# Patient Record
Sex: FEMALE | Race: WHITE | NOT HISPANIC OR LATINO | Employment: OTHER | URBAN - METROPOLITAN AREA
[De-identification: names, ages, dates, MRNs, and addresses within clinical notes are randomized per-mention and may not be internally consistent; named-entity substitution may affect disease eponyms.]

---

## 2017-01-06 ENCOUNTER — OFFICE VISIT (OUTPATIENT)
Dept: FAMILY MEDICINE | Facility: CLINIC | Age: 62
End: 2017-01-06
Payer: COMMERCIAL

## 2017-01-06 VITALS
RESPIRATION RATE: 18 BRPM | BODY MASS INDEX: 28.33 KG/M2 | WEIGHT: 176.25 LBS | DIASTOLIC BLOOD PRESSURE: 58 MMHG | SYSTOLIC BLOOD PRESSURE: 118 MMHG | OXYGEN SATURATION: 99 % | HEART RATE: 68 BPM | TEMPERATURE: 97.8 F | HEIGHT: 66 IN

## 2017-01-06 DIAGNOSIS — J01.00 ACUTE MAXILLARY SINUSITIS, RECURRENCE NOT SPECIFIED: Primary | ICD-10-CM

## 2017-01-06 DIAGNOSIS — C44.310 BCC (BASAL CELL CARCINOMA), FACE: ICD-10-CM

## 2017-01-06 LAB
DEPRECATED S PYO AG THROAT QL EIA: NORMAL
MICRO REPORT STATUS: NORMAL
SPECIMEN SOURCE: NORMAL

## 2017-01-06 PROCEDURE — 99213 OFFICE O/P EST LOW 20 MIN: CPT | Performed by: PHYSICIAN ASSISTANT

## 2017-01-06 PROCEDURE — 87880 STREP A ASSAY W/OPTIC: CPT | Performed by: PHYSICIAN ASSISTANT

## 2017-01-06 PROCEDURE — 87081 CULTURE SCREEN ONLY: CPT | Performed by: PHYSICIAN ASSISTANT

## 2017-01-06 RX ORDER — AZITHROMYCIN 250 MG/1
TABLET, FILM COATED ORAL
Qty: 6 TABLET | Refills: 0 | Status: SHIPPED | OUTPATIENT
Start: 2017-01-06 | End: 2017-01-16

## 2017-01-06 NOTE — NURSING NOTE
"Chief Complaint   Patient presents with     Throat Problem     since yesterday       Initial /58 mmHg  Pulse 68  Temp(Src) 97.8  F (36.6  C) (Tympanic)  Resp 18  Ht 5' 6\" (1.676 m)  Wt 176 lb 4 oz (79.946 kg)  BMI 28.46 kg/m2  SpO2 99%  LMP 01/01/2004 (Approximate)  Breastfeeding? No Estimated body mass index is 28.46 kg/(m^2) as calculated from the following:    Height as of this encounter: 5' 6\" (1.676 m).    Weight as of this encounter: 176 lb 4 oz (79.946 kg).  BP completed using cuff size: large    Patient would like to be notified at the following phone number for results from this visit   [phone] OK to leave message   Rebecca Garcia CMA (AAMA) 1/6/2017 9:17 AM      "

## 2017-01-06 NOTE — MR AVS SNAPSHOT
"              After Visit Summary   1/6/2017    Flor Griffin    MRN: [id number]           Patient Information     Date Of Birth          1955        Visit Information        Provider Department      1/6/2017 9:30 AM Rocío Donahue PA-C [facility]        Today's Diagnoses     Acute maxillary sinusitis, recurrence not specified    -  1     BCC (basal cell carcinoma), face            Follow-ups after your visit        Your next 10 appointments already scheduled     Jan 06, 2017  9:30 AM   SHORT with Rocío Donahue PA-C   [facility]    [address] [address] [phone]            Jan 09, 2017  9:00 AM   PHYSICAL with Suzanne Goode MD   [facility]    [address] [address] [phone]              Who to contact     If you have questions or need follow up information about today's clinic visit or your schedule please contact [facility] directly at [phone].  Normal or non-critical lab and imaging results will be communicated to you by MyChart, letter or phone within 4 business days after the clinic has received the results. If you do not hear from us within 7 days, please contact the clinic through MyChart or phone. If you have a critical or abnormal lab result, we will notify you by phone as soon as possible.  Submit refill requests through [employer] or call your pharmacy and they will forward the refill request to us. Please allow 3 business days for your refill to be completed.          Additional Information About Your Visit        MyChart Information     [email or web address] lets you send messages to your doctor, view your test results, renew your prescriptions, schedule appointments and more. To sign up, go to [email or web address] . Click on \"Log in\" on the left side of the screen, which will take you to the " "Welcome page. Then click on \"Sign up Now\" on the right side of the page.     You will be asked to enter the access code listed below, as well as some personal information. Please follow the directions to create your username and password.     Your access code is: [email or web address]  Expires: 2017  9:28 AM     Your access code will  in 90 days. If you need help or a new code, please call your [facility] clinic or [phone].        Care EveryWhere ID     This is your Care EveryWhere ID. This could be used by other organizations to access your [facility] medical records  [id number]        Your Vitals Were     Pulse Temperature Respirations    68 97.8  F (36.6  C) (Tympanic) 18    Height BMI (Body Mass Index) Pulse Oximetry    5' 6\" (1.676 m) 28.46 kg/m2 99%    Last Period Breastfeeding?       2004 (Approximate) No        Blood Pressure from Last 3 Encounters:   17 118/58   16 120/58   04/29/15 122/68    Weight from Last 3 Encounters:   17 176 lb 4 oz (79.946 kg)   16 179 lb 5 oz (81.336 kg)   04/29/15 179 lb 14.4 oz (81.602 kg)              We Performed the Following     Beta strep group A culture     Rapid strep screen          Today's Medication Changes          These changes are accurate as of: 17  9:28 AM.  If you have any questions, ask your nurse or doctor.               Start taking these medicines.        Dose/Directions    azithromycin 250 MG tablet   Commonly known as:  ZITHROMAX   Used for:  Acute maxillary sinusitis, recurrence not specified   Started by:  Rocío Donahue PA-C        Two tablets first day, then one tablet daily for four days.   Quantity:  6 tablet   Refills:  0            Where to get your medicines      These medications were sent to [facility] - [place], [place] - [address] AT [address]  [address], [facility] [address]     Phone:  [phone]    - azithromycin 250 MG tablet          "    Primary Care Provider Office Phone # Fax #    Suzanne Goode [phone]       [facility] [address]  [facility] [address]        Thank you!     Thank you for choosing [facility]  for your care. Our goal is always to provide you with excellent care. Hearing back from our patients is one way we can continue to improve our services. Please take a few minutes to complete the written survey that you may receive in the mail after your visit with us. Thank you!             Your Updated Medication List - Protect others around you: Learn how to safely use, store and throw away your medicines at www.disposemymeds.org.          This list is accurate as of: 1/6/17  9:28 AM.  Always use your most recent med list.                   Brand Name Dispense Instructions for use    aspirin 81 MG tablet      Take 81 mg by mouth daily       azithromycin 250 MG tablet    ZITHROMAX    6 tablet    Two tablets first day, then one tablet daily for four days.       CHOLEST OFF COMPLETE PO      Take 1 tablet by mouth daily       OMEGA-3 FISH OIL PO      Take 1 g by mouth daily       VITAMIN D (CHOLECALCIFEROL) PO      Take 1,000 Units by mouth daily

## 2017-01-06 NOTE — PROGRESS NOTES
"  SUBJECTIVE:                                                    Folr Griffin is a 61 year old female who presents to clinic today for the following health issues:      RESPIRATORY SYMPTOMS      Duration: x 1 month intermittently    Description  Productive Cough, Fatigue, Sore Throat Since Last Night    Severity: moderate    Accompanying signs and symptoms: None    History (predisposing factors):  none    Precipitating or alleviating factors: None    Therapies tried and outcome:  Ibuprofen--Per Patient Temporary Relief        Patient is here today complaining of persistent cough ongoing for 1 month  She states it is productive with \"slime\"  No fever, + chills, and achy and tired  + runny nose and sore throat  Some of the other symptoms come and go  Taking OTC Ibuprofen and Vit C without relief      Problem list and histories reviewed & adjusted, as indicated.  Additional history: as documented    Patient Active Problem List   Diagnosis     Hyperlipidemia LDL goal <160     Heartburn     Vitamin D deficiency     BCC (basal cell carcinoma), face     Past Surgical History   Procedure Laterality Date     Mohs micrographic procedure  ~2010     right cheek; BCC     Colonoscopy         Social History   Substance Use Topics     Smoking status: Never Smoker      Smokeless tobacco: Never Used     Alcohol Use: 0.0 oz/week     0 Standard drinks or equivalent per week     Family History   Problem Relation Age of Onset     Lupus Mother      HEART DISEASE Mother      CHF     DIABETES Father      Breast Cancer Other          Current Outpatient Prescriptions   Medication Sig Dispense Refill     azithromycin (ZITHROMAX) 250 MG tablet Two tablets first day, then one tablet daily for four days. 6 tablet 0     VITAMIN D, CHOLECALCIFEROL, PO Take 1,000 Units by mouth daily       Omega-3 Fatty Acids (OMEGA-3 FISH OIL PO) Take 1 g by mouth daily       Plant Sterol Stanol-Pantethine (CHOLEST OFF COMPLETE PO) Take 1 tablet by mouth daily  " "     aspirin 81 MG tablet Take 81 mg by mouth daily       Allergies   Allergen Reactions     Penicillins Hives       ROS:  Constitutional, HEENT, cardiovascular, pulmonary, gi and gu systems are negative, except as otherwise noted.    OBJECTIVE:                                                    /58 mmHg  Pulse 68  Temp(Src) 97.8  F (36.6  C) (Tympanic)  Resp 18  Ht 5' 6\" (1.676 m)  Wt 176 lb 4 oz (79.946 kg)  BMI 28.46 kg/m2  SpO2 99%  LMP 01/01/2004 (Approximate)  Breastfeeding? No  Body mass index is 28.46 kg/(m^2).  GENERAL: healthy, alert and no distress  EYES: Eyes grossly normal to inspection, PERRL and conjunctivae and sclerae normal  HENT: normal cephalic/atraumatic, ear canals and TM's normal, nose and mouth without ulcers or lesions, oropharynx clear, oral mucous membranes moist and sinuses: maxillary tenderness on bilateral, + post nasal drip  NECK: no adenopathy, no asymmetry, masses, or scars and thyroid normal to palpation  RESP: lungs clear to auscultation - no rales, rhonchi or wheezes  CV: regular rate and rhythm, normal S1 S2, no S3 or S4, no murmur, click or rub, no peripheral edema and peripheral pulses strong  MS: no gross musculoskeletal defects noted, no edema    Diagnostic Test Results:  none      ASSESSMENT/PLAN:                                                            1. Acute maxillary sinusitis, recurrence not specified  New problem, RS -, TC pending, will treat with Z sumit and recommend continual use of OTCs. Push fluids. F/U if symptoms worsen or do not improve.  - Rapid strep screen  - Beta strep group A culture  - azithromycin (ZITHROMAX) 250 MG tablet; Two tablets first day, then one tablet daily for four days.  Dispense: 6 tablet; Refill: 0    2. BCC (basal cell carcinoma), face  New problem, being followed by my Dermatology.      Risks, benefits and alternatives were discussed with patient. Agreeable to the plan of care.      OSCAR Schroeder " [facility]

## 2017-01-08 LAB
BACTERIA SPEC CULT: NORMAL
MICRO REPORT STATUS: NORMAL
SPECIMEN SOURCE: NORMAL

## 2017-01-16 ENCOUNTER — OFFICE VISIT (OUTPATIENT)
Dept: FAMILY MEDICINE | Facility: CLINIC | Age: 62
End: 2017-01-16
Payer: COMMERCIAL

## 2017-01-16 VITALS
RESPIRATION RATE: 15 BRPM | DIASTOLIC BLOOD PRESSURE: 54 MMHG | TEMPERATURE: 98 F | HEIGHT: 66 IN | SYSTOLIC BLOOD PRESSURE: 102 MMHG | WEIGHT: 176 LBS | OXYGEN SATURATION: 99 % | BODY MASS INDEX: 28.28 KG/M2 | HEART RATE: 70 BPM

## 2017-01-16 DIAGNOSIS — Z00.00 ROUTINE GENERAL MEDICAL EXAMINATION AT A HEALTH CARE FACILITY: Primary | ICD-10-CM

## 2017-01-16 DIAGNOSIS — E78.5 HYPERLIPIDEMIA LDL GOAL <160: ICD-10-CM

## 2017-01-16 DIAGNOSIS — Z12.31 VISIT FOR SCREENING MAMMOGRAM: ICD-10-CM

## 2017-01-16 DIAGNOSIS — E55.9 VITAMIN D DEFICIENCY: ICD-10-CM

## 2017-01-16 PROCEDURE — 99396 PREV VISIT EST AGE 40-64: CPT | Performed by: INTERNAL MEDICINE

## 2017-01-16 NOTE — LETTER
[facility]  [address] [address]  [phone]        March 28, 2017    Flor Griffin  [address] [address]              Dear Flor Griffin    This is to remind you that your fasting labs are  due.    You may call our office at [phone] schedule an appointment.    Please disregard this notice if you have already had your labs drawn or made an appointment.        Sincerely,        Suzanne Goode MD and [facility] lab staff

## 2017-01-16 NOTE — PROGRESS NOTES
SUBJECTIVE:     CC: Flor Griffin is an 61 year old woman who presents for preventive health visit.     Healthy Habits:    Do you get at least three servings of calcium containing foods daily (dairy, green leafy vegetables, etc.)? yes    Amount of exercise or daily activities, outside of work: 3-4 day(s) per week    Problems taking medications regularly not applicable    Medication side effects: No    Have you had an eye exam in the past two years? yes    Do you see a dentist twice per year? yes    Do you have sleep apnea, excessive snoring or daytime drowsiness?no        HISTORY OF PRESENT ILLNESS:    Hyperlipidemia:  The patient presents to the clinic for a hyperlipidemia follow-up.       Use of Krill oil and Cholestoff - has not been taking Choelstoff routinely, but thinks she needs to start backup on it; has been trying to intervene her elevated LDL cholesterol naturally through exercise. Over the weekend, she snoeshoed through [facility] in [place], [place].    Basal Cell Carcinoma:  The patient reports a history of basal cell carcinoma, which were located on her right-sided nose and cheek. Back in 2010, the patient's right cheek cell was removed via the Mohs procedure and on 10/31/2016, Dr. Nicholas removed the cell on her right-sided nose via the Mohs procedure. After both surgeries, she had plastic surgery performed to fill-in the removed skin. The excess skin was taken from her ear and placed on the two areas mentioned. After the procedure in 2016, the patient noticed a lump just above her right clavicle, which was diagnosed as a swollen lymph node. She states the node has not significantly changed since her surgery, but would still like to have it evaluated again.    Heartburn:  When asked, the patient reported requiring Tums three times per week for heartburn relief. In the past, she has taken Omeprazole with relief, but stopped taking it due to the negative effects it has on Vitamin D  absorption.     Physical:  Outside of these three complaints, the patient is here for a routine wellness exam and has no additional medical complaints at this time.         Today's PHQ-2 Score:   PHQ-2 ( 1999 Pfizer) 1/16/2017 8/16/2016   Q1: Little interest or pleasure in doing things 0 0   Q2: Feeling down, depressed or hopeless 0 0   PHQ-2 Score 0 0     Abuse: Current or Past(Physical, Sexual or Emotional)- No  Do you feel safe in your environment - Yes    Social History   Substance Use Topics     Smoking status: Never Smoker      Smokeless tobacco: Never Used     Alcohol Use: 0.0 oz/week     0 Standard drinks or equivalent per week     The patient does not drink >3 drinks per day nor >7 drinks per week.    Recent Labs   Lab Test  04/08/15   0802   CHOL  290*   HDL  59   LDL  204*   TRIG  137   CHOLHDLRATIO  4.9     Reviewed orders with patient.  Reviewed health maintenance and updated orders accordingly - Yes    Mammo Decision Support:  Patient over age 50, mutual decision to screen reflected in health maintenance.    Pertinent mammograms are reviewed under the imaging tab.  History of abnormal Pap smear: NO - age 30- 65 PAP every 3 years recommended  All Histories reviewed and updated in Epic.  Past Medical History   Diagnosis Date     Hyperlipidaemia      Basal cell cancer      right cheek      Past Surgical History   Procedure Laterality Date     Mohs micrographic procedure  ~2010     right cheek; BCC     Colonoscopy         REVIEW OF SYSTEMS:  C: NEGATIVE for fever, chills, or change in weight  I: Hx of Basal Cell Carcinoma; NEGATIVE for worrisome skin rashes or moles  E: NEGATIVE for vision changes or irritation  E/M: NEGATIVE for ear, mouth and throat problems  R: NEGATIVE for significant cough or SOB  CV: NEGATIVE for chest pain, palpitations or peripheral edema  GI: Hx of Vitamin D Deficiency - use of Cholecalciferol 1,000 IUs; POSITIVE for intermittent Heartburn - use of Tums; NEGATIVE for nausea,  "abdominal pain, or change in bowel habits  B: NEGATIVE for masses, tenderness, or nipple discharge  M: NEGATIVE for significant arthralgias or myalgia  N: NEGATIVE for weakness, dizziness or paresthesias  E: Hx of Hyperlipidemia- use of Cholest Off and Krill Oil; NEGATIVE for temperature intolerance, or skin/hair changes  H: POSITIVE for a swollen lymph node; NEGATIVE for bleeding problems  P: NEGATIVE for changes in mood or affect    This document serves as a record of the services and decisions personally performed and made by Suzanne Goode MD. It was created on her behalf by Jes Amor, a trained medical scribe. The creation of this document is based the provider's statements to the medical scribe.  Jes Amor, January 16, 2017 9:44 AM     Problem list, Medication list, Allergies, and Medical/Social/Surgical histories reviewed in [facility] and updated as appropriate.  Labs reviewed in EPIC  OBJECTIVE:     /54 mmHg  Pulse 70  Temp(Src) 98  F (36.7  C) (Oral)  Resp 15  Ht 5' 6\" (1.676 m)  Wt 176 lb (79.833 kg)  BMI 28.42 kg/m2  SpO2 99%  LMP 01/01/2004 (Approximate)    EXAM:  GENERAL: Patient appears healthy, alert and not distressed.  EYES: Eyes appear grossly normal to inspection, with normal conjunctivae and sclerae  HENT: Ear canals and TM's appear normal, mouth without ulcers or lesions, oropharynx is clear and oral mucous membranes are moist  NECK: Small, enlarged, but unchanged lymph node present 3 or 4 mm above the right clavicle; no asymmetry, masses, or scars noted, thyroid is normal to palpation  RESP: Lungs are clear to auscultation - no rales, rhonchi or wheezes present  CV: Regular rate and rhythm, normal S1 S2 heart sounds, no ectopy, no peripheral edema present, peripheral pulses normal, no carotid bruit.  ABDOMEN: Soft, nontender, no hepatosplenomegaly, no masses, normal bowel sounds  BREAST: normal without masses, tenderness or nipple discharge and no palpable axillary masses or " "adenopathy  MS: No gross musculoskeletal defects noted, no edema, gait is age appropriate without ataxia  SKIN: No suspicious rashes, lesions, or moles.  NEURO: Patient exhibits normal strength and tone, normal speech and mentation  PSYCH: Mentation appears normal, affect is normal/bright    Diagnostic Test Results:  No results found for this or any previous visit (from the past 24 hour(s)).     ASSESSMENT/PLAN:     (Z00.00) Routine general medical examination at a health care facility  (primary encounter diagnosis)  Comment: Annual preventative visit. Medications reviewed with patient. TDAP 2014  Continue to eat healthy and get regular exercise - goal of 150 minutes per week  Plan: Schedule an appointment one year from now for another annual wellness check.    (E78.5) Hyperlipidemia LDL goal <160  Comment: LDL from 4/2015 was reviewed - 204; Using Krill Oil and Cholest-off for management; Statins not optimally tolerated; Fasting labs today to recheck Cholesterol levels.  Pooled cohort risk assessment- low  Plan: Comprehensive metabolic panel, Lipid panel reflex to direct LDL          (Z12.31) Visit for screening mammogram  Comment: Due for mammogram screening in April or early May; Referred to [facility] for screening.  Plan: MA Screening Digital Bilateral          (E55.9) Vitamin D deficiency  Comment: Currently taking Vitamin D 1,000 units once daily; Future labs ordered to recheck Vitamin D levels.  Plan: Vitamin D deficiency screening            COUNSELING:   Reviewed preventive health counseling, as reflected in patient instructions       Regular exercise       Healthy diet/nutrition       Osteoporosis Prevention/Bone Health     reports that she has never smoked. She has never used smokeless tobacco.  Estimated body mass index is 28.42 kg/(m^2) as calculated from the following:    Height as of this encounter: 5' 6\" (1.676 m).    Weight as of this encounter: 176 lb (79.833 kg).   Weight " management plan: Encouraged to continue exercising regularly and to maintain a healthy, well-balanced diet.    Counseling Resources:  ATP IV Guidelines  Pooled Cohorts Equation Calculator  Breast Cancer Risk Calculator  FRAX Risk Assessment  ICSI Preventive Guidelines  Dietary Guidelines for Americans, 2010  USDA's MyPlate  ASA Prophylaxis  Lung CA Screening    The information in this document, created by a medical scribe for me, accurately reflects the services I personally performed and the decisions made by me. I have reviewed and approved this document for accuracy.  Dr. Suzanne Goode, 10:04 AM, January 16, 2017    Suzanne Goode MD  Internal Medicine   [facility]

## 2017-01-16 NOTE — MR AVS SNAPSHOT
After Visit Summary   1/16/2017    Flor Griffin    MRN: [id number]           Patient Information     Date Of Birth          1955        Visit Information        Provider Department      1/16/2017 9:00 AM Suzanne Goode MD [facility] [place]        Today's Diagnoses     Routine general medical examination at a health care facility    -  1     Hyperlipidemia LDL goal <160         Visit for screening mammogram         Vitamin D deficiency           Care Instructions      Preventive Health Recommendations  Female Ages 50 - 64    Yearly exam: See your health care provider every year in order to  o Review health changes.   o Discuss preventive care.    o Review your medicines if your doctor has prescribed any.      Get a Pap test every three years (unless you have an abnormal result and your provider advises testing more often).    If you get Pap tests with HPV test, you only need to test every 5 years, unless you have an abnormal result.     You do not need a Pap test if your uterus was removed (hysterectomy) and you have not had cancer.    You should be tested each year for STDs (sexually transmitted diseases) if you're at risk.     Have a mammogram every 1 to 2 years.    Have a colonoscopy at age 50, or have a yearly FIT test (stool test). These exams screen for colon cancer.      Have a cholesterol test every 5 years, or more often if advised.    Have a diabetes test (fasting glucose) every three years. If you are at risk for diabetes, you should have this test more often.     If you are at risk for osteoporosis (brittle bone disease), think about having a bone density scan (DEXA).    Shots: Get a flu shot each year. Get a tetanus shot every 10 years.    Nutrition:     Eat at least 5 servings of fruits and vegetables each day.    Eat whole-grain bread, whole-wheat pasta and brown rice instead of white grains and rice.    Talk to your provider about Calcium and Vitamin D.  "    Lifestyle    Exercise at least 150 minutes a week (30 minutes a day, 5 days a week). This will help you control your weight and prevent disease.    Limit alcohol to one drink per day.    No smoking.     Wear sunscreen to prevent skin cancer.     See your dentist every six months for an exam and cleaning.    See your eye doctor every 1 to 2 years.            Follow-ups after your visit        Future tests that were ordered for you today     Open Future Orders        Priority Expected Expires Ordered    MA Screening Digital Bilateral Routine  1/16/2018 1/16/2017    Comprehensive metabolic panel Routine 3/16/2017 7/16/2017 1/16/2017    Lipid panel reflex to direct LDL Routine 3/16/2017 7/16/2017 1/16/2017    Vitamin D deficiency screening Routine 3/16/2017 7/16/2017 1/16/2017            Who to contact     If you have questions or need follow up information about today's clinic visit or your schedule please contact [facility] directly at [phone].  Normal or non-critical lab and imaging results will be communicated to you by Intervention Insightshart, letter or phone within 4 business days after the clinic has received the results. If you do not hear from us within 7 days, please contact the clinic through Proficientt or phone. If you have a critical or abnormal lab result, we will notify you by phone as soon as possible.  Submit refill requests through T-ZONE or call your pharmacy and they will forward the refill request to us. Please allow 3 business days for your refill to be completed.          Additional Information About Your Visit        Intervention InsightsharStrongSteam Information     T-ZONE lets you send messages to your doctor, view your test results, renew your prescriptions, schedule appointments and more. To sign up, go to [email or web address] . Click on \"Log in\" on the left side of the screen, which will take you to the Welcome page. Then click on \"Sign up Now\" on the right side of the page.     You will be asked to enter " "the access code listed below, as well as some personal information. Please follow the directions to create your username and password.     Your access code is: [email or web address]  Expires: 2017  9:28 AM     Your access code will  in 90 days. If you need help or a new code, please call your [facility] clinic or [phone].        Care EveryWhere ID     This is your Care EveryWhere ID. This could be used by other organizations to access your [facility] medical records  [id number]        Your Vitals Were     Pulse Temperature Respirations    70 98  F (36.7  C) (Oral) 15    Height BMI (Body Mass Index) Pulse Oximetry    5' 6\" (1.676 m) 28.42 kg/m2 99%    Last Period          2004 (Approximate)         Blood Pressure from Last 3 Encounters:   17 102/54   17 118/58   16 120/58    Weight from Last 3 Encounters:   17 176 lb (79.833 kg)   17 176 lb 4 oz (79.946 kg)   16 179 lb 5 oz (81.336 kg)               Primary Care Provider Office Phone # Fax #    Suzanne Goode [phone]       [facility] [address] [facility] [address]        Thank you!     Thank you for choosing [facility]  for your care. Our goal is always to provide you with excellent care. Hearing back from our patients is one way we can continue to improve our services. Please take a few minutes to complete the written survey that you may receive in the mail after your visit with us. Thank you!             Your Updated Medication List - Protect others around you: Learn how to safely use, store and throw away your medicines at www.disposemymeds.org.          This list is accurate as of: 17  9:57 AM.  Always use your most recent med list.                   Brand Name Dispense Instructions for use    aspirin 81 MG tablet      Take 81 mg by mouth daily       CHOLEST OFF COMPLETE PO      Take 1 tablet by mouth daily       OMEGA-3 FISH OIL PO      Take 1 g by " mouth daily       VITAMIN D (CHOLECALCIFEROL) PO      Take 1,000 Units by mouth daily

## 2017-04-11 ENCOUNTER — RADIANT APPOINTMENT (OUTPATIENT)
Dept: MAMMOGRAPHY | Facility: CLINIC | Age: 62
End: 2017-04-11
Attending: INTERNAL MEDICINE
Payer: COMMERCIAL

## 2017-04-11 DIAGNOSIS — Z12.31 VISIT FOR SCREENING MAMMOGRAM: ICD-10-CM

## 2017-04-11 PROCEDURE — G0202 SCR MAMMO BI INCL CAD: HCPCS | Mod: TC

## 2017-06-02 ENCOUNTER — OFFICE VISIT (OUTPATIENT)
Dept: FAMILY MEDICINE | Facility: CLINIC | Age: 62
End: 2017-06-02
Payer: COMMERCIAL

## 2017-06-02 VITALS
DIASTOLIC BLOOD PRESSURE: 78 MMHG | SYSTOLIC BLOOD PRESSURE: 116 MMHG | HEIGHT: 66 IN | WEIGHT: 184 LBS | RESPIRATION RATE: 12 BRPM | BODY MASS INDEX: 29.57 KG/M2 | HEART RATE: 81 BPM | OXYGEN SATURATION: 99 % | TEMPERATURE: 98.3 F

## 2017-06-02 DIAGNOSIS — J06.9 VIRAL URI WITH COUGH: Primary | ICD-10-CM

## 2017-06-02 PROCEDURE — 99213 OFFICE O/P EST LOW 20 MIN: CPT | Performed by: FAMILY MEDICINE

## 2017-06-02 RX ORDER — PREDNISONE 20 MG/1
20 TABLET ORAL DAILY
Qty: 5 TABLET | Refills: 0 | Status: SHIPPED | OUTPATIENT
Start: 2017-06-02 | End: 2017-06-07

## 2017-06-02 NOTE — NURSING NOTE
"Chief Complaint   Patient presents with     URI       Initial /78 (BP Location: Left arm, Patient Position: Chair, Cuff Size: Adult Large)  Pulse 81  Temp 98.3  F (36.8  C) (Oral)  Resp 12  Ht 5' 6\" (1.676 m)  Wt 184 lb (83.5 kg)  LMP 01/01/2004 (Approximate)  SpO2 99%  BMI 29.7 kg/m2 Estimated body mass index is 29.7 kg/(m^2) as calculated from the following:    Height as of this encounter: 5' 6\" (1.676 m).    Weight as of this encounter: 184 lb (83.5 kg).  Medication Reconciliation: complete   Harish Wilhelm CMA       "

## 2017-06-02 NOTE — PATIENT INSTRUCTIONS
Follow up as needed  Viral Upper Respiratory Illness (Adult)  You have a viral upper respiratory illness (URI), which is another term for the common cold. This illness is contagious during the first few days. It is spread through the air by coughing and sneezing. It may also be spread by direct contact (touching the sick person and then touching your own eyes, nose, or mouth). Frequent handwashing will decrease risk of spread. Most viral illnesses go away within 7 to 10 days with rest and simple home remedies. Sometimes the illness may last for several weeks. Antibiotics will not kill a virus, and they are generally not prescribed for this condition.    Home care    If symptoms are severe, rest at home for the first 2 to 3 days. When you resume activity, don't let yourself get too tired.    Avoid being exposed to cigarette smoke (yours or others ).    You may use acetaminophen or ibuprofen to control pain and fever, unless another medicine was prescribed. (Note: If you have chronic liver or kidney disease, have ever had a stomach ulcer or gastrointestinal bleeding, or are taking blood-thinning medicines, talk with your healthcare provider before using these medicines.) Aspirin should never be given to anyone under 18 years of age who is ill with a viral infection or fever. It may cause severe liver or brain damage.    Your appetite may be poor, so a light diet is fine. Avoid dehydration by drinking 6 to 8 glasses of fluids per day (water, soft drinks, juices, tea, or soup). Extra fluids will help loosen secretions in the nose and lungs.    Over-the-counter cold medicines will not shorten the length of time you re sick, but they may be helpful for the following symptoms: cough, sore throat, and nasal and sinus congestion. (Note: Do not use decongestants if you have high blood pressure.)  Follow-up care  Follow up with your healthcare provider, or as advised.  When to seek medical advice  Call your healthcare  provider right away if any of these occur:    Cough with lots of colored sputum (mucus)    Severe headache; face, neck, or ear pain    Difficulty swallowing due to throat pain    Fever of 100.4 F (38 C)  Call 911, or get immediate medical care  Call emergency services right away if any of these occur:    Chest pain, shortness of breath, wheezing, or difficulty breathing    Coughing up blood    Inability to swallow due to throat pain    5950-0681 [employer]. [address] [place] [address]. All rights reserved. This information is not intended as a substitute for professional medical care. Always follow your healthcare professional's instructions.

## 2017-06-02 NOTE — PROGRESS NOTES
SUBJECTIVE:                                                    Flor Griffin is a 62 year old female who presents to clinic today for the following health issues:      Acute Illness   Acute illness concerns: upper respiratory  Onset: started 3 weeks ago got better for 4 days and then got sick again last night     Fever: 3 weeks ago     Chills/Sweats: slight    Headache (location?): YES- a little    Sinus Pressure:YES    Conjunctivitis:  no    Ear Pain: YES: both    Rhinorrhea: YES    Congestion: YES    Sore Throat: no     PND: YES     Cough: YES- feels congested     Wheeze: no    Decreased Appetite: YES    Nausea: no     Vomiting: no     Diarrhea:  YES- one night    Dysuria/Freq.: no     Fatigue/Achiness: YES- fatigue only    Sick/Strep Exposure: yes- grandkids have colds     Therapies Tried and outcome: Mucinex DM and Tylenol helped a little    Also power washed her patio over the weekend and thinks there was some mold that irritated her throat.       Problem list and histories reviewed & adjusted, as indicated.  Additional history: as documented    Patient Active Problem List   Diagnosis     Hyperlipidemia LDL goal <160     Heartburn     Vitamin D deficiency     BCC (basal cell carcinoma), face     Past Surgical History:   Procedure Laterality Date     COLONOSCOPY       MOHS MICROGRAPHIC PROCEDURE  ~2010    right cheek; BCC     MOHS MICROGRAPHIC PROCEDURE  10/31/2016    Dr. Nicholas [facility]       Social History   Substance Use Topics     Smoking status: Never Smoker     Smokeless tobacco: Never Used     Alcohol use 0.0 oz/week     0 Standard drinks or equivalent per week     Family History   Problem Relation Age of Onset     Lupus Mother      HEART DISEASE Mother      CHF     DIABETES Father      Breast Cancer Other            Reviewed and updated as needed this visit by clinical staff       Reviewed and updated as needed this visit by Provider         ROS:  Constitutional, HEENT,  pulmonary, gi  systems are  "negative, except as otherwise noted.    OBJECTIVE:                                                    /78 (BP Location: Left arm, Patient Position: Chair, Cuff Size: Adult Large)  Pulse 81  Temp 98.3  F (36.8  C) (Oral)  Resp 12  Ht 5' 6\" (1.676 m)  Wt 184 lb (83.5 kg)  LMP 01/01/2004 (Approximate)  SpO2 99%  BMI 29.7 kg/m2  Body mass index is 29.7 kg/(m^2).  GENERAL: healthy, alert and no distress  HENT: normal cephalic/atraumatic, ear canals and TM's normal, nose and mouth without ulcers or lesions, oropharynx clear, oral mucous membranes moist, sinuses: not tender and congested  NECK: no adenopathy, no asymmetry, masses, or scars and thyroid normal to palpation  RESP: lungs clear to auscultation - no rales, rhonchi or wheezes  CV: regular rate and rhythm, normal S1 S2,   Diagnostic Test Results:  none      ASSESSMENT/PLAN:                                                        1. Viral URI with cough  - supportive care discussed. No indication for Abx at this time. Recommend mucinex OTC, honey and plenty of fluids. If symptoms persist or worsen in the next 10-14 days patient to follow up with provider.   - predniSONE (DELTASONE) 20 MG tablet; Take 1 tablet (20 mg) by mouth daily for 5 days  Dispense: 5 tablet; Refill: 0    See Patient Instructions    Kate Johnson MD  [facility]  "

## 2017-06-02 NOTE — MR AVS SNAPSHOT
After Visit Summary   6/2/2017    Flor Griffin    MRN: [id number]           Patient Information     Date Of Birth          1955        Visit Information        Provider Department      6/2/2017 2:15 PM Kate Johnson MD [facility]        Today's Diagnoses     Viral URI with cough    -  1      Care Instructions      Follow up as needed  Viral Upper Respiratory Illness (Adult)  You have a viral upper respiratory illness (URI), which is another term for the common cold. This illness is contagious during the first few days. It is spread through the air by coughing and sneezing. It may also be spread by direct contact (touching the sick person and then touching your own eyes, nose, or mouth). Frequent handwashing will decrease risk of spread. Most viral illnesses go away within 7 to 10 days with rest and simple home remedies. Sometimes the illness may last for several weeks. Antibiotics will not kill a virus, and they are generally not prescribed for this condition.    Home care    If symptoms are severe, rest at home for the first 2 to 3 days. When you resume activity, don't let yourself get too tired.    Avoid being exposed to cigarette smoke (yours or others ).    You may use acetaminophen or ibuprofen to control pain and fever, unless another medicine was prescribed. (Note: If you have chronic liver or kidney disease, have ever had a stomach ulcer or gastrointestinal bleeding, or are taking blood-thinning medicines, talk with your healthcare provider before using these medicines.) Aspirin should never be given to anyone under 18 years of age who is ill with a viral infection or fever. It may cause severe liver or brain damage.    Your appetite may be poor, so a light diet is fine. Avoid dehydration by drinking 6 to 8 glasses of fluids per day (water, soft drinks, juices, tea, or soup). Extra fluids will help loosen secretions in the nose and  lungs.    Over-the-counter cold medicines will not shorten the length of time you re sick, but they may be helpful for the following symptoms: cough, sore throat, and nasal and sinus congestion. (Note: Do not use decongestants if you have high blood pressure.)  Follow-up care  Follow up with your healthcare provider, or as advised.  When to seek medical advice  Call your healthcare provider right away if any of these occur:    Cough with lots of colored sputum (mucus)    Severe headache; face, neck, or ear pain    Difficulty swallowing due to throat pain    Fever of 100.4 F (38 C)  Call 911, or get immediate medical care  Call emergency services right away if any of these occur:    Chest pain, shortness of breath, wheezing, or difficulty breathing    Coughing up blood    Inability to swallow due to throat pain    5587-3449 [employer]. [address] [place] [address]. All rights reserved. This information is not intended as a substitute for professional medical care. Always follow your healthcare professional's instructions.                Follow-ups after your visit        Who to contact     If you have questions or need follow up information about today's clinic visit or your schedule please contact [facility] directly at [phone].  Normal or non-critical lab and imaging results will be communicated to you by Cogohart, letter or phone within 4 business days after the clinic has received the results. If you do not hear from us within 7 days, please contact the clinic through Cogohart or phone. If you have a critical or abnormal lab result, we will notify you by phone as soon as possible.  Submit refill requests through MyDemocracy or call your pharmacy and they will forward the refill request to us. Please allow 3 business days for your refill to be completed.          Additional Information About Your Visit        MyDemocracy Information     MyDemocracy lets you send messages to  "your doctor, view your test results, renew your prescriptions, schedule appointments and more. To sign up, go to [email or web address] . Click on \"Log in\" on the left side of the screen, which will take you to the Welcome page. Then click on \"Sign up Now\" on the right side of the page.     You will be asked to enter the access code listed below, as well as some personal information. Please follow the directions to create your username and password.     Your access code is: [email or web address]  Expires: 2017  2:34 PM     Your access code will  in 90 days. If you need help or a new code, please call your [facility] clinic or [phone].        Care EveryWhere ID     This is your Care EveryWhere ID. This could be used by other organizations to access your [facility] medical records  [id number]        Your Vitals Were     Pulse Temperature Respirations Height Last Period Pulse Oximetry    81 98.3  F (36.8  C) (Oral) 12 5' 6\" (1.676 m) 2004 (Approximate) 99%    BMI (Body Mass Index)                   29.7 kg/m2            Blood Pressure from Last 3 Encounters:   17 116/78   17 102/54   17 118/58    Weight from Last 3 Encounters:   17 184 lb (83.5 kg)   17 176 lb (79.8 kg)   17 176 lb 4 oz (79.9 kg)              Today, you had the following     No orders found for display         Today's Medication Changes          These changes are accurate as of: 17  2:34 PM.  If you have any questions, ask your nurse or doctor.               Start taking these medicines.        Dose/Directions    predniSONE 20 MG tablet   Commonly known as:  DELTASONE   Used for:  Viral URI with cough   Started by:  Kate Johnson MD        Dose:  20 mg   Take 1 tablet (20 mg) by mouth daily for 5 days   Quantity:  5 tablet   Refills:  0            Where to get your medicines      These medications were sent to [facility] [id number] [place] [address]  [address]  [address], [facility] [address]     Phone:  [phone]     predniSONE 20 MG tablet                Primary Care Provider Office Phone # Fax #    Suzanne Goode [phone]       [facility] [address]  [facility] [address]        Thank you!     Thank you for choosing [facility]  for your care. Our goal is always to provide you with excellent care. Hearing back from our patients is one way we can continue to improve our services. Please take a few minutes to complete the written survey that you may receive in the mail after your visit with us. Thank you!             Your Updated Medication List - Protect others around you: Learn how to safely use, store and throw away your medicines at www.disposemymeds.org.          This list is accurate as of: 6/2/17  2:34 PM.  Always use your most recent med list.                   Brand Name Dispense Instructions for use    aspirin 81 MG tablet      Take 81 mg by mouth daily       CHOLEST OFF COMPLETE PO      Take 1 tablet by mouth daily       OMEGA-3 FISH OIL PO      Take 1 g by mouth daily       predniSONE 20 MG tablet    DELTASONE    5 tablet    Take 1 tablet (20 mg) by mouth daily for 5 days       VITAMIN D (CHOLECALCIFEROL) PO      Take 1,000 Units by mouth daily

## 2017-07-03 ENCOUNTER — TELEPHONE (OUTPATIENT)
Dept: FAMILY MEDICINE | Facility: CLINIC | Age: 62
End: 2017-07-03

## 2017-07-03 NOTE — TELEPHONE ENCOUNTER
Pt calls.  She is looking for a med for a cold sore.  She says she has gotten acyclovir in the past,- it was pills.  Recently she had a really bad cold sore.  She does not have one right now, but wants to have one on hand.      Advised we probably should see her since we have not given her that med and it is not on her problem list.  She said she will call me back because she is doing  and she has to go.

## 2017-08-25 ENCOUNTER — OFFICE VISIT (OUTPATIENT)
Dept: FAMILY MEDICINE | Facility: CLINIC | Age: 62
End: 2017-08-25
Payer: COMMERCIAL

## 2017-08-25 ENCOUNTER — RADIANT APPOINTMENT (OUTPATIENT)
Dept: GENERAL RADIOLOGY | Facility: CLINIC | Age: 62
End: 2017-08-25
Attending: NURSE PRACTITIONER
Payer: COMMERCIAL

## 2017-08-25 VITALS
TEMPERATURE: 98.4 F | SYSTOLIC BLOOD PRESSURE: 118 MMHG | WEIGHT: 182.4 LBS | OXYGEN SATURATION: 99 % | BODY MASS INDEX: 29.44 KG/M2 | HEART RATE: 62 BPM | DIASTOLIC BLOOD PRESSURE: 72 MMHG

## 2017-08-25 DIAGNOSIS — M25.572 ACUTE LEFT ANKLE PAIN: ICD-10-CM

## 2017-08-25 DIAGNOSIS — M25.572 ACUTE LEFT ANKLE PAIN: Primary | ICD-10-CM

## 2017-08-25 PROCEDURE — 99213 OFFICE O/P EST LOW 20 MIN: CPT | Performed by: NURSE PRACTITIONER

## 2017-08-25 PROCEDURE — 73610 X-RAY EXAM OF ANKLE: CPT | Mod: LT

## 2017-08-25 NOTE — PATIENT INSTRUCTIONS
Rest, ice, elevate.  Wear the boot for the next 7 days when up and around.    If you continue to have symptoms after this you should see Dr. Bhardwaj, the podiatrist here on Wednesdays.

## 2017-08-25 NOTE — PROGRESS NOTES
SUBJECTIVE:   Flor Griffin is a 62 year old female who presents to clinic today for the following health issues:      Musculoskeletal problem/pain      Duration: 2 weeks    Description  Location: Left ankle    Intensity:  moderate    Accompanying signs and symptoms: swelling    History  Previous similar problem: no   Previous evaluation:  none    Precipitating or alleviating factors:  Trauma or overuse: no   Aggravating factors include: standing, walking and bumping the area    Therapies tried and outcome: ice, support wrap and NSAID - with some relief.    Pt presents with L ankle pain, present for about 2-3 weeks.  Believes it is because she has worn flip flops for most of the time, and has occasionally felt like she tweaked her ankle on uneven ground several times.  No traumatic injury.  Mild swelling over lateral aspect.  She feels better when she takes ibuprofen and wraps the ankle with tennis shoes.    Problem list and histories reviewed & adjusted, as indicated.  Additional history: as documented    Patient Active Problem List   Diagnosis     Hyperlipidemia LDL goal <160     Heartburn     Vitamin D deficiency     BCC (basal cell carcinoma), face     Past Surgical History:   Procedure Laterality Date     COLONOSCOPY       MOHS MICROGRAPHIC PROCEDURE  ~2010    right cheek; BCC     MOHS MICROGRAPHIC PROCEDURE  10/31/2016    Dr. Nicholas [facility]       Social History   Substance Use Topics     Smoking status: Never Smoker     Smokeless tobacco: Never Used     Alcohol use 0.0 oz/week     0 Standard drinks or equivalent per week     Family History   Problem Relation Age of Onset     Lupus Mother      HEART DISEASE Mother      CHF     DIABETES Father      Breast Cancer Other              Reviewed and updated as needed this visit by clinical staff     Reviewed and updated as needed this visit by Provider         ROS:  SEE HPI.    OBJECTIVE:     /72  Pulse 62  Temp 98.4  F (36.9  C) (Oral)  Wt 182 lb 6.4 oz  (82.7 kg)  LMP 01/01/2004 (Approximate)  SpO2 99%  BMI 29.44 kg/m2  Body mass index is 29.44 kg/(m^2).  GENERAL: healthy, alert and no distress  MS: L ankle without erythema.  Trace swelling over lateral malleolus.  No ttp throughout ankle or foot.  Mild pain with dorsiflexion and inversion and eversion of ankle.  PSYCH: mentation appears normal, affect normal/bright    Diagnostic Test Results:  none     ASSESSMENT/PLAN:   1. Acute left ankle pain  Likely tendonitis r/t poor footwear and overuse.  She will wear the cam boot for the next week to rest the ankle and then wear supportive footwear and wrap the ankle for support following that time.  She will monitor her progress and if symptoms persist she will see Dr. Bhardwaj.  Pt agrees with plan and verbalized understanding.  - XR Ankle Left G/E 3 Views; Future  - order for DME; Cam boot  Dispense: 1 Units; Refill: 0    HEDY Gale Ra, CNP  [facility]

## 2017-08-25 NOTE — NURSING NOTE
"Chief Complaint   Patient presents with     Musculoskeletal Problem       Initial /72  Pulse 62  Temp 98.4  F (36.9  C) (Oral)  Wt 182 lb 6.4 oz (82.7 kg)  LMP 01/01/2004 (Approximate)  SpO2 99%  BMI 29.44 kg/m2 Estimated body mass index is 29.44 kg/(m^2) as calculated from the following:    Height as of 6/2/17: 5' 6\" (1.676 m).    Weight as of this encounter: 182 lb 6.4 oz (82.7 kg).  Medication Reconciliation: complete   Yola HECTOR M.A.      "

## 2017-08-25 NOTE — MR AVS SNAPSHOT
"              After Visit Summary   8/25/2017    Flor Griffin    MRN: [id number]           Patient Information     Date Of Birth          1955        Visit Information        Provider Department      8/25/2017 2:00 PM Maggi Serrano Ra, APRN CNP [facility]        Today's Diagnoses     Acute left ankle pain    -  1      Care Instructions    Rest, ice, elevate.  Wear the boot for the next 7 days when up and around.    If you continue to have symptoms after this you should see Dr. Bhardwaj, the podiatrist here on Wednesdays.          Follow-ups after your visit        Future tests that were ordered for you today     Open Future Orders        Priority Expected Expires Ordered    XR Ankle Left G/E 3 Views Routine 8/25/2017 8/25/2018 8/25/2017            Who to contact     If you have questions or need follow up information about today's clinic visit or your schedule please contact [facility] directly at [phone].  Normal or non-critical lab and imaging results will be communicated to you by MyChart, letter or phone within 4 business days after the clinic has received the results. If you do not hear from us within 7 days, please contact the clinic through Hurray!hart or phone. If you have a critical or abnormal lab result, we will notify you by phone as soon as possible.  Submit refill requests through IdenIve or call your pharmacy and they will forward the refill request to us. Please allow 3 business days for your refill to be completed.          Additional Information About Your Visit        Hurray!hart Information     IdenIve lets you send messages to your doctor, view your test results, renew your prescriptions, schedule appointments and more. To sign up, go to [email or web address] . Click on \"Log in\" on the left side of the screen, which will take you to the Welcome page. Then click on \"Sign up Now\" on the right side of the page.     You will be asked to enter the access code " listed below, as well as some personal information. Please follow the directions to create your username and password.     Your access code is: [email or web address]  Expires: 2017  2:34 PM     Your access code will  in 90 days. If you need help or a new code, please call your [place] clinic or [phone].        Care EveryWhere ID     This is your Care EveryWhere ID. This could be used by other organizations to access your [place] medical records  [id number]        Your Vitals Were     Pulse Temperature Last Period Pulse Oximetry BMI (Body Mass Index)       62 98.4  F (36.9  C) (Oral) 2004 (Approximate) 99% 29.44 kg/m2        Blood Pressure from Last 3 Encounters:   17 118/72   17 116/78   17 102/54    Weight from Last 3 Encounters:   17 182 lb 6.4 oz (82.7 kg)   17 184 lb (83.5 kg)   17 176 lb (79.8 kg)                 Today's Medication Changes          These changes are accurate as of: 17  2:29 PM.  If you have any questions, ask your nurse or doctor.               Start taking these medicines.        Dose/Directions    order for DME   Used for:  Acute left ankle pain   Started by:  Maggi Serrano Ra, HEDY Rodriges boot   Quantity:  1 Units   Refills:  0            Where to get your medicines      Some of these will need a paper prescription and others can be bought over the counter.  Ask your nurse if you have questions.     Bring a paper prescription for each of these medications     order for DME                Primary Care Provider Office Phone # Fax #    Suzanne Goode [phone] [address] [facility] [address]        Equal Access to Services     TRICIA JARA AH: Mika Nolan, carole heard, qaphil kaalmada osmani, ketan connor. So [facility] [phone].    ATENCIÓN: Si habla español, tiene a thomas disposición servicios gratuitos de asistencia lingüística. Llame al  [phone].    We comply with applicable federal civil rights laws and [place] laws. We do not discriminate on the basis of race, color, national origin, age, disability sex, sexual orientation or gender identity.            Thank you!     Thank you for choosing [facility]  for your care. Our goal is always to provide you with excellent care. Hearing back from our patients is one way we can continue to improve our services. Please take a few minutes to complete the written survey that you may receive in the mail after your visit with us. Thank you!             Your Updated Medication List - Protect others around you: Learn how to safely use, store and throw away your medicines at www.disposemymeds.org.          This list is accurate as of: 8/25/17  2:29 PM.  Always use your most recent med list.                   Brand Name Dispense Instructions for use Diagnosis    aspirin 81 MG tablet      Take 81 mg by mouth daily        CHOLEST OFF COMPLETE PO      Take 1 tablet by mouth daily        OMEGA-3 FISH OIL PO      Take 1 g by mouth daily        order for DME     1 Units    Cam boot    Acute left ankle pain       VITAMIN D (CHOLECALCIFEROL) PO      Take 1,000 Units by mouth daily

## 2017-10-23 ENCOUNTER — OFFICE VISIT (OUTPATIENT)
Dept: FAMILY MEDICINE | Facility: CLINIC | Age: 62
End: 2017-10-23
Payer: COMMERCIAL

## 2017-10-23 VITALS
SYSTOLIC BLOOD PRESSURE: 122 MMHG | BODY MASS INDEX: 29.23 KG/M2 | WEIGHT: 181.9 LBS | TEMPERATURE: 98.1 F | HEIGHT: 66 IN | HEART RATE: 65 BPM | OXYGEN SATURATION: 98 % | DIASTOLIC BLOOD PRESSURE: 78 MMHG | RESPIRATION RATE: 16 BRPM

## 2017-10-23 DIAGNOSIS — J01.00 ACUTE NON-RECURRENT MAXILLARY SINUSITIS: Primary | ICD-10-CM

## 2017-10-23 DIAGNOSIS — Z23 NEED FOR PROPHYLACTIC VACCINATION AND INOCULATION AGAINST INFLUENZA: ICD-10-CM

## 2017-10-23 DIAGNOSIS — B00.1 COLD SORE: ICD-10-CM

## 2017-10-23 DIAGNOSIS — R59.0 SUPRACLAVICULAR LYMPHADENOPATHY: ICD-10-CM

## 2017-10-23 LAB
DEPRECATED S PYO AG THROAT QL EIA: NORMAL
SPECIMEN SOURCE: NORMAL

## 2017-10-23 PROCEDURE — 99214 OFFICE O/P EST MOD 30 MIN: CPT | Mod: 25 | Performed by: PHYSICIAN ASSISTANT

## 2017-10-23 PROCEDURE — 90686 IIV4 VACC NO PRSV 0.5 ML IM: CPT | Performed by: PHYSICIAN ASSISTANT

## 2017-10-23 PROCEDURE — 87081 CULTURE SCREEN ONLY: CPT | Performed by: PHYSICIAN ASSISTANT

## 2017-10-23 PROCEDURE — 87880 STREP A ASSAY W/OPTIC: CPT | Performed by: PHYSICIAN ASSISTANT

## 2017-10-23 PROCEDURE — 90471 IMMUNIZATION ADMIN: CPT | Performed by: PHYSICIAN ASSISTANT

## 2017-10-23 RX ORDER — VALACYCLOVIR HYDROCHLORIDE 1 G/1
1000 TABLET, FILM COATED ORAL 2 TIMES DAILY
Qty: 20 TABLET | Refills: 0 | Status: SHIPPED | OUTPATIENT
Start: 2017-10-23 | End: 2018-05-18

## 2017-10-23 RX ORDER — AZITHROMYCIN 250 MG/1
TABLET, FILM COATED ORAL
Qty: 6 TABLET | Refills: 0 | Status: SHIPPED | OUTPATIENT
Start: 2017-10-23 | End: 2017-11-14

## 2017-10-23 NOTE — NURSING NOTE
"Chief Complaint   Patient presents with     Sinus Problem       Initial /78 (BP Location: Right arm, Patient Position: Chair, Cuff Size: Adult Regular)  Pulse 65  Temp 98.1  F (36.7  C) (Oral)  Resp 16  Ht 5' 6\" (1.676 m)  Wt 181 lb 14.4 oz (82.5 kg)  LMP 01/01/2004 (Approximate)  SpO2 98%  Breastfeeding? No  BMI 29.36 kg/m2 Estimated body mass index is 29.36 kg/(m^2) as calculated from the following:    Height as of this encounter: 5' 6\" (1.676 m).    Weight as of this encounter: 181 lb 14.4 oz (82.5 kg).  Medication Reconciliation: complete   Alfonso Cervantes MA      "

## 2017-10-23 NOTE — MR AVS SNAPSHOT
"              After Visit Summary   10/23/2017    Flor Griffin    MRN: [id number]           Patient Information     Date Of Birth          1955        Visit Information        Provider Department      10/23/2017 9:10 AM Rocío Donahue PA-C [facility]        Today's Diagnoses     Acute non-recurrent maxillary sinusitis    -  1    Supraclavicular lymphadenopathy        Need for prophylactic vaccination and inoculation against influenza           Follow-ups after your visit        Future tests that were ordered for you today     Open Future Orders        Priority Expected Expires Ordered    US Head Neck Soft Tissue Routine  10/23/2018 10/23/2017            Who to contact     If you have questions or need follow up information about today's clinic visit or your schedule please contact [facility] directly at [phone].  Normal or non-critical lab and imaging results will be communicated to you by MyChart, letter or phone within 4 business days after the clinic has received the results. If you do not hear from us within 7 days, please contact the clinic through MyChart or phone. If you have a critical or abnormal lab result, we will notify you by phone as soon as possible.  Submit refill requests through Drippler or call your pharmacy and they will forward the refill request to us. Please allow 3 business days for your refill to be completed.          Additional Information About Your Visit        MyChart Information     Drippler lets you send messages to your doctor, view your test results, renew your prescriptions, schedule appointments and more. To sign up, go to [email or web address] . Click on \"Log in\" on the left side of the screen, which will take you to the Welcome page. Then click on \"Sign up Now\" on the right side of the page.     You will be asked to enter the access code listed below, as well as some personal information. Please follow the directions to " "create your username and password.     Your access code is: [email or web address]  Expires: 2018  9:24 AM     Your access code will  in 90 days. If you need help or a new code, please call your [place] clinic or [phone].        Care EveryWhere ID     This is your Care EveryWhere ID. This could be used by other organizations to access your [place] medical records  [id number]        Your Vitals Were     Pulse Temperature Respirations Height Last Period Pulse Oximetry    65 98.1  F (36.7  C) (Oral) 16 5' 6\" (1.676 m) 2004 (Approximate) 98%    Breastfeeding? BMI (Body Mass Index)                No 29.36 kg/m2           Blood Pressure from Last 3 Encounters:   10/23/17 122/78   17 118/72   17 116/78    Weight from Last 3 Encounters:   10/23/17 181 lb 14.4 oz (82.5 kg)   17 182 lb 6.4 oz (82.7 kg)   17 184 lb (83.5 kg)              We Performed the Following     FLU VAC, SPLIT VIRUS IM > 3 YO (QUADRIVALENT) [02774]     Rapid strep screen     Vaccine Administration, Initial [65619]          Today's Medication Changes          These changes are accurate as of: 10/23/17  9:24 AM.  If you have any questions, ask your nurse or doctor.               Start taking these medicines.        Dose/Directions    azithromycin 250 MG tablet   Commonly known as:  ZITHROMAX   Used for:  Acute non-recurrent maxillary sinusitis   Started by:  Rocío Donahue PA-C        Two tablets first day, then one tablet daily for four days.   Quantity:  6 tablet   Refills:  0            Where to get your medicines      These medications were sent to [facility] - [address]  [address] [address] [address]     Phone:  [phone]     azithromycin 250 MG tablet                Primary Care Provider Office Phone # Fax #    Suzanne Goode [phone] [address] [address]        Equal Access to Services     [facility] " GAAR : Hadii aad dinorah ej Nolan, waelissada luqadaha, qaybta kafrieda danielamonica, waxgamaliel anmol haycara montejohyunbraxton connor. So [facility] [phone].    ATENCIÓN: Si habla español, tiene a thomas disposición servicios gratuitos de asistencia lingüística. Llame al [phone].    We comply with applicable federal civil rights laws and [place] laws. We do not discriminate on the basis of race, color, national origin, age, disability, sex, sexual orientation, or gender identity.            Thank you!     Thank you for choosing [facility]  for your care. Our goal is always to provide you with excellent care. Hearing back from our patients is one way we can continue to improve our services. Please take a few minutes to complete the written survey that you may receive in the mail after your visit with us. Thank you!             Your Updated Medication List - Protect others around you: Learn how to safely use, store and throw away your medicines at www.disposemymeds.org.          This list is accurate as of: 10/23/17  9:24 AM.  Always use your most recent med list.                   Brand Name Dispense Instructions for use Diagnosis    aspirin 81 MG tablet      Take 81 mg by mouth daily        azithromycin 250 MG tablet    ZITHROMAX    6 tablet    Two tablets first day, then one tablet daily for four days.    Acute non-recurrent maxillary sinusitis       OMEGA-3 FISH OIL PO      Take 1 g by mouth daily        VITAMIN D (CHOLECALCIFEROL) PO      Take 1,000 Units by mouth daily

## 2017-10-23 NOTE — PROGRESS NOTES
"  SUBJECTIVE:   Flor Griffin is a 62 year old female who presents to clinic today for the following health issues:      RESPIRATORY SYMPTOMS      Duration: x 1 month    Description  nasal congestion, facial pain/pressure, cough, headache    Severity: moderate    Accompanying signs and symptoms: None    History (predisposing factors):  grandkids are sick    Precipitating or alleviating factors: None    Therapies tried and outcome:  Mucinex, tylenol, and sudafed, nothing helped      Problem list and histories reviewed & adjusted, as indicated.  Additional history: as documented    Patient is here today for evaluation of sinus pain and pressure  Ongoing for 1 month, worsening  She states it initially started in her chest with a productive cough  Now having sinus pain and pressure, blowing green/yellow discharge out  No fever or chills, + headache, no ear pain, initially had a sore throat from coughing though that is improving  Taking OTC without relief    Reviewed and updated as needed this visit by clinical staffTobacco  Allergies  Med Hx  Surg Hx  Fam Hx  Soc Hx      Reviewed and updated as needed this visit by Provider         ROS:  Constitutional, HEENT, cardiovascular, pulmonary, gi and gu systems are negative, except as otherwise noted.      OBJECTIVE:   /78 (BP Location: Right arm, Patient Position: Chair, Cuff Size: Adult Regular)  Pulse 65  Temp 98.1  F (36.7  C) (Oral)  Resp 16  Ht 5' 6\" (1.676 m)  Wt 181 lb 14.4 oz (82.5 kg)  LMP 01/01/2004 (Approximate)  SpO2 98%  Breastfeeding? No  BMI 29.36 kg/m2  Body mass index is 29.36 kg/(m^2).  GENERAL: healthy, alert and no distress  EYES: Eyes grossly normal to inspection, PERRL and conjunctivae and sclerae normal  HENT: normal cephalic/atraumatic, ear canals and TM's normal, nose and mouth without ulcers or lesions, oropharynx clear, oral mucous membranes moist and sinuses: maxillary tenderness on bilateral  NECK: + right supraclavicular " lymph node present 4-5cm in diameter, mobile, rubbery, no asymmetry, masses, or scars and thyroid normal to palpation  RESP: lungs clear to auscultation - no rales, rhonchi or wheezes  CV: regular rate and rhythm, normal S1 S2, no S3 or S4, no murmur, click or rub, no peripheral edema and peripheral pulses strong  MS: no gross musculoskeletal defects noted, no edema    Diagnostic Test Results:  none     ASSESSMENT/PLAN:             1. Acute non-recurrent maxillary sinusitis  New problem, RS -, TC pending.  Will treat with a Z sumit, recommend rest, fluids and OTCs.  F/U if symptoms worsen or do not improve.  - Rapid strep screen  - azithromycin (ZITHROMAX) 250 MG tablet; Two tablets first day, then one tablet daily for four days.  Dispense: 6 tablet; Refill: 0  - Beta strep group A culture    2. Supraclavicular lymphadenopathy  Chronic issue, new to me, patient has right supraclavicular mass that feels like a swollen lymph node, ongoing for 9+ months, mobile. Patient feels that this is enlarging, will set up U/S to further evaluate.  - US Head Neck Soft Tissue; Future    3. Cold sore  Chronic issue, new to me, will order Valtrex for cold sore, initiate pills at onset of sore.  F/U if symptoms worsen or do not improve.  - valACYclovir (VALTREX) 1000 mg tablet; Take 1 tablet (1,000 mg) by mouth 2 times daily  Dispense: 20 tablet; Refill: 0    4. Need for prophylactic vaccination and inoculation against influenza  - FLU VAC, SPLIT VIRUS IM > 3 YO (QUADRIVALENT) [06571]  - Vaccine Administration, Initial [04567]    Risks, benefits and alternatives were discussed with patient. Agreeable to the plan of care.      Rocío Donahue PA-C  [facility]      Injectable Influenza Immunization Documentation    1.  Is the person to be vaccinated sick today?  Yes    2. Does the person to be vaccinated have an allergy to a component   of the vaccine?   No  Egg Allergy Algorithm Link    3. Has the person to be  vaccinated ever had a serious reaction   to influenza vaccine in the past?   No    4. Has the person to be vaccinated ever had Guillain-Barré syndrome?   No    Form completed by Alfonso Cervantes MA

## 2017-10-24 LAB
BACTERIA SPEC CULT: NORMAL
SPECIMEN SOURCE: NORMAL

## 2017-11-09 ENCOUNTER — HOSPITAL ENCOUNTER (OUTPATIENT)
Dept: ULTRASOUND IMAGING | Facility: CLINIC | Age: 62
Discharge: HOME | End: 2017-11-09
Attending: PHYSICIAN ASSISTANT | Admitting: PHYSICIAN ASSISTANT
Payer: COMMERCIAL

## 2017-11-09 DIAGNOSIS — R59.0 SUPRACLAVICULAR LYMPHADENOPATHY: ICD-10-CM

## 2017-11-09 PROCEDURE — 76536 US EXAM OF HEAD AND NECK: CPT

## 2017-11-10 ENCOUNTER — TELEPHONE (OUTPATIENT)
Dept: FAMILY MEDICINE | Facility: CLINIC | Age: 62
End: 2017-11-10

## 2017-11-10 DIAGNOSIS — R22.1 NECK SWELLING: Primary | ICD-10-CM

## 2017-11-10 NOTE — TELEPHONE ENCOUNTER
Clinic Action Needed: YES. Please follow up with Flor regarding her head/neck ultrasound results and treatment plan (referral needs). She can be reached at [phone], ok to leave detailed message. Flor returned call from clinic today and reached FNA nurse instead. This nurse relayed provider result note to patient (see below):    Result Notes   Notes Recorded by Mey Adler RN on 11/10/2017 at 10:15 AM  LM to call clinic.    Mey Adler RN    ------    Notes Recorded by Rocío Donahue PA-C on 11/10/2017 at 8:53 AM  Please call patient: + 3 lymph nodes present, largest is 2.3cm, would recommend f/u with general surgery to see about further work up (may need biopsy). Will place referral if okay. Rocío Donahue PA-C         FNA Triage Call  Presenting Problem: returning call from clinic     Guideline Used: n/a    Patient Recommendations/Teaching: Follow up with [facility] clinic for treatment plan/referral. Next step is a general surgery consult referral per provider recommendation.     Routed to: Dr. Michelle Goode's care team & ERIK Montero care team    Esmer August, RN  [facility]

## 2017-11-13 ENCOUNTER — OFFICE VISIT (OUTPATIENT)
Dept: SURGERY | Facility: CLINIC | Age: 62
End: 2017-11-13
Payer: COMMERCIAL

## 2017-11-13 VITALS
HEART RATE: 68 BPM | BODY MASS INDEX: 29.09 KG/M2 | OXYGEN SATURATION: 98 % | WEIGHT: 181 LBS | HEIGHT: 66 IN | DIASTOLIC BLOOD PRESSURE: 64 MMHG | SYSTOLIC BLOOD PRESSURE: 122 MMHG

## 2017-11-13 DIAGNOSIS — R59.0 SUPRACLAVICULAR ADENOPATHY: Primary | ICD-10-CM

## 2017-11-13 PROCEDURE — 99203 OFFICE O/P NEW LOW 30 MIN: CPT | Performed by: SURGERY

## 2017-11-13 RX ORDER — CEFAZOLIN SODIUM 2 G/100ML
2 INJECTION, SOLUTION INTRAVENOUS
Status: CANCELLED | OUTPATIENT
Start: 2017-11-13

## 2017-11-13 RX ORDER — CEFAZOLIN SODIUM 1 G/3ML
1 INJECTION, POWDER, FOR SOLUTION INTRAMUSCULAR; INTRAVENOUS SEE ADMIN INSTRUCTIONS
Status: CANCELLED | OUTPATIENT
Start: 2017-11-13

## 2017-11-13 NOTE — LETTER
2017    Re: Flor BARTHOLOMEW Gaby - 1955    I was asked to see this 62-year-old patient regarding an enlarging right supraclavicular mass.  This is a mobile lump which the patient has noted for over nine months, but which has recently become larger. No significant tenderness.  The patient does have a history of a basal cell on her face.      Physical exam:  The patient is in no apparent distress.  Previous nonlabored.  The right supraclavicular area reveals some prominence with a 2.5 cm node which actually moves down anterior to the clavicle.  This is quite freely mobile but fairly firm.  There are some mildly palpable nodes in the supraclavicular region on the left, but they are not notably enlarged.     Assessment and plan: This patient with a prominent mobile lymph node in the right supraclavicular area.  This is quite easily approachable surgically, and I have recommended excisional biopsy.  We discussed the procedure, along with its risks and complications, in detail.  The patient has agreed to proceed.  We'll plan on a local anesthesia with sedation.     Waylon Corral MD  [facility]

## 2017-11-13 NOTE — MR AVS SNAPSHOT
"              After Visit Summary   11/13/2017    Flor Griffin    MRN: [id number]           Patient Information     Date Of Birth          1955        Visit Information        Provider Department      11/13/2017 10:30 AM Waylon Corral MD [facility] General Surgery      Today's Diagnoses     Supraclavicular adenopathy    -  1       Follow-ups after your visit        Your next 10 appointments already scheduled     Nov 16, 2017   Procedure with Waylon Corral MD   [facility] (--)    [address] [facility] [address]   [phone]            Nov 16, 2017 10:30 AM [facility] Same Day Surgery with Waylon Corral MD, Brook Blackmon PA-C   [facility] Surgery Scheduling ([facility])    [facility] Surgery Scheduling ([facility])   [phone]              Who to contact     If you have questions or need follow up information about today's clinic visit or your schedule please contact [facility] directly at [phone].  Normal or non-critical lab and imaging results will be communicated to you by MyGeekDayhart, letter or phone within 4 business days after the clinic has received the results. If you do not hear from us within 7 days, please contact the clinic through MyColorScreent or phone. If you have a critical or abnormal lab result, we will notify you by phone as soon as possible.  Submit refill requests through [facility] or call your pharmacy and they will forward the refill request to us. Please allow 3 business days for your refill to be completed.          Additional Information About Your Visit        MyGeekDayharAnesiva Information     [facility] lets you send messages to your doctor, view your test results, renew your prescriptions, schedule appointments and more. To sign up, go to [email or web address] . Click on \"Log in\" on the left side of the " "screen, which will take you to the Welcome page. Then click on \"Sign up Now\" on the right side of the page.     You will be asked to enter the access code listed below, as well as some personal information. Please follow the directions to create your username and password.     Your access code is: [email or web address]  Expires: 2018  8:24 AM     Your access code will  in 90 days. If you need help or a new code, please call your [facility] or [phone].        Care EveryWhere ID     This is your Care EveryWhere ID. This could be used by other organizations to access your [place] medical records  [id number]        Your Vitals Were     Pulse Height Last Period Pulse Oximetry BMI (Body Mass Index)       68 5' 6\" (1.676 m) 2004 (Approximate) 98% 29.21 kg/m2        Blood Pressure from Last 3 Encounters:   17 120/60   17 122/64   10/23/17 122/78    Weight from Last 3 Encounters:   17 179 lb (81.2 kg)   17 181 lb (82.1 kg)   10/23/17 181 lb 14.4 oz (82.5 kg)              Today, you had the following     No orders found for display         Today's Medication Changes          These changes are accurate as of: 17 11:59 PM.  If you have any questions, ask your nurse or doctor.               These medicines have changed or have updated prescriptions.        Dose/Directions    valACYclovir 1000 mg tablet   Commonly known as:  VALTREX   This may have changed:    - when to take this  - reasons to take this   Used for:  Cold sore        Dose:  1000 mg   Take 1 tablet (1,000 mg) by mouth 2 times daily   Quantity:  20 tablet   Refills:  0                Primary Care Provider Office Phone # Fax #    [facility] [phone] [address]  [facility] [address]        Equal Access to Services     MARYJANE JARA AH: Mika Nolan, waaxda luqadaha, qaybta kaalketan cohen . So wa " [phone].    ATENCIÓN: Si lane le, tiene a thomas disposición servicios gratuitos de asistencia lingüística. Pino schafer [phone].    We comply with applicable federal civil rights laws and [place] laws. We do not discriminate on the basis of race, color, national origin, age, disability, sex, sexual orientation, or gender identity.            Thank you!     Thank you for choosing [facility]  for your care. Our goal is always to provide you with excellent care. Hearing back from our patients is one way we can continue to improve our services. Please take a few minutes to complete the written survey that you may receive in the mail after your visit with us. Thank you!             Your Updated Medication List - Protect others around you: Learn how to safely use, store and throw away your medicines at www.disposemymeds.org.          This list is accurate as of: 11/13/17 11:59 PM.  Always use your most recent med list.                   Brand Name Dispense Instructions for use Diagnosis    ADVIL PO      Take 400 mg by mouth every 6 hours as needed for moderate pain        aspirin 81 MG tablet      Take 81 mg by mouth daily        OMEGA-3 FISH OIL PO      Take 1 g by mouth daily        UNABLE TO FIND      Take 900 mg by mouth three times a week MEDICATION NAME: cholestoff plus        valACYclovir 1000 mg tablet    VALTREX    20 tablet    Take 1 tablet (1,000 mg) by mouth 2 times daily    Cold sore       VITAMIN D (CHOLECALCIFEROL) PO      Take 1,000 Units by mouth daily

## 2017-11-13 NOTE — TELEPHONE ENCOUNTER
Patient daughter is calling back, and referral is placed to general surgery today.  She is given the number to call and schedule an appointment this week.  Martita Barrios, RN  Triage Nurse

## 2017-11-13 NOTE — PROGRESS NOTES
I was asked to see this 62-year-old patient regarding an enlarging right supraclavicular mass.  This is a mobile lump which the patient has noted for over nine months, but which has recently become larger.  No significant tenderness.  The patient does have a history of a basal cell on her face.    Past Medical History:   Diagnosis Date     Basal cell cancer     right cheek     Gastroesophageal reflux disease      History of blood transfusion     blue baby     Hyperlipidaemia      Past Surgical History:   Procedure Laterality Date     COLONOSCOPY       MOHS MICROGRAPHIC PROCEDURE  ~2010    right cheek; BCC     MOHS MICROGRAPHIC PROCEDURE  10/31/2016    Dr. [facility]     Social History     Social History     Marital status:      Spouse name: N/A     Number of children: N/A     Years of education: N/A     Occupational History     Not on file.     Social History Main Topics     Smoking status: Never Smoker     Smokeless tobacco: Never Used     Alcohol use 0.0 oz/week     0 Standard drinks or equivalent per week      Comment: 4 times a week, 2 drinks     Drug use: No     Sexual activity: Yes     Partners: Male     Other Topics Concern     Parent/Sibling W/ Cabg, Mi Or Angioplasty Before 65f 55m? No     Social History Narrative     Physical exam:  The patient is in no apparent distress.  Previous nonlabored.  The right supraclavicular area reveals some prominence with a 2.5 cm node which actually moves down anterior to the clavicle.  This is quite freely mobile but fairly firm.  There are some mildly palpable nodes in the supraclavicular region on the left, but they are not notably enlarged.    Assessment and plan: This patient with a prominent mobile lymph node in the right supraclavicular area.  This is quite easily approachable surgically, and I have recommended excisional biopsy.  We discussed the procedure, along with its risks and complications, in detail.  The patient has agreed to proceed.  We'll plan on a  local anesthesia with sedation.    Waylon Corral MD  [facility]    Please route or send letter to:  Primary Care Provider (PCP)

## 2017-11-14 ENCOUNTER — OFFICE VISIT (OUTPATIENT)
Dept: FAMILY MEDICINE | Facility: CLINIC | Age: 62
End: 2017-11-14
Payer: COMMERCIAL

## 2017-11-14 VITALS
TEMPERATURE: 98.1 F | HEIGHT: 66 IN | RESPIRATION RATE: 20 BRPM | OXYGEN SATURATION: 99 % | SYSTOLIC BLOOD PRESSURE: 120 MMHG | DIASTOLIC BLOOD PRESSURE: 60 MMHG | BODY MASS INDEX: 28.77 KG/M2 | WEIGHT: 179 LBS | HEART RATE: 87 BPM

## 2017-11-14 DIAGNOSIS — Z01.818 PREOP GENERAL PHYSICAL EXAM: Primary | ICD-10-CM

## 2017-11-14 DIAGNOSIS — E66.3 OVERWEIGHT: ICD-10-CM

## 2017-11-14 DIAGNOSIS — R59.0 SUPRACLAVICULAR LYMPHADENOPATHY: ICD-10-CM

## 2017-11-14 LAB — HGB BLD-MCNC: 12.5 G/DL (ref 11.7–15.7)

## 2017-11-14 PROCEDURE — 85018 HEMOGLOBIN: CPT | Performed by: PHYSICIAN ASSISTANT

## 2017-11-14 PROCEDURE — 36415 COLL VENOUS BLD VENIPUNCTURE: CPT | Performed by: PHYSICIAN ASSISTANT

## 2017-11-14 PROCEDURE — 99214 OFFICE O/P EST MOD 30 MIN: CPT | Performed by: PHYSICIAN ASSISTANT

## 2017-11-14 NOTE — MR AVS SNAPSHOT
After Visit Summary   11/14/2017    Flor Griffin    MRN: [id number]           Patient Information     Date Of Birth          1955        Visit Information        Provider Department      11/14/2017 1:50 PM Rocío Donahue PA-C [facility]        Today's Diagnoses     Preop general physical exam    -  1    Supraclavicular lymphadenopathy        Overweight          Care Instructions      Before Your Surgery      Call your surgeon if there is any change in your health. This includes signs of a cold or flu (such as a sore throat, runny nose, cough, rash or fever).    Do not smoke, drink alcohol or take over the counter medicine (unless your surgeon or primary care doctor tells you to) for the 24 hours before and after surgery.    If you take prescribed drugs: Follow your doctor s orders about which medicines to take and which to stop until after surgery.    Eating and drinking prior to surgery: follow the instructions from your surgeon    Take a shower or bath the night before surgery. Use the soap your surgeon gave you to gently clean your skin. If you do not have soap from your surgeon, use your regular soap. Do not shave or scrub the surgery site.  Wear clean pajamas and have clean sheets on your bed.           Follow-ups after your visit        Your next 10 appointments already scheduled     Nov 16, 2017   Procedure with Waylon Corral MD   [facility] (--)    [address] [facility] [address]   [phone]            Nov 16, 2017 10:30 AM [facility] Same Day Surgery with Waylon Corral MD, Brook Blackmon PA-C   [facility] Surgery Scheduling ([facility])    [facility] Surgery Scheduling ([facility])   [phone]              Who to contact     If you have questions or need follow up information about today's clinic visit or your schedule please contact [facility] directly at [phone].  Normal or non-critical lab and imaging results will be communicated to you by MyChart, letter or phone within 4 business days after the clinic has received the results. If you do not hear from us within 7 days, please contact the clinic through [facility] or phone. If you have a critical or abnormal lab result, we will notify you by phone as soon as possible.  Submit refill requests through [facility] or call your pharmacy and they will forward the refill request to us. Please allow 3 business days for your refill to be completed.          Additional Information About Your Visit        [facility] Information     [facility] lets you send messages to your doctor, view your test results, renew your prescriptions, schedule appointments and more. To sign up, go to [email or web address] . Click on \"Log in\" on the left side of the screen, which will take you to the Welcome page. Then click on \"Sign up Now\" on the right side of the page.     You will be asked to enter the access code listed below, as well as some personal information. Please follow the directions to create your username and password.     Your access code is: [email or web address]  Expires: 2018  8:24 AM     Your access code will  in 90 days. If you need help or a new code, please call your [facility] clinic or [phone].        Care EveryWhere ID     This is your Care EveryWhere ID. This could be used by other organizations to access your [facility] medical records  [id number]        Your Vitals Were     Pulse Temperature Respirations Height Last Period Pulse Oximetry    87 98.1  F (36.7  C) (Oral) 20 5' 6\" (1.676 m) 2004 (Approximate) 99%    Breastfeeding? BMI (Body Mass Index)                No 28.89 kg/m2           Blood Pressure from Last 3 Encounters:   17 120/60   17 122/64   10/23/17 122/78    Weight from Last 3 Encounters:   17 179 lb (81.2 kg)   17 181 lb (82.1 kg)   10/23/17 181 " lb 14.4 oz (82.5 kg)              We Performed the Following     Hemoglobin          Today's Medication Changes          These changes are accurate as of: 11/14/17  2:30 PM.  If you have any questions, ask your nurse or doctor.               These medicines have changed or have updated prescriptions.        Dose/Directions    valACYclovir 1000 mg tablet   Commonly known as:  VALTREX   This may have changed:    - when to take this  - reasons to take this   Used for:  Cold sore        Dose:  1000 mg   Take 1 tablet (1,000 mg) by mouth 2 times daily   Quantity:  20 tablet   Refills:  0                Primary Care Provider Office Phone # Fax #    Rocío Donahue PA-C [phone] [address] [facility] [address] [facility] [address] [facility] [address]        Equal Access to Services     MARYJANE JARA : Mika sono Oivdio, waaxda luqadaha, qaybta kaalmada adeegyavanita, ketan connor. So [facility] [phone].    ATENCIÓN: Si habla español, tiene a thomas disposición servicios gratuitos de asistencia lingüística. [facility] [phone].    We comply with applicable federal civil rights laws and [place] laws. We do not discriminate on the basis of race, color, national origin, age, disability, sex, sexual orientation, or gender identity.            Thank you!     Thank you for choosing [facility]  for your care. Our goal is always to provide you with excellent care. Hearing back from our patients is one way we can continue to improve our services. Please take a few minutes to complete the written survey that you may receive in the mail after your visit with us. Thank you!             Your Updated Medication List - Protect others around you: Learn how to safely use, store and throw away your medicines at www.disposemymeds.org.          This list is accurate as of: 11/14/17  2:30 PM.  Always use your most recent med list.                   Brand Name Dispense Instructions for use  Diagnosis    ADVIL PO      Take 400 mg by mouth every 6 hours as needed for moderate pain        aspirin 81 MG tablet      Take 81 mg by mouth daily        OMEGA-3 FISH OIL PO      Take 1 g by mouth daily        UNABLE TO FIND      Take 900 mg by mouth three times a week MEDICATION NAME: cholestoff plus        valACYclovir 1000 mg tablet    VALTREX    20 tablet    Take 1 tablet (1,000 mg) by mouth 2 times daily    Cold sore       VITAMIN D (CHOLECALCIFEROL) PO      Take 1,000 Units by mouth daily

## 2017-11-14 NOTE — PROGRESS NOTES
"[facility]  [address] [facility] [address] [phone]  Dept: [phone]    PRE-OP EVALUATION:  Today's date: 2017    Flor Griffin (: 1955) presents for pre-operative evaluation assessment as requested by Dr. Corral  She requires evaluation and anesthesia risk assessment prior to undergoing surgery/procedure for treatment of her lymph node .  Proposed procedure: Excisional Biopsy Right Neck Lymph node    Date of Surgery/ Procedure: 17  Time of Surgery/ Procedure: 10:15 AM   Hospital/Surgical Facility: [facility]  Primary Physician: Rocío Donahue PA-C  Type of Anesthesia Anticipated: General    Patient has a Health Care Directive or Living Will:  NO    1. NO - Do you have a history of heart attack, stroke, stent, bypass or surgery on an artery in the head, neck, heart or legs?  2. NO - Do you ever have any pain or discomfort in your chest?  3. NO - Do you have a history of  Heart Failure?  4. NO - Are you troubled by shortness of breath when: walking on the level, up a slight hill or at night?  5. NO - Do you currently have a cold, bronchitis or other respiratory infection?  6. NO - Do you have a cough, shortness of breath or wheezing? Cough for \"awhile\"tickle in throat that causes cough  7. NO - Do you sometimes get pains in the calves of your legs when you walk?  8. NO - Do you or anyone in your family have previous history of blood clots?  9. NO - Do you or does anyone in your family have a serious bleeding problem such as prolonged bleeding following surgeries or cuts?  10. NO - Have you ever had problems with anemia or been told to take iron pills?  11. NO - Have you had any abnormal blood loss such as black, tarry or bloody stools, or abnormal vaginal bleeding?  12. YES - Have you ever had a blood transfusion? As baby  13. NO - Have you or any of your relatives ever had problems with anesthesia?  14. NO - Do you have sleep apnea, excessive snoring " or daytime drowsiness?  15. NO - Do you have any prosthetic heart valves?  16. NO - Do you have prosthetic joints?  17. NO - Is there any chance that you may be pregnant?        HPI:                                                      Brief HPI related to upcoming procedure: patient had lymph node in neck on right side that was enlarged and she thought may be getting bigger, was concerned. U/S ordered, found to be enlarged, it was recommended she see surgery. Planning for excisional biopsy.      See problem list for active medical problems.  Problems all longstanding and stable, except as noted/documented.  See ROS for pertinent symptoms related to these conditions.                                                                                                  .  HYPERLIPIDEMIA - Patient has a long history of significant Hyperlipidemia requiring medication for treatment with recent good control. Patient reports no problems or side effects with the medication.                                                                                                                                                       .    MEDICAL HISTORY:                                                    Patient Active Problem List    Diagnosis Date Noted     Overweight 11/14/2017     Priority: Medium     Cold sore 10/23/2017     Priority: Medium     BCC (basal cell carcinoma), face 01/06/2017     Priority: Medium     Follows with [facility] q6-1yr check       Vitamin D deficiency 04/29/2015     Priority: Medium     vit D3 26; discussed use of Vit D supplement 2000 IU  Problem list name updated by automated process. Provider to review       Hyperlipidemia LDL goal <160 03/30/2015     Priority: Medium     past use of statins, not well tolerated; now on Cholest-off; due for labs 2015       Heartburn 03/30/2015     Priority: Medium     has been on PPI 3 years; reviewed triggers; no EGD; recommmend change to H2; risk for Vit D         Past  "Medical History:   Diagnosis Date     Basal cell cancer     right cheek     Gastroesophageal reflux disease      History of blood transfusion     blue baby     Hyperlipidaemia      Past Surgical History:   Procedure Laterality Date     COLONOSCOPY       MOHS MICROGRAPHIC PROCEDURE  ~2010    right cheek; BCC     MOHS MICROGRAPHIC PROCEDURE  10/31/2016    Dr. Nicholas [facility]     Current Outpatient Prescriptions   Medication Sig Dispense Refill     UNABLE TO FIND Take 900 mg by mouth three times a week MEDICATION NAME: cholestoff plus       Ibuprofen (ADVIL PO) Take 400 mg by mouth every 6 hours as needed for moderate pain       valACYclovir (VALTREX) 1000 mg tablet Take 1 tablet (1,000 mg) by mouth 2 times daily (Patient taking differently: Take 1,000 mg by mouth 2 times daily as needed ) 20 tablet 0     VITAMIN D, CHOLECALCIFEROL, PO Take 1,000 Units by mouth daily       Omega-3 Fatty Acids (OMEGA-3 FISH OIL PO) Take 1 g by mouth daily       aspirin 81 MG tablet Take 81 mg by mouth daily       OTC products: Aspirin 81mg, last taken 11/14/3/2017    Allergies   Allergen Reactions     Penicillins Hives      Latex Allergy: NO    Social History   Substance Use Topics     Smoking status: Never Smoker     Smokeless tobacco: Never Used     Alcohol use 0.0 oz/week     0 Standard drinks or equivalent per week      Comment: 4 times a week, 2 drinks     History   Drug Use No       REVIEW OF SYSTEMS:                                                    C: NEGATIVE for fever, chills, change in weight  I: NEGATIVE for worrisome rashes, moles or lesions  E: NEGATIVE for vision changes or irritation  ENT/MOUTH: + tickle in throat ongoing  RESP:occasional has a dry cough, ongoing for \"awhile\"  B: NEGATIVE for masses, tenderness or discharge  CV: NEGATIVE for chest pain, palpitations or peripheral edema  GI: POSITIVE for heartburn or reflux  : NEGATIVE for frequency, dysuria, or hematuria  M: NEGATIVE for significant arthralgias or " "myalgia  N: NEGATIVE for weakness, dizziness or paresthesias  E: NEGATIVE for temperature intolerance, skin/hair changes  H: NEGATIVE for bleeding problems  P: NEGATIVE for changes in mood or affect    EXAM:                                                    /60 (BP Location: Right arm, Patient Position: Chair, Cuff Size: Adult Large)  Pulse 87  Temp 98.1  F (36.7  C) (Oral)  Resp 20  Ht 5' 6\" (1.676 m)  Wt 179 lb (81.2 kg)  LMP 01/01/2004 (Approximate)  SpO2 99%  Breastfeeding? No  BMI 28.89 kg/m2    GENERAL APPEARANCE: healthy, alert and no distress     EYES: EOMI, PERRL     HENT: ear canals and TM's normal and nose and mouth without ulcers or lesions     NECK: cervical adenopathy supraclavicular on right     RESP: lungs clear to auscultation - no rales, rhonchi or wheezes     CV: regular rates and rhythm, normal S1 S2, no S3 or S4 and no murmur, click or rub     ABDOMEN:  soft, nontender, no HSM or masses and bowel sounds normal     MS: extremities normal- no gross deformities noted, no evidence of inflammation in joints, FROM in all extremities.     SKIN: no suspicious lesions or rashes     NEURO: Normal strength and tone, sensory exam grossly normal, mentation intact and speech normal     PSYCH: mentation appears normal. and affect normal/bright     LYMPHATICS: No axillary, cervical, or supraclavicular nodes    DIAGNOSTICS:                                                    Hemoglobin (indicated for history of anemia or procedure with significant blood loss such as tonsillectomy, major intraperitoneal surgery, vascular surgery, major spine surgery, total joint replacement)    Recent Labs   Lab Test  04/08/15   0802   NA  141   POTASSIUM  4.1   CR  0.78        IMPRESSION:                                                    Reason for surgery/procedure: excisional biopsy enlarged node  Diagnosis/reason for consult: supraclavicular lymphadenopathy    The proposed surgical procedure is considered LOW " risk.    REVISED CARDIAC RISK INDEX  The patient has the following serious cardiovascular risks for perioperative complications such as (MI, PE, VFib and 3  AV Block):  No serious cardiac risks  INTERPRETATION: 0 risks: Class I (very low risk - 0.4% complication rate)    The patient has the following additional risks for perioperative complications:  No identified additional risks      ICD-10-CM    1. Preop general physical exam Z01.818 Hemoglobin   2. Supraclavicular lymphadenopathy R59.0    3. Overweight E66.3        RECOMMENDATIONS:                                                      --Consult hospital rounder / IM to assist post-op medical management  --Patient is to skip all medications day of surgery.  --Patient understands NPO recommendations, understands where and when to be.  --Patient instructed to take no further ASA, NSAIDs or Steroids until procedure complete.     *Patient did take ASA up until 11/13/2017*     APPROVAL GIVEN to proceed with proposed procedure, without further diagnostic evaluation       Signed Electronically by: Rocío Donahue PA-C    Copy of this evaluation report is provided to requesting physician.    [facility] Preop Guidelines

## 2017-11-14 NOTE — NURSING NOTE
"Chief Complaint   Patient presents with     Pre-Op Exam     11/16/17, bx       Initial /60 (BP Location: Right arm, Patient Position: Chair, Cuff Size: Adult Large)  Pulse 87  Temp 98.1  F (36.7  C) (Oral)  Resp 20  Ht 5' 6\" (1.676 m)  Wt 179 lb (81.2 kg)  LMP 01/01/2004 (Approximate)  SpO2 99%  Breastfeeding? No  BMI 28.89 kg/m2 Estimated body mass index is 28.89 kg/(m^2) as calculated from the following:    Height as of this encounter: 5' 6\" (1.676 m).    Weight as of this encounter: 179 lb (81.2 kg).  Medication Reconciliation: complete   Alfonso Cervantes MA    "

## 2017-11-15 NOTE — H&P (VIEW-ONLY)
"[facility]  [address] [facility] [id number] [phone]  Dept: [phone]    PRE-OP EVALUATION:  Today's date: 2017    Flor Griffin (: 1955) presents for pre-operative evaluation assessment as requested by Dr. Corral  She requires evaluation and anesthesia risk assessment prior to undergoing surgery/procedure for treatment of her lymph node .  Proposed procedure: Excisional Biopsy Right Neck Lymph node    Date of Surgery/ Procedure: 17  Time of Surgery/ Procedure: 10:15 AM   Hospital/Surgical Facility: [facility]  Primary Physician: Rocío Donahue PA-C  Type of Anesthesia Anticipated: General    Patient has a Health Care Directive or Living Will:  NO    1. NO - Do you have a history of heart attack, stroke, stent, bypass or surgery on an artery in the head, neck, heart or legs?  2. NO - Do you ever have any pain or discomfort in your chest?  3. NO - Do you have a history of  Heart Failure?  4. NO - Are you troubled by shortness of breath when: walking on the level, up a slight hill or at night?  5. NO - Do you currently have a cold, bronchitis or other respiratory infection?  6. NO - Do you have a cough, shortness of breath or wheezing? Cough for \"awhile\"tickle in throat that causes cough  7. NO - Do you sometimes get pains in the calves of your legs when you walk?  8. NO - Do you or anyone in your family have previous history of blood clots?  9. NO - Do you or does anyone in your family have a serious bleeding problem such as prolonged bleeding following surgeries or cuts?  10. NO - Have you ever had problems with anemia or been told to take iron pills?  11. NO - Have you had any abnormal blood loss such as black, tarry or bloody stools, or abnormal vaginal bleeding?  12. YES - Have you ever had a blood transfusion? As baby  13. NO - Have you or any of your relatives ever had problems with anesthesia?  14. NO - Do you have sleep apnea, excessive snoring " or daytime drowsiness?  15. NO - Do you have any prosthetic heart valves?  16. NO - Do you have prosthetic joints?  17. NO - Is there any chance that you may be pregnant?        HPI:                                                      Brief HPI related to upcoming procedure: patient had lymph node in neck on right side that was enlarged and she thought may be getting bigger, was concerned. U/S ordered, found to be enlarged, it was recommended she see surgery. Planning for excisional biopsy.      See problem list for active medical problems.  Problems all longstanding and stable, except as noted/documented.  See ROS for pertinent symptoms related to these conditions.                                                                                                  .  HYPERLIPIDEMIA - Patient has a long history of significant Hyperlipidemia requiring medication for treatment with recent good control. Patient reports no problems or side effects with the medication.                                                                                                                                                       .    MEDICAL HISTORY:                                                    Patient Active Problem List    Diagnosis Date Noted     Overweight 11/14/2017     Priority: Medium     Cold sore 10/23/2017     Priority: Medium     BCC (basal cell carcinoma), face 01/06/2017     Priority: Medium     Follows with [facility] q6-1yr check       Vitamin D deficiency 04/29/2015     Priority: Medium     vit D3 26; discussed use of Vit D supplement 2000 IU  Problem list name updated by automated process. Provider to review       Hyperlipidemia LDL goal <160 03/30/2015     Priority: Medium     past use of statins, not well tolerated; now on Cholest-off; due for labs 2015       Heartburn 03/30/2015     Priority: Medium     has been on PPI 3 years; reviewed triggers; no EGD; recommmend change to H2; risk for Vit D         Past  "Medical History:   Diagnosis Date     Basal cell cancer     right cheek     Gastroesophageal reflux disease      History of blood transfusion     blue baby     Hyperlipidaemia      Past Surgical History:   Procedure Laterality Date     COLONOSCOPY       MOHS MICROGRAPHIC PROCEDURE  ~2010    right cheek; BCC     MOHS MICROGRAPHIC PROCEDURE  10/31/2016    Dr. [facility]     Current Outpatient Prescriptions   Medication Sig Dispense Refill     UNABLE TO FIND Take 900 mg by mouth three times a week MEDICATION NAME: cholestoff plus       Ibuprofen (ADVIL PO) Take 400 mg by mouth every 6 hours as needed for moderate pain       valACYclovir (VALTREX) 1000 mg tablet Take 1 tablet (1,000 mg) by mouth 2 times daily (Patient taking differently: Take 1,000 mg by mouth 2 times daily as needed ) 20 tablet 0     VITAMIN D, CHOLECALCIFEROL, PO Take 1,000 Units by mouth daily       Omega-3 Fatty Acids (OMEGA-3 FISH OIL PO) Take 1 g by mouth daily       aspirin 81 MG tablet Take 81 mg by mouth daily       OTC products: Aspirin 81mg, last taken 11/14/3/2017    Allergies   Allergen Reactions     Penicillins Hives      Latex Allergy: NO    Social History   Substance Use Topics     Smoking status: Never Smoker     Smokeless tobacco: Never Used     Alcohol use 0.0 oz/week     0 Standard drinks or equivalent per week      Comment: 4 times a week, 2 drinks     History   Drug Use No       REVIEW OF SYSTEMS:                                                    C: NEGATIVE for fever, chills, change in weight  I: NEGATIVE for worrisome rashes, moles or lesions  E: NEGATIVE for vision changes or irritation  ENT/MOUTH: + tickle in throat ongoing  RESP:occasional has a dry cough, ongoing for \"awhile\"  B: NEGATIVE for masses, tenderness or discharge  CV: NEGATIVE for chest pain, palpitations or peripheral edema  GI: POSITIVE for heartburn or reflux  : NEGATIVE for frequency, dysuria, or hematuria  M: NEGATIVE for significant arthralgias or " "myalgia  N: NEGATIVE for weakness, dizziness or paresthesias  E: NEGATIVE for temperature intolerance, skin/hair changes  H: NEGATIVE for bleeding problems  P: NEGATIVE for changes in mood or affect    EXAM:                                                    /60 (BP Location: Right arm, Patient Position: Chair, Cuff Size: Adult Large)  Pulse 87  Temp 98.1  F (36.7  C) (Oral)  Resp 20  Ht 5' 6\" (1.676 m)  Wt 179 lb (81.2 kg)  LMP 01/01/2004 (Approximate)  SpO2 99%  Breastfeeding? No  BMI 28.89 kg/m2    GENERAL APPEARANCE: healthy, alert and no distress     EYES: EOMI, PERRL     HENT: ear canals and TM's normal and nose and mouth without ulcers or lesions     NECK: cervical adenopathy supraclavicular on right     RESP: lungs clear to auscultation - no rales, rhonchi or wheezes     CV: regular rates and rhythm, normal S1 S2, no S3 or S4 and no murmur, click or rub     ABDOMEN:  soft, nontender, no HSM or masses and bowel sounds normal     MS: extremities normal- no gross deformities noted, no evidence of inflammation in joints, FROM in all extremities.     SKIN: no suspicious lesions or rashes     NEURO: Normal strength and tone, sensory exam grossly normal, mentation intact and speech normal     PSYCH: mentation appears normal. and affect normal/bright     LYMPHATICS: No axillary, cervical, or supraclavicular nodes    DIAGNOSTICS:                                                    Hemoglobin (indicated for history of anemia or procedure with significant blood loss such as tonsillectomy, major intraperitoneal surgery, vascular surgery, major spine surgery, total joint replacement)    Recent Labs   Lab Test  04/08/15   0802   NA  141   POTASSIUM  4.1   CR  0.78        IMPRESSION:                                                    Reason for surgery/procedure: excisional biopsy enlarged node  Diagnosis/reason for consult: supraclavicular lymphadenopathy    The proposed surgical procedure is considered LOW " risk.    REVISED CARDIAC RISK INDEX  The patient has the following serious cardiovascular risks for perioperative complications such as (MI, PE, VFib and 3  AV Block):  No serious cardiac risks  INTERPRETATION: 0 risks: Class I (very low risk - 0.4% complication rate)    The patient has the following additional risks for perioperative complications:  No identified additional risks      ICD-10-CM    1. Preop general physical exam Z01.818 Hemoglobin   2. Supraclavicular lymphadenopathy R59.0    3. Overweight E66.3        RECOMMENDATIONS:                                                      --Consult hospital rounder / IM to assist post-op medical management  --Patient is to skip all medications day of surgery.  --Patient understands NPO recommendations, understands where and when to be.  --Patient instructed to take no further ASA, NSAIDs or Steroids until procedure complete.     *Patient did take ASA up until 11/13/2017*     APPROVAL GIVEN to proceed with proposed procedure, without further diagnostic evaluation       Signed Electronically by: Rocío Donahue PA-C    Copy of this evaluation report is provided to requesting physician.    [facility] Preop Guidelines

## 2017-11-16 ENCOUNTER — ANESTHESIA (OUTPATIENT)
Dept: SURGERY | Facility: CLINIC | Age: 62
End: 2017-11-16
Payer: COMMERCIAL

## 2017-11-16 ENCOUNTER — HOSPITAL ENCOUNTER (OUTPATIENT)
Facility: CLINIC | Age: 62
Discharge: HOME | End: 2017-11-16
Attending: SURGERY | Admitting: SURGERY
Payer: COMMERCIAL

## 2017-11-16 ENCOUNTER — SURGERY (OUTPATIENT)
Age: 62
End: 2017-11-16

## 2017-11-16 ENCOUNTER — ANESTHESIA EVENT (OUTPATIENT)
Dept: SURGERY | Facility: CLINIC | Age: 62
End: 2017-11-16
Payer: COMMERCIAL

## 2017-11-16 ENCOUNTER — APPOINTMENT (OUTPATIENT)
Dept: SURGERY | Facility: PHYSICIAN GROUP | Age: 62
End: 2017-11-16
Payer: COMMERCIAL

## 2017-11-16 VITALS
WEIGHT: 176 LBS | HEIGHT: 66 IN | SYSTOLIC BLOOD PRESSURE: 139 MMHG | RESPIRATION RATE: 16 BRPM | DIASTOLIC BLOOD PRESSURE: 75 MMHG | TEMPERATURE: 97.5 F | OXYGEN SATURATION: 96 % | HEART RATE: 68 BPM | BODY MASS INDEX: 28.28 KG/M2

## 2017-11-16 DIAGNOSIS — R59.9 ADENOPATHY: Primary | ICD-10-CM

## 2017-11-16 PROCEDURE — 88239 TISSUE CULTURE TUMOR: CPT | Performed by: SURGERY

## 2017-11-16 PROCEDURE — 00000159 ZZHCL STATISTIC H-SEND OUTS PREP: Performed by: SURGERY

## 2017-11-16 PROCEDURE — 88185 FLOWCYTOMETRY/TC ADD-ON: CPT | Performed by: SURGERY

## 2017-11-16 PROCEDURE — 25000125 ZZHC RX 250: Performed by: NURSE ANESTHETIST, CERTIFIED REGISTERED

## 2017-11-16 PROCEDURE — 36000063 ZZH SURGERY LEVEL 4 EA 15 ADDTL MIN: Performed by: SURGERY

## 2017-11-16 PROCEDURE — 25000128 H RX IP 250 OP 636: Performed by: NURSE ANESTHETIST, CERTIFIED REGISTERED

## 2017-11-16 PROCEDURE — 88341 IMHCHEM/IMCYTCHM EA ADD ANTB: CPT | Performed by: SURGERY

## 2017-11-16 PROCEDURE — 88280 CHROMOSOME KARYOTYPE STUDY: CPT | Performed by: SURGERY

## 2017-11-16 PROCEDURE — 27210794 ZZH OR GENERAL SUPPLY STERILE: Performed by: SURGERY

## 2017-11-16 PROCEDURE — 71000027 ZZH RECOVERY PHASE 2 EACH 15 MINS: Performed by: SURGERY

## 2017-11-16 PROCEDURE — 25000128 H RX IP 250 OP 636: Performed by: ANESTHESIOLOGY

## 2017-11-16 PROCEDURE — 88342 IMHCHEM/IMCYTCHM 1ST ANTB: CPT | Performed by: SURGERY

## 2017-11-16 PROCEDURE — 37000008 ZZH ANESTHESIA TECHNICAL FEE, 1ST 30 MIN: Performed by: SURGERY

## 2017-11-16 PROCEDURE — 88264 CHROMOSOME ANALYSIS 20-25: CPT | Performed by: SURGERY

## 2017-11-16 PROCEDURE — 38510 BIOPSY/REMOVAL LYMPH NODES: CPT | Performed by: SURGERY

## 2017-11-16 PROCEDURE — 36000093 ZZH SURGERY LEVEL 4 1ST 30 MIN: Performed by: SURGERY

## 2017-11-16 PROCEDURE — 88360 TUMOR IMMUNOHISTOCHEM/MANUAL: CPT | Performed by: SURGERY

## 2017-11-16 PROCEDURE — 88360 TUMOR IMMUNOHISTOCHEM/MANUAL: CPT | Mod: 26 | Performed by: SURGERY

## 2017-11-16 PROCEDURE — 88342 IMHCHEM/IMCYTCHM 1ST ANTB: CPT | Mod: 26,59 | Performed by: SURGERY

## 2017-11-16 PROCEDURE — 25000132 ZZH RX MED GY IP 250 OP 250 PS 637: Performed by: SURGERY

## 2017-11-16 PROCEDURE — 37000009 ZZH ANESTHESIA TECHNICAL FEE, EACH ADDTL 15 MIN: Performed by: SURGERY

## 2017-11-16 PROCEDURE — 00000158 ZZHCL STATISTIC H-FISH PROCESS B/S: Performed by: SURGERY

## 2017-11-16 PROCEDURE — 25000125 ZZHC RX 250: Performed by: SURGERY

## 2017-11-16 PROCEDURE — 88305 TISSUE EXAM BY PATHOLOGIST: CPT | Mod: 26 | Performed by: SURGERY

## 2017-11-16 PROCEDURE — 88360 TUMOR IMMUNOHISTOCHEM/MANUAL: CPT | Mod: 26,59 | Performed by: SURGERY

## 2017-11-16 PROCEDURE — 88377 M/PHMTRC ALYS ISHQUANT/SEMIQ: CPT | Performed by: PATHOLOGY

## 2017-11-16 PROCEDURE — 40000306 ZZH STATISTIC PRE PROC ASSESS II: Performed by: SURGERY

## 2017-11-16 PROCEDURE — 88305 TISSUE EXAM BY PATHOLOGIST: CPT | Performed by: SURGERY

## 2017-11-16 PROCEDURE — 40001004 ZZHCL STATISTIC FLOW INT 9-15 ABY TC 88188: Performed by: SURGERY

## 2017-11-16 PROCEDURE — 88184 FLOWCYTOMETRY/ TC 1 MARKER: CPT | Performed by: SURGERY

## 2017-11-16 PROCEDURE — 88341 IMHCHEM/IMCYTCHM EA ADD ANTB: CPT | Mod: 26,59 | Performed by: SURGERY

## 2017-11-16 PROCEDURE — S0020 INJECTION, BUPIVICAINE HYDRO: HCPCS | Performed by: SURGERY

## 2017-11-16 RX ORDER — LABETALOL HYDROCHLORIDE 5 MG/ML
10 INJECTION, SOLUTION INTRAVENOUS
Status: DISCONTINUED | OUTPATIENT
Start: 2017-11-16 | End: 2017-11-16 | Stop reason: HOSPADM

## 2017-11-16 RX ORDER — ONDANSETRON 2 MG/ML
INJECTION INTRAMUSCULAR; INTRAVENOUS PRN
Status: DISCONTINUED | OUTPATIENT
Start: 2017-11-16 | End: 2017-11-16

## 2017-11-16 RX ORDER — METOCLOPRAMIDE 10 MG/1
10 TABLET ORAL EVERY 6 HOURS PRN
Status: DISCONTINUED | OUTPATIENT
Start: 2017-11-16 | End: 2017-11-16 | Stop reason: HOSPADM

## 2017-11-16 RX ORDER — ONDANSETRON 4 MG/1
4 TABLET, ORALLY DISINTEGRATING ORAL EVERY 30 MIN PRN
Status: DISCONTINUED | OUTPATIENT
Start: 2017-11-16 | End: 2017-11-16 | Stop reason: HOSPADM

## 2017-11-16 RX ORDER — FENTANYL CITRATE 50 UG/ML
25-50 INJECTION, SOLUTION INTRAMUSCULAR; INTRAVENOUS
Status: DISCONTINUED | OUTPATIENT
Start: 2017-11-16 | End: 2017-11-16 | Stop reason: HOSPADM

## 2017-11-16 RX ORDER — DEXAMETHASONE SODIUM PHOSPHATE 4 MG/ML
4 INJECTION, SOLUTION INTRA-ARTICULAR; INTRALESIONAL; INTRAMUSCULAR; INTRAVENOUS; SOFT TISSUE EVERY 10 MIN PRN
Status: DISCONTINUED | OUTPATIENT
Start: 2017-11-16 | End: 2017-11-16 | Stop reason: HOSPADM

## 2017-11-16 RX ORDER — DEXAMETHASONE SODIUM PHOSPHATE 4 MG/ML
INJECTION, SOLUTION INTRA-ARTICULAR; INTRALESIONAL; INTRAMUSCULAR; INTRAVENOUS; SOFT TISSUE PRN
Status: DISCONTINUED | OUTPATIENT
Start: 2017-11-16 | End: 2017-11-16

## 2017-11-16 RX ORDER — LIDOCAINE 40 MG/G
CREAM TOPICAL
Status: DISCONTINUED | OUTPATIENT
Start: 2017-11-16 | End: 2017-11-16 | Stop reason: HOSPADM

## 2017-11-16 RX ORDER — DROPERIDOL 2.5 MG/ML
0.62 INJECTION, SOLUTION INTRAMUSCULAR; INTRAVENOUS
Status: DISCONTINUED | OUTPATIENT
Start: 2017-11-16 | End: 2017-11-16 | Stop reason: CLARIF

## 2017-11-16 RX ORDER — FENTANYL CITRATE 50 UG/ML
INJECTION, SOLUTION INTRAMUSCULAR; INTRAVENOUS PRN
Status: DISCONTINUED | OUTPATIENT
Start: 2017-11-16 | End: 2017-11-16

## 2017-11-16 RX ORDER — SODIUM CHLORIDE, SODIUM LACTATE, POTASSIUM CHLORIDE, CALCIUM CHLORIDE 600; 310; 30; 20 MG/100ML; MG/100ML; MG/100ML; MG/100ML
INJECTION, SOLUTION INTRAVENOUS CONTINUOUS
Status: DISCONTINUED | OUTPATIENT
Start: 2017-11-16 | End: 2017-11-16 | Stop reason: HOSPADM

## 2017-11-16 RX ORDER — PROMETHAZINE HYDROCHLORIDE 25 MG/ML
12.5 INJECTION, SOLUTION INTRAMUSCULAR; INTRAVENOUS
Status: DISCONTINUED | OUTPATIENT
Start: 2017-11-16 | End: 2017-11-16 | Stop reason: HOSPADM

## 2017-11-16 RX ORDER — MEPERIDINE HYDROCHLORIDE 25 MG/ML
12.5 INJECTION INTRAMUSCULAR; INTRAVENOUS; SUBCUTANEOUS
Status: DISCONTINUED | OUTPATIENT
Start: 2017-11-16 | End: 2017-11-16 | Stop reason: HOSPADM

## 2017-11-16 RX ORDER — PROPOFOL 10 MG/ML
INJECTION, EMULSION INTRAVENOUS PRN
Status: DISCONTINUED | OUTPATIENT
Start: 2017-11-16 | End: 2017-11-16

## 2017-11-16 RX ORDER — HYDROMORPHONE HYDROCHLORIDE 1 MG/ML
.3-.5 INJECTION, SOLUTION INTRAMUSCULAR; INTRAVENOUS; SUBCUTANEOUS EVERY 10 MIN PRN
Status: DISCONTINUED | OUTPATIENT
Start: 2017-11-16 | End: 2017-11-16 | Stop reason: HOSPADM

## 2017-11-16 RX ORDER — ONDANSETRON 2 MG/ML
4 INJECTION INTRAMUSCULAR; INTRAVENOUS EVERY 30 MIN PRN
Status: DISCONTINUED | OUTPATIENT
Start: 2017-11-16 | End: 2017-11-16 | Stop reason: HOSPADM

## 2017-11-16 RX ORDER — HYDRALAZINE HYDROCHLORIDE 20 MG/ML
2.5-5 INJECTION INTRAMUSCULAR; INTRAVENOUS EVERY 10 MIN PRN
Status: DISCONTINUED | OUTPATIENT
Start: 2017-11-16 | End: 2017-11-16 | Stop reason: HOSPADM

## 2017-11-16 RX ORDER — PROPOFOL 10 MG/ML
INJECTION, EMULSION INTRAVENOUS CONTINUOUS PRN
Status: DISCONTINUED | OUTPATIENT
Start: 2017-11-16 | End: 2017-11-16

## 2017-11-16 RX ORDER — KETOROLAC TROMETHAMINE 30 MG/ML
30 INJECTION, SOLUTION INTRAMUSCULAR; INTRAVENOUS EVERY 6 HOURS PRN
Status: DISCONTINUED | OUTPATIENT
Start: 2017-11-16 | End: 2017-11-16 | Stop reason: HOSPADM

## 2017-11-16 RX ORDER — NALOXONE HYDROCHLORIDE 0.4 MG/ML
.1-.4 INJECTION, SOLUTION INTRAMUSCULAR; INTRAVENOUS; SUBCUTANEOUS
Status: DISCONTINUED | OUTPATIENT
Start: 2017-11-16 | End: 2017-11-16 | Stop reason: HOSPADM

## 2017-11-16 RX ORDER — DIMENHYDRINATE 50 MG/ML
25 INJECTION, SOLUTION INTRAMUSCULAR; INTRAVENOUS
Status: DISCONTINUED | OUTPATIENT
Start: 2017-11-16 | End: 2017-11-16 | Stop reason: HOSPADM

## 2017-11-16 RX ORDER — METOCLOPRAMIDE HYDROCHLORIDE 5 MG/ML
10 INJECTION INTRAMUSCULAR; INTRAVENOUS EVERY 6 HOURS PRN
Status: DISCONTINUED | OUTPATIENT
Start: 2017-11-16 | End: 2017-11-16 | Stop reason: HOSPADM

## 2017-11-16 RX ORDER — HYDROCODONE BITARTRATE AND ACETAMINOPHEN 5; 325 MG/1; MG/1
1-2 TABLET ORAL EVERY 4 HOURS PRN
Qty: 30 TABLET | Refills: 0 | Status: SHIPPED | OUTPATIENT
Start: 2017-11-16 | End: 2017-11-22

## 2017-11-16 RX ORDER — HYDROCODONE BITARTRATE AND ACETAMINOPHEN 5; 325 MG/1; MG/1
1 TABLET ORAL
Status: COMPLETED | OUTPATIENT
Start: 2017-11-16 | End: 2017-11-16

## 2017-11-16 RX ADMIN — PROPOFOL 30 MG: 10 INJECTION, EMULSION INTRAVENOUS at 10:45

## 2017-11-16 RX ADMIN — SODIUM CHLORIDE, POTASSIUM CHLORIDE, SODIUM LACTATE AND CALCIUM CHLORIDE: 600; 310; 30; 20 INJECTION, SOLUTION INTRAVENOUS at 09:28

## 2017-11-16 RX ADMIN — FENTANYL CITRATE 50 MCG: 50 INJECTION, SOLUTION INTRAMUSCULAR; INTRAVENOUS at 10:41

## 2017-11-16 RX ADMIN — ONDANSETRON 4 MG: 2 INJECTION INTRAMUSCULAR; INTRAVENOUS at 10:45

## 2017-11-16 RX ADMIN — PROPOFOL 50 MCG/KG/MIN: 10 INJECTION, EMULSION INTRAVENOUS at 10:41

## 2017-11-16 RX ADMIN — PROPOFOL 30 MG: 10 INJECTION, EMULSION INTRAVENOUS at 10:41

## 2017-11-16 RX ADMIN — HYDROCODONE BITARTRATE AND ACETAMINOPHEN 1 TABLET: 5; 325 TABLET ORAL at 12:19

## 2017-11-16 RX ADMIN — BUPIVACAINE HYDROCHLORIDE 2 ML: 2.5 INJECTION, SOLUTION INFILTRATION; PERINEURAL at 11:08

## 2017-11-16 RX ADMIN — FENTANYL CITRATE 50 MCG: 50 INJECTION, SOLUTION INTRAMUSCULAR; INTRAVENOUS at 10:38

## 2017-11-16 RX ADMIN — MIDAZOLAM HYDROCHLORIDE 2 MG: 1 INJECTION, SOLUTION INTRAMUSCULAR; INTRAVENOUS at 10:35

## 2017-11-16 RX ADMIN — DEXAMETHASONE SODIUM PHOSPHATE 8 MG: 4 INJECTION, SOLUTION INTRA-ARTICULAR; INTRALESIONAL; INTRAMUSCULAR; INTRAVENOUS; SOFT TISSUE at 10:45

## 2017-11-16 NOTE — IP AVS SNAPSHOT
MRN:[id number]                      After Visit Summary   11/16/2017    Flor Griffin    MRN: [id number]           Thank you!     Thank you for choosing [facility] for your care. Our goal is always to provide you with excellent care. Hearing back from our patients is one way we can continue to improve our services. Please take a few minutes to complete the written survey that you may receive in the mail after you visit. If you would like to speak to someone directly about your visit please contact Patient Relations at [phone]. Thank you!          Patient Information     Date Of Birth          1955        About your hospital stay     You were admitted on:  November 16, 2017 You last received care in the:  [facility] PreOP/PostOP    You were discharged on:  November 16, 2017       Who to Call     For medical emergencies, please call 911.  For non-urgent questions about your medical care, please call your primary care provider or clinic, [phone]  For questions related to your surgery, please call your surgery clinic        Attending Provider     Provider Specialty    Waylon Corral MD General Surgery       Primary Care Provider Office Phone # Fax #    Rocío Donahue PA-C [phone]      Further instructions from your care team       GENERAL ANESTHESIA OR SEDATION ADULT DISCHARGE INSTRUCTIONS   SPECIAL PRECAUTIONS FOR 24 HOURS AFTER SURGERY    IT IS NOT UNUSUAL TO FEEL LIGHT-HEADED OR FAINT, UP TO 24 HOURS AFTER SURGERY OR WHILE TAKING PAIN MEDICATION.  IF YOU HAVE THESE SYMPTOMS; SIT FOR A FEW MINUTES BEFORE STANDING AND HAVE SOMEONE ASSIST YOU WHEN YOU GET UP TO WALK OR USE THE BATHROOM.    YOU SHOULD REST AND RELAX FOR THE NEXT 24 HOURS AND YOU MUST MAKE ARRANGEMENTS TO HAVE SOMEONE STAY WITH YOU FOR AT LEAST 24 HOURS AFTER YOUR DISCHARGE.  AVOID HAZARDOUS AND STRENUOUS ACTIVITIES.  DO NOT MAKE IMPORTANT DECISIONS FOR 24 HOURS.    DO  NOT DRIVE ANY VEHICLE OR OPERATE MECHANICAL EQUIPMENT FOR 24 HOURS FOLLOWING THE END OF YOUR SURGERY.  EVEN THOUGH YOU MAY FEEL NORMAL, YOUR REACTIONS MAY BE AFFECTED BY THE MEDICATION YOU HAVE RECEIVED.    DO NOT DRINK ALCOHOLIC BEVERAGES FOR 24 HOURS FOLLOWING YOUR SURGERY.    DRINK CLEAR LIQUIDS (APPLE JUICE, GINGER ALE, 7-UP, BROTH, ETC.).  PROGRESS TO YOUR REGULAR DIET AS YOU FEEL ABLE.    YOU MAY HAVE A DRY MOUTH, A SORE THROAT, MUSCLES ACHES OR TROUBLE SLEEPING.  THESE SHOULD GO AWAY AFTER 24 HOURS.    CALL YOUR DOCTOR FOR ANY OF THE FOLLOWING:  SIGNS OF INFECTION (FEVER, GROWING TENDERNESS AT THE SURGERY SITE, A LARGE AMOUNT OF DRAINAGE OR BLEEDING, SEVERE PAIN, FOUL-SMELLING DRAINAGE, REDNESS OR SWELLING.    IT HAS BEEN OVER 8 TO 10 HOURS SINCE SURGERY AND YOU ARE STILL NOT ABLE TO URINATE (PASS WATER).     HOME CARE FOLLOWING MINOR SURGERY  Sandra Elena, SOCO Corral, JUDY Whipple, MIROSLAVA Flowers, SOCO Sawyer, ALE Bosch, CORNELIUS Morales    RESULTS:  If a biopsy of tissue was done, you may call for your final pathology report after 1p.m. two working days after surgery.  Your results will also be reviewed with you at your postoperative appointment.    INCISIONAL CARE:    If you have a dressing in place, keep clean and dry for 48 hours; you may replace the gauze if it becomes soiled.    After 48 hours you may remove the dressing and shower.  Do not submerse incision in water for 1 week.    If you have a Dermabond dressing (a type of skin glue), you may shower immediately.    Sutures which are beneath the skin will absorb and do not need to be removed.    Sutures you can see should be removed at your surgeon's office in 10-14 days at the latest.    If present, leave the steri-strips (white paper tapes) in place for 14 days after surgery.    If present, leave Dermabond glue in place until it wears/flakes off.    You may expect a small amount of drainage from your incision.    A lump/ridge under the incision is normal  and will gradually resolve.  If it becomes red or very uncomfortable, contact the nurse at your surgeon's office to discuss whether this needs to be evaluated.    ACTIVITY:  Cautiously resume exercise and strenuous activities such as jogging, tennis, aerobics, etc. Also, be careful of stretching activities which affect the area of surgery for two weeks.    DIET:  No restrictions.  Increased fluid intake is recommended. While taking pain medications, increase dietary fiber or add a fiber supplementation like Metamucil or Citrucel to help prevent constipation - a possible side effect of pain medications.    DISCOMFORT:  Local anesthetic placed at surgery should provide relief for 4-8 hours.  Begin taking pain pills before discomfort is severe.  Take the pain medication with some food, when possible, to minimize side effects.  Intermittent use of ice packs may help during the first 48 hours.  Expect gradual improvement.  You may slowly transition to use of over-the-counter medications for pain relief when you are no longer requiring narcotics for pain control.    RETURN APPOINTMENT:  Schedule a follow-up visit 2-3 weeks post-op.  Office Phone:  [phone]     CONTACT US IF THE FOLLOWING DEVELOPS:   1. A fever that is above 101     2. If there is a large amount of drainage, bleeding, or swelling.   3. Severe pain that is not relieved by your prescription.   4. Drainage that is thick, cloudy, yellow, green or white.   5. Any other questions not answered by  Frequently Asked Questions  sheet.      FREQUENTLY ASKED QUESTIONS:    Q:  How should my incision look?    A:  Normally your incision will appear slightly swollen with light redness directly along the incision itself as it heals.  It may feel like a bump or ridge as the healing/scarring happens, and over time (3-4 months) this bump or ridge feeling should slowly go away.  In general, clear or pink watery drainage can be normal at first as your incision heals, but  should decrease over time.    Q:  How do I know if my incision is infected?  A:  Look at your incision for signs of infection, like redness around the incision spreading to surrounding skin, or drainage of cloudy or foul-smelling drainage.  If you feel warm, check your temperature to see if you are running a fever.    **If any of these things occur, please notify the nurse at our office.  We may need you to come into the office for an incision check.      Q:  How do I take care of my incision?  A:  If you have a dressing in place - Starting the day after surgery, replace the dressing 1-2 times a day until there is no further drainage from the incision.  At that time, a dressing is no longer needed.  Try to minimize tape on the skin if irritation is occurring at the tape sites.  If you have significant irritation from tape on the skin, please call the office to discuss other method of dressing your incision.    Small pieces of tape called  steri-strips  may be present directly overlying your incision; these may be removed 10 days after surgery unless otherwise specified by your surgeon.  If these tapes start to loosen at the ends, you may trim them back until they fall off or are removed.    A:  If you had  Dermabond  tissue glue used as a dressing (this causes your incision to look shiny with a clear covering over it) - This type of dressing wears off with time and does not require more dressings over the top unless it is draining around the glue as it wears off.  Do not apply ointments or lotions over the incisions until the glue has completely worn off.    Q:  There is a piece of tape or a sticky  lead  still on my skin.  Can I remove this?  A:  Sometimes the sticky  leads  used for monitoring during surgery or for evaluation in the emergency department are not all removed while you are in the hospital.  These sometimes have a tab or metal dot on them.  You can easily remove these on your own, like taking off a  band-aid.  If there is a gel substance under the  lead , simply wipe/clean it off with a washcloth or paper towel.      Q:  What can I do to minimize constipation (very hard stools, or lack of stools)?  A:  Stay well hydrated.  Increase your dietary fiber intake or take a fiber supplement -with plenty of water.  Walk around frequently.  You may consider an over-the-counter stool-softener.  Your Pharmacist can assist you with choosing one that is stocked at your pharmacy.  Constipation is also one of the most common side effects of pain medication.  If you are using pain medication, be pro-active and try to PREVENT problems with constipation by taking the steps above BEFORE constipation becomes a problem.    Q:  What do I do if I need more pain medications?  A:  Call the office to receive refills.  Be aware that certain pain meds cannot be called into a pharmacy and actually require a paper prescription.  A change may be made in your pain med as you progress thru your recovery period or if you have side effects to certain meds.    --Pain meds are NOT refilled after 5pm on weekdays, and NOT AT ALL on the weekends, so please look ahead to prevent problems.      Q:  Why am I having a hard time sleeping now that I am at home?  A:  Many medications you receive while you are in the hospital can impact your sleep for a number of days after your surgery/hospitalization.  Decreased level of activity and naps during the day may also make sleeping at night difficult.  Try to minimize day-time naps, and get up frequently during the day to walk around your home during your recovery time.  Sleep aides may be of some help, but are not recommended for long-term use.      Q:  I am having some back discomfort.  What should I do?  A:  This may be related to certain positioning that was required for your surgery, extended periods of time in bed, or other changes in your overall activity level.  You may try ice, heat, acetaminophen, or  ibuprofen to treat this temporarily.  Note that many pain medications have acetaminophen in them and would state this on the prescription bottle.  Be sure not to exceed the maximum of 4000mg per day of acetaminophen.     **If the pain you are having does not resolve, is severe, or is a flare of back pain you have had on other occasions prior to surgery, please contact your primary physician for further recommendations or for an appointment to be examined at their office.    Q:  Why am I having headaches?  A:  Headaches can be caused by many things:  caffeine withdrawal, use of pain meds, dehydration, high blood pressure, lack of sleep, over-activity/exhaustion, flare-up of usual migraine headaches.  If you feel this is related to muscle tension (a band-like feeling around the head, or a pressure at the low-back of the head) you may try ice or heat to this area.  You may need to drink more fluids (try electrolyte drink like Gatorade), rest, or take your usual migraine medications.   **If your headaches do not resolve, worsen, are accompanied by other symptoms, or if your blood pressure is high, please call your primary physician for recommendation and/or examination.    Q:  I am unable to urinate.  What do I do?  A:  A small percentage of people can have difficulty urinating initially after surgery.  This includes being able to urinate only a very small amount at a time and feeling discomfort or pressure in the very low abdomen.  This is called  urinary retention , and is actually an urgent situation.  Proceed to your nearest Emergency department for evaluation (not an Urgent Care Center).  Sometimes the bladder does not work correctly after certain medications you receive during surgery, or related to certain procedures.  You may need to have a catheter placed until your bladder recovers.  When planning to go to an Emergency department, it may help to call the ER to let them know you are coming in for this problem  "after a surgery.  This may help you get in quicker to be evaluated.  **If you have symptoms of a urinary tract infection, please contact your primary physician for the proper evaluation and treatment.          If you have other questions, please call the office Monday thru Friday between 8am and 5pm to discuss with the nurse or physician assistant.  [phone]    There is a surgeon ON CALL on weekday evenings and over the weekend in case of urgent need only, and may be contacted at the same number.    If you are having an emergency, call 911 or proceed to your nearest emergency department.              Pending Results     Date and Time Order Name Status Description    2017 1106 Surgical pathology exam In process             Admission Information     Date & Time Provider Department Dept. Phone    2017 Waylon Corral MD [facility] PreOP/PostOP [phone]      Your Vitals Were     Blood Pressure Pulse Temperature Respirations Height Weight    122/64 68 97.5  F (36.4  C) (Temporal) 16 1.676 m (5' 6\") 79.8 kg (176 lb)    Last Period Pulse Oximetry BMI (Body Mass Index)             2004 (Approximate) 98% 28.41 kg/m2         MyChart Information     "[employer] lets you send messages to your doctor, view your test results, renew your prescriptions, schedule appointments and more. To sign up, go to [email or web address] . Click on \"Log in\" on the left side of the screen, which will take you to the Welcome page. Then click on \"Sign up Now\" on the right side of the page.     You will be asked to enter the access code listed below, as well as some personal information. Please follow the directions to create your username and password.     Your access code is: [email or web address]  Expires: 2018  8:24 AM     Your access code will  in 90 days. If you need help or a new code, please call your [place] clinic or [phone].        Care EveryWhere ID     This is your Care EveryWhere ID. This " could be used by other organizations to access your [facility] medical records  [id number]        Equal Access to Services     MARYJANE JARA : Hadii nelida Nolan, carole heard, dayanandrew de santiagoradahvanita keen, ketan montejohyunbraxton connor. So [facility] [phone].    ATENCIÓN: Si habla español, tiene a thomas disposición servicios gratuitos de asistencia lingüística. Llame al [phone].    We comply with applicable federal civil rights laws and [place] laws. We do not discriminate on the basis of race, color, national origin, age, disability, sex, sexual orientation, or gender identity.               Review of your medicines      START taking        Dose / Directions    HYDROcodone-acetaminophen 5-325 MG per tablet   Commonly known as:  NORCO   Used for:  Adenopathy        Dose:  1-2 tablet   Take 1-2 tablets by mouth every 4 hours as needed for other (Moderate to Severe Pain)   Quantity:  30 tablet   Refills:  0         CONTINUE these medicines which may have CHANGED, or have new prescriptions. If we are uncertain of the size of tablets/capsules you have at home, strength may be listed as something that might have changed.        Dose / Directions    valACYclovir 1000 mg tablet   Commonly known as:  VALTREX   This may have changed:    - when to take this  - reasons to take this   Used for:  Cold sore        Dose:  1000 mg   Take 1 tablet (1,000 mg) by mouth 2 times daily   Quantity:  20 tablet   Refills:  0         CONTINUE these medicines which have NOT CHANGED        Dose / Directions    ADVIL PO        Dose:  400 mg   Take 400 mg by mouth every 6 hours as needed for moderate pain   Refills:  0       aspirin 81 MG tablet        Dose:  81 mg   Take 81 mg by mouth daily   Refills:  0       OMEGA-3 FISH OIL PO        Dose:  1 g   Take 1 g by mouth daily   Refills:  0       UNABLE TO FIND        Dose:  900 mg   Take 900 mg by mouth three times a week MEDICATION NAME: cholestoff plus   Refills:  0        VITAMIN D (CHOLECALCIFEROL) PO        Dose:  1000 Units   Take 1,000 Units by mouth daily   Refills:  0            Where to get your medicines      Some of these will need a paper prescription and others can be bought over the counter. Ask your nurse if you have questions.     Bring a paper prescription for each of these medications     HYDROcodone-acetaminophen 5-325 MG per tablet                Protect others around you: Learn how to safely use, store and throw away your medicines at www.disposemymeds.org.             Medication List: This is a list of all your medications and when to take them. Check marks below indicate your daily home schedule. Keep this list as a reference.      Medications           Morning Afternoon Evening Bedtime As Needed    ADVIL PO   Take 400 mg by mouth every 6 hours as needed for moderate pain                                aspirin 81 MG tablet   Take 81 mg by mouth daily                                HYDROcodone-acetaminophen 5-325 MG per tablet   Commonly known as:  NORCO   Take 1-2 tablets by mouth every 4 hours as needed for other (Moderate to Severe Pain)                                OMEGA-3 FISH OIL PO   Take 1 g by mouth daily                                UNABLE TO FIND   Take 900 mg by mouth three times a week MEDICATION NAME: cholestoff plus                                valACYclovir 1000 mg tablet   Commonly known as:  VALTREX   Take 1 tablet (1,000 mg) by mouth 2 times daily                                VITAMIN D (CHOLECALCIFEROL) PO   Take 1,000 Units by mouth daily

## 2017-11-16 NOTE — ANESTHESIA PREPROCEDURE EVALUATION
Anesthesia Evaluation     . Pt has had prior anesthetic. Type: General           ROS/MED HX    ENT/Pulmonary:  - neg pulmonary ROS     Neurologic:  - neg neurologic ROS     Cardiovascular:  - neg cardiovascular ROS       METS/Exercise Tolerance:     Hematologic:  - neg hematologic  ROS       Musculoskeletal:  - neg musculoskeletal ROS       GI/Hepatic:     (+) GERD Asymptomatic on medication,       Renal/Genitourinary:  - ROS Renal section negative       Endo:  - neg endo ROS       Psychiatric:  - neg psychiatric ROS       Infectious Disease:  - neg infectious disease ROS       Malignancy:      - no malignancy   Other:    (+) No chance of pregnancy C-spine cleared: N/A, no H/O Chronic Pain,no other significant disability   - neg other ROS                 Physical Exam  Normal systems: cardiovascular, pulmonary and dental    Airway   Mallampati: II  TM distance: >3 FB  Neck ROM: full    Dental     Cardiovascular       Pulmonary                     Anesthesia Plan      History & Physical Review  History and physical reviewed and following examination; no interval change.    ASA Status:  2 .    NPO Status:  > 8 hours    Plan for MAC with Other induction. Maintenance will be Other.  Reason for MAC:  Deep or markedly invasive procedure (G8)  PONV prophylaxis:  Ondansetron (or other 5HT-3) and Dexamethasone or Solumedrol       Postoperative Care  Postoperative pain management:  IV analgesics.      Consents  Anesthetic plan, risks, benefits and alternatives discussed with:  Patient.  Use of blood products discussed: Yes.   Use of blood products discussed with Patient.  Consented to blood products.  .                          .

## 2017-11-16 NOTE — ANESTHESIA POSTPROCEDURE EVALUATION
Patient: Flor Griffin    Procedure(s):  Excisional Biopsy Right Neck Lymph node - Wound Class: I-Clean    Diagnosis:Right Neck Adenopathy  Diagnosis Additional Information: Right Neck Adenopathy    Anesthesia Type:  MAC    Note:  Anesthesia Post Evaluation    Patient location during evaluation: PACU  Patient participation: Able to fully participate in evaluation  Level of consciousness: awake and alert  Pain management: adequate  Airway patency: patent  Cardiovascular status: acceptable  Respiratory status: acceptable  Hydration status: acceptable  PONV: controlled     Anesthetic complications: None          Last vitals:  Vitals:    11/16/17 1153 11/16/17 1217 11/16/17 1245   BP: 139/72 122/73 139/75   Pulse:      Resp: 16 12 16   Temp:   97.5  F (36.4  C)   SpO2: 99% 98% 96%         Electronically Signed By: Suleiman Leonard MD  November 16, 2017  2:42 PM

## 2017-11-16 NOTE — OP NOTE
General Surgery Operative Note    Pre-operative diagnosis:  Right Neck Adenopathy   Post-operative diagnosis: same   Procedure:  excisional biopsy of right supraclavicular lymph node.     Surgeon: Waylon Corral MD   Assistant(s): Brook Blackmon PA-C  - the PA's assistance was medically necessary in providing adequate exposure in the operating field, maintaining hemostasis, cuttting suture, clamping and ligating blood vessels, and visualization of the anatomic structures throughout the surgical procedure.   Anesthesia: Local with MAC    Estimated blood loss: 2 cc's   Drains placed: None   Complications:  None   Findings:   prominent right supraclavicular lymph node was resected.       Indications for operation: This is a 62-year-old woman with enlarging right supraclavicular lymph node.  Excisional biopsy was recommended, and the procedure, along with its risks and complications, was discussed with the patient.  She agreed to proceed.    Details of the operation: After informed consent, the patient was taken to the operating room where she was sterilely prepped and draped.  The right supraclavicular area was infiltrated with mixture of lidocaine with epinephrine plain Marcaine.  Skin incision was made and dissection carried down through the platysma using electrocautery.  The lymph node was pulled down over the clavicle and was then exposed.  The attachments were taken down between clamps and ties and the lymph node was removed and sent fresh to pathology.  There was excellent hemostasis.  The platysma was reapproximated using interrupted 3-0 Vicryl sutures and the subdermal tissues were approximated using interrupted 3-0 Vicryl suture as well.  Skin was closed using Dermabond skin adhesive.    The patient tolerated the procedure well and was transferred to the recovery room in satisfactory condition.  Sponge and needle counts were correct at the close of the case.    Specimens:   ID Type Source Tests  Collected by Time Destination   A : Right supraclavicular lymph node Tissue Lymph Node SURGICAL PATHOLOGY EXAM Waylon Corral MD 11/16/2017 11:05 AM            Waylon Corral MD

## 2017-11-16 NOTE — DISCHARGE INSTRUCTIONS
GENERAL ANESTHESIA OR SEDATION ADULT DISCHARGE INSTRUCTIONS   SPECIAL PRECAUTIONS FOR 24 HOURS AFTER SURGERY    IT IS NOT UNUSUAL TO FEEL LIGHT-HEADED OR FAINT, UP TO 24 HOURS AFTER SURGERY OR WHILE TAKING PAIN MEDICATION.  IF YOU HAVE THESE SYMPTOMS; SIT FOR A FEW MINUTES BEFORE STANDING AND HAVE SOMEONE ASSIST YOU WHEN YOU GET UP TO WALK OR USE THE BATHROOM.    YOU SHOULD REST AND RELAX FOR THE NEXT 24 HOURS AND YOU MUST MAKE ARRANGEMENTS TO HAVE SOMEONE STAY WITH YOU FOR AT LEAST 24 HOURS AFTER YOUR DISCHARGE.  AVOID HAZARDOUS AND STRENUOUS ACTIVITIES.  DO NOT MAKE IMPORTANT DECISIONS FOR 24 HOURS.    DO NOT DRIVE ANY VEHICLE OR OPERATE MECHANICAL EQUIPMENT FOR 24 HOURS FOLLOWING THE END OF YOUR SURGERY.  EVEN THOUGH YOU MAY FEEL NORMAL, YOUR REACTIONS MAY BE AFFECTED BY THE MEDICATION YOU HAVE RECEIVED.    DO NOT DRINK ALCOHOLIC BEVERAGES FOR 24 HOURS FOLLOWING YOUR SURGERY.    DRINK CLEAR LIQUIDS (APPLE JUICE, GINGER ALE, 7-UP, BROTH, ETC.).  PROGRESS TO YOUR REGULAR DIET AS YOU FEEL ABLE.    YOU MAY HAVE A DRY MOUTH, A SORE THROAT, MUSCLES ACHES OR TROUBLE SLEEPING.  THESE SHOULD GO AWAY AFTER 24 HOURS.    CALL YOUR DOCTOR FOR ANY OF THE FOLLOWING:  SIGNS OF INFECTION (FEVER, GROWING TENDERNESS AT THE SURGERY SITE, A LARGE AMOUNT OF DRAINAGE OR BLEEDING, SEVERE PAIN, FOUL-SMELLING DRAINAGE, REDNESS OR SWELLING.    IT HAS BEEN OVER 8 TO 10 HOURS SINCE SURGERY AND YOU ARE STILL NOT ABLE TO URINATE (PASS WATER).     HOME CARE FOLLOWING MINOR SURGERY  SOCO Redmond, JUDY Whipple, SOCO Bernard, ALE Bosch, CORNELIUS Morales    RESULTS:  If a biopsy of tissue was done, you may call for your final pathology report after 1p.m. two working days after surgery.  Your results will also be reviewed with you at your postoperative appointment.    INCISIONAL CARE:    If you have a dressing in place, keep clean and dry for 48 hours; you may replace the gauze if it becomes soiled.    After 48 hours you may  remove the dressing and shower.  Do not submerse incision in water for 1 week.    If you have a Dermabond dressing (a type of skin glue), you may shower immediately.    Sutures which are beneath the skin will absorb and do not need to be removed.    Sutures you can see should be removed at your surgeon's office in 10-14 days at the latest.    If present, leave the steri-strips (white paper tapes) in place for 14 days after surgery.    If present, leave Dermabond glue in place until it wears/flakes off.    You may expect a small amount of drainage from your incision.    A lump/ridge under the incision is normal and will gradually resolve.  If it becomes red or very uncomfortable, contact the nurse at your surgeon's office to discuss whether this needs to be evaluated.    ACTIVITY:  Cautiously resume exercise and strenuous activities such as jogging, tennis, aerobics, etc. Also, be careful of stretching activities which affect the area of surgery for two weeks.    DIET:  No restrictions.  Increased fluid intake is recommended. While taking pain medications, increase dietary fiber or add a fiber supplementation like Metamucil or Citrucel to help prevent constipation - a possible side effect of pain medications.    DISCOMFORT:  Local anesthetic placed at surgery should provide relief for 4-8 hours.  Begin taking pain pills before discomfort is severe.  Take the pain medication with some food, when possible, to minimize side effects.  Intermittent use of ice packs may help during the first 48 hours.  Expect gradual improvement.  You may slowly transition to use of over-the-counter medications for pain relief when you are no longer requiring narcotics for pain control.    RETURN APPOINTMENT:  Schedule a follow-up visit 2-3 weeks post-op.  Office Phone:  [phone]     CONTACT US IF THE FOLLOWING DEVELOPS:   1. A fever that is above 101     2. If there is a large amount of drainage, bleeding, or swelling.   3. Severe  pain that is not relieved by your prescription.   4. Drainage that is thick, cloudy, yellow, green or white.   5. Any other questions not answered by  Frequently Asked Questions  sheet.      FREQUENTLY ASKED QUESTIONS:    Q:  How should my incision look?    A:  Normally your incision will appear slightly swollen with light redness directly along the incision itself as it heals.  It may feel like a bump or ridge as the healing/scarring happens, and over time (3-4 months) this bump or ridge feeling should slowly go away.  In general, clear or pink watery drainage can be normal at first as your incision heals, but should decrease over time.    Q:  How do I know if my incision is infected?  A:  Look at your incision for signs of infection, like redness around the incision spreading to surrounding skin, or drainage of cloudy or foul-smelling drainage.  If you feel warm, check your temperature to see if you are running a fever.    **If any of these things occur, please notify the nurse at our office.  We may need you to come into the office for an incision check.      Q:  How do I take care of my incision?  A:  If you have a dressing in place - Starting the day after surgery, replace the dressing 1-2 times a day until there is no further drainage from the incision.  At that time, a dressing is no longer needed.  Try to minimize tape on the skin if irritation is occurring at the tape sites.  If you have significant irritation from tape on the skin, please call the office to discuss other method of dressing your incision.    Small pieces of tape called  steri-strips  may be present directly overlying your incision; these may be removed 10 days after surgery unless otherwise specified by your surgeon.  If these tapes start to loosen at the ends, you may trim them back until they fall off or are removed.    A:  If you had  Dermabond  tissue glue used as a dressing (this causes your incision to look shiny with a clear covering  over it) - This type of dressing wears off with time and does not require more dressings over the top unless it is draining around the glue as it wears off.  Do not apply ointments or lotions over the incisions until the glue has completely worn off.    Q:  There is a piece of tape or a sticky  lead  still on my skin.  Can I remove this?  A:  Sometimes the sticky  leads  used for monitoring during surgery or for evaluation in the emergency department are not all removed while you are in the hospital.  These sometimes have a tab or metal dot on them.  You can easily remove these on your own, like taking off a band-aid.  If there is a gel substance under the  lead , simply wipe/clean it off with a washcloth or paper towel.      Q:  What can I do to minimize constipation (very hard stools, or lack of stools)?  A:  Stay well hydrated.  Increase your dietary fiber intake or take a fiber supplement -with plenty of water.  Walk around frequently.  You may consider an over-the-counter stool-softener.  Your Pharmacist can assist you with choosing one that is stocked at your pharmacy.  Constipation is also one of the most common side effects of pain medication.  If you are using pain medication, be pro-active and try to PREVENT problems with constipation by taking the steps above BEFORE constipation becomes a problem.    Q:  What do I do if I need more pain medications?  A:  Call the office to receive refills.  Be aware that certain pain meds cannot be called into a pharmacy and actually require a paper prescription.  A change may be made in your pain med as you progress thru your recovery period or if you have side effects to certain meds.    --Pain meds are NOT refilled after 5pm on weekdays, and NOT AT ALL on the weekends, so please look ahead to prevent problems.      Q:  Why am I having a hard time sleeping now that I am at home?  A:  Many medications you receive while you are in the hospital can impact your sleep for a  number of days after your surgery/hospitalization.  Decreased level of activity and naps during the day may also make sleeping at night difficult.  Try to minimize day-time naps, and get up frequently during the day to walk around your home during your recovery time.  Sleep aides may be of some help, but are not recommended for long-term use.      Q:  I am having some back discomfort.  What should I do?  A:  This may be related to certain positioning that was required for your surgery, extended periods of time in bed, or other changes in your overall activity level.  You may try ice, heat, acetaminophen, or ibuprofen to treat this temporarily.  Note that many pain medications have acetaminophen in them and would state this on the prescription bottle.  Be sure not to exceed the maximum of 4000mg per day of acetaminophen.     **If the pain you are having does not resolve, is severe, or is a flare of back pain you have had on other occasions prior to surgery, please contact your primary physician for further recommendations or for an appointment to be examined at their office.    Q:  Why am I having headaches?  A:  Headaches can be caused by many things:  caffeine withdrawal, use of pain meds, dehydration, high blood pressure, lack of sleep, over-activity/exhaustion, flare-up of usual migraine headaches.  If you feel this is related to muscle tension (a band-like feeling around the head, or a pressure at the low-back of the head) you may try ice or heat to this area.  You may need to drink more fluids (try electrolyte drink like Gatorade), rest, or take your usual migraine medications.   **If your headaches do not resolve, worsen, are accompanied by other symptoms, or if your blood pressure is high, please call your primary physician for recommendation and/or examination.    Q:  I am unable to urinate.  What do I do?  A:  A small percentage of people can have difficulty urinating initially after surgery.  This includes  being able to urinate only a very small amount at a time and feeling discomfort or pressure in the very low abdomen.  This is called  urinary retention , and is actually an urgent situation.  Proceed to your nearest Emergency department for evaluation (not an Urgent Care Center).  Sometimes the bladder does not work correctly after certain medications you receive during surgery, or related to certain procedures.  You may need to have a catheter placed until your bladder recovers.  When planning to go to an Emergency department, it may help to call the ER to let them know you are coming in for this problem after a surgery.  This may help you get in quicker to be evaluated.  **If you have symptoms of a urinary tract infection, please contact your primary physician for the proper evaluation and treatment.          If you have other questions, please call the office Monday thru Friday between 8am and 5pm to discuss with the nurse or physician assistant.  #[phone]    There is a surgeon ON CALL on weekday evenings and over the weekend in case of urgent need only, and may be contacted at the same number.    If you are having an emergency, call 911 or proceed to your nearest emergency department.

## 2017-11-16 NOTE — ANESTHESIA CARE TRANSFER NOTE
Patient: Flor Griffin    Procedure(s):  Excisional Biopsy Right Neck Lymph node - Wound Class: I-Clean    Diagnosis: Right Neck Adenopathy  Diagnosis Additional Information: No value filed.    Anesthesia Type:   MAC     Note:  Airway :Room Air  Patient transferred to:Phase II  Comments: Report to RN, VVS.      Vitals: (Last set prior to Anesthesia Care Transfer)    CRNA VITALS  11/16/2017 1045 - 11/16/2017 1120      11/16/2017             Pulse: 61    SpO2: 100 %                Electronically Signed By: HEDY Salas CRNA  November 16, 2017  11:20 AM

## 2017-11-16 NOTE — IP AVS SNAPSHOT
[facility] PreOP/PostOP    [address]    [facility] [address]    Phone:  [phone]    Fax:  [phone]                                       After Visit Summary   11/16/2017    Flor Griffin    MRN: [id number]           After Visit Summary Signature Page     I have received my discharge instructions, and my questions have been answered. I have discussed any challenges I see with this plan with the nurse or doctor.    ..........................................................................................................................................  Patient/Patient Representative Signature      ..........................................................................................................................................  Patient Representative Print Name and Relationship to Patient    ..................................................               ................................................  Date                                            Time    ..........................................................................................................................................  Reviewed by Signature/Title    ...................................................              ..............................................  Date                                                            Time

## 2017-11-17 LAB — COPATH REPORT: NORMAL

## 2017-11-20 ENCOUNTER — TELEPHONE (OUTPATIENT)
Dept: SURGERY | Facility: CLINIC | Age: 62
End: 2017-11-20

## 2017-11-20 NOTE — TELEPHONE ENCOUNTER
Patient called again.  Discussed with Dr. Corral, patient notified, pathology results are still in process.  These tests frequently take a week to complete, with the holiday weekend it would not  Be unusual if results were not finalized until next Monday, 11/27/17.  Patient states family is very concerned and calling her frequently for results.  She will  Let family know results may not be available for a week.  Charity Magallon RN

## 2017-11-20 NOTE — TELEPHONE ENCOUNTER
Name of caller: Patient    Reason for Call:  RESULTS     Surgeon:  Dr. Corral    Recent Surgery:  Yes.    If yes, when & what type:  excisional biopsy of right supraclavicular lymph node 11/16/17       Best phone number to reach pt at is: [phone]  Ok to leave a message with medical info? Yes.    Pharmacy preferred (if calling for a refill): n/a

## 2017-11-21 ENCOUNTER — TELEPHONE (OUTPATIENT)
Dept: PEDIATRICS | Facility: CLINIC | Age: 62
End: 2017-11-21

## 2017-11-21 DIAGNOSIS — C80.1 ADENOCARCINOMA OF UNKNOWN PRIMARY (H): Primary | ICD-10-CM

## 2017-11-21 LAB — COPATH REPORT: NORMAL

## 2017-11-21 NOTE — PROGRESS NOTES
I called the patient with these results.  Patient instructed that she should follow up with primary care and should also make an appointment with her oncologist.  She would like to talk to her family to see if they have thoughts on who she should see.  I told her she could call back at any time task for recommendations.

## 2017-11-21 NOTE — TELEPHONE ENCOUNTER
Patient daughter is calling to ask questions about the pathology results for her mom.  Given key highlights in the results of both tests that were done. Please review these  And call her when you can. She is aware that you are out of the office today. She is going  To call the Oncology number that you gave her to get her scheduled. Wondering if she  Will need a PET scan or what the next step would possibly be.     Martita Barrios, RN  Triage Nurse

## 2017-11-22 ENCOUNTER — HOSPITAL ENCOUNTER (OUTPATIENT)
Facility: CLINIC | Age: 62
Setting detail: SPECIMEN
Discharge: HOME | End: 2017-11-22
Attending: INTERNAL MEDICINE | Admitting: INTERNAL MEDICINE
Payer: COMMERCIAL

## 2017-11-22 ENCOUNTER — OFFICE VISIT (OUTPATIENT)
Dept: ONCOLOGY | Facility: CLINIC | Age: 62
End: 2017-11-22
Attending: INTERNAL MEDICINE
Payer: COMMERCIAL

## 2017-11-22 VITALS
RESPIRATION RATE: 16 BRPM | BODY MASS INDEX: 29.05 KG/M2 | OXYGEN SATURATION: 98 % | WEIGHT: 180 LBS | HEART RATE: 76 BPM | TEMPERATURE: 97.6 F | SYSTOLIC BLOOD PRESSURE: 133 MMHG | DIASTOLIC BLOOD PRESSURE: 72 MMHG

## 2017-11-22 DIAGNOSIS — C77.9 LYMPH NODE CANCER (H): Primary | ICD-10-CM

## 2017-11-22 LAB
ALBUMIN SERPL-MCNC: 3.9 G/DL (ref 3.4–5)
ALP SERPL-CCNC: 83 U/L (ref 40–150)
ALT SERPL W P-5'-P-CCNC: 32 U/L (ref 0–50)
ANION GAP SERPL CALCULATED.3IONS-SCNC: 6 MMOL/L (ref 3–14)
AST SERPL W P-5'-P-CCNC: 19 U/L (ref 0–45)
BASOPHILS # BLD AUTO: 0 10E9/L (ref 0–0.2)
BASOPHILS NFR BLD AUTO: 0.5 %
BILIRUB SERPL-MCNC: 0.5 MG/DL (ref 0.2–1.3)
BUN SERPL-MCNC: 17 MG/DL (ref 7–30)
CALCIUM SERPL-MCNC: 9.2 MG/DL (ref 8.5–10.1)
CANCER AG125 SERPL-ACNC: 16 U/ML (ref 0–30)
CANCER AG27-29 SERPL-ACNC: 12 U/ML (ref 0–39)
CEA SERPL-MCNC: <0.5 UG/L (ref 0–2.5)
CHLORIDE SERPL-SCNC: 104 MMOL/L (ref 94–109)
CO2 SERPL-SCNC: 29 MMOL/L (ref 20–32)
CREAT SERPL-MCNC: 0.81 MG/DL (ref 0.52–1.04)
DIFFERENTIAL METHOD BLD: NORMAL
EOSINOPHIL # BLD AUTO: 0.1 10E9/L (ref 0–0.7)
EOSINOPHIL NFR BLD AUTO: 2.1 %
ERYTHROCYTE [DISTWIDTH] IN BLOOD BY AUTOMATED COUNT: 13.1 % (ref 10–15)
GFR SERPL CREATININE-BSD FRML MDRD: 72 ML/MIN/1.7M2
GLUCOSE SERPL-MCNC: 113 MG/DL (ref 70–99)
HCT VFR BLD AUTO: 39.6 % (ref 35–47)
HGB BLD-MCNC: 13.2 G/DL (ref 11.7–15.7)
IMM GRANULOCYTES # BLD: 0 10E9/L (ref 0–0.4)
IMM GRANULOCYTES NFR BLD: 0.2 %
LYMPHOCYTES # BLD AUTO: 1.6 10E9/L (ref 0.8–5.3)
LYMPHOCYTES NFR BLD AUTO: 25.5 %
MCH RBC QN AUTO: 31.4 PG (ref 26.5–33)
MCHC RBC AUTO-ENTMCNC: 33.3 G/DL (ref 31.5–36.5)
MCV RBC AUTO: 94 FL (ref 78–100)
MONOCYTES # BLD AUTO: 0.5 10E9/L (ref 0–1.3)
MONOCYTES NFR BLD AUTO: 7.8 %
NEUTROPHILS # BLD AUTO: 3.9 10E9/L (ref 1.6–8.3)
NEUTROPHILS NFR BLD AUTO: 63.9 %
NRBC # BLD AUTO: 0 10*3/UL
NRBC BLD AUTO-RTO: 0 /100
PLATELET # BLD AUTO: 312 10E9/L (ref 150–450)
POTASSIUM SERPL-SCNC: 4 MMOL/L (ref 3.4–5.3)
PROT SERPL-MCNC: 7.5 G/DL (ref 6.8–8.8)
RBC # BLD AUTO: 4.21 10E12/L (ref 3.8–5.2)
SODIUM SERPL-SCNC: 139 MMOL/L (ref 133–144)
WBC # BLD AUTO: 6.2 10E9/L (ref 4–11)

## 2017-11-22 PROCEDURE — 80053 COMPREHEN METABOLIC PANEL: CPT | Performed by: INTERNAL MEDICINE

## 2017-11-22 PROCEDURE — 85025 COMPLETE CBC W/AUTO DIFF WBC: CPT | Performed by: INTERNAL MEDICINE

## 2017-11-22 PROCEDURE — 36415 COLL VENOUS BLD VENIPUNCTURE: CPT

## 2017-11-22 PROCEDURE — 99211 OFF/OP EST MAY X REQ PHY/QHP: CPT

## 2017-11-22 PROCEDURE — 86301 IMMUNOASSAY TUMOR CA 19-9: CPT | Performed by: INTERNAL MEDICINE

## 2017-11-22 PROCEDURE — 82378 CARCINOEMBRYONIC ANTIGEN: CPT | Performed by: INTERNAL MEDICINE

## 2017-11-22 PROCEDURE — 86300 IMMUNOASSAY TUMOR CA 15-3: CPT | Performed by: INTERNAL MEDICINE

## 2017-11-22 PROCEDURE — 99205 OFFICE O/P NEW HI 60 MIN: CPT | Performed by: INTERNAL MEDICINE

## 2017-11-22 PROCEDURE — 86304 IMMUNOASSAY TUMOR CA 125: CPT | Performed by: INTERNAL MEDICINE

## 2017-11-22 ASSESSMENT — PAIN SCALES - GENERAL: PAINLEVEL: MILD PAIN (2)

## 2017-11-22 NOTE — PROGRESS NOTES
[facility]  HEMATOLOGY ONCOLOGY    ONCOLOGY CONSULTATION      DATE OF CONSULTATION:  11/22/2017      REASON FOR CONSULTATION:  New diagnosis of poorly differentiated carcinoma on a lymph node excisional biopsy.      REFERRING PROVIDER:  Dr. Jaida Liu.      HISTORY OF PRESENT ILLNESS:  Flor Griffin is a 62-year-old lady.  She states that she had a nasal basal cell excision surgery almost a year ago and at that time there was a right-sided supraclavicular node detected.  At that time, she was also having a sinus infection and was treated with antibiotics for 1 month and observation was decided at that time.  She states that almost a month ago, she felt that the lump is changing its shape and was seen by her primary care provider and further workup was done which included an ultrasound of her head and neck 11/09/2017.  It showed palpable abnormality right supraclavicular fossa correlated to at least 3 lymph node, the largest measured up to 2.3 cm in diameter.   One appeared hypoechoic without an obvious fatty hilum; it was 0.9 x 0.9 x 0.8 cm and another appeared to have a normal fatty hilum measuring 0.9 x 0.9 x 0.6 cm.  A third one was enlarged and appeared quite abnormal and did not appear to have a fatty hilum.  Pathology results indicated that it was a metastatic poorly differentiated adenocarcinoma.  Immunohistochemical staining was positive for CK7, CK5/6 and MAVIS-3 and were nonspecific for primary site of origin.      Patient states that she does not have any significant medical issues except for gastroesophageal reflux.  She has not had any weight loss, any excessive sweating overnight or any unexplained fever.  She had a mammogram in 04/2017 which was negative.  Colonoscopy 5 years ago was negative as well.      PAST MEDICAL HISTORY:  Gastroesophageal reflux disease.      MEDICATIONS:  Reviewed.      ALLERGIES:  Reviewed.      SOCIAL HISTORY:  Never smoker, no significant alcohol  intake.  She is , has 4 kids and 8 grandkids.  She is a beautician and currently retired.      FAMILY HISTORY:  Mother had lupus.  Father had diabetes mellitus.  Sister had breast cancer at the age of 80. Patient could not recall any additional details about cancers in other first/second degree relatives including ages of onset.     REVIEW OF SYSTEMS:  A complete review of systems was performed and found to be negative other than pertinent positives mentioned in history of present illness.      PHYSICAL EXAMINATION:   VITAL SIGNS:  Blood pressure was 133/72, pulse 76, respirations 16, temperature 97.6.    CONSTITUTIONAL: Sitting comfortably.   HEENT: Pupils are equal. Oropharynx is clear.   NECK: No cervical or supraclavicular lymphadenopathy.   RESPIRATORY: Clear bilaterally.   CARD/VASC: S1, S2, regular.   GI: Soft, nontender, nondistended, no hepatosplenomegaly.   MUSKULOSKELETAL: Warm, well perfused.   NEUROLOGIC: Alert, awake.   INTEGUMENT: No rash.   LYMPHATICS: No edema. She has a right-sided area of incision just below the right clavicle.  No palpable adenopathy was detected.     LABORATORY DATA AND IMAGING REVIEWED DURING THIS VISIT:  Recent Labs   Lab Test  11/22/17   1420  04/08/15   0802   NA  139  141   POTASSIUM  4.0  4.1   CHLORIDE  104  105   CO2  29  28   ANIONGAP  6  8   BUN  17  17   CR  0.81  0.78   GLC  113*  105*   EDILSON  9.2  9.4     Recent Labs   Lab Test  11/22/17   1420  11/14/17   1418   WBC  6.2   --    HGB  13.2  12.5   PLT  312   --    MCV  94   --    NEUTROPHIL  63.9   --      Recent Labs   Lab Test  11/22/17   1420  04/08/15   0802   BILITOTAL  0.5  0.4   ALKPHOS  83  83   ALT  32  33   AST  19  15   ALBUMIN  3.9  4.1         Results for orders placed or performed during the hospital encounter of 11/09/17   US Head Neck Soft Tissue    Narrative    ULTRASOUND HEAD NECK SOFT TISSUE 11/9/2017 12:30 PM     HISTORY: Supraclavicular lymphadenopathy.     FINDINGS: Ultrasound evaluation  in the area of patient's palpable  abnormality right supraclavicular fossa is performed. At least three  lymph nodes are visualized. One appears hypoechoic without an obvious  fatty hilum measuring 0.9 x 0.9 x 0.8 cm. Another appears to have a  normal fatty hilum measuring 0.9 x 0.9 x 0.6 cm. A third is enlarged  and predominantly hypoechoic but does appear to have a fatty hilum  measuring 1.0 x 2.3 x 1.1 cm. No other cyst or mass is appreciated.      Impression    IMPRESSION: Palpable abnormality right supraclavicular fossa  correlates to at least three lymph nodes. The largest measuring up to  2.3 cm in diameter. Reactive or metastatic nodes are possible. Follow  up as clinically warranted.    DAVON NUNEZ MD          ECOG performance status 0.      ASSESSMENT AND RECOMMENDATIONS:  This is a 62-year-old lady who has a diagnosis of poorly differentiated adenocarcinoma which is CK7, CK5/6 and MAVIS-3 positive.  Morphologically and based on immunohistochemistry staining, the site of origin could not be determined.  She has normal physical exam.  I did a breast exam and could not find any abnormality as well.  I did long detailed discussion with this patient and her daughter today, discussed that further staging is required, which will include a PET CT scan and based on the results of PET CT scan, we may decide to do additional testing including endoscopies and possibly a mammogram as well.  I will also draw some tumor markers today, which will be a CA 19-9, CA-125, CEA and CA 27-29.  I also discussed that this is a metastatic lesion and I am concerned about diagnosis of an incurable cancer; however, we will know more once we have the PET scan results available.  The patient and her daughter had multiple questions, all of them were answered to their satisfaction.      I plan to see her next week.         ERON BARNES MD             D: 11/22/2017 15:08   T: 11/22/2017 16:34   MT: MARINA      Name:     ISABELLA ALMA   MRN:       [id number]        Account:      [id number]   :      1955           Visit Date:   2017      Document: [id number]       cc: Jaida Liu MD

## 2017-11-22 NOTE — PROGRESS NOTES
"Oncology Rooming Note    November 22, 2017 1:37 PM   Flor Griffin is a 62 year old female who presents for:    Chief Complaint   Patient presents with     Oncology Clinic Visit     Initial Vitals: /72 (BP Location: Left arm, Patient Position: Chair, Cuff Size: Adult Regular)  Pulse 76  Temp 97.6  F (36.4  C) (Oral)  Resp 16  Wt 81.6 kg (180 lb)  LMP 01/01/2004 (Approximate)  SpO2 98%  BMI 29.05 kg/m2 Estimated body mass index is 29.05 kg/(m^2) as calculated from the following:    Height as of 11/16/17: 1.676 m (5' 6\").    Weight as of this encounter: 81.6 kg (180 lb). Body surface area is 1.95 meters squared.  Mild Pain (2) Comment: abdominal pain   Patient's last menstrual period was 01/01/2004 (approximate).  Allergies reviewed: Yes  Medications reviewed: Yes    Medications: Medication refills not needed today.  Ph[facility] into [facility] [address]    Clinical concerns: None     5 minutes for nursing intake (face to face time)     Mary Jo James CMA              "

## 2017-11-22 NOTE — LETTER
"    11/22/2017         RE: Flor Griffin  [address]  [facility] [address]        Dear Colleague,    Thank you for referring your patient, Flor Griffin, to the [facility]. Please see a copy of my visit note below.    Oncology Rooming Note    November 22, 2017 1:37 PM   Flor Griffin is a 62 year old female who presents for:    Chief Complaint   Patient presents with     Oncology Clinic Visit     Initial Vitals: /72 (BP Location: Left arm, Patient Position: Chair, Cuff Size: Adult Regular)  Pulse 76  Temp 97.6  F (36.4  C) (Oral)  Resp 16  Wt 81.6 kg (180 lb)  LMP 01/01/2004 (Approximate)  SpO2 98%  BMI 29.05 kg/m2 Estimated body mass index is 29.05 kg/(m^2) as calculated from the following:    Height as of 11/16/17: 1.676 m (5' 6\").    Weight as of this encounter: 81.6 kg (180 lb). Body surface area is 1.95 meters squared.  Mild Pain (2) Comment: abdominal pain   Patient's last menstrual period was 01/01/2004 (approximate).  Allergies reviewed: Yes  Medications reviewed: Yes    Medications: Medication refills not needed today.  Pharm[facility] entered into Reclip[facility]It[id number] - [place], [place] - [address]    Clinical concerns: None     5 minutes for nursing intake (face to face time)     Mary Jo James CMA                This clinic visit note has been dictated.  [id number]              Medical Assistant Note:  Flor Griffin presents today for labs.    Patient seen by provider today: Yes: tod.   present during visit today: Not Applicable.    Concerns: No Concerns.    Procedure:  Lab draw site: RAC, Needle type: BF, Gauge: 23.    Post Assessment:  Labs drawn without difficulty: Yes.    Discharge Plan:  Pt tolerated procedure well. Gauze applied and pt held pressure until site clear.     Face to Face Time: 10min.    Sanjana Corona MA        DISCHARGE PLAN:  Next appointments: See patient " instruction section  Departure Mode: Ambulatory; schedulers made future appts.  Accompanied by: daughter  5 minutes for nursing discharge (face to face time)   Sanjana Corona MA        [facility] PHYSICIANS  HEMATOLOGY ONCOLOGY    ONCOLOGY CONSULTATION      DATE OF CONSULTATION:  11/22/2017      REASON FOR CONSULTATION:  New diagnosis of poorly differentiated carcinoma on a lymph node excisional biopsy.      REFERRING PROVIDER:  Dr. Jaida Liu.      HISTORY OF PRESENT ILLNESS:  Flor Griffin is a 62-year-old lady.  She states that she had a nasal basal cell excision surgery almost a year ago and at that time there was a right-sided supraclavicular node detected.  At that time, she was also having a sinus infection and was treated with antibiotics for 1 month and observation was decided at that time.  She states that almost a month ago, she felt that the lump is changing its shape and was seen by her primary care provider and further workup was done which included an ultrasound of her head and neck 11/09/2017.  It showed palpable abnormality right supraclavicular fossa correlated to at least 3 lymph node, the largest measured up to 2.3 cm in diameter.   One appeared hypoechoic without an obvious fatty hilum; it was 0.9 x 0.9 x 0.8 cm and another appeared to have a normal fatty hilum measuring 0.9 x 0.9 x 0.6 cm.  A third one was enlarged and appeared quite abnormal and did not appear to have a fatty hilum.  Pathology results indicated that it was a metastatic poorly differentiated adenocarcinoma.  Immunohistochemical staining was positive for CK7, CK5/6 and MAVIS-3 and were nonspecific for primary site of origin.      Patient states that she does not have any significant medical issues except for gastroesophageal reflux.  She has not had any weight loss, any excessive sweating overnight or any unexplained fever.  She had a mammogram in 04/2017 which was negative.  Colonoscopy 5 years ago was  negative as well.      PAST MEDICAL HISTORY:  Gastroesophageal reflux disease.      MEDICATIONS:  Reviewed.      ALLERGIES:  Reviewed.      SOCIAL HISTORY:  Never smoker, no significant alcohol intake.  She is , has 4 kids and 8 grandkids.  She is a beautician and currently retired.      FAMILY HISTORY:  Mother had lupus.  Father had diabetes mellitus.  Sister had breast cancer at the age of 80; otherwise, no additional cancers in first or second-degree relatives.      REVIEW OF SYSTEMS:  A complete review of systems was performed and found to be negative other than pertinent positives mentioned in history of present illness.      PHYSICAL EXAMINATION:   VITAL SIGNS:  Blood pressure was 133/72, pulse 76, respirations 16, temperature 97.6.    CONSTITUTIONAL: Sitting comfortably.   HEENT: Pupils are equal. Oropharynx is clear.   NECK: No cervical or supraclavicular lymphadenopathy.   RESPIRATORY: Clear bilaterally.   CARD/VASC: S1, S2, regular.   GI: Soft, nontender, nondistended, no hepatosplenomegaly.   MUSKULOSKELETAL: Warm, well perfused.   NEUROLOGIC: Alert, awake.   INTEGUMENT: No rash.   LYMPHATICS: No edema. She has a right-sided area of incision just below the right clavicle.  No palpable adenopathy was detected.     LABORATORY DATA AND IMAGING REVIEWED DURING THIS VISIT:  Recent Labs   Lab Test  11/22/17   1420  04/08/15   0802   NA  139  141   POTASSIUM  4.0  4.1   CHLORIDE  104  105   CO2  29  28   ANIONGAP  6  8   BUN  17  17   CR  0.81  0.78   GLC  113*  105*   EDILSON  9.2  9.4     Recent Labs   Lab Test  11/22/17   1420  11/14/17   1418   WBC  6.2   --    HGB  13.2  12.5   PLT  312   --    MCV  94   --    NEUTROPHIL  63.9   --      Recent Labs   Lab Test  11/22/17   1420  04/08/15   0802   BILITOTAL  0.5  0.4   ALKPHOS  83  83   ALT  32  33   AST  19  15   ALBUMIN  3.9  4.1         Results for orders placed or performed during the hospital encounter of 11/09/17    Head Neck Soft Tissue     Narrative    ULTRASOUND HEAD NECK SOFT TISSUE 11/9/2017 12:30 PM     HISTORY: Supraclavicular lymphadenopathy.     FINDINGS: Ultrasound evaluation in the area of patient's palpable  abnormality right supraclavicular fossa is performed. At least three  lymph nodes are visualized. One appears hypoechoic without an obvious  fatty hilum measuring 0.9 x 0.9 x 0.8 cm. Another appears to have a  normal fatty hilum measuring 0.9 x 0.9 x 0.6 cm. A third is enlarged  and predominantly hypoechoic but does appear to have a fatty hilum  measuring 1.0 x 2.3 x 1.1 cm. No other cyst or mass is appreciated.      Impression    IMPRESSION: Palpable abnormality right supraclavicular fossa  correlates to at least three lymph nodes. The largest measuring up to  2.3 cm in diameter. Reactive or metastatic nodes are possible. Follow  up as clinically warranted.    DAVON NUNEZ MD          ECOG performance status 0.      ASSESSMENT AND RECOMMENDATIONS:  This is a 62-year-old lady who has a diagnosis of poorly differentiated adenocarcinoma which is CK7, CK5/6 and MAVIS-3 positive.  Morphologically and based on immunohistochemistry staining, the site of origin could not be determined.  She has normal physical exam.  I did a breast exam and could not find any abnormality as well.  I did long detailed discussion with this patient and her daughter today, discussed that further staging is required, which will include a PET CT scan and based on the results of PET CT scan, we may decide to do additional testing including endoscopies and possibly a mammogram as well.  I will also draw some tumor markers today, which will be a CA 19-9, CA-125, CEA and CA 27-29.  I also discussed that this is a metastatic lesion and I am concerned about diagnosis of an incurable cancer; however, we will know more once we have the PET scan results available.  The patient and her daughter had multiple questions, all of them were answered to their satisfaction.      I plan  to see her next week.         YANI MAGANA MD             D: 2017 15:08   T: 2017 16:34   MT: MARINA      Name:     ALMA MASON   MRN:      [id number]        Account:      [id number]   :      1955           Visit Date:   2017      Document: [id number]       cc: Jaida Liu MD       Again, thank you for allowing me to participate in the care of your patient.        Sincerely,        Yani Magana MD

## 2017-11-22 NOTE — PROGRESS NOTES
Medical Assistant Note:  Flor Griffin presents today for labs.    Patient seen by provider today: Yes: tod.   present during visit today: Not Applicable.    Concerns: No Concerns.    Procedure:  Lab draw site: RAC, Needle type: BF, Gauge: 23.    Post Assessment:  Labs drawn without difficulty: Yes.    Discharge Plan:  Pt tolerated procedure well. Gauze applied and pt held pressure until site clear.     Face to Face Time: 10min.    Sanjana Corona MA        DISCHARGE PLAN:  Next appointments: See patient instruction section  Departure Mode: Ambulatory; schedulers made future appts.  Accompanied by: daughter  5 minutes for nursing discharge (face to face time)   Sanjana Corona MA

## 2017-11-22 NOTE — MR AVS SNAPSHOT
After Visit Summary   11/22/2017    Flor Griffin    MRN: [id number]           Patient Information     Date Of Birth          1955        Visit Information        Provider Department      11/22/2017 1:45 PM Yani Magana MD [facility]        Today's Diagnoses     Lymph node cancer (H)    -  1      Care Instructions    1- Labs today  2- PET CT scan ASAP  3- RTC MD next week          Follow-ups after your visit        Your next 10 appointments already scheduled     Nov 27, 2017  2:00 PM CST   PE NPET ONCOLOGY (EYES TO THIGHS) with [id number]   [facility] PET CT ([facility])    [address] [id number] [phone]           Tell your doctor:   If there is any chance you may be pregnant or if you are breastfeeding.   If you have problems lying in small spaces (claustrophobia). If you do, your doctor may give you medicine to help you relax. If you have diabetes:   Have your exam early in the morning. Your blood glucose will go up as the day goes by.   Your glucose level must be 180 or less at the start of the exam. Please take any medicines you need to ensure this blood glucose level. 24 hours before your scan: Don t do any heavy exercise. (No jogging, aerobics or other workouts.) Exercise will make your pictures less accurate. 6 hours before your scan:   Stop all food and liquids (except water).   Do not chew gum or suck on mints.   If you need to take medicine with food, you may take it with a few crackers.  Please call your Imaging Department at your exam site with any questions.            Nov 30, 2017  8:45 AM CST   Return Visit with Yani Magana MD   [facility] ([facility])    [facility]  [address]  [facility] [address] [phone]              Future tests that were ordered for you today     Open Future Orders        Priority Expected Expires Ordered    PET Oncology  "(Eyes to Thighs) Routine  2018            Who to contact     If you have questions or need follow up information about today's clinic visit or your schedule please contact [facility] directly at [phone].  Normal or non-critical lab and imaging results will be communicated to you by MyChart, letter or phone within 4 business days after the clinic has received the results. If you do not hear from us within 7 days, please contact the clinic through [employer] or phone. If you have a critical or abnormal lab result, we will notify you by phone as soon as possible.  Submit refill requests through Giferent or call your pharmacy and they will forward the refill request to us. Please allow 3 business days for your refill to be completed.          Additional Information About Your Visit        [employer] Information     Giferent lets you send messages to your doctor, view your test results, renew your prescriptions, schedule appointments and more. To sign up, go to [email or web address] . Click on \"Log in\" on the left side of the screen, which will take you to the Welcome page. Then click on \"Sign up Now\" on the right side of the page.     You will be asked to enter the access code listed below, as well as some personal information. Please follow the directions to create your username and password.     Your access code is: [email or web address]  Expires: 2018  8:24 AM     Your access code will  in 90 days. If you need help or a new code, please call your [facility] clinic or [phone].        Care EveryWhere ID     This is your Care EveryWhere ID. This could be used by other organizations to access your [facility] medical records  [id number]        Your Vitals Were     Pulse Temperature Respirations Last Period Pulse Oximetry BMI (Body Mass Index)    76 97.6  F (36.4  C) (Oral) 16 2004 (Approximate) 98% 29.05 kg/m2       Blood Pressure from Last 3 Encounters:   17 133/72   17 " 139/75   11/14/17 120/60    Weight from Last 3 Encounters:   11/22/17 81.6 kg (180 lb)   11/16/17 79.8 kg (176 lb)   11/14/17 81.2 kg (179 lb)              We Performed the Following          Ca27.29  breast tumor marker     Cancer antigen 19-9     CBC with platelets differential     CEA     Comprehensive metabolic panel          Today's Medication Changes          These changes are accurate as of: 11/22/17  2:34 PM.  If you have any questions, ask your nurse or doctor.               Stop taking these medicines if you haven't already. Please contact your care team if you have questions.     HYDROcodone-acetaminophen 5-325 MG per tablet   Commonly known as:  NORCO           UNABLE TO FIND                    Primary Care Provider Office Phone # Fax #    Jaida Liu [phone] [address] [place] [address]        Equal Access to Services     [facility]: Mika Nolan, waaxvanita heard, qaybandrew de santiagoalmavanita keen, ketan lam . [facility] [phone].    ATENCIÓN: Si habla español, tiene a thomas disposición servicios gratuitos de asistencia lingüística. Llame al [phone].    We comply with applicable federal civil rights laws and [place] laws. We do not discriminate on the basis of race, color, national origin, age, disability, sex, sexual orientation, or gender identity.            Thank you!     Thank you for choosing [facility]  for your care. Our goal is always to provide you with excellent care. Hearing back from our patients is one way we can continue to improve our services. Please take a few minutes to complete the written survey that you may receive in the mail after your visit with us. Thank you!             Your Updated Medication List - Protect others around you: Learn how to safely use, store and throw away your medicines at www.disposemymeds.org.          This list is accurate as of: 11/22/17   2:34 PM.  Always use your most recent med list.                   Brand Name Dispense Instructions for use Diagnosis    ADVIL PO      Take 400 mg by mouth every 6 hours as needed for moderate pain        aspirin 81 MG tablet      Take 81 mg by mouth daily        OMEGA-3 FISH OIL PO      Take 1 g by mouth daily        valACYclovir 1000 mg tablet    VALTREX    20 tablet    Take 1 tablet (1,000 mg) by mouth 2 times daily    Cold sore       VITAMIN D (CHOLECALCIFEROL) PO      Take 1,000 Units by mouth daily

## 2017-11-22 NOTE — PATIENT INSTRUCTIONS
1- Labs today  2- PET CT scan ASAP  Scheduled/janice  3- RTC MD next week  Scheduled/Janice      AVS printed & given to patient/Liseth

## 2017-11-24 ENCOUNTER — TELEPHONE (OUTPATIENT)
Dept: ONCOLOGY | Facility: CLINIC | Age: 62
End: 2017-11-24

## 2017-11-24 LAB — CANCER AG19-9 SERPL-ACNC: 19 U/ML (ref 0–37)

## 2017-11-24 NOTE — TELEPHONE ENCOUNTER
Patient's daughter, Lexii, called inquiring about cancellations or any sooner openings with Dr Magana to review PET Scan results next week.  [facility] contacted to check for cancellations, I was informed that there were no changes to Dr Magana's upcoming schedule for week of Nov 27 - Dec 1.  Daughter informed that we would recheck scheduling on Monday and contact her if there were any cancellations.

## 2017-11-27 ENCOUNTER — HOSPITAL ENCOUNTER (OUTPATIENT)
Dept: PET IMAGING | Facility: CLINIC | Age: 62
Discharge: HOME | End: 2017-11-27
Attending: INTERNAL MEDICINE | Admitting: INTERNAL MEDICINE
Payer: COMMERCIAL

## 2017-11-27 DIAGNOSIS — C77.9 LYMPH NODE CANCER (H): ICD-10-CM

## 2017-11-27 LAB — GLUCOSE BLDC GLUCOMTR-MCNC: 80 MG/DL (ref 70–99)

## 2017-11-27 PROCEDURE — 82962 GLUCOSE BLOOD TEST: CPT

## 2017-11-27 PROCEDURE — A9552 F18 FDG: HCPCS | Performed by: INTERNAL MEDICINE

## 2017-11-27 PROCEDURE — 71260 CT THORAX DX C+: CPT

## 2017-11-27 PROCEDURE — 34300033 ZZH RX 343: Performed by: INTERNAL MEDICINE

## 2017-11-27 PROCEDURE — 78815 PET IMAGE W/CT SKULL-THIGH: CPT | Mod: PI

## 2017-11-27 PROCEDURE — 25000128 H RX IP 250 OP 636: Performed by: INTERNAL MEDICINE

## 2017-11-27 RX ORDER — IOPAMIDOL 755 MG/ML
100 INJECTION, SOLUTION INTRAVASCULAR ONCE
Status: COMPLETED | OUTPATIENT
Start: 2017-11-27 | End: 2017-11-27

## 2017-11-27 RX ADMIN — IOPAMIDOL 100 ML: 755 INJECTION, SOLUTION INTRAVENOUS at 15:27

## 2017-11-27 RX ADMIN — FLUDEOXYGLUCOSE F-18 13.77 MCI.: 500 INJECTION, SOLUTION INTRAVENOUS at 14:26

## 2017-11-29 ENCOUNTER — ONCOLOGY VISIT (OUTPATIENT)
Dept: ONCOLOGY | Facility: CLINIC | Age: 62
End: 2017-11-29
Attending: INTERNAL MEDICINE
Payer: COMMERCIAL

## 2017-11-29 VITALS
BODY MASS INDEX: 29.21 KG/M2 | SYSTOLIC BLOOD PRESSURE: 145 MMHG | WEIGHT: 181 LBS | HEART RATE: 76 BPM | TEMPERATURE: 98.4 F | RESPIRATION RATE: 16 BRPM | OXYGEN SATURATION: 100 % | DIASTOLIC BLOOD PRESSURE: 90 MMHG

## 2017-11-29 DIAGNOSIS — C50.911: Primary | ICD-10-CM

## 2017-11-29 DIAGNOSIS — C79.9 METASTATIC CARCINOMA (H): Primary | ICD-10-CM

## 2017-11-29 PROCEDURE — 99215 OFFICE O/P EST HI 40 MIN: CPT | Performed by: INTERNAL MEDICINE

## 2017-11-29 PROCEDURE — 36415 COLL VENOUS BLD VENIPUNCTURE: CPT

## 2017-11-29 PROCEDURE — 99211 OFF/OP EST MAY X REQ PHY/QHP: CPT

## 2017-11-29 ASSESSMENT — PAIN SCALES - GENERAL: PAINLEVEL: NO PAIN (0)

## 2017-11-29 NOTE — PROGRESS NOTES
"Oncology Rooming Note    November 29, 2017 12:42 PM   Flor Griffin is a 62 year old female who presents for:    Chief Complaint   Patient presents with     Oncology Clinic Visit     return pt     Initial Vitals: BP (!) 151/94 (BP Location: Right arm, Patient Position: Sitting, Cuff Size: Adult Regular)  Pulse 78  Temp 98.4  F (36.9  C) (Oral)  Resp 16  Wt 82.1 kg (181 lb)  LMP 01/01/2004 (Approximate)  SpO2 99%  BMI 29.21 kg/m2 Estimated body mass index is 29.21 kg/(m^2) as calculated from the following:    Height as of 11/16/17: 1.676 m (5' 6\").    Weight as of this encounter: 82.1 kg (181 lb). Body surface area is 1.96 meters squared.  No Pain (0) Comment: Data Unavailable   Patient's last menstrual period was 01/01/2004 (approximate).  Allergies reviewed: Yes  Medications reviewed: Yes    Medications: Medication refills not needed today.  Pharmacy name entered into Splash: [facility] [address] [place] [address]  [address]    Clinical concerns: none [place] was notified.    10 minutes for nursing intake (face to face time)     Sanjana Corona MA    DISCHARGE PLAN:  Next appointments: See patient instruction section.  Brought the patient to the lobby for scheduling.  Departure Mode: Ambulatory  Accompanied by: son and daughter  3 minutes for nursing discharge (face to face time)   Angie Parry MA    "

## 2017-11-29 NOTE — LETTER
"    11/29/2017         RE: Flor Griffin  [address]  [facility] [address]        Dear Colleague,    Thank you for referring your patient, Flor Griffin, to the [facility]. Please see a copy of my visit note below.    Oncology Rooming Note    November 29, 2017 12:42 PM   Flor Griffin is a 62 year old female who presents for:    Chief Complaint   Patient presents with     Oncology Clinic Visit     return pt     Initial Vitals: BP (!) 151/94 (BP Location: Right arm, Patient Position: Sitting, Cuff Size: Adult Regular)  Pulse 78  Temp 98.4  F (36.9  C) (Oral)  Resp 16  Wt 82.1 kg (181 lb)  LMP 01/01/2004 (Approximate)  SpO2 99%  BMI 29.21 kg/m2 Estimated body mass index is 29.21 kg/(m^2) as calculated from the following:    Height as of 11/16/17: 1.676 m (5' 6\").    Weight as of this encounter: 82.1 kg (181 lb). Body surface area is 1.96 meters squared.  No Pain (0) Comment: Data Unavailable   Patient's last menstrual period was 01/01/2004 (approximate).  Allergies reviewed: Yes  Medications reviewed: Yes    Medications: Medication refills not needed today.  Pharmacy name entered into [facility]: [id number] - [place], [place] - [address]    Clinical concerns: none [place] was notified.    10 minutes for nursing intake (face to face time)     Sanjana Corona MA    DISCHARGE PLAN:  Next appointments: See patient instruction section.  Brought the patient to the lobby for scheduling.  Departure Mode: Ambulatory  Accompanied by: son and daughter  3 minutes for nursing discharge (face to face time)   Angie Parry MA      This clinic visit note has been dictated.  [id number]        [facility]  HEMATOLOGY ONCOLOGY    ONCOLOGY FOLLOWUP NOTE      DATE OF SERVICE:  11/29/2017.      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  Initially she was seen " 11/22/2017 after she had the right supraclavicular lymph node biopsy which was positive for poorly differentiated adenocarcinoma.  She had this lymph node almost a month and had an excisional biopsy performed which was consistent with the diagnosis of cancer.      A pet CT scan 11/27/2017 showed hypermetabolic right supraclavicular, right axillary and subpectoral adenopathy.  Otherwise, no distant metastatic disease.      Treatment workup in progress.      SUBJECTIVE:  The patient was seen as a followup today.  Her son, daughter and  came along.  We reviewed the results of PET scans and discussed further plan of care.     REVIEW OF SYSTEMS:  A complete review of systems was performed and found to be negative other than pertinent positives mentioned in history of present illness.      Past medical, social histories reviewed.     Meds- Reviewed.     PHYSICAL EXAMINATION:   VITAL SIGNS:  Her blood pressure is 151/94, pulse 76, respirations 16, temperature 98.4.  Constitutional sitting on chair, appears stressed.   NEUROLOGIC:  Alert, awake.     PSYCHIATRIC:  Very anxious.      LABORATORY DATA AND IMAGING REVIEWED DURING THIS VISIT:  Recent Labs   Lab Test  11/22/17   1420  04/08/15   0802   NA  139  141   POTASSIUM  4.0  4.1   CHLORIDE  104  105   CO2  29  28   ANIONGAP  6  8   BUN  17  17   CR  0.81  0.78   GLC  113*  105*   EDILSON  9.2  9.4     Recent Labs   Lab Test  11/22/17   1420  11/14/17   1418   WBC  6.2   --    HGB  13.2  12.5   PLT  312   --    MCV  94   --    NEUTROPHIL  63.9   --      Recent Labs   Lab Test  11/22/17   1420  04/08/15   0802   BILITOTAL  0.5  0.4   ALKPHOS  83  83   ALT  32  33   AST  19  15   ALBUMIN  3.9  4.1         Results for orders placed or performed during the hospital encounter of 11/27/17   PET Oncology (Eyes to Thighs)    Narrative    PET/CT SKULL BASE TO MID THIGH DIAGNOSTIC WITH IV CONTRAST November 27, 2017 4:13 PM     HISTORY: Staging for poorly differentiated cancer;  lymph node cancer  (H).      COMPARISON EXAMS:  SUBURBAN IMAGING: None.  FAIRVIEW: Right supraclavicular ultrasound 11/9/2017.  OTHER: None.    TECHNIQUE: 13.8 mCi of F-18 FDG were administered  intravenously.  Following the uneventful administration of 100 mL Isovue 370  intravenous contrast and oral contrast, a PET/CT scan was performed  from the skull base through the mid thigh. A diagnostic CT scan was  performed of the chest, abdomen and pelvis. Coronal, sagittal and  axial images were created and fused with the contrast enhanced CT  examination. Blood glucose: 80 mg/dL. The CT, PET and fusion images  are then evaluated on a Vio[employer]er workstation. Radiation dose for this  scan was reduced using automated exposure control, adjustment of the  mA and/or kV according to patient size, or iterative reconstruction  technique.    FINDINGS: Normal physiologic uptake is identified within the salivary  glands, myocardium, kidneys, ureters and bladder. Scattered areas of  physiologic bowel uptake are also present.    NECK:  Lymph nodes: Right supraclavicular node on series 3, image 90 measures  1 cm in diameter with an SUV max 10.0 consistent with known  malignancy. This is in the area of prior ultrasound. Adjacent scarring  is noted possibly from interval lymph node dissection or biopsy. No  additional hypermetabolic cervical lymph nodes.    Additional findings: None.    CHEST:  Lungs: No pathologic activity. Lungs are clear. No infiltrate,  consolidation or mass.    Lymph nodes: Hypermetabolic subpectoral and right axillary lymph nodes  are present. The largest in the right subpectoral region just medial  to the pectoralis minor muscle on series 3, image 110 measures 1.6 x  1.5 cm with an SUV max 25.3. An additional lymph node is noted just  inferior to this node and is of similar size and metabolic activity.  Just lateral to the pectoralis minor muscle is an additional lymph  node measuring an SUV max 13.3 on series 3,  image 104 measuring less  than 1 cm. An anterior right axillary node on series 3, image 127  measures 1.4 x 1.2 cm with an SUV max 13.3. No other hypermetabolic  right axillary nodes are present. No other hypermetabolic left  axillary or intrathoracic lymph nodes.     Additional findings: No pleural or pericardial fluid. Heart is normal  in size. Esophagus is unremarkable.    ABDOMEN/PELVIS:  Hepatobiliary: No pathologic activity. No evidence of fatty liver  infiltration or mass. No calcified gallstones.    Spleen: No pathologic activity. No enlargement or focal mass.    Pancreas: No pathologic activity. No evidence of a mass or surrounding  inflammation.    Kidneys: No pathologic activity. Parapelvic cyst left kidney with no  associated abnormal hypermetabolism. No other renal cyst or mass is  appreciated. No urinary tract calculi. No hydronephrosis. Bladder is  unremarkable.    Adrenals: No pathologic activity. No adrenal mass.    Reproductive: No pathologic activity. Uterus and adnexal regions are  within normal limits.    Gastrointestinal: No pathologic activity. No bowel obstruction.    Lymph nodes: No pathologic activity. No enlarged or hypermetabolic  lymph nodes in the abdomen or pelvis.    Additional findings: None.    SKELETON:   No pathologic activity.      Impression    IMPRESSION:  1. Hypermetabolic right supraclavicular, right axillary and  subpectoral lymph nodes as described. A total of five hypermetabolic  lymph nodes are noted in this region. No other hypermetabolic  adenopathy in the neck, chest, abdomen or pelvis. Spleen is normal in  size.  2. Lungs are clear. No evidence of metastatic disease in the abdomen  or pelvis.     DAVON NUNEZ MD      the PET scan results were reviewed.  I reviewed the images myself.      ECOG performance status 0.      ASSESSMENT AND PLAN:  This is a 62-year-old lady who has diagnosis of poorly differentiated adenocarcinoma on right supraclavicular lymph node  excisional biopsy.  Tumor cells were positive for CK7, CK5/6 and GATA3.  We did tumor marker testing and she had CA 27-29 at the level of 12,  of 16, CA 19-9 at 19.  Her CEA was less than 0.5.      PET CT scan results was done 2017.  We reviewed the results today.  I reviewed the images myself and reviewed them with the family as well.  She has hypermetabolic right supraclavicular, right axillary and subpectoral adenopathy.  There is no evidence of distant metastases.      I think that based on the distribution of adenopathy it is likely is a breast cancer which has metastasized to the regional lymph nodes and we are not able to find a primary on physical exam and a PET scan.      I have called and discussed this case with Dr. Justine Whipple of [facility].  At this point, we are planning to proceed with ER, WA, and HER-2 testing on the biopsy specimen.  Dr. Waylon Corral, who is the referring provider, his office will schedule a mammogram and we will also have them arrange for a port placement.      She will also be referred to Genetics due to family history of breast cancer.      PLAN:   1.  We will send ER, WA and HER-2 on the biopsy specimen.   2.  Mammogram to be scheduled by Dr. Waylon Corral's office.   3.  Schedule an appointment with Dr. Waylon Corral at [facility] for port placement early next week.   4.  Return to clinic next Thursday.   5.  Refer to Genetics.         YANI BARNES MD             D: 2017 13:45   T: 2017 14:18   MT: RU      Name:     ALMA MASON   MRN:      [id number]        Account:      [id number]   :      1955           Visit Date:   2017      Document: [id number]       cc: Waylon Liu MD       Again, thank you for allowing me to participate in the care of your patient.        Sincerely,        Yani Barnes MD

## 2017-11-29 NOTE — MR AVS SNAPSHOT
"              After Visit Summary   11/29/2017    Flor rGiffin    MRN: [id number]           Patient Information     Date Of Birth          1955        Visit Information        Provider Department      11/29/2017 12:45 PM Yani Magana MD [facility]        Today's Diagnoses     Carcinoma of right breast, stage 3 (H)    -  1      Care Instructions    1- Please send biopsy from 11/16 for ER/MO and Her-2  2- Schedule for a mammogram  3- Schedule an appointment with Dr. Waylon Corral at [facility] for consultation and port placement. Please see if we can have her see Dr. Corral early next week.   4-  RTC MD next Thursday  5- Refer to genetics          Follow-ups after your visit        Your next 10 appointments already scheduled     Nov 30, 2017 12:45 PM CST   (Arrive by 12:30 PM)   MA SCREENING DIGITAL BILATERAL with SHBCMA2   [facility] ([facility])    [address] [place] [address], [address] [address] [phone]           Do not use any powder, lotion or deodorant under your arms or on your breast. If you do, we will ask you to remove it before your exam.  Wear comfortable, two-piece clothing.  If you have any allergies, tell your care team.  Bring any previous mammograms from other facilities or have them mailed to the breast center. Three-dimensional (3D) mammograms are available at [facility] locations in [facility], [place], [facility], [place], [place], and [place]. -Health locations include [facility] in [place]. Benefits of 3D mammograms include: - Improved rate of cancer detection - Decreases your chance of having to go back for more tests, which means fewer: - \"False-positive\" results (This means that there is an abnormal area but it isn't cancer.) - Invasive testing procedures, such as a biopsy or surgery - Can provide clearer images of the breast if you have dense breast tissue. 3D " mammography is an optional exam that anyone can have with a 2D mammogram. It doesn't replace or take the place of a 2D mammogram. 2D mammograms remain an effective screening test for all women.  Not all insurance companies cover the cost of a 3D mammogram. Check with your insurance.            Dec 04, 2017  3:00 PM CST   Return Visit with Waylon Corral MD   [facility] ([facility])    [address] [address]   [phone]            Dec 07, 2017  9:15 AM CST   Return Visit with Yani Magana MD   [facility] ([facility])    [facility]  [address]  [facility] [address] [phone]            Jan 11, 2018 11:15 AM CST   New Visit with María Galeas GC   [facility] ([facility])    [facility]  [address]  [facility] [address]   [phone]              Future tests that were ordered for you today     Open Future Orders        Priority Expected Expires Ordered    Mammo Screening digital (bilat) Routine  11/29/2018 11/29/2017            Who to contact     If you have questions or need follow up information about today's clinic visit or your schedule please contact [facility] directly at [phone].  Normal or non-critical lab and imaging results will be communicated to you by [employer]OSAFEhart, letter or phone within 4 business days after the clinic has received the results. If you do not hear from us within 7 days, please contact the clinic through [employer] or phone. If you have a critical or abnormal lab result, we will notify you by phone as soon as possible.  Submit refill requests through [employer] or call your pharmacy and they will forward the refill request to us. Please allow 3 business days for your refill to be completed.          Additional Information About Your Visit        BIOSAFEhart Information     [employer] lets you  "send messages to your doctor, view your test results, renew your prescriptions, schedule appointments and more. To sign up, go to [email or web address] . Click on \"Log in\" on the left side of the screen, which will take you to the Welcome page. Then click on \"Sign up Now\" on the right side of the page.     You will be asked to enter the access code listed below, as well as some personal information. Please follow the directions to create your username and password.     Your access code is: [email or web address]  Expires: 2018  8:24 AM     Your access code will  in 90 days. If you need help or a new code, please call your [place] clinic or [phone].        Care EveryWhere ID     This is your Care EveryWhere ID. This could be used by other organizations to access your [facility] medical records  [id number]        Your Vitals Were     Pulse Temperature Respirations Last Period Pulse Oximetry BMI (Body Mass Index)    76 98.4  F (36.9  C) (Oral) 16 2004 (Approximate) 100% 29.21 kg/m2       Blood Pressure from Last 3 Encounters:   17 145/90   17 133/72   17 139/75    Weight from Last 3 Encounters:   17 82.1 kg (181 lb)   17 81.6 kg (180 lb)   17 79.8 kg (176 lb)               Primary Care Provider Office Phone # Fax #    Jaida Liu [phone] [address] [place] [address]        Equal Access to Services     [facility]: Hadii nelida copeland hadashtessa Sodot, waaxda luqadaha, qaybta kaalmada osmani, ketan connor. So [facility] [phone].    ATENCIÓN: Si habla español, tiene a thomas disposición servicios gratuitos de asistencia lingüística. Llame al [phone].    We comply with applicable federal civil rights laws and [place] laws. We do not discriminate on the basis of race, color, national origin, age, disability, sex, sexual orientation, or gender identity.            Thank you!     Thank you for " choosing [facility]  for your care. Our goal is always to provide you with excellent care. Hearing back from our patients is one way we can continue to improve our services. Please take a few minutes to complete the written survey that you may receive in the mail after your visit with us. Thank you!             Your Updated Medication List - Protect others around you: Learn how to safely use, store and throw away your medicines at www.disposemymeds.org.          This list is accurate as of: 11/29/17  1:57 PM.  Always use your most recent med list.                   Brand Name Dispense Instructions for use Diagnosis    ADVIL PO      Take 400 mg by mouth every 6 hours as needed for moderate pain        aspirin 81 MG tablet      Take 81 mg by mouth daily        OMEGA-3 FISH OIL PO      Take 1 g by mouth daily        valACYclovir 1000 mg tablet    VALTREX    20 tablet    Take 1 tablet (1,000 mg) by mouth 2 times daily    Cold sore       VITAMIN D (CHOLECALCIFEROL) PO      Take 1,000 Units by mouth daily

## 2017-11-29 NOTE — PROGRESS NOTES
[facility]  HEMATOLOGY ONCOLOGY    ONCOLOGY FOLLOWUP NOTE      DATE OF SERVICE:  11/29/2017.      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  Initially she was seen 11/22/2017 after she had the right supraclavicular lymph node biopsy which was positive for poorly differentiated adenocarcinoma.  She had this lymph node almost a month and had an excisional biopsy performed which was consistent with the diagnosis of cancer.      A pet CT scan 11/27/2017 showed hypermetabolic right supraclavicular, right axillary and subpectoral adenopathy.  Otherwise, no distant metastatic disease.      Treatment workup in progress.      SUBJECTIVE:  The patient was seen as a followup today.  Her son, daughter and  came along.  We reviewed the results of PET scans and discussed further plan of care.     REVIEW OF SYSTEMS:  A complete review of systems was performed and found to be negative other than pertinent positives mentioned in history of present illness.      Past medical, social histories reviewed.     Meds- Reviewed.     PHYSICAL EXAMINATION:   VITAL SIGNS:  Her blood pressure is 151/94, pulse 76, respirations 16, temperature 98.4.  Constitutional sitting on chair, appears stressed.   NEUROLOGIC:  Alert, awake.     PSYCHIATRIC:  Very anxious.      LABORATORY DATA AND IMAGING REVIEWED DURING THIS VISIT:  Recent Labs   Lab Test  11/22/17   1420  04/08/15   0802   NA  139  141   POTASSIUM  4.0  4.1   CHLORIDE  104  105   CO2  29  28   ANIONGAP  6  8   BUN  17  17   CR  0.81  0.78   GLC  113*  105*   EDILSON  9.2  9.4     Recent Labs   Lab Test  11/22/17   1420  11/14/17   1418   WBC  6.2   --    HGB  13.2  12.5   PLT  312   --    MCV  94   --    NEUTROPHIL  63.9   --      Recent Labs   Lab Test  11/22/17   1420  04/08/15   0802   BILITOTAL  0.5  0.4   ALKPHOS  83  83   ALT  32  33   AST  19  15   ALBUMIN  3.9  4.1         Results for orders  placed or performed during the hospital encounter of 11/27/17   PET Oncology (Eyes to Thighs)    Narrative    PET/CT SKULL BASE TO MID THIGH DIAGNOSTIC WITH IV CONTRAST November 27, 2017 4:13 PM     HISTORY: Staging for poorly differentiated cancer; lymph node cancer  (H).      COMPARISON EXAMS:  SUBURBAN IMAGING: None.  FAIRVIEW: Right supraclavicular ultrasound 11/9/2017.  OTHER: None.    TECHNIQUE: 13.8 mCi of F-18 FDG were administered  intravenously.  Following the uneventful administration of 100 mL Isovue 370  intravenous contrast and oral contrast, a PET/CT scan was performed  from the skull base through the mid thigh. A diagnostic CT scan was  performed of the chest, abdomen and pelvis. Coronal, sagittal and  axial images were created and fused with the contrast enhanced CT  examination. Blood glucose: 80 mg/dL. The CT, PET and fusion images  are then evaluated on a [employer] workstation. Radiation dose for this  scan was reduced using automated exposure control, adjustment of the  mA and/or kV according to patient size, or iterative reconstruction  technique.    FINDINGS: Normal physiologic uptake is identified within the salivary  glands, myocardium, kidneys, ureters and bladder. Scattered areas of  physiologic bowel uptake are also present.    NECK:  Lymph nodes: Right supraclavicular node on series 3, image 90 measures  1 cm in diameter with an SUV max 10.0 consistent with known  malignancy. This is in the area of prior ultrasound. Adjacent scarring  is noted possibly from interval lymph node dissection or biopsy. No  additional hypermetabolic cervical lymph nodes.    Additional findings: None.    CHEST:  Lungs: No pathologic activity. Lungs are clear. No infiltrate,  consolidation or mass.    Lymph nodes: Hypermetabolic subpectoral and right axillary lymph nodes  are present. The largest in the right subpectoral region just medial  to the pectoralis minor muscle on series 3, image 110 measures 1.6  x  1.5 cm with an SUV max 25.3. An additional lymph node is noted just  inferior to this node and is of similar size and metabolic activity.  Just lateral to the pectoralis minor muscle is an additional lymph  node measuring an SUV max 13.3 on series 3, image 104 measuring less  than 1 cm. An anterior right axillary node on series 3, image 127  measures 1.4 x 1.2 cm with an SUV max 13.3. No other hypermetabolic  right axillary nodes are present. No other hypermetabolic left  axillary or intrathoracic lymph nodes.     Additional findings: No pleural or pericardial fluid. Heart is normal  in size. Esophagus is unremarkable.    ABDOMEN/PELVIS:  Hepatobiliary: No pathologic activity. No evidence of fatty liver  infiltration or mass. No calcified gallstones.    Spleen: No pathologic activity. No enlargement or focal mass.    Pancreas: No pathologic activity. No evidence of a mass or surrounding  inflammation.    Kidneys: No pathologic activity. Parapelvic cyst left kidney with no  associated abnormal hypermetabolism. No other renal cyst or mass is  appreciated. No urinary tract calculi. No hydronephrosis. Bladder is  unremarkable.    Adrenals: No pathologic activity. No adrenal mass.    Reproductive: No pathologic activity. Uterus and adnexal regions are  within normal limits.    Gastrointestinal: No pathologic activity. No bowel obstruction.    Lymph nodes: No pathologic activity. No enlarged or hypermetabolic  lymph nodes in the abdomen or pelvis.    Additional findings: None.    SKELETON:   No pathologic activity.      Impression    IMPRESSION:  1. Hypermetabolic right supraclavicular, right axillary and  subpectoral lymph nodes as described. A total of five hypermetabolic  lymph nodes are noted in this region. No other hypermetabolic  adenopathy in the neck, chest, abdomen or pelvis. Spleen is normal in  size.  2. Lungs are clear. No evidence of metastatic disease in the abdomen  or pelvis.     DAVON NUNEZ MD       the PET scan results were reviewed.  I reviewed the images myself.      ECOG performance status 0.      ASSESSMENT AND PLAN:  This is a 62-year-old lady who has diagnosis of 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  Tumor cells were positive for CK7, CK5/6 and GATA3.  We did tumor marker testing and she had CA 27-29 at the level of 12,  of 16, CA 19-9 at 19.  Her CEA was less than 0.5.      PET CT scan results was done 2017.  We reviewed the results today.  I reviewed the images myself and reviewed them with the family as well.  She has hypermetabolic right supraclavicular, right axillary and subpectoral adenopathy.  There is no evidence of distant metastases.      I think that based on the distribution of adenopathy it is likely is a breast cancer which has metastasized to the regional lymph nodes and we are not able to find a primary on physical exam and a PET scan.      I have called and discussed this case with Dr. Justine Whipple of [facility].  At this point, we are planning to proceed with ER, MT, and HER-2 testing on the biopsy specimen.  Dr. Waylon Corral, who is the referring provider, his office will schedule a mammogram and we will also have them arrange for a port placement.      She will also be referred to Genetics due to family history of breast cancer.      PLAN:   1.  We will send ER, MT and HER-2 on the biopsy specimen.   2.  Mammogram to be scheduled by Dr. Waylon Corral's office.   3.  Schedule an appointment with Dr. Wayoln Corral at [facility] for port placement early next week.   4.  Return to clinic next Thursday.   5.  Refer to Genetics.         ERON BARNES MD             D: 2017 13:45   T: 2017 14:18   MT: RU      Name:     ALMA MASON   MRN:      [id number]        Account:      [id number]   :      1955           Visit Date:   2017      Document: [id number]       cc: Waylon Corral MD        Jaida Gallego Serum MD

## 2017-11-29 NOTE — PATIENT INSTRUCTIONS
1- Please send biopsy from 11/16 for ER/CO and Her-2 ( Done by ZOE, RN)  2- Schedule for a mammogram  Scheduled for 11/30/17 - Charity  3- Schedule an appointment with Dr. Waylon Corral at [facility] for consultation and port  placement. Please see if we can have her see Dr. Corral early next week.   Scheduled for 12/4/17 - Charity  4-  RTC MD next Thursday  Scheduled - Charity  5- Refer to genetics - Scheduled for 1/11/18 - Charity    AVS given to patient - Charity      11/30: I spoke with Sharita at [facility]/ pathology [phone] and she informed me that pathologist is looking to see if they have enough tissue to do the ER/CO and Her-2.

## 2017-11-30 ENCOUNTER — HOSPITAL ENCOUNTER (OUTPATIENT)
Dept: MAMMOGRAPHY | Facility: CLINIC | Age: 62
Discharge: HOME | End: 2017-11-30
Attending: SURGERY | Admitting: SURGERY
Payer: COMMERCIAL

## 2017-11-30 ENCOUNTER — TELEPHONE (OUTPATIENT)
Dept: ONCOLOGY | Facility: CLINIC | Age: 62
End: 2017-11-30

## 2017-11-30 DIAGNOSIS — C79.9 METASTATIC CARCINOMA (H): ICD-10-CM

## 2017-11-30 PROCEDURE — G0204 DX MAMMO INCL CAD BI: HCPCS

## 2017-11-30 NOTE — TELEPHONE ENCOUNTER
"Addendum:     Vanesa returned this clinician's call later in day. Vanesa reported to this clinician that she was pleased to have better understanding about diagnosis after visit with Dr. Magana, which helps her to feel that \"it is something that I can tackle.\" Pt reports that her 4 children, all of whom live locally, as well as her strong kieran community ([facility]) have been helpful in her coping. Pt describes herself as a \"very optimistic person\" by nature, and is looking forward to meeting with Dr. Magana next week for further discussion as to next steps. This clinician introduced role of oncology social worker, and pt is aware of how to reach out to pt prior to upcoming visit if in the case of psychosocial distress or need. Pt in agreement with plan to visit after pt's appointment with Dr. Magana on 12/7/17.    Please call or page this clinician in the case of psychosocial distress or concern.     ALBERT Carranza, [facility]  Phone: [phone]  Pager: [phone]    Kevin Jorgensen: M, T  *every other Thursday, 8am-4:30pm  Kevin Rivera: W, F, *every other Thursday, 8am-4:30pm       "

## 2017-11-30 NOTE — TELEPHONE ENCOUNTER
Oncology Distress Screening Follow-up  Clinical Social Work  [facility]    Identified Concern and Score From Distress Screening:     How concerned are you about feeling anxious or very scared?   8       Date of Distress Screenin17    Intervention:   This clinician called pt's cell phone and LVM requesting return call when able, leaving contact information. This clinician called pt's home phone and spoke briefly to pt's , introducing psychosocial services and support and leaving this clinician's contact information.     Follow-up Required:   This clinician will plan to meet pt in person after 17 visit with Dr. Magana. Family knows to reach out prior to this in the event of psychosocial distress or concern.     ALBERT Carranza, LICSW  Phone: [phone]  Pager: [phone]    [facility]: M, T  *every other Thursday, 8am-4:30pm  [facility]: W, F, *every other Thursday, 8am-4:30pm

## 2017-12-04 ENCOUNTER — OFFICE VISIT (OUTPATIENT)
Dept: SURGERY | Facility: CLINIC | Age: 62
End: 2017-12-04
Payer: COMMERCIAL

## 2017-12-04 ENCOUNTER — TELEPHONE (OUTPATIENT)
Dept: ONCOLOGY | Facility: CLINIC | Age: 62
End: 2017-12-04

## 2017-12-04 VITALS
BODY MASS INDEX: 29.38 KG/M2 | WEIGHT: 182 LBS | DIASTOLIC BLOOD PRESSURE: 89 MMHG | SYSTOLIC BLOOD PRESSURE: 152 MMHG | HEART RATE: 71 BPM | OXYGEN SATURATION: 98 %

## 2017-12-04 DIAGNOSIS — Z09 SURGERY FOLLOW-UP EXAMINATION: Primary | ICD-10-CM

## 2017-12-04 LAB — COPATH REPORT: NORMAL

## 2017-12-04 PROCEDURE — 99024 POSTOP FOLLOW-UP VISIT: CPT | Performed by: SURGERY

## 2017-12-04 NOTE — MR AVS SNAPSHOT
"              After Visit Summary   12/4/2017    Flor Griffin    MRN: [id number]           Patient Information     Date Of Birth          1955        Visit Information        Provider Department      12/4/2017 3:15 PM Waylon Corral MD [facility]      Today's Diagnoses     Surgery follow-up examination    -  1       Follow-ups after your visit        Your next 10 appointments already scheduled     Dec 07, 2017  9:15 AM CST   Return Visit with Yani Magana MD   [facility] ([facility])    [facility]  [address]  [place] [address]   [phone]            Jan 11, 2018 11:15 AM CST   New Visit with María Galeas GC   Cancer Risk Management Program ([facility])    [facility]  [address]  [place] [address]   [phone]              Who to contact     If you have questions or need follow up information about today's clinic visit or your schedule please contact [facility] directly at [phone].  Normal or non-critical lab and imaging results will be communicated to you by MyChart, letter or phone within 4 business days after the clinic has received the results. If you do not hear from us within 7 days, please contact the clinic through MyChart or phone. If you have a critical or abnormal lab result, we will notify you by phone as soon as possible.  Submit refill requests through [employer] or call your pharmacy and they will forward the refill request to us. Please allow 3 business days for your refill to be completed.          Additional Information About Your Visit        MyChart Information     [employer] lets you send messages to your doctor, view your test results, renew your prescriptions, schedule appointments and more. To sign up, go to [email or web address] . Click on \"Log in\" on the left side of the screen, " "which will take you to the Welcome page. Then click on \"Sign up Now\" on the right side of the page.     You will be asked to enter the access code listed below, as well as some personal information. Please follow the directions to create your username and password.     Your access code is: [email or web address]  Expires: 2018  8:24 AM     Your access code will  in 90 days. If you need help or a new code, please call your [facility] clinic or [phone].        Care EveryWhere ID     This is your Care EveryWhere ID. This could be used by other organizations to access your [facility] medical records  [id number]        Your Vitals Were     Pulse Last Period Pulse Oximetry BMI (Body Mass Index)          71 2004 (Approximate) 98% 29.38 kg/m2         Blood Pressure from Last 3 Encounters:   17 152/89   17 145/90   17 133/72    Weight from Last 3 Encounters:   17 182 lb (82.6 kg)   17 181 lb (82.1 kg)   17 180 lb (81.6 kg)              Today, you had the following     No orders found for display       Primary Care Provider Office Phone # Fax #    Jaida Liu [phone] [address] [place] [address]        Equal Access to Services     [facility]: Hadii nelida ku hadasho Sobradleyali, waaxda luqadaha, qaybta kaalmada osmani, ketan lam . So [facility] [phone].    ATENCIÓN: Si habla español, tiene a thomas disposición servicios gratuitos de asistencia lingüística. Pino al [phone].    We comply with applicable federal civil rights laws and [place] laws. We do not discriminate on the basis of race, color, national origin, age, disability, sex, sexual orientation, or gender identity.            Thank you!     Thank you for choosing [facility]  for your care. Our goal is always to provide you with excellent care. Hearing back from our patients is one way we can continue to improve our " services. Please take a few minutes to complete the written survey that you may receive in the mail after your visit with us. Thank you!             Your Updated Medication List - Protect others around you: Learn how to safely use, store and throw away your medicines at www.disposemymeds.org.          This list is accurate as of: 12/4/17  4:59 PM.  Always use your most recent med list.                   Brand Name Dispense Instructions for use Diagnosis    ADVIL PO      Take 400 mg by mouth every 6 hours as needed for moderate pain        aspirin 81 MG tablet      Take 81 mg by mouth daily        OMEGA-3 FISH OIL PO      Take 1 g by mouth daily        valACYclovir 1000 mg tablet    VALTREX    20 tablet    Take 1 tablet (1,000 mg) by mouth 2 times daily    Cold sore       VITAMIN D (CHOLECALCIFEROL) PO      Take 1,000 Units by mouth daily

## 2017-12-04 NOTE — TELEPHONE ENCOUNTER
Patient's daughter called stating that her parents would like to purchase a new puppy currently but were wondering if it is ok with her mother starting chemotherapy treatments. Stated to patient's daughter that it should not be a problem as long as her mother does not clean up feces or urine with a new puppy and that she needs to be careful with a potential dog bite which could cause an infection. Message will be forwarded to Dr. Magana to make sure he is ok with patient being exposed to a new puppy. Danya Lara RN

## 2017-12-04 NOTE — PROGRESS NOTES
The patient presents today to follow-up after her recent right clavicular lymph node biopsy.  This revealed poorly differentiated adenocarcinoma.  Recent PET scan revealed axillary, subpectoral and supraclavicular malignant findings.  There were no other malignant findings.  This raises the possibility of the breast as primary.  Mammogram was negative.    The patient's incision is healing well.    Assessment and plan: This is a patient with a metastatic malignancy of unknown primary.  Breast is suspected.  We could certainly consider an MRI of the breasts, although I think is relatively unlikely that will show any obvious findings with a negative PET/CT scan and mammogram.  I would be happy to order that for her or it could be ordered through Dr. Magana's office.  At this point, the patient does not feel strongly she wants to have that.  We did discuss port placement today, along with its risks and complications.  We should be able to get that place for her prior to her chemotherapy.  She will let us know once she knows what her chemotherapy start date is.    Waylon Corral MD  [facility]    Please route or send letter to:  Primary Care Provider (PCP) and Dr. Magana of oncology

## 2017-12-04 NOTE — NURSING NOTE
"Chief Complaint   Patient presents with     RECHECK     Follow up excisional bx R neck lymphnode 11/16/17       Initial /89  Pulse 71  Wt 182 lb (82.6 kg)  LMP 01/01/2004 (Approximate)  SpO2 98%  BMI 29.38 kg/m2 Estimated body mass index is 29.38 kg/(m^2) as calculated from the following:    Height as of 11/16/17: 5' 6\" (1.676 m).    Weight as of this encounter: 182 lb (82.6 kg).  Medication Reconciliation: complete   Keara Burton CMA      "

## 2017-12-04 NOTE — LETTER
2017    Re: Flor Griffin, : 1955    The patient presents today to follow-up after her recent right clavicular lymph node biopsy.  This revealed poorly differentiated adenocarcinoma.  Recent PET scan revealed axillary, subpectoral and supraclavicular malignant findings.  There were no other malignant findings.  This raises the possibility of the breast as primary.  Mammogram was negative.     The patient's incision is healing well.     Assessment and plan: This is a patient with a metastatic malignancy of unknown primary.  Breast is suspected.  We could certainly consider an MRI of the breasts, although I think is relatively unlikely that will show any obvious findings with a negative PET/CT scan and mammogram.  I would be happy to order that for her or it could be ordered through Dr. Magana's office.  At this point, the patient does not feel strongly she wants to have that.  We did discuss port placement today, along with its risks and complications.  We should be able to get that place for her prior to her chemotherapy.  She will let us know once she knows what her chemotherapy start date is.     Waylon Corral MD  [facility]

## 2017-12-05 LAB — COPATH REPORT: NORMAL

## 2017-12-07 ENCOUNTER — ONCOLOGY VISIT (OUTPATIENT)
Dept: ONCOLOGY | Facility: CLINIC | Age: 62
End: 2017-12-07
Attending: INTERNAL MEDICINE
Payer: COMMERCIAL

## 2017-12-07 VITALS
DIASTOLIC BLOOD PRESSURE: 79 MMHG | BODY MASS INDEX: 29.31 KG/M2 | HEART RATE: 72 BPM | OXYGEN SATURATION: 99 % | SYSTOLIC BLOOD PRESSURE: 125 MMHG | WEIGHT: 181.6 LBS | TEMPERATURE: 97.8 F | RESPIRATION RATE: 16 BRPM

## 2017-12-07 DIAGNOSIS — C50.919 TRIPLE NEGATIVE MALIGNANT NEOPLASM OF BREAST (H): ICD-10-CM

## 2017-12-07 DIAGNOSIS — Z17.421 TRIPLE NEGATIVE MALIGNANT NEOPLASM OF BREAST (H): ICD-10-CM

## 2017-12-07 DIAGNOSIS — C80.1 CARCINOMA OF UNKNOWN ORIGIN (H): Primary | ICD-10-CM

## 2017-12-07 PROCEDURE — 99215 OFFICE O/P EST HI 40 MIN: CPT

## 2017-12-07 PROCEDURE — 99211 OFF/OP EST MAY X REQ PHY/QHP: CPT

## 2017-12-07 PROCEDURE — 99214 OFFICE O/P EST MOD 30 MIN: CPT | Performed by: INTERNAL MEDICINE

## 2017-12-07 ASSESSMENT — PAIN SCALES - GENERAL: PAINLEVEL: NO PAIN (0)

## 2017-12-07 NOTE — LETTER
"    12/7/2017         RE: Flor Griffin  [address]  [facility] [address]        Dear Colleague,    Thank you for referring your patient, Flor Griffin, to the [facility]. Please see a copy of my visit note below.    Oncology Rooming Note    December 7, 2017 8:53 AM   Flor Griffin is a 62 year old female who presents for:    Chief Complaint   Patient presents with     Oncology Clinic Visit     metastatic adenocarcinoma      Initial Vitals: /79 (BP Location: Right arm, Patient Position: Sitting, Cuff Size: Adult Large)  Pulse 72  Temp 97.8  F (36.6  C) (Oral)  Resp 16  Wt 82.4 kg (181 lb 9.6 oz)  LMP 01/01/2004 (Approximate)  SpO2 99%  BMI 29.31 kg/m2 Estimated body mass index is 29.31 kg/(m^2) as calculated from the following:    Height as of 11/16/17: 1.676 m (5' 6\").    Weight as of this encounter: 82.4 kg (181 lb 9.6 oz). Body surface area is 1.96 meters squared.  No Pain (0) Comment: Data Unavailable   Patient's last menstrual period was 01/01/2004 (approximate).  Allergies reviewed: Yes  Medications reviewed: Yes    Medications: Medication refills not needed today.  Pharmacy name entered into [employer]: [id number] - [place], [place] - [address]    Clinical concerns: None                      4 minutes for nursing intake (face to face time)     Angie Parry MA    DISCHARGE PLAN:  1.) Patient educated on port placement, reviewed port booklet with patient. Port will be placed in Interventional Radiology and order was sent for it be accessed in IR prior to chemotherapy.   2.) Patient given Chemotherapy guidebook and patient was given information through chemocare[employer] on Taxol/Carboplatin.   3.) Patient and  met with [facility] regarding chemotherapy symptoms, side effects and timing of chemotherapy.   4.) Patient to start Taxol/Carboplatin 12/11/17 after port placement.   5.) Patient to follow up with labs and " exam in 2 weeks with Dr. Magana.   Next appointments: See patient instruction section  Departure Mode: Ambulatory  Accompanied by:   35 minutes for nursing discharge (face to face time)   Danya Lara RN     Teaching Flowsheet   Relevant Diagnosis: Triple negative Breast cancer  Teaching Topic: Chemotherapy  Person(s) involved in teaching: Patient and   Motivation Level: High  Asks Questions: yes  Eager to Learn: yes  Cooperative: yes  Receptive (willing/able to accept information): yes  (Patient and wife) demonstrates understanding of the following:   Reason for the appointment, diagnosis and treatment plan: yes  Knowledge of proper use of medications and conditions for which they are ordered (with special attention to potential side effects or drug interactions): Patient met with [facility]  Which situations necessitate calling provider and whom to contact: Patient given clinic phone number, nurse Vanessa  Teaching Concerns Addressed: yes  Instructional Materials Used/Given: Chemotherapy Guidebook  Patient given ChemoCare print-out on Taxol,Carboplatin  Printed material reviewed. yes  Patient given contact phone number of Clinic [facility]   Time spent with patient: 35 monutes  Signature of Nurse: Danya Lara RN     Patient given information on [employer], [employer], Look Good Feel Better, [employer]. Patient given information on wig information, catalogue for wigs and hair coverings. Danya Lara RN      [facility]  HEMATOLOGY ONCOLOGY    ONCOLOGY FOLLOWUP NOTE      DIAGNOSIS:  Clinically, TX N3c M0 adenocarcinoma which is poorly differentiated, likely of breast origin.  Initially she was seen 11/22/2017 after she had the right supraclavicular lymph node biopsy which was positive for poorly differentiated adenocarcinoma.  She had this lymph node almost a month and had an excisional biopsy performed which was consistent with the diagnosis of cancer.      A pet CT scan  11/27/2017 showed hypermetabolic right supraclavicular, right axillary and subpectoral adenopathy.  Otherwise, no distant metastatic disease.     Mammogram 11/30/17- negative    TREATMENT: Carboplatin and Taxol      SUBJECTIVE:  The patient was seen as a followup today. We discussed the results of mammogram, pathology and further plan of care.     REVIEW OF SYSTEMS:  A complete review of systems was performed and found to be negative other than pertinent positives mentioned in history of present illness.      Past medical, social histories reviewed.     Meds- Reviewed.     PHYSICAL EXAMINATION:   VITAL SIGNS: /79 (BP Location: Right arm, Patient Position: Sitting, Cuff Size: Adult Large)  Pulse 72  Temp 97.8  F (36.6  C) (Oral)  Resp 16  Wt 82.4 kg (181 lb 9.6 oz)  LMP 01/01/2004 (Approximate)  SpO2 99%  BMI 29.31 kg/m2  CONSTITUTIONAL: Sitting comfortably.   NEUROLOGIC: Alert, awake.   PSYCH: Anxious.     LABORATORY DATA AND IMAGING REVIEWED DURING THIS VISIT:  Recent Labs   Lab Test  11/22/17   1420  04/08/15   0802   NA  139  141   POTASSIUM  4.0  4.1   CHLORIDE  104  105   CO2  29  28   ANIONGAP  6  8   BUN  17  17   CR  0.81  0.78   GLC  113*  105*   EDILSON  9.2  9.4     Recent Labs   Lab Test  11/22/17   1420  11/14/17   1418   WBC  6.2   --    HGB  13.2  12.5   PLT  312   --    MCV  94   --    NEUTROPHIL  63.9   --      Recent Labs   Lab Test  11/22/17   1420  04/08/15   0802   BILITOTAL  0.5  0.4   ALKPHOS  83  83   ALT  32  33   AST  19  15   ALBUMIN  3.9  4.1         Results for orders placed or performed during the hospital encounter of 11/30/17   MA Diagnostic Bilateral w/Dmitriy    Narrative    MA DIAGNOSTIC BILATERAL W/ DMITRIY -  11/30/2017    HISTORY:  Recently diagnosed right supraclavicular adenocarcinoma.  Bilateral diagnostic mammogram is performed to evaluate for primary  malignancy site.    COMPARISON:  Screening mammograms 4/11/2017, 4/30/2015.    BREAST DENSITY: Heterogeneously  dense.    FINDINGS:  Standard bilateral diagnostic views, including  tomosynthesis, were obtained. The pattern of glandular tissue in both  breasts is stable. There is no suspicious mammographic abnormality in  either breast to suggest primary breast malignancy.      Impression    IMPRESSION: BI-RADS CATEGORY: 1 -  Negative  No evidence of malignancy. Results were communicated to the patient  during her appointment. Clinical follow-up is recommended.    RECOMMENDED FOLLOW-UP: Clinical follow-up.    REJI LAKE MD     ECOG performance status 0.      ASSESSMENT AND PLAN:  This is a 62-year-old lady who has diagnosis of 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  Tumor cells were positive for CK7, CK5/6 and GATA3.  We did tumor marker testing and she had CA 27-29 at the level of 12,  of 16, CA 19-9 at 19.  Her CEA was less than 0.5.      PET CT scan results was done 11/27/2017.  showed hypermetabolic right supraclavicular, right axillary and subpectoral adenopathy.  There was no evidence of distant metastases.      The distribution of adenopathy indicates possibility breast cancer which has metastasized to the regional lymph nodes and we are not able to find a primary on physical exam and a PET scan.     - Mammogram 11/30/17 results were reviewed- no evidence of primary.   - Her-2 non amplified. Androgen receptor 10-20%. ER 1-5%. HI < 1%.   - Detailed discussion with the patient. I will favor getting an ENT examination done and an EGD to screen for primary in these areas. I think she should start chemotherapy and if she has a good response then we will consider doing consolidation radiation.   We discussed about starting combination of carboplatin and Taxol. We had a detailed discussion about each chemotherapy agent, intent of treatment and also potential risks and benefits. We discussed the adverse effects related to each of chemotherapy agent. Patient asked  questions and expressed willingness to proceed with the treatment.      PLAN:   1- Start carbo/Txol every three weeks- chemo education today  2- Port placement ASAP  3- Schedule an appointment for ENT examination-   4- Schedule an EGD -DR. Caro  5- RTC MD 2 weeks with CBC diff, CMP    YANI MAGANA MD    12/7/2017     cc: Waylon Liu MD       Again, thank you for allowing me to participate in the care of your patient.        Sincerely,        Yani Magana MD

## 2017-12-07 NOTE — MR AVS SNAPSHOT
After Visit Summary   12/7/2017    Flor Griffin    MRN: [id number]           Patient Information     Date Of Birth          1955        Visit Information        Provider Department      12/7/2017 9:15 AM Yani Magana MD [facility]        Today's Diagnoses     Carcinoma of unknown origin (H)    -  1    Triple negative malignant neoplasm of breast (H)          Care Instructions    1- Start carbo/Txol every three weeks- chemo education today  Scheduled/janice  2- Port placement ASAP  Scheduled/janice  3- Schedule an appointment for ENT examination-   Scheduled/janice  4- Schedule an EGD -DR. Caro  Scheduled/janice  5- RTC MD 2 weeks with CBC diff, CMP   Scheduled/janice    You are scheduled for a Port-a-cath Placement at [facility] on Dec 11, 2017 with Interventional Radiology.  Please check in at the [facility] [employer] Desk @ 6:30am  Your procedure is scheduled for 8:00am  Nothing to eat or drink after midnight  You will need a .      You have an appointment with Dr Miller on Dec 14, 2017 @ 1:30pm [place] location. The office will mail an information packet to you. Please check in 20 minutes early for registration.    You are scheduled with Dr Caro on Dec 15, 2017 @ 1:30pm for EGD at the [place] location. A nurse will call you with instructions and the office will mail a packet to you.      AVS printed & given to patient/janice          Follow-ups after your visit        Additional Services     GASTROENTEROLOGY ADULT REF PROCEDURE ONLY       Last Lab Result: Creatinine (mg/dL)       Date                     Value                 11/22/2017               0.81             ----------  Body mass index is 29.31 kg/(m^2).     Needed:  No  Language:  English    Patient will be contacted to schedule procedure.     Please be aware that coverage of these services is subject to the terms and limitations of your health insurance plan.  Call member  services at your health plan with any benefit or coverage questions.  Any procedures must be performed at a [place] facility OR coordinated by your clinic's referral office.    Please bring the following with you to your appointment:    (1) Any X-Rays, CTs or MRIs which have been performed.  Contact the facility where they were done to arrange for  prior to your scheduled appointment.    (2) List of current medications   (3) This referral request   (4) Any documents/labs given to you for this referral            OTOLARYNGOLOGY REFERRAL       Your provider has referred you to: [facility]: [place] [facility] [phone]   [email or web address]    Please be aware that coverage of these services is subject to the terms and limitations of your health insurance plan.  Call member services at your health plan with any benefit or coverage questions.      Please bring the following with you to your appointment:    (1) Any X-Rays, CTs or MRIs which have been performed.  Contact the facility where they were done to arrange for  prior to your scheduled appointment.   (2) List of current medications  (3) This referral request   (4) Any documents/labs given to you for this referral                  Your next 10 appointments already scheduled     Dec 11, 2017  8:00 AM CST   (Arrive by 7:45 AM)   IR CHEST PORT PLACEMENT > 5 YRS OF AGE with SHIR1   [facility] Interventional Radiology ([facility])    [address] [place] [id number] [phone]           1. Your doctor will need to do a history and physical within 7 days before this procedure. 2. Your doctor will which medications should not be taken the morning of the exam. 3. Laboratory tests are to be obtained by your doctor prior to the exam (Basic Metabolic Panel, CBCP, PTT and INR) (No labs needed if you are having a tunneled catheter exchange or removal) 4. If you have allergies to x-ray  contrast or iodine, contact your doctor or a Radiology nurse prior to the exam day for instructions. 5. Someone will need to drive you to and from the hospital. 6. If you are or may be pregnant, contact your doctor or a Radiology nurse prior to the day of the exam. 7. If you have diabetes, check with your doctor or a Radiology nurse to see if your insulin needs to be adjusted for the exam. 8. If you are taking a medication called Glucophage or Glucovance; these medications need to be held the day of the exam and for approximately 48 hours following. A blood sample must be drawn so your creatinine level can be checked before resuming this medication. 9. If you are taking Coumadin (to thin you blood) please contact your doctor or a Radiology nurse at least 3 days before the exam for special instructions. 10. You should not have received contrast within 48 hours of this exam. 11. The day before your exam you may eat your regular diet and are encouraged to drink at least 2 quarts of clear liquids. Drink no alcoholic beverages for 24 hours prior to the exam. 12. If you have a colostomy you will need to irrigate it with tap water at 8PM the evening before and again at 6AM the morning of the exam. 13. Do not smoke for 24 hours prior to the procedure. 14. Birth to 4 years: - Breast feeding must be stopped 4 hours prior to exam - Solid food or formula must be stopped 6 hours prior to exam - Tube feedings must be stopped 6 hours prior to exam 15. 4-10 years old: - Nothing to eat or drink 6 hours prior to exam 16. 10+ years old: - Nothing to eat or drink 8 hours prior to exam 17. The morning of the exam you may brush your teeth and take medications as directed with a sip of water. 18. When discharged, you cannot drive until morning, and an adult must be with you until then. You should stay in the [facility] overnight. 19. Bring a list of all drugs you are taking; include supplements and over-the-counter medications. Wear  comfortable clothes and leave your valuables at home.            Dec 11, 2017 10:00 AM CST   Level 5 with SH INFUSION CHAIR 16   [facility] ([facility])    [facility] [place]  [address]  [place] [address]   [phone]            Dec 21, 2017 11:30 AM CST   Level 1 with SH INFUSION CHAIR 4   [facility] ([facility])    [facility] [place]  [address]  [place] [address]   [phone]            Dec 21, 2017 12:30 PM CST   Return Visit with Yani Magana MD   [facility] ([facility])    [facility] [place]  [address]  [place] [address]   [phone]            Jan 11, 2018 11:15 AM CST   New Visit with María Galeas GC   Cancer Risk Management Program ([facility])    [facility]  [address]  [place] [address]   [phone]              Future tests that were ordered for you today     Open Future Orders        Priority Expected Expires Ordered    IR Chest Port Placement > 5 Yrs of Age Routine 12/7/2017 12/29/2017 12/7/2017    CBC with platelets differential Routine 12/21/2017 4/7/2018 12/7/2017    Comprehensive metabolic panel Routine 12/21/2017 4/7/2018 12/7/2017            Who to contact     If you have questions or need follow up information about today's clinic visit or your schedule please contact [facility] directly at [phone].  Normal or non-critical lab and imaging results will be communicated to you by MyChart, letter or phone within 4 business days after the clinic has received the results. If you do not hear from us within 7 days, please contact the clinic through MyChart or phone. If you have a critical or abnormal lab result, we will notify you by phone as soon as possible.  Submit refill requests through [employer] or call your  "pharmacy and they will forward the refill request to us. Please allow 3 business days for your refill to be completed.          Additional Information About Your Visit        MyChart Information     [employer] lets you send messages to your doctor, view your test results, renew your prescriptions, schedule appointments and more. To sign up, go to [email or web address] . Click on \"Log in\" on the left side of the screen, which will take you to the Welcome page. Then click on \"Sign up Now\" on the right side of the page.     You will be asked to enter the access code listed below, as well as some personal information. Please follow the directions to create your username and password.     Your access code is: [email or web address]  Expires: 2018  8:24 AM     Your access code will  in 90 days. If you need help or a new code, please call your [facility] clinic or [phone].        Care EveryWhere ID     This is your Care EveryWhere ID. This could be used by other organizations to access your [facility] medical records  [id number]        Your Vitals Were     Pulse Temperature Respirations Last Period Pulse Oximetry BMI (Body Mass Index)    72 97.8  F (36.6  C) (Oral) 16 2004 (Approximate) 99% 29.31 kg/m2       Blood Pressure from Last 3 Encounters:   17 125/79   17 152/89   17 145/90    Weight from Last 3 Encounters:   17 82.4 kg (181 lb 9.6 oz)   17 82.6 kg (182 lb)   17 82.1 kg (181 lb)              We Performed the Following     GASTROENTEROLOGY ADULT REF PROCEDURE ONLY     OTOLARYNGOLOGY REFERRAL        Primary Care Provider Office Phone # Fax #    Jaida Liu [phone] [address] [place] [address]        Equal Access to Services     MARYJANE JARA : Mika Nolan, carole heard, ketan norton. [facility] [phone].    ATENCIÓN: Si juan j zaman " thomas disposición servicios gratuitos de asistencia lingüística. Pino schafer [phone].    We comply with applicable federal civil rights laws and [place] laws. We do not discriminate on the basis of race, color, national origin, age, disability, sex, sexual orientation, or gender identity.            Thank you!     Thank you for choosing [facility]  for your care. Our goal is always to provide you with excellent care. Hearing back from our patients is one way we can continue to improve our services. Please take a few minutes to complete the written survey that you may receive in the mail after your visit with us. Thank you!             Your Updated Medication List - Protect others around you: Learn how to safely use, store and throw away your medicines at www.disposemymeds.org.          This list is accurate as of: 12/7/17  3:28 PM.  Always use your most recent med list.                   Brand Name Dispense Instructions for use Diagnosis    ADVIL PO      Take 400 mg by mouth every 6 hours as needed for moderate pain        aspirin 81 MG tablet      Take 81 mg by mouth daily        OMEGA-3 FISH OIL PO      Take 1 g by mouth daily        valACYclovir 1000 mg tablet    VALTREX    20 tablet    Take 1 tablet (1,000 mg) by mouth 2 times daily    Cold sore       VITAMIN D (CHOLECALCIFEROL) PO      Take 1,000 Units by mouth daily

## 2017-12-07 NOTE — PROGRESS NOTES
"Oncology Rooming Note    December 7, 2017 8:53 AM   Flor Griffin is a 62 year old female who presents for:    Chief Complaint   Patient presents with     Oncology Clinic Visit     metastatic adenocarcinoma      Initial Vitals: /79 (BP Location: Right arm, Patient Position: Sitting, Cuff Size: Adult Large)  Pulse 72  Temp 97.8  F (36.6  C) (Oral)  Resp 16  Wt 82.4 kg (181 lb 9.6 oz)  LMP 01/01/2004 (Approximate)  SpO2 99%  BMI 29.31 kg/m2 Estimated body mass index is 29.31 kg/(m^2) as calculated from the following:    Height as of 11/16/17: 1.676 m (5' 6\").    Weight as of this encounter: 82.4 kg (181 lb 9.6 oz). Body surface area is 1.96 meters squared.  No Pain (0) Comment: Data Unavailable   Patient's last menstrual period was 01/01/2004 (approximate).  Allergies reviewed: Yes  Medications reviewed: Yes    Medications: Medication refills not needed today.  Pharmacy name entered into [employer]: [id number] - [place] [address]  [address]    Clinical concerns: None                      4 minutes for nursing intake (face to face time)     Angie Parry MA    DISCHARGE PLAN:  1.) Patient educated on port placement, reviewed port booklet with patient. Port will be placed in Interventional Radiology and order was sent for it be accessed in IR prior to chemotherapy.   2.) Patient given Chemotherapy guidebook and patient was given information through chemocareWochit on Taxol/Carboplatin.   3.) Patient and  met with [facility] regarding chemotherapy symptoms, side effects and timing of chemotherapy.   4.) Patient to start Taxol/Carboplatin 12/11/17 after port placement.   5.) Patient to follow up with labs and exam in 2 weeks with Dr. Magana.   Next appointments: See patient instruction section  Departure Mode: Ambulatory  Accompanied by:   35 minutes for nursing discharge (face to face time)   Danya Lara RN     Teaching Flowsheet   Relevant " Diagnosis: Triple negative Breast cancer  Teaching Topic: Chemotherapy  Person(s) involved in teaching: Patient and   Motivation Level: High  Asks Questions: yes  Eager to Learn: yes  Cooperative: yes  Receptive (willing/able to accept information): yes  (Patient and wife) demonstrates understanding of the following:   Reason for the appointment, diagnosis and treatment plan: yes  Knowledge of proper use of medications and conditions for which they are ordered (with special attention to potential side effects or drug interactions): Patient met with [facility]  Which situations necessitate calling provider and whom to contact: Patient given clinic phone number, nurse Vanessa  Teaching Concerns Addressed: yes  Instructional Materials Used/Given: Chemotherapy Guidebook  Patient given ChemoCare print-out on Taxol,Carboplatin  Printed material reviewed. yes  Patient given contact phone number of Clinic [facility]   Time spent with patient: 35 monutes  Signature of Nurse: Danya Lara RN     Patient given information on [employer], [employer], Look Good Feel Better, [employer]. Patient given information on wig information, catalogue for wigs and hair coverings. Danya Lara RN

## 2017-12-07 NOTE — PATIENT INSTRUCTIONS
1- Start carbo/Txol every three weeks- chemo education today  Scheduled/janice  2- Port placement ASAP  Scheduled/janice  3- Schedule an appointment for ENT examination-   Scheduled/janice  4- Schedule an EGD -DR. Caro  Scheduled/janice  5- RTC MD 2 weeks with CBC diff, CMP   Scheduled/janice    You are scheduled for a Port-a-cath Placement at [facility] on Dec 11, 2017 with Interventional Radiology.  Please check in at the [facility] [place] Interview Desk @ 6:30am  Your procedure is scheduled for 8:00am  Nothing to eat or drink after midnight  You will need a .      You have an appointment with Dr Miller on Dec 14, 2017 @ 1:30pm [place] location. The office will mail an information packet to you. Please check in 20 minutes early for registration.    You are scheduled with Dr Caro on Dec 15, 2017 @ 1:30pm for EGD at the [place] location. A nurse will call you with instructions and the office will mail a packet to you.      AVS printed & given to patient/cristina

## 2017-12-07 NOTE — PROGRESS NOTES
[facility] PHYSICIANS  HEMATOLOGY ONCOLOGY    ONCOLOGY FOLLOWUP NOTE      DIAGNOSIS:  Clinically, TX N3c M0 adenocarcinoma which is poorly differentiated, likely of breast origin.  Initially she was seen 11/22/2017 after she had the right supraclavicular lymph node biopsy which was positive for poorly differentiated adenocarcinoma.  She had this lymph node almost a month and had an excisional biopsy performed which was consistent with the diagnosis of cancer.      A pet CT scan 11/27/2017 showed hypermetabolic right supraclavicular, right axillary and subpectoral adenopathy.  Otherwise, no distant metastatic disease.     Mammogram 11/30/17- negative    TREATMENT: Carboplatin and Taxol      SUBJECTIVE:  The patient was seen as a followup today. We discussed the results of mammogram, pathology and further plan of care.     REVIEW OF SYSTEMS:  A complete review of systems was performed and found to be negative other than pertinent positives mentioned in history of present illness.      Past medical, social histories reviewed.     Meds- Reviewed.     PHYSICAL EXAMINATION:   VITAL SIGNS: /79 (BP Location: Right arm, Patient Position: Sitting, Cuff Size: Adult Large)  Pulse 72  Temp 97.8  F (36.6  C) (Oral)  Resp 16  Wt 82.4 kg (181 lb 9.6 oz)  LMP 01/01/2004 (Approximate)  SpO2 99%  BMI 29.31 kg/m2  CONSTITUTIONAL: Sitting comfortably.   NEUROLOGIC: Alert, awake.   PSYCH: Anxious.     LABORATORY DATA AND IMAGING REVIEWED DURING THIS VISIT:  Recent Labs   Lab Test  11/22/17   1420  04/08/15   0802   NA  139  141   POTASSIUM  4.0  4.1   CHLORIDE  104  105   CO2  29  28   ANIONGAP  6  8   BUN  17  17   CR  0.81  0.78   GLC  113*  105*   EDILSON  9.2  9.4     Recent Labs   Lab Test  11/22/17   1420  11/14/17   1418   WBC  6.2   --    HGB  13.2  12.5   PLT  312   --    MCV  94   --    NEUTROPHIL  63.9   --      Recent Labs   Lab Test  11/22/17   1420  04/08/15   0802   BILITOTAL  0.5  0.4   ALKPHOS  83   83   ALT  32  33   AST  19  15   ALBUMIN  3.9  4.1         Results for orders placed or performed during the hospital encounter of 11/30/17   MA Diagnostic Bilateral w/Dmitriy    Narrative    MA DIAGNOSTIC BILATERAL W/ DMITRIY -  11/30/2017    HISTORY:  Recently diagnosed right supraclavicular adenocarcinoma.  Bilateral diagnostic mammogram is performed to evaluate for primary  malignancy site.    COMPARISON:  Screening mammograms 4/11/2017, 4/30/2015.    BREAST DENSITY: Heterogeneously dense.    FINDINGS:  Standard bilateral diagnostic views, including  tomosynthesis, were obtained. The pattern of glandular tissue in both  breasts is stable. There is no suspicious mammographic abnormality in  either breast to suggest primary breast malignancy.      Impression    IMPRESSION: BI-RADS CATEGORY: 1 -  Negative  No evidence of malignancy. Results were communicated to the patient  during her appointment. Clinical follow-up is recommended.    RECOMMENDED FOLLOW-UP: Clinical follow-up.    REJI LAKE MD     ECOG performance status 0.      ASSESSMENT AND PLAN:  This is a 62-year-old lady who has diagnosis of 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  Tumor cells were positive for CK7, CK5/6 and GATA3.  We did tumor marker testing and she had CA 27-29 at the level of 12,  of 16, CA 19-9 at 19.  Her CEA was less than 0.5.      PET CT scan results was done 11/27/2017.  showed hypermetabolic right supraclavicular, right axillary and subpectoral adenopathy.  There was no evidence of distant metastases.      The distribution of adenopathy indicates possibility breast cancer which has metastasized to the regional lymph nodes and we are not able to find a primary on physical exam and a PET scan.     - Mammogram 11/30/17 results were reviewed- no evidence of primary.   - Her-2 non amplified. Androgen receptor 10-20%. ER 1-5%. AR < 1%.   - Detailed discussion with the patient. I will  favor getting an ENT examination done and an EGD to screen for primary in these areas. I think she should start chemotherapy and if she has a good response then we will consider doing consolidation radiation.   We discussed about starting combination of carboplatin and Taxol. We had a detailed discussion about each chemotherapy agent, intent of treatment and also potential risks and benefits. We discussed the adverse effects related to each of chemotherapy agent. Patient asked questions and expressed willingness to proceed with the treatment.      PLAN:   1- Start carbo/Txol every three weeks- chemo education today  2- Port placement ASAP  3- Schedule an appointment for ENT examination-   4- Schedule an EGD -DR. Caro  5- RTC MD 2 weeks with CBC diff, CMP    ERON BARNES MD    12/7/2017     cc: Waylon Liu MD

## 2017-12-11 ENCOUNTER — APPOINTMENT (OUTPATIENT)
Dept: INTERVENTIONAL RADIOLOGY/VASCULAR | Facility: CLINIC | Age: 62
End: 2017-12-11
Attending: INTERNAL MEDICINE
Payer: COMMERCIAL

## 2017-12-11 ENCOUNTER — INFUSION THERAPY VISIT (OUTPATIENT)
Dept: INFUSION THERAPY | Facility: CLINIC | Age: 62
End: 2017-12-11
Attending: INTERNAL MEDICINE
Payer: COMMERCIAL

## 2017-12-11 ENCOUNTER — HOSPITAL ENCOUNTER (OUTPATIENT)
Facility: CLINIC | Age: 62
Setting detail: SPECIMEN
Discharge: HOME | End: 2017-12-11
Attending: INTERNAL MEDICINE | Admitting: INTERNAL MEDICINE
Payer: COMMERCIAL

## 2017-12-11 ENCOUNTER — HOSPITAL ENCOUNTER (OUTPATIENT)
Facility: CLINIC | Age: 62
Discharge: HOME | End: 2017-12-11
Attending: RADIOLOGY | Admitting: RADIOLOGY
Payer: COMMERCIAL

## 2017-12-11 VITALS
HEART RATE: 60 BPM | DIASTOLIC BLOOD PRESSURE: 53 MMHG | BODY MASS INDEX: 28.77 KG/M2 | WEIGHT: 179 LBS | TEMPERATURE: 97.3 F | HEIGHT: 66 IN | SYSTOLIC BLOOD PRESSURE: 100 MMHG | OXYGEN SATURATION: 98 % | RESPIRATION RATE: 16 BRPM

## 2017-12-11 VITALS
TEMPERATURE: 98.3 F | BODY MASS INDEX: 29.25 KG/M2 | DIASTOLIC BLOOD PRESSURE: 60 MMHG | HEIGHT: 66 IN | WEIGHT: 182 LBS | OXYGEN SATURATION: 100 % | RESPIRATION RATE: 16 BRPM | HEART RATE: 73 BPM | SYSTOLIC BLOOD PRESSURE: 96 MMHG

## 2017-12-11 DIAGNOSIS — C80.1 CARCINOMA OF UNKNOWN ORIGIN (H): Primary | ICD-10-CM

## 2017-12-11 DIAGNOSIS — Z17.421 TRIPLE NEGATIVE MALIGNANT NEOPLASM OF BREAST (H): ICD-10-CM

## 2017-12-11 DIAGNOSIS — C50.919 TRIPLE NEGATIVE MALIGNANT NEOPLASM OF BREAST (H): ICD-10-CM

## 2017-12-11 LAB
ALBUMIN SERPL-MCNC: 3.6 G/DL (ref 3.4–5)
ALP SERPL-CCNC: 75 U/L (ref 40–150)
ALT SERPL W P-5'-P-CCNC: 39 U/L (ref 0–50)
ANION GAP SERPL CALCULATED.3IONS-SCNC: 7 MMOL/L (ref 3–14)
APTT PPP: 27 SEC (ref 22–37)
AST SERPL W P-5'-P-CCNC: 21 U/L (ref 0–45)
BASOPHILS # BLD AUTO: 0 10E9/L (ref 0–0.2)
BASOPHILS NFR BLD AUTO: 0.8 %
BILIRUB SERPL-MCNC: 0.5 MG/DL (ref 0.2–1.3)
BUN SERPL-MCNC: 13 MG/DL (ref 7–30)
CALCIUM SERPL-MCNC: 8.6 MG/DL (ref 8.5–10.1)
CHLORIDE SERPL-SCNC: 105 MMOL/L (ref 94–109)
CO2 SERPL-SCNC: 28 MMOL/L (ref 20–32)
CREAT SERPL-MCNC: 0.63 MG/DL (ref 0.52–1.04)
DIFFERENTIAL METHOD BLD: NORMAL
EOSINOPHIL # BLD AUTO: 0.1 10E9/L (ref 0–0.7)
EOSINOPHIL NFR BLD AUTO: 2.8 %
ERYTHROCYTE [DISTWIDTH] IN BLOOD BY AUTOMATED COUNT: 13.1 % (ref 10–15)
GFR SERPL CREATININE-BSD FRML MDRD: >90 ML/MIN/1.7M2
GLUCOSE SERPL-MCNC: 154 MG/DL (ref 70–99)
HCT VFR BLD AUTO: 37.5 % (ref 35–47)
HGB BLD-MCNC: 12.5 G/DL (ref 11.7–15.7)
IMM GRANULOCYTES # BLD: 0 10E9/L (ref 0–0.4)
IMM GRANULOCYTES NFR BLD: 0.2 %
INR PPP: 0.84 (ref 0.86–1.14)
LYMPHOCYTES # BLD AUTO: 1.4 10E9/L (ref 0.8–5.3)
LYMPHOCYTES NFR BLD AUTO: 27.2 %
MCH RBC QN AUTO: 31.3 PG (ref 26.5–33)
MCHC RBC AUTO-ENTMCNC: 33.3 G/DL (ref 31.5–36.5)
MCV RBC AUTO: 94 FL (ref 78–100)
MONOCYTES # BLD AUTO: 0.3 10E9/L (ref 0–1.3)
MONOCYTES NFR BLD AUTO: 5.6 %
NEUTROPHILS # BLD AUTO: 3.1 10E9/L (ref 1.6–8.3)
NEUTROPHILS NFR BLD AUTO: 63.4 %
NRBC # BLD AUTO: 0 10*3/UL
NRBC BLD AUTO-RTO: 0 /100
PLATELET # BLD AUTO: 275 10E9/L (ref 150–450)
POTASSIUM SERPL-SCNC: 3.8 MMOL/L (ref 3.4–5.3)
PROT SERPL-MCNC: 6.9 G/DL (ref 6.8–8.8)
RBC # BLD AUTO: 4 10E12/L (ref 3.8–5.2)
SODIUM SERPL-SCNC: 140 MMOL/L (ref 133–144)
WBC # BLD AUTO: 5 10E9/L (ref 4–11)

## 2017-12-11 PROCEDURE — 27210886 ZZH ACCESSORY CR5

## 2017-12-11 PROCEDURE — 96417 CHEMO IV INFUS EACH ADDL SEQ: CPT

## 2017-12-11 PROCEDURE — C1769 GUIDE WIRE: HCPCS

## 2017-12-11 PROCEDURE — 25000128 H RX IP 250 OP 636: Performed by: INTERNAL MEDICINE

## 2017-12-11 PROCEDURE — 96375 TX/PRO/DX INJ NEW DRUG ADDON: CPT

## 2017-12-11 PROCEDURE — 96377 APPLICATON ON-BODY INJECTOR: CPT | Mod: XS

## 2017-12-11 PROCEDURE — 36415 COLL VENOUS BLD VENIPUNCTURE: CPT

## 2017-12-11 PROCEDURE — 36561 INSERT TUNNELED CV CATH: CPT | Mod: LT

## 2017-12-11 PROCEDURE — 85025 COMPLETE CBC W/AUTO DIFF WBC: CPT | Performed by: INTERNAL MEDICINE

## 2017-12-11 PROCEDURE — 96415 CHEMO IV INFUSION ADDL HR: CPT

## 2017-12-11 PROCEDURE — C1788 PORT, INDWELLING, IMP: HCPCS

## 2017-12-11 PROCEDURE — 27211193 ZZ H WOUND GLUE CR1

## 2017-12-11 PROCEDURE — 27210905 ZZH KIT CR7

## 2017-12-11 PROCEDURE — S0077 INJECTION, CLINDAMYCIN PHOSP: HCPCS | Performed by: RADIOLOGY

## 2017-12-11 PROCEDURE — 25000128 H RX IP 250 OP 636: Performed by: RADIOLOGY

## 2017-12-11 PROCEDURE — 80053 COMPREHEN METABOLIC PANEL: CPT | Performed by: INTERNAL MEDICINE

## 2017-12-11 PROCEDURE — 40000854 ZZH STATISTIC SIMPLE TUBE INSERTION/CHARGE, PORT, CATH, FISTULOGRAM

## 2017-12-11 PROCEDURE — 85610 PROTHROMBIN TIME: CPT | Performed by: RADIOLOGY

## 2017-12-11 PROCEDURE — 25000125 ZZHC RX 250: Performed by: RADIOLOGY

## 2017-12-11 PROCEDURE — 85730 THROMBOPLASTIN TIME PARTIAL: CPT | Performed by: RADIOLOGY

## 2017-12-11 PROCEDURE — 27210742 ZZH CATH CR1

## 2017-12-11 PROCEDURE — 96413 CHEMO IV INFUSION 1 HR: CPT

## 2017-12-11 PROCEDURE — 25000125 ZZHC RX 250: Performed by: INTERNAL MEDICINE

## 2017-12-11 PROCEDURE — 96367 TX/PROPH/DG ADDL SEQ IV INF: CPT

## 2017-12-11 RX ORDER — ONDANSETRON 2 MG/ML
8 INJECTION INTRAMUSCULAR; INTRAVENOUS ONCE
Status: COMPLETED | OUTPATIENT
Start: 2017-12-11 | End: 2017-12-11

## 2017-12-11 RX ORDER — CLINDAMYCIN PHOSPHATE 900 MG/50ML
900 INJECTION, SOLUTION INTRAVENOUS
Status: COMPLETED | OUTPATIENT
Start: 2017-12-11 | End: 2017-12-11

## 2017-12-11 RX ORDER — FENTANYL CITRATE 50 UG/ML
INJECTION, SOLUTION INTRAMUSCULAR; INTRAVENOUS
Status: DISCONTINUED
Start: 2017-12-11 | End: 2017-12-11 | Stop reason: HOSPADM

## 2017-12-11 RX ORDER — HEPARIN SODIUM (PORCINE) LOCK FLUSH IV SOLN 100 UNIT/ML 100 UNIT/ML
SOLUTION INTRAVENOUS
Status: DISCONTINUED
Start: 2017-12-11 | End: 2017-12-11 | Stop reason: HOSPADM

## 2017-12-11 RX ORDER — MEPERIDINE HYDROCHLORIDE 50 MG/ML
25 INJECTION INTRAMUSCULAR; INTRAVENOUS; SUBCUTANEOUS EVERY 30 MIN PRN
Status: CANCELLED | OUTPATIENT
Start: 2017-12-13

## 2017-12-11 RX ORDER — SODIUM CHLORIDE 9 MG/ML
1000 INJECTION, SOLUTION INTRAVENOUS CONTINUOUS PRN
Status: CANCELLED
Start: 2017-12-13

## 2017-12-11 RX ORDER — FENTANYL CITRATE 50 UG/ML
25-50 INJECTION, SOLUTION INTRAMUSCULAR; INTRAVENOUS EVERY 5 MIN PRN
Status: DISCONTINUED | OUTPATIENT
Start: 2017-12-11 | End: 2017-12-11 | Stop reason: HOSPADM

## 2017-12-11 RX ORDER — LORAZEPAM 2 MG/ML
0.5 INJECTION INTRAMUSCULAR EVERY 4 HOURS PRN
Status: CANCELLED
Start: 2017-12-13

## 2017-12-11 RX ORDER — HEPARIN SODIUM (PORCINE) LOCK FLUSH IV SOLN 100 UNIT/ML 100 UNIT/ML
5 SOLUTION INTRAVENOUS EVERY 8 HOURS PRN
Status: DISCONTINUED | OUTPATIENT
Start: 2017-12-11 | End: 2017-12-11 | Stop reason: HOSPADM

## 2017-12-11 RX ORDER — ACETAMINOPHEN 325 MG/1
650-975 TABLET ORAL
Status: DISCONTINUED | OUTPATIENT
Start: 2017-12-11 | End: 2017-12-11 | Stop reason: HOSPADM

## 2017-12-11 RX ORDER — NALOXONE HYDROCHLORIDE 0.4 MG/ML
.1-.4 INJECTION, SOLUTION INTRAMUSCULAR; INTRAVENOUS; SUBCUTANEOUS
Status: DISCONTINUED | OUTPATIENT
Start: 2017-12-11 | End: 2017-12-11 | Stop reason: HOSPADM

## 2017-12-11 RX ORDER — DIPHENHYDRAMINE HCL 25 MG
50 CAPSULE ORAL ONCE
Status: CANCELLED
Start: 2017-12-13

## 2017-12-11 RX ORDER — LIDOCAINE HYDROCHLORIDE 10 MG/ML
1-30 INJECTION, SOLUTION EPIDURAL; INFILTRATION; INTRACAUDAL; PERINEURAL
Status: COMPLETED | OUTPATIENT
Start: 2017-12-11 | End: 2017-12-11

## 2017-12-11 RX ORDER — PROCHLORPERAZINE MALEATE 10 MG
10 TABLET ORAL EVERY 6 HOURS PRN
Qty: 30 TABLET | Refills: 2 | Status: SHIPPED | OUTPATIENT
Start: 2017-12-11 | End: 2018-02-21

## 2017-12-11 RX ORDER — LIDOCAINE 40 MG/G
CREAM TOPICAL
Status: DISCONTINUED | OUTPATIENT
Start: 2017-12-11 | End: 2017-12-11 | Stop reason: HOSPADM

## 2017-12-11 RX ORDER — SODIUM CHLORIDE 9 MG/ML
INJECTION, SOLUTION INTRAVENOUS CONTINUOUS
Status: DISCONTINUED | OUTPATIENT
Start: 2017-12-11 | End: 2017-12-11 | Stop reason: HOSPADM

## 2017-12-11 RX ORDER — ALBUTEROL SULFATE 0.83 MG/ML
2.5 SOLUTION RESPIRATORY (INHALATION)
Status: CANCELLED | OUTPATIENT
Start: 2017-12-13

## 2017-12-11 RX ORDER — ALBUTEROL SULFATE 90 UG/1
1-2 AEROSOL, METERED RESPIRATORY (INHALATION)
Status: CANCELLED
Start: 2017-12-13

## 2017-12-11 RX ORDER — DIPHENHYDRAMINE HYDROCHLORIDE 50 MG/ML
50 INJECTION INTRAMUSCULAR; INTRAVENOUS
Status: CANCELLED
Start: 2017-12-13

## 2017-12-11 RX ORDER — ONDANSETRON 8 MG/1
8 TABLET, FILM COATED ORAL EVERY 8 HOURS PRN
Qty: 10 TABLET | Refills: 2 | Status: SHIPPED | OUTPATIENT
Start: 2017-12-11 | End: 2018-02-21

## 2017-12-11 RX ORDER — HEPARIN SODIUM 1000 [USP'U]/ML
INJECTION, SOLUTION INTRAVENOUS; SUBCUTANEOUS
Status: DISCONTINUED
Start: 2017-12-11 | End: 2017-12-11 | Stop reason: HOSPADM

## 2017-12-11 RX ORDER — LORAZEPAM 0.5 MG/1
0.5 TABLET ORAL EVERY 4 HOURS PRN
Qty: 30 TABLET | Refills: 2 | Status: SHIPPED | OUTPATIENT
Start: 2017-12-11 | End: 2018-02-21

## 2017-12-11 RX ORDER — FLUMAZENIL 0.1 MG/ML
0.2 INJECTION, SOLUTION INTRAVENOUS
Status: DISCONTINUED | OUTPATIENT
Start: 2017-12-11 | End: 2017-12-11 | Stop reason: HOSPADM

## 2017-12-11 RX ORDER — EPINEPHRINE 1 MG/ML
0.3 INJECTION, SOLUTION INTRAMUSCULAR; SUBCUTANEOUS EVERY 5 MIN PRN
Status: CANCELLED | OUTPATIENT
Start: 2017-12-13

## 2017-12-11 RX ORDER — METHYLPREDNISOLONE SODIUM SUCCINATE 125 MG/2ML
125 INJECTION, POWDER, LYOPHILIZED, FOR SOLUTION INTRAMUSCULAR; INTRAVENOUS
Status: CANCELLED
Start: 2017-12-13

## 2017-12-11 RX ORDER — OXYCODONE AND ACETAMINOPHEN 5; 325 MG/1; MG/1
1 TABLET ORAL
Status: DISCONTINUED | OUTPATIENT
Start: 2017-12-11 | End: 2017-12-11 | Stop reason: HOSPADM

## 2017-12-11 RX ORDER — EPINEPHRINE 0.3 MG/.3ML
0.3 INJECTION SUBCUTANEOUS EVERY 5 MIN PRN
Status: CANCELLED | OUTPATIENT
Start: 2017-12-13

## 2017-12-11 RX ORDER — HEPARIN SODIUM (PORCINE) LOCK FLUSH IV SOLN 100 UNIT/ML 100 UNIT/ML
500 SOLUTION INTRAVENOUS ONCE
Status: COMPLETED | OUTPATIENT
Start: 2017-12-11 | End: 2017-12-11

## 2017-12-11 RX ORDER — LIDOCAINE HYDROCHLORIDE 10 MG/ML
INJECTION, SOLUTION INFILTRATION; PERINEURAL
Status: DISCONTINUED
Start: 2017-12-11 | End: 2017-12-11 | Stop reason: HOSPADM

## 2017-12-11 RX ADMIN — SODIUM CHLORIDE, PRESERVATIVE FREE 400 UNITS: 5 INJECTION INTRAVENOUS at 08:58

## 2017-12-11 RX ADMIN — SODIUM CHLORIDE, PRESERVATIVE FREE 5 ML: 5 INJECTION INTRAVENOUS at 15:46

## 2017-12-11 RX ADMIN — LIDOCAINE HYDROCHLORIDE 140 MG: 10 INJECTION, SOLUTION INFILTRATION; PERINEURAL at 08:59

## 2017-12-11 RX ADMIN — DIPHENHYDRAMINE HYDROCHLORIDE 50 MG: 50 INJECTION, SOLUTION INTRAMUSCULAR; INTRAVENOUS at 11:29

## 2017-12-11 RX ADMIN — SODIUM CHLORIDE 250 ML: 9 INJECTION, SOLUTION INTRAVENOUS at 11:29

## 2017-12-11 RX ADMIN — FAMOTIDINE 20 MG: 20 INJECTION, SOLUTION INTRAVENOUS at 11:48

## 2017-12-11 RX ADMIN — PEGFILGRASTIM 6 MG: KIT SUBCUTANEOUS at 15:20

## 2017-12-11 RX ADMIN — CLINDAMYCIN PHOSPHATE 900 MG: 18 INJECTION, SOLUTION INTRAVENOUS at 07:35

## 2017-12-11 RX ADMIN — ONDANSETRON HYDROCHLORIDE 8 MG: 2 INJECTION, SOLUTION INTRAVENOUS at 11:33

## 2017-12-11 RX ADMIN — FENTANYL CITRATE 25 MCG: 50 INJECTION, SOLUTION INTRAMUSCULAR; INTRAVENOUS at 08:45

## 2017-12-11 RX ADMIN — MIDAZOLAM HYDROCHLORIDE 0.5 MG: 1 INJECTION, SOLUTION INTRAMUSCULAR; INTRAVENOUS at 08:45

## 2017-12-11 RX ADMIN — DEXAMETHASONE SODIUM PHOSPHATE: 10 INJECTION, SOLUTION INTRAMUSCULAR; INTRAVENOUS at 11:40

## 2017-12-11 RX ADMIN — PACLITAXEL 392 MG: 6 INJECTION, SOLUTION INTRAVENOUS at 12:10

## 2017-12-11 RX ADMIN — FENTANYL CITRATE 50 MCG: 50 INJECTION, SOLUTION INTRAMUSCULAR; INTRAVENOUS at 08:25

## 2017-12-11 RX ADMIN — FENTANYL CITRATE 25 MCG: 50 INJECTION, SOLUTION INTRAMUSCULAR; INTRAVENOUS at 08:35

## 2017-12-11 RX ADMIN — MIDAZOLAM HYDROCHLORIDE 0.5 MG: 1 INJECTION, SOLUTION INTRAMUSCULAR; INTRAVENOUS at 08:35

## 2017-12-11 RX ADMIN — MIDAZOLAM HYDROCHLORIDE 1 MG: 1 INJECTION, SOLUTION INTRAMUSCULAR; INTRAVENOUS at 08:25

## 2017-12-11 RX ADMIN — CARBOPLATIN 625 MG: 10 INJECTION, SOLUTION INTRAVENOUS at 15:15

## 2017-12-11 RX ADMIN — SODIUM CHLORIDE: 9 INJECTION, SOLUTION INTRAVENOUS at 07:00

## 2017-12-11 ASSESSMENT — PAIN SCALES - GENERAL: PAINLEVEL: NO PAIN (0)

## 2017-12-11 NOTE — PATIENT INSTRUCTIONS
Your On-body Neulasta Injector was applied to your left upper arm at 3:20pm.  At approximately 6:20pm on 12/12/17, your On-body Injector will beep to let you know your dose delivery will begin in 2 minutes.  Your medication will be delivered over the next 45 minutes.  You can remove your Injector when indicator on device is on empty.  Please make sure your Injector has a solid green light or has turned off prior to removing the device.  Please contact your provider at [phone] with questions or concerns.

## 2017-12-11 NOTE — PROCEDURES
RADIOLOGY PROCEDURE NOTE  Patient name: Flor Griffin  MRN: [id number]  : 1955    Pre-procedure diagnosis: breast cancer  Post-procedure diagnosis: Same    Procedure Date/Time: 2017  8:59 AM  Procedure: port placement  Estimated blood loss: None  Specimen(s) collected with description: none  The patient tolerated the procedure well with no immediate complications.  Significant findings:none    See imaging dictation for procedural details.    Provider name: Reece Wong  Assistant(s):None

## 2017-12-11 NOTE — PROGRESS NOTES
Infusion Nursing Note:  Flor Griffin presents today for C1 D1 Taxol, Carboplatin.    Patient seen by provider today: No   present during visit today: Not Applicable.    Note: Patient denies any new concerns since exam with Dr. Magana on 12/7. First time getting chemotherapy today. Oriented to infusion center. Chemotherapy teaching, side effects, and schedule reviewed with patient. General and individual chemotherapy side effects reviewed with patient including fatigue, immunosuppression, alopecia, nausea/vomiting, constipation/diarrhea and peripheral neuropathy. States she previously received printed info and teaching on the chemo drugs in clinic. Pt instructed to call care coordinator, triage (or MD on call if after hours/weekends) with chills/temp >=100.5, questions/concerns. Pt stated understanding of plan.    ONPRO  Was placed on patient's: back of left arm.    Was placed at 3:20 PM    ONPRO injector device Lot number: [id number]    Patient education included: what patient can expect after application, what colored lights mean on the device, when to remove device, when and where to call with questions or issues, all patients questions answered and that Neulasta administration will occur at 6:20pm. Patient declined to watch onpro instructional video.    Patient tolerated administration well.    Intravenous Access:  Labs drawn without difficulty.  Implanted Port.    Treatment Conditions:  Lab Results   Component Value Date    HGB 12.5 12/11/2017     Lab Results   Component Value Date    WBC 5.0 12/11/2017      Lab Results   Component Value Date    ANEU 3.1 12/11/2017     Lab Results   Component Value Date     12/11/2017      Lab Results   Component Value Date     12/11/2017                   Lab Results   Component Value Date    POTASSIUM 3.8 12/11/2017               Lab Results   Component Value Date    CR 0.63 12/11/2017                   Lab Results   Component Value Date    EDILSON 8.6  12/11/2017                Lab Results   Component Value Date    BILITOTAL 0.5 12/11/2017           Lab Results   Component Value Date    ALBUMIN 3.6 12/11/2017                    Lab Results   Component Value Date    ALT 39 12/11/2017           Lab Results   Component Value Date    AST 21 12/11/2017     Results reviewed, labs MET treatment parameters, ok to proceed with treatment.      Post Infusion Assessment:  Patient tolerated infusion without incident.  Patient tolerated injection without incident.  Blood return noted pre and post infusion.  Site patent and intact, free from redness, edema or discomfort.  No evidence of extravasations.  Access discontinued per protocol.    Discharge Plan:   Prescription refills given for ativan, compazine and zofran. Indications and possible side effects of meds reviewed by pharmacy staff with patient.  Discharge instructions reviewed with: Patient.  Patient and/or family verbalized understanding of discharge instructions and all questions answered.  Copy of AVS reviewed with patient and/or family.  Patient will return 12/21 for next appointment.  Patient discharged in stable condition accompanied by: .  Departure Mode: Ambulatory.    Mary Charles RN

## 2017-12-11 NOTE — IP AVS SNAPSHOT
[place] [address]    [place] [address]    Phone:  [phone]                                       After Visit Summary   12/11/2017    Flor Griffin    MRN: [id number]           After Visit Summary Signature Page     I have received my discharge instructions, and my questions have been answered. I have discussed any challenges I see with this plan with the nurse or doctor.    ..........................................................................................................................................  Patient/Patient Representative Signature      ..........................................................................................................................................  Patient Representative Print Name and Relationship to Patient    ..................................................               ................................................  Date                                            Time    ..........................................................................................................................................  Reviewed by Signature/Title    ...................................................              ..............................................  Date                                                            Time

## 2017-12-11 NOTE — MR AVS SNAPSHOT
After Visit Summary   12/11/2017    Flor Griffin    MRN: [id number]           Patient Information     Date Of Birth          1955        Visit Information        Provider Department      12/11/2017 10:00 AM  INFUSION CHAIR 16 [facility]        Today's Diagnoses     Carcinoma of unknown origin (H)    -  1      Care Instructions    Your On-body Neulasta Injector was applied to your left upper arm at 3:20pm.  At approximately 6:20pm on 12/12/17, your On-body Injector will beep to let you know your dose delivery will begin in 2 minutes.  Your medication will be delivered over the next 45 minutes.  You can remove your Injector when indicator on device is on empty.  Please make sure your Injector has a solid green light or has turned off prior to removing the device.  Please contact your provider at [phone] with questions or concerns.            Follow-ups after your visit        Your next 10 appointments already scheduled     Dec 21, 2017 11:30 AM CST   Level 1 with  INFUSION CHAIR 4   [facility] ([facility])    [facility]  [address]  [place] [address]   [phone]            Dec 21, 2017 12:30 PM CST   Return Visit with Yani Magana MD   [facility] ([facility])    [facility]  [address]  [place] [address]   [phone]            Jan 11, 2018 11:15 AM CST   New Visit with María Galeas GC   [facility] ([facility])    [facility] [place] [id number]  [place] [address]   [phone]              Who to contact     If you have questions or need follow up information about today's clinic visit or your schedule please contact [facility] directly at [phone].  Normal or non-critical lab and imaging results  "will be communicated to you by MyChart, letter or phone within 4 business days after the clinic has received the results. If you do not hear from us within 7 days, please contact the clinic through [email or web address] or phone. If you have a critical or abnormal lab result, we will notify you by phone as soon as possible.  Submit refill requests through [email or web address] or call your pharmacy and they will forward the refill request to us. Please allow 3 business days for your refill to be completed.          Additional Information About Your Visit        [email or web address] Information     [email or web address] lets you send messages to your doctor, view your test results, renew your prescriptions, schedule appointments and more. To sign up, go to [email or web address] . Click on \"Log in\" on the left side of the screen, which will take you to the Welcome page. Then click on \"Sign up Now\" on the right side of the page.     You will be asked to enter the access code listed below, as well as some personal information. Please follow the directions to create your username and password.     Your access code is: [email or web address]  Expires: 2018  8:24 AM     Your access code will  in 90 days. If you need help or a new code, please call your [place] clinic or [phone].        Care EveryWhere ID     This is your Care EveryWhere ID. This could be used by other organizations to access your [place] medical records  [id number]        Your Vitals Were     Pulse Temperature Respirations Height Last Period Pulse Oximetry    73 98.3  F (36.8  C) (Oral) 16 1.676 m (5' 5.98\") 2004 (Approximate) 100%    BMI (Body Mass Index)                   29.39 kg/m2            Blood Pressure from Last 3 Encounters:   17 96/60   17 100/53   17 125/79    Weight from Last 3 Encounters:   17 82.6 kg (182 lb)   17 81.2 kg (179 lb)   17 82.4 kg (181 lb 9.6 oz)              We Performed the Following     CBC with platelets differential     " Comprehensive metabolic panel          Today's Medication Changes          These changes are accurate as of: 12/11/17  3:24 PM.  If you have any questions, ask your nurse or doctor.               Start taking these medicines.        Dose/Directions    LORazepam 0.5 MG tablet   Commonly known as:  ATIVAN   Used for:  Carcinoma of unknown origin (H)        Dose:  0.5 mg   Take 1 tablet (0.5 mg) by mouth every 4 hours as needed (Anxiety, Nausea/Vomiting or Sleep)   Quantity:  30 tablet   Refills:  2       ondansetron 8 MG tablet   Commonly known as:  ZOFRAN   Used for:  Carcinoma of unknown origin (H)        Dose:  8 mg   Take 1 tablet (8 mg) by mouth every 8 hours as needed (Nausea/Vomiting)   Quantity:  10 tablet   Refills:  2       prochlorperazine 10 MG tablet   Commonly known as:  COMPAZINE   Used for:  Carcinoma of unknown origin (H)        Dose:  10 mg   Take 1 tablet (10 mg) by mouth every 6 hours as needed (Nausea/Vomiting)   Quantity:  30 tablet   Refills:  2            Where to get your medicines      These medications were sent to [facility] - [place] - [address]  [address] [place] [address], [facility] [address]     Phone:  [phone]     ondansetron 8 MG tablet    prochlorperazine 10 MG tablet         Some of these will need a paper prescription and others can be bought over the counter.  Ask your nurse if you have questions.     Bring a paper prescription for each of these medications     LORazepam 0.5 MG tablet                Primary Care Provider Office Phone # Fax #    Jaida Liu [phone] [address] [place] [address]        Equal Access to Services     [facility] AH: Hadii nelida pagan Sodot, waaxda luqadaha, qaybta kaalmada ketan keen. So [facility] [phone].    ATENCIÓN: Si habla español, tiene a thomas disposición servicios gratuitos de asistencia lingüística. Llame al  [phone].    We comply with applicable federal civil rights laws and [place] laws. We do not discriminate on the basis of race, color, national origin, age, disability, sex, sexual orientation, or gender identity.            Thank you!     Thank you for choosing [facility]  for your care. Our goal is always to provide you with excellent care. Hearing back from our patients is one way we can continue to improve our services. Please take a few minutes to complete the written survey that you may receive in the mail after your visit with us. Thank you!             Your Updated Medication List - Protect others around you: Learn how to safely use, store and throw away your medicines at www.disposemymeds.org.          This list is accurate as of: 12/11/17  3:24 PM.  Always use your most recent med list.                   Brand Name Dispense Instructions for use Diagnosis    ADVIL PO      Take 400 mg by mouth every 6 hours as needed for moderate pain        aspirin 81 MG tablet      Take 81 mg by mouth daily        GLUCOSAMINE CHONDROITIN JOINT PO      Take by mouth daily        LORazepam 0.5 MG tablet    ATIVAN    30 tablet    Take 1 tablet (0.5 mg) by mouth every 4 hours as needed (Anxiety, Nausea/Vomiting or Sleep)    Carcinoma of unknown origin (H)       OMEGA-3 FISH OIL PO      Take 1 g by mouth daily        ondansetron 8 MG tablet    ZOFRAN    10 tablet    Take 1 tablet (8 mg) by mouth every 8 hours as needed (Nausea/Vomiting)    Carcinoma of unknown origin (H)       prochlorperazine 10 MG tablet    COMPAZINE    30 tablet    Take 1 tablet (10 mg) by mouth every 6 hours as needed (Nausea/Vomiting)    Carcinoma of unknown origin (H)       valACYclovir 1000 mg tablet    VALTREX    20 tablet    Take 1 tablet (1,000 mg) by mouth 2 times daily    Cold sore       VITAMIN B6 PO      Take by mouth daily        VITAMIN D (CHOLECALCIFEROL) PO      Take 1,000 Units by mouth daily

## 2017-12-11 NOTE — IP AVS SNAPSHOT
MRN:[id number]                      After Visit Summary   12/11/2017    Flor Griffin    MRN: [id number]           Visit Information        Department      12/11/2017  5:59 AM [facility]          Review of your medicines      UNREVIEWED medicines. Ask your doctor about these medicines        Dose / Directions    ADVIL PO        Dose:  400 mg   Take 400 mg by mouth every 6 hours as needed for moderate pain   Refills:  0       aspirin 81 MG tablet        Dose:  81 mg   Take 81 mg by mouth daily   Refills:  0       OMEGA-3 FISH OIL PO        Dose:  1 g   Take 1 g by mouth daily   Refills:  0       valACYclovir 1000 mg tablet   Commonly known as:  VALTREX   Used for:  Cold sore        Dose:  1000 mg   Take 1 tablet (1,000 mg) by mouth 2 times daily   Quantity:  20 tablet   Refills:  0       VITAMIN D (CHOLECALCIFEROL) PO        Dose:  1000 Units   Take 1,000 Units by mouth daily   Refills:  0                Protect others around you: Learn how to safely use, store and throw away your medicines at www.disposemymeds.org.         Follow-ups after your visit        Your next 10 appointments already scheduled     Dec 11, 2017  8:00 AM CST   (Arrive by 7:45 AM)   IR CHEST PORT PLACEMENT > 5 YRS OF AGE with SHIR1   [facility] Interventional Radiology ([facility])    [address] [place] [address] [phone]           1. Your doctor will need to do a history and physical within 7 days before this procedure. 2. Your doctor will which medications should not be taken the morning of the exam. 3. Laboratory tests are to be obtained by your doctor prior to the exam (Basic Metabolic Panel, CBCP, PTT and INR) (No labs needed if you are having a tunneled catheter exchange or removal) 4. If you have allergies to x-ray contrast or iodine, contact your doctor or a Radiology nurse prior to the exam day for instructions. 5. Someone will need to drive you to  and from the hospital. 6. If you are or may be pregnant, contact your doctor or a Radiology nurse prior to the day of the exam. 7. If you have diabetes, check with your doctor or a Radiology nurse to see if your insulin needs to be adjusted for the exam. 8. If you are taking a medication called Glucophage or Glucovance; these medications need to be held the day of the exam and for approximately 48 hours following. A blood sample must be drawn so your creatinine level can be checked before resuming this medication. 9. If you are taking Coumadin (to thin you blood) please contact your doctor or a Radiology nurse at least 3 days before the exam for special instructions. 10. You should not have received contrast within 48 hours of this exam. 11. The day before your exam you may eat your regular diet and are encouraged to drink at least 2 quarts of clear liquids. Drink no alcoholic beverages for 24 hours prior to the exam. 12. If you have a colostomy you will need to irrigate it with tap water at 8PM the evening before and again at 6AM the morning of the exam. 13. Do not smoke for 24 hours prior to the procedure. 14. Birth to 4 years: - Breast feeding must be stopped 4 hours prior to exam - Solid food or formula must be stopped 6 hours prior to exam - Tube feedings must be stopped 6 hours prior to exam 15. 4-10 years old: - Nothing to eat or drink 6 hours prior to exam 16. 10+ years old: - Nothing to eat or drink 8 hours prior to exam 17. The morning of the exam you may brush your teeth and take medications as directed with a sip of water. 18. When discharged, you cannot drive until morning, and an adult must be with you until then. You should stay in the [facility] overnight. 19. Bring a list of all drugs you are taking; include supplements and over-the-counter medications. Wear comfortable clothes and leave your valuables at home.            Dec 11, 2017 10:00 AM CST   Level 5 with  INFUSION CHAIR 16    [facility] ([facility])    [facility]  [address]  [place] [address]   [phone]            Dec 21, 2017 11:30 AM CST   Level 1 with  INFUSION CHAIR 4   [facility] ([facility])    [facility]  [address]  [place] [address]   [phone]            Dec 21, 2017 12:30 PM CST   Return Visit with Yani Magana MD   [facility] ([facility])    [place] [address]  [place] [address]   [phone]            Jan 11, 2018 11:15 AM CST   New Visit with María Galeas GC   Cancer Risk Management Program ([facility])    [facility]  [address]  [place] [address]   [phone]               Care Instructions        Further instructions from your care team       Port Insertion Discharge Instructions     After you go home:      Have an adult stay with you for the first 6 hours    You may resume your normal diet       For 24 hours - due to the sedation you received:    Relax and take it easy    Do NOT make any important or legal decisions    Do NOT drive or operate machines at home or at work    Do NOT drink alcohol    Care of Incision sites:      You have a liquid adhesive covering on your incisions. Do not remove the adhesive residue. It will come off on it's own.    You may shower tomorrow.    You may cover the wound with a bandaid if needed for comfort.      Activity       Avoid heavy lifting (greater than 10 pounds) or the overuse of your shoulder for 3 days    Bleeding:      If you start bleeding from the incision sites in your chest or neck - or have swelling in your neck, sit down and press on the site for 5-10 minutes.     If bleeding has not stopped after 10 minutes, call your provider.        Call 911 right away if you have  "heavy bleeding or bleeding that does not stop.      Medicines:      You may resume all medications    Resume your Warfarin/Coumadin at your regular dose today. Follow up with your provider to have your INR rechecked    Resume your Platelet Inhibitors and Aspirin tomorrow at your regular dose    For minor pain, you may take Acetaminophen (Tylenol) or Ibuprofen (Advil)    Call the provider who ordered this procedure if:      You have swelling in your neck or over your port site    The incision area is red, swollen, hot or tender    You have chills or a fever greater than 101 F (38 C)    Any questions or concerns    Call  911 or go to the Emergency Room if you have:      Severe chest pain or trouble breathing    Bleeding that you cannot control    Additional Information:      Your port may be accessed right away.     You will need to have your port flushed every 30 days or after each use.      If you have questions call:          [facility] Radiology Dept @ [phone]      The provider who performed your procedure was _Dr. Wong________________.         Additional Information About Your Visit        Yi Chang Ou Sai [employer]Thar[employer]ensys Information     [employer] lets you send messages to your doctor, view your test results, renew your prescriptions, schedule appointments and more. To sign up, go to [email or web address] . Click on \"Log in\" on the left side of the screen, which will take you to the Welcome page. Then click on \"Sign up Now\" on the right side of the page.     You will be asked to enter the access code listed below, as well as some personal information. Please follow the directions to create your username and password.     Your access code is: [email or web address]  Expires: 2018  8:24 AM     Your access code will  in 90 days. If you need help or a new code, please call your [facility] clinic or [phone].        Care EveryWhere ID     This is your Care EveryWhere ID. This could be used by other organizations to access " "your [facility] medical records  [id number]        Your Vitals Were     Blood Pressure Pulse Temperature Respirations Height Weight    127/77 (BP Location: Right arm) 60 97.3  F (36.3  C) (Oral) 16 1.676 m (5' 6\") 81.2 kg (179 lb)    Last Period Pulse Oximetry BMI (Body Mass Index)             01/01/2004 (Approximate) 98% 28.89 kg/m2          Primary Care Provider Office Phone # Fax #    Jaida Julián Liu [phone]      Equal Access to Services     [facility]: Hadii nelida copeland hadasho Soomaali, waaxda luqadaha, qaybta kaalmada adeegyavanita, ketan lam . So [facility] [phone].    ATENCIÓN: Si habla español, tiene a thomas disposición servicios gratuitos de asistencia lingüística. [facility] [phone].    We comply with applicable federal civil rights laws and [place] laws. We do not discriminate on the basis of race, color, national origin, age, disability, sex, sexual orientation, or gender identity.            Thank you!     Thank you for choosing [facility] for your care. Our goal is always to provide you with excellent care. Hearing back from our patients is one way we can continue to improve our services. Please take a few minutes to complete the written survey that you may receive in the mail after you visit with us. Thank you!             Medication List: This is a list of all your medications and when to take them. Check marks below indicate your daily home schedule. Keep this list as a reference.      Medications           Morning Afternoon Evening Bedtime As Needed    ADVIL PO   Take 400 mg by mouth every 6 hours as needed for moderate pain                                aspirin 81 MG tablet   Take 81 mg by mouth daily                                OMEGA-3 FISH OIL PO   Take 1 g by mouth daily                                valACYclovir 1000 mg tablet   Commonly known as:  VALTREX   Take 1 tablet (1,000 mg) by mouth 2 times daily                                " VITAMIN D (CHOLECALCIFEROL) PO   Take 1,000 Units by mouth daily

## 2017-12-11 NOTE — PROGRESS NOTES
States tolerated port placement well. Denies any c/o. Port remains accessed for chemo today. Tolerated juice and crackers well. AVS was reviewed and given to pt and  pre procedure. Ambulated in room and to BR well. Discharged per w/c to 's care and to chemo in stable condition.

## 2017-12-11 NOTE — PROGRESS NOTES
Interventional Radiology Intra-procedural Nursing Note    Patient Name: Flor Griffin  Medical Record Number: [id number]  Today's Date: December 11, 2017    Start Time: 0825  End of procedure time: 0855  Procedure: Port placement  Report given to: Cathie Mckee RN, Care Suites  Time pt departs:  0905      Other Notes: Successful port placement. No complications. Pt tolerated the procedure well. Fentanyl 100 mcg and Versed 2 mg. Site sutured, dermabond and steri-strips applied. Covered with gauze and tegaderm. Left port accessed for chemotherapy this afternoon. CDI. JEMMA KEITH RN

## 2017-12-11 NOTE — DISCHARGE INSTRUCTIONS
Port Insertion Discharge Instructions     After you go home:      Have an adult stay with you for the first 6 hours    You may resume your normal diet       For 24 hours - due to the sedation you received:    Relax and take it easy    Do NOT make any important or legal decisions    Do NOT drive or operate machines at home or at work    Do NOT drink alcohol    Care of Incision sites:      You have a liquid adhesive covering on your incisions. Do not remove the adhesive residue. It will come off on it's own.    You may shower tomorrow.    You may cover the wound with a bandaid if needed for comfort.      Activity       Avoid heavy lifting (greater than 10 pounds) or the overuse of your shoulder for 3 days    Bleeding:      If you start bleeding from the incision sites in your chest or neck - or have swelling in your neck, sit down and press on the site for 5-10 minutes.     If bleeding has not stopped after 10 minutes, call your provider.        Call 911 right away if you have heavy bleeding or bleeding that does not stop.      Medicines:      You may resume all medications    Resume your Warfarin/Coumadin at your regular dose today. Follow up with your provider to have your INR rechecked    Resume your Platelet Inhibitors and Aspirin tomorrow at your regular dose    For minor pain, you may take Acetaminophen (Tylenol) or Ibuprofen (Advil)    Call the provider who ordered this procedure if:      You have swelling in your neck or over your port site    The incision area is red, swollen, hot or tender    You have chills or a fever greater than 101 F (38 C)    Any questions or concerns    Call  911 or go to the Emergency Room if you have:      Severe chest pain or trouble breathing    Bleeding that you cannot control    Additional Information:      Your port may be accessed right away.     You will need to have your port flushed every 30 days or after each use.      If you have questions call:          Kevin Rivera  Radiology Dept @ [phone]      The provider who performed your procedure was _Dr. Wong________________.

## 2017-12-11 NOTE — PROGRESS NOTES
"Infusion Nursing Note:  Flor Griffin presents today for ***.    Patient seen by provider today: {YES (EXPLAIN)/NO:083806}   present during visit today: {UMHLANGUAGE:175330}    Note: {Not Applicable or free text:075651:s:\"N/A\"}.    Intravenous Access:  {UMHIVACCESS:971394}    Treatment Conditions:  {UMHTXCONDITIONS:305467}      Post Infusion Assessment:  {UMHPOSTINFUSION:470411}    Discharge Plan:   {UMHDISCHARGE:246813}    Mary Charles RN      "

## 2017-12-12 ENCOUNTER — TELEPHONE (OUTPATIENT)
Dept: ONCOLOGY | Facility: CLINIC | Age: 62
End: 2017-12-12

## 2017-12-12 NOTE — TELEPHONE ENCOUNTER
Called and spoke with Perry.   Dose for glutamine powder 30 grams , usually divided in 2 doses- 15 grams each.

## 2017-12-12 NOTE — TELEPHONE ENCOUNTER
10:34 am Perry Gavin patients daughter has medication questions.  She picked up the Glutamine powder for her mother but she needs to know the dosage her mom is supposed to be taking.  Please call back at [phone]  Angie Parry MA

## 2017-12-13 ENCOUNTER — TELEPHONE (OUTPATIENT)
Dept: ONCOLOGY | Facility: CLINIC | Age: 62
End: 2017-12-13

## 2017-12-13 NOTE — TELEPHONE ENCOUNTER
Post treatment follow up call made.  Treatment received on this date: 12/11/17- Carboplatin and Taxol.  Onpro neulasta administered w/o any difficulty.  Denies any side effects/concerns at this time.  Has a 2 week follow up appointment on 12/21/17 w/ Dr. Magana./ Labs.  Will follow up with her then and arrange next treatment.

## 2017-12-14 ENCOUNTER — TRANSFERRED RECORDS (OUTPATIENT)
Dept: HEALTH INFORMATION MANAGEMENT | Facility: CLINIC | Age: 62
End: 2017-12-14

## 2017-12-14 ENCOUNTER — TELEPHONE (OUTPATIENT)
Dept: ONCOLOGY | Facility: CLINIC | Age: 62
End: 2017-12-14

## 2017-12-14 DIAGNOSIS — C80.1 CARCINOMA OF UNKNOWN ORIGIN (H): Primary | ICD-10-CM

## 2017-12-15 ENCOUNTER — HOSPITAL ENCOUNTER (OUTPATIENT)
Facility: CLINIC | Age: 62
End: 2017-12-15
Admitting: INTERNAL MEDICINE

## 2017-12-15 ENCOUNTER — TELEPHONE (OUTPATIENT)
Dept: ONCOLOGY | Facility: CLINIC | Age: 62
End: 2017-12-15

## 2017-12-15 NOTE — TELEPHONE ENCOUNTER
I was informed an order for a breast MRI has been placed. Patient's family is aware. I have sent a request to our schedulers to reach out to patient and assist in scheduling scan prior to 12/21 office visit. Michela Parmar

## 2017-12-18 ENCOUNTER — TELEPHONE (OUTPATIENT)
Dept: ONCOLOGY | Facility: CLINIC | Age: 62
End: 2017-12-18

## 2017-12-18 NOTE — TELEPHONE ENCOUNTER
"Patient called clinic stating that over the weekend she felt \" a pulsating sensation inside her body\". She felt her blood pressure was elevated at the time and stated that it feels better today. Patient also stated that her port felt itchy. Explained to patient that this is normal as her port heals. Instructed patient to apply hydrocortisone at her port site for the itching. Message will be routed to Dr. Magana and Dr. Magana's XAVIER Mendez. Danya Lara RN    "

## 2017-12-20 ENCOUNTER — HOSPITAL ENCOUNTER (OUTPATIENT)
Facility: CLINIC | Age: 62
Discharge: HOME | End: 2017-12-20
Attending: INTERNAL MEDICINE | Admitting: INTERNAL MEDICINE
Payer: COMMERCIAL

## 2017-12-20 ENCOUNTER — HOSPITAL ENCOUNTER (OUTPATIENT)
Dept: MRI IMAGING | Facility: CLINIC | Age: 62
End: 2017-12-20
Attending: INTERNAL MEDICINE | Admitting: INTERNAL MEDICINE
Payer: COMMERCIAL

## 2017-12-20 DIAGNOSIS — C80.1 CARCINOMA OF UNKNOWN ORIGIN (H): ICD-10-CM

## 2017-12-20 PROCEDURE — A9585 GADOBUTROL INJECTION: HCPCS | Performed by: INTERNAL MEDICINE

## 2017-12-20 PROCEDURE — 25000128 H RX IP 250 OP 636: Performed by: INTERNAL MEDICINE

## 2017-12-20 PROCEDURE — 0159T MR BREAST BILATERAL W CONTRAST: CPT

## 2017-12-20 PROCEDURE — 27210995 ZZH RX 272: Performed by: INTERNAL MEDICINE

## 2017-12-20 RX ORDER — GADOBUTROL 604.72 MG/ML
8 INJECTION INTRAVENOUS ONCE
Status: COMPLETED | OUTPATIENT
Start: 2017-12-20 | End: 2017-12-20

## 2017-12-20 RX ORDER — ACYCLOVIR 200 MG/1
60 CAPSULE ORAL ONCE
Status: COMPLETED | OUTPATIENT
Start: 2017-12-20 | End: 2017-12-20

## 2017-12-20 RX ADMIN — GADOBUTROL 8 ML: 604.72 INJECTION INTRAVENOUS at 08:45

## 2017-12-20 RX ADMIN — SODIUM CHLORIDE 60 ML: 9 INJECTION, SOLUTION INTRAMUSCULAR; INTRAVENOUS; SUBCUTANEOUS at 08:44

## 2017-12-21 ENCOUNTER — ONCOLOGY VISIT (OUTPATIENT)
Dept: ONCOLOGY | Facility: CLINIC | Age: 62
End: 2017-12-21
Attending: INTERNAL MEDICINE
Payer: COMMERCIAL

## 2017-12-21 ENCOUNTER — INFUSION THERAPY VISIT (OUTPATIENT)
Dept: INFUSION THERAPY | Facility: CLINIC | Age: 62
End: 2017-12-21
Attending: INTERNAL MEDICINE
Payer: COMMERCIAL

## 2017-12-21 ENCOUNTER — HOSPITAL ENCOUNTER (OUTPATIENT)
Facility: CLINIC | Age: 62
Setting detail: SPECIMEN
Discharge: HOME | End: 2017-12-21
Attending: INTERNAL MEDICINE | Admitting: INTERNAL MEDICINE
Payer: COMMERCIAL

## 2017-12-21 VITALS
OXYGEN SATURATION: 98 % | RESPIRATION RATE: 20 BRPM | HEART RATE: 87 BPM | WEIGHT: 174 LBS | SYSTOLIC BLOOD PRESSURE: 142 MMHG | TEMPERATURE: 98.6 F | DIASTOLIC BLOOD PRESSURE: 88 MMHG | BODY MASS INDEX: 28.1 KG/M2

## 2017-12-21 DIAGNOSIS — C80.1 CARCINOMA OF UNKNOWN ORIGIN (H): Primary | ICD-10-CM

## 2017-12-21 DIAGNOSIS — C80.1 CARCINOMA OF UNKNOWN ORIGIN (H): ICD-10-CM

## 2017-12-21 LAB
ALBUMIN SERPL-MCNC: 3.6 G/DL (ref 3.4–5)
ALP SERPL-CCNC: 137 U/L (ref 40–150)
ALT SERPL W P-5'-P-CCNC: 84 U/L (ref 0–50)
ANION GAP SERPL CALCULATED.3IONS-SCNC: 5 MMOL/L (ref 3–14)
AST SERPL W P-5'-P-CCNC: 32 U/L (ref 0–45)
BASOPHILS # BLD AUTO: 0 10E9/L (ref 0–0.2)
BASOPHILS NFR BLD AUTO: 0.2 %
BILIRUB SERPL-MCNC: 0.2 MG/DL (ref 0.2–1.3)
BUN SERPL-MCNC: 16 MG/DL (ref 7–30)
CALCIUM SERPL-MCNC: 8.5 MG/DL (ref 8.5–10.1)
CHLORIDE SERPL-SCNC: 105 MMOL/L (ref 94–109)
CO2 SERPL-SCNC: 28 MMOL/L (ref 20–32)
CREAT SERPL-MCNC: 0.66 MG/DL (ref 0.52–1.04)
DIFFERENTIAL METHOD BLD: ABNORMAL
EOSINOPHIL # BLD AUTO: 0.2 10E9/L (ref 0–0.7)
EOSINOPHIL NFR BLD AUTO: 1.5 %
ERYTHROCYTE [DISTWIDTH] IN BLOOD BY AUTOMATED COUNT: 13.6 % (ref 10–15)
GFR SERPL CREATININE-BSD FRML MDRD: >90 ML/MIN/1.7M2
GLUCOSE SERPL-MCNC: 103 MG/DL (ref 70–99)
HCT VFR BLD AUTO: 34.8 % (ref 35–47)
HGB BLD-MCNC: 11.6 G/DL (ref 11.7–15.7)
IMM GRANULOCYTES # BLD: 0.6 10E9/L (ref 0–0.4)
IMM GRANULOCYTES NFR BLD: 4.1 %
LYMPHOCYTES # BLD AUTO: 1.4 10E9/L (ref 0.8–5.3)
LYMPHOCYTES NFR BLD AUTO: 10.6 %
MCH RBC QN AUTO: 31.2 PG (ref 26.5–33)
MCHC RBC AUTO-ENTMCNC: 33.3 G/DL (ref 31.5–36.5)
MCV RBC AUTO: 94 FL (ref 78–100)
MONOCYTES # BLD AUTO: 0.7 10E9/L (ref 0–1.3)
MONOCYTES NFR BLD AUTO: 5.3 %
NEUTROPHILS # BLD AUTO: 10.5 10E9/L (ref 1.6–8.3)
NEUTROPHILS NFR BLD AUTO: 78.3 %
NRBC # BLD AUTO: 0 10*3/UL
NRBC BLD AUTO-RTO: 0 /100
PLATELET # BLD AUTO: 210 10E9/L (ref 150–450)
POTASSIUM SERPL-SCNC: 3.9 MMOL/L (ref 3.4–5.3)
PROT SERPL-MCNC: 6.7 G/DL (ref 6.8–8.8)
RBC # BLD AUTO: 3.72 10E12/L (ref 3.8–5.2)
SODIUM SERPL-SCNC: 138 MMOL/L (ref 133–144)
WBC # BLD AUTO: 13.4 10E9/L (ref 4–11)

## 2017-12-21 PROCEDURE — 99211 OFF/OP EST MAY X REQ PHY/QHP: CPT

## 2017-12-21 PROCEDURE — 99214 OFFICE O/P EST MOD 30 MIN: CPT | Performed by: INTERNAL MEDICINE

## 2017-12-21 PROCEDURE — 80053 COMPREHEN METABOLIC PANEL: CPT | Performed by: INTERNAL MEDICINE

## 2017-12-21 PROCEDURE — 25000128 H RX IP 250 OP 636: Performed by: INTERNAL MEDICINE

## 2017-12-21 PROCEDURE — 85025 COMPLETE CBC W/AUTO DIFF WBC: CPT | Performed by: INTERNAL MEDICINE

## 2017-12-21 PROCEDURE — 36591 DRAW BLOOD OFF VENOUS DEVICE: CPT

## 2017-12-21 RX ORDER — HEPARIN SODIUM (PORCINE) LOCK FLUSH IV SOLN 100 UNIT/ML 100 UNIT/ML
500 SOLUTION INTRAVENOUS ONCE
Status: COMPLETED | OUTPATIENT
Start: 2017-12-21 | End: 2017-12-21

## 2017-12-21 RX ADMIN — SODIUM CHLORIDE, PRESERVATIVE FREE 500 UNITS: 5 INJECTION INTRAVENOUS at 11:22

## 2017-12-21 ASSESSMENT — PAIN SCALES - GENERAL: PAINLEVEL: NO PAIN (0)

## 2017-12-21 NOTE — PROGRESS NOTES
"Oncology Rooming Note    December 21, 2017 12:22 PM   Flor Griffin is a 62 year old female who presents for:    Chief Complaint   Patient presents with     Oncology Clinic Visit     RETURN-2 week follow up-Carcinoma of unknown origin      Initial Vitals: /88 (BP Location: Right arm, Patient Position: Chair, Cuff Size: Adult Regular)  Pulse 87  Temp 98.6  F (37  C)  Resp 20  Wt 78.9 kg (174 lb)  LMP 01/01/2004 (Approximate)  SpO2 98%  BMI 28.1 kg/m2 Estimated body mass index is 28.1 kg/(m^2) as calculated from the following:    Height as of 12/11/17: 1.676 m (5' 5.98\").    Weight as of this encounter: 78.9 kg (174 lb). Body surface area is 1.92 meters squared.  No Pain (0) Comment: Data Unavailable   Patient's last menstrual period was 01/01/2004 (approximate).  Allergies reviewed: Yes  Medications reviewed: Yes    Medications: Medication refills not needed today.  Pharmacy name entered into [facility]:    [id number] - [place], [place] - [address]  [facility] [place], [place] - [address]    Clinical concerns: none     5 minutes for nursing intake (face to face time)     Hailey Ryan [facility]            "

## 2017-12-21 NOTE — PROGRESS NOTES
Infusion Nursing Note:  Flor Griffin presents today for port labs.    Patient seen by provider today: Yes: Dr. Magana   present during visit today: Not Applicable.    Note: N/A.    Intravenous Access:  Labs drawn without difficulty.  Implanted Port.    Treatment Conditions:  Not Applicable.      Post Infusion Assessment:  Site patent and intact, free from redness, edema or discomfort.  No evidence of extravasations.  Access discontinued per protocol.    Discharge Plan:   AVS to patient via MYCHART.  Patient will return as prev keily for next appointment.   Patient discharged in stable condition accompanied by: son.  Departure Mode: Ambulatory to clinic.    Woody Mathur RN

## 2017-12-21 NOTE — MR AVS SNAPSHOT
"              After Visit Summary   12/21/2017    Flor Griffin    MRN: [id number]           Patient Information     Date Of Birth          1955        Visit Information        Provider Department      12/21/2017 11:30 AM  INFUSION CHAIR 4 [facility]        Today's Diagnoses     Carcinoma of unknown origin (H)           Follow-ups after your visit        Your next 10 appointments already scheduled     Dec 21, 2017 12:30 PM CST   Return Visit with Yani Magana MD   [facility] ([facility])    [facility]  [address]  [facility] [address]   [phone]            Jan 11, 2018 11:15 AM CST   New Visit with María Galeas GC   Cancer Risk Management Program ([facility])    [facility]  [address]  [facility] [address] [phone]              Who to contact     If you have questions or need follow up information about today's clinic visit or your schedule please contact [facility] AND [facility] directly at [phone].  Normal or non-critical lab and imaging results will be communicated to you by LoiLohart, letter or phone within 4 business days after the clinic has received the results. If you do not hear from us within 7 days, please contact the clinic through LoiLohart or phone. If you have a critical or abnormal lab result, we will notify you by phone as soon as possible.  Submit refill requests through MySupportAssistant or call your pharmacy and they will forward the refill request to us. Please allow 3 business days for your refill to be completed.          Additional Information About Your Visit        MyChart Information     MySupportAssistant lets you send messages to your doctor, view your test results, renew your prescriptions, schedule appointments and more. To sign up, go to [email or web address] . Click on \"Log in\" on the left side of the screen, which will " "take you to the Welcome page. Then click on \"Sign up Now\" on the right side of the page.     You will be asked to enter the access code listed below, as well as some personal information. Please follow the directions to create your username and password.     Your access code is: [email or web address]  Expires: 2018  8:24 AM     Your access code will  in 90 days. If you need help or a new code, please call your [facility] clinic or [phone].        Care EveryWhere ID     This is your Care EveryWhere ID. This could be used by other organizations to access your [facility] medical records  [id number]        Your Vitals Were     Last Period                   2004 (Approximate)            Blood Pressure from Last 3 Encounters:   17 96/60   17 100/53   17 125/79    Weight from Last 3 Encounters:   17 82.6 kg (182 lb)   17 81.2 kg (179 lb)   17 82.4 kg (181 lb 9.6 oz)              We Performed the Following     CBC with platelets differential     Comprehensive metabolic panel        Primary Care Provider Office Phone # Fax #    Jaida Liu [phone] [address] [place] [address]        Equal Access to Services     [facility] : Hadii nelida ku hadasho Soomaali, waaxda luqadaha, qaybta kaalmada adeegyada, ketan lam . So [facility] [phone].    ATENCIÓN: Si habla español, tiene a thomas disposición servicios gratuitos de asistencia lingüística. Llame al [phone].    We comply with applicable federal civil rights laws and [place] laws. We do not discriminate on the basis of race, color, national origin, age, disability, sex, sexual orientation, or gender identity.            Thank you!     Thank you for choosing [facility]  for your care. Our goal is always to provide you with excellent care. Hearing back from our patients is one way we can continue to improve our " services. Please take a few minutes to complete the written survey that you may receive in the mail after your visit with us. Thank you!             Your Updated Medication List - Protect others around you: Learn how to safely use, store and throw away your medicines at www.disposemymeds.org.          This list is accurate as of: 12/21/17 11:33 AM.  Always use your most recent med list.                   Brand Name Dispense Instructions for use Diagnosis    ADVIL PO      Take 400 mg by mouth every 6 hours as needed for moderate pain        aspirin 81 MG tablet      Take 81 mg by mouth daily        GLUCOSAMINE CHONDROITIN JOINT PO      Take by mouth daily        LORazepam 0.5 MG tablet    ATIVAN    30 tablet    Take 1 tablet (0.5 mg) by mouth every 4 hours as needed (Anxiety, Nausea/Vomiting or Sleep)    Carcinoma of unknown origin (H)       OMEGA-3 FISH OIL PO      Take 1 g by mouth daily        ondansetron 8 MG tablet    ZOFRAN    10 tablet    Take 1 tablet (8 mg) by mouth every 8 hours as needed (Nausea/Vomiting)    Carcinoma of unknown origin (H)       prochlorperazine 10 MG tablet    COMPAZINE    30 tablet    Take 1 tablet (10 mg) by mouth every 6 hours as needed (Nausea/Vomiting)    Carcinoma of unknown origin (H)       valACYclovir 1000 mg tablet    VALTREX    20 tablet    Take 1 tablet (1,000 mg) by mouth 2 times daily    Cold sore       VITAMIN B6 PO      Take by mouth daily        VITAMIN D (CHOLECALCIFEROL) PO      Take 1,000 Units by mouth daily

## 2017-12-21 NOTE — LETTER
"    12/21/2017         RE: Flor Griffin  [address]  [facility] [address]        Dear Colleague,    Thank you for referring your patient, Flor Griffin, to the [facility]. Please see a copy of my visit note below.    Oncology Rooming Note    December 21, 2017 12:22 PM   Flor Griffin is a 62 year old female who presents for:    Chief Complaint   Patient presents with     Oncology Clinic Visit     RETURN-2 week follow up-Carcinoma of unknown origin      Initial Vitals: /88 (BP Location: Right arm, Patient Position: Chair, Cuff Size: Adult Regular)  Pulse 87  Temp 98.6  F (37  C)  Resp 20  Wt 78.9 kg (174 lb)  LMP 01/01/2004 (Approximate)  SpO2 98%  BMI 28.1 kg/m2 Estimated body mass index is 28.1 kg/(m^2) as calculated from the following:    Height as of 12/11/17: 1.676 m (5' 5.98\").    Weight as of this encounter: 78.9 kg (174 lb). Body surface area is 1.92 meters squared.  No Pain (0) Comment: Data Unavailable   Patient's last menstrual period was 01/01/2004 (approximate).  Allergies reviewed: Yes  Medications reviewed: Yes    Medications: Medication refills not needed today.  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  Initially she was seen 11/22/2017 after she had the right supraclavicular lymph node biopsy which was positive for poorly differentiated adenocarcinoma.  She had this lymph node almost a month and had an excisional biopsy performed which was consistent with the diagnosis of cancer.      A pet CT " scan 11/27/2017 showed hypermetabolic right supraclavicular, right axillary and subpectoral adenopathy.  Otherwise, no distant metastatic disease.     Mammogram 11/30/17- negative    TREATMENT: Carboplatin and Taxol      SUBJECTIVE:  The patient was seen as a followup today. She is tolerating treatment well except for some fatigue and hair loss.     REVIEW OF SYSTEMS:  A complete review of systems was performed and found to be negative other than pertinent positives mentioned in history of present illness.      Past medical, social histories reviewed.     Meds- Reviewed.     PHYSICAL EXAMINATION:   VITAL SIGNS: /88 (BP Location: Right arm, Patient Position: Chair, Cuff Size: Adult Regular)  Pulse 87  Temp 98.6  F (37  C)  Resp 20  Wt 78.9 kg (174 lb)  LMP 01/01/2004 (Approximate)  SpO2 98%  BMI 28.1 kg/m2  CONSTITUTIONAL: Sitting comfortably.   HEENT: Pupils are equal. Oropharynx is clear.   NECK: No cervical or supraclavicular lymphadenopathy.   RESPIRATORY: Clear bilaterally.   CARD/VASC: S1, S2, regular.   GI: Soft, nontender, nondistended, no hepatosplenomegaly.   MUSKULOSKELETAL: Warm, well perfused.   NEUROLOGIC: Alert, awake.   INTEGUMENT: No rash.   LYMPHATICS: No edema.   PSYCH: Mood and affect was normal.     LABORATORY DATA AND IMAGING REVIEWED DURING THIS VISIT:  Recent Labs   Lab Test  12/21/17   1120  12/11/17   1030   NA  138  140   POTASSIUM  3.9  3.8   CHLORIDE  105  105   CO2  28  28   ANIONGAP  5  7   BUN  16  13   CR  0.66  0.63   GLC  103*  154*   EDILSON  8.5  8.6     Recent Labs   Lab Test  12/21/17   1120  12/11/17   1030  11/22/17   1420   WBC  13.4*  5.0  6.2   HGB  11.6*  12.5  13.2   PLT  210  275  312   MCV  94  94  94   NEUTROPHIL  78.3  63.4  63.9     Recent Labs   Lab Test  12/21/17   1120  12/11/17   1030  11/22/17   1420   BILITOTAL  0.2  0.5  0.5   ALKPHOS  137  75  83   ALT  84*  39  32   AST  32  21  19   ALBUMIN  3.6  3.6  3.9         Results for orders placed or  performed during the hospital encounter of 12/20/17   MR Breast Bilateral w Contrast    Narrative    Exam: Breast MRI, with and without Contrast, and with color encoding     History/Indication: Recently diagnosed poorly differentiated cancer  from lymph node biopsy with PET/CT demonstrating hypermetabolic right  supraclavicular, right axillary and right subpectoral lymph nodes.    Comparison: Mammogram dated 11/30/2017 and PET/CT dated 11/27/2017.    Technique: Precontrast gradient recall axial nonfat sat T1.  Precontrast gradient recall axial fat-sat T1. Precontrast STIR axial  fat sat. Postcontrast gradient recall axial fat-sat T1 with dynamic  postcontrast acquisitions at 2, 4 and 6 minutes with subtractions.  Delayed high-resolution gradient recall sagittal fat-sat T1. MIP of  subtractions, axial coronal and sagittal. Color encoding of post  contrast dynamic acquisitions. IV contrast: 8 mL Gadavist    Breast composition: Heterogeneous fibroglandular tissue    Background parenchymal enhancement: Mild    Findings: No concerning areas of enhancement in either breast.     Extensive lymphadenopathy is again seen on the right and axillary  levels 2, 3 and supraclavicular regions. There is sparing of the  axillary level 1 lymph nodes, which appear normal in size. No internal  mammary lymphadenopathy on either side. No left axillary  lymphadenopathy.     Left chest implanted central venous port is seen.      Impression    Impression: BI-RADS CATEGORY: 1 -  NEGATIVE FOR PRIMARY BREAST  MALIGNANCY.  1. Extensive lymphadenopathy is again seen on the right and axillary  levels 2, 3 and the supraclavicular region.  2. No concerning areas of enhancement in either breast. Also, given  the sparing of right axillary level 1 lymph nodes, primary breast  malignancy is thought to be unlikely.    Recommendation: Continued oncologic management.    SHAWANDA MADRID MD         ECOG performance status 0.      ASSESSMENT AND PLAN:  This is  a 62-year-old lady who has diagnosis of 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  Tumor cells were positive for CK7, CK5/6 and GATA3.  We did tumor marker testing and she had CA 27-29 at the level of 12,  of 16, CA 19-9 at 19.  Her CEA was less than 0.5.      PET CT scan results was done 11/27/2017.  showed hypermetabolic right supraclavicular, right axillary and subpectoral adenopathy.  There was no evidence of distant metastases.      The distribution of adenopathy indicates possibility breast cancer which has metastasized to the regional lymph nodes and we are not able to find a primary on physical exam and a PET scan.     Mammogram 11/30/17 results were reviewed- no evidence of primary. Her-2 non amplified. Androgen receptor 10-20%. ER 1-5%. [place] < 1%.   - ENT examination by Dr. Miller was negative. EGD was negative as well.   - MRI breast 12/20/17 results were reviewed, no evidence of malignancy in the breasts.  - Labs were reviewed with the patient, leukocytosis and mild anemia.    I will complete 2-3 cycles of chemotherapy and then will repeat a PET scan. If she has a good response then we will plan to complete six cycles of chemotherapy. We are treating her like Stage IIIC breast cancer. I will have her see radiation oncology for consolidation radiation.      PLAN:   1- Continue carbo/Txol every three weeks-Cycle 2 1/3  2- Schedule an appointment with Dr. Miles  3. RTC MD 1/3    YANI MAGANA MD    12/21/2017     cc: Waylon Liu MD       Again, thank you for allowing me to participate in the care of your patient.        Sincerely,        Yani Magana MD

## 2017-12-21 NOTE — MR AVS SNAPSHOT
After Visit Summary   12/21/2017    Flor Griffin    MRN: [id number]           Patient Information     Date Of Birth          1955        Visit Information        Provider Department      12/21/2017 12:30 PM Yani Magana MD [facility]        Care Instructions    1- Continue carbo/Txol every three weeks-Cycle 2 1/3  2- Schedule an appointment with Dr. Miles  3. RTC MD 1/3          Follow-ups after your visit        Your next 10 appointments already scheduled     Jan 03, 2018  9:30 AM CST   Level 3 with  INFUSION CHAIR 12   [facility] and Infusion Center ([facility])    [facility]  [address]  [place] [address]   [phone]            Jan 03, 2018 10:00 AM CST   Return Visit with Yani Magana MD   [facility] ([facility])    [facility]  [address]  [place] [address]   [phone]            Jan 11, 2018 11:15 AM CST   New Visit with María Galeas GC   Cancer Risk Management Program ([facility])    [facility]  [address]  [place] [address]   [phone]              Who to contact     If you have questions or need follow up information about today's clinic visit or your schedule please contact [facility] directly at [phone].  Normal or non-critical lab and imaging results will be communicated to you by MyChart, letter or phone within 4 business days after the clinic has received the results. If you do not hear from us within 7 days, please contact the clinic through MyChart or phone. If you have a critical or abnormal lab result, we will notify you by phone as soon as possible.  Submit refill requests through [employer] or call your pharmacy and they will forward the refill request to us. Please allow 3 business days for your refill to be completed.          Additional Information  "About Your Visit        [employer] Information     [employer] lets you send messages to your doctor, view your test results, renew your prescriptions, schedule appointments and more. To sign up, go to [email or web address] . Click on \"Log in\" on the left side of the screen, which will take you to the Welcome page. Then click on \"Sign up Now\" on the right side of the page.     You will be asked to enter the access code listed below, as well as some personal information. Please follow the directions to create your username and password.     Your access code is: [email or web address]  Expires: 2018  8:24 AM     Your access code will  in 90 days. If you need help or a new code, please call your [facility] clinic or [phone].        Care EveryWhere ID     This is your Care EveryWhere ID. This could be used by other organizations to access your [facility] medical records  [id number]        Your Vitals Were     Pulse Temperature Respirations Last Period Pulse Oximetry BMI (Body Mass Index)    87 98.6  F (37  C) 20 2004 (Approximate) 98% 28.1 kg/m2       Blood Pressure from Last 3 Encounters:   17 142/88   17 96/60   17 100/53    Weight from Last 3 Encounters:   17 78.9 kg (174 lb)   17 82.6 kg (182 lb)   17 81.2 kg (179 lb)              Today, you had the following     No orders found for display       Primary Care Provider Office Phone # Fax #    Jaida Liu [phone] [address] [place] [address]        Equal Access to Services     [facility]: Hadii nelida Nolan, waelissada orquidea, qaybta anyialketan cohen . So [facility] [phone].    ATENCIÓN: Si habla español, tiene a thomas disposición servicios gratuitos de asistencia lingüística. Llame al [phone].    We comply with applicable federal civil rights laws and [place] laws. We do not discriminate on the basis of race, color, " national origin, age, disability, sex, sexual orientation, or gender identity.            Thank you!     Thank you for choosing [facility] CANCER [facility]  for your care. Our goal is always to provide you with excellent care. Hearing back from our patients is one way we can continue to improve our services. Please take a few minutes to complete the written survey that you may receive in the mail after your visit with us. Thank you!             Your Updated Medication List - Protect others around you: Learn how to safely use, store and throw away your medicines at www.disposemymeds.org.          This list is accurate as of: 12/21/17  1:15 PM.  Always use your most recent med list.                   Brand Name Dispense Instructions for use Diagnosis    ADVIL PO      Take 400 mg by mouth every 6 hours as needed for moderate pain        aspirin 81 MG tablet      Take 81 mg by mouth daily        GLUCOSAMINE CHONDROITIN JOINT PO      Take by mouth daily        LORazepam 0.5 MG tablet    ATIVAN    30 tablet    Take 1 tablet (0.5 mg) by mouth every 4 hours as needed (Anxiety, Nausea/Vomiting or Sleep)    Carcinoma of unknown origin (H)       OMEGA-3 FISH OIL PO      Take 1 g by mouth daily        ondansetron 8 MG tablet    ZOFRAN    10 tablet    Take 1 tablet (8 mg) by mouth every 8 hours as needed (Nausea/Vomiting)    Carcinoma of unknown origin (H)       prochlorperazine 10 MG tablet    COMPAZINE    30 tablet    Take 1 tablet (10 mg) by mouth every 6 hours as needed (Nausea/Vomiting)    Carcinoma of unknown origin (H)       valACYclovir 1000 mg tablet    VALTREX    20 tablet    Take 1 tablet (1,000 mg) by mouth 2 times daily    Cold sore       VITAMIN B6 PO      Take by mouth daily        VITAMIN D (CHOLECALCIFEROL) PO      Take 1,000 Units by mouth daily

## 2017-12-21 NOTE — PATIENT INSTRUCTIONS
1- Continue carbo/Txol every three weeks-Cycle 2 1/3  - Scheduled - Charity  2- Schedule an appointment with Dr. Miles  Scheduled for 1/4/18 - Charity  3. RTC MD 1/3 - Scheduled - Charity    AVS given to patient - Charity

## 2017-12-21 NOTE — PROGRESS NOTES
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  Initially she was seen 11/22/2017 after she had the right supraclavicular lymph node biopsy which was positive for poorly differentiated adenocarcinoma.  She had this lymph node almost a month and had an excisional biopsy performed which was consistent with the diagnosis of cancer.      A pet CT scan 11/27/2017 showed hypermetabolic right supraclavicular, right axillary and subpectoral adenopathy.  Otherwise, no distant metastatic disease.     Mammogram 11/30/17- negative    TREATMENT: Carboplatin and Taxol      SUBJECTIVE:  The patient was seen as a followup today. She is tolerating treatment well except for some fatigue and hair loss.     REVIEW OF SYSTEMS:  A complete review of systems was performed and found to be negative other than pertinent positives mentioned in history of present illness.      Past medical, social histories reviewed.     Meds- Reviewed.     PHYSICAL EXAMINATION:   VITAL SIGNS: /88 (BP Location: Right arm, Patient Position: Chair, Cuff Size: Adult Regular)  Pulse 87  Temp 98.6  F (37  C)  Resp 20  Wt 78.9 kg (174 lb)  LMP 01/01/2004 (Approximate)  SpO2 98%  BMI 28.1 kg/m2  CONSTITUTIONAL: Sitting comfortably.   HEENT: Pupils are equal. Oropharynx is clear.   NECK: No cervical or supraclavicular lymphadenopathy.   RESPIRATORY: Clear bilaterally.   CARD/VASC: S1, S2, regular.   GI: Soft, nontender, nondistended, no hepatosplenomegaly.   MUSKULOSKELETAL: Warm, well perfused.   NEUROLOGIC: Alert, awake.   INTEGUMENT: No rash.   LYMPHATICS: No edema.   PSYCH: Mood and affect was normal.     LABORATORY DATA AND IMAGING REVIEWED DURING THIS VISIT:  Recent Labs   Lab Test  12/21/17   1120  12/11/17   1030   NA  138  140   POTASSIUM  3.9  3.8   CHLORIDE  105  105   CO2  28  28   ANIONGAP  5  7    BUN  16  13   CR  0.66  0.63   GLC  103*  154*   EDILSON  8.5  8.6     Recent Labs   Lab Test  12/21/17   1120  12/11/17   1030  11/22/17   1420   WBC  13.4*  5.0  6.2   HGB  11.6*  12.5  13.2   PLT  210  275  312   MCV  94  94  94   NEUTROPHIL  78.3  63.4  63.9     Recent Labs   Lab Test  12/21/17   1120  12/11/17   1030  11/22/17   1420   BILITOTAL  0.2  0.5  0.5   ALKPHOS  137  75  83   ALT  84*  39  32   AST  32  21  19   ALBUMIN  3.6  3.6  3.9         Results for orders placed or performed during the hospital encounter of 12/20/17   MR Breast Bilateral w Contrast    Narrative    Exam: Breast MRI, with and without Contrast, and with color encoding     History/Indication: Recently diagnosed poorly differentiated cancer  from lymph node biopsy with PET/CT demonstrating hypermetabolic right  supraclavicular, right axillary and right subpectoral lymph nodes.    Comparison: Mammogram dated 11/30/2017 and PET/CT dated 11/27/2017.    Technique: Precontrast gradient recall axial nonfat sat T1.  Precontrast gradient recall axial fat-sat T1. Precontrast STIR axial  fat sat. Postcontrast gradient recall axial fat-sat T1 with dynamic  postcontrast acquisitions at 2, 4 and 6 minutes with subtractions.  Delayed high-resolution gradient recall sagittal fat-sat T1. MIP of  subtractions, axial coronal and sagittal. Color encoding of post  contrast dynamic acquisitions. IV contrast: 8 mL Gadavist    Breast composition: Heterogeneous fibroglandular tissue    Background parenchymal enhancement: Mild    Findings: No concerning areas of enhancement in either breast.     Extensive lymphadenopathy is again seen on the right and axillary  levels 2, 3 and supraclavicular regions. There is sparing of the  axillary level 1 lymph nodes, which appear normal in size. No internal  mammary lymphadenopathy on either side. No left axillary  lymphadenopathy.     Left chest implanted central venous port is seen.      Impression    Impression: BI-RADS  CATEGORY: 1 -  NEGATIVE FOR PRIMARY BREAST  MALIGNANCY.  1. Extensive lymphadenopathy is again seen on the right and axillary  levels 2, 3 and the supraclavicular region.  2. No concerning areas of enhancement in either breast. Also, given  the sparing of right axillary level 1 lymph nodes, primary breast  malignancy is thought to be unlikely.    Recommendation: Continued oncologic management.    SHAWANDA MADRID MD         ECOG performance status 0.      ASSESSMENT AND PLAN:  This is a 62-year-old lady who has diagnosis of 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  Tumor cells were positive for CK7, CK5/6 and GATA3.  We did tumor marker testing and she had CA 27-29 at the level of 12,  of 16, CA 19-9 at 19.  Her CEA was less than 0.5.      PET CT scan results was done 11/27/2017.  showed hypermetabolic right supraclavicular, right axillary and subpectoral adenopathy.  There was no evidence of distant metastases.      The distribution of adenopathy indicates possibility breast cancer which has metastasized to the regional lymph nodes and we are not able to find a primary on physical exam and a PET scan.     Mammogram 11/30/17 results were reviewed- no evidence of primary. Her-2 non amplified. Androgen receptor 10-20%. ER 1-5%. [place] < 1%.   - ENT examination by Dr. Miller was negative. EGD was negative as well.   - MRI breast 12/20/17 results were reviewed, no evidence of malignancy in the breasts.  - Labs were reviewed with the patient, leukocytosis and mild anemia.    I will complete 2-3 cycles of chemotherapy and then will repeat a PET scan. If she has a good response then we will plan to complete six cycles of chemotherapy. We are treating her like Stage IIIC breast cancer. I will have her see radiation oncology for consolidation radiation.      PLAN:   1- Continue carbo/Txol every three weeks-Cycle 2 1/3  2- Schedule an appointment with Dr. Miles  3. RTC MD  1/3    ERON BARNES MD    12/21/2017     cc: Waylon Liu MD

## 2018-01-02 RX ORDER — ALBUTEROL SULFATE 0.83 MG/ML
2.5 SOLUTION RESPIRATORY (INHALATION)
Status: CANCELLED | OUTPATIENT
Start: 2018-01-03

## 2018-01-02 RX ORDER — DIPHENHYDRAMINE HCL 25 MG
50 CAPSULE ORAL ONCE
Status: CANCELLED
Start: 2018-01-03

## 2018-01-02 RX ORDER — ALBUTEROL SULFATE 90 UG/1
1-2 AEROSOL, METERED RESPIRATORY (INHALATION)
Status: CANCELLED
Start: 2018-01-03

## 2018-01-02 RX ORDER — EPINEPHRINE 1 MG/ML
0.3 INJECTION, SOLUTION, CONCENTRATE INTRAVENOUS EVERY 5 MIN PRN
Status: CANCELLED | OUTPATIENT
Start: 2018-01-03

## 2018-01-02 RX ORDER — HEPARIN SODIUM (PORCINE) LOCK FLUSH IV SOLN 100 UNIT/ML 100 UNIT/ML
5 SOLUTION INTRAVENOUS EVERY 8 HOURS PRN
Status: CANCELLED
Start: 2018-01-03

## 2018-01-02 RX ORDER — EPINEPHRINE 0.3 MG/.3ML
0.3 INJECTION SUBCUTANEOUS EVERY 5 MIN PRN
Status: CANCELLED | OUTPATIENT
Start: 2018-01-03

## 2018-01-02 RX ORDER — LORAZEPAM 2 MG/ML
0.5 INJECTION INTRAMUSCULAR EVERY 4 HOURS PRN
Status: CANCELLED
Start: 2018-01-03

## 2018-01-02 RX ORDER — METHYLPREDNISOLONE SODIUM SUCCINATE 125 MG/2ML
125 INJECTION, POWDER, LYOPHILIZED, FOR SOLUTION INTRAMUSCULAR; INTRAVENOUS
Status: CANCELLED
Start: 2018-01-03

## 2018-01-02 RX ORDER — MEPERIDINE HYDROCHLORIDE 25 MG/ML
25 INJECTION INTRAMUSCULAR; INTRAVENOUS; SUBCUTANEOUS EVERY 30 MIN PRN
Status: CANCELLED | OUTPATIENT
Start: 2018-01-03

## 2018-01-02 RX ORDER — SODIUM CHLORIDE 9 MG/ML
1000 INJECTION, SOLUTION INTRAVENOUS CONTINUOUS PRN
Status: CANCELLED
Start: 2018-01-03

## 2018-01-02 RX ORDER — DIPHENHYDRAMINE HYDROCHLORIDE 50 MG/ML
50 INJECTION INTRAMUSCULAR; INTRAVENOUS
Status: CANCELLED
Start: 2018-01-03

## 2018-01-03 ENCOUNTER — HOSPITAL ENCOUNTER (OUTPATIENT)
Facility: CLINIC | Age: 63
Setting detail: SPECIMEN
Discharge: HOME | End: 2018-01-03
Attending: INTERNAL MEDICINE | Admitting: INTERNAL MEDICINE
Payer: COMMERCIAL

## 2018-01-03 ENCOUNTER — INFUSION THERAPY VISIT (OUTPATIENT)
Dept: INFUSION THERAPY | Facility: CLINIC | Age: 63
End: 2018-01-03
Attending: INTERNAL MEDICINE
Payer: COMMERCIAL

## 2018-01-03 ENCOUNTER — ONCOLOGY VISIT (OUTPATIENT)
Dept: ONCOLOGY | Facility: CLINIC | Age: 63
End: 2018-01-03
Attending: INTERNAL MEDICINE
Payer: COMMERCIAL

## 2018-01-03 VITALS
SYSTOLIC BLOOD PRESSURE: 114 MMHG | TEMPERATURE: 98.3 F | HEART RATE: 78 BPM | DIASTOLIC BLOOD PRESSURE: 67 MMHG | HEIGHT: 66 IN | RESPIRATION RATE: 16 BRPM | BODY MASS INDEX: 28.93 KG/M2 | WEIGHT: 180 LBS

## 2018-01-03 VITALS
SYSTOLIC BLOOD PRESSURE: 119 MMHG | RESPIRATION RATE: 14 BRPM | TEMPERATURE: 98.2 F | HEART RATE: 78 BPM | BODY MASS INDEX: 28.93 KG/M2 | DIASTOLIC BLOOD PRESSURE: 75 MMHG | HEIGHT: 66 IN | WEIGHT: 180 LBS

## 2018-01-03 DIAGNOSIS — C80.1 CARCINOMA OF UNKNOWN ORIGIN (H): Primary | ICD-10-CM

## 2018-01-03 LAB
ALBUMIN SERPL-MCNC: 3.5 G/DL (ref 3.4–5)
ALP SERPL-CCNC: 86 U/L (ref 40–150)
ALT SERPL W P-5'-P-CCNC: 75 U/L (ref 0–50)
ANION GAP SERPL CALCULATED.3IONS-SCNC: 6 MMOL/L (ref 3–14)
AST SERPL W P-5'-P-CCNC: 40 U/L (ref 0–45)
BASOPHILS # BLD AUTO: 0 10E9/L (ref 0–0.2)
BASOPHILS NFR BLD AUTO: 1 %
BILIRUB SERPL-MCNC: 0.7 MG/DL (ref 0.2–1.3)
BUN SERPL-MCNC: 18 MG/DL (ref 7–30)
CALCIUM SERPL-MCNC: 9 MG/DL (ref 8.5–10.1)
CHLORIDE SERPL-SCNC: 106 MMOL/L (ref 94–109)
CO2 SERPL-SCNC: 28 MMOL/L (ref 20–32)
CREAT SERPL-MCNC: 0.71 MG/DL (ref 0.52–1.04)
DIFFERENTIAL METHOD BLD: ABNORMAL
EOSINOPHIL # BLD AUTO: 0.2 10E9/L (ref 0–0.7)
EOSINOPHIL NFR BLD AUTO: 3.7 %
ERYTHROCYTE [DISTWIDTH] IN BLOOD BY AUTOMATED COUNT: 14.2 % (ref 10–15)
GFR SERPL CREATININE-BSD FRML MDRD: 83 ML/MIN/1.7M2
GLUCOSE SERPL-MCNC: 88 MG/DL (ref 70–99)
HCT VFR BLD AUTO: 35.2 % (ref 35–47)
HGB BLD-MCNC: 11.8 G/DL (ref 11.7–15.7)
IMM GRANULOCYTES # BLD: 0 10E9/L (ref 0–0.4)
IMM GRANULOCYTES NFR BLD: 0 %
LYMPHOCYTES # BLD AUTO: 1.1 10E9/L (ref 0.8–5.3)
LYMPHOCYTES NFR BLD AUTO: 27.5 %
MCH RBC QN AUTO: 31.6 PG (ref 26.5–33)
MCHC RBC AUTO-ENTMCNC: 33.5 G/DL (ref 31.5–36.5)
MCV RBC AUTO: 94 FL (ref 78–100)
MONOCYTES # BLD AUTO: 0.4 10E9/L (ref 0–1.3)
MONOCYTES NFR BLD AUTO: 9.9 %
NEUTROPHILS # BLD AUTO: 2.3 10E9/L (ref 1.6–8.3)
NEUTROPHILS NFR BLD AUTO: 57.9 %
NRBC # BLD AUTO: 0 10*3/UL
NRBC BLD AUTO-RTO: 0 /100
PLATELET # BLD AUTO: 265 10E9/L (ref 150–450)
POTASSIUM SERPL-SCNC: 4 MMOL/L (ref 3.4–5.3)
PROT SERPL-MCNC: 6.9 G/DL (ref 6.8–8.8)
RBC # BLD AUTO: 3.74 10E12/L (ref 3.8–5.2)
SODIUM SERPL-SCNC: 140 MMOL/L (ref 133–144)
WBC # BLD AUTO: 4 10E9/L (ref 4–11)

## 2018-01-03 PROCEDURE — G0463 HOSPITAL OUTPT CLINIC VISIT: HCPCS

## 2018-01-03 PROCEDURE — 25000132 ZZH RX MED GY IP 250 OP 250 PS 637: Performed by: INTERNAL MEDICINE

## 2018-01-03 PROCEDURE — 25000128 H RX IP 250 OP 636: Performed by: INTERNAL MEDICINE

## 2018-01-03 PROCEDURE — 96413 CHEMO IV INFUSION 1 HR: CPT

## 2018-01-03 PROCEDURE — 96417 CHEMO IV INFUS EACH ADDL SEQ: CPT

## 2018-01-03 PROCEDURE — 96377 APPLICATON ON-BODY INJECTOR: CPT | Mod: XS

## 2018-01-03 PROCEDURE — 85025 COMPLETE CBC W/AUTO DIFF WBC: CPT | Performed by: INTERNAL MEDICINE

## 2018-01-03 PROCEDURE — 99214 OFFICE O/P EST MOD 30 MIN: CPT | Performed by: INTERNAL MEDICINE

## 2018-01-03 PROCEDURE — 96415 CHEMO IV INFUSION ADDL HR: CPT

## 2018-01-03 PROCEDURE — 80053 COMPREHEN METABOLIC PANEL: CPT | Performed by: INTERNAL MEDICINE

## 2018-01-03 PROCEDURE — 25000125 ZZHC RX 250: Performed by: INTERNAL MEDICINE

## 2018-01-03 PROCEDURE — 96375 TX/PRO/DX INJ NEW DRUG ADDON: CPT

## 2018-01-03 PROCEDURE — 96367 TX/PROPH/DG ADDL SEQ IV INF: CPT

## 2018-01-03 RX ORDER — HEPARIN SODIUM (PORCINE) LOCK FLUSH IV SOLN 100 UNIT/ML 100 UNIT/ML
5 SOLUTION INTRAVENOUS EVERY 8 HOURS PRN
Status: DISCONTINUED | OUTPATIENT
Start: 2018-01-03 | End: 2018-01-03 | Stop reason: HOSPADM

## 2018-01-03 RX ORDER — DIPHENHYDRAMINE HCL 25 MG
50 CAPSULE ORAL ONCE
Status: COMPLETED | OUTPATIENT
Start: 2018-01-03 | End: 2018-01-03

## 2018-01-03 RX ORDER — ONDANSETRON 2 MG/ML
8 INJECTION INTRAMUSCULAR; INTRAVENOUS ONCE
Status: COMPLETED | OUTPATIENT
Start: 2018-01-03 | End: 2018-01-03

## 2018-01-03 RX ADMIN — PACLITAXEL 392 MG: 6 INJECTION, SOLUTION INTRAVENOUS at 11:23

## 2018-01-03 RX ADMIN — SODIUM CHLORIDE, PRESERVATIVE FREE 5 ML: 5 INJECTION INTRAVENOUS at 15:10

## 2018-01-03 RX ADMIN — PEGFILGRASTIM 6 MG: KIT SUBCUTANEOUS at 14:49

## 2018-01-03 RX ADMIN — CARBOPLATIN 600 MG: 10 INJECTION, SOLUTION INTRAVENOUS at 14:26

## 2018-01-03 RX ADMIN — FAMOTIDINE 20 MG: 20 INJECTION, SOLUTION INTRAVENOUS at 10:54

## 2018-01-03 RX ADMIN — DEXAMETHASONE SODIUM PHOSPHATE 20 MG: 10 INJECTION, SOLUTION INTRAMUSCULAR; INTRAVENOUS at 11:13

## 2018-01-03 RX ADMIN — DIPHENHYDRAMINE HYDROCHLORIDE 50 MG: 25 CAPSULE ORAL at 10:29

## 2018-01-03 RX ADMIN — SODIUM CHLORIDE 250 ML: 9 INJECTION, SOLUTION INTRAVENOUS at 10:28

## 2018-01-03 RX ADMIN — ONDANSETRON HYDROCHLORIDE 8 MG: 2 INJECTION, SOLUTION INTRAVENOUS at 11:17

## 2018-01-03 ASSESSMENT — PAIN SCALES - GENERAL
PAINLEVEL: NO PAIN (0)
PAINLEVEL: NO PAIN (0)

## 2018-01-03 NOTE — PROGRESS NOTES
Infusion Nursing Note:  Flor Griffin presents today for chemotherapy.    Patient seen by provider today: Yes: Dr Magana   present during visit today: Not Applicable.    Note: N/A.    Intravenous Access:  Labs drawn without difficulty.  Implanted Port.    Treatment Conditions:  Lab Results   Component Value Date    HGB 11.8 01/03/2018     Lab Results   Component Value Date    WBC 4.0 01/03/2018      Lab Results   Component Value Date    ANEU 2.3 01/03/2018     Lab Results   Component Value Date     01/03/2018      Lab Results   Component Value Date     01/03/2018                   Lab Results   Component Value Date    POTASSIUM 4.0 01/03/2018           No results found for: MAG         Lab Results   Component Value Date    CR 0.71 01/03/2018                   Lab Results   Component Value Date    EDILSON 9.0 01/03/2018                Lab Results   Component Value Date    BILITOTAL 0.7 01/03/2018           Lab Results   Component Value Date    ALBUMIN 3.5 01/03/2018                    Lab Results   Component Value Date    ALT 75 01/03/2018           Lab Results   Component Value Date    AST 40 01/03/2018     Results reviewed, labs MET treatment parameters, ok to proceed with treatment.          Post Infusion Assessment:  Patient tolerated infusion without incident.  Site patent and intact, free from redness, edema or discomfort.  No evidence of extravasations.  Access discontinued per protocol.    Discharge Plan:   Patient and/or family verbalized understanding of discharge instructions and all questions answered.  Copy of AVS reviewed with patient and/or family.  Patient will return 1/24/18 for next appointment.  Patient discharged in stable condition accompanied by: self.    Kimberly Ramirez RN        ONPRO  Was placed on patient's: back of left arm.    Was placed at 2:50 PM    ONPRO injector device Lot number: [id number]    Patient education included: what patient can expect after application,  what colored lights mean on the device, when to remove device, when and where to call with questions or issues, all patients questions answered and that Neulasta administration will occur at 5:50pm.    Patient tolerated administration well.    Your On-body Neulasta Injector was applied to your left arm at 2:50.  At approximately 5:50pm on 1/4/18, your On-body Injector will beep to let you know your dose delivery will begin in 2 minutes.  Your medication will be delivered over the next 45 minutes.  You can remove your Injector at 7pm.  Please make sure your Injector has a solid green light or has turned off prior to removing the device.  Please contact your provider at [phone] with questions or concerns.

## 2018-01-03 NOTE — PATIENT INSTRUCTIONS
Your On-body Neulasta Injector was applied to your left arm at 2:50.  At approximately 5:50pm on 1/4/18, your On-body Injector will beep to let you know your dose delivery will begin in 2 minutes.  Your medication will be delivered over the next 45 minutes.  You can remove your Injector at 7pm.  Please make sure your Injector has a solid green light or has turned off prior to removing the device.  Please contact your provider at [phone] with questions or concerns.

## 2018-01-03 NOTE — PROGRESS NOTES
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  Initially she was seen 11/22/2017 after she had the right supraclavicular lymph node biopsy which was positive for poorly differentiated adenocarcinoma.  She had this lymph node almost a month and had an excisional biopsy performed which was consistent with the diagnosis of cancer.      A pet CT scan 11/27/2017 showed hypermetabolic right supraclavicular, right axillary and subpectoral adenopathy.  Otherwise, no distant metastatic disease.     Mammogram 11/30/17- negative    TREATMENT: Carboplatin and Taxol      SUBJECTIVE:  The patient was seen as a followup today. She has been feeling well. No new complaints.     REVIEW OF SYSTEMS:  A complete review of systems was performed and found to be negative other than pertinent positives mentioned in history of present illness.      Past medical, social histories reviewed.     Meds- Reviewed.     PHYSICAL EXAMINATION:   VITAL SIGNS: /75  Pulse 78  Temp 98.2  F (36.8  C) (Oral)  Resp 14  Ht 1.676 m (5' 5.98\")  Wt 81.6 kg (180 lb)  LMP 01/01/2004 (Approximate)  BMI 29.07 kg/m2  CONSTITUTIONAL: Sitting comfortably.   HEENT: Pupils are equal. Oropharynx is clear.   NECK: No cervical or supraclavicular lymphadenopathy.   RESPIRATORY: Clear bilaterally.   CARD/VASC: S1, S2, regular.   GI: Soft, nontender, nondistended, no hepatosplenomegaly.   MUSKULOSKELETAL: Warm, well perfused.   NEUROLOGIC: Alert, awake.   INTEGUMENT: No rash.   LYMPHATICS: No edema.   PSYCH: Mood and affect was normal.     LABORATORY DATA AND IMAGING REVIEWED DURING THIS VISIT:  Recent Labs   Lab Test  12/21/17   1120  12/11/17   1030   NA  138  140   POTASSIUM  3.9  3.8   CHLORIDE  105  105   CO2  28  28   ANIONGAP  5  7   BUN  16  13   CR  0.66  0.63   GLC  103*  154*   EDILSON  8.5  8.6     Recent Labs "   Lab Test  12/21/17   1120  12/11/17   1030  11/22/17   1420   WBC  13.4*  5.0  6.2   HGB  11.6*  12.5  13.2   PLT  210  275  312   MCV  94  94  94   NEUTROPHIL  78.3  63.4  63.9     Recent Labs   Lab Test  12/21/17   1120  12/11/17   1030  11/22/17   1420   BILITOTAL  0.2  0.5  0.5   ALKPHOS  137  75  83   ALT  84*  39  32   AST  32  21  19   ALBUMIN  3.6  3.6  3.9        ECOG performance status 0.      ASSESSMENT AND PLAN:  This is a 62-year-old lady who has diagnosis of 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  Tumor cells were positive for CK7, CK5/6 and GATA3.  We did tumor marker testing and she had CA 27-29 at the level of 12,  of 16, CA 19-9 at 19.  Her CEA was less than 0.5.      PET CT scan results was done 11/27/2017.  showed hypermetabolic right supraclavicular, right axillary and subpectoral adenopathy.  There was no evidence of distant metastases.      The distribution of adenopathy indicates possibility breast cancer which has metastasized to the regional lymph nodes and we are not able to find a primary on physical exam and a PET scan.     Mammogram 11/30/17 no evidence of primary. Her-2 non amplified. Androgen receptor 10-20%. ER 1-5%. MS < 1%.   ENT examination by Dr. Miller was negative. EGD was negative as well. MRI breast 12/20/17 no evidence of malignancy in the breasts.    She is getting chemotherapy. We plan to complete 3 cycles of chemotherapy and then will repeat a PET scan. If she has a good response then we will plan to complete six cycles of chemotherapy. We are treating her like Stage IIIC breast cancer.     - Labs were reviewed with the patient, normal blood counts.      PLAN:   1- Continue carbo/Txol every three weeks-Cycle 2 today  2- RTC MD 3 weeks.     ERON BARNES MD    1/3/2018     cc: Waylon Liu MD

## 2018-01-03 NOTE — PATIENT INSTRUCTIONS
1- Continue carbo/Txol every three weeks-Cycle 2 today  Scheduled - [facility]  2- RTC MD 3 weeks with infusion clinic appointment  Scheduled - [facility]

## 2018-01-03 NOTE — MR AVS SNAPSHOT
After Visit Summary   1/3/2018    Flor Griffin    MRN: [id number]           Patient Information     Date Of Birth          1955        Visit Information        Provider Department      1/3/2018 10:00 AM Yani Magana MD [facility]        Care Instructions    1- Continue carbo/Txol every three weeks-Cycle 2 today  2- RTC MD 3 weeks with infusion clinic appointment          Follow-ups after your visit        Your next 10 appointments already scheduled     Jan 11, 2018 11:15 AM CST   New Visit with María Galeas GC   Cancer Risk Management Program ([facility] [place])    [facility]  [address]  [place] [address]   [phone]            Jan 24, 2018  9:00 AM CST   Level 3 with  INFUSION CHAIR 6   [facility] ([facility] [place])    [facility]  [address]  [place] [address]   [phone]            Jan 24, 2018  9:45 AM CST   Return Visit with Yani Magana MD   [facility] ([facility] [place])    [facility]  [address]  [place] [address]   [phone]              Who to contact     If you have questions or need follow up information about today's clinic visit or your schedule please contact [facility] directly at [phone].  Normal or non-critical lab and imaging results will be communicated to you by MyChart, letter or phone within 4 business days after the clinic has received the results. If you do not hear from us within 7 days, please contact the clinic through MyChart or phone. If you have a critical or abnormal lab result, we will notify you by phone as soon as possible.  Submit refill requests through [employer] or call your pharmacy and they will forward the refill request to us. Please allow 3 business days for your refill to be completed.          Additional Information About Your  "Visit        [facility] [employer] Information     [employer] lets you send messages to your doctor, view your test results, renew your prescriptions, schedule appointments and more. To sign up, go to [email or web address] . Click on \"Log in\" on the left side of the screen, which will take you to the Welcome page. Then click on \"Sign up Now\" on the right side of the page.     You will be asked to enter the access code listed below, as well as some personal information. Please follow the directions to create your username and password.     Your access code is: [email or web address]  Expires: 2018  8:24 AM     Your access code will  in 90 days. If you need help or a new code, please call your [place] clinic or [phone].        Care EveryWhere ID     This is your Care EveryWhere ID. This could be used by other organizations to access your [place] medical records  [id number]        Your Vitals Were     Pulse Temperature Respirations Height Last Period BMI (Body Mass Index)    78 98.2  F (36.8  C) (Oral) 14 1.676 m (5' 5.98\") 2004 (Approximate) 29.07 kg/m2       Blood Pressure from Last 3 Encounters:   18 119/75   18 119/75   17 142/88    Weight from Last 3 Encounters:   18 81.6 kg (180 lb)   18 81.6 kg (180 lb)   17 78.9 kg (174 lb)              Today, you had the following     No orders found for display       Primary Care Provider Office Phone # Fax #    Jaida Liu [phone] [address] [place] [address]        Equal Access to Services     [facility]: Mika Nolan, carole heard, pernell keen, ketan connor. So [facility] [phone].    ATENCIÓN: Si habla español, tiene a thomas disposición servicios gratuitos de asistencia lingüística. Llame al [phone].    We comply with applicable federal civil rights laws and [place] laws. We do not discriminate on the basis of race, " color, national origin, age, disability, sex, sexual orientation, or gender identity.            Thank you!     Thank you for choosing [facility]  for your care. Our goal is always to provide you with excellent care. Hearing back from our patients is one way we can continue to improve our services. Please take a few minutes to complete the written survey that you may receive in the mail after your visit with us. Thank you!             Your Updated Medication List - Protect others around you: Learn how to safely use, store and throw away your medicines at www.disposemymeds.org.          This list is accurate as of: 1/3/18  1:05 PM.  Always use your most recent med list.                   Brand Name Dispense Instructions for use Diagnosis    ADVIL PO      Take 400 mg by mouth every 6 hours as needed for moderate pain        aspirin 81 MG tablet      Take 81 mg by mouth daily        GLUCOSAMINE CHONDROITIN JOINT PO      Take by mouth daily        LORazepam 0.5 MG tablet    ATIVAN    30 tablet    Take 1 tablet (0.5 mg) by mouth every 4 hours as needed (Anxiety, Nausea/Vomiting or Sleep)    Carcinoma of unknown origin (H)       OMEGA-3 FISH OIL PO      Take 1 g by mouth daily        ondansetron 8 MG tablet    ZOFRAN    10 tablet    Take 1 tablet (8 mg) by mouth every 8 hours as needed (Nausea/Vomiting)    Carcinoma of unknown origin (H)       prochlorperazine 10 MG tablet    COMPAZINE    30 tablet    Take 1 tablet (10 mg) by mouth every 6 hours as needed (Nausea/Vomiting)    Carcinoma of unknown origin (H)       valACYclovir 1000 mg tablet    VALTREX    20 tablet    Take 1 tablet (1,000 mg) by mouth 2 times daily    Cold sore       VITAMIN B6 PO      Take by mouth daily        VITAMIN D (CHOLECALCIFEROL) PO      Take 1,000 Units by mouth daily

## 2018-01-03 NOTE — MR AVS SNAPSHOT
After Visit Summary   1/3/2018    Flor Griffin    MRN: [id number]           Patient Information     Date Of Birth          1955        Visit Information        Provider Department      1/3/2018 9:30 AM  INFUSION CHAIR 12 [facility]        Today's Diagnoses     Carcinoma of unknown origin (H)    -  1      Care Instructions    Your On-body Neulasta Injector was applied to your left arm at 2:50.  At approximately 5:50pm on 1/4/18, your On-body Injector will beep to let you know your dose delivery will begin in 2 minutes.  Your medication will be delivered over the next 45 minutes.  You can remove your Injector at 7pm.  Please make sure your Injector has a solid green light or has turned off prior to removing the device.  Please contact your provider at [phone] with questions or concerns.            Follow-ups after your visit        Your next 10 appointments already scheduled     Jan 11, 2018 11:15 AM CST   New Visit with María Galeas GC   Cancer Risk Management Program ([facility])    [facility]  [address]  [place] [address]   [phone]            Jan 24, 2018  9:00 AM CST   Level 3 with  INFUSION CHAIR 6   [facility] ([facility])    [facility]  [address]  [place] [address]   [phone]            Jan 24, 2018  9:45 AM CST   Return Visit with Yani Magana MD   [facility] ([facility])    [facility]  [address]  [place] [address]   [phone]              Who to contact     If you have questions or need follow up information about today's clinic visit or your schedule please contact [facility] directly at [phone].  Normal or non-critical lab and imaging results will be communicated to you by MyChart, letter  "or phone within 4 business days after the clinic has received the results. If you do not hear from us within 7 days, please contact the clinic through [employer] or phone. If you have a critical or abnormal lab result, we will notify you by phone as soon as possible.  Submit refill requests through [employer] or call your pharmacy and they will forward the refill request to us. Please allow 3 business days for your refill to be completed.          Additional Information About Your Visit        [employer] Information     [employer] lets you send messages to your doctor, view your test results, renew your prescriptions, schedule appointments and more. To sign up, go to [email or web address] . Click on \"Log in\" on the left side of the screen, which will take you to the Welcome page. Then click on \"Sign up Now\" on the right side of the page.     You will be asked to enter the access code listed below, as well as some personal information. Please follow the directions to create your username and password.     Your access code is: [email or web address]  Expires: 2018  8:24 AM     Your access code will  in 90 days. If you need help or a new code, please call your [facility] clinic or [phone].        Care EveryWhere ID     This is your Care EveryWhere ID. This could be used by other organizations to access your [facility] medical records  [id number]        Your Vitals Were     Pulse Temperature Respirations Height Last Period BMI (Body Mass Index)    78 98.2  F (36.8  C) (Oral) 14 1.676 m (5' 5.98\") 2004 (Approximate) 29.07 kg/m2       Blood Pressure from Last 3 Encounters:   18 119/75   18 119/75   17 142/88    Weight from Last 3 Encounters:   18 81.6 kg (180 lb)   18 81.6 kg (180 lb)   17 78.9 kg (174 lb)              We Performed the Following     CBC with platelets differential     Comprehensive metabolic panel        Primary Care Provider Office Phone # Fax #    Jaida Gallego " MD Noe [phone] [address] [place] [address]        Equal Access to Services     TRICIA JARA : Hadii aad ku hadavanitessa Kattali, waelissada kuldiptessie, dayanta kafrieda danielamonica, ketan osvaldoin hayaachirag suarezdarcy vallejo laliyachirag nikolas. So [facility] [phone].    ATENCIÓN: Si habla español, tiene a thomas disposición servicios gratuitos de asistencia lingüística. Llame al [phone].    We comply with applicable federal civil rights laws and [place] laws. We do not discriminate on the basis of race, color, national origin, age, disability, sex, sexual orientation, or gender identity.            Thank you!     Thank you for choosing [facility] CLIN[facility] AND [facility]  for your care. Our goal is always to provide you with excellent care. Hearing back from our patients is one way we can continue to improve our services. Please take a few minutes to complete the written survey that you may receive in the mail after your visit with us. Thank you!             Your Updated Medication List - Protect others around you: Learn how to safely use, store and throw away your medicines at www.disposemymeds.org.          This list is accurate as of: 1/3/18  3:01 PM.  Always use your most recent med list.                   Brand Name Dispense Instructions for use Diagnosis    ADVIL PO      Take 400 mg by mouth every 6 hours as needed for moderate pain        aspirin 81 MG tablet      Take 81 mg by mouth daily        GLUCOSAMINE CHONDROITIN JOINT PO      Take by mouth daily        LORazepam 0.5 MG tablet    ATIVAN    30 tablet    Take 1 tablet (0.5 mg) by mouth every 4 hours as needed (Anxiety, Nausea/Vomiting or Sleep)    Carcinoma of unknown origin (H)       OMEGA-3 FISH OIL PO      Take 1 g by mouth daily        ondansetron 8 MG tablet    ZOFRAN    10 tablet    Take 1 tablet (8 mg) by mouth every 8 hours as needed (Nausea/Vomiting)    Carcinoma of unknown origin (H)       prochlorperazine 10 MG tablet     COMPAZINE    30 tablet    Take 1 tablet (10 mg) by mouth every 6 hours as needed (Nausea/Vomiting)    Carcinoma of unknown origin (H)       valACYclovir 1000 mg tablet    VALTREX    20 tablet    Take 1 tablet (1,000 mg) by mouth 2 times daily    Cold sore       VITAMIN B6 PO      Take by mouth daily        VITAMIN D (CHOLECALCIFEROL) PO      Take 1,000 Units by mouth daily

## 2018-01-03 NOTE — LETTER
"    1/3/2018         RE: Flor Griffin  [address] [place] [facility] [address]        Dear Colleague,    Thank you for referring your patient, Flor Griffin, to the [facility]. Please see a copy of my visit note below.    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  Initially she was seen 11/22/2017 after she had the right supraclavicular lymph node biopsy which was positive for poorly differentiated adenocarcinoma.  She had this lymph node almost a month and had an excisional biopsy performed which was consistent with the diagnosis of cancer.      A pet CT scan 11/27/2017 showed hypermetabolic right supraclavicular, right axillary and subpectoral adenopathy.  Otherwise, no distant metastatic disease.     Mammogram 11/30/17- negative    TREATMENT: Carboplatin and Taxol      SUBJECTIVE:  The patient was seen as a followup today. She has been feeling well. No new complaints.     REVIEW OF SYSTEMS:  A complete review of systems was performed and found to be negative other than pertinent positives mentioned in history of present illness.      Past medical, social histories reviewed.     Meds- Reviewed.     PHYSICAL EXAMINATION:   VITAL SIGNS: /75  Pulse 78  Temp 98.2  F (36.8  C) (Oral)  Resp 14  Ht 1.676 m (5' 5.98\")  Wt 81.6 kg (180 lb)  LMP 01/01/2004 (Approximate)  BMI 29.07 kg/m2  CONSTITUTIONAL: Sitting comfortably.   HEENT: Pupils are equal. Oropharynx is clear.   NECK: No cervical or supraclavicular lymphadenopathy.   RESPIRATORY: Clear bilaterally.   CARD/VASC: S1, S2, regular.   GI: Soft, nontender, nondistended, no hepatosplenomegaly.   MUSKULOSKELETAL: Warm, well perfused.   NEUROLOGIC: Alert, awake.   INTEGUMENT: No rash.   LYMPHATICS: No edema.   PSYCH: Mood and affect was normal.     LABORATORY DATA AND IMAGING " REVIEWED DURING THIS VISIT:  Recent Labs   Lab Test  12/21/17   1120  12/11/17   1030   NA  138  140   POTASSIUM  3.9  3.8   CHLORIDE  105  105   CO2  28  28   ANIONGAP  5  7   BUN  16  13   CR  0.66  0.63   GLC  103*  154*   EDILSON  8.5  8.6     Recent Labs   Lab Test  12/21/17   1120  12/11/17   1030  11/22/17   1420   WBC  13.4*  5.0  6.2   HGB  11.6*  12.5  13.2   PLT  210  275  312   MCV  94  94  94   NEUTROPHIL  78.3  63.4  63.9     Recent Labs   Lab Test  12/21/17   1120  12/11/17   1030  11/22/17   1420   BILITOTAL  0.2  0.5  0.5   ALKPHOS  137  75  83   ALT  84*  39  32   AST  32  21  19   ALBUMIN  3.6  3.6  3.9        ECOG performance status 0.      ASSESSMENT AND PLAN:  This is a 62-year-old lady who has diagnosis of 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  Tumor cells were positive for CK7, CK5/6 and GATA3.  We did tumor marker testing and she had CA 27-29 at the level of 12,  of 16, CA 19-9 at 19.  Her CEA was less than 0.5.      PET CT scan results was done 11/27/2017.  showed hypermetabolic right supraclavicular, right axillary and subpectoral adenopathy.  There was no evidence of distant metastases.      The distribution of adenopathy indicates possibility breast cancer which has metastasized to the regional lymph nodes and we are not able to find a primary on physical exam and a PET scan.     Mammogram 11/30/17 no evidence of primary. Her-2 non amplified. Androgen receptor 10-20%. ER 1-5%. MT < 1%.   ENT examination by Dr. Miller was negative. EGD was negative as well. MRI breast 12/20/17 no evidence of malignancy in the breasts.    She is getting chemotherapy. We plan to complete 3 cycles of chemotherapy and then will repeat a PET scan. If she has a good response then we will plan to complete six cycles of chemotherapy. We are treating her like Stage IIIC breast cancer.     - Labs were reviewed with the patient, normal blood counts.      PLAN:  "  1- Continue carbo/Txol every three weeks-Cycle 2 today  2- RTC MD 3 weeks.     YANI MAGANA MD    1/3/2018     cc: Waylon Liu MD       Oncology Rooming Note    January 3, 2018 10:01 AM   Flor Griffin is a 62 year old female who presents for:    Chief Complaint   Patient presents with     Oncology Clinic Visit     Initial Vitals: /75  Pulse 78  Temp 98.2  F (36.8  C) (Oral)  Resp 14  Ht 1.676 m (5' 5.98\")  Wt 81.6 kg (180 lb)  LMP 01/01/2004 (Approximate)  BMI 29.07 kg/m2 Estimated body mass index is 29.07 kg/(m^2) as calculated from the following:    Height as of this encounter: 1.676 m (5' 5.98\").    Weight as of this encounter: 81.6 kg (180 lb). Body surface area is 1.95 meters squared.  No Pain (0) Comment: Data Unavailable   Patient's last menstrual period was 01/01/2004 (approximate).  Allergies reviewed: Yes  Medications reviewed: Yes    Medications: Medication refills not needed today.  Pharmacy name entered into [employer]:    [facility] [address] [place] [address] - [address]  [address]  [facility] [place], [place] - [address]    Clinical concerns: None     2 minutes for nursing intake (face to face time)     Mary Jo James [facility]                Again, thank you for allowing me to participate in the care of your patient.        Sincerely,        Yani Magana MD    "

## 2018-01-03 NOTE — PROGRESS NOTES
"Oncology Rooming Note    January 3, 2018 10:01 AM   Flor Griffin is a 62 year old female who presents for:    Chief Complaint   Patient presents with     Oncology Clinic Visit     Initial Vitals: /75  Pulse 78  Temp 98.2  F (36.8  C) (Oral)  Resp 14  Ht 1.676 m (5' 5.98\")  Wt 81.6 kg (180 lb)  LMP 01/01/2004 (Approximate)  BMI 29.07 kg/m2 Estimated body mass index is 29.07 kg/(m^2) as calculated from the following:    Height as of this encounter: 1.676 m (5' 5.98\").    Weight as of this encounter: 81.6 kg (180 lb). Body surface area is 1.95 meters squared.  No Pain (0) Comment: Data Unavailable   Patient's last menstrual period was 01/01/2004 (approximate).  Allergies reviewed: Yes  Medications reviewed: Yes    Medications: Medication refills not needed today.  Pharmacy name entered into [facility]:    [id number] [address]  [facility] [place], [place] - [address]    Clinical concerns: None     2 minutes for nursing intake (face to face time)     Mary Jo James CMA              "

## 2018-01-04 ENCOUNTER — TRANSFERRED RECORDS (OUTPATIENT)
Dept: HEALTH INFORMATION MANAGEMENT | Facility: CLINIC | Age: 63
End: 2018-01-04

## 2018-01-05 ENCOUNTER — TELEPHONE (OUTPATIENT)
Dept: ONCOLOGY | Facility: CLINIC | Age: 63
End: 2018-01-05

## 2018-01-05 NOTE — TELEPHONE ENCOUNTER
Pt's daughter Emmie called & stated that Pt had some vomiting yesterday & some nausea today.  Emmie is wondering if this could be from her chemo--Carbo/Taxol--that she had on Wed 1-3-18 or perhaps gastroenteritis.  Spoke with Randy Hennessy & nausea/vomiting is a common side effect of this chemo regimen.     Pt felt better after taking Zofran--encouraged Pt to take her Zofran every 8 hours for now to decrease N/V.  Offered for Pt to come in for IV fluids but Pt's daughter felt that Pt is taking in sufficient fluids po today & that the vomiting was only yesterday.    Strongly encouraged Emmie to call back or have Pt call back if she is having any further issues or wants to have IV fluids.    Emmie verbalized understanding.

## 2018-01-11 ENCOUNTER — TELEPHONE (OUTPATIENT)
Dept: ONCOLOGY | Facility: CLINIC | Age: 63
End: 2018-01-11

## 2018-01-22 RX ORDER — EPINEPHRINE 1 MG/ML
0.3 INJECTION, SOLUTION, CONCENTRATE INTRAVENOUS EVERY 5 MIN PRN
Status: CANCELLED | OUTPATIENT
Start: 2018-01-24

## 2018-01-22 RX ORDER — ALBUTEROL SULFATE 90 UG/1
1-2 AEROSOL, METERED RESPIRATORY (INHALATION)
Status: CANCELLED
Start: 2018-01-24

## 2018-01-22 RX ORDER — MEPERIDINE HYDROCHLORIDE 25 MG/ML
25 INJECTION INTRAMUSCULAR; INTRAVENOUS; SUBCUTANEOUS EVERY 30 MIN PRN
Status: CANCELLED | OUTPATIENT
Start: 2018-01-24

## 2018-01-22 RX ORDER — DIPHENHYDRAMINE HYDROCHLORIDE 50 MG/ML
50 INJECTION INTRAMUSCULAR; INTRAVENOUS
Status: CANCELLED
Start: 2018-01-24

## 2018-01-22 RX ORDER — EPINEPHRINE 0.3 MG/.3ML
0.3 INJECTION SUBCUTANEOUS EVERY 5 MIN PRN
Status: CANCELLED | OUTPATIENT
Start: 2018-01-24

## 2018-01-22 RX ORDER — SODIUM CHLORIDE 9 MG/ML
1000 INJECTION, SOLUTION INTRAVENOUS CONTINUOUS PRN
Status: CANCELLED
Start: 2018-01-24

## 2018-01-22 RX ORDER — LORAZEPAM 2 MG/ML
0.5 INJECTION INTRAMUSCULAR EVERY 4 HOURS PRN
Status: CANCELLED
Start: 2018-01-24

## 2018-01-22 RX ORDER — ALBUTEROL SULFATE 0.83 MG/ML
2.5 SOLUTION RESPIRATORY (INHALATION)
Status: CANCELLED | OUTPATIENT
Start: 2018-01-24

## 2018-01-22 RX ORDER — HEPARIN SODIUM (PORCINE) LOCK FLUSH IV SOLN 100 UNIT/ML 100 UNIT/ML
5 SOLUTION INTRAVENOUS EVERY 8 HOURS PRN
Status: CANCELLED
Start: 2018-01-24

## 2018-01-22 RX ORDER — METHYLPREDNISOLONE SODIUM SUCCINATE 125 MG/2ML
125 INJECTION, POWDER, LYOPHILIZED, FOR SOLUTION INTRAMUSCULAR; INTRAVENOUS
Status: CANCELLED
Start: 2018-01-24

## 2018-01-22 RX ORDER — DIPHENHYDRAMINE HCL 25 MG
50 CAPSULE ORAL ONCE
Status: CANCELLED
Start: 2018-01-24

## 2018-01-24 ENCOUNTER — INFUSION THERAPY VISIT (OUTPATIENT)
Dept: INFUSION THERAPY | Facility: CLINIC | Age: 63
End: 2018-01-24
Attending: INTERNAL MEDICINE
Payer: COMMERCIAL

## 2018-01-24 ENCOUNTER — ONCOLOGY VISIT (OUTPATIENT)
Dept: ONCOLOGY | Facility: CLINIC | Age: 63
End: 2018-01-24
Attending: INTERNAL MEDICINE
Payer: COMMERCIAL

## 2018-01-24 ENCOUNTER — HOSPITAL ENCOUNTER (OUTPATIENT)
Facility: CLINIC | Age: 63
Setting detail: SPECIMEN
Discharge: HOME | End: 2018-01-24
Attending: INTERNAL MEDICINE | Admitting: INTERNAL MEDICINE
Payer: COMMERCIAL

## 2018-01-24 VITALS
RESPIRATION RATE: 16 BRPM | WEIGHT: 177 LBS | DIASTOLIC BLOOD PRESSURE: 65 MMHG | TEMPERATURE: 98.3 F | BODY MASS INDEX: 28.57 KG/M2 | SYSTOLIC BLOOD PRESSURE: 137 MMHG | OXYGEN SATURATION: 99 % | HEART RATE: 69 BPM

## 2018-01-24 VITALS
HEART RATE: 86 BPM | DIASTOLIC BLOOD PRESSURE: 70 MMHG | SYSTOLIC BLOOD PRESSURE: 114 MMHG | WEIGHT: 177.8 LBS | BODY MASS INDEX: 28.57 KG/M2 | HEIGHT: 66 IN | RESPIRATION RATE: 16 BRPM

## 2018-01-24 DIAGNOSIS — C80.1 CARCINOMA OF UNKNOWN ORIGIN (H): Primary | ICD-10-CM

## 2018-01-24 LAB
ALBUMIN SERPL-MCNC: 3.6 G/DL (ref 3.4–5)
ALP SERPL-CCNC: 110 U/L (ref 40–150)
ALT SERPL W P-5'-P-CCNC: 51 U/L (ref 0–50)
ANION GAP SERPL CALCULATED.3IONS-SCNC: 9 MMOL/L (ref 3–14)
AST SERPL W P-5'-P-CCNC: 24 U/L (ref 0–45)
BASOPHILS # BLD AUTO: 0.1 10E9/L (ref 0–0.2)
BASOPHILS NFR BLD AUTO: 1.5 %
BILIRUB SERPL-MCNC: 0.5 MG/DL (ref 0.2–1.3)
BUN SERPL-MCNC: 16 MG/DL (ref 7–30)
CALCIUM SERPL-MCNC: 9.1 MG/DL (ref 8.5–10.1)
CHLORIDE SERPL-SCNC: 104 MMOL/L (ref 94–109)
CO2 SERPL-SCNC: 25 MMOL/L (ref 20–32)
CREAT SERPL-MCNC: 0.68 MG/DL (ref 0.52–1.04)
DIFFERENTIAL METHOD BLD: ABNORMAL
EOSINOPHIL # BLD AUTO: 0.1 10E9/L (ref 0–0.7)
EOSINOPHIL NFR BLD AUTO: 1.2 %
ERYTHROCYTE [DISTWIDTH] IN BLOOD BY AUTOMATED COUNT: 14.8 % (ref 10–15)
GFR SERPL CREATININE-BSD FRML MDRD: 88 ML/MIN/1.7M2
GLUCOSE SERPL-MCNC: 103 MG/DL (ref 70–99)
HCT VFR BLD AUTO: 33.3 % (ref 35–47)
HGB BLD-MCNC: 11.1 G/DL (ref 11.7–15.7)
IMM GRANULOCYTES # BLD: 0 10E9/L (ref 0–0.4)
IMM GRANULOCYTES NFR BLD: 0.2 %
LYMPHOCYTES # BLD AUTO: 1.3 10E9/L (ref 0.8–5.3)
LYMPHOCYTES NFR BLD AUTO: 31.4 %
MCH RBC QN AUTO: 31.4 PG (ref 26.5–33)
MCHC RBC AUTO-ENTMCNC: 33.3 G/DL (ref 31.5–36.5)
MCV RBC AUTO: 94 FL (ref 78–100)
MONOCYTES # BLD AUTO: 0.5 10E9/L (ref 0–1.3)
MONOCYTES NFR BLD AUTO: 12.6 %
NEUTROPHILS # BLD AUTO: 2.1 10E9/L (ref 1.6–8.3)
NEUTROPHILS NFR BLD AUTO: 53.1 %
NRBC # BLD AUTO: 0 10*3/UL
NRBC BLD AUTO-RTO: 0 /100
PLATELET # BLD AUTO: 284 10E9/L (ref 150–450)
POTASSIUM SERPL-SCNC: 4 MMOL/L (ref 3.4–5.3)
PROT SERPL-MCNC: 7.1 G/DL (ref 6.8–8.8)
RBC # BLD AUTO: 3.53 10E12/L (ref 3.8–5.2)
SODIUM SERPL-SCNC: 138 MMOL/L (ref 133–144)
WBC # BLD AUTO: 4 10E9/L (ref 4–11)

## 2018-01-24 PROCEDURE — 80053 COMPREHEN METABOLIC PANEL: CPT | Performed by: INTERNAL MEDICINE

## 2018-01-24 PROCEDURE — 25000128 H RX IP 250 OP 636: Performed by: INTERNAL MEDICINE

## 2018-01-24 PROCEDURE — 96375 TX/PRO/DX INJ NEW DRUG ADDON: CPT

## 2018-01-24 PROCEDURE — 96417 CHEMO IV INFUS EACH ADDL SEQ: CPT

## 2018-01-24 PROCEDURE — 96413 CHEMO IV INFUSION 1 HR: CPT

## 2018-01-24 PROCEDURE — 96377 APPLICATON ON-BODY INJECTOR: CPT | Mod: XS

## 2018-01-24 PROCEDURE — 96415 CHEMO IV INFUSION ADDL HR: CPT

## 2018-01-24 PROCEDURE — 25000125 ZZHC RX 250: Performed by: INTERNAL MEDICINE

## 2018-01-24 PROCEDURE — 85025 COMPLETE CBC W/AUTO DIFF WBC: CPT | Performed by: INTERNAL MEDICINE

## 2018-01-24 PROCEDURE — 99214 OFFICE O/P EST MOD 30 MIN: CPT | Performed by: INTERNAL MEDICINE

## 2018-01-24 PROCEDURE — G0463 HOSPITAL OUTPT CLINIC VISIT: HCPCS | Mod: 25

## 2018-01-24 PROCEDURE — 25000132 ZZH RX MED GY IP 250 OP 250 PS 637: Performed by: INTERNAL MEDICINE

## 2018-01-24 RX ORDER — ONDANSETRON 2 MG/ML
8 INJECTION INTRAMUSCULAR; INTRAVENOUS ONCE
Status: COMPLETED | OUTPATIENT
Start: 2018-01-24 | End: 2018-01-24

## 2018-01-24 RX ORDER — DIPHENHYDRAMINE HCL 25 MG
50 CAPSULE ORAL ONCE
Status: COMPLETED | OUTPATIENT
Start: 2018-01-24 | End: 2018-01-24

## 2018-01-24 RX ORDER — HEPARIN SODIUM (PORCINE) LOCK FLUSH IV SOLN 100 UNIT/ML 100 UNIT/ML
5 SOLUTION INTRAVENOUS EVERY 8 HOURS PRN
Status: DISCONTINUED | OUTPATIENT
Start: 2018-01-24 | End: 2018-01-24 | Stop reason: HOSPADM

## 2018-01-24 RX ADMIN — FAMOTIDINE 20 MG: 20 INJECTION, SOLUTION INTRAVENOUS at 11:15

## 2018-01-24 RX ADMIN — CARBOPLATIN 585 MG: 10 INJECTION, SOLUTION INTRAVENOUS at 14:27

## 2018-01-24 RX ADMIN — ONDANSETRON HYDROCHLORIDE 8 MG: 2 INJECTION, SOLUTION INTRAVENOUS at 11:07

## 2018-01-24 RX ADMIN — PACLITAXEL 392 MG: 6 INJECTION, SOLUTION INTRAVENOUS at 11:32

## 2018-01-24 RX ADMIN — SODIUM CHLORIDE 250 ML: 9 INJECTION, SOLUTION INTRAVENOUS at 10:31

## 2018-01-24 RX ADMIN — PEGFILGRASTIM 6 MG: KIT SUBCUTANEOUS at 13:57

## 2018-01-24 RX ADMIN — HEPARIN 5 ML: 100 SYRINGE at 15:02

## 2018-01-24 RX ADMIN — DIPHENHYDRAMINE HYDROCHLORIDE 50 MG: 25 CAPSULE ORAL at 10:31

## 2018-01-24 RX ADMIN — DEXAMETHASONE SODIUM PHOSPHATE: 10 INJECTION, SOLUTION INTRAMUSCULAR; INTRAVENOUS at 10:58

## 2018-01-24 ASSESSMENT — PAIN SCALES - GENERAL: PAINLEVEL: NO PAIN (0)

## 2018-01-24 NOTE — LETTER
"    1/24/2018         RE: Flor Griffin  [address]  [facility] [address]        Dear Colleague,    Thank you for referring your patient, Flor Griffin, to the [facility]. Please see a copy of my visit note below.    Oncology Rooming Note    January 24, 2018 9:42 AM   Flor Griffin is a 62 year old female who presents for:    Chief Complaint   Patient presents with     Oncology Clinic Visit     Initial Vitals: /65 (BP Location: Left arm, Patient Position: Sitting, Cuff Size: Adult Regular)  Pulse 69  Temp 98.3  F (36.8  C)  Resp 16  Wt 80.3 kg (177 lb)  LMP 01/01/2004 (Approximate)  SpO2 99%  BMI 28.57 kg/m2 Estimated body mass index is 28.57 kg/(m^2) as calculated from the following:    Height as of an earlier encounter on 1/24/18: 1.676 m (5' 6\").    Weight as of this encounter: 80.3 kg (177 lb). Body surface area is 1.93 meters squared.  No Pain (0) Comment: Data Unavailable   Patient's last menstrual period was 01/01/2004 (approximate).  Allergies reviewed: Yes  Medications reviewed: Yes    Medications: Medication refills not needed today.  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  Initially she was seen 11/22/2017 after she had the right supraclavicular lymph node biopsy which was positive for poorly differentiated adenocarcinoma.  She had this lymph node almost a month and had an excisional biopsy performed which was consistent with the diagnosis of cancer.      A pet CT scan 11/27/2017 showed " hypermetabolic right supraclavicular, right axillary and subpectoral adenopathy.  Otherwise, no distant metastatic disease.     Mammogram 11/30/17- negative    TREATMENT: Carboplatin and Taxol      SUBJECTIVE:  The patient was seen as a followup today. She has mild neuropathy. Otherwise tolerating treatment well.     REVIEW OF SYSTEMS:  A complete review of systems was performed and found to be negative other than pertinent positives mentioned in history of present illness.      Past medical, social histories reviewed.     Meds- Reviewed.     PHYSICAL EXAMINATION:   VITAL SIGNS: /65 (BP Location: Left arm, Patient Position: Sitting, Cuff Size: Adult Regular)  Pulse 69  Temp 98.3  F (36.8  C)  Resp 16  Wt 80.3 kg (177 lb)  LMP 01/01/2004 (Approximate)  SpO2 99%  BMI 28.57 kg/m2  CONSTITUTIONAL: Appears tired.   HEENT: Pupils are equal. Oropharynx is clear.   NECK: No cervical or supraclavicular lymphadenopathy.   RESPIRATORY: Clear bilaterally.   CARD/VASC: S1, S2, regular.   GI: Soft, nontender, nondistended, no hepatosplenomegaly.   MUSKULOSKELETAL: Warm, well perfused.   NEUROLOGIC: Alert, awake.   INTEGUMENT: No rash.   LYMPHATICS: No edema.   PSYCH: Mood and affect was normal.     LABORATORY DATA AND IMAGING REVIEWED DURING THIS VISIT:  Recent Labs   Lab Test  01/03/18   0933  12/21/17   1120   NA  140  138   POTASSIUM  4.0  3.9   CHLORIDE  106  105   CO2  28  28   ANIONGAP  6  5   BUN  18  16   CR  0.71  0.66   GLC  88  103*   EDILSON  9.0  8.5     Recent Labs   Lab Test  01/24/18   0926  01/03/18   0933  12/21/17   1120   WBC  4.0  4.0  13.4*   HGB  11.1*  11.8  11.6*   PLT  284  265  210   MCV  94  94  94   NEUTROPHIL  53.1  57.9  78.3     Recent Labs   Lab Test  01/03/18   0933  12/21/17   1120  12/11/17   1030   BILITOTAL  0.7  0.2  0.5   ALKPHOS  86  137  75   ALT  75*  84*  39   AST  40  32  21   ALBUMIN  3.5  3.6  3.6      ECOG performance status 0.      ASSESSMENT AND PLAN:  This is a  62-year-old lady who has diagnosis of 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  Tumor cells were positive for CK7, CK5/6 and GATA3.  We did tumor marker testing and she had CA 27-29 at the level of 12,  of 16, CA 19-9 at 19.  Her CEA was less than 0.5.      PET CT scan results was done 11/27/2017.  showed hypermetabolic right supraclavicular, right axillary and subpectoral adenopathy.  There was no evidence of distant metastases.      The distribution of adenopathy indicates possibility breast cancer which has metastasized to the regional lymph nodes and we are not able to find a primary on physical exam and a PET scan.     Mammogram 11/30/17 no evidence of primary. Her-2 non amplified. Androgen receptor 10-20%. ER 1-5%. DE < 1%.   ENT examination by Dr. Miller was negative. EGD was negative as well. MRI breast 12/20/17 no evidence of malignancy in the breasts.    She is getting chemotherapy carboplatin/Taxol.   She will proceed with 3rd cycle of chemotherapy today and then will repeat a PET scan. If she has a good response then we will plan to complete six cycles of chemotherapy. We are treating her like Stage IIIC breast cancer.     - Labs were reviewed with the patient, normocytic anemia related to chemotherapy.   - Neuropathy, mild in nature- monitor.      PLAN:   1- Continue carbo/Txol every three weeks-Cycle 3 today  2- PET CT scan in 2 weeks  3- RTC MD 3 weeks with infusion clinic appointment     YANI MAGANA MD    1/24/2018     cc: Waylon Liu MD       Again, thank you for allowing me to participate in the care of your patient.        Sincerely,        Yani Magana MD

## 2018-01-24 NOTE — PROGRESS NOTES
"Oncology Rooming Note    January 24, 2018 9:42 AM   Flor Griffin is a 62 year old female who presents for:    Chief Complaint   Patient presents with     Oncology Clinic Visit     Initial Vitals: /65 (BP Location: Left arm, Patient Position: Sitting, Cuff Size: Adult Regular)  Pulse 69  Temp 98.3  F (36.8  C)  Resp 16  Wt 80.3 kg (177 lb)  LMP 01/01/2004 (Approximate)  SpO2 99%  BMI 28.57 kg/m2 Estimated body mass index is 28.57 kg/(m^2) as calculated from the following:    Height as of an earlier encounter on 1/24/18: 1.676 m (5' 6\").    Weight as of this encounter: 80.3 kg (177 lb). Body surface area is 1.93 meters squared.  No Pain (0) Comment: Data Unavailable   Patient's last menstrual period was 01/01/2004 (approximate).  Allergies reviewed: Yes  Medications reviewed: Yes    Medications: Medication refills not needed today.  Pharmacy name entered into [facility]:    [id number] - [facility] [address] OF  [address] [place], [place] - [address]    Clinical concerns: no     5 minutes for nursing intake (face to face time)          Terri Torres MA                "

## 2018-01-24 NOTE — MR AVS SNAPSHOT
After Visit Summary   1/24/2018    Flor Griffin    MRN: [id number]           Patient Information     Date Of Birth          1955        Visit Information        Provider Department      1/24/2018 9:00 AM  INFUSION CHAIR 6 [facility]        Today's Diagnoses     Carcinoma of unknown origin (H)    -  1      Care Instructions    Your On-body Neulasta Injector was applied to your left arm at 2pm.  At approximately 5pm on Thursday 1/25, your On-body Injector will beep to let you know your dose delivery will begin in 2 minutes.  Your medication will be delivered over the next 45 minutes.  You can remove your Injector at 6pm.  Please make sure your Injector has a solid green light or has turned off prior to removing the device.  Please contact your provider at [phone] with questions or concerns.  Check level is at empty.          Follow-ups after your visit        Your next 10 appointments already scheduled     Feb 12, 2018 10:00 AM CST   (Arrive by 9:45 AM)   PE NPET ONCOLOGY (EYES TO THIGHS) with SHPETM1   [facility] PET CT ([facility])    [address] [place] [address] [phone]           Tell your doctor:   If there is any chance you may be pregnant or if you are breastfeeding.   If you have problems lying in small spaces (claustrophobia). If you do, your doctor may give you medicine to help you relax. If you have diabetes:   Have your exam early in the morning. Your blood glucose will go up as the day goes by.   Your glucose level must be 180 or less at the start of the exam. Please take any medicines you need to ensure this blood glucose level. 24 hours before your scan: Don t do any heavy exercise. (No jogging, aerobics or other workouts.) Exercise will make your pictures less accurate. 6 hours before your scan:   Stop all food and liquids (except water).   Do not chew gum or suck on mints.   If you need to  take medicine with food, you may take it with a few crackers.  Please call your Imaging Department at your exam site with any questions.            Feb 14, 2018 10:30 AM CST   Level 3 with  INFUSION CHAIR 1   [facility] ([facility] [place])    [place] [address]  [place] [address]   [phone]            Feb 14, 2018 11:00 AM CST   Return Visit with Yani Magana MD   [facility] ([facility] [place])    [place] [address]  [place] [address]   [phone]            Mar 15, 2018 11:15 AM CDT   New Visit with María Galeas GC   Cancer Risk Management Program ([facility] [place])    [place] [address]  [place] [address]   [phone]              Future tests that were ordered for you today     Open Future Orders        Priority Expected Expires Ordered    PET Oncology (Eyes to Thighs) Routine  1/24/2019 1/24/2018            Who to contact     If you have questions or need follow up information about today's clinic visit or your schedule please contact [facility] directly at [phone].  Normal or non-critical lab and imaging results will be communicated to you by [employer], letter or phone within 4 business days after the clinic has received the results. If you do not hear from us within 7 days, please contact the clinic through [employer] or phone. If you have a critical or abnormal lab result, we will notify you by phone as soon as possible.  Submit refill requests through [employer] or call your pharmacy and they will forward the refill request to us. Please allow 3 business days for your refill to be completed.          Additional Information About Your Visit        [employer]ix Information     [employer] lets you send messages to your doctor, view your test results, renew your prescriptions, schedule  "appointments and more. To sign up, go to [email or web address] . Click on \"Log in\" on the left side of the screen, which will take you to the Welcome page. Then click on \"Sign up Now\" on the right side of the page.     You will be asked to enter the access code listed below, as well as some personal information. Please follow the directions to create your username and password.     Your access code is: [email or web address]  Expires: 2018  2:02 PM     Your access code will  in 90 days. If you need help or a new code, please call your [place] clinic or [phone].        Care EveryWhere ID     This is your Care EveryWhere ID. This could be used by other organizations to access your [place] medical records  [id number]        Your Vitals Were     Height Last Period BMI (Body Mass Index)             1.676 m (5' 6\") 2004 (Approximate) 28.7 kg/m2          Blood Pressure from Last 3 Encounters:   18 137/65   18 119/75   18 114/67    Weight from Last 3 Encounters:   18 80.3 kg (177 lb)   18 80.6 kg (177 lb 12.8 oz)   18 81.6 kg (180 lb)              We Performed the Following     CBC with platelets differential     Comprehensive metabolic panel        Primary Care Provider Office Phone # Fax #    [facility] [phone] [address] [facility] [address]        Equal Access to Services     TRICIA JARA : Hadii nelida sono Sodot, waaxda luqadaha, qaybta kaalmada adeegyavanita, ketan connor. So [facility] [phone].    ATENCIÓN: Si habla español, tiene a thomas disposición servicios gratuitos de asistencia lingüística. Llame al [phone].    We comply with applicable federal civil rights laws and [place] laws. We do not discriminate on the basis of race, color, national origin, age, disability, sex, sexual orientation, or gender identity.            Thank you!     Thank you for choosing [facility]  for your care. Our goal is always to provide you with excellent care. Hearing back from our patients is one way we can continue to improve our services. Please take a few minutes to complete the written survey that you may receive in the mail after your visit with us. Thank you!             Your Updated Medication List - Protect others around you: Learn how to safely use, store and throw away your medicines at www.disposemymeds.org.          This list is accurate as of 1/24/18  2:49 PM.  Always use your most recent med list.                   Brand Name Dispense Instructions for use Diagnosis    ADVIL PO      Take 400 mg by mouth every 6 hours as needed for moderate pain        aspirin 81 MG tablet      Take 81 mg by mouth daily        GLUCOSAMINE CHONDROITIN JOINT PO      Take by mouth daily        LORazepam 0.5 MG tablet    ATIVAN    30 tablet    Take 1 tablet (0.5 mg) by mouth every 4 hours as needed (Anxiety, Nausea/Vomiting or Sleep)    Carcinoma of unknown origin (H)       OMEGA-3 FISH OIL PO      Take 1 g by mouth daily        ondansetron 8 MG tablet    ZOFRAN    10 tablet    Take 1 tablet (8 mg) by mouth every 8 hours as needed (Nausea/Vomiting)    Carcinoma of unknown origin (H)       prochlorperazine 10 MG tablet    COMPAZINE    30 tablet    Take 1 tablet (10 mg) by mouth every 6 hours as needed (Nausea/Vomiting)    Carcinoma of unknown origin (H)       valACYclovir 1000 mg tablet    VALTREX    20 tablet    Take 1 tablet (1,000 mg) by mouth 2 times daily    Cold sore       VITAMIN B6 PO      Take by mouth daily        VITAMIN D (CHOLECALCIFEROL) PO      Take 1,000 Units by mouth daily

## 2018-01-24 NOTE — PATIENT INSTRUCTIONS
Your On-body Neulasta Injector was applied to your left arm at 2pm.  At approximately 5pm on Thursday 1/25, your On-body Injector will beep to let you know your dose delivery will begin in 2 minutes.  Your medication will be delivered over the next 45 minutes.  You can remove your Injector at 6pm.  Please make sure your Injector has a solid green light or has turned off prior to removing the device.  Please contact your provider at [phone] with questions or concerns.  Check level is at empty.

## 2018-01-24 NOTE — MR AVS SNAPSHOT
After Visit Summary   1/24/2018    Flor Griffin    MRN: [id number]           Patient Information     Date Of Birth          1955        Visit Information        Provider Department      1/24/2018 9:45 AM Yani Magana MD [facility]        Today's Diagnoses     Carcinoma of unknown origin (H)    -  1      Care Instructions    1- Continue carbo/Txol every three weeks-Cycle 3 today - Scheduled - [facility]  2- PET CT scan in 2 weeks  3- RTC MD 3 weeks with infusion clinic appointment - Scheduled - [facility]    AVS given to patient - [facility]          Follow-ups after your visit        Your next 10 appointments already scheduled     Feb 12, 2018 10:00 AM CST   (Arrive by 9:45 AM)   PE NPET ONCOLOGY (EYES TO THIGHS) with SHPETM1   [facility] PET CT ([facility])    [address] [place] [address] [phone]           Tell your doctor:   If there is any chance you may be pregnant or if you are breastfeeding.   If you have problems lying in small spaces (claustrophobia). If you do, your doctor may give you medicine to help you relax. If you have diabetes:   Have your exam early in the morning. Your blood glucose will go up as the day goes by.   Your glucose level must be 180 or less at the start of the exam. Please take any medicines you need to ensure this blood glucose level. 24 hours before your scan: Don t do any heavy exercise. (No jogging, aerobics or other workouts.) Exercise will make your pictures less accurate. 6 hours before your scan:   Stop all food and liquids (except water).   Do not chew gum or suck on mints.   If you need to take medicine with food, you may take it with a few crackers.  Please call your Imaging Department at your exam site with any questions.            Feb 14, 2018 10:30 AM CST   Level 3 with  INFUSION CHAIR 1   [facility] ([facility])    [facility]  [address] [address]   [phone]            2018 11:00 AM CST   Return Visit with Yani Magana MD   [facility] ([facility])    [facility]  [address] [address]   [phone]            Mar 15, 2018 11:15 AM CDT   New Visit with María Galeas GC   Cancer Risk Management Program ([facility])    [facility]  [address] [address]   [phone]              Who to contact     If you have questions or need follow up information about today's clinic visit or your schedule please contact [facility] directly at [phone].  Normal or non-critical lab and imaging results will be communicated to you by MyChart, letter or phone within 4 business days after the clinic has received the results. If you do not hear from us within 7 days, please contact the clinic through [employer] or phone. If you have a critical or abnormal lab result, we will notify you by phone as soon as possible.  Submit refill requests through [employer] or call your pharmacy and they will forward the refill request to us. Please allow 3 business days for your refill to be completed.          Additional Information About Your Visit        MyChart Information     [employer] lets you send messages to your doctor, view your test results, renew your prescriptions, schedule appointments and more. To sign up, go to [email or web address] . Click on \"Log in\" on the left side of the screen, which will take you to the Welcome page. Then click on \"Sign up Now\" on the right side of the page.     You will be asked to enter the access code listed below, as well as some personal information. Please follow the directions to create your username and password.     Your access code is: [email or web address]  Expires: 2018  2:02 PM     Your access code will  in 90 days. If you need help or a new code, " please call your [place] clinic or [phone].        Care EveryWhere ID     This is your Care EveryWhere ID. This could be used by other organizations to access your [place] medical records  [id number]        Your Vitals Were     Pulse Temperature Respirations Last Period Pulse Oximetry BMI (Body Mass Index)    69 98.3  F (36.8  C) 16 01/01/2004 (Approximate) 99% 28.57 kg/m2       Blood Pressure from Last 3 Encounters:   01/24/18 137/65   01/24/18 114/70   01/03/18 119/75    Weight from Last 3 Encounters:   01/24/18 80.3 kg (177 lb)   01/24/18 80.6 kg (177 lb 12.8 oz)   01/03/18 81.6 kg (180 lb)               Primary Care Provider Office Phone # Fax #    [facility] [phone] [address] [facility] [address]        Equal Access to Services     MARYJANE JARA : Hadii aad ku hadasho Soomaali, waaxda luqadaha, qaybta kaalmada adeegyada, ketan lam . So [facility] [phone].    ATENCIÓN: Si habla español, tiene a thomas disposición servicios gratuitos de asistencia lingüística. Llame al [phone].    We comply with applicable federal civil rights laws and [place] laws. We do not discriminate on the basis of race, color, national origin, age, disability, sex, sexual orientation, or gender identity.            Thank you!     Thank you for choosing [facility]  for your care. Our goal is always to provide you with excellent care. Hearing back from our patients is one way we can continue to improve our services. Please take a few minutes to complete the written survey that you may receive in the mail after your visit with us. Thank you!             Your Updated Medication List - Protect others around you: Learn how to safely use, store and throw away your medicines at www.disposemymeds.org.          This list is accurate as of 1/24/18 11:59 PM.  Always use your most recent med list.                   Brand Name Dispense Instructions  for use Diagnosis    ADVIL PO      Take 400 mg by mouth every 6 hours as needed for moderate pain        aspirin 81 MG tablet      Take 81 mg by mouth daily        GLUCOSAMINE CHONDROITIN JOINT PO      Take by mouth daily        LORazepam 0.5 MG tablet    ATIVAN    30 tablet    Take 1 tablet (0.5 mg) by mouth every 4 hours as needed (Anxiety, Nausea/Vomiting or Sleep)    Carcinoma of unknown origin (H)       OMEGA-3 FISH OIL PO      Take 1 g by mouth daily        ondansetron 8 MG tablet    ZOFRAN    10 tablet    Take 1 tablet (8 mg) by mouth every 8 hours as needed (Nausea/Vomiting)    Carcinoma of unknown origin (H)       prochlorperazine 10 MG tablet    COMPAZINE    30 tablet    Take 1 tablet (10 mg) by mouth every 6 hours as needed (Nausea/Vomiting)    Carcinoma of unknown origin (H)       valACYclovir 1000 mg tablet    VALTREX    20 tablet    Take 1 tablet (1,000 mg) by mouth 2 times daily    Cold sore       VITAMIN B6 PO      Take by mouth daily        VITAMIN D (CHOLECALCIFEROL) PO      Take 1,000 Units by mouth daily

## 2018-01-24 NOTE — PATIENT INSTRUCTIONS
1- Continue carbo/Txol every three weeks-Cycle 3 today - Scheduled - [facility]  2- PET CT scan in 2 weeks  3- RTC MD 3 weeks with infusion clinic appointment - Scheduled - [facility]    AVS given to patient - [facility]

## 2018-01-24 NOTE — PROGRESS NOTES
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  Initially she was seen 11/22/2017 after she had the right supraclavicular lymph node biopsy which was positive for poorly differentiated adenocarcinoma.  She had this lymph node almost a month and had an excisional biopsy performed which was consistent with the diagnosis of cancer.      A pet CT scan 11/27/2017 showed hypermetabolic right supraclavicular, right axillary and subpectoral adenopathy.  Otherwise, no distant metastatic disease.     Mammogram 11/30/17- negative    TREATMENT: Carboplatin and Taxol      SUBJECTIVE:  The patient was seen as a followup today. She has mild neuropathy. Otherwise tolerating treatment well.     REVIEW OF SYSTEMS:  A complete review of systems was performed and found to be negative other than pertinent positives mentioned in history of present illness.      Past medical, social histories reviewed.     Meds- Reviewed.     PHYSICAL EXAMINATION:   VITAL SIGNS: /65 (BP Location: Left arm, Patient Position: Sitting, Cuff Size: Adult Regular)  Pulse 69  Temp 98.3  F (36.8  C)  Resp 16  Wt 80.3 kg (177 lb)  LMP 01/01/2004 (Approximate)  SpO2 99%  BMI 28.57 kg/m2  CONSTITUTIONAL: Appears tired.   HEENT: Pupils are equal. Oropharynx is clear.   NECK: No cervical or supraclavicular lymphadenopathy.   RESPIRATORY: Clear bilaterally.   CARD/VASC: S1, S2, regular.   GI: Soft, nontender, nondistended, no hepatosplenomegaly.   MUSKULOSKELETAL: Warm, well perfused.   NEUROLOGIC: Alert, awake.   INTEGUMENT: No rash.   LYMPHATICS: No edema.   PSYCH: Mood and affect was normal.     LABORATORY DATA AND IMAGING REVIEWED DURING THIS VISIT:  Recent Labs   Lab Test  01/03/18   0933  12/21/17   1120   NA  140  138   POTASSIUM  4.0  3.9   CHLORIDE  106  105   CO2  28  28   ANIONGAP  6  5   BUN  18  16    CR  0.71  0.66   GLC  88  103*   EDILSON  9.0  8.5     Recent Labs   Lab Test  01/24/18   0926  01/03/18   0933  12/21/17   1120   WBC  4.0  4.0  13.4*   HGB  11.1*  11.8  11.6*   PLT  284  265  210   MCV  94  94  94   NEUTROPHIL  53.1  57.9  78.3     Recent Labs   Lab Test  01/03/18   0933  12/21/17   1120  12/11/17   1030   BILITOTAL  0.7  0.2  0.5   ALKPHOS  86  137  75   ALT  75*  84*  39   AST  40  32  21   ALBUMIN  3.5  3.6  3.6      ECOG performance status 0.      ASSESSMENT AND PLAN:  This is a 62-year-old lady who has diagnosis of 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  Tumor cells were positive for CK7, CK5/6 and GATA3.  We did tumor marker testing and she had CA 27-29 at the level of 12,  of 16, CA 19-9 at 19.  Her CEA was less than 0.5.      PET CT scan results was done 11/27/2017.  showed hypermetabolic right supraclavicular, right axillary and subpectoral adenopathy.  There was no evidence of distant metastases.      The distribution of adenopathy indicates possibility breast cancer which has metastasized to the regional lymph nodes and we are not able to find a primary on physical exam and a PET scan.     Mammogram 11/30/17 no evidence of primary. Her-2 non amplified. Androgen receptor 10-20%. ER 1-5%. [place] < 1%.   ENT examination by Dr. Miller was negative. EGD was negative as well. MRI breast 12/20/17 no evidence of malignancy in the breasts.    She is getting chemotherapy carboplatin/Taxol.   She will proceed with 3rd cycle of chemotherapy today and then will repeat a PET scan. If she has a good response then we will plan to complete six cycles of chemotherapy. We are treating her like Stage IIIC breast cancer.     - Labs were reviewed with the patient, normocytic anemia related to chemotherapy.   - Neuropathy, mild in nature- monitor.      PLAN:   1- Continue carbo/Txol every three weeks-Cycle 3 today  2- PET CT scan in 2 weeks  3- RTC MD 3  weeks with infusion clinic appointment     ERON BARNES MD    1/24/2018     cc: Waylon Liu MD

## 2018-01-24 NOTE — PROGRESS NOTES
Infusion Nursing Note:  Flor PUMA Griffin presents today for taxol/carbo.    Patient seen by provider today: Yes: Dr. Magana   present during visit today: Not Applicable.    Note: N/A.    Intravenous Access:  Implanted Port.    Treatment Conditions:  Lab Results   Component Value Date    HGB 11.1 01/24/2018     Lab Results   Component Value Date    WBC 4.0 01/24/2018      Lab Results   Component Value Date    ANEU 2.1 01/24/2018     Lab Results   Component Value Date     01/24/2018      Lab Results   Component Value Date     01/24/2018                   Lab Results   Component Value Date    POTASSIUM 4.0 01/24/2018           No results found for: MAG         Lab Results   Component Value Date    CR 0.68 01/24/2018                   Lab Results   Component Value Date    EDILSON 9.1 01/24/2018                Lab Results   Component Value Date    BILITOTAL 0.5 01/24/2018           Lab Results   Component Value Date    ALBUMIN 3.6 01/24/2018                    Lab Results   Component Value Date    ALT 51 01/24/2018           Lab Results   Component Value Date    AST 24 01/24/2018       Results reviewed, labs MET treatment parameters, ok to proceed with treatment.  ONPRO  Was placed on patient's: back of left arm.    Was placed at 2 PM    ONPRO injector device Lot number: [id number]    Patient education included: what patient can expect after application, what colored lights mean on the device, when to remove device, when and where to call with questions or issues, all patients questions answered and that Neulasta administration will occur at 5pm  tomnorrow.    Patient tolerated administration well.    Post Infusion Assessment:  Patient tolerated infusion without incident.  Blood return noted pre and post infusion.  Site patent and intact, free from redness, edema or discomfort.  No evidence of extravasations.  Access discontinued per protocol.    Discharge Plan:   Patient declined prescription  refills.  Discharge instructions reviewed with: Patient.  Patient and/or family verbalized understanding of discharge instructions and all questions answered.  Copy of AVS reviewed with patient and/or family.  Patient will return 2/14/18 for next appointment.  Patient discharged in stable condition accompanied by: self and .  Departure Mode: Ambulatory.    XVAIER Posey RN

## 2018-02-05 ENCOUNTER — TELEPHONE (OUTPATIENT)
Dept: ONCOLOGY | Facility: CLINIC | Age: 63
End: 2018-02-05

## 2018-02-07 DIAGNOSIS — C80.1 CARCINOMA OF UNKNOWN ORIGIN (H): Primary | ICD-10-CM

## 2018-02-07 DIAGNOSIS — R05.8 PRODUCTIVE COUGH: ICD-10-CM

## 2018-02-07 RX ORDER — LEVOFLOXACIN 500 MG/1
TABLET, FILM COATED ORAL
Qty: 7 TABLET | Refills: 0 | Status: SHIPPED | OUTPATIENT
Start: 2018-02-07 | End: 2018-02-21

## 2018-02-09 RX ORDER — HEPARIN SODIUM (PORCINE) LOCK FLUSH IV SOLN 100 UNIT/ML 100 UNIT/ML
5 SOLUTION INTRAVENOUS EVERY 8 HOURS PRN
Status: CANCELLED
Start: 2018-02-14

## 2018-02-09 RX ORDER — MEPERIDINE HYDROCHLORIDE 25 MG/ML
25 INJECTION INTRAMUSCULAR; INTRAVENOUS; SUBCUTANEOUS EVERY 30 MIN PRN
Status: CANCELLED | OUTPATIENT
Start: 2018-02-14

## 2018-02-09 RX ORDER — METHYLPREDNISOLONE SODIUM SUCCINATE 125 MG/2ML
125 INJECTION, POWDER, LYOPHILIZED, FOR SOLUTION INTRAMUSCULAR; INTRAVENOUS
Status: CANCELLED
Start: 2018-02-14

## 2018-02-09 RX ORDER — DIPHENHYDRAMINE HYDROCHLORIDE 50 MG/ML
50 INJECTION INTRAMUSCULAR; INTRAVENOUS
Status: CANCELLED
Start: 2018-02-14

## 2018-02-09 RX ORDER — EPINEPHRINE 1 MG/ML
0.3 INJECTION, SOLUTION, CONCENTRATE INTRAVENOUS EVERY 5 MIN PRN
Status: CANCELLED | OUTPATIENT
Start: 2018-02-14

## 2018-02-09 RX ORDER — ALBUTEROL SULFATE 90 UG/1
1-2 AEROSOL, METERED RESPIRATORY (INHALATION)
Status: CANCELLED
Start: 2018-02-14

## 2018-02-09 RX ORDER — LORAZEPAM 2 MG/ML
0.5 INJECTION INTRAMUSCULAR EVERY 4 HOURS PRN
Status: CANCELLED
Start: 2018-02-14

## 2018-02-09 RX ORDER — SODIUM CHLORIDE 9 MG/ML
1000 INJECTION, SOLUTION INTRAVENOUS CONTINUOUS PRN
Status: CANCELLED
Start: 2018-02-14

## 2018-02-09 RX ORDER — ALBUTEROL SULFATE 0.83 MG/ML
2.5 SOLUTION RESPIRATORY (INHALATION)
Status: CANCELLED | OUTPATIENT
Start: 2018-02-14

## 2018-02-09 RX ORDER — EPINEPHRINE 0.3 MG/.3ML
0.3 INJECTION SUBCUTANEOUS EVERY 5 MIN PRN
Status: CANCELLED | OUTPATIENT
Start: 2018-02-14

## 2018-02-09 RX ORDER — DIPHENHYDRAMINE HCL 25 MG
50 CAPSULE ORAL ONCE
Status: CANCELLED
Start: 2018-02-14

## 2018-02-12 ENCOUNTER — HOSPITAL ENCOUNTER (OUTPATIENT)
Facility: CLINIC | Age: 63
End: 2018-02-12
Admitting: INTERNAL MEDICINE

## 2018-02-12 ENCOUNTER — TELEPHONE (OUTPATIENT)
Dept: ONCOLOGY | Facility: CLINIC | Age: 63
End: 2018-02-12

## 2018-02-12 ENCOUNTER — HOSPITAL ENCOUNTER (OUTPATIENT)
Facility: CLINIC | Age: 63
Discharge: HOME | End: 2018-02-12
Attending: INTERNAL MEDICINE | Admitting: INTERNAL MEDICINE
Payer: COMMERCIAL

## 2018-02-12 VITALS
OXYGEN SATURATION: 100 % | HEART RATE: 70 BPM | RESPIRATION RATE: 18 BRPM | DIASTOLIC BLOOD PRESSURE: 67 MMHG | SYSTOLIC BLOOD PRESSURE: 126 MMHG | TEMPERATURE: 96.8 F

## 2018-02-12 PROCEDURE — 40000863 ZZH STATISTIC RADIOLOGY XRAY, US, CT, MAR, NM

## 2018-02-12 NOTE — TELEPHONE ENCOUNTER
Patient called wanting to update Dr. Magana she did not have her PETSCAN done because she had a bite to eat this morning with her antibiotic. She has been rescheduled for Monday 2/19.     Patient is scheduled for exam and treatment 2/14. Michela Parmar

## 2018-02-14 ENCOUNTER — ONCOLOGY VISIT (OUTPATIENT)
Dept: ONCOLOGY | Facility: CLINIC | Age: 63
End: 2018-02-14
Attending: INTERNAL MEDICINE
Payer: COMMERCIAL

## 2018-02-14 ENCOUNTER — INFUSION THERAPY VISIT (OUTPATIENT)
Dept: INFUSION THERAPY | Facility: CLINIC | Age: 63
End: 2018-02-14
Attending: INTERNAL MEDICINE
Payer: COMMERCIAL

## 2018-02-14 ENCOUNTER — HOSPITAL ENCOUNTER (OUTPATIENT)
Facility: CLINIC | Age: 63
Setting detail: SPECIMEN
Discharge: HOME | End: 2018-02-14
Attending: INTERNAL MEDICINE | Admitting: INTERNAL MEDICINE
Payer: COMMERCIAL

## 2018-02-14 VITALS
SYSTOLIC BLOOD PRESSURE: 119 MMHG | DIASTOLIC BLOOD PRESSURE: 71 MMHG | BODY MASS INDEX: 28.87 KG/M2 | HEIGHT: 66 IN | TEMPERATURE: 98.3 F | OXYGEN SATURATION: 100 % | HEART RATE: 81 BPM | RESPIRATION RATE: 16 BRPM | WEIGHT: 179.6 LBS

## 2018-02-14 VITALS
WEIGHT: 179.6 LBS | TEMPERATURE: 98.3 F | BODY MASS INDEX: 28.87 KG/M2 | DIASTOLIC BLOOD PRESSURE: 63 MMHG | RESPIRATION RATE: 18 BRPM | SYSTOLIC BLOOD PRESSURE: 124 MMHG | OXYGEN SATURATION: 100 % | HEIGHT: 66 IN | HEART RATE: 72 BPM

## 2018-02-14 DIAGNOSIS — C80.1 CARCINOMA OF UNKNOWN ORIGIN (H): Primary | ICD-10-CM

## 2018-02-14 LAB
ALBUMIN SERPL-MCNC: 3.5 G/DL (ref 3.4–5)
ALP SERPL-CCNC: 101 U/L (ref 40–150)
ALT SERPL W P-5'-P-CCNC: 41 U/L (ref 0–50)
ANION GAP SERPL CALCULATED.3IONS-SCNC: 6 MMOL/L (ref 3–14)
AST SERPL W P-5'-P-CCNC: 21 U/L (ref 0–45)
BASOPHILS # BLD AUTO: 0 10E9/L (ref 0–0.2)
BASOPHILS NFR BLD AUTO: 0.7 %
BILIRUB SERPL-MCNC: 0.4 MG/DL (ref 0.2–1.3)
BUN SERPL-MCNC: 14 MG/DL (ref 7–30)
CALCIUM SERPL-MCNC: 8.7 MG/DL (ref 8.5–10.1)
CHLORIDE SERPL-SCNC: 105 MMOL/L (ref 94–109)
CO2 SERPL-SCNC: 27 MMOL/L (ref 20–32)
CREAT SERPL-MCNC: 0.72 MG/DL (ref 0.52–1.04)
DIFFERENTIAL METHOD BLD: ABNORMAL
EOSINOPHIL # BLD AUTO: 0.1 10E9/L (ref 0–0.7)
EOSINOPHIL NFR BLD AUTO: 2.1 %
ERYTHROCYTE [DISTWIDTH] IN BLOOD BY AUTOMATED COUNT: 15.3 % (ref 10–15)
GFR SERPL CREATININE-BSD FRML MDRD: 82 ML/MIN/1.7M2
GLUCOSE SERPL-MCNC: 105 MG/DL (ref 70–99)
HCT VFR BLD AUTO: 33 % (ref 35–47)
HGB BLD-MCNC: 11 G/DL (ref 11.7–15.7)
IMM GRANULOCYTES # BLD: 0 10E9/L (ref 0–0.4)
IMM GRANULOCYTES NFR BLD: 0 %
LYMPHOCYTES # BLD AUTO: 1.3 10E9/L (ref 0.8–5.3)
LYMPHOCYTES NFR BLD AUTO: 30.3 %
MCH RBC QN AUTO: 31.8 PG (ref 26.5–33)
MCHC RBC AUTO-ENTMCNC: 33.3 G/DL (ref 31.5–36.5)
MCV RBC AUTO: 95 FL (ref 78–100)
MONOCYTES # BLD AUTO: 0.6 10E9/L (ref 0–1.3)
MONOCYTES NFR BLD AUTO: 13.7 %
NEUTROPHILS # BLD AUTO: 2.2 10E9/L (ref 1.6–8.3)
NEUTROPHILS NFR BLD AUTO: 53.2 %
NRBC # BLD AUTO: 0 10*3/UL
NRBC BLD AUTO-RTO: 0 /100
PLATELET # BLD AUTO: 271 10E9/L (ref 150–450)
POTASSIUM SERPL-SCNC: 3.8 MMOL/L (ref 3.4–5.3)
PROT SERPL-MCNC: 6.8 G/DL (ref 6.8–8.8)
RBC # BLD AUTO: 3.46 10E12/L (ref 3.8–5.2)
SODIUM SERPL-SCNC: 138 MMOL/L (ref 133–144)
WBC # BLD AUTO: 4.2 10E9/L (ref 4–11)

## 2018-02-14 PROCEDURE — 25000132 ZZH RX MED GY IP 250 OP 250 PS 637: Performed by: INTERNAL MEDICINE

## 2018-02-14 PROCEDURE — 96413 CHEMO IV INFUSION 1 HR: CPT

## 2018-02-14 PROCEDURE — 96377 APPLICATON ON-BODY INJECTOR: CPT

## 2018-02-14 PROCEDURE — 99214 OFFICE O/P EST MOD 30 MIN: CPT | Performed by: INTERNAL MEDICINE

## 2018-02-14 PROCEDURE — 25000128 H RX IP 250 OP 636: Performed by: INTERNAL MEDICINE

## 2018-02-14 PROCEDURE — G0463 HOSPITAL OUTPT CLINIC VISIT: HCPCS | Mod: 25

## 2018-02-14 PROCEDURE — 96415 CHEMO IV INFUSION ADDL HR: CPT

## 2018-02-14 PROCEDURE — 96367 TX/PROPH/DG ADDL SEQ IV INF: CPT

## 2018-02-14 PROCEDURE — 96417 CHEMO IV INFUS EACH ADDL SEQ: CPT

## 2018-02-14 PROCEDURE — 85025 COMPLETE CBC W/AUTO DIFF WBC: CPT | Performed by: INTERNAL MEDICINE

## 2018-02-14 PROCEDURE — 25000125 ZZHC RX 250: Performed by: INTERNAL MEDICINE

## 2018-02-14 PROCEDURE — 96375 TX/PRO/DX INJ NEW DRUG ADDON: CPT

## 2018-02-14 PROCEDURE — 80053 COMPREHEN METABOLIC PANEL: CPT | Performed by: INTERNAL MEDICINE

## 2018-02-14 RX ORDER — ONDANSETRON 2 MG/ML
8 INJECTION INTRAMUSCULAR; INTRAVENOUS ONCE
Status: COMPLETED | OUTPATIENT
Start: 2018-02-14 | End: 2018-02-14

## 2018-02-14 RX ORDER — HEPARIN SODIUM (PORCINE) LOCK FLUSH IV SOLN 100 UNIT/ML 100 UNIT/ML
5 SOLUTION INTRAVENOUS EVERY 8 HOURS PRN
Status: DISCONTINUED | OUTPATIENT
Start: 2018-02-14 | End: 2018-02-14 | Stop reason: HOSPADM

## 2018-02-14 RX ORDER — HYDROCODONE BITARTRATE AND ACETAMINOPHEN 5; 325 MG/1; MG/1
1 TABLET ORAL EVERY 6 HOURS PRN
Qty: 6 TABLET | Refills: 0 | Status: SHIPPED | OUTPATIENT
Start: 2018-02-14 | End: 2018-05-18

## 2018-02-14 RX ORDER — DIPHENHYDRAMINE HCL 25 MG
50 CAPSULE ORAL ONCE
Status: COMPLETED | OUTPATIENT
Start: 2018-02-14 | End: 2018-02-14

## 2018-02-14 RX ADMIN — HEPARIN 5 ML: 100 SYRINGE at 16:55

## 2018-02-14 RX ADMIN — ONDANSETRON HYDROCHLORIDE 8 MG: 2 INJECTION, SOLUTION INTRAVENOUS at 12:18

## 2018-02-14 RX ADMIN — PACLITAXEL 392 MG: 6 INJECTION, SOLUTION INTRAVENOUS at 13:25

## 2018-02-14 RX ADMIN — CARBOPLATIN 560 MG: 10 INJECTION, SOLUTION INTRAVENOUS at 16:21

## 2018-02-14 RX ADMIN — PEGFILGRASTIM 6 MG: KIT SUBCUTANEOUS at 15:38

## 2018-02-14 RX ADMIN — DEXAMETHASONE SODIUM PHOSPHATE: 10 INJECTION, SOLUTION INTRAMUSCULAR; INTRAVENOUS at 12:24

## 2018-02-14 RX ADMIN — SODIUM CHLORIDE 250 ML: 9 INJECTION, SOLUTION INTRAVENOUS at 12:16

## 2018-02-14 RX ADMIN — FAMOTIDINE 20 MG: 20 INJECTION, SOLUTION INTRAVENOUS at 12:34

## 2018-02-14 RX ADMIN — DIPHENHYDRAMINE HYDROCHLORIDE 50 MG: 25 CAPSULE ORAL at 12:10

## 2018-02-14 ASSESSMENT — PAIN SCALES - GENERAL: PAINLEVEL: NO PAIN (0)

## 2018-02-14 NOTE — LETTER
"    2/14/2018         RE: Flor Griffin  [id number] [facility] [address]        Dear Colleague,    Thank you for referring your patient, Flor Griffin, to the [facility]. Please see a copy of my visit note below.    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  Initially she was seen 11/22/2017 after she had the right supraclavicular lymph node biopsy which was positive for poorly differentiated adenocarcinoma.  She had this lymph node almost a month and had an excisional biopsy performed which was consistent with the diagnosis of cancer.      A pet CT scan 11/27/2017 showed hypermetabolic right supraclavicular, right axillary and subpectoral adenopathy.  Otherwise, no distant metastatic disease.     Mammogram 11/30/17- negative    TREATMENT: Carboplatin and Taxol      SUBJECTIVE:  The patient was seen as a followup today. She is tolerating treatment well. Mild neuropathy is unchanged.     REVIEW OF SYSTEMS:  A complete review of systems was performed and found to be negative other than pertinent positives mentioned in history of present illness.      Past medical, social histories reviewed.     Meds- Reviewed.     PHYSICAL EXAMINATION:   VITAL SIGNS: /63  Pulse 72  Temp 98.3  F (36.8  C) (Oral)  Resp 18  Ht 1.676 m (5' 5.98\")  Wt 81.5 kg (179 lb 9.6 oz)  LMP 01/01/2004 (Approximate)  SpO2 100%  BMI 29 kg/m2  CONSTITUTIONAL: Appears tired.   HEENT: Pupils are equal. Oropharynx is clear.   NECK: No cervical or supraclavicular lymphadenopathy.   RESPIRATORY: Clear bilaterally.   CARD/VASC: S1, S2, regular.   GI: Soft, nontender, nondistended, no hepatosplenomegaly.   MUSKULOSKELETAL: Warm, well perfused.   NEUROLOGIC: Alert, awake.   INTEGUMENT: No rash.   LYMPHATICS: No edema.   PSYCH: Mood and affect was normal.   "   LABORATORY DATA AND IMAGING REVIEWED DURING THIS VISIT:  Recent Labs   Lab Test  01/24/18   0926  01/03/18   0933   NA  138  140   POTASSIUM  4.0  4.0   CHLORIDE  104  106   CO2  25  28   ANIONGAP  9  6   BUN  16  18   CR  0.68  0.71   GLC  103*  88   EDILSON  9.1  9.0     Recent Labs   Lab Test  01/24/18   0926  01/03/18   0933  12/21/17   1120   WBC  4.0  4.0  13.4*   HGB  11.1*  11.8  11.6*   PLT  284  265  210   MCV  94  94  94   NEUTROPHIL  53.1  57.9  78.3     Recent Labs   Lab Test  01/24/18   0926  01/03/18   0933  12/21/17   1120   BILITOTAL  0.5  0.7  0.2   ALKPHOS  110  86  137   ALT  51*  75*  84*   AST  24  40  32   ALBUMIN  3.6  3.5  3.6        ECOG performance status 0.      ASSESSMENT AND PLAN:  This is a 62-year-old lady who has diagnosis of 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  Tumor cells were positive for CK7, CK5/6 and GATA3.  We did tumor marker testing and she had CA 27-29 at the level of 12,  of 16, CA 19-9 at 19.  Her CEA was less than 0.5.      PET CT scan results was done 11/27/2017.  showed hypermetabolic right supraclavicular, right axillary and subpectoral adenopathy.  There was no evidence of distant metastases.      The distribution of adenopathy indicates possibility breast cancer which has metastasized to the regional lymph nodes and we are not able to find a primary on physical exam and a PET scan.     Mammogram 11/30/17 no evidence of primary. Her-2 non amplified. Androgen receptor 10-20%. ER 1-5%. CA < 1%.   ENT examination by Dr. Miller was negative. EGD was negative as well. MRI breast 12/20/17 no evidence of malignancy in the breasts.    She is getting chemotherapy carboplatin/Taxol.   She will proceed with 4th cycle of chemotherapy today,   PET scan is scheduled for 2/19.  - Her case was reviewed at tumor board on 2/7/18- it was recommended to consider AC for 4 cycles if she has good response on PET scan.  If she has  "a good response then we will plan to complete 4 cycles of AC.     - Labs were reviewed with the patient, normocytic anemia related to chemotherapy.   - Neuropathy, mild in nature- monitor.      PLAN:   1- Carbo/Txol Cycle 4 today  2- PET CT scan 2/19  3- RTC MD next week  4- Use norco for bone pain    YANI MAGANA MD    2/14/2018     cc: Waylon Liu MD       Oncology Rooming Note    February 14, 2018 11:35 AM   Flor Griffin is a 62 year old female who presents for:    Chief Complaint   Patient presents with     Oncology Clinic Visit     Initial Vitals: /63  Pulse 72  Temp 98.3  F (36.8  C) (Oral)  Resp 18  Ht 1.676 m (5' 5.98\")  Wt 81.5 kg (179 lb 9.6 oz)  LMP 01/01/2004 (Approximate)  SpO2 100%  BMI 29 kg/m2 Estimated body mass index is 29 kg/(m^2) as calculated from the following:    Height as of this encounter: 1.676 m (5' 5.98\").    Weight as of this encounter: 81.5 kg (179 lb 9.6 oz). Body surface area is 1.95 meters squared.  Data Unavailable Comment: Data Unavailable   Patient's last menstrual period was 01/01/2004 (approximate).  Allergies reviewed: Yes  Medications reviewed: Yes    Medications: Medication refills not needed today.  Pharmacy name entered into [employer]: [id number] - [place] [address]  [address]    Clinical concerns: no    5 minutes for nursing intake (face to face time)        Terri Torres MA                Again, thank you for allowing me to participate in the care of your patient.        Sincerely,        Yani Magana MD    "

## 2018-02-14 NOTE — PROGRESS NOTES
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  Initially she was seen 11/22/2017 after she had the right supraclavicular lymph node biopsy which was positive for poorly differentiated adenocarcinoma.  She had this lymph node almost a month and had an excisional biopsy performed which was consistent with the diagnosis of cancer.      A pet CT scan 11/27/2017 showed hypermetabolic right supraclavicular, right axillary and subpectoral adenopathy.  Otherwise, no distant metastatic disease.     Mammogram 11/30/17- negative    TREATMENT: Carboplatin and Taxol      SUBJECTIVE:  The patient was seen as a followup today. She is tolerating treatment well. Mild neuropathy is unchanged.     REVIEW OF SYSTEMS:  A complete review of systems was performed and found to be negative other than pertinent positives mentioned in history of present illness.      Past medical, social histories reviewed.     Meds- Reviewed.     PHYSICAL EXAMINATION:   VITAL SIGNS: /63  Pulse 72  Temp 98.3  F (36.8  C) (Oral)  Resp 18  Ht 1.676 m (5' 5.98\")  Wt 81.5 kg (179 lb 9.6 oz)  LMP 01/01/2004 (Approximate)  SpO2 100%  BMI 29 kg/m2  CONSTITUTIONAL: Appears tired.   HEENT: Pupils are equal. Oropharynx is clear.   NECK: No cervical or supraclavicular lymphadenopathy.   RESPIRATORY: Clear bilaterally.   CARD/VASC: S1, S2, regular.   GI: Soft, nontender, nondistended, no hepatosplenomegaly.   MUSKULOSKELETAL: Warm, well perfused.   NEUROLOGIC: Alert, awake.   INTEGUMENT: No rash.   LYMPHATICS: No edema.   PSYCH: Mood and affect was normal.     LABORATORY DATA AND IMAGING REVIEWED DURING THIS VISIT:  Recent Labs   Lab Test  01/24/18   0926  01/03/18   0933   NA  138  140   POTASSIUM  4.0  4.0   CHLORIDE  104  106   CO2  25  28   ANIONGAP  9  6   BUN  16  18   CR  0.68  0.71   GLC  103*  88   EDILSON  " 9.1  9.0     Recent Labs   Lab Test  01/24/18   0926  01/03/18   0933  12/21/17   1120   WBC  4.0  4.0  13.4*   HGB  11.1*  11.8  11.6*   PLT  284  265  210   MCV  94  94  94   NEUTROPHIL  53.1  57.9  78.3     Recent Labs   Lab Test  01/24/18   0926  01/03/18   0933  12/21/17   1120   BILITOTAL  0.5  0.7  0.2   ALKPHOS  110  86  137   ALT  51*  75*  84*   AST  24  40  32   ALBUMIN  3.6  3.5  3.6        ECOG performance status 0.      ASSESSMENT AND PLAN:  This is a 62-year-old lady who has diagnosis of 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  Tumor cells were positive for CK7, CK5/6 and GATA3.  We did tumor marker testing and she had CA 27-29 at the level of 12,  of 16, CA 19-9 at 19.  Her CEA was less than 0.5.      PET CT scan results was done 11/27/2017.  showed hypermetabolic right supraclavicular, right axillary and subpectoral adenopathy.  There was no evidence of distant metastases.      The distribution of adenopathy indicates possibility breast cancer which has metastasized to the regional lymph nodes and we are not able to find a primary on physical exam and a PET scan.     Mammogram 11/30/17 no evidence of primary. Her-2 non amplified. Androgen receptor 10-20%. ER 1-5%. [place] < 1%.   ENT examination by Dr. Miller was negative. EGD was negative as well. MRI breast 12/20/17 no evidence of malignancy in the breasts.    She is getting chemotherapy carboplatin/Taxol.   She will proceed with 4th cycle of chemotherapy today,   PET scan is scheduled for 2/19.  - Her case was reviewed at tumor board on 2/7/18- it was recommended to consider AC for 4 cycles if she has good response on PET scan.  If she has a good response then we will plan to complete 4 cycles of AC.     - Labs were reviewed with the patient, normocytic anemia related to chemotherapy.   - Neuropathy, mild in nature- monitor.      PLAN:   1- Carbo/Txol Cycle 4 today  2- PET CT scan 2/19  3- RTC  MD next week  4- Use norco for bone pain    ERON BARNES MD    2/14/2018     cc: Wayoln Liu MD

## 2018-02-14 NOTE — PROGRESS NOTES
Infusion Nursing Note:  Flor Griffin presents today for D1C4 Carbo/taxol.    Patient seen by provider today: Yes: Dr. Magana   present during visit today: Not Applicable.    Note: No concern per visit for this appointment visit.    Intravenous Access:  Labs drawn without difficulty.  Implanted Port.    Treatment Conditions:  Lab Results   Component Value Date    HGB 11.0 02/14/2018     Lab Results   Component Value Date    WBC 4.2 02/14/2018      Lab Results   Component Value Date    ANEU 2.2 02/14/2018     Lab Results   Component Value Date     02/14/2018      Results reviewed, labs MET treatment parameters, ok to proceed with treatment.    ONPRO  Was placed on patient's: back of right arm.    Was placed at 3:38 PM    ONPRO injector device Lot number: [id number]    Patient education included: what patient can expect after application, what colored lights mean on the device, when to remove device, when and where to call with questions or issues, all patients questions answered and that Neulasta administration will occur at 7:35 pm 2/15/2018.    Patient tolerated administration well.    Post Infusion Assessment:  Patient tolerated infusion without incident.  Site patent and intact, free from redness, edema or discomfort.  No evidence of extravasations.  Access discontinued per protocol.     Discharge Plan:   Copy of AVS reviewed with patient and/or family.  Patient will return as scheduled for next appointment.  Patient discharged in stable condition accompanied by: .  Departure Mode: Ambulatory.    XAVIER Louis RN (discharge)

## 2018-02-14 NOTE — PATIENT INSTRUCTIONS
1- Carbo/Txol Cycle 4 today  2- PET CT scan 2/19   Scheduled - Charity  3- RTC MD next week  - Scheduled - Charity  4- Use norco for bone pain  5- Echocardiogram  - Scheduled - Charity    AVS given to patient - Charity

## 2018-02-14 NOTE — PROGRESS NOTES
"Oncology Rooming Note    February 14, 2018 11:35 AM   Flor Griffin is a 62 year old female who presents for:    Chief Complaint   Patient presents with     Oncology Clinic Visit     Initial Vitals: /63  Pulse 72  Temp 98.3  F (36.8  C) (Oral)  Resp 18  Ht 1.676 m (5' 5.98\")  Wt 81.5 kg (179 lb 9.6 oz)  LMP 01/01/2004 (Approximate)  SpO2 100%  BMI 29 kg/m2 Estimated body mass index is 29 kg/(m^2) as calculated from the following:    Height as of this encounter: 1.676 m (5' 5.98\").    Weight as of this encounter: 81.5 kg (179 lb 9.6 oz). Body surface area is 1.95 meters squared.  Data Unavailable Comment: Data Unavailable   Patient's last menstrual period was 01/01/2004 (approximate).  Allergies reviewed: Yes  Medications reviewed: Yes    Medications: Medication refills not needed today.  Pharmacy name entered into [employer]: [id number] - [facility] [id number]    Clinical concerns: no    5 minutes for nursing intake (face to face time)        eTrri Torres MA              "

## 2018-02-14 NOTE — MR AVS SNAPSHOT
After Visit Summary   2/14/2018    Flor Griffin    MRN: [id number]           Patient Information     Date Of Birth          1955        Visit Information        Provider Department      2/14/2018 11:00 AM Yani Magana MD [facility]        Today's Diagnoses     Carcinoma of unknown origin (H)    -  1      Care Instructions    1- Carbo/Txol Cycle 4 today  2- PET CT scan 2/19  3- RTC MD next week  4- Use norco for bone pain  5- Echocardiogram          Follow-ups after your visit        Your next 10 appointments already scheduled     Feb 19, 2018  1:15 PM CST   (Arrive by 1:00 PM)   PE NPET ONCOLOGY (EYES TO THIGHS) with SHPETM1   [facility] PET CT ([facility])    [address] [facility] [address] [phone]           Tell your doctor:   If there is any chance you may be pregnant or if you are breastfeeding.   If you have problems lying in small spaces (claustrophobia). If you do, your doctor may give you medicine to help you relax. If you have diabetes:   Have your exam early in the morning. Your blood glucose will go up as the day goes by.   Your glucose level must be 180 or less at the start of the exam. Please take any medicines you need to ensure this blood glucose level. 24 hours before your scan: Don t do any heavy exercise. (No jogging, aerobics or other workouts.) Exercise will make your pictures less accurate. 6 hours before your scan:   Stop all food and liquids (except water).   Do not chew gum or suck on mints.   If you need to take medicine with food, you may take it with a few crackers.  Please call your Imaging Department at your exam site with any questions.            Feb 21, 2018  8:45 AM CST   Ech Complete with SHCVECHR4   [facility] CV Echocardiography (Cardiovascular Imaging at [facility])    [address]  [facility] [address] [phone]           1. Please bring or wear a  "comfortable two-piece outfit. 2. You may eat, drink and take your normal medicines. 3. For any questions that cannot be answered, please contact the ordering physician            Feb 21, 2018 12:30 PM CST   Return Visit with Yani Magana MD   [facility] ([facility])    [facility]  [address]  [place] [address]   [phone]            Mar 15, 2018 11:15 AM CDT   New Visit with María Galeas GC   Cancer Risk Management Program ([facility])    [facility]  [address]  [facility] [id number] [phone]              Future tests that were ordered for you today     Open Future Orders        Priority Expected Expires Ordered    Echocardiogram Complete Routine  2/14/2019 2/14/2018            Who to contact     If you have questions or need follow up information about today's clinic visit or your schedule please contact [facility] directly at [phone].  Normal or non-critical lab and imaging results will be communicated to you by Entredahart, letter or phone within 4 business days after the clinic has received the results. If you do not hear from us within 7 days, please contact the clinic through EverPowert or phone. If you have a critical or abnormal lab result, we will notify you by phone as soon as possible.  Submit refill requests through [employer] or call your pharmacy and they will forward the refill request to us. Please allow 3 business days for your refill to be completed.          Additional Information About Your Visit        EntredaharSweet Cred Information     [employer] lets you send messages to your doctor, view your test results, renew your prescriptions, schedule appointments and more. To sign up, go to [email or web address] . Click on \"Log in\" on the left side of the screen, which will take you to the Welcome page. Then click on \"Sign up Now\" on the right side of the page.     You will be asked " "to enter the access code listed below, as well as some personal information. Please follow the directions to create your username and password.     Your access code is: [email or web address]  Expires: 2018  2:02 PM     Your access code will  in 90 days. If you need help or a new code, please call your [facility] or [phone].        Care EveryWhere ID     This is your Care EveryWhere ID. This could be used by other organizations to access your [place] medical records  [id number]        Your Vitals Were     Pulse Temperature Respirations Height Last Period Pulse Oximetry    72 98.3  F (36.8  C) (Oral) 18 1.676 m (5' 5.98\") 2004 (Approximate) 100%    BMI (Body Mass Index)                   29 kg/m2            Blood Pressure from Last 3 Encounters:   18 124/63   18 124/63   18 126/67    Weight from Last 3 Encounters:   18 81.5 kg (179 lb 9.6 oz)   18 81.5 kg (179 lb 9.6 oz)   18 80.3 kg (177 lb)                 Today's Medication Changes          These changes are accurate as of 18 12:31 PM.  If you have any questions, ask your nurse or doctor.               Start taking these medicines.        Dose/Directions    HYDROcodone-acetaminophen 5-325 MG per tablet   Commonly known as:  NORCO   Used for:  Carcinoma of unknown origin (H)   Started by:  Yani Magana MD        Dose:  1 tablet   Take 1 tablet by mouth every 6 hours as needed for moderate to severe pain   Quantity:  6 tablet   Refills:  0            Where to get your medicines      Some of these will need a paper prescription and others can be bought over the counter.  Ask your nurse if you have questions.     Bring a paper prescription for each of these medications     HYDROcodone-acetaminophen 5-325 MG per tablet                Primary Care Provider Office Phone # Fax #    [facility] [phone] [address] [facility] [address] [address]        Equal Access to Services "     MARYJANE JARA : Hadii aad ku ej Nolan, waaxda luqadaha, qaybta kaalmada osmani, ketan anmol haycara montejohyunbraxton lam . So [facility] [phone].    ATENCIÓN: Si habla rey, tiene a thomas disposición servicios gratuitos de asistencia lingüística. Ngocame al [phone].    We comply with applicable federal civil rights laws and [place] laws. We do not discriminate on the basis of race, color, national origin, age, disability, sex, sexual orientation, or gender identity.            Thank you!     Thank you for choosing [facility]  for your care. Our goal is always to provide you with excellent care. Hearing back from our patients is one way we can continue to improve our services. Please take a few minutes to complete the written survey that you may receive in the mail after your visit with us. Thank you!             Your Updated Medication List - Protect others around you: Learn how to safely use, store and throw away your medicines at www.disposemymeds.org.          This list is accurate as of 2/14/18 12:31 PM.  Always use your most recent med list.                   Brand Name Dispense Instructions for use Diagnosis    ADVIL PO      Take 400 mg by mouth every 6 hours as needed for moderate pain        aspirin 81 MG tablet      Take 81 mg by mouth daily        GLUCOSAMINE CHONDROITIN JOINT PO      Take by mouth daily        HYDROcodone-acetaminophen 5-325 MG per tablet    NORCO    6 tablet    Take 1 tablet by mouth every 6 hours as needed for moderate to severe pain    Carcinoma of unknown origin (H)       levofloxacin 500 MG tablet    LEVAQUIN    7 tablet    Take 1 tab daily for 7 days    Carcinoma of unknown origin (H), Productive cough       LORazepam 0.5 MG tablet    ATIVAN    30 tablet    Take 1 tablet (0.5 mg) by mouth every 4 hours as needed (Anxiety, Nausea/Vomiting or Sleep)    Carcinoma of unknown origin (H)       OMEGA-3 FISH OIL PO      Take 1 g by mouth daily         ondansetron 8 MG tablet    ZOFRAN    10 tablet    Take 1 tablet (8 mg) by mouth every 8 hours as needed (Nausea/Vomiting)    Carcinoma of unknown origin (H)       prochlorperazine 10 MG tablet    COMPAZINE    30 tablet    Take 1 tablet (10 mg) by mouth every 6 hours as needed (Nausea/Vomiting)    Carcinoma of unknown origin (H)       valACYclovir 1000 mg tablet    VALTREX    20 tablet    Take 1 tablet (1,000 mg) by mouth 2 times daily    Cold sore       VITAMIN B6 PO      Take by mouth daily        VITAMIN D (CHOLECALCIFEROL) PO      Take 1,000 Units by mouth daily

## 2018-02-14 NOTE — PATIENT INSTRUCTIONS
Your On-body Neulasta Injector was applied to your back of right arm at 3:38 pm 2/14/2017.  At approximately 7:38 pm on 2/15/2018, your On-body Injector will beep to let you know your dose delivery will begin in 2 minutes.  Your medication will be delivered over the next 45 minutes.  You can remove your Injector at 8:38 pm.  Please make sure your Injector has a solid green light or has turned off prior to removing the device.  Please contact your provider at [phone] with questions or concerns.

## 2018-02-14 NOTE — MR AVS SNAPSHOT
After Visit Summary   2/14/2018    Flor Griffin    MRN: [id number]           Patient Information     Date Of Birth          1955        Visit Information        Provider Department      2/14/2018 10:30 AM  INFUSION CHAIR 1 [facility]        Today's Diagnoses     Carcinoma of unknown origin (H)    -  1      Care Instructions    Your On-body Neulasta Injector was applied to your back of right arm at 3:38 pm 2/14/2017.  At approximately 7:38 pm on 2/15/2018, your On-body Injector will beep to let you know your dose delivery will begin in 2 minutes.  Your medication will be delivered over the next 45 minutes.  You can remove your Injector at 8:38 pm.  Please make sure your Injector has a solid green light or has turned off prior to removing the device.  Please contact your provider at [phone] with questions or concerns.              Follow-ups after your visit        Your next 10 appointments already scheduled     Feb 19, 2018  1:15 PM CST   (Arrive by 1:00 PM)   PE NPET ONCOLOGY (EYES TO THIGHS) with SHPETM1   [facility] PET CT ([facility])    [address] [place] [address] [phone]           Tell your doctor:   If there is any chance you may be pregnant or if you are breastfeeding.   If you have problems lying in small spaces (claustrophobia). If you do, your doctor may give you medicine to help you relax. If you have diabetes:   Have your exam early in the morning. Your blood glucose will go up as the day goes by.   Your glucose level must be 180 or less at the start of the exam. Please take any medicines you need to ensure this blood glucose level. 24 hours before your scan: Don t do any heavy exercise. (No jogging, aerobics or other workouts.) Exercise will make your pictures less accurate. 6 hours before your scan:   Stop all food and liquids (except water).   Do not chew gum or suck on mints.   If you need  to take medicine with food, you may take it with a few crackers.  Please call your Imaging Department at your exam site with any questions.            Feb 21, 2018  8:45 AM CST   Ech Complete with SHCVECHR4   [facility] CV Echocardiography (Cardiovascular Imaging at [facility])    [address] [address]  [place] [address]   [phone]           1. Please bring or wear a comfortable two-piece outfit. 2. You may eat, drink and take your normal medicines. 3. For any questions that cannot be answered, please contact the ordering physician            Feb 21, 2018 12:30 PM CST   Return Visit with Yani Magana MD   [facility] ([facility])    [facility]  [address]  [facility] [address]   [phone]            Mar 15, 2018 11:15 AM CDT   New Visit with María Galeas GC   Cancer Risk Management Program ([facility])    [facility]  [address]  [facility] [address] [phone]              Future tests that were ordered for you today     Open Future Orders        Priority Expected Expires Ordered    Echocardiogram Complete Routine  2/14/2019 2/14/2018            Who to contact     If you have questions or need follow up information about today's clinic visit or your schedule please contact [facility] directly at [phone].  Normal or non-critical lab and imaging results will be communicated to you by MyChart, letter or phone within 4 business days after the clinic has received the results. If you do not hear from us within 7 days, please contact the clinic through MyChart or phone. If you have a critical or abnormal lab result, we will notify you by phone as soon as possible.  Submit refill requests through [employer] or call your pharmacy and they will forward the refill request to us. Please allow 3 business days for your refill to be  "completed.          Additional Information About Your Visit        [employer]E Software[employer]ul Information     [employer] lets you send messages to your doctor, view your test results, renew your prescriptions, schedule appointments and more. To sign up, go to [email or web address] . Click on \"Log in\" on the left side of the screen, which will take you to the Welcome page. Then click on \"Sign up Now\" on the right side of the page.     You will be asked to enter the access code listed below, as well as some personal information. Please follow the directions to create your username and password.     Your access code is: [email or web address]  Expires: 2018  2:02 PM     Your access code will  in 90 days. If you need help or a new code, please call your [place] clinic or [phone].        Care EveryWhere ID     This is your Care EveryWhere ID. This could be used by other organizations to access your [place] medical records  [id number]        Your Vitals Were     Pulse Temperature Respirations Height Last Period Pulse Oximetry    72 98.3  F (36.8  C) (Oral) 18 1.676 m (5' 6\") 2004 (Approximate) 100%    BMI (Body Mass Index)                   28.99 kg/m2            Blood Pressure from Last 3 Encounters:   18 124/63   18 124/63   18 126/67    Weight from Last 3 Encounters:   18 81.5 kg (179 lb 9.6 oz)   18 81.5 kg (179 lb 9.6 oz)   18 80.3 kg (177 lb)              We Performed the Following     CBC with platelets differential     Comprehensive metabolic panel          Today's Medication Changes          These changes are accurate as of 18  3:48 PM.  If you have any questions, ask your nurse or doctor.               Start taking these medicines.        Dose/Directions    HYDROcodone-acetaminophen 5-325 MG per tablet   Commonly known as:  NORCO   Used for:  Carcinoma of unknown origin (H)   Started by:  Yani Magana MD        Dose:  1 tablet   Take 1 tablet by mouth every 6 hours as needed " for moderate to severe pain   Quantity:  6 tablet   Refills:  0            Where to get your medicines      Some of these will need a paper prescription and others can be bought over the counter.  Ask your nurse if you have questions.     Bring a paper prescription for each of these medications     HYDROcodone-acetaminophen 5-325 MG per tablet                Primary Care Provider Office Phone # Fax #    [facility] [phone] [address] [facility] [address]        Equal Access to Services     MARYJANE JARA : Hadii aad ku hadasho Soomaali, waaxda luqadaha, qaybta kaalmada adeegyada, waxay idiin hayaan adedarcy montejohyunbraxton lam . So [facility] [phone].    ATENCIÓN: Si habla español, tiene a thomas disposición servicios gratuitos de asistencia lingüística. [facility] [phone].    We comply with applicable federal civil rights laws and [place] laws. We do not discriminate on the basis of race, color, national origin, age, disability, sex, sexual orientation, or gender identity.            Thank you!     Thank you for choosing [facility] AND [facility]  for your care. Our goal is always to provide you with excellent care. Hearing back from our patients is one way we can continue to improve our services. Please take a few minutes to complete the written survey that you may receive in the mail after your visit with us. Thank you!             Your Updated Medication List - Protect others around you: Learn how to safely use, store and throw away your medicines at www.disposemymeds.org.          This list is accurate as of 2/14/18  3:48 PM.  Always use your most recent med list.                   Brand Name Dispense Instructions for use Diagnosis    ADVIL PO      Take 400 mg by mouth every 6 hours as needed for moderate pain        aspirin 81 MG tablet      Take 81 mg by mouth daily        GLUCOSAMINE CHONDROITIN JOINT PO      Take by mouth daily         HYDROcodone-acetaminophen 5-325 MG per tablet    NORCO    6 tablet    Take 1 tablet by mouth every 6 hours as needed for moderate to severe pain    Carcinoma of unknown origin (H)       levofloxacin 500 MG tablet    LEVAQUIN    7 tablet    Take 1 tab daily for 7 days    Carcinoma of unknown origin (H), Productive cough       LORazepam 0.5 MG tablet    ATIVAN    30 tablet    Take 1 tablet (0.5 mg) by mouth every 4 hours as needed (Anxiety, Nausea/Vomiting or Sleep)    Carcinoma of unknown origin (H)       OMEGA-3 FISH OIL PO      Take 1 g by mouth daily        ondansetron 8 MG tablet    ZOFRAN    10 tablet    Take 1 tablet (8 mg) by mouth every 8 hours as needed (Nausea/Vomiting)    Carcinoma of unknown origin (H)       prochlorperazine 10 MG tablet    COMPAZINE    30 tablet    Take 1 tablet (10 mg) by mouth every 6 hours as needed (Nausea/Vomiting)    Carcinoma of unknown origin (H)       valACYclovir 1000 mg tablet    VALTREX    20 tablet    Take 1 tablet (1,000 mg) by mouth 2 times daily    Cold sore       VITAMIN B6 PO      Take by mouth daily        VITAMIN D (CHOLECALCIFEROL) PO      Take 1,000 Units by mouth daily

## 2018-02-20 ENCOUNTER — RADIANT APPOINTMENT (OUTPATIENT)
Dept: PET IMAGING | Facility: CLINIC | Age: 63
End: 2018-02-20
Attending: INTERNAL MEDICINE
Payer: COMMERCIAL

## 2018-02-20 DIAGNOSIS — C50.919 BREAST CANCER (H): Primary | ICD-10-CM

## 2018-02-20 DIAGNOSIS — C80.1 CARCINOMA OF UNKNOWN ORIGIN (H): ICD-10-CM

## 2018-02-20 LAB — GLUCOSE SERPL-MCNC: 95 MG/DL (ref 70–99)

## 2018-02-20 RX ORDER — HEPARIN SODIUM (PORCINE) LOCK FLUSH IV SOLN 100 UNIT/ML 100 UNIT/ML
500 SOLUTION INTRAVENOUS ONCE
Status: COMPLETED | OUTPATIENT
Start: 2018-02-20 | End: 2018-02-20

## 2018-02-20 RX ADMIN — HEPARIN SODIUM (PORCINE) LOCK FLUSH IV SOLN 100 UNIT/ML 500 UNITS: 100 SOLUTION at 09:30

## 2018-02-20 NOTE — DISCHARGE INSTRUCTIONS

## 2018-02-21 ENCOUNTER — ONCOLOGY VISIT (OUTPATIENT)
Dept: ONCOLOGY | Facility: CLINIC | Age: 63
End: 2018-02-21
Attending: INTERNAL MEDICINE
Payer: COMMERCIAL

## 2018-02-21 ENCOUNTER — HOSPITAL ENCOUNTER (OUTPATIENT)
Dept: CARDIOLOGY | Facility: CLINIC | Age: 63
Discharge: HOME | End: 2018-02-21
Attending: INTERNAL MEDICINE | Admitting: INTERNAL MEDICINE
Payer: COMMERCIAL

## 2018-02-21 VITALS
DIASTOLIC BLOOD PRESSURE: 86 MMHG | TEMPERATURE: 97.5 F | WEIGHT: 177 LBS | RESPIRATION RATE: 16 BRPM | HEART RATE: 91 BPM | SYSTOLIC BLOOD PRESSURE: 123 MMHG | OXYGEN SATURATION: 96 % | BODY MASS INDEX: 28.58 KG/M2

## 2018-02-21 DIAGNOSIS — Z17.0 MALIGNANT NEOPLASM OF OVERLAPPING SITES OF RIGHT BREAST IN FEMALE, ESTROGEN RECEPTOR POSITIVE (H): Primary | ICD-10-CM

## 2018-02-21 DIAGNOSIS — C80.1 CARCINOMA OF UNKNOWN ORIGIN (H): ICD-10-CM

## 2018-02-21 DIAGNOSIS — C50.811 MALIGNANT NEOPLASM OF OVERLAPPING SITES OF RIGHT BREAST IN FEMALE, ESTROGEN RECEPTOR POSITIVE (H): Primary | ICD-10-CM

## 2018-02-21 PROCEDURE — 93306 TTE W/DOPPLER COMPLETE: CPT | Mod: 26 | Performed by: INTERNAL MEDICINE

## 2018-02-21 PROCEDURE — 0399T ECHO COMPLETE: CPT

## 2018-02-21 PROCEDURE — 99214 OFFICE O/P EST MOD 30 MIN: CPT | Performed by: INTERNAL MEDICINE

## 2018-02-21 PROCEDURE — 0399T ZZHC MYOCARDIAL STRAIN IMAGING: CPT | Performed by: INTERNAL MEDICINE

## 2018-02-21 PROCEDURE — G0463 HOSPITAL OUTPT CLINIC VISIT: HCPCS

## 2018-02-21 RX ORDER — ALBUTEROL SULFATE 90 UG/1
1-2 AEROSOL, METERED RESPIRATORY (INHALATION)
Status: CANCELLED
Start: 2018-03-07

## 2018-02-21 RX ORDER — LORAZEPAM 2 MG/ML
0.5 INJECTION INTRAMUSCULAR EVERY 4 HOURS PRN
Status: CANCELLED
Start: 2018-03-07

## 2018-02-21 RX ORDER — EPINEPHRINE 1 MG/ML
0.3 INJECTION, SOLUTION INTRAMUSCULAR; SUBCUTANEOUS EVERY 5 MIN PRN
Status: CANCELLED | OUTPATIENT
Start: 2018-03-07

## 2018-02-21 RX ORDER — PALONOSETRON 0.05 MG/ML
0.25 INJECTION, SOLUTION INTRAVENOUS ONCE
Status: CANCELLED
Start: 2018-03-07

## 2018-02-21 RX ORDER — ALBUTEROL SULFATE 0.83 MG/ML
2.5 SOLUTION RESPIRATORY (INHALATION)
Status: CANCELLED | OUTPATIENT
Start: 2018-03-07

## 2018-02-21 RX ORDER — METHYLPREDNISOLONE SODIUM SUCCINATE 125 MG/2ML
125 INJECTION, POWDER, LYOPHILIZED, FOR SOLUTION INTRAMUSCULAR; INTRAVENOUS
Status: CANCELLED
Start: 2018-03-07

## 2018-02-21 RX ORDER — SODIUM CHLORIDE 9 MG/ML
1000 INJECTION, SOLUTION INTRAVENOUS CONTINUOUS PRN
Status: CANCELLED
Start: 2018-03-07

## 2018-02-21 RX ORDER — MEPERIDINE HYDROCHLORIDE 25 MG/ML
25 INJECTION INTRAMUSCULAR; INTRAVENOUS; SUBCUTANEOUS EVERY 30 MIN PRN
Status: CANCELLED | OUTPATIENT
Start: 2018-03-07

## 2018-02-21 RX ORDER — EPINEPHRINE 0.3 MG/.3ML
0.3 INJECTION SUBCUTANEOUS EVERY 5 MIN PRN
Status: CANCELLED | OUTPATIENT
Start: 2018-03-07

## 2018-02-21 RX ORDER — DOXORUBICIN HYDROCHLORIDE 2 MG/ML
60 INJECTION, SOLUTION INTRAVENOUS ONCE
Status: CANCELLED | OUTPATIENT
Start: 2018-03-07

## 2018-02-21 RX ORDER — DIPHENHYDRAMINE HYDROCHLORIDE 50 MG/ML
50 INJECTION INTRAMUSCULAR; INTRAVENOUS
Status: CANCELLED
Start: 2018-03-07

## 2018-02-21 ASSESSMENT — PAIN SCALES - GENERAL: PAINLEVEL: NO PAIN (0)

## 2018-02-21 NOTE — MR AVS SNAPSHOT
After Visit Summary   2/21/2018    Flor Griffin    MRN: [id number]           Patient Information     Date Of Birth          1955        Visit Information        Provider Department      2/21/2018 12:30 PM Yani Magana MD [facility]        Care Instructions    1- Start dose dense AC on 3/7  2- Chemo education today  3- RTC MD 2 weeks with infusion clinic appointment          Follow-ups after your visit        Your next 10 appointments already scheduled     Mar 07, 2018 12:30 PM CST   Level 3 with  INFUSION CHAIR 13   [facility] ([facility])    [facility]  [address]  [place] [address]   [phone]            Mar 07, 2018  1:15 PM CST   Return Visit with Yani Magana MD   [facility] ([facility])    [facility]  [address]  [place] [address]   [phone]            Mar 15, 2018 11:15 AM CDT   New Visit with María Galeas GC   Cancer Risk Management Program ([facility])    [facility]  [address]  [place] [address]   [phone]            Mar 21, 2018 11:00 AM CDT   Level 3 with  INFUSION CHAIR 2   [facility] ([facility])    [facility]  [address]  [place] [address]   [phone]              Who to contact     If you have questions or need follow up information about today's clinic visit or your schedule please contact [facility] directly at [phone].  Normal or non-critical lab and imaging results will be communicated to you by MyChart, letter or phone within 4 business days after the clinic has received the results. If you do not hear from us within 7 days, please contact the clinic through MyChart or phone. If you have a critical or abnormal lab result, we will notify  "you by phone as soon as possible.  Submit refill requests through [employer] or call your pharmacy and they will forward the refill request to us. Please allow 3 business days for your refill to be completed.          Additional Information About Your Visit        [email or web address] Information     [employer] lets you send messages to your doctor, view your test results, renew your prescriptions, schedule appointments and more. To sign up, go to [email or web address] . Click on \"Log in\" on the left side of the screen, which will take you to the Welcome page. Then click on \"Sign up Now\" on the right side of the page.     You will be asked to enter the access code listed below, as well as some personal information. Please follow the directions to create your username and password.     Your access code is: [email or web address]  Expires: 2018  2:02 PM     Your access code will  in 90 days. If you need help or a new code, please call your [facility] clinic or [phone].        Care EveryWhere ID     This is your Care EveryWhere ID. This could be used by other organizations to access your [facility] medical records  [id number]        Your Vitals Were     Pulse Temperature Respirations Last Period Pulse Oximetry BMI (Body Mass Index)    91 97.5  F (36.4  C) (Oral) 16 2004 (Approximate) 96% 28.58 kg/m2       Blood Pressure from Last 3 Encounters:   18 123/86   18 124/63   18 119/71    Weight from Last 3 Encounters:   18 80.3 kg (177 lb)   18 81.5 kg (179 lb 9.6 oz)   18 81.5 kg (179 lb 9.6 oz)              Today, you had the following     No orders found for display         Today's Medication Changes          These changes are accurate as of 18  1:28 PM.  If you have any questions, ask your nurse or doctor.               Stop taking these medicines if you haven't already. Please contact your care team if you have questions.     levofloxacin 500 MG tablet   Commonly known as:  LEVAQUIN     "       LORazepam 0.5 MG tablet   Commonly known as:  ATIVAN           ondansetron 8 MG tablet   Commonly known as:  ZOFRAN           prochlorperazine 10 MG tablet   Commonly known as:  COMPAZINE                    Primary Care Provider Office Phone # Fax #    [facility] [phone] [address] [facility] [address]        Equal Access to Services     [facility] Center[facility]ICK[facility] : Hadii aad ku hadasho Soomaali, waaxda luqadaha, qaybta kaalmada adeegyada, waxay idiin hayaan adeeg khhyunsh laclementina . So [facility] [phone].    ATENCIÓN: Si habla español, tiene a thomas disposición servicios gratuitos de asistencia lingüística. Pino al [phone].    We comply with applicable federal civil rights laws and [place] laws. We do not discriminate on the basis of race, color, national origin, age, disability, sex, sexual orientation, or gender identity.            Thank you!     Thank you for choosing [facility]  for your care. Our goal is always to provide you with excellent care. Hearing back from our patients is one way we can continue to improve our services. Please take a few minutes to complete the written survey that you may receive in the mail after your visit with us. Thank you!             Your Updated Medication List - Protect others around you: Learn how to safely use, store and throw away your medicines at www.disposemymeds.org.          This list is accurate as of 2/21/18  1:28 PM.  Always use your most recent med list.                   Brand Name Dispense Instructions for use Diagnosis    ADVIL PO      Take 400 mg by mouth every 6 hours as needed for moderate pain        aspirin 81 MG tablet      Take 81 mg by mouth daily        GLUCOSAMINE CHONDROITIN JOINT PO      Take by mouth daily        HYDROcodone-acetaminophen 5-325 MG per tablet    NORCO    6 tablet    Take 1 tablet by mouth every 6 hours as needed for moderate to severe pain    Carcinoma of unknown origin (H)        OMEGA-3 FISH OIL PO      Take 1 g by mouth daily        valACYclovir 1000 mg tablet    VALTREX    20 tablet    Take 1 tablet (1,000 mg) by mouth 2 times daily    Cold sore       VITAMIN B6 PO      Take by mouth daily        VITAMIN D (CHOLECALCIFEROL) PO      Take 1,000 Units by mouth daily

## 2018-02-21 NOTE — PATIENT INSTRUCTIONS
1- Start dose dense AC on 3/7  Scheduled/janice  2- Chemo education today/ Done- CW  3- RTC MD 2 weeks with infusion clinic appointment  Scheduled/janice      AVS printed & given to patient/cristina

## 2018-02-21 NOTE — LETTER
"    2/21/2018         RE: Flor Griffin  [address]  [place] [address]        Dear Colleague,    Thank you for referring your patient, Flor Griffin, to the [facility]. Please see a copy of my visit note below.    Oncology Rooming Note    February 21, 2018 12:08 PM   Flor Griffin is a 62 year old female who presents for:    Chief Complaint   Patient presents with     Oncology Clinic Visit     Breast cancer     Initial Vitals: /86 (BP Location: Left arm, Patient Position: Sitting, Cuff Size: Adult Large)  Pulse 91  Temp 97.5  F (36.4  C) (Oral)  Resp 16  Wt 80.3 kg (177 lb)  LMP 01/01/2004 (Approximate)  SpO2 96%  BMI 28.58 kg/m2 Estimated body mass index is 28.58 kg/(m^2) as calculated from the following:    Height as of 2/14/18: 1.676 m (5' 5.98\").    Weight as of this encounter: 80.3 kg (177 lb). Body surface area is 1.93 meters squared.  No Pain (0) Comment: Data Unavailable   Patient's last menstrual period was 01/01/2004 (approximate).  Allergies reviewed: Yes  Medications reviewed: Yes    Medications: Medication refills not needed today.  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  Initially she was seen 11/22/2017 after she had the right supraclavicular lymph node biopsy which was positive for poorly differentiated adenocarcinoma.  She had this lymph node almost a month and had an excisional biopsy performed which was consistent with the diagnosis of cancer.      A pet CT scan 11/27/2017 showed hypermetabolic right supraclavicular, right axillary and " subpectoral adenopathy.  Otherwise, no distant metastatic disease.     Mammogram 11/30/17- negative    TREATMENT: Carboplatin and Taxol completed 4 cycles.      SUBJECTIVE:  The patient was seen as a followup today. She is tolerating treatment well. Mild neuropathy is unchanged. We reviewed the results of PET CT scan and discussed further plan of care.     REVIEW OF SYSTEMS:  A complete review of systems was performed and found to be negative other than pertinent positives mentioned in history of present illness.      Past medical, social histories reviewed.     Meds- Reviewed.     PHYSICAL EXAMINATION:   VITAL SIGNS: /86 (BP Location: Left arm, Patient Position: Sitting, Cuff Size: Adult Large)  Pulse 91  Temp 97.5  F (36.4  C) (Oral)  Resp 16  Wt 80.3 kg (177 lb)  LMP 01/01/2004 (Approximate)  SpO2 96%  BMI 28.58 kg/m2  CONSTITUTIONAL: Appears tired.   NEUROLOGIC: Alert, awake.   PSYCH: Anxious.      LABORATORY DATA AND IMAGING REVIEWED DURING THIS VISIT:  Recent Labs   Lab Test  02/20/18   0805  02/14/18   1103  01/24/18   0926   NA   --   138  138   POTASSIUM   --   3.8  4.0   CHLORIDE   --   105  104   CO2   --   27  25   ANIONGAP   --   6  9   BUN   --   14  16   CR   --   0.72  0.68   GLC  95  105*  103*   EDILSON   --   8.7  9.1     Recent Labs   Lab Test  02/14/18   1103  01/24/18   0926  01/03/18   0933   WBC  4.2  4.0  4.0   HGB  11.0*  11.1*  11.8   PLT  271  284  265   MCV  95  94  94   NEUTROPHIL  53.2  53.1  57.9     Recent Labs   Lab Test  02/14/18   1103  01/24/18   0926  01/03/18   0933   BILITOTAL  0.4  0.5  0.7   ALKPHOS  101  110  86   ALT  41  51*  75*   AST  21  24  40   ALBUMIN  3.5  3.6  3.5         Results for orders placed or performed in visit on 02/20/18   PET Oncology Whole Body    Narrative    Combined Report of:    PET and CT on  2/20/2018 9:40 AM :    1. PET of the neck, chest, abdomen, and pelvis.  2. PET CT Fusion for Attenuation Correction and Anatomical  Localization:     3. Diagnostic CT scan of the chest, abdomen, and pelvis with  intravenous contrast for interpretation.  3. CT of the chest, abdomen and pelvis obtained for diagnostic  interpretation.  4. 3D MIP and PET-CT fused images were processed on an independent  workstation and archived to PACS and reviewed by a radiologist.    Technique:    1. PET: The patient received 16.69 mCi of F-18-FDG; the serum glucose  was 95 prior to administration, body weight was 81.5 kg. Images were  evaluated in the axial, sagittal, and coronal planes as well as the  rotational whole body MIP. Images were acquired from the Vertex to the  Feet.    UPTAKE WAS MEASURED AT 60 MINUTES.     BACKGROUND:  Liver SUV max= 4.2,  Aorta Blood SUV Max: 3.3.     2. CT: Volumetric acquisition for clinical interpretation of the  chest, abdomen, and pelvis acquired at 3 mm sections . The chest,  abdomen, and pelvis were evaluated at 5 mm sections in bone, soft  tissue, and lung windows.      The patient received 100 cc Optiray 320  intravenously for the  examination.      3. 3D MIP and PET-CT fused images were processed on an independent  workstation and archived to PACS and reviewed by a radiologist.    INDICATION: Carcinoma of unknown origin (H)    ADDITIONAL INFORMATION OBTAINED FROM EMR: Patient is a 62-year-old  female with poorly differentiated adenocarcinoma, likely of breast  origin. Patient treatment includes carboplatin and Taxol.    COMPARISON: PET/CT dated 11/27/2017. Breast MRI dated 12/20/2017.  Ultrasound dated 11/9/2017.    FINDINGS:     HEAD/NECK:  Right supraclavicular lymph node measures up to 0.9 cm (series 3 image  249), maximum SUV 6.82, previously 1.1 cm and maximum SUV 10.    The paranasal sinuses are clear. The mastoid air cells are clear.     The mucosal pharyngeal space, the , prevertebral and carotid  spaces are within normal limits.     No masses, mass effect or pathologically enlarged lymph nodes are  evident. The thyroid  gland is unremarkable.    CHEST:  Right axillary lymph nodes are decreased in size and FDG avidity when  compared with prior exam:  - Right axillary lymph node measures 1.2 cm with maximum SUV 6.64  (series 3 image 339), previously 1.3 cm with maximum SUV 13.3.  - Right subpectoral lymph node measures 1.3 cm with maximum SUV 10.32  (series 3 image 317), previously 1.8 cm with maximum SUV 25.3.    The central tracheobronchial tree is patent. No focal consolidation,  pleural effusion or pneumothorax.    Left chest wall port tip is in the right atrium. The heart is not  enlarged. There is no pericardial effusion.     ABDOMEN AND PELVIS:  There is no suspicious FDG uptake in the abdomen or pelvis.    There are no suspicious hepatic lesions. There is no splenomegaly or  evidence for splenic or pancreatic mass lesion.  There are no suspicious adrenal mass lesions or opaque gallbladder  calculi.  There is symmetric nephrographic renal phase without  hydronephrosis. Left parapelvic cyst is unchanged.    The bowel is nondilated. Large colonic stool burden. Colonic  diverticulosis without diverticulitis. The appendix is normal.    LOWER EXTREMITIES:   No abnormal masses or hypermetabolic lesions.    BONES:   There are no suspicious lytic or blastic osseous lesions.  Diffuse  uptake of the bone marrow is consistent with marrow reactivation.  There is no abnormal FDG uptake in the skeleton.      Impression    IMPRESSION:   1. In this patient with history of sarcoma of unknown origin, there  has been a positive response to therapy:  a. Decreased size and metabolic activity of right supraclavicular and  right axillary lymphadenopathy when compared with exam dated  11/27/2017. However there are residual hypermetabolic abnormal  lymphadenopathy in the same stations. no new lesions/  lymphoadenopathy.   b. No evidence for additional malignancy in the chest, abdomen or  pelvis.    I have personally reviewed the examination and  initial interpretation  and I agree with the findings.    MICAH PHILLIPS MD        ECOG performance status 0.      ASSESSMENT AND PLAN:  This is a 62-year-old lady who has diagnosis of 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  Tumor cells were positive for CK7, CK5/6 and GATA3.  We did tumor marker testing and she had CA 27-29 at the level of 12,  of 16, CA 19-9 at 19.  Her CEA was less than 0.5.      PET CT scan results was done 11/27/2017.  showed hypermetabolic right supraclavicular, right axillary and subpectoral adenopathy.  There was no evidence of distant metastases.      The distribution of adenopathy indicates possibility breast cancer which has metastasized to the regional lymph nodes and we are not able to find a primary on physical exam and a PET scan.     Mammogram 11/30/17 no evidence of primary. Her-2 non amplified. Androgen receptor 10-20%. ER 1-5%. SC < 1%.   ENT examination by Dr. Miller was negative. EGD was negative as well. MRI breast 12/20/17 no evidence of malignancy in the breasts.    She is getting chemotherapy carboplatin/Taxol. Completed 4 cycles.   - PET Scan 2/20/18 results were reviewed with the patient. I reviewed the images myself. She has good response to treatment.   - We discussed about switching to dose dense AC for 4 cycles. We had a detailed discussion about each chemotherapy agent, intent of treatment and also potential risks and benefits. We discussed the adverse effects related to each of chemotherapy agent. Patient asked questions and expressed willingness to proceed with the treatment.   - Neuropathy, mild in nature- monitor.      PLAN:   1- Start dose dense AC on 3/7    2- Chemo education today  3- RTC MD 2 weeks with infusion clinic appointment    ERON BARNES MD    2/21/2018     cc: Waylon Liu MD       Again, thank you for allowing me to participate in the care of your patient.         Sincerely,        Yani Magana MD

## 2018-02-21 NOTE — PROGRESS NOTES
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  Initially she was seen 11/22/2017 after she had the right supraclavicular lymph node biopsy which was positive for poorly differentiated adenocarcinoma.  She had this lymph node almost a month and had an excisional biopsy performed which was consistent with the diagnosis of cancer.      A pet CT scan 11/27/2017 showed hypermetabolic right supraclavicular, right axillary and subpectoral adenopathy.  Otherwise, no distant metastatic disease.     Mammogram 11/30/17- negative    TREATMENT: Carboplatin and Taxol completed 4 cycles.      SUBJECTIVE:  The patient was seen as a followup today. She is tolerating treatment well. Mild neuropathy is unchanged. We reviewed the results of PET CT scan and discussed further plan of care.     REVIEW OF SYSTEMS:  A complete review of systems was performed and found to be negative other than pertinent positives mentioned in history of present illness.      Past medical, social histories reviewed.     Meds- Reviewed.     PHYSICAL EXAMINATION:   VITAL SIGNS: /86 (BP Location: Left arm, Patient Position: Sitting, Cuff Size: Adult Large)  Pulse 91  Temp 97.5  F (36.4  C) (Oral)  Resp 16  Wt 80.3 kg (177 lb)  LMP 01/01/2004 (Approximate)  SpO2 96%  BMI 28.58 kg/m2  CONSTITUTIONAL: Appears tired.   NEUROLOGIC: Alert, awake.   PSYCH: Anxious.      LABORATORY DATA AND IMAGING REVIEWED DURING THIS VISIT:  Recent Labs   Lab Test  02/20/18   0805  02/14/18   1103  01/24/18   0926   NA   --   138  138   POTASSIUM   --   3.8  4.0   CHLORIDE   --   105  104   CO2   --   27  25   ANIONGAP   --   6  9   BUN   --   14  16   CR   --   0.72  0.68   GLC  95  105*  103*   EDILSON   --   8.7  9.1     Recent Labs   Lab Test  02/14/18   1103  01/24/18   0926  01/03/18   0933   WBC  4.2  4.0  4.0   HGB  11.0*   11.1*  11.8   PLT  271  284  265   MCV  95  94  94   NEUTROPHIL  53.2  53.1  57.9     Recent Labs   Lab Test  02/14/18   1103  01/24/18   0926  01/03/18   0933   BILITOTAL  0.4  0.5  0.7   ALKPHOS  101  110  86   ALT  41  51*  75*   AST  21  24  40   ALBUMIN  3.5  3.6  3.5         Results for orders placed or performed in visit on 02/20/18   PET Oncology Whole Body    Narrative    Combined Report of:    PET and CT on  2/20/2018 9:40 AM :    1. PET of the neck, chest, abdomen, and pelvis.  2. PET CT Fusion for Attenuation Correction and Anatomical  Localization:    3. Diagnostic CT scan of the chest, abdomen, and pelvis with  intravenous contrast for interpretation.  3. CT of the chest, abdomen and pelvis obtained for diagnostic  interpretation.  4. 3D MIP and PET-CT fused images were processed on an independent  workstation and archived to PACS and reviewed by a radiologist.    Technique:    1. PET: The patient received 16.69 mCi of F-18-FDG; the serum glucose  was 95 prior to administration, body weight was 81.5 kg. Images were  evaluated in the axial, sagittal, and coronal planes as well as the  rotational whole body MIP. Images were acquired from the Vertex to the  Feet.    UPTAKE WAS MEASURED AT 60 MINUTES.     BACKGROUND:  Liver SUV max= 4.2,  Aorta Blood SUV Max: 3.3.     2. CT: Volumetric acquisition for clinical interpretation of the  chest, abdomen, and pelvis acquired at 3 mm sections . The chest,  abdomen, and pelvis were evaluated at 5 mm sections in bone, soft  tissue, and lung windows.      The patient received 100 cc Optiray 320  intravenously for the  examination.      3. 3D MIP and PET-CT fused images were processed on an independent  workstation and archived to PACS and reviewed by a radiologist.    INDICATION: Carcinoma of unknown origin (H)    ADDITIONAL INFORMATION OBTAINED FROM EMR: Patient is a 62-year-old  female with poorly differentiated adenocarcinoma, likely of breast  origin. Patient  treatment includes carboplatin and Taxol.    COMPARISON: PET/CT dated 11/27/2017. Breast MRI dated 12/20/2017.  Ultrasound dated 11/9/2017.    FINDINGS:     HEAD/NECK:  Right supraclavicular lymph node measures up to 0.9 cm (series 3 image  249), maximum SUV 6.82, previously 1.1 cm and maximum SUV 10.    The paranasal sinuses are clear. The mastoid air cells are clear.     The mucosal pharyngeal space, the , prevertebral and carotid  spaces are within normal limits.     No masses, mass effect or pathologically enlarged lymph nodes are  evident. The thyroid gland is unremarkable.    CHEST:  Right axillary lymph nodes are decreased in size and FDG avidity when  compared with prior exam:  - Right axillary lymph node measures 1.2 cm with maximum SUV 6.64  (series 3 image 339), previously 1.3 cm with maximum SUV 13.3.  - Right subpectoral lymph node measures 1.3 cm with maximum SUV 10.32  (series 3 image 317), previously 1.8 cm with maximum SUV 25.3.    The central tracheobronchial tree is patent. No focal consolidation,  pleural effusion or pneumothorax.    Left chest wall port tip is in the right atrium. The heart is not  enlarged. There is no pericardial effusion.     ABDOMEN AND PELVIS:  There is no suspicious FDG uptake in the abdomen or pelvis.    There are no suspicious hepatic lesions. There is no splenomegaly or  evidence for splenic or pancreatic mass lesion.  There are no suspicious adrenal mass lesions or opaque gallbladder  calculi.  There is symmetric nephrographic renal phase without  hydronephrosis. Left parapelvic cyst is unchanged.    The bowel is nondilated. Large colonic stool burden. Colonic  diverticulosis without diverticulitis. The appendix is normal.    LOWER EXTREMITIES:   No abnormal masses or hypermetabolic lesions.    BONES:   There are no suspicious lytic or blastic osseous lesions.  Diffuse  uptake of the bone marrow is consistent with marrow reactivation.  There is no abnormal  FDG uptake in the skeleton.      Impression    IMPRESSION:   1. In this patient with history of sarcoma of unknown origin, there  has been a positive response to therapy:  a. Decreased size and metabolic activity of right supraclavicular and  right axillary lymphadenopathy when compared with exam dated  11/27/2017. However there are residual hypermetabolic abnormal  lymphadenopathy in the same stations. no new lesions/  lymphoadenopathy.   b. No evidence for additional malignancy in the chest, abdomen or  pelvis.    I have personally reviewed the examination and initial interpretation  and I agree with the findings.    MICAH PHILLIPS MD        ECOG performance status 0.      ASSESSMENT AND PLAN:  This is a 62-year-old lady who has diagnosis of 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  Tumor cells were positive for CK7, CK5/6 and GATA3.  We did tumor marker testing and she had CA 27-29 at the level of 12,  of 16, CA 19-9 at 19.  Her CEA was less than 0.5.      PET CT scan results was done 11/27/2017.  showed hypermetabolic right supraclavicular, right axillary and subpectoral adenopathy.  There was no evidence of distant metastases.      The distribution of adenopathy indicates possibility breast cancer which has metastasized to the regional lymph nodes and we are not able to find a primary on physical exam and a PET scan.     Mammogram 11/30/17 no evidence of primary. Her-2 non amplified. Androgen receptor 10-20%. ER 1-5%. IA < 1%.   ENT examination by Dr. Miller was negative. EGD was negative as well. MRI breast 12/20/17 no evidence of malignancy in the breasts.    She is getting chemotherapy carboplatin/Taxol. Completed 4 cycles.   - PET Scan 2/20/18 results were reviewed with the patient. I reviewed the images myself. She has good response to treatment.   - We discussed about switching to dose dense AC for 4 cycles. We had a detailed discussion about each  chemotherapy agent, intent of treatment and also potential risks and benefits. We discussed the adverse effects related to each of chemotherapy agent. Patient asked questions and expressed willingness to proceed with the treatment.   - Neuropathy, mild in nature- monitor.      PLAN:   1- Start dose dense AC on 3/7    2- Chemo education today  3- RTC MD 2 weeks with infusion clinic appointment    ERON BARNSE MD    2/21/2018     cc: Waylon Liu MD

## 2018-02-21 NOTE — PROGRESS NOTES
"Oncology Rooming Note    February 21, 2018 12:08 PM   Flor Griffin is a 62 year old female who presents for:    Chief Complaint   Patient presents with     Oncology Clinic Visit     Breast cancer     Initial Vitals: /86 (BP Location: Left arm, Patient Position: Sitting, Cuff Size: Adult Large)  Pulse 91  Temp 97.5  F (36.4  C) (Oral)  Resp 16  Wt 80.3 kg (177 lb)  LMP 01/01/2004 (Approximate)  SpO2 96%  BMI 28.58 kg/m2 Estimated body mass index is 28.58 kg/(m^2) as calculated from the following:    Height as of 2/14/18: 1.676 m (5' 5.98\").    Weight as of this encounter: 80.3 kg (177 lb). Body surface area is 1.93 meters squared.  No Pain (0) Comment: Data Unavailable   Patient's last menstrual period was 01/01/2004 (approximate).  Allergies reviewed: Yes  Medications reviewed: Yes    Medications: Medication refills not needed today.  Pharm[facility] entered into [employer]: [id number] [id number]    Clinical concerns: None                   4 minutes for nursing intake (face to face time)     Angie Parry MA            "

## 2018-03-07 ENCOUNTER — DOCUMENTATION ONLY (OUTPATIENT)
Dept: PHARMACY | Facility: CLINIC | Age: 63
End: 2018-03-07

## 2018-03-07 ENCOUNTER — ONCOLOGY VISIT (OUTPATIENT)
Dept: ONCOLOGY | Facility: CLINIC | Age: 63
End: 2018-03-07
Attending: INTERNAL MEDICINE
Payer: COMMERCIAL

## 2018-03-07 ENCOUNTER — INFUSION THERAPY VISIT (OUTPATIENT)
Dept: INFUSION THERAPY | Facility: CLINIC | Age: 63
End: 2018-03-07
Attending: INTERNAL MEDICINE
Payer: COMMERCIAL

## 2018-03-07 ENCOUNTER — HOSPITAL ENCOUNTER (OUTPATIENT)
Facility: CLINIC | Age: 63
Setting detail: SPECIMEN
Discharge: HOME | End: 2018-03-07
Attending: INTERNAL MEDICINE | Admitting: INTERNAL MEDICINE
Payer: COMMERCIAL

## 2018-03-07 VITALS
HEART RATE: 81 BPM | TEMPERATURE: 98.1 F | RESPIRATION RATE: 20 BRPM | HEIGHT: 66 IN | SYSTOLIC BLOOD PRESSURE: 130 MMHG | DIASTOLIC BLOOD PRESSURE: 86 MMHG | BODY MASS INDEX: 28.7 KG/M2 | WEIGHT: 178.6 LBS

## 2018-03-07 VITALS
HEART RATE: 70 BPM | DIASTOLIC BLOOD PRESSURE: 77 MMHG | TEMPERATURE: 98.1 F | BODY MASS INDEX: 28.7 KG/M2 | HEIGHT: 66 IN | RESPIRATION RATE: 16 BRPM | WEIGHT: 178.6 LBS | SYSTOLIC BLOOD PRESSURE: 120 MMHG

## 2018-03-07 DIAGNOSIS — C50.811 MALIGNANT NEOPLASM OF OVERLAPPING SITES OF RIGHT BREAST IN FEMALE, ESTROGEN RECEPTOR POSITIVE (H): Primary | ICD-10-CM

## 2018-03-07 DIAGNOSIS — Z17.0 MALIGNANT NEOPLASM OF OVERLAPPING SITES OF RIGHT BREAST IN FEMALE, ESTROGEN RECEPTOR POSITIVE (H): Primary | ICD-10-CM

## 2018-03-07 DIAGNOSIS — C80.1 CARCINOMA OF UNKNOWN ORIGIN (H): Primary | ICD-10-CM

## 2018-03-07 DIAGNOSIS — C80.1 CARCINOMA OF UNKNOWN ORIGIN (H): ICD-10-CM

## 2018-03-07 LAB
ALBUMIN SERPL-MCNC: 3.6 G/DL (ref 3.4–5)
ALP SERPL-CCNC: 109 U/L (ref 40–150)
ALT SERPL W P-5'-P-CCNC: 60 U/L (ref 0–50)
ANION GAP SERPL CALCULATED.3IONS-SCNC: 8 MMOL/L (ref 3–14)
AST SERPL W P-5'-P-CCNC: 35 U/L (ref 0–45)
BASOPHILS # BLD AUTO: 0.1 10E9/L (ref 0–0.2)
BASOPHILS NFR BLD AUTO: 1.2 %
BILIRUB SERPL-MCNC: 0.4 MG/DL (ref 0.2–1.3)
BUN SERPL-MCNC: 16 MG/DL (ref 7–30)
CALCIUM SERPL-MCNC: 8.5 MG/DL (ref 8.5–10.1)
CHLORIDE SERPL-SCNC: 103 MMOL/L (ref 94–109)
CO2 SERPL-SCNC: 28 MMOL/L (ref 20–32)
CREAT SERPL-MCNC: 0.63 MG/DL (ref 0.52–1.04)
DIFFERENTIAL METHOD BLD: ABNORMAL
EOSINOPHIL # BLD AUTO: 0.1 10E9/L (ref 0–0.7)
EOSINOPHIL NFR BLD AUTO: 2 %
ERYTHROCYTE [DISTWIDTH] IN BLOOD BY AUTOMATED COUNT: 15.5 % (ref 10–15)
GFR SERPL CREATININE-BSD FRML MDRD: >90 ML/MIN/1.7M2
GLUCOSE SERPL-MCNC: 99 MG/DL (ref 70–99)
HCT VFR BLD AUTO: 33.5 % (ref 35–47)
HGB BLD-MCNC: 11.3 G/DL (ref 11.7–15.7)
IMM GRANULOCYTES # BLD: 0 10E9/L (ref 0–0.4)
IMM GRANULOCYTES NFR BLD: 0 %
LYMPHOCYTES # BLD AUTO: 1.3 10E9/L (ref 0.8–5.3)
LYMPHOCYTES NFR BLD AUTO: 32.5 %
MCH RBC QN AUTO: 32.5 PG (ref 26.5–33)
MCHC RBC AUTO-ENTMCNC: 33.7 G/DL (ref 31.5–36.5)
MCV RBC AUTO: 96 FL (ref 78–100)
MONOCYTES # BLD AUTO: 0.5 10E9/L (ref 0–1.3)
MONOCYTES NFR BLD AUTO: 13.2 %
NEUTROPHILS # BLD AUTO: 2.1 10E9/L (ref 1.6–8.3)
NEUTROPHILS NFR BLD AUTO: 51.1 %
NRBC # BLD AUTO: 0 10*3/UL
NRBC BLD AUTO-RTO: 0 /100
PLATELET # BLD AUTO: 258 10E9/L (ref 150–450)
POTASSIUM SERPL-SCNC: 3.7 MMOL/L (ref 3.4–5.3)
PROT SERPL-MCNC: 7.2 G/DL (ref 6.8–8.8)
RBC # BLD AUTO: 3.48 10E12/L (ref 3.8–5.2)
SODIUM SERPL-SCNC: 139 MMOL/L (ref 133–144)
WBC # BLD AUTO: 4 10E9/L (ref 4–11)

## 2018-03-07 PROCEDURE — 99213 OFFICE O/P EST LOW 20 MIN: CPT | Performed by: INTERNAL MEDICINE

## 2018-03-07 PROCEDURE — 80053 COMPREHEN METABOLIC PANEL: CPT | Performed by: INTERNAL MEDICINE

## 2018-03-07 PROCEDURE — 25000128 H RX IP 250 OP 636: Performed by: INTERNAL MEDICINE

## 2018-03-07 PROCEDURE — 96377 APPLICATON ON-BODY INJECTOR: CPT | Mod: XS

## 2018-03-07 PROCEDURE — 96375 TX/PRO/DX INJ NEW DRUG ADDON: CPT

## 2018-03-07 PROCEDURE — 96367 TX/PROPH/DG ADDL SEQ IV INF: CPT

## 2018-03-07 PROCEDURE — 85025 COMPLETE CBC W/AUTO DIFF WBC: CPT | Performed by: INTERNAL MEDICINE

## 2018-03-07 PROCEDURE — 96413 CHEMO IV INFUSION 1 HR: CPT

## 2018-03-07 PROCEDURE — 96411 CHEMO IV PUSH ADDL DRUG: CPT

## 2018-03-07 RX ORDER — PALONOSETRON 0.05 MG/ML
0.25 INJECTION, SOLUTION INTRAVENOUS ONCE
Status: COMPLETED | OUTPATIENT
Start: 2018-03-07 | End: 2018-03-07

## 2018-03-07 RX ORDER — LORAZEPAM 0.5 MG/1
0.5 TABLET ORAL EVERY 4 HOURS PRN
Qty: 30 TABLET | Refills: 3 | Status: SHIPPED | OUTPATIENT
Start: 2018-03-07 | End: 2018-12-06

## 2018-03-07 RX ORDER — DEXAMETHASONE 4 MG/1
8 TABLET ORAL DAILY
Qty: 6 TABLET | Refills: 3 | Status: SHIPPED | OUTPATIENT
Start: 2018-03-07 | End: 2018-04-04

## 2018-03-07 RX ORDER — PROCHLORPERAZINE MALEATE 10 MG
10 TABLET ORAL EVERY 6 HOURS PRN
Qty: 30 TABLET | Refills: 3 | Status: SHIPPED | OUTPATIENT
Start: 2018-03-07 | End: 2018-05-18

## 2018-03-07 RX ORDER — DOXORUBICIN HYDROCHLORIDE 2 MG/ML
120 INJECTION, SOLUTION INTRAVENOUS ONCE
Status: COMPLETED | OUTPATIENT
Start: 2018-03-07 | End: 2018-03-07

## 2018-03-07 RX ORDER — HEPARIN SODIUM (PORCINE) LOCK FLUSH IV SOLN 100 UNIT/ML 100 UNIT/ML
500 SOLUTION INTRAVENOUS ONCE
Status: COMPLETED | OUTPATIENT
Start: 2018-03-07 | End: 2018-03-07

## 2018-03-07 RX ADMIN — PEGFILGRASTIM 6 MG: KIT SUBCUTANEOUS at 15:17

## 2018-03-07 RX ADMIN — DEXAMETHASONE SODIUM PHOSPHATE: 10 INJECTION, SOLUTION INTRAMUSCULAR; INTRAVENOUS at 14:17

## 2018-03-07 RX ADMIN — SODIUM CHLORIDE 250 ML: 9 INJECTION, SOLUTION INTRAVENOUS at 14:12

## 2018-03-07 RX ADMIN — DOXORUBICIN HYDROCHLORIDE 120 MG: 2 INJECTION, SOLUTION INTRAVENOUS at 14:44

## 2018-03-07 RX ADMIN — PALONOSETRON HYDROCHLORIDE 0.25 MG: 0.25 INJECTION INTRAVENOUS at 14:12

## 2018-03-07 RX ADMIN — HEPARIN 500 UNITS: 100 SYRINGE at 15:43

## 2018-03-07 RX ADMIN — CYCLOPHOSPHAMIDE 1200 MG: 1 INJECTION, POWDER, FOR SOLUTION INTRAVENOUS; ORAL at 15:06

## 2018-03-07 NOTE — PROGRESS NOTES
Counseled patient on dexamethasone, lorazepam, and compazine about side effects and how to take the medications. Reviewed when to call the pharmacy if needed.     -Giorgio Martin  PharmD Student    Minoo Stephenson PharmD  March 7, 2018

## 2018-03-07 NOTE — PROGRESS NOTES
"Oncology Rooming Note    March 7, 2018 1:23 PM   Flor Griffin is a 62 year old female who presents for:    Chief Complaint   Patient presents with     Oncology Clinic Visit     Initial Vitals: /86  Pulse 81  Temp 98.1  F (36.7  C) (Oral)  Resp 20  Ht 1.676 m (5' 5.98\")  Wt 81 kg (178 lb 9.6 oz)  LMP 01/01/2004 (Approximate)  BMI 28.84 kg/m2 Estimated body mass index is 28.84 kg/(m^2) as calculated from the following:    Height as of this encounter: 1.676 m (5' 5.98\").    Weight as of this encounter: 81 kg (178 lb 9.6 oz). Body surface area is 1.94 meters squared.  Data Unavailable Comment: Data Unavailable   Patient's last menstrual period was 01/01/2004 (approximate).  Allergies reviewed: Yes  Medications reviewed: Yes    Medications: Medication refills not needed today.  Pharmacy name entered into [employer]: [facility] [place] [id number]    Clinical concerns: None     2 minutes for nursing intake (face to face time)     Mary Jo James CMA              "

## 2018-03-07 NOTE — MR AVS SNAPSHOT
After Visit Summary   3/7/2018    Flor Griffin    MRN: [id number]           Patient Information     Date Of Birth          1955        Visit Information        Provider Department      3/7/2018 1:15 PM Yani Magana MD [facility]         Follow-ups after your visit        Your next 10 appointments already scheduled     Mar 07, 2018  1:15 PM CST   Return Visit with Yani Magana MD   [facility] ([facility])    [facility]  [address]  [place] [address]   [phone]            Mar 15, 2018 11:15 AM CDT   New Visit with María Galeas GC   Cancer Risk Management Program ([facility])    [facility]  [address]  [place] [address]   [phone]            Mar 21, 2018 11:00 AM CDT   Level 3 with  INFUSION CHAIR 2   [facility] ([facility])    [facility]  [address]  [place] [address]   [phone]              Who to contact     If you have questions or need follow up information about today's clinic visit or your schedule please contact [facility] directly at [phone].  Normal or non-critical lab and imaging results will be communicated to you by InternetVistahart, letter or phone within 4 business days after the clinic has received the results. If you do not hear from us within 7 days, please contact the clinic through InternetVistahart or phone. If you have a critical or abnormal lab result, we will notify you by phone as soon as possible.  Submit refill requests through Pix4D or call your pharmacy and they will forward the refill request to us. Please allow 3 business days for your refill to be completed.          Additional Information About Your Visit        InternetVistaharLuminous Medical Information     Pix4D lets you send messages to your doctor, view your test results, renew your prescriptions,  "schedule appointments and more. To sign up, go to [email or web address] . Click on \"Log in\" on the left side of the screen, which will take you to the Welcome page. Then click on \"Sign up Now\" on the right side of the page.     You will be asked to enter the access code listed below, as well as some personal information. Please follow the directions to create your username and password.     Your access code is: [email or web address]  Expires: 2018  2:02 PM     Your access code will  in 90 days. If you need help or a new code, please call your [facility] clinic or [phone].        Care EveryWhere ID     This is your Care EveryWhere ID. This could be used by other organizations to access your [facility] medical records  [id number]        Your Vitals Were     Last Period                   2004 (Approximate)            Blood Pressure from Last 3 Encounters:   18 123/86   18 124/63   18 119/71    Weight from Last 3 Encounters:   18 80.3 kg (177 lb)   18 81.5 kg (179 lb 9.6 oz)   18 81.5 kg (179 lb 9.6 oz)              Today, you had the following     No orders found for display       Primary Care Provider Office Phone # Fax #    [facility] [phone]       [address] [facility] [address]        Equal Access to Services     MARYJANE JARA AH: Hadii nelida sono Sodot, waaxda luqadaha, qaybta kaalmada ketan keen [facility] con[facility]. So [facility] [phone].    ATENCIÓN: Si habla luis mañol, tiene a thomas disposición servicios gratuitos de asistencia lingüística. Llame al [phone].    We comply with applicable federal civil rights laws and [place] laws. We do not discriminate on the basis of race, color, national origin, age, disability, sex, sexual orientation, or gender identity.            Thank you!     Thank you for choosing [facility]  for your care. Our goal is always to provide you with " excellent care. Hearing back from our patients is one way we can continue to improve our services. Please take a few minutes to complete the written survey that you may receive in the mail after your visit with us. Thank you!             Your Updated Medication List - Protect others around you: Learn how to safely use, store and throw away your medicines at www.disposemymeds.org.          This list is accurate as of 3/7/18 12:35 PM.  Always use your most recent med list.                   Brand Name Dispense Instructions for use Diagnosis    ADVIL PO      Take 400 mg by mouth every 6 hours as needed for moderate pain        aspirin 81 MG tablet      Take 81 mg by mouth daily        GLUCOSAMINE CHONDROITIN JOINT PO      Take by mouth daily        HYDROcodone-acetaminophen 5-325 MG per tablet    NORCO    6 tablet    Take 1 tablet by mouth every 6 hours as needed for moderate to severe pain    Carcinoma of unknown origin (H)       OMEGA-3 FISH OIL PO      Take 1 g by mouth daily        valACYclovir 1000 mg tablet    VALTREX    20 tablet    Take 1 tablet (1,000 mg) by mouth 2 times daily    Cold sore       VITAMIN B6 PO      Take by mouth daily        VITAMIN D (CHOLECALCIFEROL) PO      Take 1,000 Units by mouth daily

## 2018-03-07 NOTE — PROGRESS NOTES
Infusion Nursing Note:  Flor Griffin presents today for C1D1.    Patient seen by provider today: Yes: Dr. Magana   present during visit today: Not Applicable.    Note: N/A.    Intravenous Access:  Labs drawn without difficulty.  Implanted Port.    Treatment Conditions:  Lab Results   Component Value Date    HGB 11.3 03/07/2018     Lab Results   Component Value Date    WBC 4.0 03/07/2018      Lab Results   Component Value Date    ANEU 2.1 03/07/2018     Lab Results   Component Value Date     03/07/2018      Lab Results   Component Value Date     03/07/2018                   Lab Results   Component Value Date    POTASSIUM 3.7 03/07/2018           No results found for: MAG         Lab Results   Component Value Date    CR 0.63 03/07/2018                   Lab Results   Component Value Date    EDILSON 8.5 03/07/2018                Lab Results   Component Value Date    BILITOTAL 0.4 03/07/2018           Lab Results   Component Value Date    ALBUMIN 3.6 03/07/2018                    Lab Results   Component Value Date    ALT 60 03/07/2018           Lab Results   Component Value Date    AST 35 03/07/2018       Results reviewed, labs MET treatment parameters, ok to proceed with treatment.  ECHO/MUGA completed 2/21/18  EF 69%.      Post Infusion Assessment:  Patient tolerated infusion without incident.  Blood return noted pre and post infusion.  Site patent and intact, free from redness, edema or discomfort.  No evidence of extravasations.  Access discontinued per protocol.    ONPRO  Was placed on patient's: back of right arm.    Was placed at 3:15 PM    ONPRO injector device Lot number: [id number]    Patient education included: that Neulasta administration will occur at 3/8 at 6:15pm.    Patient tolerated administration well.      Discharge Plan:   Prescription refills given for Dexamethasone, Ativan and Compazine.  Discharge instructions reviewed with: Patient.  Patient and/or family verbalized  understanding of discharge instructions and all questions answered.  Copy of AVS reviewed with patient and/or family.  Patient will return 3/15/2018 for next appointment.  Patient discharged in stable condition accompanied by: self and .  Departure Mode: Ambulatory.    XAVIER Fuller RN

## 2018-03-07 NOTE — MR AVS SNAPSHOT
After Visit Summary   3/7/2018    Flor Griffin    MRN: [id number]           Patient Information     Date Of Birth          1955        Visit Information        Provider Department      3/7/2018 12:30 PM  INFUSION CHAIR 13 [facility]        Today's Diagnoses     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 in female, estrogen receptor positive (H)    -  1    Carcinoma of unknown origin (H)           Follow-ups after your visit        Your next 10 appointments already scheduled     Mar 15, 2018 11:15 AM CDT   New Visit with María Galeas GC   Cancer Risk Management Program ([facility])    [facility]  [address]  [place] [address]   [phone]            Mar 15, 2018 12:30 PM CDT   Return Visit with HEDY Beth CNP   [facility] ([facility])    [facility]  [address]  [place] [address]   [phone]            Mar 15, 2018  2:00 PM CDT   Level 2 with  INFUSION CHAIR 8   [facility] ([facility])    [facility]  [address]  [place] [address]   [phone]            Mar 21, 2018  1:00 PM CDT   Level 3 with  INFUSION CHAIR 2   [facility] ([facility])    [facility]  [address]  [place] [address]   [phone]            Mar 21, 2018  1:45 PM CDT   Return Visit with Yani Magana MD   [facility] ([facility])    [facility]  [address]  [place] [address]   [phone]              Future tests that were ordered for you today     Open Future Orders        Priority Expected Expires Ordered    CBC with platelets differential Routine  4/7/2018 3/7/2018    Comprehensive metabolic panel Routine   "2018 3/7/2018            Who to contact     If you have questions or need follow up information about today's clinic visit or your schedule please contact [facility] directly at [phone].  Normal or non-critical lab and imaging results will be communicated to you by "[employer]hart, letter or phone within 4 business days after the clinic has received the results. If you do not hear from us within 7 days, please contact the clinic through "[employer]hart or phone. If you have a critical or abnormal lab result, we will notify you by phone as soon as possible.  Submit refill requests through [employer] or call your pharmacy and they will forward the refill request to us. Please allow 3 business days for your refill to be completed.          Additional Information About Your Visit        "[employer] Information     [employer] lets you send messages to your doctor, view your test results, renew your prescriptions, schedule appointments and more. To sign up, go to [email or web address] . Click on \"Log in\" on the left side of the screen, which will take you to the Welcome page. Then click on \"Sign up Now\" on the right side of the page.     You will be asked to enter the access code listed below, as well as some personal information. Please follow the directions to create your username and password.     Your access code is: [email or web address]  Expires: 2018  2:02 PM     Your access code will  in 90 days. If you need help or a new code, please call your [place] clinic or [phone].        Care EveryWhere ID     This is your Care EveryWhere ID. This could be used by other organizations to access your [place] medical records  [id number]        Your Vitals Were     Pulse Temperature Respirations Height Last Period BMI (Body Mass Index)    81 98.1  F (36.7  C) (Oral) 20 1.676 m (5' 5.98\") 2004 (Approximate) 28.84 kg/m2       Blood Pressure from Last 3 Encounters:   18 130/86   18 " 130/86   02/21/18 123/86    Weight from Last 3 Encounters:   03/07/18 81 kg (178 lb 9.6 oz)   03/07/18 81 kg (178 lb 9.6 oz)   02/21/18 80.3 kg (177 lb)              We Performed the Following     CBC with platelets differential     Comprehensive metabolic panel          Today's Medication Changes          These changes are accurate as of 3/7/18  3:46 PM.  If you have any questions, ask your nurse or doctor.               Start taking these medicines.        Dose/Directions    dexamethasone 4 MG tablet   Commonly known as:  DECADRON   Used for:  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 in female, estrogen receptor positive (H), Carcinoma of unknown origin (H)        Dose:  8 mg   Take 2 tablets (8 mg) by mouth daily for 3 days Start on Day 2.   Quantity:  6 tablet   Refills:  3       LORazepam 0.5 MG tablet   Commonly known as:  ATIVAN   Used for:  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 in female, estrogen receptor positive (H), Carcinoma of unknown origin (H)        Dose:  0.5 mg   Take 1 tablet (0.5 mg) by mouth every 4 hours as needed (Anxiety, Nausea/Vomiting or Sleep)   Quantity:  30 tablet   Refills:  3       prochlorperazine 10 MG tablet   Commonly known as:  COMPAZINE   Used for:  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 in female, estrogen receptor positive (H), Carcinoma of unknown origin (H)        Dose:  10 mg   Take 1 tablet (10 mg) by mouth every 6 hours as needed (Nausea/Vomiting)   Quantity:  30 tablet   Refills:  3            Where to get your medicines      These medications were sent to [facility] [place], [place] - [address]  [address] [facility] [address] [address]     Phone:  [phone]     dexamethasone 4 MG tablet    prochlorperazine 10 MG tablet         Some of these will need a paper prescription and others can be bought over the counter.  Ask your nurse if you have questions.     Bring a paper prescription for each of  these medications     LORazepam 0.5 MG tablet                Primary Care Provider Office Phone # Fax #    [facility] [phone] [address] [facility] [address]        Equal Access to Services     MARYJANE CONNOR: Hadii aad ku hadavanio Sobradleyali, waaxda luqadaha, qaybta kaalmada adedarcyyada, ketan rosen danieladarcy vallejo genaro connor. [facility] [phone].    ATENCIÓN: Si habla español, tiene a thomas disposición servicios gratuitos de asistencia lingüística. Llame al [phone].    We comply with applicable federal civil rights laws and [place] laws. We do not discriminate on the basis of race, color, national origin, age, disability, sex, sexual orientation, or gender identity.            Thank you!     Thank you for choosing [facility]  for your care. Our goal is always to provide you with excellent care. Hearing back from our patients is one way we can continue to improve our services. Please take a few minutes to complete the written survey that you may receive in the mail after your visit with us. Thank you!             Your Updated Medication List - Protect others around you: Learn how to safely use, store and throw away your medicines at www.disposemymeds.org.          This list is accurate as of 3/7/18  3:46 PM.  Always use your most recent med list.                   Brand Name Dispense Instructions for use Diagnosis    ADVIL PO      Take 400 mg by mouth every 6 hours as needed for moderate pain        aspirin 81 MG tablet      Take 81 mg by mouth daily        dexamethasone 4 MG tablet    DECADRON    6 tablet    Take 2 tablets (8 mg) by mouth daily for 3 days Start on Day 2.    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 in female, estrogen receptor positive (H), Carcinoma of unknown origin (H)       GLUCOSAMINE CHONDROITIN JOINT PO      Take by mouth daily        HYDROcodone-acetaminophen 5-325 MG per tablet    NORCO     6 tablet    Take 1 tablet by mouth every 6 hours as needed for moderate to severe pain    Carcinoma of unknown origin (H)       LORazepam 0.5 MG tablet    ATIVAN    30 tablet    Take 1 tablet (0.5 mg) by mouth every 4 hours as needed (Anxiety, Nausea/Vomiting or Sleep)    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 in female, estrogen receptor positive (H), Carcinoma of unknown origin (H)       OMEGA-3 FISH OIL PO      Take 1 g by mouth daily        prochlorperazine 10 MG tablet    COMPAZINE    30 tablet    Take 1 tablet (10 mg) by mouth every 6 hours as needed (Nausea/Vomiting)    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 in female, estrogen receptor positive (H), Carcinoma of unknown origin (H)       valACYclovir 1000 mg tablet    VALTREX    20 tablet    Take 1 tablet (1,000 mg) by mouth 2 times daily    Cold sore       VITAMIN B6 PO      Take by mouth daily        VITAMIN D (CHOLECALCIFEROL) PO      Take 1,000 Units by mouth daily

## 2018-03-07 NOTE — PATIENT INSTRUCTIONS
1- Cycle 1 dose dense AC today  2- See NP next week with Labs and IV fluids.   3- RTC MD 2 weeks with infusion clinic appointment

## 2018-03-07 NOTE — PROGRESS NOTES
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  Initially she was seen 11/22/2017 after she had the right supraclavicular lymph node biopsy which was positive for poorly differentiated adenocarcinoma.  She had this lymph node almost a month and had an excisional biopsy performed which was consistent with the diagnosis of cancer.      A pet CT scan 11/27/2017 showed hypermetabolic right supraclavicular, right axillary and subpectoral adenopathy.  Otherwise, no distant metastatic disease.     Mammogram 11/30/17- negative    TREATMENT: Carboplatin and Taxol completed 4 cycles. 3/7/2018 started dose dense AC.      SUBJECTIVE:  The patient was seen as a followup today. She has been feeling well. No new complaints.     REVIEW OF SYSTEMS:  A complete review of systems was performed and found to be negative other than pertinent positives mentioned in history of present illness.      Past medical, social histories reviewed.     Meds- Reviewed.     PHYSICAL EXAMINATION:   VITAL SIGNS: /86  Pulse 81  Temp 98.1  F (36.7  C) (Oral)  Resp 20  Ht 1.676 m (5' 5.98\")  Wt 81 kg (178 lb 9.6 oz)  LMP 01/01/2004 (Approximate)  BMI 28.84 kg/m2  CONSTITUTIONAL: Sitting comfortably.   HEENT: Pupils are equal. Oropharynx is clear.   NECK: No cervical or supraclavicular lymphadenopathy.   RESPIRATORY: Clear bilaterally.   CARD/VASC: S1, S2, regular.   GI: Soft, nontender, nondistended, no hepatosplenomegaly.   MUSKULOSKELETAL: Warm, well perfused.   NEUROLOGIC: Alert, awake.   INTEGUMENT: No rash.   LYMPHATICS: No edema.   PSYCH: Mood and affect was normal.     LABORATORY DATA AND IMAGING REVIEWED DURING THIS VISIT:  Recent Labs   Lab Test  02/20/18   0805  02/14/18   1103  01/24/18   0926   NA   --   138  138   POTASSIUM   --   3.8  4.0   CHLORIDE   --   105  104   CO2   --   27  25 "   ANIONGAP   --   6  9   BUN   --   14  16   CR   --   0.72  0.68   GLC  95  105*  103*   EDILSON   --   8.7  9.1     Recent Labs   Lab Test  03/07/18   1250  02/14/18   1103  01/24/18   0926   WBC  4.0  4.2  4.0   HGB  11.3*  11.0*  11.1*   PLT  258  271  284   MCV  96  95  94   NEUTROPHIL  51.1  53.2  53.1     Recent Labs   Lab Test  02/14/18   1103  01/24/18   0926  01/03/18   0933   BILITOTAL  0.4  0.5  0.7   ALKPHOS  101  110  86   ALT  41  51*  75*   AST  21  24  40   ALBUMIN  3.5  3.6  3.5        ECOG performance status 0.      ASSESSMENT AND PLAN:  This is a 62-year-old lady who has diagnosis of 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  Tumor cells were positive for CK7, CK5/6 and GATA3.  We did tumor marker testing and she had CA 27-29 at the level of 12,  of 16, CA 19-9 at 19.  Her CEA was less than 0.5.      PET CT scan results was done 11/27/2017.  showed hypermetabolic right supraclavicular, right axillary and subpectoral adenopathy.  There was no evidence of distant metastases.      The distribution of adenopathy indicates possibility breast cancer which has metastasized to the regional lymph nodes and we are not able to find a primary on physical exam and a PET scan.     Mammogram 11/30/17 no evidence of primary. Her-2 non amplified. Androgen receptor 10-20%. ER 1-5%. ND < 1%.   ENT examination by Dr. Miller was negative. EGD was negative as well. MRI breast 12/20/17 no evidence of malignancy in the breasts.    She started chemotherapy carboplatin/Taxol. Completed 4 cycles. PET Scan 2/20/18 showed good response to treatment.   She will complete dose dense AC for 4 cycles.     - Labs were reviewed with the patient, mild elevation f AST, normocytic anemia.  She will start dose dense AC today. Risks benefits discussed. Questions answered.      PLAN:   1- Cycle 1 dose dense AC today  2- See NP next week with Labs and IV fluids.   3- RTC MD 2 weeks with  infusion clinic appointment     ERON BARNES MD    3/7/2018       cc: Waylon Liu MD

## 2018-03-07 NOTE — LETTER
"    3/7/2018         RE: Flor Griffin  [address] [facility] [address]        Dear Colleague,    Thank you for referring your patient, Flor Griffin, to the [facility]. Please see a copy of my visit note below.    Oncology Rooming Note    March 7, 2018 1:23 PM   Flor Griffin is a 62 year old female who presents for:    Chief Complaint   Patient presents with     Oncology Clinic Visit     Initial Vitals: /86  Pulse 81  Temp 98.1  F (36.7  C) (Oral)  Resp 20  Ht 1.676 m (5' 5.98\")  Wt 81 kg (178 lb 9.6 oz)  LMP 01/01/2004 (Approximate)  BMI 28.84 kg/m2 Estimated body mass index is 28.84 kg/(m^2) as calculated from the following:    Height as of this encounter: 1.676 m (5' 5.98\").    Weight as of this encounter: 81 kg (178 lb 9.6 oz). Body surface area is 1.94 meters squared.  Data Unavailable Comment: Data Unavailable   Patient's last menstrual period was 01/01/2004 (approximate).  Allergies reviewed: Yes  Medications reviewed: Yes    Medications: Medication refills not needed today.  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  Initially she was seen 11/22/2017 after she had the right supraclavicular lymph node biopsy which was positive for poorly differentiated adenocarcinoma.  She had this lymph node almost a month and had an excisional biopsy performed which was consistent with the diagnosis of cancer.      A pet CT scan 11/27/2017 showed hypermetabolic right supraclavicular, right axillary and subpectoral adenopathy.  Otherwise, no distant metastatic disease. " "    Mammogram 11/30/17- negative    TREATMENT: Carboplatin and Taxol completed 4 cycles. 3/7/2018 started dose dense AC.      SUBJECTIVE:  The patient was seen as a followup today. She has been feeling well. No new complaints.     REVIEW OF SYSTEMS:  A complete review of systems was performed and found to be negative other than pertinent positives mentioned in history of present illness.      Past medical, social histories reviewed.     Meds- Reviewed.     PHYSICAL EXAMINATION:   VITAL SIGNS: /86  Pulse 81  Temp 98.1  F (36.7  C) (Oral)  Resp 20  Ht 1.676 m (5' 5.98\")  Wt 81 kg (178 lb 9.6 oz)  LMP 01/01/2004 (Approximate)  BMI 28.84 kg/m2  CONSTITUTIONAL: Sitting comfortably.   HEENT: Pupils are equal. Oropharynx is clear.   NECK: No cervical or supraclavicular lymphadenopathy.   RESPIRATORY: Clear bilaterally.   CARD/VASC: S1, S2, regular.   GI: Soft, nontender, nondistended, no hepatosplenomegaly.   MUSKULOSKELETAL: Warm, well perfused.   NEUROLOGIC: Alert, awake.   INTEGUMENT: No rash.   LYMPHATICS: No edema.   PSYCH: Mood and affect was normal.     LABORATORY DATA AND IMAGING REVIEWED DURING THIS VISIT:  Recent Labs   Lab Test  02/20/18   0805  02/14/18   1103  01/24/18   0926   NA   --   138  138   POTASSIUM   --   3.8  4.0   CHLORIDE   --   105  104   CO2   --   27  25   ANIONGAP   --   6  9   BUN   --   14  16   CR   --   0.72  0.68   GLC  95  105*  103*   EDILSON   --   8.7  9.1     Recent Labs   Lab Test  03/07/18   1250  02/14/18   1103  01/24/18   0926   WBC  4.0  4.2  4.0   HGB  11.3*  11.0*  11.1*   PLT  258  271  284   MCV  96  95  94   NEUTROPHIL  51.1  53.2  53.1     Recent Labs   Lab Test  02/14/18   1103  01/24/18   0926  01/03/18   0933   BILITOTAL  0.4  0.5  0.7   ALKPHOS  101  110  86   ALT  41  51*  75*   AST  21  24  40   ALBUMIN  3.5  3.6  3.5        ECOG performance status 0.      ASSESSMENT AND PLAN:  This is a 62-year-old lady who has diagnosis of poorly differentiated " adenocarcinoma on right supraclavicular lymph node excisional biopsy.  Tumor cells were positive for CK7, CK5/6 and GATA3.  We did tumor marker testing and she had CA 27-29 at the level of 12,  of 16, CA 19-9 at 19.  Her CEA was less than 0.5.      PET CT scan results was done 11/27/2017.  showed hypermetabolic right supraclavicular, right axillary and subpectoral adenopathy.  There was no evidence of distant metastases.      The distribution of adenopathy indicates possibility breast cancer which has metastasized to the regional lymph nodes and we are not able to find a primary on physical exam and a PET scan.     Mammogram 11/30/17 no evidence of primary. Her-2 non amplified. Androgen receptor 10-20%. ER 1-5%. NM < 1%.   ENT examination by Dr. Miller was negative. EGD was negative as well. MRI breast 12/20/17 no evidence of malignancy in the breasts.    She started chemotherapy carboplatin/Taxol. Completed 4 cycles. PET Scan 2/20/18 showed good response to treatment.   She will complete dose dense AC for 4 cycles.     - Labs were reviewed with the patient, mild elevation f AST, normocytic anemia.  She will start dose dense AC today. Risks benefits discussed. Questions answered.      PLAN:   1- Cycle 1 dose dense AC today  2- See NP next week with Labs and IV fluids.   3- RTC MD 2 weeks with infusion clinic appointment     YANI MAGANA MD    3/7/2018       cc: Waylon Liu MD       Again, thank you for allowing me to participate in the care of your patient.        Sincerely,        Yani Magana MD

## 2018-03-12 ENCOUNTER — TELEPHONE (OUTPATIENT)
Dept: ONCOLOGY | Facility: CLINIC | Age: 63
End: 2018-03-12

## 2018-03-12 NOTE — TELEPHONE ENCOUNTER
"I called the patient to follow up on potential side effects of C1D1 AC received on 3/7/18.    Patient states she has been doing \"surprisingly well\" she was prepared to feel awful. She denies nausea, vomiting diarrhea, and is drinking and eating well.    She is scheduled for IVf's this Thursday but does not think she will need them.  Patient encouraged to call with any concerns or questions.    Message routed to Vanessa BERGERON RN . Michela Parmar    "

## 2018-03-15 ENCOUNTER — INFUSION THERAPY VISIT (OUTPATIENT)
Dept: INFUSION THERAPY | Facility: CLINIC | Age: 63
End: 2018-03-15
Attending: NURSE PRACTITIONER
Payer: COMMERCIAL

## 2018-03-15 ENCOUNTER — HOSPITAL ENCOUNTER (OUTPATIENT)
Facility: CLINIC | Age: 63
Setting detail: SPECIMEN
Discharge: HOME | End: 2018-03-15
Attending: NURSE PRACTITIONER | Admitting: INTERNAL MEDICINE
Payer: COMMERCIAL

## 2018-03-15 ENCOUNTER — ONCOLOGY VISIT (OUTPATIENT)
Dept: ONCOLOGY | Facility: CLINIC | Age: 63
End: 2018-03-15
Payer: COMMERCIAL

## 2018-03-15 VITALS
HEART RATE: 77 BPM | BODY MASS INDEX: 28.68 KG/M2 | OXYGEN SATURATION: 100 % | TEMPERATURE: 98.5 F | RESPIRATION RATE: 16 BRPM | DIASTOLIC BLOOD PRESSURE: 76 MMHG | SYSTOLIC BLOOD PRESSURE: 117 MMHG | WEIGHT: 177.6 LBS

## 2018-03-15 DIAGNOSIS — C80.1 CARCINOMA OF UNKNOWN ORIGIN (H): ICD-10-CM

## 2018-03-15 DIAGNOSIS — T45.1X5A CHEMOTHERAPY-INDUCED NEUTROPENIA (H): Primary | ICD-10-CM

## 2018-03-15 DIAGNOSIS — C80.1 CARCINOMA OF UNKNOWN ORIGIN (H): Primary | ICD-10-CM

## 2018-03-15 DIAGNOSIS — D70.1 CHEMOTHERAPY-INDUCED NEUTROPENIA (H): Primary | ICD-10-CM

## 2018-03-15 DIAGNOSIS — Z80.3 FAMILY HISTORY OF MALIGNANT NEOPLASM OF BREAST: ICD-10-CM

## 2018-03-15 LAB
ALBUMIN SERPL-MCNC: 3.5 G/DL (ref 3.4–5)
ALP SERPL-CCNC: 116 U/L (ref 40–150)
ALT SERPL W P-5'-P-CCNC: 37 U/L (ref 0–50)
ANION GAP SERPL CALCULATED.3IONS-SCNC: 6 MMOL/L (ref 3–14)
AST SERPL W P-5'-P-CCNC: 13 U/L (ref 0–45)
BASOPHILS # BLD AUTO: 0 10E9/L (ref 0–0.2)
BASOPHILS NFR BLD AUTO: 2 %
BILIRUB SERPL-MCNC: 0.6 MG/DL (ref 0.2–1.3)
BUN SERPL-MCNC: 17 MG/DL (ref 7–30)
CALCIUM SERPL-MCNC: 8.7 MG/DL (ref 8.5–10.1)
CHLORIDE SERPL-SCNC: 101 MMOL/L (ref 94–109)
CO2 SERPL-SCNC: 27 MMOL/L (ref 20–32)
CREAT SERPL-MCNC: 0.55 MG/DL (ref 0.52–1.04)
DIFFERENTIAL METHOD BLD: ABNORMAL
EOSINOPHIL # BLD AUTO: 0 10E9/L (ref 0–0.7)
EOSINOPHIL NFR BLD AUTO: 2 %
ERYTHROCYTE [DISTWIDTH] IN BLOOD BY AUTOMATED COUNT: 14 % (ref 10–15)
GFR SERPL CREATININE-BSD FRML MDRD: >90 ML/MIN/1.7M2
GLUCOSE SERPL-MCNC: 92 MG/DL (ref 70–99)
HCT VFR BLD AUTO: 30.9 % (ref 35–47)
HGB BLD-MCNC: 10.5 G/DL (ref 11.7–15.7)
LYMPHOCYTES # BLD AUTO: 0.9 10E9/L (ref 0.8–5.3)
LYMPHOCYTES NFR BLD AUTO: 84 %
MCH RBC QN AUTO: 32.2 PG (ref 26.5–33)
MCHC RBC AUTO-ENTMCNC: 34 G/DL (ref 31.5–36.5)
MCV RBC AUTO: 95 FL (ref 78–100)
MISCELLANEOUS TEST: NORMAL
MONOCYTES # BLD AUTO: 0 10E9/L (ref 0–1.3)
MONOCYTES NFR BLD AUTO: 3 %
NEUTROPHILS # BLD AUTO: 0.1 10E9/L (ref 1.6–8.3)
NEUTROPHILS NFR BLD AUTO: 9 %
PLATELET # BLD AUTO: 69 10E9/L (ref 150–450)
PLATELET # BLD EST: ABNORMAL 10*3/UL
POTASSIUM SERPL-SCNC: 3.8 MMOL/L (ref 3.4–5.3)
PROT SERPL-MCNC: 6.6 G/DL (ref 6.8–8.8)
RBC # BLD AUTO: 3.26 10E12/L (ref 3.8–5.2)
RBC MORPH BLD: ABNORMAL
SODIUM SERPL-SCNC: 134 MMOL/L (ref 133–144)
WBC # BLD AUTO: 1.1 10E9/L (ref 4–11)

## 2018-03-15 PROCEDURE — 96040 ZZH GENETIC COUNSELING, EACH 30 MINUTES: CPT | Performed by: GENETIC COUNSELOR, MS

## 2018-03-15 PROCEDURE — 80053 COMPREHEN METABOLIC PANEL: CPT | Performed by: INTERNAL MEDICINE

## 2018-03-15 PROCEDURE — 99214 OFFICE O/P EST MOD 30 MIN: CPT | Performed by: NURSE PRACTITIONER

## 2018-03-15 PROCEDURE — G0463 HOSPITAL OUTPT CLINIC VISIT: HCPCS

## 2018-03-15 PROCEDURE — 36591 DRAW BLOOD OFF VENOUS DEVICE: CPT

## 2018-03-15 PROCEDURE — 25000128 H RX IP 250 OP 636: Performed by: NURSE PRACTITIONER

## 2018-03-15 PROCEDURE — 85025 COMPLETE CBC W/AUTO DIFF WBC: CPT | Performed by: INTERNAL MEDICINE

## 2018-03-15 RX ORDER — LEVOFLOXACIN 500 MG/1
500 TABLET, FILM COATED ORAL DAILY
Qty: 7 TABLET | Refills: 0 | Status: SHIPPED | OUTPATIENT
Start: 2018-03-15 | End: 2018-04-09

## 2018-03-15 RX ORDER — FLUCONAZOLE 100 MG/1
TABLET ORAL
Qty: 7 TABLET | Refills: 0 | Status: SHIPPED | OUTPATIENT
Start: 2018-03-15 | End: 2018-04-09

## 2018-03-15 RX ORDER — HEPARIN SODIUM (PORCINE) LOCK FLUSH IV SOLN 100 UNIT/ML 100 UNIT/ML
500 SOLUTION INTRAVENOUS ONCE
Status: COMPLETED | OUTPATIENT
Start: 2018-03-15 | End: 2018-03-15

## 2018-03-15 RX ADMIN — HEPARIN 500 UNITS: 100 SYRINGE at 12:59

## 2018-03-15 ASSESSMENT — PAIN SCALES - GENERAL: PAINLEVEL: NO PAIN (0)

## 2018-03-15 NOTE — MR AVS SNAPSHOT
After Visit Summary   3/15/2018    Flor Griffin    MRN: [id number]           Patient Information     Date Of Birth          1955        Visit Information        Provider Department      3/15/2018 2:00 PM  INFUSION CHAIR 8 [facility]        Today's Diagnoses     Carcinoma of unknown origin (H)           Follow-ups after your visit        Follow-up notes from your care team     Return in 4 days (on 3/19/2018).      Your next 10 appointments already scheduled     Mar 21, 2018  1:00 PM CDT   Level 3 with  INFUSION CHAIR 15   [facility] ([facility])    [facility]  [address]  [place] [id number] [phone]            Mar 21, 2018  1:45 PM CDT   Return Visit with Yani Magana MD   [facility] ([facility])    [facility]  [address]  [facility] [id number] [phone]              Who to contact     If you have questions or need follow up information about today's clinic visit or your schedule please contact [facility] directly at [phone].  Normal or non-critical lab and imaging results will be communicated to you by [employer], letter or phone within 4 business days after the clinic has received the results. If you do not hear from us within 7 days, please contact the clinic through [employer] or phone. If you have a critical or abnormal lab result, we will notify you by phone as soon as possible.  Submit refill requests through [facility] or call your pharmacy and they will forward the refill request to us. Please allow 3 business days for your refill to be completed.          Additional Information About Your Visit        MyChart Information     [facility] lets you send messages to your doctor, view your test results, renew your prescriptions, schedule appointments and more. To sign up,  "go to [email or web address] . Click on \"Log in\" on the left side of the screen, which will take you to the Welcome page. Then click on \"Sign up Now\" on the right side of the page.     You will be asked to enter the access code listed below, as well as some personal information. Please follow the directions to create your username and password.     Your access code is: [email or web address]  Expires: 2018  3:02 PM     Your access code will  in 90 days. If you need help or a new code, please call your [facility] clinic or [phone].        Care EveryWhere ID     This is your Care EveryWhere ID. This could be used by other organizations to access your [facility] medical records  [id number]        Your Vitals Were     Last Period                   2004 (Approximate)            Blood Pressure from Last 3 Encounters:   03/15/18 117/76   18 130/86   18 120/77    Weight from Last 3 Encounters:   03/15/18 80.6 kg (177 lb 9.6 oz)   18 81 kg (178 lb 9.6 oz)   18 81 kg (178 lb 9.6 oz)              We Performed the Following     CBC with platelets differential     Comprehensive metabolic panel          Today's Medication Changes          These changes are accurate as of 3/15/18  2:03 PM.  If you have any questions, ask your nurse or doctor.               Start taking these medicines.        Dose/Directions    fluconazole 100 MG tablet   Commonly known as:  DIFLUCAN   Used for:  Chemotherapy-induced neutropenia (H)   Started by:  Buster Menon APRN CNP        Take 1 tab po q day x 7 days   Quantity:  7 tablet   Refills:  0       levofloxacin 500 MG tablet   Commonly known as:  LEVAQUIN   Used for:  Chemotherapy-induced neutropenia (H)   Started by:  Buster Menon APRN CNP        Dose:  500 mg   Take 1 tablet (500 mg) by mouth daily   Quantity:  7 tablet   Refills:  0            Where to get your medicines      These medications were sent to [facility] [address] [place] [address] "  [address]  [id number], [facility] [address]     Phone:  [phone]     fluconazole 100 MG tablet    levofloxacin 500 MG tablet                Primary Care Provider Office Phone # Fax #    [facility] [phone] [address] [facility]  [place] [address]        Equal Access to Services     [facility] ESTEFANI : Hadii aad ku hadasho Soomaali, waaxda luqadaha, qaybta kaalmada adeegyada, waxay idiin hayaan adeeg kharash la'antoinen ah. So [facility] [phone].    ATENCIÓN: Si habla español, tiene a thomas disposición servicios gratuitos de asistencia lingüística. [facility] [phone].    We comply with applicable federal civil rights laws and [place] laws. We do not discriminate on the basis of race, color, national origin, age, disability, sex, sexual orientation, or gender identity.            Thank you!     Thank you for choosing [facility] AND [facility]  for your care. Our goal is always to provide you with excellent care. Hearing back from our patients is one way we can continue to improve our services. Please take a few minutes to complete the written survey that you may receive in the mail after your visit with us. Thank you!             Your Updated Medication List - Protect others around you: Learn how to safely use, store and throw away your medicines at www.disposemymeds.org.          This list is accurate as of 3/15/18  2:03 PM.  Always use your most recent med list.                   Brand Name Dispense Instructions for use Diagnosis    ADVIL PO      Take 400 mg by mouth every 6 hours as needed for moderate pain        aspirin 81 MG tablet      Take 81 mg by mouth daily        dexamethasone 4 MG tablet    DECADRON    6 tablet    Take 2 tablets (8 mg) by mouth daily for 3 days Start on Day 2.    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 in female, estrogen receptor positive (H), Carcinoma of unknown origin  (H)       fluconazole 100 MG tablet    DIFLUCAN    7 tablet    Take 1 tab po q day x 7 days    Chemotherapy-induced neutropenia (H)       GLUCOSAMINE CHONDROITIN JOINT PO      Take by mouth daily        HYDROcodone-acetaminophen 5-325 MG per tablet    NORCO    6 tablet    Take 1 tablet by mouth every 6 hours as needed for moderate to severe pain    Carcinoma of unknown origin (H)       levofloxacin 500 MG tablet    LEVAQUIN    7 tablet    Take 1 tablet (500 mg) by mouth daily    Chemotherapy-induced neutropenia (H)       LORazepam 0.5 MG tablet    ATIVAN    30 tablet    Take 1 tablet (0.5 mg) by mouth every 4 hours as needed (Anxiety, Nausea/Vomiting or Sleep)    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 in female, estrogen receptor positive (H), Carcinoma of unknown origin (H)       OMEGA-3 FISH OIL PO      Take 1 g by mouth daily        prochlorperazine 10 MG tablet    COMPAZINE    30 tablet    Take 1 tablet (10 mg) by mouth every 6 hours as needed (Nausea/Vomiting)    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 in female, estrogen receptor positive (H), Carcinoma of unknown origin (H)       valACYclovir 1000 mg tablet    VALTREX    20 tablet    Take 1 tablet (1,000 mg) by mouth 2 times daily    Cold sore       VITAMIN B6 PO      Take by mouth daily        VITAMIN D (CHOLECALCIFEROL) PO      Take 1,000 Units by mouth daily

## 2018-03-15 NOTE — PATIENT INSTRUCTIONS
Schedule appointment with Buster on Monday Scheduled @ [facility]    schedule for labs on Monday  CBC and CMP scheduled @ [facility]    Start Levaquin and fluconazole    In the event of fever or infection go to ER        AVS printed & given to patient/cristina

## 2018-03-15 NOTE — LETTER
"    3/15/2018         RE: Flor Griffin  [address]  [place] [address]        Dear Colleague,    Thank you for referring your patient, Flor Griffin, to the [facility]. Please see a copy of my visit note below.    Oncology Rooming Note    March 15, 2018 12:19 PM   Flor Griffin is a 62 year old female who presents for:    Chief Complaint   Patient presents with     Oncology Clinic Visit     breast cancer     Initial Vitals: /76 (BP Location: Left arm, Patient Position: Sitting, Cuff Size: Adult Regular)  Pulse 77  Temp 98.5  F (36.9  C) (Oral)  Resp 16  Wt 80.6 kg (177 lb 9.6 oz)  LMP 01/01/2004 (Approximate)  SpO2 100%  BMI 28.68 kg/m2 Estimated body mass index is 28.68 kg/(m^2) as calculated from the following:    Height as of 3/7/18: 1.676 m (5' 5.98\").    Weight as of this encounter: 80.6 kg (177 lb 9.6 oz). Body surface area is 1.94 meters squared.  No Pain (0) Comment: Data Unavailable   Patient's last menstrual period was 01/01/2004 (approximate).  Allergies reviewed: Yes  Medications reviewed: Yes    Medications: Medication refills not needed today.  Pharmacy name entered into [facility]: [id number] - [place], [place] - [address]    Clinical concerns: None                  4 minutes for nursing intake (face to face time)     Angie Parry MA              Oncology/Hematology Visit Note  Mar 15, 2018    Reason for Visit: follow up of adenocarcinoma post cycle 1 on chemo.     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  showed hypermetabolic right supraclavicular, right axillary and subpectoral adenopathy. There was no evidence of distant metastases.Mammogram 11/30/17 no evidence of primary  MRI breast 12/20/17 no evidence of malignancy in the breasts. S/p chemotherapy carboplatin/Taxol. " Completed 4 cycles. PET Scan 2/20/18 showed good response to treatment. She is currently undergoing treatment with  dose dense AC for 4 cycles.  Cycle 1 day 1 was on March 7  She comes in today for routine follow up after cycle 1       Interval History:  Tolerated cycle 1 of  dose dense AC well.  Has neuropathy in her fingers and toes.  Her symptoms are mild.  She refuses any intervention for now.  Denies edema.  She is active she walks frequently.  She denies fever chills or sweats, denies cough, denies shortness of breath.  Her appetite is good, her energy is good. She denies nausea vomiting or diarrhea, denies abdominal pain. Denies urinary symptoms.  She denies mouth sores or taste changes.    she does not have any concerns today      Review of Systems:  14 point ROS of systems including Constitutional, Eyes, Respiratory, Cardiovascular, Gastroenterology, Genitourinary, Integumentary, Muscularskeletal, Psychiatric were all negative except for pertinent positives noted in my HPI.      Current Outpatient Prescriptions   Medication Sig Dispense Refill     LORazepam (ATIVAN) 0.5 MG tablet Take 1 tablet (0.5 mg) by mouth every 4 hours as needed (Anxiety, Nausea/Vomiting or Sleep) 30 tablet 3     prochlorperazine (COMPAZINE) 10 MG tablet Take 1 tablet (10 mg) by mouth every 6 hours as needed (Nausea/Vomiting) 30 tablet 3     HYDROcodone-acetaminophen (NORCO) 5-325 MG per tablet Take 1 tablet by mouth every 6 hours as needed for moderate to severe pain 6 tablet 0     Pyridoxine HCl (VITAMIN B6 PO) Take by mouth daily       Glucos-Chondroit-Hyaluron-MSM (GLUCOSAMINE CHONDROITIN JOINT PO) Take by mouth daily       Ibuprofen (ADVIL PO) Take 400 mg by mouth every 6 hours as needed for moderate pain       valACYclovir (VALTREX) 1000 mg tablet Take 1 tablet (1,000 mg) by mouth 2 times daily 20 tablet 0     VITAMIN D, CHOLECALCIFEROL, PO Take 1,000 Units by mouth daily       Omega-3 Fatty Acids (OMEGA-3 FISH OIL PO) Take 1 g  by mouth daily       aspirin 81 MG tablet Take 81 mg by mouth daily       dexamethasone (DECADRON) 4 MG tablet Take 2 tablets (8 mg) by mouth daily for 3 days Start on Day 2. 6 tablet 3       Physical Examination:  General: The patient is a pleasant female in no acute distress.  /76 (BP Location: Left arm, Patient Position: Sitting, Cuff Size: Adult Regular)  Pulse 77  Temp 98.5  F (36.9  C) (Oral)  Resp 16  Wt 80.6 kg (177 lb 9.6 oz)  LMP 01/01/2004 (Approximate)  SpO2 100%  BMI 28.68 kg/m2  Wt Readings from Last 10 Encounters:   03/15/18 80.6 kg (177 lb 9.6 oz)   03/07/18 81 kg (178 lb 9.6 oz)   03/07/18 81 kg (178 lb 9.6 oz)   02/21/18 80.3 kg (177 lb)   02/14/18 81.5 kg (179 lb 9.6 oz)   02/14/18 81.5 kg (179 lb 9.6 oz)   01/24/18 80.3 kg (177 lb)   01/24/18 80.6 kg (177 lb 12.8 oz)   01/03/18 81.6 kg (180 lb)   01/03/18 81.6 kg (180 lb)     HEENT: EOMI, PERRL. Sclerae are anicteric. Oral mucosa is pink and moist with no lesions or thrush.   Lymph: Neck is supple with no lymphadenopathy in the cervical or supraclavicular areas.   Heart: Regular rate and rhythm.   Lungs: Clear to auscultation bilaterally.   GI: Bowel sounds present, soft, nontender with no palpable hepatosplenomegaly or masses.   Extremities: No lower extremity edema noted bilaterally.   Skin: No rashes, petechiae, or bruising noted on exposed skin.    Laboratory Data:    Results for ALMA MASON (MRN [id number]) as of 3/15/2018 14:08   Ref. Range 3/15/2018 13:00   Sodium Latest Ref Range: 133 - 144 mmol/L 134   Potassium Latest Ref Range: 3.4 - 5.3 mmol/L 3.8   Chloride Latest Ref Range: 94 - 109 mmol/L 101   Carbon Dioxide Latest Ref Range: 20 - 32 mmol/L 27   Urea Nitrogen Latest Ref Range: 7 - 30 mg/dL 17   Creatinine Latest Ref Range: 0.52 - 1.04 mg/dL 0.55   GFR Estimate Latest Ref Range: >60 mL/min/1.7m2 >90   GFR Estimate If Black Latest Ref Range: >60 mL/min/1.7m2 >90   Calcium Latest Ref Range: 8.5 - 10.1 mg/dL 8.7    Anion Gap Latest Ref Range: 3 - 14 mmol/L 6   Albumin Latest Ref Range: 3.4 - 5.0 g/dL 3.5   Protein Total Latest Ref Range: 6.8 - 8.8 g/dL 6.6 (L)   Bilirubin Total Latest Ref Range: 0.2 - 1.3 mg/dL 0.6   Alkaline Phosphatase Latest Ref Range: 40 - 150 U/L 116   ALT Latest Ref Range: 0 - 50 U/L 37   AST Latest Ref Range: 0 - 45 U/L 13   Glucose Latest Ref Range: 70 - 99 mg/dL 92   WBC Latest Ref Range: 4.0 - 11.0 10e9/L 1.1 (L)   Hemoglobin Latest Ref Range: 11.7 - 15.7 g/dL 10.5 (L)   Hematocrit Latest Ref Range: 35.0 - 47.0 % 30.9 (L)   Platelet Count Latest Ref Range: 150 - 450 10e9/L 69 (L)   RBC Count Latest Ref Range: 3.8 - 5.2 10e12/L 3.26 (L)   MCV Latest Ref Range: 78 - 100 fl 95   MCH Latest Ref Range: 26.5 - 33.0 pg 32.2   MCHC Latest Ref Range: 31.5 - 36.5 g/dL 34.0   RDW Latest Ref Range: 10.0 - 15.0 % 14.0         Assessment and Plan:    This is a 62-year-old female with    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   There is no evidence of distant metastases.  She had mammogram and MRI breast - did not show malignancy.  She completed 4 cycles of carbo carboplatin and Taxol.  PET CT scan showed good response to the treatment.  She started dose dense Adriamycin Cytoxan cycle 1 day 1 on March 7.  Besides mild neuropathy toes and fingers she has been tolerating chemotherapy well.  Patient is neutropenic today.  She will return to clinic on Monday 03/12 for labs and follow-up with Jennifer.  She is scheduled for cycle 2 of chemo on March 21.  She will see Dr. Magana on that day as well.        NeutropeniaSecondary to chemotherapy.    Patient is status post Neulasta shot after chemo on March 8.  Start prophylactic therapy with Levaquin 500 mg p.o. and fluconazole  neutropenic precautions discussed  In the event of fever, chills or sweats, cough shortness of breath or any changes in health condition  go to the emergency room.      Thrombocytopenia secondary to therapy.  No evidence of bleeding.  Stable  No need for platelet transfusion  Check labs on Monday and infuse for platelets less than 20 or prn bleeding    Peripheral neuropathy  Patient reports that the  symptoms are mild.  The patient refuses intervention for now.  I told the patient to call if worsening of symptoms.      Patient is asked to go to the emergency room in the event of fever chills sweats cough shortness of breath chest pain or any changes in health condition      HEDY Beth CNP  Hem/Onc   [facility]               Again, thank you for allowing me to participate in the care of your patient.        Sincerely,        HEDY Beth CNP

## 2018-03-15 NOTE — LETTER
3/15/2018         RE: Flor Griffin  [address] [facility] [address]        Dear Colleague,    Thank you for referring your patient, Flor Griffin, to the CANCER RISK MANAGEMENT PROGRAM. Please see a copy of my visit note below.    3/15/2018    Referring Provider: Yani Magana MD    Presenting Information:   I met with Flor Griffin today for genetic counseling at the Cancer Risk Management Program at the [facility] to discuss her personal and family history of cancer.  She is here today to review this history and available genetic testing options.    Personal History:  Flor is a 62 year old female.  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 Magana, at age 62.  Treatment has included chemotherapy.      Flor has her ovaries, fallopian tubes and uterus in place, and she has had no ovarian cancer screening to date.  Her most recent mammogram in November of 2017 and breast MRI in December of 2017 were normal.  Her most recent colonoscopy in 2012 was normal and follow up was recommended in 10 years.  She reports a history of two basal cell carcinomas removed in her late 50's and early 60's.  She does not regularly do any other cancer screening at this time.  Flor reported no tobacco use, and no concerns regarding alcohol use or environmental exposures.    Family History: (Please see scanned pedigree for detailed family history information)    Flor's sister was diagnosed with breast cancer at age 80 and is now 81    One maternal aunt was diagnosed with breast cancer in her 30's, and possible ovarian cancer in her 70's.  Flor was uncertain whether this ovarian cancer was a second primary diagnosis.      One maternal aunt who passed away in her 70's reportedly had a history of a brain tumor.      Her maternal uncle reportedly had a history of cancer, however details were not  available today.    One paternal aunt reportedly had a history of a brain tumor, and passed away in her 80's     Her maternal ethnicity is Japanese. Her paternal ethnicity is Japanese/Spanish.  There is no known Ashkenazi Voodoo ancestry on either side of her family.     Discussion:    Flor's family history of early onset breast cancer is suggestive of a possible hereditary cancer syndrome, such as Hereditary Breast and Ovarian Cancer syndrome.    We reviewed the features of sporadic, familial, and hereditary cancers.  In looking at Flor's family history, it is possible that a cancer susceptibility gene is present due to her maternal aunt's history of early breast cancer diagnosed in her 30's, and her sister's history of breast cancer diagnosed at an older age.  In addition, Flor has a personal history of cancer likely of breast origin, per her oncology care team.  Based on her personal and family history, Flor meets current National Comprehensive Cancer Network (NCCN) criteria for genetic testing of BRCA1 and BRCA2.      We discussed the natural history and genetics of Hereditary Breast and Ovarian Cancer syndrome due to mutations in the BRCA1/2 genes. We discussed that there are additional genes that could cause increased risk for breast cancer.  As many of these genes present with overlapping features in a family and accurate cancer risk cannot always be established based upon the pedigree analysis alone, it would be reasonable for Flor to consider panel genetic testing to analyze multiple genes at once.     A detailed handout regarding hereditary breast cancer and the information we discussed was provided to Flor at the end of our appointment today and can be found in the after visit summary.  Topics included: inheritance pattern, cancer risks, cancer screening recommendations, and also risks, benefits and limitations of testing.    We reviewed genetic testing options for hereditary breast  and gynecologic cancers, including actionable high/moderate risk breast and gynecologic cancer custom panel (CustomNext-Cancer, 16 genes) and expanded high and moderate risk panel testing (OvaNext, 25 genes).  Flor expressed an interest in only the actionable genes.  She opted for the CustomNext-Cancer panel.  The CustomNext-Cancer high/moderate risk breast and gynecologic panel includes the following genes: RAMONE, BRCA1, BRCA2, BRIP1, CDH1, CHEK2, EPCAM, MLH1, MSH2, MSH6, PALB2,  PMS2, PTEN, RAD51C, RAD51D, and TP53.    Some of the genes on this custom panel are associated with specific hereditary cancer syndromes and have published management guidelines:  Hereditary Breast and Ovarian Cancer syndrome (BRCA1, BRCA2), Ravi syndrome (EPCAM, MLH1, MSH2, MSH6, PMS2), Hereditary Diffuse Gastric Cancer (CDH1), Cowden Syndrome (PTEN), and Li Fraumeni syndrome (TP53).     Risk-reducing salpingo-oophorectomy can be considered in women with mutations in BRIP1, RAD51C, or RAD51D.  RAMONE, CHEK2, and PALB2 are associated with moderate breast cancer risk and have published screening guidelines.    Consent was obtained and genetic testing for the CustomNext-Cancer panel was sent to [facility].  Turn around time: approximately 3-4 weeks after insurance authorization is obtained.      Medical Management: For Flor, we reviewed that the information from genetic testing may determine:    additional cancer screening for which Flor may qualify (i.e. mammogram and breast MRI, more frequent colonoscopies, more frequent dermatologic exams, etc.),    options for risk reducing surgeries Flor could consider (i.e. bilateral mastectomy, surgery to remove the ovaries and/or uterus, etc.),      and possible targeted chemotherapies for Flor's active cancer, or if she were to develop certain cancers in the future (i.e. immunotherapy for individuals with Ravi syndrome, PARP inhibitors, etc.).     These recommendations  will be discussed in detail once genetic testing is completed.     Plan:  1) Today Flor elected to proceed with the CustomNext-Cancer panel offered through [facility].  2) This information should be available 3-4 weeks after insurance authorization is obtained.  3) Flor will return to clinic to discuss the results    Face to face time: 35 minutes    María Galeas MS, [facility]  Licensed Genetic Counselor  [phone]              Again, thank you for allowing me to participate in the care of your patient.        Sincerely,        María Galeas, GC

## 2018-03-15 NOTE — MR AVS SNAPSHOT
After Visit Summary   3/15/2018    Flor Griffin    MRN: [id number]           Patient Information     Date Of Birth          1955        Visit Information        Provider Department      3/15/2018 11:15 AM [facility] Risk Management [facility]        Today's Diagnoses     Carcinoma of unknown origin (H)    -  1    Family history of malignant neoplasm of breast          Care Instructions        Assessing Cancer Risk  Only about 5-10% of cancers are thought to be due to an inherited cancer susceptibility gene.    These families often have:    Several people with the same or related types of cancer    Cancers diagnosed at a young age (before age 50)    Individuals with more than one primary cancer    Multiple generations of the family affected with cancer    Some people may be candidates for genetic testing of more than one gene.  For these families, genetic testing using a cancer panel may be offered.  These panels will test different genes known to increase the risk for breast, ovarian, uterine, and/or other cancers. All of the genes discussed below have published clinical management guidelines for individuals who are found to carry a mutation. The purpose of this handout is to serve as a brief summary of the genes analyzed by the panels used to inquire about hereditary breast and gynecologic cancer:  RAMONE, BRCA1, BRCA2, BRIP1, CDH1, CHEK2, MLH1, MSH2, MSH6, PMS2, EPCAM, PTEN, PALB2, RAD51C, RAD51D, and TP53.  ______________________________________________________________________________  Hereditary Breast and Ovarian Cancer Syndrome   (BRCA1 and BRCA2)  A single mutation in one of the copies of BRCA1 or BRCA2 increases the risk for breast and ovarian cancer, among others.  The risk for pancreatic cancer and melanoma may also be slightly increased in some families.  The chart below shows the chance that someone with a BRCA mutation would develop cancer in his or her lifetime1,2,3,4.         A person s ethnic background is also important to consider, as individuals of Ashkenazi Yazidi ancestry have a higher chance of having a BRCA gene mutation.  There are three BRCA mutations that occur more frequently in this population.    Ravi Syndrome   (MLH1, MSH2, MSH6, PMS2, and EPCAM)  Currently five genes are known to cause Ravi Syndrome: MLH1, MSH2, MSH6, PMS2, and EPCAM.  A single mutation in one of the Ravi Syndrome genes increases the risk for colon, endometrial, ovarian, and stomach cancers.  Other cancers that occur less commonly in Ravi Syndrome include urinary tract, skin, and brain cancers.  The chart below shows the chance that a person with Ravi syndrome would develop cancer in his or her lifetime5.      *Cancer risk varies depending on Ravi syndrome gene found    Cowden Syndrome   (PTEN)  Cowden syndrome is a hereditary condition that increases the risk for breast, thyroid, endometrial, colon, and kidney cancer.  Cowden syndrome is caused by a mutation in the PTEN gene.  A single mutation in one of the copies of PTEN causes Cowden syndrome and increases cancer risk.  The chart below shows the chance that someone with a PTEN mutation would develop cancer in their lifetime6,7.  Other benign features seen in some individuals with Cowden syndrome include benign skin lesions (facial papules, keratoses, lipomas), learning disability, autism, thyroid nodules, colon polyps, and larger head size.      *One recent study found breast cancer risk to be increased to 85%    Li-Fraumeni Syndrome   (TP53)  Li-Fraumeni Syndrome (LFS) is a cancer predisposition syndrome caused by a mutation in the TP53 gene. A single mutation in one of the copies of TP53 increases the risk for multiple cancers. Individuals with LFS are at an increased risk for developing cancer at a young age. The lifetime risk for development of a LFS-associated cancer is 50% by age 30 and 90% by age 60.     Core Cancers: Sarcomas,  Breast, Brain, Lung, Leukemias/Lymphomas, Adrenocortical carcinomas  Other Cancers: Gastrointestinal, Thyroid, Skin, Genitourinary    Hereditary Diffuse Gastric Cancer   (CDH1)  Currently, one gene is known to cause hereditary diffuse gastric cancer (HDGC): CDH1.  Individuals with HDGC are at increased risk for diffuse gastric cancer and lobular breast cancer. Of people diagnosed with HDGC, 30-50% have a mutation in the CDH1 gene.  This suggests there are likely other genes that may cause HDGC that have not been identified yet.      Lifetime Cancer Risks    General Population HDGC    Diffuse Gastric  <1% ~80%   Breast 12% 39-52%         Additional Genes  RAMONE  RAMONE is a moderate-risk breast cancer gene. Women who have a mutation in RAMONE can have between a 2-4 fold increased risk for breast cancer compared to the general population8. RAMONE mutations have also been associated with increased risk for pancreatic cancer, however an estimate of this cancer risk is not well understood9. Individuals who inherit two RAMONE mutations have a condition called ataxia-telangiectasia (AT).  This rare autosomal recessive condition affects the nervous system and immune system, and is associated with progressive cerebellar ataxia beginning in childhood.  Individuals with ataxia-telangiectasia often have a weakened immune system and have an increased risk for childhood cancers.    PALB2  Mutations in PALB2 have been shown to increase the risk of breast cancer up to 33-58% in some families; where individuals fall within this risk range is dependent upon family [id number]. PALB2 mutations have also been associated with increased risk for pancreatic cancer, although this risk has not been quantified yet.  Individuals who inherit two PALB2 mutations--one from their mother and one from their father--have a condition called Fanconi Anemia.  This rare autosomal recessive condition is associated with short stature, developmental delay, bone marrow  failure, and increased risk for childhood cancers.    CHEK2   CHEK2 is a moderate-risk breast cancer gene.  Women who have a mutation in CHEK2 have around a 2-fold increased risk for breast cancer compared to the general population, and this risk may be higher depending upon family history.11,12,13 Mutations in CHEK2 have also been shown to increase the risk of a number of other cancers, including colon and prostate, however these cancer risks are currently not well understood.    BRIP1, RAD51C and RAD51D  Mutations in BRIP1, RAD51C, and RAD51D have been shown to increase the risk of ovarian cancer and possibly female breast cancer as well14,15 .       Lifetime Cancer Risk    General Population BRIP1 RAD51C RAD51D   Ovarian 1-2% ~5-8% ~5-9% ~7-15%               Inheritance  All of the cancer syndromes reviewed above are inherited in an autosomal dominant pattern.  This means that if a parent has a mutation, each of his or her children will have a 50% chance of inheriting that same mutation.  Therefore, each child--male or female--would have a 50% chance of being at increased risk for developing cancer.      Image obtained from Genetics Home Reference, 2013     Mutations in some genes can occur de ric, which means that a person s mutation occurred for the first time in them and was not inherited from a parent.  Now that they have the mutation, however, it can be passed on to future generations.    Genetic Testing  Genetic testing involves a blood test and will look at the genetic information in the RAMONE, BRCA1, BRCA2, BRIP1, CDH1, CHEK2, MLH1, MSH2, MSH6, PMS2, EPCAM, PTEN, PALB2, RAD51C, RAD51D, and TP53 genes for any harmful mutations that are associated with increased cancer risk.  If possible, it is recommended that the person(s) who has had cancer be tested before other family members.  That person will give us the most useful information about whether or not a specific gene is associated with the cancer in the  family.    Results  There are three possible results of genetic testing:    Positive--a harmful mutation was identified in one or more of the genes    Negative--no mutation was identified in any of the genes on this panel    Variant of unknown significance--a variation in one of the genes was identified, but it is unclear how this impacts cancer risk in the family    Advantages and Disadvantages   There are advantages and disadvantages to genetic testing.    Advantages    May clarify your cancer risk    Can help you make medical decisions    May explain the cancers in your family    May give useful information to your family members (if you share your results)    Disadvantages    Possible negative emotional impact of learning about inherited cancer risk    Uncertainty in interpreting a negative test result in some situations    Possible genetic discrimination concerns (see below)    Genetic Information Nondiscrimination Act (TAURUS)  TAURUS is a federal law that protects individuals from health insurance or employment discrimination based on a genetic test result alone.  Although rare, there are currently no legal discrimination protections in terms of life insurance, long term care, or disability insurances.  Visit the National [employer] [place] website to learn more.    Reducing Cancer Risk  All of the genes described above have nationally recognized cancer screening guidelines that would be recommended for individuals who test positive.  In addition to increased cancer screening, surgeries may be offered or recommended to reduce cancer risk.  Recommendations are based upon an individual s genetic test result as well as their personal and family history of cancer.    Questions to Think About Regarding Genetic Testing:    What effect will the test result have on me and my relationship with my family members if I have an inherited gene mutation?  If I don t have a gene mutation?    Should I share my test  results, and how will my family react to this news, which may also affect them?    Are my children ready to learn new information that may one day affect their own health?    Hereditary Cancer Resources    FORCE: Facing Our Risk of Cancer Empowered facingourrisk.org   Bright Pink bebrightpink.org   Li-Fraumeni Syndrome Association lfsassociation.org   PTEN World PTENworld.The Campaign Solution   No stomach for cancer, Inc. nostomacforcancer.org   Stomach cancer relief network Scrnet.org   Collaborative Group of the Americas on Inherited Colorectal Cancer (CGA) cgaicc.com    Cancer Care cancercare.org   American Cancer Society (ACS) cancer.org   National Cancer [place] (NCI) cancer.gov     Cancer Risk Management Program [phone] ([phone])  ? Arlette Diana, MS, [facility]  [phone]  ? Danica Billings, MS, [facility] [place]  [phone]  ? Awa Gomes, MS, [facility]  [phone]  ? María Galeas, MS, [facility] [place]  [phone]  ? Mag Mazariegos, MS, [facility]  [phone]    References  1. Carlita A, Arlin PDP, Demetria S, Rosey CHÁVEZ, Yan JE, Jacob JL, Warren N, Johnna H, Regan O, Sarah Beth A, Reena B, Lindsay P, Deepika S, Bertha DM, Juanito N, Tamiko E, Ravi H, Guan E, Lubinski J, Gronwald J, Denilson B, Tuldianeus H, Thorlacius S, Eerola H, Juan Pablo H, Mayur K, Kendy OP. Average risks of breast and ovarian cancer associated with BRCA1 or BRCA2 mutations detected in case series unselected for family history: a combined analysis of 222 studies. Am J Hum Belkis. 2003;72:1117-30.  2. Malika WARNER, Ruth JOHNS, Neo BELL.  BRCA1 and BRCA2 Hereditary Breast and Ovarian Cancer. Gene Reviews online. 2013.  3. Sage YC, Yao S, Grover G, Ramsay S. Breast cancer risk among male BRCA1 and BRCA2 mutation carriers. J Natl Cancer Inst. 2007;99:1811-4.  4. Eduardo ADKINS, Casandra I, Omar J, Samir E, Tirso ER, Bob F. Risk of breast cancer in male BRCA2 carriers. J Med Belkis. 2010;47:710-1.  5. National Comprehensive Cancer Network. Clinical practice  guidelines in oncology, colorectal cancer screening. Available online (registration required). 2015.  6. Darren MH, Dexter J, Vadim J, Medardo LA, Anshu MS, Chu C. Lifetime cancer risks in individuals with germline PTEN mutations. Clin Cancer Res. 2012;18:400-7.  7. Bryanna FORMAN. Cowden Syndrome: A Critical Review of the Clinical Literature. J Belkis . 2009:18:13-27.  8. Sravan A, Zachary D, Elma S, Aida P, Netta T, Huang M, Robbin B, Sara H, Jazmin R, Justina K, Joshua L, Eduardo DG, Bertha D, Hector DF, Linda MR, The Breast Cancer Susceptibility Collaboration (UK) & John WARNER. RAMONE mutations that cause ataxia-telangiectasia are breast cancer susceptibility alleles. Nature Genetics. 2006;38:873-875  9. Олег N , Cassie Y, Radha J, Bella L, Erlin GM , Ilan ML, Gallinger S, Agee AG, Syngal S, Ozzy ML, Forrest J , Zuleima R, Becky SZ, Arnold JR, Arturo VE, Keily M, Vogelstein B, Charles N, Kellien RH, Delvis KW, and Mehta AP. RAMONE mutations in patients with hereditary pancreatic cancer. Cancer Discover. 2012;2:41-46  10. Carlita REED, et al. Breast-Cancer Risk in Families with Mutations in PALB2. NEJM. 2014; 371(6):497-506.  11. CHEK2 Breast Cancer Case-Control Consortium. CHEK2*1100delC and susceptibility to breast cancer: A collaborative analysis involving 10,860 breast cancer cases and 9,065 controls from 10 studies. Am J Hum Belkis, 74 (2004), pp. 0596-3119  12. Nahid T, Gallo S, Timothy K, et al. Spectrum of Mutations in BRCA1, BRCA2, CHEK2, and TP53 in Families at High Risk of Breast Cancer. FARIDA. 2006;295(12):2360-7025.   13. Barbara BAUTISTA, Calvin HECTOR, Trish SHORT, et al. Risk of breast cancer in women with a CHEK2 mutation with and without a family history of breast cancer. J Clin Oncol. 2011;29:9286-2425.  14. Selvin H, Nehal E, Ramona DUVAL, et al. Contribution of germline mutations in the RAD51B, RAD51C, and RAD51D genes to ovarian cancer in the population. J Clin Oncol.  2015;33(26):9428-1564. Doi:10.1200/JCO.2015.61.2408.  15. Enedina T, Saroj DF, Perry P, et al. Mutations in BRIP1 confer high risk of ovarian cancer. Chana Belksi. 2011;43(11):5495-6503. doi:10.1038/ng.955.            Follow-ups after your visit        Your next 10 appointments already scheduled     Mar 15, 2018 12:30 PM CDT   Return Visit with HEDY Beth CNP   [facility] ([facility])    [place] [address]  [place] [address]   [phone]            Mar 15, 2018  2:00 PM CDT   Level 2 with SH INFUSION CHAIR 8   [facility] [place] ([facility])    [place] [address]  [place] [address]   [phone]            Mar 21, 2018  1:00 PM CDT   Level 3 with SH INFUSION CHAIR 15   [facility] [place])    [place] [id number]  [place] [address]   [phone]            Mar 21, 2018  1:45 PM CDT   Return Visit with Yani Magana MD   [facility] [place] ([facility])    [place] [id number]  [place] [address] [phone]              Who to contact     If you have questions or need follow up information about today's clinic visit or your schedule please contact CANCER RISK MANAGEMENT PROGRAM directly at [phone].  Normal or non-critical lab and imaging results will be communicated to you by MyChart, letter or phone within 4 business days after the clinic has received the results. If you do not hear from us within 7 days, please contact the clinic through MyChart or phone. If you have a critical or abnormal lab result, we will notify you by phone as soon as possible.  Submit refill requests through [employer] or call your pharmacy and they will forward the refill request to us. Please allow 3 business days for your  "refill to be completed.          Additional Information About Your Visit        nVoqharA la Mobile Information     [employer] lets you send messages to your doctor, view your test results, renew your prescriptions, schedule appointments and more. To sign up, go to [email or web address] . Click on \"Log in\" on the left side of the screen, which will take you to the Welcome page. Then click on \"Sign up Now\" on the right side of the page.     You will be asked to enter the access code listed below, as well as some personal information. Please follow the directions to create your username and password.     Your access code is: [email or web address]  Expires: 2018  3:02 PM     Your access code will  in 90 days. If you need help or a new code, please call your [facility] clinic or [phone].        Care EveryWhere ID     This is your Care EveryWhere ID. This could be used by other organizations to access your [facility] medical records  [id number]        Your Vitals Were     Last Period                   2004 (Approximate)            Blood Pressure from Last 3 Encounters:   18 130/86   18 120/77   18 123/86    Weight from Last 3 Encounters:   18 81 kg (178 lb 9.6 oz)   18 81 kg (178 lb 9.6 oz)   18 80.3 kg (177 lb)              We Performed the Following     [facility] test 9510: Laboratory Miscellaneous Order        Primary Care Provider Office Phone # Fax #    [facility] [place] [phone] [address] [facility] [address]        Equal Access to Services     TRICIA JARA AH: Hadii nelida Nolan, carole heard, qaybta ketan sun. So [facility] [phone].    ATENCIÓN: Si habla español, tiene a thomas disposición servicios gratuitos de asistencia lingüística. Pino schafer [phone].    We comply with applicable federal civil rights laws and [place] laws. We do not discriminate " on the basis of race, color, national origin, age, disability, sex, sexual orientation, or gender identity.            Thank you!     Thank you for choosing CANCER RISK MANAGEMENT PROGRAM  for your care. Our goal is always to provide you with excellent care. Hearing back from our patients is one way we can continue to improve our services. Please take a few minutes to complete the written survey that you may receive in the mail after your visit with us. Thank you!             Your Updated Medication List - Protect others around you: Learn how to safely use, store and throw away your medicines at www.disposemymeds.org.          This list is accurate as of 3/15/18 11:51 AM.  Always use your most recent med list.                   Brand Name Dispense Instructions for use Diagnosis    ADVIL PO      Take 400 mg by mouth every 6 hours as needed for moderate pain        aspirin 81 MG tablet      Take 81 mg by mouth daily        dexamethasone 4 MG tablet    DECADRON    6 tablet    Take 2 tablets (8 mg) by mouth daily for 3 days Start on Day 2.    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 in female, estrogen receptor positive (H), Carcinoma of unknown origin (H)       GLUCOSAMINE CHONDROITIN JOINT PO      Take by mouth daily        HYDROcodone-acetaminophen 5-325 MG per tablet    NORCO    6 tablet    Take 1 tablet by mouth every 6 hours as needed for moderate to severe pain    Carcinoma of unknown origin (H)       LORazepam 0.5 MG tablet    ATIVAN    30 tablet    Take 1 tablet (0.5 mg) by mouth every 4 hours as needed (Anxiety, Nausea/Vomiting or Sleep)    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 in female, estrogen receptor positive (H), Carcinoma of unknown origin (H)       OMEGA-3 FISH OIL PO      Take 1 g by mouth daily        prochlorperazine 10 MG tablet    COMPAZINE    30 tablet    Take 1 tablet (10 mg) by mouth every 6 hours as needed (Nausea/Vomiting)    Malignant neoplasm of overlapping  sites of right breast in female, estrogen receptor positive (H), Carcinoma of unknown origin (H)       valACYclovir 1000 mg tablet    VALTREX    20 tablet    Take 1 tablet (1,000 mg) by mouth 2 times daily    Cold sore       VITAMIN B6 PO      Take by mouth daily        VITAMIN D (CHOLECALCIFEROL) PO      Take 1,000 Units by mouth daily

## 2018-03-15 NOTE — PROGRESS NOTES
"Oncology Rooming Note    March 15, 2018 12:19 PM   Flor Griffin is a 62 year old female who presents for:    Chief Complaint   Patient presents with     Oncology Clinic Visit     breast cancer     Initial Vitals: /76 (BP Location: Left arm, Patient Position: Sitting, Cuff Size: Adult Regular)  Pulse 77  Temp 98.5  F (36.9  C) (Oral)  Resp 16  Wt 80.6 kg (177 lb 9.6 oz)  LMP 01/01/2004 (Approximate)  SpO2 100%  BMI 28.68 kg/m2 Estimated body mass index is 28.68 kg/(m^2) as calculated from the following:    Height as of 3/7/18: 1.676 m (5' 5.98\").    Weight as of this encounter: 80.6 kg (177 lb 9.6 oz). Body surface area is 1.94 meters squared.  No Pain (0) Comment: Data Unavailable   Patient's last menstrual period was 01/01/2004 (approximate).  Allergies reviewed: Yes  Medications reviewed: Yes    Medications: Medication refills not needed today.  Pharmacy name entered into [employer]: [id number] [address]    Clinical concerns: None                  4 minutes for nursing intake (face to face time)     Angie Parry MA            "

## 2018-03-15 NOTE — PROGRESS NOTES
Infusion Nursing Note:  Flor Griffin presents today for labs (didn't need IVF today).    Patient seen by provider today: Yes: Dr. Magana   present during visit today: Not Applicable.    Note:   Per Buster Menon NP pt aware of Neutropenic precautions has a protective mask and verbalizes understanding of precautions. Will return for labs 3/19/2018 pt aware    Intravenous Access:  Labs drawn without difficulty.  Implanted Port.    Treatment Conditions:  Not Applicable.      Post Infusion Assessment:  Site patent and intact, free from redness, edema or discomfort.  No evidence of extravasations.  Access discontinued per protocol.    Discharge Plan:   AVS to patient via MYCHART.  Patient will return as prev keily for next appointment.   Patient discharged in stable condition accompanied by: self.  Departure Mode: Ambulatory.    Woody Mathur RN

## 2018-03-15 NOTE — PROGRESS NOTES
Oncology/Hematology Visit Note  Mar 15, 2018    Reason for Visit: follow up of adenocarcinoma post cycle 1 on chemo.     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  showed hypermetabolic right supraclavicular, right axillary and subpectoral adenopathy. There was no evidence of distant metastases.Mammogram 11/30/17 no evidence of primary  MRI breast 12/20/17 no evidence of malignancy in the breasts. S/p chemotherapy carboplatin/Taxol. Completed 4 cycles. PET Scan 2/20/18 showed good response to treatment. She is currently undergoing treatment with  dose dense AC for 4 cycles.  Cycle 1 day 1 was on March 7  She comes in today for routine follow up after cycle 1       Interval History:  Tolerated cycle 1 of  dose dense AC well.  Has neuropathy in her fingers and toes.  Her symptoms are mild.  She refuses any intervention for now.  Denies edema.  She is active she walks frequently.  She denies fever chills or sweats, denies cough, denies shortness of breath.  Her appetite is good, her energy is good. She denies nausea vomiting or diarrhea, denies abdominal pain. Denies urinary symptoms.  She denies mouth sores or taste changes.    she does not have any concerns today      Review of Systems:  14 point ROS of systems including Constitutional, Eyes, Respiratory, Cardiovascular, Gastroenterology, Genitourinary, Integumentary, Muscularskeletal, Psychiatric were all negative except for pertinent positives noted in my HPI.      Current Outpatient Prescriptions   Medication Sig Dispense Refill     LORazepam (ATIVAN) 0.5 MG tablet Take 1 tablet (0.5 mg) by mouth every 4 hours as needed (Anxiety, Nausea/Vomiting or Sleep) 30 tablet 3     prochlorperazine (COMPAZINE) 10 MG tablet Take 1 tablet (10 mg) by mouth every 6 hours as needed (Nausea/Vomiting) 30 tablet 3     HYDROcodone-acetaminophen (NORCO) 5-325 MG  per tablet Take 1 tablet by mouth every 6 hours as needed for moderate to severe pain 6 tablet 0     Pyridoxine HCl (VITAMIN B6 PO) Take by mouth daily       Glucos-Chondroit-Hyaluron-MSM (GLUCOSAMINE CHONDROITIN JOINT PO) Take by mouth daily       Ibuprofen (ADVIL PO) Take 400 mg by mouth every 6 hours as needed for moderate pain       valACYclovir (VALTREX) 1000 mg tablet Take 1 tablet (1,000 mg) by mouth 2 times daily 20 tablet 0     VITAMIN D, CHOLECALCIFEROL, PO Take 1,000 Units by mouth daily       Omega-3 Fatty Acids (OMEGA-3 FISH OIL PO) Take 1 g by mouth daily       aspirin 81 MG tablet Take 81 mg by mouth daily       dexamethasone (DECADRON) 4 MG tablet Take 2 tablets (8 mg) by mouth daily for 3 days Start on Day 2. 6 tablet 3       Physical Examination:  General: The patient is a pleasant female in no acute distress.  /76 (BP Location: Left arm, Patient Position: Sitting, Cuff Size: Adult Regular)  Pulse 77  Temp 98.5  F (36.9  C) (Oral)  Resp 16  Wt 80.6 kg (177 lb 9.6 oz)  LMP 01/01/2004 (Approximate)  SpO2 100%  BMI 28.68 kg/m2  Wt Readings from Last 10 Encounters:   03/15/18 80.6 kg (177 lb 9.6 oz)   03/07/18 81 kg (178 lb 9.6 oz)   03/07/18 81 kg (178 lb 9.6 oz)   02/21/18 80.3 kg (177 lb)   02/14/18 81.5 kg (179 lb 9.6 oz)   02/14/18 81.5 kg (179 lb 9.6 oz)   01/24/18 80.3 kg (177 lb)   01/24/18 80.6 kg (177 lb 12.8 oz)   01/03/18 81.6 kg (180 lb)   01/03/18 81.6 kg (180 lb)     HEENT: EOMI, PERRL. Sclerae are anicteric. Oral mucosa is pink and moist with no lesions or thrush.   Lymph: Neck is supple with no lymphadenopathy in the cervical or supraclavicular areas.   Heart: Regular rate and rhythm.   Lungs: Clear to auscultation bilaterally.   GI: Bowel sounds present, soft, nontender with no palpable hepatosplenomegaly or masses.   Extremities: No lower extremity edema noted bilaterally.   Skin: No rashes, petechiae, or bruising noted on exposed skin.    Laboratory Data:    Results for  ALMA MASON (MRN [id number]) as of 3/15/2018 14:08   Ref. Range 3/15/2018 13:00   Sodium Latest Ref Range: 133 - 144 mmol/L 134   Potassium Latest Ref Range: 3.4 - 5.3 mmol/L 3.8   Chloride Latest Ref Range: 94 - 109 mmol/L 101   Carbon Dioxide Latest Ref Range: 20 - 32 mmol/L 27   Urea Nitrogen Latest Ref Range: 7 - 30 mg/dL 17   Creatinine Latest Ref Range: 0.52 - 1.04 mg/dL 0.55   GFR Estimate Latest Ref Range: >60 mL/min/1.7m2 >90   GFR Estimate If Black Latest Ref Range: >60 mL/min/1.7m2 >90   Calcium Latest Ref Range: 8.5 - 10.1 mg/dL 8.7   Anion Gap Latest Ref Range: 3 - 14 mmol/L 6   Albumin Latest Ref Range: 3.4 - 5.0 g/dL 3.5   Protein Total Latest Ref Range: 6.8 - 8.8 g/dL 6.6 (L)   Bilirubin Total Latest Ref Range: 0.2 - 1.3 mg/dL 0.6   Alkaline Phosphatase Latest Ref Range: 40 - 150 U/L 116   ALT Latest Ref Range: 0 - 50 U/L 37   AST Latest Ref Range: 0 - 45 U/L 13   Glucose Latest Ref Range: 70 - 99 mg/dL 92   WBC Latest Ref Range: 4.0 - 11.0 10e9/L 1.1 (L)   Hemoglobin Latest Ref Range: 11.7 - 15.7 g/dL 10.5 (L)   Hematocrit Latest Ref Range: 35.0 - 47.0 % 30.9 (L)   Platelet Count Latest Ref Range: 150 - 450 10e9/L 69 (L)   RBC Count Latest Ref Range: 3.8 - 5.2 10e12/L 3.26 (L)   MCV Latest Ref Range: 78 - 100 fl 95   MCH Latest Ref Range: 26.5 - 33.0 pg 32.2   MCHC Latest Ref Range: 31.5 - 36.5 g/dL 34.0   RDW Latest Ref Range: 10.0 - 15.0 % 14.0         Assessment and Plan:    This is a 62-year-old female with    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   There is no evidence of distant metastases.  She had mammogram and MRI breast - did not show malignancy.  She completed 4 cycles of carbo carboplatin and Taxol.  PET CT scan showed good response to the treatment.  She started dose dense Adriamycin Cytoxan cycle 1 day 1 on March 7.  Besides mild neuropathy toes and fingers she has been tolerating chemotherapy well.  Patient is neutropenic today.  She will return to clinic on Monday  03/12 for labs and follow-up with Jennifer.  She is scheduled for cycle 2 of chemo on March 21.  She will see Dr. Magana on that day as well.        NeutropeniaSecondary to chemotherapy.    Patient is status post Neulasta shot after chemo on March 8.  Start prophylactic therapy with Levaquin 500 mg p.o. and fluconazole  neutropenic precautions discussed  In the event of fever, chills or sweats, cough shortness of breath or any changes in health condition  go to the emergency room.      Thrombocytopenia secondary to therapy.  No evidence of bleeding. Stable  No need for platelet transfusion  Check labs on Monday and infuse for platelets less than 20 or prn bleeding    Peripheral neuropathy  Patient reports that the  symptoms are mild.  The patient refuses intervention for now.  I told the patient to call if worsening of symptoms.      Patient is asked to go to the emergency room in the event of fever chills sweats cough shortness of breath chest pain or any changes in health condition      HEDY Beth CNP  Hem/Onc   [facility]

## 2018-03-15 NOTE — LETTER
Cancer Risk Management  Program Locations    [facility]  [facility]  Mailing Address  Cancer Risk Management Program  [facility]  [address]  [place] [address]    New patient appointments  [phone]  March 16, 2018    Flor BARTHOLOMEW Gaby  [address] [address] [id number]      Dear Flor,    It was a pleasure meeting with you at the [facility] on 3/15/2018.  Here is a copy of the progress note from your recent genetic counseling visit to the Cancer Risk Management Program.  If you have any additional questions, please feel free to call.    Referring Provider: Yani Magana MD    Presenting Information:   I met with Flor Gaby today for genetic counseling at the Cancer Risk Management Program at the [facility] to discuss her personal and family history of cancer.  She is here today to review this history and available genetic testing options.    Personal History:  Flor is a 62 year old female.  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  Treatment has included chemotherapy.      Flor has her ovaries, fallopian tubes and uterus in place, and she has had no ovarian cancer screening to date.  Her most recent mammogram in November of 2017 and breast MRI in December of 2017 were normal.  Her most recent colonoscopy in 2012 was normal and follow up was recommended in 10 years.  She reports a history of two basal cell carcinomas removed in her late 50's and early 60's.  She does not regularly do any other cancer screening at this time.  Flor reported no tobacco use, and no concerns regarding alcohol use or environmental exposures.    Family History: (Please see scanned pedigree  for detailed family history information)    Flor's sister was diagnosed with breast cancer at age 80 and is now 81    One maternal aunt was diagnosed with breast cancer in her 30's, and possible ovarian cancer in her 70's.  Flor was uncertain whether this ovarian cancer was a second primary diagnosis.      One maternal aunt who passed away in her 70's reportedly had a history of a brain tumor.      Her maternal uncle reportedly had a history of cancer, however details were not available today.    One paternal aunt reportedly had a history of a brain tumor, and passed away in her 80's     Her maternal ethnicity is [place]. Her paternal ethnicity is [place]arian/Thai.  There is no known Ashkenazi Bahai ancestry on either side of her family.     Discussion:    Flor's family history of early onset breast cancer is suggestive of a possible hereditary cancer syndrome, such as Hereditary Breast and Ovarian Cancer syndrome.    We reviewed the features of sporadic, familial, and hereditary cancers.  In looking at Flor's family history, it is possible that a cancer susceptibility gene is present due to her maternal aunt's history of early breast cancer diagnosed in her 30's, and her sister's history of breast cancer diagnosed at an older age.  In addition, Flor has a personal history of cancer likely of breast origin, per her oncology care team.  Based on her personal and family history, Flor meets current National Comprehensive Cancer Network (NCCN) criteria for genetic testing of BRCA1 and BRCA2.      We discussed the natural history and genetics of Hereditary Breast and Ovarian Cancer syndrome due to mutations in the BRCA1/2 genes. We discussed that there are additional genes that could cause increased risk for breast cancer.  As many of these genes present with overlapping features in a family and accurate cancer risk cannot always be established based upon the pedigree analysis alone, it would be  reasonable for Flor to consider panel genetic testing to analyze multiple genes at once.     A detailed handout regarding hereditary breast cancer and the information we discussed was provided to Flor at the end of our appointment today and can be found in the after visit summary.  Topics included: inheritance pattern, cancer risks, cancer screening recommendations, and also risks, benefits and limitations of testing.    We reviewed genetic testing options for hereditary breast and gynecologic cancers, including actionable high/moderate risk breast and gynecologic cancer custom panel (CustomNext-Cancer, 16 genes) and expanded high and moderate risk panel testing (OvaNext, 25 genes).  Flor expressed an interest in only the actionable genes.  She opted for the CustomNext-Cancer panel.  The CustomNext-Cancer high/moderate risk breast and gynecologic panel includes the following genes: RAMONE, BRCA1, BRCA2, BRIP1, CDH1, CHEK2, EPCAM, MLH1, MSH2, MSH6, PALB2,  PMS2, PTEN, RAD51C, RAD51D, and TP53.    Some of the genes on this custom panel are associated with specific hereditary cancer syndromes and have published management guidelines:  Hereditary Breast and Ovarian Cancer syndrome (BRCA1, BRCA2), Ravi syndrome (EPCAM, MLH1, MSH2, MSH6, PMS2), Hereditary Diffuse Gastric Cancer (CDH1), Cowden Syndrome (PTEN), and Li Fraumeni syndrome (TP53).     Risk-reducing salpingo-oophorectomy can be considered in women with mutations in BRIP1, RAD51C, or RAD51D.  RAMONE, CHEK2, and PALB2 are associated with moderate breast cancer risk and have published screening guidelines.    Consent was obtained and genetic testing for the CustomNext-Cancer panel was sent to [facility].  Turn around time: approximately 3-4 weeks after insurance authorization is obtained.      Medical Management: For Flor, we reviewed that the information from genetic testing may determine:    additional cancer screening for which Flor may  qualify (i.e. mammogram and breast MRI, more frequent colonoscopies, more frequent dermatologic exams, etc.),    options for risk reducing surgeries Flor could consider (i.e. bilateral mastectomy, surgery to remove the ovaries and/or uterus, etc.),      and possible targeted chemotherapies for Flor's active cancer, or if she were to develop certain cancers in the future (i.e. immunotherapy for individuals with Ravi syndrome, PARP inhibitors, etc.).     These recommendations will be discussed in detail once genetic testing is completed.     Plan:  1) Today Flor elected to proceed with the CustomNext-Cancer panel offered through [facility].  2) This information should be available 3-4 weeks after insurance authorization is obtained.  3) Flor will return to clinic to discuss the results    María Galeas MS, [facility]  Licensed Genetic Counselor  [phone]

## 2018-03-15 NOTE — PROGRESS NOTES
3/15/2018    Referring Provider: Yani Magana MD    Presenting Information:   I met with Flor Griffin today for genetic counseling at the Cancer Risk Management Program at the [facility] to discuss her personal and family history of cancer.  She is here today to review this history and available genetic testing options.    Personal History:  Flor is a 62 year old female.  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 Magana, at age 62.  Treatment has included chemotherapy.      Flor has her ovaries, fallopian tubes and uterus in place, and she has had no ovarian cancer screening to date.  Her most recent mammogram in November of 2017 and breast MRI in December of 2017 were normal.  Her most recent colonoscopy in 2012 was normal and follow up was recommended in 10 years.  She reports a history of two basal cell carcinomas removed in her late 50's and early 60's.  She does not regularly do any other cancer screening at this time.  Flor reported no tobacco use, and no concerns regarding alcohol use or environmental exposures.    Family History: (Please see scanned pedigree for detailed family history information)    Flor's sister was diagnosed with breast cancer at age 80 and is now 81    One maternal aunt was diagnosed with breast cancer in her 30's, and possible ovarian cancer in her 70's.  Flor was uncertain whether this ovarian cancer was a second primary diagnosis.      One maternal aunt who passed away in her 70's reportedly had a history of a brain tumor.      Her maternal uncle reportedly had a history of cancer, however details were not available today.    One paternal aunt reportedly had a history of a brain tumor, and passed away in her 80's     Her maternal ethnicity is [place]mese. Her paternal ethnicity is Burmese/German.  There is no known Ashkenazi Taoist ancestry on either side of  her family.     Discussion:    Flor's family history of early onset breast cancer is suggestive of a possible hereditary cancer syndrome, such as Hereditary Breast and Ovarian Cancer syndrome.    We reviewed the features of sporadic, familial, and hereditary cancers.  In looking at Flor's family history, it is possible that a cancer susceptibility gene is present due to her maternal aunt's history of early breast cancer diagnosed in her 30's, and her sister's history of breast cancer diagnosed at an older age.  In addition, Flor has a personal history of cancer likely of breast origin, per her oncology care team.  Based on her personal and family history, Flor meets current National Comprehensive Cancer Network (NCCN) criteria for genetic testing of BRCA1 and BRCA2.      We discussed the natural history and genetics of Hereditary Breast and Ovarian Cancer syndrome due to mutations in the BRCA1/2 genes. We discussed that there are additional genes that could cause increased risk for breast cancer.  As many of these genes present with overlapping features in a family and accurate cancer risk cannot always be established based upon the pedigree analysis alone, it would be reasonable for Flor to consider panel genetic testing to analyze multiple genes at once.     A detailed handout regarding hereditary breast cancer and the information we discussed was provided to Flor at the end of our appointment today and can be found in the after visit summary.  Topics included: inheritance pattern, cancer risks, cancer screening recommendations, and also risks, benefits and limitations of testing.    We reviewed genetic testing options for hereditary breast and gynecologic cancers, including actionable high/moderate risk breast and gynecologic cancer custom panel (CustomNext-Cancer, 16 genes) and expanded high and moderate risk panel testing (OvaNext, 25 genes).  Flor expressed an interest in only the  actionable genes.  She opted for the CustomNext-Cancer panel.  The CustomNext-Cancer high/moderate risk breast and gynecologic panel includes the following genes: RAMONE, BRCA1, BRCA2, BRIP1, CDH1, CHEK2, EPCAM, MLH1, MSH2, MSH6, PALB2,  PMS2, PTEN, RAD51C, RAD51D, and TP53.    Some of the genes on this custom panel are associated with specific hereditary cancer syndromes and have published management guidelines:  Hereditary Breast and Ovarian Cancer syndrome (BRCA1, BRCA2), Ravi syndrome (EPCAM, MLH1, MSH2, MSH6, PMS2), Hereditary Diffuse Gastric Cancer (CDH1), Cowden Syndrome (PTEN), and Li Fraumeni syndrome (TP53).     Risk-reducing salpingo-oophorectomy can be considered in women with mutations in BRIP1, RAD51C, or RAD51D.  RAMONE, CHEK2, and PALB2 are associated with moderate breast cancer risk and have published screening guidelines.    Consent was obtained and genetic testing for the CustomNext-Cancer panel was sent to [facility].  Turn around time: approximately 3-4 weeks after insurance authorization is obtained.      Medical Management: For Flor, we reviewed that the information from genetic testing may determine:    additional cancer screening for which Flor may qualify (i.e. mammogram and breast MRI, more frequent colonoscopies, more frequent dermatologic exams, etc.),    options for risk reducing surgeries Flor could consider (i.e. bilateral mastectomy, surgery to remove the ovaries and/or uterus, etc.),      and possible targeted chemotherapies for Flor's active cancer, or if she were to develop certain cancers in the future (i.e. immunotherapy for individuals with Ravi syndrome, PARP inhibitors, etc.).     These recommendations will be discussed in detail once genetic testing is completed.     Plan:  1) Today Flor elected to proceed with the CustomNext-Cancer panel offered through [facility].  2) This information should be available 3-4 weeks after insurance authorization  is obtained.  3) Flor will return to clinic to discuss the results    Face to face time: 35 minutes    María Galeas MS, [facility]  Licensed Genetic Counselor  [phone]

## 2018-03-15 NOTE — MR AVS SNAPSHOT
After Visit Summary   3/15/2018    Flor Griffin    MRN: [id number]           Patient Information     Date Of Birth          1955        Visit Information        Provider Department      3/15/2018 12:30 PM Buster Menon APRN CNP [facility]        Today's Diagnoses     Chemotherapy-induced neutropenia (H)    -  1      Care Instructions      Schedule appointment with Buster on Monday     schedule for labs on Monday  CBC and CMP     Start Levaquin and fluconazole    In the event of fever or infection go to ER              Follow-ups after your visit        Your next 10 appointments already scheduled     Mar 19, 2018  8:30 AM CDT   LAB with  INFUSION CHAIR 11   [facility] ([facility])    [address]  [place] [address]   [phone]           Please do not eat 10-12 hours before your appointment if you are coming in fasting for labs on lipids, cholesterol, or glucose (sugar). This does not apply to pregnant women. Water, hot tea and black coffee (with nothing added) are okay. Do not drink other fluids, diet soda or chew gum.            Mar 19, 2018  9:00 AM CDT   Return Visit with Jennifer Garcia PA-C   [facility] ([facility])    [facility]  [address]  [place] [address]   [phone]            Mar 21, 2018  1:00 PM CDT   Level 3 with  INFUSION CHAIR 15   [facility] ([facility])    Critical access hospital [place]  [address]  [place] [id number] [phone]            Mar 21, 2018  1:45 PM CDT   Return Visit with Yani Magana MD   [facility] ([facility])    Critical access hospital [place]  [address]  [place] [address] [phone]              Who to contact     If you have  "questions or need follow up information about today's clinic visit or your schedule please contact [facility] directly at [phone].  Normal or non-critical lab and imaging results will be communicated to you by MyChart, letter or phone within 4 business days after the clinic has received the results. If you do not hear from us within 7 days, please contact the clinic through Skyerahart or phone. If you have a critical or abnormal lab result, we will notify you by phone as soon as possible.  Submit refill requests through Zkatter or call your pharmacy and they will forward the refill request to us. Please allow 3 business days for your refill to be completed.          Additional Information About Your Visit        SkyeraharAndersonBrecon Information     Zkatter lets you send messages to your doctor, view your test results, renew your prescriptions, schedule appointments and more. To sign up, go to [email or web address] . Click on \"Log in\" on the left side of the screen, which will take you to the Welcome page. Then click on \"Sign up Now\" on the right side of the page.     You will be asked to enter the access code listed below, as well as some personal information. Please follow the directions to create your username and password.     Your access code is: [email or web address]  Expires: 2018  3:02 PM     Your access code will  in 90 days. If you need help or a new code, please call your [place] clinic or [phone].        Care EveryWhere ID     This is your Care EveryWhere ID. This could be used by other organizations to access your [place] medical records  [id number]        Your Vitals Were     Pulse Temperature Respirations Last Period Pulse Oximetry BMI (Body Mass Index)    77 98.5  F (36.9  C) (Oral) 16 2004 (Approximate) 100% 28.68 kg/m2       Blood Pressure from Last 3 Encounters:   03/15/18 117/76   18 130/86   18 120/77    Weight from Last 3 Encounters:   03/15/18 80.6 kg (177 lb " 9.6 oz)   03/07/18 81 kg (178 lb 9.6 oz)   03/07/18 81 kg (178 lb 9.6 oz)              Today, you had the following     No orders found for display         Today's Medication Changes          These changes are accurate as of 3/15/18  2:05 PM.  If you have any questions, ask your nurse or doctor.               Start taking these medicines.        Dose/Directions    fluconazole 100 MG tablet   Commonly known as:  DIFLUCAN   Used for:  Chemotherapy-induced neutropenia (H)   Started by:  Buster Menon APRN CNP        Take 1 tab po q day x 7 days   Quantity:  7 tablet   Refills:  0       levofloxacin 500 MG tablet   Commonly known as:  LEVAQUIN   Used for:  Chemotherapy-induced neutropenia (H)   Started by:  Buster Menon APRN CNP        Dose:  500 mg   Take 1 tablet (500 mg) by mouth daily   Quantity:  7 tablet   Refills:  0            Where to get your medicines      These medications were sent to [facility] [address]  [address], [facility] [address]     Phone:  [phone]     fluconazole 100 MG tablet    levofloxacin 500 MG tablet                Primary Care Provider Office Phone # Fax #    [facility] [phone] [address] [facility] [address]        Equal Access to Services     MARYJANE JARA AH: Hadii nelida sono Sodot, waaxda luqadaha, qaybta kaalmada adeegyada, ketan connor. So [facility] [phone].    ATENCIÓN: Si habla español, tiene a thomas disposición servicios gratuitos de asistencia lingüística. Llame al [phone].    We comply with applicable federal civil rights laws and [place] laws. We do not discriminate on the basis of race, color, national origin, age, disability, sex, sexual orientation, or gender identity.            Thank you!     Thank you for choosing [facility]  for your care. Our goal is always to provide you with  excellent care. Hearing back from our patients is one way we can continue to improve our services. Please take a few minutes to complete the written survey that you may receive in the mail after your visit with us. Thank you!             Your Updated Medication List - Protect others around you: Learn how to safely use, store and throw away your medicines at www.disposemymeds.org.          This list is accurate as of 3/15/18  2:05 PM.  Always use your most recent med list.                   Brand Name Dispense Instructions for use Diagnosis    ADVIL PO      Take 400 mg by mouth every 6 hours as needed for moderate pain        aspirin 81 MG tablet      Take 81 mg by mouth daily        dexamethasone 4 MG tablet    DECADRON    6 tablet    Take 2 tablets (8 mg) by mouth daily for 3 days Start on Day 2.    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 in female, estrogen receptor positive (H), Carcinoma of unknown origin (H)       fluconazole 100 MG tablet    DIFLUCAN    7 tablet    Take 1 tab po q day x 7 days    Chemotherapy-induced neutropenia (H)       GLUCOSAMINE CHONDROITIN JOINT PO      Take by mouth daily        HYDROcodone-acetaminophen 5-325 MG per tablet    NORCO    6 tablet    Take 1 tablet by mouth every 6 hours as needed for moderate to severe pain    Carcinoma of unknown origin (H)       levofloxacin 500 MG tablet    LEVAQUIN    7 tablet    Take 1 tablet (500 mg) by mouth daily    Chemotherapy-induced neutropenia (H)       LORazepam 0.5 MG tablet    ATIVAN    30 tablet    Take 1 tablet (0.5 mg) by mouth every 4 hours as needed (Anxiety, Nausea/Vomiting or Sleep)    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 in female, estrogen receptor positive (H), Carcinoma of unknown origin (H)       OMEGA-3 FISH OIL PO      Take 1 g by mouth daily        prochlorperazine 10 MG tablet    COMPAZINE    30 tablet    Take 1 tablet (10 mg) by mouth every 6 hours as needed (Nausea/Vomiting)    Malignant  neoplasm of overlapping sites of right breast in female, estrogen receptor positive (H), Carcinoma of unknown origin (H)       valACYclovir 1000 mg tablet    VALTREX    20 tablet    Take 1 tablet (1,000 mg) by mouth 2 times daily    Cold sore       VITAMIN B6 PO      Take by mouth daily        VITAMIN D (CHOLECALCIFEROL) PO      Take 1,000 Units by mouth daily

## 2018-03-15 NOTE — PATIENT INSTRUCTIONS
Assessing Cancer Risk  Only about 5-10% of cancers are thought to be due to an inherited cancer susceptibility gene.    These families often have:    Several people with the same or related types of cancer    Cancers diagnosed at a young age (before age 50)    Individuals with more than one primary cancer    Multiple generations of the family affected with cancer    Some people may be candidates for genetic testing of more than one gene.  For these families, genetic testing using a cancer panel may be offered.  These panels will test different genes known to increase the risk for breast, ovarian, uterine, and/or other cancers. All of the genes discussed below have published clinical management guidelines for individuals who are found to carry a mutation. The purpose of this handout is to serve as a brief summary of the genes analyzed by the panels used to inquire about hereditary breast and gynecologic cancer:  RAMONE, BRCA1, BRCA2, BRIP1, CDH1, CHEK2, MLH1, MSH2, MSH6, PMS2, EPCAM, PTEN, PALB2, RAD51C, RAD51D, and TP53.  ______________________________________________________________________________  Hereditary Breast and Ovarian Cancer Syndrome   (BRCA1 and BRCA2)  A single mutation in one of the copies of BRCA1 or BRCA2 increases the risk for breast and ovarian cancer, among others.  The risk for pancreatic cancer and melanoma may also be slightly increased in some families.  The chart below shows the chance that someone with a BRCA mutation would develop cancer in his or her lifetime1,2,3,4.        A person s ethnic background is also important to consider, as individuals of Ashkenazi Shinto ancestry have a higher chance of having a BRCA gene mutation.  There are three BRCA mutations that occur more frequently in this population.    Ravi Syndrome   (MLH1, MSH2, MSH6, PMS2, and EPCAM)  Currently five genes are known to cause Ravi Syndrome: MLH1, MSH2, MSH6, PMS2, and EPCAM.  A single mutation in one of the  Ravi Syndrome genes increases the risk for colon, endometrial, ovarian, and stomach cancers.  Other cancers that occur less commonly in Ravi Syndrome include urinary tract, skin, and brain cancers.  The chart below shows the chance that a person with Ravi syndrome would develop cancer in his or her lifetime5.      *Cancer risk varies depending on Ravi syndrome gene found    Cowden Syndrome   (PTEN)  Cowden syndrome is a hereditary condition that increases the risk for breast, thyroid, endometrial, colon, and kidney cancer.  Cowden syndrome is caused by a mutation in the PTEN gene.  A single mutation in one of the copies of PTEN causes Cowden syndrome and increases cancer risk.  The chart below shows the chance that someone with a PTEN mutation would develop cancer in their lifetime6,7.  Other benign features seen in some individuals with Cowden syndrome include benign skin lesions (facial papules, keratoses, lipomas), learning disability, autism, thyroid nodules, colon polyps, and larger head size.      *One recent study found breast cancer risk to be increased to 85%    Li-Fraumeni Syndrome   (TP53)  Li-Fraumeni Syndrome (LFS) is a cancer predisposition syndrome caused by a mutation in the TP53 gene. A single mutation in one of the copies of TP53 increases the risk for multiple cancers. Individuals with LFS are at an increased risk for developing cancer at a young age. The lifetime risk for development of a LFS-associated cancer is 50% by age 30 and 90% by age 60.     Core Cancers: Sarcomas, Breast, Brain, Lung, Leukemias/Lymphomas, Adrenocortical carcinomas  Other Cancers: Gastrointestinal, Thyroid, Skin, Genitourinary    Hereditary Diffuse Gastric Cancer   (CDH1)  Currently, one gene is known to cause hereditary diffuse gastric cancer (HDGC): CDH1.  Individuals with HDGC are at increased risk for diffuse gastric cancer and lobular breast cancer. Of people diagnosed with HDGC, 30-50% have a mutation in the  CDH1 gene.  This suggests there are likely other genes that may cause HDGC that have not been identified yet.      Lifetime Cancer Risks    General Population HDGC    Diffuse Gastric  <1% ~80%   Breast 12% 39-52%         Additional Genes  RAMONE  RAMONE is a moderate-risk breast cancer gene. Women who have a mutation in RAMONE can have between a 2-4 fold increased risk for breast cancer compared to the general population8. RAMONE mutations have also been associated with increased risk for pancreatic cancer, however an estimate of this cancer risk is not well understood9. Individuals who inherit two RAMONE mutations have a condition called ataxia-telangiectasia (AT).  This rare autosomal recessive condition affects the nervous system and immune system, and is associated with progressive cerebellar ataxia beginning in childhood.  Individuals with ataxia-telangiectasia often have a weakened immune system and have an increased risk for childhood cancers.    PALB2  Mutations in PALB2 have been shown to increase the risk of breast cancer up to 33-58% in some families; where individuals fall within this risk range is dependent upon family [id number]. PALB2 mutations have also been associated with increased risk for pancreatic cancer, although this risk has not been quantified yet.  Individuals who inherit two PALB2 mutations--one from their mother and one from their father--have a condition called Fanconi Anemia.  This rare autosomal recessive condition is associated with short stature, developmental delay, bone marrow failure, and increased risk for childhood cancers.    CHEK2   CHEK2 is a moderate-risk breast cancer gene.  Women who have a mutation in CHEK2 have around a 2-fold increased risk for breast cancer compared to the general population, and this risk may be higher depending upon family history.11,12,13 Mutations in CHEK2 have also been shown to increase the risk of a number of other cancers, including colon and prostate,  however these cancer risks are currently not well understood.    BRIP1, RAD51C and RAD51D  Mutations in BRIP1, RAD51C, and RAD51D have been shown to increase the risk of ovarian cancer and possibly female breast cancer as well14,15 .       Lifetime Cancer Risk    General Population BRIP1 RAD51C RAD51D   Ovarian 1-2% ~5-8% ~5-9% ~7-15%               Inheritance  All of the cancer syndromes reviewed above are inherited in an autosomal dominant pattern.  This means that if a parent has a mutation, each of his or her children will have a 50% chance of inheriting that same mutation.  Therefore, each child--male or female--would have a 50% chance of being at increased risk for developing cancer.      Image obtained from [employer], 2013     Mutations in some genes can occur de ric, which means that a person s mutation occurred for the first time in them and was not inherited from a parent.  Now that they have the mutation, however, it can be passed on to future generations.    Genetic Testing  Genetic testing involves a blood test and will look at the genetic information in the RAMONE, BRCA1, BRCA2, BRIP1, CDH1, CHEK2, MLH1, MSH2, MSH6, PMS2, EPCAM, PTEN, PALB2, RAD51C, RAD51D, and TP53 genes for any harmful mutations that are associated with increased cancer risk.  If possible, it is recommended that the person(s) who has had cancer be tested before other family members.  That person will give us the most useful information about whether or not a specific gene is associated with the cancer in the family.    Results  There are three possible results of genetic testing:    Positive--a harmful mutation was identified in one or more of the genes    Negative--no mutation was identified in any of the genes on this panel    Variant of unknown significance--a variation in one of the genes was identified, but it is unclear how this impacts cancer risk in the family    Advantages and Disadvantages   There are advantages  and disadvantages to genetic testing.    Advantages    May clarify your cancer risk    Can help you make medical decisions    May explain the cancers in your family    May give useful information to your family members (if you share your results)    Disadvantages    Possible negative emotional impact of learning about inherited cancer risk    Uncertainty in interpreting a negative test result in some situations    Possible genetic discrimination concerns (see below)    Genetic Information Nondiscrimination Act (TAURUS)  TAURUS is a federal law that protects individuals from health insurance or employment discrimination based on a genetic test result alone.  Although rare, there are currently no legal discrimination protections in terms of life insurance, long term care, or disability insurances.  Visit the [employer] [place] website to learn more.    Reducing Cancer Risk  All of the genes described above have nationally recognized cancer screening guidelines that would be recommended for individuals who test positive.  In addition to increased cancer screening, surgeries may be offered or recommended to reduce cancer risk.  Recommendations are based upon an individual s genetic test result as well as their personal and family history of cancer.    Questions to Think About Regarding Genetic Testing:    What effect will the test result have on me and my relationship with my family members if I have an inherited gene mutation?  If I don t have a gene mutation?    Should I share my test results, and how will my family react to this news, which may also affect them?    Are my children ready to learn new information that may one day affect their own health?    Hereditary Cancer Resources    FORCE: Facing Our Risk of Cancer Empowered facingourrisk.org   Bright Pink bebrightpink.org   Li-Fraumeni Syndrome Association lfsassociation.org   PTEN World PTENworld.com   No stomach for cancer, Inc.  nostomachforcancer.org   Stomach cancer relief network Scrnet.org   Collaborative Group of the Americas on Inherited Colorectal Cancer (CGA) cgaicc.com    Cancer Care cancercare.org   American Cancer Society (ACS) cancer.org   National Cancer [place] (NCI) cancer.gov     Cancer Risk Management Program [phone] ([phone])  ? Arlette Ortiz, MS, [facility]  [phone]  ? Danica Billings, MS, [facility]  [phone]  ? Awa Gomes, MS, [facility]  [phone]  ? María Galeas, MS, [facility]  [phone]  ? Mag Mazariegos, MS, [facility]  [phone]    References  1. Carlita SHORT, Arlin PDP, Demetria S, Rosey CHÁVEZ, Yan JE, Jacob JL, Warren N, Johnna H, Regan O, Sarah Beth A, Reena B, Lindsay P, Mandain S, Bertha DM, Juanito N, Tamiko E, Ravi H, Roge E, Valencia J, Gronurvashi J, Denilson B, Tulinius H, Thorlacius S, Eerola H, Nevshainalinna H, Mayur K, Kendy OP. Average risks of breast and ovarian cancer associated with BRCA1 or BRCA2 mutations detected in case series unselected for family history: a combined analysis of 222 studies. Am J Hum Belkis. 2003;72:1117-30.  2. Malika N, Ruth M, Larson G.  BRCA1 and BRCA2 Hereditary Breast and Ovarian Cancer. Gene Reviews online. 2013.  3. Sage YC, Yao S, Grover G, Ramsay S. Breast cancer risk among male BRCA1 and BRCA2 mutation carriers. J Natl Cancer Inst. 2007;99:1811-4.  4. Eduardo ADKINS, Casandra I, Omar J, Samir E, Tirso ER, Bob F. Risk of breast cancer in male BRCA2 carriers. J Med Belkis. 2010;47:710-1.  5. National Comprehensive Cancer Network. Clinical practice guidelines in oncology, colorectal cancer screening. Available online (registration required). 2015.  6. Darren BRADEN, Dexter GALEAS, Vadim GALEAS, Medardo LA, Anshu MS, Eng C. Lifetime cancer risks in individuals with germline PTEN mutations. Clin Cancer Res. 2012;18:400-7.  7. Bryanna FORMAN. Cowden Syndrome: A Critical Review of the Clinical Literature. J Belkis . 2009:18:13-27.  8. Sravan SHORT, Zachary HECTOR, Elma S, Aida P,  Netta T, Huang M, Robbin B, Sara H, Jazmin R, Justina K, Joshua L, Eduardo DG, Bertha D, Hector DF, Linda MR, The Breast Cancer Susceptibility Collaboration () & John WARNER. RAMONE mutations that cause ataxia-telangiectasia are breast cancer susceptibility alleles. Nature Genetics. 2006;38:873-875  9. Олег N , Cassie Y, Radha J, Bella L, Elrin GM , Ilan ML, Gallinger S, Agee AG, Syngal S, Ozzy ML, Forrest J , Zuleima R, Becky SZ, Esgregorio JR, Arturo VE, Keily M, Vogelstein B, Charles N, Felix RH, Delvis KW, and Janine AP. RAMONE mutations in patients with hereditary pancreatic cancer. Cancer Discover. 2012;2:41-46  10. Carlita REED, et al. Breast-Cancer Risk in Families with Mutations in PALB2. NEJM. 2014; 371(6):497-506.  11. CHEK2 Breast Cancer Case-Control Consortium. CHEK2*1100delC and susceptibility to breast cancer: A collaborative analysis involving 10,860 breast cancer cases and 9,065 controls from 10 studies. Am J Hum Belkis, 74 (2004), pp. 7539-2237  12. Nahid T, Gallo S, Timothy K, et al. Spectrum of Mutations in BRCA1, BRCA2, CHEK2, and TP53 in Families at High Risk of Breast Cancer. FARIDA. 2006;295(12):3279-8455.   13. Barbaar C, Calvin D, Trish A, et al. Risk of breast cancer in women with a CHEK2 mutation with and without a family history of breast cancer. J Clin Oncol. 2011;29:3587-3139.  14. Selvin H, Nehal E, Ramona SJ, et al. Contribution of germline mutations in the RAD51B, RAD51C, and RAD51D genes to ovarian cancer in the population. J Clin Oncol. 2015;33(26):8391-3671. Doi:10.1200/JCO.2015.61.2408.  15. Enedina BURGESS, Saroj FULLER, Perry P, et al. Mutations in BRIP1 confer high risk of ovarian cancer. Chana Belkis. 2011;43(11):1838-2576. doi:10.1038/ng.955.

## 2018-03-17 ENCOUNTER — HOSPITAL ENCOUNTER (OUTPATIENT)
Facility: CLINIC | Age: 63
Setting detail: SPECIMEN
End: 2018-03-17
Attending: INTERNAL MEDICINE
Payer: COMMERCIAL

## 2018-03-19 ENCOUNTER — HOSPITAL ENCOUNTER (OUTPATIENT)
Facility: CLINIC | Age: 63
Setting detail: SPECIMEN
Discharge: HOME | End: 2018-03-19
Attending: INTERNAL MEDICINE | Admitting: PHYSICIAN ASSISTANT
Payer: COMMERCIAL

## 2018-03-19 ENCOUNTER — ONCOLOGY VISIT (OUTPATIENT)
Dept: ONCOLOGY | Facility: CLINIC | Age: 63
End: 2018-03-19
Attending: INTERNAL MEDICINE
Payer: COMMERCIAL

## 2018-03-19 ENCOUNTER — HOSPITAL ENCOUNTER (OUTPATIENT)
Facility: CLINIC | Age: 63
Setting detail: SPECIMEN
End: 2018-03-19
Attending: INTERNAL MEDICINE
Payer: COMMERCIAL

## 2018-03-19 VITALS
WEIGHT: 177 LBS | HEART RATE: 72 BPM | OXYGEN SATURATION: 100 % | RESPIRATION RATE: 16 BRPM | TEMPERATURE: 97 F | DIASTOLIC BLOOD PRESSURE: 72 MMHG | SYSTOLIC BLOOD PRESSURE: 107 MMHG | BODY MASS INDEX: 28.58 KG/M2

## 2018-03-19 DIAGNOSIS — K59.00 CONSTIPATION, UNSPECIFIED CONSTIPATION TYPE: ICD-10-CM

## 2018-03-19 DIAGNOSIS — C80.1 CARCINOMA OF UNKNOWN ORIGIN (H): Primary | ICD-10-CM

## 2018-03-19 DIAGNOSIS — R12 HEARTBURN: ICD-10-CM

## 2018-03-19 DIAGNOSIS — C80.1 CARCINOMA OF UNKNOWN ORIGIN (H): ICD-10-CM

## 2018-03-19 LAB
ALBUMIN SERPL-MCNC: 3.3 G/DL (ref 3.4–5)
ALP SERPL-CCNC: 116 U/L (ref 40–150)
ALT SERPL W P-5'-P-CCNC: 34 U/L (ref 0–50)
ANION GAP SERPL CALCULATED.3IONS-SCNC: 5 MMOL/L (ref 3–14)
AST SERPL W P-5'-P-CCNC: 15 U/L (ref 0–45)
BASOPHILS # BLD AUTO: 0 10E9/L (ref 0–0.2)
BASOPHILS NFR BLD AUTO: 0.8 %
BILIRUB SERPL-MCNC: 0.2 MG/DL (ref 0.2–1.3)
BUN SERPL-MCNC: 12 MG/DL (ref 7–30)
CALCIUM SERPL-MCNC: 9 MG/DL (ref 8.5–10.1)
CHLORIDE SERPL-SCNC: 107 MMOL/L (ref 94–109)
CO2 SERPL-SCNC: 28 MMOL/L (ref 20–32)
CREAT SERPL-MCNC: 0.59 MG/DL (ref 0.52–1.04)
DIFFERENTIAL METHOD BLD: ABNORMAL
EOSINOPHIL # BLD AUTO: 0 10E9/L (ref 0–0.7)
EOSINOPHIL NFR BLD AUTO: 0.3 %
ERYTHROCYTE [DISTWIDTH] IN BLOOD BY AUTOMATED COUNT: 14.3 % (ref 10–15)
GFR SERPL CREATININE-BSD FRML MDRD: >90 ML/MIN/1.7M2
GLUCOSE SERPL-MCNC: 94 MG/DL (ref 70–99)
HCT VFR BLD AUTO: 30.4 % (ref 35–47)
HGB BLD-MCNC: 10.1 G/DL (ref 11.7–15.7)
IMM GRANULOCYTES # BLD: 0.1 10E9/L (ref 0–0.4)
IMM GRANULOCYTES NFR BLD: 1.9 %
LYMPHOCYTES # BLD AUTO: 1 10E9/L (ref 0.8–5.3)
LYMPHOCYTES NFR BLD AUTO: 25.8 %
MCH RBC QN AUTO: 33 PG (ref 26.5–33)
MCHC RBC AUTO-ENTMCNC: 33.2 G/DL (ref 31.5–36.5)
MCV RBC AUTO: 99 FL (ref 78–100)
MONOCYTES # BLD AUTO: 0.4 10E9/L (ref 0–1.3)
MONOCYTES NFR BLD AUTO: 10.2 %
NEUTROPHILS # BLD AUTO: 2.3 10E9/L (ref 1.6–8.3)
NEUTROPHILS NFR BLD AUTO: 61 %
NRBC # BLD AUTO: 0 10*3/UL
NRBC BLD AUTO-RTO: 0 /100
PLATELET # BLD AUTO: 98 10E9/L (ref 150–450)
POTASSIUM SERPL-SCNC: 4 MMOL/L (ref 3.4–5.3)
PROT SERPL-MCNC: 6.7 G/DL (ref 6.8–8.8)
RBC # BLD AUTO: 3.06 10E12/L (ref 3.8–5.2)
SODIUM SERPL-SCNC: 140 MMOL/L (ref 133–144)
WBC # BLD AUTO: 3.7 10E9/L (ref 4–11)

## 2018-03-19 PROCEDURE — 80053 COMPREHEN METABOLIC PANEL: CPT | Performed by: PHYSICIAN ASSISTANT

## 2018-03-19 PROCEDURE — 36591 DRAW BLOOD OFF VENOUS DEVICE: CPT

## 2018-03-19 PROCEDURE — 99214 OFFICE O/P EST MOD 30 MIN: CPT | Performed by: PHYSICIAN ASSISTANT

## 2018-03-19 PROCEDURE — 85025 COMPLETE CBC W/AUTO DIFF WBC: CPT | Performed by: PHYSICIAN ASSISTANT

## 2018-03-19 PROCEDURE — G0463 HOSPITAL OUTPT CLINIC VISIT: HCPCS

## 2018-03-19 RX ORDER — DOXORUBICIN HYDROCHLORIDE 2 MG/ML
60 INJECTION, SOLUTION INTRAVENOUS ONCE
Status: CANCELLED | OUTPATIENT
Start: 2018-03-21

## 2018-03-19 RX ORDER — PALONOSETRON 0.05 MG/ML
0.25 INJECTION, SOLUTION INTRAVENOUS ONCE
Status: CANCELLED
Start: 2018-03-21

## 2018-03-19 RX ORDER — ALBUTEROL SULFATE 0.83 MG/ML
2.5 SOLUTION RESPIRATORY (INHALATION)
Status: CANCELLED | OUTPATIENT
Start: 2018-03-21

## 2018-03-19 RX ORDER — FAMOTIDINE 20 MG/1
20 TABLET, FILM COATED ORAL DAILY
Qty: 30 TABLET | Refills: 1 | Status: ON HOLD | OUTPATIENT
Start: 2018-03-19 | End: 2019-05-13

## 2018-03-19 RX ORDER — DIPHENHYDRAMINE HYDROCHLORIDE 50 MG/ML
50 INJECTION INTRAMUSCULAR; INTRAVENOUS
Status: CANCELLED
Start: 2018-03-21

## 2018-03-19 RX ORDER — SODIUM CHLORIDE 9 MG/ML
1000 INJECTION, SOLUTION INTRAVENOUS CONTINUOUS PRN
Status: CANCELLED
Start: 2018-03-21

## 2018-03-19 RX ORDER — EPINEPHRINE 0.3 MG/.3ML
0.3 INJECTION SUBCUTANEOUS EVERY 5 MIN PRN
Status: CANCELLED | OUTPATIENT
Start: 2018-03-21

## 2018-03-19 RX ORDER — LORAZEPAM 2 MG/ML
0.5 INJECTION INTRAMUSCULAR EVERY 4 HOURS PRN
Status: CANCELLED
Start: 2018-03-21

## 2018-03-19 RX ORDER — EPINEPHRINE 1 MG/ML
0.3 INJECTION, SOLUTION, CONCENTRATE INTRAVENOUS EVERY 5 MIN PRN
Status: CANCELLED | OUTPATIENT
Start: 2018-03-21

## 2018-03-19 RX ORDER — SENNOSIDES A AND B 8.6 MG/1
1 TABLET, FILM COATED ORAL DAILY
Qty: 120 TABLET | Refills: 1 | Status: SHIPPED | OUTPATIENT
Start: 2018-03-19 | End: 2018-05-18

## 2018-03-19 RX ORDER — ALBUTEROL SULFATE 90 UG/1
1-2 AEROSOL, METERED RESPIRATORY (INHALATION)
Status: CANCELLED
Start: 2018-03-21

## 2018-03-19 RX ORDER — MEPERIDINE HYDROCHLORIDE 25 MG/ML
25 INJECTION INTRAMUSCULAR; INTRAVENOUS; SUBCUTANEOUS EVERY 30 MIN PRN
Status: CANCELLED | OUTPATIENT
Start: 2018-03-21

## 2018-03-19 RX ORDER — METHYLPREDNISOLONE SODIUM SUCCINATE 125 MG/2ML
125 INJECTION, POWDER, LYOPHILIZED, FOR SOLUTION INTRAMUSCULAR; INTRAVENOUS
Status: CANCELLED
Start: 2018-03-21

## 2018-03-19 ASSESSMENT — PAIN SCALES - GENERAL: PAINLEVEL: NO PAIN (0)

## 2018-03-19 NOTE — MR AVS SNAPSHOT
After Visit Summary   3/19/2018    Flor Griffin    MRN: [id number]           Patient Information     Date Of Birth          1955        Visit Information        Provider Department      3/19/2018 9:00 AM Jennifer Ruiz PA-C [facility]        Today's Diagnoses     Carcinoma of unknown origin (H)    -  1    Constipation, unspecified constipation type        Heartburn           Follow-ups after your visit        Your next 10 appointments already scheduled     Mar 21, 2018  1:00 PM CDT   Level 3 with  INFUSION CHAIR 15   [facility] ([facility] [place])    [facility]  [address]  [place] [address] [phone]            Mar 21, 2018  1:45 PM CDT   Return Visit with Yani Magana MD   [facility] ([facility] [place])    [facility]  [address]  [place] [address] [phone]              Who to contact     If you have questions or need follow up information about today's clinic visit or your schedule please contact [facility] directly at [phone].  Normal or non-critical lab and imaging results will be communicated to you by MyChart, letter or phone within 4 business days after the clinic has received the results. If you do not hear from us within 7 days, please contact the clinic through [employer] or phone. If you have a critical or abnormal lab result, we will notify you by phone as soon as possible.  Submit refill requests through [employer] or call your pharmacy and they will forward the refill request to us. Please allow 3 business days for your refill to be completed.          Additional Information About Your Visit        [employer] Information     [employer] lets you send messages to your doctor, view your test results, renew your prescriptions, schedule appointments and more. To sign up, go to  [email or web address] . Click on \"Log in\" on the left side of the screen, which will take you to the Welcome page. Then click on \"Sign up Now\" on the right side of the page.     You will be asked to enter the access code listed below, as well as some personal information. Please follow the directions to create your username and password.     Your access code is: [email or web address]  Expires: 2018  3:02 PM     Your access code will  in 90 days. If you need help or a new code, please call your [place] clinic or [phone].        Care EveryWhere ID     This is your Care EveryWhere ID. This could be used by other organizations to access your [place] medical records  [id number]        Your Vitals Were     Pulse Temperature Respirations Last Period Pulse Oximetry BMI (Body Mass Index)    72 97  F (36.1  C) (Tympanic) 16 2004 (Approximate) 100% 28.58 kg/m2       Blood Pressure from Last 3 Encounters:   18 107/72   03/15/18 117/76   18 130/86    Weight from Last 3 Encounters:   18 80.3 kg (177 lb)   03/15/18 80.6 kg (177 lb 9.6 oz)   18 81 kg (178 lb 9.6 oz)                 Today's Medication Changes          These changes are accurate as of 3/19/18 12:48 PM.  If you have any questions, ask your nurse or doctor.               Start taking these medicines.        Dose/Directions    famotidine 20 MG tablet   Commonly known as:  PEPCID   Used for:  Heartburn   Started by:  Jennifer Ruiz PA-C        Dose:  20 mg   Take 1 tablet (20 mg) by mouth daily   Quantity:  30 tablet   Refills:  1       senna 8.6 MG tablet   Commonly known as:  SENOKOT   Used for:  Carcinoma of unknown origin (H), Constipation, unspecified constipation type   Started by:  Jennifer Ruiz PA-C        Dose:  1 tablet   Take 1 tablet by mouth daily   Quantity:  120 tablet   Refills:  1            Where to get your medicines      These medications were sent to [id number] " - [place], [place] - [address]  [address], [facility] [address]     Phone:  [phone]     famotidine 20 MG tablet    senna 8.6 MG tablet                Primary Care Provider Office Phone # Fax #    [facility] [phone] [address]  [place] [address]        Equal Access to Services     MARYJANE JARA : Hadii aad ku hadasho Soomaali, waaxda luqadaha, qaybta kaalmada adeegyada, waxay idiin hayaan adeeg kharash laclementina . So [facility] [phone].    ATENCIÓN: Si romainla rey, tiene a thomas disposición servicios gratuitos de asistencia lingüística. Pino al [phone].    We comply with applicable federal civil rights laws and [place] laws. We do not discriminate on the basis of race, color, national origin, age, disability, sex, sexual orientation, or gender identity.            Thank you!     Thank you for choosing [facility]  for your care. Our goal is always to provide you with excellent care. Hearing back from our patients is one way we can continue to improve our services. Please take a few minutes to complete the written survey that you may receive in the mail after your visit with us. Thank you!             Your Updated Medication List - Protect others around you: Learn how to safely use, store and throw away your medicines at www.disposemymeds.org.          This list is accurate as of 3/19/18 12:48 PM.  Always use your most recent med list.                   Brand Name Dispense Instructions for use Diagnosis    ADVIL PO      Take 400 mg by mouth every 6 hours as needed for moderate pain        aspirin 81 MG tablet      Take 81 mg by mouth daily        dexamethasone 4 MG tablet    DECADRON    6 tablet    Take 2 tablets (8 mg) by mouth daily for 3 days Start on Day 2.    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 in female, estrogen receptor positive (H), Carcinoma of  unknown origin (H)       famotidine 20 MG tablet    PEPCID    30 tablet    Take 1 tablet (20 mg) by mouth daily    Heartburn       fluconazole 100 MG tablet    DIFLUCAN    7 tablet    Take 1 tab po q day x 7 days    Chemotherapy-induced neutropenia (H)       GLUCOSAMINE CHONDROITIN JOINT PO      Take by mouth daily        HYDROcodone-acetaminophen 5-325 MG per tablet    NORCO    6 tablet    Take 1 tablet by mouth every 6 hours as needed for moderate to severe pain    Carcinoma of unknown origin (H)       levofloxacin 500 MG tablet    LEVAQUIN    7 tablet    Take 1 tablet (500 mg) by mouth daily    Chemotherapy-induced neutropenia (H)       LORazepam 0.5 MG tablet    ATIVAN    30 tablet    Take 1 tablet (0.5 mg) by mouth every 4 hours as needed (Anxiety, Nausea/Vomiting or Sleep)    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 in female, estrogen receptor positive (H), Carcinoma of unknown origin (H)       OMEGA-3 FISH OIL PO      Take 1 g by mouth daily        prochlorperazine 10 MG tablet    COMPAZINE    30 tablet    Take 1 tablet (10 mg) by mouth every 6 hours as needed (Nausea/Vomiting)    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 in female, estrogen receptor positive (H), Carcinoma of unknown origin (H)       senna 8.6 MG tablet    SENOKOT    120 tablet    Take 1 tablet by mouth daily    Carcinoma of unknown origin (H), Constipation, unspecified constipation type       valACYclovir 1000 mg tablet    VALTREX    20 tablet    Take 1 tablet (1,000 mg) by mouth 2 times daily    Cold sore       VITAMIN B6 PO      Take by mouth daily        VITAMIN D (CHOLECALCIFEROL) PO      Take 1,000 Units by mouth daily

## 2018-03-19 NOTE — PROGRESS NOTES
Oncology/Hematology Visit Note  Mar 19, 2018    Reason for Visit: follow up of adenocarcinoma post cycle 1 on chemo.     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 PET CT scan results was done 11/27/2017 and showed hypermetabolic right supraclavicular, right axillary and subpectoral adenopathy. There was no evidence of distant metastases. Mammogram 11/30/17 no evidence of primary breast cancer. MRI breast 12/20/17 no evidence of malignancy in the breasts. S/p chemotherapy carboplatin/Taxol. Completed 4 cycles. PET Scan 2/20/18 showed good response to treatment. She is currently undergoing treatment with dose dense AC for 4 cycles.  Cycle 1 day 1 was on 3/7/18. She was seen on 3/15 for follow up and was noted to be neutropenic. She is here today for repeat labs and follow up.      Interval History:  Flor returns today for follow-up.  She is doing really well and tolerated cycle 1 Adriamycin and Cytoxan well.  She was seen last week on 3/15 for follow-up and was noted to be neutropenic, however she had no significant symptoms.  She continues to have mild peripheral neuropathy, that is unchanged.  This does not affect her ability to function and is tolerable.  She did notice that she had more fatigue after AC that after Taxol, which was worse over this past weekend, however she has recovered nicely.  She states that she has noted constipation days 1 through 7 after AC and resolved on its own.  She has no stool softeners at home.  She denies nausea, vomiting or diarrhea.  Denies fever chills, cough or sore throat, bleeding.  She had GERD symptoms in the past and she was previously on omeprazole and Protonix, however this did not help and she is inquiring about a different medication.  She continues the fluconazole, Levaquin and Valtrex.  She denies headache, vision changes, rashes.  She has no other concerns or questions at this  time.      Review of Systems: See interval hx. Denies fevers, chills, HA, changes in vision, cough, sore throat, CP, SOB, N/V, diarrhea, changes in urination, bleeding, bruising, rash.     Current Outpatient Prescriptions   Medication Sig Dispense Refill     senna (SENOKOT) 8.6 MG tablet Take 1 tablet by mouth daily 120 tablet 1     famotidine (PEPCID) 20 MG tablet Take 1 tablet (20 mg) by mouth daily 30 tablet 1     fluconazole (DIFLUCAN) 100 MG tablet Take 1 tab po q day x 7 days 7 tablet 0     LORazepam (ATIVAN) 0.5 MG tablet Take 1 tablet (0.5 mg) by mouth every 4 hours as needed (Anxiety, Nausea/Vomiting or Sleep) 30 tablet 3     prochlorperazine (COMPAZINE) 10 MG tablet Take 1 tablet (10 mg) by mouth every 6 hours as needed (Nausea/Vomiting) 30 tablet 3     HYDROcodone-acetaminophen (NORCO) 5-325 MG per tablet Take 1 tablet by mouth every 6 hours as needed for moderate to severe pain 6 tablet 0     Pyridoxine HCl (VITAMIN B6 PO) Take by mouth daily       Glucos-Chondroit-Hyaluron-MSM (GLUCOSAMINE CHONDROITIN JOINT PO) Take by mouth daily       Ibuprofen (ADVIL PO) Take 400 mg by mouth every 6 hours as needed for moderate pain       valACYclovir (VALTREX) 1000 mg tablet Take 1 tablet (1,000 mg) by mouth 2 times daily 20 tablet 0     VITAMIN D, CHOLECALCIFEROL, PO Take 1,000 Units by mouth daily       Omega-3 Fatty Acids (OMEGA-3 FISH OIL PO) Take 1 g by mouth daily       aspirin 81 MG tablet Take 81 mg by mouth daily       levofloxacin (LEVAQUIN) 500 MG tablet Take 1 tablet (500 mg) by mouth daily (Patient not taking: Reported on 3/19/2018) 7 tablet 0     dexamethasone (DECADRON) 4 MG tablet Take 2 tablets (8 mg) by mouth daily for 3 days Start on Day 2. 6 tablet 3       Physical Examination:  General: The patient is a pleasant female in no acute distress.  /72 (BP Location: Right arm, Patient Position: Sitting, Cuff Size: Adult Regular)  Pulse 72  Temp 97  F (36.1  C) (Tympanic)  Resp 16  Wt 80.3 kg  (177 lb)  LMP 01/01/2004 (Approximate)  SpO2 100%  BMI 28.58 kg/m2  Wt Readings from Last 10 Encounters:   03/19/18 80.3 kg (177 lb)   03/15/18 80.6 kg (177 lb 9.6 oz)   03/07/18 81 kg (178 lb 9.6 oz)   03/07/18 81 kg (178 lb 9.6 oz)   02/21/18 80.3 kg (177 lb)   02/14/18 81.5 kg (179 lb 9.6 oz)   02/14/18 81.5 kg (179 lb 9.6 oz)   01/24/18 80.3 kg (177 lb)   01/24/18 80.6 kg (177 lb 12.8 oz)   01/03/18 81.6 kg (180 lb)     HEENT: EOMI, PERRL. Sclerae are anicteric. Oral mucosa is pink and moist with no lesions or thrush.   Lymph: Neck is supple with no lymphadenopathy in the cervical or supraclavicular areas.   Heart: Regular rate and rhythm.   Lungs: Clear to auscultation bilaterally.   GI: Bowel sounds present, soft, nontender with no palpable hepatosplenomegaly or masses.   Extremities: No lower extremity edema noted bilaterally.   Skin: No rashes, petechiae, or bruising noted on exposed skin.    Laboratory Data:  Results for ALMA MASON (MRN [id number]) as of 3/19/2018 11:51   3/19/2018 08:38   Sodium 140   Potassium 4.0   Chloride 107   Carbon Dioxide 28   Urea Nitrogen 12   Creatinine 0.59   GFR Estimate >90   GFR Estimate If Black >90   Calcium 9.0   Anion Gap 5   Albumin 3.3 (L)   Protein Total 6.7 (L)   Bilirubin Total 0.2   Alkaline Phosphatase 116   ALT 34   AST 15   Glucose 94   WBC 3.7 (L)   Hemoglobin 10.1 (L)   Hematocrit 30.4 (L)   Platelet Count 98 (L)   RBC Count 3.06 (L)   MCV 99   MCH 33.0   MCHC 33.2   RDW 14.3   Diff Method Automated Method   % Neutrophils 61.0   % Lymphocytes 25.8   % Monocytes 10.2   % Eosinophils 0.3   % Basophils 0.8   % Immature Granulocytes 1.9   Nucleated RBCs 0   Absolute Neutrophil 2.3   Absolute Lymphocytes 1.0   Absolute Monocytes 0.4   Absolute Eosinophils 0.0   Absolute Basophils 0.0   Abs Immature Granulocytes 0.1   Absolute Nucleated RBC 0.0       Assessment and Plan:  This is a 62-year-old lady who has diagnosis of 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     Adenocarcinoma on right supraclavicular lymph node: No evidence of distant metastasis from CT scan on 11/27/17.  Mammogram and breast MRI did not show malignancy. She completed 4 cycles of carboplatin/Taxol with minimal peripheral neuropathy.  Plan for dose dense AC ×4 cycles and she completed cycle 1 on 3/7/18.  She has tolerated AC very well so far with minimal fatigue and her counts have recovered nicely from her visit last week.  She will continue with cycle 2 on 3/21 and see Dr. Magana prior to infusion.     Peripheral neuropathy: Grade 1, secondary to Taxol and tolerable.  Does not affect her ability to function and will likely slowly improve over the next several months since completing Taxol.  We will closely monitor.    Heme:   --Neutropenia: Secondary to chemotherapy and counts have recovered today with WBC of 3.7 and ANC of 2.3.   --Thrombocytopenia: Secondary to chemo and improved today to 98 from 69 last week. No evidence of bleeding. Platelet transfusion indicated for platelets less than 20.    Constipation: Most constipated a week after chemo and we discussed senna.  Prescription sent to pharmacy.  She has been increasing her fruit and fiber intake.  We will monitor.    GERD: History of GERD and tried omeprazole and pantoprazole without improvement. Sent Rx Pepcid to pharmacy and we will monitor.        Jennifer Ash PA-C  Hematology/Oncology  [facility]

## 2018-03-19 NOTE — PATIENT INSTRUCTIONS
Patient is scheduled for her chemo treatments at [facility].  No scheduling needs at this time.  Ranjit Rosas RN, BSN, OCN  [facility]  Patient Care Coordinator

## 2018-03-19 NOTE — NURSING NOTE
"Oncology Rooming Note    March 19, 2018 8:58 AM   Flor Griffin is a 62 year old female who presents for:    Chief Complaint   Patient presents with     Oncology Clinic Visit     Follow up - 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 in female, estrogen receptor positive      Initial Vitals: /72 (BP Location: Right arm, Patient Position: Sitting, Cuff Size: Adult Regular)  Pulse 72  Temp 97  F (36.1  C) (Tympanic)  Resp 16  Wt 80.3 kg (177 lb)  LMP 01/01/2004 (Approximate)  SpO2 100%  BMI 28.58 kg/m2 Estimated body mass index is 28.58 kg/(m^2) as calculated from the following:    Height as of 3/7/18: 1.676 m (5' 5.98\").    Weight as of this encounter: 80.3 kg (177 lb). Body surface area is 1.93 meters squared.  No Pain (0) Comment: Data Unavailable   Patient's last menstrual period was 01/01/2004 (approximate).   Allergies reviewed: Yes  Medications reviewed: Yes    Medications: Medication refills not needed today.  Pharmacy name entered into [facility]: [id number] - [place] [address]    Clinical concerns: Patient is her for a recheck of labs and  Status.      10 minutes minutes for nursing intake (face to face time)     Ranjit Rosas RN     DISCHARGE PLAN:  Next appointments: Patient will have treatment and f/u at [facility].  Departure Mode: Ambulatory  Accompanied by: Self     Ranjit Rosas RN, BSN, OCN  [facility]  Patient Care Coordinator                  "

## 2018-03-19 NOTE — PROGRESS NOTES
Infusion Nursing Note:  Flor Griffin presents today for port labs.    Patient seen by provider today: No   present during visit today: Not Applicable.    Note: N/A.    Intravenous Access:  Labs drawn without difficulty.  Implanted Port.    Treatment Conditions:  Not Applicable.      Post Infusion Assessment:  Blood return noted pre and post infusion.  Site patent and intact, free from redness, edema or discomfort.  No evidence of extravasations.  Access discontinued per protocol.    Discharge Plan:   Discharge instructions reviewed with: Patient.  Patient and/or family verbalized understanding of discharge instructions and all questions answered..   Patient discharged in stable condition accompanied by: self.  Departure Mode: Ambulatory.    Opal Giles RN

## 2018-03-19 NOTE — LETTER
3/19/2018         RE: Flor Griffin  [address] [facility] [address]        Dear Colleague,    Thank you for referring your patient, Flor Griffin, to the [facility]. Please see a copy of my visit note below.    Oncology/Hematology Visit Note  Mar 19, 2018    Reason for Visit: follow up of adenocarcinoma post cycle 1 on chemo.     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 PET CT scan results was done 11/27/2017 and showed hypermetabolic right supraclavicular, right axillary and subpectoral adenopathy. There was no evidence of distant metastases. Mammogram 11/30/17 no evidence of primary  breast cancer. MRI breast 12/20/17 no evidence of malignancy in the breasts. S/p chemotherapy carboplatin/Taxol. Completed 4 cycles. PET Scan 2/20/18 showed good response to treatment. She is currently undergoing treatment with dose dense AC for 4 cycles.  Cycle 1 day 1 was on 3/7/18. She was seen on 3/15 for follow up and was noted to be neutropenic. She is here today for repeat labs and follow up.      Interval History:  Flor returns today for follow-up.  She is doing really well and tolerated cycle 1 Adriamycin and Cytoxan well.  She was seen last week on 3/15 for follow-up and was noted to be neutropenic, however she had no significant symptoms.  She continues to have mild peripheral neuropathy, that is unchanged.  This does not affect her ability to function and is tolerable.  She did notice that she had more fatigue after AC that after Taxol, which was worse over this past weekend, however she has recovered nicely.  She states that she has noted constipation days 1 through 7 after AC and resolved on its own.  She has no stool softeners at home.  She denies nausea, vomiting or diarrhea.  Denies fever chills, cough or sore throat, bleeding.  She had GERD symptoms in the past and she was  previously on omeprazole and Protonix, however this did not help and she is inquiring about a different medication.  She continues the fluconazole, Levaquin and Valtrex.  She denies headache, vision changes, rashes.  She has no other concerns or questions at this time.      Review of Systems: See interval hx. Denies fevers, chills, HA, changes in vision, cough, sore throat, CP, SOB, N/V, diarrhea, changes in urination, bleeding, bruising, rash.     Current Outpatient Prescriptions   Medication Sig Dispense Refill     senna (SENOKOT) 8.6 MG tablet Take 1 tablet by mouth daily 120 tablet 1     famotidine (PEPCID) 20 MG tablet Take 1 tablet (20 mg) by mouth daily 30 tablet 1     fluconazole (DIFLUCAN) 100 MG tablet Take 1 tab po q day x 7 days 7 tablet 0     LORazepam (ATIVAN) 0.5 MG tablet Take 1 tablet (0.5 mg) by mouth every 4 hours as needed (Anxiety, Nausea/Vomiting or Sleep) 30 tablet 3     prochlorperazine (COMPAZINE) 10 MG tablet Take 1 tablet (10 mg) by mouth every 6 hours as needed (Nausea/Vomiting) 30 tablet 3     HYDROcodone-acetaminophen (NORCO) 5-325 MG per tablet Take 1 tablet by mouth every 6 hours as needed for moderate to severe pain 6 tablet 0     Pyridoxine HCl (VITAMIN B6 PO) Take by mouth daily       Glucos-Chondroit-Hyaluron-MSM (GLUCOSAMINE CHONDROITIN JOINT PO) Take by mouth daily       Ibuprofen (ADVIL PO) Take 400 mg by mouth every 6 hours as needed for moderate pain       valACYclovir (VALTREX) 1000 mg tablet Take 1 tablet (1,000 mg) by mouth 2 times daily 20 tablet 0     VITAMIN D, CHOLECALCIFEROL, PO Take 1,000 Units by mouth daily       Omega-3 Fatty Acids (OMEGA-3 FISH OIL PO) Take 1 g by mouth daily       aspirin 81 MG tablet Take 81 mg by mouth daily       levofloxacin (LEVAQUIN) 500 MG tablet Take 1 tablet (500 mg) by mouth daily (Patient not taking: Reported on 3/19/2018) 7 tablet 0     dexamethasone (DECADRON) 4 MG tablet Take 2 tablets (8 mg) by mouth daily for 3 days Start on Day  2. 6 tablet 3       Physical Examination:  General: The patient is a pleasant female in no acute distress.  /72 (BP Location: Right arm, Patient Position: Sitting, Cuff Size: Adult Regular)  Pulse 72  Temp 97  F (36.1  C) (Tympanic)  Resp 16  Wt 80.3 kg (177 lb)  LMP 01/01/2004 (Approximate)  SpO2 100%  BMI 28.58 kg/m2  Wt Readings from Last 10 Encounters:   03/19/18 80.3 kg (177 lb)   03/15/18 80.6 kg (177 lb 9.6 oz)   03/07/18 81 kg (178 lb 9.6 oz)   03/07/18 81 kg (178 lb 9.6 oz)   02/21/18 80.3 kg (177 lb)   02/14/18 81.5 kg (179 lb 9.6 oz)   02/14/18 81.5 kg (179 lb 9.6 oz)   01/24/18 80.3 kg (177 lb)   01/24/18 80.6 kg (177 lb 12.8 oz)   01/03/18 81.6 kg (180 lb)     HEENT: EOMI, PERRL. Sclerae are anicteric. Oral mucosa is pink and moist with no lesions or thrush.   Lymph: Neck is supple with no lymphadenopathy in the cervical or supraclavicular areas.   Heart: Regular rate and rhythm.   Lungs: Clear to auscultation bilaterally.   GI: Bowel sounds present, soft, nontender with no palpable hepatosplenomegaly or masses.   Extremities: No lower extremity edema noted bilaterally.   Skin: No rashes, petechiae, or bruising noted on exposed skin.    Laboratory Data:  Results for ALMA MASON (MRN [id number]) as of 3/19/2018 11:51   3/19/2018 08:38   Sodium 140   Potassium 4.0   Chloride 107   Carbon Dioxide 28   Urea Nitrogen 12   Creatinine 0.59   GFR Estimate >90   GFR Estimate If Black >90   Calcium 9.0   Anion Gap 5   Albumin 3.3 (L)   Protein Total 6.7 (L)   Bilirubin Total 0.2   Alkaline Phosphatase 116   ALT 34   AST 15   Glucose 94   WBC 3.7 (L)   Hemoglobin 10.1 (L)   Hematocrit 30.4 (L)   Platelet Count 98 (L)   RBC Count 3.06 (L)   MCV 99   MCH 33.0   MCHC 33.2   RDW 14.3   Diff Method Automated Method   % Neutrophils 61.0   % Lymphocytes 25.8   % Monocytes 10.2   % Eosinophils 0.3   % Basophils 0.8   % Immature Granulocytes 1.9   Nucleated RBCs 0   Absolute Neutrophil 2.3   Absolute  Lymphocytes 1.0   Absolute Monocytes 0.4   Absolute Eosinophils 0.0   Absolute Basophils 0.0   Abs Immature Granulocytes 0.1   Absolute Nucleated RBC 0.0       Assessment and Plan:  This is a 62-year-old lady who has diagnosis of 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     Adenocarcinoma on right supraclavicular lymph node: No evidence of distant metastasis from CT scan on 11/27/17.  Mammogram and breast MRI did not show malignancy. She completed 4 cycles of carboplatin/Taxol with minimal peripheral neuropathy.  Plan for dose dense AC ×4 cycles and she completed cycle 1 on 3/7/18.  She has tolerated AC very well so far with minimal fatigue and her counts have recovered nicely from her visit last week.  She will continue with cycle 2 on 3/21 and see Dr. Magana prior to infusion.     Peripheral neuropathy: Grade 1, secondary to Taxol and tolerable.  Does not affect her ability to function and will likely slowly improve over the next several months since completing Taxol.  We will closely monitor.    Heme:   --Neutropenia: Secondary to chemotherapy and counts have recovered today with WBC of 3.7 and ANC of 2.3.   --Thrombocytopenia: Secondary to chemo and improved today to 98 from 69 last week. No evidence of bleeding. Platelet transfusion indicated for platelets less than 20.    Constipation: Most constipated a week after chemo and we discussed senna.  Prescription sent to pharmacy.  She has been increasing her fruit and fiber intake.  We will monitor.    GERD: History of GERD and tried omeprazole and pantoprazole without improvement. Sent Rx Pepcid to pharmacy and we will monitor.        Jennifer Ash PA-C  Hematology/Oncology  [facility]            Again, thank you for allowing me to participate in the care of your patient.        Sincerely,        Jennifer Ash PA-C

## 2018-03-21 ENCOUNTER — HOSPITAL ENCOUNTER (OUTPATIENT)
Facility: CLINIC | Age: 63
Setting detail: SPECIMEN
Discharge: HOME | End: 2018-03-21
Attending: INTERNAL MEDICINE | Admitting: INTERNAL MEDICINE
Payer: COMMERCIAL

## 2018-03-21 ENCOUNTER — ONCOLOGY VISIT (OUTPATIENT)
Dept: ONCOLOGY | Facility: CLINIC | Age: 63
End: 2018-03-21
Attending: INTERNAL MEDICINE
Payer: COMMERCIAL

## 2018-03-21 ENCOUNTER — INFUSION THERAPY VISIT (OUTPATIENT)
Dept: INFUSION THERAPY | Facility: CLINIC | Age: 63
End: 2018-03-21
Attending: INTERNAL MEDICINE
Payer: COMMERCIAL

## 2018-03-21 VITALS
WEIGHT: 180 LBS | DIASTOLIC BLOOD PRESSURE: 54 MMHG | BODY MASS INDEX: 28.93 KG/M2 | OXYGEN SATURATION: 100 % | TEMPERATURE: 97.9 F | HEIGHT: 66 IN | RESPIRATION RATE: 16 BRPM | HEART RATE: 65 BPM | SYSTOLIC BLOOD PRESSURE: 112 MMHG

## 2018-03-21 VITALS
DIASTOLIC BLOOD PRESSURE: 68 MMHG | HEIGHT: 66 IN | OXYGEN SATURATION: 100 % | WEIGHT: 180 LBS | SYSTOLIC BLOOD PRESSURE: 128 MMHG | HEART RATE: 80 BPM | RESPIRATION RATE: 18 BRPM | TEMPERATURE: 98.2 F | BODY MASS INDEX: 28.93 KG/M2

## 2018-03-21 DIAGNOSIS — Z17.0 MALIGNANT NEOPLASM OF OVERLAPPING SITES OF RIGHT BREAST IN FEMALE, ESTROGEN RECEPTOR POSITIVE (H): Primary | ICD-10-CM

## 2018-03-21 DIAGNOSIS — C80.1 CARCINOMA OF UNKNOWN ORIGIN (H): ICD-10-CM

## 2018-03-21 DIAGNOSIS — C50.811 MALIGNANT NEOPLASM OF OVERLAPPING SITES OF RIGHT BREAST IN FEMALE, ESTROGEN RECEPTOR POSITIVE (H): Primary | ICD-10-CM

## 2018-03-21 DIAGNOSIS — Z95.828 PORT CATHETER IN PLACE: ICD-10-CM

## 2018-03-21 LAB
BASOPHILS # BLD AUTO: 0 10E9/L (ref 0–0.2)
BASOPHILS NFR BLD AUTO: 0.4 %
DIFFERENTIAL METHOD BLD: ABNORMAL
EOSINOPHIL # BLD AUTO: 0 10E9/L (ref 0–0.7)
EOSINOPHIL NFR BLD AUTO: 0 %
ERYTHROCYTE [DISTWIDTH] IN BLOOD BY AUTOMATED COUNT: 15.1 % (ref 10–15)
HCT VFR BLD AUTO: 28.8 % (ref 35–47)
HGB BLD-MCNC: 9.6 G/DL (ref 11.7–15.7)
IMM GRANULOCYTES # BLD: 0.1 10E9/L (ref 0–0.4)
IMM GRANULOCYTES NFR BLD: 1 %
LYMPHOCYTES # BLD AUTO: 0.9 10E9/L (ref 0.8–5.3)
LYMPHOCYTES NFR BLD AUTO: 18.4 %
MCH RBC QN AUTO: 32.2 PG (ref 26.5–33)
MCHC RBC AUTO-ENTMCNC: 33.3 G/DL (ref 31.5–36.5)
MCV RBC AUTO: 97 FL (ref 78–100)
MONOCYTES # BLD AUTO: 0.6 10E9/L (ref 0–1.3)
MONOCYTES NFR BLD AUTO: 11.2 %
NEUTROPHILS # BLD AUTO: 3.5 10E9/L (ref 1.6–8.3)
NEUTROPHILS NFR BLD AUTO: 69 %
NRBC # BLD AUTO: 0 10*3/UL
NRBC BLD AUTO-RTO: 0 /100
PLATELET # BLD AUTO: 152 10E9/L (ref 150–450)
RBC # BLD AUTO: 2.98 10E12/L (ref 3.8–5.2)
WBC # BLD AUTO: 5.1 10E9/L (ref 4–11)

## 2018-03-21 PROCEDURE — 99213 OFFICE O/P EST LOW 20 MIN: CPT | Performed by: INTERNAL MEDICINE

## 2018-03-21 PROCEDURE — 85025 COMPLETE CBC W/AUTO DIFF WBC: CPT | Performed by: INTERNAL MEDICINE

## 2018-03-21 PROCEDURE — 96367 TX/PROPH/DG ADDL SEQ IV INF: CPT

## 2018-03-21 PROCEDURE — 96411 CHEMO IV PUSH ADDL DRUG: CPT

## 2018-03-21 PROCEDURE — 25000128 H RX IP 250 OP 636: Performed by: INTERNAL MEDICINE

## 2018-03-21 PROCEDURE — 96413 CHEMO IV INFUSION 1 HR: CPT

## 2018-03-21 PROCEDURE — 96377 APPLICATON ON-BODY INJECTOR: CPT | Mod: XS

## 2018-03-21 PROCEDURE — 96375 TX/PRO/DX INJ NEW DRUG ADDON: CPT

## 2018-03-21 RX ORDER — HEPARIN SODIUM (PORCINE) LOCK FLUSH IV SOLN 100 UNIT/ML 100 UNIT/ML
500 SOLUTION INTRAVENOUS ONCE
Status: CANCELLED
Start: 2018-03-21 | End: 2018-03-21

## 2018-03-21 RX ORDER — HEPARIN SODIUM (PORCINE) LOCK FLUSH IV SOLN 100 UNIT/ML 100 UNIT/ML
500 SOLUTION INTRAVENOUS ONCE
Status: COMPLETED | OUTPATIENT
Start: 2018-03-21 | End: 2018-03-21

## 2018-03-21 RX ORDER — DOXORUBICIN HYDROCHLORIDE 2 MG/ML
120 INJECTION, SOLUTION INTRAVENOUS ONCE
Status: COMPLETED | OUTPATIENT
Start: 2018-03-21 | End: 2018-03-21

## 2018-03-21 RX ORDER — PALONOSETRON 0.05 MG/ML
0.25 INJECTION, SOLUTION INTRAVENOUS ONCE
Status: COMPLETED | OUTPATIENT
Start: 2018-03-21 | End: 2018-03-21

## 2018-03-21 RX ADMIN — DEXAMETHASONE SODIUM PHOSPHATE: 10 INJECTION, SOLUTION INTRAMUSCULAR; INTRAVENOUS at 14:44

## 2018-03-21 RX ADMIN — SODIUM CHLORIDE, PRESERVATIVE FREE 500 UNITS: 5 INJECTION INTRAVENOUS at 16:15

## 2018-03-21 RX ADMIN — CYCLOPHOSPHAMIDE 1200 MG: 1 INJECTION, POWDER, FOR SOLUTION INTRAVENOUS; ORAL at 15:37

## 2018-03-21 RX ADMIN — DOXORUBICIN HYDROCHLORIDE 120 MG: 2 INJECTION, SOLUTION INTRAVENOUS at 15:14

## 2018-03-21 RX ADMIN — PEGFILGRASTIM 6 MG: KIT SUBCUTANEOUS at 16:15

## 2018-03-21 RX ADMIN — PALONOSETRON HYDROCHLORIDE 0.25 MG: 0.25 INJECTION INTRAVENOUS at 14:41

## 2018-03-21 RX ADMIN — SODIUM CHLORIDE 250 ML: 9 INJECTION, SOLUTION INTRAVENOUS at 15:27

## 2018-03-21 ASSESSMENT — PAIN SCALES - GENERAL
PAINLEVEL: NO PAIN (0)
PAINLEVEL: NO PAIN (0)

## 2018-03-21 NOTE — PATIENT INSTRUCTIONS
Your On-body Neulasta Injector was applied to your right upper arm at 4:20pm.  At approximately 7:20pm on 3/22/18, your On-body Injector will beep to let you know your dose delivery will begin in 2 minutes.  Your medication will be delivered over the next 45 minutes.  You can remove your Injector at 8:20pm.  Please make sure your Injector has a solid green light or has turned off prior to removing the device.  Please contact your provider at [phone] with questions or concerns.

## 2018-03-21 NOTE — PROGRESS NOTES
Infusion Nursing Note:  Flor Griffin presents today for AC.    Patient seen by provider today: Yes: Dr. Magana   present during visit today: Not Applicable.    Note: N/A.    Intravenous Access:  Implanted Port.    Treatment Conditions:  Lab Results   Component Value Date    HGB 9.6 03/21/2018     Lab Results   Component Value Date    WBC 5.1 03/21/2018      Lab Results   Component Value Date    ANEU 3.5 03/21/2018     Lab Results   Component Value Date     03/21/2018      Results reviewed, labs MET treatment parameters, ok to proceed with treatment.      Post Infusion Assessment:  Patient tolerated infusion without incident.  Blood return noted pre and post infusion.  Site patent and intact, free from redness, edema or discomfort.  No evidence of extravasations.  Access discontinued per protocol.   ONPRO  Was placed on patient's: back of right arm.    Was placed at 4:20 PM    ONPRO injector device Lot number: [id number]    Patient education included: what patient can expect after application, what colored lights mean on the device, when to remove device, when and where to call with questions or issues, all patients questions answered and that Neulasta administration will occur at 7:20pm  tomorrow.    Patient tolerated administration well.    Discharge Plan:   Discharge instructions reviewed with: Patient and Family.  Patient and/or family verbalized understanding of discharge instructions and all questions answered.  Copy of AVS reviewed with patient and/or family.  Patient will return 3/27/18 for next appointment.  Patient discharged in stable condition accompanied by: .  Departure Mode: Ambulatory.    Max Montiel RN

## 2018-03-21 NOTE — PATIENT INSTRUCTIONS
1- Cycle 2 dose dense AC today  2- NP visit Next week with Labs and IV fluids. Scheduled/cristina  3- RTC MD 2 weeks with infusion clinic appointment  Scheduled/cristina      AVS printed & given to patient/cristina

## 2018-03-21 NOTE — LETTER
"    3/21/2018         RE: Flor Griffin  [address] [facility] [address]        Dear Colleague,    Thank you for referring your patient, Flor Griffin, to the [facility]. Please see a copy of my visit note below.    [facility]  HEMATOLOGY ONCOLOGY    ONCOLOGY FOLLOWUP NOTE      DIAGNOSIS:  Clinically, TX N3c M0 adenocarcinoma which is poorly differentiated, likely of breast origin.  Initially she was seen 11/22/2017 after she had the right supraclavicular lymph node biopsy which was positive for poorly differentiated adenocarcinoma.  She had this lymph node almost a month and had an excisional biopsy performed which was consistent with the diagnosis of cancer.      A pet CT scan 11/27/2017 showed hypermetabolic right supraclavicular, right axillary and subpectoral adenopathy.  Otherwise, no distant metastatic disease.     Mammogram 11/30/17- negative    TREATMENT: Carboplatin and Taxol completed 4 cycles. 3/7/2018 started dose dense AC.      SUBJECTIVE:  The patient was seen as a followup today. She is tolerating treatment well. Some fatigue. Oral intake is good.     REVIEW OF SYSTEMS:  A complete review of systems was performed and found to be negative other than pertinent positives mentioned in history of present illness.      Past medical, social histories reviewed.     Meds- Reviewed.     PHYSICAL EXAMINATION:   VITAL SIGNS: /68 (BP Location: Right arm, Patient Position: Chair, Cuff Size: Adult Regular)  Pulse 80  Temp 98.2  F (36.8  C) (Oral)  Resp 18  Ht 1.676 m (5' 5.98\")  Wt 81.6 kg (180 lb)  LMP 01/01/2004 (Approximate)  SpO2 100%  BMI 29.07 kg/m2  CONSTITUTIONAL: Sitting comfortably.   HEENT: Pupils are equal. Oropharynx is clear.   NECK: No cervical or supraclavicular lymphadenopathy.   RESPIRATORY: Clear bilaterally.   CARD/VASC: S1, S2, regular.   GI: Soft, nontender, nondistended, no hepatosplenomegaly.   MUSKULOSKELETAL: Warm, " well perfused.   NEUROLOGIC: Alert, awake.   INTEGUMENT: No rash.   LYMPHATICS: No edema.   PSYCH: Mood and affect was normal.     LABORATORY DATA AND IMAGING REVIEWED DURING THIS VISIT:  Recent Labs   Lab Test  03/19/18   0838  03/15/18   1300   NA  140  134   POTASSIUM  4.0  3.8   CHLORIDE  107  101   CO2  28  27   ANIONGAP  5  6   BUN  12  17   CR  0.59  0.55   GLC  94  92   EDILSON  9.0  8.7     Recent Labs   Lab Test  03/19/18   0838  03/15/18   1300  03/07/18   1250   WBC  3.7*  1.1*  4.0   HGB  10.1*  10.5*  11.3*   PLT  98*  69*  258   MCV  99  95  96   NEUTROPHIL  61.0  9.0  51.1     Recent Labs   Lab Test  03/19/18   0838  03/15/18   1300  03/07/18   1250   BILITOTAL  0.2  0.6  0.4   ALKPHOS  116  116  109   ALT  34  37  60*   AST  15  13  35   ALBUMIN  3.3*  3.5  3.6        ECOG performance status 1     ASSESSMENT AND PLAN:  This is a 62-year-old lady who has diagnosis of 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  Tumor cells were positive for CK7, CK5/6 and GATA3.  We did tumor marker testing and she had CA 27-29 at the level of 12,  of 16, CA 19-9 at 19.  Her CEA was less than 0.5.      PET CT scan results was done 11/27/2017.  showed hypermetabolic right supraclavicular, right axillary and subpectoral adenopathy.  There was no evidence of distant metastases.      The distribution of adenopathy indicates possibility breast cancer which has metastasized to the regional lymph nodes and we are not able to find a primary on physical exam and a PET scan.     Mammogram 11/30/17 no evidence of primary. Her-2 non amplified. Androgen receptor 10-20%. ER 1-5%. HI < 1%.   ENT examination by Dr. Miller was negative. EGD was negative as well. MRI breast 12/20/17 no evidence of malignancy in the breasts.    She started chemotherapy carboplatin/Taxol. Completed 4 cycles. PET Scan 2/20/18 showed good response to treatment.   She will complete dose dense AC for 4  "cycles.     - Labs were reviewed with the patient, normocytic anemia related to chemotherapy. White cell count have recovered.      PLAN:   1- Cycle 2 dose dense AC today  2- NP visit Next week with Labs and IV fluids.   3- RTC MD 2 weeks with infusion clinic appointment     YANI MAGANA MD    3/21/2018       cc: Waylon Liu MD       Oncology Rooming Note    March 21, 2018 1:38 PM   Flor Griffin is a 62 year old female who presents for:    Chief Complaint   Patient presents with     Oncology Clinic Visit     RETURN-2 week return-breast cancer     Initial Vitals: /68 (BP Location: Right arm, Patient Position: Chair, Cuff Size: Adult Regular)  Pulse 80  Temp 98.2  F (36.8  C) (Oral)  Resp 18  Ht 1.676 m (5' 5.98\")  Wt 81.6 kg (180 lb)  LMP 01/01/2004 (Approximate)  SpO2 100%  BMI 29.07 kg/m2 Estimated body mass index is 29.07 kg/(m^2) as calculated from the following:    Height as of this encounter: 1.676 m (5' 5.98\").    Weight as of this encounter: 81.6 kg (180 lb). Body surface area is 1.95 meters squared.  No Pain (0) Comment: Data Unavailable   Patient's last menstrual period was 01/01/2004 (approximate).  Allergies reviewed: Yes  Medications reviewed: Yes    Medications: Medication refills not needed today.  Pharmacy name entered into [employer]: [facility] [place] [id number]    Clinical concerns: none     5 minutes for nursing intake (face to face time)     Hailey Ryan [facility]              Again, thank you for allowing me to participate in the care of your patient.        Sincerely,        Yani Magana MD    "

## 2018-03-21 NOTE — MR AVS SNAPSHOT
After Visit Summary   3/21/2018    Flor Griffin    MRN: [id number]           Patient Information     Date Of Birth          1955        Visit Information        Provider Department      3/21/2018 1:45 PM Yani Magana MD [facility]        Care Instructions    1- Cycle 2 dose dense AC today  2- NP visit Next week with Labs and IV fluids.   3- RTC MD 2 weeks with infusion clinic appointment           Follow-ups after your visit        Your next 10 appointments already scheduled     Mar 27, 2018 11:30 AM CDT   Level 2 with SH INFUSION CHAIR 6   [facility] ([facility])    [facility]  [address]  [place] [address]   [phone]            Mar 27, 2018 12:30 PM CDT   Return Visit with HEDY Beth CNP   [facility] ([facility])    [facility]  [address]  [place] [address]   [phone]            Apr 04, 2018 10:30 AM CDT   Level 3 with  INFUSION CHAIR 12   [facility] and Infusion [facility] ([facility])    [facility]  [address]  [place] [address]   [phone]            Apr 04, 2018 11:15 AM CDT   Return Visit with Yani Magana MD   [facility] ([facility])    [facility]  [address]  [place] [address]   [phone]              Who to contact     If you have questions or need follow up information about today's clinic visit or your schedule please contact [facility] directly at [phone].  Normal or non-critical lab and imaging results will be communicated to you by MyChart, letter or phone within 4 business days after the clinic has received the results. If you do not hear from us within 7 days, please contact the clinic through MyChart or phone. If you have a critical or abnormal  "lab result, we will notify you by phone as soon as possible.  Submit refill requests through [facility] or call your pharmacy and they will forward the refill request to us. Please allow 3 business days for your refill to be completed.          Additional Information About Your Visit        MyChart Information     [facility] lets you send messages to your doctor, view your test results, renew your prescriptions, schedule appointments and more. To sign up, go to [email or web address] . Click on \"Log in\" on the left side of the screen, which will take you to the Welcome page. Then click on \"Sign up Now\" on the right side of the page.     You will be asked to enter the access code listed below, as well as some personal information. Please follow the directions to create your username and password.     Your access code is: [email or web address]  Expires: 2018  3:02 PM     Your access code will  in 90 days. If you need help or a new code, please call your [place] clinic or [phone].        Care EveryWhere ID     This is your Care EveryWhere ID. This could be used by other organizations to access your [place] medical records  [id number]        Your Vitals Were     Pulse Temperature Respirations Height Last Period Pulse Oximetry    80 98.2  F (36.8  C) (Oral) 18 1.676 m (5' 5.98\") 2004 (Approximate) 100%    BMI (Body Mass Index)                   29.07 kg/m2            Blood Pressure from Last 3 Encounters:   18 128/68   18 128/68   18 107/72    Weight from Last 3 Encounters:   18 81.6 kg (180 lb)   18 81.6 kg (180 lb)   18 80.3 kg (177 lb)              Today, you had the following     No orders found for display       Primary Care Provider Office Phone # Fax #    Kevin [facility] [phone] [address] [facility] [address]        Equal Access to Services     MARYJANE JARA AH: Hadii carole Caicedo, lillianaybta " ketan sundarcy connor. So [facility] [phone].    ATENCIÓN: Si lane le, tiene a thomas disposición servicios gratuitos de asistencia lingüística. Pino al [phone].    We comply with applicable federal civil rights laws and [place] laws. We do not discriminate on the basis of race, color, national origin, age, disability, sex, sexual orientation, or gender identity.            Thank you!     Thank you for choosing [facility] [place] [facility]  for your care. Our goal is always to provide you with excellent care. Hearing back from our patients is one way we can continue to improve our services. Please take a few minutes to complete the written survey that you may receive in the mail after your visit with us. Thank you!             Your Updated Medication List - Protect others around you: Learn how to safely use, store and throw away your medicines at www.disposemymeds.org.          This list is accurate as of 3/21/18  2:47 PM.  Always use your most recent med list.                   Brand Name Dispense Instructions for use Diagnosis    ADVIL PO      Take 400 mg by mouth every 6 hours as needed for moderate pain        aspirin 81 MG tablet      Take 81 mg by mouth daily        dexamethasone 4 MG tablet    DECADRON    6 tablet    Take 2 tablets (8 mg) by mouth daily for 3 days Start on Day 2.    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 in female, estrogen receptor positive (H), Carcinoma of unknown origin (H)       famotidine 20 MG tablet    PEPCID    30 tablet    Take 1 tablet (20 mg) by mouth daily    Heartburn       fluconazole 100 MG tablet    DIFLUCAN    7 tablet    Take 1 tab po q day x 7 days    Chemotherapy-induced neutropenia (H)       GLUCOSAMINE CHONDROITIN JOINT PO      Take by mouth daily        HYDROcodone-acetaminophen 5-325 MG per tablet    NORCO    6 tablet    Take 1 tablet by mouth every 6 hours as needed for moderate to severe pain    Carcinoma  of unknown origin (H)       levofloxacin 500 MG tablet    LEVAQUIN    7 tablet    Take 1 tablet (500 mg) by mouth daily    Chemotherapy-induced neutropenia (H)       LORazepam 0.5 MG tablet    ATIVAN    30 tablet    Take 1 tablet (0.5 mg) by mouth every 4 hours as needed (Anxiety, Nausea/Vomiting or Sleep)    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 in female, estrogen receptor positive (H), Carcinoma of unknown origin (H)       OMEGA-3 FISH OIL PO      Take 1 g by mouth daily        prochlorperazine 10 MG tablet    COMPAZINE    30 tablet    Take 1 tablet (10 mg) by mouth every 6 hours as needed (Nausea/Vomiting)    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 in female, estrogen receptor positive (H), Carcinoma of unknown origin (H)       senna 8.6 MG tablet    SENOKOT    120 tablet    Take 1 tablet by mouth daily    Carcinoma of unknown origin (H), Constipation, unspecified constipation type       valACYclovir 1000 mg tablet    VALTREX    20 tablet    Take 1 tablet (1,000 mg) by mouth 2 times daily    Cold sore       VITAMIN B6 PO      Take by mouth daily        VITAMIN D (CHOLECALCIFEROL) PO      Take 1,000 Units by mouth daily

## 2018-03-21 NOTE — MR AVS SNAPSHOT
After Visit Summary   3/21/2018    Flor Griffin    MRN: [id number]           Patient Information     Date Of Birth          1955        Visit Information        Provider Department      3/21/2018 1:00 PM  INFUSION CHAIR 15 [facility]        Today's Diagnoses     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 in female, estrogen receptor positive (H)    -  1    Carcinoma of unknown origin (H)        Port catheter in place          Care Instructions    Your On-body Neulasta Injector was applied to your right upper arm at 4:20pm.  At approximately 7:20pm on 3/22/18, your On-body Injector will beep to let you know your dose delivery will begin in 2 minutes.  Your medication will be delivered over the next 45 minutes.  You can remove your Injector at 8:20pm.  Please make sure your Injector has a solid green light or has turned off prior to removing the device.  Please contact your provider at [phone] with questions or concerns.            Follow-ups after your visit        Your next 10 appointments already scheduled     Mar 27, 2018 11:30 AM CDT   Level 2 with  INFUSION CHAIR 6   [facility] ([facility])    [facility]  [address]  [place] [address]   [phone]            Mar 27, 2018 12:30 PM CDT   Return Visit with HEDY Beth CNP   [facility] ([facility])    [facility]  [address]  [place] [address]   [phone]            Apr 04, 2018 10:30 AM CDT   Level 3 with  INFUSION CHAIR 12   [facility] ([facility])    [facility]  [address]  [place] [address]   [phone]            Apr 04, 2018 11:15 AM CDT   Return Visit with Yani Magana MD   [facility] ([facility])    [facility]  [address]  [place] [address] [phone]              Who to contact     If you have questions or need follow up information about today's clinic visit or your schedule please contact [facility] AND [facility] directly at [phone].  Normal or non-critical lab and imaging results will be communicated to you by MyChart, letter or phone within 4 business days after the clinic has received the results. If you do not hear from us within 7 days, please contact the clinic through MyChart or phone. If you have a critical or abnormal lab result, we will notify you by phone as soon as possible.  Submit refill requests through [employer] or call your pharmacy and they will forward the refill request to us. Please allow 3 business days for your refill to be completed.          Additional Information About Your Visit        [employer] Information     [employer] lets you send messages to your doctor, view your test results, renew your prescriptions, schedule appointments and more. To sign up, go to [email or web address] . Click on \"Log in\" on the left side of the screen, which will take you to the Welcome page. Then click on \"Sign up Now\" on the right side of the page.     You will be asked to enter the access code listed below, as well as some personal information. Please follow the directions to create your username and password.     Your access code is: [email or web address]  Expires: 2018  3:02 PM     Your access code will  in 90 days. If you need help or a new code, please call your [place] clinic or [phone].        Care EveryWhere ID     This is your Care EveryWhere ID. This could be used by other organizations to access your [place] medical records  [id number]        Your Vitals Were     Pulse Temperature Respirations Height Last Period Pulse Oximetry    65 97.9  F (36.6  C) (Oral) 16 1.676 m (5' 6\") 2004 (Approximate) 100%    BMI (Body Mass " Index)                   29.05 kg/m2            Blood Pressure from Last 3 Encounters:   03/21/18 128/68   03/21/18 112/54   03/19/18 107/72    Weight from Last 3 Encounters:   03/21/18 81.6 kg (180 lb)   03/21/18 81.6 kg (180 lb)   03/19/18 80.3 kg (177 lb)              We Performed the Following     CBC with platelets differential        Primary Care Provider Office Phone # Fax #    [facility] [phone] [address] [facility] [address]        Equal Access to Services     [facility] HospitalDICKSON : Hadii nelida sono Sodot, waaxda luqadaha, qaybta kaalmada osmani, ketan lam . So [facility] [phone].    ATENCIÓN: Si habla español, tiene a thomas disposición servicios gratuitos de asistencia lingüística. [facility] [phone].    We comply with applicable federal civil rights laws and [place] laws. We do not discriminate on the basis of race, color, national origin, age, disability, sex, sexual orientation, or gender identity.            Thank you!     Thank you for choosing [facility] AND [facility]  for your care. Our goal is always to provide you with excellent care. Hearing back from our patients is one way we can continue to improve our services. Please take a few minutes to complete the written survey that you may receive in the mail after your visit with us. Thank you!             Your Updated Medication List - Protect others around you: Learn how to safely use, store and throw away your medicines at www.disposemymeds.org.          This list is accurate as of 3/21/18  4:17 PM.  Always use your most recent med list.                   Brand Name Dispense Instructions for use Diagnosis    ADVIL PO      Take 400 mg by mouth every 6 hours as needed for moderate pain        aspirin 81 MG tablet      Take 81 mg by mouth daily        dexamethasone 4 MG tablet    DECADRON    6 tablet    Take 2 tablets (8 mg) by mouth daily for 3  days Start on Day 2.    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 in female, estrogen receptor positive (H), Carcinoma of unknown origin (H)       famotidine 20 MG tablet    PEPCID    30 tablet    Take 1 tablet (20 mg) by mouth daily    Heartburn       fluconazole 100 MG tablet    DIFLUCAN    7 tablet    Take 1 tab po q day x 7 days    Chemotherapy-induced neutropenia (H)       GLUCOSAMINE CHONDROITIN JOINT PO      Take by mouth daily        HYDROcodone-acetaminophen 5-325 MG per tablet    NORCO    6 tablet    Take 1 tablet by mouth every 6 hours as needed for moderate to severe pain    Carcinoma of unknown origin (H)       levofloxacin 500 MG tablet    LEVAQUIN    7 tablet    Take 1 tablet (500 mg) by mouth daily    Chemotherapy-induced neutropenia (H)       LORazepam 0.5 MG tablet    ATIVAN    30 tablet    Take 1 tablet (0.5 mg) by mouth every 4 hours as needed (Anxiety, Nausea/Vomiting or Sleep)    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 in female, estrogen receptor positive (H), Carcinoma of unknown origin (H)       OMEGA-3 FISH OIL PO      Take 1 g by mouth daily        prochlorperazine 10 MG tablet    COMPAZINE    30 tablet    Take 1 tablet (10 mg) by mouth every 6 hours as needed (Nausea/Vomiting)    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 in female, estrogen receptor positive (H), Carcinoma of unknown origin (H)       senna 8.6 MG tablet    SENOKOT    120 tablet    Take 1 tablet by mouth daily    Carcinoma of unknown origin (H), Constipation, unspecified constipation type       valACYclovir 1000 mg tablet    VALTREX    20 tablet    Take 1 tablet (1,000 mg) by mouth 2 times daily    Cold sore       VITAMIN B6 PO      Take by mouth daily        VITAMIN D (CHOLECALCIFEROL) PO      Take 1,000 Units by mouth daily

## 2018-03-21 NOTE — PROGRESS NOTES
"[facility] PHYSICIANS  HEMATOLOGY ONCOLOGY    ONCOLOGY FOLLOWUP NOTE      DIAGNOSIS:  Clinically, TX N3c M0 adenocarcinoma which is poorly differentiated, likely of breast origin.  Initially she was seen 11/22/2017 after she had the right supraclavicular lymph node biopsy which was positive for poorly differentiated adenocarcinoma.  She had this lymph node almost a month and had an excisional biopsy performed which was consistent with the diagnosis of cancer.      A pet CT scan 11/27/2017 showed hypermetabolic right supraclavicular, right axillary and subpectoral adenopathy.  Otherwise, no distant metastatic disease.     Mammogram 11/30/17- negative    TREATMENT: Carboplatin and Taxol completed 4 cycles. 3/7/2018 started dose dense AC.      SUBJECTIVE:  The patient was seen as a followup today. She is tolerating treatment well. Some fatigue. Oral intake is good.     REVIEW OF SYSTEMS:  A complete review of systems was performed and found to be negative other than pertinent positives mentioned in history of present illness.      Past medical, social histories reviewed.     Meds- Reviewed.     PHYSICAL EXAMINATION:   VITAL SIGNS: /68 (BP Location: Right arm, Patient Position: Chair, Cuff Size: Adult Regular)  Pulse 80  Temp 98.2  F (36.8  C) (Oral)  Resp 18  Ht 1.676 m (5' 5.98\")  Wt 81.6 kg (180 lb)  LMP 01/01/2004 (Approximate)  SpO2 100%  BMI 29.07 kg/m2  CONSTITUTIONAL: Sitting comfortably.   HEENT: Pupils are equal. Oropharynx is clear.   NECK: No cervical or supraclavicular lymphadenopathy.   RESPIRATORY: Clear bilaterally.   CARD/VASC: S1, S2, regular.   GI: Soft, nontender, nondistended, no hepatosplenomegaly.   MUSKULOSKELETAL: Warm, well perfused.   NEUROLOGIC: Alert, awake.   INTEGUMENT: No rash.   LYMPHATICS: No edema.   PSYCH: Mood and affect was normal.     LABORATORY DATA AND IMAGING REVIEWED DURING THIS VISIT:  Recent Labs   Lab Test  03/19/18   0838  03/15/18   1300   NA  " 140  134   POTASSIUM  4.0  3.8   CHLORIDE  107  101   CO2  28  27   ANIONGAP  5  6   BUN  12  17   CR  0.59  0.55   GLC  94  92   EDILSON  9.0  8.7     Recent Labs   Lab Test  03/19/18   0838  03/15/18   1300  03/07/18   1250   WBC  3.7*  1.1*  4.0   HGB  10.1*  10.5*  11.3*   PLT  98*  69*  258   MCV  99  95  96   NEUTROPHIL  61.0  9.0  51.1     Recent Labs   Lab Test  03/19/18   0838  03/15/18   1300  03/07/18   1250   BILITOTAL  0.2  0.6  0.4   ALKPHOS  116  116  109   ALT  34  37  60*   AST  15  13  35   ALBUMIN  3.3*  3.5  3.6        ECOG performance status 1     ASSESSMENT AND PLAN:  This is a 62-year-old lady who has diagnosis of 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  Tumor cells were positive for CK7, CK5/6 and GATA3.  We did tumor marker testing and she had CA 27-29 at the level of 12,  of 16, CA 19-9 at 19.  Her CEA was less than 0.5.      PET CT scan results was done 11/27/2017.  showed hypermetabolic right supraclavicular, right axillary and subpectoral adenopathy.  There was no evidence of distant metastases.      The distribution of adenopathy indicates possibility breast cancer which has metastasized to the regional lymph nodes and we are not able to find a primary on physical exam and a PET scan.     Mammogram 11/30/17 no evidence of primary. Her-2 non amplified. Androgen receptor 10-20%. ER 1-5%. MS < 1%.   ENT examination by Dr. Miller was negative. EGD was negative as well. MRI breast 12/20/17 no evidence of malignancy in the breasts.    She started chemotherapy carboplatin/Taxol. Completed 4 cycles. PET Scan 2/20/18 showed good response to treatment.   She will complete dose dense AC for 4 cycles.     - Labs were reviewed with the patient, normocytic anemia related to chemotherapy. White cell count have recovered.      PLAN:   1- Cycle 2 dose dense AC today  2- NP visit Next week with Labs and IV fluids.   3- RTC MD 2 weeks with infusion  clinic appointment     ERON BARNES MD    3/21/2018       cc: Waylon Liu MD

## 2018-03-21 NOTE — PROGRESS NOTES
"Oncology Rooming Note    March 21, 2018 1:38 PM   Flor Griffin is a 62 year old female who presents for:    Chief Complaint   Patient presents with     Oncology Clinic Visit     RETURN-2 week return-breast cancer     Initial Vitals: /68 (BP Location: Right arm, Patient Position: Chair, Cuff Size: Adult Regular)  Pulse 80  Temp 98.2  F (36.8  C) (Oral)  Resp 18  Ht 1.676 m (5' 5.98\")  Wt 81.6 kg (180 lb)  LMP 01/01/2004 (Approximate)  SpO2 100%  BMI 29.07 kg/m2 Estimated body mass index is 29.07 kg/(m^2) as calculated from the following:    Height as of this encounter: 1.676 m (5' 5.98\").    Weight as of this encounter: 81.6 kg (180 lb). Body surface area is 1.95 meters squared.  No Pain (0) Comment: Data Unavailable   Patient's last menstrual period was 01/01/2004 (approximate).  Allergies reviewed: Yes  Medications reviewed: Yes    Medications: Medication refills not needed today.  [facility] into [id number] [address]    Clinical concerns: none     5 minutes for nursing intake (face to face time)     Hailey Ryan CMA            "

## 2018-03-26 ENCOUNTER — HOSPITAL ENCOUNTER (OUTPATIENT)
Facility: CLINIC | Age: 63
Setting detail: SPECIMEN
Discharge: HOME | End: 2018-03-26
Attending: INTERNAL MEDICINE | Admitting: INTERNAL MEDICINE
Payer: COMMERCIAL

## 2018-03-26 DIAGNOSIS — C50.919 BREAST CANCER (H): Primary | ICD-10-CM

## 2018-03-26 LAB
ALBUMIN SERPL-MCNC: 3.4 G/DL (ref 3.4–5)
ALP SERPL-CCNC: 137 U/L (ref 40–150)
ALT SERPL W P-5'-P-CCNC: 26 U/L (ref 0–50)
ANION GAP SERPL CALCULATED.3IONS-SCNC: 6 MMOL/L (ref 3–14)
AST SERPL W P-5'-P-CCNC: 12 U/L (ref 0–45)
BASOPHILS # BLD AUTO: 0 10E9/L (ref 0–0.2)
BASOPHILS NFR BLD AUTO: 0 %
BILIRUB SERPL-MCNC: 0.5 MG/DL (ref 0.2–1.3)
BUN SERPL-MCNC: 21 MG/DL (ref 7–30)
CALCIUM SERPL-MCNC: 8.2 MG/DL (ref 8.5–10.1)
CHLORIDE SERPL-SCNC: 103 MMOL/L (ref 94–109)
CO2 SERPL-SCNC: 30 MMOL/L (ref 20–32)
CREAT SERPL-MCNC: 0.64 MG/DL (ref 0.52–1.04)
DIFFERENTIAL METHOD BLD: ABNORMAL
EOSINOPHIL # BLD AUTO: 0 10E9/L (ref 0–0.7)
EOSINOPHIL NFR BLD AUTO: 0 %
ERYTHROCYTE [DISTWIDTH] IN BLOOD BY AUTOMATED COUNT: 15 % (ref 10–15)
GFR SERPL CREATININE-BSD FRML MDRD: >90 ML/MIN/1.7M2
GLUCOSE SERPL-MCNC: 113 MG/DL (ref 70–99)
HCT VFR BLD AUTO: 31.3 % (ref 35–47)
HGB BLD-MCNC: 10.3 G/DL (ref 11.7–15.7)
LYMPHOCYTES # BLD AUTO: 1.4 10E9/L (ref 0.8–5.3)
LYMPHOCYTES NFR BLD AUTO: 33 %
MCH RBC QN AUTO: 32.7 PG (ref 26.5–33)
MCHC RBC AUTO-ENTMCNC: 32.9 G/DL (ref 31.5–36.5)
MCV RBC AUTO: 99 FL (ref 78–100)
MONOCYTES # BLD AUTO: 0 10E9/L (ref 0–1.3)
MONOCYTES NFR BLD AUTO: 0 %
NEUTROPHILS # BLD AUTO: 2.7 10E9/L (ref 1.6–8.3)
NEUTROPHILS NFR BLD AUTO: 67 %
PLATELET # BLD AUTO: 151 10E9/L (ref 150–450)
PLATELET # BLD EST: ABNORMAL 10*3/UL
POTASSIUM SERPL-SCNC: 3.4 MMOL/L (ref 3.4–5.3)
PROT SERPL-MCNC: 6.3 G/DL (ref 6.8–8.8)
RBC # BLD AUTO: 3.15 10E12/L (ref 3.8–5.2)
RBC MORPH BLD: ABNORMAL
SODIUM SERPL-SCNC: 139 MMOL/L (ref 133–144)
WBC # BLD AUTO: 4.1 10E9/L (ref 4–11)

## 2018-03-26 PROCEDURE — 36591 DRAW BLOOD OFF VENOUS DEVICE: CPT

## 2018-03-26 PROCEDURE — 85025 COMPLETE CBC W/AUTO DIFF WBC: CPT | Performed by: INTERNAL MEDICINE

## 2018-03-26 PROCEDURE — 80053 COMPREHEN METABOLIC PANEL: CPT | Performed by: INTERNAL MEDICINE

## 2018-03-26 NOTE — PROGRESS NOTES
Infusion Nursing Note:  Flor BARTHOLOMEW Griffin presents today for port labs.    Patient seen by provider today: No   present during visit today: Not Applicable.    Note: Called patient with lab results per request.    Intravenous Access:  Labs drawn without difficulty.  Implanted Port.    Treatment Conditions:  Lab Results   Component Value Date    HGB 10.3 03/26/2018     Lab Results   Component Value Date    WBC 4.1 03/26/2018      Lab Results   Component Value Date    ANEU 3.5 03/21/2018     Lab Results   Component Value Date     03/26/2018      Lab Results   Component Value Date     03/26/2018                   Lab Results   Component Value Date    POTASSIUM 3.4 03/26/2018           No results found for: MAG         Lab Results   Component Value Date    CR 0.64 03/26/2018                   Lab Results   Component Value Date    EDILSON 8.2 03/26/2018                Lab Results   Component Value Date    BILITOTAL 0.5 03/26/2018           Lab Results   Component Value Date    ALBUMIN 3.4 03/26/2018                    Lab Results   Component Value Date    ALT 26 03/26/2018           Lab Results   Component Value Date    AST 12 03/26/2018           Post Infusion Assessment:  Site patent and intact, free from redness, edema or discomfort.  No evidence of extravasations.  Access discontinued per protocol.    Discharge Plan:   Patient declined prescription refills.  Discharge instructions reviewed with: Patient.  Patient and/or family verbalized understanding of discharge instructions and all questions answered.  AVS to patient via [employer].  Patient will return 4/4/18 for next appointment.   Patient discharged in stable condition accompanied by: self.  Departure Mode: Ambulatory.    Jami Sanders RN

## 2018-03-27 ENCOUNTER — HOSPITAL ENCOUNTER (OUTPATIENT)
Facility: CLINIC | Age: 63
Setting detail: SPECIMEN
End: 2018-03-27
Attending: INTERNAL MEDICINE
Payer: COMMERCIAL

## 2018-04-04 ENCOUNTER — HOSPITAL ENCOUNTER (OUTPATIENT)
Facility: CLINIC | Age: 63
End: 2018-04-04
Attending: NURSE PRACTITIONER

## 2018-04-04 ENCOUNTER — ONCOLOGY VISIT (OUTPATIENT)
Dept: ONCOLOGY | Facility: CLINIC | Age: 63
End: 2018-04-04
Attending: INTERNAL MEDICINE
Payer: COMMERCIAL

## 2018-04-04 ENCOUNTER — INFUSION THERAPY VISIT (OUTPATIENT)
Dept: INFUSION THERAPY | Facility: CLINIC | Age: 63
End: 2018-04-04
Attending: INTERNAL MEDICINE
Payer: COMMERCIAL

## 2018-04-04 ENCOUNTER — HOSPITAL ENCOUNTER (OUTPATIENT)
Facility: CLINIC | Age: 63
Setting detail: SPECIMEN
Discharge: HOME | End: 2018-04-04
Attending: INTERNAL MEDICINE | Admitting: INTERNAL MEDICINE
Payer: COMMERCIAL

## 2018-04-04 VITALS
DIASTOLIC BLOOD PRESSURE: 50 MMHG | RESPIRATION RATE: 20 BRPM | BODY MASS INDEX: 28.68 KG/M2 | SYSTOLIC BLOOD PRESSURE: 111 MMHG | HEART RATE: 65 BPM | TEMPERATURE: 98.1 F | WEIGHT: 177.6 LBS | OXYGEN SATURATION: 100 %

## 2018-04-04 VITALS
TEMPERATURE: 98.1 F | RESPIRATION RATE: 18 BRPM | OXYGEN SATURATION: 100 % | BODY MASS INDEX: 28.54 KG/M2 | HEIGHT: 66 IN | DIASTOLIC BLOOD PRESSURE: 51 MMHG | WEIGHT: 177.6 LBS | HEART RATE: 77 BPM | SYSTOLIC BLOOD PRESSURE: 103 MMHG

## 2018-04-04 DIAGNOSIS — Z17.0 MALIGNANT NEOPLASM OF OVERLAPPING SITES OF RIGHT BREAST IN FEMALE, ESTROGEN RECEPTOR POSITIVE (H): Primary | ICD-10-CM

## 2018-04-04 DIAGNOSIS — C80.1 CARCINOMA OF UNKNOWN ORIGIN (H): ICD-10-CM

## 2018-04-04 DIAGNOSIS — K13.79 MOUTH SORES: ICD-10-CM

## 2018-04-04 DIAGNOSIS — Z95.828 PORT CATHETER IN PLACE: ICD-10-CM

## 2018-04-04 DIAGNOSIS — C50.811 MALIGNANT NEOPLASM OF OVERLAPPING SITES OF RIGHT BREAST IN FEMALE, ESTROGEN RECEPTOR POSITIVE (H): Primary | ICD-10-CM

## 2018-04-04 LAB
ALBUMIN SERPL-MCNC: 3.2 G/DL (ref 3.4–5)
ALP SERPL-CCNC: 97 U/L (ref 40–150)
ALT SERPL W P-5'-P-CCNC: 32 U/L (ref 0–50)
ANION GAP SERPL CALCULATED.3IONS-SCNC: 6 MMOL/L (ref 3–14)
AST SERPL W P-5'-P-CCNC: 20 U/L (ref 0–45)
BASOPHILS # BLD AUTO: 0 10E9/L (ref 0–0.2)
BASOPHILS NFR BLD AUTO: 0.2 %
BILIRUB SERPL-MCNC: 0.2 MG/DL (ref 0.2–1.3)
BUN SERPL-MCNC: 15 MG/DL (ref 7–30)
CALCIUM SERPL-MCNC: 8.3 MG/DL (ref 8.5–10.1)
CHLORIDE SERPL-SCNC: 106 MMOL/L (ref 94–109)
CO2 SERPL-SCNC: 26 MMOL/L (ref 20–32)
CREAT SERPL-MCNC: 0.67 MG/DL (ref 0.52–1.04)
DIFFERENTIAL METHOD BLD: ABNORMAL
EOSINOPHIL # BLD AUTO: 0 10E9/L (ref 0–0.7)
EOSINOPHIL NFR BLD AUTO: 0 %
ERYTHROCYTE [DISTWIDTH] IN BLOOD BY AUTOMATED COUNT: 15.2 % (ref 10–15)
GFR SERPL CREATININE-BSD FRML MDRD: 89 ML/MIN/1.7M2
GLUCOSE SERPL-MCNC: 116 MG/DL (ref 70–99)
HCT VFR BLD AUTO: 24.2 % (ref 35–47)
HGB BLD-MCNC: 8.2 G/DL (ref 11.7–15.7)
IMM GRANULOCYTES # BLD: 0.1 10E9/L (ref 0–0.4)
IMM GRANULOCYTES NFR BLD: 2 %
LYMPHOCYTES # BLD AUTO: 0.8 10E9/L (ref 0.8–5.3)
LYMPHOCYTES NFR BLD AUTO: 18.6 %
MCH RBC QN AUTO: 32.5 PG (ref 26.5–33)
MCHC RBC AUTO-ENTMCNC: 33.9 G/DL (ref 31.5–36.5)
MCV RBC AUTO: 96 FL (ref 78–100)
MONOCYTES # BLD AUTO: 0.6 10E9/L (ref 0–1.3)
MONOCYTES NFR BLD AUTO: 12.5 %
NEUTROPHILS # BLD AUTO: 3 10E9/L (ref 1.6–8.3)
NEUTROPHILS NFR BLD AUTO: 66.7 %
NRBC # BLD AUTO: 0 10*3/UL
NRBC BLD AUTO-RTO: 0 /100
PLATELET # BLD AUTO: 131 10E9/L (ref 150–450)
POTASSIUM SERPL-SCNC: 4 MMOL/L (ref 3.4–5.3)
PROT SERPL-MCNC: 6.1 G/DL (ref 6.8–8.8)
RBC # BLD AUTO: 2.52 10E12/L (ref 3.8–5.2)
SODIUM SERPL-SCNC: 138 MMOL/L (ref 133–144)
WBC # BLD AUTO: 4.5 10E9/L (ref 4–11)

## 2018-04-04 PROCEDURE — 85025 COMPLETE CBC W/AUTO DIFF WBC: CPT | Performed by: INTERNAL MEDICINE

## 2018-04-04 PROCEDURE — 96411 CHEMO IV PUSH ADDL DRUG: CPT

## 2018-04-04 PROCEDURE — 96413 CHEMO IV INFUSION 1 HR: CPT

## 2018-04-04 PROCEDURE — 25000128 H RX IP 250 OP 636: Performed by: INTERNAL MEDICINE

## 2018-04-04 PROCEDURE — 96375 TX/PRO/DX INJ NEW DRUG ADDON: CPT

## 2018-04-04 PROCEDURE — 96367 TX/PROPH/DG ADDL SEQ IV INF: CPT

## 2018-04-04 PROCEDURE — 96377 APPLICATON ON-BODY INJECTOR: CPT | Mod: XS

## 2018-04-04 PROCEDURE — 80053 COMPREHEN METABOLIC PANEL: CPT | Performed by: INTERNAL MEDICINE

## 2018-04-04 PROCEDURE — 99214 OFFICE O/P EST MOD 30 MIN: CPT | Performed by: INTERNAL MEDICINE

## 2018-04-04 RX ORDER — DIPHENHYDRAMINE HYDROCHLORIDE AND LIDOCAINE HYDROCHLORIDE AND ALUMINUM HYDROXIDE AND MAGNESIUM HYDRO
5-10 KIT EVERY 6 HOURS PRN
Qty: 240 ML | Refills: 1 | Status: SHIPPED | OUTPATIENT
Start: 2018-04-04 | End: 2018-05-18

## 2018-04-04 RX ORDER — ALBUTEROL SULFATE 90 UG/1
1-2 AEROSOL, METERED RESPIRATORY (INHALATION)
Status: CANCELLED
Start: 2018-04-04

## 2018-04-04 RX ORDER — EPINEPHRINE 0.3 MG/.3ML
0.3 INJECTION SUBCUTANEOUS EVERY 5 MIN PRN
Status: CANCELLED | OUTPATIENT
Start: 2018-04-04

## 2018-04-04 RX ORDER — MEPERIDINE HYDROCHLORIDE 25 MG/ML
25 INJECTION INTRAMUSCULAR; INTRAVENOUS; SUBCUTANEOUS EVERY 30 MIN PRN
Status: CANCELLED | OUTPATIENT
Start: 2018-04-04

## 2018-04-04 RX ORDER — HEPARIN SODIUM (PORCINE) LOCK FLUSH IV SOLN 100 UNIT/ML 100 UNIT/ML
500 SOLUTION INTRAVENOUS ONCE
Status: CANCELLED
Start: 2018-04-04 | End: 2018-04-04

## 2018-04-04 RX ORDER — PALONOSETRON 0.05 MG/ML
0.25 INJECTION, SOLUTION INTRAVENOUS ONCE
Status: COMPLETED | OUTPATIENT
Start: 2018-04-04 | End: 2018-04-04

## 2018-04-04 RX ORDER — PALONOSETRON 0.05 MG/ML
0.25 INJECTION, SOLUTION INTRAVENOUS ONCE
Status: CANCELLED
Start: 2018-04-04

## 2018-04-04 RX ORDER — EPINEPHRINE 1 MG/ML
0.3 INJECTION, SOLUTION INTRAMUSCULAR; SUBCUTANEOUS EVERY 5 MIN PRN
Status: CANCELLED | OUTPATIENT
Start: 2018-04-04

## 2018-04-04 RX ORDER — SODIUM CHLORIDE 9 MG/ML
1000 INJECTION, SOLUTION INTRAVENOUS CONTINUOUS PRN
Status: CANCELLED
Start: 2018-04-04

## 2018-04-04 RX ORDER — DOXORUBICIN HYDROCHLORIDE 2 MG/ML
60 INJECTION, SOLUTION INTRAVENOUS ONCE
Status: CANCELLED | OUTPATIENT
Start: 2018-04-04

## 2018-04-04 RX ORDER — ALBUTEROL SULFATE 0.83 MG/ML
2.5 SOLUTION RESPIRATORY (INHALATION)
Status: CANCELLED | OUTPATIENT
Start: 2018-04-04

## 2018-04-04 RX ORDER — HEPARIN SODIUM (PORCINE) LOCK FLUSH IV SOLN 100 UNIT/ML 100 UNIT/ML
500 SOLUTION INTRAVENOUS ONCE
Status: COMPLETED | OUTPATIENT
Start: 2018-04-04 | End: 2018-04-04

## 2018-04-04 RX ORDER — DEXAMETHASONE 4 MG/1
8 TABLET ORAL DAILY
Qty: 6 TABLET | Refills: 3 | Status: SHIPPED | OUTPATIENT
Start: 2018-04-04 | End: 2018-05-18

## 2018-04-04 RX ORDER — LORAZEPAM 2 MG/ML
0.5 INJECTION INTRAMUSCULAR EVERY 4 HOURS PRN
Status: CANCELLED
Start: 2018-04-04

## 2018-04-04 RX ORDER — METHYLPREDNISOLONE SODIUM SUCCINATE 125 MG/2ML
125 INJECTION, POWDER, LYOPHILIZED, FOR SOLUTION INTRAMUSCULAR; INTRAVENOUS
Status: CANCELLED
Start: 2018-04-04

## 2018-04-04 RX ORDER — DOXORUBICIN HYDROCHLORIDE 2 MG/ML
120 INJECTION, SOLUTION INTRAVENOUS ONCE
Status: COMPLETED | OUTPATIENT
Start: 2018-04-04 | End: 2018-04-04

## 2018-04-04 RX ORDER — DIPHENHYDRAMINE HYDROCHLORIDE 50 MG/ML
50 INJECTION INTRAMUSCULAR; INTRAVENOUS
Status: CANCELLED
Start: 2018-04-04

## 2018-04-04 RX ADMIN — PEGFILGRASTIM 6 MG: KIT SUBCUTANEOUS at 12:52

## 2018-04-04 RX ADMIN — DEXAMETHASONE SODIUM PHOSPHATE: 10 INJECTION, SOLUTION INTRAMUSCULAR; INTRAVENOUS at 11:46

## 2018-04-04 RX ADMIN — SODIUM CHLORIDE, PRESERVATIVE FREE 500 UNITS: 5 INJECTION INTRAVENOUS at 13:10

## 2018-04-04 RX ADMIN — CYCLOPHOSPHAMIDE 1200 MG: 1 INJECTION, POWDER, FOR SOLUTION INTRAVENOUS; ORAL at 12:35

## 2018-04-04 RX ADMIN — SODIUM CHLORIDE 250 ML: 9 INJECTION, SOLUTION INTRAVENOUS at 11:45

## 2018-04-04 RX ADMIN — PALONOSETRON HYDROCHLORIDE 0.25 MG: 0.25 INJECTION INTRAVENOUS at 11:45

## 2018-04-04 RX ADMIN — DOXORUBICIN HYDROCHLORIDE 120 MG: 2 INJECTION, SOLUTION INTRAVENOUS at 12:15

## 2018-04-04 ASSESSMENT — PAIN SCALES - GENERAL
PAINLEVEL: MILD PAIN (2)
PAINLEVEL: NO PAIN (0)

## 2018-04-04 NOTE — PATIENT INSTRUCTIONS
Your On-body Neulasta Injector was applied to your Right Arm at 12:55pm.  At approximately 3:55pm on 4/5/18, your On-body Injector will beep to let you know your dose delivery will begin in 2 minutes.  Your medication will be delivered over the next 45 minutes.  You can remove your Injector at 5:00pm.  Please make sure your Injector has a solid green light or has turned off prior to removing the device.  Please contact your provider at [phone] with questions or concerns.

## 2018-04-04 NOTE — MR AVS SNAPSHOT
After Visit Summary   4/4/2018    Flor Griffin    MRN: [id number]           Patient Information     Date Of Birth          1955        Visit Information        Provider Department      4/4/2018 10:30 AM  INFUSION CHAIR 12 [facility] [place]        Today's Diagnoses     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 in female, estrogen receptor positive (H)    -  1    Carcinoma of unknown origin (H)          Care Instructions    Your On-body Neulasta Injector was applied to your Right Arm at 12:55pm.  At approximately 3:55pm on 4/5/18, your On-body Injector will beep to let you know your dose delivery will begin in 2 minutes.  Your medication will be delivered over the next 45 minutes.  You can remove your Injector at 5:00pm.  Please make sure your Injector has a solid green light or has turned off prior to removing the device.  Please contact your provider at [phone] with questions or concerns.              Follow-ups after your visit        Future tests that were ordered for you today     Open Future Orders        Priority Expected Expires Ordered    PET Oncology (Eyes to Thighs) Routine  4/4/2019 4/4/2018            Who to contact     If you have questions or need follow up information about today's clinic visit or your schedule please contact [facility] directly at [phone].  Normal or non-critical lab and imaging results will be communicated to you by MyChart, letter or phone within 4 business days after the clinic has received the results. If you do not hear from us within 7 days, please contact the clinic through MyChart or phone. If you have a critical or abnormal lab result, we will notify you by phone as soon as possible.  Submit refill requests through [employer] or call your pharmacy and they will forward the refill request to us. Please allow 3 business days for your refill to be completed.           "Additional Information About Your Visit        MyChart Information     [employer] lets you send messages to your doctor, view your test results, renew your prescriptions, schedule appointments and more. To sign up, go to [email or web address] . Click on \"Log in\" on the left side of the screen, which will take you to the Welcome page. Then click on \"Sign up Now\" on the right side of the page.     You will be asked to enter the access code listed below, as well as some personal information. Please follow the directions to create your username and password.     Your access code is: [email or web address]  Expires: 2018  3:02 PM     Your access code will  in 90 days. If you need help or a new code, please call your [place] clinic or [phone].        Care EveryWhere ID     This is your Care EveryWhere ID. This could be used by other organizations to access your [place] medical records  [id number]        Your Vitals Were     Pulse Temperature Respirations Height Last Period Pulse Oximetry    65 98.1  F (36.7  C) (Oral) 20 1.676 m (5' 5.98\") 2004 (Approximate) 100%    BMI (Body Mass Index)                   28.68 kg/m2            Blood Pressure from Last 3 Encounters:   18 111/50   18 111/50   18 128/68    Weight from Last 3 Encounters:   18 80.6 kg (177 lb 9.6 oz)   18 80.6 kg (177 lb 9.6 oz)   18 81.6 kg (180 lb)              We Performed the Following     CBC with platelets differential     Comprehensive metabolic panel        Primary Care Provider Office Phone # Fax #    [facility] [phone] [address] [facility] [address]        Equal Access to Services     TRICIA JARA : Mika Nolan, carole heard, ketan norton. So [facility] [phone].    ATENCIÓN: Si habla español, tiene a thomas disposición servicios gratuitos de asistencia lingüística. Llame al " [phone].    We comply with applicable federal civil rights laws and [place] laws. We do not discriminate on the basis of race, color, national origin, age, disability, sex, sexual orientation, or gender identity.            Thank you!     Thank you for choosing [facility] AND [facility]  for your care. Our goal is always to provide you with excellent care. Hearing back from our patients is one way we can continue to improve our services. Please take a few minutes to complete the written survey that you may receive in the mail after your visit with us. Thank you!             Your Updated Medication List - Protect others around you: Learn how to safely use, store and throw away your medicines at www.disposemymeds.org.          This list is accurate as of 4/4/18 12:57 PM.  Always use your most recent med list.                   Brand Name Dispense Instructions for use Diagnosis    ADVIL PO      Take 400 mg by mouth every 6 hours as needed for moderate pain        aspirin 81 MG tablet      Take 81 mg by mouth daily        dexamethasone 4 MG tablet    DECADRON    6 tablet    Take 2 tablets (8 mg) by mouth daily for 3 days Start on Day 2.    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 in female, estrogen receptor positive (H), Carcinoma of unknown origin (H)       famotidine 20 MG tablet    PEPCID    30 tablet    Take 1 tablet (20 mg) by mouth daily    Heartburn       fluconazole 100 MG tablet    DIFLUCAN    7 tablet    Take 1 tab po q day x 7 days    Chemotherapy-induced neutropenia (H)       GLUCOSAMINE CHONDROITIN JOINT PO      Take by mouth daily        HYDROcodone-acetaminophen 5-325 MG per tablet    NORCO    6 tablet    Take 1 tablet by mouth every 6 hours as needed for moderate to severe pain    Carcinoma of unknown origin (H)       levofloxacin 500 MG tablet    LEVAQUIN    7 tablet    Take 1 tablet (500 mg) by mouth daily    Chemotherapy-induced neutropenia (H)       LORazepam  0.5 MG tablet    ATIVAN    30 tablet    Take 1 tablet (0.5 mg) by mouth every 4 hours as needed (Anxiety, Nausea/Vomiting or Sleep)    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 in female, estrogen receptor positive (H), Carcinoma of unknown origin (H)       OMEGA-3 FISH OIL PO      Take 1 g by mouth daily        prochlorperazine 10 MG tablet    COMPAZINE    30 tablet    Take 1 tablet (10 mg) by mouth every 6 hours as needed (Nausea/Vomiting)    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 in female, estrogen receptor positive (H), Carcinoma of unknown origin (H)       senna 8.6 MG tablet    SENOKOT    120 tablet    Take 1 tablet by mouth daily    Carcinoma of unknown origin (H), Constipation, unspecified constipation type       valACYclovir 1000 mg tablet    VALTREX    20 tablet    Take 1 tablet (1,000 mg) by mouth 2 times daily    Cold sore       VITAMIN B6 PO      Take by mouth daily        VITAMIN D (CHOLECALCIFEROL) PO      Take 1,000 Units by mouth daily

## 2018-04-04 NOTE — PROGRESS NOTES
Infusion Nursing Note:  Flor BARTHOLOMEW Gaby presents today for Cycle 3 Day 1 Adriamycin, Cytoxan..    Patient seen by provider today: Yes: Dr. Magana   present during visit today: Not Applicable.    Note: N/A.    Intravenous Access:  Implanted Port.    Treatment Conditions:  Lab Results   Component Value Date    HGB 8.2 04/04/2018     Lab Results   Component Value Date    WBC 4.5 04/04/2018      Lab Results   Component Value Date    ANEU 3.0 04/04/2018     Lab Results   Component Value Date     04/04/2018      Lab Results   Component Value Date     04/04/2018                   Lab Results   Component Value Date    POTASSIUM 4.0 04/04/2018           No results found for: MAG         Lab Results   Component Value Date    CR 0.67 04/04/2018                   Lab Results   Component Value Date    EDILSON 8.3 04/04/2018                Lab Results   Component Value Date    BILITOTAL 0.2 04/04/2018           Lab Results   Component Value Date    ALBUMIN 3.2 04/04/2018                    Lab Results   Component Value Date    ALT 32 04/04/2018           Lab Results   Component Value Date    AST 20 04/04/2018       Results reviewed, labs MET treatment parameters, ok to proceed with treatment.      Post Infusion Assessment:  Patient tolerated infusion without incident.  Blood return noted pre and post infusion.  Blood return noted during administration every 2 cc.  Site patent and intact, free from redness, edema or discomfort.  No evidence of extravasations.  Access discontinued per protocol.    ONPRO  Was placed on patient's: back of right arm.    Was placed at 1255 PM    ONPRO injector device Lot number: [id number]    Patient education included: what patient can expect after application, what colored lights mean on the device, when to remove device, when and where to call with questions or issues, all patients questions answered and that Neulasta administration will occur at 1555 on 4/5/18.    Patient tolerated  administration well.      Discharge Plan:   Patient declined prescription refills.  Discharge instructions reviewed with: Patient.  Patient verbalized understanding of discharge instructions and all questions answered.  Copy of AVS reviewed with patient.  Patient will return 4/18/18 for next appointment.  Patient discharged in stable condition accompanied by: self.  Departure Mode: Ambulatory.    Domenica Farah RN

## 2018-04-04 NOTE — PATIENT INSTRUCTIONS
1- Cycle 3 dose dense AC today  2- Cycle 4 on 4/23 with NP visit   Scheduled - Charity  3- See Dr. Miles first week of May   Scheduled for 5/3/18- Charity  4- See me on 5/3 with PET CT scan before the visit  PT Scheduled for 4/30/18, follow up scheduled - Charity    AVS given to patient - Charity

## 2018-04-04 NOTE — MR AVS SNAPSHOT
After Visit Summary   4/4/2018    Flor Griffin    MRN: [id number]           Patient Information     Date Of Birth          1955        Visit Information        Provider Department      4/4/2018 11:15 AM Yani Magana MD [facility]        Today's Diagnoses     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 in female, estrogen receptor positive (H)    -  1      Care Instructions    1- Cycle 3 dose dense AC today  2- Cycle 4 on 4/23 with NP visit  3- See Dr. Miles first week of May  4- See me on 5/3 with PET CT scan before the visit  Scheduled           Follow-ups after your visit        Follow-up notes from your care team     Return in about 6 months (around 10/4/2018).      Your next 10 appointments already scheduled     Apr 23, 2018  9:30 AM CDT   Level 3 with  INFUSION CHAIR 17   [facility] ([facility])    [facility]  [address]  [facility] [address]   [phone]            Apr 23, 2018 10:00 AM CDT   Return Visit with HEDY Beth CNP   [facility] ([facility])    [facility]  [address]  [facility] [address]   [phone]            Apr 30, 2018  8:30 AM CDT   (Arrive by 8:15 AM)   PE NPET ONCOLOGY (EYES TO THIGHS) with [id number]   [facility] PET CT ([facility])    [address] [facility] [address]   [phone]           Tell your doctor:   If there is any chance you may be pregnant or if you are breastfeeding.   If you have problems lying in small spaces (claustrophobia). If you do, your doctor may give you medicine to help you relax. If you have diabetes:   Have your exam early in the morning. Your blood glucose will go up as the day goes by.   Your glucose level must be 180 or less at the start of the exam. Please take any medicines you need to ensure this blood glucose level. 24  "hours before your scan: Don t do any heavy exercise. (No jogging, aerobics or other workouts.) Exercise will make your pictures less accurate. 6 hours before your scan:   Stop all food and liquids (except water).   Do not chew gum or suck on mints.   If you need to take medicine with food, you may take it with a few crackers.  Please call your Imaging Department at your exam site with any questions.            May 03, 2018 10:30 AM CDT   Return Visit with Yani Magana MD   [facility] ([facility])    [facility] [place]  [address]  [place] [address]   [phone]              Future tests that were ordered for you today     Open Future Orders        Priority Expected Expires Ordered    PET Oncology (Eyes to Thighs) Routine  4/4/2019 4/4/2018            Who to contact     If you have questions or need follow up information about today's clinic visit or your schedule please contact [facility] directly at [phone].  Normal or non-critical lab and imaging results will be communicated to you by Spotsetterhart, letter or phone within 4 business days after the clinic has received the results. If you do not hear from us within 7 days, please contact the clinic through Biodesixt or phone. If you have a critical or abnormal lab result, we will notify you by phone as soon as possible.  Submit refill requests through [employer] or call your pharmacy and they will forward the refill request to us. Please allow 3 business days for your refill to be completed.          Additional Information About Your Visit        Emp[employer] Information     [employer]ica lets you send messages to your doctor, view your test results, renew your prescriptions, schedule appointments and more. To sign up, go to [email or web address] . Click on \"Log in\" on the left side of the screen, which will take you to the Welcome page. Then click on \"Sign up Now\" on the right side of the page.     You will " be asked to enter the access code listed below, as well as some personal information. Please follow the directions to create your username and password.     Your access code is: [email or web address]  Expires: 2018  3:02 PM     Your access code will  in 90 days. If you need help or a new code, please call your [facility] or [phone].        Care EveryWhere ID     This is your Care EveryWhere ID. This could be used by other organizations to access your [place] medical records  [id number]        Your Vitals Were     Pulse Temperature Respirations Last Period Pulse Oximetry BMI (Body Mass Index)    65 98.1  F (36.7  C) (Oral) 20 2004 (Approximate) 100% 28.68 kg/m2       Blood Pressure from Last 3 Encounters:   18 111/50   18 103/51   18 128/68    Weight from Last 3 Encounters:   18 80.6 kg (177 lb 9.6 oz)   18 80.6 kg (177 lb 9.6 oz)   18 81.6 kg (180 lb)               Primary Care Provider Office Phone # Fax #    [facility] [phone]       [address] [facility] [address]        Equal Access to Services     MARYJANE JARA AH: Hadii nelida sono Sodot, waaxda luqadaha, qaybta kaalmada adeegyada, ketan connor. So [facility] [phone].    ATENCIÓN: Si habla español, tiene a thomas disposición servicios gratuitos de asistencia lingüística. ame al [phone].    We comply with applicable federal civil rights laws and [place] laws. We do not discriminate on the basis of race, color, national origin, age, disability, sex, sexual orientation, or gender identity.            Thank you!     Thank you for choosing [facility]  for your care. Our goal is always to provide you with excellent care. Hearing back from our patients is one way we can continue to improve our services. Please take a few minutes to complete the written survey that you may receive in the mail after your visit with us.  Thank you!             Your Updated Medication List - Protect others around you: Learn how to safely use, store and throw away your medicines at www.disposemymeds.org.          This list is accurate as of 4/4/18  1:38 PM.  Always use your most recent med list.                   Brand Name Dispense Instructions for use Diagnosis    ADVIL PO      Take 400 mg by mouth every 6 hours as needed for moderate pain        aspirin 81 MG tablet      Take 81 mg by mouth daily        dexamethasone 4 MG tablet    DECADRON    6 tablet    Take 2 tablets (8 mg) by mouth daily for 3 days Start on Day 2.    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 in female, estrogen receptor positive (H), Carcinoma of unknown origin (H)       famotidine 20 MG tablet    PEPCID    30 tablet    Take 1 tablet (20 mg) by mouth daily    Heartburn       fluconazole 100 MG tablet    DIFLUCAN    7 tablet    Take 1 tab po q day x 7 days    Chemotherapy-induced neutropenia (H)       GLUCOSAMINE CHONDROITIN JOINT PO      Take by mouth daily        HYDROcodone-acetaminophen 5-325 MG per tablet    NORCO    6 tablet    Take 1 tablet by mouth every 6 hours as needed for moderate to severe pain    Carcinoma of unknown origin (H)       levofloxacin 500 MG tablet    LEVAQUIN    7 tablet    Take 1 tablet (500 mg) by mouth daily    Chemotherapy-induced neutropenia (H)       LORazepam 0.5 MG tablet    ATIVAN    30 tablet    Take 1 tablet (0.5 mg) by mouth every 4 hours as needed (Anxiety, Nausea/Vomiting or Sleep)    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 in female, estrogen receptor positive (H), Carcinoma of unknown origin (H)       OMEGA-3 FISH OIL PO      Take 1 g by mouth daily        prochlorperazine 10 MG tablet    COMPAZINE    30 tablet    Take 1 tablet (10 mg) by mouth every 6 hours as needed (Nausea/Vomiting)    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 in female, estrogen receptor positive (H), Carcinoma of unknown  origin (H)       senna 8.6 MG tablet    SENOKOT    120 tablet    Take 1 tablet by mouth daily    Carcinoma of unknown origin (H), Constipation, unspecified constipation type       valACYclovir 1000 mg tablet    VALTREX    20 tablet    Take 1 tablet (1,000 mg) by mouth 2 times daily    Cold sore       VITAMIN B6 PO      Take by mouth daily        VITAMIN D (CHOLECALCIFEROL) PO      Take 1,000 Units by mouth daily

## 2018-04-04 NOTE — PROGRESS NOTES
"Oncology Rooming Note    April 4, 2018 11:07 AM   Flor Griffin is a 62 year old female who presents for:    Chief Complaint   Patient presents with     Oncology Clinic Visit     Initial Vitals: /50 (BP Location: Left arm)  Pulse 65  Temp 98.1  F (36.7  C) (Oral)  Resp 20  Wt 80.6 kg (177 lb 9.6 oz)  LMP 01/01/2004 (Approximate)  SpO2 100%  BMI 28.68 kg/m2 Estimated body mass index is 28.68 kg/(m^2) as calculated from the following:    Height as of an earlier encounter on 4/4/18: 1.676 m (5' 5.98\").    Weight as of this encounter: 80.6 kg (177 lb 9.6 oz). Body surface area is 1.94 meters squared.  Mild Pain (2) Comment: mouth sores   Patient's last menstrual period was 01/01/2004 (approximate).  Allergies reviewed: Yes  Medications reviewed: Yes    Medications: Medication refills not needed today.  Pharmacy [facility] entered into [employer]: [id number] - [facility] [id number]    Clinical concerns: None     2 minutes for nursing intake (face to face time)     Mary Jo James CMA              "

## 2018-04-04 NOTE — PROGRESS NOTES
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  Initially she was seen 11/22/2017 after she had the right supraclavicular lymph node biopsy which was positive for poorly differentiated adenocarcinoma.  She had this lymph node almost a month and had an excisional biopsy performed which was consistent with the diagnosis of cancer.      A pet CT scan 11/27/2017 showed hypermetabolic right supraclavicular, right axillary and subpectoral adenopathy.  Otherwise, no distant metastatic disease.     Mammogram 11/30/17- negative    TREATMENT: Carboplatin and Taxol completed 4 cycles. 3/7/2018 started dose dense AC.      SUBJECTIVE:  The patient was seen as a followup today. She has been feeling tired and have mouth sores. She is planning for a vacation after this cycle of chemotherapy.     REVIEW OF SYSTEMS:  A complete review of systems was performed and found to be negative other than pertinent positives mentioned in history of present illness.      Past medical, social histories reviewed.     Meds- Reviewed.     PHYSICAL EXAMINATION:   VITAL SIGNS: /50 (BP Location: Left arm)  Pulse 65  Temp 98.1  F (36.7  C) (Oral)  Resp 20  Wt 80.6 kg (177 lb 9.6 oz)  LMP 01/01/2004 (Approximate)  SpO2 100%  BMI 28.68 kg/m2  CONSTITUTIONAL: Appears tired.   HEENT: Pupils are equal. Mucositis.   NECK: No cervical or supraclavicular lymphadenopathy.   RESPIRATORY: Clear bilaterally.   CARD/VASC: S1, S2, regular.   GI: Soft, nontender, nondistended, no hepatosplenomegaly.   MUSKULOSKELETAL: Warm, well perfused.   NEUROLOGIC: Alert, awake.   INTEGUMENT: No rash.   LYMPHATICS: No edema.   PSYCH: Mood and affect was normal.     LABORATORY DATA AND IMAGING REVIEWED DURING THIS VISIT:  Recent Labs   Lab Test  03/26/18   0710  03/19/18   0838   NA  139  140   POTASSIUM  3.4  4.0   CHLORIDE   103  107   CO2  30  28   ANIONGAP  6  5   BUN  21  12   CR  0.64  0.59   GLC  113*  94   EDILSON  8.2*  9.0     Recent Labs   Lab Test  04/04/18   1036  03/26/18   0710  03/21/18   1320   WBC  4.5  4.1  5.1   HGB  8.2*  10.3*  9.6*   PLT  131*  151  152   MCV  96  99  97   NEUTROPHIL  66.7  67.0  69.0     Recent Labs   Lab Test  03/26/18   0710  03/19/18   0838  03/15/18   1300   BILITOTAL  0.5  0.2  0.6   ALKPHOS  137  116  116   ALT  26  34  37   AST  12  15  13   ALBUMIN  3.4  3.3*  3.5        ECOG performance status 1     ASSESSMENT AND PLAN:  This is a 62-year-old lady who has diagnosis of 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  Tumor cells were positive for CK7, CK5/6 and GATA3.  We did tumor marker testing and she had CA 27-29 at the level of 12,  of 16, CA 19-9 at 19.  Her CEA was less than 0.5.      PET CT scan results was done 11/27/2017.  showed hypermetabolic right supraclavicular, right axillary and subpectoral adenopathy.  There was no evidence of distant metastases.      The distribution of adenopathy indicates possibility breast cancer which has metastasized to the regional lymph nodes and we are not able to find a primary on physical exam and a PET scan.     Mammogram 11/30/17 no evidence of primary. Her-2 non amplified. Androgen receptor 10-20%. ER 1-5%. PA < 1%.   ENT examination by Dr. Miller was negative. EGD was negative as well. MRI breast 12/20/17 no evidence of malignancy in the breasts.    She started chemotherapy carboplatin/Taxol. Completed 4 cycles. PET Scan 2/20/18 showed good response to treatment.   She will complete dose dense AC for 4 cycles.     - Labs were reviewed with the patient, normocytic anemia and thrombocytopenia related to chemotherapy. The labs are within the parameters.   - She will start magic mouth wash for mucositis related to chemotherapy.     PLAN:   1- Cycle 3 dose dense AC today  2- Cycle 4 on 4/23 with NP  visit  3- See Dr. Miles first week of May  4- See me on 5/3 with PET CT scan before the visit    ERON BARNES MD    4/4/2018       cc: Waylon Liu MD

## 2018-04-04 NOTE — LETTER
"    4/4/2018         RE: Flor Griffin  [address] [facility] [address]        Dear Colleague,    Thank you for referring your patient, Flor Griffin, to the [facility]. Please see a copy of my visit note below.    Oncology Rooming Note    April 4, 2018 11:07 AM   Flor Griffin is a 62 year old female who presents for:    Chief Complaint   Patient presents with     Oncology Clinic Visit     Initial Vitals: /50 (BP Location: Left arm)  Pulse 65  Temp 98.1  F (36.7  C) (Oral)  Resp 20  Wt 80.6 kg (177 lb 9.6 oz)  LMP 01/01/2004 (Approximate)  SpO2 100%  BMI 28.68 kg/m2 Estimated body mass index is 28.68 kg/(m^2) as calculated from the following:    Height as of an earlier encounter on 4/4/18: 1.676 m (5' 5.98\").    Weight as of this encounter: 80.6 kg (177 lb 9.6 oz). Body surface area is 1.94 meters squared.  Mild Pain (2) Comment: mouth sores   Patient's last menstrual period was 01/01/2004 (approximate).  Allergies reviewed: Yes  Medications reviewed: Yes    Medications: Medication refills not needed today.  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  Initially she was seen 11/22/2017 after she had the right supraclavicular lymph node biopsy which was positive for poorly differentiated adenocarcinoma.  She had this lymph node almost a month and had an excisional biopsy performed which was consistent with the diagnosis of cancer.      A pet CT scan 11/27/2017 showed hypermetabolic right supraclavicular, right axillary and subpectoral adenopathy.  Otherwise, no " distant metastatic disease.     Mammogram 11/30/17- negative    TREATMENT: Carboplatin and Taxol completed 4 cycles. 3/7/2018 started dose dense AC.      SUBJECTIVE:  The patient was seen as a followup today. She has been feeling tired and have mouth sores. She is planning for a vacation after this cycle of chemotherapy.     REVIEW OF SYSTEMS:  A complete review of systems was performed and found to be negative other than pertinent positives mentioned in history of present illness.      Past medical, social histories reviewed.     Meds- Reviewed.     PHYSICAL EXAMINATION:   VITAL SIGNS: /50 (BP Location: Left arm)  Pulse 65  Temp 98.1  F (36.7  C) (Oral)  Resp 20  Wt 80.6 kg (177 lb 9.6 oz)  LMP 01/01/2004 (Approximate)  SpO2 100%  BMI 28.68 kg/m2  CONSTITUTIONAL: Appears tired.   HEENT: Pupils are equal. Mucositis.   NECK: No cervical or supraclavicular lymphadenopathy.   RESPIRATORY: Clear bilaterally.   CARD/VASC: S1, S2, regular.   GI: Soft, nontender, nondistended, no hepatosplenomegaly.   MUSKULOSKELETAL: Warm, well perfused.   NEUROLOGIC: Alert, awake.   INTEGUMENT: No rash.   LYMPHATICS: No edema.   PSYCH: Mood and affect was normal.     LABORATORY DATA AND IMAGING REVIEWED DURING THIS VISIT:  Recent Labs   Lab Test  03/26/18   0710  03/19/18   0838   NA  139  140   POTASSIUM  3.4  4.0   CHLORIDE  103  107   CO2  30  28   ANIONGAP  6  5   BUN  21  12   CR  0.64  0.59   GLC  113*  94   EDILSON  8.2*  9.0     Recent Labs   Lab Test  04/04/18   1036  03/26/18   0710  03/21/18   1320   WBC  4.5  4.1  5.1   HGB  8.2*  10.3*  9.6*   PLT  131*  151  152   MCV  96  99  97   NEUTROPHIL  66.7  67.0  69.0     Recent Labs   Lab Test  03/26/18   0710  03/19/18   0838  03/15/18   1300   BILITOTAL  0.5  0.2  0.6   ALKPHOS  137  116  116   ALT  26  34  37   AST  12  15  13   ALBUMIN  3.4  3.3*  3.5        ECOG performance status 1     ASSESSMENT AND PLAN:  This is a 62-year-old lady who has diagnosis of poorly  differentiated adenocarcinoma on right supraclavicular lymph node excisional biopsy.  Tumor cells were positive for CK7, CK5/6 and GATA3.  We did tumor marker testing and she had CA 27-29 at the level of 12,  of 16, CA 19-9 at 19.  Her CEA was less than 0.5.      PET CT scan results was done 11/27/2017.  showed hypermetabolic right supraclavicular, right axillary and subpectoral adenopathy.  There was no evidence of distant metastases.      The distribution of adenopathy indicates possibility breast cancer which has metastasized to the regional lymph nodes and we are not able to find a primary on physical exam and a PET scan.     Mammogram 11/30/17 no evidence of primary. Her-2 non amplified. Androgen receptor 10-20%. ER 1-5%. NM < 1%.   ENT examination by Dr. Miller was negative. EGD was negative as well. MRI breast 12/20/17 no evidence of malignancy in the breasts.    She started chemotherapy carboplatin/Taxol. Completed 4 cycles. PET Scan 2/20/18 showed good response to treatment.   She will complete dose dense AC for 4 cycles.     - Labs were reviewed with the patient, normocytic anemia and thrombocytopenia related to chemotherapy. The labs are within the parameters.   - She will start magic mouth wash for mucositis related to chemotherapy.     PLAN:   1- Cycle 3 dose dense AC today  2- Cycle 4 on 4/23 with NP visit  3- See Dr. Miles first week of May  4- See me on 5/3 with PET CT scan before the visit    YANI MAGANA MD    4/4/2018       cc: Waylon Liu MD       Again, thank you for allowing me to participate in the care of your patient.        Sincerely,        Yani Magana MD

## 2018-04-06 RX ORDER — ALBUTEROL SULFATE 0.83 MG/ML
2.5 SOLUTION RESPIRATORY (INHALATION)
Status: CANCELLED | OUTPATIENT
Start: 2018-04-23

## 2018-04-06 RX ORDER — MEPERIDINE HYDROCHLORIDE 25 MG/ML
25 INJECTION INTRAMUSCULAR; INTRAVENOUS; SUBCUTANEOUS EVERY 30 MIN PRN
Status: CANCELLED | OUTPATIENT
Start: 2018-04-23

## 2018-04-06 RX ORDER — SODIUM CHLORIDE 9 MG/ML
1000 INJECTION, SOLUTION INTRAVENOUS CONTINUOUS PRN
Status: CANCELLED
Start: 2018-04-23

## 2018-04-06 RX ORDER — LORAZEPAM 2 MG/ML
0.5 INJECTION INTRAMUSCULAR EVERY 4 HOURS PRN
Status: CANCELLED
Start: 2018-04-23

## 2018-04-06 RX ORDER — EPINEPHRINE 1 MG/ML
0.3 INJECTION, SOLUTION, CONCENTRATE INTRAVENOUS EVERY 5 MIN PRN
Status: CANCELLED | OUTPATIENT
Start: 2018-04-23

## 2018-04-06 RX ORDER — DIPHENHYDRAMINE HYDROCHLORIDE 50 MG/ML
50 INJECTION INTRAMUSCULAR; INTRAVENOUS
Status: CANCELLED
Start: 2018-04-23

## 2018-04-06 RX ORDER — EPINEPHRINE 0.3 MG/.3ML
0.3 INJECTION SUBCUTANEOUS EVERY 5 MIN PRN
Status: CANCELLED | OUTPATIENT
Start: 2018-04-23

## 2018-04-06 RX ORDER — PALONOSETRON 0.05 MG/ML
0.25 INJECTION, SOLUTION INTRAVENOUS ONCE
Status: CANCELLED
Start: 2018-04-23

## 2018-04-06 RX ORDER — METHYLPREDNISOLONE SODIUM SUCCINATE 125 MG/2ML
125 INJECTION, POWDER, LYOPHILIZED, FOR SOLUTION INTRAMUSCULAR; INTRAVENOUS
Status: CANCELLED
Start: 2018-04-23

## 2018-04-06 RX ORDER — DOXORUBICIN HYDROCHLORIDE 2 MG/ML
60 INJECTION, SOLUTION INTRAVENOUS ONCE
Status: CANCELLED | OUTPATIENT
Start: 2018-04-23

## 2018-04-06 RX ORDER — ALBUTEROL SULFATE 90 UG/1
1-2 AEROSOL, METERED RESPIRATORY (INHALATION)
Status: CANCELLED
Start: 2018-04-23

## 2018-04-09 ENCOUNTER — INFUSION THERAPY VISIT (OUTPATIENT)
Dept: INFUSION THERAPY | Facility: CLINIC | Age: 63
End: 2018-04-09
Attending: INTERNAL MEDICINE
Payer: COMMERCIAL

## 2018-04-09 ENCOUNTER — ONCOLOGY VISIT (OUTPATIENT)
Dept: ONCOLOGY | Facility: CLINIC | Age: 63
End: 2018-04-09
Attending: NURSE PRACTITIONER
Payer: COMMERCIAL

## 2018-04-09 ENCOUNTER — TELEPHONE (OUTPATIENT)
Dept: ONCOLOGY | Facility: CLINIC | Age: 63
End: 2018-04-09

## 2018-04-09 ENCOUNTER — HOSPITAL ENCOUNTER (OUTPATIENT)
Facility: CLINIC | Age: 63
Setting detail: SPECIMEN
Discharge: HOME | End: 2018-04-09
Attending: NURSE PRACTITIONER | Admitting: NURSE PRACTITIONER
Payer: COMMERCIAL

## 2018-04-09 VITALS
SYSTOLIC BLOOD PRESSURE: 104 MMHG | TEMPERATURE: 98.2 F | BODY MASS INDEX: 27.45 KG/M2 | DIASTOLIC BLOOD PRESSURE: 69 MMHG | OXYGEN SATURATION: 98 % | HEART RATE: 84 BPM | RESPIRATION RATE: 12 BRPM | WEIGHT: 170 LBS

## 2018-04-09 VITALS — HEART RATE: 63 BPM | DIASTOLIC BLOOD PRESSURE: 71 MMHG | SYSTOLIC BLOOD PRESSURE: 120 MMHG

## 2018-04-09 DIAGNOSIS — C50.811 MALIGNANT NEOPLASM OF OVERLAPPING SITES OF RIGHT BREAST IN FEMALE, ESTROGEN RECEPTOR POSITIVE (H): ICD-10-CM

## 2018-04-09 DIAGNOSIS — R11.0 NAUSEA: ICD-10-CM

## 2018-04-09 DIAGNOSIS — C80.1 CARCINOMA OF UNKNOWN ORIGIN (H): Primary | ICD-10-CM

## 2018-04-09 DIAGNOSIS — C80.1 CARCINOMA OF UNKNOWN ORIGIN (H): ICD-10-CM

## 2018-04-09 DIAGNOSIS — Z95.828 PORT CATHETER IN PLACE: Primary | ICD-10-CM

## 2018-04-09 DIAGNOSIS — Z17.0 MALIGNANT NEOPLASM OF OVERLAPPING SITES OF RIGHT BREAST IN FEMALE, ESTROGEN RECEPTOR POSITIVE (H): ICD-10-CM

## 2018-04-09 DIAGNOSIS — Z95.828 PORT CATHETER IN PLACE: ICD-10-CM

## 2018-04-09 LAB
ALBUMIN SERPL-MCNC: 3.6 G/DL (ref 3.4–5)
ALP SERPL-CCNC: 134 U/L (ref 40–150)
ALT SERPL W P-5'-P-CCNC: 30 U/L (ref 0–50)
ANION GAP SERPL CALCULATED.3IONS-SCNC: 7 MMOL/L (ref 3–14)
AST SERPL W P-5'-P-CCNC: 21 U/L (ref 0–45)
BASOPHILS # BLD AUTO: 0 10E9/L (ref 0–0.2)
BASOPHILS NFR BLD AUTO: 0 %
BILIRUB SERPL-MCNC: 0.6 MG/DL (ref 0.2–1.3)
BUN SERPL-MCNC: 15 MG/DL (ref 7–30)
CALCIUM SERPL-MCNC: 8.9 MG/DL (ref 8.5–10.1)
CHLORIDE SERPL-SCNC: 102 MMOL/L (ref 94–109)
CO2 SERPL-SCNC: 28 MMOL/L (ref 20–32)
CREAT SERPL-MCNC: 0.59 MG/DL (ref 0.52–1.04)
DIFFERENTIAL METHOD BLD: ABNORMAL
DOHLE BOD BLD QL SMEAR: PRESENT
EOSINOPHIL # BLD AUTO: 0 10E9/L (ref 0–0.7)
EOSINOPHIL NFR BLD AUTO: 0 %
ERYTHROCYTE [DISTWIDTH] IN BLOOD BY AUTOMATED COUNT: 15.4 % (ref 10–15)
GFR SERPL CREATININE-BSD FRML MDRD: >90 ML/MIN/1.7M2
GLUCOSE SERPL-MCNC: 104 MG/DL (ref 70–99)
HCT VFR BLD AUTO: 25.6 % (ref 35–47)
HGB BLD-MCNC: 8.9 G/DL (ref 11.7–15.7)
LYMPHOCYTES # BLD AUTO: 0.3 10E9/L (ref 0.8–5.3)
LYMPHOCYTES NFR BLD AUTO: 17 %
MCH RBC QN AUTO: 33.5 PG (ref 26.5–33)
MCHC RBC AUTO-ENTMCNC: 34.8 G/DL (ref 31.5–36.5)
MCV RBC AUTO: 96 FL (ref 78–100)
MONOCYTES # BLD AUTO: 0 10E9/L (ref 0–1.3)
MONOCYTES NFR BLD AUTO: 0 %
NEUTROPHILS # BLD AUTO: 1.4 10E9/L (ref 1.6–8.3)
NEUTROPHILS NFR BLD AUTO: 83 %
NEUTS HYPERSEG BLD QL SMEAR: PRESENT
PLATELET # BLD AUTO: 146 10E9/L (ref 150–450)
PLATELET # BLD EST: ABNORMAL 10*3/UL
POTASSIUM SERPL-SCNC: 4 MMOL/L (ref 3.4–5.3)
PROT SERPL-MCNC: 6.7 G/DL (ref 6.8–8.8)
RBC # BLD AUTO: 2.66 10E12/L (ref 3.8–5.2)
RBC MORPH BLD: NORMAL
SODIUM SERPL-SCNC: 137 MMOL/L (ref 133–144)
TOXIC GRANULES BLD QL SMEAR: PRESENT
WBC # BLD AUTO: 1.7 10E9/L (ref 4–11)

## 2018-04-09 PROCEDURE — G0463 HOSPITAL OUTPT CLINIC VISIT: HCPCS | Mod: 25

## 2018-04-09 PROCEDURE — 80053 COMPREHEN METABOLIC PANEL: CPT | Performed by: NURSE PRACTITIONER

## 2018-04-09 PROCEDURE — 99214 OFFICE O/P EST MOD 30 MIN: CPT | Performed by: NURSE PRACTITIONER

## 2018-04-09 PROCEDURE — 25000128 H RX IP 250 OP 636: Performed by: NURSE PRACTITIONER

## 2018-04-09 PROCEDURE — 85025 COMPLETE CBC W/AUTO DIFF WBC: CPT | Performed by: NURSE PRACTITIONER

## 2018-04-09 PROCEDURE — 96361 HYDRATE IV INFUSION ADD-ON: CPT

## 2018-04-09 PROCEDURE — 96374 THER/PROPH/DIAG INJ IV PUSH: CPT

## 2018-04-09 PROCEDURE — 25000128 H RX IP 250 OP 636: Performed by: INTERNAL MEDICINE

## 2018-04-09 RX ORDER — ONDANSETRON 2 MG/ML
8 INJECTION INTRAMUSCULAR; INTRAVENOUS EVERY 6 HOURS PRN
Status: CANCELLED
Start: 2018-04-09

## 2018-04-09 RX ORDER — DEXAMETHASONE 4 MG/1
4 TABLET ORAL 2 TIMES DAILY WITH MEALS
Qty: 3 TABLET | Refills: 0 | Status: SHIPPED | OUTPATIENT
Start: 2018-04-09 | End: 2018-04-12

## 2018-04-09 RX ORDER — HEPARIN SODIUM (PORCINE) LOCK FLUSH IV SOLN 100 UNIT/ML 100 UNIT/ML
500 SOLUTION INTRAVENOUS ONCE
Status: COMPLETED | OUTPATIENT
Start: 2018-04-09 | End: 2018-04-09

## 2018-04-09 RX ORDER — HEPARIN SODIUM (PORCINE) LOCK FLUSH IV SOLN 100 UNIT/ML 100 UNIT/ML
500 SOLUTION INTRAVENOUS ONCE
Status: CANCELLED
Start: 2018-04-09 | End: 2018-04-09

## 2018-04-09 RX ORDER — OLANZAPINE 5 MG/1
TABLET ORAL
Qty: 30 TABLET | Refills: 0 | Status: SHIPPED | OUTPATIENT
Start: 2018-04-09 | End: 2018-05-18

## 2018-04-09 RX ORDER — ONDANSETRON 2 MG/ML
8 INJECTION INTRAMUSCULAR; INTRAVENOUS EVERY 6 HOURS PRN
Status: DISCONTINUED | OUTPATIENT
Start: 2018-04-09 | End: 2018-04-09 | Stop reason: HOSPADM

## 2018-04-09 RX ADMIN — SODIUM CHLORIDE 1000 ML: 9 INJECTION, SOLUTION INTRAVENOUS at 09:57

## 2018-04-09 RX ADMIN — ONDANSETRON HYDROCHLORIDE 8 MG: 2 INJECTION, SOLUTION INTRAVENOUS at 10:20

## 2018-04-09 RX ADMIN — SODIUM CHLORIDE, PRESERVATIVE FREE 500 UNITS: 5 INJECTION INTRAVENOUS at 11:07

## 2018-04-09 ASSESSMENT — PAIN SCALES - GENERAL: PAINLEVEL: NO PAIN (0)

## 2018-04-09 NOTE — MR AVS SNAPSHOT
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 ([facility])    [facility]  [address]  [place] [address]   [phone]            Apr 13, 2018  1:00 PM CDT   Level 2 with  INFUSION CHAIR 4   [facility] ([facility])    [facility]  [address]  [place] [address]   [phone]            Apr 23, 2018  9:30 AM CDT   Level 3 with  INFUSION CHAIR 17   [facility] ([facility])    [facility]  [address]  [facility] [address]   [phone]            Apr 23, 2018 10:00 AM CDT   Return Visit with HEDY Beth CNP   [facility] ([facility])    [facility]  [address]  [facility] [address]   [phone]            Apr 30, 2018  8:30 AM CDT   (Arrive by 8:15 AM)   PE NPET ONCOLOGY (EYES TO THIGHS) with SHPETM1   [facility] PET CT ([facility])    [address] [facility] [address] [phone]           Tell your doctor:   If there is any chance you may be pregnant or if you are breastfeeding.   If you have problems lying in small spaces (claustrophobia). If  you do, your doctor may give you medicine to help you relax. If you have diabetes:   Have your exam early in the morning. Your blood glucose will go up as the day goes by.   Your glucose level must be 180 or less at the start of the exam. Please take any medicines you need to ensure this blood glucose level. 24 hours before your scan: Don t do any heavy exercise. (No jogging, aerobics or other workouts.) Exercise will make your pictures less accurate. 6 hours before your scan:   Stop all food and liquids (except water).   Do not chew gum or suck on mints.   If you need to take medicine with food, you may take it with a few crackers.  Please call your Imaging Department at your exam site with any questions.            May 03, 2018 10:30 AM CDT   Return Visit with Yani Magana MD   [facility] ([facility])    [facility]  [address] [facility] [address]  [facility] [address] [phone]              Who to contact     If you have questions or need follow up information about today's clinic visit or your schedule please contact [facility] AND [facility] directly at [phone].  Normal or non-critical lab and imaging results will be communicated to you by MyChart, letter or phone within 4 business days after the clinic has received the results. If you do not hear from us within 7 days, please contact the clinic through MyChart or phone. If you have a critical or abnormal lab result, we will notify you by phone as soon as possible.  Submit refill requests through [employer] or call your pharmacy and they will forward the refill request to us. Please allow 3 business days for your refill to be completed.          Additional Information About Your Visit        Debitos Information     [employer] lets you send messages to your doctor, view your test results, renew your prescriptions, schedule appointments and more. To sign up, go to [email or web address] . Click on  "\"Log in\" on the left side of the screen, which will take you to the Welcome page. Then click on \"Sign up Now\" on the right side of the page.     You will be asked to enter the access code listed below, as well as some personal information. Please follow the directions to create your username and password.     Your access code is: [email or web address]  Expires: 2018  3:02 PM     Your access code will  in 90 days. If you need help or a new code, please call your [facility] clinic or [phone].        Care EveryWhere ID     This is your Care EveryWhere ID. This could be used by other organizations to access your [facility] medical records  [id number]        Your Vitals Were     Pulse Last Period                63 2004 (Approximate)           Blood Pressure from Last 3 Encounters:   18 104/69   18 120/71   18 111/50    Weight from Last 3 Encounters:   18 77.1 kg (170 lb)   18 80.6 kg (177 lb 9.6 oz)   18 80.6 kg (177 lb 9.6 oz)              We Performed the Following     Comprehensive metabolic panel          Today's Medication Changes          These changes are accurate as of 18 11:10 AM.  If you have any questions, ask your nurse or doctor.               Start taking these medicines.        Dose/Directions    OLANZapine 5 MG tablet   Commonly known as:  zyPREXA   Used for:  Nausea   Started by:  Buster Menon APRN CNP        Take 1 tablet (5 mg) orally at bedtime   Quantity:  30 tablet   Refills:  0         These medicines have changed or have updated prescriptions.        Dose/Directions    * dexamethasone 4 MG tablet   Commonly known as:  DECADRON   This may have changed:  Another medication with the same name was added. Make sure you understand how and when to take each.   Used for:  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 in female, estrogen receptor positive (H), Carcinoma of unknown origin (H)   Changed by:  Buster Menon APRN CNP        Dose:  8 mg "   Take 2 tablets (8 mg) by mouth daily Start on Day 2.   Quantity:  6 tablet   Refills:  3       * dexamethasone 4 MG tablet   Commonly known as:  DECADRON   This may have changed:  You were already taking a medication with the same name, and this prescription was added. Make sure you understand how and when to take each.   Used for:  Carcinoma of unknown origin (H)   Changed by:  Buster Menon APRN CNP        Dose:  4 mg   Take 1 tablet (4 mg) by mouth 2 times daily (with meals) for 3 days   Quantity:  3 tablet   Refills:  0       * Notice:  This list has 2 medication(s) that are the same as other medications prescribed for you. Read the directions carefully, and ask your doctor or other care provider to review them with you.      Stop taking these medicines if you haven't already. Please contact your care team if you have questions.     fluconazole 100 MG tablet   Commonly known as:  DIFLUCAN   Stopped by:  Buster Menon APRN CNP           levofloxacin 500 MG tablet   Commonly known as:  LEVAQUIN   Stopped by:  Buster Menon APRN CNP                Where to get your medicines      These medications were sent to [facility] [address] [place] [address] [id number]  [address], [facility] [address]     Phone:  [phone]     dexamethasone 4 MG tablet    OLANZapine 5 MG tablet                Primary Care Provider Office Phone # Fax #    [facility] [phone] [address] [facility] [address]        Equal Access to Services     TRICIA JARA AH: Mika sono Sodot, waaxda luqadaha, qaybta kaalmada adeegyada, ketan connor. So [facility] [phone].    ATENCIÓN: Si habla español, tiene a thomas disposición servicios gratuitos de asistencia lingüística. Llame al [phone].    We comply with applicable federal civil rights laws and [place] laws. We do not discriminate on the basis of  race, color, national origin, age, disability, sex, sexual orientation, or gender identity.            Thank you!     Thank you for choosing [facility]  for your care. Our goal is always to provide you with excellent care. Hearing back from our patients is one way we can continue to improve our services. Please take a few minutes to complete the written survey that you may receive in the mail after your visit with us. Thank you!             Your Updated Medication List - Protect others around you: Learn how to safely use, store and throw away your medicines at www.disposemymeds.org.          This list is accurate as of 4/9/18 11:10 AM.  Always use your most recent med list.                   Brand Name Dispense Instructions for use Diagnosis    ADVIL PO      Take 400 mg by mouth every 6 hours as needed for moderate pain        aspirin 81 MG tablet      Take 81 mg by mouth daily        * dexamethasone 4 MG tablet    DECADRON    6 tablet    Take 2 tablets (8 mg) by mouth daily Start on Day 2.    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 in female, estrogen receptor positive (H), Carcinoma of unknown origin (H)       * dexamethasone 4 MG tablet    DECADRON    3 tablet    Take 1 tablet (4 mg) by mouth 2 times daily (with meals) for 3 days    Carcinoma of unknown origin (H)       famotidine 20 MG tablet    PEPCID    30 tablet    Take 1 tablet (20 mg) by mouth daily    Heartburn       GLUCOSAMINE CHONDROITIN JOINT PO      Take by mouth daily        HYDROcodone-acetaminophen 5-325 MG per tablet    NORCO    6 tablet    Take 1 tablet by mouth every 6 hours as needed for moderate to severe pain    Carcinoma of unknown origin (H)       LORazepam 0.5 MG tablet    ATIVAN    30 tablet    Take 1 tablet (0.5 mg) by mouth every 4 hours as needed (Anxiety, Nausea/Vomiting or Sleep)    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 in female, estrogen receptor positive (H),  Carcinoma of unknown origin (H)       magic mouthwash suspension (diphenhydrAMINE, lidocaine, aluminum-magnesium & simethicone) Susp compounding kit     240 mL    Swish and swallow 5-10 mLs in mouth every 6 hours as needed for mouth sores    Mouth sores       OLANZapine 5 MG tablet    zyPREXA    30 tablet    Take 1 tablet (5 mg) orally at bedtime    Nausea       OMEGA-3 FISH OIL PO      Take 1 g by mouth daily        prochlorperazine 10 MG tablet    COMPAZINE    30 tablet    Take 1 tablet (10 mg) by mouth every 6 hours as needed (Nausea/Vomiting)    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 in female, estrogen receptor positive (H), Carcinoma of unknown origin (H)       senna 8.6 MG tablet    SENOKOT    120 tablet    Take 1 tablet by mouth daily    Carcinoma of unknown origin (H), Constipation, unspecified constipation type       valACYclovir 1000 mg tablet    VALTREX    20 tablet    Take 1 tablet (1,000 mg) by mouth 2 times daily    Cold sore       VITAMIN B6 PO      Take by mouth daily        VITAMIN D (CHOLECALCIFEROL) PO      Take 1,000 Units by mouth daily        * Notice:  This list has 2 medication(s) that are the same as other medications prescribed for you. Read the directions carefully, and ask your doctor or other care provider to review them with you.

## 2018-04-09 NOTE — PROGRESS NOTES
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  Otherwise, no distant metastatic disease.   Mammogram 11/30/17- negative   TREATMENT: Carboplatin and Taxol completed 4 cycles. 3/7/2018 started dose dense AC.     Patient comes here for follow-up of nausea    Interval History:  Nausea started on Saturday.  She had the one episode of vomiting last night.  She takes Zofran and she took Ativan last night.  Ativan helped with nausea, she denies abdominal pain.  He feels constipated.  She had a small bowel movements last night.  She takes Senokot she tolerates p.o. and she tries to drink 1-2 bottles of water a day.  She eats small frequent meals.She denies fever chills or sweats denies cough denies shortness of breath.  She feels a little weak this morning but overall she feels okay she takes small short walks throughout the day.    Review of Systems:  14 point ROS of systems including Constitutional, Eyes, Respiratory, Cardiovascular, Gastroenterology, Genitourinary, Integumentary, Muscularskeletal, Psychiatric were all negative except for pertinent positives noted in my HPI.      Current Outpatient Prescriptions   Medication Sig Dispense Refill     dexamethasone (DECADRON) 4 MG tablet Take 1 tablet (4 mg) by mouth 2 times daily (with meals) for 3 days 3 tablet 0     OLANZapine (ZYPREXA) 5 MG tablet Take 1 tablet (5 mg) orally at bedtime 30 tablet 0     magic mouthwash (FIRST-MOUTHWASH BLM) suspension Swish and swallow 5-10 mLs in mouth every 6 hours as needed for mouth sores 240 mL 1     dexamethasone (DECADRON) 4 MG tablet Take 2 tablets (8 mg) by mouth daily Start on Day 2. 6 tablet 3     senna (SENOKOT) 8.6 MG tablet  Take 1 tablet by mouth daily 120 tablet 1     famotidine (PEPCID) 20 MG tablet Take 1 tablet (20 mg) by mouth daily 30 tablet 1     LORazepam (ATIVAN) 0.5 MG tablet Take 1 tablet (0.5 mg) by mouth every 4 hours as needed (Anxiety, Nausea/Vomiting or Sleep) 30 tablet 3     prochlorperazine (COMPAZINE) 10 MG tablet Take 1 tablet (10 mg) by mouth every 6 hours as needed (Nausea/Vomiting) 30 tablet 3     HYDROcodone-acetaminophen (NORCO) 5-325 MG per tablet Take 1 tablet by mouth every 6 hours as needed for moderate to severe pain 6 tablet 0     Pyridoxine HCl (VITAMIN B6 PO) Take by mouth daily       Glucos-Chondroit-Hyaluron-MSM (GLUCOSAMINE CHONDROITIN JOINT PO) Take by mouth daily       Ibuprofen (ADVIL PO) Take 400 mg by mouth every 6 hours as needed for moderate pain       valACYclovir (VALTREX) 1000 mg tablet Take 1 tablet (1,000 mg) by mouth 2 times daily 20 tablet 0     VITAMIN D, CHOLECALCIFEROL, PO Take 1,000 Units by mouth daily       Omega-3 Fatty Acids (OMEGA-3 FISH OIL PO) Take 1 g by mouth daily       aspirin 81 MG tablet Take 81 mg by mouth daily         Physical Examination:  General: The patient is a pleasant female in no acute distress.  /69 (BP Location: Left arm, Patient Position: Chair, Cuff Size: Adult Regular)  Pulse 84  Temp 98.2  F (36.8  C) (Oral)  Resp 12  Wt 77.1 kg (170 lb)  LMP 01/01/2004 (Approximate)  SpO2 98%  BMI 27.45 kg/m2  Wt Readings from Last 10 Encounters:   04/09/18 77.1 kg (170 lb)   04/04/18 80.6 kg (177 lb 9.6 oz)   04/04/18 80.6 kg (177 lb 9.6 oz)   03/21/18 81.6 kg (180 lb)   03/21/18 81.6 kg (180 lb)   03/19/18 80.3 kg (177 lb)   03/15/18 80.6 kg (177 lb 9.6 oz)   03/07/18 81 kg (178 lb 9.6 oz)   03/07/18 81 kg (178 lb 9.6 oz)   02/21/18 80.3 kg (177 lb)     HEENT: EOMI, PERRL. Sclerae are anicteric. Oral mucosa is pink and moist with no lesions or thrush.   Lymph: Neck is supple with no lymphadenopathy in the cervical or supraclavicular areas.   Heart:  Regular rate and rhythm.   Lungs: Clear to auscultation bilaterally.   GI: Bowel sounds present, soft, nontender with no palpable hepatosplenomegaly or masses.   Extremities: No lower extremity edema noted bilaterally.   Skin: No rashes, petechiae, or bruising noted on exposed skin.    Laboratory Data:  Results for ALMA MASON (MRN [id number]) as of 4/9/2018 11:59   Ref. Range 4/9/2018 09:55   Sodium Latest Ref Range: 133 - 144 mmol/L 137   Potassium Latest Ref Range: 3.4 - 5.3 mmol/L 4.0   Chloride Latest Ref Range: 94 - 109 mmol/L 102   Carbon Dioxide Latest Ref Range: 20 - 32 mmol/L 28   Urea Nitrogen Latest Ref Range: 7 - 30 mg/dL 15   Creatinine Latest Ref Range: 0.52 - 1.04 mg/dL 0.59   GFR Estimate Latest Ref Range: >60 mL/min/1.7m2 >90   GFR Estimate If Black Latest Ref Range: >60 mL/min/1.7m2 >90   Calcium Latest Ref Range: 8.5 - 10.1 mg/dL 8.9   Anion Gap Latest Ref Range: 3 - 14 mmol/L 7   Albumin Latest Ref Range: 3.4 - 5.0 g/dL 3.6   Protein Total Latest Ref Range: 6.8 - 8.8 g/dL 6.7 (L)   Bilirubin Total Latest Ref Range: 0.2 - 1.3 mg/dL 0.6   Alkaline Phosphatase Latest Ref Range: 40 - 150 U/L 134   ALT Latest Ref Range: 0 - 50 U/L 30   AST Latest Ref Range: 0 - 45 U/L 21   Glucose Latest Ref Range: 70 - 99 mg/dL 104 (H)   WBC Latest Ref Range: 4.0 - 11.0 10e9/L 1.7 (L)   Hemoglobin Latest Ref Range: 11.7 - 15.7 g/dL 8.9 (L)   Hematocrit Latest Ref Range: 35.0 - 47.0 % 25.6 (L)   Platelet Count Latest Ref Range: 150 - 450 10e9/L 146 (L)   RBC Count Latest Ref Range: 3.8 - 5.2 10e12/L 2.66 (L)   MCV Latest Ref Range: 78 - 100 fl 96   MCH Latest Ref Range: 26.5 - 33.0 pg 33.5 (H)   MCHC Latest Ref Range: 31.5 - 36.5 g/dL 34.8   RDW Latest Ref Range: 10.0 - 15.0 % 15.4 (H)   Diff Method Unknown Manual Differential   % Neutrophils Latest Units: % 83.0   % Lymphocytes Latest Units: % 17.0   % Monocytes Latest Units: % 0.0   % Eosinophils Latest Units: % 0.0   % Basophils Latest Units: % 0.0    Absolute Neutrophil Latest Ref Range: 1.6 - 8.3 10e9/L 1.4 (L)   Absolute Lymphocytes Latest Ref Range: 0.8 - 5.3 10e9/L 0.3 (L)   Absolute Monocytes Latest Ref Range: 0.0 - 1.3 10e9/L 0.0   Absolute Eosinophils Latest Ref Range: 0.0 - 0.7 10e9/L 0.0   Absolute Basophils Latest Ref Range: 0.0 - 0.2 10e9/L 0.0   RBC Morphology Unknown Normal   Toxic Granulation Unknown Present   Dohle Bodies Unknown Present   Hyper Segmented PMNs Unknown Present   Platelet Estimate Unknown Automated count c...       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  PET scan from 11/2017hypermetabolic right supraclavicular, right axillary and subpectoral adenopathy.  There was no evidence of distant metastases.  One possibility is breast cancer which has metastasized to the regional lymph nodes.  However, we are unable to find primary cancer on physical exam and a PET scan mammogram was negative.  MR of the breast I is negative.She is status post 4 cycles of carboplatin Taxol with PET CT scan on February 2018 showing good response.  She is currently receiving dose dense AC for 4 cycles she completed 2 cycles of the chemo on March 21.  He is scheduled in 2 weeks for cycle 3.  And follow-up with me prior cycle 3.She is scheduled for PET CT scan on April 30 and she will see Dr. Magana May 3.    Neutropenia-secondary to chemotherapy  WBC 1.7 ANC 1.4   She denies fever chills or sweats no signs or symptoms of infection.  Neutropenic precautions discussed .  In the event of fever chills or sweats callwe will monitor CBC closely  Recheck CBC on Wednesday     Nausea vomiting secondary to chemotherapy.  Continue with Zofran and Ativan as needed nausea.  I will add dexamethasone 4 mg p.o. ×3 days  Start Zyprexa 5 mg p.o. q. at bedtime  I will schedule her for normal saline hydration today on Wednesday and on Friday.  She prefers that hydration be scheduled at [facility].  Call if  worsening of symptoms    Constipation secondary to Zofran and chemo  She will continue docusate.  I asked her to start taking MiraLAX p.o. Daily.  I discussed also prune-juice and high-fiber foods.  Call if worsening of symptoms.    Patient is asked to call in the event of fever chills sweats worsening of nausea vomiting or constipation.  Patient is asked to call if any changes in health condition      HEDY Beth CNP  Hem/Onc   [facility]

## 2018-04-09 NOTE — PROGRESS NOTES
"Oncology Rooming Note    April 9, 2018 9:30 AM   Flor Griffin is a 62 year old female who presents for:    Chief Complaint   Patient presents with     Oncology Clinic Visit     Initial Vitals: /69 (BP Location: Left arm, Patient Position: Chair, Cuff Size: Adult Regular)  Pulse 84  Temp 98.2  F (36.8  C) (Oral)  Resp 12  Wt 77.1 kg (170 lb)  LMP 01/01/2004 (Approximate)  SpO2 98%  BMI 27.45 kg/m2 Estimated body mass index is 27.45 kg/(m^2) as calculated from the following:    Height as of 4/4/18: 1.676 m (5' 5.98\").    Weight as of this encounter: 77.1 kg (170 lb). Body surface area is 1.89 meters squared.  No Pain (0) Comment: Data Unavailable   Patient's last menstrual period was 01/01/2004 (approximate).  Allergies reviewed: Yes  Medications reviewed: Yes    Medications: Medication refills not needed today.  Pharmacy name entered into [employer]: [id number] [id number]    Clinical concerns: None     5 minutes for nursing intake (face to face time)     Mary Jo James CMA              "

## 2018-04-09 NOTE — MR AVS SNAPSHOT
After Visit Summary   4/9/2018    Flor Griffin    MRN: [id number]           Patient Information     Date Of Birth          1955        Visit Information        Provider Department      4/9/2018 9:00 AM Buster Menon APRN CNP [facility]        Today's Diagnoses     Carcinoma of unknown origin (H)    -  1    Nausea        Port catheter in place        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 in female, estrogen receptor positive (H)          Care Instructions    CBC and CMP today   Star miralax    Start dexamethasone x 3 days   Start zyprexa 5 mgpo q hs ( for nausea)     Schedule for hydration on Wed and Friday at [facility]                       Follow-ups after your visit        Your next 10 appointments already scheduled     Apr 11, 2018  2:30 PM CDT   Level 2 with  INFUSION CHAIR 4   [facility] ([facility])    [facility]  [address] [place] [address]  [address]  [facility] [address]   [phone]            Apr 13, 2018  1:00 PM CDT   Level 2 with  INFUSION CHAIR 4   [facility] ([facility])    [facility]  [address] [place] [address]  [address]  [facility] [address]   [phone]            Apr 23, 2018  9:30 AM CDT   Level 3 with  INFUSION CHAIR 17   [facility] ([facility])    [facility]  [address]  [facility] [address]   [phone]            Apr 23, 2018 10:00 AM CDT   Return Visit with HEDY Beth CNP   [facility] ([facility])    [facility]  [address]  [facility] [address]   [phone]            Apr 30, 2018  8:30 AM CDT   (Arrive by 8:15 AM)   PE NPET ONCOLOGY (EYES TO THIGHS) with [id number]   [facility] PET CT ([facility])    [address] [facility]  [place] [address] [phone]           Tell your doctor:   If there is any chance you may be pregnant or if you are breastfeeding.   If you have problems lying in small spaces (claustrophobia). If you do, your doctor may give you medicine to help you relax. If you have diabetes:   Have your exam early in the morning. Your blood glucose will go up as the day goes by.   Your glucose level must be 180 or less at the start of the exam. Please take any medicines you need to ensure this blood glucose level. 24 hours before your scan: Don t do any heavy exercise. (No jogging, aerobics or other workouts.) Exercise will make your pictures less accurate. 6 hours before your scan:   Stop all food and liquids (except water).   Do not chew gum or suck on mints.   If you need to take medicine with food, you may take it with a few crackers.  Please call your Imaging Department at your exam site with any questions.            May 03, 2018 10:30 AM CDT   Return Visit with Yani Magana MD   [facility] ([facility])    [facility]  [address]  [facility] [address] [phone]              Who to contact     If you have questions or need follow up information about today's clinic visit or your schedule please contact [facility] directly at [phone].  Normal or non-critical lab and imaging results will be communicated to you by MyChart, letter or phone within 4 business days after the clinic has received the results. If you do not hear from us within 7 days, please contact the clinic through MyChart or phone. If you have a critical or abnormal lab result, we will notify you by phone as soon as possible.  Submit refill requests through [employer] or call your pharmacy and they will forward the refill request to us. Please allow 3 business days for your refill to be completed.          Additional Information About Your Visit        MyChart Information      "[employer] lets you send messages to your doctor, view your test results, renew your prescriptions, schedule appointments and more. To sign up, go to [email or web address] . Click on \"Log in\" on the left side of the screen, which will take you to the Welcome page. Then click on \"Sign up Now\" on the right side of the page.     You will be asked to enter the access code listed below, as well as some personal information. Please follow the directions to create your username and password.     Your access code is: [email or web address]  Expires: 2018  3:02 PM     Your access code will  in 90 days. If you need help or a new code, please call your [place] clinic or [phone].        Care EveryWhere ID     This is your Care EveryWhere ID. This could be used by other organizations to access your [facility] medical records  [id number]        Your Vitals Were     Pulse Temperature Respirations Last Period Pulse Oximetry BMI (Body Mass Index)    84 98.2  F (36.8  C) (Oral) 12 2004 (Approximate) 98% 27.45 kg/m2       Blood Pressure from Last 3 Encounters:   18 104/69   18 111/50   18 103/51    Weight from Last 3 Encounters:   18 77.1 kg (170 lb)   18 80.6 kg (177 lb 9.6 oz)   18 80.6 kg (177 lb 9.6 oz)              We Performed the Following     CBC with platelets and differential          Today's Medication Changes          These changes are accurate as of 18 10:13 AM.  If you have any questions, ask your nurse or doctor.               Start taking these medicines.        Dose/Directions    OLANZapine 5 MG tablet   Commonly known as:  zyPREXA   Used for:  Nausea   Started by:  Buster Menon APRN CNP        Take 1 tablet (5 mg) orally at bedtime   Quantity:  30 tablet   Refills:  0         These medicines have changed or have updated prescriptions.        Dose/Directions    * dexamethasone 4 MG tablet   Commonly known as:  DECADRON   This may have changed:  Another medication " with the same name was added. Make sure you understand how and when to take each.   Used for:  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 in female, estrogen receptor positive (H), Carcinoma of unknown origin (H)   Changed by:  Buster Menon APRN CNP        Dose:  8 mg   Take 2 tablets (8 mg) by mouth daily Start on Day 2.   Quantity:  6 tablet   Refills:  3       * dexamethasone 4 MG tablet   Commonly known as:  DECADRON   This may have changed:  You were already taking a medication with the same name, and this prescription was added. Make sure you understand how and when to take each.   Used for:  Carcinoma of unknown origin (H)   Changed by:  Buster Menon APRN CNP        Dose:  4 mg   Take 1 tablet (4 mg) by mouth 2 times daily (with meals) for 3 days   Quantity:  3 tablet   Refills:  0       * Notice:  This list has 2 medication(s) that are the same as other medications prescribed for you. Read the directions carefully, and ask your doctor or other care provider to review them with you.      Stop taking these medicines if you haven't already. Please contact your care team if you have questions.     fluconazole 100 MG tablet   Commonly known as:  DIFLUCAN   Stopped by:  Buster Menon APRN CNP           levofloxacin 500 MG tablet   Commonly known as:  LEVAQUIN   Stopped by:  Buster Menon APRN CNP                Where to get your medicines      These medications were sent to [facility] [id number] [place] [address] [id number]  [address], [facility] [address]     Phone:  [phone]     dexamethasone 4 MG tablet    OLANZapine 5 MG tablet                Primary Care Provider Office Phone # Fax #    [facility] [phone] [address] [facility] [address]        Equal Access to Services     [facility] ESTEFANI AH: Mika Nolan, pernell dolan waxay idiin hayaan  kelsea shipmanaachirag ah. So [facility] [phone].    ATENCIÓN: Si lane le, tiene a thomas disposición servicios gratuitos de asistencia lingüística. Pino al [phone].    We comply with applicable federal civil rights laws and [place] laws. We do not discriminate on the basis of race, color, national origin, age, disability, sex, sexual orientation, or gender identity.            Thank you!     Thank you for choosing [facility]  for your care. Our goal is always to provide you with excellent care. Hearing back from our patients is one way we can continue to improve our services. Please take a few minutes to complete the written survey that you may receive in the mail after your visit with us. Thank you!             Your Updated Medication List - Protect others around you: Learn how to safely use, store and throw away your medicines at www.disposemymeds.org.          This list is accurate as of 4/9/18 10:13 AM.  Always use your most recent med list.                   Brand Name Dispense Instructions for use Diagnosis    ADVIL PO      Take 400 mg by mouth every 6 hours as needed for moderate pain        aspirin 81 MG tablet      Take 81 mg by mouth daily        * dexamethasone 4 MG tablet    DECADRON    6 tablet    Take 2 tablets (8 mg) by mouth daily Start on Day 2.    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 in female, estrogen receptor positive (H), Carcinoma of unknown origin (H)       * dexamethasone 4 MG tablet    DECADRON    3 tablet    Take 1 tablet (4 mg) by mouth 2 times daily (with meals) for 3 days    Carcinoma of unknown origin (H)       famotidine 20 MG tablet    PEPCID    30 tablet    Take 1 tablet (20 mg) by mouth daily    Heartburn       GLUCOSAMINE CHONDROITIN JOINT PO      Take by mouth daily        HYDROcodone-acetaminophen 5-325 MG per tablet    NORCO    6 tablet    Take 1 tablet by mouth every 6 hours as needed for moderate to severe pain    Carcinoma of unknown  origin (H)       LORazepam 0.5 MG tablet    ATIVAN    30 tablet    Take 1 tablet (0.5 mg) by mouth every 4 hours as needed (Anxiety, Nausea/Vomiting or Sleep)    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 in female, estrogen receptor positive (H), Carcinoma of unknown origin (H)       magic mouthwash suspension (diphenhydrAMINE, lidocaine, aluminum-magnesium & simethicone) Susp compounding kit     240 mL    Swish and swallow 5-10 mLs in mouth every 6 hours as needed for mouth sores    Mouth sores       OLANZapine 5 MG tablet    zyPREXA    30 tablet    Take 1 tablet (5 mg) orally at bedtime    Nausea       OMEGA-3 FISH OIL PO      Take 1 g by mouth daily        prochlorperazine 10 MG tablet    COMPAZINE    30 tablet    Take 1 tablet (10 mg) by mouth every 6 hours as needed (Nausea/Vomiting)    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 in female, estrogen receptor positive (H), Carcinoma of unknown origin (H)       senna 8.6 MG tablet    SENOKOT    120 tablet    Take 1 tablet by mouth daily    Carcinoma of unknown origin (H), Constipation, unspecified constipation type       valACYclovir 1000 mg tablet    VALTREX    20 tablet    Take 1 tablet (1,000 mg) by mouth 2 times daily    Cold sore       VITAMIN B6 PO      Take by mouth daily        VITAMIN D (CHOLECALCIFEROL) PO      Take 1,000 Units by mouth daily        * Notice:  This list has 2 medication(s) that are the same as other medications prescribed for you. Read the directions carefully, and ask your doctor or other care provider to review them with you.

## 2018-04-09 NOTE — PATIENT INSTRUCTIONS
CBC and CMP today   Star miralax    Start dexamethasone x 3 days   Start zyprexa 5 mgpo q hs ( for nausea)     Schedule for hydration on Wed and Friday at [facility] Scheduled/janice    Please schedule for lab CBC on wed at [facility]       AVs printed & given to patient/janice

## 2018-04-09 NOTE — TELEPHONE ENCOUNTER
I received a call from patient's daughter Emmie. Patient has been nauseated with no relief from antiemetics, diarrhea, and poor oral intake for the last 4 days. Emmie stated that patient 's urine has been concentrated as well.    I added patient to Buster's and Infusion schedule. Buster to place lab and infusion orders.    Will route message to Dr. Magana and Vanessa BERGERON RN

## 2018-04-09 NOTE — LETTER
"    4/9/2018         RE: Flor Griffin  [address]  [facility] [address]        Dear Colleague,    Thank you for referring your patient, Flor Griffin, to the [facility]. Please see a copy of my visit note below.    Oncology Rooming Note    April 9, 2018 9:30 AM   Flor Griffin is a 62 year old female who presents for:    Chief Complaint   Patient presents with     Oncology Clinic Visit     Initial Vitals: /69 (BP Location: Left arm, Patient Position: Chair, Cuff Size: Adult Regular)  Pulse 84  Temp 98.2  F (36.8  C) (Oral)  Resp 12  Wt 77.1 kg (170 lb)  LMP 01/01/2004 (Approximate)  SpO2 98%  BMI 27.45 kg/m2 Estimated body mass index is 27.45 kg/(m^2) as calculated from the following:    Height as of 4/4/18: 1.676 m (5' 5.98\").    Weight as of this encounter: 77.1 kg (170 lb). Body surface area is 1.89 meters squared.  No Pain (0) Comment: Data Unavailable   Patient's last menstrual period was 01/01/2004 (approximate).  Allergies reviewed: Yes  Medications reviewed: Yes    Medications: Medication refills not needed today.  Pharmacy name entered into tibdit: 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  Otherwise, no distant metastatic disease.   Mammogram 11/30/17- negative   TREATMENT: Carboplatin and Taxol completed 4 cycles. 3/7/2018 started dose dense AC.     Patient comes here for follow-up of nausea    Interval History:  Nausea started on " Saturday.  She had the one episode of vomiting last night.  She takes Zofran and she took Ativan last night.  Ativan helped with nausea, she denies abdominal pain.  He feels constipated.  She had a small bowel movements last night.  She takes Senokot she tolerates p.o. and she tries to drink 1-2 bottles of water a day.  She eats small frequent meals.She denies fever chills or sweats denies cough denies shortness of breath.  She feels a little weak this morning but overall she feels okay she takes small short walks throughout the day.    Review of Systems:  14 point ROS of systems including Constitutional, Eyes, Respiratory, Cardiovascular, Gastroenterology, Genitourinary, Integumentary, Muscularskeletal, Psychiatric were all negative except for pertinent positives noted in my HPI.      Current Outpatient Prescriptions   Medication Sig Dispense Refill     dexamethasone (DECADRON) 4 MG tablet Take 1 tablet (4 mg) by mouth 2 times daily (with meals) for 3 days 3 tablet 0     OLANZapine (ZYPREXA) 5 MG tablet Take 1 tablet (5 mg) orally at bedtime 30 tablet 0     magic mouthwash (FIRST-MOUTHWASH BLM) suspension Swish and swallow 5-10 mLs in mouth every 6 hours as needed for mouth sores 240 mL 1     dexamethasone (DECADRON) 4 MG tablet Take 2 tablets (8 mg) by mouth daily Start on Day 2. 6 tablet 3     senna (SENOKOT) 8.6 MG tablet Take 1 tablet by mouth daily 120 tablet 1     famotidine (PEPCID) 20 MG tablet Take 1 tablet (20 mg) by mouth daily 30 tablet 1     LORazepam (ATIVAN) 0.5 MG tablet Take 1 tablet (0.5 mg) by mouth every 4 hours as needed (Anxiety, Nausea/Vomiting or Sleep) 30 tablet 3     prochlorperazine (COMPAZINE) 10 MG tablet Take 1 tablet (10 mg) by mouth every 6 hours as needed (Nausea/Vomiting) 30 tablet 3     HYDROcodone-acetaminophen (NORCO) 5-325 MG per tablet Take 1 tablet by mouth every 6 hours as needed for moderate to severe pain 6 tablet 0     Pyridoxine HCl (VITAMIN B6 PO) Take by mouth daily        Glucos-Chondroit-Hyaluron-MSM (GLUCOSAMINE CHONDROITIN JOINT PO) Take by mouth daily       Ibuprofen (ADVIL PO) Take 400 mg by mouth every 6 hours as needed for moderate pain       valACYclovir (VALTREX) 1000 mg tablet Take 1 tablet (1,000 mg) by mouth 2 times daily 20 tablet 0     VITAMIN D, CHOLECALCIFEROL, PO Take 1,000 Units by mouth daily       Omega-3 Fatty Acids (OMEGA-3 FISH OIL PO) Take 1 g by mouth daily       aspirin 81 MG tablet Take 81 mg by mouth daily         Physical Examination:  General: The patient is a pleasant female in no acute distress.  /69 (BP Location: Left arm, Patient Position: Chair, Cuff Size: Adult Regular)  Pulse 84  Temp 98.2  F (36.8  C) (Oral)  Resp 12  Wt 77.1 kg (170 lb)  LMP 01/01/2004 (Approximate)  SpO2 98%  BMI 27.45 kg/m2  Wt Readings from Last 10 Encounters:   04/09/18 77.1 kg (170 lb)   04/04/18 80.6 kg (177 lb 9.6 oz)   04/04/18 80.6 kg (177 lb 9.6 oz)   03/21/18 81.6 kg (180 lb)   03/21/18 81.6 kg (180 lb)   03/19/18 80.3 kg (177 lb)   03/15/18 80.6 kg (177 lb 9.6 oz)   03/07/18 81 kg (178 lb 9.6 oz)   03/07/18 81 kg (178 lb 9.6 oz)   02/21/18 80.3 kg (177 lb)     HEENT: EOMI, PERRL. Sclerae are anicteric. Oral mucosa is pink and moist with no lesions or thrush.   Lymph: Neck is supple with no lymphadenopathy in the cervical or supraclavicular areas.   Heart: Regular rate and rhythm.   Lungs: Clear to auscultation bilaterally.   GI: Bowel sounds present, soft, nontender with no palpable hepatosplenomegaly or masses.   Extremities: No lower extremity edema noted bilaterally.   Skin: No rashes, petechiae, or bruising noted on exposed skin.    Laboratory Data:  Results for ALMA MASON (MRN [id number]) as of 4/9/2018 11:59   Ref. Range 4/9/2018 09:55   Sodium Latest Ref Range: 133 - 144 mmol/L 137   Potassium Latest Ref Range: 3.4 - 5.3 mmol/L 4.0   Chloride Latest Ref Range: 94 - 109 mmol/L 102   Carbon Dioxide Latest Ref Range: 20 - 32 mmol/L 28    Urea Nitrogen Latest Ref Range: 7 - 30 mg/dL 15   Creatinine Latest Ref Range: 0.52 - 1.04 mg/dL 0.59   GFR Estimate Latest Ref Range: >60 mL/min/1.7m2 >90   GFR Estimate If Black Latest Ref Range: >60 mL/min/1.7m2 >90   Calcium Latest Ref Range: 8.5 - 10.1 mg/dL 8.9   Anion Gap Latest Ref Range: 3 - 14 mmol/L 7   Albumin Latest Ref Range: 3.4 - 5.0 g/dL 3.6   Protein Total Latest Ref Range: 6.8 - 8.8 g/dL 6.7 (L)   Bilirubin Total Latest Ref Range: 0.2 - 1.3 mg/dL 0.6   Alkaline Phosphatase Latest Ref Range: 40 - 150 U/L 134   ALT Latest Ref Range: 0 - 50 U/L 30   AST Latest Ref Range: 0 - 45 U/L 21   Glucose Latest Ref Range: 70 - 99 mg/dL 104 (H)   WBC Latest Ref Range: 4.0 - 11.0 10e9/L 1.7 (L)   Hemoglobin Latest Ref Range: 11.7 - 15.7 g/dL 8.9 (L)   Hematocrit Latest Ref Range: 35.0 - 47.0 % 25.6 (L)   Platelet Count Latest Ref Range: 150 - 450 10e9/L 146 (L)   RBC Count Latest Ref Range: 3.8 - 5.2 10e12/L 2.66 (L)   MCV Latest Ref Range: 78 - 100 fl 96   MCH Latest Ref Range: 26.5 - 33.0 pg 33.5 (H)   MCHC Latest Ref Range: 31.5 - 36.5 g/dL 34.8   RDW Latest Ref Range: 10.0 - 15.0 % 15.4 (H)   Diff Method Unknown Manual Differential   % Neutrophils Latest Units: % 83.0   % Lymphocytes Latest Units: % 17.0   % Monocytes Latest Units: % 0.0   % Eosinophils Latest Units: % 0.0   % Basophils Latest Units: % 0.0   Absolute Neutrophil Latest Ref Range: 1.6 - 8.3 10e9/L 1.4 (L)   Absolute Lymphocytes Latest Ref Range: 0.8 - 5.3 10e9/L 0.3 (L)   Absolute Monocytes Latest Ref Range: 0.0 - 1.3 10e9/L 0.0   Absolute Eosinophils Latest Ref Range: 0.0 - 0.7 10e9/L 0.0   Absolute Basophils Latest Ref Range: 0.0 - 0.2 10e9/L 0.0   RBC Morphology Unknown Normal   Toxic Granulation Unknown Present   Dohle Bodies Unknown Present   Hyper Segmented PMNs Unknown Present   Platelet Estimate Unknown Automated count c...       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  PET scan from 11/2017hypermetabolic right supraclavicular, right axillary and subpectoral adenopathy.  There was no evidence of distant metastases.  One possibility is breast cancer which has metastasized to the regional lymph nodes.  However, we are unable to find primary cancer on physical exam and a PET scan mammogram was negative.  MR of the breast I is negative.She is status post 4 cycles of carboplatin Taxol with PET CT scan on February 2018 showing good response.  She is currently receiving dose dense AC for 4 cycles she completed 2 cycles of the chemo on March 21.  He is scheduled in 2 weeks for cycle 3.  And follow-up with me prior cycle 3.She is scheduled for PET CT scan on April 30 and she will see Dr. Magana May 3.    Neutropenia-secondary to chemotherapy  WBC 1.7 ANC 1.4   She denies fever chills or sweats no signs or symptoms of infection.  Neutropenic precautions discussed .  In the event of fever chills or sweats callwe will monitor CBC closely  Recheck CBC on Wednesday     Nausea vomiting secondary to chemotherapy.  Continue with Zofran and Ativan as needed nausea.  I will add dexamethasone 4 mg p.o. ×3 days  Start Zyprexa 5 mg p.o. q. at bedtime  I will schedule her for normal saline hydration today on Wednesday and on Friday.  She prefers that hydration be scheduled at [facility].  Call if worsening of symptoms    Constipation secondary to Zofran and chemo  She will continue docusate.  I asked her to start taking MiraLAX p.o. Daily.  I discussed also prune-juice and high-fiber foods.  Call if worsening of symptoms.    Patient is asked to call in the event of fever chills sweats worsening of nausea vomiting or constipation.  Patient is asked to call if any changes in health condition      HEDY Beth CNP  Hem/Onc   [facility]               Again, thank you for allowing me to participate in the care of your patient.        Sincerely,        HEDY Beth  CNP

## 2018-04-09 NOTE — PROGRESS NOTES
Infusion Nursing Note:  Flor Griffin presents today for IV fluids and Zofran.    Patient seen by provider today: Yes: QUYEN Millard   present during visit today: Not Applicable.    Note: Patient states over the weekend she felt very nauseated and weak. Each cycle of chemo therapy has gotten progressively worse as far N/V.    Intravenous Access:  Labs drawn without difficulty.  Implanted Port.    Treatment Conditions:  Not Applicable.    Post Infusion Assessment:  Patient tolerated infusion without incident.  Blood return noted pre and post infusion.  Site patent and intact, free from redness, edema or discomfort.  No evidence of extravasations.  Access discontinued per protocol.    Discharge Plan:   QUYEN Goins sent prescription to [facility] in [place] for Dexamethasone and Zyprexa.  Discharge instructions reviewed with: Patient and Family.  Patient and/or family verbalized understanding of discharge instructions and all questions answered.  Copy of AVS reviewed with patient and/or family.  Patient will return 4/11/2018 to [facility] for next appointment.  Patient discharged in stable condition accompanied by: self and .  Departure Mode: Ambulatory.    Amber Mckeon RN

## 2018-04-10 ENCOUNTER — TELEPHONE (OUTPATIENT)
Dept: ONCOLOGY | Facility: CLINIC | Age: 63
End: 2018-04-10

## 2018-04-10 NOTE — TELEPHONE ENCOUNTER
Call received from Michelle's daughter Emmie about constipation and rectal irritation.  She is taking Miralax and senna-s .  Emmie stated she was going to the store to get anusol and witch hazel pads for her mothers rectal irritation.  Advised her to have her mom stay hydrated and to update us if the above has not worked.    Message left on [facility] cell phone to return my call, multiple attempts made to home phone line that has been busy.  Checking on her mouth sores mentioned at last weeks appointment, magic mouth wash sent for this, reviewed salt and baking soda mouth rinses with her.

## 2018-04-11 ENCOUNTER — INFUSION THERAPY VISIT (OUTPATIENT)
Dept: INFUSION THERAPY | Facility: CLINIC | Age: 63
End: 2018-04-11
Attending: INTERNAL MEDICINE
Payer: COMMERCIAL

## 2018-04-11 ENCOUNTER — TELEPHONE (OUTPATIENT)
Dept: ONCOLOGY | Facility: CLINIC | Age: 63
End: 2018-04-11

## 2018-04-11 ENCOUNTER — ONCOLOGY VISIT (OUTPATIENT)
Dept: ONCOLOGY | Facility: CLINIC | Age: 63
End: 2018-04-11
Attending: NURSE PRACTITIONER
Payer: COMMERCIAL

## 2018-04-11 VITALS
TEMPERATURE: 98.8 F | HEART RATE: 68 BPM | RESPIRATION RATE: 16 BRPM | DIASTOLIC BLOOD PRESSURE: 76 MMHG | SYSTOLIC BLOOD PRESSURE: 127 MMHG

## 2018-04-11 DIAGNOSIS — R11.0 NAUSEA: ICD-10-CM

## 2018-04-11 DIAGNOSIS — D70.1 CHEMOTHERAPY-INDUCED NEUTROPENIA (H): ICD-10-CM

## 2018-04-11 DIAGNOSIS — T45.1X5A CHEMOTHERAPY-INDUCED NEUTROPENIA (H): ICD-10-CM

## 2018-04-11 DIAGNOSIS — Z95.828 PORT CATHETER IN PLACE: Primary | ICD-10-CM

## 2018-04-11 DIAGNOSIS — C80.1 CARCINOMA OF UNKNOWN ORIGIN (H): ICD-10-CM

## 2018-04-11 DIAGNOSIS — Z17.0 MALIGNANT NEOPLASM OF OVERLAPPING SITES OF RIGHT BREAST IN FEMALE, ESTROGEN RECEPTOR POSITIVE (H): ICD-10-CM

## 2018-04-11 DIAGNOSIS — Z17.0 MALIGNANT NEOPLASM OF OVERLAPPING SITES OF RIGHT BREAST IN FEMALE, ESTROGEN RECEPTOR POSITIVE (H): Primary | ICD-10-CM

## 2018-04-11 DIAGNOSIS — C50.811 MALIGNANT NEOPLASM OF OVERLAPPING SITES OF RIGHT BREAST IN FEMALE, ESTROGEN RECEPTOR POSITIVE (H): ICD-10-CM

## 2018-04-11 DIAGNOSIS — C50.811 MALIGNANT NEOPLASM OF OVERLAPPING SITES OF RIGHT BREAST IN FEMALE, ESTROGEN RECEPTOR POSITIVE (H): Primary | ICD-10-CM

## 2018-04-11 LAB
DIFFERENTIAL METHOD BLD: ABNORMAL
ERYTHROCYTE [DISTWIDTH] IN BLOOD BY AUTOMATED COUNT: 14.6 % (ref 10–15)
HCT VFR BLD AUTO: 22.3 % (ref 35–47)
HGB BLD-MCNC: 7.4 G/DL (ref 11.7–15.7)
MCH RBC QN AUTO: 32.5 PG (ref 26.5–33)
MCHC RBC AUTO-ENTMCNC: 33.2 G/DL (ref 31.5–36.5)
MCV RBC AUTO: 98 FL (ref 78–100)
PLATELET # BLD AUTO: 96 10E9/L (ref 150–450)
RBC # BLD AUTO: 2.28 10E12/L (ref 3.8–5.2)
WBC # BLD AUTO: 0.2 10E9/L (ref 4–11)

## 2018-04-11 PROCEDURE — 85027 COMPLETE CBC AUTOMATED: CPT | Performed by: NURSE PRACTITIONER

## 2018-04-11 PROCEDURE — 96372 THER/PROPH/DIAG INJ SC/IM: CPT | Mod: XS

## 2018-04-11 PROCEDURE — 99214 OFFICE O/P EST MOD 30 MIN: CPT | Performed by: NURSE PRACTITIONER

## 2018-04-11 PROCEDURE — 96374 THER/PROPH/DIAG INJ IV PUSH: CPT

## 2018-04-11 PROCEDURE — 96361 HYDRATE IV INFUSION ADD-ON: CPT

## 2018-04-11 PROCEDURE — 25000128 H RX IP 250 OP 636: Performed by: INTERNAL MEDICINE

## 2018-04-11 PROCEDURE — 25000128 H RX IP 250 OP 636: Performed by: NURSE PRACTITIONER

## 2018-04-11 RX ORDER — HEPARIN SODIUM (PORCINE) LOCK FLUSH IV SOLN 100 UNIT/ML 100 UNIT/ML
500 SOLUTION INTRAVENOUS ONCE
Status: COMPLETED | OUTPATIENT
Start: 2018-04-11 | End: 2018-04-11

## 2018-04-11 RX ORDER — FLUCONAZOLE 100 MG/1
TABLET ORAL
Qty: 15 TABLET | Refills: 0 | Status: SHIPPED | OUTPATIENT
Start: 2018-04-11 | End: 2018-05-18

## 2018-04-11 RX ORDER — HEPARIN SODIUM (PORCINE) LOCK FLUSH IV SOLN 100 UNIT/ML 100 UNIT/ML
500 SOLUTION INTRAVENOUS ONCE
Status: CANCELLED
Start: 2018-04-11 | End: 2018-04-11

## 2018-04-11 RX ORDER — ONDANSETRON 2 MG/ML
8 INJECTION INTRAMUSCULAR; INTRAVENOUS EVERY 6 HOURS PRN
Status: CANCELLED
Start: 2018-04-11

## 2018-04-11 RX ORDER — LEVOFLOXACIN 500 MG/1
500 TABLET, FILM COATED ORAL DAILY
Qty: 7 TABLET | Refills: 0 | Status: SHIPPED | OUTPATIENT
Start: 2018-04-11 | End: 2018-04-28

## 2018-04-11 RX ORDER — ONDANSETRON 2 MG/ML
8 INJECTION INTRAMUSCULAR; INTRAVENOUS EVERY 6 HOURS PRN
Status: DISCONTINUED | OUTPATIENT
Start: 2018-04-11 | End: 2018-04-11 | Stop reason: HOSPADM

## 2018-04-11 RX ADMIN — SODIUM CHLORIDE 1000 ML: 9 INJECTION, SOLUTION INTRAVENOUS at 15:06

## 2018-04-11 RX ADMIN — FILGRASTIM 480 MCG: 480 INJECTION, SOLUTION INTRAVENOUS; SUBCUTANEOUS at 16:27

## 2018-04-11 RX ADMIN — SODIUM CHLORIDE, PRESERVATIVE FREE 500 UNITS: 5 INJECTION INTRAVENOUS at 16:06

## 2018-04-11 RX ADMIN — ONDANSETRON 8 MG: 2 INJECTION INTRAMUSCULAR; INTRAVENOUS at 15:34

## 2018-04-11 NOTE — PROGRESS NOTES
Infusion Nursing Note:  Flor Griffin presents today for IV fluids .    Patient seen by provider today: No   present during visit today: Not Applicable.    Note: Nausea and vomited last night. Given zofran today, will try zyprexa tonight for the first time.  Given Neupogen shot first of three today.  -pt received neutrapenic instruct, and has masks and thermometer.  Will need T&C tomorrow with Neupogen shot  Friday transfuse PRBC      Intravenous Access:  Implanted Port.    Treatment Conditions:  Lab Results   Component Value Date    HGB 7.4 04/11/2018     Lab Results   Component Value Date    WBC 0.2 04/11/2018      Lab Results   Component Value Date    ANEU 1.4 04/09/2018     Lab Results   Component Value Date    PLT 96 04/11/2018      Not Applicable.      Post Infusion Assessment:  Patient tolerated infusion without incident.  Patient tolerated injection without incident.  Blood return noted pre and post infusion.  Site patent and intact, free from redness, edema or discomfort.  Access discontinued per protocol.    Discharge Plan:   Prescription refills given for Levaquin and fluconazole.  Discharge instructions reviewed with: Patient.  Patient and/or family verbalized understanding of discharge instructions and all questions answered.  Copy of AVS reviewed with patient and/or family.  Patient will return tomorrow for next appointment.  Patient discharged in stable condition accompanied by: self and .  Departure Mode: Ambulatory.    Flor Hong RN

## 2018-04-11 NOTE — PATIENT INSTRUCTIONS
Please schedule for 1 unit of blood  on Friday Scheduled 4/13/18 Esha HECTOR     Schedule for labs and follow up with leisa on Friday Scheduled 4/13/18 Esha hector   schedule for chemo and follow up with me on 04/18  Flor is scheduled for her chemo and f/u with Buster on 4/23 instead of 4/18 because she will be in [place] on 4/18/18. Esha HECTOR    neutropenia   Start levaquin and fluconazole      In the event of fever, chill or sweats, cough . Or any changes in health condition  go to ER   Pt is s/p neulasta shot     AVS given to patient

## 2018-04-11 NOTE — PATIENT INSTRUCTIONS
Schedule for 1 unit PRBC tomorrow  or friday      Schedule for  labs and follow up with leisa on Friday     schedule for chemo and follow up with me on 04/18    Start levaquin and fluconazole     In the event of fever go to ER

## 2018-04-11 NOTE — LETTER
4/11/2018         RE: Flor Griffin  [address] [facility] [address]        Dear Colleague,    Thank you for referring your patient, Flor Griffin, to the [facility]. Please see a copy of my visit note below.    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  Otherwise, no distant metastatic disease.   Mammogram 11/30/17- negative   TREATMENT: Carboplatin and Taxol completed 4 cycles. 3/7/2018 started dose dense AC.     Patient comes here for follow-up of neutropenia     Interval History:    Her nausea resolved since the start of Zyprexa.  She feels better now.  She is tolerating p.o.  She denies vomiting diarrhea constipation, she denies abdominal pain.  Patient feels well she denies fever chills or sweats, denies cough shortness of breath or chest pain.  Her energy and appetite are improving.  She takes short frequent walks.  Overall she reports feeling better since last clinic visit.  She is going to [place] for vacation on April 14.    Review of Systems:  14 point ROS of systems including Constitutional, Eyes, Respiratory, Cardiovascular, Gastroenterology, Genitourinary, Integumentary, Muscularskeletal, Psychiatric were all negative except for pertinent positives noted in my HPI.      Current Outpatient Prescriptions   Medication Sig Dispense Refill     levofloxacin (LEVAQUIN) 500 MG tablet Take 1 tablet (500 mg) by mouth daily 7 tablet 0     fluconazole (DIFLUCAN) 100 MG tablet Take 1 tab daily 15 tablet 0     dexamethasone (DECADRON) 4 MG tablet Take 1 tablet (4 mg) by mouth 2 times daily (with meals) for 3 days 3 tablet 0     OLANZapine (ZYPREXA)  5 MG tablet Take 1 tablet (5 mg) orally at bedtime 30 tablet 0     magic mouthwash (FIRST-MOUTHWASH BLM) suspension Swish and swallow 5-10 mLs in mouth every 6 hours as needed for mouth sores 240 mL 1     dexamethasone (DECADRON) 4 MG tablet Take 2 tablets (8 mg) by mouth daily Start on Day 2. 6 tablet 3     senna (SENOKOT) 8.6 MG tablet Take 1 tablet by mouth daily 120 tablet 1     famotidine (PEPCID) 20 MG tablet Take 1 tablet (20 mg) by mouth daily 30 tablet 1     LORazepam (ATIVAN) 0.5 MG tablet Take 1 tablet (0.5 mg) by mouth every 4 hours as needed (Anxiety, Nausea/Vomiting or Sleep) 30 tablet 3     prochlorperazine (COMPAZINE) 10 MG tablet Take 1 tablet (10 mg) by mouth every 6 hours as needed (Nausea/Vomiting) 30 tablet 3     HYDROcodone-acetaminophen (NORCO) 5-325 MG per tablet Take 1 tablet by mouth every 6 hours as needed for moderate to severe pain 6 tablet 0     Pyridoxine HCl (VITAMIN B6 PO) Take by mouth daily       Glucos-Chondroit-Hyaluron-MSM (GLUCOSAMINE CHONDROITIN JOINT PO) Take by mouth daily       Ibuprofen (ADVIL PO) Take 400 mg by mouth every 6 hours as needed for moderate pain       valACYclovir (VALTREX) 1000 mg tablet Take 1 tablet (1,000 mg) by mouth 2 times daily 20 tablet 0     VITAMIN D, CHOLECALCIFEROL, PO Take 1,000 Units by mouth daily       Omega-3 Fatty Acids (OMEGA-3 FISH OIL PO) Take 1 g by mouth daily       aspirin 81 MG tablet Take 81 mg by mouth daily         Physical Examination:  General: The patient is a pleasant female in no acute distress.  LMP 01/01/2004 (Approximate)  Wt Readings from Last 10 Encounters:   04/09/18 77.1 kg (170 lb)   04/04/18 80.6 kg (177 lb 9.6 oz)   04/04/18 80.6 kg (177 lb 9.6 oz)   03/21/18 81.6 kg (180 lb)   03/21/18 81.6 kg (180 lb)   03/19/18 80.3 kg (177 lb)   03/15/18 80.6 kg (177 lb 9.6 oz)   03/07/18 81 kg (178 lb 9.6 oz)   03/07/18 81 kg (178 lb 9.6 oz)   02/21/18 80.3 kg (177 lb)     HEENT: EOMI, PERRL. Sclerae are anicteric. Oral mucosa  is pink and moist with no lesions or thrush.   Lymph: Neck is supple with no lymphadenopathy in the cervical or supraclavicular areas.   Heart: Regular rate and rhythm.   Lungs: Clear to auscultation bilaterally.   GI: Bowel sounds present, soft, nontender with no palpable hepatosplenomegaly or masses.   Extremities: No lower extremity edema noted bilaterally.   Skin: No rashes, petechiae, or bruising noted on exposed skin.    Laboratory Data:    Results for ALMA MASON (MRN [id number]) as of 4/12/2018 12:10   Ref. Range 4/11/2018 15:20   WBC Latest Ref Range: 4.0 - 11.0 10e9/L 0.2 (LL)   Hemoglobin Latest Ref Range: 11.7 - 15.7 g/dL 7.4 (L)   Hematocrit Latest Ref Range: 35.0 - 47.0 % 22.3 (L)   Platelet Count Latest Ref Range: 150 - 450 10e9/L 96 (L)   RBC Count Latest Ref Range: 3.8 - 5.2 10e12/L 2.28 (L)   MCV Latest Ref Range: 78 - 100 fl 98   MCH Latest Ref Range: 26.5 - 33.0 pg 32.5   MCHC Latest Ref Range: 31.5 - 36.5 g/dL 33.2   RDW Latest Ref Range: 10.0 - 15.0 % 14.6   Diff Method Unknown WBC <0.5, Diff no...             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  PET scan from 11/2017hypermetabolic right supraclavicular, right axillary and subpectoral adenopathy.  There was no evidence of distant metastases.  One possibility is breast cancer which has metastasized to the regional lymph nodes.  However, we are unable to find primary cancer on physical exam and a PET scan mammogram was negative.  MR of the breast I is negative.She is status post 4 cycles of carboplatin Taxol with PET CT scan on February 2018 showing good response.  She is currently receiving dose dense AC for 4 cycles she completed 2 cycles of the chemo on March 21.  He is scheduled in 2 weeks for cycle 3.  And follow-up with me prior cycle 3.She is scheduled for PET CT scan on April 30 and she will see Dr. Magana May 3.      Neutropenia-secondary to  chemotherapy  Neutropenic precautions discussed  She denies fever chills or sweats.  Cough  Or SOB. No signs or symptoms of infection. Patient does not appear to be septic and is in stable condition.  Patient is status post Neulasta shot on 4/04.   She received Neupogen shot today.(Dr. Magana is aware) no additional Neupogen shot needed since she received Neulasta. Patient is going to [place] on the 14th and will stay there for 1 week. I discussed neutropenic precautions in detail, again I told the patient that she must go to the emergency room in the event of fever, chills, sweats, cough, SOB, CP urinary symptoms, signs or symptoms of infection or any changes in health condition.  Patient verbalized understanding  -I will start her on prophylactic Levaquin and fluconazole      Anemia secondary to chemotherapy.  No evidence of bleeding.  I will schedule her for 1 unit PRBC on Friday.      Nausea vomiting secondary to chemotherapy.    Started Zyprexa 5 mg p.o. q. at bedtime.  Nausea resolved  She has Zofran and Ativan as needed as well  -She is scheduled for normal saline hydration today already so therefore she will get NS bolus hydration today        Patient is asked to go to the emergency room in the event of fever, chills sweats, cough, shortness of breath chest pain nausea vomiting diarrhea abdominal pain or any changes in health condition       Patient will come on Friday 04/13 for labs, 1 unit PRBC and physical assessment prior to her trip to [place].    HEDY Beth CNP  Hem/Onc   [facility]                       Again, thank you for allowing me to participate in the care of your patient.        Sincerely,        HEDY Beth CNP

## 2018-04-11 NOTE — MR AVS SNAPSHOT
After Visit Summary   4/11/2018    Flor Griffin    MRN: [id number]           Patient Information     Date Of Birth          1955        Visit Information        Provider Department      4/11/2018 2:30 PM Buster Menon APRN CNP [facility]        Care Instructions    Schedule for daily Neupogen shot  Scheduled 4/12, 4/13. Esha hector  Please schedule for 1 unit of blood today or on Friday Scheduled 4/13/18 Esha HECTOR     Schedule for  labs and follow up with jennifer on Friday Scheduled 4/13/18 Esha hector   schedule for chemo and follow up with me on 04/18  Flor is scheduled for her chemo and f/u with Buster on 4/23 instead of 4/18 because she will be in [place] on 4/18/18. Esha HECTOR     Start levaquin and fluconazole      In the event of fever go to ER   AVS given to patient            Electronically signed by Buster Menon APRN CNP at                Follow-ups after your visit        Your next 10 appointments already scheduled     Apr 12, 2018 12:45 PM CDT   injection with RH LAB DRAW 1   [facility] ([facility])    [facility]  [address]  [facility] [address]   [phone]            Apr 13, 2018 12:00 PM CDT   Level 4 with RH INFUSION CHAIR 4   [facility] ([facility])    [facility]  [address]  [facility] [address]   [phone]            Apr 13, 2018 12:30 PM CDT   Return Visit with Jennifer Garcia PA-C   [facility] ([facility])    [facility]  [address]  [facility] [address]   [phone]            Apr 23, 2018  9:30 AM CDT   Level 3 with  INFUSION CHAIR 17   [facility] ([facility])    [facility]  [address]  [place]  [address]   [phone]            Apr 23, 2018 10:00 AM CDT   Return Visit with HEDY Beth CNP   [facility] ([facility])    [facility]  [address]  [place] [address]   [phone]            Apr 30, 2018  8:30 AM CDT   (Arrive by 8:15 AM)   PE NPET ONCOLOGY (EYES TO THIGHS) with SHPETM1   [facility] PET CT ([facility])    [address] [facility] [address] [phone]           Tell your doctor:   If there is any chance you may be pregnant or if you are breastfeeding.   If you have problems lying in small spaces (claustrophobia). If you do, your doctor may give you medicine to help you relax. If you have diabetes:   Have your exam early in the morning. Your blood glucose will go up as the day goes by.   Your glucose level must be 180 or less at the start of the exam. Please take any medicines you need to ensure this blood glucose level. 24 hours before your scan: Don t do any heavy exercise. (No jogging, aerobics or other workouts.) Exercise will make your pictures less accurate. 6 hours before your scan:   Stop all food and liquids (except water).   Do not chew gum or suck on mints.   If you need to take medicine with food, you may take it with a few crackers.  Please call your Imaging Department at your exam site with any questions.            May 03, 2018  8:00 AM CDT   Return Visit with María Galeas GC   [facility] Risk Management Program ([facility])    [facility]  [address]  [place] [address]   [phone]            May 03, 2018 10:30 AM CDT   Return Visit with Yani Magana MD   [facility] ([facility])    [facility]  [address]  [facility] [address]   [phone]              Who to contact     If you have questions or need follow up information about today's clinic visit or your  "schedule please contact [facility]ARE directly at [phone].  Normal or non-critical lab and imaging results will be communicated to you by MyChart, letter or phone within 4 business days after the clinic has received the results. If you do not hear from us within 7 days, please contact the clinic through [employer] or phone. If you have a critical or abnormal lab result, we will notify you by phone as soon as possible.  Submit refill requests through [employer] or call your pharmacy and they will forward the refill request to us. Please allow 3 business days for your refill to be completed.          Additional Information About Your Visit        Uncov[employer]arDat[employer] Information     [employer] lets you send messages to your doctor, view your test results, renew your prescriptions, schedule appointments and more. To sign up, go to [email or web address] . Click on \"Log in\" on the left side of the screen, which will take you to the Welcome page. Then click on \"Sign up Now\" on the right side of the page.     You will be asked to enter the access code listed below, as well as some personal information. Please follow the directions to create your username and password.     Your access code is: [email or web address]  Expires: 2018  3:02 PM     Your access code will  in 90 days. If you need help or a new code, please call your [facility] clinic or [phone].        Care EveryWhere ID     This is your Care EveryWhere ID. This could be used by other organizations to access your [facility] medical records  [id number]        Your Vitals Were     Last Period                   2004 (Approximate)            Blood Pressure from Last 3 Encounters:   18 127/76   18 104/69   18 120/71    Weight from Last 3 Encounters:   18 77.1 kg (170 lb)   18 80.6 kg (177 lb 9.6 oz)   18 80.6 kg (177 lb 9.6 oz)              Today, you had the following     No orders found for display         Today's " Medication Changes          These changes are accurate as of 4/11/18  4:28 PM.  If you have any questions, ask your nurse or doctor.               Start taking these medicines.        Dose/Directions    fluconazole 100 MG tablet   Commonly known as:  DIFLUCAN   Used for:  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 in female, estrogen receptor positive (H), Carcinoma of unknown origin (H)        Take 1 tab daily   Quantity:  15 tablet   Refills:  0       levofloxacin 500 MG tablet   Commonly known as:  LEVAQUIN   Used for:  Port catheter in place, 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 in female, estrogen receptor positive (H), Carcinoma of unknown origin (H)        Dose:  500 mg   Take 1 tablet (500 mg) by mouth daily   Quantity:  7 tablet   Refills:  0            Where to get your medicines      These medications were sent to [facility] [address] [facility] [address]  [address], [facility] [address]     Phone:  [phone]     fluconazole 100 MG tablet    levofloxacin 500 MG tablet                Primary Care Provider Office Phone # Fax #    [facility] [phone] [address] [facility] [address]        Equal Access to Services     MARYJANE JARA AH: Hadii nelida copeland hadasho Soomaali, waaxda luqadaha, qaybta kaalmada adeegyada, ketan connor. So [facility] [phone].    ATENCIÓN: Si habla español, tiene a thomas disposición servicios gratuitos de asistencia lingüística. Pino al [phone].    We comply with applicable federal civil rights laws and [place] laws. We do not discriminate on the basis of race, color, national origin, age, disability, sex, sexual orientation, or gender identity.            Thank you!     Thank you for choosing [facility]  for your care. Our goal is always to provide you with excellent care. Hearing  back from our patients is one way we can continue to improve our services. Please take a few minutes to complete the written survey that you may receive in the mail after your visit with us. Thank you!             Your Updated Medication List - Protect others around you: Learn how to safely use, store and throw away your medicines at www.disposemymeds.org.          This list is accurate as of 4/11/18  4:28 PM.  Always use your most recent med list.                   Brand Name Dispense Instructions for use Diagnosis    ADVIL PO      Take 400 mg by mouth every 6 hours as needed for moderate pain        aspirin 81 MG tablet      Take 81 mg by mouth daily        * dexamethasone 4 MG tablet    DECADRON    6 tablet    Take 2 tablets (8 mg) by mouth daily Start on Day 2.    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 in female, estrogen receptor positive (H), Carcinoma of unknown origin (H)       * dexamethasone 4 MG tablet    DECADRON    3 tablet    Take 1 tablet (4 mg) by mouth 2 times daily (with meals) for 3 days    Carcinoma of unknown origin (H), Nausea, Port catheter in place, 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 in female, estrogen receptor positive (H)       famotidine 20 MG tablet    PEPCID    30 tablet    Take 1 tablet (20 mg) by mouth daily    Heartburn       fluconazole 100 MG tablet    DIFLUCAN    15 tablet    Take 1 tab daily    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 in female, estrogen receptor positive (H), Carcinoma of unknown origin (H)       GLUCOSAMINE CHONDROITIN JOINT PO      Take by mouth daily        HYDROcodone-acetaminophen 5-325 MG per tablet    NORCO    6 tablet    Take 1 tablet by mouth every 6 hours as needed for moderate to severe pain    Carcinoma of unknown origin (H)       levofloxacin 500 MG tablet    LEVAQUIN    7 tablet    Take 1 tablet (500 mg) by mouth daily    Port catheter in place, Malignant neoplasm of overlapping sites of right  breast in female, estrogen receptor positive (H), Carcinoma of unknown origin (H)       LORazepam 0.5 MG tablet    ATIVAN    30 tablet    Take 1 tablet (0.5 mg) by mouth every 4 hours as needed (Anxiety, Nausea/Vomiting or Sleep)    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 in female, estrogen receptor positive (H), Carcinoma of unknown origin (H)       magic mouthwash suspension (diphenhydrAMINE, lidocaine, aluminum-magnesium & simethicone) Susp compounding kit     240 mL    Swish and swallow 5-10 mLs in mouth every 6 hours as needed for mouth sores    Mouth sores       OLANZapine 5 MG tablet    zyPREXA    30 tablet    Take 1 tablet (5 mg) orally at bedtime    Nausea, Carcinoma of unknown origin (H), Port catheter in place, 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 in female, estrogen receptor positive (H)       OMEGA-3 FISH OIL PO      Take 1 g by mouth daily        prochlorperazine 10 MG tablet    COMPAZINE    30 tablet    Take 1 tablet (10 mg) by mouth every 6 hours as needed (Nausea/Vomiting)    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 in female, estrogen receptor positive (H), Carcinoma of unknown origin (H)       senna 8.6 MG tablet    SENOKOT    120 tablet    Take 1 tablet by mouth daily    Carcinoma of unknown origin (H), Constipation, unspecified constipation type       valACYclovir 1000 mg tablet    VALTREX    20 tablet    Take 1 tablet (1,000 mg) by mouth 2 times daily    Cold sore       VITAMIN B6 PO      Take by mouth daily        VITAMIN D (CHOLECALCIFEROL) PO      Take 1,000 Units by mouth daily        * Notice:  This list has 2 medication(s) that are the same as other medications prescribed for you. Read the directions carefully, and ask your doctor or other care provider to review them with you.

## 2018-04-12 ENCOUNTER — TELEPHONE (OUTPATIENT)
Dept: ONCOLOGY | Facility: CLINIC | Age: 63
End: 2018-04-12

## 2018-04-12 VITALS — SYSTOLIC BLOOD PRESSURE: 110 MMHG | TEMPERATURE: 98.7 F | HEART RATE: 85 BPM | DIASTOLIC BLOOD PRESSURE: 62 MMHG

## 2018-04-12 PROBLEM — D64.9 ANEMIA: Status: ACTIVE | Noted: 2018-04-12

## 2018-04-12 LAB — LAB SCANNED RESULT: NORMAL

## 2018-04-12 NOTE — PROGRESS NOTES
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  Otherwise, no distant metastatic disease.   Mammogram 11/30/17- negative   TREATMENT: Carboplatin and Taxol completed 4 cycles. 3/7/2018 started dose dense AC.     Patient comes here for follow-up of neutropenia     Interval History:    Her nausea resolved since the start of Zyprexa.  She feels better now.  She is tolerating p.o.  She denies vomiting diarrhea constipation, she denies abdominal pain.  Patient feels well she denies fever chills or sweats, denies cough shortness of breath or chest pain.  Her energy and appetite are improving.  She takes short frequent walks.  Overall she reports feeling better since last clinic visit.  She is going to [place] for vacation on April 14.    Review of Systems:  14 point ROS of systems including Constitutional, Eyes, Respiratory, Cardiovascular, Gastroenterology, Genitourinary, Integumentary, Muscularskeletal, Psychiatric were all negative except for pertinent positives noted in my HPI.      Current Outpatient Prescriptions   Medication Sig Dispense Refill     levofloxacin (LEVAQUIN) 500 MG tablet Take 1 tablet (500 mg) by mouth daily 7 tablet 0     fluconazole (DIFLUCAN) 100 MG tablet Take 1 tab daily 15 tablet 0     dexamethasone (DECADRON) 4 MG tablet Take 1 tablet (4 mg) by mouth 2 times daily (with meals) for 3 days 3 tablet 0     OLANZapine (ZYPREXA) 5 MG tablet Take 1 tablet (5 mg) orally at bedtime 30 tablet 0     magic mouthwash (FIRST-MOUTHWASH BLM) suspension Swish and swallow 5-10 mLs in mouth every 6 hours as needed for mouth sores 240 mL 1     dexamethasone (DECADRON) 4 MG tablet Take 2 tablets (8 mg)  by mouth daily Start on Day 2. 6 tablet 3     senna (SENOKOT) 8.6 MG tablet Take 1 tablet by mouth daily 120 tablet 1     famotidine (PEPCID) 20 MG tablet Take 1 tablet (20 mg) by mouth daily 30 tablet 1     LORazepam (ATIVAN) 0.5 MG tablet Take 1 tablet (0.5 mg) by mouth every 4 hours as needed (Anxiety, Nausea/Vomiting or Sleep) 30 tablet 3     prochlorperazine (COMPAZINE) 10 MG tablet Take 1 tablet (10 mg) by mouth every 6 hours as needed (Nausea/Vomiting) 30 tablet 3     HYDROcodone-acetaminophen (NORCO) 5-325 MG per tablet Take 1 tablet by mouth every 6 hours as needed for moderate to severe pain 6 tablet 0     Pyridoxine HCl (VITAMIN B6 PO) Take by mouth daily       Glucos-Chondroit-Hyaluron-MSM (GLUCOSAMINE CHONDROITIN JOINT PO) Take by mouth daily       Ibuprofen (ADVIL PO) Take 400 mg by mouth every 6 hours as needed for moderate pain       valACYclovir (VALTREX) 1000 mg tablet Take 1 tablet (1,000 mg) by mouth 2 times daily 20 tablet 0     VITAMIN D, CHOLECALCIFEROL, PO Take 1,000 Units by mouth daily       Omega-3 Fatty Acids (OMEGA-3 FISH OIL PO) Take 1 g by mouth daily       aspirin 81 MG tablet Take 81 mg by mouth daily         Physical Examination:  General: The patient is a pleasant female in no acute distress.  LMP 01/01/2004 (Approximate)  Wt Readings from Last 10 Encounters:   04/09/18 77.1 kg (170 lb)   04/04/18 80.6 kg (177 lb 9.6 oz)   04/04/18 80.6 kg (177 lb 9.6 oz)   03/21/18 81.6 kg (180 lb)   03/21/18 81.6 kg (180 lb)   03/19/18 80.3 kg (177 lb)   03/15/18 80.6 kg (177 lb 9.6 oz)   03/07/18 81 kg (178 lb 9.6 oz)   03/07/18 81 kg (178 lb 9.6 oz)   02/21/18 80.3 kg (177 lb)     HEENT: EOMI, PERRL. Sclerae are anicteric. Oral mucosa is pink and moist with no lesions or thrush.   Lymph: Neck is supple with no lymphadenopathy in the cervical or supraclavicular areas.   Heart: Regular rate and rhythm.   Lungs: Clear to auscultation bilaterally.   GI: Bowel sounds present, soft, nontender with no  palpable hepatosplenomegaly or masses.   Extremities: No lower extremity edema noted bilaterally.   Skin: No rashes, petechiae, or bruising noted on exposed skin.    Laboratory Data:    Results for ALMA MASON (MRN [id number]) as of 4/12/2018 12:10   Ref. Range 4/11/2018 15:20   WBC Latest Ref Range: 4.0 - 11.0 10e9/L 0.2 (LL)   Hemoglobin Latest Ref Range: 11.7 - 15.7 g/dL 7.4 (L)   Hematocrit Latest Ref Range: 35.0 - 47.0 % 22.3 (L)   Platelet Count Latest Ref Range: 150 - 450 10e9/L 96 (L)   RBC Count Latest Ref Range: 3.8 - 5.2 10e12/L 2.28 (L)   MCV Latest Ref Range: 78 - 100 fl 98   MCH Latest Ref Range: 26.5 - 33.0 pg 32.5   MCHC Latest Ref Range: 31.5 - 36.5 g/dL 33.2   RDW Latest Ref Range: 10.0 - 15.0 % 14.6   Diff Method Unknown WBC <0.5, Diff no...             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  PET scan from 11/2017hypermetabolic right supraclavicular, right axillary and subpectoral adenopathy.  There was no evidence of distant metastases.  One possibility is breast cancer which has metastasized to the regional lymph nodes.  However, we are unable to find primary cancer on physical exam and a PET scan mammogram was negative.  MR of the breast I is negative.She is status post 4 cycles of carboplatin Taxol with PET CT scan on February 2018 showing good response.  She is currently receiving dose dense AC for 4 cycles she completed 2 cycles of the chemo on March 21.  He is scheduled in 2 weeks for cycle 3.  And follow-up with me prior cycle 3.She is scheduled for PET CT scan on April 30 and she will see Dr. Magana May 3.      Neutropenia-secondary to chemotherapy  Neutropenic precautions discussed  She denies fever chills or sweats.  Cough  Or SOB. No signs or symptoms of infection. Patient does not appear to be septic and is in stable condition.  Patient is status post Neulasta shot on 4/04.   She received Neupogen shot  today.(Dr. Magana is aware) no additional Neupogen shot needed since she received Neulasta. Patient is going to [place] on the 14th and will stay there for 1 week. I discussed neutropenic precautions in detail, again I told the patient that she must go to the emergency room in the event of fever, chills, sweats, cough, SOB, CP urinary symptoms, signs or symptoms of infection or any changes in health condition.  Patient verbalized understanding  -I will start her on prophylactic Levaquin and fluconazole      Anemia secondary to chemotherapy.  No evidence of bleeding.  I will schedule her for 1 unit PRBC on Friday.      Nausea vomiting secondary to chemotherapy.    Started Zyprexa 5 mg p.o. q. at bedtime.  Nausea resolved  She has Zofran and Ativan as needed as well  -She is scheduled for normal saline hydration today already so therefore she will get NS bolus hydration today        Patient is asked to go to the emergency room in the event of fever, chills sweats, cough, shortness of breath chest pain nausea vomiting diarrhea abdominal pain or any changes in health condition       Patient will come on Friday 04/13 for labs, 1 unit PRBC and physical assessment prior to her trip to [place].    HEDY Beth CNP  Hem/Onc   [facility]

## 2018-04-12 NOTE — TELEPHONE ENCOUNTER
TORB 4/12/18 at 0815 per Buster Menon NP/Ericka Ge RN Cancel Neupogen today and tomorrow.     Writer notified patient she doesn't need to come for Neupogen tomorrow per Buster Menon NP. Patient received on-pro on 4-5. Counts still low on 4/11, ANC < 0.5. Patient received 1 dose Neupogen SQ. No further injections needed. Patient aware.     Patient scheduled for 1 unit prbc tomorrow, 4/13. She'd prefer to get T&C drawn tomorrow prior to blood transfusion instead of coming for labs today. Fast Track and scheduling notified patient appt cancelled today. Patient and scheduling notified her appt for tomorrow has been changed to 10:00 instead of 12:00 due to needing T&C drawn.    Dr. Magana notified patient leaving on plane for [place] the morning of 4/14. Neutropenic precautions reinforced. Patient stated she has a mask she will wear and will practice diligent handwashing.    TORB 4/12/18 at 1045 Per Dr. Chino M.D./Clarita Ramirez RN Draw CBC with diff on Friday and call him with results prior to patient discharge. CBC with diff ordered.     Message routed to Buster Menon NP to discontinue Neupogen plan and to write blood plan orders for tomorrows infusion.

## 2018-04-13 ENCOUNTER — HOSPITAL ENCOUNTER (OUTPATIENT)
Facility: CLINIC | Age: 63
Setting detail: SPECIMEN
Discharge: HOME | End: 2018-04-13
Attending: INTERNAL MEDICINE | Admitting: NURSE PRACTITIONER
Payer: COMMERCIAL

## 2018-04-13 ENCOUNTER — INFUSION THERAPY VISIT (OUTPATIENT)
Dept: INFUSION THERAPY | Facility: CLINIC | Age: 63
End: 2018-04-13
Attending: INTERNAL MEDICINE
Payer: COMMERCIAL

## 2018-04-13 VITALS
SYSTOLIC BLOOD PRESSURE: 116 MMHG | TEMPERATURE: 98.6 F | HEART RATE: 82 BPM | RESPIRATION RATE: 16 BRPM | DIASTOLIC BLOOD PRESSURE: 66 MMHG | OXYGEN SATURATION: 98 %

## 2018-04-13 DIAGNOSIS — Z17.0 MALIGNANT NEOPLASM OF OVERLAPPING SITES OF RIGHT BREAST IN FEMALE, ESTROGEN RECEPTOR POSITIVE (H): ICD-10-CM

## 2018-04-13 DIAGNOSIS — C50.811 MALIGNANT NEOPLASM OF OVERLAPPING SITES OF RIGHT BREAST IN FEMALE, ESTROGEN RECEPTOR POSITIVE (H): ICD-10-CM

## 2018-04-13 DIAGNOSIS — D64.89 ANEMIA DUE TO OTHER CAUSE, NOT CLASSIFIED: Primary | ICD-10-CM

## 2018-04-13 LAB
ABO + RH BLD: NORMAL
ABO + RH BLD: NORMAL
BASOPHILS # BLD AUTO: 0 10E9/L (ref 0–0.2)
BASOPHILS NFR BLD AUTO: 1 %
BLD GP AB SCN SERPL QL: NORMAL
BLD PROD TYP BPU: NORMAL
BLD PROD TYP BPU: NORMAL
BLD UNIT ID BPU: 0
BLOOD BANK CMNT PATIENT-IMP: NORMAL
BLOOD PRODUCT CODE: NORMAL
BPU ID: NORMAL
DIFFERENTIAL METHOD BLD: ABNORMAL
EOSINOPHIL # BLD AUTO: 0 10E9/L (ref 0–0.7)
EOSINOPHIL NFR BLD AUTO: 0 %
ERYTHROCYTE [DISTWIDTH] IN BLOOD BY AUTOMATED COUNT: 14.8 % (ref 10–15)
HCT VFR BLD AUTO: 21.5 % (ref 35–47)
HGB BLD-MCNC: 7.4 G/DL (ref 11.7–15.7)
LYMPHOCYTES # BLD AUTO: 1 10E9/L (ref 0.8–5.3)
LYMPHOCYTES NFR BLD AUTO: 47 %
MCH RBC QN AUTO: 33.5 PG (ref 26.5–33)
MCHC RBC AUTO-ENTMCNC: 34.4 G/DL (ref 31.5–36.5)
MCV RBC AUTO: 97 FL (ref 78–100)
METAMYELOCYTES # BLD: 0 10E9/L
METAMYELOCYTES NFR BLD MANUAL: 2 %
MONOCYTES # BLD AUTO: 0.4 10E9/L (ref 0–1.3)
MONOCYTES NFR BLD AUTO: 17 %
NEUTROPHILS # BLD AUTO: 0.7 10E9/L (ref 1.6–8.3)
NEUTROPHILS NFR BLD AUTO: 33 %
NUM BPU REQUESTED: 1
PLATELET # BLD AUTO: 80 10E9/L (ref 150–450)
PLATELET # BLD EST: ABNORMAL 10*3/UL
RBC # BLD AUTO: 2.21 10E12/L (ref 3.8–5.2)
RBC MORPH BLD: ABNORMAL
SPECIMEN EXP DATE BLD: NORMAL
TRANSFUSION STATUS PATIENT QL: NORMAL
TRANSFUSION STATUS PATIENT QL: NORMAL
WBC # BLD AUTO: 2.1 10E9/L (ref 4–11)

## 2018-04-13 PROCEDURE — 86901 BLOOD TYPING SEROLOGIC RH(D): CPT | Performed by: NURSE PRACTITIONER

## 2018-04-13 PROCEDURE — P9016 RBC LEUKOCYTES REDUCED: HCPCS | Performed by: NURSE PRACTITIONER

## 2018-04-13 PROCEDURE — 86923 COMPATIBILITY TEST ELECTRIC: CPT | Performed by: NURSE PRACTITIONER

## 2018-04-13 PROCEDURE — 96372 THER/PROPH/DIAG INJ SC/IM: CPT

## 2018-04-13 PROCEDURE — 25000128 H RX IP 250 OP 636: Performed by: INTERNAL MEDICINE

## 2018-04-13 PROCEDURE — 86900 BLOOD TYPING SEROLOGIC ABO: CPT | Performed by: NURSE PRACTITIONER

## 2018-04-13 PROCEDURE — 86850 RBC ANTIBODY SCREEN: CPT | Performed by: NURSE PRACTITIONER

## 2018-04-13 PROCEDURE — 36430 TRANSFUSION BLD/BLD COMPNT: CPT

## 2018-04-13 PROCEDURE — 85025 COMPLETE CBC W/AUTO DIFF WBC: CPT | Performed by: INTERNAL MEDICINE

## 2018-04-13 RX ADMIN — FILGRASTIM 300 MCG: 300 INJECTION, SOLUTION INTRAVENOUS; SUBCUTANEOUS at 13:16

## 2018-04-13 NOTE — MR AVS SNAPSHOT
After Visit Summary   4/13/2018    Flor Griffin    MRN: [id number]           Patient Information     Date Of Birth          1955        Visit Information        Provider Department      4/13/2018 10:00 AM  INFUSION CHAIR 4 [facility]        Today's Diagnoses     Anemia due to other cause, not classified    -  1    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 in female, estrogen receptor positive (H)           Follow-ups after your visit        Your next 10 appointments already scheduled     Apr 23, 2018  9:30 AM CDT   Level 3 with  INFUSION CHAIR 17   [facility] ([facility])    [facility]  [address]  [facility] [place] [address]   [phone]            Apr 23, 2018 10:00 AM CDT   Return Visit with HEDY Beth CNP   [facility] ([facility])    [facility]  [address]  [facility] [facility] [place] [address]   [phone]            Apr 30, 2018  8:30 AM CDT   (Arrive by 8:15 AM)   PE NPET ONCOLOGY (EYES TO THIGHS) with [id number]   [facility] PET CT ([facility])    [address] [facility] [address] [phone]           Tell your doctor:   If there is any chance you may be pregnant or if you are breastfeeding.   If you have problems lying in small spaces (claustrophobia). If you do, your doctor may give you medicine to help you relax. If you have diabetes:   Have your exam early in the morning. Your blood glucose will go up as the day goes by.   Your glucose level must be 180 or less at the start of the exam. Please take any medicines you need to ensure this blood glucose level. 24 hours before your scan: Don t do any heavy exercise. (No jogging, aerobics or other workouts.) Exercise will make your pictures less accurate. 6 hours before your scan:   Stop all food and liquids (except  "water).   Do not chew gum or suck on mints.   If you need to take medicine with food, you may take it with a few crackers.  Please call your Imaging Department at your exam site with any questions.            May 03, 2018  8:00 AM CDT   Return Visit with María Galeas GC   [facility] Program ([facility])    [facility]  [address]  [place] [id number] [phone]            May 03, 2018 10:30 AM CDT   Return Visit with Yani Magana MD   [facility] ([facility])    [facility]  [address]  [place] [id number] [phone]              Who to contact     If you have questions or need follow up information about today's clinic visit or your schedule please contact [facility] directly at [phone].  Normal or non-critical lab and imaging results will be communicated to you by [employer], letter or phone within 4 business days after the clinic has received the results. If you do not hear from us within 7 days, please contact the clinic through [employer] or phone. If you have a critical or abnormal lab result, we will notify you by phone as soon as possible.  Submit refill requests through [employer] or call your pharmacy and they will forward the refill request to us. Please allow 3 business days for your refill to be completed.          Additional Information About Your Visit        [employer] Information     [employer] lets you send messages to your doctor, view your test results, renew your prescriptions, schedule appointments and more. To sign up, go to [email or web address] . Click on \"Log in\" on the left side of the screen, which will take you to the Welcome page. Then click on \"Sign up Now\" on the right side of the page.     You will be asked to enter the access code listed below, as well as some personal information. Please follow the directions to create your " username and password.     Your access code is: [email or web address]  Expires: 2018  3:02 PM     Your access code will  in 90 days. If you need help or a new code, please call your [place] clinic or [phone].        Care EveryWhere ID     This is your Care EveryWhere ID. This could be used by other organizations to access your [place] medical records  [id number]        Your Vitals Were     Pulse Temperature Respirations Last Period Pulse Oximetry       82 98.6  F (37  C) (Tympanic) 16 2004 (Approximate) 98%        Blood Pressure from Last 3 Encounters:   18 116/66   18 110/62   18 127/76    Weight from Last 3 Encounters:   18 77.1 kg (170 lb)   18 80.6 kg (177 lb 9.6 oz)   18 80.6 kg (177 lb 9.6 oz)              We Performed the Following     ABO/Rh type and screen     Blood component     CBC with platelets and differential     Transfuse red blood cell unit        Primary Care Provider Office Phone # Fax #    [facility] [phone]       [address] [facility] [address]        Equal Access to Services     MARYJANE JARA : Hadii aad ku hadasho Soomaali, waaxda luqadaha, qaybta kaalmada adeegyada, waxay osvaldoin hayantoinen kelsea connor. So [facility] [phone].    ATENCIÓN: Si habla español, tiene a thomas disposición servicios gratuitos de asistencia lingüística. Llame al [phone].    We comply with applicable federal civil rights laws and [place] laws. We do not discriminate on the basis of race, color, national origin, age, disability, sex, sexual orientation, or gender identity.            Thank you!     Thank you for choosing [facility]  for your care. Our goal is always to provide you with excellent care. Hearing back from our patients is one way we can continue to improve our services. Please take a few minutes to complete the written survey that you may receive in the mail after your  visit with us. Thank you!             Your Updated Medication List - Protect others around you: Learn how to safely use, store and throw away your medicines at www.disposemymeds.org.          This list is accurate as of 4/13/18  3:59 PM.  Always use your most recent med list.                   Brand Name Dispense Instructions for use Diagnosis    ADVIL PO      Take 400 mg by mouth every 6 hours as needed for moderate pain        aspirin 81 MG tablet      Take 81 mg by mouth daily        * dexamethasone 4 MG tablet    DECADRON    6 tablet    Take 2 tablets (8 mg) by mouth daily Start on Day 2.    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 in female, estrogen receptor positive (H), Carcinoma of unknown origin (H)       * dexamethasone 4 MG tablet    DECADRON    3 tablet    Take 1 tablet (4 mg) by mouth 2 times daily (with meals) for 3 days    Carcinoma of unknown origin (H), Nausea, Port catheter in place, 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 in female, estrogen receptor positive (H)       famotidine 20 MG tablet    PEPCID    30 tablet    Take 1 tablet (20 mg) by mouth daily    Heartburn       fluconazole 100 MG tablet    DIFLUCAN    15 tablet    Take 1 tab daily    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 in female, estrogen receptor positive (H), Carcinoma of unknown origin (H)       GLUCOSAMINE CHONDROITIN JOINT PO      Take by mouth daily        HYDROcodone-acetaminophen 5-325 MG per tablet    NORCO    6 tablet    Take 1 tablet by mouth every 6 hours as needed for moderate to severe pain    Carcinoma of unknown origin (H)       levofloxacin 500 MG tablet    LEVAQUIN    7 tablet    Take 1 tablet (500 mg) by mouth daily    Port catheter in place, 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 in female, estrogen receptor positive (H), Carcinoma of unknown origin (H)       LORazepam 0.5 MG tablet    ATIVAN    30 tablet    Take 1 tablet (0.5 mg) by mouth every 4  hours as needed (Anxiety, Nausea/Vomiting or Sleep)    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 in female, estrogen receptor positive (H), Carcinoma of unknown origin (H)       magic mouthwash suspension (diphenhydrAMINE, lidocaine, aluminum-magnesium & simethicone) Susp compounding kit     240 mL    Swish and swallow 5-10 mLs in mouth every 6 hours as needed for mouth sores    Mouth sores       OLANZapine 5 MG tablet    zyPREXA    30 tablet    Take 1 tablet (5 mg) orally at bedtime    Nausea, Carcinoma of unknown origin (H), Port catheter in place, 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 in female, estrogen receptor positive (H)       OMEGA-3 FISH OIL PO      Take 1 g by mouth daily        prochlorperazine 10 MG tablet    COMPAZINE    30 tablet    Take 1 tablet (10 mg) by mouth every 6 hours as needed (Nausea/Vomiting)    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 in female, estrogen receptor positive (H), Carcinoma of unknown origin (H)       senna 8.6 MG tablet    SENOKOT    120 tablet    Take 1 tablet by mouth daily    Carcinoma of unknown origin (H), Constipation, unspecified constipation type       valACYclovir 1000 mg tablet    VALTREX    20 tablet    Take 1 tablet (1,000 mg) by mouth 2 times daily    Cold sore       VITAMIN B6 PO      Take by mouth daily        VITAMIN D (CHOLECALCIFEROL) PO      Take 1,000 Units by mouth daily        * Notice:  This list has 2 medication(s) that are the same as other medications prescribed for you. Read the directions carefully, and ask your doctor or other care provider to review them with you.

## 2018-04-13 NOTE — PROGRESS NOTES
Infusion Nursing Note:  Flor Griffin presents today for 1 PRBC, labs, and neupogen.    Patient seen by provider today: Yes: Jennifer Ash   present during visit today: Not Applicable.    Note: ANC 0.7 today. Order from  for neupogen due to patient traveling tomorrow.Neutropenic precautions discussed with Flor, and masks supplied.    Intravenous Access:  Labs drawn without difficulty.  Implanted Port.    Treatment Conditions:  Lab Results   Component Value Date    HGB 7.4 04/13/2018     Lab Results   Component Value Date    WBC 2.1 04/13/2018      Lab Results   Component Value Date    ANEU 0.7 04/13/2018     Lab Results   Component Value Date    PLT 80 04/13/2018      Blood transfusion consent signed 4/13/18.      Post Infusion Assessment:  Patient tolerated infusion without incident.  Site patent and intact, free from redness, edema or discomfort.  No evidence of extravasations.  Access discontinued per protocol.    Discharge Plan:   Patient declined prescription refills.  Discharge instructions reviewed with: Patient.  Patient and/or family verbalized understanding of discharge instructions and all questions answered.  Patient discharged in stable condition accompanied by: self.  Departure Mode: Ambulatory.    Jami Sanders RN

## 2018-04-19 NOTE — TELEPHONE ENCOUNTER
Michelle called back stating she is in [place] feeling well. She noticed a rash on her back, chest and thigh that itches.  She states it looks red, kind of blotchy with little bumps in it.  She has been taking benadryl at night for the itching.    Reviewed with Dr. Magana, he stated she can take a claritin in the day for itching and try some topical hyrdocortisone cream.  If this gets worse she is to got to urgent care in [place].    Message left for her to call back

## 2018-04-23 ENCOUNTER — INFUSION THERAPY VISIT (OUTPATIENT)
Dept: INFUSION THERAPY | Facility: CLINIC | Age: 63
End: 2018-04-23
Attending: INTERNAL MEDICINE
Payer: COMMERCIAL

## 2018-04-23 ENCOUNTER — ONCOLOGY VISIT (OUTPATIENT)
Dept: ONCOLOGY | Facility: CLINIC | Age: 63
End: 2018-04-23
Attending: INTERNAL MEDICINE
Payer: COMMERCIAL

## 2018-04-23 ENCOUNTER — HOSPITAL ENCOUNTER (OUTPATIENT)
Facility: CLINIC | Age: 63
Setting detail: SPECIMEN
Discharge: HOME | End: 2018-04-23
Attending: INTERNAL MEDICINE | Admitting: NURSE PRACTITIONER
Payer: COMMERCIAL

## 2018-04-23 VITALS
BODY MASS INDEX: 27.97 KG/M2 | OXYGEN SATURATION: 100 % | RESPIRATION RATE: 16 BRPM | SYSTOLIC BLOOD PRESSURE: 107 MMHG | TEMPERATURE: 98.1 F | DIASTOLIC BLOOD PRESSURE: 53 MMHG | HEIGHT: 66 IN | HEART RATE: 74 BPM | WEIGHT: 174 LBS

## 2018-04-23 VITALS
HEIGHT: 66 IN | WEIGHT: 174 LBS | HEART RATE: 66 BPM | SYSTOLIC BLOOD PRESSURE: 95 MMHG | BODY MASS INDEX: 27.97 KG/M2 | TEMPERATURE: 98.1 F | RESPIRATION RATE: 16 BRPM | DIASTOLIC BLOOD PRESSURE: 47 MMHG | OXYGEN SATURATION: 100 %

## 2018-04-23 DIAGNOSIS — C50.811 MALIGNANT NEOPLASM OF OVERLAPPING SITES OF RIGHT BREAST IN FEMALE, ESTROGEN RECEPTOR POSITIVE (H): Primary | ICD-10-CM

## 2018-04-23 DIAGNOSIS — Z17.0 MALIGNANT NEOPLASM OF OVERLAPPING SITES OF RIGHT BREAST IN FEMALE, ESTROGEN RECEPTOR POSITIVE (H): Primary | ICD-10-CM

## 2018-04-23 DIAGNOSIS — C80.1 CARCINOMA OF UNKNOWN ORIGIN (H): ICD-10-CM

## 2018-04-23 LAB
ALBUMIN SERPL-MCNC: 2.9 G/DL (ref 3.4–5)
ALP SERPL-CCNC: 101 U/L (ref 40–150)
ALT SERPL W P-5'-P-CCNC: 27 U/L (ref 0–50)
ANION GAP SERPL CALCULATED.3IONS-SCNC: 7 MMOL/L (ref 3–14)
AST SERPL W P-5'-P-CCNC: 20 U/L (ref 0–45)
BASOPHILS # BLD AUTO: 0 10E9/L (ref 0–0.2)
BASOPHILS NFR BLD AUTO: 1.2 %
BILIRUB SERPL-MCNC: 0.2 MG/DL (ref 0.2–1.3)
BUN SERPL-MCNC: 14 MG/DL (ref 7–30)
CALCIUM SERPL-MCNC: 8.7 MG/DL (ref 8.5–10.1)
CHLORIDE SERPL-SCNC: 108 MMOL/L (ref 94–109)
CO2 SERPL-SCNC: 26 MMOL/L (ref 20–32)
CREAT SERPL-MCNC: 0.61 MG/DL (ref 0.52–1.04)
DIFFERENTIAL METHOD BLD: ABNORMAL
EOSINOPHIL # BLD AUTO: 0 10E9/L (ref 0–0.7)
EOSINOPHIL NFR BLD AUTO: 0.3 %
ERYTHROCYTE [DISTWIDTH] IN BLOOD BY AUTOMATED COUNT: 19 % (ref 10–15)
GFR SERPL CREATININE-BSD FRML MDRD: >90 ML/MIN/1.7M2
GLUCOSE SERPL-MCNC: 96 MG/DL (ref 70–99)
HCT VFR BLD AUTO: 24 % (ref 35–47)
HGB BLD-MCNC: 8 G/DL (ref 11.7–15.7)
IMM GRANULOCYTES # BLD: 0 10E9/L (ref 0–0.4)
IMM GRANULOCYTES NFR BLD: 0.3 %
LYMPHOCYTES # BLD AUTO: 0.6 10E9/L (ref 0.8–5.3)
LYMPHOCYTES NFR BLD AUTO: 17 %
MCH RBC QN AUTO: 31.7 PG (ref 26.5–33)
MCHC RBC AUTO-ENTMCNC: 33.3 G/DL (ref 31.5–36.5)
MCV RBC AUTO: 95 FL (ref 78–100)
MONOCYTES # BLD AUTO: 0.6 10E9/L (ref 0–1.3)
MONOCYTES NFR BLD AUTO: 18.5 %
NEUTROPHILS # BLD AUTO: 2 10E9/L (ref 1.6–8.3)
NEUTROPHILS NFR BLD AUTO: 62.7 %
NRBC # BLD AUTO: 0 10*3/UL
NRBC BLD AUTO-RTO: 0 /100
PLATELET # BLD AUTO: 308 10E9/L (ref 150–450)
POTASSIUM SERPL-SCNC: 4 MMOL/L (ref 3.4–5.3)
PROT SERPL-MCNC: 5.9 G/DL (ref 6.8–8.8)
RBC # BLD AUTO: 2.52 10E12/L (ref 3.8–5.2)
SODIUM SERPL-SCNC: 141 MMOL/L (ref 133–144)
WBC # BLD AUTO: 3.2 10E9/L (ref 4–11)

## 2018-04-23 PROCEDURE — 96375 TX/PRO/DX INJ NEW DRUG ADDON: CPT

## 2018-04-23 PROCEDURE — 99214 OFFICE O/P EST MOD 30 MIN: CPT | Performed by: NURSE PRACTITIONER

## 2018-04-23 PROCEDURE — 96413 CHEMO IV INFUSION 1 HR: CPT

## 2018-04-23 PROCEDURE — 96411 CHEMO IV PUSH ADDL DRUG: CPT

## 2018-04-23 PROCEDURE — 85025 COMPLETE CBC W/AUTO DIFF WBC: CPT | Performed by: INTERNAL MEDICINE

## 2018-04-23 PROCEDURE — 96377 APPLICATON ON-BODY INJECTOR: CPT | Mod: XS

## 2018-04-23 PROCEDURE — 25000128 H RX IP 250 OP 636: Performed by: INTERNAL MEDICINE

## 2018-04-23 PROCEDURE — 96367 TX/PROPH/DG ADDL SEQ IV INF: CPT

## 2018-04-23 PROCEDURE — 80053 COMPREHEN METABOLIC PANEL: CPT | Performed by: NURSE PRACTITIONER

## 2018-04-23 RX ORDER — PALONOSETRON 0.05 MG/ML
0.25 INJECTION, SOLUTION INTRAVENOUS ONCE
Status: COMPLETED | OUTPATIENT
Start: 2018-04-23 | End: 2018-04-23

## 2018-04-23 RX ORDER — DOXORUBICIN HYDROCHLORIDE 2 MG/ML
120 INJECTION, SOLUTION INTRAVENOUS ONCE
Status: COMPLETED | OUTPATIENT
Start: 2018-04-23 | End: 2018-04-23

## 2018-04-23 RX ADMIN — DOXORUBICIN HYDROCHLORIDE 120 MG: 2 INJECTION, SOLUTION INTRAVENOUS at 11:51

## 2018-04-23 RX ADMIN — SODIUM CHLORIDE 250 ML: 9 INJECTION, SOLUTION INTRAVENOUS at 11:06

## 2018-04-23 RX ADMIN — PALONOSETRON HYDROCHLORIDE 0.25 MG: 0.25 INJECTION INTRAVENOUS at 11:07

## 2018-04-23 RX ADMIN — CYCLOPHOSPHAMIDE 1200 MG: 1 INJECTION, POWDER, FOR SOLUTION INTRAVENOUS; ORAL at 12:07

## 2018-04-23 RX ADMIN — DEXAMETHASONE SODIUM PHOSPHATE: 10 INJECTION, SOLUTION INTRAMUSCULAR; INTRAVENOUS at 11:10

## 2018-04-23 RX ADMIN — PEGFILGRASTIM 6 MG: KIT SUBCUTANEOUS at 12:11

## 2018-04-23 ASSESSMENT — PAIN SCALES - GENERAL
PAINLEVEL: NO PAIN (0)
PAINLEVEL: NO PAIN (0)

## 2018-04-23 NOTE — PATIENT INSTRUCTIONS
Your On-body Neulasta Injector was applied to your right arm at 1215PM.  At approximately 3:15PM on 4/24/18, your On-body Injector will beep to let you know your dose delivery will begin in 2 minutes.  Your medication will be delivered over the next 45 minutes.  You can remove your Injector at 4:15PM.  Please make sure your Injector has a solid green light or has turned off prior to removing the device.  Please contact your provider at [phone] with questions or concerns.

## 2018-04-23 NOTE — MR AVS SNAPSHOT
After Visit Summary   4/23/2018    Flor Griffin    MRN: [id number]           Patient Information     Date Of Birth          1955        Visit Information        Provider Department      4/23/2018 9:30 AM  INFUSION CHAIR 17 [facility]        Today's Diagnoses     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 in female, estrogen receptor positive (H)    -  1    Carcinoma of unknown origin (H)          Care Instructions    Your On-body Neulasta Injector was applied to your right arm at 1215PM.  At approximately 3:15PM on 4/24/18, your On-body Injector will beep to let you know your dose delivery will begin in 2 minutes.  Your medication will be delivered over the next 45 minutes.  You can remove your Injector at 4:15PM.  Please make sure your Injector has a solid green light or has turned off prior to removing the device.  Please contact your provider at [phone] with questions or concerns.              Follow-ups after your visit        Your next 10 appointments already scheduled     Apr 30, 2018  8:30 AM CDT   (Arrive by 8:15 AM)   PE NPET ONCOLOGY (EYES TO THIGHS) with SHPETM1   [facility] PET CT ([facility])    [address] [place] [id number] [phone]           Tell your doctor:   If there is any chance you may be pregnant or if you are breastfeeding.   If you have problems lying in small spaces (claustrophobia). If you do, your doctor may give you medicine to help you relax. If you have diabetes:   Have your exam early in the morning. Your blood glucose will go up as the day goes by.   Your glucose level must be 180 or less at the start of the exam. Please take any medicines you need to ensure this blood glucose level. 24 hours before your scan: Don t do any heavy exercise. (No jogging, aerobics or other workouts.) Exercise will make your pictures less accurate. 6 hours before your scan:   Stop all  "food and liquids (except water).   Do not chew gum or suck on mints.   If you need to take medicine with food, you may take it with a few crackers.  Please call your Imaging Department at your exam site with any questions.            May 03, 2018  8:00 AM CDT   Return Visit with María Galeas GC   Cancer Risk Management Program ([facility])    [place] [id number]  [facility] [id number] [phone]            May 03, 2018 10:30 AM CDT   Return Visit with Yani Magana MD   [facility] ([facility])    [facility]  [address]  [facility] [id number] [phone]              Who to contact     If you have questions or need follow up information about today's clinic visit or your schedule please contact [facility] directly at [phone].  Normal or non-critical lab and imaging results will be communicated to you by [employer]hart, letter or phone within 4 business days after the clinic has received the results. If you do not hear from us within 7 days, please contact the clinic through [employer]hart or phone. If you have a critical or abnormal lab result, we will notify you by phone as soon as possible.  Submit refill requests through [employer] or call your pharmacy and they will forward the refill request to us. Please allow 3 business days for your refill to be completed.          Additional Information About Your Visit        [employer] Information     [employer] lets you send messages to your doctor, view your test results, renew your prescriptions, schedule appointments and more. To sign up, go to [email or web address] . Click on \"Log in\" on the left side of the screen, which will take you to the Welcome page. Then click on \"Sign up Now\" on the right side of the page.     You will be asked to enter the access code listed below, as well as some personal information. Please follow the " "directions to create your username and password.     Your access code is: [email or web address]  Expires: 2018  3:02 PM     Your access code will  in 90 days. If you need help or a new code, please call your [place] clinic or [phone].        Care EveryWhere ID     This is your Care EveryWhere ID. This could be used by other organizations to access your [place] medical records  [id number]        Your Vitals Were     Pulse Temperature Respirations Height Last Period Pulse Oximetry    74 98.1  F (36.7  C) (Oral) 16 1.676 m (5' 6\") 2004 (Approximate) 100%    BMI (Body Mass Index)                   28.08 kg/m2            Blood Pressure from Last 3 Encounters:   18 107/53   18 107/53   18 116/66    Weight from Last 3 Encounters:   18 78.9 kg (174 lb)   18 78.9 kg (174 lb)   18 77.1 kg (170 lb)              We Performed the Following     CBC with platelets differential     Comprehensive metabolic panel (BMP + Alb, Alk Phos, ALT, AST, Total. Bili, TP)        Primary Care Provider Office Phone # Fax #    [facility] [phone]       [address] [facility] [address]        Equal Access to Services     MARYJANE JARA : Hadii nelida ku hadasho Sodot, waaxda luqadaha, qaybta kaalmada osmani, ketan connor. So [facility] [phone].    ATENCIÓN: Si habla español, tiene a thomas disposición servicios gratuitos de asistencia lingüística. Llame al [phone].    We comply with applicable federal civil rights laws and [place] laws. We do not discriminate on the basis of race, color, national origin, age, disability, sex, sexual orientation, or gender identity.            Thank you!     Thank you for choosing [facility] AND [facility]  for your care. Our goal is always to provide you with excellent care. Hearing back from our patients is one way we can continue to improve our services. Please take a " few minutes to complete the written survey that you may receive in the mail after your visit with us. Thank you!             Your Updated Medication List - Protect others around you: Learn how to safely use, store and throw away your medicines at www.disposemymeds.org.          This list is accurate as of 4/23/18 12:16 PM.  Always use your most recent med list.                   Brand Name Dispense Instructions for use Diagnosis    ADVIL PO      Take 400 mg by mouth every 6 hours as needed for moderate pain        aspirin 81 MG tablet      Take 81 mg by mouth daily        dexamethasone 4 MG tablet    DECADRON    6 tablet    Take 2 tablets (8 mg) by mouth daily Start on Day 2.    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 in female, estrogen receptor positive (H), Carcinoma of unknown origin (H)       famotidine 20 MG tablet    PEPCID    30 tablet    Take 1 tablet (20 mg) by mouth daily    Heartburn       fluconazole 100 MG tablet    DIFLUCAN    15 tablet    Take 1 tab daily    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 in female, estrogen receptor positive (H), Carcinoma of unknown origin (H)       GLUCOSAMINE CHONDROITIN JOINT PO      Take by mouth daily        HYDROcodone-acetaminophen 5-325 MG per tablet    NORCO    6 tablet    Take 1 tablet by mouth every 6 hours as needed for moderate to severe pain    Carcinoma of unknown origin (H)       levofloxacin 500 MG tablet    LEVAQUIN    7 tablet    Take 1 tablet (500 mg) by mouth daily    Port catheter in place, 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 in female, estrogen receptor positive (H), Carcinoma of unknown origin (H)       LORazepam 0.5 MG tablet    ATIVAN    30 tablet    Take 1 tablet (0.5 mg) by mouth every 4 hours as needed (Anxiety, Nausea/Vomiting or Sleep)    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 in female, estrogen receptor positive (H), Carcinoma of unknown origin (H)       magic mouthwash  suspension (diphenhydrAMINE, lidocaine, aluminum-magnesium & simethicone) Susp compounding kit     240 mL    Swish and swallow 5-10 mLs in mouth every 6 hours as needed for mouth sores    Mouth sores       OLANZapine 5 MG tablet    zyPREXA    30 tablet    Take 1 tablet (5 mg) orally at bedtime    Nausea, Carcinoma of unknown origin (H), Port catheter in place, 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 in female, estrogen receptor positive (H)       OMEGA-3 FISH OIL PO      Take 1 g by mouth daily        prochlorperazine 10 MG tablet    COMPAZINE    30 tablet    Take 1 tablet (10 mg) by mouth every 6 hours as needed (Nausea/Vomiting)    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 in female, estrogen receptor positive (H), Carcinoma of unknown origin (H)       senna 8.6 MG tablet    SENOKOT    120 tablet    Take 1 tablet by mouth daily    Carcinoma of unknown origin (H), Constipation, unspecified constipation type       valACYclovir 1000 mg tablet    VALTREX    20 tablet    Take 1 tablet (1,000 mg) by mouth 2 times daily    Cold sore       VITAMIN B6 PO      Take by mouth daily        VITAMIN D (CHOLECALCIFEROL) PO      Take 1,000 Units by mouth daily

## 2018-04-23 NOTE — PROGRESS NOTES
Infusion Nursing Note:  Flor Griffin presents today for C4D1 AC.    Patient seen by provider today: Yes: Buster Menon   present during visit today: Not Applicable.    Note: N/A.    Intravenous Access:  Labs drawn without difficulty.  Implanted Port.    Treatment Conditions:  Lab Results   Component Value Date    HGB 8.0 04/23/2018     Lab Results   Component Value Date    WBC 3.2 04/23/2018      Lab Results   Component Value Date    ANEU 2.0 04/23/2018     Lab Results   Component Value Date     04/23/2018      Results reviewed, labs MET treatment parameters, ok to proceed with treatment.      Post Infusion Assessment:  Patient tolerated infusion without incident.  Blood return noted pre and post infusion.  Blood return noted during administration every 2 cc.  Site patent and intact, free from redness, edema or discomfort.  No evidence of extravasations.  Access discontinued per protocol.  ONPRO  Was placed on patient's: back of right arm.    Was placed at 1215 PM    ONPRO injector device Lot number: [id number]    Patient education included: what patient can expect after application, what colored lights mean on the device, when to remove device, when and where to call with questions or issues, all patients questions answered and that Neulasta administration will occur at 3:15PM.    Patient tolerated administration well.    Discharge Plan:   Patient declined prescription refills.  Discharge instructions reviewed with: Patient.  Patient and/or family verbalized understanding of discharge instructions and all questions answered.  Copy of AVS reviewed with patient and/or family.  Patient will return 5/3/18 for next appointment.  Patient discharged in stable condition accompanied by: self.  Departure Mode: Ambulatory.    Audrey Mendez RN

## 2018-04-23 NOTE — PROGRESS NOTES
"Oncology Rooming Note    April 23, 2018 10:01 AM   Flor Griffin is a 62 year old female who presents for:    Chief Complaint   Patient presents with     Oncology Clinic Visit     Initial Vitals: /53  Pulse 74  Temp 98.1  F (36.7  C) (Oral)  Resp 16  Ht 1.676 m (5' 5.98\")  Wt 78.9 kg (174 lb)  LMP 01/01/2004 (Approximate)  SpO2 100%  BMI 28.1 kg/m2 Estimated body mass index is 28.1 kg/(m^2) as calculated from the following:    Height as of this encounter: 1.676 m (5' 5.98\").    Weight as of this encounter: 78.9 kg (174 lb). Body surface area is 1.92 meters squared.  No Pain (0) Comment: Data Unavailable   Patient's last menstrual period was 01/01/2004 (approximate).  Allergies reviewed: Yes  Medications reviewed: Yes    Medications: Medication refills not needed today.  Pharm[facility] into [facility]: [facility] [id number]    Clinical concerns: None     2 minutes for nursing intake (face to face time)     Mary Jo James CMA              "

## 2018-04-23 NOTE — MR AVS SNAPSHOT
After Visit Summary   4/23/2018    Flor Griffin    MRN: [id number]           Patient Information     Date Of Birth          1955        Visit Information        Provider Department      4/23/2018 10:00 AM Buster Menon APRN [facility]        Today's Diagnoses     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 in female, estrogen receptor positive (H)    -  1      Care Instructions    Ok to give chemo today       Keep all future appointments           Follow-ups after your visit        Your next 10 appointments already scheduled     Apr 30, 2018  8:30 AM CDT   (Arrive by 8:15 AM)   PE NPET ONCOLOGY (EYES TO THIGHS) with SHPETM1   [facility] PET CT ([facility])    [address] [facility] [id number] [phone]           Tell your doctor:   If there is any chance you may be pregnant or if you are breastfeeding.   If you have problems lying in small spaces (claustrophobia). If you do, your doctor may give you medicine to help you relax. If you have diabetes:   Have your exam early in the morning. Your blood glucose will go up as the day goes by.   Your glucose level must be 180 or less at the start of the exam. Please take any medicines you need to ensure this blood glucose level. 24 hours before your scan: Don t do any heavy exercise. (No jogging, aerobics or other workouts.) Exercise will make your pictures less accurate. 6 hours before your scan:   Stop all food and liquids (except water).   Do not chew gum or suck on mints.   If you need to take medicine with food, you may take it with a few crackers.  Please call your Imaging Department at your exam site with any questions.            May 03, 2018  8:00 AM CDT   Return Visit with María Galeas GC   Cancer Risk Management Program ([facility])    [facility]  [address] [address] [phone]            May 03, 2018 10:30  "AM CDT   Return Visit with Yani Magana MD   [facility] ([facility])    [facility]  [address]  [place] [address] [phone]              Who to contact     If you have questions or need follow up information about today's clinic visit or your schedule please contact [facility] directly at [phone].  Normal or non-critical lab and imaging results will be communicated to you by [employer], letter or phone within 4 business days after the clinic has received the results. If you do not hear from us within 7 days, please contact the clinic through [employer] or phone. If you have a critical or abnormal lab result, we will notify you by phone as soon as possible.  Submit refill requests through [employer] or call your pharmacy and they will forward the refill request to us. Please allow 3 business days for your refill to be completed.          Additional Information About Your Visit        [employer] Information     [employer] lets you send messages to your doctor, view your test results, renew your prescriptions, schedule appointments and more. To sign up, go to [email or web address] . Click on \"Log in\" on the left side of the screen, which will take you to the Welcome page. Then click on \"Sign up Now\" on the right side of the page.     You will be asked to enter the access code listed below, as well as some personal information. Please follow the directions to create your username and password.     Your access code is: [email or web address]  Expires: 2018  3:02 PM     Your access code will  in 90 days. If you need help or a new code, please call your [place] clinic or [phone].        Care EveryWhere ID     This is your Care EveryWhere ID. This could be used by other organizations to access your [place] medical records  [id number]        Your Vitals Were     Pulse Temperature Respirations Height Last Period Pulse Oximetry    74 98.1  F " "(36.7  C) (Oral) 16 1.676 m (5' 5.98\") 01/01/2004 (Approximate) 100%    BMI (Body Mass Index)                   28.1 kg/m2            Blood Pressure from Last 3 Encounters:   04/23/18 107/53   04/23/18 95/47   04/13/18 116/66    Weight from Last 3 Encounters:   04/23/18 78.9 kg (174 lb)   04/23/18 78.9 kg (174 lb)   04/09/18 77.1 kg (170 lb)              Today, you had the following     No orders found for display       Primary Care Provider Office Phone # Fax #    [facility] [phone] [address] [facility] [address]        Equal Access to Services     MARYJANE JARA : Mika Nolan, waaxda luqadaha, qaybta kaalmada adedarcyyavanita, ketan connor. So [facility] [phone].    ATENCIÓN: Si habla español, tiene a thomas disposición servicios gratuitos de asistencia lingüística. [facility] [phone].    We comply with applicable federal civil rights laws and [place] laws. We do not discriminate on the basis of race, color, national origin, age, disability, sex, sexual orientation, or gender identity.            Thank you!     Thank you for choosing [facility] [place]  for your care. Our goal is always to provide you with excellent care. Hearing back from our patients is one way we can continue to improve our services. Please take a few minutes to complete the written survey that you may receive in the mail after your visit with us. Thank you!             Your Updated Medication List - Protect others around you: Learn how to safely use, store and throw away your medicines at www.disposemymeds.org.          This list is accurate as of 4/23/18 11:59 PM.  Always use your most recent med list.                   Brand Name Dispense Instructions for use Diagnosis    ADVIL PO      Take 400 mg by mouth every 6 hours as needed for moderate pain        aspirin 81 MG tablet      Take 81 mg by mouth daily        dexamethasone 4 MG tablet    " DECADRON    6 tablet    Take 2 tablets (8 mg) by mouth daily Start on Day 2.    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 in female, estrogen receptor positive (H), Carcinoma of unknown origin (H)       famotidine 20 MG tablet    PEPCID    30 tablet    Take 1 tablet (20 mg) by mouth daily    Heartburn       fluconazole 100 MG tablet    DIFLUCAN    15 tablet    Take 1 tab daily    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 in female, estrogen receptor positive (H), Carcinoma of unknown origin (H)       GLUCOSAMINE CHONDROITIN JOINT PO      Take by mouth daily        HYDROcodone-acetaminophen 5-325 MG per tablet    NORCO    6 tablet    Take 1 tablet by mouth every 6 hours as needed for moderate to severe pain    Carcinoma of unknown origin (H)       levofloxacin 500 MG tablet    LEVAQUIN    7 tablet    Take 1 tablet (500 mg) by mouth daily    Port catheter in place, 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 in female, estrogen receptor positive (H), Carcinoma of unknown origin (H)       LORazepam 0.5 MG tablet    ATIVAN    30 tablet    Take 1 tablet (0.5 mg) by mouth every 4 hours as needed (Anxiety, Nausea/Vomiting or Sleep)    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 in female, estrogen receptor positive (H), Carcinoma of unknown origin (H)       magic mouthwash suspension (diphenhydrAMINE, lidocaine, aluminum-magnesium & simethicone) Susp compounding kit     240 mL    Swish and swallow 5-10 mLs in mouth every 6 hours as needed for mouth sores    Mouth sores       OLANZapine 5 MG tablet    zyPREXA    30 tablet    Take 1 tablet (5 mg) orally at bedtime    Nausea, Carcinoma of unknown origin (H), Port catheter in place, 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 in female, estrogen receptor positive (H)       OMEGA-3 FISH OIL PO      Take 1 g by mouth daily        prochlorperazine 10 MG tablet    COMPAZINE    30 tablet    Take 1 tablet (10 mg) by  mouth every 6 hours as needed (Nausea/Vomiting)    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 in female, estrogen receptor positive (H), Carcinoma of unknown origin (H)       senna 8.6 MG tablet    SENOKOT    120 tablet    Take 1 tablet by mouth daily    Carcinoma of unknown origin (H), Constipation, unspecified constipation type       valACYclovir 1000 mg tablet    VALTREX    20 tablet    Take 1 tablet (1,000 mg) by mouth 2 times daily    Cold sore       VITAMIN B6 PO      Take by mouth daily        VITAMIN D (CHOLECALCIFEROL) PO      Take 1,000 Units by mouth daily

## 2018-04-23 NOTE — LETTER
"    4/23/2018         RE: Flor Griffin  [address]  [facility] [address]        Dear Colleague,    Thank you for referring your patient, Flor Griffin, to the [facility]. Please see a copy of my visit note below.    Oncology Rooming Note    April 23, 2018 10:01 AM   Flor Griffin is a 62 year old female who presents for:    Chief Complaint   Patient presents with     Oncology Clinic Visit     Initial Vitals: /53  Pulse 74  Temp 98.1  F (36.7  C) (Oral)  Resp 16  Ht 1.676 m (5' 5.98\")  Wt 78.9 kg (174 lb)  LMP 01/01/2004 (Approximate)  SpO2 100%  BMI 28.1 kg/m2 Estimated body mass index is 28.1 kg/(m^2) as calculated from the following:    Height as of this encounter: 1.676 m (5' 5.98\").    Weight as of this encounter: 78.9 kg (174 lb). Body surface area is 1.92 meters squared.  No Pain (0) Comment: Data Unavailable   Patient's last menstrual period was 01/01/2004 (approximate).  Allergies reviewed: Yes  Medications reviewed: Yes    Medications: Medication refills not needed today.  Pharm[facility] entered into [id number] - [place], [place] - [address]    Clinical concerns: None     2 minutes for nursing intake (face to face time)     Mary Jo James CMA                Oncology/Hematology Visit Note  Apr 23, 2018    Reason for Visit: follow up of  Adenocarcinoma.   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  Otherwise, no distant metastatic disease.   Mammogram 11/30/17- negative   TREATMENT: Carboplatin and Taxol completed 4 cycles. 3/7/2018 started dose dense AC.     Patient comes here for follow-up prior cycle 4 of  dose dense AC.    Interval History:    She " returned from a trip in [place].  Patient reports that she feels well. she denies fever chills or sweats, denies cough shortness of breath, her nausea is controlled with Zyprexa.  She denies nausea now denies vomiting denies pain.  Appetite is improving.  She does not have any new complaints today    Review of Systems:  14 point ROS of systems including Constitutional, Eyes, Respiratory, Cardiovascular, Gastroenterology, Genitourinary, Integumentary, Muscularskeletal, Psychiatric were all negative except for pertinent positives noted in my HPI.      Current Outpatient Prescriptions   Medication Sig Dispense Refill     aspirin 81 MG tablet Take 81 mg by mouth daily       dexamethasone (DECADRON) 4 MG tablet Take 2 tablets (8 mg) by mouth daily Start on Day 2. 6 tablet 3     famotidine (PEPCID) 20 MG tablet Take 1 tablet (20 mg) by mouth daily 30 tablet 1     fluconazole (DIFLUCAN) 100 MG tablet Take 1 tab daily (Patient not taking: Reported on 4/23/2018) 15 tablet 0     Glucos-Chondroit-Hyaluron-MSM (GLUCOSAMINE CHONDROITIN JOINT PO) Take by mouth daily       HYDROcodone-acetaminophen (NORCO) 5-325 MG per tablet Take 1 tablet by mouth every 6 hours as needed for moderate to severe pain 6 tablet 0     Ibuprofen (ADVIL PO) Take 400 mg by mouth every 6 hours as needed for moderate pain       levofloxacin (LEVAQUIN) 500 MG tablet Take 1 tablet (500 mg) by mouth daily (Patient not taking: Reported on 4/23/2018) 7 tablet 0     LORazepam (ATIVAN) 0.5 MG tablet Take 1 tablet (0.5 mg) by mouth every 4 hours as needed (Anxiety, Nausea/Vomiting or Sleep) 30 tablet 3     magic mouthwash (FIRST-MOUTHWASH BLM) suspension Swish and swallow 5-10 mLs in mouth every 6 hours as needed for mouth sores 240 mL 1     OLANZapine (ZYPREXA) 5 MG tablet Take 1 tablet (5 mg) orally at bedtime 30 tablet 0     Omega-3 Fatty Acids (OMEGA-3 FISH OIL PO) Take 1 g by mouth daily       prochlorperazine (COMPAZINE) 10 MG tablet Take 1 tablet (10 mg) by  "mouth every 6 hours as needed (Nausea/Vomiting) 30 tablet 3     Pyridoxine HCl (VITAMIN B6 PO) Take by mouth daily       senna (SENOKOT) 8.6 MG tablet Take 1 tablet by mouth daily 120 tablet 1     valACYclovir (VALTREX) 1000 mg tablet Take 1 tablet (1,000 mg) by mouth 2 times daily 20 tablet 0     VITAMIN D, CHOLECALCIFEROL, PO Take 1,000 Units by mouth daily         Physical Examination:  General: The patient is a pleasant female in no acute distress.  /53  Pulse 74  Temp 98.1  F (36.7  C) (Oral)  Resp 16  Ht 1.676 m (5' 5.98\")  Wt 78.9 kg (174 lb)  LMP 01/01/2004 (Approximate)  SpO2 100%  BMI 28.1 kg/m2  HEENT: EOMI, PERRL. Sclerae are anicteric. Oral mucosa is pink and moist with no lesions or thrush.   Lymph: Neck is supple with no lymphadenopathy in the cervical or supraclavicular areas.   Heart: Regular rate and rhythm.   Lungs: Clear to auscultation bilaterally.   GI: Bowel sounds present, soft, nontender with no palpable hepatosplenomegaly or masses.   Extremities: No lower extremity edema noted bilaterally.   Skin: No rashes, petechiae, or bruising noted on exposed skin.    Laboratory Data:      Results for ALMA MASON (MRN [id number]) as of 4/24/2018 09:11   Ref. Range 4/23/2018 09:21 4/23/2018 09:51   Sodium Latest Ref Range: 133 - 144 mmol/L  141   Potassium Latest Ref Range: 3.4 - 5.3 mmol/L  4.0   Chloride Latest Ref Range: 94 - 109 mmol/L  108   Carbon Dioxide Latest Ref Range: 20 - 32 mmol/L  26   Urea Nitrogen Latest Ref Range: 7 - 30 mg/dL  14   Creatinine Latest Ref Range: 0.52 - 1.04 mg/dL  0.61   GFR Estimate Latest Ref Range: >60 mL/min/1.7m2  >90   GFR Estimate If Black Latest Ref Range: >60 mL/min/1.7m2  >90   Calcium Latest Ref Range: 8.5 - 10.1 mg/dL  8.7   Anion Gap Latest Ref Range: 3 - 14 mmol/L  7   Albumin Latest Ref Range: 3.4 - 5.0 g/dL  2.9 (L)   Protein Total Latest Ref Range: 6.8 - 8.8 g/dL  5.9 (L)   Bilirubin Total Latest Ref Range: 0.2 - 1.3 mg/dL  0.2 "   Alkaline Phosphatase Latest Ref Range: 40 - 150 U/L  101   ALT Latest Ref Range: 0 - 50 U/L  27   AST Latest Ref Range: 0 - 45 U/L  20   Glucose Latest Ref Range: 70 - 99 mg/dL  96   WBC Latest Ref Range: 4.0 - 11.0 10e9/L 3.2 (L)    Hemoglobin Latest Ref Range: 11.7 - 15.7 g/dL 8.0 (L)    Hematocrit Latest Ref Range: 35.0 - 47.0 % 24.0 (L)    Platelet Count Latest Ref Range: 150 - 450 10e9/L 308    RBC Count Latest Ref Range: 3.8 - 5.2 10e12/L 2.52 (L)    MCV Latest Ref Range: 78 - 100 fl 95    MCH Latest Ref Range: 26.5 - 33.0 pg 31.7    MCHC Latest Ref Range: 31.5 - 36.5 g/dL 33.3    RDW Latest Ref Range: 10.0 - 15.0 % 19.0 (H)    Diff Method Unknown Automated Method    % Neutrophils Latest Units: % 62.7    % Lymphocytes Latest Units: % 17.0    % Monocytes Latest Units: % 18.5    % Eosinophils Latest Units: % 0.3    % Basophils Latest Units: % 1.2    % Immature Granulocytes Latest Units: % 0.3    Nucleated RBCs Latest Ref Range: 0 /100 0    Absolute Neutrophil Latest Ref Range: 1.6 - 8.3 10e9/L 2.0    Absolute Lymphocytes Latest Ref Range: 0.8 - 5.3 10e9/L 0.6 (L)    Absolute Monocytes Latest Ref Range: 0.0 - 1.3 10e9/L 0.6    Absolute Eosinophils Latest Ref Range: 0.0 - 0.7 10e9/L 0.0    Absolute Basophils Latest Ref Range: 0.0 - 0.2 10e9/L 0.0    Abs Immature Granulocytes Latest Ref Range: 0 - 0.4 10e9/L 0.0    Absolute Nucleated RBC Unknown 0.0              Assessment and Plan:    This is a 62-year-old female with    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  PET scan from 11/2017hypermetabolic right supraclavicular, right axillary and subpectoral adenopathy.  There was no evidence of distant metastases.  One possibility is breast cancer which has metastasized to the regional lymph nodes.  However, we are unable to find primary cancer on physical exam and a PET scan mammogram was negative.  MR of the breast I is negative.She is status post 4 cycles of carboplatin Taxol with PET CT scan on  February 2018 showing good response.  She is currently receiving dose dense AC for 4 cycles s  Today is cycle 4 - she had neutropenia after cycle 3 . She was given Neupogen in addition to neulasta  due to her trip to [place]      Proceed with cycle 4 today followed by neulasta   She is scheduled for PET CT scan on April 30 and she will see Dr. Magana May 3.            Anemia secondary to chemotherapy.  No evidence of bleeding.  Continue to monitor     Nausea vomiting secondary to chemotherapy.    Started Zyprexa 5 mg p.o. q. at bedtime.  Nausea resolved  She has Zofran and Ativan as needed as well          Patient is asked to go to the emergency room in the event of fever, chills sweats, cough, shortness of breath chest pain nausea vomiting diarrhea abdominal pain or any changes in health condition           HEDY Beth CNP  Hem/Onc   [facility]                       Again, thank you for allowing me to participate in the care of your patient.        Sincerely,        HEDY Beth CNP

## 2018-04-24 NOTE — PROGRESS NOTES
Oncology/Hematology Visit Note  Apr 23, 2018    Reason for Visit: follow up of  Adenocarcinoma.   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  Otherwise, no distant metastatic disease.   Mammogram 11/30/17- negative   TREATMENT: Carboplatin and Taxol completed 4 cycles. 3/7/2018 started dose dense AC.     Patient comes here for follow-up prior cycle 4 of  dose dense AC.    Interval History:    She returned from a trip in [place].  Patient reports that she feels well. she denies fever chills or sweats, denies cough shortness of breath, her nausea is controlled with Zyprexa.  She denies nausea now denies vomiting denies pain.  Appetite is improving.  She does not have any new complaints today    Review of Systems:  14 point ROS of systems including Constitutional, Eyes, Respiratory, Cardiovascular, Gastroenterology, Genitourinary, Integumentary, Muscularskeletal, Psychiatric were all negative except for pertinent positives noted in my HPI.      Current Outpatient Prescriptions   Medication Sig Dispense Refill     aspirin 81 MG tablet Take 81 mg by mouth daily       dexamethasone (DECADRON) 4 MG tablet Take 2 tablets (8 mg) by mouth daily Start on Day 2. 6 tablet 3     famotidine (PEPCID) 20 MG tablet Take 1 tablet (20 mg) by mouth daily 30 tablet 1     fluconazole (DIFLUCAN) 100 MG tablet Take 1 tab daily (Patient not taking: Reported on 4/23/2018) 15 tablet 0     Glucos-Chondroit-Hyaluron-MSM (GLUCOSAMINE CHONDROITIN JOINT PO) Take by mouth daily       HYDROcodone-acetaminophen (NORCO) 5-325 MG per tablet Take 1 tablet by mouth every 6 hours as needed for moderate to severe pain 6 tablet 0     Ibuprofen (ADVIL PO) Take 400 mg by mouth every 6 hours as needed for moderate pain    "    levofloxacin (LEVAQUIN) 500 MG tablet Take 1 tablet (500 mg) by mouth daily (Patient not taking: Reported on 4/23/2018) 7 tablet 0     LORazepam (ATIVAN) 0.5 MG tablet Take 1 tablet (0.5 mg) by mouth every 4 hours as needed (Anxiety, Nausea/Vomiting or Sleep) 30 tablet 3     magic mouthwash (FIRST-MOUTHWASH BLM) suspension Swish and swallow 5-10 mLs in mouth every 6 hours as needed for mouth sores 240 mL 1     OLANZapine (ZYPREXA) 5 MG tablet Take 1 tablet (5 mg) orally at bedtime 30 tablet 0     Omega-3 Fatty Acids (OMEGA-3 FISH OIL PO) Take 1 g by mouth daily       prochlorperazine (COMPAZINE) 10 MG tablet Take 1 tablet (10 mg) by mouth every 6 hours as needed (Nausea/Vomiting) 30 tablet 3     Pyridoxine HCl (VITAMIN B6 PO) Take by mouth daily       senna (SENOKOT) 8.6 MG tablet Take 1 tablet by mouth daily 120 tablet 1     valACYclovir (VALTREX) 1000 mg tablet Take 1 tablet (1,000 mg) by mouth 2 times daily 20 tablet 0     VITAMIN D, CHOLECALCIFEROL, PO Take 1,000 Units by mouth daily         Physical Examination:  General: The patient is a pleasant female in no acute distress.  /53  Pulse 74  Temp 98.1  F (36.7  C) (Oral)  Resp 16  Ht 1.676 m (5' 5.98\")  Wt 78.9 kg (174 lb)  LMP 01/01/2004 (Approximate)  SpO2 100%  BMI 28.1 kg/m2  HEENT: EOMI, PERRL. Sclerae are anicteric. Oral mucosa is pink and moist with no lesions or thrush.   Lymph: Neck is supple with no lymphadenopathy in the cervical or supraclavicular areas.   Heart: Regular rate and rhythm.   Lungs: Clear to auscultation bilaterally.   GI: Bowel sounds present, soft, nontender with no palpable hepatosplenomegaly or masses.   Extremities: No lower extremity edema noted bilaterally.   Skin: No rashes, petechiae, or bruising noted on exposed skin.    Laboratory Data:      Results for ALMA MASON (MRN [id number]) as of 4/24/2018 09:11   Ref. Range 4/23/2018 09:21 4/23/2018 09:51   Sodium Latest Ref Range: 133 - 144 mmol/L  141 "   Potassium Latest Ref Range: 3.4 - 5.3 mmol/L  4.0   Chloride Latest Ref Range: 94 - 109 mmol/L  108   Carbon Dioxide Latest Ref Range: 20 - 32 mmol/L  26   Urea Nitrogen Latest Ref Range: 7 - 30 mg/dL  14   Creatinine Latest Ref Range: 0.52 - 1.04 mg/dL  0.61   GFR Estimate Latest Ref Range: >60 mL/min/1.7m2  >90   GFR Estimate If Black Latest Ref Range: >60 mL/min/1.7m2  >90   Calcium Latest Ref Range: 8.5 - 10.1 mg/dL  8.7   Anion Gap Latest Ref Range: 3 - 14 mmol/L  7   Albumin Latest Ref Range: 3.4 - 5.0 g/dL  2.9 (L)   Protein Total Latest Ref Range: 6.8 - 8.8 g/dL  5.9 (L)   Bilirubin Total Latest Ref Range: 0.2 - 1.3 mg/dL  0.2   Alkaline Phosphatase Latest Ref Range: 40 - 150 U/L  101   ALT Latest Ref Range: 0 - 50 U/L  27   AST Latest Ref Range: 0 - 45 U/L  20   Glucose Latest Ref Range: 70 - 99 mg/dL  96   WBC Latest Ref Range: 4.0 - 11.0 10e9/L 3.2 (L)    Hemoglobin Latest Ref Range: 11.7 - 15.7 g/dL 8.0 (L)    Hematocrit Latest Ref Range: 35.0 - 47.0 % 24.0 (L)    Platelet Count Latest Ref Range: 150 - 450 10e9/L 308    RBC Count Latest Ref Range: 3.8 - 5.2 10e12/L 2.52 (L)    MCV Latest Ref Range: 78 - 100 fl 95    MCH Latest Ref Range: 26.5 - 33.0 pg 31.7    MCHC Latest Ref Range: 31.5 - 36.5 g/dL 33.3    RDW Latest Ref Range: 10.0 - 15.0 % 19.0 (H)    Diff Method Unknown Automated Method    % Neutrophils Latest Units: % 62.7    % Lymphocytes Latest Units: % 17.0    % Monocytes Latest Units: % 18.5    % Eosinophils Latest Units: % 0.3    % Basophils Latest Units: % 1.2    % Immature Granulocytes Latest Units: % 0.3    Nucleated RBCs Latest Ref Range: 0 /100 0    Absolute Neutrophil Latest Ref Range: 1.6 - 8.3 10e9/L 2.0    Absolute Lymphocytes Latest Ref Range: 0.8 - 5.3 10e9/L 0.6 (L)    Absolute Monocytes Latest Ref Range: 0.0 - 1.3 10e9/L 0.6    Absolute Eosinophils Latest Ref Range: 0.0 - 0.7 10e9/L 0.0    Absolute Basophils Latest Ref Range: 0.0 - 0.2 10e9/L 0.0    Abs Immature Granulocytes  Latest Ref Range: 0 - 0.4 10e9/L 0.0    Absolute Nucleated RBC Unknown 0.0              Assessment and Plan:    This is a 62-year-old female with    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  PET scan from 11/2017hypermetabolic right supraclavicular, right axillary and subpectoral adenopathy.  There was no evidence of distant metastases.  One possibility is breast cancer which has metastasized to the regional lymph nodes.  However, we are unable to find primary cancer on physical exam and a PET scan mammogram was negative.  MR of the breast I is negative.She is status post 4 cycles of carboplatin Taxol with PET CT scan on February 2018 showing good response.  She is currently receiving dose dense AC for 4 cycles s  Today is cycle 4 - she had neutropenia after cycle 3 . She was given Neupogen in addition to neulasta  due to her trip to [place]      Proceed with cycle 4 today followed by neulasta   She is scheduled for PET CT scan on April 30 and she will see Dr. Magana May 3.            Anemia secondary to chemotherapy.  No evidence of bleeding.  Continue to monitor     Nausea vomiting secondary to chemotherapy.    Started Zyprexa 5 mg p.o. q. at bedtime.  Nausea resolved  She has Zofran and Ativan as needed as well          Patient is asked to go to the emergency room in the event of fever, chills sweats, cough, shortness of breath chest pain nausea vomiting diarrhea abdominal pain or any changes in health condition           HEDY Beth CNP  Hem/Onc   [facility]

## 2018-04-27 ENCOUNTER — HOSPITAL ENCOUNTER (EMERGENCY)
Facility: CLINIC | Age: 63
Discharge: HOME | End: 2018-04-27
Attending: NURSE PRACTITIONER | Admitting: NURSE PRACTITIONER
Payer: COMMERCIAL

## 2018-04-27 ENCOUNTER — APPOINTMENT (OUTPATIENT)
Dept: GENERAL RADIOLOGY | Facility: CLINIC | Age: 63
End: 2018-04-27
Attending: NURSE PRACTITIONER
Payer: COMMERCIAL

## 2018-04-27 ENCOUNTER — TELEPHONE (OUTPATIENT)
Dept: ONCOLOGY | Facility: CLINIC | Age: 63
End: 2018-04-27

## 2018-04-27 VITALS
TEMPERATURE: 98.4 F | WEIGHT: 170 LBS | RESPIRATION RATE: 18 BRPM | BODY MASS INDEX: 27.32 KG/M2 | DIASTOLIC BLOOD PRESSURE: 64 MMHG | HEART RATE: 84 BPM | SYSTOLIC BLOOD PRESSURE: 107 MMHG | OXYGEN SATURATION: 100 % | HEIGHT: 66 IN

## 2018-04-27 DIAGNOSIS — J06.9 UPPER RESPIRATORY TRACT INFECTION, UNSPECIFIED TYPE: ICD-10-CM

## 2018-04-27 PROCEDURE — 71046 X-RAY EXAM CHEST 2 VIEWS: CPT

## 2018-04-27 PROCEDURE — 99283 EMERGENCY DEPT VISIT LOW MDM: CPT | Mod: 25

## 2018-04-27 ASSESSMENT — ENCOUNTER SYMPTOMS
RHINORRHEA: 1
CHEST TIGHTNESS: 1
SHORTNESS OF BREATH: 0
DIARRHEA: 0
NAUSEA: 0
CHILLS: 0
VOMITING: 0
FEVER: 0

## 2018-04-27 NOTE — TELEPHONE ENCOUNTER
Michelle called today stating she is developing a chest cold, Denies fever.  States she has clear phlegm.  She declined an appointment today.  Per Buster our NP He advised she be seen in Urgent care.  She just finished chemotherapy and will be starting radiation therapy soon

## 2018-04-27 NOTE — ED AVS SNAPSHOT
[facility] Emergency Department    [address] [place] [address]    Phone:  [phone]    Fax:  [phone]                                       Flor Griffin   MRN: [id number]    Department:  [facility] Emergency Department   Date of Visit:  4/27/2018           Patient Information     Date Of Birth          1955        Your diagnoses for this visit were:     Upper respiratory tract infection, unspecified type        You were seen by Mina Kitchen APRN CNP and Andrew Alonso MD.      Follow-up Information     Follow up with [facility] [place] [facility].    Why:  As scheduled or sooner if worsening    Contact information:    [address] [facility] [address]  [phone]          Follow up with oncology.    Why:  As scheduled or sooner if worsening        Discharge Instructions         Viral Upper Respiratory Illness (Adult)  You have a viral upper respiratory illness (URI), which is another term for the common cold. This illness is contagious during the first few days. It is spread through the air by coughing and sneezing. It may also be spread by direct contact (touching the sick person and then touching your own eyes, nose, or mouth). Frequent handwashing will decrease risk of spread. Most viral illnesses go away within 7 to 10 days with rest and simple home remedies. Sometimes the illness may last for several weeks. Antibiotics will not kill a virus, and they are generally not prescribed for this condition.    Home care    If symptoms are severe, rest at home for the first 2 to 3 days. When you resume activity, don't let yourself get too tired.    Avoid being exposed to cigarette smoke (yours or others ).    You may use acetaminophen or ibuprofen to control pain and fever, unless another medicine was prescribed. If you have chronic liver or kidney disease, have ever had a stomach ulcer or gastrointestinal bleeding, or are taking  blood-thinning medicines, talk with your healthcare provider before using these medicines. Aspirin should never be given to anyone under 18 years of age who is ill with a viral infection or fever. It may cause severe liver or brain damage.    Your appetite may be poor, so a light diet is fine. Avoid dehydration by drinking 6 to 8 glasses of fluids per day (water, soft drinks, juices, tea, or soup). Extra fluids will help loosen secretions in the nose and lungs.    Over-the-counter cold medicines will not shorten the length of time you re sick, but they may be helpful for the following symptoms: cough, sore throat, and nasal and sinus congestion. (Note: Do not use decongestants if you have high blood pressure.)  Follow-up care  Follow up with your healthcare provider, or as advised.  When to seek medical advice  Call your healthcare provider right away if any of these occur:    Cough with lots of colored sputum (mucus)    Severe headache; face, neck, or ear pain    Difficulty swallowing due to throat pain    Fever of 100.4 F (38 C) or higher, or as directed by your healthcare provider  Call 911  Call 911 if any of these occur:    Chest pain, shortness of breath, wheezing, or difficulty breathing    Coughing up blood    Inability to swallow due to throat pain  Date Last Reviewed: 9/13/2015 2000-2017 [employer]. [address] [place] [address]. All rights reserved. This information is not intended as a substitute for professional medical care. Always follow your healthcare professional's instructions.          Your next 10 appointments already scheduled     Apr 30, 2018  8:30 AM CDT   (Arrive by 8:15 AM)   PE NPET ONCOLOGY (EYES TO THIGHS) with [id number]   [facility] PET CT ([facility])    [address] [place] [id number] [phone]           Tell your doctor:   If there is any chance you may be pregnant or if you are breastfeeding.   If you have  problems lying in small spaces (claustrophobia). If you do, your doctor may give you medicine to help you relax. If you have diabetes:   Have your exam early in the morning. Your blood glucose will go up as the day goes by.   Your glucose level must be 180 or less at the start of the exam. Please take any medicines you need to ensure this blood glucose level. 24 hours before your scan: Don t do any heavy exercise. (No jogging, aerobics or other workouts.) Exercise will make your pictures less accurate. 6 hours before your scan:   Stop all food and liquids (except water).   Do not chew gum or suck on mints.   If you need to take medicine with food, you may take it with a few crackers.  Please call your Imaging Department at your exam site with any questions.            May 03, 2018  8:00 AM CDT   Return Visit with María Galeas GC   Cancer Risk Management Program ([facility])    [facility]  [address] [facility] [address]  [facility] [address]   [phone]            May 03, 2018 10:30 AM CDT   Return Visit with Yani Magana MD   [facility] ([facility])    [facility]  [address] [facility] [address]  [facility] [address]   [phone]              24 Hour Appointment Hotline       To make an appointment at any [facility], call [phone] ([phone]). If you don't have a family doctor or clinic, we will help you find one. [facility] are conveniently located to serve the needs of you and your family.             Review of your medicines      Our records show that you are taking the medicines listed below. If these are incorrect, please call your family doctor or clinic.        Dose / Directions Last dose taken    ADVIL PO   Dose:  400 mg        Take 400 mg by mouth every 6 hours as needed for moderate pain   Refills:  0        aspirin 81 MG tablet   Dose:  81 mg        Take 81 mg by mouth daily   Refills:  0         dexamethasone 4 MG tablet   Commonly known as:  DECADRON   Dose:  8 mg   Quantity:  6 tablet        Take 2 tablets (8 mg) by mouth daily Start on Day 2.   Refills:  3        famotidine 20 MG tablet   Commonly known as:  PEPCID   Dose:  20 mg   Quantity:  30 tablet        Take 1 tablet (20 mg) by mouth daily   Refills:  1        fluconazole 100 MG tablet   Commonly known as:  DIFLUCAN   Quantity:  15 tablet        Take 1 tab daily   Refills:  0        GLUCOSAMINE CHONDROITIN JOINT PO        Take by mouth daily   Refills:  0        HYDROcodone-acetaminophen 5-325 MG per tablet   Commonly known as:  NORCO   Dose:  1 tablet   Quantity:  6 tablet        Take 1 tablet by mouth every 6 hours as needed for moderate to severe pain   Refills:  0        levofloxacin 500 MG tablet   Commonly known as:  LEVAQUIN   Dose:  500 mg   Quantity:  7 tablet        Take 1 tablet (500 mg) by mouth daily   Refills:  0        LORazepam 0.5 MG tablet   Commonly known as:  ATIVAN   Dose:  0.5 mg   Quantity:  30 tablet        Take 1 tablet (0.5 mg) by mouth every 4 hours as needed (Anxiety, Nausea/Vomiting or Sleep)   Refills:  3        magic mouthwash suspension (diphenhydrAMINE, lidocaine, aluminum-magnesium & simethicone) Susp compounding kit   Dose:  5-10 mL   Quantity:  240 mL        Swish and swallow 5-10 mLs in mouth every 6 hours as needed for mouth sores   Refills:  1        OLANZapine 5 MG tablet   Commonly known as:  zyPREXA   Quantity:  30 tablet        Take 1 tablet (5 mg) orally at bedtime   Refills:  0        OMEGA-3 FISH OIL PO   Dose:  1 g        Take 1 g by mouth daily   Refills:  0        prochlorperazine 10 MG tablet   Commonly known as:  COMPAZINE   Dose:  10 mg   Quantity:  30 tablet        Take 1 tablet (10 mg) by mouth every 6 hours as needed (Nausea/Vomiting)   Refills:  3        senna 8.6 MG tablet   Commonly known as:  SENOKOT   Dose:  1 tablet   Quantity:  120 tablet        Take 1 tablet by mouth daily   Refills:  1         valACYclovir 1000 mg tablet   Commonly known as:  VALTREX   Dose:  1000 mg   Quantity:  20 tablet        Take 1 tablet (1,000 mg) by mouth 2 times daily   Refills:  0        VITAMIN B6 PO        Take by mouth daily   Refills:  0        VITAMIN D (CHOLECALCIFEROL) PO   Dose:  1000 Units        Take 1,000 Units by mouth daily   Refills:  0                Procedures and tests performed during your visit     XR Chest 2 Views      Orders Needing Specimen Collection     None      Pending Results     No orders found from 4/25/2018 to 4/28/2018.            Pending Culture Results     No orders found from 4/25/2018 to 4/28/2018.            Pending Results Instructions     If you had any lab results that were not finalized at the time of your Discharge, you can call the ED Lab Result RN at [phone]. You will be contacted by this team for any positive Lab results or changes in treatment. The nurses are available 7 days a week from 10A to 6:30P.  You can leave a message 24 hours per day and they will return your call.        Test Results From Your Hospital Stay        4/27/2018  3:33 PM      Narrative     XR CHEST 2 VW 4/27/2018 3:26 PM    HISTORY: Cough.    COMPARISON: None.        Impression     IMPRESSION: 2 views of the chest show no acute or active  cardiopulmonary disease. A PowerPort catheter is in place.     SANDOR THORNE MD                Clinical Quality Measure: Blood Pressure Screening     Your blood pressure was checked while you were in the emergency department today. The last reading we obtained was  BP: 107/64 . Please read the guidelines below about what these numbers mean and what you should do about them.  If your systolic blood pressure (the top number) is less than 120 and your diastolic blood pressure (the bottom number) is less than 80, then your blood pressure is normal. There is nothing more that you need to do about it.  If your systolic blood pressure (the top number) is 120-139 or your  "diastolic blood pressure (the bottom number) is 80-89, your blood pressure may be higher than it should be. You should have your blood pressure rechecked within a year by a primary care provider.  If your systolic blood pressure (the top number) is 140 or greater or your diastolic blood pressure (the bottom number) is 90 or greater, you may have high blood pressure. High blood pressure is treatable, but if left untreated over time it can put you at risk for heart attack, stroke, or kidney failure. You should have your blood pressure rechecked by a primary care provider within the next 4 weeks.  If your provider in the emergency department today gave you specific instructions to follow-up with your doctor or provider even sooner than that, you should follow that instruction and not wait for up to 4 weeks for your follow-up visit.        Thank you for choosing [facility]       Thank you for choosing [facility] for your care. Our goal is always to provide you with excellent care. Hearing back from our patients is one way we can continue to improve our services. Please take a few minutes to complete the written survey that you may receive in the mail after you visit with us. Thank you!        [employer] Information     [employer] lets you send messages to your doctor, view your test results, renew your prescriptions, schedule appointments and more. To sign up, go to [email or web address] . Click on \"Log in\" on the left side of the screen, which will take you to the Welcome page. Then click on \"Sign up Now\" on the right side of the page.     You will be asked to enter the access code listed below, as well as some personal information. Please follow the directions to create your username and password.     Your access code is: [email or web address]  Expires: 2018  4:07 PM     Your access code will  in 90 days. If you need help or a new code, please call your [facility] clinic or [phone].        Care EveryWhere ID     This is " your Care EveryWhere ID. This could be used by other organizations to access your [facility] medical records  [id number]        Equal Access to Services     MARYJANE JARA : Mika Nolan, carole heard, pernell keen, ketan connor. So [facility] [phone].    ATENCIÓN: Si habla español, tiene a thomas disposición servicios gratuitos de asistencia lingüística. Llame al [phone].    We comply with applicable federal civil rights laws and [place] laws. We do not discriminate on the basis of race, color, national origin, age, disability, sex, sexual orientation, or gender identity.            After Visit Summary       This is your record. Keep this with you and show to your community pharmacist(s) and doctor(s) at your next visit.

## 2018-04-27 NOTE — ED PROVIDER NOTES
"  History     Chief Complaint:  chest congestion    The history is provided by the patient.      Flor Griffin is a 62 year old female with history of breast cancer who presents with chest congestion. The patient last received chemotherapy on 4/23 and she is scheduled to start radiation therapy next week. She was sent here by her oncologist for a chest xray given her new cold symptoms she developed 2 days ago. She currently endorses rhinorrhea, congestion, and chest tightness. She denies any fever, nausea, vomiting, diarrhea, or other medical complaints.    Allergies:  Penicillins      Medications:    Aspirin  Decadron  Pepcid  Diflucan  Norco  Levaquin  Ativan  Zyprexa  Compazine  Senokot  Valtrex    Past Medical History:    Breast cancer   Basal cell cancer   Gastroesophageal reflux disease   History of blood transfusion   Hyperlipidaemia     Past Surgical History:    Biopsy mass neck  Colonoscopy  Mohs Micrographic procedure x2    Family History:    Lupus  CHF  CAD  HLD  Diabetes  Breast cancer    Social History:  Presents alone   Tobacco use: Never  Alcohol use: Occasional  PCP: Kevin Quinones    Marital Status:        Review of Systems   Constitutional: Negative for chills and fever.   HENT: Positive for congestion and rhinorrhea.    Respiratory: Positive for chest tightness. Negative for shortness of breath.    Cardiovascular: Negative for chest pain and leg swelling.   Gastrointestinal: Negative for diarrhea, nausea and vomiting.   Genitourinary: Negative.      Physical Exam     Patient Vitals for the past 24 hrs:   BP Temp Temp src Pulse Heart Rate Resp SpO2 Height Weight   04/27/18 1605 107/64 - - - 64 - 100 % - -   04/27/18 1419 120/60 98.4  F (36.9  C) Oral 84 84 18 99 % 1.676 m (5' 6\") 77.1 kg (170 lb)      Physical Exam  General: Alert, No obvious discomfort, well kept  Eyes: PERRL, conjunctivae pink no scleral icterus or conjunctival injection  ENT:   Moist mucus membranes, posterior " oropharynx clear without erythema or exudates, No lymphadenopathy, Normal voice  Resp:  Lungs clear to auscultation bilaterally, no crackles/rubs/wheezes. Good air movement  CV:  Normal rate and rhythm, no murmurs/rubs/gallops  GI:  Abdomen soft and non-distended.  Normoactive BS.  No tenderness, guarding or rebound, No masses  Skin:  Warm, dry.  No rashes or petechiae  Musculoskeletal: No peripheral edema or calf tenderness, Normal gross ROM   Neuro: Alert and oriented to person/place/time, normal sensation  Psychiatric: Normal affect, cooperative, good eye contact     Emergency Department Course   Imaging:  Radiographic findings were communicated with the patient who voiced understanding of the findings.  XR Chest 2 Views  IMPRESSION: 2 views of the chest show no acute or active cardiopulmonary disease. A PowerPort catheter is in place.     SANDOR THORNE MD    Emergency Department Course:  Past medical records, nursing notes, and vitals reviewed.  1503: I performed an exam of the patient and obtained history, as documented above.      1605: I rechecked the patient. Findings and plan explained to the Patient. Patient discharged home with instructions regarding supportive care, medications, and reasons to return. The importance of close follow-up was reviewed.    Impression & Plan    Medical Decision Making:  Flor Griffin is a 62 year old female presents for evaluation of upper respiratory symptoms. Patient is concerned for possible pneumonia, was advised to come here due to her current treatment for cancer. Evaluation consisted of CXR, physical exam; negative CXR. Exam consistent with viral illness. No meningismus, pneumothorax, or pleural effusion. No evidence of sepsis.  Given patient's minor symptoms and negative exam.  No laboratory studies or advanced imagery was indicated. Discharged with advice for symptomatic treatment including over the counter medication such as Tylenol and Ibuprofen. Advised to follow  up with primary care provider in 3-5 days if continued symptoms, immediately if worse. Patient will return to the ER/UR if they develop high fevers not controlled with medication, difficulty breathing, shortness of breath, or has other concerns.     Diagnosis:    ICD-10-CM    1. Upper respiratory tract infection, unspecified type J06.9        Disposition:  Discharged to home with plan as outlined.    Discharge Medications:  New Prescriptions    No medications on file         Richie Tyler  4/27/2018   [facility] EMERGENCY DEPARTMENT  I, Richie Tyler, am serving as a scribe at 3:03 PM on 4/27/2018 to document services personally performed by Mina Kitchen APRN CNP based on my observations and the provider's statements to me.        Mina Kitchen APRN CNP  04/27/18 1032

## 2018-04-27 NOTE — ED PROVIDER NOTES
History     Chief Complaint:  Chest congestion    HPI   Flor Griffin is a 62 year old female who presents to the ED for evaluation of chest congestion. The patient is currently being followed by Dr. Magana at [facility] for breast cancer. She underwent her last chemotherapy     Dr. Magana at [place] [facility]  Here for chest x-ray  PET scan for Monday  Last chemo last Monday; then developed chest cold and wanted her to be seen in ED  Not terrible sob  No fever, cp  No hx asthma, COPD, emphysema    Allergies:  Penicillins     Medications:    aspirin 81 MG tablet  dexamethasone (DECADRON) 4 MG tablet  famotidine (PEPCID) 20 MG tablet  fluconazole (DIFLUCAN) 100 MG tablet  Glucos-Chondroit-Hyaluron-MSM (GLUCOSAMINE CHONDROITIN JOINT PO)  HYDROcodone-acetaminophen (NORCO) 5-325 MG per tablet  Ibuprofen (ADVIL PO)  levofloxacin (LEVAQUIN) 500 MG tablet  LORazepam (ATIVAN) 0.5 MG tablet  magic mouthwash (FIRST-MOUTHWASH BLM) suspension  OLANZapine (ZYPREXA) 5 MG tablet  Omega-3 Fatty Acids (OMEGA-3 FISH OIL PO)  prochlorperazine (COMPAZINE) 10 MG tablet  Pyridoxine HCl (VITAMIN B6 PO)  senna (SENOKOT) 8.6 MG tablet  valACYclovir (VALTREX) 1000 mg tablet  VITAMIN D, CHOLECALCIFEROL, PO     Past Medical History:    Basal cell cancer  GERD  Hyperlipidemia  Heartburn  Vitamin D deficiency  Cold sore  Overweight  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 in female, estrogen receptor positive  Carcinoma of unknown origin  Port catheter in place  Chemotherapy-induced neutropenia  Anemia    Past Surgical History:    Biopsy mass neck, right  MOHS micrographic procedure - right cheek; BCC  MOHS micrographic procedure    Family History:    Lupus  Heart disease - CHF  CAD  Hyperlipidemia  Diabetes    Social History:  Smoking status: Never  Alcohol use: Yes  Marital Status:       Review of Systems  ***    Physical Exam     Patient Vitals for the past 24 hrs:   BP Temp Temp src Pulse Heart Rate Resp SpO2 Height Weight  "  04/27/18 1419 120/60 98.4  F (36.9  C) Oral 84 84 18 99 % 1.676 m (5' 6\") 77.1 kg (170 lb)     Physical Exam  General: ***  HEENT:   The scalp and head appear normal    The pupils are equal, round, and reactive to light    Extraocular muscles are intact.    The nose is normal.    The oropharynx is normal.      Uvula is in the midline.    Neck:  Normal range of motion.    Lungs:  Clear.      No rales, no wheezing.      There is no tachypnea.  Non-labored.  Cardiac: Regular rate.      Normal S1 and S2.      No pathological murmur/rub    Abdomen: Soft. No distension, no localized tenderness or rebound.  MS:  Normal tone. Normal movement of all extremities.   Neurologic:     Normal mentation.  No cranial nerve deficits.  No focal motor or sensory                            changes.      Speech normal.  Psych:  Awake.     Alert.      Normal affect.      Appropriate interactions.  Skin:  No rash.      No lesions.    Emergency Department Course   ECG:  ***    Imaging:  {Radiographic findings?:[id number]::\" \"}  ***    Laboratory:  ***    Procedures:  ***        Interventions:  ***  {Response to meds:926360::\" \"}    Emergency Department Course:  ***    Impression & Plan       {trauma activation?:322192::\" \"}  CMS Diagnoses: {Sepsis/Septic Shock/Stemi/Stroke:224819::\" \"}       Medical Decision Making:  ***  Critical Care time:  {none or minutes:229238::\"none\"}    Diagnosis:  No diagnosis found.    Disposition:  {discharged to home/discharged to home with.../Admitted to...:738585}    Discharge Medications:  New Prescriptions    No medications on file         Aleena Jones  4/27/2018   [facility] EMERGENCY DEPARTMENT  I, Aleena Jones, am serving as a scribe at 3:07 PM on 4/27/2018 to document services personally performed by Andrew Alonso MD*** based on my observations and the provider's statements to me.     "

## 2018-04-27 NOTE — DISCHARGE INSTRUCTIONS
Viral Upper Respiratory Illness (Adult)  You have a viral upper respiratory illness (URI), which is another term for the common cold. This illness is contagious during the first few days. It is spread through the air by coughing and sneezing. It may also be spread by direct contact (touching the sick person and then touching your own eyes, nose, or mouth). Frequent handwashing will decrease risk of spread. Most viral illnesses go away within 7 to 10 days with rest and simple home remedies. Sometimes the illness may last for several weeks. Antibiotics will not kill a virus, and they are generally not prescribed for this condition.    Home care    If symptoms are severe, rest at home for the first 2 to 3 days. When you resume activity, don't let yourself get too tired.    Avoid being exposed to cigarette smoke (yours or others ).    You may use acetaminophen or ibuprofen to control pain and fever, unless another medicine was prescribed. If you have chronic liver or kidney disease, have ever had a stomach ulcer or gastrointestinal bleeding, or are taking blood-thinning medicines, talk with your healthcare provider before using these medicines. Aspirin should never be given to anyone under 18 years of age who is ill with a viral infection or fever. It may cause severe liver or brain damage.    Your appetite may be poor, so a light diet is fine. Avoid dehydration by drinking 6 to 8 glasses of fluids per day (water, soft drinks, juices, tea, or soup). Extra fluids will help loosen secretions in the nose and lungs.    Over-the-counter cold medicines will not shorten the length of time you re sick, but they may be helpful for the following symptoms: cough, sore throat, and nasal and sinus congestion. (Note: Do not use decongestants if you have high blood pressure.)  Follow-up care  Follow up with your healthcare provider, or as advised.  When to seek medical advice  Call your healthcare provider right away if any of these  occur:    Cough with lots of colored sputum (mucus)    Severe headache; face, neck, or ear pain    Difficulty swallowing due to throat pain    Fever of 100.4 F (38 C) or higher, or as directed by your healthcare provider  Call 911  Call 911 if any of these occur:    Chest pain, shortness of breath, wheezing, or difficulty breathing    Coughing up blood    Inability to swallow due to throat pain  Date Last Reviewed: 9/13/2015 2000-2017 [employer]. [address] [place] [address]. All rights reserved. This information is not intended as a substitute for professional medical care. Always follow your healthcare professional's instructions.

## 2018-04-27 NOTE — ED AVS SNAPSHOT
[facility] Emergency Department    [address]    [facility] [address]    Phone:  [phone]    Fax:  [phone]                                       Flor Griffin   MRN: [id number]    Department:  [facility] Emergency Department   Date of Visit:  4/27/2018           After Visit Summary Signature Page     I have received my discharge instructions, and my questions have been answered. I have discussed any challenges I see with this plan with the nurse or doctor.    ..........................................................................................................................................  Patient/Patient Representative Signature      ..........................................................................................................................................  Patient Representative Print Name and Relationship to Patient    ..................................................               ................................................  Date                                            Time    ..........................................................................................................................................  Reviewed by Signature/Title    ...................................................              ..............................................  Date                                                            Time

## 2018-04-27 NOTE — ED TRIAGE NOTES
Reports cough, chest congestion; sent over from oncologist for xray to rule out infection so pt can start radiation. Denies fever. PET scan scheduled Monday. Hx of breast CA. ABCs intact.

## 2018-04-28 ENCOUNTER — NURSE TRIAGE (OUTPATIENT)
Dept: NURSING | Facility: CLINIC | Age: 63
End: 2018-04-28

## 2018-04-28 DIAGNOSIS — J01.00 ACUTE MAXILLARY SINUSITIS, RECURRENCE NOT SPECIFIED: ICD-10-CM

## 2018-04-28 DIAGNOSIS — Z17.0 MALIGNANT NEOPLASM OF OVERLAPPING SITES OF RIGHT BREAST IN FEMALE, ESTROGEN RECEPTOR POSITIVE (H): Primary | ICD-10-CM

## 2018-04-28 DIAGNOSIS — C50.811 MALIGNANT NEOPLASM OF OVERLAPPING SITES OF RIGHT BREAST IN FEMALE, ESTROGEN RECEPTOR POSITIVE (H): Primary | ICD-10-CM

## 2018-04-28 RX ORDER — AZITHROMYCIN 250 MG/1
TABLET, FILM COATED ORAL
Qty: 6 TABLET | Refills: 0 | Status: SHIPPED | OUTPATIENT
Start: 2018-04-28 | End: 2018-04-30

## 2018-04-28 NOTE — PROGRESS NOTES
Flor called in with symptoms of cold that have been persistent since this Wed. She did have CXR yesterday and did not have pneumonia. She continues to feel poorly now with headaches and sinus symptoms.     I have prescribed zpack for her.     She has PET-CT and follow up in clinic next week.     Earnest Kilpatrick  ,  Division of Hematology, Oncology & Transplantation  [facility].

## 2018-04-28 NOTE — TELEPHONE ENCOUNTER
"Patient calling stating \"I am supposed to have a PET scan Monday 4/30/18. Last chemo was 4/23/18.   Patient reporting new onset of \"sinus\" symptoms starting in past 2 days. Facial pain, pressure, chest tightness, frequent cough.  Patient can not find her thermometer during triage. Current temp unknown. Patient is requesting to update MD on call.    Paged Dr Kilpatrick (PCP [facility] Hematology/Oncology) through [facility] Answering [facility] at 8 a.m. To call patient at Ph. [phone]. Patient was advised to return call if no call back with in 20 minutes. Caller verbalized understanding. Denies further questions.    Danica Carlson RN  [facility]      "

## 2018-04-28 NOTE — TELEPHONE ENCOUNTER
Reason for Disposition    Lots of coughing    Additional Information    Negative: Severe difficulty breathing (e.g., struggling for each breath, speaks in single words)    Negative: Sounds like a life-threatening emergency to the triager    Negative: [1] Sinus infection AND [2] taking an antibiotic AND [3] symptoms continue    Negative: [1] Difficulty breathing AND [2] not from stuffy nose (e.g., not relieved by cleaning out the nose)    Negative: [1] SEVERE headache AND [2] fever    Negative: [1] Redness or swelling on the cheek, forehead or around the eye AND [2] fever    Negative: Fever > 104 F (40 C)    Negative: Patient sounds very sick or weak to the triager    Negative: [1] SEVERE pain AND [2] not improved 2 hours after pain medicine    Negative: [1] Redness or swelling on the cheek, forehead or around the eye AND [2] no fever    Negative: [1] Fever > 101 F (38.3 C) AND [2] age > 60    Negative: [1] Fever > 101 F (38.3 C) AND [2] bedridden (e.g., nursing home patient, CVA, chronic illness, recovering from surgery)    Negative: [1] Fever > 100.5 F (38.1 C) AND [2] diabetes mellitus or weak immune system (e.g., HIV positive, cancer chemo, splenectomy, organ transplant, chronic steroids)    Negative: Fever present > 3 days (72 hours)    Negative: [1] Fever returns after gone for over 24 hours AND [2] symptoms worse or not improved    Negative: [1] Sinus pain (not just congestion) AND [2] fever    Negative: Earache    Negative: [1] Sinus congestion (pressure, fullness) AND [2] present > 10 days    Negative: [1] Nasal discharge AND [2] present > 10 days    Negative: [1] Using nasal washes and pain medicine > 24 hours AND [2] sinus pain (around cheekbone or eye) persists    Negative: [1] Sinus congestion as part of a cold AND [2] present < 10 days (all triage questions negative)    Protocols used: SINUS PAIN OR CONGESTION-ADULT-

## 2018-04-30 ENCOUNTER — HOSPITAL ENCOUNTER (OUTPATIENT)
Facility: CLINIC | Age: 63
Setting detail: SPECIMEN
End: 2018-04-30
Attending: NURSE PRACTITIONER
Payer: COMMERCIAL

## 2018-04-30 ENCOUNTER — TELEPHONE (OUTPATIENT)
Dept: ONCOLOGY | Facility: CLINIC | Age: 63
End: 2018-04-30

## 2018-04-30 ENCOUNTER — NURSE TRIAGE (OUTPATIENT)
Dept: NURSING | Facility: CLINIC | Age: 63
End: 2018-04-30

## 2018-04-30 ENCOUNTER — INFUSION THERAPY VISIT (OUTPATIENT)
Dept: INFUSION THERAPY | Facility: CLINIC | Age: 63
End: 2018-04-30
Attending: INTERNAL MEDICINE
Payer: COMMERCIAL

## 2018-04-30 ENCOUNTER — HOSPITAL ENCOUNTER (OUTPATIENT)
Facility: CLINIC | Age: 63
Setting detail: SPECIMEN
Discharge: HOME | End: 2018-04-30
Attending: NURSE PRACTITIONER | Admitting: NURSE PRACTITIONER
Payer: COMMERCIAL

## 2018-04-30 ENCOUNTER — ONCOLOGY VISIT (OUTPATIENT)
Dept: ONCOLOGY | Facility: CLINIC | Age: 63
End: 2018-04-30
Attending: INTERNAL MEDICINE
Payer: COMMERCIAL

## 2018-04-30 VITALS
RESPIRATION RATE: 18 BRPM | HEART RATE: 90 BPM | TEMPERATURE: 98.4 F | DIASTOLIC BLOOD PRESSURE: 56 MMHG | SYSTOLIC BLOOD PRESSURE: 122 MMHG | OXYGEN SATURATION: 100 %

## 2018-04-30 VITALS
SYSTOLIC BLOOD PRESSURE: 122 MMHG | DIASTOLIC BLOOD PRESSURE: 56 MMHG | RESPIRATION RATE: 18 BRPM | HEART RATE: 90 BPM | OXYGEN SATURATION: 100 % | TEMPERATURE: 98.4 F

## 2018-04-30 DIAGNOSIS — Z95.828 PORT CATHETER IN PLACE: Primary | ICD-10-CM

## 2018-04-30 DIAGNOSIS — Z17.0 MALIGNANT NEOPLASM OF OVERLAPPING SITES OF RIGHT BREAST IN FEMALE, ESTROGEN RECEPTOR POSITIVE (H): Primary | ICD-10-CM

## 2018-04-30 DIAGNOSIS — C50.811 MALIGNANT NEOPLASM OF OVERLAPPING SITES OF RIGHT BREAST IN FEMALE, ESTROGEN RECEPTOR POSITIVE (H): ICD-10-CM

## 2018-04-30 DIAGNOSIS — C50.811 MALIGNANT NEOPLASM OF OVERLAPPING SITES OF RIGHT BREAST IN FEMALE, ESTROGEN RECEPTOR POSITIVE (H): Primary | ICD-10-CM

## 2018-04-30 DIAGNOSIS — Z17.0 MALIGNANT NEOPLASM OF OVERLAPPING SITES OF RIGHT BREAST IN FEMALE, ESTROGEN RECEPTOR POSITIVE (H): ICD-10-CM

## 2018-04-30 LAB
ALBUMIN SERPL-MCNC: 3.4 G/DL (ref 3.4–5)
ALBUMIN UR-MCNC: 10 MG/DL
ALP SERPL-CCNC: 106 U/L (ref 40–150)
ALT SERPL W P-5'-P-CCNC: 30 U/L (ref 0–50)
ANION GAP SERPL CALCULATED.3IONS-SCNC: 8 MMOL/L (ref 3–14)
APPEARANCE UR: CLEAR
AST SERPL W P-5'-P-CCNC: 13 U/L (ref 0–45)
BASOPHILS # BLD AUTO: 0 10E9/L (ref 0–0.2)
BASOPHILS NFR BLD AUTO: 1 %
BILIRUB SERPL-MCNC: 0.7 MG/DL (ref 0.2–1.3)
BILIRUB UR QL STRIP: NEGATIVE
BUN SERPL-MCNC: 20 MG/DL (ref 7–30)
CALCIUM SERPL-MCNC: 8.8 MG/DL (ref 8.5–10.1)
CHLORIDE SERPL-SCNC: 101 MMOL/L (ref 94–109)
CO2 SERPL-SCNC: 26 MMOL/L (ref 20–32)
COLOR UR AUTO: YELLOW
CREAT SERPL-MCNC: 0.62 MG/DL (ref 0.52–1.04)
DACRYOCYTES BLD QL SMEAR: SLIGHT
DIFFERENTIAL METHOD BLD: ABNORMAL
EOSINOPHIL # BLD AUTO: 0 10E9/L (ref 0–0.7)
EOSINOPHIL NFR BLD AUTO: 1 %
ERYTHROCYTE [DISTWIDTH] IN BLOOD BY AUTOMATED COUNT: 17.5 % (ref 10–15)
GFR SERPL CREATININE-BSD FRML MDRD: >90 ML/MIN/1.7M2
GLUCOSE SERPL-MCNC: 106 MG/DL (ref 70–99)
GLUCOSE UR STRIP-MCNC: NEGATIVE MG/DL
HCT VFR BLD AUTO: 22.4 % (ref 35–47)
HGB BLD-MCNC: 7.8 G/DL (ref 11.7–15.7)
HGB UR QL STRIP: NEGATIVE
HYALINE CASTS #/AREA URNS LPF: 12 /LPF (ref 0–2)
KETONES UR STRIP-MCNC: NEGATIVE MG/DL
LEUKOCYTE ESTERASE UR QL STRIP: NEGATIVE
LYMPHOCYTES # BLD AUTO: 0.3 10E9/L (ref 0.8–5.3)
LYMPHOCYTES NFR BLD AUTO: 86 %
MCH RBC QN AUTO: 32.2 PG (ref 26.5–33)
MCHC RBC AUTO-ENTMCNC: 34.8 G/DL (ref 31.5–36.5)
MCV RBC AUTO: 93 FL (ref 78–100)
MONOCYTES # BLD AUTO: 0 10E9/L (ref 0–1.3)
MONOCYTES NFR BLD AUTO: 9 %
MUCOUS THREADS #/AREA URNS LPF: PRESENT /LPF
NEUTROPHILS # BLD AUTO: 0 10E9/L (ref 1.6–8.3)
NEUTROPHILS NFR BLD AUTO: 3 %
NITRATE UR QL: NEGATIVE
OVALOCYTES BLD QL SMEAR: SLIGHT
PH UR STRIP: 6 PH (ref 5–7)
PLATELET # BLD AUTO: 79 10E9/L (ref 150–450)
PLATELET # BLD EST: ABNORMAL 10*3/UL
POTASSIUM SERPL-SCNC: 4 MMOL/L (ref 3.4–5.3)
PROT SERPL-MCNC: 6.3 G/DL (ref 6.8–8.8)
RBC # BLD AUTO: 2.42 10E12/L (ref 3.8–5.2)
RBC #/AREA URNS AUTO: 3 /HPF (ref 0–2)
SODIUM SERPL-SCNC: 135 MMOL/L (ref 133–144)
SOURCE: ABNORMAL
SP GR UR STRIP: 1.02 (ref 1–1.03)
SQUAMOUS #/AREA URNS AUTO: 1 /HPF (ref 0–1)
UROBILINOGEN UR STRIP-MCNC: 2 MG/DL (ref 0–2)
WBC # BLD AUTO: 0.3 10E9/L (ref 4–11)
WBC #/AREA URNS AUTO: 1 /HPF (ref 0–5)

## 2018-04-30 PROCEDURE — 85025 COMPLETE CBC W/AUTO DIFF WBC: CPT | Performed by: NURSE PRACTITIONER

## 2018-04-30 PROCEDURE — 96361 HYDRATE IV INFUSION ADD-ON: CPT

## 2018-04-30 PROCEDURE — 87040 BLOOD CULTURE FOR BACTERIA: CPT | Performed by: INTERNAL MEDICINE

## 2018-04-30 PROCEDURE — 80053 COMPREHEN METABOLIC PANEL: CPT | Performed by: NURSE PRACTITIONER

## 2018-04-30 PROCEDURE — 99214 OFFICE O/P EST MOD 30 MIN: CPT | Performed by: NURSE PRACTITIONER

## 2018-04-30 PROCEDURE — 25000128 H RX IP 250 OP 636: Performed by: INTERNAL MEDICINE

## 2018-04-30 PROCEDURE — 81001 URINALYSIS AUTO W/SCOPE: CPT | Performed by: NURSE PRACTITIONER

## 2018-04-30 PROCEDURE — G0463 HOSPITAL OUTPT CLINIC VISIT: HCPCS | Mod: 25

## 2018-04-30 PROCEDURE — 96374 THER/PROPH/DIAG INJ IV PUSH: CPT

## 2018-04-30 PROCEDURE — 25000128 H RX IP 250 OP 636: Performed by: NURSE PRACTITIONER

## 2018-04-30 RX ORDER — HEPARIN SODIUM (PORCINE) LOCK FLUSH IV SOLN 100 UNIT/ML 100 UNIT/ML
500 SOLUTION INTRAVENOUS ONCE
Status: CANCELLED
Start: 2018-04-30 | End: 2018-04-30

## 2018-04-30 RX ORDER — LEVOFLOXACIN 500 MG/1
500 TABLET, FILM COATED ORAL DAILY
Qty: 7 TABLET | Refills: 0 | Status: SHIPPED | OUTPATIENT
Start: 2018-04-30 | End: 2018-05-18

## 2018-04-30 RX ORDER — ONDANSETRON 2 MG/ML
8 INJECTION INTRAMUSCULAR; INTRAVENOUS EVERY 6 HOURS PRN
Status: CANCELLED
Start: 2018-04-30

## 2018-04-30 RX ORDER — HEPARIN SODIUM (PORCINE) LOCK FLUSH IV SOLN 100 UNIT/ML 100 UNIT/ML
500 SOLUTION INTRAVENOUS ONCE
Status: COMPLETED | OUTPATIENT
Start: 2018-04-30 | End: 2018-04-30

## 2018-04-30 RX ORDER — ONDANSETRON 2 MG/ML
8 INJECTION INTRAMUSCULAR; INTRAVENOUS EVERY 6 HOURS PRN
Status: DISCONTINUED | OUTPATIENT
Start: 2018-04-30 | End: 2018-04-30 | Stop reason: HOSPADM

## 2018-04-30 RX ORDER — FLUCONAZOLE 100 MG/1
TABLET ORAL
Qty: 7 TABLET | Refills: 0 | Status: SHIPPED | OUTPATIENT
Start: 2018-04-30 | End: 2018-05-18

## 2018-04-30 RX ADMIN — SODIUM CHLORIDE 1000 ML: 9 INJECTION, SOLUTION INTRAVENOUS at 12:10

## 2018-04-30 RX ADMIN — ONDANSETRON HYDROCHLORIDE 8 MG: 2 INJECTION, SOLUTION INTRAVENOUS at 12:22

## 2018-04-30 RX ADMIN — HEPARIN 500 UNITS: 100 SYRINGE at 13:10

## 2018-04-30 ASSESSMENT — PAIN SCALES - GENERAL: PAINLEVEL: NO PAIN (0)

## 2018-04-30 NOTE — PROGRESS NOTES
"Oncology Rooming Note    April 30, 2018 12:02 PM   Flor Griffin is a 62 year old female who presents for:    Chief Complaint   Patient presents with     Oncology Clinic Visit     Initial Vitals: /56  Pulse 90  Temp 98.4  F (36.9  C) (Oral)  Resp 18  LMP 01/01/2004 (Approximate)  SpO2 100% Estimated body mass index is 27.44 kg/(m^2) as calculated from the following:    Height as of 4/27/18: 1.676 m (5' 6\").    Weight as of 4/27/18: 77.1 kg (170 lb). There is no height or weight on file to calculate BSA.  No Pain (0) Comment: Data Unavailable   Patient's last menstrual period was 01/01/2004 (approximate).  Allergies reviewed: Yes  Medications reviewed: Yes    Medications: Medication refills not needed today.  Pharmacy name entered into [employer]: [facility] [address] [place] [address]  [address]    Clinical concerns: None     2 minutes for nursing intake (face to face time)     Mary Jo James CMA                    "

## 2018-04-30 NOTE — PROGRESS NOTES
Infusion Nursing Note:  Flor Griffin presents today for IVFs.    Patient seen by provider today: Yes: Buster Menon   present during visit today: Not Applicable.    Note: N/A.    Intravenous Access:  Implanted Port.    Treatment Conditions:  Lab Results   Component Value Date    HGB 7.8 04/30/2018     Lab Results   Component Value Date    WBC 0.3 04/30/2018      Lab Results   Component Value Date    ANEU 0.0 04/30/2018     Lab Results   Component Value Date    PLT 79 04/30/2018      Lab Results   Component Value Date     04/30/2018                   Lab Results   Component Value Date    POTASSIUM 4.0 04/30/2018           No results found for: MAG         Lab Results   Component Value Date    CR 0.62 04/30/2018                   Lab Results   Component Value Date    EDILSON 8.8 04/30/2018                Lab Results   Component Value Date    BILITOTAL 0.7 04/30/2018           Lab Results   Component Value Date    ALBUMIN 3.4 04/30/2018                    Lab Results   Component Value Date    ALT 30 04/30/2018           Lab Results   Component Value Date    AST 13 04/30/2018           Post Infusion Assessment:  Patient tolerated infusion without incident.  Blood return noted pre and post infusion.  Site patent and intact, free from redness, edema or discomfort.  No evidence of extravasations.  Access discontinued per protocol.    Discharge Plan:   Discharge instructions reviewed with: Patient and Family.  Patient and/or family verbalized understanding of discharge instructions and all questions answered.  Copy of AVS reviewed with patient and/or family.  Patient will return 5/3/18 for next appointment.  Patient discharged in stable condition accompanied by: .  Departure Mode: Ambulatory.    Max Montiel RN        Notified QUYEN Goins of WBC 0.3. UA already ordered. Will obtained blood cultures. Patient received chemo 7 days ago and Neulasta 6 days ago. Low counts is to be expected. Patient to  start on antibiotic. Patient educated on neutropenic precautions and to go to ER with temperature >/=100.4F. Patient will get a PET Scan on Wednesday and will see Dr. Magana on Thursday. She has been added on to the schedule for labs and possible transfusion on Thursday as well.  XAVIER Rolle

## 2018-04-30 NOTE — MR AVS SNAPSHOT
After Visit Summary   4/30/2018    Flor Griffin    MRN: [id number]           Patient Information     Date Of Birth          1955        Visit Information        Provider Department      4/30/2018 11:00 AM  INFUSION CHAIR 14 [facility]        Today's Diagnoses     Port catheter in place    -  1    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 in female, estrogen receptor positive (H)           Follow-ups after your visit        Your next 10 appointments already scheduled     May 02, 2018 12:00 PM CDT   PE NPET ONCOLOGY (EYES TO THIGHS) with UUPET1   [facility] PET CT ([facility], [facility])    [address] [facility] [id number] [phone]           Tell your doctor:   If there is any chance you may be pregnant or if you are breastfeeding.   If you have problems lying in small spaces (claustrophobia). If you do, your doctor may give you medicine to help you relax. If you have diabetes:   Have your exam early in the morning. Your blood glucose will go up as the day goes by.   Your glucose level must be 180 or less at the start of the exam. Please take any medicines you need to ensure this blood glucose level. 24 hours before your scan: Don t do any heavy exercise. (No jogging, aerobics or other workouts.) Exercise will make your pictures less accurate. 6 hours before your scan:   Stop all food and liquids (except water).   Do not chew gum or suck on mints.   If you need to take medicine with food, you may take it with a few crackers.  Please call your Imaging Department at your exam site with any questions.            May 03, 2018  8:00 AM CDT   Return Visit with María Galeas GC   Cancer Risk Management Program ([facility])    [facility]  [address]  [facility] [address]   [phone]            May 03, 2018 10:00 AM CDT   Level 4 with SH INFUSION  "CHAIR 3   [facility] ([facility])    [facility] [place]  [address]  [place] [address] [phone]            May 03, 2018 10:30 AM CDT   Return Visit with Yani Magana MD   [facility] ([facility])    [facility] [place]  [address]  [place] [id number] [phone]              Who to contact     If you have questions or need follow up information about today's clinic visit or your schedule please contact [facility] directly at [phone].  Normal or non-critical lab and imaging results will be communicated to you by [employer], letter or phone within 4 business days after the clinic has received the results. If you do not hear from us within 7 days, please contact the clinic through MyChart or phone. If you have a critical or abnormal lab result, we will notify you by phone as soon as possible.  Submit refill requests through crowd[employer]PRING or call your pharmacy and they will forward the refill request to us. Please allow 3 business days for your refill to be completed.          Additional Information About Your Visit        [employer] Information     [employer]ING lets you send messages to your doctor, view your test results, renew your prescriptions, schedule appointments and more. To sign up, go to [email or web address] . Click on \"Log in\" on the left side of the screen, which will take you to the Welcome page. Then click on \"Sign up Now\" on the right side of the page.     You will be asked to enter the access code listed below, as well as some personal information. Please follow the directions to create your username and password.     Your access code is: [email or web address]  Expires: 2018  4:07 PM     Your access code will  in 90 days. If you need help or a new code, please call your [facility] clinic or [phone].        Care EveryWhere ID     This " is your Care EveryWhere ID. This could be used by other organizations to access your [place] medical records  [id number]        Your Vitals Were     Pulse Temperature Respirations Last Period Pulse Oximetry       90 98.4  F (36.9  C) (Oral) 18 01/01/2004 (Approximate) 100%        Blood Pressure from Last 3 Encounters:   04/30/18 122/56   04/30/18 122/56   04/27/18 107/64    Weight from Last 3 Encounters:   04/27/18 77.1 kg (170 lb)   04/23/18 78.9 kg (174 lb)   04/23/18 78.9 kg (174 lb)              We Performed the Following     Blood culture     CBC with platelets and differential     Comprehensive metabolic panel (BMP + Alb, Alk Phos, ALT, AST, Total. Bili, TP)     UA with Microscopic reflex to Culture          Today's Medication Changes          These changes are accurate as of 4/30/18  1:57 PM.  If you have any questions, ask your nurse or doctor.               Start taking these medicines.        Dose/Directions    levofloxacin 500 MG tablet   Commonly known as:  LEVAQUIN   Used for:  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 in female, estrogen receptor positive (H)   Started by:  Buster Menon APRN CNP        Dose:  500 mg   Take 1 tablet (500 mg) by mouth daily   Quantity:  7 tablet   Refills:  0         These medicines have changed or have updated prescriptions.        Dose/Directions    * fluconazole 100 MG tablet   Commonly known as:  DIFLUCAN   This may have changed:  Another medication with the same name was added. Make sure you understand how and when to take each.   Used for:  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 in female, estrogen receptor positive (H), Carcinoma of unknown origin (H)   Changed by:  Buster Menon APRN CNP        Take 1 tab daily   Quantity:  15 tablet   Refills:  0       * fluconazole 100 MG tablet   Commonly known as:  DIFLUCAN   This may have changed:  You were already taking a medication with the same name, and this prescription was added. Make  sure you understand how and when to take each.   Used for:  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 in female, estrogen receptor positive (H)   Changed by:  Buster Menon APRN CNP        Take 1 tab p daily   Quantity:  7 tablet   Refills:  0       * Notice:  This list has 2 medication(s) that are the same as other medications prescribed for you. Read the directions carefully, and ask your doctor or other care provider to review them with you.      Stop taking these medicines if you haven't already. Please contact your care team if you have questions.     azithromycin 250 MG tablet   Commonly known as:  ZITHROMAX Z-JOLENE   Stopped by:  Buster Menon APRN CNP                Where to get your medicines      These medications were sent to [facility] [id number] [place] [address] [address]  [address], [facility] [address]     Phone:  [phone]     fluconazole 100 MG tablet    levofloxacin 500 MG tablet                Primary Care Provider Office Phone # Fax #    [facility] [phone] [address] [facility] [address]        Equal Access to Services     TRICIA JARA AH: Hadii nelida copeland hadasho Sodot, waaxda luqadaha, qaybta kaalmada adedarcyyavanita, ketan lam . So [facility] [phone].    ATENCIÓN: Si habla español, tiene a thomas disposición servicios gratuitos de asistencia lingüística. [facility] [phone].    We comply with applicable federal civil rights laws and [place] laws. We do not discriminate on the basis of race, color, national origin, age, disability, sex, sexual orientation, or gender identity.            Thank you!     Thank you for choosing [facility]  for your care. Our goal is always to provide you with excellent care. Hearing back from our patients is one way we can continue to improve our services. Please take a few minutes to  complete the written survey that you may receive in the mail after your visit with us. Thank you!             Your Updated Medication List - Protect others around you: Learn how to safely use, store and throw away your medicines at www.disposemymeds.org.          This list is accurate as of 4/30/18  1:57 PM.  Always use your most recent med list.                   Brand Name Dispense Instructions for use Diagnosis    ADVIL PO      Take 400 mg by mouth every 6 hours as needed for moderate pain        aspirin 81 MG tablet      Take 81 mg by mouth daily        dexamethasone 4 MG tablet    DECADRON    6 tablet    Take 2 tablets (8 mg) by mouth daily Start on Day 2.    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 in female, estrogen receptor positive (H), Carcinoma of unknown origin (H)       * fluconazole 100 MG tablet    DIFLUCAN    7 tablet    Take 1 tab p daily    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 in female, estrogen receptor positive (H)       GLUCOSAMINE CHONDROITIN JOINT PO      Take by mouth daily        HYDROcodone-acetaminophen 5-325 MG per tablet    NORCO    6 tablet    Take 1 tablet by mouth every 6 hours as needed for moderate to severe pain    Carcinoma of unknown origin (H)       levofloxacin 500 MG tablet    LEVAQUIN    7 tablet    Take 1 tablet (500 mg) by mouth daily    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 in female, estrogen receptor positive (H)       LORazepam 0.5 MG tablet    ATIVAN    30 tablet    Take 1 tablet (0.5 mg) by mouth every 4 hours as needed (Anxiety, Nausea/Vomiting or Sleep)    Malignant neoplasm of overlapping sites of  right breast in female, estrogen receptor positive (H), Carcinoma of unknown origin (H)       magic mouthwash suspension (diphenhydrAMINE, lidocaine, aluminum-magnesium & simethicone) Susp compounding kit     240 mL    Swish and swallow 5-10 mLs in mouth every 6 hours as needed for mouth sores    Mouth sores       OLANZapine 5 MG tablet    zyPREXA    30 tablet    Take 1 tablet (5 mg) orally at bedtime    Nausea, Carcinoma of unknown origin (H), Port catheter in place, 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 in female, estrogen receptor positive (H)       OMEGA-3 FISH OIL PO      Take 1 g by mouth daily        prochlorperazine 10 MG tablet    COMPAZINE    30 tablet    Take 1 tablet (10 mg) by mouth every 6 hours as needed (Nausea/Vomiting)    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 in female, estrogen receptor positive (H), Carcinoma of unknown origin (H)       senna 8.6 MG tablet    SENOKOT    120 tablet    Take 1 tablet by mouth daily    Carcinoma of unknown origin (H), Constipation, unspecified constipation type       valACYclovir 1000 mg tablet    VALTREX    20 tablet    Take 1 tablet (1,000 mg) by mouth 2 times daily    Cold sore       VITAMIN B6 PO      Take by mouth daily        VITAMIN D (CHOLECALCIFEROL) PO      Take 1,000 Units by mouth daily        * Notice:  This list has 2 medication(s) that are the same as other medications prescribed for you. Read the directions carefully, and ask your doctor or other care provider to review them with you.

## 2018-04-30 NOTE — PATIENT INSTRUCTIONS
'Reschedule PET SCAN for tomorrow or wed  Done/Janice   Schedule for  labs and possible blood transfusion on Thursday when pt sees Dr Magana   Scheduled/janice  Start miralax    Dc zpack  Start levaquin

## 2018-04-30 NOTE — MR AVS SNAPSHOT
After Visit Summary   4/30/2018    Flor Griffin    MRN: [id number]           Patient Information     Date Of Birth          1955        Visit Information        Provider Department      4/30/2018 11:45 AM Buster Menon APRN [facility]        Today's Diagnoses     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 in female, estrogen receptor positive (H)    -  1      Care Instructions      'Reschedule PET SCAN for tomorrow or wed   Schedule for  labs and possible blood transfusion on Thursday when pt sees Dr Chino Ayers miralax    Dc zpack  Start levaquin           Follow-ups after your visit        Your next 10 appointments already scheduled     May 02, 2018 12:00 PM CDT   PE NPET ONCOLOGY (EYES TO THIGHS) with UUPET1   [facility] PET CT ([facility], [facility])    [address] [facility] [address] [phone]           Tell your doctor:   If there is any chance you may be pregnant or if you are breastfeeding.   If you have problems lying in small spaces (claustrophobia). If you do, your doctor may give you medicine to help you relax. If you have diabetes:   Have your exam early in the morning. Your blood glucose will go up as the day goes by.   Your glucose level must be 180 or less at the start of the exam. Please take any medicines you need to ensure this blood glucose level. 24 hours before your scan: Don t do any heavy exercise. (No jogging, aerobics or other workouts.) Exercise will make your pictures less accurate. 6 hours before your scan:   Stop all food and liquids (except water).   Do not chew gum or suck on mints.   If you need to take medicine with food, you may take it with a few crackers.  Please call your Imaging Department at your exam site with any questions.            May 03, 2018  8:00 AM CDT   Return Visit with María Galeas GC   Cancer Risk Management Program ([facility])    [facility]  "[facility]  [address]  [place] [address]   [phone]            May 03, 2018 10:00 AM CDT   Level 4 with  INFUSION CHAIR 3   [facility] ([facility])    [facility] [place]  [address]  [place] [address]   [phone]            May 03, 2018 10:30 AM CDT   Return Visit with Yani Magana MD   [facility] ([facility])    [facility] [place]  [address]  [place] [address]   [phone]              Who to contact     If you have questions or need follow up information about today's clinic visit or your schedule please contact [facility] directly at [phone].  Normal or non-critical lab and imaging results will be communicated to you by Bigvesthart, letter or phone within 4 business days after the clinic has received the results. If you do not hear from us within 7 days, please contact the clinic through GuestMetricst or phone. If you have a critical or abnormal lab result, we will notify you by phone as soon as possible.  Submit refill requests through NewsFixed or call your pharmacy and they will forward the refill request to us. Please allow 3 business days for your refill to be completed.          Additional Information About Your Visit        NewsFixed Information     NewsFixed lets you send messages to your doctor, view your test results, renew your prescriptions, schedule appointments and more. To sign up, go to [email or web address] . Click on \"Log in\" on the left side of the screen, which will take you to the Welcome page. Then click on \"Sign up Now\" on the right side of the page.     You will be asked to enter the access code listed below, as well as some personal information. Please follow the directions to create your username and password.     Your access code is: [email or web address]  Expires: 7/26/2018  4:07 PM     Your access code will "  in 90 days. If you need help or a new code, please call your [place] clinic or [phone].        Care EveryWhere ID     This is your Care EveryWhere ID. This could be used by other organizations to access your [place] medical records  [id number]        Your Vitals Were     Pulse Temperature Respirations Last Period Pulse Oximetry       90 98.4  F (36.9  C) (Oral) 18 2004 (Approximate) 100%        Blood Pressure from Last 3 Encounters:   18 122/56   18 122/56   18 107/64    Weight from Last 3 Encounters:   18 77.1 kg (170 lb)   18 78.9 kg (174 lb)   18 78.9 kg (174 lb)                 Today's Medication Changes          These changes are accurate as of 18  2:08 PM.  If you have any questions, ask your nurse or doctor.               Start taking these medicines.        Dose/Directions    levofloxacin 500 MG tablet   Commonly known as:  LEVAQUIN   Used for:  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 in female, estrogen receptor positive (H)   Started by:  Buster Menon APRN CNP        Dose:  500 mg   Take 1 tablet (500 mg) by mouth daily   Quantity:  7 tablet   Refills:  0         These medicines have changed or have updated prescriptions.        Dose/Directions    * fluconazole 100 MG tablet   Commonly known as:  DIFLUCAN   This may have changed:  Another medication with the same name was added. Make sure you understand how and when to take each.   Used for:  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 in female, estrogen receptor positive (H), Carcinoma of unknown origin (H)   Changed by:  Buster Menon APRN CNP        Take 1 tab daily   Quantity:  15 tablet   Refills:  0       * fluconazole 100 MG tablet   Commonly known as:  DIFLUCAN   This may have changed:  You were already taking a medication with the same name, and this prescription was added. Make sure you understand how and when to take each.   Used for:  Malignant neoplasm  of overlapping sites of right breast in female, estrogen receptor positive (H)   Changed by:  Buster Menon APRN CNP        Take 1 tab p daily   Quantity:  7 tablet   Refills:  0       * Notice:  This list has 2 medication(s) that are the same as other medications prescribed for you. Read the directions carefully, and ask your doctor or other care provider to review them with you.      Stop taking these medicines if you haven't already. Please contact your care team if you have questions.     azithromycin 250 MG tablet   Commonly known as:  ZITHROMAX Z-JOLENE   Stopped by:  Buster Menon APRN CNP                Where to get your medicines      These medications were sent to [facility] [address] [facility] [address], [facility] [address]     Phone:  [phone]     fluconazole 100 MG tablet    levofloxacin 500 MG tablet                Primary Care Provider Office Phone # Fax #    [facility] [phone] [address] [facility] [address]        Equal Access to Services     [facility]: Hadii aad ku hadasho Soomaali, waaxda luqadaha, qaybta kaalmada adeegyada, waxay osvaldoin haycara lam . So [facility] [phone].    ATENCIÓN: Si habla español, tiene a thomas disposición servicios gratuitos de asistencia lingüística. Llame al [phone].    We comply with applicable federal civil rights laws and [place] laws. We do not discriminate on the basis of race, color, national origin, age, disability, sex, sexual orientation, or gender identity.            Thank you!     Thank you for choosing [facility]  for your care. Our goal is always to provide you with excellent care. Hearing back from our patients is one way we can continue to improve our services. Please take a few minutes to complete the written survey that you may receive in the mail after your visit with us. Thank you!              Your Updated Medication List - Protect others around you: Learn how to safely use, store and throw away your medicines at www.disposemymeds.org.          This list is accurate as of 4/30/18  2:08 PM.  Always use your most recent med list.                   Brand Name Dispense Instructions for use Diagnosis    ADVIL PO      Take 400 mg by mouth every 6 hours as needed for moderate pain        aspirin 81 MG tablet      Take 81 mg by mouth daily        dexamethasone 4 MG tablet    DECADRON    6 tablet    Take 2 tablets (8 mg) by mouth daily Start on Day 2.    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 in female, estrogen receptor positive (H), Carcinoma of unknown origin (H)       * fluconazole 100 MG tablet    DIFLUCAN    7 tablet    Take 1 tab p daily    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 in female, estrogen receptor positive (H)       GLUCOSAMINE CHONDROITIN JOINT PO      Take by mouth daily        HYDROcodone-acetaminophen 5-325 MG per tablet    NORCO    6 tablet    Take 1 tablet by mouth every 6 hours as needed for moderate to severe pain    Carcinoma of unknown origin (H)       levofloxacin 500 MG tablet    LEVAQUIN    7 tablet    Take 1 tablet (500 mg) by mouth daily    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 in female, estrogen receptor positive (H)       LORazepam 0.5 MG tablet    ATIVAN    30 tablet    Take 1 tablet (0.5 mg) by mouth every 4 hours as needed (Anxiety, Nausea/Vomiting or Sleep)    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 in female, estrogen receptor positive (H), Carcinoma of unknown origin (H)       magic  mouthwash suspension (diphenhydrAMINE, lidocaine, aluminum-magnesium & simethicone) Susp compounding kit     240 mL    Swish and swallow 5-10 mLs in mouth every 6 hours as needed for mouth sores    Mouth sores       OLANZapine 5 MG tablet    zyPREXA    30 tablet    Take 1 tablet (5 mg) orally at bedtime    Nausea, Carcinoma of unknown origin (H), Port catheter in place, 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 in female, estrogen receptor positive (H)       OMEGA-3 FISH OIL PO      Take 1 g by mouth daily        prochlorperazine 10 MG tablet    COMPAZINE    30 tablet    Take 1 tablet (10 mg) by mouth every 6 hours as needed (Nausea/Vomiting)    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 in female, estrogen receptor positive (H), Carcinoma of unknown origin (H)       senna 8.6 MG tablet    SENOKOT    120 tablet    Take 1 tablet by mouth daily    Carcinoma of unknown origin (H), Constipation, unspecified constipation type       valACYclovir 1000 mg tablet    VALTREX    20 tablet    Take 1 tablet (1,000 mg) by mouth 2 times daily    Cold sore       VITAMIN B6 PO      Take by mouth daily        VITAMIN D (CHOLECALCIFEROL) PO      Take 1,000 Units by mouth daily        * Notice:  This list has 2 medication(s) that are the same as other medications prescribed for you. Read the directions carefully, and ask your doctor or other care provider to review them with you.

## 2018-04-30 NOTE — TELEPHONE ENCOUNTER
Patient's daughter Emmie called stating that her mother is not feeling with dehydration and an anal fissure. Patient will be added on for IV fluids today and to see QUYEN Goins. Danya Lara RN,BSN,OCN

## 2018-04-30 NOTE — LETTER
"    4/30/2018         RE: Flor Griffin  [id number]  [place] [address]        Dear Colleague,    Thank you for referring your patient, Flor Griffin, to the [facility]. Please see a copy of my visit note below.    Oncology Rooming Note    April 30, 2018 12:02 PM   Flor Griffin is a 62 year old female who presents for:    Chief Complaint   Patient presents with     Oncology Clinic Visit     Initial Vitals: /56  Pulse 90  Temp 98.4  F (36.9  C) (Oral)  Resp 18  LMP 01/01/2004 (Approximate)  SpO2 100% Estimated body mass index is 27.44 kg/(m^2) as calculated from the following:    Height as of 4/27/18: 1.676 m (5' 6\").    Weight as of 4/27/18: 77.1 kg (170 lb). There is no height or weight on file to calculate BSA.  No Pain (0) Comment: Data Unavailable   Patient's last menstrual period was 01/01/2004 (approximate).  Allergies reviewed: Yes  Medications reviewed: Yes    Medications: Medication refills not needed today.  Pharmacy name entered into Tapjoy: [id number] - [place], [place] - [address]    Clinical concerns: None     2 minutes for nursing intake (face to face time)     Mary Jo James CMA                      Oncology/Hematology Visit Note  Apr 30, 2018    Reason for Visit: follow up of chills and cough    History of Present Illness: Flor Griffin is a 62 year old female with poorly differentiated adenocarcinoma PET scan 11/27/2017 showed hypermetabolic right supraclavicular oral, right axillary and subpectoral adenopathy no distant metastatic disease.  One possibility is breast cancer metastasized to the regional lymph nodes.  However, mammogram and MRI of the breast were negative  Patient was treated with carboplatin and Taxol she completed 4 cycles on 02/14/2018   she started dose dense AC on 03/07/2018-She completed 4th cycle on April 23 and followed by Neulasta shot        Interval " History:  Patient states that on Friday, April 27 she noticed chills and nonproductive cough she went to ER where chest x-ray was negative.  She was sent home without antibiotic.   On Saturday she called the clinic again and Dr. Kilpatrick  Prescribed Z-Levi for the patient  Since the start of Z-Levi her symptoms have improved.  Her cough has subsided.  Patient denies fever she told me that she had chills on Saturday and Sunday but the temperature was 98.9 .she denies sweats ,she took ibuprofen.   patient tells me that her temperature was 98.9.  Prior to taking ibuprofen.  Patient denies shortness of breath denies any rhinorrhea denies sore throat denies ear pain or chest tightness.  Denies urinary symptoms.  She she had nausea yesterday no vomiting she took Zofran which helps with nausea.  She tells me she was constipated yesterday she is taking senna.  She she had a normal bowel movement today  Patient denies abdominal pain.  Denies bleeding or bruising    She was scheduled for PET scan today but she canceled because she was scheduled for hydration here           Review of Systems:  14 point ROS of systems including Constitutional, Eyes, Respiratory, Cardiovascular, Gastroenterology, Genitourinary, Integumentary, Muscularskeletal, Psychiatric were all negative except for pertinent positives noted in my HPI.      Current Outpatient Prescriptions   Medication Sig Dispense Refill     levofloxacin (LEVAQUIN) 500 MG tablet Take 1 tablet (500 mg) by mouth daily 7 tablet 0     aspirin 81 MG tablet Take 81 mg by mouth daily       dexamethasone (DECADRON) 4 MG tablet Take 2 tablets (8 mg) by mouth daily Start on Day 2. 6 tablet 3     famotidine (PEPCID) 20 MG tablet Take 1 tablet (20 mg) by mouth daily 30 tablet 1     fluconazole (DIFLUCAN) 100 MG tablet Take 1 tab daily 15 tablet 0     Glucos-Chondroit-Hyaluron-MSM (GLUCOSAMINE CHONDROITIN JOINT PO) Take by mouth daily       HYDROcodone-acetaminophen (NORCO) 5-325 MG per tablet  Take 1 tablet by mouth every 6 hours as needed for moderate to severe pain 6 tablet 0     Ibuprofen (ADVIL PO) Take 400 mg by mouth every 6 hours as needed for moderate pain       LORazepam (ATIVAN) 0.5 MG tablet Take 1 tablet (0.5 mg) by mouth every 4 hours as needed (Anxiety, Nausea/Vomiting or Sleep) 30 tablet 3     magic mouthwash (FIRST-MOUTHWASH BLM) suspension Swish and swallow 5-10 mLs in mouth every 6 hours as needed for mouth sores 240 mL 1     OLANZapine (ZYPREXA) 5 MG tablet Take 1 tablet (5 mg) orally at bedtime 30 tablet 0     Omega-3 Fatty Acids (OMEGA-3 FISH OIL PO) Take 1 g by mouth daily       prochlorperazine (COMPAZINE) 10 MG tablet Take 1 tablet (10 mg) by mouth every 6 hours as needed (Nausea/Vomiting) 30 tablet 3     Pyridoxine HCl (VITAMIN B6 PO) Take by mouth daily       senna (SENOKOT) 8.6 MG tablet Take 1 tablet by mouth daily 120 tablet 1     valACYclovir (VALTREX) 1000 mg tablet Take 1 tablet (1,000 mg) by mouth 2 times daily 20 tablet 0     VITAMIN D, CHOLECALCIFEROL, PO Take 1,000 Units by mouth daily         Physical Examination:  General: The patient is a pleasant female in no acute distress.  /56  Pulse 90  Temp 98.4  F (36.9  C) (Oral)  Resp 18  LMP 01/01/2004 (Approximate)  SpO2 100%  HEENT: EOMI, PERRL. Sclerae are anicteric. Oral mucosa is pink and moist with no lesions or thrush.   Lymph: Neck is supple with no lymphadenopathy in the cervical or supraclavicular areas.   Heart: Regular rate and rhythm.   Lungs: Clear to auscultation bilaterally.   GI: Bowel sounds present, soft, nontender with no palpable hepatosplenomegaly or masses.   Extremities: No lower extremity edema noted bilaterally.   Skin: No rashes, petechiae, or bruising noted on exposed skin.    Laboratory Data:  Results for orders placed or performed in visit on 04/30/18 (from the past 24 hour(s))   CBC with platelets and differential   Result Value Ref Range    WBC 0.3 (LL) 4.0 - 11.0 10e9/L    RBC  Count 2.42 (L) 3.8 - 5.2 10e12/L    Hemoglobin 7.8 (L) 11.7 - 15.7 g/dL    Hematocrit 22.4 (L) 35.0 - 47.0 %    MCV 93 78 - 100 fl    MCH 32.2 26.5 - 33.0 pg    MCHC 34.8 31.5 - 36.5 g/dL    RDW 17.5 (H) 10.0 - 15.0 %    Platelet Count 79 (L) 150 - 450 10e9/L    Diff Method PENDING    Comprehensive metabolic panel (BMP + Alb, Alk Phos, ALT, AST, Total. Bili, TP)   Result Value Ref Range    Sodium 135 133 - 144 mmol/L    Potassium 4.0 3.4 - 5.3 mmol/L    Chloride 101 94 - 109 mmol/L    Carbon Dioxide 26 20 - 32 mmol/L    Anion Gap 8 3 - 14 mmol/L    Glucose 106 (H) 70 - 99 mg/dL    Urea Nitrogen 20 7 - 30 mg/dL    Creatinine 0.62 0.52 - 1.04 mg/dL    GFR Estimate >90 >60 mL/min/1.7m2    GFR Estimate If Black >90 >60 mL/min/1.7m2    Calcium 8.8 8.5 - 10.1 mg/dL    Bilirubin Total 0.7 0.2 - 1.3 mg/dL    Albumin 3.4 3.4 - 5.0 g/dL    Protein Total 6.3 (L) 6.8 - 8.8 g/dL    Alkaline Phosphatase 106 40 - 150 U/L    ALT 30 0 - 50 U/L    AST 13 0 - 45 U/L         Assessment and Plan:    This is a 62-year-old female with    Adenocarcinoma   poorly differentiated adenocarcinoma PET scan 11/27/2017 showed hypermetabolic right supraclavicular oral, right axillary and subpectoral adenopathy no distant metastatic disease.  One possibility is breast cancer metastasized to the regional lymph nodes.  However, mammogram and MRI of the breast were negative  Patient was treated with carboplatin and Taxol she completed 4 cycles on 02/14/2018   she started dose dense AC on 03/07/2018 She completed 4th cycle on April 23  followed by Neulasta shot  -Patient missed her PET scan today we will reschedule PET scan and see if she can go today.  If not reschedule PET scan prior visit with Dr. Magana on May 3      Neutropenia contrary to chemotherapy  given to her on April 23 she received  Neulasta shot  Neutropenic precautions discussed, avoid sick contacts.  In the event of fever, chills sweats worsening of cough patient must call and go to the  ER  Patient verbalized understanding      Anemia /thrombocytopenia secondary to chemotherapy  Denies bleeding, patient is asymptomatic from anemia denies shortness of breath, extensive fatigue, headache or dizziness, heart palpitations  -She will come on Thursday for CBC and possible blood transfusion  -I discussed symptoms of anemia -which she denies - instructed patient to call the clinic clinic if symptomatic    History of chills/nonproductive cough  -Patient denies chills ,denies fever, shortness of breath.  -Patient tells me that her symptoms have improved since the start of Z-Levi.   Since patient is neutropenic I will start prophylactic Levaquin and fluconazole for better coverage (DC Z-Levi)  Patient refuses to go for chest x-ray today-she states her cough is almost resolved and refuses to go.  Chest x-ray 3  days ago was normal  -Order blood and urine cultures today      Nausea secondary to chemotherapy  She has also Zyprexa 5 mg at bedtime.  Zofran helps with nausea  I will give her NS bolus hydration 1 L today      Constipation-therapy and Zofran  Normal bowel movement today  Continue senna  Start MiraLAX daily as needed    Patient is asked to call and  go to ER in the event of fever chills, sweats productive cough or  any signs or symptoms of infection.  Or any changes in health condition nausea vomiting diarrhea bleeding    HEDY Beth CNP  Hem/Onc   [facility]               Again, thank you for allowing me to participate in the care of your patient.        Sincerely,        HEDY Beth CNP

## 2018-04-30 NOTE — TELEPHONE ENCOUNTER
Michelle calling to cancel PET scan scheduled for today at 08:30,  Transferred to imaging scheduling.      Reason for Disposition    [1] Caller requesting NON-URGENT health information AND [2] PCP's office is the best resource    Protocols used: INFORMATION ONLY CALL-ADULT-

## 2018-04-30 NOTE — PROGRESS NOTES
Oncology/Hematology Visit Note  Apr 30, 2018    Reason for Visit: follow up of chills and cough    History of Present Illness: Flor Griffin is a 62 year old female with poorly differentiated adenocarcinoma PET scan 11/27/2017 showed hypermetabolic right supraclavicular oral, right axillary and subpectoral adenopathy no distant metastatic disease.  One possibility is breast cancer metastasized to the regional lymph nodes.  However, mammogram and MRI of the breast were negative  Patient was treated with carboplatin and Taxol she completed 4 cycles on 02/14/2018   she started dose dense AC on 03/07/2018-She completed 4th cycle on April 23 and followed by Neulasta shot        Interval History:  Patient states that on Friday, April 27 she noticed chills and nonproductive cough she went to ER where chest x-ray was negative.  She was sent home without antibiotic.   On Saturday she called the clinic again and Dr. Kilpatrick  Prescribed Z-Levi for the patient  Since the start of Z-Levi her symptoms have improved.  Her cough has subsided.  Patient denies fever she told me that she had chills on Saturday and Sunday but the temperature was 98.9 .she denies sweats ,she took ibuprofen.   patient tells me that her temperature was 98.9.  Prior to taking ibuprofen.  Patient denies shortness of breath denies any rhinorrhea denies sore throat denies ear pain or chest tightness.  Denies urinary symptoms.  She she had nausea yesterday no vomiting she took Zofran which helps with nausea.  She tells me she was constipated yesterday she is taking senna.  She she had a normal bowel movement today  Patient denies abdominal pain.  Denies bleeding or bruising    She was scheduled for PET scan today but she canceled because she was scheduled for hydration here           Review of Systems:  14 point ROS of systems including Constitutional, Eyes, Respiratory, Cardiovascular, Gastroenterology, Genitourinary, Integumentary, Muscularskeletal, Psychiatric  were all negative except for pertinent positives noted in my HPI.      Current Outpatient Prescriptions   Medication Sig Dispense Refill     levofloxacin (LEVAQUIN) 500 MG tablet Take 1 tablet (500 mg) by mouth daily 7 tablet 0     aspirin 81 MG tablet Take 81 mg by mouth daily       dexamethasone (DECADRON) 4 MG tablet Take 2 tablets (8 mg) by mouth daily Start on Day 2. 6 tablet 3     famotidine (PEPCID) 20 MG tablet Take 1 tablet (20 mg) by mouth daily 30 tablet 1     fluconazole (DIFLUCAN) 100 MG tablet Take 1 tab daily 15 tablet 0     Glucos-Chondroit-Hyaluron-MSM (GLUCOSAMINE CHONDROITIN JOINT PO) Take by mouth daily       HYDROcodone-acetaminophen (NORCO) 5-325 MG per tablet Take 1 tablet by mouth every 6 hours as needed for moderate to severe pain 6 tablet 0     Ibuprofen (ADVIL PO) Take 400 mg by mouth every 6 hours as needed for moderate pain       LORazepam (ATIVAN) 0.5 MG tablet Take 1 tablet (0.5 mg) by mouth every 4 hours as needed (Anxiety, Nausea/Vomiting or Sleep) 30 tablet 3     magic mouthwash (FIRST-MOUTHWASH BLM) suspension Swish and swallow 5-10 mLs in mouth every 6 hours as needed for mouth sores 240 mL 1     OLANZapine (ZYPREXA) 5 MG tablet Take 1 tablet (5 mg) orally at bedtime 30 tablet 0     Omega-3 Fatty Acids (OMEGA-3 FISH OIL PO) Take 1 g by mouth daily       prochlorperazine (COMPAZINE) 10 MG tablet Take 1 tablet (10 mg) by mouth every 6 hours as needed (Nausea/Vomiting) 30 tablet 3     Pyridoxine HCl (VITAMIN B6 PO) Take by mouth daily       senna (SENOKOT) 8.6 MG tablet Take 1 tablet by mouth daily 120 tablet 1     valACYclovir (VALTREX) 1000 mg tablet Take 1 tablet (1,000 mg) by mouth 2 times daily 20 tablet 0     VITAMIN D, CHOLECALCIFEROL, PO Take 1,000 Units by mouth daily         Physical Examination:  General: The patient is a pleasant female in no acute distress.  /56  Pulse 90  Temp 98.4  F (36.9  C) (Oral)  Resp 18  LMP 01/01/2004 (Approximate)  SpO2 100%  HEENT:  EOMI, PERRL. Sclerae are anicteric. Oral mucosa is pink and moist with no lesions or thrush.   Lymph: Neck is supple with no lymphadenopathy in the cervical or supraclavicular areas.   Heart: Regular rate and rhythm.   Lungs: Clear to auscultation bilaterally.   GI: Bowel sounds present, soft, nontender with no palpable hepatosplenomegaly or masses.   Extremities: No lower extremity edema noted bilaterally.   Skin: No rashes, petechiae, or bruising noted on exposed skin.    Laboratory Data:  Results for orders placed or performed in visit on 04/30/18 (from the past 24 hour(s))   CBC with platelets and differential   Result Value Ref Range    WBC 0.3 (LL) 4.0 - 11.0 10e9/L    RBC Count 2.42 (L) 3.8 - 5.2 10e12/L    Hemoglobin 7.8 (L) 11.7 - 15.7 g/dL    Hematocrit 22.4 (L) 35.0 - 47.0 %    MCV 93 78 - 100 fl    MCH 32.2 26.5 - 33.0 pg    MCHC 34.8 31.5 - 36.5 g/dL    RDW 17.5 (H) 10.0 - 15.0 %    Platelet Count 79 (L) 150 - 450 10e9/L    Diff Method PENDING    Comprehensive metabolic panel (BMP + Alb, Alk Phos, ALT, AST, Total. Bili, TP)   Result Value Ref Range    Sodium 135 133 - 144 mmol/L    Potassium 4.0 3.4 - 5.3 mmol/L    Chloride 101 94 - 109 mmol/L    Carbon Dioxide 26 20 - 32 mmol/L    Anion Gap 8 3 - 14 mmol/L    Glucose 106 (H) 70 - 99 mg/dL    Urea Nitrogen 20 7 - 30 mg/dL    Creatinine 0.62 0.52 - 1.04 mg/dL    GFR Estimate >90 >60 mL/min/1.7m2    GFR Estimate If Black >90 >60 mL/min/1.7m2    Calcium 8.8 8.5 - 10.1 mg/dL    Bilirubin Total 0.7 0.2 - 1.3 mg/dL    Albumin 3.4 3.4 - 5.0 g/dL    Protein Total 6.3 (L) 6.8 - 8.8 g/dL    Alkaline Phosphatase 106 40 - 150 U/L    ALT 30 0 - 50 U/L    AST 13 0 - 45 U/L         Assessment and Plan:    This is a 62-year-old female with    Adenocarcinoma   poorly differentiated adenocarcinoma PET scan 11/27/2017 showed hypermetabolic right supraclavicular oral, right axillary and subpectoral adenopathy no distant metastatic disease.  One possibility is breast cancer  metastasized to the regional lymph nodes.  However, mammogram and MRI of the breast were negative  Patient was treated with carboplatin and Taxol she completed 4 cycles on 02/14/2018   she started dose dense AC on 03/07/2018 She completed 4th cycle on April 23  followed by Neulasta shot  -Patient missed her PET scan today we will reschedule PET scan and see if she can go today.  If not reschedule PET scan prior visit with Dr. Magana on May 3      Neutropenia contrary to chemotherapy  given to her on April 23 she received  Neulasta shot  Neutropenic precautions discussed, avoid sick contacts.  In the event of fever, chills sweats worsening of cough patient must call and go to the ER  Patient verbalized understanding      Anemia /thrombocytopenia secondary to chemotherapy  Denies bleeding, patient is asymptomatic from anemia denies shortness of breath, extensive fatigue, headache or dizziness, heart palpitations  -She will come on Thursday for CBC and possible blood transfusion  -I discussed symptoms of anemia -which she denies - instructed patient to call the clinic clinic if symptomatic    History of chills/nonproductive cough  -Patient denies chills ,denies fever, shortness of breath.  -Patient tells me that her symptoms have improved since the start of Z-Levi.   Since patient is neutropenic I will start prophylactic Levaquin and fluconazole for better coverage (DC Z-Levi)  Patient refuses to go for chest x-ray today-she states her cough is almost resolved and refuses to go.  Chest x-ray 3  days ago was normal  -Order blood and urine cultures today      Nausea secondary to chemotherapy  She has also Zyprexa 5 mg at bedtime.  Zofran helps with nausea  I will give her NS bolus hydration 1 L today      Constipation-therapy and Zofran  Normal bowel movement today  Continue senna  Start MiraLAX daily as needed    Patient is asked to call and  go to ER in the event of fever chills, sweats productive cough or  any signs or  symptoms of infection.  Or any changes in health condition nausea vomiting diarrhea bleeding    HEDY Beth CNP  Hem/Onc   [facility]

## 2018-04-30 NOTE — TELEPHONE ENCOUNTER
Patient's daughter Emmie called concerned regarding her mother's antibiotics. She just completed a Zpack, and she was just prescribed Levaquin and Diflucan. Patient and her daughter are concerned about rosy C diff with all these antibiotics. She is aware that Diflucan is an antifungal. Emmie would like a call back from Buster at [phone].

## 2018-05-02 ENCOUNTER — HOSPITAL ENCOUNTER (OUTPATIENT)
Dept: PET IMAGING | Facility: CLINIC | Age: 63
Discharge: HOME | End: 2018-05-02
Attending: INTERNAL MEDICINE | Admitting: INTERNAL MEDICINE
Payer: COMMERCIAL

## 2018-05-02 DIAGNOSIS — Z17.0 MALIGNANT NEOPLASM OF OVERLAPPING SITES OF RIGHT BREAST IN FEMALE, ESTROGEN RECEPTOR POSITIVE (H): ICD-10-CM

## 2018-05-02 DIAGNOSIS — C50.811 MALIGNANT NEOPLASM OF OVERLAPPING SITES OF RIGHT BREAST IN FEMALE, ESTROGEN RECEPTOR POSITIVE (H): ICD-10-CM

## 2018-05-02 LAB — GLUCOSE BLDC GLUCOMTR-MCNC: 111 MG/DL (ref 70–99)

## 2018-05-02 PROCEDURE — 34300033 ZZH RX 343: Performed by: INTERNAL MEDICINE

## 2018-05-02 PROCEDURE — A9552 F18 FDG: HCPCS | Performed by: INTERNAL MEDICINE

## 2018-05-02 PROCEDURE — 71260 CT THORAX DX C+: CPT

## 2018-05-02 PROCEDURE — 82962 GLUCOSE BLOOD TEST: CPT

## 2018-05-02 PROCEDURE — 25000128 H RX IP 250 OP 636: Performed by: INTERNAL MEDICINE

## 2018-05-02 RX ORDER — IOPAMIDOL 755 MG/ML
1-135 INJECTION, SOLUTION INTRAVASCULAR ONCE
Status: COMPLETED | OUTPATIENT
Start: 2018-05-02 | End: 2018-05-02

## 2018-05-02 RX ORDER — HEPARIN SODIUM (PORCINE) LOCK FLUSH IV SOLN 100 UNIT/ML 100 UNIT/ML
500 SOLUTION INTRAVENOUS ONCE
Status: COMPLETED | OUTPATIENT
Start: 2018-05-02 | End: 2018-05-02

## 2018-05-02 RX ADMIN — HEPARIN 500 UNITS: 100 SYRINGE at 13:24

## 2018-05-02 RX ADMIN — IOPAMIDOL 103 ML: 755 INJECTION, SOLUTION INTRAVENOUS at 13:22

## 2018-05-02 RX ADMIN — FLUDEOXYGLUCOSE F-18 10.4 MCI.: 500 INJECTION, SOLUTION INTRAVENOUS at 12:00

## 2018-05-03 ENCOUNTER — TELEPHONE (OUTPATIENT)
Dept: ONCOLOGY | Facility: CLINIC | Age: 63
End: 2018-05-03

## 2018-05-03 ENCOUNTER — INFUSION THERAPY VISIT (OUTPATIENT)
Dept: INFUSION THERAPY | Facility: CLINIC | Age: 63
End: 2018-05-03
Attending: INTERNAL MEDICINE
Payer: COMMERCIAL

## 2018-05-03 ENCOUNTER — ONCOLOGY VISIT (OUTPATIENT)
Dept: ONCOLOGY | Facility: CLINIC | Age: 63
End: 2018-05-03
Attending: INTERNAL MEDICINE
Payer: COMMERCIAL

## 2018-05-03 ENCOUNTER — HOSPITAL ENCOUNTER (OUTPATIENT)
Facility: CLINIC | Age: 63
Setting detail: SPECIMEN
Discharge: HOME | End: 2018-05-03
Attending: INTERNAL MEDICINE | Admitting: INTERNAL MEDICINE
Payer: COMMERCIAL

## 2018-05-03 VITALS
RESPIRATION RATE: 16 BRPM | SYSTOLIC BLOOD PRESSURE: 105 MMHG | DIASTOLIC BLOOD PRESSURE: 57 MMHG | BODY MASS INDEX: 26.36 KG/M2 | HEART RATE: 87 BPM | TEMPERATURE: 97.8 F | WEIGHT: 164 LBS | HEIGHT: 66 IN | OXYGEN SATURATION: 100 %

## 2018-05-03 VITALS
TEMPERATURE: 97.8 F | SYSTOLIC BLOOD PRESSURE: 105 MMHG | RESPIRATION RATE: 20 BRPM | DIASTOLIC BLOOD PRESSURE: 57 MMHG | HEART RATE: 87 BPM

## 2018-05-03 DIAGNOSIS — C50.811 MALIGNANT NEOPLASM OF OVERLAPPING SITES OF RIGHT BREAST IN FEMALE, ESTROGEN RECEPTOR POSITIVE (H): ICD-10-CM

## 2018-05-03 DIAGNOSIS — Z17.0 MALIGNANT NEOPLASM OF OVERLAPPING SITES OF RIGHT BREAST IN FEMALE, ESTROGEN RECEPTOR POSITIVE (H): ICD-10-CM

## 2018-05-03 DIAGNOSIS — Z17.0 MALIGNANT NEOPLASM OF OVERLAPPING SITES OF RIGHT BREAST IN FEMALE, ESTROGEN RECEPTOR POSITIVE (H): Primary | ICD-10-CM

## 2018-05-03 DIAGNOSIS — Z95.828 PORT CATHETER IN PLACE: Primary | ICD-10-CM

## 2018-05-03 DIAGNOSIS — C50.811 MALIGNANT NEOPLASM OF OVERLAPPING SITES OF RIGHT BREAST IN FEMALE, ESTROGEN RECEPTOR POSITIVE (H): Primary | ICD-10-CM

## 2018-05-03 LAB
BASOPHILS # BLD AUTO: 0.1 10E9/L (ref 0–0.2)
BASOPHILS NFR BLD AUTO: 3 %
DACRYOCYTES BLD QL SMEAR: SLIGHT
DIFFERENTIAL METHOD BLD: ABNORMAL
EOSINOPHIL # BLD AUTO: 0 10E9/L (ref 0–0.7)
EOSINOPHIL NFR BLD AUTO: 0 %
ERYTHROCYTE [DISTWIDTH] IN BLOOD BY AUTOMATED COUNT: 18.2 % (ref 10–15)
HCT VFR BLD AUTO: 20.8 % (ref 35–47)
HGB BLD-MCNC: 7.3 G/DL (ref 11.7–15.7)
LYMPHOCYTES # BLD AUTO: 0.3 10E9/L (ref 0.8–5.3)
LYMPHOCYTES NFR BLD AUTO: 9 %
MCH RBC QN AUTO: 32.2 PG (ref 26.5–33)
MCHC RBC AUTO-ENTMCNC: 35.1 G/DL (ref 31.5–36.5)
MCV RBC AUTO: 92 FL (ref 78–100)
MONOCYTES # BLD AUTO: 0.2 10E9/L (ref 0–1.3)
MONOCYTES NFR BLD AUTO: 7 %
NEUTROPHILS # BLD AUTO: 2.4 10E9/L (ref 1.6–8.3)
NEUTROPHILS NFR BLD AUTO: 81 %
NRBC # BLD AUTO: 0 10*3/UL
NRBC BLD AUTO-RTO: 1 /100
PLATELET # BLD AUTO: 61 10E9/L (ref 150–450)
PLATELET # BLD EST: ABNORMAL 10*3/UL
RBC # BLD AUTO: 2.27 10E12/L (ref 3.8–5.2)
RBC INCLUSIONS BLD: SLIGHT
WBC # BLD AUTO: 3 10E9/L (ref 4–11)

## 2018-05-03 PROCEDURE — G0463 HOSPITAL OUTPT CLINIC VISIT: HCPCS

## 2018-05-03 PROCEDURE — 25000128 H RX IP 250 OP 636: Performed by: INTERNAL MEDICINE

## 2018-05-03 PROCEDURE — 85025 COMPLETE CBC W/AUTO DIFF WBC: CPT | Performed by: INTERNAL MEDICINE

## 2018-05-03 PROCEDURE — 36591 DRAW BLOOD OFF VENOUS DEVICE: CPT

## 2018-05-03 PROCEDURE — 99215 OFFICE O/P EST HI 40 MIN: CPT | Performed by: INTERNAL MEDICINE

## 2018-05-03 RX ORDER — HEPARIN SODIUM (PORCINE) LOCK FLUSH IV SOLN 100 UNIT/ML 100 UNIT/ML
500 SOLUTION INTRAVENOUS ONCE
Status: COMPLETED | OUTPATIENT
Start: 2018-05-03 | End: 2018-05-03

## 2018-05-03 RX ORDER — HEPARIN SODIUM (PORCINE) LOCK FLUSH IV SOLN 100 UNIT/ML 100 UNIT/ML
500 SOLUTION INTRAVENOUS ONCE
Status: CANCELLED
Start: 2018-05-03 | End: 2018-05-03

## 2018-05-03 RX ORDER — ONDANSETRON 2 MG/ML
8 INJECTION INTRAMUSCULAR; INTRAVENOUS EVERY 6 HOURS PRN
Status: CANCELLED
Start: 2018-05-03

## 2018-05-03 RX ADMIN — HEPARIN 500 UNITS: 100 SYRINGE at 11:26

## 2018-05-03 ASSESSMENT — PAIN SCALES - GENERAL: PAINLEVEL: NO PAIN (0)

## 2018-05-03 NOTE — PROGRESS NOTES
Infusion Nursing Note:  Flor Griffin presents today for port labs  Patient seen by provider today: Yes: exam with Dr. Magana today   present during visit today: Not Applicable.    Note: appt set up for cbc possible transfusion.    Intravenous Access:  Implanted Port.    Treatment Conditions:  Lab Results   Component Value Date    HGB 7.3 05/03/2018     Lab Results   Component Value Date    WBC 3.0 05/03/2018      Lab Results   Component Value Date    ANEU 2.4 05/03/2018     Lab Results   Component Value Date    PLT 61 05/03/2018      No orders for blood transfusion following exam with MD    Post Infusion Assessment:  Site patent and intact, free from redness, edema or discomfort.  No evidence of extravasations.  Access discontinued per protocol.    Discharge Plan:   Patient discharged in stable condition accompanied by: .  Departure Mode: Ambulatory.    Cecy Lopez RN

## 2018-05-03 NOTE — PATIENT INSTRUCTIONS
1- Schedule an appointment with [facility]  for left parapharyngeal hypermetabolic area  Scheduled/cristina  2- RTC MD after ENT appointment   Scheduled/cristina      AVS printed & given to patient/cristina

## 2018-05-03 NOTE — MR AVS SNAPSHOT
After Visit Summary   5/3/2018    Flor Griffin    MRN: [id number]           Patient Information     Date Of Birth          1955        Visit Information        Provider Department      5/3/2018 10:30 AM Yani Magana MD [facility]        Care Instructions    1- Schedule an appointment with ENT at [facility] for left parapharyngeal hypermetabolic area  2- RTC MD after ENT appointment          Follow-ups after your visit        Your next 10 appointments already scheduled     May 16, 2018  2:20 PM CDT   (Arrive by 2:05 PM)   New Patient Visit with Norma Lee MD   [facility] ([facility] [place])    [address]  4th Floor  [place] [address] [phone]            May 17, 2018  9:15 AM CDT   Return Visit with Yani Magana MD   [facility] ([facility])    [facility]  [address]  [facility] [id number] [phone]              Future tests that were ordered for you today     Open Future Orders        Priority Expected Expires Ordered    PET Oncology Whole Body Routine  4/4/2019 4/4/2018            Who to contact     If you have questions or need follow up information about today's clinic visit or your schedule please contact [facility] directly at [phone].  Normal or non-critical lab and imaging results will be communicated to you by MyChart, letter or phone within 4 business days after the clinic has received the results. If you do not hear from us within 7 days, please contact the clinic through MyChart or phone. If you have a critical or abnormal lab result, we will notify you by phone as soon as possible.  Submit refill requests through [employer] or call your pharmacy and they will forward the refill request to us. Please allow 3 business days for your refill to be completed.          Additional Information About Your Visit        MyChart Information   "   [employer] lets you send messages to your doctor, view your test results, renew your prescriptions, schedule appointments and more. To sign up, go to [email or web address] . Click on \"Log in\" on the left side of the screen, which will take you to the Welcome page. Then click on \"Sign up Now\" on the right side of the page.     You will be asked to enter the access code listed below, as well as some personal information. Please follow the directions to create your username and password.     Your access code is: [email or web address]  Expires: 2018  4:07 PM     Your access code will  in 90 days. If you need help or a new code, please call your [place] clinic or [phone].        Care EveryWhere ID     This is your [facility] EveryWhere ID. This could be used by other organizations to access your [place] medical records  [id number]        Your Vitals Were     Pulse Temperature Respirations Height Last Period Pulse Oximetry    87 97.8  F (36.6  C) 16 1.676 m (5' 6\") 2004 (Approximate) 100%    BMI (Body Mass Index)                   26.47 kg/m2            Blood Pressure from Last 3 Encounters:   18 105/57   18 105/57   18 122/56    Weight from Last 3 Encounters:   18 74.4 kg (164 lb)   18 77.1 kg (170 lb)   18 78.9 kg (174 lb)              Today, you had the following     No orders found for display       Primary Care Provider Office Phone # Fax #    [facility] [phone] [address] [address]        Equal Access to Services     [facility] ESTEFANI AH: Hadii nelida Nolan, carole heard, pernell kaalmada osmani, ketan connor. So [facility] [phone].    ATENCIÓN: Si habla español, tiene a thomas disposición servicios gratuitos de asistencia lingüística. Llame al [phone].    We comply with applicable federal civil rights laws and [place] laws. We do not discriminate on the basis of " race, color, national origin, age, disability, sex, sexual orientation, or gender identity.            Thank you!     Thank you for choosing [facility]  for your care. Our goal is always to provide you with excellent care. Hearing back from our patients is one way we can continue to improve our services. Please take a few minutes to complete the written survey that you may receive in the mail after your visit with us. Thank you!             Your Updated Medication List - Protect others around you: Learn how to safely use, store and throw away your medicines at www.disposemymeds.org.          This list is accurate as of 5/3/18 11:25 AM.  Always use your most recent med list.                   Brand Name Dispense Instructions for use Diagnosis    ADVIL PO      Take 400 mg by mouth every 6 hours as needed for moderate pain        aspirin 81 MG tablet      Take 81 mg by mouth daily        dexamethasone 4 MG tablet    DECADRON    6 tablet    Take 2 tablets (8 mg) by mouth daily Start on Day 2.    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 in female, estrogen receptor positive (H), Carcinoma of unknown origin (H)       * fluconazole 100 MG tablet    DIFLUCAN    7 tablet    Take 1 tab p daily    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 in female, estrogen receptor positive (H)       GLUCOSAMINE CHONDROITIN JOINT PO      Take by mouth daily        HYDROcodone-acetaminophen 5-325 MG per tablet    NORCO    6 tablet    Take 1 tablet by mouth every 6 hours as needed for moderate to severe pain    Carcinoma of unknown origin (H)       levofloxacin 500 MG tablet    LEVAQUIN     7 tablet    Take 1 tablet (500 mg) by mouth daily    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 in female, estrogen receptor positive (H)       LORazepam 0.5 MG tablet    ATIVAN    30 tablet    Take 1 tablet (0.5 mg) by mouth every 4 hours as needed (Anxiety, Nausea/Vomiting or Sleep)    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 in female, estrogen receptor positive (H), Carcinoma of unknown origin (H)       magic mouthwash suspension (diphenhydrAMINE, lidocaine, aluminum-magnesium & simethicone) Susp compounding kit     240 mL    Swish and swallow 5-10 mLs in mouth every 6 hours as needed for mouth sores    Mouth sores       OLANZapine 5 MG tablet    zyPREXA    30 tablet    Take 1 tablet (5 mg) orally at bedtime    Nausea, Carcinoma of unknown origin (H), Port catheter in place, 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 in female, estrogen receptor positive (H)       OMEGA-3 FISH OIL PO      Take 1 g by mouth daily        prochlorperazine 10 MG tablet    COMPAZINE    30 tablet    Take 1 tablet (10 mg) by mouth every 6 hours as needed (Nausea/Vomiting)    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 in female, estrogen receptor positive (H), Carcinoma of unknown origin (H)       senna 8.6 MG tablet    SENOKOT    120 tablet    Take 1 tablet by mouth daily    Carcinoma of unknown origin (H), Constipation, unspecified constipation type       valACYclovir 1000 mg tablet    VALTREX    20 tablet    Take 1 tablet (1,000 mg) by mouth 2 times daily    Cold sore       VITAMIN B6 PO      Take by mouth daily        VITAMIN D (CHOLECALCIFEROL) PO      Take 1,000 Units by mouth daily        * Notice:  This list has 2 medication(s) that are the same as other medications prescribed for you. Read the directions carefully, and ask your doctor or other care provider to review them with you.

## 2018-05-03 NOTE — TELEPHONE ENCOUNTER
"5/3/2018    Referring Provider: Yani Magana MD    Presenting Information:  I spoke to Flor by phone today to discuss her genetic testing results, as she was unable to attend her genetic counseling appointment this morning. Her blood was drawn on 3/15/2018. A CustomNext-Cancer panel was ordered  from [employer]. This testing was done because of Flor's personal and family history of breast cancer.    Genetic Testing Result: NEGATIVE  Flor is negative for mutations in the RAMONE, BRCA1, BRCA2, BRIP1, CDH1, CHEK2, EPCAM, MLH1, MSH2, MSH6, PALB2, PMS2, PTEN, RAD51C, RAD51D, and TP53 genes.  No mutations were found in any of the 16 genes analyzed.  This test involved sequencing and deletion/duplication analysis of all genes with the exception of EPCAM (deletions/duplications only).    Testing did not detect an identifiable mutation associated with Hereditary Breast and Ovarian Cancer syndrome (BRCA1, BRCA2), Hereditary Diffuse Gastric Cancer (CDH1), Ravi syndrome (EPCAM, MLH1, MSH2, MSH6, PMS2), Li Fraumeni syndrome (TP53), or Cowden syndrome (PTEN).    A copy of the test report can be found in the Laboratory tab, dated 3/15/2018, and named \"SEND OUTS MISC TEST\". The report is scanned in as a linked document.    Interpretation:  We discussed several different interpretations of this negative test result.    1. One explanation may be that there is a different gene or combination of genes and environment that are associated with the cancers in Flor and/or her relatives.    2. It is possible that her sister or maternal aunt, who had a history of breast cancer, did have a mutation in a cancer risk gene and she did not inherit it.  3. There is also a small possibility that there is a mutation in one of these genes, and we could not find it with our current testing methods.       Screening:  Based on this negative test result, it is important for Flor and her relatives to refer back to the family history for " appropriate cancer screening.      Flor s close female relatives remain at increased risk for breast cancer given their family history.  Breast cancer screening is generally recommended to begin approximately 10 years younger than the earliest age of breast cancer diagnosis in the family, or at age 40, whichever comes first.  Breast screening options should be discussed with an individual's primary care provider and a genetic counselor, to determine what screening is appropriate, or if additional screening (such as breast MRI) is necessary based on personal/family history factors.        Other population cancer screening, such as recommendations by the American Cancer Society and the National Comprehensive Cancer Network (NCCN), are also appropriate for Flor and her family.  These screening recommendations may change if there are changes to Flor's personal and/or family history.  Final screening recommendations should be made by each individual's managing physician.    Inheritance:  We reviewed the autosomal dominant inheritance of the 16 genes tested.  We discussed that Flor did not pass on an identifiable mutation in these genes to her children based on this test result.  Mutations in these genes do not skip generations.      Additional Testing Considerations:  It is possible Flor does carry a gene or combination of genes and environment that increase her risk for cancer. We again reviewed the option of larger gene panels covering more moderate risk genes associated with breast cancer. Flor felt comfortable declining further genetic testing at this time.     Summary:  We do not have an explanation for Flor's personal and family history of cancer.  Because of that, it is important that she continue with cancer screening based on her personal and family history as discussed above.    Genetic testing is rapidly advancing, and new cancer susceptibility genes will most likely be identified in the  future.  Therefore, I encouraged Flor to contact me annually or if there are changes in her personal or family history.  This may change how we assess her cancer risk, screening, and the testing we would offer.    Plan:  1.  I will send Flor a copy of her test results today.  2. She plans to follow-up with her care providers for treatment.  3. She should contact me annually, or sooner if her family history changes.    If Flor has any further questions, I encouraged her to contact me at [phone].    María Galeas MS, [facility]  Licensed Genetic Counselor  [phone]

## 2018-05-03 NOTE — Clinical Note
Please print and send a copy of this letter and the patient's genetic testing report to the patient.    Please enclose test report (ordered 3/15/2018): Send outs misc test [[id number]] (Order [id number])

## 2018-05-03 NOTE — LETTER
Cancer Risk Management  Program [facility]  [facility]  [facility]  Mailing Address  Cancer Risk Management Program  [facility]  [address]  [place] [address]    New patient appointments  [phone]  May 7, 2018    Flor Griffin  [address] [address] [address]      Dear Flor,    It was a pleasure speaking with you on the phone on 5/3/2018.  Here is a copy of the progress note from our discussion.  If you have any additional questions, please feel free to call.    Referring Provider: Yani Magana MD    Presenting Information:  I spoke to Flor by phone today to discuss her genetic testing results, as she was unable to attend her genetic counseling appointment this morning. Her blood was drawn on 3/15/2018. A CustomNext-Cancer panel was ordered  from [facility]. This testing was done because of Flor's personal and family history of breast cancer.    Genetic Testing Result: NEGATIVE  Flor is negative for mutations in the RAMONE, BRCA1, BRCA2, BRIP1, CDH1, CHEK2, EPCAM, MLH1, MSH2, MSH6, PALB2, PMS2, PTEN, RAD51C, RAD51D, and TP53 genes.  No mutations were found in any of the 16 genes analyzed.  This test involved sequencing and deletion/duplication analysis of all genes with the exception of EPCAM (deletions/duplications only).    Testing did not detect an identifiable mutation associated with Hereditary Breast and Ovarian Cancer syndrome (BRCA1, BRCA2), Hereditary Diffuse Gastric Cancer (CDH1), Ravi syndrome (EPCAM, MLH1, MSH2, MSH6, PMS2), Li Fraumeni syndrome (TP53), or Cowden syndrome (PTEN).    Interpretation:  We discussed several different interpretations of this negative test result.    1. One explanation may be that there is a different gene or combination of genes and  environment that are associated with the cancers in Flor and/or her relatives.    2. It is possible that her sister or maternal aunt, who had a history of breast cancer, did have a mutation in a cancer risk gene and she did not inherit it.  3. There is also a small possibility that there is a mutation in one of these genes, and we could not find it with our current testing methods.       Screening:  Based on this negative test result, it is important for Flor and her relatives to refer back to the family history for appropriate cancer screening.      Flor s close female relatives remain at increased risk for breast cancer given their family history.  Breast cancer screening is generally recommended to begin approximately 10 years younger than the earliest age of breast cancer diagnosis in the family, or at age 40, whichever comes first.  Breast screening options should be discussed with an individual's primary care provider and a genetic counselor, to determine what screening is appropriate, or if additional screening (such as breast MRI) is necessary based on personal/family history factors.        Other population cancer screening, such as recommendations by the American Cancer Society and the National Comprehensive Cancer Network (NCCN), are also appropriate for Flor and her family.  These screening recommendations may change if there are changes to Flor's personal and/or family history.  Final screening recommendations should be made by each individual's managing physician.    Inheritance:  We reviewed the autosomal dominant inheritance of the 16 genes tested.  We discussed that Flor did not pass on an identifiable mutation in these genes to her children based on this test result.  Mutations in these genes do not skip generations.      Additional Testing Considerations:  It is possible Flor does carry a gene or combination of genes and environment that increase her risk for cancer. We again  reviewed the option of larger gene panels covering more moderate risk genes associated with breast cancer. Flor felt comfortable declining further genetic testing at this time.     Summary:  We do not have an explanation for Flor's personal and family history of cancer.  Because of that, it is important that she continue with cancer screening based on her personal and family history as discussed above.    Genetic testing is rapidly advancing, and new cancer susceptibility genes will most likely be identified in the future.  Therefore, I encouraged Flor to contact me annually or if there are changes in her personal or family history.  This may change how we assess her cancer risk, screening, and the testing we would offer.    Plan:  1.  I will send Flor a copy of her test results today.  2. She plans to follow-up with her care providers for treatment.  3. She should contact me annually, or sooner if her family history changes.    If Flor has any further questions, I encouraged her to contact me at [phone].    María Galeas MS, [facility]  Licensed Genetic Counselor  [phone]

## 2018-05-03 NOTE — PROGRESS NOTES
"Oncology Rooming Note    May 3, 2018 10:26 AM   Flor Griffin is a 62 year old female who presents for:    Chief Complaint   Patient presents with     Oncology Clinic Visit     Return-1 week follow up-breast cancer     Initial Vitals: /57 (BP Location: Right arm, Patient Position: Chair, Cuff Size: Adult Regular)  Pulse 87  Temp 97.8  F (36.6  C)  Resp 16  Ht 1.676 m (5' 6\")  Wt 74.4 kg (164 lb)  LMP 01/01/2004 (Approximate)  SpO2 100%  BMI 26.47 kg/m2 Estimated body mass index is 26.47 kg/(m^2) as calculated from the following:    Height as of this encounter: 1.676 m (5' 6\").    Weight as of this encounter: 74.4 kg (164 lb). Body surface area is 1.86 meters squared.  No Pain (0) Comment: Data Unavailable   Patient's last menstrual period was 01/01/2004 (approximate).  Allergies reviewed: Yes  Medications reviewed: Yes    Medications: Medication refills not needed today.  Pharmacy name entered into [employer]: [id number] - [place], [place] - [address]    Clinical concerns: none     5 minutes for nursing intake (face to face time)     Hailey Ryan CMA            "

## 2018-05-03 NOTE — MR AVS SNAPSHOT
After Visit Summary   5/3/2018    Flor Griffin    MRN: [id number]           Patient Information     Date Of Birth          1955        Visit Information        Provider Department      5/3/2018 10:00 AM  INFUSION CHAIR 3 [facility]        Today's Diagnoses     Port catheter in place    -  1    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 in female, estrogen receptor positive (H)           Follow-ups after your visit        Your next 10 appointments already scheduled     May 16, 2018  2:20 PM CDT   (Arrive by 2:05 PM)   New Patient Visit with Norma Lee MD   [facility] ([facility])    [address]  4th [address]  [facility] [address] [phone]            May 17, 2018  9:15 AM CDT   Return Visit with Yani Magana MD   [facility] ([facility])    [facility]  [address]  [facility] [id number] [phone]              Future tests that were ordered for you today     Open Future Orders        Priority Expected Expires Ordered    PET Oncology Whole Body Routine  4/4/2019 4/4/2018            Who to contact     If you have questions or need follow up information about today's clinic visit or your schedule please contact [facility] directly at [phone].  Normal or non-critical lab and imaging results will be communicated to you by MyChart, letter or phone within 4 business days after the clinic has received the results. If you do not hear from us within 7 days, please contact the clinic through MyChart or phone. If you have a critical or abnormal lab result, we will notify you by phone as soon as possible.  Submit refill requests through [employer] or call your pharmacy and they will forward the refill request to us. Please allow 3 business days for your refill to be completed.          Additional  "Information About Your Visit        MyChart Information     [employer] lets you send messages to your doctor, view your test results, renew your prescriptions, schedule appointments and more. To sign up, go to [email or web address] . Click on \"Log in\" on the left side of the screen, which will take you to the Welcome page. Then click on \"Sign up Now\" on the right side of the page.     You will be asked to enter the access code listed below, as well as some personal information. Please follow the directions to create your username and password.     Your access code is: [email or web address]  Expires: 2018  4:07 PM     Your access code will  in 90 days. If you need help or a new code, please call your [facility] clinic or [phone].        Care EveryWhere ID     This is your Care EveryWhere ID. This could be used by other organizations to access your [facility] medical records  [id number]        Your Vitals Were     Pulse Temperature Respirations Last Period          87 97.8  F (36.6  C) (Oral) 20 2004 (Approximate)         Blood Pressure from Last 3 Encounters:   18 105/57   18 105/57   18 122/56    Weight from Last 3 Encounters:   18 74.4 kg (164 lb)   18 77.1 kg (170 lb)   18 78.9 kg (174 lb)              We Performed the Following     CBC with platelets differential        Primary Care Provider Office Phone # Fax #    [facility] [phone] [address] [address]        Equal Access to Services     [facility] Center[facility] : Hadii nelida pagan Sodot, waaxda luqadaha, qaybta kaalmaketan carlson. So [facility] [phone].    ATENCIÓN: Si habla español, tiene a thomas disposición servicios gratuitos de asistencia lingüística. Llame al [phone].    We comply with applicable federal civil rights laws and [place] laws. We do not discriminate on the basis of race, color, national origin, " age, disability, sex, sexual orientation, or gender identity.            Thank you!     Thank you for choosing [facility]  for your care. Our goal is always to provide you with excellent care. Hearing back from our patients is one way we can continue to improve our services. Please take a few minutes to complete the written survey that you may receive in the mail after your visit with us. Thank you!             Your Updated Medication List - Protect others around you: Learn how to safely use, store and throw away your medicines at www.disposemymeds.org.          This list is accurate as of 5/3/18 11:46 AM.  Always use your most recent med list.                   Brand Name Dispense Instructions for use Diagnosis    ADVIL PO      Take 400 mg by mouth every 6 hours as needed for moderate pain        aspirin 81 MG tablet      Take 81 mg by mouth daily        dexamethasone 4 MG tablet    DECADRON    6 tablet    Take 2 tablets (8 mg) by mouth daily Start on Day 2.    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 in female, estrogen receptor positive (H), Carcinoma of unknown origin (H)       * fluconazole 100 MG tablet    DIFLUCAN    7 tablet    Take 1 tab p daily    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 in female, estrogen receptor positive (H)       GLUCOSAMINE CHONDROITIN JOINT PO      Take by mouth daily        HYDROcodone-acetaminophen 5-325 MG per tablet    NORCO    6 tablet    Take 1 tablet by mouth every 6 hours as needed for moderate to severe pain    Carcinoma of unknown origin (H)       levofloxacin 500 MG tablet    LEVAQUIN    7 tablet     Take 1 tablet (500 mg) by mouth daily    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 in female, estrogen receptor positive (H)       LORazepam 0.5 MG tablet    ATIVAN    30 tablet    Take 1 tablet (0.5 mg) by mouth every 4 hours as needed (Anxiety, Nausea/Vomiting or Sleep)    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 in female, estrogen receptor positive (H), Carcinoma of unknown origin (H)       magic mouthwash suspension (diphenhydrAMINE, lidocaine, aluminum-magnesium & simethicone) Susp compounding kit     240 mL    Swish and swallow 5-10 mLs in mouth every 6 hours as needed for mouth sores    Mouth sores       OLANZapine 5 MG tablet    zyPREXA    30 tablet    Take 1 tablet (5 mg) orally at bedtime    Nausea, Carcinoma of unknown origin (H), Port catheter in place, 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 in female, estrogen receptor positive (H)       OMEGA-3 FISH OIL PO      Take 1 g by mouth daily        prochlorperazine 10 MG tablet    COMPAZINE    30 tablet    Take 1 tablet (10 mg) by mouth every 6 hours as needed (Nausea/Vomiting)    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 in female, estrogen receptor positive (H), Carcinoma of unknown origin (H)       senna 8.6 MG tablet    SENOKOT    120 tablet    Take 1 tablet by mouth daily    Carcinoma of unknown origin (H), Constipation, unspecified constipation type       valACYclovir 1000 mg tablet    VALTREX    20 tablet    Take 1 tablet (1,000 mg) by mouth 2 times daily    Cold sore       VITAMIN B6 PO      Take by mouth daily        VITAMIN D (CHOLECALCIFEROL) PO      Take 1,000 Units by mouth daily        * Notice:  This list has 2 medication(s) that are the same as other medications prescribed for you. Read the directions carefully, and ask your doctor or other care provider to review them with you.

## 2018-05-03 NOTE — LETTER
"    5/3/2018         RE: Flor Griffin  [address]  [facility] [address]        Dear Colleague,    Thank you for referring your patient, Flor Griffin, to the [facility]. Please see a copy of my visit note below.    Oncology Rooming Note    May 3, 2018 10:26 AM   Flor Griffin is a 62 year old female who presents for:    Chief Complaint   Patient presents with     Oncology Clinic Visit     Return-1 week follow up-breast cancer     Initial Vitals: /57 (BP Location: Right arm, Patient Position: Chair, Cuff Size: Adult Regular)  Pulse 87  Temp 97.8  F (36.6  C)  Resp 16  Ht 1.676 m (5' 6\")  Wt 74.4 kg (164 lb)  LMP 01/01/2004 (Approximate)  SpO2 100%  BMI 26.47 kg/m2 Estimated body mass index is 26.47 kg/(m^2) as calculated from the following:    Height as of this encounter: 1.676 m (5' 6\").    Weight as of this encounter: 74.4 kg (164 lb). Body surface area is 1.86 meters squared.  No Pain (0) Comment: Data Unavailable   Patient's last menstrual period was 01/01/2004 (approximate).  Allergies reviewed: Yes  Medications reviewed: Yes    Medications: Medication refills not needed today.  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  Initially she was seen 11/22/2017 after she had the right supraclavicular lymph node biopsy which was positive for poorly differentiated adenocarcinoma.  She had this lymph node almost a month and had an excisional biopsy performed which was consistent with the diagnosis of cancer.      A pet CT scan 11/27/2017 showed hypermetabolic right " "supraclavicular, right axillary and subpectoral adenopathy.  Otherwise, no distant metastatic disease.     Mammogram 11/30/17- negative    TREATMENT: Carboplatin and Taxol completed 4 cycles. 3/7/2018 started dose dense AC. Completed 4 cycles.      SUBJECTIVE:  The patient was seen as a followup today. She is recovering from a respiratory infection. She was not feeling well overall and had to miss radiation appointment.     REVIEW OF SYSTEMS:  A complete review of systems was performed and found to be negative other than pertinent positives mentioned in history of present illness.      Past medical, social histories reviewed.     Meds- Reviewed.     PHYSICAL EXAMINATION:   VITAL SIGNS: /57 (BP Location: Right arm, Patient Position: Chair, Cuff Size: Adult Regular)  Pulse 87  Temp 97.8  F (36.6  C)  Resp 16  Ht 1.676 m (5' 6\")  Wt 74.4 kg (164 lb)  LMP 01/01/2004 (Approximate)  SpO2 100%  BMI 26.47 kg/m2  CONSTITUTIONAL: Appears tired.   HEENT: Pupils are equal. Normal mucosa.    NECK: No cervical or supraclavicular lymphadenopathy.   RESPIRATORY: Clear bilaterally.   CARD/VASC: S1, S2, regular.   GI: Soft, nontender, nondistended, no hepatosplenomegaly.   MUSKULOSKELETAL: Warm, well perfused.   NEUROLOGIC: Alert, awake.   INTEGUMENT: No rash.   LYMPHATICS: No edema.   PSYCH: Anxious.      LABORATORY DATA AND IMAGING REVIEWED DURING THIS VISIT:  Recent Labs   Lab Test  04/30/18   1150  04/23/18   0951   NA  135  141   POTASSIUM  4.0  4.0   CHLORIDE  101  108   CO2  26  26   ANIONGAP  8  7   BUN  20  14   CR  0.62  0.61   GLC  106*  96   EDILSON  8.8  8.7     Recent Labs   Lab Test  05/03/18   1005  04/30/18   1150  04/23/18   0921  04/13/18   1000   WBC  3.0*  0.3*  3.2*  2.1*   HGB  7.3*  7.8*  8.0*  7.4*   PLT  61*  79*  308  80*   MCV  92  93  95  97   NEUTROPHIL   --   3.0  62.7  33.0     Recent Labs   Lab Test  04/30/18   1150  04/23/18   0951  04/09/18   0955   BILITOTAL  0.7  0.2  0.6   ALKPHOS  106  " 101  134   ALT  30  27  30   AST  13  20  21   ALBUMIN  3.4  2.9*  3.6         Results for orders placed or performed during the hospital encounter of 05/02/18   PET Oncology Whole Body    Narrative    Combined Report of:    PET and CT on  5/2/2018 1:57 PM :    1. PET of the neck, chest, abdomen, and pelvis.  2. PET CT Fusion for Attenuation Correction and Anatomical  Localization:    3. Diagnostic CT scan of the chest, abdomen, and pelvis with  intravenous contrast for interpretation.  3. CT of the chest, abdomen and pelvis obtained for diagnostic  interpretation.  4. 3D MIP and PET-CT fused images were processed on an independent  workstation and archived to PACS and reviewed by a radiologist.    Technique:    1. PET: The patient received 10.4 mCi of F-18-FDG; the serum glucose  was 111 prior to administration, body weight was 77.1 kg. Images were  evaluated in the axial, sagittal, and coronal planes as well as the  rotational whole body MIP. Images were acquired from the Vertex to the  Feet.    UPTAKE WAS MEASURED AT 65 MINUTES.     BACKGROUND:  Liver SUV max= 3.92,   Aorta Blood SUV Max: 2.62.     2. CT: Volumetric acquisition for clinical interpretation of the  chest, abdomen, and pelvis acquired at 3 mm sections . The chest,  abdomen, and pelvis were evaluated at 5 mm sections in bone, soft  tissue, and lung windows.      The patient received 103 cc. Of Isovue 370 intravenously for the  examination.      3. 3D MIP and PET-CT fused images were processed on an independent  workstation and archived to PACS and reviewed by a radiologist.    INDICATION: follow up breast cancer; Malignant neoplasm of overlapping  sites of right breast in female, estrogen receptor positive (H);  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 in female,  estrogen receptor positive (H)    ADDITIONAL INFORMATION OBTAINED FROM EMR: Patient with history of  right supraclavicular lymph node adenocarcinoma of unknown  origin.  Patient is undergone chemotherapy with carboplatin Taxol     COMPARISON: None.    FINDINGS:     HEAD/NECK:  There is a new, FDG avid, 8 mm peripherally enhancing lesion in the  left parapharyngeal region with Max SUV of 9.2, likely representing an  necrotic lymphadenopathy.    Interval decrease in the size and FDG uptake of previously seen right  supraclavicular lymph nodes. For example, 6 mm nodule with max SUV of  8.1, previously was measuring 10 mm with max SUV of 14 and 7 mm node  with max SUV of 3.3 was measuring 14 mm with max SUV of 6.    The paranasal sinuses are clear. The mastoid air cells are clear.     The mucosal pharyngeal space, the , prevertebral and carotid  spaces are within normal limits.     No masses, mass effect or pathologically enlarged lymph nodes are  evident. The thyroid gland is normal.    CHEST:  Interval decrease in the size and FDG uptake of right axillary and  right pectoral lymph nodes. For example, right axillary lymph node  measures 6 mm with max SUV of 1.4, previously 11 mm with max SUV of  6.6 and pectoral node measures 9 mm with max SUV of 1.3, previously  measuring 1.7 cm with max SUV of 12.    There are no pathologically enlarged mediastinal, hilar or axillary  lymph nodes.    There are no suspicious lung nodules or evidence for infection.     There is no significant pericardial or plural effusions.    Left IJV port a catheter tip is in the right atrium.    ABDOMEN AND PELVIS:  There is no suspicious FDG uptake in the abdomen or pelvis.    There are no suspicious hepatic lesions. There is no splenomegaly or  evidence for splenic or pancreatic mass lesion.  There are no suspicious adrenal mass lesions or opaque gallbladder  calculi. There is symmetric nephrographic renal phase without  hydronephrosis.    There is no evidence for diverticulitis, bowel obstruction or free  fluid.    LOWER EXTREMITIES:   No abnormal masses or hypermetabolic lesions.    BONES:    There are no suspicious lytic or blastic osseous lesions.  There is no  abnormal FDG uptake in the skeleton. Diffuse FDG uptake in the axial  and proximal appendicular skeleton, likely secondary to bone marrow  activation.      Impression    IMPRESSION: In this patient with history of poorly differentiated  adenocarcinoma with unknown origin status post chemotherapy;  1. Interval decrease in the size and FDG uptake of previously seen  right supraclavicular, axillary and pectoral lymph nodes. There are  still several residual hypermetabolic lymph nodes. Findings are  compatible with partial treatment response.  2. New hypermetabolic peripherally enhancing nodular lesion in the  left parapharyngeal space, likely representing a necrotic lymph node  reactive vs metastatic. Recommend direct visualization and biopsy.     I have personally reviewed the examination and initial interpretation  and I agree with the findings.    DESEAN VILLA MD        ECOG performance status 1     ASSESSMENT AND PLAN:  This is a 62-year-old lady who has diagnosis of 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  Tumor cells were positive for CK7, CK5/6 and GATA3.  We did tumor marker testing and she had CA 27-29 at the level of 12,  of 16, CA 19-9 at 19.  Her CEA was less than 0.5.      PET CT scan results was done 11/27/2017.  showed hypermetabolic right supraclavicular, right axillary and subpectoral adenopathy.  There was no evidence of distant metastases.      The distribution of adenopathy indicates possibility breast cancer which has metastasized to the regional lymph nodes and we are not able to find a primary on physical exam and a PET scan.     Mammogram 11/30/17 no evidence of primary. Her-2 non amplified. Androgen receptor 10-20%. ER 1-5%. KS < 1%.   ENT examination by Dr. Miller was negative. EGD was negative as well. MRI breast 12/20/17 no evidence of malignancy in the  breasts.    She started chemotherapy carboplatin/Taxol. Completed 4 cycles. PET Scan 2/20/18 showed good response to treatment.   She completed dose dense AC for 4 cycles.     - Labs were reviewed with the patient, normocytic anemia related to chemotherapy.   - PET Scan 5/2/18 results were reviewed with the patient. I reviewed the images myself. She has overall response to previous areas of disease. There is a new area in left parapharyngeal wall which appears to be a necrotic or reactive lymph node. I am concerned about a new metastatic site.  I will have her see one of my ENT colleagues for assessment of this area.     I also have discussed with Dr. Miles (radiation oncology). We will delay starting radiation until we have ENT area assessment done.      PLAN:   1- Schedule an appointment with ENT at [facility] for left parapharyngeal hypermetabolic area  2- RTC MD after ENT appointment    YANI MAGANA MD    5/3/2018       cc: Waylon Liu MD       Again, thank you for allowing me to participate in the care of your patient.        Sincerely,        Yani Magana MD

## 2018-05-06 LAB
BACTERIA SPEC CULT: NO GROWTH
BACTERIA SPEC CULT: NO GROWTH
Lab: NORMAL
Lab: NORMAL
SPECIMEN SOURCE: NORMAL
SPECIMEN SOURCE: NORMAL

## 2018-05-09 ENCOUNTER — TELEPHONE (OUTPATIENT)
Dept: ONCOLOGY | Facility: CLINIC | Age: 63
End: 2018-05-09

## 2018-05-09 NOTE — TELEPHONE ENCOUNTER
Michelle called today stating that she finished her levaquin and fluconazole yesterday and still is congested with a productive cough, phlegm is clear, she denies any fever or shortness of breath.  Per Dr. Magana we will have her notify us if she develops a fever of 100.4 or higher, increased shortness of breath or frequency of cough with yellow or brown phlegm.  Michelle verbalized understanding of this.

## 2018-05-11 ENCOUNTER — OFFICE VISIT (OUTPATIENT)
Dept: OTOLARYNGOLOGY | Facility: CLINIC | Age: 63
End: 2018-05-11
Payer: COMMERCIAL

## 2018-05-11 VITALS — HEIGHT: 66 IN | BODY MASS INDEX: 26.84 KG/M2 | WEIGHT: 167 LBS

## 2018-05-11 DIAGNOSIS — R94.02 ABNORMAL POSITRON EMISSION TOMOGRAPHY (PET) SCAN OF HEAD: Primary | ICD-10-CM

## 2018-05-11 ASSESSMENT — PAIN SCALES - GENERAL: PAINLEVEL: NO PAIN (0)

## 2018-05-11 NOTE — NURSING NOTE
Chief Complaint   Patient presents with     Consult     Left parapharyngeal mass     Charity Bird Medical Assistant

## 2018-05-11 NOTE — PATIENT INSTRUCTIONS
1. Please follow-up in clinic as needed with Dr. Lee.   2. Please call the ENT clinic with any questions,concerns, new or worsening symptoms.    -Clinic number is [phone]   - Amber's direct line (Dr. Lee's nurse) [phone]

## 2018-05-11 NOTE — LETTER
5/11/2018     RE: Flor Griffin  [address] [facility] [address]     Dear Colleague,    Thank you for referring your patient, Flor Griffin, to the [facility] EAR NOSE AND THROAT at [facility]. Please see a copy of my visit note below.    Dear Dr. Magana:    I had the pleasure of meeting Flor Griffin in consultation today at the [facility] Ot[facility] at your request.     History of Present Illness:   Flor Griffin is a 62-year-old woman who is referred for evaluation of PET CT scan findings.  She has a history of a [id number] adenocarcinoma presumably of the breast.  She initially presented to her primary care physician with a supraclavicular mass which was biopsied and consistent with an poorly differentiated adenocarcinoma.  She had a CT scan in November that showed dulce maria disease in her supraclavicular region, axilla, subpectoral area without any distant disease.  She underwent chemotherapy under the care of Dr. Magana with 4 cycles carboplatin and Taxol and then 4 cycles of dose dense AC.  She had a PET CT scan following her chemotherapy which showed some hypermetabolic activity in the left base of tongue (read by radiology left parapharyngeal space).  She is supposed to start radiation next week.  She is not having any symptoms of sore throat, dysphagia, odynophagia, otalgia.  She does say that at the time of her scan she was suffering from an upper respiratory illness.    She does not have a history significant for smoking.  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  She is  and has multiple children.      MEDICATIONS:     Current Outpatient Prescriptions   Medication Sig Dispense Refill     aspirin 81 MG tablet Take 81 mg by mouth daily       dexamethasone (DECADRON) 4 MG tablet Take 2 tablets (8 mg) by mouth daily Start on Day 2. 6 tablet 3     famotidine (PEPCID) 20 MG tablet Take 1 tablet (20 mg) by mouth  daily 30 tablet 1     fluconazole (DIFLUCAN) 100 MG tablet Take 1 tab daily 15 tablet 0     fluconazole (DIFLUCAN) 100 MG tablet Take 1 tab p daily 7 tablet 0     Glucos-Chondroit-Hyaluron-MSM (GLUCOSAMINE CHONDROITIN JOINT PO) Take by mouth daily       HYDROcodone-acetaminophen (NORCO) 5-325 MG per tablet Take 1 tablet by mouth every 6 hours as needed for moderate to severe pain 6 tablet 0     Ibuprofen (ADVIL PO) Take 400 mg by mouth every 6 hours as needed for moderate pain       levofloxacin (LEVAQUIN) 500 MG tablet Take 1 tablet (500 mg) by mouth daily 7 tablet 0     LORazepam (ATIVAN) 0.5 MG tablet Take 1 tablet (0.5 mg) by mouth every 4 hours as needed (Anxiety, Nausea/Vomiting or Sleep) 30 tablet 3     magic mouthwash (FIRST-MOUTHWASH BLM) suspension Swish and swallow 5-10 mLs in mouth every 6 hours as needed for mouth sores 240 mL 1     OLANZapine (ZYPREXA) 5 MG tablet Take 1 tablet (5 mg) orally at bedtime 30 tablet 0     Omega-3 Fatty Acids (OMEGA-3 FISH OIL PO) Take 1 g by mouth daily       prochlorperazine (COMPAZINE) 10 MG tablet Take 1 tablet (10 mg) by mouth every 6 hours as needed (Nausea/Vomiting) 30 tablet 3     Pyridoxine HCl (VITAMIN B6 PO) Take by mouth daily       senna (SENOKOT) 8.6 MG tablet Take 1 tablet by mouth daily 120 tablet 1     valACYclovir (VALTREX) 1000 mg tablet Take 1 tablet (1,000 mg) by mouth 2 times daily 20 tablet 0     VITAMIN D, CHOLECALCIFEROL, PO Take 1,000 Units by mouth daily         ALLERGIES:    Allergies   Allergen Reactions     Penicillins Hives       HABITS/SOCIAL HISTORY:   , multiple children  Nonsmoker, no chewing tobacco use  Previously was a hairdresser    Social History     Social History     Marital status:      Spouse name: N/A     Number of children: N/A     Years of education: N/A     Occupational History     Not on file.     Social History Main Topics     Smoking status: Never Smoker     Smokeless tobacco: Never Used     Alcohol use 0.0  "oz/week     0 Standard drinks or equivalent per week      Comment: 4 times a week, 2 drinks     Drug use: No     Sexual activity: Yes     Partners: Male     Other Topics Concern     Parent/Sibling W/ Cabg, Mi Or Angioplasty Before 65f 55m? No     Social History Narrative       PAST MEDICAL HISTORY:   Past Medical History:   Diagnosis Date     Basal cell cancer     right cheek     Gastroesophageal reflux disease      History of blood transfusion     blue baby     Hyperlipidaemia         PAST SURGICAL HISTORY:   Past Surgical History:   Procedure Laterality Date     BIOPSY MASS NECK Right 11/16/2017    Procedure: BIOPSY MASS NECK;  Excisional Biopsy Right Neck Lymph node;  Surgeon: Waylon Corral MD;  Location: [facility] OR     COLONOSCOPY       MOHS MICROGRAPHIC PROCEDURE  ~2010    right cheek; BCC     MOHS MICROGRAPHIC PROCEDURE  10/31/2016    Dr. Nicholas [facility]       FAMILY HISTORY:    Family History   Problem Relation Age of Onset     Lupus Mother      HEART DISEASE Mother      CHF     Coronary Artery Disease Mother      Hyperlipidemia Mother      DIABETES Father      Breast Cancer Other        REVIEW OF SYSTEMS:  12 point ROS was negative other than the symptoms noted above in the HPI.  Patient Supplied Answers to Review of Systems  [facility] ENT ROS 5/11/2018   Cardiopulmonary Cough         PHYSICAL EXAMINATION:   Ht 1.676 m (5' 6\")  Wt 75.8 kg (167 lb)  LMP 01/01/2004 (Approximate)  BMI 26.95 kg/m2  Appearance:   normal; NAD, age-appropriate appearance, well-developed, normal habitus   Communication:   normal; communicates verbally, normal voice quality   Head/Face:   inspection -  Normal; no scars or visible lesions   Salivary glands -  Normal size, no tenderness, swelling, or palpable masses   Facial strength -  Normal and symmetric bilateral; H/B I/VI   Skin:  normal, no rash   Ocular Motility:  normal occular movements   Ears:  auricle (AD) -  normal  EAC (AD) -  normal  TM (AD) -  Normal, no effusion  auricle (AS) -  " normal  EAC (AS) -  normal  TM (AS) -  Normal, no effusion  Normal clinical speech reception   Nose:  Ext. inspection -  Normal  Internal Inspection -  Normal mucosa, septum, and turbinates   Nasopharynx:  normal mucosa, no masses   Oral Cavity:  lips -  Normal mucosa, oral competence, and stoma size   Age-appropriate dentition, healthy gingival mucosa   Hard palate, buccal, floor of mouth mucosa normal   Tongue - normal movement, no lesions   Oropharynx:  mucosa -  Normal, no lesions  soft palate -  Normal, no lesions, no asymmetry, normal elevation  tonsils -  Normal, no exudates, no abnormal lesions, symmetric   Hypopharynx:  Normal pyriform sinus and pharyngeal wall mucosa   No pooled secretions    Larynx:  Epiglottis, false vocal cord, true vocal cord normal in appearance, bilaterally mobile cords   Neck: No visible mass or asymmetry   Normal palpation, no tenderness, no tracheal deviation  Normal range of motion   Lymphatic:  no abnormal nodes   Cardiovascular:  warm, pink, well-perfused extremities without swelling, tenderness, or edema   Respiratory:  Normal respiratory effort, no stridor   Neuro/Psych.:  mood/affect -  normal  mental status -  normal  cranial nerves -  normal        PROCEDURES:   Flexible fiberoptic laryngoscopy: Scope exam was indicated due to PET CT findings. Verbal consent was obtained. The nasal cavity was prepped with an aerosolized solution of topical anesthetic and vasoconstrictive agent. The scope was passed through the anterior nasal cavity and advanced. Inspection of the nasopharynx revealed no gross abnormality. Inspection of the larynx revealed bilaterally mobile vocal cords. Pyriform sinuses are symmetric. No intra-arytenoid irregularities noted. The epiglottis, aryepiglottic folds, vallecula and base of tongue are unremarkable. The airway is patent. Procedure tolerated well with no immediate complications noted.    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  She had a PET CT scan images which showed some uptake in the left base of tongue.  On her scope exam today I do not see anything concerning.  She has no symptoms related to this.  I think at this point I would not proceed with any sort of biopsy and allow her to move forward with treatment of her primary disease.  Certainly if she knows that if she starts having any sore throat, dysphagia, odynophagia that she should certainly let us know we should reevaluate.      Thank you very much for the opportunity to participate in the care of your patient.      Norma Lee M.D.  Otolaryngology- Head & Neck Surgery    CC:  Yani Magana MD  Department of Medical Oncology  [facility]

## 2018-05-11 NOTE — MR AVS SNAPSHOT
After Visit Summary   2018    Flor Griffin    MRN: [id number]           Patient Information     Date Of Birth          1955        Visit Information        Provider Department      2018 4:00 PM Norma Lee MD [facility] Ear Nose and Throat        Today's Diagnoses     Abnormal positron emission tomography (PET) scan of head    -  1      Care Instructions    1. Please follow-up in clinic as needed with Dr. Lee.   2. Please call the ENT clinic with any questions,concerns, new or worsening symptoms.    -Clinic number is [phone]   - Amber's direct line (Dr. Lee's nurse) [phone]              Follow-ups after your visit        Your next 10 appointments already scheduled     May 17, 2018  9:15 AM CDT   Return Visit with Yani Magana MD   [facility] ([facility])    [facility]  [address] [facility] [address]  [facility] [address] [phone]              Who to contact     Please call your clinic at [phone] to:    Ask questions about your health    Make or cancel appointments    Discuss your medicines    Learn about your test results    Speak to your doctor            Additional Information About Your Visit        MyChart Information     Emefcyt is an electronic gateway that provides easy, online access to your medical records. With Sweetgreen, you can request a clinic appointment, read your test results, renew a prescription or communicate with your care team.     To sign up for Emefcyt visit the website at [email or web address]   You will be asked to enter the access code listed below, as well as some personal information. Please follow the directions to create your username and password.     Your access code is: [email or web address]  Expires: 2018  4:07 PM     Your access code will  in 90 days. If you need help or a new code, please contact your [facility] or call [phone] for  "assistance.        Care EveryWhere ID     This is your Care EveryWhere ID. This could be used by other organizations to access your [place] medical records  [id number]        Your Vitals Were     Height Last Period BMI (Body Mass Index)             1.676 m (5' 6\") 01/01/2004 (Approximate) 26.95 kg/m2          Blood Pressure from Last 3 Encounters:   05/03/18 105/57   05/03/18 105/57   04/30/18 122/56    Weight from Last 3 Encounters:   05/11/18 75.8 kg (167 lb)   05/03/18 74.4 kg (164 lb)   04/27/18 77.1 kg (170 lb)              We Performed the Following     IMAGESTREAM RECORDING ORDER        Primary Care Provider Office Phone # Fax #    [facility] [phone] [address] [facility] [address]        Equal Access to Services     MARYJANE JARA : Hadii nelida sono Sodot, waaxda luqadaha, qaybta kaalmada adedarcyyada, ketan lam . So [facility] [phone].    ATENCIÓN: Si habla español, tiene a thomas disposición servicios gratuitos de asistencia lingüística. Llame al [phone].    We comply with applicable federal civil rights laws and [place] laws. We do not discriminate on the basis of race, color, national origin, age, disability, sex, sexual orientation, or gender identity.            Thank you!     Thank you for choosing [facility] EAR NOSE AND THROAT  for your care. Our goal is always to provide you with excellent care. Hearing back from our patients is one way we can continue to improve our services. Please take a few minutes to complete the written survey that you may receive in the mail after your visit with us. Thank you!             Your Updated Medication List - Protect others around you: Learn how to safely use, store and throw away your medicines at www.disposemymeds.org.          This list is accurate as of 5/11/18 11:59 PM.  Always use your most recent med list.                   Brand Name Dispense Instructions for use " Diagnosis    ADVIL PO      Take 400 mg by mouth every 6 hours as needed for moderate pain        aspirin 81 MG tablet      Take 81 mg by mouth daily        dexamethasone 4 MG tablet    DECADRON    6 tablet    Take 2 tablets (8 mg) by mouth daily Start on Day 2.    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 in female, estrogen receptor positive (H), Carcinoma of unknown origin (H)       * fluconazole 100 MG tablet    DIFLUCAN    7 tablet    Take 1 tab p daily    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 in female, estrogen receptor positive (H)       GLUCOSAMINE CHONDROITIN JOINT PO      Take by mouth daily        HYDROcodone-acetaminophen 5-325 MG per tablet    NORCO    6 tablet    Take 1 tablet by mouth every 6 hours as needed for moderate to severe pain    Carcinoma of unknown origin (H)       levofloxacin 500 MG tablet    LEVAQUIN    7 tablet    Take 1 tablet (500 mg) by mouth daily    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 in female, estrogen receptor positive (H)       LORazepam 0.5 MG tablet    ATIVAN    30 tablet    Take 1 tablet (0.5 mg) by mouth every 4 hours as needed (Anxiety, Nausea/Vomiting or Sleep)    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 in female, estrogen receptor positive (H), Carcinoma of unknown origin (H)       magic mouthwash suspension (diphenhydrAMINE, lidocaine, aluminum-magnesium & simethicone) Susp compounding kit     240 mL    Swish and swallow 5-10 mLs in mouth every 6 hours as needed for mouth sores    Mouth sores       OLANZapine 5 MG tablet    zyPREXA    30 tablet    Take 1 tablet (5 mg) orally at bedtime     Nausea, Carcinoma of unknown origin (H), Port catheter in place, 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 in female, estrogen receptor positive (H)       OMEGA-3 FISH OIL PO      Take 1 g by mouth daily        prochlorperazine 10 MG tablet    COMPAZINE    30 tablet    Take 1 tablet (10 mg) by mouth every 6 hours as needed (Nausea/Vomiting)    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 in female, estrogen receptor positive (H), Carcinoma of unknown origin (H)       senna 8.6 MG tablet    SENOKOT    120 tablet    Take 1 tablet by mouth daily    Carcinoma of unknown origin (H), Constipation, unspecified constipation type       valACYclovir 1000 mg tablet    VALTREX    20 tablet    Take 1 tablet (1,000 mg) by mouth 2 times daily    Cold sore       VITAMIN B6 PO      Take by mouth daily        VITAMIN D (CHOLECALCIFEROL) PO      Take 1,000 Units by mouth daily        * Notice:  This list has 2 medication(s) that are the same as other medications prescribed for you. Read the directions carefully, and ask your doctor or other care provider to review them with you.

## 2018-05-12 NOTE — PROGRESS NOTES
Dear Dr. Magana:    I had the pleasure of meeting Flor Griffin in consultation today at the [facility] at your request.     History of Present Illness:   Flor Griffin is a 62-year-old woman who is referred for evaluation of PET CT scan findings.  She has a history of a JNO4fT1 adenocarcinoma presumably of the breast.  She initially presented to her primary care physician with a supraclavicular mass which was biopsied and consistent with an poorly differentiated adenocarcinoma.  She had a CT scan in November that showed dulce maria disease in her supraclavicular region, axilla, subpectoral area without any distant disease.  She underwent chemotherapy under the care of Dr. Magana with 4 cycles carboplatin and Taxol and then 4 cycles of dose dense AC.  She had a PET CT scan following her chemotherapy which showed some hypermetabolic activity in the left base of tongue (read by radiology left parapharyngeal space).  She is supposed to start radiation next week.  She is not having any symptoms of sore throat, dysphagia, odynophagia, otalgia.  She does say that at the time of her scan she was suffering from an upper respiratory illness.    She does not have a history significant for smoking.  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  She is  and has multiple children.      MEDICATIONS:     Current Outpatient Prescriptions   Medication Sig Dispense Refill     aspirin 81 MG tablet Take 81 mg by mouth daily       dexamethasone (DECADRON) 4 MG tablet Take 2 tablets (8 mg) by mouth daily Start on Day 2. 6 tablet 3     famotidine (PEPCID) 20 MG tablet Take 1 tablet (20 mg) by mouth daily 30 tablet 1     fluconazole (DIFLUCAN) 100 MG tablet Take 1 tab daily 15 tablet 0     fluconazole (DIFLUCAN) 100 MG tablet Take 1 tab p daily 7 tablet 0     Glucos-Chondroit-Hyaluron-MSM (GLUCOSAMINE CHONDROITIN JOINT PO) Take by mouth daily       HYDROcodone-acetaminophen (NORCO) 5-325 MG per  tablet Take 1 tablet by mouth every 6 hours as needed for moderate to severe pain 6 tablet 0     Ibuprofen (ADVIL PO) Take 400 mg by mouth every 6 hours as needed for moderate pain       levofloxacin (LEVAQUIN) 500 MG tablet Take 1 tablet (500 mg) by mouth daily 7 tablet 0     LORazepam (ATIVAN) 0.5 MG tablet Take 1 tablet (0.5 mg) by mouth every 4 hours as needed (Anxiety, Nausea/Vomiting or Sleep) 30 tablet 3     magic mouthwash (FIRST-MOUTHWASH BLM) suspension Swish and swallow 5-10 mLs in mouth every 6 hours as needed for mouth sores 240 mL 1     OLANZapine (ZYPREXA) 5 MG tablet Take 1 tablet (5 mg) orally at bedtime 30 tablet 0     Omega-3 Fatty Acids (OMEGA-3 FISH OIL PO) Take 1 g by mouth daily       prochlorperazine (COMPAZINE) 10 MG tablet Take 1 tablet (10 mg) by mouth every 6 hours as needed (Nausea/Vomiting) 30 tablet 3     Pyridoxine HCl (VITAMIN B6 PO) Take by mouth daily       senna (SENOKOT) 8.6 MG tablet Take 1 tablet by mouth daily 120 tablet 1     valACYclovir (VALTREX) 1000 mg tablet Take 1 tablet (1,000 mg) by mouth 2 times daily 20 tablet 0     VITAMIN D, CHOLECALCIFEROL, PO Take 1,000 Units by mouth daily         ALLERGIES:    Allergies   Allergen Reactions     Penicillins Hives       HABITS/SOCIAL HISTORY:   , multiple children  Nonsmoker, no chewing tobacco use  Previously was a hairdresser    Social History     Social History     Marital status:      Spouse name: N/A     Number of children: N/A     Years of education: N/A     Occupational History     Not on file.     Social History Main Topics     Smoking status: Never Smoker     Smokeless tobacco: Never Used     Alcohol use 0.0 oz/week     0 Standard drinks or equivalent per week      Comment: 4 times a week, 2 drinks     Drug use: No     Sexual activity: Yes     Partners: Male     Other Topics Concern     Parent/Sibling W/ Cabg, Mi Or Angioplasty Before 65f 55m? No     Social History Narrative       PAST MEDICAL HISTORY:  "  Past Medical History:   Diagnosis Date     Basal cell cancer     right cheek     Gastroesophageal reflux disease      History of blood transfusion     blue baby     Hyperlipidaemia         PAST SURGICAL HISTORY:   Past Surgical History:   Procedure Laterality Date     BIOPSY MASS NECK Right 11/16/2017    Procedure: BIOPSY MASS NECK;  Excisional Biopsy Right Neck Lymph node;  Surgeon: Waylon Corral MD;  Location: [facility] OR     COLONOSCOPY       MOHS MICROGRAPHIC PROCEDURE  ~2010    right cheek; BCC     MOHS MICROGRAPHIC PROCEDURE  10/31/2016    Dr. Yu BERUMEN       FAMILY HISTORY:    Family History   Problem Relation Age of Onset     Lupus Mother      HEART DISEASE Mother      CHF     Coronary Artery Disease Mother      Hyperlipidemia Mother      DIABETES Father      Breast Cancer Other        REVIEW OF SYSTEMS:  12 point ROS was negative other than the symptoms noted above in the HPI.  Patient Supplied Answers to Review of Systems  [facility] ENT ROS 5/11/2018   Cardiopulmonary Cough         PHYSICAL EXAMINATION:   Ht 1.676 m (5' 6\")  Wt 75.8 kg (167 lb)  LMP 01/01/2004 (Approximate)  BMI 26.95 kg/m2  Appearance:   normal; NAD, age-appropriate appearance, well-developed, normal habitus   Communication:   normal; communicates verbally, normal voice quality   Head/Face:   inspection -  Normal; no scars or visible lesions   Salivary glands -  Normal size, no tenderness, swelling, or palpable masses   Facial strength -  Normal and symmetric bilateral; H/B I/VI   Skin:  normal, no rash   Ocular Motility:  normal occular movements   Ears:  auricle (AD) -  normal  EAC (AD) -  normal  TM (AD) -  Normal, no effusion  auricle (AS) -  normal  EAC (AS) -  normal  TM (AS) -  Normal, no effusion  Normal clinical speech reception   Nose:  Ext. inspection -  Normal  Internal Inspection -  Normal mucosa, septum, and turbinates   Nasopharynx:  normal mucosa, no masses   Oral Cavity:  lips -  Normal mucosa, oral competence, and stoma " size   Age-appropriate dentition, healthy gingival mucosa   Hard palate, buccal, floor of mouth mucosa normal   Tongue - normal movement, no lesions   Oropharynx:  mucosa -  Normal, no lesions  soft palate -  Normal, no lesions, no asymmetry, normal elevation  tonsils -  Normal, no exudates, no abnormal lesions, symmetric   Hypopharynx:  Normal pyriform sinus and pharyngeal wall mucosa   No pooled secretions    Larynx:  Epiglottis, false vocal cord, true vocal cord normal in appearance, bilaterally mobile cords   Neck: No visible mass or asymmetry   Normal palpation, no tenderness, no tracheal deviation  Normal range of motion   Lymphatic:  no abnormal nodes   Cardiovascular:  warm, pink, well-perfused extremities without swelling, tenderness, or edema   Respiratory:  Normal respiratory effort, no stridor   Neuro/Psych.:  mood/affect -  normal  mental status -  normal  cranial nerves -  normal        PROCEDURES:   Flexible fiberoptic laryngoscopy: Scope exam was indicated due to PET CT findings. Verbal consent was obtained. The nasal cavity was prepped with an aerosolized solution of topical anesthetic and vasoconstrictive agent. The scope was passed through the anterior nasal cavity and advanced. Inspection of the nasopharynx revealed no gross abnormality. Inspection of the larynx revealed bilaterally mobile vocal cords. Pyriform sinuses are symmetric. No intra-arytenoid irregularities noted. The epiglottis, aryepiglottic folds, vallecula and base of tongue are unremarkable. The airway is patent. Procedure tolerated well with no immediate complications noted.    RESULTS REVIEWED:   I reviewed the referral records from Dr. Magana which are summarized above    I independently reviewed the PET CT scan images which show a hypermetabolic region in the left base of tongue (radiology read as parapharyngeal space but this does not appear consistent based on my examination and further discussion with the  neuroradiologist)    IMPRESSION AND PLAN:   Flor Griffin is a 62-year-old woman with a history of a poorly differentiated adenocarcinoma likely of the breast with metastases to the neck.  She had a PET CT scan images which showed some uptake in the left base of tongue.  On her scope exam today I do not see anything concerning.  She has no symptoms related to this.  I think at this point I would not proceed with any sort of biopsy and allow her to move forward with treatment of her primary disease.  Certainly if she knows that if she starts having any sore throat, dysphagia, odynophagia that she should certainly let us know we should reevaluate.      Thank you very much for the opportunity to participate in the care of your patient.      Norma Lee M.D.  Otolaryngology- Head & Neck Surgery      CC:  Yani Magana MD  Department of Medical Oncology  [facility]

## 2018-05-14 ENCOUNTER — TELEPHONE (OUTPATIENT)
Dept: ONCOLOGY | Facility: CLINIC | Age: 63
End: 2018-05-14

## 2018-05-14 NOTE — TELEPHONE ENCOUNTER
Patient called clinic stating that she had an EGD done on Friday 5/11/18 which she stated was ok. Patient is wondering when Dr. Magana would like her to start radiation treatment. Message will be routed to Dr. Magana and his care coordinators. Patient would like a call back at [phone].

## 2018-05-16 NOTE — TELEPHONE ENCOUNTER
Jose Maria Mendez,    Please let the patient know that it is ok to proceed with radiation as was being planned by Dr. Miles. Thanks

## 2018-05-16 NOTE — TELEPHONE ENCOUNTER
Michelle notified and called [facility] for FYI.  Also faxed message to [facility] at [phone] per anil at [facility] .

## 2018-05-17 ENCOUNTER — TRANSFERRED RECORDS (OUTPATIENT)
Dept: HEALTH INFORMATION MANAGEMENT | Facility: CLINIC | Age: 63
End: 2018-05-17

## 2018-05-18 ENCOUNTER — TELEPHONE (OUTPATIENT)
Dept: ONCOLOGY | Facility: CLINIC | Age: 63
End: 2018-05-18

## 2018-05-18 ENCOUNTER — OFFICE VISIT (OUTPATIENT)
Dept: FAMILY MEDICINE | Facility: CLINIC | Age: 63
End: 2018-05-18
Payer: COMMERCIAL

## 2018-05-18 VITALS
DIASTOLIC BLOOD PRESSURE: 62 MMHG | WEIGHT: 167.7 LBS | SYSTOLIC BLOOD PRESSURE: 102 MMHG | TEMPERATURE: 98 F | BODY MASS INDEX: 27.07 KG/M2 | RESPIRATION RATE: 20 BRPM | HEART RATE: 76 BPM | OXYGEN SATURATION: 99 %

## 2018-05-18 DIAGNOSIS — R05.9 COUGH: Primary | ICD-10-CM

## 2018-05-18 PROCEDURE — 99213 OFFICE O/P EST LOW 20 MIN: CPT | Performed by: NURSE PRACTITIONER

## 2018-05-18 RX ORDER — AZITHROMYCIN 250 MG/1
TABLET, FILM COATED ORAL
Qty: 6 TABLET | Refills: 0 | Status: SHIPPED | OUTPATIENT
Start: 2018-05-18 | End: 2018-05-23

## 2018-05-18 NOTE — MR AVS SNAPSHOT
"              After Visit Summary   5/18/2018    Flor Griffin    MRN: [id number]           Patient Information     Date Of Birth          1955        Visit Information        Provider Department      5/18/2018 2:40 PM Ananya Ruiz APRN CNP [facility]        Today's Diagnoses     Cough    -  1      Care Instructions    Begin antibiotics and monitor symptoms, if they persist through the weekend then report to [facility] in [place] for a chest x-ray. If they last until next week, contact your oncologist to discuss what should be done.          Follow-ups after your visit        Follow-up notes from your care team     Return in about 1 week (around 5/25/2018), or if symptoms worsen or fail to improve.      Who to contact     If you have questions or need follow up information about today's clinic visit or your schedule please contact [facility] directly at [phone].  Normal or non-critical lab and imaging results will be communicated to you by MyChart, letter or phone within 4 business days after the clinic has received the results. If you do not hear from us within 7 days, please contact the clinic through MyChart or phone. If you have a critical or abnormal lab result, we will notify you by phone as soon as possible.  Submit refill requests through thrdPlace or call your pharmacy and they will forward the refill request to us. Please allow 3 business days for your refill to be completed.          Additional Information About Your Visit        MyChart Information     thrdPlace lets you send messages to your doctor, view your test results, renew your prescriptions, schedule appointments and more. To sign up, go to [email or web address] . Click on \"Log in\" on the left side of the screen, which will take you to the Welcome page. Then click on \"Sign up Now\" on the right side of the page.     You will be asked to enter the access code listed below, as well as some " personal information. Please follow the directions to create your username and password.     Your access code is: [email or web address]  Expires: 2018  4:07 PM     Your access code will  in 90 days. If you need help or a new code, please call your [place] clinic or [phone].        Care EveryWhere ID     This is your Care EveryWhere ID. This could be used by other organizations to access your [place] medical records  [id number]        Your Vitals Were     Pulse Temperature Respirations Last Period Pulse Oximetry BMI (Body Mass Index)    76 98  F (36.7  C) (Oral) 20 2004 (Approximate) 99% 27.07 kg/m2       Blood Pressure from Last 3 Encounters:   18 102/62   18 105/57   18 105/57    Weight from Last 3 Encounters:   18 167 lb 11.2 oz (76.1 kg)   18 167 lb (75.8 kg)   18 164 lb (74.4 kg)              Today, you had the following     No orders found for display         Today's Medication Changes          These changes are accurate as of 18  3:07 PM.  If you have any questions, ask your nurse or doctor.               Start taking these medicines.        Dose/Directions    azithromycin 250 MG tablet   Commonly known as:  ZITHROMAX   Used for:  Cough   Started by:  Ananya Ruiz APRN CNP        Two tablets first day, then one tablet daily for four days.   Quantity:  6 tablet   Refills:  0            Where to get your medicines      These medications were sent to [id number] [address]  [address], [facility] [address]     Phone:  [phone]     azithromycin 250 MG tablet                Primary Care Provider Office Phone # Fax #    [facility] [phone] [address] [facility] [address]        Equal Access to Services     MARYJANE JARA AH: Mika Nolan, carole heard, ketan norton  kelsea montejohyunbraxton rosario'aachirag ah. So [facility] [phone].    ATENCIÓN: Si lane le, tiene a thomas disposición servicios gratuitos de asistencia lingüística. Pino schafer [phone].    We comply with applicable federal civil rights laws and [place] laws. We do not discriminate on the basis of race, color, national origin, age, disability, sex, sexual orientation, or gender identity.            Thank you!     Thank you for choosing [facility]  for your care. Our goal is always to provide you with excellent care. Hearing back from our patients is one way we can continue to improve our services. Please take a few minutes to complete the written survey that you may receive in the mail after your visit with us. Thank you!             Your Updated Medication List - Protect others around you: Learn how to safely use, store and throw away your medicines at www.disposemymeds.org.          This list is accurate as of 5/18/18  3:07 PM.  Always use your most recent med list.                   Brand Name Dispense Instructions for use Diagnosis    ADVIL PO      Take 400 mg by mouth every 6 hours as needed for moderate pain        aspirin 81 MG tablet      Take 81 mg by mouth daily        azithromycin 250 MG tablet    ZITHROMAX    6 tablet    Two tablets first day, then one tablet daily for four days.    Cough       famotidine 20 MG tablet    PEPCID    30 tablet    Take 1 tablet (20 mg) by mouth daily    Heartburn       LORazepam 0.5 MG tablet    ATIVAN    30 tablet    Take 1 tablet (0.5 mg) by mouth every 4 hours as needed (Anxiety, Nausea/Vomiting or Sleep)    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 in female, estrogen receptor positive (H), Carcinoma of unknown origin (H)       OMEGA-3 FISH OIL PO      Take 1 g by mouth daily        VITAMIN B6 PO      Take by mouth daily        VITAMIN D (CHOLECALCIFEROL) PO      Take 1,000 Units by mouth daily

## 2018-05-18 NOTE — PATIENT INSTRUCTIONS
Begin antibiotics and monitor symptoms, if they persist through the weekend then report to [facility] in [place] for a chest x-ray. If they last until next week, contact your oncologist to discuss what should be done.

## 2018-05-18 NOTE — TELEPHONE ENCOUNTER
Michelle called today stating she still has a productive cough hanging on.  She denies fever.  States phlegm is sometimes clear and sometimes a little green.  Mentions she is very fatigued.    Writer reviewed with our NP who wanted her to come in and be seen, have a chest xray and labs.  She declined.    Advised her to then follow up with her PCP.  She was agreeable to this.    Will follow up next week to see how she is feeling.

## 2018-05-18 NOTE — PROGRESS NOTES
SUBJECTIVE:   Flor Griffin is a 62 year old female who presents to clinic today for the following health issues:      RESPIRATORY SYMPTOMS    Duration: 1 month, improved initially and nearly resolved now worsening in the past few days.  Is scheduled to start radiation therapy    Description  nasal congestion, rhinorrhea, facial pain/pressure, cough, headache and chest congestion    Severity: moderate    Accompanying signs and symptoms: None    History (predisposing factors):  none    Precipitating or alleviating factors: None    Therapies tried and outcome:  Rest, fluids & oral decongestant      - Pt has been undergoing chemotherapy for treatment of breast cancer, then caught this illness from her granddaughter. She would like to be well, as she begins radiation treatment soon and wants to be healthy when she starts that. The symptoms have persisted for a month now, worse this morning when she woke up than it previously had been.    > Michelle was treated for similar symptoms 04/30 after reporting to , took a round of Levaquin but states she  does not feel this helped.   > Pt declines a CXR at today's visit.           Problem list and histories reviewed & adjusted, as indicated.  Additional history: as documented    Labs reviewed in EPIC    Reviewed and updated as needed this visit by clinical staff  Tobacco  Allergies  Meds  Problems  Med Hx  Surg Hx  Fam Hx  Soc Hx        Reviewed and updated as needed this visit by Provider  Allergies  Meds  Problems             Patient Active Problem List   Diagnosis     Hyperlipidemia LDL goal <160     Heartburn     Vitamin D deficiency     BCC (basal cell carcinoma), face     Cold sore     Overweight     Carcinoma of unknown origin (H)     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 in female, estrogen receptor positive (H)     Port catheter in place     Chemotherapy-induced neutropenia (H)     Anemia     Past Surgical History:   Procedure Laterality  Date     BIOPSY MASS NECK Right 11/16/2017    Procedure: BIOPSY MASS NECK;  Excisional Biopsy Right Neck Lymph node;  Surgeon: Waylon Corral MD;  Location: [facility] OR     COLONOSCOPY       MOHS MICROGRAPHIC PROCEDURE  ~2010    right cheek; BCC     MOHS MICROGRAPHIC PROCEDURE  10/31/2016    Dr. [facility]       Social History   Substance Use Topics     Smoking status: Never Smoker     Smokeless tobacco: Never Used     Alcohol use 0.0 oz/week     0 Standard drinks or equivalent per week      Comment: 4 times a week, 2 drinks     Family History   Problem Relation Age of Onset     Lupus Mother      HEART DISEASE Mother      CHF     Coronary Artery Disease Mother      Hyperlipidemia Mother      DIABETES Father      Breast Cancer Other            ROS:  Constitutional, HEENT, cardiovascular, pulmonary, GI, , musculoskeletal, neuro, skin, endocrine and psych systems are negative, except as otherwise noted.      This document serves as a record of the services and decisions personally performed and made by HEDY Davis CNP. It was created on his behalf by Jade Cortez, a trained medical scribe. The creation of this document is based the provider's statements to the medical scribe.  Scribe Jade Cortez 2:55 PM, May 18, 2018    OBJECTIVE:                                                    /62  Pulse 76  Temp 98  F (36.7  C) (Oral)  Resp 20  Wt 76.1 kg (167 lb 11.2 oz)  LMP 01/01/2004 (Approximate)  SpO2 99%  BMI 27.07 kg/m2  Body mass index is 27.07 kg/(m^2).  GENERAL: healthy, alert and no distress  HENT: ear canals and TM's normal, nose and mouth without ulcers or lesions  NECK: no adenopathy, no asymmetry, masses, or scars and thyroid normal to palpation  RESP: lungs clear to auscultation - no rales, rhonchi or wheezes  CV: regular rate and rhythm, normal S1 S2, no S3 or S4, no murmur, click or rub, no peripheral edema and peripheral pulses strong  SKIN: no suspicious lesions or rashes  NEURO: Normal  "strength and tone, mentation intact and speech normal  PSYCH: mentation appears normal, affect normal/bright    Diagnostic Test Results:  none      ASSESSMENT/PLAN:                                                    A/P:    ICD-10-CM    1. Cough R05 azithromycin (ZITHROMAX) 250 MG tablet     Suspect sinusitis.  Patient states z pack \"usually works for her\"  Offered CXR which was recommended per oncology and pt refuses.  Agrees to seek further evaluation if worsening.    Patient Instructions   Begin antibiotics and monitor symptoms, if they persist through the weekend then report to [facility] in [place] for a chest x-ray. If they last until next week, contact your oncologist to discuss what should be done.        The information in this document, created by the medical scribe for me, accurately reflects the services I personally performed and the decisions made by me. I have reviewed and approved this document for accuracy prior to leaving the patient care area.  2:54 PM, 05/18/18    HEDY Davis [facility]    "

## 2018-05-29 ENCOUNTER — TELEPHONE (OUTPATIENT)
Dept: ONCOLOGY | Facility: CLINIC | Age: 63
End: 2018-05-29

## 2018-05-29 NOTE — TELEPHONE ENCOUNTER
Patient called this morning to requesting to know when she should be scheduled for a port flush. I reviewed chart and noted last port flush done on 5/3. Patient is starting radiation today. Port flush requested to be coordinated with radiation appointment on 6/12 @ 11:15.    I have sent a staff message to Dr. Magana with an update to advise follow up plan of care. Patient to be called with an update on plan of care. Michela Parmar

## 2018-06-12 ENCOUNTER — INFUSION THERAPY VISIT (OUTPATIENT)
Dept: INFUSION THERAPY | Facility: CLINIC | Age: 63
End: 2018-06-12
Attending: INTERNAL MEDICINE
Payer: COMMERCIAL

## 2018-06-12 VITALS
SYSTOLIC BLOOD PRESSURE: 112 MMHG | RESPIRATION RATE: 20 BRPM | TEMPERATURE: 97.8 F | DIASTOLIC BLOOD PRESSURE: 70 MMHG | HEART RATE: 78 BPM

## 2018-06-12 DIAGNOSIS — C50.811 MALIGNANT NEOPLASM OF OVERLAPPING SITES OF RIGHT BREAST IN FEMALE, ESTROGEN RECEPTOR POSITIVE (H): ICD-10-CM

## 2018-06-12 DIAGNOSIS — Z17.0 MALIGNANT NEOPLASM OF OVERLAPPING SITES OF RIGHT BREAST IN FEMALE, ESTROGEN RECEPTOR POSITIVE (H): ICD-10-CM

## 2018-06-12 DIAGNOSIS — Z95.828 PORT CATHETER IN PLACE: Primary | ICD-10-CM

## 2018-06-12 PROCEDURE — 96523 IRRIG DRUG DELIVERY DEVICE: CPT

## 2018-06-12 PROCEDURE — 25000128 H RX IP 250 OP 636: Performed by: INTERNAL MEDICINE

## 2018-06-12 RX ORDER — ONDANSETRON 2 MG/ML
8 INJECTION INTRAMUSCULAR; INTRAVENOUS EVERY 6 HOURS PRN
Status: CANCELLED
Start: 2018-06-12

## 2018-06-12 RX ORDER — HEPARIN SODIUM (PORCINE) LOCK FLUSH IV SOLN 100 UNIT/ML 100 UNIT/ML
500 SOLUTION INTRAVENOUS ONCE
Status: COMPLETED | OUTPATIENT
Start: 2018-06-12 | End: 2018-06-12

## 2018-06-12 RX ORDER — HEPARIN SODIUM (PORCINE) LOCK FLUSH IV SOLN 100 UNIT/ML 100 UNIT/ML
500 SOLUTION INTRAVENOUS ONCE
Status: CANCELLED
Start: 2018-06-12 | End: 2018-06-12

## 2018-06-12 RX ADMIN — HEPARIN 500 UNITS: 100 SYRINGE at 11:18

## 2018-06-12 NOTE — PROGRESS NOTES
Infusion Nursing Note:  Flormarkos Griffin presents today for port flush.    Patient seen by provider today: No   present during visit today: Not Applicable.    Note: N/A.    Intravenous Access:  Implanted Port.    Treatment Conditions:  Not Applicable.      Post Infusion Assessment:  Patient tolerated infusion without incident.  Blood return noted pre and post infusion.  Site patent and intact, free from redness, edema or discomfort.  No evidence of extravasations.  Access discontinued per protocol.    Discharge Plan:   Patient declined prescription refills.  Discharge instructions reviewed with: Patient.  Patient verbalized understanding of discharge instructions and all questions answered.  AVS to patient via [employer].  Patient will return 6/20/18 for next appointment.   Patient discharged in stable condition accompanied by: self.  Departure Mode: Ambulatory.    Domenica Farah RN

## 2018-06-12 NOTE — MR AVS SNAPSHOT
After Visit Summary   6/12/2018    Flor Griffin    MRN: [id number]           Patient Information     Date Of Birth          1955        Visit Information        Provider Department      6/12/2018 11:30 AM  INFUSION CHAIR [facility]        Today's Diagnoses     Port catheter in place    -  1    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 in female, estrogen receptor positive (H)           Follow-ups after your visit        Your next 10 appointments already scheduled     Jun 20, 2018 11:30 AM CDT   Return Visit with Yani Magana MD   [facility] ([facility])    [facility] [place]  [address]  [place] [address] [phone]              Who to contact     If you have questions or need follow up information about today's clinic visit or your schedule please contact [facility] directly at [phone].  Normal or non-critical lab and imaging results will be communicated to you by MyChart, letter or phone within 4 business days after the clinic has received the results. If you do not hear from us within 7 days, please contact the clinic through MyChart or phone. If you have a critical or abnormal lab result, we will notify you by phone as soon as possible.  Submit refill requests through [employer] or call your pharmacy and they will forward the refill request to us. Please allow 3 business days for your refill to be completed.          Additional Information About Your Visit        Care EveryWhere ID     This is your Care EveryWhere ID. This could be used by other organizations to access your [place] medical records  [id number]        Your Vitals Were     Pulse Temperature Respirations Last Period          78 97.8  F (36.6  C) (Oral) 20 01/01/2004 (Approximate)         Blood Pressure from Last 3 Encounters:   06/12/18 112/70   05/18/18 102/62   05/03/18 105/57     Weight from Last 3 Encounters:   05/18/18 76.1 kg (167 lb 11.2 oz)   05/11/18 75.8 kg (167 lb)   05/03/18 74.4 kg (164 lb)              Today, you had the following     No orders found for display       Primary Care Provider Office Phone # Fax #    [facility] [phone] [address] [facility] [address]        Equal Access to Services     MARYJANE JARA : Hadii aad ku hadasho Soomaali, waaxda luqadaha, qaybta kaalmada adeegyada, waxay idiin hayaan adeeg yoni connor. So [facility] [phone].    ATENCIÓN: Si habla espkaryn, tiene a thomas disposición servicios gratuitos de asistencia lingüística. Llame al [phone].    We comply with applicable federal civil rights laws and [place] laws. We do not discriminate on the basis of race, color, national origin, age, disability, sex, sexual orientation, or gender identity.            Thank you!     Thank you for choosing [facility] AND [facility]  for your care. Our goal is always to provide you with excellent care. Hearing back from our patients is one way we can continue to improve our services. Please take a few minutes to complete the written survey that you may receive in the mail after your visit with us. Thank you!             Your Updated Medication List - Protect others around you: Learn how to safely use, store and throw away your medicines at www.disposemymeds.org.          This list is accurate as of 6/12/18 11:54 AM.  Always use your most recent med list.                   Brand Name Dispense Instructions for use Diagnosis    ADVIL PO      Take 400 mg by mouth every 6 hours as needed for moderate pain        aspirin 81 MG tablet      Take 81 mg by mouth daily        famotidine 20 MG tablet    PEPCID    30 tablet    Take 1 tablet (20 mg) by mouth daily    Heartburn       LORazepam 0.5 MG tablet    ATIVAN    30 tablet    Take 1 tablet (0.5 mg) by mouth every 4 hours as needed (Anxiety,  Nausea/Vomiting or Sleep)    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 in female, estrogen receptor positive (H), Carcinoma of unknown origin (H)       OMEGA-3 FISH OIL PO      Take 1 g by mouth daily        VITAMIN B6 PO      Take by mouth daily        VITAMIN D (CHOLECALCIFEROL) PO      Take 1,000 Units by mouth daily

## 2018-06-20 ENCOUNTER — ONCOLOGY VISIT (OUTPATIENT)
Dept: ONCOLOGY | Facility: CLINIC | Age: 63
End: 2018-06-20
Attending: INTERNAL MEDICINE
Payer: COMMERCIAL

## 2018-06-20 ENCOUNTER — HOSPITAL ENCOUNTER (OUTPATIENT)
Facility: CLINIC | Age: 63
End: 2018-06-20
Attending: INTERNAL MEDICINE | Admitting: INTERNAL MEDICINE
Payer: COMMERCIAL

## 2018-06-20 VITALS
BODY MASS INDEX: 27.28 KG/M2 | DIASTOLIC BLOOD PRESSURE: 80 MMHG | RESPIRATION RATE: 18 BRPM | OXYGEN SATURATION: 99 % | SYSTOLIC BLOOD PRESSURE: 125 MMHG | WEIGHT: 169 LBS | TEMPERATURE: 98.4 F | HEART RATE: 68 BPM

## 2018-06-20 DIAGNOSIS — C80.1 CARCINOMA OF UNKNOWN ORIGIN (H): Primary | ICD-10-CM

## 2018-06-20 PROCEDURE — 99213 OFFICE O/P EST LOW 20 MIN: CPT | Performed by: INTERNAL MEDICINE

## 2018-06-20 PROCEDURE — G0463 HOSPITAL OUTPT CLINIC VISIT: HCPCS

## 2018-06-20 ASSESSMENT — PAIN SCALES - GENERAL: PAINLEVEL: NO PAIN (0)

## 2018-06-20 NOTE — LETTER
6/20/2018         RE: Flor Griffin  [address] [place] [address]        Dear Colleague,    Thank you for referring your patient, Flor Griffin, to the [facility]. Please see a copy of my visit note below.    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  Initially she was seen 11/22/2017 after she had the right supraclavicular lymph node biopsy which was positive for poorly differentiated adenocarcinoma.  She had this lymph node almost a month and had an excisional biopsy performed which was consistent with the diagnosis of cancer.      A pet CT scan 11/27/2017 showed hypermetabolic right supraclavicular, right axillary and subpectoral adenopathy.  Otherwise, no distant metastatic disease.     Mammogram 11/30/17- negative    TREATMENT: Carboplatin and Taxol completed 4 cycles. 3/7/2018 started dose dense AC. Completed 4 cycles.      SUBJECTIVE:  The patient was seen as a followup today. She is undergoing radiation. Tolerating it well except for some fatigue.     REVIEW OF SYSTEMS:  A complete review of systems was performed and found to be negative other than pertinent positives mentioned in history of present illness.      Past medical, social histories reviewed.     Meds- Reviewed.     PHYSICAL EXAMINATION:   VITAL SIGNS: /80 (BP Location: Right arm, Patient Position: Sitting, Cuff Size: Adult Regular)  Pulse 68  Temp 98.4  F (36.9  C) (Oral)  Resp 18  Wt 76.7 kg (169 lb)  LMP 01/01/2004 (Approximate)  SpO2 99%  BMI 27.28 kg/m2  CONSTITUTIONAL: Sitting comfortably.   HEENT: Pupils are equal. Oropharynx is clear.   NECK: No cervical or supraclavicular lymphadenopathy.   RESPIRATORY: Clear bilaterally.   CARD/VASC: S1, S2, regular.   GI: Soft, nontender, nondistended, no hepatosplenomegaly.   MUSKULOSKELETAL: Warm,  well perfused.   NEUROLOGIC: Alert, awake.   INTEGUMENT: Anterior chest wall skin changes related to radiation.   LYMPHATICS: No edema.   PSYCH: Mood and affect was normal.     LABORATORY DATA AND IMAGING REVIEWED DURING THIS VISIT:  Recent Labs   Lab Test  04/30/18   1150  04/23/18   0951   NA  135  141   POTASSIUM  4.0  4.0   CHLORIDE  101  108   CO2  26  26   ANIONGAP  8  7   BUN  20  14   CR  0.62  0.61   GLC  106*  96   EDILSON  8.8  8.7     Recent Labs   Lab Test  05/03/18   1005  04/30/18   1150  04/23/18   0921   WBC  3.0*  0.3*  3.2*   HGB  7.3*  7.8*  8.0*   PLT  61*  79*  308   MCV  92  93  95   NEUTROPHIL  81.0  3.0  62.7     Recent Labs   Lab Test  04/30/18   1150  04/23/18   0951  04/09/18   0955   BILITOTAL  0.7  0.2  0.6   ALKPHOS  106  101  134   ALT  30  27  30   AST  13  20  21   ALBUMIN  3.4  2.9*  3.6        ECOG performance status 1     ASSESSMENT AND PLAN:  This is a 63-year-old lady who has diagnosis of 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  Tumor cells were positive for CK7, CK5/6 and GATA3.  We did tumor marker testing and she had CA 27-29 at the level of 12,  of 16, CA 19-9 at 19.  Her CEA was less than 0.5.      PET CT scan results was done 11/27/2017.  showed hypermetabolic right supraclavicular, right axillary and subpectoral adenopathy.  There was no evidence of distant metastases.      The distribution of adenopathy indicates possibility breast cancer which has metastasized to the regional lymph nodes and we are not able to find a primary on physical exam and a PET scan.     Mammogram 11/30/17 no evidence of primary. Her-2 non amplified. Androgen receptor 10-20%. ER 1-5%. [place] < 1%.   ENT examination by Dr. Miller was negative. EGD was negative as well. MRI breast 12/20/17 no evidence of malignancy in the breasts.    She started chemotherapy carboplatin/Taxol. Completed 4 cycles. PET Scan 2/20/18 showed good response to treatment.    She completed dose dense AC for 4 cycles.   PET Scan 5/2/18 showed overall response to previous areas of disease. There was a new area in left parapharyngeal wall which appeared to be a necrotic or reactive lymph node. She was seen by ENT colleagues for assessment of this area. Follow up recommended.     - She will complete radiation and will get a PET scan after that.     PLAN:   1- Continue radiation  2. RTC MD first week of September. PET CT scan before the visit.     YANI MAGANA MD    6/20/2018     cc: Waylon Liu MD       Again, thank you for allowing me to participate in the care of your patient.        Sincerely,        Yani Magana MD

## 2018-06-20 NOTE — MR AVS SNAPSHOT
After Visit Summary   6/20/2018    Flor Griffin    MRN: [id number]           Patient Information     Date Of Birth          1955        Visit Information        Provider Department      6/20/2018 11:30 AM Yani Magana MD [facility]        Today's Diagnoses     Carcinoma of unknown origin (H)    -  1      Care Instructions    1- Continue radiation  2. RTC MD first week of September. PET CT scan before the visit.           Follow-ups after your visit        Your next 10 appointments already scheduled     Aug 27, 2018 10:00 AM CDT   PE NPET ONCOLOGY (EYES TO THIGHS) with SHPETM1   [facility] PET CT ([facility])    [address] [facility] [id number] [phone]           Tell your doctor:   If there is any chance you may be pregnant or if you are breastfeeding.   If you have problems lying in small spaces (claustrophobia). If you do, your doctor may give you medicine to help you relax. If you have diabetes:   Have your exam early in the morning. Your blood glucose will go up as the day goes by.   Your glucose level must be 180 or less at the start of the exam. Please take any medicines you need to ensure this blood glucose level. 24 hours before your scan: Don t do any heavy exercise. (No jogging, aerobics or other workouts.) Exercise will make your pictures less accurate. 6 hours before your scan:   Stop all food and liquids (except water).   Do not chew gum or suck on mints.   If you need to take medicine with food, you may take it with a few crackers.  Please call your Imaging Department at your exam site with any questions.            Sep 06, 2018 10:00 AM CDT   Return Visit with Yani Magana MD   [facility] ([facility])    n [facility]  [address]  [place] [address] [phone]              Future tests that were ordered for you today     Open Future Orders        Priority  Expected Expires Ordered    PET Oncology (Eyes to Thighs) Routine  6/20/2019 6/20/2018            Who to contact     If you have questions or need follow up information about today's clinic visit or your schedule please contact [facility] directly at [phone].  Normal or non-critical lab and imaging results will be communicated to you by MyChart, letter or phone within 4 business days after the clinic has received the results. If you do not hear from us within 7 days, please contact the clinic through MyChart or phone. If you have a critical or abnormal lab result, we will notify you by phone as soon as possible.  Submit refill requests through [employer] or call your pharmacy and they will forward the refill request to us. Please allow 3 business days for your refill to be completed.          Additional Information About Your Visit        Care EveryWhere ID     This is your Care EveryWhere ID. This could be used by other organizations to access your [place] medical records  [id number]        Your Vitals Were     Pulse Temperature Respirations Last Period Pulse Oximetry BMI (Body Mass Index)    68 98.4  F (36.9  C) (Oral) 18 01/01/2004 (Approximate) 99% 27.28 kg/m2       Blood Pressure from Last 3 Encounters:   06/20/18 125/80   06/12/18 112/70   05/18/18 102/62    Weight from Last 3 Encounters:   06/20/18 76.7 kg (169 lb)   05/18/18 76.1 kg (167 lb 11.2 oz)   05/11/18 75.8 kg (167 lb)               Primary Care Provider Office Phone # Fax #    [facility] [phone] [address] [address]        Equal Access to Services     [facility] AH: Hadii nelida Nolan, carole heard, qaketan andrade. So [facility] [phone].    ATENCIÓN: Si habla español, tiene a thomas disposición servicios gratuitos de asistencia lingüística. Llame al [phone].    We comply with applicable federal civil  rights laws and [place] laws. We do not discriminate on the basis of race, color, national origin, age, disability, sex, sexual orientation, or gender identity.            Thank you!     Thank you for choosing [facility]  for your care. Our goal is always to provide you with excellent care. Hearing back from our patients is one way we can continue to improve our services. Please take a few minutes to complete the written survey that you may receive in the mail after your visit with us. Thank you!             Your Updated Medication List - Protect others around you: Learn how to safely use, store and throw away your medicines at www.disposemymeds.org.          This list is accurate as of 6/20/18 12:04 PM.  Always use your most recent med list.                   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

## 2018-06-20 NOTE — PATIENT INSTRUCTIONS
1- Continue radiation  2. RTC MD first week of September. PET CT scan before the visit. Scheduled/ Marley    AVS printed and given to the patient. Marley  Staff message sent to fd to arrange port flushes, CW

## 2018-06-20 NOTE — PROGRESS NOTES
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  Initially she was seen 11/22/2017 after she had the right supraclavicular lymph node biopsy which was positive for poorly differentiated adenocarcinoma.  She had this lymph node almost a month and had an excisional biopsy performed which was consistent with the diagnosis of cancer.      A pet CT scan 11/27/2017 showed hypermetabolic right supraclavicular, right axillary and subpectoral adenopathy.  Otherwise, no distant metastatic disease.     Mammogram 11/30/17- negative    TREATMENT: Carboplatin and Taxol completed 4 cycles. 3/7/2018 started dose dense AC. Completed 4 cycles.      SUBJECTIVE:  The patient was seen as a followup today. She is undergoing radiation. Tolerating it well except for some fatigue.     REVIEW OF SYSTEMS:  A complete review of systems was performed and found to be negative other than pertinent positives mentioned in history of present illness.      Past medical, social histories reviewed.     Meds- Reviewed.     PHYSICAL EXAMINATION:   VITAL SIGNS: /80 (BP Location: Right arm, Patient Position: Sitting, Cuff Size: Adult Regular)  Pulse 68  Temp 98.4  F (36.9  C) (Oral)  Resp 18  Wt 76.7 kg (169 lb)  LMP 01/01/2004 (Approximate)  SpO2 99%  BMI 27.28 kg/m2  CONSTITUTIONAL: Sitting comfortably.   HEENT: Pupils are equal. Oropharynx is clear.   NECK: No cervical or supraclavicular lymphadenopathy.   RESPIRATORY: Clear bilaterally.   CARD/VASC: S1, S2, regular.   GI: Soft, nontender, nondistended, no hepatosplenomegaly.   MUSKULOSKELETAL: Warm, well perfused.   NEUROLOGIC: Alert, awake.   INTEGUMENT: Anterior chest wall skin changes related to radiation.   LYMPHATICS: No edema.   PSYCH: Mood and affect was normal.     LABORATORY DATA AND IMAGING REVIEWED DURING THIS VISIT:  Recent Labs   Lab  Test  04/30/18   1150  04/23/18   0951   NA  135  141   POTASSIUM  4.0  4.0   CHLORIDE  101  108   CO2  26  26   ANIONGAP  8  7   BUN  20  14   CR  0.62  0.61   GLC  106*  96   EDILSON  8.8  8.7     Recent Labs   Lab Test  05/03/18   1005  04/30/18   1150  04/23/18   0921   WBC  3.0*  0.3*  3.2*   HGB  7.3*  7.8*  8.0*   PLT  61*  79*  308   MCV  92  93  95   NEUTROPHIL  81.0  3.0  62.7     Recent Labs   Lab Test  04/30/18   1150  04/23/18   0951  04/09/18   0955   BILITOTAL  0.7  0.2  0.6   ALKPHOS  106  101  134   ALT  30  27  30   AST  13  20  21   ALBUMIN  3.4  2.9*  3.6        ECOG performance status 1     ASSESSMENT AND PLAN:  This is a 63-year-old lady who has diagnosis of 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  Tumor cells were positive for CK7, CK5/6 and GATA3.  We did tumor marker testing and she had CA 27-29 at the level of 12,  of 16, CA 19-9 at 19.  Her CEA was less than 0.5.      PET CT scan results was done 11/27/2017.  showed hypermetabolic right supraclavicular, right axillary and subpectoral adenopathy.  There was no evidence of distant metastases.      The distribution of adenopathy indicates possibility breast cancer which has metastasized to the regional lymph nodes and we are not able to find a primary on physical exam and a PET scan.     Mammogram 11/30/17 no evidence of primary. Her-2 non amplified. Androgen receptor 10-20%. ER 1-5%. KS < 1%.   ENT examination by Dr. Miller was negative. EGD was negative as well. MRI breast 12/20/17 no evidence of malignancy in the breasts.    She started chemotherapy carboplatin/Taxol. Completed 4 cycles. PET Scan 2/20/18 showed good response to treatment.   She completed dose dense AC for 4 cycles.   PET Scan 5/2/18 showed overall response to previous areas of disease. There was a new area in left parapharyngeal wall which appeared to be a necrotic or reactive lymph node. She was seen by ENT colleagues  for assessment of this area. Follow up recommended.     - She will complete radiation and will get a PET scan after that.     PLAN:   1- Continue radiation  2. RTC MD first week of September. PET CT scan before the visit.     ERON BARNES MD    6/20/2018     cc: Waylon Liu MD

## 2018-06-27 ENCOUNTER — TELEPHONE (OUTPATIENT)
Dept: ONCOLOGY | Facility: CLINIC | Age: 63
End: 2018-06-27

## 2018-06-27 NOTE — TELEPHONE ENCOUNTER
Per staff message, called patient to schedule port flush appointment/s prior to Dr visit in September    Second attempt to schedule port flush appointments

## 2018-07-09 ENCOUNTER — TELEPHONE (OUTPATIENT)
Dept: INFUSION THERAPY | Facility: CLINIC | Age: 63
End: 2018-07-09

## 2018-07-09 NOTE — TELEPHONE ENCOUNTER
Port Flush 7-13-18 has been cancelled per Charge time slot needed for an urgent appointment- please schedule her for an afternoon time

## 2018-07-12 ENCOUNTER — INFUSION THERAPY VISIT (OUTPATIENT)
Dept: INFUSION THERAPY | Facility: CLINIC | Age: 63
End: 2018-07-12
Attending: INTERNAL MEDICINE
Payer: COMMERCIAL

## 2018-07-12 DIAGNOSIS — Z95.828 PORT CATHETER IN PLACE: Primary | ICD-10-CM

## 2018-07-12 DIAGNOSIS — Z17.0 MALIGNANT NEOPLASM OF OVERLAPPING SITES OF RIGHT BREAST IN FEMALE, ESTROGEN RECEPTOR POSITIVE (H): ICD-10-CM

## 2018-07-12 DIAGNOSIS — C50.811 MALIGNANT NEOPLASM OF OVERLAPPING SITES OF RIGHT BREAST IN FEMALE, ESTROGEN RECEPTOR POSITIVE (H): ICD-10-CM

## 2018-07-12 PROCEDURE — 25000128 H RX IP 250 OP 636: Performed by: INTERNAL MEDICINE

## 2018-07-12 PROCEDURE — 96523 IRRIG DRUG DELIVERY DEVICE: CPT

## 2018-07-12 RX ORDER — HEPARIN SODIUM (PORCINE) LOCK FLUSH IV SOLN 100 UNIT/ML 100 UNIT/ML
500 SOLUTION INTRAVENOUS ONCE
Status: CANCELLED
Start: 2018-07-12 | End: 2018-07-12

## 2018-07-12 RX ORDER — HEPARIN SODIUM (PORCINE) LOCK FLUSH IV SOLN 100 UNIT/ML 100 UNIT/ML
500 SOLUTION INTRAVENOUS ONCE
Status: COMPLETED | OUTPATIENT
Start: 2018-07-12 | End: 2018-07-12

## 2018-07-12 RX ORDER — ONDANSETRON 2 MG/ML
8 INJECTION INTRAMUSCULAR; INTRAVENOUS EVERY 6 HOURS PRN
Status: CANCELLED
Start: 2018-07-12

## 2018-07-12 RX ADMIN — HEPARIN 500 UNITS: 100 SYRINGE at 09:36

## 2018-07-12 NOTE — MR AVS SNAPSHOT
After Visit Summary   7/12/2018    Flor Griffin    MRN: [id number]           Patient Information     Date Of Birth          1955        Visit Information        Provider Department      7/12/2018 9:30 AM  INFUSION CHAIR 19 [facility]        Today's Diagnoses     Port catheter in place    -  1    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 in female, estrogen receptor positive (H)           Follow-ups after your visit        Your next 10 appointments already scheduled     Aug 27, 2018 10:00 AM CDT   PE NPET ONCOLOGY (EYES TO THIGHS) with [id number]   [facility] PET CT ([facility])    [address] [address]   [phone]           Tell your doctor:   If there is any chance you may be pregnant or if you are breastfeeding.   If you have problems lying in small spaces (claustrophobia). If you do, your doctor may give you medicine to help you relax. If you have diabetes:   Have your exam early in the morning. Your blood glucose will go up as the day goes by.   Your glucose level must be 180 or less at the start of the exam. Please take any medicines you need to ensure this blood glucose level. 24 hours before your scan: Don t do any heavy exercise. (No jogging, aerobics or other workouts.) Exercise will make your pictures less accurate. 6 hours before your scan:   Stop all food and liquids (except water).   Do not chew gum or suck on mints.   If you need to take medicine with food, you may take it with a few crackers.  Please call your Imaging Department at your exam site with any questions.            Sep 06, 2018 10:00 AM CDT   Return Visit with Yani Magana MD   [facility] ([facility])    [facility]  [address] [facility] [address]  [facility] [place] [facility] [address]   [phone]            Sep 24, 2018  1:00 PM CDT   Level 1 with  INFUSION CHAIR 2   [facility] ([facility])    n [facility] [place]  [address]  [place] [address] [phone]              Who to contact     If you have questions or need follow up information about today's clinic visit or your schedule please contact [facility] directly at [phone].  Normal or non-critical lab and imaging results will be communicated to you by MyChart, letter or phone within 4 business days after the clinic has received the results. If you do not hear from us within 7 days, please contact the clinic through MyChart or phone. If you have a critical or abnormal lab result, we will notify you by phone as soon as possible.  Submit refill requests through Not iT or call your pharmacy and they will forward the refill request to us. Please allow 3 business days for your refill to be completed.          Additional Information About Your Visit        Care EveryWhere ID     This is your Care EveryWhere ID. This could be used by other organizations to access your [facility] medical records  [id number]        Your Vitals Were     Last Period                   01/01/2004 (Approximate)            Blood Pressure from Last 3 Encounters:   06/20/18 125/80   06/12/18 112/70   05/18/18 102/62    Weight from Last 3 Encounters:   06/20/18 76.7 kg (169 lb)   05/18/18 76.1 kg (167 lb 11.2 oz)   05/11/18 75.8 kg (167 lb)              Today, you had the following     No orders found for display       Primary Care Provider Office Phone # Fax #    [facility] [phone] [address] [address]        Equal Access to Services     TRICIA JARA : Hadii nelida Nolan, waaxda luqadaha, qaybta kaalmada osmani, ketan connor. So [facility] [phone].    ATENCIÓN: Si habla español, tiene a thomas disposición servicios gratuitos de asistencia lingüística. Llame al  [phone].    We comply with applicable federal civil rights laws and [place] laws. We do not discriminate on the basis of race, color, national origin, age, disability, sex, sexual orientation, or gender identity.            Thank you!     Thank you for choosing [facility]  for your care. Our goal is always to provide you with excellent care. Hearing back from our patients is one way we can continue to improve our services. Please take a few minutes to complete the written survey that you may receive in the mail after your visit with us. Thank you!             Your Updated Medication List - Protect others around you: Learn how to safely use, store and throw away your medicines at www.disposemymeds.org.          This list is accurate as of 7/12/18  9:37 AM.  Always use your most recent med list.                   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

## 2018-07-12 NOTE — PROGRESS NOTES
Infusion Nursing Note:  Flor PUMA Griffin presents today for port flush.    Patient seen by provider today: No   present during visit today: Not Applicable.    Note: N/A.    Intravenous Access:  Implanted Port.    Treatment Conditions:  Not Applicable.      Post Infusion Assessment:  Site patent and intact, free from redness, edema or discomfort.  No evidence of extravasations.  Access discontinued per protocol.    Discharge Plan:   Discharge instructions reviewed with: Patient.  Patient and/or family verbalized understanding of discharge instructions and all questions answered.  AVS to patient via "[facility].  Patient will return 9/6/18 for next appointment.   Patient discharged in stable condition accompanied by: self.  Departure Mode: Ambulatory.    Karissa Briceño RN

## 2018-07-13 ENCOUNTER — TRANSFERRED RECORDS (OUTPATIENT)
Dept: SURGERY | Facility: CLINIC | Age: 63
End: 2018-07-13

## 2018-07-13 ENCOUNTER — TRANSFERRED RECORDS (OUTPATIENT)
Dept: HEALTH INFORMATION MANAGEMENT | Facility: CLINIC | Age: 63
End: 2018-07-13

## 2018-08-27 ENCOUNTER — HOSPITAL ENCOUNTER (OUTPATIENT)
Dept: PET IMAGING | Facility: CLINIC | Age: 63
Discharge: HOME | End: 2018-08-27
Attending: INTERNAL MEDICINE | Admitting: INTERNAL MEDICINE
Payer: COMMERCIAL

## 2018-08-27 ENCOUNTER — HOSPITAL ENCOUNTER (OUTPATIENT)
Dept: PET IMAGING | Facility: CLINIC | Age: 63
End: 2018-08-27
Attending: INTERNAL MEDICINE
Payer: COMMERCIAL

## 2018-08-27 DIAGNOSIS — C80.1 CARCINOMA OF UNKNOWN ORIGIN (H): ICD-10-CM

## 2018-08-27 PROCEDURE — 70491 CT SOFT TISSUE NECK W/DYE: CPT

## 2018-08-27 PROCEDURE — 25000128 H RX IP 250 OP 636: Performed by: INTERNAL MEDICINE

## 2018-08-27 PROCEDURE — 71260 CT THORAX DX C+: CPT

## 2018-08-27 PROCEDURE — A9552 F18 FDG: HCPCS | Performed by: INTERNAL MEDICINE

## 2018-08-27 PROCEDURE — 34300033 ZZH RX 343: Performed by: INTERNAL MEDICINE

## 2018-08-27 RX ORDER — IOPAMIDOL 755 MG/ML
1-135 INJECTION, SOLUTION INTRAVASCULAR ONCE
Status: COMPLETED | OUTPATIENT
Start: 2018-08-27 | End: 2018-08-27

## 2018-08-27 RX ADMIN — FLUDEOXYGLUCOSE F-18 11.54 MCI.: 500 INJECTION, SOLUTION INTRAVENOUS at 10:00

## 2018-08-27 RX ADMIN — IOPAMIDOL 100 ML: 755 INJECTION, SOLUTION INTRAVENOUS at 10:31

## 2018-08-27 RX ADMIN — SODIUM CHLORIDE, PRESERVATIVE FREE 500 UNITS: 5 INJECTION INTRAVENOUS at 11:37

## 2018-08-29 ENCOUNTER — ONCOLOGY VISIT (OUTPATIENT)
Dept: ONCOLOGY | Facility: CLINIC | Age: 63
End: 2018-08-29
Attending: INTERNAL MEDICINE
Payer: COMMERCIAL

## 2018-08-29 VITALS
BODY MASS INDEX: 27.97 KG/M2 | DIASTOLIC BLOOD PRESSURE: 78 MMHG | SYSTOLIC BLOOD PRESSURE: 122 MMHG | TEMPERATURE: 98.1 F | HEART RATE: 79 BPM | OXYGEN SATURATION: 97 % | WEIGHT: 174 LBS | HEIGHT: 66 IN

## 2018-08-29 DIAGNOSIS — Z17.0 MALIGNANT NEOPLASM OF OVERLAPPING SITES OF RIGHT BREAST IN FEMALE, ESTROGEN RECEPTOR POSITIVE (H): Primary | ICD-10-CM

## 2018-08-29 DIAGNOSIS — C50.811 MALIGNANT NEOPLASM OF OVERLAPPING SITES OF RIGHT BREAST IN FEMALE, ESTROGEN RECEPTOR POSITIVE (H): Primary | ICD-10-CM

## 2018-08-29 PROCEDURE — G0463 HOSPITAL OUTPT CLINIC VISIT: HCPCS

## 2018-08-29 PROCEDURE — 99215 OFFICE O/P EST HI 40 MIN: CPT | Performed by: INTERNAL MEDICINE

## 2018-08-29 RX ORDER — LETROZOLE 2.5 MG/1
2.5 TABLET, FILM COATED ORAL DAILY
Qty: 30 TABLET | Refills: 3 | Status: SHIPPED | OUTPATIENT
Start: 2018-08-29 | End: 2018-12-29

## 2018-08-29 ASSESSMENT — PAIN SCALES - GENERAL: PAINLEVEL: NO PAIN (0)

## 2018-08-29 NOTE — PATIENT INSTRUCTIONS
1- We will work on getting appointments with surg onc and neck surgery.- Vanessa RN will call the patient with Appointment- LW  2. Start Femara 2.5 mg daily.   3. Dexa scan in next few weeks scheduled/crsitina      AVS printed & given to patient/cristina     Messaged the  to arrange a Dr. Magana follow up appt 1 week after her visit with Dr. Booker

## 2018-08-29 NOTE — PROGRESS NOTES
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  Initially she was seen 11/22/2017 after she had the right supraclavicular lymph node biopsy which was positive for poorly differentiated adenocarcinoma.  She had this lymph node almost a month and had an excisional biopsy performed which was consistent with the diagnosis of cancer.      A pet CT scan 11/27/2017 showed hypermetabolic right supraclavicular, right axillary and subpectoral adenopathy.  Otherwise, no distant metastatic disease.     Mammogram 11/30/17- negative    TREATMENT: Carboplatin and Taxol completed 4 cycles. 3/7/2018 started dose dense AC. Completed 4 cycles.   Radiation to right breast, right axilla, right supraclavicular area between 5/29/18-7/31/18     SUBJECTIVE:  The patient was seen as a followup today. We reviewed the PET CT Scan results and discussed further plan of care.     REVIEW OF SYSTEMS:  A complete review of systems was performed and found to be negative other than pertinent positives mentioned in history of present illness.      Past medical, social histories reviewed.     Meds- Reviewed.     PHYSICAL EXAMINATION:   VITAL SIGNS: /78 (BP Location: Left arm, Patient Position: Chair, Cuff Size: Adult Large)  Pulse 79  Temp 98.1  F (36.7  C) (Oral)  Ht 1.676 m (5' 6\")  Wt 78.9 kg (174 lb)  LMP 01/01/2004 (Approximate)  SpO2 97%  BMI 28.08 kg/m2  CONSTITUTIONAL: Sitting comfortably.   NEUROLOGIC: Alert, awake.   PSYCH: Anxious     LABORATORY DATA AND IMAGING REVIEWED DURING THIS VISIT:  Recent Labs   Lab Test  04/30/18   1150  04/23/18   0951   NA  135  141   POTASSIUM  4.0  4.0   CHLORIDE  101  108   CO2  26  26   ANIONGAP  8  7   BUN  20  14   CR  0.62  0.61   GLC  106*  96   EDILSON  8.8  8.7     Recent Labs   Lab Test  05/03/18   1005  04/30/18   1150  04/23/18   0921   WBC  3.0*  " 0.3*  3.2*   HGB  7.3*  7.8*  8.0*   PLT  61*  79*  308   MCV  92  93  95   NEUTROPHIL  81.0  3.0  62.7     Recent Labs   Lab Test  04/30/18   1150  04/23/18   0951  04/09/18   0955   BILITOTAL  0.7  0.2  0.6   ALKPHOS  106  101  134   ALT  30  27  30   AST  13  20  21   ALBUMIN  3.4  2.9*  3.6         Results for orders placed or performed during the hospital encounter of 08/27/18   PET Oncology (Eyes to Thighs)    Narrative    PET / CT SKULL BASE TO MID THIGH DIAGNOSTIC WITH IV CONTRAST 8/27/2018  11:54 AM     HISTORY: Follow up breast cancer- please include high-resolution CT of  neck. Carcinoma of unknown origin (H).    COMPARISON EXAMS:  [facility]: None.  [facility]  OTHER: None.    TECHNIQUE:  11.5 mCi of F-18 FDG were administered  intravenously.   Following the uneventful administration of 100 mL Isovue 370  intravenous contrast and oral contrast, a PET/CT scan was performed  from the skull base through the mid thigh. Additional high-resolution  PET/CT imaging of the head and neck is performed.  A diagnostic CT  scan was performed of the chest, abdomen and pelvis. Coronal, sagittal  and axial images were created and fused with the contrast enhanced CT  examination.  Blood glucose: 84 mg/dL.  The CT, PET and fusion images  are then evaluated on a [employer] workstation.  Radiation dose for  this scan was reduced using automated exposure control, adjustment of  the mA and/or kV according to patient size, or iterative  reconstruction technique.    FINDINGS: Normal physiologic uptake is identified within the salivary  glands, myocardium, kidneys, ureters and bladder.  Scattered areas of  physiologic bowel uptake are also present.    HIGH-RESOLUTION NECK:  Lymph nodes: Previously noted left parapharyngeal rim-enhancing  lesions are not definitely identified on the current exam and have  likely resolved. Correlate with concurrently performed neck CT exam  report. Right supraclavicular lymph  node again measures 0.6-0.7 cm in  size now with an SUV max 2.5, previously 8.1 consistent with interval  improvement. Some residual viable malignancy in this node is possible.  No new or enlarged cervical lymph nodes.    Additional findings: None.    CHEST:  Lungs: No pathologic activity. Lungs are clear with only mild  dependent atelectasis.    Lymph nodes: A high right axillary lymph node described on prior exam  is barely evident on the current study now measuring 0.4 cm in maximal  diameter without any obvious residual FDG activity on series 3, image  118. Previously this measured 0.7 cm with an SUV max 3.3. Remaining  nodes in the right axillary and subpectoral region have resolved.    Additional findings: No pleural or pericardial fluid. Heart is normal  in size. Esophagus is unremarkable. Left chest port appears in good  position with catheter tip near the SVC right atrium junction.    ABDOMEN/PELVIS:  Hepatobiliary: No pathologic activity. No evidence of fatty  infiltration or mass. No calcified gallstones.    Spleen: No pathologic activity. No enlargement or focal mass.    Pancreas: No pathologic activity. No evidence of a mass or  peripancreatic inflammation.    Kidneys: No pathologic activity. Parapelvic cyst lower pole left  kidney is stable. No enhancing renal mass. No abnormal FDG activity in  the kidneys, ureters or bladder. Mild left bladder wall thickening  could be due to incomplete distention. No discrete wall nodularity is  evident.    Adrenals: No pathologic activity. No adrenal mass.    Reproductive: No pathologic activity. Uterus is unremarkable. Adnexal  regions are within normal limits.    Gastrointestinal: No pathologic activity. No evidence of bowel  obstruction or diverticulitis. Appendix is normal.    Lymph nodes: No pathologic activity. No enlarged abdominal or pelvic  lymph nodes.    Additional findings: Aorta demonstrates scattered calcified plaque. No  aneurysm or  dissection.    SKELETON:   No pathologic activity.      Impression    IMPRESSION:  1. Interval improvement since prior PET/CT likely related to interval  therapy. Left parapharyngeal necrotic-appearing lymph nodes have  resolved. Right axillary and supraclavicular nodes have diminished in  size or have completely resolved. Minimal residual FDG activity is  noted in a small subcentimeter right axillary node. Continued  surveillance recommended.   2. No evidence of metastatic disease in the lungs, abdomen or pelvis.  No enlarged lymph nodes in the neck, chest, abdomen or pelvis.  3. Parapelvic cyst lower pole left kidney. No associated abnormal FDG  activity. This is likely a simple cyst and no further follow-up  required.    DAVON NUNEZ MD   CT Soft Tissue Neck w Contrast    Narrative    CT SCAN OF THE NECK WITH CONTRAST  8/27/2018 11:55 AM     HISTORY: Carcinoma of unknown origin.    TECHNIQUE: Axial CT images of the neck following administration of  intravenous contrast with reformations. Radiation dose for this scan  was reduced using automated exposure control, adjustment of the mA  and/or kV according to patient size, or iterative reconstruction  technique. DX CT W CONTRAST, IV Contrast: 100ml Isovue-370, Oral  Contrast 500ml Breeza, 11.54 mCi FDG inj port, glucose: 84, HT: 176.6  cm, WT: 76.7 kg, Uptake: 60 minutes IV.     COMPARISON: PET/CT 5/2/2018, 2/20/2018, and 11/27/2017.     FINDINGS:  Previously seen centrally hypodense necrotic node or  possible mass in the left glossotonsillar sulcus appears to be  resolved. No new suspicious masses in the neck.    No new enlarged or necrotic cervical lymph nodes are seen. Please see  dedicated chest CT report for discussion of right axillary lymph node.    The submandibular and parotid glands are unremarkable by CT. No  abnormal thyroid gland nodules. Left IJ Port-A-Cath partially  visualized.    No suspicious osseous lesions.    Please see dedicated PET/CT report  for details below the neck.      Impression    IMPRESSION:    1. Previously seen hypermetabolic lesion with central hypodensity in  the region of the left glossotonsillar sulcus is no longer  appreciated. This may have represented a primary mass or necrotic  lymph node. Findings suggest represent response to treatment.  2. No new suspicious masses or lymph nodes within the neck.  3. Please see dedicated PET/CT report for discussion of the right  axillary lymph node as well as findings below the neck.    VENKATA ROCHA MD        ECOG performance status 0     ASSESSMENT:  This is a 63-year-old lady who has diagnosis of 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  Tumor cells were positive for CK7, CK5/6 and GATA3.  We did tumor marker testing and she had CA 27-29 at the level of 12,  of 16, CA 19-9 at 19.  Her CEA was less than 0.5. PET CT scan results was done 11/27/2017.  showed hypermetabolic right supraclavicular, right axillary and subpectoral adenopathy.  There was no evidence of distant metastases.      The distribution of adenopathy indicated possibility breast cancer which had metastasized to the regional lymph nodes and we are not able to find a primary on physical exam and a PET scan. Mammogram 11/30/17 no evidence of primary. Her-2 non amplified. Androgen receptor 10-20%. ER 1-5%. ME < 1%. ENT examination by Dr. Mliler was negative. EGD was negative as well. MRI breast 12/20/17 no evidence of malignancy in the breasts.    She started chemotherapy carboplatin/Taxol. Completed 4 cycles. PET Scan 2/20/18 showed good response to treatment. She completed dose dense AC for 4 cycles. PET Scan 5/2/18 showed overall response to previous areas of disease. There was a new area in left parapharyngeal wall which appeared to be a necrotic or reactive lymph node. She was seen by ENT colleagues for assessment of this area. Follow up recommended.     She has completed  radiation.    - PET CT Scan results 8/27/18 were reviewed with the patient. I reviewed the images myself.  Left paraesophageal necrotic lymph node has resolved. Right supraclavicular lymph node has FDG activity and right axillary lymph node has actvity as well. Overall, she appears to have a good partial response with residual disease in axilla and right supraclavicular area.   I have called and discussed the CT findings with surgeon Dr. Ulises Castillo- the axillary dissection is realistic option but supraclavicular area would likely need involvement by ENT/Neck surgeon.     I think that we must try to surgically remove the residual disease. I will have her see Dr. Booker for this. She has seen Dr. Lee from ENT before and if needed we can involve Dr. Lee again.     She has low ER positivity and I will start her on Femara in the mean time.      PLAN:   1- We will work on getting appointments with surg onc.  2. Start Femara 2.5 mg daily.   3. Dexa scan    ERON BARNES MD    8/29/2018    Total face to face time 50 minutes, more than 50% of which were spent in counselling and coordination of care.        cc: Waylon Liu MD

## 2018-08-29 NOTE — LETTER
"    8/29/2018         RE: Flor Griffin  [address] [facility] [address]        Dear Colleague,    Thank you for referring your patient, Flor Griffin, to the [facility]. Please see a copy of my visit note below.    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  Initially she was seen 11/22/2017 after she had the right supraclavicular lymph node biopsy which was positive for poorly differentiated adenocarcinoma.  She had this lymph node almost a month and had an excisional biopsy performed which was consistent with the diagnosis of cancer.      A pet CT scan 11/27/2017 showed hypermetabolic right supraclavicular, right axillary and subpectoral adenopathy.  Otherwise, no distant metastatic disease.     Mammogram 11/30/17- negative    TREATMENT: Carboplatin and Taxol completed 4 cycles. 3/7/2018 started dose dense AC. Completed 4 cycles.   Radiation to right breast, right axilla, right supraclavicular area between 5/29/18-7/31/18     SUBJECTIVE:  The patient was seen as a followup today. We reviewed the PET CT Scan results and discussed further plan of care.     REVIEW OF SYSTEMS:  A complete review of systems was performed and found to be negative other than pertinent positives mentioned in history of present illness.      Past medical, social histories reviewed.     Meds- Reviewed.     PHYSICAL EXAMINATION:   VITAL SIGNS: /78 (BP Location: Left arm, Patient Position: Chair, Cuff Size: Adult Large)  Pulse 79  Temp 98.1  F (36.7  C) (Oral)  Ht 1.676 m (5' 6\")  Wt 78.9 kg (174 lb)  LMP 01/01/2004 (Approximate)  SpO2 97%  BMI 28.08 kg/m2  CONSTITUTIONAL: Sitting comfortably.   NEUROLOGIC: Alert, awake.   PSYCH: Anxious     LABORATORY DATA AND IMAGING REVIEWED DURING THIS VISIT:  Recent Labs   Lab Test  04/30/18   1150  " 04/23/18   0951   NA  135  141   POTASSIUM  4.0  4.0   CHLORIDE  101  108   CO2  26  26   ANIONGAP  8  7   BUN  20  14   CR  0.62  0.61   GLC  106*  96   EDILSON  8.8  8.7     Recent Labs   Lab Test  05/03/18   1005  04/30/18   1150  04/23/18   0921   WBC  3.0*  0.3*  3.2*   HGB  7.3*  7.8*  8.0*   PLT  61*  79*  308   MCV  92  93  95   NEUTROPHIL  81.0  3.0  62.7     Recent Labs   Lab Test  04/30/18   1150  04/23/18   0951  04/09/18   0955   BILITOTAL  0.7  0.2  0.6   ALKPHOS  106  101  134   ALT  30  27  30   AST  13  20  21   ALBUMIN  3.4  2.9*  3.6         Results for orders placed or performed during the hospital encounter of 08/27/18   PET Oncology (Eyes to Thighs)    Narrative    PET / CT SKULL BASE TO MID THIGH DIAGNOSTIC WITH IV CONTRAST 8/27/2018  11:54 AM     HISTORY: Follow up breast cancer- please include high-resolution CT of  neck. Carcinoma of unknown origin (H).    COMPARISON EXAMS:  SUBURBAN IMAGING: None.  [place]: PET/CT 5/2/2018.  OTHER: None.    TECHNIQUE:  11.5 mCi of F-18 FDG were administered  intravenously.   Following the uneventful administration of 100 mL Isovue 370  intravenous contrast and oral contrast, a PET/CT scan was performed  from the skull base through the mid thigh. Additional high-resolution  PET/CT imaging of the head and neck is performed.  A diagnostic CT  scan was performed of the chest, abdomen and pelvis. Coronal, sagittal  and axial images were created and fused with the contrast enhanced CT  examination.  Blood glucose: 84 mg/dL.  The CT, PET and fusion images  are then evaluated on a Acc[employer] workstation.  Radiation dose for  this scan was reduced using automated exposure control, adjustment of  the mA and/or kV according to patient size, or iterative  reconstruction technique.    FINDINGS: Normal physiologic uptake is identified within the salivary  glands, myocardium, kidneys, ureters and bladder.  Scattered areas of  physiologic bowel uptake are also  present.    HIGH-RESOLUTION NECK:  Lymph nodes: Previously noted left parapharyngeal rim-enhancing  lesions are not definitely identified on the current exam and have  likely resolved. Correlate with concurrently performed neck CT exam  report. Right supraclavicular lymph node again measures 0.6-0.7 cm in  size now with an SUV max 2.5, previously 8.1 consistent with interval  improvement. Some residual viable malignancy in this node is possible.  No new or enlarged cervical lymph nodes.    Additional findings: None.    CHEST:  Lungs: No pathologic activity. Lungs are clear with only mild  dependent atelectasis.    Lymph nodes: A high right axillary lymph node described on prior exam  is barely evident on the current study now measuring 0.4 cm in maximal  diameter without any obvious residual FDG activity on series 3, image  118. Previously this measured 0.7 cm with an SUV max 3.3. Remaining  nodes in the right axillary and subpectoral region have resolved.    Additional findings: No pleural or pericardial fluid. Heart is normal  in size. Esophagus is unremarkable. Left chest port appears in good  position with catheter tip near the SVC right atrium junction.    ABDOMEN/PELVIS:  Hepatobiliary: No pathologic activity. No evidence of fatty  infiltration or mass. No calcified gallstones.    Spleen: No pathologic activity. No enlargement or focal mass.    Pancreas: No pathologic activity. No evidence of a mass or  peripancreatic inflammation.    Kidneys: No pathologic activity. Parapelvic cyst lower pole left  kidney is stable. No enhancing renal mass. No abnormal FDG activity in  the kidneys, ureters or bladder. Mild left bladder wall thickening  could be due to incomplete distention. No discrete wall nodularity is  evident.    Adrenals: No pathologic activity. No adrenal mass.    Reproductive: No pathologic activity. Uterus is unremarkable. Adnexal  regions are within normal limits.    Gastrointestinal: No pathologic  activity. No evidence of bowel  obstruction or diverticulitis. Appendix is normal.    Lymph nodes: No pathologic activity. No enlarged abdominal or pelvic  lymph nodes.    Additional findings: Aorta demonstrates scattered calcified plaque. No  aneurysm or dissection.    SKELETON:   No pathologic activity.      Impression    IMPRESSION:  1. Interval improvement since prior PET/CT likely related to interval  therapy. Left parapharyngeal necrotic-appearing lymph nodes have  resolved. Right axillary and supraclavicular nodes have diminished in  size or have completely resolved. Minimal residual FDG activity is  noted in a small subcentimeter right axillary node. Continued  surveillance recommended.   2. No evidence of metastatic disease in the lungs, abdomen or pelvis.  No enlarged lymph nodes in the neck, chest, abdomen or pelvis.  3. Parapelvic cyst lower pole left kidney. No associated abnormal FDG  activity. This is likely a simple cyst and no further follow-up  required.    DAVON NUNEZ MD   CT Soft Tissue Neck w Contrast    Narrative    CT SCAN OF THE NECK WITH CONTRAST  8/27/2018 11:55 AM     HISTORY: Carcinoma of unknown origin.    TECHNIQUE: Axial CT images of the neck following administration of  intravenous contrast with reformations. Radiation dose for this scan  was reduced using automated exposure control, adjustment of the mA  and/or kV according to patient size, or iterative reconstruction  technique. DX CT W CONTRAST, IV Contrast: 100ml Isovue-370, Oral  Contrast 500ml [place], 11.54 mCi FDG inj port, glucose: 84, HT: 176.6  cm, WT: 76.7 kg, Uptake: 60 minutes IV.     COMPARISON: PET/CT 5/2/2018, 2/20/2018, and 11/27/2017.     FINDINGS:  Previously seen centrally hypodense necrotic node or  possible mass in the left glossotonsillar sulcus appears to be  resolved. No new suspicious masses in the neck.    No new enlarged or necrotic cervical lymph nodes are seen. Please see  dedicated chest CT report for  discussion of right axillary lymph node.    The submandibular and parotid glands are unremarkable by CT. No  abnormal thyroid gland nodules. Left IJ Port-A-Cath partially  visualized.    No suspicious osseous lesions.    Please see dedicated PET/CT report for details below the neck.      Impression    IMPRESSION:    1. Previously seen hypermetabolic lesion with central hypodensity in  the region of the left glossotonsillar sulcus is no longer  appreciated. This may have represented a primary mass or necrotic  lymph node. Findings suggest represent response to treatment.  2. No new suspicious masses or lymph nodes within the neck.  3. Please see dedicated PET/CT report for discussion of the right  axillary lymph node as well as findings below the neck.    VENKATA ROCHA MD        ECOG performance status 0     ASSESSMENT:  This is a 63-year-old lady who has diagnosis of 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  Tumor cells were positive for CK7, CK5/6 and GATA3.  We did tumor marker testing and she had CA 27-29 at the level of 12,  of 16, CA 19-9 at 19.  Her CEA was less than 0.5. PET CT scan results was done 11/27/2017.  showed hypermetabolic right supraclavicular, right axillary and subpectoral adenopathy.  There was no evidence of distant metastases.      The distribution of adenopathy indicated possibility breast cancer which had metastasized to the regional lymph nodes and we are not able to find a primary on physical exam and a PET scan. Mammogram 11/30/17 no evidence of primary. Her-2 non amplified. Androgen receptor 10-20%. ER 1-5%. NH < 1%. ENT examination by Dr. Miller was negative. EGD was negative as well. MRI breast 12/20/17 no evidence of malignancy in the breasts.    She started chemotherapy carboplatin/Taxol. Completed 4 cycles. PET Scan 2/20/18 showed good response to treatment. She completed dose dense AC for 4 cycles. PET Scan 5/2/18 showed  overall response to previous areas of disease. There was a new area in left parapharyngeal wall which appeared to be a necrotic or reactive lymph node. She was seen by ENT colleagues for assessment of this area. Follow up recommended.     She has completed radiation.    - PET CT Scan results 8/27/18 were reviewed with the patient. I reviewed the images myself.  Left paraesophageal necrotic lymph node has resolved. Right supraclavicular lymph node has FDG activity and right axillary lymph node has actvity as well. Overall, she appears to have a good partial response with residual disease in axilla and right supraclavicular area.   I have called and discussed the CT findings with surgeon Dr. Ulises Castillo- the axillary dissection is realistic option but supraclavicular area would likely need involvement by ENT/Neck surgeon.     I think that we must try to surgically remove the residual disease. I will have her see Dr. Booker for this. She has seen Dr. Lee from ENT before and if needed we can involve Dr. Lee again.     She has low ER positivity and I will start her on Femara in the mean time.      PLAN:   1- We will work on getting appointments with surg onc.  2. Start Femara 2.5 mg daily.   3. Dexa scan    YANI MAGANA MD    8/29/2018    Total face to face time 50 minutes, more than 50% of which were spent in counselling and coordination of care.        cc: Waylon Liu MD       Again, thank you for allowing me to participate in the care of your patient.        Sincerely,        Yani Magana MD

## 2018-08-29 NOTE — TELEPHONE ENCOUNTER
Date of appointment: 9/7/18 @ 0915   Diagnosis/reason for appointment: Surgical Consult for Resection of Residual Disease  Referring provider/facility: Dr. Yani Magana - [facility]  Who called: Vanessa PRAK @ [facility]    Recent Studies - ALL in Epic    Additional Information: Dr. Magana specific referral to Dr. Booker for consult

## 2018-08-29 NOTE — MR AVS SNAPSHOT
After Visit Summary   8/29/2018    Flor Griffin    MRN: [id number]           Patient Information     Date Of Birth          1955        Visit Information        Provider Department      8/29/2018 9:00 AM Yani Magana MD [facility]        Today's Diagnoses     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 in female, estrogen receptor positive (H)    -  1      Care Instructions    1- We will work on getting appointments with surg onc and neck surgery.- XAVIER Mendez will call the patient with Appointment- LW  2. Start Femara 2.5 mg daily.   3. Dexa scan in next few weeks          Follow-ups after your visit        Your next 10 appointments already scheduled     Sep 06, 2018  9:00 AM CDT   DX HIP/PELVIS/SPINE with [id number]   [facility] ([facility])    [address]  [facility] [address] [phone]           Please do not take any of the following 24 hours prior to the day of your exam: vitamins, calcium tablets, antacids.  If possible, please wear clothes without metal (snaps, zippers). A sweatsuit works well.            Sep 07, 2018  9:15 AM CDT   (Arrive by 9:00 AM)   New Patient Visit with Moody Booker MD   [facility] ([facility])    [address] [place] [id number] [phone]            Sep 24, 2018  1:00 PM CDT   Level 1 with  INFUSION CHAIR 2   [facility] ([facility])    [facility]  [address]  [facility] [address] [phone]              Future tests that were ordered for you today     Open Future Orders        Priority Expected Expires Ordered    DX Hip/Pelvis/Spine Routine  8/29/2019 8/29/2018            Who to contact     If you have questions or need follow up information about today's clinic visit or your schedule please contact [facility]  "directly at [phone].  Normal or non-critical lab and imaging results will be communicated to you by MyChart, letter or phone within 4 business days after the clinic has received the results. If you do not hear from us within 7 days, please contact the clinic through MyChart or phone. If you have a critical or abnormal lab result, we will notify you by phone as soon as possible.  Submit refill requests through [employer] or call your pharmacy and they will forward the refill request to us. Please allow 3 business days for your refill to be completed.          Additional Information About Your Visit        Care EveryWhere ID     This is your Care EveryWhere ID. This could be used by other organizations to access your [facility] medical records  [id number]        Your Vitals Were     Pulse Temperature Height Last Period Pulse Oximetry BMI (Body Mass Index)    79 98.1  F (36.7  C) (Oral) 1.676 m (5' 6\") 01/01/2004 (Approximate) 97% 28.08 kg/m2       Blood Pressure from Last 3 Encounters:   08/29/18 122/78   06/20/18 125/80   06/12/18 112/70    Weight from Last 3 Encounters:   08/29/18 78.9 kg (174 lb)   06/20/18 76.7 kg (169 lb)   05/18/18 76.1 kg (167 lb 11.2 oz)                 Today's Medication Changes          These changes are accurate as of 8/29/18  9:52 AM.  If you have any questions, ask your nurse or doctor.               Start taking these medicines.        Dose/Directions    letrozole 2.5 MG tablet   Commonly known as:  FEMARA   Used for:  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 in female, estrogen receptor positive (H)   Started by:  Yani Magana MD        Dose:  2.5 mg   Take 1 tablet (2.5 mg) by mouth daily   Quantity:  30 tablet   Refills:  3            Where to get your medicines      These medications were sent to [facility] - [place], [place] - [address], [facility] [address]     Phone:  [phone]    "  letrozole 2.5 MG tablet                Primary Care Provider Office Phone # Fax #    [facility] [phone] [address] [address]        Equal Access to Services     MARYJANE JARA : Hadii aad ku hadavanio Sobradleyali, waaxda luqadaha, qaybta kaalmada ademonica, ketan rosen danieladarcy vallejo genaro connor. So [facility] [phone].    ATENCIÓN: Si habla español, tiene a thomas disposición servicios gratuitos de asistencia lingüística. [facility] [phone].    We comply with applicable federal civil rights laws and [place] laws. We do not discriminate on the basis of race, color, national origin, age, disability, sex, sexual orientation, or gender identity.            Thank you!     Thank you for choosing [facility]  for your care. Our goal is always to provide you with excellent care. Hearing back from our patients is one way we can continue to improve our services. Please take a few minutes to complete the written survey that you may receive in the mail after your visit with us. Thank you!             Your Updated Medication List - Protect others around you: Learn how to safely use, store and throw away your medicines at www.disposemymeds.org.          This list is accurate as of 8/29/18  9:52 AM.  Always use your most recent med list.                   Brand Name Dispense Instructions for use Diagnosis    ADVIL PO      Take 400 mg by mouth every 6 hours as needed for moderate pain        aspirin 81 MG tablet      Take 81 mg by mouth daily        famotidine 20 MG tablet    PEPCID    30 tablet    Take 1 tablet (20 mg) by mouth daily    Heartburn       letrozole 2.5 MG tablet    FEMARA    30 tablet    Take 1 tablet (2.5 mg) by mouth daily    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 in female, estrogen receptor positive (H)       LORazepam 0.5 MG tablet    ATIVAN    30 tablet    Take 1 tablet (0.5 mg) by mouth every 4 hours as needed (Anxiety,  Nausea/Vomiting or Sleep)    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 in female, estrogen receptor positive (H), Carcinoma of unknown origin (H)       OMEGA-3 FISH OIL PO      Take 1 g by mouth daily        VITAMIN B6 PO      Take by mouth daily        VITAMIN D (CHOLECALCIFEROL) PO      Take 1,000 Units by mouth daily

## 2018-08-30 ENCOUNTER — TELEPHONE (OUTPATIENT)
Dept: ONCOLOGY | Facility: CLINIC | Age: 63
End: 2018-08-30

## 2018-08-30 NOTE — TELEPHONE ENCOUNTER
----- Message from Vanessa Goldsmith RN sent at 8/29/2018  4:44 PM CDT -----  Regarding: follow up appt with Dr. Chino magallanes,   Can you please call walter and have her follow up with Dr. Magana 1 week after she sees Dr. Booker ( 9/7/18) Thanks  Vanessa

## 2018-09-06 ENCOUNTER — HOSPITAL ENCOUNTER (OUTPATIENT)
Dept: BONE DENSITY | Facility: CLINIC | Age: 63
Discharge: HOME | End: 2018-09-06
Attending: INTERNAL MEDICINE | Admitting: INTERNAL MEDICINE
Payer: COMMERCIAL

## 2018-09-06 DIAGNOSIS — C50.811 MALIGNANT NEOPLASM OF OVERLAPPING SITES OF RIGHT BREAST IN FEMALE, ESTROGEN RECEPTOR POSITIVE (H): ICD-10-CM

## 2018-09-06 DIAGNOSIS — Z17.0 MALIGNANT NEOPLASM OF OVERLAPPING SITES OF RIGHT BREAST IN FEMALE, ESTROGEN RECEPTOR POSITIVE (H): ICD-10-CM

## 2018-09-06 PROCEDURE — 77080 DXA BONE DENSITY AXIAL: CPT

## 2018-09-07 ENCOUNTER — ONCOLOGY VISIT (OUTPATIENT)
Dept: ONCOLOGY | Facility: CLINIC | Age: 63
End: 2018-09-07
Attending: SURGERY
Payer: COMMERCIAL

## 2018-09-07 ENCOUNTER — PRE VISIT (OUTPATIENT)
Dept: ONCOLOGY | Facility: CLINIC | Age: 63
End: 2018-09-07

## 2018-09-07 VITALS
WEIGHT: 174.7 LBS | OXYGEN SATURATION: 99 % | DIASTOLIC BLOOD PRESSURE: 77 MMHG | SYSTOLIC BLOOD PRESSURE: 112 MMHG | HEIGHT: 66 IN | TEMPERATURE: 96.9 F | BODY MASS INDEX: 28.08 KG/M2 | RESPIRATION RATE: 16 BRPM | HEART RATE: 77 BPM

## 2018-09-07 DIAGNOSIS — C80.1 CARCINOMA OF UNKNOWN ORIGIN (H): Primary | ICD-10-CM

## 2018-09-07 PROCEDURE — G0463 HOSPITAL OUTPT CLINIC VISIT: HCPCS | Mod: ZF

## 2018-09-07 ASSESSMENT — PAIN SCALES - GENERAL: PAINLEVEL: NO PAIN (0)

## 2018-09-07 NOTE — NURSING NOTE
"Oncology Rooming Note    September 7, 2018 9:20 AM   Flor Griffin is a 63 year old female who presents for:    Chief Complaint   Patient presents with     Oncology Clinic Visit     new pt surgical Consult for resection of residual disease      Initial Vitals: /77 (BP Location: Right arm, Patient Position: Sitting, Cuff Size: Adult Regular)  Pulse 77  Temp 96.9  F (36.1  C) (Oral)  Resp 16  Ht 1.676 m (5' 5.98\")  Wt 79.2 kg (174 lb 11.2 oz)  LMP 01/01/2004 (Approximate)  SpO2 99%  BMI 28.21 kg/m2 Estimated body mass index is 28.21 kg/(m^2) as calculated from the following:    Height as of this encounter: 1.676 m (5' 5.98\").    Weight as of this encounter: 79.2 kg (174 lb 11.2 oz). Body surface area is 1.92 meters squared.  No Pain (0) Comment: Data Unavailable   Patient's last menstrual period was 01/01/2004 (approximate).  Allergies reviewed: Yes  Medications reviewed: Yes    Medications: Medication refills not needed today.  Pharmacy [facility] entered into [employer]: [id number] - [place] [id number]    Clinical concerns: none      8 minutes for nursing intake (face to face time)     Kellie PALMA Jean-Baptiste              "

## 2018-09-07 NOTE — MR AVS SNAPSHOT
After Visit Summary   9/7/2018    Flor Griffin    MRN: [id number]           Patient Information     Date Of Birth          1955        Visit Information        Provider Department      9/7/2018 9:15 AM Moody Booker MD [facility]        Today's Diagnoses     Carcinoma of unknown origin (H)    -  1       Follow-ups after your visit        Your next 10 appointments already scheduled     Sep 13, 2018  9:30 AM CDT   Return Visit with Yani Magana MD   [facility] ([facility])    [facility]  [address]  [facility] [address]   [phone]            Sep 24, 2018  1:00 PM CDT   Level 1 with  INFUSION CHAIR 2   [facility] ([facility])    [facility]  [address]  [facility] [address]   [phone]              Who to contact     If you have questions or need follow up information about today's clinic visit or your schedule please contact [facility] directly at [phone].  Normal or non-critical lab and imaging results will be communicated to you by MyChart, letter or phone within 4 business days after the clinic has received the results. If you do not hear from us within 7 days, please contact the clinic through MyChart or phone. If you have a critical or abnormal lab result, we will notify you by phone as soon as possible.  Submit refill requests through [employer] or call your pharmacy and they will forward the refill request to us. Please allow 3 business days for your refill to be completed.          Additional Information About Your Visit        Care EveryWhere ID     This is your Care EveryWhere ID. This could be used by other organizations to access your [place] medical records  [id number]        Your Vitals Were     Pulse Temperature Respirations Height Last Period Pulse Oximetry    77 96.9  F (36.1  C) (Oral) 16 1.676 m  "(5' 5.98\") 01/01/2004 (Approximate) 99%    BMI (Body Mass Index)                   28.21 kg/m2            Blood Pressure from Last 3 Encounters:   09/07/18 112/77   08/29/18 122/78   06/20/18 125/80    Weight from Last 3 Encounters:   09/07/18 79.2 kg (174 lb 11.2 oz)   08/29/18 78.9 kg (174 lb)   06/20/18 76.7 kg (169 lb)              Today, you had the following     No orders found for display       Primary Care Provider Office Phone # Fax #    [facility] [phone] [address] [facility] [address]        Equal Access to Services     MARYJANE JARA : Mika Nolan, wasteven heard, qaybta kaalmada osmani, ketan connor. So [facility] [phone].    ATENCIÓN: Si habla español, tiene a thomas disposición servicios gratuitos de asistencia lingüística. [facility] [phone].    We comply with applicable federal civil rights laws and [place] laws. We do not discriminate on the basis of race, color, national origin, age, disability, sex, sexual orientation, or gender identity.            Thank you!     Thank you for choosing [facility]  for your care. Our goal is always to provide you with excellent care. Hearing back from our patients is one way we can continue to improve our services. Please take a few minutes to complete the written survey that you may receive in the mail after your visit with us. Thank you!             Your Updated Medication List - Protect others around you: Learn how to safely use, store and throw away your medicines at www.disposemymeds.org.          This list is accurate as of 9/7/18 11:59 PM.  Always use your most recent med list.                   Brand Name Dispense Instructions for use Diagnosis    ADVIL PO      Take 400 mg by mouth every 6 hours as needed for moderate pain        aspirin 81 MG tablet      Take 81 mg by mouth daily        famotidine 20 MG tablet    PEPCID    30 tablet    Take 1 " tablet (20 mg) by mouth daily    Heartburn       letrozole 2.5 MG tablet    FEMARA    30 tablet    Take 1 tablet (2.5 mg) by mouth daily    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 in female, estrogen receptor positive (H)       LORazepam 0.5 MG tablet    ATIVAN    30 tablet    Take 1 tablet (0.5 mg) by mouth every 4 hours as needed (Anxiety, Nausea/Vomiting or Sleep)    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 in female, estrogen receptor positive (H), Carcinoma of unknown origin (H)       OMEGA-3 FISH OIL PO      Take 1 g by mouth daily        VITAMIN B6 PO      Take by mouth daily        VITAMIN D (CHOLECALCIFEROL) PO      Take 1,000 Units by mouth daily

## 2018-09-07 NOTE — PROGRESS NOTES
HISTORY OF PRESENT ILLNESS:  Flor Griffin is a 63-year-old woman I was asked to see at the request of Dr. Yani Magana for evaluation of metastatic breast cancer.  The patient initially presented in late fall with a lump in her right supraclavicular area for which she underwent an excision, which demonstrated a poorly differentiated adenocarcinoma.  Estrogen receptors were positive in 1%-5%.  It was HER-2/mitzi negative.  She underwent an extensive workup.  She has a negative mammogram.  She had an MRI, which demonstrated level 2 and 3 lymph nodes, but there were no level 1 lymph nodes.  There were no abnormalities in either breast.  She had a PET CT scan in 11/2017 which demonstrated a right supraclavicular lymphadenopathy, right subpectoral lymphadenopathy and right axillary lymphadenopathy without any other primary source and without any evidence of distant metastatic disease.  She was treated with breast therapy, specifically carboplatinum, Taxol and Adriamycin, Cytoxan.  She then underwent 6 weeks of radiation to her right breast, axilla and right supraclavicular area.  She completed that in early summer.  She had a followup PET/CT scan in August, which demonstrated decreased adenopathy of the right axilla and supraclavicular lymph nodes with no evidence of distant metastatic disease.  On the PET/CT scan from August, the lymph node in her right axilla measured 4 mm in size.  The lymph node in her right neck measured 7 mm.  She is now here to talk about the role of surgical treatment.      PHYSICAL EXAMINATION:  She is a well-appearing woman in no apparent distress.  Examination of her neck demonstrates a scar in her right supraclavicular area.  I can appreciate no evidence of residual lymphadenopathy.  Examination of her right axilla demonstrates no lymphadenopathy.      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  This may very well be breast cancer, but  I am not certain.      PLAN:  I do not think there is a role for surgical treatment at this time.  I do not think I would be able to find the 4 mm lymph node in her right axilla or the 7 mm lymph node in her supraclavicular area without doing an extensive lymph node dissection.  I do not think she would benefit from an extensive lymph node dissection.  I have recommended observation.  If there is a clear enlargement and either of these lymph nodes could be clearly distinguished, then surgery would be more feasible.  I still do not know whether or not she would have any survival benefit.  She is happy with this recommendation and will follow up with me if there are any further changes.      TT:  30 minutes.  CT:  20 minutes.      cc:   Yani Magana MD   [facility]   [address], [address] [address]

## 2018-09-13 ENCOUNTER — ONCOLOGY VISIT (OUTPATIENT)
Dept: ONCOLOGY | Facility: CLINIC | Age: 63
End: 2018-09-13
Attending: INTERNAL MEDICINE
Payer: COMMERCIAL

## 2018-09-13 VITALS
SYSTOLIC BLOOD PRESSURE: 109 MMHG | BODY MASS INDEX: 28.52 KG/M2 | HEART RATE: 72 BPM | RESPIRATION RATE: 18 BRPM | OXYGEN SATURATION: 99 % | TEMPERATURE: 97.2 F | WEIGHT: 176.6 LBS | DIASTOLIC BLOOD PRESSURE: 71 MMHG

## 2018-09-13 DIAGNOSIS — Z17.0 MALIGNANT NEOPLASM OF OVERLAPPING SITES OF RIGHT BREAST IN FEMALE, ESTROGEN RECEPTOR POSITIVE (H): Primary | ICD-10-CM

## 2018-09-13 DIAGNOSIS — C50.811 MALIGNANT NEOPLASM OF OVERLAPPING SITES OF RIGHT BREAST IN FEMALE, ESTROGEN RECEPTOR POSITIVE (H): Primary | ICD-10-CM

## 2018-09-13 PROCEDURE — G0463 HOSPITAL OUTPT CLINIC VISIT: HCPCS

## 2018-09-13 PROCEDURE — 99214 OFFICE O/P EST MOD 30 MIN: CPT | Performed by: INTERNAL MEDICINE

## 2018-09-13 ASSESSMENT — PAIN SCALES - GENERAL: PAINLEVEL: NO PAIN (0)

## 2018-09-13 NOTE — PATIENT INSTRUCTIONS
1- Continue Femara  2- Calcium vitamin D and weight bearing exercise  3- RTC MD in december with PET CT scan before scheduled/janice     Port Flushes at [facility] please- last flushed 8/27/18  Patient scheduled      MD discharged CY      AVS printed & mailed to patient after appts after phone conversation/Janice

## 2018-09-13 NOTE — PROGRESS NOTES
[facility] PHYSICIANS  HEMATOLOGY ONCOLOGY    ONCOLOGY FOLLOWUP NOTE      DIAGNOSIS:  Clinically, TX N3c M0 adenocarcinoma which is poorly differentiated, likely of breast origin.  Initially she was seen 11/22/2017 after she had the right supraclavicular lymph node biopsy which was positive for poorly differentiated adenocarcinoma.  She had this lymph node almost a month and had an excisional biopsy performed which was consistent with the diagnosis of cancer.      A pet CT scan 11/27/2017 showed hypermetabolic right supraclavicular, right axillary and subpectoral adenopathy.  Otherwise, no distant metastatic disease.     Mammogram 11/30/17- negative    TREATMENT: Carboplatin and Taxol completed 4 cycles. 3/7/2018 started dose dense AC. Completed 4 cycles.   Radiation to right breast, right axilla, right supraclavicular area between 5/29/18-7/31/18 9/2018 started Femara     SUBJECTIVE:  The patient was seen as a followup today. She has been at her baseline. We discussed further plan of care.     REVIEW OF SYSTEMS:  A complete review of systems was performed and found to be negative other than pertinent positives mentioned in history of present illness.      Past medical, social histories reviewed.     Meds- Reviewed.     PHYSICAL EXAMINATION:   VITAL SIGNS: /71 (BP Location: Left arm, Patient Position: Sitting, Cuff Size: Adult Regular)  Pulse 72  Temp 97.2  F (36.2  C) (Oral)  Resp 18  Wt 80.1 kg (176 lb 9.6 oz)  LMP 01/01/2004 (Approximate)  SpO2 99%  BMI 28.52 kg/m2  CONSTITUTIONAL: Sitting comfortably.   NEUROLOGIC: Alert, awake.   PSYCH: Anxious     LABORATORY DATA AND IMAGING REVIEWED DURING THIS VISIT:  No new labs today.     ECOG performance status 0     ASSESSMENT:  This is a 63-year-old lady who has diagnosis of 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  Tumor cells were positive for CK7, CK5/6 and GATA3.  We did tumor marker  testing and she had CA 27-29 at the level of 12,  of 16, CA 19-9 at 19.  Her CEA was less than 0.5. PET CT scan results was done 11/27/2017.  showed hypermetabolic right supraclavicular, right axillary and subpectoral adenopathy.  There was no evidence of distant metastases.      The distribution of adenopathy indicated possibility breast cancer which had metastasized to the regional lymph nodes and we are not able to find a primary on physical exam and a PET scan. Mammogram 11/30/17 no evidence of primary. Her-2 non amplified. Androgen receptor 10-20%. ER 1-5%. KS < 1%. ENT examination by Dr. Miller was negative. EGD was negative as well. MRI breast 12/20/17 no evidence of malignancy in the breasts.    She started chemotherapy carboplatin/Taxol. Completed 4 cycles. PET Scan 2/20/18 showed good response to treatment. She completed dose dense AC for 4 cycles. PET Scan 5/2/18 showed overall response to previous areas of disease. There was a new area in left parapharyngeal wall which appeared to be a necrotic or reactive lymph node. She was seen by ENT colleagues for assessment of this area. Follow up recommended.     She has completed radiation.    - PET CT Scan results 8/27/18 were reviewed with the patient. I reviewed the images myself.  Left paraesophageal necrotic lymph node has resolved. Right supraclavicular lymph node has FDG activity and right axillary lymph node has actvity as well. Overall, she appears to have a good partial response with residual disease in axilla and right supraclavicular area.   I have called and discussed the CT findings with surgeon Dr. Ulises Castillo- the axillary dissection is realistic option but supraclavicular area would likely need involvement by ENT/Neck surgeon.     - She saw Dr. Booker for consideration on surgical removal of the residual disease. Observation was recommended.   - She is on Femara.    - We had a detailed discussion about her situation. She agrees to  continue Femara and repeat imaging in 3-4 months.      PLAN:   1- Continue Femara  2- Calcium vitamin D and weight bearing exercise  3- RTC MD in december with PET CT scan before    ERON BARNES MD    9/13/2018       cc: Waylon Liu MD

## 2018-09-13 NOTE — MR AVS SNAPSHOT
After Visit Summary   9/13/2018    Flro Griffin    MRN: [id number]           Patient Information     Date Of Birth          1955        Visit Information        Provider Department      9/13/2018 9:30 AM Yani Magana MD [facility]        Today's Diagnoses     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 in female, estrogen receptor positive (H)    -  1      Care Instructions    1- Continue Femara  2- Calcium vitamin D and weight bearing exercise  3- RTC MD in december with PET CT scan before scheduled/janice     Port Flushes at [facility] please- last flushed 8/27/18  Patient scheduled      MD discharged CY            Follow-ups after your visit        Your next 10 appointments already scheduled     Sep 24, 2018  1:00 PM CDT   Level 1 with  INFUSION CHAIR 2   [facility] ([facility])    [facility]  [address]  [place] [address]   [phone]            Oct 03, 2018 12:30 PM CDT   injection with RH LAB DRAW 1   [facility] ([facility] [place])    [facility]  [address]  [place] [address]   [phone]            Nov 14, 2018 12:30 PM CST   injection with RH LAB DRAW 1   [facility] ([facility] [place])    [facility]  [address]  [facility] [address]   [phone]            Dec 04, 2018  9:30 AM CST   (Arrive by 9:15 AM)   PE NPET ONCOLOGY (EYES TO THIGHS) with RHPET1   [facility] [place] PET/CT ([facility])    [address]  [address]  [place] [address]   [phone]           How do I prepare for my exam? (Food and drink instructions) 6 hours before your scan, stop all food and liquids (except water). Do not chew gum or suck on mints. If you need to take medicine with food, you may take it  with a few crackers.  How do I prepare for my exam? (Other instructions) 24 hours before your scan, don t do any heavy exercise. (No jogging, aerobics or other workouts.) Exercise will make your pictures less accurate.  What should I wear? Wear loose fitting clothing to your exam.  How long does the exam take?  Most scans will take between 2-3 hours.  What should I bring: Please bring a list of your medicines (including vitamins, minerals and over-the-counter drugs). Leave your valuables at home.  Do I need a :  No  is needed.  What should I do after the exam: No restrictions, You may resume normal activities.  What is this test: Your doctor has ordered a PET scan (positron emission tomography). This will take pictures of the cells and organs in your body. Your doctor may have also ordered a CT scan (computed tomography). This is another way to take pictures of your cells and organs. It is done at the same time as the PET scan. The pictures will help us understand your problem so we can make a treatment plan that fits your needs.  Who should I call with questions: Please call your Imaging Department at your exam site with any questions. Directions, parking instructions, and other information is available on our website, [email or web address].            Dec 06, 2018  9:15 AM CST   Return Visit with Yani Magana MD   [facility] ([facility])    [facility]  [address]  [facility] [address]   [phone]              Future tests that were ordered for you today     Open Future Orders        Priority Expected Expires Ordered    PET Oncology (Eyes to Thighs) Routine  9/13/2019 9/13/2018            Who to contact     If you have questions or need follow up information about today's clinic visit or your schedule please contact [facility] directly at [phone].  Normal or non-critical lab and imaging results will be communicated to you  by MyChart, letter or phone within 4 business days after the clinic has received the results. If you do not hear from us within 7 days, please contact the clinic through MyChart or phone. If you have a critical or abnormal lab result, we will notify you by phone as soon as possible.  Submit refill requests through [email or web address] or call your pharmacy and they will forward the refill request to us. Please allow 3 business days for your refill to be completed.          Additional Information About Your Visit        Care EveryWhere ID     This is your Care EveryWhere ID. This could be used by other organizations to access your [place] medical records  [id number]        Your Vitals Were     Pulse Temperature Respirations Last Period Pulse Oximetry BMI (Body Mass Index)    72 97.2  F (36.2  C) (Oral) 18 01/01/2004 (Approximate) 99% 28.52 kg/m2       Blood Pressure from Last 3 Encounters:   09/13/18 109/71   09/07/18 112/77   08/29/18 122/78    Weight from Last 3 Encounters:   09/13/18 80.1 kg (176 lb 9.6 oz)   09/07/18 79.2 kg (174 lb 11.2 oz)   08/29/18 78.9 kg (174 lb)               Primary Care Provider Office Phone # Fax #    [facility] [phone]       [address] [facility] [address]        Equal Access to Services     MARYJANE JARA : Hadii nelida ku hadasho Soomaali, waaxda luqadaha, qaybta kaalmada osmani, ketan connor. So [facility] [phone].    ATENCIÓN: Si habla español, tiene a thomas disposición servicios gratuitos de asistencia lingüística. Llame al [phone].    We comply with applicable federal civil rights laws and [place] laws. We do not discriminate on the basis of race, color, national origin, age, disability, sex, sexual orientation, or gender identity.            Thank you!     Thank you for choosing [facility]  for your care. Our goal is always to provide you with excellent care. Hearing back from our patients is one way  we can continue to improve our services. Please take a few minutes to complete the written survey that you may receive in the mail after your visit with us. Thank you!             Your Updated Medication List - Protect others around you: Learn how to safely use, store and throw away your medicines at www.disposemymeds.org.          This list is accurate as of 9/13/18  3:45 PM.  Always use your most recent med list.                   Brand Name Dispense Instructions for use Diagnosis    ADVIL PO      Take 400 mg by mouth every 6 hours as needed for moderate pain        aspirin 81 MG tablet      Take 81 mg by mouth daily        famotidine 20 MG tablet    PEPCID    30 tablet    Take 1 tablet (20 mg) by mouth daily    Heartburn       letrozole 2.5 MG tablet    FEMARA    30 tablet    Take 1 tablet (2.5 mg) by mouth daily    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 in female, estrogen receptor positive (H)       LORazepam 0.5 MG tablet    ATIVAN    30 tablet    Take 1 tablet (0.5 mg) by mouth every 4 hours as needed (Anxiety, Nausea/Vomiting or Sleep)    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 in female, estrogen receptor positive (H), Carcinoma of unknown origin (H)       OMEGA-3 FISH OIL PO      Take 1 g by mouth daily        VITAMIN B6 PO      Take by mouth daily        VITAMIN D (CHOLECALCIFEROL) PO      Take 1,000 Units by mouth daily

## 2018-09-13 NOTE — PROGRESS NOTES
"Oncology Rooming Note    September 13, 2018 9:27 AM   Flor Griffin is a 63 year old female who presents for:    Chief Complaint   Patient presents with     Oncology Clinic Visit     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 in female, estrogen receptor positive (H)     Initial Vitals: /71 (BP Location: Left arm, Patient Position: Sitting, Cuff Size: Adult Regular)  Pulse 72  Temp 97.2  F (36.2  C) (Oral)  Resp 18  Wt 80.1 kg (176 lb 9.6 oz)  LMP 01/01/2004 (Approximate)  SpO2 99%  BMI 28.52 kg/m2 Estimated body mass index is 28.52 kg/(m^2) as calculated from the following:    Height as of 9/7/18: 1.676 m (5' 5.98\").    Weight as of this encounter: 80.1 kg (176 lb 9.6 oz). Body surface area is 1.93 meters squared.  No Pain (0) Comment: Data Unavailable   Patient's last menstrual period was 01/01/2004 (approximate).  Allergies reviewed: Yes  Medications reviewed: Yes    Medications: Medication refills not needed today.  Pharmacy name entered into [employer]: [id number] - [place], [place] - [address]    Clinical concerns: no   5 minutes for nursing intake (face to face time)          Terri Torres MA            "

## 2018-09-13 NOTE — LETTER
"    9/13/2018         RE: Flor Griffin  [address]  [facility] [address]        Dear Colleague,    Thank you for referring your patient, Flor Griffin, to the [facility]. Please see a copy of my visit note below.    Oncology Rooming Note    September 13, 2018 9:27 AM   Flor Griffin is a 63 year old female who presents for:    Chief Complaint   Patient presents with     Oncology Clinic Visit     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 in female, estrogen receptor positive (H)     Initial Vitals: /71 (BP Location: Left arm, Patient Position: Sitting, Cuff Size: Adult Regular)  Pulse 72  Temp 97.2  F (36.2  C) (Oral)  Resp 18  Wt 80.1 kg (176 lb 9.6 oz)  LMP 01/01/2004 (Approximate)  SpO2 99%  BMI 28.52 kg/m2 Estimated body mass index is 28.52 kg/(m^2) as calculated from the following:    Height as of 9/7/18: 1.676 m (5' 5.98\").    Weight as of this encounter: 80.1 kg (176 lb 9.6 oz). Body surface area is 1.93 meters squared.  No Pain (0) Comment: Data Unavailable   Patient's last menstrual period was 01/01/2004 (approximate).  Allergies reviewed: Yes  Medications reviewed: Yes    Medications: Medication refills not needed today.  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  Initially she was seen 11/22/2017 after she had the right supraclavicular lymph node biopsy which was positive for poorly differentiated adenocarcinoma.  She had this lymph node almost a month and had an excisional biopsy performed which was consistent with the diagnosis of cancer.      A pet " CT scan 11/27/2017 showed hypermetabolic right supraclavicular, right axillary and subpectoral adenopathy.  Otherwise, no distant metastatic disease.     Mammogram 11/30/17- negative    TREATMENT: Carboplatin and Taxol completed 4 cycles. 3/7/2018 started dose dense AC. Completed 4 cycles.   Radiation to right breast, right axilla, right supraclavicular area between 5/29/18-7/31/18 9/2018 started Femara     SUBJECTIVE:  The patient was seen as a followup today. She has been at her baseline. We discussed further plan of care.     REVIEW OF SYSTEMS:  A complete review of systems was performed and found to be negative other than pertinent positives mentioned in history of present illness.      Past medical, social histories reviewed.     Meds- Reviewed.     PHYSICAL EXAMINATION:   VITAL SIGNS: /71 (BP Location: Left arm, Patient Position: Sitting, Cuff Size: Adult Regular)  Pulse 72  Temp 97.2  F (36.2  C) (Oral)  Resp 18  Wt 80.1 kg (176 lb 9.6 oz)  LMP 01/01/2004 (Approximate)  SpO2 99%  BMI 28.52 kg/m2  CONSTITUTIONAL: Sitting comfortably.   NEUROLOGIC: Alert, awake.   PSYCH: Anxious     LABORATORY DATA AND IMAGING REVIEWED DURING THIS VISIT:  No new labs today.     ECOG performance status 0     ASSESSMENT:  This is a 63-year-old lady who has diagnosis of 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  Tumor cells were positive for CK7, CK5/6 and GATA3.  We did tumor marker testing and she had CA 27-29 at the level of 12,  of 16, CA 19-9 at 19.  Her CEA was less than 0.5. PET CT scan results was done 11/27/2017.  showed hypermetabolic right supraclavicular, right axillary and subpectoral adenopathy.  There was no evidence of distant metastases.      The distribution of adenopathy indicated possibility breast cancer which had metastasized to the regional lymph nodes and we are not able to find a primary on physical exam and a PET scan. Mammogram  11/30/17 no evidence of primary. Her-2 non amplified. Androgen receptor 10-20%. ER 1-5%. LA < 1%. ENT examination by Dr. Miller was negative. EGD was negative as well. MRI breast 12/20/17 no evidence of malignancy in the breasts.    She started chemotherapy carboplatin/Taxol. Completed 4 cycles. PET Scan 2/20/18 showed good response to treatment. She completed dose dense AC for 4 cycles. PET Scan 5/2/18 showed overall response to previous areas of disease. There was a new area in left parapharyngeal wall which appeared to be a necrotic or reactive lymph node. She was seen by ENT colleagues for assessment of this area. Follow up recommended.     She has completed radiation.    - PET CT Scan results 8/27/18 were reviewed with the patient. I reviewed the images myself.  Left paraesophageal necrotic lymph node has resolved. Right supraclavicular lymph node has FDG activity and right axillary lymph node has actvity as well. Overall, she appears to have a good partial response with residual disease in axilla and right supraclavicular area.   I have called and discussed the CT findings with surgeon Dr. Ulises Castillo- the axillary dissection is realistic option but supraclavicular area would likely need involvement by ENT/Neck surgeon.     - She saw Dr. Booker for consideration on surgical removal of the residual disease. Observation was recommended.   - She is on Femara.    - We had a detailed discussion about her situation. She agrees to continue Femara and repeat imaging in 3-4 months.      PLAN:   1- Continue Femara  2- Calcium vitamin D and weight bearing exercise  3- RTC MD in december with PET CT scan before    YANI MAGANA MD    9/13/2018       cc: Waylon Liu MD       Again, thank you for allowing me to participate in the care of your patient.        Sincerely,        Yani Magana MD

## 2018-09-14 ENCOUNTER — TELEPHONE (OUTPATIENT)
Dept: ONCOLOGY | Facility: CLINIC | Age: 63
End: 2018-09-14

## 2018-09-14 NOTE — TELEPHONE ENCOUNTER
Pt's daughter Emmie called & requested a recap of yesterday's visit with .  Updated Emmie & mentioned that she can be on speaker phone if she'd like to be a part of Pt's exam appt & not able to attend in person.  Emmie expressed appreciation.

## 2018-10-03 ENCOUNTER — ALLIED HEALTH/NURSE VISIT (OUTPATIENT)
Dept: INFUSION THERAPY | Facility: CLINIC | Age: 63
End: 2018-10-03
Attending: INTERNAL MEDICINE
Payer: COMMERCIAL

## 2018-10-03 DIAGNOSIS — Z17.0 MALIGNANT NEOPLASM OF OVERLAPPING SITES OF RIGHT BREAST IN FEMALE, ESTROGEN RECEPTOR POSITIVE (H): ICD-10-CM

## 2018-10-03 DIAGNOSIS — C50.811 MALIGNANT NEOPLASM OF OVERLAPPING SITES OF RIGHT BREAST IN FEMALE, ESTROGEN RECEPTOR POSITIVE (H): ICD-10-CM

## 2018-10-03 DIAGNOSIS — Z95.828 PORT-A-CATH IN PLACE: Primary | ICD-10-CM

## 2018-10-03 PROCEDURE — 25000128 H RX IP 250 OP 636: Performed by: INTERNAL MEDICINE

## 2018-10-03 PROCEDURE — 96523 IRRIG DRUG DELIVERY DEVICE: CPT

## 2018-10-03 RX ORDER — ONDANSETRON 2 MG/ML
8 INJECTION INTRAMUSCULAR; INTRAVENOUS EVERY 6 HOURS PRN
Status: CANCELLED
Start: 2018-10-03

## 2018-10-03 RX ORDER — HEPARIN SODIUM (PORCINE) LOCK FLUSH IV SOLN 100 UNIT/ML 100 UNIT/ML
500 SOLUTION INTRAVENOUS ONCE
Status: COMPLETED | OUTPATIENT
Start: 2018-10-03 | End: 2018-10-03

## 2018-10-03 RX ORDER — HEPARIN SODIUM (PORCINE) LOCK FLUSH IV SOLN 100 UNIT/ML 100 UNIT/ML
500 SOLUTION INTRAVENOUS ONCE
Status: CANCELLED
Start: 2018-10-03 | End: 2018-10-03

## 2018-10-03 RX ADMIN — SODIUM CHLORIDE, PRESERVATIVE FREE 500 UNITS: 5 INJECTION INTRAVENOUS at 12:20

## 2018-10-03 NOTE — PROGRESS NOTES
Infusion Nursing Note:  Flor BARTHOLOMEW Gaby presents today for Port flush.    Patient seen by provider today: No   present during visit today: Not Applicable.    Note: N/A.    Intravenous Access:  Implanted Port.    Treatment Conditions:  Not Applicable.    Post Infusion Assessment:  Patient tolerated port flush without incident.    Discharge Plan:   Discharge instructions reviewed with: Patient.  Patient and/or family verbalized understanding of discharge instructions and all questions answered.  Copy of AVS declined; appointment list given to patient by scheduling.  Patient will return 1/14/18 for port flush for next appointment.  Patient discharged in stable condition accompanied by: self.  Departure Mode: Ambulatory.    Anni Puente RN

## 2018-10-03 NOTE — MR AVS SNAPSHOT
After Visit Summary   10/3/2018    Flor Griffin    MRN: [id number]           Patient Information     Date Of Birth          1955        Visit Information        Provider Department      10/3/2018 12:30 PM RH LAB DRAW 1 [facility] Infusion Services        Today's Diagnoses     Port catheter in place    -  1    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 in female, estrogen receptor positive (H)           Follow-ups after your visit        Your next 10 appointments already scheduled     Oct 03, 2018 12:30 PM CDT   injection with RH LAB DRAW 1   [facility] Infusion Services ([facility])    [facility]  [address]  [facility] [address]   [phone]            Nov 14, 2018 12:30 PM CST   injection with RH LAB DRAW 1   [facility] Infusion Services ([facility])    [facility]  [address]  [facility] [address]   [phone]            Dec 04, 2018  9:30 AM CST   (Arrive by 9:15 AM)   PE NPET ONCOLOGY (EYES TO THIGHS) with RHPET1   [facility] PET/CT ([facility])    [address]  [address]  [facility] [address]   [phone]           How do I prepare for my exam? (Food and drink instructions) 6 hours before your scan, stop all food and liquids (except water). Do not chew gum or suck on mints. If you need to take medicine with food, you may take it with a few crackers.  How do I prepare for my exam? (Other instructions) If you have diabetes: Have your exam early in the morning. Your blood glucose will go up as the day goes by. Your glucose level must be 180 or less at the start of the exam. Please take any oral diabetic medication you need to ensure this blood glucose level is below 180, but no insulin 4 hours prior to the exam.  * If you are taking insulin in the morning take with breakfast by  6 am and schedule procedure between 12 and 2:15 pm. * If you are taking insulin at night take nightly dose, fast overnight, schedule procedure before 10 am. * If you take insulin both morning and night take morning dose by 6am and schedule procedure between 12 and 2:15 pm.  24 hours before your scan, don t do any heavy exercise. (No jogging, aerobics or other workouts.) Exercise will make your pictures less accurate.  What should I wear? Wear loose fitting clothing to your exam.  How long does the exam take?  Most scans will take between 2-3 hours.  What should I bring: Please bring a list of your medicines (including vitamins, minerals and over-the-counter drugs). Leave your valuables at home.  Do I need a :  No  is needed.  What should I do after the exam: No restrictions, You may resume normal activities.  What is this test: Your doctor has ordered a PET scan (positron emission tomography). This will take pictures of the cells and organs in your body. Your doctor may have also ordered a CT scan (computed tomography). This is another way to take pictures of your cells and organs. It is done at the same time as the PET scan. The pictures will help us understand your problem so we can make a treatment plan that fits your needs.  Who should I call with questions: Please call your Imaging Department at your exam site with any questions. Directions, parking instructions, and other information is available on our website, [email or web address].            Dec 06, 2018 12:30 PM CST   Return Visit with Yani Magana MD   [facility] ([facility])    [facility]  [address]  [place] [address] [phone]              Who to contact     If you have questions or need follow up information about today's clinic visit or your schedule please contact [facility] INFUSION SERVICES directly at [phone].  Normal or non-critical lab and  imaging results will be communicated to you by MyChart, letter or phone within 4 business days after the clinic has received the results. If you do not hear from us within 7 days, please contact the clinic through MyChart or phone. If you have a critical or abnormal lab result, we will notify you by phone as soon as possible.  Submit refill requests through [employer] or call your pharmacy and they will forward the refill request to us. Please allow 3 business days for your refill to be completed.          Additional Information About Your Visit        Care EveryWhere ID     This is your Care EveryWhere ID. This could be used by other organizations to access your [place] medical records  [id number]        Your Vitals Were     Last Period                   01/01/2004 (Approximate)            Blood Pressure from Last 3 Encounters:   09/13/18 109/71   09/07/18 112/77   08/29/18 122/78    Weight from Last 3 Encounters:   09/13/18 80.1 kg (176 lb 9.6 oz)   09/07/18 79.2 kg (174 lb 11.2 oz)   08/29/18 78.9 kg (174 lb)              Today, you had the following     No orders found for display       Primary Care Provider Office Phone # Fax #    [facility] [phone]       [address] [facility] [address]        Equal Access to Services     MARYJANE JARA : Hadii nelida sono Sobradleyali, waaxda luqadaha, qaybta kaalmada adeegyada, ketan connor. So [facility] [phone].    ATENCIÓN: Si habla español, tiene a thomas disposición servicios gratuitos de asistencia lingüística. Llame al [phone].    We comply with applicable federal civil rights laws and [place] laws. We do not discriminate on the basis of race, color, national origin, age, disability, sex, sexual orientation, or gender identity.            Thank you!     Thank you for choosing [facility] INF[facility]ION SERVICES  for your care. Our goal is always to provide you with excellent care.  Hearing back from our patients is one way we can continue to improve our services. Please take a few minutes to complete the written survey that you may receive in the mail after your visit with us. Thank you!             Your Updated Medication List - Protect others around you: Learn how to safely use, store and throw away your medicines at www.disposemymeds.org.          This list is accurate as of 10/3/18 12:24 PM.  Always use your most recent med list.                   Brand Name Dispense Instructions for use Diagnosis    ADVIL PO      Take 400 mg by mouth every 6 hours as needed for moderate pain        aspirin 81 MG tablet      Take 81 mg by mouth daily        famotidine 20 MG tablet    PEPCID    30 tablet    Take 1 tablet (20 mg) by mouth daily    Heartburn       letrozole 2.5 MG tablet    FEMARA    30 tablet    Take 1 tablet (2.5 mg) by mouth daily    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 in female, estrogen receptor positive (H)       LORazepam 0.5 MG tablet    ATIVAN    30 tablet    Take 1 tablet (0.5 mg) by mouth every 4 hours as needed (Anxiety, Nausea/Vomiting or Sleep)    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 in female, estrogen receptor positive (H), Carcinoma of unknown origin (H)       OMEGA-3 FISH OIL PO      Take 1 g by mouth daily        VITAMIN B6 PO      Take by mouth daily        VITAMIN D (CHOLECALCIFEROL) PO      Take 1,000 Units by mouth daily

## 2018-11-14 ENCOUNTER — ALLIED HEALTH/NURSE VISIT (OUTPATIENT)
Dept: INFUSION THERAPY | Facility: CLINIC | Age: 63
End: 2018-11-14
Attending: INTERNAL MEDICINE
Payer: COMMERCIAL

## 2018-11-14 DIAGNOSIS — Z17.0 MALIGNANT NEOPLASM OF OVERLAPPING SITES OF RIGHT BREAST IN FEMALE, ESTROGEN RECEPTOR POSITIVE (H): ICD-10-CM

## 2018-11-14 DIAGNOSIS — Z95.828 PORT-A-CATH IN PLACE: Primary | ICD-10-CM

## 2018-11-14 DIAGNOSIS — C50.811 MALIGNANT NEOPLASM OF OVERLAPPING SITES OF RIGHT BREAST IN FEMALE, ESTROGEN RECEPTOR POSITIVE (H): ICD-10-CM

## 2018-11-14 PROCEDURE — 25000128 H RX IP 250 OP 636: Performed by: INTERNAL MEDICINE

## 2018-11-14 PROCEDURE — 96523 IRRIG DRUG DELIVERY DEVICE: CPT

## 2018-11-14 RX ORDER — HEPARIN SODIUM (PORCINE) LOCK FLUSH IV SOLN 100 UNIT/ML 100 UNIT/ML
500 SOLUTION INTRAVENOUS ONCE
Status: CANCELLED
Start: 2018-11-14 | End: 2018-11-14

## 2018-11-14 RX ORDER — HEPARIN SODIUM (PORCINE) LOCK FLUSH IV SOLN 100 UNIT/ML 100 UNIT/ML
500 SOLUTION INTRAVENOUS ONCE
Status: COMPLETED | OUTPATIENT
Start: 2018-11-14 | End: 2018-11-14

## 2018-11-14 RX ORDER — ONDANSETRON 2 MG/ML
8 INJECTION INTRAMUSCULAR; INTRAVENOUS EVERY 6 HOURS PRN
Status: CANCELLED
Start: 2018-11-14

## 2018-11-14 RX ADMIN — SODIUM CHLORIDE, PRESERVATIVE FREE 500 UNITS: 5 INJECTION INTRAVENOUS at 12:30

## 2018-11-14 NOTE — MR AVS SNAPSHOT
After Visit Summary   11/14/2018    Flor Griffin    MRN: [id number]           Patient Information     Date Of Birth          1955        Visit Information        Provider Department      11/14/2018 12:30 PM RH LAB DRAW 1 [facility] Infusion Services        Today's Diagnoses     Port catheter in place    -  1    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 in female, estrogen receptor positive (H)           Follow-ups after your visit        Your next 10 appointments already scheduled     Dec 04, 2018  9:30 AM CST   (Arrive by 9:15 AM)   PE NPET ONCOLOGY (EYES TO THIGHS) with RHPET1   [facility] PET/CT ([facility])    [address]  [address]  [facility] [address] [phone]           How do I prepare for my exam? (Food and drink instructions) 6 hours before your scan, stop all food and liquids (except water). Do not chew gum or suck on mints. If you need to take medicine with food, you may take it with a few crackers.  How do I prepare for my exam? (Other instructions) If you have diabetes: Have your exam early in the morning. Your blood glucose will go up as the day goes by. Your glucose level must be 180 or less at the start of the exam. Please take any oral diabetic medication you need to ensure this blood glucose level is below 180, but no insulin 4 hours prior to the exam.  * If you are taking insulin in the morning take with breakfast by 6 am and schedule procedure between 12 and 2:15 pm. * If you are taking insulin at night take nightly dose, fast overnight, schedule procedure before 10 am. * If you take insulin both morning and night take morning dose by 6am and schedule procedure between 12 and 2:15 pm.  24 hours before your scan, don t do any heavy exercise. (No jogging, aerobics or other workouts.) Exercise will make your pictures less accurate.  What should I wear? Wear loose fitting clothing to your exam.  How  long does the exam take?  Most scans will take between 2-3 hours.  What should I bring: Please bring a list of your medicines (including vitamins, minerals and over-the-counter drugs). Leave your valuables at home.  Do I need a :  No  is needed.  What should I do after the exam: No restrictions, You may resume normal activities.  What is this test: Your doctor has ordered a PET scan (positron emission tomography). This will take pictures of the cells and organs in your body. Your doctor may have also ordered a CT scan (computed tomography). This is another way to take pictures of your cells and organs. It is done at the same time as the PET scan. The pictures will help us understand your problem so we can make a treatment plan that fits your needs.  Who should I call with questions: Please call your Imaging Department at your exam site with any questions. Directions, parking instructions, and other information is available on our website, [email or web address].            Dec 06, 2018 12:30 PM CST   Return Visit with Yani Magana MD   [facility] ([facility])    [facility]  [address] [facility] [address]  [facility] [address] [phone]              Who to contact     If you have questions or need follow up information about today's clinic visit or your schedule please contact [facility] INFUSION SERVICES directly at [phone].  Normal or non-critical lab and imaging results will be communicated to you by MyChart, letter or phone within 4 business days after the clinic has received the results. If you do not hear from us within 7 days, please contact the clinic through MyChart or phone. If you have a critical or abnormal lab result, we will notify you by phone as soon as possible.  Submit refill requests through [employer] or call your pharmacy and they will forward the refill request to us. Please allow 3 business days for your refill to be  completed.          Additional Information About Your Visit        Care EveryWhere ID     This is your Care EveryWhere ID. This could be used by other organizations to access your [place] medical records  [id number]        Your Vitals Were     Last Period                   01/01/2004 (Approximate)            Blood Pressure from Last 3 Encounters:   09/13/18 109/71   09/07/18 112/77   08/29/18 122/78    Weight from Last 3 Encounters:   09/13/18 80.1 kg (176 lb 9.6 oz)   09/07/18 79.2 kg (174 lb 11.2 oz)   08/29/18 78.9 kg (174 lb)              Today, you had the following     No orders found for display       Primary Care Provider Office Phone # Fax #    [facility] [phone] [address] [facility] [address]        Equal Access to Services     MARYJANE JARA : Hadii nelida pagan Sodot, waaxda luqadaha, qaybta kaalmada osmani, ketan connor. So [facility] [phone].    ATENCIÓN: Si habla español, tiene a thomas disposición servicios gratuitos de asistencia lingüística. Pino al [phone].    We comply with applicable federal civil rights laws and [place] laws. We do not discriminate on the basis of race, color, national origin, age, disability, sex, sexual orientation, or gender identity.            Thank you!     Thank you for choosing [facility] INFUSION [facility]  for your care. Our goal is always to provide you with excellent care. Hearing back from our patients is one way we can continue to improve our services. Please take a few minutes to complete the written survey that you may receive in the mail after your visit with us. Thank you!             Your Updated Medication List - Protect others around you: Learn how to safely use, store and throw away your medicines at www.disposemymeds.org.          This list is accurate as of 11/14/18 12:45 PM.  Always use your most recent med list.                   Brand Name  Dispense Instructions for use Diagnosis    ADVIL PO      Take 400 mg by mouth every 6 hours as needed for moderate pain        aspirin 81 MG tablet      Take 81 mg by mouth daily        famotidine 20 MG tablet    PEPCID    30 tablet    Take 1 tablet (20 mg) by mouth daily    Heartburn       letrozole 2.5 MG tablet    FEMARA    30 tablet    Take 1 tablet (2.5 mg) by mouth daily    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 in female, estrogen receptor positive (H)       LORazepam 0.5 MG tablet    ATIVAN    30 tablet    Take 1 tablet (0.5 mg) by mouth every 4 hours as needed (Anxiety, Nausea/Vomiting or Sleep)    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 in female, estrogen receptor positive (H), Carcinoma of unknown origin (H)       OMEGA-3 FISH OIL PO      Take 1 g by mouth daily        VITAMIN B6 PO      Take by mouth daily        VITAMIN D (CHOLECALCIFEROL) PO      Take 1,000 Units by mouth daily

## 2018-11-14 NOTE — PROGRESS NOTES
Infusion Nursing Note:  Flor PUMA Griffin presents today for Port flush.    Patient seen by provider today: No   present during visit today: Not Applicable.    Note: N/A.    Intravenous Access:  Implanted Port.    Treatment Conditions:  Not Applicable.      Post Infusion Assessment:  Blood return noted.  Site patent and intact, free from redness, edema or discomfort.  No evidence of extravasations.  Access discontinued per protocol.    Discharge Plan:   Discharge instructions reviewed with: Patient.  Patient and/or family verbalized understanding of discharge instructions and all questions answered.  AVS to patient via [email or web address].  Patient will return 12/6/18 for next appointment.   Patient discharged in stable condition accompanied by: self.  Departure Mode: Ambulatory.    Elisabeth North RN

## 2018-12-04 ENCOUNTER — HOSPITAL ENCOUNTER (OUTPATIENT)
Dept: PET IMAGING | Facility: CLINIC | Age: 63
Discharge: HOME | End: 2018-12-04
Attending: INTERNAL MEDICINE | Admitting: INTERNAL MEDICINE
Payer: COMMERCIAL

## 2018-12-04 ENCOUNTER — HOSPITAL ENCOUNTER (OUTPATIENT)
Dept: PET IMAGING | Facility: CLINIC | Age: 63
End: 2018-12-04
Attending: INTERNAL MEDICINE
Payer: COMMERCIAL

## 2018-12-04 DIAGNOSIS — Z17.0 MALIGNANT NEOPLASM OF OVERLAPPING SITES OF RIGHT BREAST IN FEMALE, ESTROGEN RECEPTOR POSITIVE (H): ICD-10-CM

## 2018-12-04 DIAGNOSIS — C50.811 MALIGNANT NEOPLASM OF OVERLAPPING SITES OF RIGHT BREAST IN FEMALE, ESTROGEN RECEPTOR POSITIVE (H): ICD-10-CM

## 2018-12-04 PROCEDURE — A9552 F18 FDG: HCPCS | Performed by: INTERNAL MEDICINE

## 2018-12-04 PROCEDURE — 25000128 H RX IP 250 OP 636: Performed by: INTERNAL MEDICINE

## 2018-12-04 PROCEDURE — 34300033 ZZH RX 343: Performed by: INTERNAL MEDICINE

## 2018-12-04 PROCEDURE — 70491 CT SOFT TISSUE NECK W/DYE: CPT

## 2018-12-04 PROCEDURE — 78815 PET IMAGE W/CT SKULL-THIGH: CPT

## 2018-12-04 RX ORDER — IOPAMIDOL 755 MG/ML
20-135 INJECTION, SOLUTION INTRAVASCULAR ONCE
Status: COMPLETED | OUTPATIENT
Start: 2018-12-04 | End: 2018-12-04

## 2018-12-04 RX ORDER — HEPARIN SODIUM (PORCINE) LOCK FLUSH IV SOLN 100 UNIT/ML 100 UNIT/ML
500 SOLUTION INTRAVENOUS
Status: COMPLETED | OUTPATIENT
Start: 2018-12-04 | End: 2018-12-04

## 2018-12-04 RX ADMIN — SODIUM CHLORIDE, PRESERVATIVE FREE 500 UNITS: 5 INJECTION INTRAVENOUS at 10:01

## 2018-12-04 RX ADMIN — FLUDEOXYGLUCOSE F-18 13.4 MCI.: 500 INJECTION, SOLUTION INTRAVENOUS at 10:01

## 2018-12-04 RX ADMIN — IOPAMIDOL 100 ML: 755 INJECTION, SOLUTION INTRAVENOUS at 10:01

## 2018-12-06 ENCOUNTER — TELEPHONE (OUTPATIENT)
Dept: ONCOLOGY | Facility: CLINIC | Age: 63
End: 2018-12-06

## 2018-12-06 ENCOUNTER — ONCOLOGY VISIT (OUTPATIENT)
Dept: ONCOLOGY | Facility: CLINIC | Age: 63
End: 2018-12-06
Attending: INTERNAL MEDICINE
Payer: COMMERCIAL

## 2018-12-06 VITALS
DIASTOLIC BLOOD PRESSURE: 85 MMHG | HEART RATE: 67 BPM | TEMPERATURE: 97.8 F | RESPIRATION RATE: 18 BRPM | SYSTOLIC BLOOD PRESSURE: 137 MMHG | WEIGHT: 169 LBS | OXYGEN SATURATION: 100 % | BODY MASS INDEX: 27.29 KG/M2

## 2018-12-06 DIAGNOSIS — C50.811 MALIGNANT NEOPLASM OF OVERLAPPING SITES OF RIGHT BREAST IN FEMALE, ESTROGEN RECEPTOR POSITIVE (H): Primary | ICD-10-CM

## 2018-12-06 DIAGNOSIS — Z17.0 MALIGNANT NEOPLASM OF OVERLAPPING SITES OF RIGHT BREAST IN FEMALE, ESTROGEN RECEPTOR POSITIVE (H): Primary | ICD-10-CM

## 2018-12-06 PROCEDURE — 99214 OFFICE O/P EST MOD 30 MIN: CPT | Performed by: INTERNAL MEDICINE

## 2018-12-06 ASSESSMENT — PAIN SCALES - GENERAL: PAINLEVEL: NO PAIN (0)

## 2018-12-06 NOTE — PROGRESS NOTES
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  Initially she was seen 11/22/2017 after she had the right supraclavicular lymph node biopsy which was positive for poorly differentiated adenocarcinoma.  She had this lymph node almost a month and had an excisional biopsy performed which was consistent with the diagnosis of cancer.   A pet CT scan 11/27/2017 showed hypermetabolic right supraclavicular, right axillary and subpectoral adenopathy.  Otherwise, no distant metastatic disease. Mammogram 11/30/17- negative    TREATMENT: Carboplatin and Taxol completed 4 cycles. 3/7/2018 started dose dense AC. Completed 4 cycles.   Radiation to right breast, right axilla, right supraclavicular area between 5/29/18-7/31/18 9/2018 started Femara       SUBJECTIVE:  The patient was seen as a followup today. She had a URI and is recovering from it. She otherwise no other alarming symptoms. She has been worried about her scan results.     REVIEW OF SYSTEMS:  A complete review of systems was performed and found to be negative other than pertinent positives mentioned in history of present illness.      Past medical, social histories reviewed.     Meds- Reviewed.     PHYSICAL EXAMINATION:   VITAL SIGNS: /85 (BP Location: Right arm, Patient Position: Sitting, Cuff Size: Adult Regular)  Pulse 67  Temp 97.8  F (36.6  C) (Oral)  Resp 18  Wt 76.7 kg (169 lb)  LMP 01/01/2004 (Approximate)  SpO2 100%  BMI 27.29 kg/m2  CONSTITUTIONAL: Sitting comfortably.   HEENT: Pupils are equal. Oropharynx is clear.   NECK: No cervical or supraclavicular lymphadenopathy.   RESPIRATORY: Clear bilaterally.   CARD/VASC: S1, S2, regular.   GI: Soft, nontender, nondistended, no hepatosplenomegaly.   MUSKULOSKELETAL: Warm, well perfused.   NEUROLOGIC: Alert, awake.   INTEGUMENT: No rash.   LYMPHATICS: No  edema.   PSYCH: Anxious     LABORATORY DATA AND IMAGING REVIEWED DURING THIS VISIT:  Results for orders placed or performed during the hospital encounter of 12/04/18   PET Oncology (Eyes to Thighs)    Narrative    PET/CT SKULL BASE TO MID THIGH DIAGNOSTIC WITH IV CONTRAST  12/4/2018  1:55 PM     HISTORY: Follow up malignant neoplasm of overlapping sites of right  breast in female, estrogen receptor positive (H).    COMPARISON EXAMS:  SUBURBAN IMAGING: None.  FA[place]: PET/CT 8/27/2018.  OTHER: None.    TECHNIQUE:  13.4 mCi of F-18 FDG were administered  intravenously.   Following the uneventful administration of 100 mL Isovue 370  intravenous contrast and oral contrast, a PET/CT scan was performed  from the skull base through the mid thigh. Additional high-resolution  PET/CT imaging of the head and neck is performed.  A diagnostic CT  scan was performed of the chest, abdomen and pelvis. Coronal, sagittal  and axial images were created and fused with the contrast enhanced CT  examination.  Blood glucose: 89 mg/dL.  The CT, PET and fusion images  are then evaluated on a Hydrocapsule workstation.  Radiation dose for  this scan was reduced using automated exposure control, adjustment of  the mA and/or kV according to patient size, or iterative  reconstruction technique.    FINDINGS: Normal physiologic uptake is identified within the salivary  glands, myocardium, kidneys, ureters and bladder.  Scattered areas of  physiologic bowel uptake are also present.    HIGH-RESOLUTION NECK:  Lymph nodes: Previously noted right supraclavicular node is again  present measuring 0.6 cm in maximal diameter with an SUV max 7.1,  previously 0.4 cm with  an SUV max 2.5, suggesting interval  progression in this metastatic node. A small left cervical node on  series 611, image 92 measures 0.5 cm with an SUV max 4.6 which is new  since prior study. No other hypermetabolic cervical lymph nodes are  evident.    Additional findings:  None.    CHEST:  Lungs: No pathologic activity. Lungs are clear. No infiltrate,  consolidation or mass.    Lymph nodes: No pathologic activity. Previously noted tiny right  axillary lymph node measures 0.4 cm, which is stable. No associated  abnormal FDG activity, unchanged since prior study. No enlarged lymph  nodes are evident in the chest or axillas.    Additional findings: No pleural or pericardial fluid. Heart is normal  in size. Esophagus is unremarkable. Left chest port appears in good  position with catheter tip near the SVC right atrium junction.    ABDOMEN/PELVIS:  Hepatobiliary: No pathologic activity. No evidence of fatty  infiltration or mass. No calcified gallstones.    Spleen: No pathologic activity. No enlargement or mass.    Pancreas: No pathologic activity. No evidence of a mass or surrounding  inflammation.    Kidneys: No pathologic activity. Parapelvic lower pole left renal cyst  is noted. No renal mass is appreciated. No urinary tract calculi.  Bladder is unremarkable.    Adrenals: No pathologic activity. No adrenal mass.    Reproductive: No pathologic activity. The uterus and ovaries are  unremarkable.    Gastrointestinal: No pathologic activity. No bowel obstruction or  diverticulitis. Appendix is normal.    Lymph nodes: No pathologic activity. No enlarged abdominal or pelvic  lymph nodes.    Additional findings: None.    SKELETON:   No pathologic activity.      Impression    IMPRESSION:  1. Slight increased FDG activity involving the right supraclavicular  node. New left posterior cervical node is also noted. Follow up as  clinically warranted. These lymph nodes are not significantly enlarged  by CT criteria but demonstrate increased FDG uptake concerning for  metastases. No other evidence of metastatic adenopathy in the neck,  chest, abdomen or pelvis.  2. Stable parapelvic cyst lower pole left kidney. No hydronephrosis.     DAVON NUNEZ MD   CT Soft Tissue Neck w Contrast    Narrative    CT  SCAN OF THE NECK WITH CONTRAST  12/4/2018 1:55 PM     HISTORY: 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 in  female, estrogen receptor positive (H) [C50.811, Z17.0 (ICD-10-CM)];     TECHNIQUE:  Axial images and coronal reformations. Radiation dose for  this scan was reduced using automated exposure control, adjustment of  the mA and/or kV according to patient size, or iterative  reconstruction technique. 100 mL Isovue-370 IV.    COMPARISON: Scan dated 8/27/2018    FINDINGS: There is an 8 mm left level 3 lymph node posterior 2 the  left sternocleidomastoid muscle. This is seen on axial image 90 of  series 3. This is slightly larger than on the scan of 8/27/2018 when  it measured about 4 mm. The small lymph node does demonstrate  increased metabolic activity. This is suspicious for metastatic  disease.    Visualized sinuses, nasopharynx and orbits: Normal.      Tongue, oral cavity and oropharynx:  No abnormal activity is seen in  the glossopharyngeal sulcus region.      Hypopharynx: Normal.      Larynx and trachea: Normal.      Thyroid: Normal.    Submandibular glands: Normal.      Parotid glands: Normal.        Lymph nodes: Normal.      Vasculature: Normal.      Upper mediastinum and lungs: Normal.      Bones: Normal.      Impression    IMPRESSION:   Small 8 mm left level 3 lymph node has increased in size  when compared to 8/27/2018 and it does demonstrate in reduced  metabolic activity. This is suspicious for metastatic disease.    NAHOMI SOLORIO MD        ECOG performance status 0     ASSESSMENT:  This is a 63-year-old lady who has diagnosis of 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  Tumor cells were positive for CK7, CK5/6 and GATA3.  We did tumor marker testing and she had CA 27-29 at the level of 12,  of 16, CA 19-9 at 19.  Her CEA was less than 0.5. PET CT scan results was done 11/27/2017.  showed hypermetabolic right supraclavicular, right  axillary and subpectoral adenopathy.  There was no evidence of distant metastases.      The distribution of adenopathy indicated possibility breast cancer which had metastasized to the regional lymph nodes and we are not able to find a primary on physical exam and a PET scan. Mammogram 11/30/17 no evidence of primary. Her-2 non amplified. Androgen receptor 10-20%. ER 1-5%. WI < 1%. ENT examination by Dr. Miller was negative. EGD was negative as well. MRI breast 12/20/17 no evidence of malignancy in the breasts.    She started chemotherapy carboplatin/Taxol. Completed 4 cycles. PET Scan 2/20/18 showed good response to treatment. She completed dose dense AC for 4 cycles. PET Scan 5/2/18 showed overall response to previous areas of disease. There was a new area in left parapharyngeal wall which appeared to be a necrotic or reactive lymph node. She was seen by ENT colleagues for assessment of this area. Follow up recommended.   She has completed radiation.  PET CT Scan results 8/27/18 showed left paraesophageal necrotic lymph node had resolved. Right supraclavicular lymph node had FDG activity and right axillary lymph node had actvity as well. Overall, she appears to have a good partial response with residual disease in axilla and right supraclavicular area. She saw Dr. Booker for consideration on surgical removal of the residual disease. Observation was recommended.   - She was continued on Femara.    - PET CT 12/4/18 Scan results were reviewed with the patient. I reviewed the images myself. There is a new hypermetabolic left cervical lymph node, it is small-0.8 cm. There are stable right supraclavicular lymph nodes with some increase in FDG uptake. The lymph node in the neck is small and likely not amenable to biopsy. Her disease had indolent behavior and I don't see any urgency to start systemic treatment at this point other than continuing Femara.   I will have her see one of my colleagues at [facility]  for a second opinion.  I have called and updated her daughter about this plan.      PLAN:   1- Continue Femara  2- Calcium vitamin D and weight bearing exercise  3- RTC MD 8 weeks. CT scan neck and CT chest abdomen and pelvis before next visit  4- Schedule an appointment with Dr. Taina Abreu or Dr. Sanabria for a second opinion    ERON BARNES MD    12/6/2018     cc: Waylon Liu MD

## 2018-12-06 NOTE — PATIENT INSTRUCTIONS
1- Continue Femara patient aware  2- Calcium vitamin D and weight bearing exercise patient aware  3- RTC MD 8 weeks. CT scan neck and CT chest abdomen and pelvis before next visit scheduled/cristina   4- Schedule an appointment with Dr. Taina Abreu or Dr. Waylon Sanabria for a second opinion  Scheduled with Dr Abreu Jan 22, 2018/cristina       AVS printed & given to patient/cristina

## 2018-12-06 NOTE — MR AVS SNAPSHOT
After Visit Summary   12/6/2018    Flor Griffin    MRN: [id number]           Patient Information     Date Of Birth          1955        Visit Information        Provider Department      12/6/2018 12:30 PM Yani Magana MD [facility]        Today's Diagnoses     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 in female, estrogen receptor positive (H)    -  1      Care Instructions    1- Continue Femara patient aware  2- Calcium vitamin D and weight bearing exercise patient aware  3- RTC MD 8 weeks. CT scan neck and CT chest abdomen and pelvis before next visit  4- Schedule an appointment with Dr. Cathie Abreu or Dr. Waylon Sanabria for a second opinion          Follow-ups after your visit        Your next 10 appointments already scheduled     Jan 22, 2019  3:00 PM CST   (Arrive by 2:45 PM)   New Patient Visit with Taina Abreu MD   [facility] ([facility])    [address] [place] [address]  [address] [place] [id number]   [phone]            Jan 31, 2019 10:00 AM CST   CT CHEST/ABDOMEN/PELVIS W CONTRAST with SHCT1   [facility] CT ([facility])    [address] [place] [id number]   [phone]           How do I prepare for my exam? (Food and drink instructions) To prepare: Do not eat or drink for 2 hours before your exam. If you need to take medicine, you may take it with small sips of water. (We may ask you to take liquid medicine as well.)  How do I prepare for my exam? (Other instructions) Please arrive 30 minutes early for your CT.  Once in the department you might be asked to drink water 15-20 minutes prior to your exam.  If indicated you may be asked to drink an oral contrast in advance of your CT.  If this is the case, the imaging team will let you know or be in contact with you prior to your appointment  Patients over 70 or patients with diabetes or kidney problems: If you haven t had a  blood test (creatinine test) within the last 30 days, the Cardiologist/Radiologist may require you to get this test prior to your exam.  If you have diabetes:  Continue to take your metformin medication on the day of your exam  What should I wear: Please wear loose clothing, such as a sweat suit or jogging clothes. Avoid snaps, zippers and other metal. We may ask you to undress and put on a hospital gown.  How long does the exam take: Most scans take less than 20 minutes.  What should I bring: Please bring any scans or X-rays taken at other hospitals, if similar tests were done. Also bring a list of your medicines, including vitamins, minerals and over-the-counter drugs. It is safest to leave personal items at home.  Do I need a : No  is needed.  What do I need to tell my doctor? Be sure to tell your doctor: * If you have any allergies. * If there s any chance you are pregnant. * If you are breastfeeding.  What should I do after the exam: No restrictions, You may resume normal activities.  What is this test: A CT (computed tomography) scan is a series of pictures that allows us to look inside your body. The scanner creates images of the body in cross sections, much like slices of bread. This helps us see any problems more clearly. You may receive contrast (X-ray dye) before or during your scan. You will be asked to drink the contrast.  Who should I call with questions: If you have any questions, please call the Imaging Department where you will have your exam. Directions, parking instructions, and other information is available on our website, [email or web address].            Jan 31, 2019 10:30 AM CST   CT SOFT TISSUE NECK W CONTRAST with SHCT1   [facility] CT ([facility])    [address] [place] [id number] [phone]           How do I prepare for my exam? (Food and drink instructions) **You will have contrast for this exam.** Do not eat or drink for 2 hours  before your exam. If you need to take medicine, you may take it with small sips of water. (We may ask you to take liquid medicine as well.)  The day before your exam, drink extra fluids at least six 8-ounce glasses (unless your doctor tells you to restrict your fluids).  How do I prepare for my exam? (Other instructions) Patients over 70 or patients with diabetes or kidney problems: If you haven t had a blood test (creatinine test) within the last 30 days, the Cardiologist/Radiologist may require you to get this test prior to your exam.  What should I wear: Please wear loose clothing, such as a sweat suit or jogging clothes.  Avoid snaps, zippers and other metal. We may ask you to undress and put on a hospital gown.  How long does the exam take: Most scans take less than 20 minutes.  What should I bring: Please bring any scans or X-rays taken at other hospitals, if similar tests were done. Also bring a list of your medicines, including vitamins, minerals and over-the-counter drugs. It is safest to leave personal items at home.  Do I need a :  No  is needed.  What do I need to tell my doctor? Be sure to tell your doctor: * If you have any allergies. * If there s any chance you are pregnant. * If you are breastfeeding. * If you have diabetes as your medication may need to be adjusted for this exam.  What should I do after the exam: No restrictions, You may resume normal activities.  What is this test: A CT (computed tomography) scan is a series of pictures that allows us to look inside your body. The scanner creates images of the body in cross sections, much like slices of bread. This helps us see any problems more clearly. You may receive contrast (X-ray dye) before or during your scan. Contrast is given through an IV (small needle in your arm).  Who should I call with questions: If you have any questions, please call the Imaging Department where you will have your exam. Directions, parking instructions,  and other information is available on our website, [email or web address].            Feb 06, 2019 12:45 PM CST   Return Visit with Yani Magana MD   [facility] ([facility])    [facility] [place]  [address]  [place] [address]   [phone]              Future tests that were ordered for you today     Open Future Orders        Priority Expected Expires Ordered    CT Chest/Abdomen/Pelvis w Contrast Routine  12/6/2019 12/6/2018    CT Soft Tissue Neck w Contrast Routine  12/6/2019 12/6/2018            Who to contact     If you have questions or need follow up information about today's clinic visit or your schedule please contact [facility] directly at [phone].  Normal or non-critical lab and imaging results will be communicated to you by MyChart, letter or phone within 4 business days after the clinic has received the results. If you do not hear from us within 7 days, please contact the clinic through MyChart or phone. If you have a critical or abnormal lab result, we will notify you by phone as soon as possible.  Submit refill requests through [employer] or call your pharmacy and they will forward the refill request to us. Please allow 3 business days for your refill to be completed.          Additional Information About Your Visit        Care EveryWhere ID     This is your Care EveryWhere ID. This could be used by other organizations to access your [facility] medical records  [id number]        Your Vitals Were     Pulse Temperature Respirations Last Period Pulse Oximetry BMI (Body Mass Index)    67 97.8  F (36.6  C) (Oral) 18 01/01/2004 (Approximate) 100% 27.29 kg/m2       Blood Pressure from Last 3 Encounters:   12/06/18 137/85   09/13/18 109/71   09/07/18 112/77    Weight from Last 3 Encounters:   12/06/18 76.7 kg (169 lb)   09/13/18 80.1 kg (176 lb 9.6 oz)   09/07/18 79.2 kg (174 lb 11.2 oz)                 Today's Medication Changes           These changes are accurate as of 12/6/18  1:03 PM.  If you have any questions, ask your nurse or doctor.               Stop taking these medicines if you haven't already. Please contact your care team if you have questions.     LORazepam 0.5 MG tablet   Commonly known as:  ATIVAN                    Primary Care Provider Office Phone # Fax #    [facility] [phone] [address] [facility] [address]        Equal Access to Services     MARYJANE JARA : Hadii aad ku hadasho Soomaali, waaxda luqadaha, qaybta kaalmada adeegyada, waxay idiin hayantoinen kelsea lam . So [facility] [phone].    ATENCIÓN: Si habla español, tiene a thomas disposición servicios gratuitos de asistencia lingüística. [facility] [phone].    We comply with applicable federal civil rights laws and [place] laws. We do not discriminate on the basis of race, color, national origin, age, disability, sex, sexual orientation, or gender identity.            Thank you!     Thank you for choosing [facility]  for your care. Our goal is always to provide you with excellent care. Hearing back from our patients is one way we can continue to improve our services. Please take a few minutes to complete the written survey that you may receive in the mail after your visit with us. Thank you!             Your Updated Medication List - Protect others around you: Learn how to safely use, store and throw away your medicines at www.disposemymeds.org.          This list is accurate as of 12/6/18  1:03 PM.  Always use your most recent med list.                   Brand Name Dispense Instructions for use Diagnosis    ADVIL PO      Take 400 mg by mouth every 6 hours as needed for moderate pain        aspirin 81 MG tablet    ASA     Take 81 mg by mouth daily        famotidine 20 MG tablet    PEPCID    30 tablet    Take 1 tablet (20 mg) by mouth daily    Heartburn       letrozole 2.5 MG tablet    FEMARA    30 tablet     Take 1 tablet (2.5 mg) by mouth daily    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 in female, estrogen receptor positive (H)       OMEGA-3 FISH OIL PO      Take 1 g by mouth daily        VITAMIN B6 PO      Take by mouth daily        VITAMIN D (CHOLECALCIFEROL) PO      Take 1,000 Units by mouth daily

## 2018-12-06 NOTE — TELEPHONE ENCOUNTER
ONCOLOGY INTAKE: Records Information      APPT INFORMATION:  Referring provider:  Yani Magana  Referring provider s clinic:  [facility]  Reason for visit/diagnosis:  Breast Cancer    Were the records received with the referral (via [employer])? No, everything is in our system, no further action needed    Has patient been seen for any external appt for this diagnosis (enter clinic/location)? No, internal referral

## 2018-12-06 NOTE — LETTER
12/6/2018         RE: Flor Griffin  [address] [facility] [address]        Dear Colleague,    Thank you for referring your patient, Flor Griffin, to the [facility]. Please see a copy of my visit note below.    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  Initially she was seen 11/22/2017 after she had the right supraclavicular lymph node biopsy which was positive for poorly differentiated adenocarcinoma.  She had this lymph node almost a month and had an excisional biopsy performed which was consistent with the diagnosis of cancer.   A pet CT scan 11/27/2017 showed hypermetabolic right supraclavicular, right axillary and subpectoral adenopathy.  Otherwise, no distant metastatic disease. Mammogram 11/30/17- negative    TREATMENT: Carboplatin and Taxol completed 4 cycles. 3/7/2018 started dose dense AC. Completed 4 cycles.   Radiation to right breast, right axilla, right supraclavicular area between 5/29/18-7/31/18 9/2018 started Femara       SUBJECTIVE:  The patient was seen as a followup today. She had a URI and is recovering from it. She otherwise no other alarming symptoms. She has been worried about her scan results.     REVIEW OF SYSTEMS:  A complete review of systems was performed and found to be negative other than pertinent positives mentioned in history of present illness.      Past medical, social histories reviewed.     Meds- Reviewed.     PHYSICAL EXAMINATION:   VITAL SIGNS: /85 (BP Location: Right arm, Patient Position: Sitting, Cuff Size: Adult Regular)  Pulse 67  Temp 97.8  F (36.6  C) (Oral)  Resp 18  Wt 76.7 kg (169 lb)  LMP 01/01/2004 (Approximate)  SpO2 100%  BMI 27.29 kg/m2  CONSTITUTIONAL: Sitting comfortably.   HEENT: Pupils are equal. Oropharynx is clear.   NECK: No cervical or supraclavicular  lymphadenopathy.   RESPIRATORY: Clear bilaterally.   CARD/VASC: S1, S2, regular.   GI: Soft, nontender, nondistended, no hepatosplenomegaly.   MUSKULOSKELETAL: Warm, well perfused.   NEUROLOGIC: Alert, awake.   INTEGUMENT: No rash.   LYMPHATICS: No edema.   PSYCH: Anxious     LABORATORY DATA AND IMAGING REVIEWED DURING THIS VISIT:  Results for orders placed or performed during the hospital encounter of 12/04/18   PET Oncology (Eyes to Thighs)    Narrative    PET/CT SKULL BASE TO MID THIGH DIAGNOSTIC WITH IV CONTRAST  12/4/2018  1:55 PM     HISTORY: Follow up malignant neoplasm of overlapping sites of right  breast in female, estrogen receptor positive (H).    COMPARISON EXAMS:  SUBURBAN IMAGING: None.  FA[facility]VIEW: PET/CT 8/27/2018.  OTHER: None.    TECHNIQUE:  13.4 mCi of F-18 FDG were administered  intravenously.   Following the uneventful administration of 100 mL Isovue 370  intravenous contrast and oral contrast, a PET/CT scan was performed  from the skull base through the mid thigh. Additional high-resolution  PET/CT imaging of the head and neck is performed.  A diagnostic CT  scan was performed of the chest, abdomen and pelvis. Coronal, sagittal  and axial images were created and fused with the contrast enhanced CT  examination.  Blood glucose: 89 mg/dL.  The CT, PET and fusion images  are then evaluated on a viaCycle workstation.  Radiation dose for  this scan was reduced using automated exposure control, adjustment of  the mA and/or kV according to patient size, or iterative  reconstruction technique.    FINDINGS: Normal physiologic uptake is identified within the salivary  glands, myocardium, kidneys, ureters and bladder.  Scattered areas of  physiologic bowel uptake are also present.    HIGH-RESOLUTION NECK:  Lymph nodes: Previously noted right supraclavicular node is again  present measuring 0.6 cm in maximal diameter with an SUV max 7.1,  previously 0.4 cm with  an SUV max 2.5, suggesting  interval  progression in this metastatic node. A small left cervical node on  series 611, image 92 measures 0.5 cm with an SUV max 4.6 which is new  since prior study. No other hypermetabolic cervical lymph nodes are  evident.    Additional findings: None.    CHEST:  Lungs: No pathologic activity. Lungs are clear. No infiltrate,  consolidation or mass.    Lymph nodes: No pathologic activity. Previously noted tiny right  axillary lymph node measures 0.4 cm, which is stable. No associated  abnormal FDG activity, unchanged since prior study. No enlarged lymph  nodes are evident in the chest or axillas.    Additional findings: No pleural or pericardial fluid. Heart is normal  in size. Esophagus is unremarkable. Left chest port appears in good  position with catheter tip near the SVC right atrium junction.    ABDOMEN/PELVIS:  Hepatobiliary: No pathologic activity. No evidence of fatty  infiltration or mass. No calcified gallstones.    Spleen: No pathologic activity. No enlargement or mass.    Pancreas: No pathologic activity. No evidence of a mass or surrounding  inflammation.    Kidneys: No pathologic activity. Parapelvic lower pole left renal cyst  is noted. No renal mass is appreciated. No urinary tract calculi.  Bladder is unremarkable.    Adrenals: No pathologic activity. No adrenal mass.    Reproductive: No pathologic activity. The uterus and ovaries are  unremarkable.    Gastrointestinal: No pathologic activity. No bowel obstruction or  diverticulitis. Appendix is normal.    Lymph nodes: No pathologic activity. No enlarged abdominal or pelvic  lymph nodes.    Additional findings: None.    SKELETON:   No pathologic activity.      Impression    IMPRESSION:  1. Slight increased FDG activity involving the right supraclavicular  node. New left posterior cervical node is also noted. Follow up as  clinically warranted. These lymph nodes are not significantly enlarged  by CT criteria but demonstrate increased FDG uptake  concerning for  metastases. No other evidence of metastatic adenopathy in the neck,  chest, abdomen or pelvis.  2. Stable parapelvic cyst lower pole left kidney. No hydronephrosis.     DAVON NUNEZ MD   CT Soft Tissue Neck w Contrast    Narrative    CT SCAN OF THE NECK WITH CONTRAST  12/4/2018 1:55 PM     HISTORY: 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 in  female, estrogen receptor positive (H) [C50.811, Z17.0 (ICD-10-CM)];     TECHNIQUE:  Axial images and coronal reformations. Radiation dose for  this scan was reduced using automated exposure control, adjustment of  the mA and/or kV according to patient size, or iterative  reconstruction technique. 100 mL Isovue-370 IV.    COMPARISON: Scan dated 8/27/2018    FINDINGS: There is an 8 mm left level 3 lymph node posterior 2 the  left sternocleidomastoid muscle. This is seen on axial image 90 of  series 3. This is slightly larger than on the scan of 8/27/2018 when  it measured about 4 mm. The small lymph node does demonstrate  increased metabolic activity. This is suspicious for metastatic  disease.    Visualized sinuses, nasopharynx and orbits: Normal.      Tongue, oral cavity and oropharynx:  No abnormal activity is seen in  the glossopharyngeal sulcus region.      Hypopharynx: Normal.      Larynx and trachea: Normal.      Thyroid: Normal.    Submandibular glands: Normal.      Parotid glands: Normal.        Lymph nodes: Normal.      Vasculature: Normal.      Upper mediastinum and lungs: Normal.      Bones: Normal.      Impression    IMPRESSION:   Small 8 mm left level 3 lymph node has increased in size  when compared to 8/27/2018 and it does demonstrate in reduced  metabolic activity. This is suspicious for metastatic disease.    NAHOMI SOLORIO MD        ECOG performance status 0     ASSESSMENT:  This is a 63-year-old lady who has diagnosis of 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  Tumor cells were  positive for CK7, CK5/6 and GATA3.  We did tumor marker testing and she had CA 27-29 at the level of 12,  of 16, CA 19-9 at 19.  Her CEA was less than 0.5. PET CT scan results was done 11/27/2017.  showed hypermetabolic right supraclavicular, right axillary and subpectoral adenopathy.  There was no evidence of distant metastases.      The distribution of adenopathy indicated possibility breast cancer which had metastasized to the regional lymph nodes and we are not able to find a primary on physical exam and a PET scan. Mammogram 11/30/17 no evidence of primary. Her-2 non amplified. Androgen receptor 10-20%. ER 1-5%. NC < 1%. ENT examination by Dr. Miller was negative. EGD was negative as well. MRI breast 12/20/17 no evidence of malignancy in the breasts.    She started chemotherapy carboplatin/Taxol. Completed 4 cycles. PET Scan 2/20/18 showed good response to treatment. She completed dose dense AC for 4 cycles. PET Scan 5/2/18 showed overall response to previous areas of disease. There was a new area in left parapharyngeal wall which appeared to be a necrotic or reactive lymph node. She was seen by ENT colleagues for assessment of this area. Follow up recommended.   She has completed radiation.  PET CT Scan results 8/27/18 showed left paraesophageal necrotic lymph node had resolved. Right supraclavicular lymph node had FDG activity and right axillary lymph node had actvity as well. Overall, she appears to have a good partial response with residual disease in axilla and right supraclavicular area. She saw Dr. Booker for consideration on surgical removal of the residual disease. Observation was recommended.   - She was continued on Femara.    - PET CT 12/4/18 Scan results were reviewed with the patient. I reviewed the images myself. There is a new hypermetabolic left cervical lymph node, it is small-0.8 cm. There are stable right supraclavicular lymph nodes with some increase in FDG uptake. The lymph node in  the neck is small and likely not amenable to biopsy. Her disease had indolent behavior and I don't see any urgency to start systemic treatment at this point other than continuing Femara.   I will have her see one of my colleagues at [facility] for a second opinion.  I have called and updated her daughter about this plan.      PLAN:   1- Continue Femara  2- Calcium vitamin D and weight bearing exercise  3- RTC MD 8 weeks. CT scan neck and CT chest abdomen and pelvis before next visit  4- Schedule an appointment with Dr. Taina Abreu or Dr. Sanabria for a second opinion    YANI MAGANA MD    12/6/2018     cc: Waylon Liu MD       Again, thank you for allowing me to participate in the care of your patient.        Sincerely,        Yani Magana MD

## 2018-12-29 DIAGNOSIS — Z17.0 MALIGNANT NEOPLASM OF OVERLAPPING SITES OF RIGHT BREAST IN FEMALE, ESTROGEN RECEPTOR POSITIVE (H): ICD-10-CM

## 2018-12-29 DIAGNOSIS — C50.811 MALIGNANT NEOPLASM OF OVERLAPPING SITES OF RIGHT BREAST IN FEMALE, ESTROGEN RECEPTOR POSITIVE (H): ICD-10-CM

## 2019-01-02 PROBLEM — C50.811 MALIGNANT NEOPLASM OF OVERLAPPING SITES OF RIGHT BREAST IN FEMALE, ESTROGEN RECEPTOR POSITIVE (H): Status: ACTIVE | Noted: 2018-02-21

## 2019-01-02 PROBLEM — Z17.0 MALIGNANT NEOPLASM OF OVERLAPPING SITES OF RIGHT BREAST IN FEMALE, ESTROGEN RECEPTOR POSITIVE (H): Status: ACTIVE | Noted: 2018-02-21

## 2019-01-02 RX ORDER — LETROZOLE 2.5 MG/1
TABLET, FILM COATED ORAL
Qty: 30 TABLET | Refills: 0 | Status: SHIPPED | OUTPATIENT
Start: 2019-01-02 | End: 2019-01-28

## 2019-01-22 ENCOUNTER — ONCOLOGY VISIT (OUTPATIENT)
Dept: ONCOLOGY | Facility: CLINIC | Age: 64
End: 2019-01-22
Attending: INTERNAL MEDICINE
Payer: COMMERCIAL

## 2019-01-22 VITALS
BODY MASS INDEX: 27.16 KG/M2 | SYSTOLIC BLOOD PRESSURE: 133 MMHG | WEIGHT: 169 LBS | TEMPERATURE: 98.2 F | HEART RATE: 71 BPM | OXYGEN SATURATION: 98 % | RESPIRATION RATE: 16 BRPM | HEIGHT: 66 IN | DIASTOLIC BLOOD PRESSURE: 80 MMHG

## 2019-01-22 DIAGNOSIS — C80.1 CARCINOMA OF UNKNOWN ORIGIN (H): Primary | ICD-10-CM

## 2019-01-22 PROCEDURE — 99205 OFFICE O/P NEW HI 60 MIN: CPT | Mod: ZP | Performed by: INTERNAL MEDICINE

## 2019-01-22 PROCEDURE — G0463 HOSPITAL OUTPT CLINIC VISIT: HCPCS | Mod: ZF

## 2019-01-22 ASSESSMENT — PAIN SCALES - GENERAL: PAINLEVEL: NO PAIN (0)

## 2019-01-22 ASSESSMENT — MIFFLIN-ST. JEOR: SCORE: 1338.08

## 2019-01-22 NOTE — NURSING NOTE
"Oncology Rooming Note    January 22, 2019 3:01 PM   Flor Griffin is a 63 year old female who presents for:    Chief Complaint   Patient presents with     Oncology Clinic Visit     new pt complete 2nd opinion for breast cancer      Initial Vitals: /80 (BP Location: Right arm, Patient Position: Sitting, Cuff Size: Adult Regular)   Pulse 71   Temp 98.2  F (36.8  C) (Oral)   Resp 16   Ht 1.676 m (5' 5.98\")   Wt 76.7 kg (169 lb)   LMP 01/01/2004 (Approximate)   SpO2 98%   BMI 27.29 kg/m   Estimated body mass index is 27.29 kg/m  as calculated from the following:    Height as of this encounter: 1.676 m (5' 5.98\").    Weight as of this encounter: 76.7 kg (169 lb). Body surface area is 1.89 meters squared.  No Pain (0) Comment: Data Unavailable   Patient's last menstrual period was 01/01/2004 (approximate).  Allergies reviewed: Yes  Medications reviewed: Yes    Medications: Medication refills not needed today.  Pharmacy name entered into [facility]: [id number] - [facility] [id number]    Clinical concerns: none      8 minutes for nursing intake (face to face time)     Kellie PALMA Jean-Baptiste              "

## 2019-01-22 NOTE — LETTER
1/22/2019       RE: Flor Griffin  [address] [facility] [address]     Dear Colleague,    Thank you for referring your patient, Flor Griffin, to the [facility]. Please see a copy of my visit note below.    I was asked to see Ms. Flor Griffin at the request of Dr. Yani Magana for a second opinion and consultation regarding an adenocarcinoma of unknown primary.      HISTORY OF PRESENT ILLNESS:  Michelle is a 63-year-old postmenopausal female who comes in today for consultation.  She reports that she noticed a lump in her right supraclavicular area in the fall of 2017.  At that time she had originally been told it was a lipoma.  She subsequently had a cold and went in and saw a physician assistant who did not like the feel of this area and sent her for ultrasound and further workup and evaluation.  She was sent to Dr. Corral for an excisional biopsy; this excisional biopsy was performed on 11/16/2017.  This revealed a metastatic, poorly differentiated adenocarcinoma.  Immunohistochemical staining profile was positive for CK7, CK5-CK6 and GATA3.  Possible origins of primary were felt to be breast cancer, urothelial adnexal of the skin, the salivary gland, pancreatic, mesothelial and lung tumor.  This was HER2 negative by FISH.  ER was low positive at 5% of the tumor cells, VA negative at less than 1%, and androgen receptor was positive in 10%-20% of the cells.  She had a PET/CT scan done at that particular time as well as a mammogram and MRI.  The PET/CT scan 11/27/2017 revealed hypermetabolic right supraclavicular, right axillary and subpectoral lymph nodes.  There were a total of 5 lymph nodes in that area that were noted to be abnormal in size and PET activity.  The largest of these measured approximately 1.5 cm.  These were quite PET avid with an SUV of 25 and 15, respectively.  She had a bilateral diagnostic mammogram 11/30/2017, which was negative.  She had a breast MRI  performed 12/20/2017, which revealed extensive lymphadenopathy in the right axillary levels 2-3 in the supraclavicular region.  There were no concerning areas of enhancement in either breast.  There was sparing of the right axillary level 1 lymph nodes, making a primary breast tumor unlikely.      The patient saw Dr. Magana and began on chemotherapy with carboplatin and Taxol.  She received 4 cycles of this.  She then went on to have dose-dense Adriamycin and Cytoxan.  She had near-complete resolution of this area.  No surgical resection was obtained.  She went on to receive radiation to the right breast, right axilla and right supraclavicular area between 05/29/2018-07/31/2018.  In 09/2018, she started on Femara.      The patient comes in today for followup.  She is feeling well with no specific complaints.  She has no hot flashes, no arthralgias or myalgias.  She says she is feeling well and recovering from her chemotherapy.      She has no fevers or chills.  She currently is suffering from an upper respiratory infection with occasional cough.  She has no adenopathy that she can appreciate.  She said she has no heartburn.  She is eating and drinking well.  She feels like her energy is improving now that she is off of any therapy.  She takes Femara and has no other complaints today.      REVIEW OF SYSTEMS:  Her 10 point review of systems is otherwise negative.      PAST MEDICAL HISTORY:  Significant for:    1.  GERD.   2.  Adenocarcinoma of unknown primary as outlined above.      MEDICATIONS:  Reviewed in the EMR.      ALLERGIES:  Penicillin.      SOCIAL HISTORY:  She is a never smoker, no alcohol intake.  She is a retired beautician.  She is .  She has 4 children between the ages of 30-40 years old and 9 grandkids.      FAMILY HISTORY:  Mother had lupus.  Father had diabetes.  She had a sister who was diagnosed with breast cancer at the age of 80.  She had an aunt who had a mastectomy at the age of 30 and  subsequently  of uterine cancer.  Her children are healthy.  Her 10 point review of systems is otherwise negative.      PHYSICAL EXAMINATION:   GENERAL:  She is well appearing, in no apparent distress.   HEENT:  No icterus.  Her oropharynx is clear.  There are no ulcers or lesions.   NECK:  Supple.  There is no cervical, supraclavicular or axillary adenopathy that I can appreciate.   HEART:  Regular.   LUNGS:  Clear bilaterally.   ABDOMEN:  Soft, nontender and nondistended.  There is no palpable hepatosplenomegaly.   EXTREMITIES:  She has no peripheral edema.  No clubbing or cyanosis.   SKIN:  No rashes.   BREASTS:  She has symmetric breasts.  There are no nodules or masses in either breast, no axillary adenopathy, no nipple discharge.  She has a port in the upper chest.      Her laboratories and pathology were reviewed personally by me.  Her PET/CT scan was also reviewed revealing 2 small areas of subcentimeter possible lymph nodes.      ASSESSMENT AND PLAN:  This is a 63-year-old female with adenocarcinoma of unknown primary involving the supraclavicular, cervical and axillary adenopathy.  She was treated with chemotherapy with carboplatin and Taxol, followed by dose-dense Adriamycin and Cytoxan, followed by adjuvant radiation therapy who now has no evidence of disease.      I had a long conversation today with Michelle and her .  We discussed that this may be a breast primary; however, I believe this is likely adenocarcinoma of unknown primary.  This may have originated in the sweat glands or skin as outlined on the original pathology report.  We reviewed her MRI that revealed there were no level 1 lymph nodes, making it also less likely to be breast primary.      She has had complete therapy with chemotherapy and radiation treatment.  At this time, I would recommend close observation.  Without any measurable disease, I would not recommend any systemic treatment.      She was weakly ER positive at 5%.  She  is tolerating Femara without any difficulty, and I would recommend continuing this.      It would be reasonable to do PD-1 testing on her primary tumor.  If she developed a recurrence, it would be worthwhile considering this therapy as part of her treatment.      I am happy to see Michelle at any time.  She is coping today with no signs or symptoms of depression.  No neuropathy.      We reviewed that it is unclear where her primary tumor originally originated from.  I did discuss with her that there are some tumor testings that can be done to look at the site of origin.  However, given her overall stable disease currently, I think close observation would be reasonable.  If she had a recurrence, sending a tumor evaluation with [facility], for example, would be reasonable to pursue.      I am happy to see Michelle back at any time.  Thank you for this consultation.         Again, thank you for allowing me to participate in the care of your patient.      Sincerely,    Taina Abreu MD

## 2019-01-22 NOTE — PROGRESS NOTES
I was asked to see Ms. Flor Griffin at the request of Dr. Yani Magana for a second opinion and consultation regarding an adenocarcinoma of unknown primary.      HISTORY OF PRESENT ILLNESS:  Michelle is a 63-year-old postmenopausal female who comes in today for consultation.  She reports that she noticed a lump in her right supraclavicular area in the fall of 2017.  At that time she had originally been told it was a lipoma.  She subsequently had a cold and went in and saw a physician assistant who did not like the feel of this area and sent her for ultrasound and further workup and evaluation.  She was sent to Dr. Corral for an excisional biopsy; this excisional biopsy was performed on 11/16/2017.  This revealed a metastatic, poorly differentiated adenocarcinoma.  Immunohistochemical staining profile was positive for CK7, CK5-CK6 and GATA3.  Possible origins of primary were felt to be breast cancer, urothelial adnexal of the skin, the salivary gland, pancreatic, mesothelial and lung tumor.  This was HER2 negative by FISH.  ER was low positive at 5% of the tumor cells, TX negative at less than 1%, and androgen receptor was positive in 10%-20% of the cells.  She had a PET/CT scan done at that particular time as well as a mammogram and MRI.  The PET/CT scan 11/27/2017 revealed hypermetabolic right supraclavicular, right axillary and subpectoral lymph nodes.  There were a total of 5 lymph nodes in that area that were noted to be abnormal in size and PET activity.  The largest of these measured approximately 1.5 cm.  These were quite PET avid with an SUV of 25 and 15, respectively.  She had a bilateral diagnostic mammogram 11/30/2017, which was negative.  She had a breast MRI performed 12/20/2017, which revealed extensive lymphadenopathy in the right axillary levels 2-3 in the supraclavicular region.  There were no concerning areas of enhancement in either breast.  There was sparing of the right axillary level 1 lymph nodes,  making a primary breast tumor unlikely.      The patient saw Dr. Magana and began on chemotherapy with carboplatin and Taxol.  She received 4 cycles of this.  She then went on to have dose-dense Adriamycin and Cytoxan.  She had near-complete resolution of this area.  No surgical resection was obtained.  She went on to receive radiation to the right breast, right axilla and right supraclavicular area between 2018-2018.  In 2018, she started on Femara.      The patient comes in today for followup.  She is feeling well with no specific complaints.  She has no hot flashes, no arthralgias or myalgias.  She says she is feeling well and recovering from her chemotherapy.      She has no fevers or chills.  She currently is suffering from an upper respiratory infection with occasional cough.  She has no adenopathy that she can appreciate.  She said she has no heartburn.  She is eating and drinking well.  She feels like her energy is improving now that she is off of any therapy.  She takes Femara and has no other complaints today.      REVIEW OF SYSTEMS:  Her 10 point review of systems is otherwise negative.      PAST MEDICAL HISTORY:  Significant for:    1.  GERD.   2.  Adenocarcinoma of unknown primary as outlined above.      MEDICATIONS:  Reviewed in the EMR.      ALLERGIES:  Penicillin.      SOCIAL HISTORY:  She is a never smoker, no alcohol intake.  She is a retired beautician.  She is .  She has 4 children between the ages of 30-40 years old and 9 grandkids.      FAMILY HISTORY:  Mother had lupus.  Father had diabetes.  She had a sister who was diagnosed with breast cancer at the age of 80.  She had an aunt who had a mastectomy at the age of 30 and subsequently  of uterine cancer.  Her children are healthy.  Her 10 point review of systems is otherwise negative.      PHYSICAL EXAMINATION:   GENERAL:  She is well appearing, in no apparent distress.   HEENT:  No icterus.  Her oropharynx is clear.   There are no ulcers or lesions.   NECK:  Supple.  There is no cervical, supraclavicular or axillary adenopathy that I can appreciate.   HEART:  Regular.   LUNGS:  Clear bilaterally.   ABDOMEN:  Soft, nontender and nondistended.  There is no palpable hepatosplenomegaly.   EXTREMITIES:  She has no peripheral edema.  No clubbing or cyanosis.   SKIN:  No rashes.   BREASTS:  She has symmetric breasts.  There are no nodules or masses in either breast, no axillary adenopathy, no nipple discharge.  She has a port in the upper chest.      Her laboratories and pathology were reviewed personally by me.  Her PET/CT scan was also reviewed revealing 2 small areas of subcentimeter possible lymph nodes.      ASSESSMENT AND PLAN:  This is a 63-year-old female with adenocarcinoma of unknown primary involving the supraclavicular, cervical and axillary adenopathy.  She was treated with chemotherapy with carboplatin and Taxol, followed by dose-dense Adriamycin and Cytoxan, followed by adjuvant radiation therapy who now has no evidence of disease.      I had a long conversation today with Michelle and her .  We discussed that this may be a breast primary; however, I believe this is likely adenocarcinoma of unknown primary.  This may have originated in the sweat glands or skin as outlined on the original pathology report.  We reviewed her MRI that revealed there were no level 1 lymph nodes, making it also less likely to be breast primary.      She has had complete therapy with chemotherapy and radiation treatment.  At this time, I would recommend close observation.  Without any measurable disease, I would not recommend any systemic treatment.      She was weakly ER positive at 5%.  She is tolerating Femara without any difficulty, and I would recommend continuing this.      It would be reasonable to do PD-1 testing on her primary tumor.  If she developed a recurrence, it would be worthwhile considering this therapy as part of her  treatment.      I am happy to see Michelle at any time.  She is coping today with no signs or symptoms of depression.  No neuropathy.      We reviewed that it is unclear where her primary tumor originally originated from.  I did discuss with her that there are some tumor testings that can be done to look at the site of origin.  However, given her overall stable disease currently, I think close observation would be reasonable.  If she had a recurrence, sending a tumor evaluation with [facility], for example, would be reasonable to pursue.      I am happy to see Michelle back at any time.  Thank you for this consultation.

## 2019-01-28 DIAGNOSIS — Z17.0 MALIGNANT NEOPLASM OF OVERLAPPING SITES OF RIGHT BREAST IN FEMALE, ESTROGEN RECEPTOR POSITIVE (H): ICD-10-CM

## 2019-01-28 DIAGNOSIS — C50.811 MALIGNANT NEOPLASM OF OVERLAPPING SITES OF RIGHT BREAST IN FEMALE, ESTROGEN RECEPTOR POSITIVE (H): ICD-10-CM

## 2019-01-28 RX ORDER — LETROZOLE 2.5 MG/1
TABLET, FILM COATED ORAL
Qty: 90 TABLET | Refills: 0 | Status: SHIPPED | OUTPATIENT
Start: 2019-01-28 | End: 2019-04-30

## 2019-01-31 ENCOUNTER — HOSPITAL ENCOUNTER (OUTPATIENT)
Dept: CT IMAGING | Facility: CLINIC | Age: 64
End: 2019-01-31
Attending: INTERNAL MEDICINE | Admitting: INTERNAL MEDICINE
Payer: COMMERCIAL

## 2019-01-31 ENCOUNTER — HOSPITAL ENCOUNTER (OUTPATIENT)
Facility: CLINIC | Age: 64
Discharge: HOME | End: 2019-01-31
Attending: INTERNAL MEDICINE | Admitting: INTERNAL MEDICINE
Payer: COMMERCIAL

## 2019-01-31 DIAGNOSIS — C50.811 MALIGNANT NEOPLASM OF OVERLAPPING SITES OF RIGHT BREAST IN FEMALE, ESTROGEN RECEPTOR POSITIVE (H): ICD-10-CM

## 2019-01-31 DIAGNOSIS — Z17.0 MALIGNANT NEOPLASM OF OVERLAPPING SITES OF RIGHT BREAST IN FEMALE, ESTROGEN RECEPTOR POSITIVE (H): ICD-10-CM

## 2019-01-31 PROCEDURE — 74177 CT ABD & PELVIS W/CONTRAST: CPT

## 2019-01-31 PROCEDURE — 96374 THER/PROPH/DIAG INJ IV PUSH: CPT

## 2019-01-31 PROCEDURE — 25000128 H RX IP 250 OP 636: Performed by: INTERNAL MEDICINE

## 2019-01-31 PROCEDURE — 25000125 ZZHC RX 250: Performed by: INTERNAL MEDICINE

## 2019-01-31 PROCEDURE — 70491 CT SOFT TISSUE NECK W/DYE: CPT

## 2019-01-31 PROCEDURE — 71260 CT THORAX DX C+: CPT

## 2019-01-31 PROCEDURE — 40000863 ZZH STATISTIC RADIOLOGY XRAY, US, CT, MAR, NM

## 2019-01-31 RX ORDER — LIDOCAINE 40 MG/G
CREAM TOPICAL
Status: DISCONTINUED | OUTPATIENT
Start: 2019-01-31 | End: 2019-01-31 | Stop reason: HOSPADM

## 2019-01-31 RX ORDER — HEPARIN SODIUM (PORCINE) LOCK FLUSH IV SOLN 100 UNIT/ML 100 UNIT/ML
5 SOLUTION INTRAVENOUS
Status: DISCONTINUED | OUTPATIENT
Start: 2019-01-31 | End: 2019-01-31 | Stop reason: HOSPADM

## 2019-01-31 RX ORDER — IOPAMIDOL 755 MG/ML
125 INJECTION, SOLUTION INTRAVASCULAR ONCE
Status: COMPLETED | OUTPATIENT
Start: 2019-01-31 | End: 2019-01-31

## 2019-01-31 RX ADMIN — IOPAMIDOL 125 ML: 755 INJECTION, SOLUTION INTRAVENOUS at 09:37

## 2019-01-31 RX ADMIN — SODIUM CHLORIDE, PRESERVATIVE FREE 70 ML: 5 INJECTION INTRAVENOUS at 09:38

## 2019-01-31 RX ADMIN — SODIUM CHLORIDE, PRESERVATIVE FREE 5 ML: 5 INJECTION INTRAVENOUS at 09:42

## 2019-02-06 ENCOUNTER — ONCOLOGY VISIT (OUTPATIENT)
Dept: ONCOLOGY | Facility: CLINIC | Age: 64
End: 2019-02-06
Attending: INTERNAL MEDICINE
Payer: COMMERCIAL

## 2019-02-06 VITALS
HEART RATE: 72 BPM | OXYGEN SATURATION: 98 % | SYSTOLIC BLOOD PRESSURE: 120 MMHG | WEIGHT: 169 LBS | BODY MASS INDEX: 27.16 KG/M2 | HEIGHT: 66 IN | DIASTOLIC BLOOD PRESSURE: 78 MMHG | RESPIRATION RATE: 16 BRPM | TEMPERATURE: 97.7 F

## 2019-02-06 DIAGNOSIS — C80.1 CARCINOMA OF UNKNOWN ORIGIN (H): Primary | ICD-10-CM

## 2019-02-06 PROCEDURE — 99214 OFFICE O/P EST MOD 30 MIN: CPT | Performed by: INTERNAL MEDICINE

## 2019-02-06 PROCEDURE — G0463 HOSPITAL OUTPT CLINIC VISIT: HCPCS

## 2019-02-06 ASSESSMENT — PAIN SCALES - GENERAL: PAINLEVEL: NO PAIN (0)

## 2019-02-06 ASSESSMENT — MIFFLIN-ST. JEOR: SCORE: 1338.01

## 2019-02-06 NOTE — NURSING NOTE
"Oncology Rooming Note    February 6, 2019 1:00 PM   Flor Griffin is a 63 year old female who presents for:    Chief Complaint   Patient presents with     Oncology Clinic Visit     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 in female, estrogen receptor positive (H)     Initial Vitals: /78 (BP Location: Right arm, Cuff Size: Adult Regular)   Pulse 72   Temp 97.7  F (36.5  C) (Oral)   Resp 16   Ht 1.676 m (5' 5.98\")   Wt 76.7 kg (169 lb)   LMP 01/01/2004 (Approximate)   SpO2 98%   BMI 27.29 kg/m   Estimated body mass index is 27.29 kg/m  as calculated from the following:    Height as of this encounter: 1.676 m (5' 5.98\").    Weight as of this encounter: 76.7 kg (169 lb). Body surface area is 1.89 meters squared.  No Pain (0) Comment: Data Unavailable   Patient's last menstrual period was 01/01/2004 (approximate).  Allergies reviewed: Yes  Medications reviewed: Yes    Medications: Medication refills not needed today.  Pharmacy name entered into [employer] [id number] [id number]    Clinical concerns: none     8 minutes for nursing intake (face to face time)     Martita Serrano CMA              "

## 2019-02-06 NOTE — PROGRESS NOTES
"[facility]  HEMATOLOGY ONCOLOGY    ONCOLOGY FOLLOWUP NOTE      DIAGNOSIS:  Clinically, TX N3c M0 adenocarcinoma which is poorly differentiated, likely of breast origin.  Initially she was seen 11/22/2017 after she had the right supraclavicular lymph node biopsy which was positive for poorly differentiated adenocarcinoma.  She had this lymph node almost a month and had an excisional biopsy performed which was consistent with the diagnosis of cancer.   A pet CT scan 11/27/2017 showed hypermetabolic right supraclavicular, right axillary and subpectoral adenopathy.  Otherwise, no distant metastatic disease. Mammogram 11/30/17- negative    TREATMENT: Carboplatin and Taxol completed 4 cycles. 3/7/2018 started dose dense AC. Completed 4 cycles.   Radiation to right breast, right axilla, right supraclavicular area between 5/29/18-7/31/18 9/2018 started Femara       SUBJECTIVE:  The patient was seen as a followup today. She has been feeling well. No new symptoms.     REVIEW OF SYSTEMS:  A complete review of systems was performed and found to be negative other than pertinent positives mentioned in history of present illness.      Past medical, social histories reviewed.     Meds- Reviewed.     PHYSICAL EXAMINATION:   VITAL SIGNS:/78 (BP Location: Right arm, Cuff Size: Adult Regular)   Pulse 72   Temp 97.7  F (36.5  C) (Oral)   Resp 16   Ht 1.676 m (5' 5.98\")   Wt 76.7 kg (169 lb)   LMP 01/01/2004 (Approximate)   SpO2 98%   BMI 27.29 kg/m    CONSTITUTIONAL: Sitting comfortably.   HEENT: Pupils are equal. Oropharynx is clear.   NECK: No cervical or supraclavicular lymphadenopathy.   RESPIRATORY: Clear bilaterally.   CARD/VASC: S1, S2, regular.   GI: Soft, nontender, nondistended, no hepatosplenomegaly.   MUSKULOSKELETAL: Warm, well perfused.   NEUROLOGIC: Alert, awake.   INTEGUMENT: No rash.   LYMPHATICS: No edema.   PSYCH: Anxious     LABORATORY DATA AND IMAGING REVIEWED DURING THIS " VISIT:  Recent Labs   Lab Test 04/30/18  1150 04/23/18  0951    141   POTASSIUM 4.0 4.0   CHLORIDE 101 108   CO2 26 26   ANIONGAP 8 7   BUN 20 14   CR 0.62 0.61   * 96   EDILSON 8.8 8.7     Recent Labs   Lab Test 05/03/18  1005 04/30/18  1150 04/23/18  0921   WBC 3.0* 0.3* 3.2*   HGB 7.3* 7.8* 8.0*   PLT 61* 79* 308   MCV 92 93 95   NEUTROPHIL 81.0 3.0 62.7     Recent Labs   Lab Test 04/30/18  1150 04/23/18  0951 04/09/18  0955   BILITOTAL 0.7 0.2 0.6   ALKPHOS 106 101 134   ALT 30 27 30   AST 13 20 21   ALBUMIN 3.4 2.9* 3.6         Results for orders placed or performed during the hospital encounter of 01/31/19   CT Soft Tissue Neck w Contrast    Narrative    CT SCAN OF THE NECK WITH CONTRAST  1/31/2019 9:52 AM     HISTORY: Follow up breast cancer. Malignant neoplasm of overlapping  sites of right breast in female, estrogen receptor positive (H).    TECHNIQUE:  Axial images and coronal reformations. 55 mL Isovue-370  IV.   Radiation dose for this scan was reduced using automated  exposure control, adjustment of the mA and/or kV according to patient  size, or iterative reconstruction technique.    COMPARISON: 12/4/2018    FINDINGS:  Visualized sinuses, nasopharynx and orbits: Normal.      Tongue, oral cavity and oropharynx:  Normal.      Hypopharynx: Normal.      Larynx and trachea: Normal.      Thyroid: Normal.    Submandibular glands: Normal.      Parotid glands: Normal.        Lymph nodes: The left level III lymph node previously described on  prior report currently measures 0.4 cm in maximum dimensions as  compared to 0.8 cm on the prior exam. This is measured on image 64 of  series 12. No enlarged lymph nodes are present.     Vasculature: Some mild atherosclerotic disease noted.    Upper mediastinum and lungs: Normal.      Bones: Normal.      Impression    IMPRESSION:   Interval decrease in size of previously seen  metabolically active left level III lymph node. There is no evidence  for any new  lymphadenopathy.    MAYKEL WINTER MD        ECOG performance status 0     ASSESSMENT:  This is a 63-year-old lady who has diagnosis of 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  Tumor cells were positive for CK7, CK5/6 and GATA3.  We did tumor marker testing and she had CA 27-29 at the level of 12,  of 16, CA 19-9 at 19.  Her CEA was less than 0.5. PET CT scan results was done 11/27/2017.  showed hypermetabolic right supraclavicular, right axillary and subpectoral adenopathy.  There was no evidence of distant metastases.      The distribution of adenopathy indicated possibility breast cancer which had metastasized to the regional lymph nodes and we are not able to find a primary on physical exam and a PET scan. Mammogram 11/30/17 no evidence of primary. Her-2 non amplified. Androgen receptor 10-20%. ER 1-5%. MS < 1%. ENT examination by Dr. Miller was negative. EGD was negative as well. MRI breast 12/20/17 no evidence of malignancy in the breasts.    She started chemotherapy carboplatin/Taxol. Completed 4 cycles. PET Scan 2/20/18 showed good response to treatment. She completed dose dense AC for 4 cycles. PET Scan 5/2/18 showed overall response to previous areas of disease. There was a new area in left parapharyngeal wall which appeared to be a necrotic or reactive lymph node. She was seen by ENT colleagues for assessment of this area. Follow up recommended.   She has completed radiation.    She was continued on Femara.    PET CT 12/4/18 Scan showed a new hypermetabolic left cervical lymph node, it is small-0.8 cm. There are stable right supraclavicular lymph nodes with some increase in FDG uptake. The lymph node in the neck was small and likely not amenable to biopsy. Her disease had indolent behavior and I did not see any urgency to start systemic treatment at that point other than continuing Femara.   - She was seen by Dr. Abreu for second opinion. Dr. Ho  thought it might not be breast primary and mentioned possibility of sweat gland tumor.     - CT Scan 1/31/18 results were reviewed with the patient. I reviewed the images myself. Left level III lymph node has further decreased in size. No evidence of disease on body CT scan.      PLAN:   1- Continue Femara  2- Calcium vitamin D and weight bearing exercise  3- RTC MD 3 months. CT scan neck and CT chest abdomen and pelvis before next visit    ERON BARNES MD    2/6/2019     cc: Waylon Liu MD

## 2019-02-06 NOTE — PATIENT INSTRUCTIONS
1- Continue Femara  2- Calcium vitamin D and weight bearing exercise  3- RTC MD 3 months. CT scan neck and CT chest abdomen and pelvis before next visit  Scheduled/Marley MANDEL printed and given to patient/ Marley

## 2019-02-06 NOTE — LETTER
"    2/6/2019         RE: Flor Griffin  [id number] [facility] [id number]        Dear Colleague,    Thank you for referring your patient, Flor Griffin, to the [facility]. Please see a copy of my visit note below.    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  Initially she was seen 11/22/2017 after she had the right supraclavicular lymph node biopsy which was positive for poorly differentiated adenocarcinoma.  She had this lymph node almost a month and had an excisional biopsy performed which was consistent with the diagnosis of cancer.   A pet CT scan 11/27/2017 showed hypermetabolic right supraclavicular, right axillary and subpectoral adenopathy.  Otherwise, no distant metastatic disease. Mammogram 11/30/17- negative    TREATMENT: Carboplatin and Taxol completed 4 cycles. 3/7/2018 started dose dense AC. Completed 4 cycles.   Radiation to right breast, right axilla, right supraclavicular area between 5/29/18-7/31/18 9/2018 started Femara       SUBJECTIVE:  The patient was seen as a followup today. She has been feeling well. No new symptoms.     REVIEW OF SYSTEMS:  A complete review of systems was performed and found to be negative other than pertinent positives mentioned in history of present illness.      Past medical, social histories reviewed.     Meds- Reviewed.     PHYSICAL EXAMINATION:   VITAL SIGNS:/78 (BP Location: Right arm, Cuff Size: Adult Regular)   Pulse 72   Temp 97.7  F (36.5  C) (Oral)   Resp 16   Ht 1.676 m (5' 5.98\")   Wt 76.7 kg (169 lb)   LMP 01/01/2004 (Approximate)   SpO2 98%   BMI 27.29 kg/m     CONSTITUTIONAL: Sitting comfortably.   HEENT: Pupils are equal. Oropharynx is clear.   NECK: No cervical or supraclavicular lymphadenopathy.   RESPIRATORY: Clear bilaterally.   CARD/VASC: S1, S2, regular. "   GI: Soft, nontender, nondistended, no hepatosplenomegaly.   MUSKULOSKELETAL: Warm, well perfused.   NEUROLOGIC: Alert, awake.   INTEGUMENT: No rash.   LYMPHATICS: No edema.   PSYCH: Anxious     LABORATORY DATA AND IMAGING REVIEWED DURING THIS VISIT:  Recent Labs   Lab Test 04/30/18  1150 04/23/18  0951    141   POTASSIUM 4.0 4.0   CHLORIDE 101 108   CO2 26 26   ANIONGAP 8 7   BUN 20 14   CR 0.62 0.61   * 96   EDILSON 8.8 8.7     Recent Labs   Lab Test 05/03/18  1005 04/30/18  1150 04/23/18  0921   WBC 3.0* 0.3* 3.2*   HGB 7.3* 7.8* 8.0*   PLT 61* 79* 308   MCV 92 93 95   NEUTROPHIL 81.0 3.0 62.7     Recent Labs   Lab Test 04/30/18  1150 04/23/18  0951 04/09/18  0955   BILITOTAL 0.7 0.2 0.6   ALKPHOS 106 101 134   ALT 30 27 30   AST 13 20 21   ALBUMIN 3.4 2.9* 3.6         Results for orders placed or performed during the hospital encounter of 01/31/19   CT Soft Tissue Neck w Contrast    Narrative    CT SCAN OF THE NECK WITH CONTRAST  1/31/2019 9:52 AM     HISTORY: Follow up breast cancer. Malignant neoplasm of overlapping  sites of right breast in female, estrogen receptor positive (H).    TECHNIQUE:  Axial images and coronal reformations. 55 mL Isovue-370  IV.   Radiation dose for this scan was reduced using automated  exposure control, adjustment of the mA and/or kV according to patient  size, or iterative reconstruction technique.    COMPARISON: 12/4/2018    FINDINGS:  Visualized sinuses, nasopharynx and orbits: Normal.      Tongue, oral cavity and oropharynx:  Normal.      Hypopharynx: Normal.      Larynx and trachea: Normal.      Thyroid: Normal.    Submandibular glands: Normal.      Parotid glands: Normal.        Lymph nodes: The left level III lymph node previously described on  prior report currently measures 0.4 cm in maximum dimensions as  compared to 0.8 cm on the prior exam. This is measured on image 64 of  series 12. No enlarged lymph nodes are present.     Vasculature: Some mild  atherosclerotic disease noted.    Upper mediastinum and lungs: Normal.      Bones: Normal.      Impression    IMPRESSION:   Interval decrease in size of previously seen  metabolically active left level III lymph node. There is no evidence  for any new lymphadenopathy.    MAYKEL WINTER MD        ECOG performance status 0     ASSESSMENT:  This is a 63-year-old lady who has diagnosis of 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  Tumor cells were positive for CK7, CK5/6 and GATA3.  We did tumor marker testing and she had CA 27-29 at the level of 12,  of 16, CA 19-9 at 19.  Her CEA was less than 0.5. PET CT scan results was done 11/27/2017.  showed hypermetabolic right supraclavicular, right axillary and subpectoral adenopathy.  There was no evidence of distant metastases.      The distribution of adenopathy indicated possibility breast cancer which had metastasized to the regional lymph nodes and we are not able to find a primary on physical exam and a PET scan. Mammogram 11/30/17 no evidence of primary. Her-2 non amplified. Androgen receptor 10-20%. ER 1-5%. SC < 1%. ENT examination by Dr. Miller was negative. EGD was negative as well. MRI breast 12/20/17 no evidence of malignancy in the breasts.    She started chemotherapy carboplatin/Taxol. Completed 4 cycles. PET Scan 2/20/18 showed good response to treatment. She completed dose dense AC for 4 cycles. PET Scan 5/2/18 showed overall response to previous areas of disease. There was a new area in left parapharyngeal wall which appeared to be a necrotic or reactive lymph node. She was seen by ENT colleagues for assessment of this area. Follow up recommended.   She has completed radiation.    She was continued on Femara.    PET CT 12/4/18 Scan showed a new hypermetabolic left cervical lymph node, it is small-0.8 cm. There are stable right supraclavicular lymph nodes with some increase in FDG uptake. The lymph node in  the neck was small and likely not amenable to biopsy. Her disease had indolent behavior and I did not see any urgency to start systemic treatment at that point other than continuing Femara.   - She was seen by Dr. Abreu for second opinion. Dr. Ho thought it might not be breast primary and mentioned possibility of sweat gland tumor.     - CT Scan 1/31/18 results were reviewed with the patient. I reviewed the images myself. Left level III lymph node has further decreased in size. No evidence of disease on body CT scan.      PLAN:   1- Continue Femara  2- Calcium vitamin D and weight bearing exercise  3- RTC MD 3 months. CT scan neck and CT chest abdomen and pelvis before next visit    YANI MAGANA MD    2/6/2019     cc: Waylon Liu MD       Again, thank you for allowing me to participate in the care of your patient.        Sincerely,        Yani Magana MD

## 2019-02-07 ENCOUNTER — TELEPHONE (OUTPATIENT)
Dept: ONCOLOGY | Facility: CLINIC | Age: 64
End: 2019-02-07

## 2019-02-07 NOTE — TELEPHONE ENCOUNTER
Patient called requesting to have port removed and a message to Dr. Magana was sent with the request. Dr. Magana responded that he would like patient to keep the port in for now.     I called and LVM with patient with the above information. Michela Parmar

## 2019-03-13 ENCOUNTER — OFFICE VISIT (OUTPATIENT)
Dept: FAMILY MEDICINE | Facility: CLINIC | Age: 64
End: 2019-03-13
Payer: COMMERCIAL

## 2019-03-13 VITALS
TEMPERATURE: 98.7 F | DIASTOLIC BLOOD PRESSURE: 76 MMHG | HEART RATE: 88 BPM | BODY MASS INDEX: 30.35 KG/M2 | WEIGHT: 187.9 LBS | OXYGEN SATURATION: 99 % | SYSTOLIC BLOOD PRESSURE: 122 MMHG

## 2019-03-13 DIAGNOSIS — J01.00 ACUTE NON-RECURRENT MAXILLARY SINUSITIS: Primary | ICD-10-CM

## 2019-03-13 DIAGNOSIS — C50.811 MALIGNANT NEOPLASM OF OVERLAPPING SITES OF RIGHT BREAST IN FEMALE, ESTROGEN RECEPTOR POSITIVE (H): ICD-10-CM

## 2019-03-13 DIAGNOSIS — Z17.0 MALIGNANT NEOPLASM OF OVERLAPPING SITES OF RIGHT BREAST IN FEMALE, ESTROGEN RECEPTOR POSITIVE (H): ICD-10-CM

## 2019-03-13 PROCEDURE — 99213 OFFICE O/P EST LOW 20 MIN: CPT | Performed by: PHYSICIAN ASSISTANT

## 2019-03-13 RX ORDER — LETROZOLE 2.5 MG/1
1 TABLET, FILM COATED ORAL DAILY
Qty: 90 TABLET | Refills: 0 | Status: CANCELLED | OUTPATIENT
Start: 2019-03-13

## 2019-03-13 RX ORDER — AZITHROMYCIN 250 MG/1
TABLET, FILM COATED ORAL
Qty: 6 TABLET | Refills: 0 | Status: SHIPPED | OUTPATIENT
Start: 2019-03-13 | End: 2019-05-17

## 2019-03-13 NOTE — PROGRESS NOTES
SUBJECTIVE:   Flor Griffin is a 63 year old female who presents to clinic today for the following health issues:      RESPIRATORY SYMPTOMS      Duration: 1 week    Description  nasal congestion, rhinorrhea, facial pain/pressure, cough, headache, hoarse voice and chest congestion    Severity: moderate    Accompanying signs and symptoms: None    History (predisposing factors):  none    Precipitating or alleviating factors: None    Therapies tried and outcome:  oral decongestant antihistamine nasal spray/wash - hot showers without relief      Patient is here today for evaluation of not feeling well for 1 week  She notes sinus congestion, facial pain over cheeks, + green/yellow nasal discharge and productive cough  No known fevers, but has some chills  She has tried multiple OTC regimens without relief    Problem list and histories reviewed & adjusted, as indicated.  Additional history: as documented    Patient Active Problem List   Diagnosis     Hyperlipidemia LDL goal <160     Heartburn     Vitamin D deficiency     BCC (basal cell carcinoma), face     Cold sore     Overweight     Carcinoma of unknown origin (H)     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 in female, estrogen receptor positive (H)     Port catheter in place     Chemotherapy-induced neutropenia (H)     Anemia     Past Surgical History:   Procedure Laterality Date     BIOPSY MASS NECK Right 11/16/2017    Procedure: BIOPSY MASS NECK;  Excisional Biopsy Right Neck Lymph node;  Surgeon: Waylon Corral MD;  Location: [facility] OR     COLONOSCOPY       MOHS MICROGRAPHIC PROCEDURE  ~2010    right cheek; BCC     MOHS MICROGRAPHIC PROCEDURE  10/31/2016    [facility]       Social History     Tobacco Use     Smoking status: Never Smoker     Smokeless tobacco: Never Used   Substance Use Topics     Alcohol use: Yes     Alcohol/week: 0.0 oz     Comment: 4 times a week, 2 drinks     Family History   Problem Relation Age of Onset     Lupus Mother       Heart Disease Mother         CHF     Coronary Artery Disease Mother      Hyperlipidemia Mother      Diabetes Father      Breast Cancer Other          Current Outpatient Medications   Medication Sig Dispense Refill     aspirin 81 MG tablet Take 81 mg by mouth daily       azithromycin (ZITHROMAX) 250 MG tablet Two tablets first day, then one tablet daily for four days. 6 tablet 0     famotidine (PEPCID) 20 MG tablet Take 1 tablet (20 mg) by mouth daily 30 tablet 1     Ibuprofen (ADVIL PO) Take 400 mg by mouth every 6 hours as needed for moderate pain       letrozole (FEMARA) 2.5 MG tablet TAKE 1 TABLET BY MOUTH DAILY 90 tablet 0     Omega-3 Fatty Acids (OMEGA-3 FISH OIL PO) Take 1 g by mouth daily       Pyridoxine HCl (VITAMIN B6 PO) Take by mouth daily       VITAMIN D, CHOLECALCIFEROL, PO Take 1,000 Units by mouth daily       Allergies   Allergen Reactions     Penicillins Hives       Reviewed and updated as needed this visit by clinical staff  Tobacco  Allergies  Meds  Med Hx  Surg Hx  Fam Hx  Soc Hx      Reviewed and updated as needed this visit by Provider         ROS:  Constitutional, HEENT, cardiovascular, pulmonary, gi and gu systems are negative, except as otherwise noted.    OBJECTIVE:     /76   Pulse 88   Temp 98.7  F (37.1  C) (Oral)   Wt 85.2 kg (187 lb 14.4 oz)   LMP 01/01/2004 (Approximate)   SpO2 99%   BMI 30.35 kg/m    Body mass index is 30.35 kg/m .  GENERAL: healthy, alert and no distress  EYES: Eyes grossly normal to inspection, PERRL and conjunctivae and sclerae normal  HENT: normal cephalic/atraumatic, ear canals and TM's normal, nose and mouth without ulcers or lesions, oropharynx clear, oral mucous membranes moist and sinuses: maxillary tenderness on bilateral  NECK: no adenopathy, no asymmetry, masses, or scars and thyroid normal to palpation  RESP: lungs clear to auscultation - no rales, rhonchi or wheezes  CV: regular rate and rhythm, normal S1 S2, no S3 or S4, no murmur,  click or rub, no peripheral edema and peripheral pulses strong  MS: no gross musculoskeletal defects noted, no edema    Diagnostic Test Results:  none     ASSESSMENT/PLAN:             1. Acute non-recurrent maxillary sinusitis  New problem, given breast cancer and recent treatment, will go ahead and treat with Z sumit for infection.  Recommend rest, fluids and OTCS.  F/U if symptoms worsen or do not improve.  - azithromycin (ZITHROMAX) 250 MG tablet; Two tablets first day, then one tablet daily for four days.  Dispense: 6 tablet; Refill: 0    2. 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 in female, estrogen receptor positive (H)  Chronic issue, followed by oncology.      Risks, benefits and alternatives were discussed with patient. Agreeable to the plan of care.      Rocío Donahue PA-C  [facility]

## 2019-04-30 DIAGNOSIS — Z17.0 MALIGNANT NEOPLASM OF OVERLAPPING SITES OF RIGHT BREAST IN FEMALE, ESTROGEN RECEPTOR POSITIVE (H): ICD-10-CM

## 2019-04-30 DIAGNOSIS — C50.811 MALIGNANT NEOPLASM OF OVERLAPPING SITES OF RIGHT BREAST IN FEMALE, ESTROGEN RECEPTOR POSITIVE (H): ICD-10-CM

## 2019-04-30 RX ORDER — LETROZOLE 2.5 MG/1
1 TABLET, FILM COATED ORAL DAILY
Qty: 90 TABLET | Refills: 0 | Status: ON HOLD | OUTPATIENT
Start: 2019-04-30 | End: 2019-05-13

## 2019-05-02 RX ORDER — LETROZOLE 2.5 MG/1
TABLET, FILM COATED ORAL
Qty: 90 TABLET | Refills: 0 | Status: SHIPPED | OUTPATIENT
Start: 2019-05-02 | End: 2019-07-26

## 2019-05-07 ENCOUNTER — HOSPITAL ENCOUNTER (OUTPATIENT)
Dept: CT IMAGING | Facility: CLINIC | Age: 64
End: 2019-05-07
Attending: INTERNAL MEDICINE | Admitting: INTERNAL MEDICINE
Payer: COMMERCIAL

## 2019-05-07 ENCOUNTER — HOSPITAL ENCOUNTER (OUTPATIENT)
Facility: CLINIC | Age: 64
Discharge: HOME | End: 2019-05-07
Attending: INTERNAL MEDICINE | Admitting: INTERNAL MEDICINE
Payer: COMMERCIAL

## 2019-05-07 DIAGNOSIS — C80.1 CARCINOMA OF UNKNOWN ORIGIN (H): ICD-10-CM

## 2019-05-07 PROCEDURE — 71260 CT THORAX DX C+: CPT

## 2019-05-07 PROCEDURE — 74177 CT ABD & PELVIS W/CONTRAST: CPT

## 2019-05-07 PROCEDURE — 70491 CT SOFT TISSUE NECK W/DYE: CPT

## 2019-05-07 PROCEDURE — 25000125 ZZHC RX 250: Performed by: INTERNAL MEDICINE

## 2019-05-07 PROCEDURE — 40000863 ZZH STATISTIC RADIOLOGY XRAY, US, CT, MAR, NM

## 2019-05-07 PROCEDURE — 25000128 H RX IP 250 OP 636: Performed by: INTERNAL MEDICINE

## 2019-05-07 PROCEDURE — 25000128 H RX IP 250 OP 636: Performed by: RADIOLOGY

## 2019-05-07 RX ORDER — MULTIPLE VITAMINS W/ MINERALS TAB 9MG-400MCG
1 TAB ORAL EVERY MORNING
COMMUNITY

## 2019-05-07 RX ORDER — HEPARIN SODIUM (PORCINE) LOCK FLUSH IV SOLN 100 UNIT/ML 100 UNIT/ML
5 SOLUTION INTRAVENOUS
Status: DISCONTINUED | OUTPATIENT
Start: 2019-05-07 | End: 2019-05-07 | Stop reason: HOSPADM

## 2019-05-07 RX ORDER — IOPAMIDOL 755 MG/ML
125 INJECTION, SOLUTION INTRAVASCULAR ONCE
Status: COMPLETED | OUTPATIENT
Start: 2019-05-07 | End: 2019-05-07

## 2019-05-07 RX ADMIN — IOPAMIDOL 125 ML: 755 INJECTION, SOLUTION INTRAVENOUS at 10:26

## 2019-05-07 RX ADMIN — SODIUM CHLORIDE 70 ML: 9 INJECTION, SOLUTION INTRAVENOUS at 10:27

## 2019-05-07 RX ADMIN — HEPARIN SODIUM (PORCINE) LOCK FLUSH IV SOLN 100 UNIT/ML 5 ML: 100 SOLUTION at 10:33

## 2019-05-07 NOTE — PROGRESS NOTES
Required Care Suites assessment completed.  Power port accessed per protocol, using sterile procedure, for CT abd/pelvis.  Tolerated well.  Denies pain.       1035  Port de-accessed per protocol.  Bandaid applied to site.  Instructed patient to schedule next port flush on 6/7/19.  Patient verbalized understanding.

## 2019-05-08 ENCOUNTER — ONCOLOGY VISIT (OUTPATIENT)
Dept: ONCOLOGY | Facility: CLINIC | Age: 64
End: 2019-05-08
Attending: INTERNAL MEDICINE
Payer: COMMERCIAL

## 2019-05-08 VITALS
DIASTOLIC BLOOD PRESSURE: 83 MMHG | SYSTOLIC BLOOD PRESSURE: 141 MMHG | TEMPERATURE: 98.6 F | WEIGHT: 175 LBS | OXYGEN SATURATION: 100 % | HEIGHT: 66 IN | HEART RATE: 85 BPM | BODY MASS INDEX: 28.12 KG/M2 | RESPIRATION RATE: 16 BRPM

## 2019-05-08 DIAGNOSIS — Z17.0 MALIGNANT NEOPLASM OF OVERLAPPING SITES OF RIGHT BREAST IN FEMALE, ESTROGEN RECEPTOR POSITIVE (H): Primary | ICD-10-CM

## 2019-05-08 DIAGNOSIS — C50.811 MALIGNANT NEOPLASM OF OVERLAPPING SITES OF RIGHT BREAST IN FEMALE, ESTROGEN RECEPTOR POSITIVE (H): Primary | ICD-10-CM

## 2019-05-08 PROCEDURE — 99213 OFFICE O/P EST LOW 20 MIN: CPT | Performed by: INTERNAL MEDICINE

## 2019-05-08 PROCEDURE — G0463 HOSPITAL OUTPT CLINIC VISIT: HCPCS

## 2019-05-08 ASSESSMENT — PAIN SCALES - GENERAL: PAINLEVEL: NO PAIN (0)

## 2019-05-08 ASSESSMENT — MIFFLIN-ST. JEOR: SCORE: 1365.22

## 2019-05-08 NOTE — LETTER
"    5/8/2019         RE: Flor Griffin  [address] [facility] [address]        Dear Colleague,    Thank you for referring your patient, Flor Griffin, to the [facility]. Please see a copy of my visit note below.    Oncology Rooming Note    May 8, 2019 1:00 PM   Flor Griffin is a 63 year old female who presents for:    Chief Complaint   Patient presents with     Oncology Clinic Visit     Initial Vitals: /83   Pulse 85   Temp 98.6  F (37  C) (Oral)   Resp 16   Ht 1.676 m (5' 5.98\")   Wt 79.4 kg (175 lb)   LMP 01/01/2004 (Approximate)   SpO2 100%   BMI 28.26 kg/m    Estimated body mass index is 28.26 kg/m  as calculated from the following:    Height as of this encounter: 1.676 m (5' 5.98\").    Weight as of this encounter: 79.4 kg (175 lb). Body surface area is 1.92 meters squared.  No Pain (0) Comment: Data Unavailable   Patient's last menstrual period was 01/01/2004 (approximate).  Allergies reviewed: Yes  Medications reviewed: Yes    Medications: Medication refills not needed today.  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  Initially she was seen 11/22/2017 after she had the right supraclavicular lymph node biopsy which was positive for poorly differentiated adenocarcinoma.  She had this lymph node almost a month and had an excisional biopsy performed which was consistent with the diagnosis of cancer.   A pet CT scan 11/27/2017 showed hypermetabolic right supraclavicular, right axillary and subpectoral adenopathy.  Otherwise, no distant metastatic disease. Mammogram 11/30/17- negative    TREATMENT: Carboplatin and Taxol " "completed 4 cycles. 3/7/2018 started dose dense AC. Completed 4 cycles.   Radiation to right breast, right axilla, right supraclavicular area between 5/29/18-7/31/18 9/2018 started Femara       SUBJECTIVE:  The patient was seen as a followup today. She has been feeling well. No new symptoms.     REVIEW OF SYSTEMS:  A complete review of systems was performed and found to be negative other than pertinent positives mentioned in history of present illness.      Past medical, social histories reviewed.     Meds- Reviewed.     PHYSICAL EXAMINATION:   VITAL SIGNS:/83   Pulse 85   Temp 98.6  F (37  C) (Oral)   Resp 16   Ht 1.676 m (5' 5.98\")   Wt 79.4 kg (175 lb)   LMP 01/01/2004 (Approximate)   SpO2 100%   BMI 28.26 kg/m     CONSTITUTIONAL: Sitting comfortably.   HEENT: Pupils are equal. Oropharynx is clear.   NECK: No cervical or supraclavicular lymphadenopathy.   RESPIRATORY: Clear bilaterally.   CARD/VASC: S1, S2, regular.   GI: Soft, nontender, nondistended, no hepatosplenomegaly.   MUSKULOSKELETAL: Warm, well perfused.   NEUROLOGIC: Alert, awake.   INTEGUMENT: No rash.   LYMPHATICS: No edema.   PSYCH: Anxious     LABORATORY DATA AND IMAGING REVIEWED DURING THIS VISIT:  Recent Labs   Lab Test 04/30/18  1150 04/23/18  0951    141   POTASSIUM 4.0 4.0   CHLORIDE 101 108   CO2 26 26   ANIONGAP 8 7   BUN 20 14   CR 0.62 0.61   * 96   EDILSON 8.8 8.7     Recent Labs   Lab Test 05/03/18  1005 04/30/18  1150 04/23/18  0921   WBC 3.0* 0.3* 3.2*   HGB 7.3* 7.8* 8.0*   PLT 61* 79* 308   MCV 92 93 95   NEUTROPHIL 81.0 3.0 62.7     Recent Labs   Lab Test 04/30/18  1150 04/23/18  0951 04/09/18  0955   BILITOTAL 0.7 0.2 0.6   ALKPHOS 106 101 134   ALT 30 27 30   AST 13 20 21   ALBUMIN 3.4 2.9* 3.6         Results for orders placed or performed during the hospital encounter of 05/07/19   CT Soft tissue neck w contrast*    Narrative    CT OF THE NECK WITH CONTRAST  5/7/2019 10:48 AM     COMPARISON: Soft tissue " neck CT 1/31/2019.    HISTORY: Follow up breast cancer. Carcinoma of unknown origin (H).    TECHNIQUE:  Axial CT images of the neck were acquired after the  intravenous administration of  125 mL Isovue-370 nonionic iodinated  contrast material. Coronal reconstructions were created.    FINDINGS: The 3-4 mm lymph node in the level III left neck (series 12,  image 48) is again noted without change in size or shape. There is no  cervical lymphadenopathy. There are no fluid collections or abscesses  in the neck. The salivary glands remain unremarkable. There is no  evidence for mucosal space lesion. The thyroid gland is unremarkable.  The lung apices are clear. The paranasal sinuses are well aerated.  There is no mastoiditis.      Impression    IMPRESSION: Normal soft tissue neck CT.    Radiation dose for this scan was reduced using automated exposure  control, adjustment of the mA and/or kV according to patient size, or  iterative reconstruction technique.    JOSE CARLOS FLORES MD        ECOG performance status 0     ASSESSMENT:  This is a 63-year-old lady who has diagnosis of 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  Tumor cells were positive for CK7, CK5/6 and GATA3.  We did tumor marker testing and she had CA 27-29 at the level of 12,  of 16, CA 19-9 at 19.  Her CEA was less than 0.5. PET CT scan results was done 11/27/2017.  showed hypermetabolic right supraclavicular, right axillary and subpectoral adenopathy.  There was no evidence of distant metastases.      The distribution of adenopathy indicated possibility breast cancer which had metastasized to the regional lymph nodes and we are not able to find a primary on physical exam and a PET scan. Mammogram 11/30/17 no evidence of primary. Her-2 non amplified. Androgen receptor 10-20%. ER 1-5%. CO < 1%. ENT examination by Dr. Miller was negative. EGD was negative as well. MRI breast 12/20/17 no evidence of  malignancy in the breasts.    She started chemotherapy carboplatin/Taxol. Completed 4 cycles. PET Scan 2/20/18 showed good response to treatment. She completed dose dense AC for 4 cycles. PET Scan 5/2/18 showed overall response to previous areas of disease. There was a new area in left parapharyngeal wall which appeared to be a necrotic or reactive lymph node. She was seen by ENT colleagues for assessment of this area. Follow up recommended.   She has completed radiation.    She was continued on Femara.    PET CT 12/4/18 Scan showed a new hypermetabolic left cervical lymph node, it is small-0.8 cm. There are stable right supraclavicular lymph nodes with some increase in FDG uptake. The lymph node in the neck was small and likely not amenable to biopsy. Her disease had indolent behavior and I did not see any urgency to start systemic treatment at that point other than continuing Femara.   - She was seen by Dr. Abreu for second opinion. Dr. Ho thought it might not be breast primary and mentioned possibility of sweat gland tumor.     - CT Scan 5/7/19 results were reviewed with the patient. No evidence of recurrence.      PLAN:   1- Continue Femara  2- Calcium vitamin D and weight bearing exercise  3- RTC MD 3-4 months. CT scan neck and CT chest abdomen and pelvis before next visit  4- Schedule for port removal    YANI MAGANA MD    5/8/2019     cc: Waylon Liu MD       Again, thank you for allowing me to participate in the care of your patient.        Sincerely,        Yani Magana MD

## 2019-05-08 NOTE — PROGRESS NOTES
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  Initially she was seen 11/22/2017 after she had the right supraclavicular lymph node biopsy which was positive for poorly differentiated adenocarcinoma.  She had this lymph node almost a month and had an excisional biopsy performed which was consistent with the diagnosis of cancer.   A pet CT scan 11/27/2017 showed hypermetabolic right supraclavicular, right axillary and subpectoral adenopathy.  Otherwise, no distant metastatic disease. Mammogram 11/30/17- negative    TREATMENT: Carboplatin and Taxol completed 4 cycles. 3/7/2018 started dose dense AC. Completed 4 cycles.   Radiation to right breast, right axilla, right supraclavicular area between 5/29/18-7/31/18 9/2018 started Femara       SUBJECTIVE:  The patient was seen as a followup today. She has been feeling well. No new symptoms.     REVIEW OF SYSTEMS:  A complete review of systems was performed and found to be negative other than pertinent positives mentioned in history of present illness.      Past medical, social histories reviewed.     Meds- Reviewed.     PHYSICAL EXAMINATION:   VITAL SIGNS:/83   Pulse 85   Temp 98.6  F (37  C) (Oral)   Resp 16   Ht 1.676 m (5' 5.98\")   Wt 79.4 kg (175 lb)   LMP 01/01/2004 (Approximate)   SpO2 100%   BMI 28.26 kg/m    CONSTITUTIONAL: Sitting comfortably.   HEENT: Pupils are equal. Oropharynx is clear.   NECK: No cervical or supraclavicular lymphadenopathy.   RESPIRATORY: Clear bilaterally.   CARD/VASC: S1, S2, regular.   GI: Soft, nontender, nondistended, no hepatosplenomegaly.   MUSKULOSKELETAL: Warm, well perfused.   NEUROLOGIC: Alert, awake.   INTEGUMENT: No rash.   LYMPHATICS: No edema.   PSYCH: Anxious     LABORATORY DATA AND IMAGING REVIEWED DURING THIS VISIT:  Recent Labs   Lab Test 04/30/18  1150 " 04/23/18  0951    141   POTASSIUM 4.0 4.0   CHLORIDE 101 108   CO2 26 26   ANIONGAP 8 7   BUN 20 14   CR 0.62 0.61   * 96   EDILSON 8.8 8.7     Recent Labs   Lab Test 05/03/18  1005 04/30/18  1150 04/23/18  0921   WBC 3.0* 0.3* 3.2*   HGB 7.3* 7.8* 8.0*   PLT 61* 79* 308   MCV 92 93 95   NEUTROPHIL 81.0 3.0 62.7     Recent Labs   Lab Test 04/30/18  1150 04/23/18  0951 04/09/18  0955   BILITOTAL 0.7 0.2 0.6   ALKPHOS 106 101 134   ALT 30 27 30   AST 13 20 21   ALBUMIN 3.4 2.9* 3.6         Results for orders placed or performed during the hospital encounter of 05/07/19   CT Soft tissue neck w contrast*    Narrative    CT OF THE NECK WITH CONTRAST  5/7/2019 10:48 AM     COMPARISON: Soft tissue neck CT 1/31/2019.    HISTORY: Follow up breast cancer. Carcinoma of unknown origin (H).    TECHNIQUE:  Axial CT images of the neck were acquired after the  intravenous administration of  125 mL Isovue-370 nonionic iodinated  contrast material. Coronal reconstructions were created.    FINDINGS: The 3-4 mm lymph node in the level III left neck (series 12,  image 48) is again noted without change in size or shape. There is no  cervical lymphadenopathy. There are no fluid collections or abscesses  in the neck. The salivary glands remain unremarkable. There is no  evidence for mucosal space lesion. The thyroid gland is unremarkable.  The lung apices are clear. The paranasal sinuses are well aerated.  There is no mastoiditis.      Impression    IMPRESSION: Normal soft tissue neck CT.    Radiation dose for this scan was reduced using automated exposure  control, adjustment of the mA and/or kV according to patient size, or  iterative reconstruction technique.    JOSE CARLOS FLORES MD        ECOG performance status 0     ASSESSMENT:  This is a 63-year-old lady who has diagnosis of 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  Tumor cells were positive for CK7, CK5/6 and GATA3.  We did  tumor marker testing and she had CA 27-29 at the level of 12,  of 16, CA 19-9 at 19.  Her CEA was less than 0.5. PET CT scan results was done 11/27/2017.  showed hypermetabolic right supraclavicular, right axillary and subpectoral adenopathy.  There was no evidence of distant metastases.      The distribution of adenopathy indicated possibility breast cancer which had metastasized to the regional lymph nodes and we are not able to find a primary on physical exam and a PET scan. Mammogram 11/30/17 no evidence of primary. Her-2 non amplified. Androgen receptor 10-20%. ER 1-5%. NV < 1%. ENT examination by Dr. Miller was negative. EGD was negative as well. MRI breast 12/20/17 no evidence of malignancy in the breasts.    She started chemotherapy carboplatin/Taxol. Completed 4 cycles. PET Scan 2/20/18 showed good response to treatment. She completed dose dense AC for 4 cycles. PET Scan 5/2/18 showed overall response to previous areas of disease. There was a new area in left parapharyngeal wall which appeared to be a necrotic or reactive lymph node. She was seen by ENT colleagues for assessment of this area. Follow up recommended.   She has completed radiation.    She was continued on Femara.    PET CT 12/4/18 Scan showed a new hypermetabolic left cervical lymph node, it is small-0.8 cm. There are stable right supraclavicular lymph nodes with some increase in FDG uptake. The lymph node in the neck was small and likely not amenable to biopsy. Her disease had indolent behavior and I did not see any urgency to start systemic treatment at that point other than continuing Femara.   - She was seen by Dr. Abreu for second opinion. Dr. Ho thought it might not be breast primary and mentioned possibility of sweat gland tumor.     - CT Scan 5/7/19 results were reviewed with the patient. No evidence of recurrence.      PLAN:   1- Continue Femara  2- Calcium vitamin D and weight bearing exercise  3- RTC MD 3-4 months. CT scan  neck and CT chest abdomen and pelvis before next visit  4- Schedule for port removal    ERON BARNES MD    5/8/2019     cc: Waylon Liu MD

## 2019-05-08 NOTE — PATIENT INSTRUCTIONS
1- Continue Femara  2- Calcium vitamin D and weight bearing exercise  3- RTC MD 3-4 months. CT scan neck and CT chest abdomen and pelvis before next visit  4- Schedule for port removal

## 2019-05-08 NOTE — PROGRESS NOTES
"Oncology Rooming Note    May 8, 2019 1:00 PM   Flor Griffin is a 63 year old female who presents for:    Chief Complaint   Patient presents with     Oncology Clinic Visit     Initial Vitals: /83   Pulse 85   Temp 98.6  F (37  C) (Oral)   Resp 16   Ht 1.676 m (5' 5.98\")   Wt 79.4 kg (175 lb)   LMP 01/01/2004 (Approximate)   SpO2 100%   BMI 28.26 kg/m   Estimated body mass index is 28.26 kg/m  as calculated from the following:    Height as of this encounter: 1.676 m (5' 5.98\").    Weight as of this encounter: 79.4 kg (175 lb). Body surface area is 1.92 meters squared.  No Pain (0) Comment: Data Unavailable   Patient's last menstrual period was 01/01/2004 (approximate).  Allergies reviewed: Yes  Medications reviewed: Yes    Medications: Medication refills not needed today.  Pharmacy name entered into [employer]: [id number] - [place] [phone]    Clinical concerns: no      Miriam Kovacs, [place]            "

## 2019-05-13 ENCOUNTER — APPOINTMENT (OUTPATIENT)
Dept: INTERVENTIONAL RADIOLOGY/VASCULAR | Facility: CLINIC | Age: 64
End: 2019-05-13
Attending: INTERNAL MEDICINE
Payer: COMMERCIAL

## 2019-05-13 ENCOUNTER — HOSPITAL ENCOUNTER (OUTPATIENT)
Facility: CLINIC | Age: 64
Discharge: HOME | End: 2019-05-13
Attending: RADIOLOGY | Admitting: RADIOLOGY
Payer: COMMERCIAL

## 2019-05-13 VITALS
DIASTOLIC BLOOD PRESSURE: 57 MMHG | SYSTOLIC BLOOD PRESSURE: 95 MMHG | OXYGEN SATURATION: 95 % | TEMPERATURE: 98 F | HEART RATE: 71 BPM | RESPIRATION RATE: 10 BRPM

## 2019-05-13 DIAGNOSIS — C50.811 MALIGNANT NEOPLASM OF OVERLAPPING SITES OF RIGHT BREAST IN FEMALE, ESTROGEN RECEPTOR POSITIVE (H): ICD-10-CM

## 2019-05-13 DIAGNOSIS — Z17.0 MALIGNANT NEOPLASM OF OVERLAPPING SITES OF RIGHT BREAST IN FEMALE, ESTROGEN RECEPTOR POSITIVE (H): ICD-10-CM

## 2019-05-13 LAB
APTT PPP: 25 SEC (ref 22–37)
ERYTHROCYTE [DISTWIDTH] IN BLOOD BY AUTOMATED COUNT: 13.7 % (ref 10–15)
HCT VFR BLD AUTO: 38.6 % (ref 35–47)
HGB BLD-MCNC: 12.8 G/DL (ref 11.7–15.7)
INR PPP: 0.98 (ref 0.86–1.14)
MCH RBC QN AUTO: 32.5 PG (ref 26.5–33)
MCHC RBC AUTO-ENTMCNC: 33.2 G/DL (ref 31.5–36.5)
MCV RBC AUTO: 98 FL (ref 78–100)
PLATELET # BLD AUTO: 206 10E9/L (ref 150–450)
RBC # BLD AUTO: 3.94 10E12/L (ref 3.8–5.2)
WBC # BLD AUTO: 2.9 10E9/L (ref 4–11)

## 2019-05-13 PROCEDURE — 36415 COLL VENOUS BLD VENIPUNCTURE: CPT

## 2019-05-13 PROCEDURE — 76937 US GUIDE VASCULAR ACCESS: CPT

## 2019-05-13 PROCEDURE — 85027 COMPLETE CBC AUTOMATED: CPT | Performed by: RADIOLOGY

## 2019-05-13 PROCEDURE — 27211193 ZZ H WOUND GLUE CR1

## 2019-05-13 PROCEDURE — 40000854 ZZH STATISTIC SIMPLE TUBE INSERTION/CHARGE, PORT, CATH, FISTULOGRAM

## 2019-05-13 PROCEDURE — 25000128 H RX IP 250 OP 636: Performed by: NURSE PRACTITIONER

## 2019-05-13 PROCEDURE — 25000125 ZZHC RX 250

## 2019-05-13 PROCEDURE — 85730 THROMBOPLASTIN TIME PARTIAL: CPT | Performed by: RADIOLOGY

## 2019-05-13 PROCEDURE — 25000128 H RX IP 250 OP 636

## 2019-05-13 PROCEDURE — 25000125 ZZHC RX 250: Performed by: RADIOLOGY

## 2019-05-13 PROCEDURE — 36415 COLL VENOUS BLD VENIPUNCTURE: CPT | Performed by: RADIOLOGY

## 2019-05-13 PROCEDURE — 27210886 ZZH ACCESSORY CR5

## 2019-05-13 PROCEDURE — 27210905 ZZH KIT CR7

## 2019-05-13 PROCEDURE — 85610 PROTHROMBIN TIME: CPT | Performed by: RADIOLOGY

## 2019-05-13 RX ORDER — ACETAMINOPHEN 325 MG/1
650-975 TABLET ORAL
Status: CANCELLED | OUTPATIENT
Start: 2019-05-13

## 2019-05-13 RX ORDER — NALOXONE HYDROCHLORIDE 0.4 MG/ML
.1-.4 INJECTION, SOLUTION INTRAMUSCULAR; INTRAVENOUS; SUBCUTANEOUS
Status: DISCONTINUED | OUTPATIENT
Start: 2019-05-13 | End: 2019-05-13 | Stop reason: HOSPADM

## 2019-05-13 RX ORDER — FLUMAZENIL 0.1 MG/ML
0.2 INJECTION, SOLUTION INTRAVENOUS
Status: DISCONTINUED | OUTPATIENT
Start: 2019-05-13 | End: 2019-05-13 | Stop reason: HOSPADM

## 2019-05-13 RX ORDER — CLINDAMYCIN PHOSPHATE 900 MG/50ML
900 INJECTION, SOLUTION INTRAVENOUS
Status: COMPLETED | OUTPATIENT
Start: 2019-05-13 | End: 2019-05-13

## 2019-05-13 RX ORDER — LIDOCAINE 40 MG/G
CREAM TOPICAL
Status: DISCONTINUED | OUTPATIENT
Start: 2019-05-13 | End: 2019-05-13 | Stop reason: HOSPADM

## 2019-05-13 RX ORDER — DEXTROSE MONOHYDRATE 25 G/50ML
25-50 INJECTION, SOLUTION INTRAVENOUS
Status: DISCONTINUED | OUTPATIENT
Start: 2019-05-13 | End: 2019-05-13 | Stop reason: HOSPADM

## 2019-05-13 RX ORDER — LIDOCAINE HYDROCHLORIDE 10 MG/ML
INJECTION, SOLUTION INFILTRATION; PERINEURAL
Status: COMPLETED
Start: 2019-05-13 | End: 2019-05-13

## 2019-05-13 RX ORDER — FENTANYL CITRATE 50 UG/ML
INJECTION, SOLUTION INTRAMUSCULAR; INTRAVENOUS
Status: COMPLETED
Start: 2019-05-13 | End: 2019-05-13

## 2019-05-13 RX ORDER — NICOTINE POLACRILEX 4 MG
15-30 LOZENGE BUCCAL
Status: DISCONTINUED | OUTPATIENT
Start: 2019-05-13 | End: 2019-05-13 | Stop reason: HOSPADM

## 2019-05-13 RX ORDER — FENTANYL CITRATE 50 UG/ML
25-50 INJECTION, SOLUTION INTRAMUSCULAR; INTRAVENOUS EVERY 5 MIN PRN
Status: DISCONTINUED | OUTPATIENT
Start: 2019-05-13 | End: 2019-05-13 | Stop reason: HOSPADM

## 2019-05-13 RX ADMIN — MIDAZOLAM HYDROCHLORIDE 1 MG: 1 INJECTION, SOLUTION INTRAMUSCULAR; INTRAVENOUS at 09:57

## 2019-05-13 RX ADMIN — MIDAZOLAM HYDROCHLORIDE 1 MG: 1 INJECTION, SOLUTION INTRAMUSCULAR; INTRAVENOUS at 10:03

## 2019-05-13 RX ADMIN — FENTANYL CITRATE 50 MCG: 50 INJECTION, SOLUTION INTRAMUSCULAR; INTRAVENOUS at 09:57

## 2019-05-13 RX ADMIN — CLINDAMYCIN PHOSPHATE 900 MG: 900 INJECTION, SOLUTION INTRAVENOUS at 09:35

## 2019-05-13 RX ADMIN — FENTANYL CITRATE 50 MCG: 50 INJECTION, SOLUTION INTRAMUSCULAR; INTRAVENOUS at 10:03

## 2019-05-13 RX ADMIN — LIDOCAINE HYDROCHLORIDE 10 ML: 10 INJECTION, SOLUTION INFILTRATION; PERINEURAL at 10:16

## 2019-05-13 NOTE — PROGRESS NOTES
Interventional Radiology Pre-Procedure Sedation Assessment   Time of Assessment: 9:21 AM    Expected Level: Moderate Sedation    Indication: Sedation is required for the following type of Procedure: Port a catheter removal with IV moderate sedation    Sedation and procedural consent: Risks, benefits and alternatives were discussed with Patient    PO Intake: Appropriately NPO for procedure    ASA Class: Class 3 - SEVERE SYSTEMIC DISEASE, DEFINITE FUNCTIONAL LIMITATIONS.    Mallampati: Grade 1:  Soft palate, uvula, tonsillar pillars, and posterior pharyngeal wall visible    Lungs: Lungs Clear with good breath sounds bilaterally    Heart: Normal heart sounds and rate    History and physical reviewed and no updates needed. I have reviewed the lab findings, diagnostic data, medications, and the plan for sedation. I have determined this patient to be an appropriate candidate for the planned sedation and procedure and have reassessed the patient IMMEDIATELY PRIOR to sedation and procedure.    Katja Rendon, HEDY CNP

## 2019-05-13 NOTE — PROGRESS NOTES
Interventional Radiology Intra-procedural Nursing Note    Patient Name: Flor Griffin  Medical Record Number: [id number]  Today's Date: May 13, 2019    Start Time: 1000  End of procedure time: 1011  Procedure: Left Port Removal  Report given to: Cristian PARK  Time pt departs:  1020    Other Notes: Patient into IR suite 1 via cart with cleocin infusing to left PIV. Patient to table in supine position, prepped and draped with 2% chlorhexidine to left neck/chest. VSS, sinus rhythm. Dr. Scott in room, timeout and procedure started. Port removed without any issues. Versed 2 mg and fentanyl 100 mcg given. Debrief with Dr. Scott, no complications. Dressing clean, dry and intact. Patient back to care suites. Report given to XAVIER.    Juju Perez RN

## 2019-05-13 NOTE — PROCEDURES
RADIOLOGY POST PROCEDURE NOTE w/ SEDATION  Patient name: Flor Griffin  MRN: [id number]  : 1955    Pre-procedure diagnosis: adenocarcinoma  Post-procedure diagnosis: Same    Procedure Date/Time: May 13, 2019  10:13 AM  Procedure: Chest port removal  Estimated blood loss: None  Specimen(s) collected with description: none    I determined this patient to be an appropriate candidate for the planned sedation and procedure and reassessed the patient IMMEDIATELY PRIOR to sedation and procedure.     The patient tolerated the procedure well with no immediate complications.  Significant findings:none    See imaging dictation for procedural details.    Provider name: Baldo Scott  Assistant(s):None

## 2019-05-13 NOTE — DISCHARGE SUMMARY
Patient discharged to home by wheelchair at 11:15 AM 05/13/19.  Medication regimen discussed with patient and patient verbalizes understanding.  Diet and activity and wound care discussed with patient.  Upcoming appointments reviewed.  No questions at this time.

## 2019-05-13 NOTE — DISCHARGE INSTRUCTIONS
Port Removal Discharge Instructions     After you go home:      Have an adult stay with you for the first 6 hours    You may resume your normal diet       For 24 hours - due to the sedation you received:    Relax and take it easy    Do NOT make any important or legal decisions    Do NOT drive or operate machines at home or at work    Do NOT drink alcohol    Care of Puncture Site:      Keep the dressings on your site clean & dry for 3 days. Change it only if it gets wet or dirty.    You may shower after the dressing comes off in 3 days    Do not remove the small white strips of tape, if present. Allow them to fall off on their own.     You may cover the wound with a bandaid after the dressing is removed if needed for comfort      Activity       Avoid heavy lifting (greater than 10 pounds) or the overuse of your shoulder for 3 days    Bleeding:      If you start bleeding from the incision site in your chest or have swelling in your neck, sit down and press on the site for 5-10 minutes.     If bleeding has not stopped after 10 minutes, call your provider.        Call 911 right away if you have heavy bleeding or bleeding that does not stop.      Medicines:      You may resume all medications    Resume your Aspirin tomorrow at your regular dose    For minor pain, you may take Acetaminophen (Tylenol) or Ibuprofen (Advil)          Call the provider who ordered this procedure if:      You have swelling in your neck or over your port site    The incision area is red, swollen, hot or tender    You have chills or a fever greater than 101 F (38 C)    Any questions or concerns    Call  911 or go to the Emergency Room if you have:      Severe chest pain or trouble breathing    Bleeding that you cannot control    If you have questions call:          [facility] @ [phone]

## 2019-05-13 NOTE — PROGRESS NOTES
INR 0.98  PTT 25  Consent yet to be signed.    IV started    Patient took 81 mg aspirin today.  Patient is accompanied by Joe -  [phone]  Pre procedure plan of care reviewed with patient prior to procedure. All questions answered and patient verbalizes acceptance of plan.

## 2019-05-17 ENCOUNTER — OFFICE VISIT (OUTPATIENT)
Dept: FAMILY MEDICINE | Facility: CLINIC | Age: 64
End: 2019-05-17
Payer: COMMERCIAL

## 2019-05-17 VITALS
WEIGHT: 182.8 LBS | OXYGEN SATURATION: 98 % | HEART RATE: 78 BPM | RESPIRATION RATE: 16 BRPM | DIASTOLIC BLOOD PRESSURE: 74 MMHG | BODY MASS INDEX: 29.38 KG/M2 | SYSTOLIC BLOOD PRESSURE: 124 MMHG | HEIGHT: 66 IN | TEMPERATURE: 97.8 F

## 2019-05-17 DIAGNOSIS — Z17.0 MALIGNANT NEOPLASM OF OVERLAPPING SITES OF RIGHT BREAST IN FEMALE, ESTROGEN RECEPTOR POSITIVE (H): ICD-10-CM

## 2019-05-17 DIAGNOSIS — J01.00 ACUTE NON-RECURRENT MAXILLARY SINUSITIS: Primary | ICD-10-CM

## 2019-05-17 DIAGNOSIS — C50.811 MALIGNANT NEOPLASM OF OVERLAPPING SITES OF RIGHT BREAST IN FEMALE, ESTROGEN RECEPTOR POSITIVE (H): ICD-10-CM

## 2019-05-17 PROCEDURE — 99213 OFFICE O/P EST LOW 20 MIN: CPT | Performed by: PHYSICIAN ASSISTANT

## 2019-05-17 RX ORDER — AZITHROMYCIN 250 MG/1
TABLET, FILM COATED ORAL
Qty: 6 TABLET | Refills: 0 | Status: SHIPPED | OUTPATIENT
Start: 2019-05-17 | End: 2019-06-05

## 2019-05-17 ASSESSMENT — MIFFLIN-ST. JEOR: SCORE: 1400.93

## 2019-05-17 NOTE — PROGRESS NOTES
SUBJECTIVE:   Flor Griffin is a 63 year old female who presents to clinic today for the following   health issues:      RESPIRATORY SYMPTOMS      Duration: 8 days    Description  nasal congestion, rhinorrhea, facial pain/pressure, cough, chills, headache, fatigue/malaise, myalgias and feeling warm (unsure if fever, didn't take temp)    Severity: moderate    Accompanying signs and symptoms: None    History (predisposing factors):  Neighbor and friend both have URI symptoms    Precipitating or alleviating factors: meds helped a little bit    Therapies tried and outcome:  oral decongestant, ibuprofen and sudafed; they helped a little bit    Patient is here today for evaluation of sinus congestion  She notes this is ongoing for 8 days- 9 days  + headache, facial pressure, fatigue  No known temp  No ear pain, sore throat or cough  Using OTC without relief    Additional history: as documented    Reviewed  and updated as needed this visit by clinical staff  Tobacco  Allergies  Meds  Med Hx  Surg Hx  Fam Hx  Soc Hx        Reviewed and updated as needed this visit by Provider         Patient Active Problem List   Diagnosis     Hyperlipidemia LDL goal <160     Heartburn     Vitamin D deficiency     BCC (basal cell carcinoma), face     Cold sore     Overweight     Carcinoma of unknown origin (H)     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 in female, estrogen receptor positive (H)     Port catheter in place     Chemotherapy-induced neutropenia (H)     Anemia     Past Surgical History:   Procedure Laterality Date     BIOPSY MASS NECK Right 11/16/2017    Procedure: BIOPSY MASS NECK;  Excisional Biopsy Right Neck Lymph node;  Surgeon: Waylon Corral MD;  Location: [facility] OR     COLONOSCOPY       IR PORT REMOVAL LEFT  5/13/2019     MOHS MICROGRAPHIC PROCEDURE  ~2010    right cheek; BCC     MOHS MICROGRAPHIC PROCEDURE  10/31/2016    Dr. Nicholas [facility]       Social History     Tobacco Use     Smoking status:  "Never Smoker     Smokeless tobacco: Never Used   Substance Use Topics     Alcohol use: Yes     Alcohol/week: 0.0 oz     Comment: 4 times a week, 2 drinks     Family History   Problem Relation Age of Onset     Lupus Mother      Heart Disease Mother         CHF     Coronary Artery Disease Mother      Hyperlipidemia Mother      Diabetes Father      Breast Cancer Other          Current Outpatient Medications   Medication Sig Dispense Refill     aspirin 81 MG tablet Take 81 mg by mouth daily       azithromycin (ZITHROMAX) 250 MG tablet Two tablets first day, then one tablet daily for four days. 6 tablet 0     Ibuprofen (ADVIL PO) Take 400 mg by mouth every 6 hours as needed for moderate pain       letrozole (FEMARA) 2.5 MG tablet TAKE 1 TABLET BY MOUTH DAILY 90 tablet 0     multivitamin w/minerals (THERA-VIT-M) tablet Take 1 tablet by mouth daily       Omega-3 Fatty Acids (OMEGA-3 FISH OIL PO) Take 1 g by mouth daily       Allergies   Allergen Reactions     Penicillins Hives       ROS:  Constitutional, HEENT, cardiovascular, pulmonary, gi and gu systems are negative, except as otherwise noted.    OBJECTIVE:     /74 (BP Location: Right arm, Patient Position: Chair, Cuff Size: Adult Regular)   Pulse 78   Temp 97.8  F (36.6  C) (Oral)   Resp 16   Ht 1.676 m (5' 6\")   Wt 82.9 kg (182 lb 12.8 oz)   LMP 01/01/2004 (Approximate)   SpO2 98%   Breastfeeding? No   BMI 29.50 kg/m    Body mass index is 29.5 kg/m .  GENERAL: healthy, alert and no distress  EYES: Eyes grossly normal to inspection, PERRL and conjunctivae and sclerae normal  HENT: normal cephalic/atraumatic, ear canals and TM's normal, nose and mouth without ulcers or lesions, oropharynx clear, oral mucous membranes moist and sinuses: maxillary tenderness on bilateral  NECK: no adenopathy, no asymmetry, masses, or scars and thyroid normal to palpation  RESP: lungs clear to auscultation - no rales, rhonchi or wheezes  CV: regular rate and rhythm, normal S1 " S2, no S3 or S4, no murmur, click or rub, no peripheral edema and peripheral pulses strong  MS: no gross musculoskeletal defects noted, no edema    Diagnostic Test Results:  none     ASSESSMENT/PLAN:             1. Acute non-recurrent maxillary sinusitis  New problem, given recent breast cancer and treatment, will treat with Z sumit.  Recommend rest, fluids and OTCs.  F/U if symptoms worsen or do not improve.  - azithromycin (ZITHROMAX) 250 MG tablet; Two tablets first day, then one tablet daily for four days.  Dispense: 6 tablet; Refill: 0    2. 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 in female, estrogen receptor positive (H)  Followed by oncology.      Risks, benefits and alternatives were discussed with patient. Agreeable to the plan of care.      Rocío Donahue PA-C  [facility]

## 2019-05-17 NOTE — NURSING NOTE
HM: patient will talk to oncology about if she needs mammo.  Scheduled fasting px with pap for 7/1/19.  Alfonso Cervantes CMA ([facility])

## 2019-06-05 ENCOUNTER — OFFICE VISIT (OUTPATIENT)
Dept: FAMILY MEDICINE | Facility: CLINIC | Age: 64
End: 2019-06-05
Payer: COMMERCIAL

## 2019-06-05 VITALS
SYSTOLIC BLOOD PRESSURE: 104 MMHG | HEART RATE: 64 BPM | RESPIRATION RATE: 16 BRPM | BODY MASS INDEX: 29.25 KG/M2 | OXYGEN SATURATION: 99 % | HEIGHT: 66 IN | DIASTOLIC BLOOD PRESSURE: 62 MMHG | WEIGHT: 182 LBS | TEMPERATURE: 98.3 F

## 2019-06-05 DIAGNOSIS — M79.674 PAIN OF TOE OF RIGHT FOOT: Primary | ICD-10-CM

## 2019-06-05 LAB
ERYTHROCYTE [DISTWIDTH] IN BLOOD BY AUTOMATED COUNT: 13.6 % (ref 10–15)
HCT VFR BLD AUTO: 37 % (ref 35–47)
HGB BLD-MCNC: 12.2 G/DL (ref 11.7–15.7)
MCH RBC QN AUTO: 32.5 PG (ref 26.5–33)
MCHC RBC AUTO-ENTMCNC: 33 G/DL (ref 31.5–36.5)
MCV RBC AUTO: 99 FL (ref 78–100)
PLATELET # BLD AUTO: 235 10E9/L (ref 150–450)
RBC # BLD AUTO: 3.75 10E12/L (ref 3.8–5.2)
WBC # BLD AUTO: 3.8 10E9/L (ref 4–11)

## 2019-06-05 PROCEDURE — 36415 COLL VENOUS BLD VENIPUNCTURE: CPT | Performed by: PHYSICIAN ASSISTANT

## 2019-06-05 PROCEDURE — 99213 OFFICE O/P EST LOW 20 MIN: CPT | Performed by: PHYSICIAN ASSISTANT

## 2019-06-05 PROCEDURE — 85027 COMPLETE CBC AUTOMATED: CPT | Performed by: PHYSICIAN ASSISTANT

## 2019-06-05 PROCEDURE — 84550 ASSAY OF BLOOD/URIC ACID: CPT | Performed by: PHYSICIAN ASSISTANT

## 2019-06-05 ASSESSMENT — MIFFLIN-ST. JEOR: SCORE: 1392.3

## 2019-06-05 NOTE — NURSING NOTE
HM: fasting px with pap scheduled 7/1/19. Will talk to oncology about need for mammo.  Alfonso Cervantes CMA ([facility])

## 2019-06-05 NOTE — PROGRESS NOTES
Subjective     Flor Griffin is a 64 year old female who presents to clinic today for the following health issues:    HPI   Musculoskeletal problem/pain      Duration: ongoing at least 1 month    Description  Location: right big toe and top of right foot near big toe    Intensity:  moderate    Accompanying signs and symptoms: swelling, redness, pain    History  Previous similar problem: no   Previous evaluation:  none    Precipitating or alleviating factors:  Trauma or overuse: no   Aggravating factors include: walking    Therapies tried and outcome: has tried wearing 2 pairs of socks, wearing better shoes has not helped, ibuprofen has helped    Patient is here today complaining of left big toe/foot pain  Ongoing since winter she thinks  Has pain with walking  Notes swollen and red  Patient is wondering is Femara is causing joint pain, started in January  Also wondering if maybe is gout, has not had a hx  Treating with ice and ibuprofen  No injury    Patient Active Problem List   Diagnosis     Hyperlipidemia LDL goal <160     Heartburn     Vitamin D deficiency     BCC (basal cell carcinoma), face     Cold sore     Overweight     Carcinoma of unknown origin (H)     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 in female, estrogen receptor positive (H)     Port catheter in place     Chemotherapy-induced neutropenia (H)     Anemia     Past Surgical History:   Procedure Laterality Date     BIOPSY MASS NECK Right 11/16/2017    Procedure: BIOPSY MASS NECK;  Excisional Biopsy Right Neck Lymph node;  Surgeon: Waylon Corral MD;  Location: [facility] OR     COLONOSCOPY       IR PORT REMOVAL LEFT  5/13/2019     MOHS MICROGRAPHIC PROCEDURE  ~2010    right cheek; BCC     MOHS MICROGRAPHIC PROCEDURE  10/31/2016    Dr. Nicholas [facility]       Social History     Tobacco Use     Smoking status: Never Smoker     Smokeless tobacco: Never Used   Substance Use Topics     Alcohol use: Yes     Alcohol/week: 0.0 oz     Comment: 4 times a  "week, 2 drinks     Family History   Problem Relation Age of Onset     Lupus Mother      Heart Disease Mother         CHF     Coronary Artery Disease Mother      Hyperlipidemia Mother      Diabetes Father      Breast Cancer Other          Current Outpatient Medications   Medication Sig Dispense Refill     aspirin 81 MG tablet Take 81 mg by mouth daily       Ibuprofen (ADVIL PO) Take 400 mg by mouth every 6 hours as needed for moderate pain       letrozole (FEMARA) 2.5 MG tablet TAKE 1 TABLET BY MOUTH DAILY 90 tablet 0     multivitamin w/minerals (THERA-VIT-M) tablet Take 1 tablet by mouth daily       Omega-3 Fatty Acids (OMEGA-3 FISH OIL PO) Take 1 g by mouth daily       Allergies   Allergen Reactions     Penicillins Hives       Reviewed and updated as needed this visit by Provider         Review of Systems   ROS COMP: Constitutional, HEENT, cardiovascular, pulmonary, gi and gu systems are negative, except as otherwise noted.      Objective    /62 (BP Location: Right arm, Patient Position: Chair, Cuff Size: Adult Regular)   Pulse 64   Temp 98.3  F (36.8  C) (Oral)   Resp 16   Ht 1.676 m (5' 6\")   Wt 82.6 kg (182 lb)   LMP 01/01/2004 (Approximate)   SpO2 99%   Breastfeeding? No   BMI 29.38 kg/m    Body mass index is 29.38 kg/m .  Physical Exam   GENERAL: healthy, alert and no distress  NECK: no adenopathy, no asymmetry, masses, or scars and thyroid normal to palpation  RESP: lungs clear to auscultation - no rales, rhonchi or wheezes  CV: regular rate and rhythm, normal S1 S2, no S3 or S4, no murmur, click or rub, no peripheral edema and peripheral pulses strong  MS: left foot: 1st joint at MTJ is slightly erythematous and swollen, tender to touch    Diagnostic Test Results:  none         Assessment & Plan     1. Pain of toe of right foot  New problem, highly suspicious for gout. Check CBC and Uric Acid today. Recommend conservative management until labs back, may consider podiatry referral in future.  - " CBC with platelets  - Uric acid    Risks, benefits and alternatives were discussed with patient. Agreeable to the plan of care.      Return in about 1 week (around 6/12/2019) for If symptoms worsen or fail to improve.    Rocío Donahue PA-C  [facility]

## 2019-06-06 LAB — URATE SERPL-MCNC: 5.5 MG/DL (ref 2.6–6)

## 2019-06-10 ENCOUNTER — TELEPHONE (OUTPATIENT)
Dept: FAMILY MEDICINE | Facility: CLINIC | Age: 64
End: 2019-06-10

## 2019-06-10 NOTE — TELEPHONE ENCOUNTER
Please notify patient:     Labs do not reflect gout or an infection, recommend she follow up with podiatry.   Referral placed.     Rocío Donahue PA-C

## 2019-06-10 NOTE — TELEPHONE ENCOUNTER
1st attempt, LM for patient to call back.  Please notify of lab results and schedule patient with podiatry when she calls back.  Alfonso Cervantes CMA ([facility])

## 2019-06-11 NOTE — TELEPHONE ENCOUNTER
Pt called back. Told her results and she said she seems to be doing better and she will call back to schedule if need.

## 2019-06-26 ENCOUNTER — OFFICE VISIT (OUTPATIENT)
Dept: PODIATRY | Facility: CLINIC | Age: 64
End: 2019-06-26
Payer: COMMERCIAL

## 2019-06-26 ENCOUNTER — ANCILLARY PROCEDURE (OUTPATIENT)
Dept: GENERAL RADIOLOGY | Facility: CLINIC | Age: 64
End: 2019-06-26
Attending: PODIATRIST
Payer: COMMERCIAL

## 2019-06-26 VITALS
HEIGHT: 66 IN | WEIGHT: 181 LBS | SYSTOLIC BLOOD PRESSURE: 112 MMHG | BODY MASS INDEX: 29.09 KG/M2 | DIASTOLIC BLOOD PRESSURE: 60 MMHG

## 2019-06-26 DIAGNOSIS — M79.671 RIGHT FOOT PAIN: ICD-10-CM

## 2019-06-26 DIAGNOSIS — M20.5X1 HALLUX LIMITUS OF RIGHT FOOT: ICD-10-CM

## 2019-06-26 DIAGNOSIS — M79.671 RIGHT FOOT PAIN: Primary | ICD-10-CM

## 2019-06-26 DIAGNOSIS — M19.071 PRIMARY OSTEOARTHRITIS OF RIGHT FOOT: ICD-10-CM

## 2019-06-26 PROCEDURE — 99243 OFF/OP CNSLTJ NEW/EST LOW 30: CPT | Performed by: PODIATRIST

## 2019-06-26 PROCEDURE — 73630 X-RAY EXAM OF FOOT: CPT | Mod: RT

## 2019-06-26 ASSESSMENT — MIFFLIN-ST. JEOR: SCORE: 1387.76

## 2019-06-26 NOTE — LETTER
6/26/2019         RE: Flor Griffin  [address] [address] [facility] [id number]        Dear Colleague,    Thank you for referring your patient, Flor Griffin, to the [facility]. Please see a copy of my visit note below.    PATIENT HISTORY:  Rocío Donahue PA-C requested I see this patient for their foot issue.  Flor Griffin is a 64 year old female who presents to clinic for  Pain to right great toe. She notes that she has had cancer and takes a medication that causes joint pain and wondering if that is what is causing the pain to the toe. Has been going on for 3-4 months. Worse with activity. Currently 7/10.  Denies injury. Wondering what can be done for it.     Review of Systems:  Patient denies fever, chills, rash, wound,  numbness, weakness, heart burn, blood in stool, chest pain with activity, calf pain when walking, shortness of breath with activity, chronic cough, easy bleeding/bruising, swelling of ankles, excessive thirst, fatigue, depression, anxiety.  Patient admits to stiffness and limping at times.     PAST MEDICAL HISTORY:   Past Medical History:   Diagnosis Date     Basal cell cancer     right cheek     Gastroesophageal reflux disease      History of blood transfusion     blue baby     Hyperlipidaemia         PAST SURGICAL HISTORY:   Past Surgical History:   Procedure Laterality Date     BIOPSY MASS NECK Right 11/16/2017    Procedure: BIOPSY MASS NECK;  Excisional Biopsy Right Neck Lymph node;  Surgeon: Waylon Corral MD;  Location: [facility] OR     COLONOSCOPY       IR PORT REMOVAL LEFT  5/13/2019     MOHS MICROGRAPHIC PROCEDURE  ~2010    right cheek; BCC     MOHS MICROGRAPHIC PROCEDURE  10/31/2016    Dr. Nicholas [facility]        MEDICATIONS:   Current Outpatient Medications:      aspirin 81 MG tablet, Take 81 mg by mouth daily, Disp: , Rfl:      Ibuprofen (ADVIL PO), Take 400 mg by mouth every 6 hours as needed for moderate pain, Disp: , Rfl:      letrozole (FEMARA) 2.5 MG  tablet, TAKE 1 TABLET BY MOUTH DAILY, Disp: 90 tablet, Rfl: 0     multivitamin w/minerals (THERA-VIT-M) tablet, Take 1 tablet by mouth daily, Disp: , Rfl:      Omega-3 Fatty Acids (OMEGA-3 FISH OIL PO), Take 1 g by mouth daily, Disp: , Rfl:      ALLERGIES:    Allergies   Allergen Reactions     Penicillins Hives        SOCIAL HISTORY:   Social History     Socioeconomic History     Marital status:      Spouse name: Not on file     Number of children: Not on file     Years of education: Not on file     Highest education level: Not on file   Occupational History     Not on file   Social Needs     Financial resource strain: Not on file     Food insecurity:     Worry: Not on file     Inability: Not on file     Transportation needs:     Medical: Not on file     Non-medical: Not on file   Tobacco Use     Smoking status: Never Smoker     Smokeless tobacco: Never Used   Substance and Sexual Activity     Alcohol use: Yes     Alcohol/week: 0.0 oz     Comment: 4 times a week, 2 drinks     Drug use: No     Sexual activity: Yes     Partners: Male   Lifestyle     Physical activity:     Days per week: Not on file     Minutes per session: Not on file     Stress: Not on file   Relationships     Social connections:     Talks on phone: Not on file     Gets together: Not on file     Attends Restorationism service: Not on file     Active member of club or organization: Not on file     Attends meetings of clubs or organizations: Not on file     Relationship status: Not on file     Intimate partner violence:     Fear of current or ex partner: Not on file     Emotionally abused: Not on file     Physically abused: Not on file     Forced sexual activity: Not on file   Other Topics Concern     Parent/sibling w/ CABG, MI or angioplasty before 65F 55M? No   Social History Narrative     Not on file        FAMILY HISTORY:   Family History   Problem Relation Age of Onset     Lupus Mother      Heart Disease Mother         CHF     Coronary Artery  "Disease Mother      Hyperlipidemia Mother      Diabetes Father      Breast Cancer Other         EXAM:Vitals: /60   Ht 1.676 m (5' 6\")   Wt 82.1 kg (181 lb)   LMP 01/01/2004 (Approximate)   BMI 29.21 kg/m     BMI= Body mass index is 29.21 kg/m .    General appearance: Patient is alert and fully cooperative with history & exam.  No sign of distress is noted during the visit.     Psychiatric: Affect is pleasant & appropriate.  Patient appears motivated to improve health.     Respiratory: Breathing is regular & unlabored while sitting.     HEENT: Hearing is intact to spoken word.  Speech is clear.  No gross evidence of visual impairment that would impact ambulation.     Dermatologic: Skin is intact to both lower extremities without significant lesions, rash or abrasion.  No paronychia or evidence of soft tissue infection is noted.     Vascular: DP & PT pulses are intact & regular bilaterally.  No significant edema or varicosities noted.  CFT and skin temperature is normal to both lower extremities.     Neurologic: Lower extremity sensation is intact to light touch.  No evidence of weakness or contracture in the lower extremities.  No evidence of neuropathy.     Musculoskeletal: Patient is ambulatory without assistive device or brace.  Decrease range of motion of the right great toe with some pain and crepitus.     Radiographs:  I personally reviewed the xrays. Degenerative changes to right 1st metatarsal phalangeal joint.      ASSESSMENT:    Right foot pain  Hallux limitus of right foot  Primary osteoarthritis of right foot     PLAN:  Reviewed patient's chart in [facility]. Reviewed xrays.  Reviewed and discussed causes of hallux limitus with patient.  Explained that it is a progressive arthritis meaning that over time, there is decrease in the joint space as well as bony spurring that occurs which leads to pain in the big toe especially with bending motions of the big toe joint.    Discussed treatment options with " patient including rigid soled shoes or orthotics that are stiff under the big toe that help to prevent motion at that joint which is leading to pain and inflammation.  We discussed that sometimes cortisone injections can help with the pain or physical therapy treatments such as ultrasound.  Discussed that this is normally a structural issue in the foot and if conservative therapy doesn't work, surgery is considered.    We discussed that depending on the quality of the cartilage and/or of the joint determines if patient will need a joint sparing or fusion procedure.  Discussed that this can usually not be determined by x-ray and is an intra- op position.  With joint sparing procedure, and implant is placed in the joint to try to help keep the range of motion at that the toe. Patient is normally minimally weight bearing in a cam boot for 3 weeks.  With fusion, patient is normally non weight bearing for 2 weeks, then minimal weight bearing for 4 weeks in boot.     Talked about arthritis and treatments including orthotics, injections, icing, NSAIDS, bracing, physical therapy, compounding pain cream, or surgical intervention.    She is going to try topical pain cream , warm foot soaks, stiffer shoes. Will call if she would like injection.        Ariana Bhardwaj DPM, Podiatry/Foot and Ankle Surgery    Weight management plan: Patient was referred to their PCP to discuss a diet and exercise plan.      Again, thank you for allowing me to participate in the care of your patient.        Sincerely,        Ariana Bhardwaj DPM, Podiatry/Foot and Ankle Surgery

## 2019-06-26 NOTE — PATIENT INSTRUCTIONS
"Thank you for choosing [facility]!    DR. BARBER'S CLINIC SCHEDULE  MONDAY AM - [facility] TUESDAY - [place]   [address] [address] [place] [address] [place] [address]   [phone] / [phone] / [phone]       WEDNESDAY - [place] FRIDAY AM - [facility]   [address]   [place] [address] [place] [address]   [phone] / [phone]       FRIDAY PM - [place] SCHEDULE SURGERY: [phone]   [address] BILLING QUESTIONS: [phone]   [place] [address] AFTER HOURS: [phone] / [phone] APPOINTMENTS: [phone]     [employer] (CPL) [phone]     DEGENERATIVE ARTHRITIS OF THE BIG TOE JOINT   (hallux limitus/hallux rigidus)   Arthritis of the joint at the base of the big toe (metatarsophalangeal joint) has several causes. Usually it results from repetitive trauma to the joint, secondary to abnormal foot mechanics. Often it is hereditary. However, a one-time traumatic event can lead to arthritis. The condition doesn't improve with time, and even with treatment, can worsen. The cartilage wears out, joint surfaces are no longer smooth, bone rubs on bone, inflammation occurs with pain, and eventually bone spurs and loose fragments might develop.   The joint is often painful with activity, worse with flimsy shoes or walking barefoot, and it slowly progresses over time. A person might notice the toe \"locking up\" with walking. There often is an obvious, and irritating, bony bump on top of the foot. Shoes might be uncomfortable. In some people the pain is so bothersome that recreational activities sometimes even normal daily activities are difficult to perform.   The pain from this arthritis is likely a combination of joint jamming, cartilage loss and inflammation, and irritation from shoes rubbing on the bump. Sometimes other parts of the " foot, leg, or back hurt from altering one's walk to compensate for the painful joint.     Ways to help a person live with the discomfort include wearing a good, supportive shoe with a rigid, rocker-type bottom. An example is a hiking boot. A rigid sole minimizes bending of the joint, and therefore, joint motion and pain. Shoes with a high toe box allow for less rubbing on the bump. Avoiding barefoot walking, sandals, flip-flops and slippers usually helps.   Sometimes an insert or orthotic provides symptom relief. This might make shoe fit more difficult. Pads over the bump and occasionally injections into the joint provide relief.   Surgery for this condition is aimed towards alleviating pain. It does not cure the arthritis nor does it guarantee better joint motion. Depending on the condition of the metatarsophalangeal joint, there are several surgiqal options:    1.  Cutting off the bony bump(s) and cleaning the joint    2.  Loosening the joint up by making cuts in the first metatarsal bone or the big toe bone and removing a small section of bone.    3.  Repositioning bone to minimize jamming of the joint.    4.  In severe cases, the joint is fused. By fusing the joint, it will never bend again. This resolves the pain, because it's the movement of a worn out joint that causes pain. Oftentimes the operation involves a combination of these procedures and. requires the use of screws, pins, and/or a small surgical plate.     Healing after surgery requires about six weeks of protection. This allows the bone to heal. Maximum recovery takes about one year. The scar tissue and joint structures require this amount of time to finish the healing process. Expect stiffness, swelling and numbness during that time frame.   Surgery for arthritis of the metatarsophalangeal joint does involve side effects. Some side effects are predictable and others are less common but do occur. A scar will be visible and could be irritated by  shoes. The shoe may rub on the screw or internal pin requiring surgical removal of these fixation devices. The screw and pin would likely be left in place for a full year. The first toe may remain stiff after surgery. The amount of stiffness is variable. Most people never regain normal motion of the first toe. This is due to scar tissue inherent to any surgery, in addition to the cumUlative effects of arthritis. Sometimes the big toe drifts to one side or the other. Joint fusion is one option to correct an unstable, drifting toe. This procedure straightens the toe, however, no motion remains.   All surgical procedures involve risk of infection, numbness, pain, delayed bone healing, osteotomy (bone cut) dislocation, blood clots, continued foot pain, etc. Arthritic joint surgery is quite complex and should not be taken lightly.    Any skin incision can lead to infection. Deep infection might involve the bone and thus repeat surgery and six weeks of IV antibiotics. Scar tissue can cause nerve pain or numbness. his is generally temporary but can be permanent. We do not have treatments that cure nerve problems. Second toe pain could be related to altered mechanics and pressure transferred to the second toe. Delayed bone healing would lengthen the healing time. Some bones simply do not heal. This requires repeat surgery, electronic bone stimulation and/or extended protection. Smokers have an approximate 20% chance of poor bone healing. This is double that of a non-smoker. The bone cut may displace. This may need to be repaired with a second operation. Displacement can cause joint malalignment. Immobility after surgery can cause a blood clot in the legs and lungs. This could result in death.   Foot pain is complex. Most feet hurt for more than one reason. Operating on the arthritic   big toe joint will not necessarily create a pain free foot. Appropriate shoes, healthy body weight, avoidance of bare foot walking and  moderation of activity will always be   necessary to enjoy foot comfort. Arthritis is incurable even with surgery.     Surgery for this type of arthritis is nevertheless quite successful. Most surgical patients are pleased with their foot following surgery. Many of the issues described above can be controlled by taking proper care of your foot during the healing process.   Cosmetic bump surgery is discouraged for the reasons listed above. A bump and joint that is comfortable when wearing appropriate shoes should simply be treated with appropriate shoes.   Your surgeon would be happy to fully describe any of the above issues. You should pursue a full understanding of the operation, recovery process and any potential problems that could develop.     OSTEOARTHRITIS OF THE FOOT & ANKLE  Osteoarthritis is a condition characterized by the breakdown and eventual loss of cartilage in one or more joints. Cartilage (the connective tissue found at the end of the bones in the joints) protects and cushions the bones during movement. When cartilage deteriorates or is lost, symptoms develop that can restrict one s ability to easily perform daily activities.  Osteoarthritis is also known as degenerative arthritis, reflecting its nature to develop as part of the aging process. As the most common form of arthritis, osteoarthritis affects millions of Americans. Some people refer to osteoarthritis simply as arthritis, even though there are many different types of arthritis.  Osteoarthritis appears at various joints throughout the body, including the hands, feet, spine, hips, and knees. In the foot, the disease most frequently occurs in the big toe, although it is also often found in the midfoot and ankle.  CAUSES  Osteoarthritis is considered a  wear and tear  disease because the cartilage in the joint wears down with repeated stress and use over time. As the cartilage deteriorates and gets thinner, the bones lose their protective  covering and eventually may rub together, causing pain and inflammation of the joint.  An injury may also lead to osteoarthritis, although it may take months or years after the injury for the condition to develop. For example, osteoarthritis in the big toe is often caused by kicking or jamming the toe, or by dropping something on the toe. Osteoarthritis in the midfoot is often caused by dropping something on it, or by a sprain or fracture. In the ankle, osteoarthritis is usually caused by a fracture and occasionally by a severe sprain.  Sometimes osteoarthritis develops as a result of abnormal foot mechanics such as flat feet or high arches. A flat foot causes less stability in the ligaments (bands of tissue that connect bones), resulting in excessive strain on the joints, which can cause arthritis. A high arch is rigid and lacks mobility, causing a jamming of joints that creates an increased risk of arthritis.  SYMPTOMS  People with osteoarthritis in the foot or ankle experience, in varying degrees, one or more of the following: Pain and stiffness in the joint, swelling in or near the joint, or difficulty walking or bending the joint.   Some patients with osteoarthritis also develop a bone spur (a bony protrusion) at the affected joint. Shoe pressure may cause pain at the site of a bone spur, and in some cases blisters or calluses may form over its surface. Bone spurs can also limit the movement of the joint.    DIAGNOSIS  In diagnosing osteoarthritis, the foot and ankle surgeon will examine the foot thoroughly, looking for swelling in the joint, limited mobility, and pain with movement. In some cases, deformity and/or enlargement (spur) of the joint may be noted. X-rays may be ordered to evaluate the extent of the disease.  NON-SURGICAL TREATMENT  To help relieve symptoms, the surgeon may begin treating osteoarthritis with one or more of the following non-surgical approaches:  Oral medications. Nonsteroidal  anti-inflammatory drugs (NSAIDs), such as ibuprofen, are often helpful in reducing the inflammation and pain. Occasionally a prescription for a steroid medication is needed to adequately reduce symptoms.   Orthotic devices. Custom orthotic devices (shoe inserts) are often prescribed to provide support to improve the foot s mechanics or cushioning to help minimize pain.   Bracing. Bracing, which restricts motion and supports the joint, can reduce pain during walking and help prevent further deformity.   Immobilization. Protecting the foot from movement by wearing a cast or removable cast-boot may be necessary to allow the inflammation to resolve.   Steroid injections. In some cases, steroid injections are applied to the affected joint to deliver anti-inflammatory medication.   Physical therapy. Exercises to strengthen the muscles, especially when the osteoarthritis occurs in the ankle, may give the patient greater stability and help avoid injury that might worsen the condition.   DO I NEED SURGERY?  When osteoarthritis has progressed substantially or failed to improve with non-surgical treatment, surgery may be recommended. In advanced cases, surgery may be the only option. The goal of surgery is to decrease pain and improve function. The foot and ankle surgeon will consider a number of factors when selecting the procedure best suited to the patient s condition and lifestyle.        BODY WEIGHT AND YOUR FEET  The following information is included in the after visit summary for all patients. Body weight can be a sensitive issue to discuss in clinic, but we think the following information is very important. Although we focus on the feet and ankles, we do support the overall health of our patients.     Many things can cause foot and ankle problems. Foot structure, activity level, foot mechanics and injuries are common causes of pain. One very important issue that often goes unmentioned, is body weight. Extra weight can  cause increased stress on muscles, ligaments, bones and tendons. Sometimes just a few extra pounds is all it takes to put one over her/his threshold. Without reducing that stress, it can be difficult to alleviate pain. As Foot & Ankle specialists, our job is addressing the lower extremity problem and possible causes. Regarding extra body weight, we encourage patients to discuss diet and weight management plans with their primary care doctors. It is this team approach that gives you the best opportunity for pain relief and getting you back on your feet.      [facility] has a Comprehensive Weight Management Program. This program includes counseling, education, non-surgical and surgical approaches to weight loss. If you are interested in learning more either talk to you primary care provider or call [phone].

## 2019-06-26 NOTE — PROGRESS NOTES
PATIENT HISTORY:  Rocío Donahue PA-C requested I see this patient for their foot issue.  Flor Griffin is a 64 year old female who presents to clinic for  Pain to right great toe. She notes that she has had cancer and takes a medication that causes joint pain and wondering if that is what is causing the pain to the toe. Has been going on for 3-4 months. Worse with activity. Currently 7/10.  Denies injury. Wondering what can be done for it.     Review of Systems:  Patient denies fever, chills, rash, wound,  numbness, weakness, heart burn, blood in stool, chest pain with activity, calf pain when walking, shortness of breath with activity, chronic cough, easy bleeding/bruising, swelling of ankles, excessive thirst, fatigue, depression, anxiety.  Patient admits to stiffness and limping at times.     PAST MEDICAL HISTORY:   Past Medical History:   Diagnosis Date     Basal cell cancer     right cheek     Gastroesophageal reflux disease      History of blood transfusion     blue baby     Hyperlipidaemia         PAST SURGICAL HISTORY:   Past Surgical History:   Procedure Laterality Date     BIOPSY MASS NECK Right 11/16/2017    Procedure: BIOPSY MASS NECK;  Excisional Biopsy Right Neck Lymph node;  Surgeon: Waylon Corral MD;  Location: [facility] OR     COLONOSCOPY       IR PORT REMOVAL LEFT  5/13/2019     MOHS MICROGRAPHIC PROCEDURE  ~2010    right cheek; [facility]     MOHS MICROGRAPHIC PROCEDURE  10/31/2016    Dr. Nicholas [facility]        MEDICATIONS:   Current Outpatient Medications:      aspirin 81 MG tablet, Take 81 mg by mouth daily, Disp: , Rfl:      Ibuprofen (ADVIL PO), Take 400 mg by mouth every 6 hours as needed for moderate pain, Disp: , Rfl:      letrozole (FEMARA) 2.5 MG tablet, TAKE 1 TABLET BY MOUTH DAILY, Disp: 90 tablet, Rfl: 0     multivitamin w/minerals (THERA-VIT-M) tablet, Take 1 tablet by mouth daily, Disp: , Rfl:      Omega-3 Fatty Acids (OMEGA-3 FISH OIL PO), Take 1 g by mouth daily, Disp: , Rfl:     "  ALLERGIES:    Allergies   Allergen Reactions     Penicillins Hives        SOCIAL HISTORY:   Social History     Socioeconomic History     Marital status:      Spouse name: Not on file     Number of children: Not on file     Years of education: Not on file     Highest education level: Not on file   Occupational History     Not on file   Social Needs     Financial resource strain: Not on file     Food insecurity:     Worry: Not on file     Inability: Not on file     Transportation needs:     Medical: Not on file     Non-medical: Not on file   Tobacco Use     Smoking status: Never Smoker     Smokeless tobacco: Never Used   Substance and Sexual Activity     Alcohol use: Yes     Alcohol/week: 0.0 oz     Comment: 4 times a week, 2 drinks     Drug use: No     Sexual activity: Yes     Partners: Male   Lifestyle     Physical activity:     Days per week: Not on file     Minutes per session: Not on file     Stress: Not on file   Relationships     Social connections:     Talks on phone: Not on file     Gets together: Not on file     Attends [employer]ical service: Not on file     Active member of club or organization: Not on file     Attends meetings of clubs or organizations: Not on file     Relationship status: Not on file     Intimate partner violence:     Fear of current or ex partner: Not on file     Emotionally abused: Not on file     Physically abused: Not on file     Forced sexual activity: Not on file   Other Topics Concern     Parent/sibling w/ CABG, MI or angioplasty before 65F 55M? No   Social History Narrative     Not on file        FAMILY HISTORY:   Family History   Problem Relation Age of Onset     Lupus Mother      Heart Disease Mother         CHF     Coronary Artery Disease Mother      Hyperlipidemia Mother      Diabetes Father      Breast Cancer Other         EXAM:Vitals: /60   Ht 1.676 m (5' 6\")   Wt 82.1 kg (181 lb)   LMP 01/01/2004 (Approximate)   BMI 29.21 kg/m    BMI= Body mass index is 29.21 " kg/m .    General appearance: Patient is alert and fully cooperative with history & exam.  No sign of distress is noted during the visit.     Psychiatric: Affect is pleasant & appropriate.  Patient appears motivated to improve health.     Respiratory: Breathing is regular & unlabored while sitting.     HEENT: Hearing is intact to spoken word.  Speech is clear.  No gross evidence of visual impairment that would impact ambulation.     Dermatologic: Skin is intact to both lower extremities without significant lesions, rash or abrasion.  No paronychia or evidence of soft tissue infection is noted.     Vascular: DP & PT pulses are intact & regular bilaterally.  No significant edema or varicosities noted.  CFT and skin temperature is normal to both lower extremities.     Neurologic: Lower extremity sensation is intact to light touch.  No evidence of weakness or contracture in the lower extremities.  No evidence of neuropathy.     Musculoskeletal: Patient is ambulatory without assistive device or brace.  Decrease range of motion of the right great toe with some pain and crepitus.     Radiographs:  I personally reviewed the xrays. Degenerative changes to right 1st metatarsal phalangeal joint.      ASSESSMENT:    Right foot pain  Hallux limitus of right foot  Primary osteoarthritis of right foot     PLAN:  Reviewed patient's chart in [facility]. Reviewed xrays.  Reviewed and discussed causes of hallux limitus with patient.  Explained that it is a progressive arthritis meaning that over time, there is decrease in the joint space as well as bony spurring that occurs which leads to pain in the big toe especially with bending motions of the big toe joint.    Discussed treatment options with patient including rigid soled shoes or orthotics that are stiff under the big toe that help to prevent motion at that joint which is leading to pain and inflammation.  We discussed that sometimes cortisone injections can help with the pain or  physical therapy treatments such as ultrasound.  Discussed that this is normally a structural issue in the foot and if conservative therapy doesn't work, surgery is considered.    We discussed that depending on the quality of the cartilage and/or of the joint determines if patient will need a joint sparing or fusion procedure.  Discussed that this can usually not be determined by x-ray and is an intra- op position.  With joint sparing procedure, and implant is placed in the joint to try to help keep the range of motion at that the toe. Patient is normally minimally weight bearing in a cam boot for 3 weeks.  With fusion, patient is normally non weight bearing for 2 weeks, then minimal weight bearing for 4 weeks in boot.     Talked about arthritis and treatments including orthotics, injections, icing, NSAIDS, bracing, physical therapy, compounding pain cream, or surgical intervention.    She is going to try topical pain cream , warm foot soaks, stiffer shoes. Will call if she would like injection.        Ariana Bhardwaj DPM, Podiatry/Foot and Ankle Surgery    Weight management plan: Patient was referred to their PCP to discuss a diet and exercise plan.

## 2019-06-28 NOTE — PROGRESS NOTES
SUBJECTIVE:   CC: Flor Griffin is an 64 year old woman who presents for preventive health visit.     Patient is fasting: Yes  Patient unsure if mammo needed, has PET scan in Fall 2019    Healthy Habits:     Getting at least 3 servings of Calcium per day:  Yes    Bi-annual eye exam:  NO    Dental care twice a year:  NO    Sleep apnea or symptoms of sleep apnea:  None    Diet:  Regular (no restrictions)    Frequency of exercise:  6-7 days/week    Duration of exercise:  45-60 minutes    Taking medications regularly:  Yes    Barriers to taking medications:  None    Medication side effects:  Not applicable    PHQ-2 Total Score: 3    Additional concerns today:  No    Today's PHQ-2 Score:   PHQ-2 ( 1999 [employer]) 7/1/2019   Q1: Little interest or pleasure in doing things 3   Q2: Feeling down, depressed or hopeless 0   PHQ-2 Score 3   Q1: Little interest or pleasure in doing things Nearly every day   Q2: Feeling down, depressed or hopeless Not at all   PHQ-2 Score 3     Abuse: Current or Past(Physical, Sexual or Emotional)- No  Do you feel safe in your environment? Yes    Social History     Tobacco Use     Smoking status: Never Smoker     Smokeless tobacco: Never Used   Substance Use Topics     Alcohol use: Yes     Alcohol/week: 0.0 oz     Comment: 4 times a week, 2 drinks       Alcohol Use 7/1/2019   Prescreen: >3 drinks/day or >7 drinks/week? No   Prescreen: >3 drinks/day or >7 drinks/week? -   Answers for HPI/ROS submitted by the patient on 7/1/2019   Annual Exam:  PHQ9 TOTAL SCORE: 0      Reviewed orders with patient.  Reviewed health maintenance and updated orders accordingly - Yes  Patient Active Problem List   Diagnosis     Hyperlipidemia LDL goal <160     Heartburn     Vitamin D deficiency     BCC (basal cell carcinoma), face     Cold sore     Overweight     Carcinoma of unknown origin (H)     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 in female, estrogen receptor positive (H)     Port catheter in  place     Chemotherapy-induced neutropenia (H)     Anemia     Past Surgical History:   Procedure Laterality Date     BIOPSY MASS NECK Right 11/16/2017    Procedure: BIOPSY MASS NECK;  Excisional Biopsy Right Neck Lymph node;  Surgeon: Waylon Corral MD;  Location: [facility] OR     COLONOSCOPY       IR PORT REMOVAL LEFT  5/13/2019     MOHS MICROGRAPHIC PROCEDURE  ~2010    right cheek; BCC     MOHS MICROGRAPHIC PROCEDURE  10/31/2016    Dr. Nicholas [facility]       Social History     Tobacco Use     Smoking status: Never Smoker     Smokeless tobacco: Never Used   Substance Use Topics     Alcohol use: Yes     Alcohol/week: 0.0 oz     Comment: 4 times a week, 2 drinks     Family History   Problem Relation Age of Onset     Lupus Mother      Heart Disease Mother         CHF     Coronary Artery Disease Mother      Hyperlipidemia Mother      Diabetes Father      Breast Cancer Other          Current Outpatient Medications   Medication Sig Dispense Refill     aspirin 81 MG tablet Take 81 mg by mouth daily       Ibuprofen (ADVIL PO) Take 400 mg by mouth every 6 hours as needed for moderate pain       letrozole (FEMARA) 2.5 MG tablet TAKE 1 TABLET BY MOUTH DAILY 90 tablet 0     multivitamin w/minerals (THERA-VIT-M) tablet Take 1 tablet by mouth daily       Omega-3 Fatty Acids (OMEGA-3 FISH OIL PO) Take 1 g by mouth daily       Allergies   Allergen Reactions     Penicillins Hives       Alternate mammogram schedule due to breast cancer history will check with Oncologist to see about need for this, getting PET scan q 4 months    Pertinent mammograms are reviewed under the imaging tab.  History of abnormal Pap smear: NO - age 30-65 PAP every 5 years with negative HPV co-testing recommended  PAP / HPV Latest Ref Rng & Units 3/30/2015   PAP - NIL   HPV 16 DNA NEG Negative   HPV 18 DNA NEG Negative   OTHER HR HPV NEG Negative     Reviewed and updated as needed this visit by clinical staff  Tobacco  Allergies  Meds  Problems  Med Hx  Surg Hx   "Fam Hx  Soc Hx          Reviewed and updated as needed this visit by Provider  Tobacco  Allergies  Meds  Problems  Med Hx  Surg Hx  Fam Hx            Review of Systems   Constitutional: Negative for chills and fever.   HENT: Negative for congestion, ear pain and hearing loss.    Eyes: Negative for pain.   Respiratory: Negative for cough.    Cardiovascular: Negative for chest pain, palpitations and peripheral edema.   Gastrointestinal: Positive for heartburn. Negative for abdominal pain, constipation, diarrhea, hematochezia and nausea.   Genitourinary: Negative for dysuria, frequency, genital sores and hematuria.   Musculoskeletal: Negative for arthralgias, joint swelling and myalgias.   Neurological: Negative for dizziness, headaches and paresthesias.   Psychiatric/Behavioral: Negative for mood changes. The patient is not nervous/anxious.         OBJECTIVE:   /68 (BP Location: Right arm, Patient Position: Chair, Cuff Size: Adult Regular)   Pulse 79   Temp 98  F (36.7  C) (Oral)   Resp 16   Ht 1.676 m (5' 6\")   Wt 82.2 kg (181 lb 4.8 oz)   LMP 01/01/2004 (Approximate)   SpO2 99%   Breastfeeding? No   BMI 29.26 kg/m    Physical Exam  GENERAL APPEARANCE: healthy, alert and no distress  EYES: Eyes grossly normal to inspection, PERRL and conjunctivae and sclerae normal  HENT: ear canals and TM's normal, nose and mouth without ulcers or lesions, oropharynx clear and oral mucous membranes moist  NECK: no adenopathy, no asymmetry, masses, or scars and thyroid normal to palpation  RESP: lungs clear to auscultation - no rales, rhonchi or wheezes  BREAST: normal without masses, tenderness or nipple discharge and no palpable axillary masses or adenopathy  CV: regular rate and rhythm, normal S1 S2, no S3 or S4, no murmur, click or rub, no peripheral edema and peripheral pulses strong  ABDOMEN: soft, nontender, no hepatosplenomegaly, no masses and bowel sounds normal   (female): normal female external " "genitalia, normal urethral meatus, vaginal mucosal atrophy noted, normal cervix, adnexae, and uterus without masses or abnormal discharge  MS: no musculoskeletal defects are noted and gait is age appropriate without ataxia  SKIN: no suspicious lesions or rashes  NEURO: Normal strength and tone, sensory exam grossly normal, mentation intact and speech normal  PSYCH: mentation appears normal and affect normal/bright    Diagnostic Test Results:  none     ASSESSMENT/PLAN:   1. Routine general medical examination at a health care facility  Labs update today.  - Lipid panel reflex to direct LDL Fasting  - Comprehensive metabolic panel  - TSH with free T4 reflex  - CBC with platelets    2. Screening for malignant neoplasm of cervix  Pap updated today, should be last pap.  - Pap imaged thin layer screen with HPV - recommended age 30 - 65 years (select HPV order below)  - HPV High Risk Types DNA Cervical    3. Encounter for immunization  - VACCINE ADMINISTRATION, INITIAL  - SCREENING QUESTIONS FOR ADULT IMMUNIZATIONS  - HC ZOSTER VACCINE RECOMBINANT ADJUVANTED IM NJX    4. 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 in female, estrogen receptor positive (H)  Chronic issue, following with Oncology, will check to see if mammogram needed.      COUNSELING:  Reviewed preventive health counseling, as reflected in patient instructions    Estimated body mass index is 29.26 kg/m  as calculated from the following:    Height as of this encounter: 1.676 m (5' 6\").    Weight as of this encounter: 82.2 kg (181 lb 4.8 oz).    Weight management plan: Discussed healthy diet and exercise guidelines     reports that she has never smoked. She has never used smokeless tobacco.      Counseling Resources:  ATP IV Guidelines  Pooled Cohorts Equation Calculator  Breast Cancer Risk Calculator  FRAX Risk Assessment  ICSI Preventive Guidelines  Dietary Guidelines for Americans, 2010  USDA's MyPlate  ASA Prophylaxis  Lung CA " Screening    Rocío Donahue PA-C  [facility] [place]      Answers for HPI/ROS submitted by the patient on 7/1/2019   Annual Exam:  PHQ9 TOTAL SCORE: 0

## 2019-07-01 ENCOUNTER — OFFICE VISIT (OUTPATIENT)
Dept: FAMILY MEDICINE | Facility: CLINIC | Age: 64
End: 2019-07-01
Payer: COMMERCIAL

## 2019-07-01 ENCOUNTER — PATIENT OUTREACH (OUTPATIENT)
Dept: ONCOLOGY | Facility: CLINIC | Age: 64
End: 2019-07-01

## 2019-07-01 VITALS
BODY MASS INDEX: 29.14 KG/M2 | DIASTOLIC BLOOD PRESSURE: 68 MMHG | OXYGEN SATURATION: 99 % | HEIGHT: 66 IN | RESPIRATION RATE: 16 BRPM | TEMPERATURE: 98 F | HEART RATE: 79 BPM | WEIGHT: 181.3 LBS | SYSTOLIC BLOOD PRESSURE: 114 MMHG

## 2019-07-01 DIAGNOSIS — C50.811 MALIGNANT NEOPLASM OF OVERLAPPING SITES OF RIGHT BREAST IN FEMALE, ESTROGEN RECEPTOR POSITIVE (H): ICD-10-CM

## 2019-07-01 DIAGNOSIS — Z23 ENCOUNTER FOR IMMUNIZATION: ICD-10-CM

## 2019-07-01 DIAGNOSIS — E78.2 MIXED HYPERLIPIDEMIA: ICD-10-CM

## 2019-07-01 DIAGNOSIS — Z17.0 MALIGNANT NEOPLASM OF OVERLAPPING SITES OF RIGHT BREAST IN FEMALE, ESTROGEN RECEPTOR POSITIVE (H): ICD-10-CM

## 2019-07-01 DIAGNOSIS — Z00.00 ROUTINE GENERAL MEDICAL EXAMINATION AT A HEALTH CARE FACILITY: Primary | ICD-10-CM

## 2019-07-01 DIAGNOSIS — Z12.4 SCREENING FOR MALIGNANT NEOPLASM OF CERVIX: ICD-10-CM

## 2019-07-01 LAB
ALBUMIN SERPL-MCNC: 3.6 G/DL (ref 3.4–5)
ALP SERPL-CCNC: 93 U/L (ref 40–150)
ALT SERPL W P-5'-P-CCNC: 41 U/L (ref 0–50)
ANION GAP SERPL CALCULATED.3IONS-SCNC: 8 MMOL/L (ref 3–14)
AST SERPL W P-5'-P-CCNC: 29 U/L (ref 0–45)
BILIRUB SERPL-MCNC: 0.6 MG/DL (ref 0.2–1.3)
BUN SERPL-MCNC: 14 MG/DL (ref 7–30)
CALCIUM SERPL-MCNC: 9.1 MG/DL (ref 8.5–10.1)
CHLORIDE SERPL-SCNC: 107 MMOL/L (ref 94–109)
CHOLEST SERPL-MCNC: 314 MG/DL
CO2 SERPL-SCNC: 25 MMOL/L (ref 20–32)
CREAT SERPL-MCNC: 0.74 MG/DL (ref 0.52–1.04)
ERYTHROCYTE [DISTWIDTH] IN BLOOD BY AUTOMATED COUNT: 13.5 % (ref 10–15)
GFR SERPL CREATININE-BSD FRML MDRD: 86 ML/MIN/{1.73_M2}
GLUCOSE SERPL-MCNC: 99 MG/DL (ref 70–99)
HCT VFR BLD AUTO: 38.4 % (ref 35–47)
HDLC SERPL-MCNC: 55 MG/DL
HGB BLD-MCNC: 12.8 G/DL (ref 11.7–15.7)
LDLC SERPL CALC-MCNC: 212 MG/DL
MCH RBC QN AUTO: 32.6 PG (ref 26.5–33)
MCHC RBC AUTO-ENTMCNC: 33.3 G/DL (ref 31.5–36.5)
MCV RBC AUTO: 98 FL (ref 78–100)
NONHDLC SERPL-MCNC: 259 MG/DL
PLATELET # BLD AUTO: 244 10E9/L (ref 150–450)
POTASSIUM SERPL-SCNC: 4.1 MMOL/L (ref 3.4–5.3)
PROT SERPL-MCNC: 6.6 G/DL (ref 6.8–8.8)
RBC # BLD AUTO: 3.93 10E12/L (ref 3.8–5.2)
SODIUM SERPL-SCNC: 140 MMOL/L (ref 133–144)
T4 FREE SERPL-MCNC: 0.81 NG/DL (ref 0.76–1.46)
TRIGL SERPL-MCNC: 236 MG/DL
TSH SERPL DL<=0.005 MIU/L-ACNC: 4.57 MU/L (ref 0.4–4)
WBC # BLD AUTO: 3.6 10E9/L (ref 4–11)

## 2019-07-01 PROCEDURE — 36415 COLL VENOUS BLD VENIPUNCTURE: CPT | Performed by: PHYSICIAN ASSISTANT

## 2019-07-01 PROCEDURE — 90471 IMMUNIZATION ADMIN: CPT | Performed by: PHYSICIAN ASSISTANT

## 2019-07-01 PROCEDURE — 99396 PREV VISIT EST AGE 40-64: CPT | Mod: 25 | Performed by: PHYSICIAN ASSISTANT

## 2019-07-01 PROCEDURE — 84443 ASSAY THYROID STIM HORMONE: CPT | Performed by: PHYSICIAN ASSISTANT

## 2019-07-01 PROCEDURE — G0123 SCREEN CERV/VAG THIN LAYER: HCPCS | Performed by: PHYSICIAN ASSISTANT

## 2019-07-01 PROCEDURE — 87624 HPV HI-RISK TYP POOLED RSLT: CPT | Performed by: PHYSICIAN ASSISTANT

## 2019-07-01 PROCEDURE — 90750 HZV VACC RECOMBINANT IM: CPT | Performed by: PHYSICIAN ASSISTANT

## 2019-07-01 PROCEDURE — 80053 COMPREHEN METABOLIC PANEL: CPT | Performed by: PHYSICIAN ASSISTANT

## 2019-07-01 PROCEDURE — 80061 LIPID PANEL: CPT | Performed by: PHYSICIAN ASSISTANT

## 2019-07-01 PROCEDURE — 85027 COMPLETE CBC AUTOMATED: CPT | Performed by: PHYSICIAN ASSISTANT

## 2019-07-01 PROCEDURE — 84439 ASSAY OF FREE THYROXINE: CPT | Performed by: PHYSICIAN ASSISTANT

## 2019-07-01 ASSESSMENT — ENCOUNTER SYMPTOMS
ARTHRALGIAS: 0
HEMATURIA: 0
DIZZINESS: 0
MYALGIAS: 0
PARESTHESIAS: 0
NAUSEA: 0
CONSTIPATION: 0
NERVOUS/ANXIOUS: 0
HEADACHES: 0
HEMATOCHEZIA: 0
JOINT SWELLING: 0
ABDOMINAL PAIN: 0
HEARTBURN: 1
CHILLS: 0
DIARRHEA: 0
FREQUENCY: 0
FEVER: 0
PALPITATIONS: 0
DYSURIA: 0
EYE PAIN: 0
COUGH: 0

## 2019-07-01 ASSESSMENT — PATIENT HEALTH QUESTIONNAIRE - PHQ9
SUM OF ALL RESPONSES TO PHQ QUESTIONS 1-9: 0
SUM OF ALL RESPONSES TO PHQ QUESTIONS 1-9: 0

## 2019-07-01 ASSESSMENT — MIFFLIN-ST. JEOR: SCORE: 1389.12

## 2019-07-01 NOTE — LETTER
"July 2, 2019      Flor Griffin  [address] [address] [address]        Dear Ms. Flor Griffin,    We have attached your recent lab results with this letter:    Your total cholesterol is high at 314, please continue exercise and watch diet.  Triglycerides are high at 236, this is simple sugar and fat in blood. HDL which is the \"good\" cholesterol (heart protective)  is good at 55. Increase this with more exercise.  The LDL or \"bad\" cholesterol is at 212. As discussed on the phone, Rocío will have you trial Crestor to see if that helps with your cholesterol.  If you have any side effects, please let us know, otherwise we will call you in one month to see how you are doing.    Your CBC shows no evidence of anemia, your white count is mildly low which is not unexpected.  Your CMP reveals normal kidney function, liver function and electrolytes. Your fasting sugar was normal.  Your thyroid test was mildly high but your active thyroid level is normal, since you are feeling well, we will just monitor this for now.    If you have any questions or concerns please let me know at [phone].      Sincerely,     Rocío Donahue PA-C      "

## 2019-07-01 NOTE — PROGRESS NOTES
Patient called and LVM stating she has a planned PETCT in September and wondering is she will need a mammogram as well.     Will route message.    Michela Parmar

## 2019-07-01 NOTE — LETTER
July 10, 2019      Flor PUMA Griffin  [address] [address]    Dear ,      I am happy to inform you that your cervical cancer screening test (PAP smear) was normal and your Human Papillomavirus (HPV) test was negative.    Per current guidelines, you no longer need to have pap smears completed.     Please continue to be seen every year for an annual wellness visit and other preventative tests.     If you have additional questions regarding this result, please call our registered nurse, Veronica at [phone].    Sincerely,      Rocío Donahue PA-C/elise

## 2019-07-02 RX ORDER — ROSUVASTATIN CALCIUM 20 MG/1
20 TABLET, COATED ORAL DAILY
Qty: 90 TABLET | Refills: 3 | Status: SHIPPED | OUTPATIENT
Start: 2019-07-02 | End: 2020-07-03

## 2019-07-02 ASSESSMENT — PATIENT HEALTH QUESTIONNAIRE - PHQ9: SUM OF ALL RESPONSES TO PHQ QUESTIONS 1-9: 0

## 2019-07-03 LAB
COPATH REPORT: NORMAL
PAP: NORMAL

## 2019-07-03 NOTE — PATIENT INSTRUCTIONS
Message left for Michelle to call back, Reviewed with Michelle- Dr. Magana's response for Mammogram.

## 2019-07-05 LAB
FINAL DIAGNOSIS: NORMAL
HPV HR 12 DNA CVX QL NAA+PROBE: NEGATIVE
HPV16 DNA SPEC QL NAA+PROBE: NEGATIVE
HPV18 DNA SPEC QL NAA+PROBE: NEGATIVE
SPECIMEN DESCRIPTION: NORMAL
SPECIMEN SOURCE CVX/VAG CYTO: NORMAL

## 2019-07-26 DIAGNOSIS — Z17.0 MALIGNANT NEOPLASM OF OVERLAPPING SITES OF RIGHT BREAST IN FEMALE, ESTROGEN RECEPTOR POSITIVE (H): ICD-10-CM

## 2019-07-26 DIAGNOSIS — C50.811 MALIGNANT NEOPLASM OF OVERLAPPING SITES OF RIGHT BREAST IN FEMALE, ESTROGEN RECEPTOR POSITIVE (H): ICD-10-CM

## 2019-07-26 RX ORDER — LETROZOLE 2.5 MG/1
1 TABLET, FILM COATED ORAL DAILY
Qty: 90 TABLET | Refills: 3 | Status: SHIPPED | OUTPATIENT
Start: 2019-07-26 | End: 2020-07-21

## 2019-09-03 ENCOUNTER — HOSPITAL ENCOUNTER (OUTPATIENT)
Dept: CT IMAGING | Facility: CLINIC | Age: 64
Discharge: HOME | End: 2019-09-03
Attending: INTERNAL MEDICINE | Admitting: INTERNAL MEDICINE
Payer: COMMERCIAL

## 2019-09-03 ENCOUNTER — HOSPITAL ENCOUNTER (OUTPATIENT)
Dept: CT IMAGING | Facility: CLINIC | Age: 64
End: 2019-09-03
Attending: INTERNAL MEDICINE
Payer: COMMERCIAL

## 2019-09-03 DIAGNOSIS — C50.811 MALIGNANT NEOPLASM OF OVERLAPPING SITES OF RIGHT BREAST IN FEMALE, ESTROGEN RECEPTOR POSITIVE (H): ICD-10-CM

## 2019-09-03 DIAGNOSIS — Z17.0 MALIGNANT NEOPLASM OF OVERLAPPING SITES OF RIGHT BREAST IN FEMALE, ESTROGEN RECEPTOR POSITIVE (H): ICD-10-CM

## 2019-09-03 PROCEDURE — 25000125 ZZHC RX 250: Performed by: RADIOLOGY

## 2019-09-03 PROCEDURE — 70491 CT SOFT TISSUE NECK W/DYE: CPT

## 2019-09-03 PROCEDURE — 25000128 H RX IP 250 OP 636: Performed by: RADIOLOGY

## 2019-09-03 PROCEDURE — 71260 CT THORAX DX C+: CPT

## 2019-09-03 PROCEDURE — 74177 CT ABD & PELVIS W/CONTRAST: CPT

## 2019-09-03 RX ORDER — IOPAMIDOL 755 MG/ML
125 INJECTION, SOLUTION INTRAVASCULAR ONCE
Status: COMPLETED | OUTPATIENT
Start: 2019-09-03 | End: 2019-09-03

## 2019-09-03 RX ADMIN — IOPAMIDOL 125 ML: 755 INJECTION, SOLUTION INTRAVENOUS at 08:12

## 2019-09-03 RX ADMIN — SODIUM CHLORIDE 70 ML: 9 INJECTION, SOLUTION INTRAVENOUS at 08:12

## 2019-09-05 ENCOUNTER — ONCOLOGY VISIT (OUTPATIENT)
Dept: ONCOLOGY | Facility: CLINIC | Age: 64
End: 2019-09-05
Attending: INTERNAL MEDICINE
Payer: COMMERCIAL

## 2019-09-05 VITALS
OXYGEN SATURATION: 98 % | HEART RATE: 80 BPM | HEIGHT: 66 IN | BODY MASS INDEX: 27.8 KG/M2 | TEMPERATURE: 98.1 F | RESPIRATION RATE: 16 BRPM | DIASTOLIC BLOOD PRESSURE: 83 MMHG | SYSTOLIC BLOOD PRESSURE: 123 MMHG | WEIGHT: 173 LBS

## 2019-09-05 DIAGNOSIS — R91.8 PULMONARY NODULES: Primary | ICD-10-CM

## 2019-09-05 DIAGNOSIS — Z85.3 PERSONAL HISTORY OF MALIGNANT NEOPLASM OF BREAST: ICD-10-CM

## 2019-09-05 PROCEDURE — 99215 OFFICE O/P EST HI 40 MIN: CPT | Performed by: INTERNAL MEDICINE

## 2019-09-05 PROCEDURE — G0463 HOSPITAL OUTPT CLINIC VISIT: HCPCS

## 2019-09-05 ASSESSMENT — MIFFLIN-ST. JEOR: SCORE: 1351.47

## 2019-09-05 ASSESSMENT — PAIN SCALES - GENERAL: PAINLEVEL: NO PAIN (0)

## 2019-09-05 NOTE — PROGRESS NOTES
[facility]    Hematology/Oncology Established Patient Follow-up Note      Today's Date: 09/05/19    Reason for Follow-up: Adenocarcinoma of probable breast origin.    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  Initially she was seen 11/22/2017 after she had the right supraclavicular lymph node biopsy which was positive for poorly differentiated adenocarcinoma.  She had this lymph node almost a month and had an excisional biopsy performed which was consistent with the diagnosis of cancer.   A PET/CT scan 11/27/2017 showed hypermetabolic right supraclavicular, right axillary and subpectoral adenopathy.  Otherwise, no distant metastatic disease. Mammogram 11/30/17- negative.     TREATMENT: Carboplatin and Taxol completed 4 cycles. 3/7/2018 Started dose dense AC. Completed 4 cycles.   Radiation to right breast, right axilla, right supraclavicular area between 5/29/18-7/31/18 9/2018 started letrozole.    INTERIM HISTORY:  Michelle reports feeling very well.  She is here to transfer care to me after previous care with Dr. Magana.  She denies any fevers, chills, night sweats, unintentional weight loss, or new pain.  She also denies any headaches or vision changes.  She denies any breast issues.      REVIEW OF SYSTEMS:   14 point ROS was reviewed and is negative other than as noted above in HPI.       HOME MEDICATIONS:  Current Outpatient Medications   Medication Sig Dispense Refill     aspirin 81 MG tablet Take 81 mg by mouth daily       Ibuprofen (ADVIL PO) Take 400 mg by mouth every 6 hours as needed for moderate pain       letrozole (FEMARA) 2.5 MG tablet Take 1 tablet (2.5 mg) by mouth daily 90 tablet 3     multivitamin w/minerals (THERA-VIT-M) tablet Take 1 tablet by mouth daily       Omega-3 Fatty Acids (OMEGA-3 FISH OIL PO)  Take 1 g by mouth daily       rosuvastatin (CRESTOR) 20 MG tablet Take 1 tablet (20 mg) by mouth daily 90 tablet 3         ALLERGIES:  Allergies   Allergen Reactions     Penicillins Hives         PAST MEDICAL HISTORY:  Past Medical History:   Diagnosis Date     Basal cell cancer     right cheek     Gastroesophageal reflux disease      History of blood transfusion     blue baby     Hyperlipidaemia          PAST SURGICAL HISTORY:  Past Surgical History:   Procedure Laterality Date     BIOPSY MASS NECK Right 11/16/2017    Procedure: BIOPSY MASS NECK;  Excisional Biopsy Right Neck Lymph node;  Surgeon: Waylon Corral MD;  Location: [facility] OR     COLONOSCOPY       IR PORT REMOVAL LEFT  5/13/2019     MOHS MICROGRAPHIC PROCEDURE  ~2010    right cheek; BCC     MOHS MICROGRAPHIC PROCEDURE  10/31/2016    Dr. [facility]         SOCIAL HISTORY:  Social History     Socioeconomic History     Marital status:      Spouse name: Not on file     Number of children: Not on file     Years of education: Not on file     Highest education level: Not on file   Occupational History     Not on file   Social Needs     Financial resource strain: Not on file     Food insecurity:     Worry: Not on file     Inability: Not on file     Transportation needs:     Medical: Not on file     Non-medical: Not on file   Tobacco Use     Smoking status: Never Smoker     Smokeless tobacco: Never Used   Substance and Sexual Activity     Alcohol use: Yes     Alcohol/week: 0.0 oz     Comment: 4 times a week, 2 drinks     Drug use: No     Sexual activity: Yes     Partners: Male     Birth control/protection: Post-menopausal   Lifestyle     Physical activity:     Days per week: Not on file     Minutes per session: Not on file     Stress: Not on file   Relationships     Social connections:     Talks on phone: Not on file     Gets together: Not on file     Attends Restoration service: Not on file     Active member of club or organization: Not on file     Attends  "meetings of clubs or organizations: Not on file     Relationship status: Not on file     Intimate partner violence:     Fear of current or ex partner: Not on file     Emotionally abused: Not on file     Physically abused: Not on file     Forced sexual activity: Not on file   Other Topics Concern     Parent/sibling w/ CABG, MI or angioplasty before 65F 55M? No   Social History Narrative     Not on file         FAMILY HISTORY:  Family History   Problem Relation Age of Onset     Lupus Mother      Heart Disease Mother         CHF     Coronary Artery Disease Mother      Hyperlipidemia Mother      Diabetes Father      Breast Cancer Other          PHYSICAL EXAM:  Vital signs:  /83   Pulse 80   Temp 98.1  F (36.7  C) (Oral)   Resp 16   Ht 1.676 m (5' 6\")   Wt 78.5 kg (173 lb)   LMP 2004 (Approximate)   SpO2 98%   BMI 27.92 kg/m     ECO  GENERAL/CONSTITUTIONAL: No acute distress.  EYES: Extraocular movements intact.  No scleral icterus.  ENT/MOUTH: Neck supple. Oropharynx clear, no mucositis.  LYMPH: No anterior cervical, posterior cervical, supraclavicular, axillary or inguinal adenopathy.   RESPIRATORY: Clear to auscultation bilaterally. No crackles or wheezing.   CARDIOVASCULAR: Regular rate and rhythm without murmurs, gallops, or rubs.  GASTROINTESTINAL: No hepatosplenomegaly, masses, or tenderness. The patient has normal bowel sounds. No guarding.  No distention.  MUSCULOSKELETAL: Warm and well-perfused, no cyanosis, clubbing, or edema.  NEUROLOGIC: Cranial nerves II-XII are intact. Alert, oriented, answers questions appropriately.  INTEGUMENTARY: No rashes or jaundice.  GAIT: Steady, does not use assistive device  BREAST: Right with no palpable mass, ulceration, or rash.  Left with no palpable mass, ulceration or rash.  Nipples are everted bilaterally with no discharge.      LABS:  CBC RESULTS:   Recent Labs   Lab Test 19  0840   WBC 3.6*   RBC 3.93   HGB 12.8   HCT 38.4   MCV 98   MCH " 32.6   MCHC 33.3   RDW 13.5          Recent Labs   Lab Test 07/01/19  0840 04/30/18  1150    135   POTASSIUM 4.1 4.0   CHLORIDE 107 101   CO2 25 26   ANIONGAP 8 8   GLC 99 106*   BUN 14 20   CR 0.74 0.62   EDILSON 9.1 8.8         PATHOLOGY:  None new.    IMAGING: All imaging personally reviewed with the patient, with findings as below.  9/3/2019 CT neck with contrast showed no new suspicious lymph nodes or masses.  9/3/2019 CT of the chest, abdomen and pelvis with contrast showed a new 5 mm pleural-based inferior right upper lobe pulmonary nodule and no other new findings.       ASSESSMENT/PLAN:  Flor Griffin is a 64 year old female with presumed right breast adenocarcinoma status post chemotherapy and radiation.  1.  Presumed right breast adenocarcinoma  2.  Right upper lobe lung nodule, indeterminate  -I note that the adenocarcinoma was of questionable origin, possibly breast.  -I personally reviewed the CT neck, chest/abdomen/pelvis and reviewed the images with Michelle today.  There is no new disease in the neck, abdomen, or pelvis but there is a new 5 mm pleural-based inferior right upper lobe pulmonary nodule of unclear etiology.  Given this finding, I recommended a repeat CT chest in 3 months to reevaluate the lung nodule.  -Otherwise, given the low ER positive presumed right breast cancer, will continue on letrozole.  I explained the rationale for this and she is comfortable with this plan of care.  -She is overdue for mammogram so we will order that.      Sally Stover MD  Hematology/Oncology  [facility]    I spent a total of 40 minutes with the patient, with over >50% of the time in counseling and/or coordination of care.

## 2019-09-05 NOTE — PROGRESS NOTES
"Oncology Rooming Note    September 5, 2019 9:43 AM   Flor Griffin is a 64 year old female who presents for:    Chief Complaint   Patient presents with     Oncology Clinic Visit     Initial Vitals: /83   Pulse 80   Temp 98.1  F (36.7  C) (Oral)   Resp 16   Ht 1.676 m (5' 6\")   Wt 78.5 kg (173 lb)   LMP 01/01/2004 (Approximate)   SpO2 98%   BMI 27.92 kg/m   Estimated body mass index is 27.92 kg/m  as calculated from the following:    Height as of this encounter: 1.676 m (5' 6\").    Weight as of this encounter: 78.5 kg (173 lb). Body surface area is 1.91 meters squared.  No Pain (0) Comment: Data Unavailable   Patient's last menstrual period was 01/01/2004 (approximate).  Allergies reviewed: Yes  Medications reviewed: Yes    Medications: Medication refills not needed today.  Pharmacy [facility] entered into MeetMe: [id number] - [facility] [address]    Clinical concerns: no      Miriam Kovacs, PALMA            "

## 2019-09-05 NOTE — LETTER
"    9/5/2019         RE: Flor Griffin  [address] [facility] [address]        Dear Colleague,    Thank you for referring your patient, Flor Griffin, to the [facility]. Please see a copy of my visit note below.    Oncology Rooming Note    September 5, 2019 9:43 AM   Flor Griffin is a 64 year old female who presents for:    Chief Complaint   Patient presents with     Oncology Clinic Visit     Initial Vitals: /83   Pulse 80   Temp 98.1  F (36.7  C) (Oral)   Resp 16   Ht 1.676 m (5' 6\")   Wt 78.5 kg (173 lb)   LMP 01/01/2004 (Approximate)   SpO2 98%   BMI 27.92 kg/m    Estimated body mass index is 27.92 kg/m  as calculated from the following:    Height as of this encounter: 1.676 m (5' 6\").    Weight as of this encounter: 78.5 kg (173 lb). Body surface area is 1.91 meters squared.  No Pain (0) Comment: Data Unavailable   Patient's last menstrual period was 01/01/2004 (approximate).  Allergies reviewed: Yes  Medications reviewed: Yes    Medications: Medication refills not needed today.  Pharmacy name entered into [email or web address]: [id number] - [place], [place] - [address]    Clinical concerns: no      Miriam Kovacs, [facility]    Hematology/Oncology Established Patient Follow-up Note      Today's Date: 09/05/19    Reason for Follow-up: Adenocarcinoma of probable breast origin.    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  Initially she was seen 11/22/2017 after she had the right supraclavicular lymph node biopsy which was positive for poorly differentiated adenocarcinoma.  She had this lymph node almost a month and had an excisional biopsy performed which was consistent with the diagnosis of cancer.   A " PET/CT scan 11/27/2017 showed hypermetabolic right supraclavicular, right axillary and subpectoral adenopathy.  Otherwise, no distant metastatic disease. Mammogram 11/30/17- negative.     TREATMENT: Carboplatin and Taxol completed 4 cycles. 3/7/2018 Started dose dense AC. Completed 4 cycles.   Radiation to right breast, right axilla, right supraclavicular area between 5/29/18-7/31/18 9/2018 started letrozole.    INTERIM HISTORY:  Michelle reports feeling very well.  She is here to transfer care to me after previous care with Dr. Magana.  She denies any fevers, chills, night sweats, unintentional weight loss, or new pain.  She also denies any headaches or vision changes.  She denies any breast issues.      REVIEW OF SYSTEMS:   14 point ROS was reviewed and is negative other than as noted above in HPI.       HOME MEDICATIONS:  Current Outpatient Medications   Medication Sig Dispense Refill     aspirin 81 MG tablet Take 81 mg by mouth daily       Ibuprofen (ADVIL PO) Take 400 mg by mouth every 6 hours as needed for moderate pain       letrozole (FEMARA) 2.5 MG tablet Take 1 tablet (2.5 mg) by mouth daily 90 tablet 3     multivitamin w/minerals (THERA-VIT-M) tablet Take 1 tablet by mouth daily       Omega-3 Fatty Acids (OMEGA-3 FISH OIL PO) Take 1 g by mouth daily       rosuvastatin (CRESTOR) 20 MG tablet Take 1 tablet (20 mg) by mouth daily 90 tablet 3         ALLERGIES:  Allergies   Allergen Reactions     Penicillins Hives         PAST MEDICAL HISTORY:  Past Medical History:   Diagnosis Date     Basal cell cancer     right cheek     Gastroesophageal reflux disease      History of blood transfusion     blue baby     Hyperlipidaemia          PAST SURGICAL HISTORY:  Past Surgical History:   Procedure Laterality Date     BIOPSY MASS NECK Right 11/16/2017    Procedure: BIOPSY MASS NECK;  Excisional Biopsy Right Neck Lymph node;  Surgeon: Waylon Corral MD;  Location: [facility] OR     COLONOSCOPY       IR PORT REMOVAL LEFT   5/13/2019     MOHS MICROGRAPHIC PROCEDURE  ~2010    right cheek; BCC     MOHS MICROGRAPHIC PROCEDURE  10/31/2016    Dr. [facility]         SOCIAL HISTORY:  Social History     Socioeconomic History     Marital status:      Spouse name: Not on file     Number of children: Not on file     Years of education: Not on file     Highest education level: Not on file   Occupational History     Not on file   Social Needs     Financial resource strain: Not on file     Food insecurity:     Worry: Not on file     Inability: Not on file     Transportation needs:     Medical: Not on file     Non-medical: Not on file   Tobacco Use     Smoking status: Never Smoker     Smokeless tobacco: Never Used   Substance and Sexual Activity     Alcohol use: Yes     Alcohol/week: 0.0 oz     Comment: 4 times a week, 2 drinks     Drug use: No     Sexual activity: Yes     Partners: Male     Birth control/protection: Post-menopausal   Lifestyle     Physical activity:     Days per week: Not on file     Minutes per session: Not on file     Stress: Not on file   Relationships     Social connections:     Talks on phone: Not on file     Gets together: Not on file     Attends [employer] service: Not on file     Active member of club or organization: Not on file     Attends meetings of clubs or organizations: Not on file     Relationship status: Not on file     Intimate partner violence:     Fear of current or ex partner: Not on file     Emotionally abused: Not on file     Physically abused: Not on file     Forced sexual activity: Not on file   Other Topics Concern     Parent/sibling w/ CABG, MI or angioplasty before 65F 55M? No   Social History Narrative     Not on file         FAMILY HISTORY:  Family History   Problem Relation Age of Onset     Lupus Mother      Heart Disease Mother         CHF     Coronary Artery Disease Mother      Hyperlipidemia Mother      Diabetes Father      Breast Cancer Other          PHYSICAL EXAM:  Vital signs:  /83    "Pulse 80   Temp 98.1  F (36.7  C) (Oral)   Resp 16   Ht 1.676 m (5' 6\")   Wt 78.5 kg (173 lb)   LMP 2004 (Approximate)   SpO2 98%   BMI 27.92 kg/m      ECO  GENERAL/CONSTITUTIONAL: No acute distress.  EYES: Extraocular movements intact.  No scleral icterus.  ENT/MOUTH: Neck supple. Oropharynx clear, no mucositis.  LYMPH: No anterior cervical, posterior cervical, supraclavicular, axillary or inguinal adenopathy.   RESPIRATORY: Clear to auscultation bilaterally. No crackles or wheezing.   CARDIOVASCULAR: Regular rate and rhythm without murmurs, gallops, or rubs.  GASTROINTESTINAL: No hepatosplenomegaly, masses, or tenderness. The patient has normal bowel sounds. No guarding.  No distention.  MUSCULOSKELETAL: Warm and well-perfused, no cyanosis, clubbing, or edema.  NEUROLOGIC: Cranial nerves II-XII are intact. Alert, oriented, answers questions appropriately.  INTEGUMENTARY: No rashes or jaundice.  GAIT: Steady, does not use assistive device  BREAST: Right with no palpable mass, ulceration, or rash.  Left with no palpable mass, ulceration or rash.  Nipples are everted bilaterally with no discharge.      LABS:  CBC RESULTS:   Recent Labs   Lab Test 19  0840   WBC 3.6*   RBC 3.93   HGB 12.8   HCT 38.4   MCV 98   MCH 32.6   MCHC 33.3   RDW 13.5          Recent Labs   Lab Test 19  0840 18  1150    135   POTASSIUM 4.1 4.0   CHLORIDE 107 101   CO2 25 26   ANIONGAP 8 8   GLC 99 106*   BUN 14 20   CR 0.74 0.62   EDILSON 9.1 8.8         PATHOLOGY:  None new.    IMAGING: All imaging personally reviewed with the patient, with findings as below.  9/3/2019 CT neck with contrast showed no new suspicious lymph nodes or masses.  9/3/2019 CT of the chest, abdomen and pelvis with contrast showed a new 5 mm pleural-based inferior right upper lobe pulmonary nodule and no other new findings.       ASSESSMENT/PLAN:  Flor Griffin is a 64 year old female with presumed right breast " adenocarcinoma status post chemotherapy and radiation.  1.  Presumed right breast adenocarcinoma  2.  Right upper lobe lung nodule, indeterminate  -I note that the adenocarcinoma was of questionable origin, possibly breast.  -I personally reviewed the CT neck, chest/abdomen/pelvis and reviewed the images with Michelle today.  There is no new disease in the neck, abdomen, or pelvis but there is a new 5 mm pleural-based inferior right upper lobe pulmonary nodule of unclear etiology.  Given this finding, I recommended a repeat CT chest in 3 months to reevaluate the lung nodule.  -Otherwise, given the low ER positive presumed right breast cancer, will continue on letrozole.  I explained the rationale for this and she is comfortable with this plan of care.  -She is overdue for mammogram so we will order that.      Sally Stover MD  Hematology/Oncology  [facility]    I spent a total of 40 minutes with the patient, with over >50% of the time in counseling and/or coordination of care.    Again, thank you for allowing me to participate in the care of your patient.        Sincerely,        Sally Stover MD

## 2019-09-10 ENCOUNTER — PATIENT OUTREACH (OUTPATIENT)
Dept: ONCOLOGY | Facility: CLINIC | Age: 64
End: 2019-09-10

## 2019-09-10 NOTE — PROGRESS NOTES
Michelle Diaz's significant other called clinic requesting clarification of Michelle's recent visit on 9/5/19 with Dr. Stover. Explained plan of care regarding repeat CT chest and follow up in 3 months. Danya Lara RN,BSN,OCN

## 2019-10-01 ENCOUNTER — HOSPITAL ENCOUNTER (OUTPATIENT)
Dept: MAMMOGRAPHY | Facility: CLINIC | Age: 64
Discharge: HOME | End: 2019-10-01
Attending: INTERNAL MEDICINE | Admitting: INTERNAL MEDICINE
Payer: COMMERCIAL

## 2019-10-01 PROCEDURE — 77063 BREAST TOMOSYNTHESIS BI: CPT

## 2019-11-01 ENCOUNTER — TELEPHONE (OUTPATIENT)
Dept: FAMILY MEDICINE | Facility: CLINIC | Age: 64
End: 2019-11-01

## 2019-11-01 NOTE — TELEPHONE ENCOUNTER
Type of outreach:  Phone, spoke to patient.  Apt scheduled for 11/6.  Health Maintenance Due   Topic Date Due     HIV SCREENING  06/01/1970     PNEUMOCOCCAL IMMUNIZATION 19-64 HIGHEST RISK (1 of 3 - PCV13) 06/01/1974     INFLUENZA VACCINE (1) 09/01/2019     ZOSTER IMMUNIZATION (2 of 2) 08/26/2019     Needs nurse only for 2nd shingrix and flu vaccine.  Alfonso Cervantes CMA ([facility])

## 2019-11-04 ENCOUNTER — ANCILLARY PROCEDURE (OUTPATIENT)
Dept: GENERAL RADIOLOGY | Facility: CLINIC | Age: 64
End: 2019-11-04
Attending: NURSE PRACTITIONER
Payer: COMMERCIAL

## 2019-11-04 ENCOUNTER — OFFICE VISIT (OUTPATIENT)
Dept: FAMILY MEDICINE | Facility: CLINIC | Age: 64
End: 2019-11-04
Payer: COMMERCIAL

## 2019-11-04 VITALS
WEIGHT: 173 LBS | BODY MASS INDEX: 27.8 KG/M2 | HEIGHT: 66 IN | RESPIRATION RATE: 16 BRPM | SYSTOLIC BLOOD PRESSURE: 98 MMHG | DIASTOLIC BLOOD PRESSURE: 60 MMHG | HEART RATE: 73 BPM | TEMPERATURE: 98.1 F | OXYGEN SATURATION: 99 %

## 2019-11-04 DIAGNOSIS — S89.91XA KNEE INJURY, RIGHT, INITIAL ENCOUNTER: ICD-10-CM

## 2019-11-04 DIAGNOSIS — S82.141A CLOSED FRACTURE OF RIGHT TIBIAL PLATEAU, INITIAL ENCOUNTER: Primary | ICD-10-CM

## 2019-11-04 PROCEDURE — 73562 X-RAY EXAM OF KNEE 3: CPT | Mod: RT

## 2019-11-04 PROCEDURE — 99214 OFFICE O/P EST MOD 30 MIN: CPT | Performed by: NURSE PRACTITIONER

## 2019-11-04 RX ORDER — HYDROCODONE BITARTRATE AND ACETAMINOPHEN 5; 325 MG/1; MG/1
1 TABLET ORAL EVERY 6 HOURS PRN
Qty: 18 TABLET | Refills: 0 | Status: SHIPPED | OUTPATIENT
Start: 2019-11-04 | End: 2020-03-12

## 2019-11-04 ASSESSMENT — ENCOUNTER SYMPTOMS
MYALGIAS: 1
ARTHRALGIAS: 1
VOMITING: 0
NUMBNESS: 0
FEVER: 0
NAUSEA: 0
WOUND: 0
WEAKNESS: 0

## 2019-11-04 ASSESSMENT — MIFFLIN-ST. JEOR: SCORE: 1351.47

## 2019-11-04 NOTE — PATIENT INSTRUCTIONS
Tylenol and ibuprofen to help with pain  Norco as needed for severe pain  Ice to help with pain  Wear splint until follow up with orthopedics  Non-weight bearing until follow up with orthopedics    RICE    RICE stands for rest, ice, compression, and elevation. Doing these things helps limit pain and swelling after an injury. RICE also helps injuries heal faster. Use RICE for sprains, strains, and severe bruises or bumps. Follow the tips on this handout and begin RICE as soon as possible after an injury.  Rest  Pain is your body s way of telling you to rest an injured area. Whether you have hurt an elbow, hand, foot, or knee, limiting its use will prevent further injury and help you heal.  Ice  Applying ice right after an injury helps prevent swelling and reduce pain. Don t place ice directly on your skin.    Wrap a cold pack or bag of ice in a thin cloth. Place it over the injured area.    Ice for 10 minutes every 3 hours. Don t ice for more than 20 minutes at a time.  Compression  Putting pressure (compression) on an injury helps prevent swelling and provides support.    Wrap the injured area firmly with an elastic bandage. If your hand or foot tingles, becomes discolored, or feels cold to the touch, the bandage may be too tight. Rewrap it more loosely.    If your bandage becomes too loose, rewrap it.    Do not wear an elastic bandage overnight.  Elevation  Keeping an injury elevated helps reduce swelling, pain, and throbbing. Elevation is most effective when the injury is kept elevated higher than the heart.     Call your healthcare provider if you notice any of the following:    Fingers or toes feel numb, are cold to the touch, or change color.    Skin looks shiny or tight.    Pain, swelling, or bruising worsens and is not improved with elevation.   Date Last Reviewed: 9/3/2015    3211-5798 [employer]. [address] [place] [address], [place] [address]. All rights reserved. This information is not intended  as a substitute for professional medical care. Always follow your healthcare professional's instructions.

## 2019-11-04 NOTE — PROGRESS NOTES
"Subjective     Flor Griffin is a 64 year old female who presents to clinic today for the following health issues:    HPI   Musculoskeletal problem/pain      Duration: yesterday PM    Description  Location: right lower leg, right knee    Intensity:  Moderate-severe    Accompanying signs and symptoms: swelling around right knee; sometimes has a zinging feeling when pressure is placed with hands; feels like there may be some bruising    History  Previous similar problem: no   Previous evaluation:  none    Precipitating or alleviating factors:  Trauma or overuse: YES- was hunting and her son's dog ran into her; dog hit right side just below knee, slightly anterior, knee bent towards midline, patient fell down and hit knee on ground (dog was about 110 lbs)  Aggravating factors include: standing, walking and climbing stairs; worse with weight added    Therapies tried and outcome: rest/inactivity, ice and Ibuprofen has all helped      Reviewed and updated as needed this visit by Provider  Tobacco  Allergies  Meds  Problems  Med Hx  Surg Hx  Fam Hx         Review of Systems   Constitutional: Negative for fever.   Gastrointestinal: Negative for nausea and vomiting.   Musculoskeletal: Positive for arthralgias and myalgias.   Skin: Negative for rash and wound.   Neurological: Negative for weakness and numbness.            Objective    BP 98/60 (BP Location: Right arm, Patient Position: Chair, Cuff Size: Adult Regular)   Pulse 73   Temp 98.1  F (36.7  C) (Oral)   Resp 16   Ht 1.676 m (5' 6\")   Wt 78.5 kg (173 lb)   LMP 01/01/2004 (Approximate)   SpO2 99%   Breastfeeding? No   BMI 27.92 kg/m    Body mass index is 27.92 kg/m .  Physical Exam  Vitals signs and nursing note reviewed.   Constitutional:       Appearance: Normal appearance.   Musculoskeletal:      Right knee: She exhibits swelling. She exhibits normal range of motion, no effusion, normal alignment, no LCL laxity, normal patellar mobility and no MCL " "laxity. Tenderness found. Lateral joint line and LCL tenderness noted. No medial joint line, no MCL and no patellar tendon tenderness noted.   Skin:     General: Skin is warm and dry.      Capillary Refill: Capillary refill takes less than 2 seconds.   Neurological:      Mental Status: She is alert and oriented to person, place, and time.   Psychiatric:         Behavior: Behavior is cooperative.            Diagnostic Test Results:  Xray - My interpretation of the right knee xray was concerning for a potential tibial plateau fracture.         Assessment & Plan     Flor was seen today for trauma.    Diagnoses and all orders for this visit:    Closed fracture of right tibial plateau, initial encounter  -     HYDROcodone-acetaminophen (NORCO) 5-325 MG tablet; Take 1 tablet by mouth every 6 hours as needed for pain  -     ORTHO  REFERRAL  -     order for DME; Equipment being ordered: Knee Brace  -     order for DME; Equipment being ordered: Crutches    Knee injury, right, initial encounter  -     XR Knee Right 3 Views; Future         BMI:   Estimated body mass index is 27.92 kg/m  as calculated from the following:    Height as of this encounter: 1.676 m (5' 6\").    Weight as of this encounter: 78.5 kg (173 lb).     Discussed with patient that my interpretation of the right knee xray was concerning for a potential tibial plateau fracture. Did have radiology do an over-read and they reported at  Mildly impacted lateral tibial plateau fracture. Pateint was placed in a knee brace and was given crutches. Patient to wear brace until follow up with orthopedics. Is to be non-weightbearing until follow up. Did do Norco for severe pain, can do tylenol and ibuprofen for mild-moderate pain. Ice to help with pain and swelling. Education was added to AVS. Patient was agreeable to plan and verbalized understanding.        Patient Instructions     Tylenol and ibuprofen to help with pain  Norco as needed for severe pain  Ice " to help with pain  Wear splint until follow up with orthopedics  Non-weight bearing until follow up with orthopedics    RICE    RICE stands for rest, ice, compression, and elevation. Doing these things helps limit pain and swelling after an injury. RICE also helps injuries heal faster. Use RICE for sprains, strains, and severe bruises or bumps. Follow the tips on this handout and begin RICE as soon as possible after an injury.  Rest  Pain is your body s way of telling you to rest an injured area. Whether you have hurt an elbow, hand, foot, or knee, limiting its use will prevent further injury and help you heal.  Ice  Applying ice right after an injury helps prevent swelling and reduce pain. Don t place ice directly on your skin.    Wrap a cold pack or bag of ice in a thin cloth. Place it over the injured area.    Ice for 10 minutes every 3 hours. Don t ice for more than 20 minutes at a time.  Compression  Putting pressure (compression) on an injury helps prevent swelling and provides support.    Wrap the injured area firmly with an elastic bandage. If your hand or foot tingles, becomes discolored, or feels cold to the touch, the bandage may be too tight. Rewrap it more loosely.    If your bandage becomes too loose, rewrap it.    Do not wear an elastic bandage overnight.  Elevation  Keeping an injury elevated helps reduce swelling, pain, and throbbing. Elevation is most effective when the injury is kept elevated higher than the heart.     Call your healthcare provider if you notice any of the following:    Fingers or toes feel numb, are cold to the touch, or change color.    Skin looks shiny or tight.    Pain, swelling, or bruising worsens and is not improved with elevation.   Date Last Reviewed: 9/3/2015    [id number] [employer]. [address] [place] [address], [place] [address]. All rights reserved. This information is not intended as a substitute for professional medical care. Always follow your healthcare  professional's instructions.        Return in 1-2 days with orthopedics.    Richar Uribe, CNP  [facility]

## 2019-11-05 ENCOUNTER — HOSPITAL ENCOUNTER (OUTPATIENT)
Dept: CT IMAGING | Facility: CLINIC | Age: 64
Discharge: HOME | End: 2019-11-05
Attending: ORTHOPAEDIC SURGERY | Admitting: ORTHOPAEDIC SURGERY
Payer: COMMERCIAL

## 2019-11-05 ENCOUNTER — OFFICE VISIT (OUTPATIENT)
Dept: ORTHOPEDICS | Facility: CLINIC | Age: 64
End: 2019-11-05
Payer: COMMERCIAL

## 2019-11-05 VITALS
WEIGHT: 182 LBS | SYSTOLIC BLOOD PRESSURE: 108 MMHG | BODY MASS INDEX: 29.25 KG/M2 | HEIGHT: 66 IN | DIASTOLIC BLOOD PRESSURE: 78 MMHG

## 2019-11-05 DIAGNOSIS — S82.141A CLOSED FRACTURE OF RIGHT TIBIAL PLATEAU, INITIAL ENCOUNTER: ICD-10-CM

## 2019-11-05 DIAGNOSIS — S82.141A CLOSED FRACTURE OF RIGHT TIBIAL PLATEAU, INITIAL ENCOUNTER: Primary | ICD-10-CM

## 2019-11-05 PROCEDURE — 27530 TREAT KNEE FRACTURE: CPT | Performed by: ORTHOPAEDIC SURGERY

## 2019-11-05 PROCEDURE — 73700 CT LOWER EXTREMITY W/O DYE: CPT | Mod: RT

## 2019-11-05 ASSESSMENT — MIFFLIN-ST. JEOR: SCORE: 1392.3

## 2019-11-05 NOTE — PATIENT INSTRUCTIONS
Orthotics referral placed for T-Scope knee brace.   Instructed to have brace locked in extension while upright, and toe-touch weightbearing.     -OK to unlock brace while lying down 0-90 degrees.   -Instructed to perform heel slides while in brace lying supine.     CT Scan of the right knee was ordered to further evaluate fracture.     Call [phone] to schedule CT exam.     Dr. Garcia will call you with CT results and plan.     Anticipated follow up in 2 weeks for repeat x-rays.     Aspirin Rx sent to [facility] pharmacy on file.

## 2019-11-05 NOTE — LETTER
11/5/2019         RE: Flor Griffin  [address] [facility] [address]        Dear Colleague,    Thank you for referring your patient, Flor Griffin, to the [facility] ORTHOPEDIC SURGERY. Please see a copy of my visit note below.    CHIEF COMPLAINT: Right knee pain    DIAGNOSIS: Right knee Schatzker Type III (pure depression) tibial plateau fracture, minimally displaced, closed injury.    OCCUPATION/SPORT: Retired hairdresser / hunting, walking.    HPI:   Flor Griffin is a 64 year old, right-hand dominant female who presents for evaluation of right knee pain following an injury sustained on 11/3/2019 while hunting.  Patient was hunting when her dog chased to pheasant and struck the lateral aspect of her right knee causing her to buckle and fall.  She had immediate sharp lateral knee pain.  Her symptoms do not improve, and she was seen at [facility] in [place] yesterday where x-rays were performed and demonstrated a lateral tibial plateau fracture that was minimally displaced.  She is given crutches and a knee immobilizer.  She was instructed to follow-up with me today.  The present time she notes her current pain is rated a 2 out of 10 but can be as severe as 10 out of 10 with activity.  Her symptoms are worse by climbing up and down stairs.  Symptoms are improved with the knee immobilizer, rest, and immobilization.  Patient has tried ice and ibuprofen with moderate relief.  Associated symptoms include sharp anterolateral knee pain, modest swelling and discoloration, and anterior pressure in the knee with flexion.  Notably the patient has had no previous knee issues or surgery.  No other concerns or complaints at this time.  She denies any calf pain.  She denies any numbness or tingling distally.  This was an isolated injury.    PAST MEDICAL HISTORY:  Past Medical History:   Diagnosis Date     Basal cell cancer     right cheek     Gastroesophageal reflux disease      History  "of blood transfusion     blue baby     Hyperlipidaemia      CURRENT MEDICATIONS:  Current Outpatient Medications   Medication     aspirin (ASA) 325 MG EC tablet     aspirin 81 MG tablet     HYDROcodone-acetaminophen (NORCO) 5-325 MG tablet     Ibuprofen (ADVIL PO)     letrozole (FEMARA) 2.5 MG tablet     multivitamin w/minerals (THERA-VIT-M) tablet     Omega-3 Fatty Acids (OMEGA-3 FISH OIL PO)     order for DME     order for DME     rosuvastatin (CRESTOR) 20 MG tablet     No current facility-administered medications for this visit.      ALLERGIES:   Penicillin    PAST SURGICAL HISTORY:  1. Lump excision for breast cancer status post successful treatment with chemo.    FAMILY HISTORY: No known family history of bleeding, clotting, or anesthesia related complications.    SOCIAL HISTORY: Patient is  and lives at home with her .  She is a retired hairdresser.  She lives in [place].  She does not smoke.  She enjoys walking and hunting.  No regular exercise.    TOXIC HABITS:  I spoke with Flor today regarding tobacco use and they informed me that they do not use any tobacco products..    REVIEW OF SYSTEMS:  Constitutional: Normal  Otolaryngeal: Normal  Cardiovascular: Normal  Pulmonary: Normal  Gastrointestinal: Normal  Genitourinary: Normal  Musculoskeletal: As noted above in the HPI, otherwise normal  Skin: Normal  Neurological: Normal  Psychiatric: Normal  Endocrine: Normal  Hematologic: Normal    PHYSICAL EXAM:  Patient is 5' 6\" and weighs 182 lbs 0 oz. /78   Ht 1.676 m (5' 6\")   Wt 82.6 kg (182 lb)   LMP 01/01/2004 (Approximate)   BMI 29.38 kg/m     Constitutional: Well-developed, well-nourished, healthy appearing female.  Psychiatric:  Oriented to person, place and time.  Mood and affect congruent.  Skin: Warm, dry, and without rashes.  HEENT: Normocephalic, atraumatic.  Cardiac: Well perfused extremities, strong 2+ peripheral pulses. No edema.   Pulmonary: Non-labored respirations on " room air without audible wheezes.   Abdomen: Soft, nontender.  Musculoskeletal: Patient is seen today comfortably seated in a chair.  She has removed her knee immobilizer.  Gait testing not performed secondary to the right knee fracture.  Overall alignment is neutral with no obvious deformity.  There is moderate swelling about the right knee joint.  There is a large right knee joint effusion.  There is tenderness to firm palpation about the knee joint.  There is discrete tenderness along the lateral tibial plateau.  Active range of motion of the right knee is from 10 degrees shy of full extension to 95 degrees of flexion, limited by pain and swelling.  The knee is stable to varus and valgus stress and full extension and at 30 degrees of flexion.  The calf compartments are soft and nontender throughout.  The neurovascular exam distally is intact.  The overlying skin is intact.    IMAGING:   Supine AP, lateral, and sunrise views of the right knee taken 11/4/2019 demonstrate a pure depression type fracture of the lateral tibial plateau with minimal displacement of less than 1 mm.  CT scan of the right knee dated 11/5/2019 ordered and obtained by me demonstrates the same.  There is a pure depression type lateral tibial plateau fracture with less than 1 mm of total displacement.    IMPRESSION: 64 year old-year-old female, with a Right knee Schatzker Type III (pure depression) tibial plateau fracture, minimally displaced, closed injury.    PLAN:   I had a long discussion with Flor today.  At the present time her injury meets criteria for nonoperative management.  She has less than 1 mm of articular displacement.  She has a stable knee joint to varus and valgus stress.  She is already tested the knee by walking on it inadvertently.  I recommend a course of continued nonoperative management.  First of all I recommend DVT prophylaxis with aspirin 325 twice daily for 1 month.  I also recommend crutch use and toe-touch  weightbearing to the right lower extremity for a total time of 6 to 8 weeks.  I recommend transitioning her from a knee immobilizer to a hinged knee brace.  Hinged knee brace to be locked in full extension when ambulating.  Hinged knee brace may be unlocked when sitting or supine in a safe environment.  She was instructed today on crutch use and on performing heel slides in the supine position.  Okay for her to unlock her brace 0-90 in the supine position to work on knee range of motion exercises.  She may use Tylenol and ibuprofen as needed for pain control and she feels this should be sufficient.  I explained that adult bones take a minimum of 3 months to heal typically.  We will continue to work on her motion and see her back in 2 weeks for repeat series of x-rays in the right knee.  She is doing well at that visit we may advance her motion.  All questions and concerns were addressed.  Return to clinic in 2 weeks with repeat right knee radiographic series.    At the conclusion of the office visit, Flor verbally acknowledged that I answered all of her questions satisfactorily.    Again, thank you for allowing me to participate in the care of your patient.        Sincerely,        Ulises Garcia MD

## 2019-11-05 NOTE — PROGRESS NOTES
CHIEF COMPLAINT: Right knee pain    DIAGNOSIS: Right knee Schatzker Type III (pure depression) tibial plateau fracture, minimally displaced, closed injury.    OCCUPATION/SPORT: Retired hairdresser / hunting, walking.    HPI:   Flor Griffin is a 64 year old, right-hand dominant female who presents for evaluation of right knee pain following an injury sustained on 11/3/2019 while hunting.  Patient was hunting when her dog chased to pheasant and struck the lateral aspect of her right knee causing her to buckle and fall.  She had immediate sharp lateral knee pain.  Her symptoms do not improve, and she was seen at [facility] in [place] yesterday where x-rays were performed and demonstrated a lateral tibial plateau fracture that was minimally displaced.  She is given crutches and a knee immobilizer.  She was instructed to follow-up with me today.  The present time she notes her current pain is rated a 2 out of 10 but can be as severe as 10 out of 10 with activity.  Her symptoms are worse by climbing up and down stairs.  Symptoms are improved with the knee immobilizer, rest, and immobilization.  Patient has tried ice and ibuprofen with moderate relief.  Associated symptoms include sharp anterolateral knee pain, modest swelling and discoloration, and anterior pressure in the knee with flexion.  Notably the patient has had no previous knee issues or surgery.  No other concerns or complaints at this time.  She denies any calf pain.  She denies any numbness or tingling distally.  This was an isolated injury.    PAST MEDICAL HISTORY:  Past Medical History:   Diagnosis Date     Basal cell cancer     right cheek     Gastroesophageal reflux disease      History of blood transfusion     blue baby     Hyperlipidaemia      CURRENT MEDICATIONS:  Current Outpatient Medications   Medication     aspirin (ASA) 325 MG EC tablet     aspirin 81 MG tablet     HYDROcodone-acetaminophen (NORCO) 5-325 MG tablet     Ibuprofen (ADVIL  "PO)     letrozole (FEMARA) 2.5 MG tablet     multivitamin w/minerals (THERA-VIT-M) tablet     Omega-3 Fatty Acids (OMEGA-3 FISH OIL PO)     order for DME     order for DME     rosuvastatin (CRESTOR) 20 MG tablet     No current facility-administered medications for this visit.      ALLERGIES:   Penicillin    PAST SURGICAL HISTORY:  1. Lump excision for breast cancer status post successful treatment with chemo.    FAMILY HISTORY: No known family history of bleeding, clotting, or anesthesia related complications.    SOCIAL HISTORY: Patient is  and lives at home with her .  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  She does not smoke.  She enjoys walking and hunting.  No regular exercise.    TOXIC HABITS:  I spoke with Flor today regarding tobacco use and they informed me that they do not use any tobacco products..    REVIEW OF SYSTEMS:  Constitutional: Normal  Otolaryngeal: Normal  Cardiovascular: Normal  Pulmonary: Normal  Gastrointestinal: Normal  Genitourinary: Normal  Musculoskeletal: As noted above in the HPI, otherwise normal  Skin: Normal  Neurological: Normal  Psychiatric: Normal  Endocrine: Normal  Hematologic: Normal    PHYSICAL EXAM:  Patient is 5' 6\" and weighs 182 lbs 0 oz. /78   Ht 1.676 m (5' 6\")   Wt 82.6 kg (182 lb)   LMP 01/01/2004 (Approximate)   BMI 29.38 kg/m    Constitutional: Well-developed, well-nourished, healthy appearing female.  Psychiatric:  Oriented to person, place and time.  Mood and affect congruent.  Skin: Warm, dry, and without rashes.  HEENT: Normocephalic, atraumatic.  Cardiac: Well perfused extremities, strong 2+ peripheral pulses. No edema.   Pulmonary: Non-labored respirations on room air without audible wheezes.   Abdomen: Soft, nontender.  Musculoskeletal: Patient is seen today comfortably seated in a chair.  She has removed her knee immobilizer.  Gait testing not performed secondary to the right knee fracture.  Overall alignment is " neutral with no obvious deformity.  There is moderate swelling about the right knee joint.  There is a large right knee joint effusion.  There is tenderness to firm palpation about the knee joint.  There is discrete tenderness along the lateral tibial plateau.  Active range of motion of the right knee is from 10 degrees shy of full extension to 95 degrees of flexion, limited by pain and swelling.  The knee is stable to varus and valgus stress and full extension and at 30 degrees of flexion.  The calf compartments are soft and nontender throughout.  The neurovascular exam distally is intact.  The overlying skin is intact.    IMAGING:   Supine AP, lateral, and sunrise views of the right knee taken 11/4/2019 demonstrate a pure depression type fracture of the lateral tibial plateau with minimal displacement of less than 1 mm.  CT scan of the right knee dated 11/5/2019 ordered and obtained by me demonstrates the same.  There is a pure depression type lateral tibial plateau fracture with less than 1 mm of total displacement.    IMPRESSION: 64 year old-year-old female, with a Right knee Schatzker Type III (pure depression) tibial plateau fracture, minimally displaced, closed injury.    PLAN:   I had a long discussion with Flor today.  At the present time her injury meets criteria for nonoperative management.  She has less than 1 mm of articular displacement.  She has a stable knee joint to varus and valgus stress.  She is already tested the knee by walking on it inadvertently.  I recommend a course of continued nonoperative management.  First of all I recommend DVT prophylaxis with aspirin 325 twice daily for 1 month.  I also recommend crutch use and toe-touch weightbearing to the right lower extremity for a total time of 6 to 8 weeks.  I recommend transitioning her from a knee immobilizer to a hinged knee brace.  Hinged knee brace to be locked in full extension when ambulating.  Hinged knee brace may be unlocked when  sitting or supine in a safe environment.  She was instructed today on crutch use and on performing heel slides in the supine position.  Okay for her to unlock her brace 0-90 in the supine position to work on knee range of motion exercises.  She may use Tylenol and ibuprofen as needed for pain control and she feels this should be sufficient.  I explained that adult bones take a minimum of 3 months to heal typically.  We will continue to work on her motion and see her back in 2 weeks for repeat series of x-rays in the right knee.  She is doing well at that visit we may advance her motion.  All questions and concerns were addressed.  Return to clinic in 2 weeks with repeat right knee radiographic series.    At the conclusion of the office visit, Flor verbally acknowledged that I answered all of her questions satisfactorily.

## 2019-11-15 ENCOUNTER — ALLIED HEALTH/NURSE VISIT (OUTPATIENT)
Dept: NURSING | Facility: CLINIC | Age: 64
End: 2019-11-15
Payer: COMMERCIAL

## 2019-11-15 DIAGNOSIS — E78.2 MIXED HYPERLIPIDEMIA: ICD-10-CM

## 2019-11-15 DIAGNOSIS — Z23 NEED FOR SHINGLES VACCINE: Primary | ICD-10-CM

## 2019-11-15 DIAGNOSIS — Z23 NEED FOR PROPHYLACTIC VACCINATION AND INOCULATION AGAINST INFLUENZA: ICD-10-CM

## 2019-11-15 LAB
ALBUMIN SERPL-MCNC: 4.1 G/DL (ref 3.4–5)
ALP SERPL-CCNC: 103 U/L (ref 40–150)
ALT SERPL W P-5'-P-CCNC: 41 U/L (ref 0–50)
AST SERPL W P-5'-P-CCNC: 31 U/L (ref 0–45)
BILIRUB DIRECT SERPL-MCNC: 0.1 MG/DL (ref 0–0.2)
BILIRUB SERPL-MCNC: 0.6 MG/DL (ref 0.2–1.3)
CHOLEST SERPL-MCNC: 227 MG/DL
HDLC SERPL-MCNC: 68 MG/DL
LDLC SERPL CALC-MCNC: 118 MG/DL
NONHDLC SERPL-MCNC: 159 MG/DL
PROT SERPL-MCNC: 7.1 G/DL (ref 6.8–8.8)
TRIGL SERPL-MCNC: 205 MG/DL

## 2019-11-15 PROCEDURE — 90682 RIV4 VACC RECOMBINANT DNA IM: CPT

## 2019-11-15 PROCEDURE — 36415 COLL VENOUS BLD VENIPUNCTURE: CPT | Performed by: PHYSICIAN ASSISTANT

## 2019-11-15 PROCEDURE — 90750 HZV VACC RECOMBINANT IM: CPT

## 2019-11-15 PROCEDURE — 90472 IMMUNIZATION ADMIN EACH ADD: CPT

## 2019-11-15 PROCEDURE — 80076 HEPATIC FUNCTION PANEL: CPT | Performed by: PHYSICIAN ASSISTANT

## 2019-11-15 PROCEDURE — 90471 IMMUNIZATION ADMIN: CPT

## 2019-11-15 PROCEDURE — 80061 LIPID PANEL: CPT | Performed by: PHYSICIAN ASSISTANT

## 2019-11-15 PROCEDURE — 99207 ZZC NO CHARGE NURSE ONLY: CPT

## 2019-11-19 ENCOUNTER — OFFICE VISIT (OUTPATIENT)
Dept: ORTHOPEDICS | Facility: CLINIC | Age: 64
End: 2019-11-19
Payer: COMMERCIAL

## 2019-11-19 ENCOUNTER — ANCILLARY PROCEDURE (OUTPATIENT)
Dept: GENERAL RADIOLOGY | Facility: CLINIC | Age: 64
End: 2019-11-19
Attending: ORTHOPAEDIC SURGERY
Payer: COMMERCIAL

## 2019-11-19 VITALS
DIASTOLIC BLOOD PRESSURE: 72 MMHG | WEIGHT: 182 LBS | SYSTOLIC BLOOD PRESSURE: 106 MMHG | HEIGHT: 66 IN | BODY MASS INDEX: 29.25 KG/M2

## 2019-11-19 DIAGNOSIS — S82.141A CLOSED FRACTURE OF RIGHT TIBIAL PLATEAU, INITIAL ENCOUNTER: Primary | ICD-10-CM

## 2019-11-19 DIAGNOSIS — S82.141A CLOSED FRACTURE OF RIGHT TIBIAL PLATEAU, INITIAL ENCOUNTER: ICD-10-CM

## 2019-11-19 PROCEDURE — 99207 ZZC FRACTURE CARE IN GLOBAL PERIOD: CPT | Performed by: ORTHOPAEDIC SURGERY

## 2019-11-19 PROCEDURE — 73560 X-RAY EXAM OF KNEE 1 OR 2: CPT | Mod: RT

## 2019-11-19 ASSESSMENT — MIFFLIN-ST. JEOR: SCORE: 1392.3

## 2019-11-19 NOTE — PROGRESS NOTES
"CHIEF COMPLAINT: Right knee pain    DIAGNOSIS: Right knee Schatzker Type III (pure depression) tibial plateau fracture, minimally displaced, closed injury. Date of injury: 11/3/2019.    OCCUPATION/SPORT: Retired hairdresser / hunting, walking.    HPI:   Flor Griffin is a 64 year old, right-hand dominant female who presents for scheduled follow-up of her right knee tibial plateau fracture following an injury sustained on 11/3/2019 while hunting.  Overall she feels better.  She has self discontinued her crutches.  She has numerous complaints about wearing hinged knee brace.  She feels that the swelling is going down.  She still notes that the knee feels achy and weak.  She states that she has been careful trying to walk without placing undue stress in the leg.  She denies any numbness or tingling.  No intercurrent trauma.  No other issues or concerns at present.    REVIEW OF SYSTEMS:  Constitutional: Normal  Otolaryngeal: Normal  Cardiovascular: Normal  Pulmonary: Normal  Gastrointestinal: Normal  Genitourinary: Normal  Musculoskeletal: As noted above in the HPI, otherwise normal  Skin: Normal  Neurological: Normal  Psychiatric: Normal  Endocrine: Normal  Hematologic: Normal    PHYSICAL EXAM:  Patient is 5' 6\" and weighs 182 lbs 0 oz. /72   Ht 1.676 m (5' 6\")   Wt 82.6 kg (182 lb)   LMP 01/01/2004 (Approximate)   BMI 29.38 kg/m    Constitutional: Well-developed, well-nourished, healthy appearing female.  Psychiatric:  Oriented to person, place and time.  Mood and affect congruent.  Skin: Warm, dry, and without rashes.  HEENT: Normocephalic, atraumatic.  Cardiac: Well perfused extremities, strong 2+ peripheral pulses. No edema.   Pulmonary: Non-labored respirations on room air without audible wheezes.   Abdomen: Soft, nontender.  Musculoskeletal: Patient is seen today comfortably seated in a chair.  She has removed her knee brace.  Gait testing demonstrates slow but steady symmetrical gait pattern without " significant antalgia.  Overall alignment is neutral with no obvious deformity.  There is continued moderate swelling about the right knee joint with a moderate effusion.   There is tenderness to firm palpation about the lateral joint line.  Active range of motion of the right knee is from 5 degrees shy of full extension to 90 degrees of flexion, limited by pain and swelling.  The knee is stable to varus and valgus stress and full extension and at 30 degrees of flexion.  The calf compartments are soft and nontender throughout.  The neurovascular exam distally is intact.  The overlying skin is intact.    IMAGING:   New AP and lateral right knee radiographs were obtained today demonstrate stable alignment of the minimally displaced lateral tibial plateau pure depression fracture.    IMPRESSION: 64 year old-year-old female, with a Right knee Schatzker Type III (pure depression) tibial plateau fracture, minimally displaced, closed injury.    PLAN:   I had a nice discussion with Flor today.  At the present time, she has self discontinued her crutches and states that she will not use them.  She is willing to continue to wear the hinged knee brace and I stressed the importance of this to prevent her knee from buckling.  She still has a significant effusion.  I encouraged her to continue icing.  In addition, I would like her to get into his establish care with a physical therapist to guide her early rehabilitation.  Focus at this time would be to work on knee range of motion and flex ability.  This should be progressively and gently advanced as tolerated.  No impact activities at all.  She needs to continue to wear the hinged knee brace locked in full extension while ambulating.  Since she refuses to use the crutches, she may gingerly weight-bear as tolerated to the right lower extremity.  I would like to see her return to clinic in 4 weeks with repeat radiographs, sooner if issues.    At the conclusion of the office  visit, Flor verbally acknowledged that I answered all of her questions satisfactorily.

## 2019-11-19 NOTE — LETTER
"    11/19/2019         RE: Flor Griffin  [address] [facility] [address]        Dear Colleague,    Thank you for referring your patient, Flor Griffin, to the [facility] ORTHOPEDIC SURGERY. Please see a copy of my visit note below.    CHIEF COMPLAINT: Right knee pain    DIAGNOSIS: Right knee Schatzker Type III (pure depression) tibial plateau fracture, minimally displaced, closed injury. Date of injury: 11/3/2019.    OCCUPATION/SPORT: Retired hairdresser / hunting, walking.    HPI:   Flor Griffin is a 64 year old, right-hand dominant female who presents for scheduled follow-up of her right knee tibial plateau fracture following an injury sustained on 11/3/2019 while hunting.  Overall she feels better.  She has self discontinued her crutches.  She has numerous complaints about wearing hinged knee brace.  She feels that the swelling is going down.  She still notes that the knee feels achy and weak.  She states that she has been careful trying to walk without placing undue stress in the leg.  She denies any numbness or tingling.  No intercurrent trauma.  No other issues or concerns at present.    REVIEW OF SYSTEMS:  Constitutional: Normal  Otolaryngeal: Normal  Cardiovascular: Normal  Pulmonary: Normal  Gastrointestinal: Normal  Genitourinary: Normal  Musculoskeletal: As noted above in the HPI, otherwise normal  Skin: Normal  Neurological: Normal  Psychiatric: Normal  Endocrine: Normal  Hematologic: Normal    PHYSICAL EXAM:  Patient is 5' 6\" and weighs 182 lbs 0 oz. /72   Ht 1.676 m (5' 6\")   Wt 82.6 kg (182 lb)   LMP 01/01/2004 (Approximate)   BMI 29.38 kg/m     Constitutional: Well-developed, well-nourished, healthy appearing female.  Psychiatric:  Oriented to person, place and time.  Mood and affect congruent.  Skin: Warm, dry, and without rashes.  HEENT: Normocephalic, atraumatic.  Cardiac: Well perfused extremities, strong 2+ peripheral pulses. No edema.   Pulmonary: " Non-labored respirations on room air without audible wheezes.   Abdomen: Soft, nontender.  Musculoskeletal: Patient is seen today comfortably seated in a chair.  She has removed her knee brace.  Gait testing demonstrates slow but steady symmetrical gait pattern without significant antalgia.  Overall alignment is neutral with no obvious deformity.  There is continued moderate swelling about the right knee joint with a moderate effusion.   There is tenderness to firm palpation about the lateral joint line.  Active range of motion of the right knee is from 5 degrees shy of full extension to 90 degrees of flexion, limited by pain and swelling.  The knee is stable to varus and valgus stress and full extension and at 30 degrees of flexion.  The calf compartments are soft and nontender throughout.  The neurovascular exam distally is intact.  The overlying skin is intact.    IMAGING:   New AP and lateral right knee radiographs were obtained today demonstrate stable alignment of the minimally displaced lateral tibial plateau pure depression fracture.    IMPRESSION: 64 year old-year-old female, with a Right knee Schatzker Type III (pure depression) tibial plateau fracture, minimally displaced, closed injury.    PLAN:   I had a nice discussion with Flor today.  At the present time, she has self discontinued her crutches and states that she will not use them.  She is willing to continue to wear the hinged knee brace and I stressed the importance of this to prevent her knee from buckling.  She still has a significant effusion.  I encouraged her to continue icing.  In addition, I would like her to get into his establish care with a physical therapist to guide her early rehabilitation.  Focus at this time would be to work on knee range of motion and flex ability.  This should be progressively and gently advanced as tolerated.  No impact activities at all.  She needs to continue to wear the hinged knee brace locked in full  extension while ambulating.  Since she refuses to use the crutches, she may gingerly weight-bear as tolerated to the right lower extremity.  I would like to see her return to clinic in 4 weeks with repeat radiographs, sooner if issues.    At the conclusion of the office visit, Flor verbally acknowledged that I answered all of her questions satisfactorily.    Again, thank you for allowing me to participate in the care of your patient.        Sincerely,        Ulises Garcia MD

## 2019-11-19 NOTE — PATIENT INSTRUCTIONS
We addressed the following today:    1. Closed fracture of right tibial plateau, initial encounter        Physical therapy: [facility] - [phone]  Other specific instructions: OK for ambulation without crutches, remain in hinged knee brace until follow-up with Dr. Garcia.  Follow up in 1 month for reassessment and x-rayimaging (sooner if needed) . Call clinic triage number [[phone],pick option 2, then option 3] at any time with questions or concerns.

## 2019-11-25 ENCOUNTER — THERAPY VISIT (OUTPATIENT)
Dept: PHYSICAL THERAPY | Facility: CLINIC | Age: 64
End: 2019-11-25
Attending: ORTHOPAEDIC SURGERY
Payer: COMMERCIAL

## 2019-11-25 DIAGNOSIS — S82.141A CLOSED FRACTURE OF RIGHT TIBIAL PLATEAU, INITIAL ENCOUNTER: ICD-10-CM

## 2019-11-25 DIAGNOSIS — M25.561 ACUTE PAIN OF RIGHT KNEE: ICD-10-CM

## 2019-11-25 PROCEDURE — 97161 PT EVAL LOW COMPLEX 20 MIN: CPT | Mod: GP | Performed by: PHYSICAL THERAPIST

## 2019-11-25 PROCEDURE — 97010 HOT OR COLD PACKS THERAPY: CPT | Mod: GP | Performed by: PHYSICAL THERAPIST

## 2019-11-25 PROCEDURE — G0283 ELEC STIM OTHER THAN WOUND: HCPCS | Mod: GP | Performed by: PHYSICAL THERAPIST

## 2019-11-25 PROCEDURE — 97110 THERAPEUTIC EXERCISES: CPT | Mod: GP | Performed by: PHYSICAL THERAPIST

## 2019-11-25 ASSESSMENT — ACTIVITIES OF DAILY LIVING (ADL)
WEAKNESS: THE SYMPTOM AFFECTS MY ACTIVITY SLIGHTLY
WALK: ACTIVITY IS SOMEWHAT DIFFICULT
KNEE_ACTIVITY_OF_DAILY_LIVING_SUM: 37
SIT WITH YOUR KNEE BENT: ACTIVITY IS SOMEWHAT DIFFICULT
HOW_WOULD_YOU_RATE_THE_CURRENT_FUNCTION_OF_YOUR_KNEE_DURING_YOUR_USUAL_DAILY_ACTIVITIES_ON_A_SCALE_FROM_0_TO_100_WITH_100_BEING_YOUR_LEVEL_OF_KNEE_FUNCTION_PRIOR_TO_YOUR_INJURY_AND_0_BEING_THE_INABILITY_TO_PERFORM_ANY_OF_YOUR_USUAL_DAILY_ACTIVITIES?: 40
STAND: ACTIVITY IS MINIMALLY DIFFICULT
RISE FROM A CHAIR: ACTIVITY IS SOMEWHAT DIFFICULT
LIMPING: THE SYMPTOM AFFECTS MY ACTIVITY MODERATELY
HOW_WOULD_YOU_RATE_THE_OVERALL_FUNCTION_OF_YOUR_KNEE_DURING_YOUR_USUAL_DAILY_ACTIVITIES?: ABNORMAL
GO UP STAIRS: ACTIVITY IS SOMEWHAT DIFFICULT
GIVING WAY, BUCKLING OR SHIFTING OF KNEE: THE SYMPTOM AFFECTS MY ACTIVITY SLIGHTLY
STIFFNESS: I DO NOT HAVE THE SYMPTOM
SWELLING: THE SYMPTOM AFFECTS MY ACTIVITY SLIGHTLY
KNEE_ACTIVITY_OF_DAILY_LIVING_SCORE: 52.86
AS_A_RESULT_OF_YOUR_KNEE_INJURY,_HOW_WOULD_YOU_RATE_YOUR_CURRENT_LEVEL_OF_DAILY_ACTIVITY?: ABNORMAL
SQUAT: I AM UNABLE TO DO THE ACTIVITY
KNEEL ON THE FRONT OF YOUR KNEE: I AM UNABLE TO DO THE ACTIVITY
PAIN: THE SYMPTOM AFFECTS MY ACTIVITY MODERATELY
RAW_SCORE: 37
GO DOWN STAIRS: ACTIVITY IS SOMEWHAT DIFFICULT

## 2019-11-25 NOTE — PROGRESS NOTES
[facility] Initial Evaluation  Subjective:  The history is provided by the patient. No  was used.   Type of problem:  Right knee   Condition occurred with:  A fall/slip. This is a new condition   Problem details: Pt reports having non-displaced R tibial plateau fracture on 11/4/19.  Currently WBAT in knee brace locked in extension per MD.  .   Site of Pain: no pain.  Associated symptoms:  Loss of motion/stiffness and loss of strength. Symptoms are exacerbated by walking, descending stairs, ascending stairs and bending/squatting and relieved by rest and ice.    Flor Griffin being seen for R tibial plateau fx.   Date of Onset: 4 weeks ago. Where condition occurred: in the community.Problem occurred: fell while hunting after being bumped by her dog  and reported as 0/10 on pain scale. General health as reported by patient is excellent. Pertinent medical history includes:  None.   Other medical allergies details: see EPIC.  Surgeries include:  None.  Current medications:  Hormone replacement therapy.      and is intermittent. Pain is worse during the day. Since onset symptoms are unchanged. Special tests:  X-ray and MRI.     Patient is retired.   Barriers include:  None as reported by patient.  Red flags:  None as reported by patient.                      Objective:    Gait:    Weight Bearing Status:  WBAT   Assistive Devices:  Brace                                                        Knee Evaluation:  ROM:  AROM: normal      PROM    Hyperextension: Left: 0    Right:  0  Extension: Left: 0    Right:  0  Flexion: Left: 125    Right:  88      Strength:     Extension:  Left: 4+/5   Pain:      Right: 3/5   Pain:  Flexion:  Left: 4+/5   Pain:      Right: 3/5   Pain:    Quad Set Left: Good    Pain:   Quad Set Right: Delayed    Pain:      Palpation:  Normal      Edema:  Edema of the knee: general effusion still present in R knee.            General     ROS    Assessment/Plan:     Patient is a 64 year old female with right side knee complaints.    Patient has the following significant findings with corresponding treatment plan.                Diagnosis 1:  R knee tibial plateau fx  Pain -  hot/cold therapy, electric stimulation, self management, education, directional preference exercise and home program  Decreased ROM/flexibility - manual therapy and therapeutic exercise  Decreased joint mobility - manual therapy and therapeutic exercise  Decreased strength - therapeutic exercise and therapeutic activities  Impaired balance - neuro re-education and therapeutic activities  Decreased proprioception - neuro re-education and therapeutic activities  Impaired muscle performance - neuro re-education  Decreased function - therapeutic activities    Therapy Evaluation Codes:   Previous and current functional limitations:  (See Goal Flow Sheet for this information)    Short term and Long term goals: (See Goal Flow Sheet for this information)     Communication ability:  Patient appears to be able to clearly communicate and understand verbal and written communication and follow directions correctly.  Treatment Explanation - The following has been discussed with the patient:   RX ordered/plan of care  Anticipated outcomes  Possible risks and side effects  This patient would benefit from PT intervention to resume normal activities.   Rehab potential is good.    Frequency:  1 X week, once daily  Duration:  for 8 weeks  Discharge Plan:  Achieve all LTG.  Independent in home treatment program.  Reach maximal therapeutic benefit.    Please refer to the daily flowsheet for treatment today, total treatment time and time spent performing 1:1 timed codes.

## 2019-12-03 ENCOUNTER — TELEPHONE (OUTPATIENT)
Dept: ORTHOPEDICS | Facility: CLINIC | Age: 64
End: 2019-12-03

## 2019-12-03 NOTE — TELEPHONE ENCOUNTER
Patient Flor calls with question regarding when she can resume driving.  Patient sustained a right tibial plateau fracture on 11/3/19, she is 4 weeks out from injury. Currently using knee immobilizer, and physical therapy. Patient states that the knee is progressing along, and she has been working on strengthening the knee.  She inquires today when she can start driving again. Currently not utilizing any prescribed or OTC pain medication.     Huddled with Dr. Garcia who recommends she does not return driving until after her next follow up visit on 12/17/19.  Patient was agreeable to plan. She had no further questions.     Matilde Coreas, ATC

## 2019-12-04 ENCOUNTER — OFFICE VISIT (OUTPATIENT)
Dept: PEDIATRICS | Facility: CLINIC | Age: 64
End: 2019-12-04
Payer: COMMERCIAL

## 2019-12-04 VITALS
WEIGHT: 186 LBS | DIASTOLIC BLOOD PRESSURE: 70 MMHG | SYSTOLIC BLOOD PRESSURE: 112 MMHG | TEMPERATURE: 98 F | HEART RATE: 78 BPM | BODY MASS INDEX: 30.02 KG/M2 | OXYGEN SATURATION: 98 %

## 2019-12-04 DIAGNOSIS — J01.00 ACUTE NON-RECURRENT MAXILLARY SINUSITIS: Primary | ICD-10-CM

## 2019-12-04 PROCEDURE — 99213 OFFICE O/P EST LOW 20 MIN: CPT | Performed by: PHYSICIAN ASSISTANT

## 2019-12-04 RX ORDER — AZITHROMYCIN 250 MG/1
TABLET, FILM COATED ORAL
Qty: 6 TABLET | Refills: 0 | Status: SHIPPED | OUTPATIENT
Start: 2019-12-04 | End: 2020-05-26

## 2019-12-04 NOTE — PROGRESS NOTES
Subjective     Flor Griffin is a 64 year old female who presents to clinic today for the following health issues:    HPI   Acute Illness   Acute illness concerns: sinus issue   Onset: 2 weeks     Fever: YES    Chills/Sweats: YES    Headache (location?): YES    Sinus Pressure:YES    Conjunctivitis:  no    Ear Pain: no    Rhinorrhea: YES    Congestion: YES    Sore Throat: no     Cough: YES    Wheeze: no    Decreased Appetite: no    Nausea: no    Vomiting: no    Diarrhea:  no    Dysuria/Freq.: no    Fatigue/Achiness: YES    Sick/Strep Exposure: no     Therapies Tried and outcome: OTC medication - little improvement   No wheezing, sob. No bronchitis, pna.     Review of Systems   ROS COMP: Constitutional, HEENT, cardiovascular, pulmonary, gi and gu systems are negative, except as otherwise noted.      Objective    /70 (BP Location: Right arm, Cuff Size: Adult Regular)   Pulse 78   Temp 98  F (36.7  C) (Tympanic)   Wt 84.4 kg (186 lb)   LMP 01/01/2004 (Approximate)   SpO2 98%   BMI 30.02 kg/m    Body mass index is 30.02 kg/m .  Physical Exam   GENERAL: alert and no distress  EYES: Eyes grossly normal to inspection, PERRL and conjunctivae and sclerae normal  HENT: ear canals and TM's normal, nose and mouth without ulcers or lesions; max sinus tenderness  NECK: no adenopathy  RESP: lungs clear to auscultation - no rales, rhonchi or wheezes  CV: regular rate and rhythm, normal S1 S2, no S3 or S4  ABDOMEN: soft, nontender    Diagnostic Test Results:  No results found for this or any previous visit (from the past 24 hour(s)).        Assessment & Plan   1. Acute non-recurrent maxillary sinusitis  Patient requesting zpak. Begin antibiotics. Call if symptoms persist.  - azithromycin (ZITHROMAX) 250 MG tablet; Take 2 tablets (500 mg) by mouth daily for 1 day, THEN 1 tablet (250 mg) daily for 4 days.  Dispense: 6 tablet; Refill: 0     Martha Red PA-C  [facility]

## 2019-12-09 ENCOUNTER — TELEPHONE (OUTPATIENT)
Dept: ORTHOPEDICS | Facility: CLINIC | Age: 64
End: 2019-12-09

## 2019-12-09 NOTE — TELEPHONE ENCOUNTER
Called patient to request change of upcoming morning appointment on 12/17/19 due to scheduling conflict. Patient was understanding and rescheduled for afternoon appointment on 12/17/19.    Patient notes that her hinged knee brace is uncomfortable and states that she has purchased a knee sleeve to wear around the house. Patient was instructed to continue use of the hinged knee brace for ambulation. Patient was informed that restriction assessment will be determined at follow up appointment, she acknowledged and agreed to plan.    Jm Hummel ATC

## 2019-12-16 ENCOUNTER — PATIENT OUTREACH (OUTPATIENT)
Dept: ONCOLOGY | Facility: CLINIC | Age: 64
End: 2019-12-16

## 2019-12-16 ENCOUNTER — HOSPITAL ENCOUNTER (OUTPATIENT)
Dept: CT IMAGING | Facility: CLINIC | Age: 64
Discharge: HOME | End: 2019-12-16
Attending: INTERNAL MEDICINE | Admitting: INTERNAL MEDICINE
Payer: COMMERCIAL

## 2019-12-16 DIAGNOSIS — R91.8 PULMONARY NODULES: ICD-10-CM

## 2019-12-16 PROCEDURE — 71250 CT THORAX DX C-: CPT

## 2019-12-16 NOTE — PROGRESS NOTES
Patient called clinic stating that she had /ct Chest today and that she cannot make her appointment scheduled for 12/19/19 with Dr. Stover. Michelle is wondering if she can be called with results since she cannot come in. Danya Lara RN,BSN,OCN

## 2019-12-17 ENCOUNTER — ANCILLARY PROCEDURE (OUTPATIENT)
Dept: GENERAL RADIOLOGY | Facility: CLINIC | Age: 64
End: 2019-12-17
Attending: ORTHOPAEDIC SURGERY
Payer: COMMERCIAL

## 2019-12-17 ENCOUNTER — OFFICE VISIT (OUTPATIENT)
Dept: ORTHOPEDICS | Facility: CLINIC | Age: 64
End: 2019-12-17
Payer: COMMERCIAL

## 2019-12-17 VITALS — DIASTOLIC BLOOD PRESSURE: 72 MMHG | SYSTOLIC BLOOD PRESSURE: 132 MMHG

## 2019-12-17 DIAGNOSIS — S82.141A CLOSED FRACTURE OF RIGHT TIBIAL PLATEAU, INITIAL ENCOUNTER: ICD-10-CM

## 2019-12-17 DIAGNOSIS — S82.141A CLOSED FRACTURE OF RIGHT TIBIAL PLATEAU, INITIAL ENCOUNTER: Primary | ICD-10-CM

## 2019-12-17 PROCEDURE — 99207 ZZC FRACTURE CARE IN GLOBAL PERIOD: CPT | Performed by: ORTHOPAEDIC SURGERY

## 2019-12-17 PROCEDURE — 73560 X-RAY EXAM OF KNEE 1 OR 2: CPT | Mod: RT

## 2019-12-17 NOTE — PROGRESS NOTES
CHIEF COMPLAINT: Right knee pain    DIAGNOSIS: Right knee Schatzker Type III (pure depression) tibial plateau fracture, minimally displaced, closed injury. Date of injury: 11/3/2019.    OCCUPATION/SPORT: Retired hairdresser / hunting, walking.    HPI:   Flor Griffin is a 64 year old, right-hand dominant female who presents for scheduled follow-up of her right knee tibial plateau fracture following an injury sustained on 11/3/2019 while hunting. She is now over 6 weeks out from injury, and she denies any significant pain or discomfort.  She denies any aching.  She has been compliant with restrictions and bracing.  She denies any numbness or tingling.  No intercurrent trauma.  No other issues or concerns at present.    REVIEW OF SYSTEMS:  Constitutional: Normal  Otolaryngeal: Normal  Cardiovascular: Normal  Pulmonary: Normal  Gastrointestinal: Normal  Genitourinary: Normal  Musculoskeletal: As noted above in the HPI, otherwise normal  Skin: Normal  Neurological: Normal  Psychiatric: Normal  Endocrine: Normal  Hematologic: Normal    PHYSICAL EXAM:  Patient is Data Unavailable and weighs 0 lbs 0 oz. /72   LMP 01/01/2004 (Approximate)   Constitutional: Well-developed, well-nourished, healthy appearing female.  Psychiatric:  Oriented to person, place and time.  Mood and affect congruent.  Skin: Warm, dry, and without rashes.  HEENT: Normocephalic, atraumatic.  Cardiac: Well perfused extremities, strong 2+ peripheral pulses. No edema.   Pulmonary: Non-labored respirations on room air without audible wheezes.   Abdomen: Soft, nontender.  Musculoskeletal: Patient is seen today comfortably seated in a chair.  She has removed her knee brace.  Gait testing demonstrates slow but steady symmetrical gait pattern without significant antalgia.  Overall alignment is neutral with no obvious deformity.  There is no significant swelling about the right knee joint with a trace effusion.   There is very mild tenderness to firm  palpation about the lateral joint line.  Active range of motion of the right knee is from 0 to 135 degrees of flexion, limited by mild stiffness.  The knee is stable to varus and valgus stress and full extension and at 30 degrees of flexion.  The calf compartments are soft and nontender throughout.  The neurovascular exam distally is intact.  The overlying skin is intact.    IMAGING:   New AP and lateral right knee radiographs were obtained today demonstrate stable alignment and continued healing and consolidation of the displaced lateral tibial plateau pure depression fracture.    IMPRESSION: 64 year old-year-old female, with a Right knee Schatzker Type III (pure depression) tibial plateau fracture, minimally displaced, closed injury.    PLAN:   I had a nice discussion with Flor today.  At the present time, she has radiographically continued to heal without any significant displacement.  She has no significant pain.  Her knee is stable on exam.  She meets all criteria at this time to discontinue her knee brace, resume driving, and gently progress activities as tolerated.  I did explain that she may develop arthritis in the lateral compartment in the future and she expressed good understanding of this.  She may return to see me on an as-needed basis moving forward.    At the conclusion of the office visit, Flor verbally acknowledged that I answered all of her questions satisfactorily.

## 2019-12-17 NOTE — LETTER
12/17/2019         RE: Flor Griffin  [id number] [facility] [id number]        Dear Colleague,    Thank you for referring your patient, Flor Griffin, to the [facility]. Please see a copy of my visit note below.    CHIEF COMPLAINT: Right knee pain    DIAGNOSIS: Right knee Schatzker Type III (pure depression) tibial plateau fracture, minimally displaced, closed injury. Date of injury: 11/3/2019.    OCCUPATION/SPORT: Retired hairdresser / hunting, walking.    HPI:   Flor Griffin is a 64 year old, right-hand dominant female who presents for scheduled follow-up of her right knee tibial plateau fracture following an injury sustained on 11/3/2019 while hunting. She is now over 6 weeks out from injury, and she denies any significant pain or discomfort.  She denies any aching.  She has been compliant with restrictions and bracing.  She denies any numbness or tingling.  No intercurrent trauma.  No other issues or concerns at present.    REVIEW OF SYSTEMS:  Constitutional: Normal  Otolaryngeal: Normal  Cardiovascular: Normal  Pulmonary: Normal  Gastrointestinal: Normal  Genitourinary: Normal  Musculoskeletal: As noted above in the HPI, otherwise normal  Skin: Normal  Neurological: Normal  Psychiatric: Normal  Endocrine: Normal  Hematologic: Normal    PHYSICAL EXAM:  Patient is Data Unavailable and weighs 0 lbs 0 oz. /72   LMP 01/01/2004 (Approximate)   Constitutional: Well-developed, well-nourished, healthy appearing female.  Psychiatric:  Oriented to person, place and time.  Mood and affect congruent.  Skin: Warm, dry, and without rashes.  HEENT: Normocephalic, atraumatic.  Cardiac: Well perfused extremities, strong 2+ peripheral pulses. No edema.   Pulmonary: Non-labored respirations on room air without audible wheezes.   Abdomen: Soft, nontender.  Musculoskeletal: Patient is seen today comfortably seated in a chair.  She has removed her knee brace.  Gait testing  demonstrates slow but steady symmetrical gait pattern without significant antalgia.  Overall alignment is neutral with no obvious deformity.  There is no significant swelling about the right knee joint with a trace effusion.   There is very mild tenderness to firm palpation about the lateral joint line.  Active range of motion of the right knee is from 0 to 135 degrees of flexion, limited by mild stiffness.  The knee is stable to varus and valgus stress and full extension and at 30 degrees of flexion.  The calf compartments are soft and nontender throughout.  The neurovascular exam distally is intact.  The overlying skin is intact.    IMAGING:   New AP and lateral right knee radiographs were obtained today demonstrate stable alignment and continued healing and consolidation of the displaced lateral tibial plateau pure depression fracture.    IMPRESSION: 64 year old-year-old female, with a Right knee Schatzker Type III (pure depression) tibial plateau fracture, minimally displaced, closed injury.    PLAN:   I had a nice discussion with Flor today.  At the present time, she has radiographically continued to heal without any significant displacement.  She has no significant pain.  Her knee is stable on exam.  She meets all criteria at this time to discontinue her knee brace, resume driving, and gently progress activities as tolerated.  I did explain that she may develop arthritis in the lateral compartment in the future and she expressed good understanding of this.  She may return to see me on an as-needed basis moving forward.    At the conclusion of the office visit, Flor verbally acknowledged that I answered all of her questions satisfactorily.    Again, thank you for allowing me to participate in the care of your patient.        Sincerely,        Ulises Garcia MD

## 2019-12-17 NOTE — PATIENT INSTRUCTIONS
We addressed the following today:    1. Closed fracture of right tibial plateau, initial encounter        Follow up as needed (sooner if needed) . Call clinic triage number [[phone],pick option 2, then option 3] at any time with questions or concerns.

## 2019-12-18 ENCOUNTER — PATIENT OUTREACH (OUTPATIENT)
Dept: ONCOLOGY | Facility: CLINIC | Age: 64
End: 2019-12-18

## 2019-12-18 ENCOUNTER — TELEPHONE (OUTPATIENT)
Dept: ONCOLOGY | Facility: CLINIC | Age: 64
End: 2019-12-18

## 2019-12-18 NOTE — PROGRESS NOTES
Reviewed CT chest result with Dr. Stovre which is stable. Dr. Stover stated that Michelle does not need to be seen until 3/20. Called Michelle left message with her that she does not need to come in tomorrow and that she should be seen 3/20 for exam.

## 2020-01-14 ENCOUNTER — TELEPHONE (OUTPATIENT)
Dept: ORTHOPEDICS | Facility: CLINIC | Age: 65
End: 2020-01-14

## 2020-01-14 PROBLEM — S82.141A TIBIAL PLATEAU FRACTURE, RIGHT: Status: RESOLVED | Noted: 2019-11-25 | Resolved: 2020-01-14

## 2020-01-14 PROBLEM — M25.561 ACUTE PAIN OF RIGHT KNEE: Status: RESOLVED | Noted: 2019-11-25 | Resolved: 2020-01-14

## 2020-01-14 NOTE — TELEPHONE ENCOUNTER
Patient Sent a letter to [facility] billing with her bill stating the cost of the knee brace was too high. She signed a delivery ticket at delivery of the knee brace with the exact cost of the brace on the ticket. Spoke with patient and let her know that the brace she received has PDAC (regulated [facility] coding) and that the code used has a negotiated bill rate set by CMS. She understood how the pricing worked but was still upset it was so high. I also let her know built into the price is the practitioner time (all her appointments and adjustments would be free of charge) and that the specific brace had a locking mechanism that is set by a practitioner. She stated the brace kept moving and didn't work for her. I let her know built into the price is she can come get adjustments. She stated she was done with it and she was going to have her Doctor write a letter to insurance that the brace was too much money. She continued by saying she would work with insurance. Complaint Resolved 1/14/20 KO

## 2020-01-20 ENCOUNTER — TELEPHONE (OUTPATIENT)
Dept: FAMILY MEDICINE | Facility: CLINIC | Age: 65
End: 2020-01-20

## 2020-01-20 NOTE — TELEPHONE ENCOUNTER
Reason for call:  Symptom   Symptom or request: productive cough (green mucus) , headache, sore throat, Both eyes are red and itchy, thinks it is pink eye.     Duration (how long have symptoms been present): Woke up with symptoms of pink eye in both eyes this morning, all other symptoms have been present for 4 days.   Have you been treated for this before? No    Additional comments:     Phone number to reach patient:  Cell number on file:    Telephone Information:   Mobile [phone]       Best Time:  any    Can we leave a detailed message on this number?  YES     Domenica Rosales on 1/20/2020 at 8:47 AM

## 2020-01-20 NOTE — TELEPHONE ENCOUNTER
Calling pt back she stated- both  and I have it sore throat coughing- yucky green stuff.   Daughter works at [employer] in [place] and has the same symptoms.   Pink eye last night- both eyes are pink crust and some drainage- does not know if it is pink eye. Offered patient  a phone visit. Does not have my chart. Pt stated she will try eye drops feels like it is not pink eye. Adv pt sometimes with cold symptoms eyes can become irritated  and red. Adv pt to keep eye clean with warm cloth. Pt did not want to be seen or do a phone visit for this. Adv pt to call back with worsening symptoms.Pt verbalized understanding.   Denies any vision changes, no n/v, no sensitivity to light, no eye pain. No headache   Pt is taking mucinex and over the counter cough  medications.    Nilam Vo RN on 1/20/2020 at 9:40 AM

## 2020-01-22 ENCOUNTER — OFFICE VISIT (OUTPATIENT)
Dept: URGENT CARE | Facility: URGENT CARE | Age: 65
End: 2020-01-22
Payer: COMMERCIAL

## 2020-01-22 VITALS
SYSTOLIC BLOOD PRESSURE: 116 MMHG | RESPIRATION RATE: 18 BRPM | DIASTOLIC BLOOD PRESSURE: 72 MMHG | TEMPERATURE: 98.2 F | HEART RATE: 74 BPM

## 2020-01-22 DIAGNOSIS — J02.9 SORE THROAT: ICD-10-CM

## 2020-01-22 DIAGNOSIS — H10.9 BACTERIAL CONJUNCTIVITIS OF BOTH EYES: ICD-10-CM

## 2020-01-22 DIAGNOSIS — J01.90 ACUTE NON-RECURRENT SINUSITIS, UNSPECIFIED LOCATION: Primary | ICD-10-CM

## 2020-01-22 DIAGNOSIS — B96.89 BACTERIAL CONJUNCTIVITIS OF BOTH EYES: ICD-10-CM

## 2020-01-22 LAB
DEPRECATED S PYO AG THROAT QL EIA: NORMAL
SPECIMEN SOURCE: NORMAL

## 2020-01-22 PROCEDURE — 87081 CULTURE SCREEN ONLY: CPT | Performed by: PHYSICIAN ASSISTANT

## 2020-01-22 PROCEDURE — 99214 OFFICE O/P EST MOD 30 MIN: CPT | Performed by: PHYSICIAN ASSISTANT

## 2020-01-22 PROCEDURE — 87880 STREP A ASSAY W/OPTIC: CPT | Performed by: FAMILY MEDICINE

## 2020-01-22 RX ORDER — AZITHROMYCIN 250 MG/1
TABLET, FILM COATED ORAL
Qty: 6 TABLET | Refills: 0 | Status: SHIPPED | OUTPATIENT
Start: 2020-01-22 | End: 2020-03-12

## 2020-01-22 RX ORDER — POLYMYXIN B SULFATE AND TRIMETHOPRIM 1; 10000 MG/ML; [USP'U]/ML
1-2 SOLUTION OPHTHALMIC EVERY 4 HOURS
Qty: 5 ML | Refills: 0 | Status: SHIPPED | OUTPATIENT
Start: 2020-01-22 | End: 2020-03-12

## 2020-01-22 NOTE — PROGRESS NOTES
SUBJECTIVE:  Flor Griffin is a 64 year old female here with concerns about sinus infection.  She states onset of symptoms were 7 day(s) ago.  She has had maxillary pressure. Course of illness is worsening. Severity moderate  Current and Associated symptoms: ear pain bilateral and sore throat  Predisposing factors include HX of sinusitis. Recent treatment has included: Antihistamine, Decongestants and Steroid nasal spray and rinse    Flor Griffin is a 64 year old female who presents complaining of mild both eyes discharge, mattering, redness for 1 day(s).   Onset/timing: sudden.    Associated Signs and Symptoms: nasal drainage, sinus congestion and sinus pain  Treatment measures tried include: none  Contact wearer : No      Past Medical History:   Diagnosis Date     Basal cell cancer     right cheek     Gastroesophageal reflux disease      History of blood transfusion     blue baby     Hyperlipidaemia      Social History     Tobacco Use     Smoking status: Never Smoker     Smokeless tobacco: Never Used   Substance Use Topics     Alcohol use: Yes     Alcohol/week: 0.0 standard drinks     Comment: 4 times a week, 2 drinks       ROS:  10 point ROS negative except as listed above      OBJECTIVE:  /72   Pulse 74   Temp 98.2  F (36.8  C) (Tympanic)   Resp 18   LMP 01/01/2004 (Approximate)   Exam:GENERAL APPEARANCE: healthy, alert and no distress  EYES: conjunctiva erythematous with discharge and mattering  HENT: ear canals and TM's normal.  Nose and mouth without ulcers, erythema or lesions  NECK: supple, nontender, no lymphadenopathy  RESP: lungs clear to auscultation - no rales, rhonchi or wheezes  CV: regular rates and rhythm, normal S1 S2, no murmur noted  NEURO: Normal strength and tone, sensory exam grossly normal,  normal speech and mentation  SKIN: no suspicious lesions or rashes    Results for orders placed or performed in visit on 01/22/20   Rapid strep screen     Status: None   Result Value Ref  Range    Specimen Description Throat     Rapid Strep A Screen       NEGATIVE: No Group A streptococcal antigen detected by immunoassay, await culture report.       ASSESSMENT:  (J01.90) Acute non-recurrent sinusitis, unspecified location  (primary encounter diagnosis)  Comment: covering for bacteria, penicillin allergy  Plan: azithromycin (ZITHROMAX) 250 MG tablet  Hold AB, trial OTC rec's and initiate if worsening or persisting    (J02.9) Sore throat  Plan: Rapid strep screen, Beta strep group A culture      (H10.9) Bacterial conjunctivitis of both eyes  Plan: trimethoprim-polymyxin b (POLYTRIM) 76615-5.1         UNIT/ML-% ophthalmic solution        Follow up with PCP if symptoms worsen or fail to improve      Patient Instructions         1.  Plenty of fluids, rest, warm compresses on face  2.  Mucinex twice daily for at least 4 days  3.  Lizeth Pot 1x in the morning 1x at night (SALINE MIST SPRAY IS AN ACCEPTABLE, THOUGH NOT AS EFFECTIVE REPLACEMENT)  4.  Benadryl (diphenhydramine) at bedtime   5.  Either Claritin (Loratadine), Allegra (Fexofenadine), or Zyrtec (Cetirizine) in the day  6.  Flonase (Fluticasone) 2x each nostril twice a day for two weeks, then once each nostril once a day  7. Antibiotic twice daily for 7 Days  8. Probiotic (ie Culturelle) 1-2 hours after each antibiotic dose     Please let us know if symptoms persist, or worsen.      Patient Education     Self-Care for Sinusitis     Drinking plenty of water can help sinuses drain.   Sinusitis can often be managed with self-care. Self-care can keep sinuses moist and make you feel more comfortable. Remember to follow your doctor's instructions closely. This can make a big difference in getting your sinus problem under control.  Drink fluids  Drinking extra fluids helps thin your mucus. This lets it drain from your sinuses more easily. Have a glass of water every hour or two. A humidifier helps in much the same way. Fluids can also offset the drying  effects of certain medicines. If you use a humidifier, follow the product maker's instructions on how to use it. Clean it on a regular schedule.  Use saltwater rinses  Rinses help keep your sinuses and nose moist. Mix a teaspoon of salt in 8 ounces of fresh, warm water. Use a bulb syringe to gently squirt the water into your nose a few times a day. You can also buy ready-made saline nasal sprays.  Apply hot or cold packs  Applying heat to the area surrounding your sinuses may make you feel more comfortable. Use a hot water bottle or a hand towel dipped in hot water. Some people also find ice packs effective for relieving pain.  Medicines  Your doctor may prescribe medications to help treat your sinusitis. If you have an infection, antibiotics can help clear it up. If you are prescribed antibiotics, take all pills on schedule until they are gone, even if you feel better. Decongestants help relieve swelling. Use decongestant sprays for short periods only under the direction of your doctor. If you have allergies, your doctor may prescribe medications to help relieve them.   Date Last Reviewed: 10/1/2016    5661-2280 [employer]. [address] [place] [address], [place] [address]. All rights reserved. This information is not intended as a substitute for professional medical care. Always follow your healthcare professional's instructions.

## 2020-01-22 NOTE — PATIENT INSTRUCTIONS
1.  Plenty of fluids, rest, warm compresses on face  2.  Mucinex twice daily for at least 4 days  3.  Lizeth Pot 1x in the morning 1x at night (SALINE MIST SPRAY IS AN ACCEPTABLE, THOUGH NOT AS EFFECTIVE REPLACEMENT)  4.  Benadryl (diphenhydramine) at bedtime   5.  Either Claritin (Loratadine), Allegra (Fexofenadine), or Zyrtec (Cetirizine) in the day  6.  Flonase (Fluticasone) 2x each nostril twice a day for two weeks, then once each nostril once a day  7. Antibiotic twice daily for 7 Days  8. Probiotic (ie Culturelle) 1-2 hours after each antibiotic dose     Please let us know if symptoms persist, or worsen.      Patient Education     Self-Care for Sinusitis     Drinking plenty of water can help sinuses drain.   Sinusitis can often be managed with self-care. Self-care can keep sinuses moist and make you feel more comfortable. Remember to follow your doctor's instructions closely. This can make a big difference in getting your sinus problem under control.  Drink fluids  Drinking extra fluids helps thin your mucus. This lets it drain from your sinuses more easily. Have a glass of water every hour or two. A humidifier helps in much the same way. Fluids can also offset the drying effects of certain medicines. If you use a humidifier, follow the product maker's instructions on how to use it. Clean it on a regular schedule.  Use saltwater rinses  Rinses help keep your sinuses and nose moist. Mix a teaspoon of salt in 8 ounces of fresh, warm water. Use a bulb syringe to gently squirt the water into your nose a few times a day. You can also buy ready-made saline nasal sprays.  Apply hot or cold packs  Applying heat to the area surrounding your sinuses may make you feel more comfortable. Use a hot water bottle or a hand towel dipped in hot water. Some people also find ice packs effective for relieving pain.  Medicines  Your doctor may prescribe medications to help treat your sinusitis. If you have an infection,  antibiotics can help clear it up. If you are prescribed antibiotics, take all pills on schedule until they are gone, even if you feel better. Decongestants help relieve swelling. Use decongestant sprays for short periods only under the direction of your doctor. If you have allergies, your doctor may prescribe medications to help relieve them.   Date Last Reviewed: 10/1/2016    8360-3412 [employer]. [address] [place] [address], [place] [address]. All rights reserved. This information is not intended as a substitute for professional medical care. Always follow your healthcare professional's instructions.

## 2020-01-23 LAB
BACTERIA SPEC CULT: NORMAL
SPECIMEN SOURCE: NORMAL

## 2020-02-10 ENCOUNTER — TELEPHONE (OUTPATIENT)
Dept: ONCOLOGY | Facility: CLINIC | Age: 65
End: 2020-02-10

## 2020-02-10 NOTE — TELEPHONE ENCOUNTER
Michelle will call us back tomorrow to schedule (staff message to schedule 3/20). She is traveling today.

## 2020-02-18 ENCOUNTER — TELEPHONE (OUTPATIENT)
Dept: ORTHOPEDICS | Facility: CLINIC | Age: 65
End: 2020-02-18

## 2020-02-18 NOTE — TELEPHONE ENCOUNTER
Patient called and stated she will only pay $125.88 since that's the cost she found online. I let her know that the cost of the brace is deemed by the company and the insurance company. She said she told her doctor and therapist the brace never worked. I let her know we were never notified but can certainly do any adjustments or take a look at the brace. She continued to be upset saying I wasn't helpful. I told her that it seems like the only way she will find me helpful is if I write off the balance but I cannot since she signed for the full amount and we were never notified of any issues until she got the bill. I told her I can help her with adjustments and using the brace. She said she was done, it's junk and it falls down. She was going to have the doctor write me a letter and call patient advocates. She continued to yell at me stating I wasn't helpful. She pays her bills and never asks to not but this is junk and she's only willing to pay the $125.88. I again went over how the brace price is determined and she continued to yell and say I wasn't helpful. She then said fine I'll call the advocates and hung up. Patient was very upset and refused to allow me to help with scheduling for adjustments or taking a look at the brace. Patient only wanted me to write off the remaining balance after insurance and since the brace had been worn, she signed off on the cost, and we were never notified of issues i cannot write off the balance. 2/18/20 STEVIE

## 2020-03-12 ENCOUNTER — OFFICE VISIT (OUTPATIENT)
Dept: FAMILY MEDICINE | Facility: CLINIC | Age: 65
End: 2020-03-12
Payer: COMMERCIAL

## 2020-03-12 VITALS
BODY MASS INDEX: 30.33 KG/M2 | DIASTOLIC BLOOD PRESSURE: 80 MMHG | HEART RATE: 72 BPM | TEMPERATURE: 98 F | WEIGHT: 187.9 LBS | SYSTOLIC BLOOD PRESSURE: 124 MMHG | OXYGEN SATURATION: 99 %

## 2020-03-12 DIAGNOSIS — H02.89 IRRITATION OF EYELID: Primary | ICD-10-CM

## 2020-03-12 PROCEDURE — 99213 OFFICE O/P EST LOW 20 MIN: CPT | Performed by: PHYSICIAN ASSISTANT

## 2020-03-12 RX ORDER — DIAPER,BRIEF,INFANT-TODD,DISP
EACH MISCELLANEOUS 2 TIMES DAILY
COMMUNITY
Start: 2020-03-12 | End: 2021-02-11

## 2020-03-12 NOTE — PATIENT INSTRUCTIONS
Suspect eczema on the eyelids and recommend hydrocortisone 1% twice daily, along with aquaphor 1-2 times daily. Do not use hydrocortisone for more than 2 weeks in a row. Recommend stopping eye wipes and using gentle cleanser on face. If no improvement in symptoms in 1 week, follow-up with dermatology for further evaluation.

## 2020-03-12 NOTE — PROGRESS NOTES
Subjective     Flor Griffin is a 64 year old female who presents to clinic today for the following health issues:    HPI   Eye(s) Problem      Duration: On and off x 5 months,     Description:  Location: bilateral upper eyelids  Pain: no   Redness: YES- Eyelids red  Itching: YES  Discharge: no    Accompanying signs and symptoms: None    History (Trauma, foreign body exposure,): None    Precipitating or alleviating factors (contact use): None    Therapies tried and outcome: Eye wipes, bacitracin helped some, baby shampoo to wash lids, ocusoft eye wipes    She has had intermittent bilateral upper eyelid erythema, dryness, itching for the past few months. Seems to get better and worse but never fully goes away. Has tried ocusoft eye wipes, baby shampoo to wash eyelids, bacitracin without significant improvement. No spreading redness, warmth, swelling, pain. No eye pain, vision changes, eye drainage, eye redness. No fever. No other rash. No new exposures. She thinks maybe she might have a dairy allergy though this has never been an issue for her before. She notes recently she has had an upset stomach sometimes after eating ice cream. Has cut down on dairy, no change in rash. She is otherwise feeling well, no other concerns today.    Patient Active Problem List   Diagnosis     Mixed hyperlipidemia     Heartburn     Vitamin D deficiency     BCC (basal cell carcinoma), face     Cold sore     Overweight     Carcinoma of unknown origin (H)     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 in female, estrogen receptor positive (H)     Port catheter in place     Chemotherapy-induced neutropenia (H)     Anemia     Screening for cervical cancer     Past Surgical History:   Procedure Laterality Date     BIOPSY MASS NECK Right 11/16/2017    Procedure: BIOPSY MASS NECK;  Excisional Biopsy Right Neck Lymph node;  Surgeon: Waylon Corral MD;  Location: [facility] OR     COLONOSCOPY       IR PORT REMOVAL LEFT  5/13/2019      MOHS MICROGRAPHIC PROCEDURE  ~2010    right cheek; BCC     MOHS MICROGRAPHIC PROCEDURE  10/31/2016    Dr. Nicholas [facility]       Social History     Tobacco Use     Smoking status: Never Smoker     Smokeless tobacco: Never Used   Substance Use Topics     Alcohol use: Yes     Alcohol/week: 0.0 standard drinks     Comment: 4 times a week, 2 drinks     Family History   Problem Relation Age of Onset     Lupus Mother      Heart Disease Mother         CHF     Coronary Artery Disease Mother      Hyperlipidemia Mother      Diabetes Father      Breast Cancer Other          Current Outpatient Medications   Medication Sig Dispense Refill     hydrocortisone (CORTAID) 1 % external ointment Apply topically 2 times daily       Ibuprofen (ADVIL PO) Take 400 mg by mouth every 6 hours as needed for moderate pain       multivitamin w/minerals (THERA-VIT-M) tablet Take 1 tablet by mouth daily       Omega-3 Fatty Acids (OMEGA-3 FISH OIL PO) Take 1 g by mouth daily       rosuvastatin (CRESTOR) 20 MG tablet Take 1 tablet (20 mg) by mouth daily 90 tablet 3     aspirin 81 MG tablet Take 81 mg by mouth daily       letrozole (FEMARA) 2.5 MG tablet Take 1 tablet (2.5 mg) by mouth daily 90 tablet 3     Allergies   Allergen Reactions     Penicillins Hives       Reviewed and updated as needed this visit by Provider  Allergies  Meds  Problems  Med Hx  Surg Hx         Review of Systems   ROS COMP: Constitutional, HEENT, cardiovascular, pulmonary, gi and gu systems are negative, except as otherwise noted.      Objective    /80   Pulse 72   Temp 98  F (36.7  C) (Oral)   Wt 85.2 kg (187 lb 14.4 oz)   LMP 01/01/2004 (Approximate)   SpO2 99%   BMI 30.33 kg/m    Body mass index is 30.33 kg/m .  Physical Exam   GENERAL: healthy, alert and no distress  EYES: Eyes grossly normal to inspection, PERRL, EOMI, conjunctivae and sclerae normal and bilateral upper eyelids with erythema and slightly dry skin. No periorbital edema, tenderness.  HENT: ear  canals and TM's normal, nose and mouth without ulcers or lesions  NECK: no adenopathy, no asymmetry, masses, or scars  RESP: lungs clear to auscultation - no rales, rhonchi or wheezes  CV: regular rate and rhythm, normal S1 S2, no murmur    Diagnostic Test Results: None         Assessment & Plan     1. Irritation of eyelid  - hydrocortisone (CORTAID) 1 % external ointment; Apply topically 2 times daily  Dispense:    - DERMATOLOGY REFERRAL  Suspect eczema dermatitis. No signs of infection. Recommend hydrocortisone and aquaphor 1-2 times daily. Advised not to use hydrocortisone for longer than 2 weeks at a time. If no improvement in 1 week, follow-up with dermatology, referral provided. Risks and benefits of treatment plan discussed. Patient and/or parent acknowledges and agrees with plan of care, all questions answered.    Return in about 4 months (around 7/12/2020) for Preventive Physical Exam.  If worsening or no improvement    Jami Gutierrez PA-C  [facility]

## 2020-05-06 ENCOUNTER — TELEPHONE (OUTPATIENT)
Dept: ONCOLOGY | Facility: CLINIC | Age: 65
End: 2020-05-06

## 2020-05-22 NOTE — PROGRESS NOTES
[facility]    Hematology/Oncology Established Patient Follow-up Note      Today's Date: 05/26/20    Reason for Follow-up: Adenocarcinoma of probable breast origin.    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  Initially she was seen 11/22/2017 after she had the right supraclavicular lymph node biopsy which was positive for poorly differentiated adenocarcinoma.  She had this lymph node almost a month and had an excisional biopsy performed which was consistent with the diagnosis of cancer.   A PET/CT scan 11/27/2017 showed hypermetabolic right supraclavicular, right axillary and subpectoral adenopathy.  Otherwise, no distant metastatic disease. Mammogram 11/30/17- negative.     TREATMENT: Carboplatin and Taxol completed 4 cycles. 3/7/2018 Started dose dense AC. Completed 4 cycles.   Radiation to right breast, right axilla, right supraclavicular area between 5/29/18-7/31/18 9/2018 started letrozole.    INTERIM HISTORY:  Michelle reports feeling well.  She denies any fevers, chills, night sweats, unintentional weight loss, new pain, shortness of breath, or cough.  She also denies any headaches or vision changes.  She has occasional right breast discomfort at the site of lumpectomy that is mild in severity and intermittent over the past several months.      REVIEW OF SYSTEMS:   14 point ROS was reviewed and is negative other than as noted above in HPI.       HOME MEDICATIONS:  Current Outpatient Medications   Medication Sig Dispense Refill     aspirin 81 MG tablet Take 81 mg by mouth daily       hydrocortisone (CORTAID) 1 % external ointment Apply topically 2 times daily       Ibuprofen (ADVIL PO) Take 400 mg by mouth every 6 hours as needed for moderate pain       letrozole (FEMARA) 2.5 MG tablet Take 1 tablet (2.5 mg)  by mouth daily 90 tablet 3     multivitamin w/minerals (THERA-VIT-M) tablet Take 1 tablet by mouth daily       Omega-3 Fatty Acids (OMEGA-3 FISH OIL PO) Take 1 g by mouth daily       rosuvastatin (CRESTOR) 20 MG tablet Take 1 tablet (20 mg) by mouth daily 90 tablet 3         ALLERGIES:  Allergies   Allergen Reactions     Penicillins Hives         PAST MEDICAL HISTORY:  Past Medical History:   Diagnosis Date     Basal cell cancer     right cheek     Gastroesophageal reflux disease      History of blood transfusion     blue baby     Hyperlipidaemia          PAST SURGICAL HISTORY:  Past Surgical History:   Procedure Laterality Date     BIOPSY MASS NECK Right 11/16/2017    Procedure: BIOPSY MASS NECK;  Excisional Biopsy Right Neck Lymph node;  Surgeon: Waylon Corral MD;  Location: [facility] OR     COLONOSCOPY       IR PORT REMOVAL LEFT  5/13/2019     MOHS MICROGRAPHIC PROCEDURE  ~2010    right cheek; BCC     MOHS MICROGRAPHIC PROCEDURE  10/31/2016    Dr. Nicholas [facility]         SOCIAL HISTORY:  Social History     Socioeconomic History     Marital status:      Spouse name: Not on file     Number of children: Not on file     Years of education: Not on file     Highest education level: Not on file   Occupational History     Not on file   Social Needs     Financial resource strain: Not on file     Food insecurity     Worry: Not on file     Inability: Not on file     Transportation needs     Medical: Not on file     Non-medical: Not on file   Tobacco Use     Smoking status: Never Smoker     Smokeless tobacco: Never Used   Substance and Sexual Activity     Alcohol use: Yes     Alcohol/week: 0.0 standard drinks     Comment: 4 times a week, 2 drinks     Drug use: No     Sexual activity: Yes     Partners: Male     Birth control/protection: Post-menopausal   Lifestyle     Physical activity     Days per week: Not on file     Minutes per session: Not on file     Stress: Not on file   Relationships     Social connections     Talks on  phone: Not on file     Gets together: Not on file     Attends [employer] service: Not on file     Active member of club or organization: Not on file     Attends meetings of clubs or organizations: Not on file     Relationship status: Not on file     Intimate partner violence     Fear of current or ex partner: Not on file     Emotionally abused: Not on file     Physically abused: Not on file     Forced sexual activity: Not on file   Other Topics Concern     Parent/sibling w/ CABG, MI or angioplasty before 65F 55M? No   Social History Narrative     Not on file         FAMILY HISTORY:  Family History   Problem Relation Age of Onset     Lupus Mother      Heart Disease Mother         CHF     Coronary Artery Disease Mother      Hyperlipidemia Mother      Diabetes Father      Breast Cancer Other          PHYSICAL EXAM:  Vital signs:  /55   Pulse 76   Temp 98.7  F (37.1  C) (Oral)   Resp 14   Wt 81.2 kg (179 lb)   LMP 2004 (Approximate)   SpO2 98%   BMI 28.89 kg/m     ECO  GENERAL/CONSTITUTIONAL: No acute distress.  EYES: No scleral icterus.  ENT/MOUTH: Neck supple.  LYMPH: No cervical, supraclavicular, axillary or epitrochlear adenopathy.   BREAST: Right with slightly thickened scar tissue and prior site of lumpectomy; no remaining masses, ulceration, or rash.  Left with no palpable mass, ulceration or rash.  Right nipple is chronically inverted with no discharge. Left nipple is everted with no discharge.  RESPIRATORY: Clear to auscultation bilaterally. No crackles or wheezing.   CARDIOVASCULAR: Regular rate and rhythm without murmurs.  GASTROINTESTINAL: No hepatosplenomegaly, masses, or tenderness. No guarding.  No distention.  MUSCULOSKELETAL: Warm and well-perfused, no cyanosis, clubbing, or edema.  NEUROLOGIC: No focal motor deficits. Alert, oriented, answers questions appropriately.  INTEGUMENTARY: No rashes or jaundice.  GAIT: Steady, does not use assistive device    LABS:  CBC RESULTS:   Recent  Labs   Lab Test 19  0840   WBC 3.6*   RBC 3.93   HGB 12.8   HCT 38.4   MCV 98   MCH 32.6   MCHC 33.3   RDW 13.5          Recent Labs   Lab Test 19  0840 18  1150    135   POTASSIUM 4.1 4.0   CHLORIDE 107 101   CO2 25 26   ANIONGAP 8 8   GLC 99 106*   BUN 14 20   CR 0.74 0.62   EDILSON 9.1 8.8         PATHOLOGY:  None new.    IMAGIN/3/2019 CT neck with contrast showed no new suspicious lymph nodes or masses.    9/3/2019 CT of the chest, abdomen and pelvis with contrast showed a new 5 mm pleural-based inferior right upper lobe pulmonary nodule and no other new findings.       ASSESSMENT/PLAN:  Flor Griffin is a 64 year old female with presumed right breast adenocarcinoma status post chemotherapy and radiation.  1.  Presumed right breast adenocarcinoma  -I note that the adenocarcinoma was of questionable origin, possibly breast.  -Given the low ER positive presumed right breast cancer, Michelle will continue on letrozole which she is tolerating well.   -Continue annual mammograms, next due 10/2020.    2.  Right upper lobe lung nodule, indeterminate  -Last chest CT 2019 showed 0.4 cm right upper lobe lung nodule and subpleural right upper lobe nodule measuring 0.2 cm.  No enlarged lymph nodes.  The nodules are stable in size compared to 9/3/2019.    -I recommended to Michelle that we continue surveillance of these lung nodules with a repeat chest CT in 2020.    Discussed infectious precautions during current COVID-19 pandemic.    Return in 6 months.    Sally Stover MD  Hematology/Oncology  [facility]    I spent a total of 25 minutes with the patient, with greater than 50% of the time in counseling and coordination of care.

## 2020-05-24 ENCOUNTER — TELEPHONE (OUTPATIENT)
Dept: ONCOLOGY | Facility: CLINIC | Age: 65
End: 2020-05-24

## 2020-05-24 NOTE — TELEPHONE ENCOUNTER
"Patient is currently scheduled for an appointment at [facility] in [place].  Called patient to review current visitor restrictions and complete COVID-19 Patient Infection/Travel Screening Tool.     Due to the recent public health concerns around COVID-19 and in an effort to keep our patients and staff safe and healthy, we are implementing a screening process for the patients that come to our clinic.      I am going to ask you a few questions, please answer yes or no.  Your honesty about any symptoms is critical, as it keeps patients and staff healthy.      Do you have a:  Fever (or reported chills)?  no  New cough (started within the past 14 days)?  no  New shortness of breath (started within the past 14 days)?  no  Rash?  no    In the last month, have you been in contact with someone who was confirmed or suspected to have Coronavirus/COVID-19? no    Have you traveled internationally in the last month?  no  If so, where? N/a     I also wanted to let you know that to protect our patients from the flu and other common illnesses, [facility] enforce visitor restrictions year round, but due to the community spread of COVID-19 in [place], we are taking additional precautionary steps to ensure the health of our patients.  At this time, NO visitors are allowed on our hospital and clinic campuses.     Patient passed the screening assessment.      \"Per CDC Guidelines, we are asking all patients that are coming into the building to wear a cloth covering that covers your mouth and nose.  You will be screened again at the entrance to the clinic for any Covid 19 symptoms. If you screen positive to any Covid 19 symptoms during our screening process you will be provided a surgical mask to wear during your time in the building.\"    \"COVID-19 is contagious and can be dangerous for our patients and staff.  Please send us a [employer] message or call our clinic before coming in if you feel any of the " "following symptoms: fever, cough, congestion, runny nose, sore throat, muscle aches and pains, or shortness of breath.  If you are already at our clinic, it is very important that you be honest about any symptoms you are experiencing to ensure your safety and that of other patients and staff who treat you.  If you do have symptoms, we will have a nurse and/or provider asses you to determine next steps.\"    Wen Reyes on 5/24/2020 at 11:09 AM    "

## 2020-05-26 ENCOUNTER — ONCOLOGY VISIT (OUTPATIENT)
Dept: ONCOLOGY | Facility: CLINIC | Age: 65
End: 2020-05-26
Attending: INTERNAL MEDICINE
Payer: COMMERCIAL

## 2020-05-26 VITALS
WEIGHT: 179 LBS | SYSTOLIC BLOOD PRESSURE: 123 MMHG | BODY MASS INDEX: 28.89 KG/M2 | TEMPERATURE: 98.7 F | RESPIRATION RATE: 14 BRPM | DIASTOLIC BLOOD PRESSURE: 55 MMHG | HEART RATE: 76 BPM | OXYGEN SATURATION: 98 %

## 2020-05-26 DIAGNOSIS — C50.411 MALIGNANT NEOPLASM OF UPPER-OUTER QUADRANT OF RIGHT BREAST IN FEMALE, ESTROGEN RECEPTOR POSITIVE (H): ICD-10-CM

## 2020-05-26 DIAGNOSIS — R91.8 PULMONARY NODULES: ICD-10-CM

## 2020-05-26 DIAGNOSIS — C50.811 MALIGNANT NEOPLASM OF OVERLAPPING SITES OF RIGHT BREAST IN FEMALE, ESTROGEN RECEPTOR POSITIVE (H): Primary | ICD-10-CM

## 2020-05-26 DIAGNOSIS — Z17.0 MALIGNANT NEOPLASM OF UPPER-OUTER QUADRANT OF RIGHT BREAST IN FEMALE, ESTROGEN RECEPTOR POSITIVE (H): ICD-10-CM

## 2020-05-26 DIAGNOSIS — Z17.0 MALIGNANT NEOPLASM OF OVERLAPPING SITES OF RIGHT BREAST IN FEMALE, ESTROGEN RECEPTOR POSITIVE (H): Primary | ICD-10-CM

## 2020-05-26 PROCEDURE — G0463 HOSPITAL OUTPT CLINIC VISIT: HCPCS

## 2020-05-26 PROCEDURE — 99214 OFFICE O/P EST MOD 30 MIN: CPT | Performed by: INTERNAL MEDICINE

## 2020-05-26 ASSESSMENT — PAIN SCALES - GENERAL: PAINLEVEL: NO PAIN (0)

## 2020-05-26 NOTE — LETTER
5/26/2020         RE: Flor Griffin  [address] [facility] [id number]        Dear Colleague,    Thank you for referring your patient, Flor Griffin, to the [facility]. Please see a copy of my visit note below.    [facility]    Hematology/Oncology Established Patient Follow-up Note      Today's Date: 05/26/20    Reason for Follow-up: Adenocarcinoma of probable breast origin.    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  Initially she was seen 11/22/2017 after she had the right supraclavicular lymph node biopsy which was positive for poorly differentiated adenocarcinoma.  She had this lymph node almost a month and had an excisional biopsy performed which was consistent with the diagnosis of cancer.   A PET/CT scan 11/27/2017 showed hypermetabolic right supraclavicular, right axillary and subpectoral adenopathy.  Otherwise, no distant metastatic disease. Mammogram 11/30/17- negative.     TREATMENT: Carboplatin and Taxol completed 4 cycles. 3/7/2018 Started dose dense AC. Completed 4 cycles.   Radiation to right breast, right axilla, right supraclavicular area between 5/29/18-7/31/18 9/2018 started letrozole.    INTERIM HISTORY:  Michelle reports feeling well.  She denies any fevers, chills, night sweats, unintentional weight loss, new pain, shortness of breath, or cough.  She also denies any headaches or vision changes.  She has occasional right breast discomfort at the site of lumpectomy that is mild in severity and intermittent over the past several months.      REVIEW OF SYSTEMS:   14 point ROS was reviewed and is negative other than as noted above in HPI.       HOME MEDICATIONS:  Current Outpatient Medications   Medication Sig Dispense Refill     aspirin 81 MG tablet Take 81 mg  by mouth daily       hydrocortisone (CORTAID) 1 % external ointment Apply topically 2 times daily       Ibuprofen (ADVIL PO) Take 400 mg by mouth every 6 hours as needed for moderate pain       letrozole (FEMARA) 2.5 MG tablet Take 1 tablet (2.5 mg) by mouth daily 90 tablet 3     multivitamin w/minerals (THERA-VIT-M) tablet Take 1 tablet by mouth daily       Omega-3 Fatty Acids (OMEGA-3 FISH OIL PO) Take 1 g by mouth daily       rosuvastatin (CRESTOR) 20 MG tablet Take 1 tablet (20 mg) by mouth daily 90 tablet 3         ALLERGIES:  Allergies   Allergen Reactions     Penicillins Hives         PAST MEDICAL HISTORY:  Past Medical History:   Diagnosis Date     Basal cell cancer     right cheek     Gastroesophageal reflux disease      History of blood transfusion     blue baby     Hyperlipidaemia          PAST SURGICAL HISTORY:  Past Surgical History:   Procedure Laterality Date     BIOPSY MASS NECK Right 11/16/2017    Procedure: BIOPSY MASS NECK;  Excisional Biopsy Right Neck Lymph node;  Surgeon: Waylon Corral MD;  Location: [facility] OR     COLONOSCOPY       IR PORT REMOVAL LEFT  5/13/2019     MOHS MICROGRAPHIC PROCEDURE  ~2010    right cheek; BCC     MOHS MICROGRAPHIC PROCEDURE  10/31/2016    Dr. [facility]         SOCIAL HISTORY:  Social History     Socioeconomic History     Marital status:      Spouse name: Not on file     Number of children: Not on file     Years of education: Not on file     Highest education level: Not on file   Occupational History     Not on file   Social Needs     Financial resource strain: Not on file     Food insecurity     Worry: Not on file     Inability: Not on file     Transportation needs     Medical: Not on file     Non-medical: Not on file   Tobacco Use     Smoking status: Never Smoker     Smokeless tobacco: Never Used   Substance and Sexual Activity     Alcohol use: Yes     Alcohol/week: 0.0 standard drinks     Comment: 4 times a week, 2 drinks     Drug use: No     Sexual  activity: Yes     Partners: Male     Birth control/protection: Post-menopausal   Lifestyle     Physical activity     Days per week: Not on file     Minutes per session: Not on file     Stress: Not on file   Relationships     Social connections     Talks on phone: Not on file     Gets together: Not on file     Attends Taoism service: Not on file     Active member of club or organization: Not on file     Attends meetings of clubs or organizations: Not on file     Relationship status: Not on file     Intimate partner violence     Fear of current or ex partner: Not on file     Emotionally abused: Not on file     Physically abused: Not on file     Forced sexual activity: Not on file   Other Topics Concern     Parent/sibling w/ CABG, MI or angioplasty before 65F 55M? No   Social History Narrative     Not on file         FAMILY HISTORY:  Family History   Problem Relation Age of Onset     Lupus Mother      Heart Disease Mother         CHF     Coronary Artery Disease Mother      Hyperlipidemia Mother      Diabetes Father      Breast Cancer Other          PHYSICAL EXAM:  Vital signs:  /55   Pulse 76   Temp 98.7  F (37.1  C) (Oral)   Resp 14   Wt 81.2 kg (179 lb)   LMP 2004 (Approximate)   SpO2 98%   BMI 28.89 kg/m     ECO  GENERAL/CONSTITUTIONAL: No acute distress.  EYES: No scleral icterus.  ENT/MOUTH: Neck supple.  LYMPH: No cervical, supraclavicular, axillary or epitrochlear adenopathy.   BREAST: Right with slightly thickened scar tissue and prior site of lumpectomy; no remaining masses, ulceration, or rash.  Left with no palpable mass, ulceration or rash.  Right nipple is chronically inverted with no discharge. Left nipple is everted with no discharge.  RESPIRATORY: Clear to auscultation bilaterally. No crackles or wheezing.   CARDIOVASCULAR: Regular rate and rhythm without murmurs.  GASTROINTESTINAL: No hepatosplenomegaly, masses, or tenderness. No guarding.  No distention.  MUSCULOSKELETAL:  Warm and well-perfused, no cyanosis, clubbing, or edema.  NEUROLOGIC: No focal motor deficits. Alert, oriented, answers questions appropriately.  INTEGUMENTARY: No rashes or jaundice.  GAIT: Steady, does not use assistive device    LABS:  CBC RESULTS:   Recent Labs   Lab Test 19  0840   WBC 3.6*   RBC 3.93   HGB 12.8   HCT 38.4   MCV 98   MCH 32.6   MCHC 33.3   RDW 13.5          Recent Labs   Lab Test 19  0840 18  1150    135   POTASSIUM 4.1 4.0   CHLORIDE 107 101   CO2 25 26   ANIONGAP 8 8   GLC 99 106*   BUN 14 20   CR 0.74 0.62   EDILSON 9.1 8.8         PATHOLOGY:  None new.    IMAGIN/3/2019 CT neck with contrast showed no new suspicious lymph nodes or masses.    9/3/2019 CT of the chest, abdomen and pelvis with contrast showed a new 5 mm pleural-based inferior right upper lobe pulmonary nodule and no other new findings.       ASSESSMENT/PLAN:  Flor Griffin is a 64 year old female with presumed right breast adenocarcinoma status post chemotherapy and radiation.  1.  Presumed right breast adenocarcinoma  -I note that the adenocarcinoma was of questionable origin, possibly breast.  -Given the low ER positive presumed right breast cancer, Michelle will continue on letrozole which she is tolerating well.   -Continue annual mammograms, next due 10/2020.    2.  Right upper lobe lung nodule, indeterminate  -Last chest CT 2019 showed 0.4 cm right upper lobe lung nodule and subpleural right upper lobe nodule measuring 0.2 cm.  No enlarged lymph nodes.  The nodules are stable in size compared to 9/3/2019.    -I recommended to Michelle that we continue surveillance of these lung nodules with a repeat chest CT in 2020.    Discussed infectious precautions during current COVID-19 pandemic.    Return in 6 months.    Sally Stover MD  Hematology/Oncology  [facility]    I spent a total of 25 minutes with the patient, with greater than 50% of the time in counseling and  "coordination of care.    Oncology Rooming Note    May 26, 2020 8:43 AM   Flor Griffin is a 64 year old female who presents for:    Chief Complaint   Patient presents with     Oncology Clinic Visit     Initial Vitals: /55   Pulse 76   Temp 98.7  F (37.1  C) (Oral)   Resp 14   Wt 81.2 kg (179 lb)   LMP 01/01/2004 (Approximate)   SpO2 98%   BMI 28.89 kg/m   Estimated body mass index is 28.89 kg/m  as calculated from the following:    Height as of 11/19/19: 1.676 m (5' 6\").    Weight as of this encounter: 81.2 kg (179 lb). Body surface area is 1.94 meters squared.  No Pain (0) Comment: Data Unavailable   Patient's last menstrual period was 01/01/2004 (approximate).  Allergies reviewed: Yes  Medications reviewed: Yes    Medications: Medication refills not needed today.  Pharmacy name entered into [employer]: [facility] #[id number] - [facility] [address]    Clinical concerns: no       Shari J. Schoenberger, [facility]              Again, thank you for allowing me to participate in the care of your patient.        Sincerely,        Sally Stover MD    "

## 2020-05-26 NOTE — PROGRESS NOTES
"Oncology Rooming Note    May 26, 2020 8:43 AM   Flor Griffin is a 64 year old female who presents for:    Chief Complaint   Patient presents with     Oncology Clinic Visit     Initial Vitals: /55   Pulse 76   Temp 98.7  F (37.1  C) (Oral)   Resp 14   Wt 81.2 kg (179 lb)   LMP 01/01/2004 (Approximate)   SpO2 98%   BMI 28.89 kg/m   Estimated body mass index is 28.89 kg/m  as calculated from the following:    Height as of 11/19/19: 1.676 m (5' 6\").    Weight as of this encounter: 81.2 kg (179 lb). Body surface area is 1.94 meters squared.  No Pain (0) Comment: Data Unavailable   Patient's last menstrual period was 01/01/2004 (approximate).  Allergies reviewed: Yes  Medications reviewed: Yes    Medications: Medication refills not needed today.  Pharmacy name entered into [employer]: [facility][id number] - [facility] [id number]    Clinical concerns: no       Shari J. Schoenberger, CMA            "

## 2020-07-02 DIAGNOSIS — E78.2 MIXED HYPERLIPIDEMIA: ICD-10-CM

## 2020-07-03 RX ORDER — ROSUVASTATIN CALCIUM 20 MG/1
TABLET, COATED ORAL
Qty: 90 TABLET | Refills: 0 | Status: SHIPPED | OUTPATIENT
Start: 2020-07-03 | End: 2021-02-11

## 2020-07-21 DIAGNOSIS — C50.811 MALIGNANT NEOPLASM OF OVERLAPPING SITES OF RIGHT BREAST IN FEMALE, ESTROGEN RECEPTOR POSITIVE (H): ICD-10-CM

## 2020-07-21 DIAGNOSIS — Z17.0 MALIGNANT NEOPLASM OF OVERLAPPING SITES OF RIGHT BREAST IN FEMALE, ESTROGEN RECEPTOR POSITIVE (H): ICD-10-CM

## 2020-07-21 RX ORDER — LETROZOLE 2.5 MG/1
1 TABLET, FILM COATED ORAL DAILY
Qty: 90 TABLET | Refills: 4 | Status: SHIPPED | OUTPATIENT
Start: 2020-07-21 | End: 2021-01-11

## 2020-08-07 ENCOUNTER — TELEPHONE (OUTPATIENT)
Dept: ONCOLOGY | Facility: CLINIC | Age: 65
End: 2020-08-07

## 2020-08-07 DIAGNOSIS — N64.59 INVERTED NIPPLE: Primary | ICD-10-CM

## 2020-08-07 NOTE — TELEPHONE ENCOUNTER
Received a phone call from patient's daughter Emmie Gavin stating that she is concerned about her mother's inverted nipple. She was told that it is secondary to radiation treatment. Emmie is wondering if something could be done and if her mother could be seen by plastic surgery. Message will be forwarded to CCR[facility] and Dr. Gottlieb.

## 2020-08-11 NOTE — TELEPHONE ENCOUNTER
Called both Michelle and Emmie left message on their voice mails stating that consult has been placed with [facility] and to call [phone] to schedule a referral. Danya Lara RN,BSN,OCN

## 2020-10-15 ENCOUNTER — HOSPITAL ENCOUNTER (OUTPATIENT)
Dept: MAMMOGRAPHY | Facility: CLINIC | Age: 65
Discharge: HOME | End: 2020-10-15
Attending: INTERNAL MEDICINE | Admitting: INTERNAL MEDICINE
Payer: COMMERCIAL

## 2020-10-15 DIAGNOSIS — C50.811 MALIGNANT NEOPLASM OF OVERLAPPING SITES OF RIGHT BREAST IN FEMALE, ESTROGEN RECEPTOR POSITIVE (H): ICD-10-CM

## 2020-10-15 DIAGNOSIS — Z17.0 MALIGNANT NEOPLASM OF OVERLAPPING SITES OF RIGHT BREAST IN FEMALE, ESTROGEN RECEPTOR POSITIVE (H): ICD-10-CM

## 2020-10-15 PROCEDURE — 77063 BREAST TOMOSYNTHESIS BI: CPT

## 2020-10-21 ENCOUNTER — HOSPITAL ENCOUNTER (OUTPATIENT)
Dept: MAMMOGRAPHY | Facility: CLINIC | Age: 65
End: 2020-10-21
Attending: INTERNAL MEDICINE
Payer: COMMERCIAL

## 2020-10-21 DIAGNOSIS — R92.8 ABNORMAL MAMMOGRAM: ICD-10-CM

## 2020-10-21 PROCEDURE — 19083 BX BREAST 1ST LESION US IMAG: CPT | Mod: RT

## 2020-10-21 PROCEDURE — 88377 M/PHMTRC ALYS ISHQUANT/SEMIQ: CPT | Mod: TC | Performed by: INTERNAL MEDICINE

## 2020-10-21 PROCEDURE — 88360 TUMOR IMMUNOHISTOCHEM/MANUAL: CPT | Mod: 26 | Performed by: PATHOLOGY

## 2020-10-21 PROCEDURE — 999N000065 MA POST PROCEDURE RIGHT

## 2020-10-21 PROCEDURE — 88342 IMHCHEM/IMCYTCHM 1ST ANTB: CPT | Mod: TC,XU | Performed by: INTERNAL MEDICINE

## 2020-10-21 PROCEDURE — 999N001020 HC STATISTIC H-SEND OUTS PREP: Performed by: INTERNAL MEDICINE

## 2020-10-21 PROCEDURE — 88377 M/PHMTRC ALYS ISHQUANT/SEMIQ: CPT | Mod: 26 | Performed by: MEDICAL GENETICS

## 2020-10-21 PROCEDURE — 250N000009 HC RX 250: Performed by: INTERNAL MEDICINE

## 2020-10-21 PROCEDURE — 76642 ULTRASOUND BREAST LIMITED: CPT | Mod: RT

## 2020-10-21 PROCEDURE — 88305 TISSUE EXAM BY PATHOLOGIST: CPT | Mod: 26 | Performed by: PATHOLOGY

## 2020-10-21 PROCEDURE — 88360 TUMOR IMMUNOHISTOCHEM/MANUAL: CPT | Mod: TC | Performed by: INTERNAL MEDICINE

## 2020-10-21 PROCEDURE — 88305 TISSUE EXAM BY PATHOLOGIST: CPT | Mod: TC | Performed by: INTERNAL MEDICINE

## 2020-10-21 PROCEDURE — 999N001019 HC STATISTIC H-FISH PROCESS B/S: Performed by: INTERNAL MEDICINE

## 2020-10-21 RX ADMIN — LIDOCAINE HYDROCHLORIDE 5 ML: 10 INJECTION, SOLUTION INFILTRATION; PERINEURAL at 10:52

## 2020-10-21 NOTE — DISCHARGE INSTRUCTIONS

## 2020-10-22 ENCOUNTER — TELEPHONE (OUTPATIENT)
Dept: ONCOLOGY | Facility: CLINIC | Age: 65
End: 2020-10-22

## 2020-10-22 ENCOUNTER — NURSE TRIAGE (OUTPATIENT)
Dept: NURSING | Facility: CLINIC | Age: 65
End: 2020-10-22

## 2020-10-22 NOTE — TELEPHONE ENCOUNTER
Second call from pt's daughter Emmie regarding wanting to know if the biopsy results came back.Pt's daughter was informed message will be sent to the ordering provider(Sally Stover MD) to reach out to the pt  regarding the biopsy results.    Anmol Arambula RN  [facility]

## 2020-10-22 NOTE — TELEPHONE ENCOUNTER
Second call from pt's daughter Emmie regarding wanting to know if the biopsy results came back.Pt's daughter was informed message will be sent to the ordering provider(Sally Stover MD) to reach out to the pt  regarding the biopsy results.    Anmol Arambula RN  [facility]     Reason for Disposition    Caller requesting lab results    Additional Information    Negative: ED call to PCP    Negative: Physician call to PCP    Negative: Call about patient who is currently hospitalized    Negative: Lab or radiology calling with CRITICAL test results    Negative: [1] Prescription not at pharmacy AND [2] was prescribed today by PCP    Negative: [1] Follow-up call from patient regarding patient's clinical status AND [2] information urgent    Negative: [1] Caller requests to speak ONLY to PCP AND [2] urgent question    Negative: [1] Caller requests to speak to PCP now AND [2] won't tell us reason for call  (Exception: if 10 pm to 6 am, caller must first discuss reason for the call)    Negative: Notification of hospital admission    Negative: Notification of death    Protocols used: PCP CALL - NO TRIAGE-A-

## 2020-10-22 NOTE — TELEPHONE ENCOUNTER
Received a phone call from Michelle's daughter emmie requesting that she be contacted with Michelle's breast biopsy results. Stated to Michelle that writer follow up with her and also Emmie requested a virtual vist with Dr. Ck márquez Thursday 10/29/20 at 0820 AM. Danya Lara RN,BSN,OCN

## 2020-10-23 LAB — COPATH REPORT: NORMAL

## 2020-10-23 NOTE — TELEPHONE ENCOUNTER
Called Emmie back and she is aware that biopsy result is pending. Writer will follow up on Monday. Danya Lara RN,BSN,OCN

## 2020-10-23 NOTE — TELEPHONE ENCOUNTER
Emmie called clinic this AM requesting her mother's biopsy results. Stated to Emmie that pathology results are still pending from her biopsy. She requested that her mother be contacted with the results as soon as they are back. Stated that writer will check and review with Dr. Stover and let her mother know biopsy result is back. Danya Lara RN,BSN,OCN

## 2020-10-24 ENCOUNTER — OFFICE VISIT (OUTPATIENT)
Dept: URGENT CARE | Facility: URGENT CARE | Age: 65
End: 2020-10-24
Payer: COMMERCIAL

## 2020-10-24 VITALS
OXYGEN SATURATION: 99 % | HEIGHT: 66 IN | SYSTOLIC BLOOD PRESSURE: 126 MMHG | TEMPERATURE: 98.9 F | BODY MASS INDEX: 28.77 KG/M2 | WEIGHT: 179 LBS | RESPIRATION RATE: 16 BRPM | DIASTOLIC BLOOD PRESSURE: 84 MMHG | HEART RATE: 66 BPM

## 2020-10-24 DIAGNOSIS — J01.90 ACUTE SINUSITIS WITH SYMPTOMS > 10 DAYS: Primary | ICD-10-CM

## 2020-10-24 LAB — COPATH REPORT: NORMAL

## 2020-10-24 PROCEDURE — 99213 OFFICE O/P EST LOW 20 MIN: CPT | Performed by: FAMILY MEDICINE

## 2020-10-24 RX ORDER — FLUTICASONE PROPIONATE 50 MCG
1 SPRAY, SUSPENSION (ML) NASAL DAILY
Qty: 16 G | Refills: 0 | Status: SHIPPED | OUTPATIENT
Start: 2020-10-24 | End: 2021-02-11

## 2020-10-24 RX ORDER — AZITHROMYCIN 250 MG/1
TABLET, FILM COATED ORAL
Qty: 6 TABLET | Refills: 0 | Status: SHIPPED | OUTPATIENT
Start: 2020-10-24 | End: 2020-10-29

## 2020-10-24 ASSESSMENT — MIFFLIN-ST. JEOR: SCORE: 1373.69

## 2020-10-24 NOTE — PROGRESS NOTES
"[facility]    Hematology/Oncology Established Patient Follow-up Note      Today's Date: 10/29/20    Reason for Follow-up: Adenocarcinoma of probable breast origin.    Flor Griffin is a 65 year old female who is being evaluated via a billable video visit.      The patient has been notified of following:     \"This video visit will be conducted via a call between you and your physician/provider. We have found that certain health care needs can be provided without the need for an in-person physical exam.  This service lets us provide the care you need with a video conversation during the COVID-19 pandemic.  If a prescription is necessary we can send it directly to your pharmacy.  If lab work is needed we can place an order for that and you can then stop by our lab to have the test done at a later time.    Video visits are billed at different rates depending on your insurance coverage.  Please reach out to your insurance provider with any questions.    If during the course of the call the physician/provider feels a video visit is not appropriate, you will not be charged for this service.\"    Patient has given verbal consent for Video visit? Yes    How would you like to obtain your AVS? [facility]    Patient would like the video invitation sent by: Text to cell phone: [phone]         Video-Visit Details    Type of service:  Video Visit      Originating Location (pt. Location): Home    Distant Location (provider location):  [facility]     Mode of Communication:  Video Conference via [employer]    Video visit duration: 45 minutes      HISTORY OF PRESENT ILLNESS: Flor Griffin is a 65 year old female who presents with the following oncologic history:     --Clinical TX N3c M0 adenocarcinoma which is poorly differentiated, likely of breast origin, ER low-positive at 1-5%, VA negative, HER-2/mitzi FISH negative, androgen stain weak-intermediate 10-20%.  Initially she was seen " 11/22/2017 after she underwent right supraclavicular lymph node biopsy which was positive for poorly differentiated adenocarcinoma.  She had this lymph node almost a month and had an excisional biopsy performed which was consistent with the diagnosis of cancer.   --A PET/CT scan 11/27/2017 showed hypermetabolic right supraclavicular, right axillary and subpectoral adenopathy.  Otherwise, no distant metastatic disease.   --3/7/2018: Completed 4 cycles of carboplatin and paclitaxel followed by dose dense AC x 4 cycles.  No surgery was done.  --5/29/2018-7/31/2018: Completed adjuvant radiation to right breast, right axilla, right supraclavicular region.  --9/2018: Started adjuvant letrozole.  --10/15/2020: Screening mammogram showed focal asymmetry in upper outer right breast; no suspicious findings in left breast.  --10/21/2020: U/S of right breast showed irregularly-shaped hypoechoic mass at 10:00, 7 cm from nipple, measuring 3.2 x 2.7 x 3.6 cm. Right axillary ultrasound shows multiple normal-appearing lymph nodes. Biopsy of right breast mass at 10:00 showed poorly differentiated carcinoma, no lymphovascular invasion, morphology consistent with breast cancer and similar to prior axillary node tumor, ER weakly positive at 1% and MN negative (0%), HER-2/mitzi FISH negative.    INTERIM HISTORY:  Michelle reports trying to recover from an upper respiratory infection.  She has not had recent fevers or chills.      REVIEW OF SYSTEMS:   14 point ROS was reviewed and is negative other than as noted above in HPI.       HOME MEDICATIONS:  Current Outpatient Medications   Medication Sig Dispense Refill     aspirin 81 MG tablet Take 81 mg by mouth daily       hydrocortisone (CORTAID) 1 % external ointment Apply topically 2 times daily       Ibuprofen (ADVIL PO) Take 400 mg by mouth every 6 hours as needed for moderate pain       letrozole (FEMARA) 2.5 MG tablet Take 1 tablet (2.5 mg) by mouth daily 90 tablet 4     multivitamin  w/minerals (THERA-VIT-M) tablet Take 1 tablet by mouth daily       Omega-3 Fatty Acids (OMEGA-3 FISH OIL PO) Take 1 g by mouth daily       rosuvastatin (CRESTOR) 20 MG tablet TAKE 1 TABLET(20 MG) BY MOUTH DAILY 90 tablet 0         ALLERGIES:  Allergies   Allergen Reactions     Penicillins Hives         PAST MEDICAL HISTORY:  Past Medical History:   Diagnosis Date     Basal cell cancer     right cheek     Gastroesophageal reflux disease      History of blood transfusion     blue baby     Hyperlipidaemia          PAST SURGICAL HISTORY:  Past Surgical History:   Procedure Laterality Date     BIOPSY MASS NECK Right 11/16/2017    Procedure: BIOPSY MASS NECK;  Excisional Biopsy Right Neck Lymph node;  Surgeon: Waylon Corral MD;  Location: [facility] OR     COLONOSCOPY       IR PORT REMOVAL LEFT  5/13/2019     MOHS MICROGRAPHIC PROCEDURE  ~2010    right cheek; BCC     MOHS MICROGRAPHIC PROCEDURE  10/31/2016    Dr. [facility]         SOCIAL HISTORY:  Social History     Socioeconomic History     Marital status:      Spouse name: Not on file     Number of children: Not on file     Years of education: Not on file     Highest education level: Not on file   Occupational History     Not on file   Social Needs     Financial resource strain: Not on file     Food insecurity     Worry: Not on file     Inability: Not on file     Transportation needs     Medical: Not on file     Non-medical: Not on file   Tobacco Use     Smoking status: Never Smoker     Smokeless tobacco: Never Used   Substance and Sexual Activity     Alcohol use: Yes     Alcohol/week: 0.0 standard drinks     Comment: 4 times a week, 2 drinks     Drug use: No     Sexual activity: Yes     Partners: Male     Birth control/protection: Post-menopausal   Lifestyle     Physical activity     Days per week: Not on file     Minutes per session: Not on file     Stress: Not on file   Relationships     Social connections     Talks on phone: Not on file     Gets together: Not on  file     Attends Baptism service: Not on file     Active member of club or organization: Not on file     Attends meetings of clubs or organizations: Not on file     Relationship status: Not on file     Intimate partner violence     Fear of current or ex partner: Not on file     Emotionally abused: Not on file     Physically abused: Not on file     Forced sexual activity: Not on file   Other Topics Concern     Parent/sibling w/ CABG, MI or angioplasty before 65F 55M? No   Social History Narrative     Not on file         FAMILY HISTORY:  Family History   Problem Relation Age of Onset     Lupus Mother      Heart Disease Mother         CHF     Coronary Artery Disease Mother      Hyperlipidemia Mother      Diabetes Father      Breast Cancer Other          PHYSICAL EXAM:  Vital signs:  Not taken.  ECO  GENERAL: No acute distress.  EYES: No scleral icterus. No overt erythema.  RESPIRATORY: No cough.  No labored breathing.  MUSCULOSKELETAL: Range of motion in the neck, shoulders, and arms appear normal.  SKIN: No overt rashes, discolorations, or lesions over the face and neck.  NEUROLOGIC: Alert.  No overt tremors.  PSYCHIATRIC: Normal affect and mood.  Does not appear anxious.    The rest of a comprehensive physical examination is deferred due to PHE (public health emergency) video visit restrictions.      LABS:  CBC RESULTS:   Recent Labs   Lab Test 19  0840   WBC 3.6*   RBC 3.93   HGB 12.8   HCT 38.4   MCV 98   MCH 32.6   MCHC 33.3   RDW 13.5          Recent Labs   Lab Test 19  0840 18  1150    135   POTASSIUM 4.1 4.0   CHLORIDE 107 101   CO2 25 26   ANIONGAP 8 8   GLC 99 106*   BUN 14 20   CR 0.74 0.62   EDILSON 9.1 8.8       PATHOLOGY:  Reviewed as per HPI.    IMAGING:   10/28/2020: PET scan at [facility]:  1. High metabolic activity in 3 cm mass in right upper outer breast.  2. Several small hypermetabolic lymph nodes in high right axillary and right subclavian suspicious for metastatic  lymphadenopathy  3. Small mildly hypermetabolic lymph nodes in both hilar regions.  4. Several very tiny nodules in right upper lobe unchanged and with low metabolic activity.  5. Small hypermetabolic focus in pelvis difficult to localize to either sigmoid colon or loop of small bowel.       ASSESSMENT/PLAN:  Flor Griffin is a 65 year old female with the following issues:  1.  Right breast poorly differentiated adenocarcinoma, ER weakly positive, HI negative, HER-2/mitzi negative with metastatic recurrence  2. Bilateral hilar lymphadenopathy  -I discussed with Michelle that she has a recurrent right breast cancer that is similar to her right axillary node tumor morphologically. The ER is weakly positive at 1%, HI negative, HER-2/mitzi negative and likely triple-negative-like behavior.  -I discussed the findings of the PET scan in detail with Michelle as described above.  It is unclear if the bilateral hilar lymph nodes represent distant metastatic disease.  -Given per prior treatment with ddAC and paclitaxel and carboplatin, I recommended she proceed with systemic therapy with capecitabine.  I discussed that we are unlikely to achieve a complete response with this drug.  I also discussed that I would look into whether the metastatic triple negative breast cancer regimens (such as Abraxane with atezolizumab) would be an option for her.  I noted that her ER is still weakly positive.  -Discussed that she can discontinue letrozole in the setting of her recurrence.  -I discussed the potential side effects of capecitabine, including but not limited to diarrhea, stomatitis, fatigue, cytopenias, hepatotoxicity, hand-foot syndrome, asthenia, increased risk for infection.  She agrees to proceed.  -Will consider surgical and radiation oncology consults if she achieves a good response to capecitabine and if the bilateral hilar lymphadenopathy resolves.    Return about 2.5 weeks after she starts capecitabine.    Sally Stover,  MD  Hematology/Oncology  [facility]

## 2020-10-24 NOTE — PROGRESS NOTES
"SUBJECTIVE:   Flor Griffin is a 65 year old female presenting with a chief complaint of facial pain/pressure.  Discolored drainage.  No fever.  More fatigue.  Mild cough.  Onset of symptoms was 3 week(s) ago.  Course of illness is worsening.    Severity moderate  Current and Associated symptoms: sinus pressure  Treatment measures tried include Fluids, Rest.  Predisposing factors include HX of chronic sinusitis.    Will usually get a sinus infection once a year, Zpak works well for her.    Past Medical History:   Diagnosis Date     Basal cell cancer     right cheek     Gastroesophageal reflux disease      History of blood transfusion     blue baby     Hyperlipidaemia      Current Outpatient Medications   Medication Sig Dispense Refill     aspirin 81 MG tablet Take 81 mg by mouth daily       hydrocortisone (CORTAID) 1 % external ointment Apply topically 2 times daily       Ibuprofen (ADVIL PO) Take 400 mg by mouth every 6 hours as needed for moderate pain       letrozole (FEMARA) 2.5 MG tablet Take 1 tablet (2.5 mg) by mouth daily 90 tablet 4     multivitamin w/minerals (THERA-VIT-M) tablet Take 1 tablet by mouth daily       Omega-3 Fatty Acids (OMEGA-3 FISH OIL PO) Take 1 g by mouth daily       rosuvastatin (CRESTOR) 20 MG tablet TAKE 1 TABLET(20 MG) BY MOUTH DAILY 90 tablet 0     Social History     Tobacco Use     Smoking status: Never Smoker     Smokeless tobacco: Never Used   Substance Use Topics     Alcohol use: Yes     Alcohol/week: 0.0 standard drinks     Comment: 4 times a week, 2 drinks       ROS:  Review of systems negative except as stated above.    OBJECTIVE:  /84 (BP Location: Right arm, Patient Position: Chair, Cuff Size: Adult Regular)   Pulse 66   Temp 98.9  F (37.2  C) (Oral)   Resp 16   Ht 1.676 m (5' 6\")   Wt 81.2 kg (179 lb)   LMP 01/01/2004 (Approximate)   SpO2 99%   Breastfeeding No   BMI 28.89 kg/m    GENERAL APPEARANCE: healthy, alert and no distress  EYES: EOMI,  PERRL, " conjunctiva clear  HENT: ear canals and TM's normal.  Nose and mouth without ulcers, erythema or lesions.  Tenderness on both frontal and maxillary sinuses on percussion  PSYCH: mentation appears normal and affect normal/bright    ASSESSMENT/PLAN:  (J01.90) Acute sinusitis with symptoms > 10 days  (primary encounter diagnosis)  Plan: azithromycin (ZITHROMAX) 250 MG tablet,         fluticasone (FLONASE) 50 MCG/ACT nasal spray            Reassurance given, reviewed symptomatic treatment with tylenol, ibuprofen, plenty of fluids and rest.  RX Zpak given for sinus infection.  Okay to take claritin, zyrtec, or allegra - 2 tab daily to help with congestion.  RX flonase given to help with congestion.  Encourage to use netti pot saline rinse.    Follow up with primary provider if no improvement of symptoms in 1-2 weeks.    Monty Leonardo MD  October 24, 2020 10:15 AM

## 2020-10-26 NOTE — TELEPHONE ENCOUNTER
Michelle called clinic this AM requesting her pathology results. Michelle was given the results of poorly differentiated carcinoma, Her- 2 negative. Daughter Emmie called clinic too requesting results. Explained that Dr. Stover will further explain and formulate a treatment plan. Writer will call patient back when pathology results are reviewed by . Danya Lara RN,BSN,OCN

## 2020-10-26 NOTE — PROGRESS NOTES
Malignant Path:  Pathology report reviewed with our breast radiologist Dr. Shawanda Madrid, who confirmed the recent breast imaging is concordant with the final surgical pathology results below.    Ultrasound Guided Right Breast Biopsy results showing Poorly Differentiated Carcinoma.  Estrogen Receptors is a weak (+) positive and Progesterone Receptor is (-)Negative. HER2 is still pending.      Patient states no problems with biopsy site.  Recommended follow up is Per Dr. Garcia's recommendations/treatment plan.    Patient was notified of pathology results this morning per Dr. Garcia's nurse- JOANNA Bedolla.  Nurse Hagan noted Dr. Garcia will reach out to patient today to also discuss results and the follow up treatment plan.   Radha Alarcon, RN, BSN    Radha Alarcon RN, BSN  Breast Care Nurse Coordinator  [facility] [place] [place]  [phone]        Patient Name: ALMA MASON   MR#: [id number]   Specimen #: [id number]   Collected: 10/21/2020   Received: 10/21/2020   Reported: 10/23/2020 16:15   Ordering Phy(s): JANES GARCIA   Additional Phy(s): SHAWANDA MADRID     SPECIMEN(S):   Right ultrasound guided breast needle biopsy, 10:00, 7.0cm from nipple,   3.6cm size     FINAL DIAGNOSIS:     Right breast, 10 o'clock position and 7 cm from the nipple, 3.6 cm lesion,    intermediate suspicion, ultrasound   guided core biopsies:   -Poorly differentiated carcinoma, please see description and comment   -HER-2/mitzi study in process with result to be reported separately by the   performing laboratory when completed     COMMENT:   The differential diagnosis includes primary breast cancer with metastasis   apparent before primary tumor site   versus the current lesion as a metastasis to the breast by tumor of   unknown primary.  Please correlate   clinically.     The H&E stained slides have been shared in intradepartmental consultation   with agreement      Electronically signed out by:     Radha Caputo M.D.

## 2020-10-27 ENCOUNTER — TELEPHONE (OUTPATIENT)
Dept: ONCOLOGY | Facility: CLINIC | Age: 65
End: 2020-10-27

## 2020-10-27 NOTE — TELEPHONE ENCOUNTER
Daughter Emmie (present with her mother) called requesting information regarding  her mother's plan of care. Explained to Emmie writer will contact Michelle as soon as recommendations are given. Emmie expressed frustration  waiting for recommendation and questioning if Pet scan can be ordered. Explained to Emmie that Pet scan needs to be ordered through physician and oncologist covering today prefers that recommendations be done through primary oncologist. Emmie questioned if another oncologist who is more availabe is possible and writer stated that patient could switch to a different oncologist who works full time. Offered Emmie to speak to management regarding her frustrations and message will be forwarded to nurse hobson.  Danya Lara RN,BSN,OCN

## 2020-10-27 NOTE — TELEPHONE ENCOUNTER
Consulted with oncologist in clinic who stated that recommendations regarding her treatment plan will come from  her primary oncologist Dr. Stover. Michelle has virtual appointment 10/29/20 with Dr. Stover. Informed Michelle that further imaging can be facilitated right away and that writer will contact her as soon as recommendations are given. Danya Lara RN,BSN,OCN

## 2020-10-27 NOTE — TELEPHONE ENCOUNTER
Called Michelle back and informed her that message has been sent to oncologist covering for clinic today to review pathology results and give recommendation. Daughter Emmie called clinic today as well anxious that no recommendation has been given yet. Stated that writer will follow up and give recommendation as soon as writer receives information. Danya Lara RN,BSN,OCN

## 2020-10-27 NOTE — TELEPHONE ENCOUNTER
Michelle left VM on triage line requesting return call from RNSHAN Haagn regarding wanting to know the plan for next visit with Dr. Stover.    Routing to RNSHAN Hagan.    Woody Harding, VYN, RN, OCN  Oncology Care Coordinator  [facility]

## 2020-10-28 ENCOUNTER — TRANSFERRED RECORDS (OUTPATIENT)
Dept: HEALTH INFORMATION MANAGEMENT | Facility: CLINIC | Age: 65
End: 2020-10-28

## 2020-10-28 ENCOUNTER — TELEPHONE (OUTPATIENT)
Dept: ONCOLOGY | Facility: CLINIC | Age: 65
End: 2020-10-28

## 2020-10-28 DIAGNOSIS — Z17.0 MALIGNANT NEOPLASM OF OVERLAPPING SITES OF RIGHT BREAST IN FEMALE, ESTROGEN RECEPTOR POSITIVE (H): Primary | ICD-10-CM

## 2020-10-28 DIAGNOSIS — C50.811 MALIGNANT NEOPLASM OF OVERLAPPING SITES OF RIGHT BREAST IN FEMALE, ESTROGEN RECEPTOR POSITIVE (H): Primary | ICD-10-CM

## 2020-10-28 NOTE — TELEPHONE ENCOUNTER
Called Michelle regarding plan of care. Dr. Stover did speak with Michelle this morning. Pet scan ordered per Dr. Stover . Michelle would like it done as soon as possible. She stated that she did eat a banana and a piece of toast this morning. Message will be forwarded to scheduling to get her Pet scheduled as soon as possible. [facility] if she cannot get into [facility]. Danya Lara RN,BSN,OCN

## 2020-10-28 NOTE — TELEPHONE ENCOUNTER
Patient scheduled at [facility] for TODAY 10-28-20 at 1pm. [address] [facility] [address]. Patient informed of prep- nothing to eat or drink except water from now until after scan. Order faxed.

## 2020-10-28 NOTE — TELEPHONE ENCOUNTER
Called Michelle she is aware that writer will  get back to her tomorrow morning to conform that her Pet scan result is back, Danya Lara RN,BSN,OCN

## 2020-10-29 ENCOUNTER — TELEPHONE (OUTPATIENT)
Dept: ONCOLOGY | Facility: CLINIC | Age: 65
End: 2020-10-29

## 2020-10-29 ENCOUNTER — VIRTUAL VISIT (OUTPATIENT)
Dept: ONCOLOGY | Facility: CLINIC | Age: 65
End: 2020-10-29
Attending: INTERNAL MEDICINE
Payer: COMMERCIAL

## 2020-10-29 DIAGNOSIS — Z17.0 MALIGNANT NEOPLASM OF UPPER-OUTER QUADRANT OF RIGHT BREAST IN FEMALE, ESTROGEN RECEPTOR POSITIVE (H): Primary | ICD-10-CM

## 2020-10-29 DIAGNOSIS — C50.411 MALIGNANT NEOPLASM OF UPPER-OUTER QUADRANT OF RIGHT BREAST IN FEMALE, ESTROGEN RECEPTOR POSITIVE (H): Primary | ICD-10-CM

## 2020-10-29 DIAGNOSIS — R91.8 PULMONARY NODULES: ICD-10-CM

## 2020-10-29 PROCEDURE — 99215 OFFICE O/P EST HI 40 MIN: CPT | Mod: 95 | Performed by: INTERNAL MEDICINE

## 2020-10-29 PROCEDURE — 999N001193 HC VIDEO/TELEPHONE VISIT; NO CHARGE

## 2020-10-29 ASSESSMENT — PAIN SCALES - GENERAL: PAINLEVEL: NO PAIN (0)

## 2020-10-29 NOTE — LETTER
"    10/29/2020         RE: Flor Griffin  [address] [facility] [id number]        Dear Colleague,    Thank you for referring your patient, Flor Griffin, to the [facility]. Please see a copy of my visit note below.    [facility]    Hematology/Oncology Established Patient Follow-up Note      Today's Date: 10/29/20    Reason for Follow-up: Adenocarcinoma of probable breast origin.    Flor Griffin is a 65 year old female who is being evaluated via a billable video visit.      The patient has been notified of following:     \"This video visit will be conducted via a call between you and your physician/provider. We have found that certain health care needs can be provided without the need for an in-person physical exam.  This service lets us provide the care you need with a video conversation during the COVID-19 pandemic.  If a prescription is necessary we can send it directly to your pharmacy.  If lab work is needed we can place an order for that and you can then stop by our lab to have the test done at a later time.    Video visits are billed at different rates depending on your insurance coverage.  Please reach out to your insurance provider with any questions.    If during the course of the call the physician/provider feels a video visit is not appropriate, you will not be charged for this service.\"    Patient has given verbal consent for Video visit? Yes    How would you like to obtain your AVS? Mareharrajinder    Patient would like the video invitation sent by: Text to cell phone: [phone]         Video-Visit Details    Type of service:  Video Visit      Originating Location (pt. Location): Home    Distant Location (provider location):  [facility]     Mode of Communication:  Video Conference via Doximity    Video visit duration: 45 minutes      HISTORY OF PRESENT ILLNESS: Flor Griffin is a 65 year old female who " presents with the following oncologic history:     --Clinical TX N3c M0 adenocarcinoma which is poorly differentiated, likely of breast origin, ER low-positive at 1-5%, AK negative, HER-2/mitzi FISH negative, androgen stain weak-intermediate 10-20%.  Initially she was seen 11/22/2017 after she underwent right supraclavicular lymph node biopsy which was positive for poorly differentiated adenocarcinoma.  She had this lymph node almost a month and had an excisional biopsy performed which was consistent with the diagnosis of cancer.   --A PET/CT scan 11/27/2017 showed hypermetabolic right supraclavicular, right axillary and subpectoral adenopathy.  Otherwise, no distant metastatic disease.   --3/7/2018: Completed 4 cycles of carboplatin and paclitaxel followed by dose dense AC x 4 cycles.  No surgery was done.  --5/29/2018-7/31/2018: Completed adjuvant radiation to right breast, right axilla, right supraclavicular region.  --9/2018: Started adjuvant letrozole.  --10/15/2020: Screening mammogram showed focal asymmetry in upper outer right breast; no suspicious findings in left breast.  --10/21/2020: U/S of right breast showed irregularly-shaped hypoechoic mass at 10:00, 7 cm from nipple, measuring 3.2 x 2.7 x 3.6 cm. Right axillary ultrasound shows multiple normal-appearing lymph nodes. Biopsy of right breast mass at 10:00 showed poorly differentiated carcinoma, no lymphovascular invasion, morphology consistent with breast cancer and similar to prior axillary node tumor, ER weakly positive at 1% and AK negative (0%), HER-2/mitzi FISH negative.    INTERIM HISTORY:  Michelle reports trying to recover from an upper respiratory infection.  She has not had recent fevers or chills.      REVIEW OF SYSTEMS:   14 point ROS was reviewed and is negative other than as noted above in HPI.       HOME MEDICATIONS:  Current Outpatient Medications   Medication Sig Dispense Refill     aspirin 81 MG tablet Take 81 mg by mouth daily        hydrocortisone (CORTAID) 1 % external ointment Apply topically 2 times daily       Ibuprofen (ADVIL PO) Take 400 mg by mouth every 6 hours as needed for moderate pain       letrozole (FEMARA) 2.5 MG tablet Take 1 tablet (2.5 mg) by mouth daily 90 tablet 4     multivitamin w/minerals (THERA-VIT-M) tablet Take 1 tablet by mouth daily       Omega-3 Fatty Acids (OMEGA-3 FISH OIL PO) Take 1 g by mouth daily       rosuvastatin (CRESTOR) 20 MG tablet TAKE 1 TABLET(20 MG) BY MOUTH DAILY 90 tablet 0         ALLERGIES:  Allergies   Allergen Reactions     Penicillins Hives         PAST MEDICAL HISTORY:  Past Medical History:   Diagnosis Date     Basal cell cancer     right cheek     Gastroesophageal reflux disease      History of blood transfusion     blue baby     Hyperlipidaemia          PAST SURGICAL HISTORY:  Past Surgical History:   Procedure Laterality Date     BIOPSY MASS NECK Right 11/16/2017    Procedure: BIOPSY MASS NECK;  Excisional Biopsy Right Neck Lymph node;  Surgeon: Waylon Corral MD;  Location: [facility] OR     COLONOSCOPY       IR PORT REMOVAL LEFT  5/13/2019     MOHS MICROGRAPHIC PROCEDURE  ~2010    right cheek; BCC     MOHS MICROGRAPHIC PROCEDURE  10/31/2016    Dr. Nicholas [facility]         SOCIAL HISTORY:  Social History     Socioeconomic History     Marital status:      Spouse name: Not on file     Number of children: Not on file     Years of education: Not on file     Highest education level: Not on file   Occupational History     Not on file   Social Needs     Financial resource strain: Not on file     Food insecurity     Worry: Not on file     Inability: Not on file     Transportation needs     Medical: Not on file     Non-medical: Not on file   Tobacco Use     Smoking status: Never Smoker     Smokeless tobacco: Never Used   Substance and Sexual Activity     Alcohol use: Yes     Alcohol/week: 0.0 standard drinks     Comment: 4 times a week, 2 drinks     Drug use: No     Sexual activity: Yes     Partners:  Male     Birth control/protection: Post-menopausal   Lifestyle     Physical activity     Days per week: Not on file     Minutes per session: Not on file     Stress: Not on file   Relationships     Social connections     Talks on phone: Not on file     Gets together: Not on file     Attends [employer] service: Not on file     Active member of club or organization: Not on file     Attends meetings of clubs or organizations: Not on file     Relationship status: Not on file     Intimate partner violence     Fear of current or ex partner: Not on file     Emotionally abused: Not on file     Physically abused: Not on file     Forced sexual activity: Not on file   Other Topics Concern     Parent/sibling w/ CABG, MI or angioplasty before 65F 55M? No   Social History Narrative     Not on file         FAMILY HISTORY:  Family History   Problem Relation Age of Onset     Lupus Mother      Heart Disease Mother         CHF     Coronary Artery Disease Mother      Hyperlipidemia Mother      Diabetes Father      Breast Cancer Other          PHYSICAL EXAM:  Vital signs:  Not taken.  ECO  GENERAL: No acute distress.  EYES: No scleral icterus. No overt erythema.  RESPIRATORY: No cough.  No labored breathing.  MUSCULOSKELETAL: Range of motion in the neck, shoulders, and arms appear normal.  SKIN: No overt rashes, discolorations, or lesions over the face and neck.  NEUROLOGIC: Alert.  No overt tremors.  PSYCHIATRIC: Normal affect and mood.  Does not appear anxious.    The rest of a comprehensive physical examination is deferred due to PHE (public health emergency) video visit restrictions.      LABS:  CBC RESULTS:   Recent Labs   Lab Test 19  0840   WBC 3.6*   RBC 3.93   HGB 12.8   HCT 38.4   MCV 98   MCH 32.6   MCHC 33.3   RDW 13.5          Recent Labs   Lab Test 19  0840 18  1150    135   POTASSIUM 4.1 4.0   CHLORIDE 107 101   CO2 25 26   ANIONGAP 8 8   GLC 99 106*   BUN 14 20   CR 0.74 0.62   EDILSON 9.1  8.8       PATHOLOGY:  Reviewed as per HPI.    IMAGING:   10/28/2020: PET scan at [facility]:  1. High metabolic activity in 3 cm mass in right upper outer breast.  2. Several small hypermetabolic lymph nodes in high right axillary and right subclavian suspicious for metastatic lymphadenopathy  3. Small mildly hypermetabolic lymph nodes in both hilar regions.  4. Several very tiny nodules in right upper lobe unchanged and with low metabolic activity.  5. Small hypermetabolic focus in pelvis difficult to localize to either sigmoid colon or loop of small bowel.       ASSESSMENT/PLAN:  Flor Griffin is a 65 year old female with the following issues:  1.  Right breast poorly differentiated adenocarcinoma, ER weakly positive, WV negative, HER-2/mitzi negative with metastatic recurrence  2. Bilateral hilar lymphadenopathy  -I discussed with Michelle that she has a recurrent right breast cancer that is similar to her right axillary node tumor morphologically. The ER is weakly positive at 1%, WV negative, HER-2/mitzi negative and likely triple-negative-like behavior.  -I discussed the findings of the PET scan in detail with Michelle as described above.  It is unclear if the bilateral hilar lymph nodes represent distant metastatic disease.  -Given per prior treatment with ddAC and paclitaxel and carboplatin, I recommended she proceed with systemic therapy with capecitabine.  I discussed that we are unlikely to achieve a complete response with this drug.  I also discussed that I would look into whether the metastatic triple negative breast cancer regimens (such as Abraxane with atezolizumab) would be an option for her.  I noted that her ER is still weakly positive.  -Discussed that she can discontinue letrozole in the setting of her recurrence.  -I discussed the potential side effects of capecitabine, including but not limited to diarrhea, stomatitis, fatigue, cytopenias, hepatotoxicity, hand-foot syndrome, asthenia, increased risk for  "infection.  She agrees to proceed.  -Will consider surgical and radiation oncology consults if she achieves a good response to capecitabine and if the bilateral hilar lymphadenopathy resolves.    Return about 2.5 weeks after she starts capecitabine.    Sally Stover MD  Hematology/Oncology  [facility]      Flor Griffin is a 65 year old female who is being evaluated via a billable video visit.      The patient has been notified of following:     \"This video visit will be conducted via a call between you and your physician/provider. We have found that certain health care needs can be provided without the need for an in-person physical exam.  This service lets us provide the care you need with a video conversation.  If a prescription is necessary we can send it directly to your pharmacy.  If lab work is needed we can place an order for that and you can then stop by our lab to have the test done at a later time.    Video visits are billed at different rates depending on your insurance coverage.  Please reach out to your insurance provider with any questions.    If during the course of the call the physician/provider feels a video visit is not appropriate, you will not be charged for this service.\"    Patient has given verbal consent for Video visit? Yes  How would you like to obtain your AVS? Mail a copy  If you are dropped from the video visit, the video invite should be resent to: Text to cell phone: [phone]   Will anyone else be joining your video visit? No        Video-Visit Details    Type of service:  Video Visit      Originating Location (pt. Location): Home    Distant Location (provider location):  [facility] [place]     Platform used for Video Visit: Doximity Shari J. Schoenberger, [facility]          Again, thank you for allowing me to participate in the care of your patient.        Sincerely,        Sally Stover MD    "

## 2020-10-29 NOTE — PROGRESS NOTES
"Flor Griffin is a 65 year old female who is being evaluated via a billable video visit.      The patient has been notified of following:     \"This video visit will be conducted via a call between you and your physician/provider. We have found that certain health care needs can be provided without the need for an in-person physical exam.  This service lets us provide the care you need with a video conversation.  If a prescription is necessary we can send it directly to your pharmacy.  If lab work is needed we can place an order for that and you can then stop by our lab to have the test done at a later time.    Video visits are billed at different rates depending on your insurance coverage.  Please reach out to your insurance provider with any questions.    If during the course of the call the physician/provider feels a video visit is not appropriate, you will not be charged for this service.\"    Patient has given verbal consent for Video visit? Yes  How would you like to obtain your AVS? Mail a copy  If you are dropped from the video visit, the video invite should be resent to: Text to cell phone: [phone]   Will anyone else be joining your video visit? No        Video-Visit Details    Type of service:  Video Visit      Originating Location (pt. Location): Home    Distant Location (provider location):  [facility] [place]     Platform used for Video Visit: Doximity Shari J. Schoenberger, [facility]      "

## 2020-10-29 NOTE — LETTER
"    10/29/2020         RE: Flor Griffin  [id number] [address] [facility] [id number]        Dear Colleague,    Thank you for referring your patient, Flor Griffin, to the [facility]. Please see a copy of my visit note below.    [facility]    Hematology/Oncology Established Patient Follow-up Note      Today's Date: 10/29/20    Reason for Follow-up: Adenocarcinoma of probable breast origin.    Flor Griffin is a 65 year old female who is being evaluated via a billable video visit.      The patient has been notified of following:     \"This video visit will be conducted via a call between you and your physician/provider. We have found that certain health care needs can be provided without the need for an in-person physical exam.  This service lets us provide the care you need with a video conversation during the COVID-19 pandemic.  If a prescription is necessary we can send it directly to your pharmacy.  If lab work is needed we can place an order for that and you can then stop by our lab to have the test done at a later time.    Video visits are billed at different rates depending on your insurance coverage.  Please reach out to your insurance provider with any questions.    If during the course of the call the physician/provider feels a video visit is not appropriate, you will not be charged for this service.\"    Patient has given verbal consent for Video visit? Yes    How would you like to obtain your AVS? Mareharrajinder    Patient would like the video invitation sent by: Text to cell phone: [phone]         Video-Visit Details    Type of service:  Video Visit      Originating Location (pt. Location): Home    Distant Location (provider location):  [facility]     Mode of Communication:  Video Conference via Doximity    Video visit duration: 45 minutes      HISTORY OF PRESENT ILLNESS: Flor Griffin is a 65 year old female who " presents with the following oncologic history:     --Clinical TX N3c M0 adenocarcinoma which is poorly differentiated, likely of breast origin, ER low-positive at 1-5%, MA negative, HER-2/mitzi FISH negative, androgen stain weak-intermediate 10-20%.  Initially she was seen 11/22/2017 after she underwent right supraclavicular lymph node biopsy which was positive for poorly differentiated adenocarcinoma.  She had this lymph node almost a month and had an excisional biopsy performed which was consistent with the diagnosis of cancer.   --A PET/CT scan 11/27/2017 showed hypermetabolic right supraclavicular, right axillary and subpectoral adenopathy.  Otherwise, no distant metastatic disease.   --3/7/2018: Completed 4 cycles of carboplatin and paclitaxel followed by dose dense AC x 4 cycles.  No surgery was done.  --5/29/2018-7/31/2018: Completed adjuvant radiation to right breast, right axilla, right supraclavicular region.  --9/2018: Started adjuvant letrozole.  --10/15/2020: Screening mammogram showed focal asymmetry in upper outer right breast; no suspicious findings in left breast.  --10/21/2020: U/S of right breast showed irregularly-shaped hypoechoic mass at 10:00, 7 cm from nipple, measuring 3.2 x 2.7 x 3.6 cm. Right axillary ultrasound shows multiple normal-appearing lymph nodes. Biopsy of right breast mass at 10:00 showed poorly differentiated carcinoma, no lymphovascular invasion, morphology consistent with breast cancer and similar to prior axillary node tumor, ER weakly positive at 1% and MA negative (0%), HER-2/mitzi FISH negative.    INTERIM HISTORY:  Michelle reports trying to recover from an upper respiratory infection.  She has not had recent fevers or chills.      REVIEW OF SYSTEMS:   14 point ROS was reviewed and is negative other than as noted above in HPI.       HOME MEDICATIONS:  Current Outpatient Medications   Medication Sig Dispense Refill     aspirin 81 MG tablet Take 81 mg by mouth daily        hydrocortisone (CORTAID) 1 % external ointment Apply topically 2 times daily       Ibuprofen (ADVIL PO) Take 400 mg by mouth every 6 hours as needed for moderate pain       letrozole (FEMARA) 2.5 MG tablet Take 1 tablet (2.5 mg) by mouth daily 90 tablet 4     multivitamin w/minerals (THERA-VIT-M) tablet Take 1 tablet by mouth daily       Omega-3 Fatty Acids (OMEGA-3 FISH OIL PO) Take 1 g by mouth daily       rosuvastatin (CRESTOR) 20 MG tablet TAKE 1 TABLET(20 MG) BY MOUTH DAILY 90 tablet 0         ALLERGIES:  Allergies   Allergen Reactions     Penicillins Hives         PAST MEDICAL HISTORY:  Past Medical History:   Diagnosis Date     Basal cell cancer     right cheek     Gastroesophageal reflux disease      History of blood transfusion     blue baby     Hyperlipidaemia          PAST SURGICAL HISTORY:  Past Surgical History:   Procedure Laterality Date     BIOPSY MASS NECK Right 11/16/2017    Procedure: BIOPSY MASS NECK;  Excisional Biopsy Right Neck Lymph node;  Surgeon: Waylon Corral MD;  Location: [facility] OR     COLONOSCOPY       IR PORT REMOVAL LEFT  5/13/2019     MOHS MICROGRAPHIC PROCEDURE  ~2010    right cheek; BCC     MOHS MICROGRAPHIC PROCEDURE  10/31/2016    Dr. Nicholas [facility]         SOCIAL HISTORY:  Social History     Socioeconomic History     Marital status:      Spouse name: Not on file     Number of children: Not on file     Years of education: Not on file     Highest education level: Not on file   Occupational History     Not on file   Social Needs     Financial resource strain: Not on file     Food insecurity     Worry: Not on file     Inability: Not on file     Transportation needs     Medical: Not on file     Non-medical: Not on file   Tobacco Use     Smoking status: Never Smoker     Smokeless tobacco: Never Used   Substance and Sexual Activity     Alcohol use: Yes     Alcohol/week: 0.0 standard drinks     Comment: 4 times a week, 2 drinks     Drug use: No     Sexual activity: Yes     Partners:  Male     Birth control/protection: Post-menopausal   Lifestyle     Physical activity     Days per week: Not on file     Minutes per session: Not on file     Stress: Not on file   Relationships     Social connections     Talks on phone: Not on file     Gets together: Not on file     Attends Hindu service: Not on file     Active member of club or organization: Not on file     Attends meetings of clubs or organizations: Not on file     Relationship status: Not on file     Intimate partner violence     Fear of current or ex partner: Not on file     Emotionally abused: Not on file     Physically abused: Not on file     Forced sexual activity: Not on file   Other Topics Concern     Parent/sibling w/ CABG, MI or angioplasty before 65F 55M? No   Social History Narrative     Not on file         FAMILY HISTORY:  Family History   Problem Relation Age of Onset     Lupus Mother      Heart Disease Mother         CHF     Coronary Artery Disease Mother      Hyperlipidemia Mother      Diabetes Father      Breast Cancer Other          PHYSICAL EXAM:  Vital signs:  Not taken.  ECO  GENERAL: No acute distress.  EYES: No scleral icterus. No overt erythema.  RESPIRATORY: No cough.  No labored breathing.  MUSCULOSKELETAL: Range of motion in the neck, shoulders, and arms appear normal.  SKIN: No overt rashes, discolorations, or lesions over the face and neck.  NEUROLOGIC: Alert.  No overt tremors.  PSYCHIATRIC: Normal affect and mood.  Does not appear anxious.    The rest of a comprehensive physical examination is deferred due to PHE (public health emergency) video visit restrictions.      LABS:  CBC RESULTS:   Recent Labs   Lab Test 19  0840   WBC 3.6*   RBC 3.93   HGB 12.8   HCT 38.4   MCV 98   MCH 32.6   MCHC 33.3   RDW 13.5          Recent Labs   Lab Test 19  0840 18  1150    135   POTASSIUM 4.1 4.0   CHLORIDE 107 101   CO2 25 26   ANIONGAP 8 8   GLC 99 106*   BUN 14 20   CR 0.74 0.62   EDILSON 9.1  8.8       PATHOLOGY:  Reviewed as per HPI.    IMAGING:   10/28/2020: PET scan at [facility]:  1. High metabolic activity in 3 cm mass in right upper outer breast.  2. Several small hypermetabolic lymph nodes in high right axillary and right subclavian suspicious for metastatic lymphadenopathy  3. Small mildly hypermetabolic lymph nodes in both hilar regions.  4. Several very tiny nodules in right upper lobe unchanged and with low metabolic activity.  5. Small hypermetabolic focus in pelvis difficult to localize to either sigmoid colon or loop of small bowel.       ASSESSMENT/PLAN:  Flor Griffin is a 65 year old female with the following issues:  1.  Right breast poorly differentiated adenocarcinoma, ER weakly positive, OH negative, HER-2/mitzi negative with metastatic recurrence  2. Bilateral hilar lymphadenopathy  -I discussed with Michelle that she has a recurrent right breast cancer that is similar to her right axillary node tumor morphologically. The ER is weakly positive at 1%, OH negative, HER-2/mitzi negative and likely triple-negative-like behavior.  -I discussed the findings of the PET scan in detail with Michelle as described above.  It is unclear if the bilateral hilar lymph nodes represent distant metastatic disease.  -Given per prior treatment with ddAC and paclitaxel and carboplatin, I recommended she proceed with systemic therapy with capecitabine.  I discussed that we are unlikely to achieve a complete response with this drug.  I also discussed that I would look into whether the metastatic triple negative breast cancer regimens (such as Abraxane with atezolizumab) would be an option for her.  I noted that her ER is still weakly positive.  -Discussed that she can discontinue letrozole in the setting of her recurrence.  -I discussed the potential side effects of capecitabine, including but not limited to diarrhea, stomatitis, fatigue, cytopenias, hepatotoxicity, hand-foot syndrome, asthenia, increased risk for  "infection.  She agrees to proceed.  -Will consider surgical and radiation oncology consults if she achieves a good response to capecitabine and if the bilateral hilar lymphadenopathy resolves.    Return about 2.5 weeks after she starts capecitabine.    Sally Stover MD  Hematology/Oncology  [facility]      Flor Griffin is a 65 year old female who is being evaluated via a billable video visit.      The patient has been notified of following:     \"This video visit will be conducted via a call between you and your physician/provider. We have found that certain health care needs can be provided without the need for an in-person physical exam.  This service lets us provide the care you need with a video conversation.  If a prescription is necessary we can send it directly to your pharmacy.  If lab work is needed we can place an order for that and you can then stop by our lab to have the test done at a later time.    Video visits are billed at different rates depending on your insurance coverage.  Please reach out to your insurance provider with any questions.    If during the course of the call the physician/provider feels a video visit is not appropriate, you will not be charged for this service.\"    Patient has given verbal consent for Video visit? Yes  How would you like to obtain your AVS? Mail a copy  If you are dropped from the video visit, the video invite should be resent to: Text to cell phone: [phone]   Will anyone else be joining your video visit? No        Video-Visit Details    Type of service:  Video Visit      Originating Location (pt. Location): Home    Distant Location (provider location):  [facility]     Platform used for Video Visit: Doximity Shari J. Schoenberger, [facility]          Again, thank you for allowing me to participate in the care of your patient.        Sincerely,        Sally Stover MD    "

## 2020-10-29 NOTE — TELEPHONE ENCOUNTER
Spoke with Michelle regarding her plan of care to start Xeloda. She will call her primary, [facility] clinic to have her labs drawn there. She is aware that she will receive a phone call from pharmacy regarding side effects, authorizationd, cost, delivery of Xeloda. Danya Lara RN,BSN,OCN

## 2020-10-29 NOTE — PATIENT INSTRUCTIONS
Please mail AVS. Shari Schoenberger, [place]    1. Arrange for lab draw.  2. Start capecitabine after lab draw.  3. RTC MD 2.5 weeks after start of capecitabine.

## 2020-10-30 DIAGNOSIS — C50.411 MALIGNANT NEOPLASM OF UPPER-OUTER QUADRANT OF RIGHT BREAST IN FEMALE, ESTROGEN RECEPTOR POSITIVE (H): ICD-10-CM

## 2020-10-30 DIAGNOSIS — Z17.0 MALIGNANT NEOPLASM OF UPPER-OUTER QUADRANT OF RIGHT BREAST IN FEMALE, ESTROGEN RECEPTOR POSITIVE (H): ICD-10-CM

## 2020-10-30 LAB
BASOPHILS # BLD AUTO: 0 10E9/L (ref 0–0.2)
BASOPHILS NFR BLD AUTO: 0.3 %
DIFFERENTIAL METHOD BLD: NORMAL
EOSINOPHIL # BLD AUTO: 0.1 10E9/L (ref 0–0.7)
EOSINOPHIL NFR BLD AUTO: 2.1 %
ERYTHROCYTE [DISTWIDTH] IN BLOOD BY AUTOMATED COUNT: 13.3 % (ref 10–15)
HCT VFR BLD AUTO: 38 % (ref 35–47)
HGB BLD-MCNC: 12.4 G/DL (ref 11.7–15.7)
LYMPHOCYTES # BLD AUTO: 1.2 10E9/L (ref 0.8–5.3)
LYMPHOCYTES NFR BLD AUTO: 18.2 %
MCH RBC QN AUTO: 32.3 PG (ref 26.5–33)
MCHC RBC AUTO-ENTMCNC: 32.6 G/DL (ref 31.5–36.5)
MCV RBC AUTO: 99 FL (ref 78–100)
MONOCYTES # BLD AUTO: 0.6 10E9/L (ref 0–1.3)
MONOCYTES NFR BLD AUTO: 8.4 %
NEUTROPHILS # BLD AUTO: 4.8 10E9/L (ref 1.6–8.3)
NEUTROPHILS NFR BLD AUTO: 71 %
PLATELET # BLD AUTO: 289 10E9/L (ref 150–450)
RBC # BLD AUTO: 3.84 10E12/L (ref 3.8–5.2)
WBC # BLD AUTO: 6.8 10E9/L (ref 4–11)

## 2020-10-30 PROCEDURE — 36415 COLL VENOUS BLD VENIPUNCTURE: CPT | Performed by: INTERNAL MEDICINE

## 2020-10-30 PROCEDURE — 85025 COMPLETE CBC W/AUTO DIFF WBC: CPT | Performed by: INTERNAL MEDICINE

## 2020-10-30 PROCEDURE — 80053 COMPREHEN METABOLIC PANEL: CPT | Performed by: INTERNAL MEDICINE

## 2020-10-31 LAB
ALBUMIN SERPL-MCNC: 3.7 G/DL (ref 3.4–5)
ALP SERPL-CCNC: 93 U/L (ref 40–150)
ALT SERPL W P-5'-P-CCNC: 40 U/L (ref 0–50)
ANION GAP SERPL CALCULATED.3IONS-SCNC: 5 MMOL/L (ref 3–14)
AST SERPL W P-5'-P-CCNC: 24 U/L (ref 0–45)
BILIRUB SERPL-MCNC: 0.4 MG/DL (ref 0.2–1.3)
BUN SERPL-MCNC: 18 MG/DL (ref 7–30)
CALCIUM SERPL-MCNC: 9.1 MG/DL (ref 8.5–10.1)
CHLORIDE SERPL-SCNC: 106 MMOL/L (ref 94–109)
CO2 SERPL-SCNC: 28 MMOL/L (ref 20–32)
CREAT SERPL-MCNC: 0.9 MG/DL (ref 0.52–1.04)
GFR SERPL CREATININE-BSD FRML MDRD: 67 ML/MIN/{1.73_M2}
GLUCOSE SERPL-MCNC: 115 MG/DL (ref 70–99)
POTASSIUM SERPL-SCNC: 3.7 MMOL/L (ref 3.4–5.3)
PROT SERPL-MCNC: 6.8 G/DL (ref 6.8–8.8)
SODIUM SERPL-SCNC: 139 MMOL/L (ref 133–144)

## 2020-11-02 ENCOUNTER — DOCUMENTATION ONLY (OUTPATIENT)
Dept: PHARMACY | Facility: CLINIC | Age: 65
End: 2020-11-02

## 2020-11-02 RX ORDER — CAPECITABINE 500 MG/1
1000 TABLET, FILM COATED ORAL 2 TIMES DAILY
Qty: 112 TABLET | Refills: 0 | Status: SHIPPED | OUTPATIENT
Start: 2020-11-02 | End: 2020-12-04

## 2020-11-02 NOTE — PROGRESS NOTES
"Oral Chemotherapy Monitoring Program    Lab Monitoring Plan  CBC and CMP every 3 weeks during treatment  Subjective/Objective:  Flor Griffin is a 65 year old female contacted by phone for an initial visit for oral chemotherapy education.  Reviewed capecitabine with Michelle today over the phone. Handout has been mailed to her as well. Patient would like to start asap.    ORAL CHEMOTHERAPY 11/2/2020   Assessment Type New Teach   Diagnosis Code Breast Cancer   Providers Reina   Clinic Name/Location [facility]   Drug Name Xeloda (Capecitabine)   Dose 2,000 mg   Current Schedule BID   Cycle Details 2 weeks on, 1 week off   Any new drug interactions? No   Is the dose as ordered appropriate for the patient? Yes       Last PHQ-2 Score on record:   PHQ-2 ( 1999 Pfizer) 7/1/2019 5/11/2018   Q1: Little interest or pleasure in doing things 3 0   Q2: Feeling down, depressed or hopeless 0 0   PHQ-2 Score 3 0   Q1: Little interest or pleasure in doing things Nearly every day -   Q2: Feeling down, depressed or hopeless Not at all -   PHQ-2 Score 3 -       Vitals:  BP:   BP Readings from Last 1 Encounters:   10/24/20 126/84     Wt Readings from Last 1 Encounters:   10/24/20 81.2 kg (179 lb)     Estimated body surface area is 1.94 meters squared as calculated from the following:    Height as of 10/24/20: 1.676 m (5' 6\").    Weight as of 10/24/20: 81.2 kg (179 lb).    Labs:  _  Result Component Current Result Ref Range   Sodium 139 (10/30/2020) 133 - 144 mmol/L     _  Result Component Current Result Ref Range   Potassium 3.7 (10/30/2020) 3.4 - 5.3 mmol/L     _  Result Component Current Result Ref Range   Calcium 9.1 (10/30/2020) 8.5 - 10.1 mg/dL     No results found for Mag within last 30 days.     No results found for Phos within last 30 days.     _  Result Component Current Result Ref Range   Albumin 3.7 (10/30/2020) 3.4 - 5.0 g/dL     _  Result Component Current Result Ref Range   Urea Nitrogen 18 (10/30/2020) 7 - 30 mg/dL "     _  Result Component Current Result Ref Range   Creatinine 0.90 (10/30/2020) 0.52 - 1.04 mg/dL     _  Result Component Current Result Ref Range   AST 24 (10/30/2020) 0 - 45 U/L     _  Result Component Current Result Ref Range   ALT 40 (10/30/2020) 0 - 50 U/L     _  Result Component Current Result Ref Range   Bilirubin Total 0.4 (10/30/2020) 0.2 - 1.3 mg/dL     _  Result Component Current Result Ref Range   WBC 6.8 (10/30/2020) 4.0 - 11.0 10e9/L     _  Result Component Current Result Ref Range   Hemoglobin 12.4 (10/30/2020) 11.7 - 15.7 g/dL     _  Result Component Current Result Ref Range   Platelet Count 289 (10/30/2020) 150 - 450 10e9/L     _  Result Component Current Result Ref Range   Absolute Neutrophil 4.8 (10/30/2020) 1.6 - 8.3 10e9/L       Assessment:  Patient is appropriate to start therapy.    Plan:  Basic chemotherapy teaching was reviewed with the patient including indication, start date of therapy, dose, administration, adverse effects, missed doses, food and drug interactions, monitoring, side effect management, office contact information, and safe handling. Written materials were provided and all questions answered.  Of note, patient will call if she is experiencing nausea and if she would like a Rx for anti-nausea sent to her pharmacy.    Follow-Up:  1 week after starting. Released medication on 11/2. Call to Check in on 11/9. Look for labs and Stover appointment about 2.5 weeks after starting.      Minoo Andre PharmD  November 2, 2020

## 2020-11-05 NOTE — TELEPHONE ENCOUNTER
Called Michelle she has started her Xeloda. She will need labs again around 11/18 per pharmacy. She will have them done around 11/18/20 at [facility]. Danya Lara RN,BSN,OCN

## 2020-11-09 ENCOUNTER — DOCUMENTATION ONLY (OUTPATIENT)
Dept: PHARMACY | Facility: CLINIC | Age: 65
End: 2020-11-09

## 2020-11-09 NOTE — PROGRESS NOTES
"Oral Chemotherapy Monitoring Program    Subjective/Objective:  Flor Griffin is a 65 year old female contacted by phone for a follow-up visit for oral chemotherapy.  Called to check in with Michelle after starting capecitabine last week. Michelle confirms that she is taking 4 tabs in the morning and 4 tabs at night. She states that she started the evening of 11/2 when she received the medication. She denies nausea, diarrhea and any issues with her hands and feet at this time. She has no concerns.    ORAL CHEMOTHERAPY 11/2/2020 11/9/2020   Assessment Type New Teach Initial Follow up   Diagnosis Code Breast Cancer Breast Cancer   Providers Nuno osullivan   Clinic Name/Location [facility]   Drug Name Xeloda (Capecitabine) Xeloda (Capecitabine)   Dose 2,000 mg 2,000 mg   Current Schedule BID BID   Cycle Details 2 weeks on, 1 week off 2 weeks on, 1 week off   Start Date of Last Cycle - 11/2/2020   Planned next cycle start date - 11/23/2020   Doses missed in last 2 weeks - 0   Adherence Assessment - Adherent   Adverse Effects - No AE identified during assessment   Any new drug interactions? No No   Is the dose as ordered appropriate for the patient? Yes Yes       Last PHQ-2 Score on record:   PHQ-2 ( 1999 Pfizer) 7/1/2019 5/11/2018   Q1: Little interest or pleasure in doing things 3 0   Q2: Feeling down, depressed or hopeless 0 0   PHQ-2 Score 3 0   Q1: Little interest or pleasure in doing things Nearly every day -   Q2: Feeling down, depressed or hopeless Not at all -   PHQ-2 Score 3 -       Vitals:  BP:   BP Readings from Last 1 Encounters:   10/24/20 126/84     Wt Readings from Last 1 Encounters:   10/24/20 81.2 kg (179 lb)     Estimated body surface area is 1.94 meters squared as calculated from the following:    Height as of 10/24/20: 1.676 m (5' 6\").    Weight as of 10/24/20: 81.2 kg (179 lb).    Labs:  _  Result Component Current Result Ref Range   Sodium 139 (10/30/2020) 133 - 144 mmol/L     _  Result Component " Current Result Ref Range   Potassium 3.7 (10/30/2020) 3.4 - 5.3 mmol/L     _  Result Component Current Result Ref Range   Calcium 9.1 (10/30/2020) 8.5 - 10.1 mg/dL     No results found for Mag within last 30 days.     No results found for Phos within last 30 days.     _  Result Component Current Result Ref Range   Albumin 3.7 (10/30/2020) 3.4 - 5.0 g/dL     _  Result Component Current Result Ref Range   Urea Nitrogen 18 (10/30/2020) 7 - 30 mg/dL     _  Result Component Current Result Ref Range   Creatinine 0.90 (10/30/2020) 0.52 - 1.04 mg/dL     _  Result Component Current Result Ref Range   AST 24 (10/30/2020) 0 - 45 U/L     _  Result Component Current Result Ref Range   ALT 40 (10/30/2020) 0 - 50 U/L     _  Result Component Current Result Ref Range   Bilirubin Total 0.4 (10/30/2020) 0.2 - 1.3 mg/dL     _  Result Component Current Result Ref Range   WBC 6.8 (10/30/2020) 4.0 - 11.0 10e9/L     _  Result Component Current Result Ref Range   Hemoglobin 12.4 (10/30/2020) 11.7 - 15.7 g/dL     _  Result Component Current Result Ref Range   Platelet Count 289 (10/30/2020) 150 - 450 10e9/L     _  Result Component Current Result Ref Range   Absolute Neutrophil 4.8 (10/30/2020) 1.6 - 8.3 10e9/L         Assessment/Plan:  Patient is tolerating capecitabine well at this time. She will be due for labs in her off week and will plan on doing them at [facility] around 11/18. She will be due to start a new cycle on 11/23. Michelle encouraged to call if she does notice side effects that are bothersome or concerning in the next week as well.    Follow-Up:  11/18 with labs.     Refill Due:  11/23    Minoo Andre PharmD  November 9, 2020

## 2020-11-10 ENCOUNTER — TELEPHONE (OUTPATIENT)
Dept: ONCOLOGY | Facility: CLINIC | Age: 65
End: 2020-11-10

## 2020-11-10 NOTE — TELEPHONE ENCOUNTER
Michelle called clinic stating that she has an Infared Sauna at home and she is wondering if Dr. Stover is ok if she uses it up to 3 x a week with her xeloda treatment. Informed Michelle that writer will contact her with Dr. Stover's recommendations.

## 2020-11-11 NOTE — TELEPHONE ENCOUNTER
I would caution use of infrared sauna while on chemotherapy -- if Michelle wishes to use it, I would recommend extra hydration.     Sally     Called Michelle with Dr. Stover's recommendation to use caution and hydrate well. Danya Lara RN,BSN,OCN

## 2020-11-16 ENCOUNTER — TELEPHONE (OUTPATIENT)
Dept: ONCOLOGY | Facility: CLINIC | Age: 65
End: 2020-11-16

## 2020-11-16 DIAGNOSIS — C50.411 MALIGNANT NEOPLASM OF UPPER-OUTER QUADRANT OF RIGHT BREAST IN FEMALE, ESTROGEN RECEPTOR POSITIVE (H): Primary | ICD-10-CM

## 2020-11-16 DIAGNOSIS — Z17.0 MALIGNANT NEOPLASM OF UPPER-OUTER QUADRANT OF RIGHT BREAST IN FEMALE, ESTROGEN RECEPTOR POSITIVE (H): Primary | ICD-10-CM

## 2020-11-16 RX ORDER — CAPECITABINE 500 MG/1
1000 TABLET, FILM COATED ORAL 2 TIMES DAILY
Qty: 112 TABLET | Refills: 0 | Status: SHIPPED | OUTPATIENT
Start: 2020-11-16 | End: 2021-02-11

## 2020-11-16 NOTE — TELEPHONE ENCOUNTER
Michelle called clinic stating that she canceled her labs for this Thursday 11/19/20 and she rescheduled her blood draw for Monday 11/23/20. Consulted with pharmacy and she is scheduled to restart her Xeloda on 11/23/20 and that should be ok. Writer will route to pharmacy liaison to make sure Xeloda medication can be delivered on time. Danya Lara RN,BSN,OCN

## 2020-11-23 DIAGNOSIS — Z17.0 MALIGNANT NEOPLASM OF UPPER-OUTER QUADRANT OF RIGHT BREAST IN FEMALE, ESTROGEN RECEPTOR POSITIVE (H): ICD-10-CM

## 2020-11-23 DIAGNOSIS — C50.411 MALIGNANT NEOPLASM OF UPPER-OUTER QUADRANT OF RIGHT BREAST IN FEMALE, ESTROGEN RECEPTOR POSITIVE (H): ICD-10-CM

## 2020-11-23 LAB
BASOPHILS # BLD AUTO: 0 10E9/L (ref 0–0.2)
BASOPHILS NFR BLD AUTO: 0.5 %
DIFFERENTIAL METHOD BLD: ABNORMAL
EOSINOPHIL # BLD AUTO: 0.1 10E9/L (ref 0–0.7)
EOSINOPHIL NFR BLD AUTO: 3.1 %
ERYTHROCYTE [DISTWIDTH] IN BLOOD BY AUTOMATED COUNT: 15.5 % (ref 10–15)
HCT VFR BLD AUTO: 36.3 % (ref 35–47)
HGB BLD-MCNC: 11.7 G/DL (ref 11.7–15.7)
LYMPHOCYTES # BLD AUTO: 1.2 10E9/L (ref 0.8–5.3)
LYMPHOCYTES NFR BLD AUTO: 27.3 %
MCH RBC QN AUTO: 33.7 PG (ref 26.5–33)
MCHC RBC AUTO-ENTMCNC: 32.2 G/DL (ref 31.5–36.5)
MCV RBC AUTO: 105 FL (ref 78–100)
MONOCYTES # BLD AUTO: 0.6 10E9/L (ref 0–1.3)
MONOCYTES NFR BLD AUTO: 14.1 %
NEUTROPHILS # BLD AUTO: 2.3 10E9/L (ref 1.6–8.3)
NEUTROPHILS NFR BLD AUTO: 55 %
PLATELET # BLD AUTO: 249 10E9/L (ref 150–450)
RBC # BLD AUTO: 3.47 10E12/L (ref 3.8–5.2)
WBC # BLD AUTO: 4.3 10E9/L (ref 4–11)

## 2020-11-23 PROCEDURE — 36415 COLL VENOUS BLD VENIPUNCTURE: CPT | Performed by: INTERNAL MEDICINE

## 2020-11-23 PROCEDURE — 80053 COMPREHEN METABOLIC PANEL: CPT | Performed by: INTERNAL MEDICINE

## 2020-11-23 PROCEDURE — 85025 COMPLETE CBC W/AUTO DIFF WBC: CPT | Performed by: INTERNAL MEDICINE

## 2020-11-24 ENCOUNTER — TELEPHONE (OUTPATIENT)
Dept: PHARMACY | Facility: CLINIC | Age: 65
End: 2020-11-24

## 2020-11-24 LAB
ALBUMIN SERPL-MCNC: 3.9 G/DL (ref 3.4–5)
ALP SERPL-CCNC: 106 U/L (ref 40–150)
ALT SERPL W P-5'-P-CCNC: 40 U/L (ref 0–50)
ANION GAP SERPL CALCULATED.3IONS-SCNC: 7 MMOL/L (ref 3–14)
AST SERPL W P-5'-P-CCNC: 26 U/L (ref 0–45)
BILIRUB SERPL-MCNC: 0.5 MG/DL (ref 0.2–1.3)
BUN SERPL-MCNC: 17 MG/DL (ref 7–30)
CALCIUM SERPL-MCNC: 9.1 MG/DL (ref 8.5–10.1)
CHLORIDE SERPL-SCNC: 107 MMOL/L (ref 94–109)
CO2 SERPL-SCNC: 26 MMOL/L (ref 20–32)
CREAT SERPL-MCNC: 0.84 MG/DL (ref 0.52–1.04)
GFR SERPL CREATININE-BSD FRML MDRD: 72 ML/MIN/{1.73_M2}
GLUCOSE SERPL-MCNC: 91 MG/DL (ref 70–99)
POTASSIUM SERPL-SCNC: 3.8 MMOL/L (ref 3.4–5.3)
PROT SERPL-MCNC: 6.8 G/DL (ref 6.8–8.8)
SODIUM SERPL-SCNC: 140 MMOL/L (ref 133–144)

## 2020-11-24 NOTE — ORAL ONC MGMT
Oral Chemotherapy Monitoring Program  Lab Follow Up    Reviewed lab results from 11/23/20. Patient started C1 the evening of 11/2/20.    ORAL CHEMOTHERAPY 11/2/2020 11/9/2020 11/24/2020   Assessment Type New Teach Initial Follow up Lab Monitoring   Diagnosis Code Breast Cancer Breast Cancer Breast Cancer   Providers Nuno osullivan   Clinic Name/Location [facility]   Drug Name Xeloda (Capecitabine) Xeloda (Capecitabine) Xeloda (Capecitabine)   Dose 2,000 mg 2,000 mg 2,000 mg   Current Schedule BID BID BID   Cycle Details 2 weeks on, 1 week off 2 weeks on, 1 week off 2 weeks on, 1 week off   Start Date of Last Cycle - 11/2/2020 11/24/2020   Planned next cycle start date - 11/23/2020 12/15/2020   Doses missed in last 2 weeks - 0 0   Adherence Assessment - Adherent -   Adverse Effects - No AE identified during assessment -   Any new drug interactions? No No -   Is the dose as ordered appropriate for the patient? Yes Yes -       Labs:  _  Result Component Current Result Ref Range   Sodium 140 (11/23/2020) 133 - 144 mmol/L     _  Result Component Current Result Ref Range   Potassium 3.8 (11/23/2020) 3.4 - 5.3 mmol/L     _  Result Component Current Result Ref Range   Calcium 9.1 (11/23/2020) 8.5 - 10.1 mg/dL     No results found for Mag within last 30 days.     No results found for Phos within last 30 days.     _  Result Component Current Result Ref Range   Albumin 3.9 (11/23/2020) 3.4 - 5.0 g/dL     _  Result Component Current Result Ref Range   Urea Nitrogen 17 (11/23/2020) 7 - 30 mg/dL     _  Result Component Current Result Ref Range   Creatinine 0.84 (11/23/2020) 0.52 - 1.04 mg/dL     _  Result Component Current Result Ref Range   AST 26 (11/23/2020) 0 - 45 U/L     _  Result Component Current Result Ref Range   ALT 40 (11/23/2020) 0 - 50 U/L     _  Result Component Current Result Ref Range   Bilirubin Total 0.5 (11/23/2020) 0.2 - 1.3 mg/dL     _  Result Component Current Result Ref Range   WBC 4.3  (11/23/2020) 4.0 - 11.0 10e9/L     _  Result Component Current Result Ref Range   Hemoglobin 11.7 (11/23/2020) 11.7 - 15.7 g/dL     _  Result Component Current Result Ref Range   Platelet Count 249 (11/23/2020) 150 - 450 10e9/L     _  Result Component Current Result Ref Range   Absolute Neutrophil 2.3 (11/23/2020) 1.6 - 8.3 10e9/L       Assessment & Plan:  No concerning abnormalities. Ok to proceed with C2 today, 11/24. Patient confirms that she has supply. Reviewed with Michelle that her C3 start date will be 12/15 and she will need labs prior to that again. Also reviewed that depending on where she has her labs done, the turnaround time can be a little longer (next day). Michelle understands and she will try and have labs done on 12/10 or 12/11 this next time.   Michelle agrees to call with any questions or concerns in C2.    Questions answered to patient's satisfaction.    Follow-Up:  3 weeks with labs.     Minoo Andre PharmD  November 24, 2020

## 2020-12-02 NOTE — PROGRESS NOTES
"[facility]    Hematology/Oncology Established Patient Follow-up Note      Today's Date: 12/09/20    Reason for Follow-up: Metastatic breast cancer.    Flor Griffin is a 65 year old female who is being evaluated via a billable video visit.      The patient has been notified of following:     \"This video visit will be conducted via a call between you and your physician/provider. We have found that certain health care needs can be provided without the need for an in-person physical exam.  This service lets us provide the care you need with a video conversation during the COVID-19 pandemic.  If a prescription is necessary we can send it directly to your pharmacy.  If lab work is needed we can place an order for that and you can then stop by our lab to have the test done at a later time.    Video visits are billed at different rates depending on your insurance coverage.  Please reach out to your insurance provider with any questions.    If during the course of the call the physician/provider feels a video visit is not appropriate, you will not be charged for this service.\"    Patient has given verbal consent for Video visit? Yes    How would you like to obtain your AVS? [facility]    Patient would like the video invitation sent by: Text to cell phone: [phone]         Video-Visit Details    Type of service:  Video Visit      Originating Location (pt. Location): Home    Distant Location (provider location):  [facility]     Mode of Communication:  Video Conference via Doximity    Video visit duration: 18 minutes      HISTORY OF PRESENT ILLNESS: Flor Griffin is a 65 year old female who presents with the following oncologic history:     --Clinical TX N3c M0 adenocarcinoma which is poorly differentiated, likely of breast origin, ER low-positive at 1-5%, ME negative, HER-2/mitzi FISH negative, androgen stain weak-intermediate 10-20%.  Initially she was seen 11/22/2017 after " she underwent right supraclavicular lymph node biopsy which was positive for poorly differentiated adenocarcinoma.  She had this lymph node almost a month and had an excisional biopsy performed which was consistent with the diagnosis of cancer.   --A PET/CT scan 11/27/2017 showed hypermetabolic right supraclavicular, right axillary and subpectoral adenopathy.  Otherwise, no distant metastatic disease.   --3/7/2018: Completed 4 cycles of carboplatin and paclitaxel followed by dose dense AC x 4 cycles.  No surgery was done.  --5/29/2018-7/31/2018: Completed adjuvant radiation to right breast, right axilla, right supraclavicular region.  --9/2018: Started adjuvant letrozole.  --10/15/2020: Screening mammogram showed focal asymmetry in upper outer right breast; no suspicious findings in left breast.  --10/21/2020: U/S of right breast showed irregularly-shaped hypoechoic mass at 10:00, 7 cm from nipple, measuring 3.2 x 2.7 x 3.6 cm. Right axillary ultrasound shows multiple normal-appearing lymph nodes. Biopsy of right breast mass at 10:00 showed poorly differentiated carcinoma, no lymphovascular invasion, morphology consistent with breast cancer and similar to prior axillary node tumor, ER weakly positive at 1% and HI negative (0%), HER-2/mitzi FISH negative.  --10/28/2020: PET/CT scan showed high metabolic activity in 3 cm area of right upper outer breast, several small hypermetabolic lymph nodes in high right axilla and right subclavian region; mild hypermetabolic lymph nodes in bilateral hilar regions, favor metastatic disease; several tiny nodules in right upper lung, favor benign etiology; small hypermetabolic focus in pelvis in either sigmoid colon or adjacent loop of small bowel.  --11/02/2020: Started 1st line metastatic therapy with capecitabine.    INTERIM HISTORY:  Michelle started cycle 2 capecitabine on 11/24/2020 and reports one episode of nausea and diarrhea after eating food that may have been too rich.  She  denies any stomatitis and has been avoiding hand-foot syndrome with use of Aquaphor.  She does report a non-pruritic rash over the wrist and forearm areas.      REVIEW OF SYSTEMS:   14 point ROS was reviewed and is negative other than as noted above in HPI.       HOME MEDICATIONS:  Current Outpatient Medications   Medication Sig Dispense Refill     aspirin 81 MG tablet Take 81 mg by mouth daily       capecitabine (XELODA) 500 MG tablet Take 4 tablets (2,000 mg) by mouth 2 times daily for 14 days Days 1 through 14, then off for 7 days. 112 tablet 0     capecitabine (XELODA) 500 MG tablet Take 4 tablets (2,000 mg) by mouth 2 times daily for 14 days Days 1 through 14, then off for 7 days. 112 tablet 0     fluticasone (FLONASE) 50 MCG/ACT nasal spray Spray 1 spray into both nostrils daily 16 g 0     hydrocortisone (CORTAID) 1 % external ointment Apply topically 2 times daily       Ibuprofen (ADVIL PO) Take 400 mg by mouth every 6 hours as needed for moderate pain       letrozole (FEMARA) 2.5 MG tablet Take 1 tablet (2.5 mg) by mouth daily 90 tablet 4     multivitamin w/minerals (THERA-VIT-M) tablet Take 1 tablet by mouth daily       Omega-3 Fatty Acids (OMEGA-3 FISH OIL PO) Take 1 g by mouth daily       rosuvastatin (CRESTOR) 20 MG tablet TAKE 1 TABLET(20 MG) BY MOUTH DAILY (Patient not taking: Reported on 10/29/2020) 90 tablet 0         ALLERGIES:  Allergies   Allergen Reactions     Penicillins Hives         PAST MEDICAL HISTORY:  Past Medical History:   Diagnosis Date     Basal cell cancer     right cheek     Gastroesophageal reflux disease      History of blood transfusion     blue baby     Hyperlipidaemia          PAST SURGICAL HISTORY:  Past Surgical History:   Procedure Laterality Date     BIOPSY MASS NECK Right 11/16/2017    Procedure: BIOPSY MASS NECK;  Excisional Biopsy Right Neck Lymph node;  Surgeon: Waylon Corral MD;  Location: [facility] OR     COLONOSCOPY       IR PORT REMOVAL LEFT  5/13/2019     MOHS  MICROGRAPHIC PROCEDURE  ~2010    right cheek; [facility]CC     MOHS MICROGRAPHIC PROCEDURE  10/31/2016    Dr. [facility]         SOCIAL HISTORY:  Social History     Socioeconomic History     Marital status:      Spouse name: Not on file     Number of children: Not on file     Years of education: Not on file     Highest education level: Not on file   Occupational History     Not on file   Social Needs     Financial resource strain: Not on file     Food insecurity     Worry: Not on file     Inability: Not on file     Transportation needs     Medical: Not on file     Non-medical: Not on file   Tobacco Use     Smoking status: Never Smoker     Smokeless tobacco: Never Used   Substance and Sexual Activity     Alcohol use: Yes     Alcohol/week: 0.0 standard drinks     Comment: 4 times a week, 2 drinks     Drug use: No     Sexual activity: Yes     Partners: Male     Birth control/protection: Post-menopausal   Lifestyle     Physical activity     Days per week: Not on file     Minutes per session: Not on file     Stress: Not on file   Relationships     Social connections     Talks on phone: Not on file     Gets together: Not on file     Attends [employer] service: Not on file     Active member of club or organization: Not on file     Attends meetings of clubs or organizations: Not on file     Relationship status: Not on file     Intimate partner violence     Fear of current or ex partner: Not on file     Emotionally abused: Not on file     Physically abused: Not on file     Forced sexual activity: Not on file   Other Topics Concern     Parent/sibling w/ CABG, MI or angioplasty before 65F 55M? No   Social History Narrative     Not on file         FAMILY HISTORY:  Family History   Problem Relation Age of Onset     Lupus Mother      Heart Disease Mother         CHF     Coronary Artery Disease Mother      Hyperlipidemia Mother      Diabetes Father      Breast Cancer Other          PHYSICAL EXAM:  Vital signs:  Not taken.  ECOG:  0  GENERAL: No acute distress.  EYES: No scleral icterus. No overt erythema.  RESPIRATORY: No cough.  No labored breathing.  MUSCULOSKELETAL: Range of motion in the neck, shoulders, and arms appear normal.  SKIN: No overt rashes, discolorations, or lesions over the face and neck.  NEUROLOGIC: Alert.  No overt tremors.  PSYCHIATRIC: Normal affect and mood.  Does not appear anxious.    The rest of a comprehensive physical examination is deferred due to PHE (public health emergency) video visit restrictions.      LABS:  CBC RESULTS:   Recent Labs   Lab Test 11/23/20  1436   WBC 4.3   RBC 3.47*   HGB 11.7   HCT 36.3   *   MCH 33.7*   MCHC 32.2   RDW 15.5*        Last Comprehensive Metabolic Panel:  Sodium   Date Value Ref Range Status   11/23/2020 140 133 - 144 mmol/L Final     Potassium   Date Value Ref Range Status   11/23/2020 3.8 3.4 - 5.3 mmol/L Final     Chloride   Date Value Ref Range Status   11/23/2020 107 94 - 109 mmol/L Final     Carbon Dioxide   Date Value Ref Range Status   11/23/2020 26 20 - 32 mmol/L Final     Anion Gap   Date Value Ref Range Status   11/23/2020 7 3 - 14 mmol/L Final     Glucose   Date Value Ref Range Status   11/23/2020 91 70 - 99 mg/dL Final     Urea Nitrogen   Date Value Ref Range Status   11/23/2020 17 7 - 30 mg/dL Final     Creatinine   Date Value Ref Range Status   11/23/2020 0.84 0.52 - 1.04 mg/dL Final     GFR Estimate   Date Value Ref Range Status   11/23/2020 72 >60 mL/min/[1.73_m2] Final     Comment:     Non  GFR Calc  Starting 12/18/2018, serum creatinine based estimated GFR (eGFR) will be   calculated using the Chronic Kidney Disease Epidemiology Collaboration   (CKD-EPI) equation.       Calcium   Date Value Ref Range Status   11/23/2020 9.1 8.5 - 10.1 mg/dL Final     Bilirubin Total   Date Value Ref Range Status   11/23/2020 0.5 0.2 - 1.3 mg/dL Final     Alkaline Phosphatase   Date Value Ref Range Status   11/23/2020 106 40 - 150 U/L Final      ALT   Date Value Ref Range Status   11/23/2020 40 0 - 50 U/L Final     AST   Date Value Ref Range Status   11/23/2020 26 0 - 45 U/L Final       PATHOLOGY:  Reviewed as per HPI.    IMAGING:   10/28/2020: PET scan at [facility]:  1. High metabolic activity in 3 cm mass in right upper outer breast.  2. Several small hypermetabolic lymph nodes in high right axillary and right subclavian suspicious for metastatic lymphadenopathy  3. Small mildly hypermetabolic lymph nodes in both hilar regions.  4. Several very tiny nodules in right upper lobe unchanged and with low metabolic activity.  5. Small hypermetabolic focus in pelvis difficult to localize to either sigmoid colon or loop of small bowel.    12/07/2020: Chest CT scan showed few tiny pleural nodules on right unchanged; 2.9 cm breast mass, diffuse fatty infiltration of liver.    ASSESSMENT/PLAN:  Flor Griffin is a 65 year old female with the following issues:  1.  Right breast poorly differentiated adenocarcinoma, ER weakly positive (1%), PA negative, HER-2/mitzi negative with metastatic recurrence  2. Bilateral hilar lymphadenopathy  -Michelle is currently on 1st line metastatic therapy with capecitabine and tolerating this well with no significant side effects so far. She had an episode of nausea and diarrhea that may have been due to food selection.  Will keep capecitabine at same dose for now.  3. Indeterminate pulmonary nodules but likely benign  -Will consider surgical and radiation oncology consults if she achieves a good response to capecitabine and if the bilateral hilar lymphadenopathy resolves.  -Discussed the findings of her 12/7/2020 chest CT scan, which was an order that was placed back in May and should have been delayed to 2/2021. However, findings show that the breast mass is slightly smaller or stable compared to her PET scan from 10/28/2020.  -Plan for repeat PET scan in 2/2021.    4. Rash  -Not pruritic. Continue to use topical emollients.  Can use  topical hydrocortisone.    Return in 3 weeks.    Sally Stover MD  Hematology/Oncology  [facility]

## 2020-12-04 ENCOUNTER — TELEPHONE (OUTPATIENT)
Dept: ONCOLOGY | Facility: CLINIC | Age: 65
End: 2020-12-04

## 2020-12-04 DIAGNOSIS — Z17.0 MALIGNANT NEOPLASM OF UPPER-OUTER QUADRANT OF RIGHT BREAST IN FEMALE, ESTROGEN RECEPTOR POSITIVE (H): Primary | ICD-10-CM

## 2020-12-04 DIAGNOSIS — C50.411 MALIGNANT NEOPLASM OF UPPER-OUTER QUADRANT OF RIGHT BREAST IN FEMALE, ESTROGEN RECEPTOR POSITIVE (H): Primary | ICD-10-CM

## 2020-12-04 RX ORDER — PROCHLORPERAZINE MALEATE 10 MG
5-10 TABLET ORAL EVERY 6 HOURS PRN
Qty: 30 TABLET | Refills: 1 | Status: SHIPPED | OUTPATIENT
Start: 2020-12-04 | End: 2020-12-21

## 2020-12-04 NOTE — TELEPHONE ENCOUNTER
Oral Chemotherapy Monitoring Program    Patient currently on capecitabine therapy for recurrent breast cancer. Michelle called stating she 4-6 loose stools and had 3 episodes of vomiting between last night and this morning. She did attribute some of her dietary consumption from yesterday to potentially some of her issues. She had no issues with her first cycle. She uses no antiemetics or antidiarrheals. She denies any cough, SOB, fever, pain, burning on urination. She wants to maintain her dose and otherwise feels normal. She is in [place] visiting family and is planning to come back home now tomorrow. Michelle states she is feeling much better today after a difficult night/morning. She wants to take her dose tonight or tomorrow morning and see how things go.    Assessment & Plan:  Hold capecitabine for in the AM. Restart tonight or tomorrow morning. Implement PRN Imodium and Compazine. Call the clinic with any changes or persisting N/V/D.     Questions answered to patient's satisfaction.    Follow-Up:  Next week will call her to check in    Eran Chávez PharmD,CHAS  Hematology/Oncology Pharmacist  [facility] [place]  December 4, 2020

## 2020-12-07 ENCOUNTER — HOSPITAL ENCOUNTER (OUTPATIENT)
Dept: CT IMAGING | Facility: CLINIC | Age: 65
Discharge: HOME | End: 2020-12-07
Attending: INTERNAL MEDICINE | Admitting: INTERNAL MEDICINE
Payer: COMMERCIAL

## 2020-12-07 DIAGNOSIS — R91.8 PULMONARY NODULES: ICD-10-CM

## 2020-12-07 DIAGNOSIS — C50.411 MALIGNANT NEOPLASM OF UPPER-OUTER QUADRANT OF RIGHT BREAST IN FEMALE, ESTROGEN RECEPTOR POSITIVE (H): Primary | ICD-10-CM

## 2020-12-07 DIAGNOSIS — Z17.0 MALIGNANT NEOPLASM OF UPPER-OUTER QUADRANT OF RIGHT BREAST IN FEMALE, ESTROGEN RECEPTOR POSITIVE (H): Primary | ICD-10-CM

## 2020-12-07 PROCEDURE — 250N000009 HC RX 250: Performed by: INTERNAL MEDICINE

## 2020-12-07 PROCEDURE — 71260 CT THORAX DX C+: CPT

## 2020-12-07 PROCEDURE — 250N000011 HC RX IP 250 OP 636: Performed by: INTERNAL MEDICINE

## 2020-12-07 RX ORDER — CAPECITABINE 500 MG/1
1000 TABLET, FILM COATED ORAL 2 TIMES DAILY
Qty: 112 TABLET | Refills: 0 | Status: SHIPPED | OUTPATIENT
Start: 2020-12-07 | End: 2021-02-18

## 2020-12-07 RX ORDER — IOPAMIDOL 755 MG/ML
500 INJECTION, SOLUTION INTRAVASCULAR ONCE
Status: COMPLETED | OUTPATIENT
Start: 2020-12-07 | End: 2020-12-07

## 2020-12-07 RX ADMIN — IOPAMIDOL 80 ML: 755 INJECTION, SOLUTION INTRAVENOUS at 10:06

## 2020-12-07 RX ADMIN — SODIUM CHLORIDE 60 ML: 9 INJECTION, SOLUTION INTRAVENOUS at 10:06

## 2020-12-09 ENCOUNTER — VIRTUAL VISIT (OUTPATIENT)
Dept: ONCOLOGY | Facility: CLINIC | Age: 65
End: 2020-12-09
Attending: INTERNAL MEDICINE
Payer: COMMERCIAL

## 2020-12-09 DIAGNOSIS — R59.1 LYMPHADENOPATHY: ICD-10-CM

## 2020-12-09 DIAGNOSIS — C50.411 MALIGNANT NEOPLASM OF UPPER-OUTER QUADRANT OF RIGHT BREAST IN FEMALE, ESTROGEN RECEPTOR POSITIVE (H): Primary | ICD-10-CM

## 2020-12-09 DIAGNOSIS — R21 RASH: ICD-10-CM

## 2020-12-09 DIAGNOSIS — R91.8 PULMONARY NODULES: ICD-10-CM

## 2020-12-09 DIAGNOSIS — Z17.0 MALIGNANT NEOPLASM OF UPPER-OUTER QUADRANT OF RIGHT BREAST IN FEMALE, ESTROGEN RECEPTOR POSITIVE (H): Primary | ICD-10-CM

## 2020-12-09 PROCEDURE — 99214 OFFICE O/P EST MOD 30 MIN: CPT | Mod: 95 | Performed by: INTERNAL MEDICINE

## 2020-12-09 PROCEDURE — 999N001193 HC VIDEO/TELEPHONE VISIT; NO CHARGE

## 2020-12-09 ASSESSMENT — PAIN SCALES - GENERAL: PAINLEVEL: NO PAIN (0)

## 2020-12-09 NOTE — LETTER
"    12/9/2020         RE: Flor Griffin  [address] [address] [facility] [address]        Dear Colleague,    Thank you for referring your patient, Flor Griffin, to the [facility]. Please see a copy of my visit note below.    [facility]    Hematology/Oncology Established Patient Follow-up Note      Today's Date: 12/09/20    Reason for Follow-up: Metastatic breast cancer.    Flor Griffin is a 65 year old female who is being evaluated via a billable video visit.      The patient has been notified of following:     \"This video visit will be conducted via a call between you and your physician/provider. We have found that certain health care needs can be provided without the need for an in-person physical exam.  This service lets us provide the care you need with a video conversation during the COVID-19 pandemic.  If a prescription is necessary we can send it directly to your pharmacy.  If lab work is needed we can place an order for that and you can then stop by our lab to have the test done at a later time.    Video visits are billed at different rates depending on your insurance coverage.  Please reach out to your insurance provider with any questions.    If during the course of the call the physician/provider feels a video visit is not appropriate, you will not be charged for this service.\"    Patient has given verbal consent for Video visit? Yes    How would you like to obtain your AVS? Mareharrajinder    Patient would like the video invitation sent by: Text to cell phone: [phone]         Video-Visit Details    Type of service:  Video Visit      Originating Location (pt. Location): Home    Distant Location (provider location):  [facility]     Mode of Communication:  Video Conference via Doximity    Video visit duration: 18 minutes      HISTORY OF PRESENT ILLNESS: Flor Griffin is a 65 year old female who presents with the " following oncologic history:     --Clinical TX N3c M0 adenocarcinoma which is poorly differentiated, likely of breast origin, ER low-positive at 1-5%, WY negative, HER-2/mitzi FISH negative, androgen stain weak-intermediate 10-20%.  Initially she was seen 11/22/2017 after she underwent right supraclavicular lymph node biopsy which was positive for poorly differentiated adenocarcinoma.  She had this lymph node almost a month and had an excisional biopsy performed which was consistent with the diagnosis of cancer.   --A PET/CT scan 11/27/2017 showed hypermetabolic right supraclavicular, right axillary and subpectoral adenopathy.  Otherwise, no distant metastatic disease.   --3/7/2018: Completed 4 cycles of carboplatin and paclitaxel followed by dose dense AC x 4 cycles.  No surgery was done.  --5/29/2018-7/31/2018: Completed adjuvant radiation to right breast, right axilla, right supraclavicular region.  --9/2018: Started adjuvant letrozole.  --10/15/2020: Screening mammogram showed focal asymmetry in upper outer right breast; no suspicious findings in left breast.  --10/21/2020: U/S of right breast showed irregularly-shaped hypoechoic mass at 10:00, 7 cm from nipple, measuring 3.2 x 2.7 x 3.6 cm. Right axillary ultrasound shows multiple normal-appearing lymph nodes. Biopsy of right breast mass at 10:00 showed poorly differentiated carcinoma, no lymphovascular invasion, morphology consistent with breast cancer and similar to prior axillary node tumor, ER weakly positive at 1% and WY negative (0%), HER-2/mitzi FISH negative.  --10/28/2020: PET/CT scan showed high metabolic activity in 3 cm area of right upper outer breast, several small hypermetabolic lymph nodes in high right axilla and right subclavian region; mild hypermetabolic lymph nodes in bilateral hilar regions, favor metastatic disease; several tiny nodules in right upper lung, favor benign etiology; small hypermetabolic focus in pelvis in either sigmoid colon or  adjacent loop of small bowel.  --11/02/2020: Started 1st line metastatic therapy with capecitabine.    INTERIM HISTORY:  Michelle started cycle 2 capecitabine on 11/24/2020 and reports one episode of nausea and diarrhea after eating food that may have been too rich.  She denies any stomatitis and has been avoiding hand-foot syndrome with use of Aquaphor.  She does report a non-pruritic rash over the wrist and forearm areas.      REVIEW OF SYSTEMS:   14 point ROS was reviewed and is negative other than as noted above in HPI.       HOME MEDICATIONS:  Current Outpatient Medications   Medication Sig Dispense Refill     aspirin 81 MG tablet Take 81 mg by mouth daily       capecitabine (XELODA) 500 MG tablet Take 4 tablets (2,000 mg) by mouth 2 times daily for 14 days Days 1 through 14, then off for 7 days. 112 tablet 0     capecitabine (XELODA) 500 MG tablet Take 4 tablets (2,000 mg) by mouth 2 times daily for 14 days Days 1 through 14, then off for 7 days. 112 tablet 0     fluticasone (FLONASE) 50 MCG/ACT nasal spray Spray 1 spray into both nostrils daily 16 g 0     hydrocortisone (CORTAID) 1 % external ointment Apply topically 2 times daily       Ibuprofen (ADVIL PO) Take 400 mg by mouth every 6 hours as needed for moderate pain       letrozole (FEMARA) 2.5 MG tablet Take 1 tablet (2.5 mg) by mouth daily 90 tablet 4     multivitamin w/minerals (THERA-VIT-M) tablet Take 1 tablet by mouth daily       Omega-3 Fatty Acids (OMEGA-3 FISH OIL PO) Take 1 g by mouth daily       rosuvastatin (CRESTOR) 20 MG tablet TAKE 1 TABLET(20 MG) BY MOUTH DAILY (Patient not taking: Reported on 10/29/2020) 90 tablet 0         ALLERGIES:  Allergies   Allergen Reactions     Penicillins Hives         PAST MEDICAL HISTORY:  Past Medical History:   Diagnosis Date     Basal cell cancer     right cheek     Gastroesophageal reflux disease      History of blood transfusion     blue baby     Hyperlipidaemia          PAST SURGICAL HISTORY:  Past Surgical  History:   Procedure Laterality Date     BIOPSY MASS NECK Right 11/16/2017    Procedure: BIOPSY MASS NECK;  Excisional Biopsy Right Neck Lymph node;  Surgeon: Waylon Corral MD;  Location: [facility] OR     COLONOSCOPY       IR PORT REMOVAL LEFT  5/13/2019     MOHS MICROGRAPHIC PROCEDURE  ~2010    right cheek; BCC     MOHS MICROGRAPHIC PROCEDURE  10/31/2016    Dr. [facility]         SOCIAL HISTORY:  Social History     Socioeconomic History     Marital status:      Spouse name: Not on file     Number of children: Not on file     Years of education: Not on file     Highest education level: Not on file   Occupational History     Not on file   Social Needs     Financial resource strain: Not on file     Food insecurity     Worry: Not on file     Inability: Not on file     Transportation needs     Medical: Not on file     Non-medical: Not on file   Tobacco Use     Smoking status: Never Smoker     Smokeless tobacco: Never Used   Substance and Sexual Activity     Alcohol use: Yes     Alcohol/week: 0.0 standard drinks     Comment: 4 times a week, 2 drinks     Drug use: No     Sexual activity: Yes     Partners: Male     Birth control/protection: Post-menopausal   Lifestyle     Physical activity     Days per week: Not on file     Minutes per session: Not on file     Stress: Not on file   Relationships     Social connections     Talks on phone: Not on file     Gets together: Not on file     Attends Oriental orthodox service: Not on file     Active member of club or organization: Not on file     Attends meetings of clubs or organizations: Not on file     Relationship status: Not on file     Intimate partner violence     Fear of current or ex partner: Not on file     Emotionally abused: Not on file     Physically abused: Not on file     Forced sexual activity: Not on file   Other Topics Concern     Parent/sibling w/ CABG, MI or angioplasty before 65F 55M? No   Social History Narrative     Not on file         FAMILY HISTORY:  Family  History   Problem Relation Age of Onset     Lupus Mother      Heart Disease Mother         CHF     Coronary Artery Disease Mother      Hyperlipidemia Mother      Diabetes Father      Breast Cancer Other          PHYSICAL EXAM:  Vital signs:  Not taken.  ECO  GENERAL: No acute distress.  EYES: No scleral icterus. No overt erythema.  RESPIRATORY: No cough.  No labored breathing.  MUSCULOSKELETAL: Range of motion in the neck, shoulders, and arms appear normal.  SKIN: No overt rashes, discolorations, or lesions over the face and neck.  NEUROLOGIC: Alert.  No overt tremors.  PSYCHIATRIC: Normal affect and mood.  Does not appear anxious.    The rest of a comprehensive physical examination is deferred due to PHE (public health emergency) video visit restrictions.      LABS:  CBC RESULTS:   Recent Labs   Lab Test 20  1436   WBC 4.3   RBC 3.47*   HGB 11.7   HCT 36.3   *   MCH 33.7*   MCHC 32.2   RDW 15.5*        Last Comprehensive Metabolic Panel:  Sodium   Date Value Ref Range Status   2020 140 133 - 144 mmol/L Final     Potassium   Date Value Ref Range Status   2020 3.8 3.4 - 5.3 mmol/L Final     Chloride   Date Value Ref Range Status   2020 107 94 - 109 mmol/L Final     Carbon Dioxide   Date Value Ref Range Status   2020 26 20 - 32 mmol/L Final     Anion Gap   Date Value Ref Range Status   2020 7 3 - 14 mmol/L Final     Glucose   Date Value Ref Range Status   2020 91 70 - 99 mg/dL Final     Urea Nitrogen   Date Value Ref Range Status   2020 17 7 - 30 mg/dL Final     Creatinine   Date Value Ref Range Status   2020 0.84 0.52 - 1.04 mg/dL Final     GFR Estimate   Date Value Ref Range Status   2020 72 >60 mL/min/[1.73_m2] Final     Comment:     Non  GFR Calc  Starting 2018, serum creatinine based estimated GFR (eGFR) will be   calculated using the Chronic Kidney Disease Epidemiology Collaboration   (CKD-EPI) equation.        Calcium   Date Value Ref Range Status   11/23/2020 9.1 8.5 - 10.1 mg/dL Final     Bilirubin Total   Date Value Ref Range Status   11/23/2020 0.5 0.2 - 1.3 mg/dL Final     Alkaline Phosphatase   Date Value Ref Range Status   11/23/2020 106 40 - 150 U/L Final     ALT   Date Value Ref Range Status   11/23/2020 40 0 - 50 U/L Final     AST   Date Value Ref Range Status   11/23/2020 26 0 - 45 U/L Final       PATHOLOGY:  Reviewed as per HPI.    IMAGING:   10/28/2020: PET scan at [facility]:  1. High metabolic activity in 3 cm mass in right upper outer breast.  2. Several small hypermetabolic lymph nodes in high right axillary and right subclavian suspicious for metastatic lymphadenopathy  3. Small mildly hypermetabolic lymph nodes in both hilar regions.  4. Several very tiny nodules in right upper lobe unchanged and with low metabolic activity.  5. Small hypermetabolic focus in pelvis difficult to localize to either sigmoid colon or loop of small bowel.    12/07/2020: Chest CT scan showed few tiny pleural nodules on right unchanged; 2.9 cm breast mass, diffuse fatty infiltration of liver.    ASSESSMENT/PLAN:  Flor Griffin is a 65 year old female with the following issues:  1.  Right breast poorly differentiated adenocarcinoma, ER weakly positive (1%), SD negative, HER-2/mitzi negative with metastatic recurrence  2. Bilateral hilar lymphadenopathy  -Michelle is currently on 1st line metastatic therapy with capecitabine and tolerating this well with no significant side effects so far. She had an episode of nausea and diarrhea that may have been due to food selection.  Will keep capecitabine at same dose for now.  3. Indeterminate pulmonary nodules but likely benign  -Will consider surgical and radiation oncology consults if she achieves a good response to capecitabine and if the bilateral hilar lymphadenopathy resolves.  -Discussed the findings of her 12/7/2020 chest CT scan, which was an order that was placed back in May and  "should have been delayed to 2/2021. However, findings show that the breast mass is slightly smaller or stable compared to her PET scan from 10/28/2020.  -Plan for repeat PET scan in 2/2021.    4. Rash  -Not pruritic. Continue to use topical emollients.  Can use topical hydrocortisone.    Return in 3 weeks.    Sally Stover MD  Hematology/Oncology  [facility]    Flor Griffin is a 65 year old female who is being evaluated via a billable video visit.      The patient has been notified of following:     \"This video visit will be conducted via a call between you and your physician/provider. We have found that certain health care needs can be provided without the need for an in-person physical exam.  This service lets us provide the care you need with a video conversation.  If a prescription is necessary we can send it directly to your pharmacy.  If lab work is needed we can place an order for that and you can then stop by our lab to have the test done at a later time.    Video visits are billed at different rates depending on your insurance coverage.  Please reach out to your insurance provider with any questions.    If during the course of the call the physician/provider feels a video visit is not appropriate, you will not be charged for this service.\"    Patient has given verbal consent for Video visit? Yes  How would you like to obtain your AVS? Mail a copy  If you are dropped from the video visit, the video invite should be resent to: Text to cell phone: [phone]   Will anyone else be joining your video visit? No        Video-Visit Details    Type of service:  Video Visit    Originating Location (pt. Location): Home    Distant Location (provider location):  [facility]     Platform used for Video Visit: Doximity Shari J. Schoenberger, [facility]          Again, thank you for allowing me to participate in the care of your patient.        Sincerely,        Sally" MD Ck

## 2020-12-09 NOTE — PATIENT INSTRUCTIONS
Please mail AVS. Shari Schoenberger, CMA    RTC NP in 3 weeks with lab draw.  RTC MD in 6 weeks with lab draw.

## 2020-12-09 NOTE — LETTER
"    12/9/2020         RE: Flor Griffin  [id number] [facility] [id number]        Dear Colleague,    Thank you for referring your patient, Folr Griffin, to the [facility]. Please see a copy of my visit note below.    [facility]    Hematology/Oncology Established Patient Follow-up Note      Today's Date: 12/09/20    Reason for Follow-up: Metastatic breast cancer.    Flor Griffin is a 65 year old female who is being evaluated via a billable video visit.      The patient has been notified of following:     \"This video visit will be conducted via a call between you and your physician/provider. We have found that certain health care needs can be provided without the need for an in-person physical exam.  This service lets us provide the care you need with a video conversation during the COVID-19 pandemic.  If a prescription is necessary we can send it directly to your pharmacy.  If lab work is needed we can place an order for that and you can then stop by our lab to have the test done at a later time.    Video visits are billed at different rates depending on your insurance coverage.  Please reach out to your insurance provider with any questions.    If during the course of the call the physician/provider feels a video visit is not appropriate, you will not be charged for this service.\"    Patient has given verbal consent for Video visit? Yes    How would you like to obtain your AVS? Mareharrajinder    Patient would like the video invitation sent by: Text to cell phone: [phone]         Video-Visit Details    Type of service:  Video Visit      Originating Location (pt. Location): Home    Distant Location (provider location):  [facility]     Mode of Communication:  Video Conference via Doximity    Video visit duration: 18 minutes      HISTORY OF PRESENT ILLNESS: Flor Griffin is a 65 year old female who presents with the " following oncologic history:     --Clinical TX N3c M0 adenocarcinoma which is poorly differentiated, likely of breast origin, ER low-positive at 1-5%, AL negative, HER-2/mitzi FISH negative, androgen stain weak-intermediate 10-20%.  Initially she was seen 11/22/2017 after she underwent right supraclavicular lymph node biopsy which was positive for poorly differentiated adenocarcinoma.  She had this lymph node almost a month and had an excisional biopsy performed which was consistent with the diagnosis of cancer.   --A PET/CT scan 11/27/2017 showed hypermetabolic right supraclavicular, right axillary and subpectoral adenopathy.  Otherwise, no distant metastatic disease.   --3/7/2018: Completed 4 cycles of carboplatin and paclitaxel followed by dose dense AC x 4 cycles.  No surgery was done.  --5/29/2018-7/31/2018: Completed adjuvant radiation to right breast, right axilla, right supraclavicular region.  --9/2018: Started adjuvant letrozole.  --10/15/2020: Screening mammogram showed focal asymmetry in upper outer right breast; no suspicious findings in left breast.  --10/21/2020: U/S of right breast showed irregularly-shaped hypoechoic mass at 10:00, 7 cm from nipple, measuring 3.2 x 2.7 x 3.6 cm. Right axillary ultrasound shows multiple normal-appearing lymph nodes. Biopsy of right breast mass at 10:00 showed poorly differentiated carcinoma, no lymphovascular invasion, morphology consistent with breast cancer and similar to prior axillary node tumor, ER weakly positive at 1% and AL negative (0%), HER-2/mitzi FISH negative.  --10/28/2020: PET/CT scan showed high metabolic activity in 3 cm area of right upper outer breast, several small hypermetabolic lymph nodes in high right axilla and right subclavian region; mild hypermetabolic lymph nodes in bilateral hilar regions, favor metastatic disease; several tiny nodules in right upper lung, favor benign etiology; small hypermetabolic focus in pelvis in either sigmoid colon or  adjacent loop of small bowel.  --11/02/2020: Started 1st line metastatic therapy with capecitabine.    INTERIM HISTORY:  Michelle started cycle 2 capecitabine on 11/24/2020 and reports one episode of nausea and diarrhea after eating food that may have been too rich.  She denies any stomatitis and has been avoiding hand-foot syndrome with use of Aquaphor.  She does report a non-pruritic rash over the wrist and forearm areas.      REVIEW OF SYSTEMS:   14 point ROS was reviewed and is negative other than as noted above in HPI.       HOME MEDICATIONS:  Current Outpatient Medications   Medication Sig Dispense Refill     aspirin 81 MG tablet Take 81 mg by mouth daily       capecitabine (XELODA) 500 MG tablet Take 4 tablets (2,000 mg) by mouth 2 times daily for 14 days Days 1 through 14, then off for 7 days. 112 tablet 0     capecitabine (XELODA) 500 MG tablet Take 4 tablets (2,000 mg) by mouth 2 times daily for 14 days Days 1 through 14, then off for 7 days. 112 tablet 0     fluticasone (FLONASE) 50 MCG/ACT nasal spray Spray 1 spray into both nostrils daily 16 g 0     hydrocortisone (CORTAID) 1 % external ointment Apply topically 2 times daily       Ibuprofen (ADVIL PO) Take 400 mg by mouth every 6 hours as needed for moderate pain       letrozole (FEMARA) 2.5 MG tablet Take 1 tablet (2.5 mg) by mouth daily 90 tablet 4     multivitamin w/minerals (THERA-VIT-M) tablet Take 1 tablet by mouth daily       Omega-3 Fatty Acids (OMEGA-3 FISH OIL PO) Take 1 g by mouth daily       rosuvastatin (CRESTOR) 20 MG tablet TAKE 1 TABLET(20 MG) BY MOUTH DAILY (Patient not taking: Reported on 10/29/2020) 90 tablet 0         ALLERGIES:  Allergies   Allergen Reactions     Penicillins Hives         PAST MEDICAL HISTORY:  Past Medical History:   Diagnosis Date     Basal cell cancer     right cheek     Gastroesophageal reflux disease      History of blood transfusion     blue baby     Hyperlipidaemia          PAST SURGICAL HISTORY:  Past Surgical  History:   Procedure Laterality Date     BIOPSY MASS NECK Right 11/16/2017    Procedure: BIOPSY MASS NECK;  Excisional Biopsy Right Neck Lymph node;  Surgeon: Waylon Corral MD;  Location: [facility] OR     COLONOSCOPY       IR PORT REMOVAL LEFT  5/13/2019     MOHS MICROGRAPHIC PROCEDURE  ~2010    right cheek; BCC     MOHS MICROGRAPHIC PROCEDURE  10/31/2016    Dr. Nicholas [facility]         SOCIAL HISTORY:  Social History     Socioeconomic History     Marital status:      Spouse name: Not on file     Number of children: Not on file     Years of education: Not on file     Highest education level: Not on file   Occupational History     Not on file   Social Needs     Financial resource strain: Not on file     Food insecurity     Worry: Not on file     Inability: Not on file     Transportation needs     Medical: Not on file     Non-medical: Not on file   Tobacco Use     Smoking status: Never Smoker     Smokeless tobacco: Never Used   Substance and Sexual Activity     Alcohol use: Yes     Alcohol/week: 0.0 standard drinks     Comment: 4 times a week, 2 drinks     Drug use: No     Sexual activity: Yes     Partners: Male     Birth control/protection: Post-menopausal   Lifestyle     Physical activity     Days per week: Not on file     Minutes per session: Not on file     Stress: Not on file   Relationships     Social connections     Talks on phone: Not on file     Gets together: Not on file     Attends Baptism service: Not on file     Active member of club or organization: Not on file     Attends meetings of clubs or organizations: Not on file     Relationship status: Not on file     Intimate partner violence     Fear of current or ex partner: Not on file     Emotionally abused: Not on file     Physically abused: Not on file     Forced sexual activity: Not on file   Other Topics Concern     Parent/sibling w/ CABG, MI or angioplasty before 65F 55M? No   Social History Narrative     Not on file         FAMILY HISTORY:  Family  History   Problem Relation Age of Onset     Lupus Mother      Heart Disease Mother         CHF     Coronary Artery Disease Mother      Hyperlipidemia Mother      Diabetes Father      Breast Cancer Other          PHYSICAL EXAM:  Vital signs:  Not taken.  ECO  GENERAL: No acute distress.  EYES: No scleral icterus. No overt erythema.  RESPIRATORY: No cough.  No labored breathing.  MUSCULOSKELETAL: Range of motion in the neck, shoulders, and arms appear normal.  SKIN: No overt rashes, discolorations, or lesions over the face and neck.  NEUROLOGIC: Alert.  No overt tremors.  PSYCHIATRIC: Normal affect and mood.  Does not appear anxious.    The rest of a comprehensive physical examination is deferred due to PHE (public health emergency) video visit restrictions.      LABS:  CBC RESULTS:   Recent Labs   Lab Test 20  1436   WBC 4.3   RBC 3.47*   HGB 11.7   HCT 36.3   *   MCH 33.7*   MCHC 32.2   RDW 15.5*        Last Comprehensive Metabolic Panel:  Sodium   Date Value Ref Range Status   2020 140 133 - 144 mmol/L Final     Potassium   Date Value Ref Range Status   2020 3.8 3.4 - 5.3 mmol/L Final     Chloride   Date Value Ref Range Status   2020 107 94 - 109 mmol/L Final     Carbon Dioxide   Date Value Ref Range Status   2020 26 20 - 32 mmol/L Final     Anion Gap   Date Value Ref Range Status   2020 7 3 - 14 mmol/L Final     Glucose   Date Value Ref Range Status   2020 91 70 - 99 mg/dL Final     Urea Nitrogen   Date Value Ref Range Status   2020 17 7 - 30 mg/dL Final     Creatinine   Date Value Ref Range Status   2020 0.84 0.52 - 1.04 mg/dL Final     GFR Estimate   Date Value Ref Range Status   2020 72 >60 mL/min/[1.73_m2] Final     Comment:     Non  GFR Calc  Starting 2018, serum creatinine based estimated GFR (eGFR) will be   calculated using the Chronic Kidney Disease Epidemiology Collaboration   (CKD-EPI) equation.        Calcium   Date Value Ref Range Status   11/23/2020 9.1 8.5 - 10.1 mg/dL Final     Bilirubin Total   Date Value Ref Range Status   11/23/2020 0.5 0.2 - 1.3 mg/dL Final     Alkaline Phosphatase   Date Value Ref Range Status   11/23/2020 106 40 - 150 U/L Final     ALT   Date Value Ref Range Status   11/23/2020 40 0 - 50 U/L Final     AST   Date Value Ref Range Status   11/23/2020 26 0 - 45 U/L Final       PATHOLOGY:  Reviewed as per HPI.    IMAGING:   10/28/2020: PET scan at [facility]:  1. High metabolic activity in 3 cm mass in right upper outer breast.  2. Several small hypermetabolic lymph nodes in high right axillary and right subclavian suspicious for metastatic lymphadenopathy  3. Small mildly hypermetabolic lymph nodes in both hilar regions.  4. Several very tiny nodules in right upper lobe unchanged and with low metabolic activity.  5. Small hypermetabolic focus in pelvis difficult to localize to either sigmoid colon or loop of small bowel.    12/07/2020: Chest CT scan showed few tiny pleural nodules on right unchanged; 2.9 cm breast mass, diffuse fatty infiltration of liver.    ASSESSMENT/PLAN:  Flor Griffin is a 65 year old female with the following issues:  1.  Right breast poorly differentiated adenocarcinoma, ER weakly positive (1%), VA negative, HER-2/mitzi negative with metastatic recurrence  2. Bilateral hilar lymphadenopathy  -Michelle is currently on 1st line metastatic therapy with capecitabine and tolerating this well with no significant side effects so far. She had an episode of nausea and diarrhea that may have been due to food selection.  Will keep capecitabine at same dose for now.  3. Indeterminate pulmonary nodules but likely benign  -Will consider surgical and radiation oncology consults if she achieves a good response to capecitabine and if the bilateral hilar lymphadenopathy resolves.  -Discussed the findings of her 12/7/2020 chest CT scan, which was an order that was placed back in May and  "should have been delayed to 2/2021. However, findings show that the breast mass is slightly smaller or stable compared to her PET scan from 10/28/2020.  -Plan for repeat PET scan in 2/2021.    4. Rash  -Not pruritic. Continue to use topical emollients.  Can use topical hydrocortisone.    Return in 3 weeks.    Sally Stover MD  Hematology/Oncology  [facility]    Flor Griffin is a 65 year old female who is being evaluated via a billable video visit.      The patient has been notified of following:     \"This video visit will be conducted via a call between you and your physician/provider. We have found that certain health care needs can be provided without the need for an in-person physical exam.  This service lets us provide the care you need with a video conversation.  If a prescription is necessary we can send it directly to your pharmacy.  If lab work is needed we can place an order for that and you can then stop by our lab to have the test done at a later time.    Video visits are billed at different rates depending on your insurance coverage.  Please reach out to your insurance provider with any questions.    If during the course of the call the physician/provider feels a video visit is not appropriate, you will not be charged for this service.\"    Patient has given verbal consent for Video visit? Yes  How would you like to obtain your AVS? Mail a copy  If you are dropped from the video visit, the video invite should be resent to: Text to cell phone: [phone]   Will anyone else be joining your video visit? No        Video-Visit Details    Type of service:  Video Visit    Originating Location (pt. Location): Home    Distant Location (provider location):  [facility] [place]     Platform used for Video Visit: Doximity Shari J. Schoenberger, [facility]          Again, thank you for allowing me to participate in the care of your patient.        Sincerely,        Sally" MD Ck

## 2020-12-09 NOTE — PROGRESS NOTES
"Flor Griffin is a 65 year old female who is being evaluated via a billable video visit.      The patient has been notified of following:     \"This video visit will be conducted via a call between you and your physician/provider. We have found that certain health care needs can be provided without the need for an in-person physical exam.  This service lets us provide the care you need with a video conversation.  If a prescription is necessary we can send it directly to your pharmacy.  If lab work is needed we can place an order for that and you can then stop by our lab to have the test done at a later time.    Video visits are billed at different rates depending on your insurance coverage.  Please reach out to your insurance provider with any questions.    If during the course of the call the physician/provider feels a video visit is not appropriate, you will not be charged for this service.\"    Patient has given verbal consent for Video visit? Yes  How would you like to obtain your AVS? Mail a copy  If you are dropped from the video visit, the video invite should be resent to: Text to cell phone: [phone]   Will anyone else be joining your video visit? No        Video-Visit Details    Type of service:  Video Visit    Originating Location (pt. Location): Home    Distant Location (provider location):  [facility] [place]     Platform used for Video Visit: Doximity Shari J. Schoenberger, [facility]      "

## 2020-12-10 DIAGNOSIS — C50.411 MALIGNANT NEOPLASM OF UPPER-OUTER QUADRANT OF RIGHT BREAST IN FEMALE, ESTROGEN RECEPTOR POSITIVE (H): ICD-10-CM

## 2020-12-10 DIAGNOSIS — Z17.0 MALIGNANT NEOPLASM OF UPPER-OUTER QUADRANT OF RIGHT BREAST IN FEMALE, ESTROGEN RECEPTOR POSITIVE (H): ICD-10-CM

## 2020-12-10 LAB
BASOPHILS # BLD AUTO: 0 10E9/L (ref 0–0.2)
BASOPHILS NFR BLD AUTO: 0.4 %
DIFFERENTIAL METHOD BLD: ABNORMAL
EOSINOPHIL # BLD AUTO: 0.1 10E9/L (ref 0–0.7)
EOSINOPHIL NFR BLD AUTO: 2.3 %
ERYTHROCYTE [DISTWIDTH] IN BLOOD BY AUTOMATED COUNT: 16.5 % (ref 10–15)
HCT VFR BLD AUTO: 34.6 % (ref 35–47)
HGB BLD-MCNC: 11.7 G/DL (ref 11.7–15.7)
LYMPHOCYTES # BLD AUTO: 1 10E9/L (ref 0.8–5.3)
LYMPHOCYTES NFR BLD AUTO: 19.1 %
MCH RBC QN AUTO: 34.5 PG (ref 26.5–33)
MCHC RBC AUTO-ENTMCNC: 33.8 G/DL (ref 31.5–36.5)
MCV RBC AUTO: 102 FL (ref 78–100)
MONOCYTES # BLD AUTO: 0.6 10E9/L (ref 0–1.3)
MONOCYTES NFR BLD AUTO: 10.6 %
NEUTROPHILS # BLD AUTO: 3.5 10E9/L (ref 1.6–8.3)
NEUTROPHILS NFR BLD AUTO: 67.6 %
PLATELET # BLD AUTO: 251 10E9/L (ref 150–450)
RBC # BLD AUTO: 3.39 10E12/L (ref 3.8–5.2)
WBC # BLD AUTO: 5.2 10E9/L (ref 4–11)

## 2020-12-10 PROCEDURE — 36415 COLL VENOUS BLD VENIPUNCTURE: CPT | Performed by: INTERNAL MEDICINE

## 2020-12-10 PROCEDURE — 85025 COMPLETE CBC W/AUTO DIFF WBC: CPT | Performed by: INTERNAL MEDICINE

## 2020-12-10 PROCEDURE — 80053 COMPREHEN METABOLIC PANEL: CPT | Performed by: INTERNAL MEDICINE

## 2020-12-11 ENCOUNTER — TELEPHONE (OUTPATIENT)
Dept: PHARMACY | Facility: CLINIC | Age: 65
End: 2020-12-11

## 2020-12-11 LAB
ALBUMIN SERPL-MCNC: 3.8 G/DL (ref 3.4–5)
ALP SERPL-CCNC: 128 U/L (ref 40–150)
ALT SERPL W P-5'-P-CCNC: 48 U/L (ref 0–50)
ANION GAP SERPL CALCULATED.3IONS-SCNC: 5 MMOL/L (ref 3–14)
AST SERPL W P-5'-P-CCNC: 33 U/L (ref 0–45)
BILIRUB SERPL-MCNC: 0.9 MG/DL (ref 0.2–1.3)
BUN SERPL-MCNC: 16 MG/DL (ref 7–30)
CALCIUM SERPL-MCNC: 8.9 MG/DL (ref 8.5–10.1)
CHLORIDE SERPL-SCNC: 108 MMOL/L (ref 94–109)
CO2 SERPL-SCNC: 26 MMOL/L (ref 20–32)
CREAT SERPL-MCNC: 0.68 MG/DL (ref 0.52–1.04)
GFR SERPL CREATININE-BSD FRML MDRD: >90 ML/MIN/{1.73_M2}
GLUCOSE SERPL-MCNC: 111 MG/DL (ref 70–99)
POTASSIUM SERPL-SCNC: 3.9 MMOL/L (ref 3.4–5.3)
PROT SERPL-MCNC: 6.5 G/DL (ref 6.8–8.8)
SODIUM SERPL-SCNC: 139 MMOL/L (ref 133–144)

## 2020-12-11 NOTE — TELEPHONE ENCOUNTER
Oral Chemotherapy Monitoring Program  Lab Follow Up    Reviewed lab results from 12/10/20.    ORAL CHEMOTHERAPY 11/2/2020 11/9/2020 11/24/2020 12/11/2020   Assessment Type New Teach Initial Follow up Lab Monitoring Lab Monitoring   Diagnosis Code Breast Cancer Breast Cancer Breast Cancer Breast Cancer   Providers Nuno osullivan   Clinic Name/Location [facility]   Drug Name Xeloda (Capecitabine) Xeloda (Capecitabine) Xeloda (Capecitabine) Xeloda (Capecitabine)   Dose 2,000 mg 2,000 mg 2,000 mg 2,000 mg   Current Schedule BID BID BID BID   Cycle Details 2 weeks on, 1 week off 2 weeks on, 1 week off 2 weeks on, 1 week off 2 weeks on, 1 week off   Start Date of Last Cycle - 11/2/2020 11/24/2020 11/24/2020   Planned next cycle start date - 11/23/2020 12/15/2020 12/15/2020   Doses missed in last 2 weeks - 0 0 -   Adherence Assessment - Adherent - -   Adverse Effects - No AE identified during assessment - -   Any new drug interactions? No No - -   Is the dose as ordered appropriate for the patient? Yes Yes - -       Labs:  _  Result Component Current Result Ref Range   Sodium 139 (12/10/2020) 133 - 144 mmol/L     _  Result Component Current Result Ref Range   Potassium 3.9 (12/10/2020) 3.4 - 5.3 mmol/L     _  Result Component Current Result Ref Range   Calcium 8.9 (12/10/2020) 8.5 - 10.1 mg/dL     No results found for Mag within last 30 days.     No results found for Phos within last 30 days.     _  Result Component Current Result Ref Range   Albumin 3.8 (12/10/2020) 3.4 - 5.0 g/dL     _  Result Component Current Result Ref Range   Urea Nitrogen 16 (12/10/2020) 7 - 30 mg/dL     _  Result Component Current Result Ref Range   Creatinine 0.68 (12/10/2020) 0.52 - 1.04 mg/dL     _  Result Component Current Result Ref Range   AST 33 (12/10/2020) 0 - 45 U/L     _  Result Component Current Result Ref Range   ALT 48 (12/10/2020) 0 - 50 U/L     _  Result Component Current Result Ref Range    Bilirubin Total 0.9 (12/10/2020) 0.2 - 1.3 mg/dL     _  Result Component Current Result Ref Range   WBC 5.2 (12/10/2020) 4.0 - 11.0 10e9/L     _  Result Component Current Result Ref Range   Hemoglobin 11.7 (12/10/2020) 11.7 - 15.7 g/dL     _  Result Component Current Result Ref Range   Platelet Count 251 (12/10/2020) 150 - 450 10e9/L     _  Result Component Current Result Ref Range   Absolute Neutrophil 3.5 (12/10/2020) 1.6 - 8.3 10e9/L       Assessment & Plan:  No concerning abnormalities. I called and talked to Michelle and told her that labs meet criteria for starting the next cycle. She will start the next cycle on 12/15 as scheduled.    Questions answered to patient's satisfaction.    Follow-Up:  3 weeks.    Hamzah RomanD. BCOP

## 2020-12-15 ENCOUNTER — TELEPHONE (OUTPATIENT)
Dept: ONCOLOGY | Facility: CLINIC | Age: 65
End: 2020-12-15

## 2020-12-15 NOTE — TELEPHONE ENCOUNTER
Patient's daughter Emmie, called clinic left message on nurse manager's voice mail regarding question regarding her mother. Per nurse manager NATIVIDAD Richmond called patient's daughter Emmie left message on her voice mail to call clinic back. Danya Lara RN,BSN,OCN

## 2020-12-15 NOTE — TELEPHONE ENCOUNTER
Emmie called clinic back and reviewed her last visit with Dr. Stover and plan of care. She stated that her mother is experiencing some anxiety at this time. Explained to Emmie that writer could offer psychotherapy with Dr. Larry Acharya or writer could have clinic social  worker call her. Emmie stated that she will discuss with her and have either Michelle or she will call clinic if she thinks this will be beneficial. Danya Lara RN,BSN,OCN

## 2020-12-21 DIAGNOSIS — Z17.0 MALIGNANT NEOPLASM OF UPPER-OUTER QUADRANT OF RIGHT BREAST IN FEMALE, ESTROGEN RECEPTOR POSITIVE (H): ICD-10-CM

## 2020-12-21 DIAGNOSIS — C50.411 MALIGNANT NEOPLASM OF UPPER-OUTER QUADRANT OF RIGHT BREAST IN FEMALE, ESTROGEN RECEPTOR POSITIVE (H): ICD-10-CM

## 2020-12-21 RX ORDER — PROCHLORPERAZINE MALEATE 10 MG
5-10 TABLET ORAL EVERY 6 HOURS PRN
Qty: 30 TABLET | Refills: 1 | Status: SHIPPED | OUTPATIENT
Start: 2020-12-21 | End: 2020-12-21

## 2020-12-21 RX ORDER — PROCHLORPERAZINE MALEATE 10 MG
5-10 TABLET ORAL EVERY 6 HOURS PRN
Qty: 30 TABLET | Refills: 1 | Status: SHIPPED | OUTPATIENT
Start: 2020-12-21 | End: 2021-06-01

## 2020-12-30 ENCOUNTER — TELEPHONE (OUTPATIENT)
Dept: ONCOLOGY | Facility: CLINIC | Age: 65
End: 2020-12-30

## 2021-01-04 ENCOUNTER — TELEPHONE (OUTPATIENT)
Dept: ONCOLOGY | Facility: CLINIC | Age: 66
End: 2021-01-04

## 2021-01-04 ENCOUNTER — DOCUMENTATION ONLY (OUTPATIENT)
Dept: ONCOLOGY | Facility: CLINIC | Age: 66
End: 2021-01-04

## 2021-01-04 NOTE — TELEPHONE ENCOUNTER
Patient's daughter Emmie Alba called clinic stating that her mother Michelle is experiencing increased anxiety. She stated that her mother is having difficulty remembering things like her appointments, she's more disorganized, and her thoughts seem everywhere. Her daughter has asked if she can could possibly be started on an anti-anxiety medication. She is seeing QUYEN Goins tomorrow and has requested to have this mentioned at the virtual appointment. Danya Lara RN,BSN,OCN

## 2021-01-05 ENCOUNTER — VIRTUAL VISIT (OUTPATIENT)
Dept: ONCOLOGY | Facility: CLINIC | Age: 66
End: 2021-01-05
Attending: NURSE PRACTITIONER
Payer: COMMERCIAL

## 2021-01-05 ENCOUNTER — DOCUMENTATION ONLY (OUTPATIENT)
Dept: ONCOLOGY | Facility: CLINIC | Age: 66
End: 2021-01-05

## 2021-01-05 DIAGNOSIS — C50.411 MALIGNANT NEOPLASM OF UPPER-OUTER QUADRANT OF RIGHT BREAST IN FEMALE, ESTROGEN RECEPTOR POSITIVE (H): ICD-10-CM

## 2021-01-05 DIAGNOSIS — C50.411 MALIGNANT NEOPLASM OF UPPER-OUTER QUADRANT OF RIGHT BREAST IN FEMALE, ESTROGEN RECEPTOR POSITIVE (H): Primary | ICD-10-CM

## 2021-01-05 DIAGNOSIS — Z17.0 MALIGNANT NEOPLASM OF UPPER-OUTER QUADRANT OF RIGHT BREAST IN FEMALE, ESTROGEN RECEPTOR POSITIVE (H): Primary | ICD-10-CM

## 2021-01-05 DIAGNOSIS — Z17.0 MALIGNANT NEOPLASM OF UPPER-OUTER QUADRANT OF RIGHT BREAST IN FEMALE, ESTROGEN RECEPTOR POSITIVE (H): ICD-10-CM

## 2021-01-05 LAB
ALBUMIN SERPL-MCNC: 3.7 G/DL (ref 3.4–5)
ALP SERPL-CCNC: 168 U/L (ref 40–150)
ALT SERPL W P-5'-P-CCNC: 56 U/L (ref 0–50)
ANION GAP SERPL CALCULATED.3IONS-SCNC: 6 MMOL/L (ref 3–14)
AST SERPL W P-5'-P-CCNC: 38 U/L (ref 0–45)
BASOPHILS # BLD AUTO: 0 10E9/L (ref 0–0.2)
BASOPHILS NFR BLD AUTO: 0.4 %
BILIRUB SERPL-MCNC: 0.9 MG/DL (ref 0.2–1.3)
BUN SERPL-MCNC: 19 MG/DL (ref 7–30)
CALCIUM SERPL-MCNC: 8.9 MG/DL (ref 8.5–10.1)
CHLORIDE SERPL-SCNC: 109 MMOL/L (ref 94–109)
CO2 SERPL-SCNC: 25 MMOL/L (ref 20–32)
CREAT SERPL-MCNC: 0.77 MG/DL (ref 0.52–1.04)
DIFFERENTIAL METHOD BLD: ABNORMAL
EOSINOPHIL # BLD AUTO: 0.2 10E9/L (ref 0–0.7)
EOSINOPHIL NFR BLD AUTO: 4.5 %
ERYTHROCYTE [DISTWIDTH] IN BLOOD BY AUTOMATED COUNT: 19.6 % (ref 10–15)
GFR SERPL CREATININE-BSD FRML MDRD: 80 ML/MIN/{1.73_M2}
GLUCOSE SERPL-MCNC: 118 MG/DL (ref 70–99)
HCT VFR BLD AUTO: 32.3 % (ref 35–47)
HGB BLD-MCNC: 11.3 G/DL (ref 11.7–15.7)
LYMPHOCYTES # BLD AUTO: 1.3 10E9/L (ref 0.8–5.3)
LYMPHOCYTES NFR BLD AUTO: 27 %
MCH RBC QN AUTO: 37.5 PG (ref 26.5–33)
MCHC RBC AUTO-ENTMCNC: 35 G/DL (ref 31.5–36.5)
MCV RBC AUTO: 107 FL (ref 78–100)
MONOCYTES # BLD AUTO: 0.7 10E9/L (ref 0–1.3)
MONOCYTES NFR BLD AUTO: 14 %
NEUTROPHILS # BLD AUTO: 2.6 10E9/L (ref 1.6–8.3)
NEUTROPHILS NFR BLD AUTO: 54.1 %
PLATELET # BLD AUTO: 243 10E9/L (ref 150–450)
POTASSIUM SERPL-SCNC: 3.9 MMOL/L (ref 3.4–5.3)
PROT SERPL-MCNC: 6.5 G/DL (ref 6.8–8.8)
RBC # BLD AUTO: 3.01 10E12/L (ref 3.8–5.2)
SODIUM SERPL-SCNC: 140 MMOL/L (ref 133–144)
WBC # BLD AUTO: 4.9 10E9/L (ref 4–11)

## 2021-01-05 PROCEDURE — 80053 COMPREHEN METABOLIC PANEL: CPT | Performed by: INTERNAL MEDICINE

## 2021-01-05 PROCEDURE — 99214 OFFICE O/P EST MOD 30 MIN: CPT | Mod: 95 | Performed by: NURSE PRACTITIONER

## 2021-01-05 PROCEDURE — 36415 COLL VENOUS BLD VENIPUNCTURE: CPT | Performed by: INTERNAL MEDICINE

## 2021-01-05 PROCEDURE — 999N001193 HC VIDEO/TELEPHONE VISIT; NO CHARGE

## 2021-01-05 PROCEDURE — 85025 COMPLETE CBC W/AUTO DIFF WBC: CPT | Performed by: INTERNAL MEDICINE

## 2021-01-05 RX ORDER — LORAZEPAM 0.5 MG/1
0.5 TABLET ORAL EVERY 6 HOURS PRN
Qty: 30 TABLET | Refills: 1 | Status: SHIPPED | OUTPATIENT
Start: 2021-01-05 | End: 2021-02-04

## 2021-01-05 ASSESSMENT — PAIN SCALES - GENERAL: PAINLEVEL: EXTREME PAIN (8)

## 2021-01-05 NOTE — LETTER
"    1/5/2021         RE: Flor Griffin  [address] [facility] [id number]        Dear Colleague,    Thank you for referring your patient, Flor Griffin, to the [facility]. Please see a copy of my visit note below.      The patient has been notified of following:      \"This video visit will be conducted via a call between you and your physician/provider. We have found that certain health care needs can be provided without the need for an in-person physical exam.  This service lets us provide the care you need with a video conversation.  If a prescription is necessary we can send it directly to your pharmacy.  If lab work is needed we can place an order for that and you can then stop by our lab to have the test done at a later time.     Video visits are billed at different rates depending on your insurance coverage.  Please reach out to your insurance provider with any questions.     If during the course of the call the physician/provider feels a video visit is not appropriate, you will not be charged for this service.\"     Patient has given verbal consent for Video visit? Yes        Video-Visit Details  Type of service:  Video Visit     Video Start Time: 0330  Video End Time:0400    Originating Location (pt. Location): Home     Distant Location (provider location):  [facility]            Hematology-Oncology Follow-up Note  [facility]     Today's Date: 01/05/21        ASSESSMENT/ PLAN :  Flor Griffin is a 65 year old female      Metastatic breast cancer  Patient of Dr. Stover's  ER weakly positive DC negative HER-2 negative with metastatic recurrence  Patient is currently getting therapy with Xeloda  -Developed hand to foot syndrome  - decrease Xeloda to 1500 mg p.o. twice daily-2 weeks on/1 week off . This is her week  off  -Labs were reviewed with patient stable counts  -Patient has follow-up appointment with Dr. Stover in 2 " weeks    Hand-foot syndrome  From Xeloda  As above decrease Xeloda to 1500 p.o. twice daily  Today is her week off patient reports significant improvement in her symptoms  Use Aquaphor cream  I also advised patient to use ice chips and avoid heat  -Patient is advised to call our clinic if worsening/changes of symptoms with new Xeloda dose      Situational anxiety/agitation  Start Ativan 0.5 mg p.o. every 6 hours as needed  Side effects of Ativan discussed with patient      INTERIM HISTORY:  Reports she noted rash redness and pain in her arms and legs mostly in palms and soles.  Denies open sores or lesions.  Patient reports that this is making her agitated and anxious.  She would like to have antianxiety medication  This week is her week off Xeloda.  Patient reports  significant improvement in her symptoms.   Patient has been using Aquaphor cream which helps  Denies fever chills sweats         ROS: 14 point ROS neg other than the symptoms noted above in the HPI.       MEDICATIONS:  Reviewed         PHYSICAL EXAM:    Video visit      LABS:  Recent Labs   Lab Test 01/05/21  1053      POTASSIUM 3.9   CHLORIDE 109   CO2 25   ANIONGAP 6   BUN 19   CR 0.77   *   EDILSON 8.9     Recent Labs   Lab Test 01/05/21  1053 12/10/20  0952   WBC 4.9 5.2   HGB 11.3* 11.7    251   * 102*   NEUTROPHIL 54.1 67.6     Recent Labs   Lab Test 01/05/21  1053 12/10/20  0952   BILITOTAL 0.9 0.9   ALKPHOS 168* 128   ALT 56* 48   AST 38 33   ALBUMIN 3.7 3.8             Buster Menon, Nurse Practitioner   [facility]     Chart documentation with Dragon Voice recognition Software. Although reviewed after completion, some words and grammatical errors may remain.             Again, thank you for allowing me to participate in the care of your patient.        Sincerely,        HEDY Beth CNP

## 2021-01-05 NOTE — PROGRESS NOTES
"  The patient has been notified of following:      \"This video visit will be conducted via a call between you and your physician/provider. We have found that certain health care needs can be provided without the need for an in-person physical exam.  This service lets us provide the care you need with a video conversation.  If a prescription is necessary we can send it directly to your pharmacy.  If lab work is needed we can place an order for that and you can then stop by our lab to have the test done at a later time.     Video visits are billed at different rates depending on your insurance coverage.  Please reach out to your insurance provider with any questions.     If during the course of the call the physician/provider feels a video visit is not appropriate, you will not be charged for this service.\"     Patient has given verbal consent for Video visit? Yes        Video-Visit Details  Type of service:  Video Visit     Video Start Time: 0330  Video End Time:0400    Originating Location (pt. Location): Home     Distant Location (provider location):  [facility]            Hematology-Oncology Follow-up Note  [facility]     Today's Date: 01/05/21        ASSESSMENT/ PLAN :  Flor Griffin is a 65 year old female      Metastatic breast cancer  Patient of Dr. Stover's  ER weakly positive PA negative HER-2 negative with metastatic recurrence  Patient is currently getting therapy with Xeloda  -Developed hand to foot syndrome  - decrease Xeloda to 1500 mg p.o. twice daily-2 weeks on/1 week off . This is her week  off  -Labs were reviewed with patient stable counts  -Patient has follow-up appointment with Dr. Stover in 2 weeks    Hand-foot syndrome  From Xeloda  As above decrease Xeloda to 1500 p.o. twice daily  Today is her week off patient reports significant improvement in her symptoms  Use Aquaphor cream  I also advised patient to use ice chips and avoid heat  -Patient is " advised to call our clinic if worsening/changes of symptoms with new Xeloda dose      Situational anxiety/agitation  Start Ativan 0.5 mg p.o. every 6 hours as needed  Side effects of Ativan discussed with patient      INTERIM HISTORY:  Reports she noted rash redness and pain in her arms and legs mostly in palms and soles.  Denies open sores or lesions.  Patient reports that this is making her agitated and anxious.  She would like to have antianxiety medication  This week is her week off Xeloda.  Patient reports  significant improvement in her symptoms.   Patient has been using Aquaphor cream which helps  Denies fever chills sweats         ROS: 14 point ROS neg other than the symptoms noted above in the HPI.       MEDICATIONS:  Reviewed         PHYSICAL EXAM:    Video visit      LABS:  Recent Labs   Lab Test 01/05/21  1053      POTASSIUM 3.9   CHLORIDE 109   CO2 25   ANIONGAP 6   BUN 19   CR 0.77   *   EDILSON 8.9     Recent Labs   Lab Test 01/05/21  1053 12/10/20  0952   WBC 4.9 5.2   HGB 11.3* 11.7    251   * 102*   NEUTROPHIL 54.1 67.6     Recent Labs   Lab Test 01/05/21  1053 12/10/20  0952   BILITOTAL 0.9 0.9   ALKPHOS 168* 128   ALT 56* 48   AST 38 33   ALBUMIN 3.7 3.8             Buster Menon, Nurse Practitioner   [facility]     Chart documentation with Dragon Voice recognition Software. Although reviewed after completion, some words and grammatical errors may remain.

## 2021-01-05 NOTE — LETTER
"    1/5/2021         RE: Flor Griffin  [address] [address]        Dear Colleague,    Thank you for referring your patient, Flor Griffin, to the [facility]. Please see a copy of my visit note below.      The patient has been notified of following:      \"This video visit will be conducted via a call between you and your physician/provider. We have found that certain health care needs can be provided without the need for an in-person physical exam.  This service lets us provide the care you need with a video conversation.  If a prescription is necessary we can send it directly to your pharmacy.  If lab work is needed we can place an order for that and you can then stop by our lab to have the test done at a later time.     Video visits are billed at different rates depending on your insurance coverage.  Please reach out to your insurance provider with any questions.     If during the course of the call the physician/provider feels a video visit is not appropriate, you will not be charged for this service.\"     Patient has given verbal consent for Video visit? Yes        Video-Visit Details  Type of service:  Video Visit     Video Start Time: 0330  Video End Time:0400    Originating Location (pt. Location): Home     Distant Location (provider location):  [facility]            Hematology-Oncology Follow-up Note  [facility]     Today's Date: 01/05/21        ASSESSMENT/ PLAN :  Flor Griffin is a 65 year old female      Metastatic breast cancer  Patient of Dr. Stover's  ER weakly positive [place] negative HER-2 negative with metastatic recurrence  Patient is currently getting therapy with Xeloda  -Developed hand to foot syndrome  - decrease Xeloda to 1500 mg p.o. twice daily-2 weeks on/1 week off . This is her week  off  -Labs were reviewed with patient stable counts  -Patient has follow-up appointment with Dr. Stover in 2 " weeks    Hand-foot syndrome  From Xeloda  As above decrease Xeloda to 1500 p.o. twice daily  Today is her week off patient reports significant improvement in her symptoms  Use Aquaphor cream  I also advised patient to use ice chips and avoid heat  -Patient is advised to call our clinic if worsening/changes of symptoms with new Xeloda dose      Situational anxiety/agitation  Start Ativan 0.5 mg p.o. every 6 hours as needed  Side effects of Ativan discussed with patient      INTERIM HISTORY:  Reports she noted rash redness and pain in her arms and legs mostly in palms and soles.  Denies open sores or lesions.  Patient reports that this is making her agitated and anxious.  She would like to have antianxiety medication  This week is her week off Xeloda.  Patient reports  significant improvement in her symptoms.   Patient has been using Aquaphor cream which helps  Denies fever chills sweats         ROS: 14 point ROS neg other than the symptoms noted above in the HPI.       MEDICATIONS:  Reviewed         PHYSICAL EXAM:    Video visit      LABS:  Recent Labs   Lab Test 01/05/21  1053      POTASSIUM 3.9   CHLORIDE 109   CO2 25   ANIONGAP 6   BUN 19   CR 0.77   *   EDILSON 8.9     Recent Labs   Lab Test 01/05/21  1053 12/10/20  0952   WBC 4.9 5.2   HGB 11.3* 11.7    251   * 102*   NEUTROPHIL 54.1 67.6     Recent Labs   Lab Test 01/05/21  1053 12/10/20  0952   BILITOTAL 0.9 0.9   ALKPHOS 168* 128   ALT 56* 48   AST 38 33   ALBUMIN 3.7 3.8             Buster Menon, Nurse Practitioner   [facility]     Chart documentation with Dragon Voice recognition Software. Although reviewed after completion, some words and grammatical errors may remain.             Again, thank you for allowing me to participate in the care of your patient.        Sincerely,        HEDY Beth CNP

## 2021-01-05 NOTE — PROGRESS NOTES
Oral Chemotherapy Monitoring Program  Lab Follow Up    Reviewed lab results from 1/5/21. Patient previously having some hand and foot syndrome issues. This is starting to resolve. She is in her off week. Start of this next cycle has been delayed to 1/8 due to HFS. Per [place]a, she is in [place]. She would prefer the refill to be sent to [facility] and have a friend pick it up and bring it to her in [place] when she visits (vs [facility] delivering).    ORAL CHEMOTHERAPY 11/2/2020 11/9/2020 11/24/2020 12/11/2020 1/5/2021   Assessment Type New Teach Initial Follow up Lab Monitoring Lab Monitoring Lab Monitoring   Diagnosis Code Breast Cancer Breast Cancer Breast Cancer Breast Cancer Breast Cancer   Providers Nuno osullivan   Clinic Name/Location [facility]   Drug Name Xeloda (Capecitabine) Xeloda (Capecitabine) Xeloda (Capecitabine) Xeloda (Capecitabine) Xeloda (Capecitabine)   Dose 2,000 mg 2,000 mg 2,000 mg 2,000 mg 1,500 mg   Current Schedule BID BID BID BID BID   Cycle Details 2 weeks on, 1 week off 2 weeks on, 1 week off 2 weeks on, 1 week off 2 weeks on, 1 week off 2 weeks on, 1 week off   Start Date of Last Cycle - 11/2/2020 11/24/2020 11/24/2020 12/15/2020   Planned next cycle start date - 11/23/2020 12/15/2020 12/15/2020 1/8/2021   Doses missed in last 2 weeks - 0 0 - -   Adherence Assessment - Adherent - - -   Adverse Effects - No AE identified during assessment - - Palmar-plantar Erythrodysethesia Syndrome   Palmar-plantar Erythrodysethesia syndrome[hand-foot syndrome] - - - - Grade 2   Pharmacist Intervention(Palmar-plantar) - - - - Yes   Intervention(s) - - - - Dose decreased (chemo)   Any new drug interactions? No No - - -   Is the dose as ordered appropriate for the patient? Yes Yes - - -       Labs:  _  Result Component Current Result Ref Range   Sodium 140 (1/5/2021) 133 - 144 mmol/L     _  Result Component Current Result Ref Range   Potassium 3.9 (1/5/2021) 3.4 - 5.3  mmol/L     _  Result Component Current Result Ref Range   Calcium 8.9 (1/5/2021) 8.5 - 10.1 mg/dL     No results found for Mag within last 30 days.     No results found for Phos within last 30 days.     _  Result Component Current Result Ref Range   Albumin 3.7 (1/5/2021) 3.4 - 5.0 g/dL     _  Result Component Current Result Ref Range   Urea Nitrogen 19 (1/5/2021) 7 - 30 mg/dL     _  Result Component Current Result Ref Range   Creatinine 0.77 (1/5/2021) 0.52 - 1.04 mg/dL     _  Result Component Current Result Ref Range   AST 38 (1/5/2021) 0 - 45 U/L     _  Result Component Current Result Ref Range   ALT 56 (H) (1/5/2021) 0 - 50 U/L     _  Result Component Current Result Ref Range   Bilirubin Total 0.9 (1/5/2021) 0.2 - 1.3 mg/dL     _  Result Component Current Result Ref Range   WBC 4.9 (1/5/2021) 4.0 - 11.0 10e9/L     _  Result Component Current Result Ref Range   Hemoglobin 11.3 (L) (1/5/2021) 11.7 - 15.7 g/dL     _  Result Component Current Result Ref Range   Platelet Count 243 (1/5/2021) 150 - 450 10e9/L     _  Result Component Current Result Ref Range   Absolute Neutrophil 2.6 (1/5/2021) 1.6 - 8.3 10e9/L       Assessment & Plan:  No concerning abnormalities. OK to proceed with next cycle at reduced dose on 1500mg BID x 14 days off for 7 days on 1/8/21. Patient verbalized understanding of plan.     Questions answered to patient's satisfaction.    Follow-Up:  3 weeks with repeat labs.     Minoo Andre PharmD  January 5, 2021

## 2021-01-06 RX ORDER — CAPECITABINE 500 MG/1
850 TABLET, FILM COATED ORAL 2 TIMES DAILY
Qty: 84 TABLET | Refills: 0 | Status: SHIPPED | OUTPATIENT
Start: 2021-01-06 | End: 2021-02-11

## 2021-01-07 ENCOUNTER — TELEPHONE (OUTPATIENT)
Dept: PHARMACY | Facility: CLINIC | Age: 66
End: 2021-01-07

## 2021-01-07 NOTE — TELEPHONE ENCOUNTER
Prior Authorization Approval    Authorization Effective Date: 12/8/2020  Authorization Expiration Date: 1/7/2022  Medication: lorazepam 0.5mg tablet_APPROVED  Approved Dose/Quantity: 30 for 5 days  Reference #: (Key: [id number])   Insurance Company: [employer] - Phone [phone] Fax [phone]  Expected CoPay:       CoPay Card Available:      Foundation Assistance Needed:    Which Pharmacy is filling the prescription (Not needed for infusion/clinic administered): [id number] - [facility] [id number]  Pharmacy Notified: Yes  Patient Notified: No

## 2021-01-07 NOTE — TELEPHONE ENCOUNTER
PA Initiation    Medication: lorazepam 0.5mg tablet_PENDING  Insurance Company: [employer] - Phone [phone] Fax [phone]  Pharmacy Filling the Rx: [id number] - [facility] [address]  Filling Pharmacy Phone:    Filling Pharmacy Fax:    Start Date: 1/7/2021

## 2021-01-11 ENCOUNTER — TELEPHONE (OUTPATIENT)
Dept: ONCOLOGY | Facility: CLINIC | Age: 66
End: 2021-01-11

## 2021-01-11 ENCOUNTER — TELEPHONE (OUTPATIENT)
Dept: ONCOLOGY | Facility: CLINIC | Age: 66
End: 2021-01-11
Payer: COMMERCIAL

## 2021-01-11 DIAGNOSIS — Z53.9 ERRONEOUS ENCOUNTER--DISREGARD: Primary | ICD-10-CM

## 2021-01-11 NOTE — TELEPHONE ENCOUNTER
Called patient's daughter Emmie back regarding symptoms that Michelle is experiencing with her Xeloda with redness, itchiness, dry cracked skin. She is just restarting her Xeloda this week and is wondering if she could be prescribed a steroid cream to help with her skin symptoms that she experiences from the Xeloda. She is using Cerave, and writer also recommended utter cream to her hands/feet. Magda automatically refilled her Letrozole which writer stated has been discontinued with her recurrence. Writer will call Mcihelle back with Dr. Stover's recommendations.

## 2021-01-11 NOTE — TELEPHONE ENCOUNTER
Jose Maria Hagan,     Agree with the recommended udder cream.  I would also recommended Michelle add Aquaphor on top of the udder cream to seal in the moisturizer.  I would not recommend a steroid topical cream at this time.  Letrozole is discontinued.     Thank you,   Sally       Called Michelle/Emmie with Dr. Stover's recommendation and that Letrozole was discontinued. Danya Lara RN,BSN,OCN

## 2021-01-16 NOTE — PROGRESS NOTES
"[facility]    Hematology/Oncology Established Patient Follow-up Note      Today's Date: 1/20/21    Reason for Follow-up: Metastatic breast cancer.    lFor Griffin is a 65 year old female who is being evaluated via a billable video visit.      The patient has been notified of following:     \"This video visit will be conducted via a call between you and your physician/provider. We have found that certain health care needs can be provided without the need for an in-person physical exam.  This service lets us provide the care you need with a video conversation during the COVID-19 pandemic.  If a prescription is necessary we can send it directly to your pharmacy.  If lab work is needed we can place an order for that and you can then stop by our lab to have the test done at a later time.    Video visits are billed at different rates depending on your insurance coverage.  Please reach out to your insurance provider with any questions.    If during the course of the call the physician/provider feels a video visit is not appropriate, you will not be charged for this service.\"    Patient has given verbal consent for Video visit? Yes    How would you like to obtain your AVS? [facility]    Patient would like the video invitation sent by: Text to cell phone: [phone]         Video-Visit Details    Type of service:  Video Visit      Originating Location (pt. Location): Home    Distant Location (provider location):  [facility]     Mode of Communication:  Video Conference via Doximity    Video visit duration: 10 minutes      HISTORY OF PRESENT ILLNESS: Flor Griffin is a 65 year old female who presents with the following oncologic history:     --Clinical TX N3c M0 adenocarcinoma which is poorly differentiated, likely of breast origin, ER low-positive at 1-5%, HI negative, HER-2/mitzi FISH negative, androgen stain weak-intermediate 10-20%.  Initially she was seen 11/22/2017 after " she underwent right supraclavicular lymph node biopsy which was positive for poorly differentiated adenocarcinoma.  She had this lymph node almost a month and had an excisional biopsy performed which was consistent with the diagnosis of cancer.   --A PET/CT scan 11/27/2017 showed hypermetabolic right supraclavicular, right axillary and subpectoral adenopathy.  Otherwise, no distant metastatic disease.   --3/7/2018: Completed 4 cycles of carboplatin and paclitaxel followed by dose dense AC x 4 cycles.  No surgery was done.  --5/29/2018-7/31/2018: Completed adjuvant radiation to right breast, right axilla, right supraclavicular region.  --9/2018: Started adjuvant letrozole.  --10/15/2020: Screening mammogram showed focal asymmetry in upper outer right breast; no suspicious findings in left breast.  --10/21/2020: U/S of right breast showed irregularly-shaped hypoechoic mass at 10:00, 7 cm from nipple, measuring 3.2 x 2.7 x 3.6 cm. Right axillary ultrasound shows multiple normal-appearing lymph nodes. Biopsy of right breast mass at 10:00 showed poorly differentiated carcinoma, no lymphovascular invasion, morphology consistent with breast cancer and similar to prior axillary node tumor, ER weakly positive at 1% and DE negative (0%), HER-2/mitzi FISH negative.  --10/28/2020: PET/CT scan showed high metabolic activity in 3 cm area of right upper outer breast, several small hypermetabolic lymph nodes in high right axilla and right subclavian region; mild hypermetabolic lymph nodes in bilateral hilar regions, favor metastatic disease; several tiny nodules in right upper lung, favor benign etiology; small hypermetabolic focus in pelvis in either sigmoid colon or adjacent loop of small bowel.  --11/02/2020: Started 1st line metastatic therapy with capecitabine.    INTERIM HISTORY:  Michelle reports improvement in her hand food syndrome after a dose reduction in her capecitabine.  She denies any stomatitis or diarrhea.  She does  report improvement in her previously seen non-pruritic rash over the wrist and forearm areas.      REVIEW OF SYSTEMS:   14 point ROS was reviewed and is negative other than as noted above in HPI.       HOME MEDICATIONS:  Current Outpatient Medications   Medication Sig Dispense Refill     aspirin 81 MG tablet Take 81 mg by mouth daily       capecitabine (XELODA) 500 MG tablet Take 3 tablets (1,500 mg) by mouth 2 times daily for 14 days Days 1 through 14, then off for 7 days. 84 tablet 0     capecitabine (XELODA) 500 MG tablet Take 4 tablets (2,000 mg) by mouth 2 times daily for 14 days Days 1 through 14, then off for 7 days. 112 tablet 0     capecitabine (XELODA) 500 MG tablet Take 4 tablets (2,000 mg) by mouth 2 times daily for 14 days Days 1 through 14, then off for 7 days. (Patient not taking: Reported on 1/5/2021) 112 tablet 0     fluticasone (FLONASE) 50 MCG/ACT nasal spray Spray 1 spray into both nostrils daily 16 g 0     hydrocortisone (CORTAID) 1 % external ointment Apply topically 2 times daily       Ibuprofen (ADVIL PO) Take 400 mg by mouth every 6 hours as needed for moderate pain       LORazepam (ATIVAN) 0.5 MG tablet Take 1 tablet (0.5 mg) by mouth every 6 hours as needed for agitation or anxiety 30 tablet 1     multivitamin w/minerals (THERA-VIT-M) tablet Take 1 tablet by mouth daily       Omega-3 Fatty Acids (OMEGA-3 FISH OIL PO) Take 1 g by mouth daily       prochlorperazine (COMPAZINE) 10 MG tablet Take 0.5-1 tablets (5-10 mg) by mouth every 6 hours as needed for nausea or vomiting 30 tablet 1     rosuvastatin (CRESTOR) 20 MG tablet TAKE 1 TABLET(20 MG) BY MOUTH DAILY (Patient not taking: Reported on 1/5/2021) 90 tablet 0         ALLERGIES:  Allergies   Allergen Reactions     Penicillins Hives         PAST MEDICAL HISTORY:  Past Medical History:   Diagnosis Date     Basal cell cancer     right cheek     Gastroesophageal reflux disease      History of blood transfusion     blue baby      Hyperlipidaemia          PAST SURGICAL HISTORY:  Past Surgical History:   Procedure Laterality Date     BIOPSY MASS NECK Right 11/16/2017    Procedure: BIOPSY MASS NECK;  Excisional Biopsy Right Neck Lymph node;  Surgeon: Waylon Corral MD;  Location: [facility] OR     COLONOSCOPY       IR PORT REMOVAL LEFT  5/13/2019     MOHS MICROGRAPHIC PROCEDURE  ~2010    right cheek; BCC     MOHS MICROGRAPHIC PROCEDURE  10/31/2016    [facility]         SOCIAL HISTORY:  Social History     Socioeconomic History     Marital status:      Spouse name: Not on file     Number of children: Not on file     Years of education: Not on file     Highest education level: Not on file   Occupational History     Not on file   Social Needs     Financial resource strain: Not on file     Food insecurity     Worry: Not on file     Inability: Not on file     Transportation needs     Medical: Not on file     Non-medical: Not on file   Tobacco Use     Smoking status: Never Smoker     Smokeless tobacco: Never Used   Substance and Sexual Activity     Alcohol use: Yes     Alcohol/week: 0.0 standard drinks     Comment: 4 times a week, 2 drinks     Drug use: No     Sexual activity: Yes     Partners: Male     Birth control/protection: Post-menopausal   Lifestyle     Physical activity     Days per week: Not on file     Minutes per session: Not on file     Stress: Not on file   Relationships     Social connections     Talks on phone: Not on file     Gets together: Not on file     Attends Mandaen service: Not on file     Active member of club or organization: Not on file     Attends meetings of clubs or organizations: Not on file     Relationship status: Not on file     Intimate partner violence     Fear of current or ex partner: Not on file     Emotionally abused: Not on file     Physically abused: Not on file     Forced sexual activity: Not on file   Other Topics Concern     Parent/sibling w/ CABG, MI or angioplasty before 65F 55M? No   Social  History Narrative     Not on file         FAMILY HISTORY:  Family History   Problem Relation Age of Onset     Lupus Mother      Heart Disease Mother         CHF     Coronary Artery Disease Mother      Hyperlipidemia Mother      Diabetes Father      Breast Cancer Other          PHYSICAL EXAM:  Vital signs:  Not taken.  ECO  GENERAL: No acute distress.  EYES: No scleral icterus. No overt erythema.  RESPIRATORY: No cough.  No labored breathing.  MUSCULOSKELETAL: Range of motion in the neck, shoulders, and arms appear normal.  SKIN: No overt rashes, discolorations, or lesions over the face, neck or arms.  NEUROLOGIC: Alert.  No overt tremors.  PSYCHIATRIC: Normal affect and mood.  Does not appear anxious.    The rest of a comprehensive physical examination is deferred due to PHE (public health emergency) video visit restrictions.      LABS:  CBC RESULTS:   Recent Labs   Lab Test 21  0940   WBC 4.2   RBC 2.99*   HGB 11.3*   HCT 32.8*   *   MCH 37.8*   MCHC 34.5   RDW 18.5*        Last Comprehensive Metabolic Panel:  Sodium   Date Value Ref Range Status   2021 138 133 - 144 mmol/L Final     Potassium   Date Value Ref Range Status   2021 3.8 3.4 - 5.3 mmol/L Final     Chloride   Date Value Ref Range Status   2021 106 94 - 109 mmol/L Final     Carbon Dioxide   Date Value Ref Range Status   2021 28 20 - 32 mmol/L Final     Anion Gap   Date Value Ref Range Status   2021 4 3 - 14 mmol/L Final     Glucose   Date Value Ref Range Status   2021 108 (H) 70 - 99 mg/dL Final     Urea Nitrogen   Date Value Ref Range Status   2021 14 7 - 30 mg/dL Final     Creatinine   Date Value Ref Range Status   2021 0.72 0.52 - 1.04 mg/dL Final     GFR Estimate   Date Value Ref Range Status   2021 88 >60 mL/min/[1.73_m2] Final     Comment:     Non  GFR Calc  Starting 2018, serum creatinine based estimated GFR (eGFR) will be   calculated using the  Chronic Kidney Disease Epidemiology Collaboration   (CKD-EPI) equation.       Calcium   Date Value Ref Range Status   01/19/2021 8.7 8.5 - 10.1 mg/dL Final     Bilirubin Total   Date Value Ref Range Status   01/19/2021 0.9 0.2 - 1.3 mg/dL Final     Alkaline Phosphatase   Date Value Ref Range Status   01/19/2021 128 40 - 150 U/L Final     ALT   Date Value Ref Range Status   01/19/2021 69 (H) 0 - 50 U/L Final     AST   Date Value Ref Range Status   01/19/2021 41 0 - 45 U/L Final       PATHOLOGY:  Reviewed as per HPI.    IMAGING:   10/28/2020: PET scan at [facility]:  1. High metabolic activity in 3 cm mass in right upper outer breast.  2. Several small hypermetabolic lymph nodes in high right axillary and right subclavian suspicious for metastatic lymphadenopathy  3. Small mildly hypermetabolic lymph nodes in both hilar regions.  4. Several very tiny nodules in right upper lobe unchanged and with low metabolic activity.  5. Small hypermetabolic focus in pelvis difficult to localize to either sigmoid colon or loop of small bowel.    12/07/2020: Chest CT scan showed few tiny pleural nodules on right unchanged; 2.9 cm breast mass, diffuse fatty infiltration of liver.    ASSESSMENT/PLAN:  Flor Griffin is a 65 year old female with the following issues:  1.  Right breast poorly differentiated adenocarcinoma, ER weakly positive (1%), MO negative, HER-2/mitzi negative with metastatic recurrence  2. Bilateral hilar lymphadenopathy  3. Indeterminate pulmonary nodules, likely benign  Lab draw every 3 weeks.  -Michelle is currently on 1st line metastatic therapy with capecitabine and tolerating this well with tolerable side effects of mild hand-foot syndrome so far with dose-reduction.   -I reviewed with Michelle that her Hgb, platelets, WBC counts, liver enzymes, and kidney function are adequate to proceed with her next cycle of capecitabine.  -Reiterated that we will consider surgical and radiation oncology consults if she achieves a good  response to capecitabine and if the bilateral hilar lymphadenopathy resolves.  -Plan for repeat PET scan in 2/2021 prior to next visit.    4. Rash  -Not pruritic, much improved. Continue to use topical emollients.  Can use topical hydrocortisone.    Return in 3 weeks with PET scan and labs.    Sally Stover MD  Hematology/Oncology  [facility]

## 2021-01-19 DIAGNOSIS — C50.411 MALIGNANT NEOPLASM OF UPPER-OUTER QUADRANT OF RIGHT BREAST IN FEMALE, ESTROGEN RECEPTOR POSITIVE (H): ICD-10-CM

## 2021-01-19 DIAGNOSIS — Z17.0 MALIGNANT NEOPLASM OF UPPER-OUTER QUADRANT OF RIGHT BREAST IN FEMALE, ESTROGEN RECEPTOR POSITIVE (H): ICD-10-CM

## 2021-01-19 LAB
ALBUMIN SERPL-MCNC: 3.7 G/DL (ref 3.4–5)
ALP SERPL-CCNC: 128 U/L (ref 40–150)
ALT SERPL W P-5'-P-CCNC: 69 U/L (ref 0–50)
ANION GAP SERPL CALCULATED.3IONS-SCNC: 4 MMOL/L (ref 3–14)
AST SERPL W P-5'-P-CCNC: 41 U/L (ref 0–45)
BASOPHILS # BLD AUTO: 0 10E9/L (ref 0–0.2)
BASOPHILS NFR BLD AUTO: 0.7 %
BILIRUB SERPL-MCNC: 0.9 MG/DL (ref 0.2–1.3)
BUN SERPL-MCNC: 14 MG/DL (ref 7–30)
CALCIUM SERPL-MCNC: 8.7 MG/DL (ref 8.5–10.1)
CHLORIDE SERPL-SCNC: 106 MMOL/L (ref 94–109)
CO2 SERPL-SCNC: 28 MMOL/L (ref 20–32)
CREAT SERPL-MCNC: 0.72 MG/DL (ref 0.52–1.04)
DIFFERENTIAL METHOD BLD: ABNORMAL
EOSINOPHIL # BLD AUTO: 0.1 10E9/L (ref 0–0.7)
EOSINOPHIL NFR BLD AUTO: 3.1 %
ERYTHROCYTE [DISTWIDTH] IN BLOOD BY AUTOMATED COUNT: 18.5 % (ref 10–15)
GFR SERPL CREATININE-BSD FRML MDRD: 88 ML/MIN/{1.73_M2}
GLUCOSE SERPL-MCNC: 108 MG/DL (ref 70–99)
HCT VFR BLD AUTO: 32.8 % (ref 35–47)
HGB BLD-MCNC: 11.3 G/DL (ref 11.7–15.7)
LYMPHOCYTES # BLD AUTO: 1 10E9/L (ref 0.8–5.3)
LYMPHOCYTES NFR BLD AUTO: 24.5 %
MCH RBC QN AUTO: 37.8 PG (ref 26.5–33)
MCHC RBC AUTO-ENTMCNC: 34.5 G/DL (ref 31.5–36.5)
MCV RBC AUTO: 110 FL (ref 78–100)
MONOCYTES # BLD AUTO: 0.4 10E9/L (ref 0–1.3)
MONOCYTES NFR BLD AUTO: 10.6 %
NEUTROPHILS # BLD AUTO: 2.6 10E9/L (ref 1.6–8.3)
NEUTROPHILS NFR BLD AUTO: 61.1 %
PLATELET # BLD AUTO: 208 10E9/L (ref 150–450)
POTASSIUM SERPL-SCNC: 3.8 MMOL/L (ref 3.4–5.3)
PROT SERPL-MCNC: 6.3 G/DL (ref 6.8–8.8)
RBC # BLD AUTO: 2.99 10E12/L (ref 3.8–5.2)
SODIUM SERPL-SCNC: 138 MMOL/L (ref 133–144)
WBC # BLD AUTO: 4.2 10E9/L (ref 4–11)

## 2021-01-19 PROCEDURE — 80053 COMPREHEN METABOLIC PANEL: CPT | Performed by: INTERNAL MEDICINE

## 2021-01-19 PROCEDURE — 36415 COLL VENOUS BLD VENIPUNCTURE: CPT | Performed by: INTERNAL MEDICINE

## 2021-01-19 PROCEDURE — 85025 COMPLETE CBC W/AUTO DIFF WBC: CPT | Performed by: INTERNAL MEDICINE

## 2021-01-20 ENCOUNTER — VIRTUAL VISIT (OUTPATIENT)
Dept: ONCOLOGY | Facility: CLINIC | Age: 66
End: 2021-01-20
Attending: INTERNAL MEDICINE
Payer: COMMERCIAL

## 2021-01-20 DIAGNOSIS — R21 RASH: ICD-10-CM

## 2021-01-20 DIAGNOSIS — C50.411 MALIGNANT NEOPLASM OF UPPER-OUTER QUADRANT OF RIGHT BREAST IN FEMALE, ESTROGEN RECEPTOR POSITIVE (H): Primary | ICD-10-CM

## 2021-01-20 DIAGNOSIS — R59.1 LYMPHADENOPATHY: ICD-10-CM

## 2021-01-20 DIAGNOSIS — Z17.0 MALIGNANT NEOPLASM OF UPPER-OUTER QUADRANT OF RIGHT BREAST IN FEMALE, ESTROGEN RECEPTOR POSITIVE (H): Primary | ICD-10-CM

## 2021-01-20 PROCEDURE — 999N001193 HC VIDEO/TELEPHONE VISIT; NO CHARGE

## 2021-01-20 PROCEDURE — 99214 OFFICE O/P EST MOD 30 MIN: CPT | Mod: 95 | Performed by: INTERNAL MEDICINE

## 2021-01-20 ASSESSMENT — PAIN SCALES - GENERAL: PAINLEVEL: NO PAIN (0)

## 2021-01-20 NOTE — LETTER
"    1/20/2021         RE: Flor Griffin  [address] [facility] [id number]        Dear Colleague,    Thank you for referring your patient, Flor Griffin, to the [facility]. Please see a copy of my visit note below.    [facility]    Hematology/Oncology Established Patient Follow-up Note      Today's Date: 1/20/21    Reason for Follow-up: Metastatic breast cancer.    Flor Griffin is a 65 year old female who is being evaluated via a billable video visit.      The patient has been notified of following:     \"This video visit will be conducted via a call between you and your physician/provider. We have found that certain health care needs can be provided without the need for an in-person physical exam.  This service lets us provide the care you need with a video conversation during the COVID-19 pandemic.  If a prescription is necessary we can send it directly to your pharmacy.  If lab work is needed we can place an order for that and you can then stop by our lab to have the test done at a later time.    Video visits are billed at different rates depending on your insurance coverage.  Please reach out to your insurance provider with any questions.    If during the course of the call the physician/provider feels a video visit is not appropriate, you will not be charged for this service.\"    Patient has given verbal consent for Video visit? Yes    How would you like to obtain your AVS? Mareharrajinder    Patient would like the video invitation sent by: Text to cell phone: [phone]         Video-Visit Details    Type of service:  Video Visit      Originating Location (pt. Location): Home    Distant Location (provider location):  [facility]     Mode of Communication:  Video Conference via Doximity    Video visit duration: 10 minutes      HISTORY OF PRESENT ILLNESS: Flor Griffin is a 65 year old female who presents with the " following oncologic history:     --Clinical TX N3c M0 adenocarcinoma which is poorly differentiated, likely of breast origin, ER low-positive at 1-5%, KS negative, HER-2/mitzi FISH negative, androgen stain weak-intermediate 10-20%.  Initially she was seen 11/22/2017 after she underwent right supraclavicular lymph node biopsy which was positive for poorly differentiated adenocarcinoma.  She had this lymph node almost a month and had an excisional biopsy performed which was consistent with the diagnosis of cancer.   --A PET/CT scan 11/27/2017 showed hypermetabolic right supraclavicular, right axillary and subpectoral adenopathy.  Otherwise, no distant metastatic disease.   --3/7/2018: Completed 4 cycles of carboplatin and paclitaxel followed by dose dense AC x 4 cycles.  No surgery was done.  --5/29/2018-7/31/2018: Completed adjuvant radiation to right breast, right axilla, right supraclavicular region.  --9/2018: Started adjuvant letrozole.  --10/15/2020: Screening mammogram showed focal asymmetry in upper outer right breast; no suspicious findings in left breast.  --10/21/2020: U/S of right breast showed irregularly-shaped hypoechoic mass at 10:00, 7 cm from nipple, measuring 3.2 x 2.7 x 3.6 cm. Right axillary ultrasound shows multiple normal-appearing lymph nodes. Biopsy of right breast mass at 10:00 showed poorly differentiated carcinoma, no lymphovascular invasion, morphology consistent with breast cancer and similar to prior axillary node tumor, ER weakly positive at 1% and KS negative (0%), HER-2/mitzi FISH negative.  --10/28/2020: PET/CT scan showed high metabolic activity in 3 cm area of right upper outer breast, several small hypermetabolic lymph nodes in high right axilla and right subclavian region; mild hypermetabolic lymph nodes in bilateral hilar regions, favor metastatic disease; several tiny nodules in right upper lung, favor benign etiology; small hypermetabolic focus in pelvis in either sigmoid colon or  adjacent loop of small bowel.  --11/02/2020: Started 1st line metastatic therapy with capecitabine.    INTERIM HISTORY:  Michelle reports improvement in her hand food syndrome after a dose reduction in her capecitabine.  She denies any stomatitis or diarrhea.  She does report improvement in her previously seen non-pruritic rash over the wrist and forearm areas.      REVIEW OF SYSTEMS:   14 point ROS was reviewed and is negative other than as noted above in HPI.       HOME MEDICATIONS:  Current Outpatient Medications   Medication Sig Dispense Refill     aspirin 81 MG tablet Take 81 mg by mouth daily       capecitabine (XELODA) 500 MG tablet Take 3 tablets (1,500 mg) by mouth 2 times daily for 14 days Days 1 through 14, then off for 7 days. 84 tablet 0     capecitabine (XELODA) 500 MG tablet Take 4 tablets (2,000 mg) by mouth 2 times daily for 14 days Days 1 through 14, then off for 7 days. 112 tablet 0     capecitabine (XELODA) 500 MG tablet Take 4 tablets (2,000 mg) by mouth 2 times daily for 14 days Days 1 through 14, then off for 7 days. (Patient not taking: Reported on 1/5/2021) 112 tablet 0     fluticasone (FLONASE) 50 MCG/ACT nasal spray Spray 1 spray into both nostrils daily 16 g 0     hydrocortisone (CORTAID) 1 % external ointment Apply topically 2 times daily       Ibuprofen (ADVIL PO) Take 400 mg by mouth every 6 hours as needed for moderate pain       LORazepam (ATIVAN) 0.5 MG tablet Take 1 tablet (0.5 mg) by mouth every 6 hours as needed for agitation or anxiety 30 tablet 1     multivitamin w/minerals (THERA-VIT-M) tablet Take 1 tablet by mouth daily       Omega-3 Fatty Acids (OMEGA-3 FISH OIL PO) Take 1 g by mouth daily       prochlorperazine (COMPAZINE) 10 MG tablet Take 0.5-1 tablets (5-10 mg) by mouth every 6 hours as needed for nausea or vomiting 30 tablet 1     rosuvastatin (CRESTOR) 20 MG tablet TAKE 1 TABLET(20 MG) BY MOUTH DAILY (Patient not taking: Reported on 1/5/2021) 90 tablet 0          ALLERGIES:  Allergies   Allergen Reactions     Penicillins Hives         PAST MEDICAL HISTORY:  Past Medical History:   Diagnosis Date     Basal cell cancer     right cheek     Gastroesophageal reflux disease      History of blood transfusion     blue baby     Hyperlipidaemia          PAST SURGICAL HISTORY:  Past Surgical History:   Procedure Laterality Date     BIOPSY MASS NECK Right 11/16/2017    Procedure: BIOPSY MASS NECK;  Excisional Biopsy Right Neck Lymph node;  Surgeon: Waylon Corral MD;  Location: [facility] OR     COLONOSCOPY       IR PORT REMOVAL LEFT  5/13/2019     MOHS MICROGRAPHIC PROCEDURE  ~2010    right cheek; BCC     MOHS MICROGRAPHIC PROCEDURE  10/31/2016    Dr. Nicholas [facility]         SOCIAL HISTORY:  Social History     Socioeconomic History     Marital status:      Spouse name: Not on file     Number of children: Not on file     Years of education: Not on file     Highest education level: Not on file   Occupational History     Not on file   Social Needs     Financial resource strain: Not on file     Food insecurity     Worry: Not on file     Inability: Not on file     Transportation needs     Medical: Not on file     Non-medical: Not on file   Tobacco Use     Smoking status: Never Smoker     Smokeless tobacco: Never Used   Substance and Sexual Activity     Alcohol use: Yes     Alcohol/week: 0.0 standard drinks     Comment: 4 times a week, 2 drinks     Drug use: No     Sexual activity: Yes     Partners: Male     Birth control/protection: Post-menopausal   Lifestyle     Physical activity     Days per week: Not on file     Minutes per session: Not on file     Stress: Not on file   Relationships     Social connections     Talks on phone: Not on file     Gets together: Not on file     Attends Zoroastrian service: Not on file     Active member of club or organization: Not on file     Attends meetings of clubs or organizations: Not on file     Relationship status: Not on file     Intimate partner  violence     Fear of current or ex partner: Not on file     Emotionally abused: Not on file     Physically abused: Not on file     Forced sexual activity: Not on file   Other Topics Concern     Parent/sibling w/ CABG, MI or angioplasty before 65F 55M? No   Social History Narrative     Not on file         FAMILY HISTORY:  Family History   Problem Relation Age of Onset     Lupus Mother      Heart Disease Mother         CHF     Coronary Artery Disease Mother      Hyperlipidemia Mother      Diabetes Father      Breast Cancer Other          PHYSICAL EXAM:  Vital signs:  Not taken.  ECO  GENERAL: No acute distress.  EYES: No scleral icterus. No overt erythema.  RESPIRATORY: No cough.  No labored breathing.  MUSCULOSKELETAL: Range of motion in the neck, shoulders, and arms appear normal.  SKIN: No overt rashes, discolorations, or lesions over the face, neck or arms.  NEUROLOGIC: Alert.  No overt tremors.  PSYCHIATRIC: Normal affect and mood.  Does not appear anxious.    The rest of a comprehensive physical examination is deferred due to PHE (public health emergency) video visit restrictions.      LABS:  CBC RESULTS:   Recent Labs   Lab Test 21  0940   WBC 4.2   RBC 2.99*   HGB 11.3*   HCT 32.8*   *   MCH 37.8*   MCHC 34.5   RDW 18.5*        Last Comprehensive Metabolic Panel:  Sodium   Date Value Ref Range Status   2021 138 133 - 144 mmol/L Final     Potassium   Date Value Ref Range Status   2021 3.8 3.4 - 5.3 mmol/L Final     Chloride   Date Value Ref Range Status   2021 106 94 - 109 mmol/L Final     Carbon Dioxide   Date Value Ref Range Status   2021 28 20 - 32 mmol/L Final     Anion Gap   Date Value Ref Range Status   2021 4 3 - 14 mmol/L Final     Glucose   Date Value Ref Range Status   2021 108 (H) 70 - 99 mg/dL Final     Urea Nitrogen   Date Value Ref Range Status   2021 14 7 - 30 mg/dL Final     Creatinine   Date Value Ref Range Status   2021  0.72 0.52 - 1.04 mg/dL Final     GFR Estimate   Date Value Ref Range Status   01/19/2021 88 >60 mL/min/[1.73_m2] Final     Comment:     Non  GFR Calc  Starting 12/18/2018, serum creatinine based estimated GFR (eGFR) will be   calculated using the Chronic Kidney Disease Epidemiology Collaboration   (CKD-EPI) equation.       Calcium   Date Value Ref Range Status   01/19/2021 8.7 8.5 - 10.1 mg/dL Final     Bilirubin Total   Date Value Ref Range Status   01/19/2021 0.9 0.2 - 1.3 mg/dL Final     Alkaline Phosphatase   Date Value Ref Range Status   01/19/2021 128 40 - 150 U/L Final     ALT   Date Value Ref Range Status   01/19/2021 69 (H) 0 - 50 U/L Final     AST   Date Value Ref Range Status   01/19/2021 41 0 - 45 U/L Final       PATHOLOGY:  Reviewed as per HPI.    IMAGING:   10/28/2020: PET scan at [facility]:  1. High metabolic activity in 3 cm mass in right upper outer breast.  2. Several small hypermetabolic lymph nodes in high right axillary and right subclavian suspicious for metastatic lymphadenopathy  3. Small mildly hypermetabolic lymph nodes in both hilar regions.  4. Several very tiny nodules in right upper lobe unchanged and with low metabolic activity.  5. Small hypermetabolic focus in pelvis difficult to localize to either sigmoid colon or loop of small bowel.    12/07/2020: Chest CT scan showed few tiny pleural nodules on right unchanged; 2.9 cm breast mass, diffuse fatty infiltration of liver.    ASSESSMENT/PLAN:  Flor Griffin is a 65 year old female with the following issues:  1.  Right breast poorly differentiated adenocarcinoma, ER weakly positive (1%), ND negative, HER-2/mitzi negative with metastatic recurrence  2. Bilateral hilar lymphadenopathy  3. Indeterminate pulmonary nodules, likely benign  Lab draw every 3 weeks.  -Michelle is currently on 1st line metastatic therapy with capecitabine and tolerating this well with tolerable side effects of mild hand-foot syndrome so far with  dose-reduction.   -I reviewed with Michelle that her Hgb, platelets, WBC counts, liver enzymes, and kidney function are adequate to proceed with her next cycle of capecitabine.  -Reiterated that we will consider surgical and radiation oncology consults if she achieves a good response to capecitabine and if the bilateral hilar lymphadenopathy resolves.  -Plan for repeat PET scan in 2/2021 prior to next visit.    4. Rash  -Not pruritic, much improved. Continue to use topical emollients.  Can use topical hydrocortisone.    Return in 3 weeks with PET scan and labs.    Sally Stover MD  Hematology/Oncology  [facility]    Michelle is a 65 year old who is being evaluated via a billable video visit.      How would you like to obtain your AVS? Mail a copy  If the video visit is dropped, the invitation should be resent by: Text to cell phone: [phone]   Will anyone else be joining your video visit? No        Video-Visit Details    Type of service:  Video Visit    Originating Location (pt. Location): Home    Distant Location (provider location):  [facility]     Platform used for Video Visit: Tristan        Again, thank you for allowing me to participate in the care of your patient.        Sincerely,        Sally Stover MD

## 2021-01-20 NOTE — PATIENT INSTRUCTIONS
Please mail AVS. Shari Schoenberger, CMA    [facility] MD in 3 weeks with PET/CT prior to visit.  Lab draw every 3 weeks.

## 2021-01-20 NOTE — LETTER
"    1/20/2021         RE: Flor Griffin  [address] [facility] [id number]        Dear Colleague,    Thank you for referring your patient, Flor Griffin, to the [facility]. Please see a copy of my visit note below.    [facility]    Hematology/Oncology Established Patient Follow-up Note      Today's Date: 1/20/21    Reason for Follow-up: Metastatic breast cancer.    Flor Griffin is a 65 year old female who is being evaluated via a billable video visit.      The patient has been notified of following:     \"This video visit will be conducted via a call between you and your physician/provider. We have found that certain health care needs can be provided without the need for an in-person physical exam.  This service lets us provide the care you need with a video conversation during the COVID-19 pandemic.  If a prescription is necessary we can send it directly to your pharmacy.  If lab work is needed we can place an order for that and you can then stop by our lab to have the test done at a later time.    Video visits are billed at different rates depending on your insurance coverage.  Please reach out to your insurance provider with any questions.    If during the course of the call the physician/provider feels a video visit is not appropriate, you will not be charged for this service.\"    Patient has given verbal consent for Video visit? Yes    How would you like to obtain your AVS? Mareharrajinder    Patient would like the video invitation sent by: Text to cell phone: [phone]         Video-Visit Details    Type of service:  Video Visit      Originating Location (pt. Location): Home    Distant Location (provider location):  [facility]     Mode of Communication:  Video Conference via Doximity    Video visit duration: 10 minutes      HISTORY OF PRESENT ILLNESS: Flor Griffin is a 65 year old female who presents with the " following oncologic history:     --Clinical TX N3c M0 adenocarcinoma which is poorly differentiated, likely of breast origin, ER low-positive at 1-5%, AZ negative, HER-2/mitzi FISH negative, androgen stain weak-intermediate 10-20%.  Initially she was seen 11/22/2017 after she underwent right supraclavicular lymph node biopsy which was positive for poorly differentiated adenocarcinoma.  She had this lymph node almost a month and had an excisional biopsy performed which was consistent with the diagnosis of cancer.   --A PET/CT scan 11/27/2017 showed hypermetabolic right supraclavicular, right axillary and subpectoral adenopathy.  Otherwise, no distant metastatic disease.   --3/7/2018: Completed 4 cycles of carboplatin and paclitaxel followed by dose dense AC x 4 cycles.  No surgery was done.  --5/29/2018-7/31/2018: Completed adjuvant radiation to right breast, right axilla, right supraclavicular region.  --9/2018: Started adjuvant letrozole.  --10/15/2020: Screening mammogram showed focal asymmetry in upper outer right breast; no suspicious findings in left breast.  --10/21/2020: U/S of right breast showed irregularly-shaped hypoechoic mass at 10:00, 7 cm from nipple, measuring 3.2 x 2.7 x 3.6 cm. Right axillary ultrasound shows multiple normal-appearing lymph nodes. Biopsy of right breast mass at 10:00 showed poorly differentiated carcinoma, no lymphovascular invasion, morphology consistent with breast cancer and similar to prior axillary node tumor, ER weakly positive at 1% and AZ negative (0%), HER-2/mitzi FISH negative.  --10/28/2020: PET/CT scan showed high metabolic activity in 3 cm area of right upper outer breast, several small hypermetabolic lymph nodes in high right axilla and right subclavian region; mild hypermetabolic lymph nodes in bilateral hilar regions, favor metastatic disease; several tiny nodules in right upper lung, favor benign etiology; small hypermetabolic focus in pelvis in either sigmoid colon or  adjacent loop of small bowel.  --11/02/2020: Started 1st line metastatic therapy with capecitabine.    INTERIM HISTORY:  Michelle reports improvement in her hand food syndrome after a dose reduction in her capecitabine.  She denies any stomatitis or diarrhea.  She does report improvement in her previously seen non-pruritic rash over the wrist and forearm areas.      REVIEW OF SYSTEMS:   14 point ROS was reviewed and is negative other than as noted above in HPI.       HOME MEDICATIONS:  Current Outpatient Medications   Medication Sig Dispense Refill     aspirin 81 MG tablet Take 81 mg by mouth daily       capecitabine (XELODA) 500 MG tablet Take 3 tablets (1,500 mg) by mouth 2 times daily for 14 days Days 1 through 14, then off for 7 days. 84 tablet 0     capecitabine (XELODA) 500 MG tablet Take 4 tablets (2,000 mg) by mouth 2 times daily for 14 days Days 1 through 14, then off for 7 days. 112 tablet 0     capecitabine (XELODA) 500 MG tablet Take 4 tablets (2,000 mg) by mouth 2 times daily for 14 days Days 1 through 14, then off for 7 days. (Patient not taking: Reported on 1/5/2021) 112 tablet 0     fluticasone (FLONASE) 50 MCG/ACT nasal spray Spray 1 spray into both nostrils daily 16 g 0     hydrocortisone (CORTAID) 1 % external ointment Apply topically 2 times daily       Ibuprofen (ADVIL PO) Take 400 mg by mouth every 6 hours as needed for moderate pain       LORazepam (ATIVAN) 0.5 MG tablet Take 1 tablet (0.5 mg) by mouth every 6 hours as needed for agitation or anxiety 30 tablet 1     multivitamin w/minerals (THERA-VIT-M) tablet Take 1 tablet by mouth daily       Omega-3 Fatty Acids (OMEGA-3 FISH OIL PO) Take 1 g by mouth daily       prochlorperazine (COMPAZINE) 10 MG tablet Take 0.5-1 tablets (5-10 mg) by mouth every 6 hours as needed for nausea or vomiting 30 tablet 1     rosuvastatin (CRESTOR) 20 MG tablet TAKE 1 TABLET(20 MG) BY MOUTH DAILY (Patient not taking: Reported on 1/5/2021) 90 tablet 0          ALLERGIES:  Allergies   Allergen Reactions     Penicillins Hives         PAST MEDICAL HISTORY:  Past Medical History:   Diagnosis Date     Basal cell cancer     right cheek     Gastroesophageal reflux disease      History of blood transfusion     blue baby     Hyperlipidaemia          PAST SURGICAL HISTORY:  Past Surgical History:   Procedure Laterality Date     BIOPSY MASS NECK Right 11/16/2017    Procedure: BIOPSY MASS NECK;  Excisional Biopsy Right Neck Lymph node;  Surgeon: Waylon Corral MD;  Location: [facility] OR     COLONOSCOPY       IR PORT REMOVAL LEFT  5/13/2019     MOHS MICROGRAPHIC PROCEDURE  ~2010    right cheek; BCC     MOHS MICROGRAPHIC PROCEDURE  10/31/2016    Dr. [facility]         SOCIAL HISTORY:  Social History     Socioeconomic History     Marital status:      Spouse name: Not on file     Number of children: Not on file     Years of education: Not on file     Highest education level: Not on file   Occupational History     Not on file   Social Needs     Financial resource strain: Not on file     Food insecurity     Worry: Not on file     Inability: Not on file     Transportation needs     Medical: Not on file     Non-medical: Not on file   Tobacco Use     Smoking status: Never Smoker     Smokeless tobacco: Never Used   Substance and Sexual Activity     Alcohol use: Yes     Alcohol/week: 0.0 standard drinks     Comment: 4 times a week, 2 drinks     Drug use: No     Sexual activity: Yes     Partners: Male     Birth control/protection: Post-menopausal   Lifestyle     Physical activity     Days per week: Not on file     Minutes per session: Not on file     Stress: Not on file   Relationships     Social connections     Talks on phone: Not on file     Gets together: Not on file     Attends Hinduism service: Not on file     Active member of club or organization: Not on file     Attends meetings of clubs or organizations: Not on file     Relationship status: Not on file     Intimate partner  violence     Fear of current or ex partner: Not on file     Emotionally abused: Not on file     Physically abused: Not on file     Forced sexual activity: Not on file   Other Topics Concern     Parent/sibling w/ CABG, MI or angioplasty before 65F 55M? No   Social History Narrative     Not on file         FAMILY HISTORY:  Family History   Problem Relation Age of Onset     Lupus Mother      Heart Disease Mother         CHF     Coronary Artery Disease Mother      Hyperlipidemia Mother      Diabetes Father      Breast Cancer Other          PHYSICAL EXAM:  Vital signs:  Not taken.  ECO  GENERAL: No acute distress.  EYES: No scleral icterus. No overt erythema.  RESPIRATORY: No cough.  No labored breathing.  MUSCULOSKELETAL: Range of motion in the neck, shoulders, and arms appear normal.  SKIN: No overt rashes, discolorations, or lesions over the face, neck or arms.  NEUROLOGIC: Alert.  No overt tremors.  PSYCHIATRIC: Normal affect and mood.  Does not appear anxious.    The rest of a comprehensive physical examination is deferred due to PHE (public health emergency) video visit restrictions.      LABS:  CBC RESULTS:   Recent Labs   Lab Test 21  0940   WBC 4.2   RBC 2.99*   HGB 11.3*   HCT 32.8*   *   MCH 37.8*   MCHC 34.5   RDW 18.5*        Last Comprehensive Metabolic Panel:  Sodium   Date Value Ref Range Status   2021 138 133 - 144 mmol/L Final     Potassium   Date Value Ref Range Status   2021 3.8 3.4 - 5.3 mmol/L Final     Chloride   Date Value Ref Range Status   2021 106 94 - 109 mmol/L Final     Carbon Dioxide   Date Value Ref Range Status   2021 28 20 - 32 mmol/L Final     Anion Gap   Date Value Ref Range Status   2021 4 3 - 14 mmol/L Final     Glucose   Date Value Ref Range Status   2021 108 (H) 70 - 99 mg/dL Final     Urea Nitrogen   Date Value Ref Range Status   2021 14 7 - 30 mg/dL Final     Creatinine   Date Value Ref Range Status   2021  0.72 0.52 - 1.04 mg/dL Final     GFR Estimate   Date Value Ref Range Status   01/19/2021 88 >60 mL/min/[1.73_m2] Final     Comment:     Non  GFR Calc  Starting 12/18/2018, serum creatinine based estimated GFR (eGFR) will be   calculated using the Chronic Kidney Disease Epidemiology Collaboration   (CKD-EPI) equation.       Calcium   Date Value Ref Range Status   01/19/2021 8.7 8.5 - 10.1 mg/dL Final     Bilirubin Total   Date Value Ref Range Status   01/19/2021 0.9 0.2 - 1.3 mg/dL Final     Alkaline Phosphatase   Date Value Ref Range Status   01/19/2021 128 40 - 150 U/L Final     ALT   Date Value Ref Range Status   01/19/2021 69 (H) 0 - 50 U/L Final     AST   Date Value Ref Range Status   01/19/2021 41 0 - 45 U/L Final       PATHOLOGY:  Reviewed as per HPI.    IMAGING:   10/28/2020: PET scan at [facility]:  1. High metabolic activity in 3 cm mass in right upper outer breast.  2. Several small hypermetabolic lymph nodes in high right axillary and right subclavian suspicious for metastatic lymphadenopathy  3. Small mildly hypermetabolic lymph nodes in both hilar regions.  4. Several very tiny nodules in right upper lobe unchanged and with low metabolic activity.  5. Small hypermetabolic focus in pelvis difficult to localize to either sigmoid colon or loop of small bowel.    12/07/2020: Chest CT scan showed few tiny pleural nodules on right unchanged; 2.9 cm breast mass, diffuse fatty infiltration of liver.    ASSESSMENT/PLAN:  Flor Griffin is a 65 year old female with the following issues:  1.  Right breast poorly differentiated adenocarcinoma, ER weakly positive (1%), WI negative, HER-2/mitzi negative with metastatic recurrence  2. Bilateral hilar lymphadenopathy  3. Indeterminate pulmonary nodules, likely benign  Lab draw every 3 weeks.  -Michelle is currently on 1st line metastatic therapy with capecitabine and tolerating this well with tolerable side effects of mild hand-foot syndrome so far with  dose-reduction.   -I reviewed with Michelle that her Hgb, platelets, WBC counts, liver enzymes, and kidney function are adequate to proceed with her next cycle of capecitabine.  -Reiterated that we will consider surgical and radiation oncology consults if she achieves a good response to capecitabine and if the bilateral hilar lymphadenopathy resolves.  -Plan for repeat PET scan in 2/2021 prior to next visit.    4. Rash  -Not pruritic, much improved. Continue to use topical emollients.  Can use topical hydrocortisone.    Return in 3 weeks with PET scan and labs.    Sally Stover MD  Hematology/Oncology  [facility]    Michelle is a 65 year old who is being evaluated via a billable video visit.      How would you like to obtain your AVS? Mail a copy  If the video visit is dropped, the invitation should be resent by: Text to cell phone: [phone]   Will anyone else be joining your video visit? No        Video-Visit Details    Type of service:  Video Visit    Originating Location (pt. Location): Home    Distant Location (provider location):  [facility]     Platform used for Video Visit: Tristan        Again, thank you for allowing me to participate in the care of your patient.        Sincerely,        Sally Stover MD

## 2021-01-20 NOTE — PROGRESS NOTES
Michelle is a 65 year old who is being evaluated via a billable video visit.      How would you like to obtain your AVS? Mail a copy  If the video visit is dropped, the invitation should be resent by: Text to cell phone: [phone]   Will anyone else be joining your video visit? No        Video-Visit Details    Type of service:  Video Visit    Originating Location (pt. Location): Home    Distant Location (provider location):  [facility]     Platform used for Video Visit: [employer]ud[employer] Lift

## 2021-01-25 ENCOUNTER — DOCUMENTATION ONLY (OUTPATIENT)
Dept: PHARMACY | Facility: CLINIC | Age: 66
End: 2021-01-25

## 2021-01-25 NOTE — PROGRESS NOTES
Oral Chemotherapy Monitoring Program  Lab Follow Up    Reviewed lab results from 1/19/2021. Start of cycle was 1/8/21 (labs slightly early).    ORAL CHEMOTHERAPY 11/2/2020 11/9/2020 11/24/2020 12/11/2020 1/5/2021 1/25/2021   Assessment Type New Teach Initial Follow up Lab Monitoring Lab Monitoring Lab Monitoring Lab Monitoring   Diagnosis Code Breast Cancer Breast Cancer Breast Cancer Breast Cancer Breast Cancer Breast Cancer   Providers Nuno osullivan   Clinic Name/Location [facility]   Drug Name Xeloda (Capecitabine) Xeloda (Capecitabine) Xeloda (Capecitabine) Xeloda (Capecitabine) Xeloda (Capecitabine) Xeloda (Capecitabine)   Dose 2,000 mg 2,000 mg 2,000 mg 2,000 mg 1,500 mg 1,500 mg   Current Schedule BID BID BID BID BID BID   Cycle Details 2 weeks on, 1 week off 2 weeks on, 1 week off 2 weeks on, 1 week off 2 weeks on, 1 week off 2 weeks on, 1 week off 2 weeks on, 1 week off   Start Date of Last Cycle - 11/2/2020 11/24/2020 11/24/2020 12/15/2020 1/8/2021   Planned next cycle start date - 11/23/2020 12/15/2020 12/15/2020 1/8/2021 1/29/2021   Doses missed in last 2 weeks - 0 0 - - -   Adherence Assessment - Adherent - - - -   Adverse Effects - No AE identified during assessment - - Palmar-plantar Erythrodysethesia Syndrome -   Palmar-plantar Erythrodysethesia syndrome[hand-foot syndrome] - - - - Grade 2 -   Pharmacist Intervention(Palmar-plantar) - - - - Yes -   Intervention(s) - - - - Dose decreased (chemo) -   Any new drug interactions? No No - - - -   Is the dose as ordered appropriate for the patient? Yes Yes - - - -       Labs:  _  Result Component Current Result Ref Range   Sodium 138 (1/19/2021) 133 - 144 mmol/L     _  Result Component Current Result Ref Range   Potassium 3.8 (1/19/2021) 3.4 - 5.3 mmol/L     _  Result Component Current Result Ref Range   Calcium 8.7 (1/19/2021) 8.5 - 10.1 mg/dL     No results found for Mag within last 30  days.     No results found for Phos within last 30 days.     _  Result Component Current Result Ref Range   Albumin 3.7 (1/19/2021) 3.4 - 5.0 g/dL     _  Result Component Current Result Ref Range   Urea Nitrogen 14 (1/19/2021) 7 - 30 mg/dL     _  Result Component Current Result Ref Range   Creatinine 0.72 (1/19/2021) 0.52 - 1.04 mg/dL     _  Result Component Current Result Ref Range   AST 41 (1/19/2021) 0 - 45 U/L     _  Result Component Current Result Ref Range   ALT 69 (H) (1/19/2021) 0 - 50 U/L     _  Result Component Current Result Ref Range   Bilirubin Total 0.9 (1/19/2021) 0.2 - 1.3 mg/dL     _  Result Component Current Result Ref Range   WBC 4.2 (1/19/2021) 4.0 - 11.0 10e9/L     _  Result Component Current Result Ref Range   Hemoglobin 11.3 (L) (1/19/2021) 11.7 - 15.7 g/dL     _  Result Component Current Result Ref Range   Platelet Count 208 (1/19/2021) 150 - 450 10e9/L     _  Result Component Current Result Ref Range   Absolute Neutrophil 2.6 (1/19/2021) 1.6 - 8.3 10e9/L       Assessment & Plan:  No concerning abnormalities. Ok to proceed with next cycle on 1/29/21.    Questions answered to patient's satisfaction.    Follow-Up:  3 weeks with labs.     Minoo RomanD  January 25, 2021

## 2021-01-28 RX ORDER — CAPECITABINE 500 MG/1
850 TABLET, FILM COATED ORAL 2 TIMES DAILY
Qty: 84 TABLET | Refills: 0 | Status: SHIPPED | OUTPATIENT
Start: 2021-01-28 | End: 2021-02-11

## 2021-02-06 NOTE — PROGRESS NOTES
"[facility]    Hematology/Oncology Established Patient Follow-up Note      Today's Date: 2/11/21    Reason for Follow-up: Metastatic breast cancer.    Flor Griffin is a 65 year old female who is being evaluated via a billable video visit.      The patient has been notified of following:     \"This video visit will be conducted via a call between you and your physician/provider. We have found that certain health care needs can be provided without the need for an in-person physical exam.  This service lets us provide the care you need with a video conversation during the COVID-19 pandemic.  If a prescription is necessary we can send it directly to your pharmacy.  If lab work is needed we can place an order for that and you can then stop by our lab to have the test done at a later time.    Video visits are billed at different rates depending on your insurance coverage.  Please reach out to your insurance provider with any questions.    If during the course of the call the physician/provider feels a video visit is not appropriate, you will not be charged for this service.\"    Patient has given verbal consent for Video visit? Yes    How would you like to obtain your AVS? [facility] [place]    Patient would like the video invitation sent by: Text to cell phone: [phone]         Video-Visit Details    Type of service:  Video Visit      Originating Location (pt. Location): Home    Distant Location (provider location):  [facility]     Mode of Communication:  Video Conference via Doximity    Video visit duration: 10 minutes      HISTORY OF PRESENT ILLNESS: Flor Griffin is a 65 year old female who presents with the following oncologic history:     --Clinical TX N3c M0 adenocarcinoma which is poorly differentiated, likely of breast origin, ER low-positive at 1-5%, CA negative, HER-2/mitzi FISH negative, androgen stain weak-intermediate 10-20%.  Initially she was seen 11/22/2017 after " she underwent right supraclavicular lymph node biopsy which was positive for poorly differentiated adenocarcinoma.  She had this lymph node almost a month and had an excisional biopsy performed which was consistent with the diagnosis of cancer.   --A PET/CT scan 11/27/2017 showed hypermetabolic right supraclavicular, right axillary and subpectoral adenopathy.  Otherwise, no distant metastatic disease.   --3/7/2018: Completed 4 cycles of carboplatin and paclitaxel followed by dose dense AC x 4 cycles.  No surgery was done.  --5/29/2018-7/31/2018: Completed adjuvant radiation to right breast, right axilla, right supraclavicular region.  --9/2018: Started adjuvant letrozole.  --10/15/2020: Screening mammogram showed focal asymmetry in upper outer right breast; no suspicious findings in left breast.  --10/21/2020: U/S of right breast showed irregularly-shaped hypoechoic mass at 10:00, 7 cm from nipple, measuring 3.2 x 2.7 x 3.6 cm. Right axillary ultrasound shows multiple normal-appearing lymph nodes. Biopsy of right breast mass at 10:00 showed poorly differentiated carcinoma, no lymphovascular invasion, morphology consistent with breast cancer and similar to prior axillary node tumor, ER weakly positive at 1% and HI negative (0%), HER-2/mitzi FISH negative.  --10/28/2020: PET/CT scan showed high metabolic activity in 3 cm area of right upper outer breast, several small hypermetabolic lymph nodes in high right axilla and right subclavian region; mild hypermetabolic lymph nodes in bilateral hilar regions, favor metastatic disease; several tiny nodules in right upper lung, favor benign etiology; small hypermetabolic focus in pelvis in either sigmoid colon or adjacent loop of small bowel.  --11/02/2020: Started 1st line metastatic therapy with capecitabine.  --2/9/2021: PET/CT scan showed hypermetabolic right breast mass not significantly changed since 12/7/2020, persistent hypermetabolism; hypermetabolic right axillary  lymph node increased in size; no distant metastasis; focal hypermetabolism in pelvis associated with sigmoid colon; stable 6-mm lung nodules.    INTERIM HISTORY:  Michelle reports feeling well with no specific complaints today.      REVIEW OF SYSTEMS:   14 point ROS was reviewed and is negative other than as noted above in HPI.       HOME MEDICATIONS:  Current Outpatient Medications   Medication Sig Dispense Refill     aspirin 81 MG tablet Take 81 mg by mouth daily       Ibuprofen (ADVIL PO) Take 400 mg by mouth every 6 hours as needed for moderate pain       multivitamin w/minerals (THERA-VIT-M) tablet Take 1 tablet by mouth daily       prochlorperazine (COMPAZINE) 10 MG tablet Take 0.5-1 tablets (5-10 mg) by mouth every 6 hours as needed for nausea or vomiting 30 tablet 1     capecitabine (XELODA) 500 MG tablet Take 4 tablets (2,000 mg) by mouth 2 times daily for 14 days Days 1 through 14, then off for 7 days. 112 tablet 0     Omega-3 Fatty Acids (OMEGA-3 FISH OIL PO) Take 1 g by mouth daily           ALLERGIES:  Allergies   Allergen Reactions     Penicillins Hives         PAST MEDICAL HISTORY:  Past Medical History:   Diagnosis Date     Basal cell cancer     right cheek     Gastroesophageal reflux disease      History of blood transfusion     blue baby     Hyperlipidaemia          PAST SURGICAL HISTORY:  Past Surgical History:   Procedure Laterality Date     BIOPSY MASS NECK Right 11/16/2017    Procedure: BIOPSY MASS NECK;  Excisional Biopsy Right Neck Lymph node;  Surgeon: Waylon Corral MD;  Location: [facility] OR     COLONOSCOPY       IR PORT REMOVAL LEFT  5/13/2019     MOHS MICROGRAPHIC PROCEDURE  ~2010    right cheek; [facility]     MOHS MICROGRAPHIC PROCEDURE  10/31/2016    Dr. Nicholas [facility]         SOCIAL HISTORY:  Social History     Socioeconomic History     Marital status:      Spouse name: Not on file     Number of children: Not on file     Years of education: Not on file     Highest education level: Not on file    Occupational History     Not on file   Social Needs     Financial resource strain: Not on file     Food insecurity     Worry: Not on file     Inability: Not on file     Transportation needs     Medical: Not on file     Non-medical: Not on file   Tobacco Use     Smoking status: Never Smoker     Smokeless tobacco: Never Used   Substance and Sexual Activity     Alcohol use: Yes     Alcohol/week: 0.0 standard drinks     Comment: 4 times a week, 2 drinks     Drug use: No     Sexual activity: Yes     Partners: Male     Birth control/protection: Post-menopausal   Lifestyle     Physical activity     Days per week: Not on file     Minutes per session: Not on file     Stress: Not on file   Relationships     Social connections     Talks on phone: Not on file     Gets together: Not on file     Attends Anabaptist service: Not on file     Active member of club or organization: Not on file     Attends meetings of clubs or organizations: Not on file     Relationship status: Not on file     Intimate partner violence     Fear of current or ex partner: Not on file     Emotionally abused: Not on file     Physically abused: Not on file     Forced sexual activity: Not on file   Other Topics Concern     Parent/sibling w/ CABG, MI or angioplasty before 65F 55M? No   Social History Narrative     Not on file         FAMILY HISTORY:  Family History   Problem Relation Age of Onset     Lupus Mother      Heart Disease Mother         CHF     Coronary Artery Disease Mother      Hyperlipidemia Mother      Diabetes Father      Breast Cancer Other          PHYSICAL EXAM:  Vital signs:  Not taken.  ECO  GENERAL: No acute distress.  EYES: No scleral icterus. No overt erythema.  RESPIRATORY: No cough.  No labored breathing.  MUSCULOSKELETAL: Range of motion in the neck, shoulders, and arms appear normal.  SKIN: No overt rashes, discolorations, or lesions over the face, neck or arms.  NEUROLOGIC: Alert.  No overt tremors.  PSYCHIATRIC: Normal affect  and mood.  Does not appear anxious.    The rest of a comprehensive physical examination is deferred due to PHE (public health emergency) video visit restrictions.      LABS:  CBC RESULTS:   Recent Labs   Lab Test 01/19/21  0940   WBC 4.2   RBC 2.99*   HGB 11.3*   HCT 32.8*   *   MCH 37.8*   MCHC 34.5   RDW 18.5*        Last Comprehensive Metabolic Panel:  Sodium   Date Value Ref Range Status   01/19/2021 138 133 - 144 mmol/L Final     Potassium   Date Value Ref Range Status   01/19/2021 3.8 3.4 - 5.3 mmol/L Final     Chloride   Date Value Ref Range Status   01/19/2021 106 94 - 109 mmol/L Final     Carbon Dioxide   Date Value Ref Range Status   01/19/2021 28 20 - 32 mmol/L Final     Anion Gap   Date Value Ref Range Status   01/19/2021 4 3 - 14 mmol/L Final     Glucose   Date Value Ref Range Status   01/19/2021 108 (H) 70 - 99 mg/dL Final     Urea Nitrogen   Date Value Ref Range Status   01/19/2021 14 7 - 30 mg/dL Final     Creatinine   Date Value Ref Range Status   01/19/2021 0.72 0.52 - 1.04 mg/dL Final     GFR Estimate   Date Value Ref Range Status   01/19/2021 88 >60 mL/min/[1.73_m2] Final     Comment:     Non  GFR Calc  Starting 12/18/2018, serum creatinine based estimated GFR (eGFR) will be   calculated using the Chronic Kidney Disease Epidemiology Collaboration   (CKD-EPI) equation.       Calcium   Date Value Ref Range Status   01/19/2021 8.7 8.5 - 10.1 mg/dL Final     Bilirubin Total   Date Value Ref Range Status   01/19/2021 0.9 0.2 - 1.3 mg/dL Final     Alkaline Phosphatase   Date Value Ref Range Status   01/19/2021 128 40 - 150 U/L Final     ALT   Date Value Ref Range Status   01/19/2021 69 (H) 0 - 50 U/L Final     AST   Date Value Ref Range Status   01/19/2021 41 0 - 45 U/L Final       PATHOLOGY:  Reviewed as per HPI.    IMAGING:   10/28/2020: PET scan at [facility]:  1. High metabolic activity in 3 cm mass in right upper outer breast.  2. Several small hypermetabolic lymph nodes in  high right axillary and right subclavian suspicious for metastatic lymphadenopathy  3. Small mildly hypermetabolic lymph nodes in both hilar regions.  4. Several very tiny nodules in right upper lobe unchanged and with low metabolic activity.  5. Small hypermetabolic focus in pelvis difficult to localize to either sigmoid colon or loop of small bowel.    12/07/2020: Chest CT scan showed few tiny pleural nodules on right unchanged; 2.9 cm breast mass, diffuse fatty infiltration of liver.    2/9/2021: PET scan showed stable hypermetabolic right breast mass but increase in size of hypermetabolic right axillary lymph node; no evidence of distant metastasis; focal hypermetabolism in pelvis associated with sigmoid colon; stable lung nodules.    ASSESSMENT/PLAN:  Flor Griffin is a 65 year old female with the following issues:  1.  Right breast poorly differentiated adenocarcinoma, ER weakly positive (1%), SC negative, HER-2/mitzi negative with metastatic recurrence  2. Bilateral hilar lymphadenopathy, now resolved  3. Indeterminate pulmonary nodules, likely benign and stable  -I personally reviewed 2/9/2021 PET scan with Michelle -- this showed that the right breast mass has not changed much in size and that her right axillary lymph node has increased in size, consistent with disease progression.  -Therefore, I recommended she switch from capecitabine to eribulin.  I discussed the potential side effects of eribulin, including but not limited to anemia,  neutropenia, peripheral neuropathy, QT prolongation/cardiac arrhythmias/congestive heart failure, asthenia/fatigue, alopecia, nausea, constipation.  -Reiterated that we will consider surgical and possibly radiation oncology consults if she achieves a good response to eribulin and if no distant metastatic disease on future scans.  -Plan for repeat PET scan in 5/2021.  -Discussed that the focal hypermetabolism in pelvis is likely insignificant as Michelle is not having signs/symptoms  of diverticulitis -- this area will be reevaluated on future scans.  -Will arrange for port placement.    4. Anemia of chemotherapy  -Mild, no indication for transfusion.  -Continue to monitor CBC prior to each treatment.    Sally Stover MD  Hematology/Oncology  [facility]    Total time spent: 38 minutes in patient evaluation, independent review of PET scan, review of labs, discussion of plan of care, change in chemo orders, documentation.

## 2021-02-09 ENCOUNTER — HOSPITAL ENCOUNTER (OUTPATIENT)
Dept: PET IMAGING | Facility: CLINIC | Age: 66
Discharge: HOME | End: 2021-02-09
Attending: INTERNAL MEDICINE | Admitting: INTERNAL MEDICINE
Payer: COMMERCIAL

## 2021-02-09 DIAGNOSIS — Z17.0 MALIGNANT NEOPLASM OF UPPER-OUTER QUADRANT OF RIGHT BREAST IN FEMALE, ESTROGEN RECEPTOR POSITIVE (H): ICD-10-CM

## 2021-02-09 DIAGNOSIS — C50.411 MALIGNANT NEOPLASM OF UPPER-OUTER QUADRANT OF RIGHT BREAST IN FEMALE, ESTROGEN RECEPTOR POSITIVE (H): ICD-10-CM

## 2021-02-09 DIAGNOSIS — R59.1 LYMPHADENOPATHY: ICD-10-CM

## 2021-02-09 LAB
RADIOLOGIST FLAGS: NORMAL
RADIOLOGIST FLAGS: NORMAL

## 2021-02-09 PROCEDURE — 250N000011 HC RX IP 250 OP 636: Performed by: INTERNAL MEDICINE

## 2021-02-09 PROCEDURE — 78816 PET IMAGE W/CT FULL BODY: CPT | Mod: PS

## 2021-02-09 PROCEDURE — 71260 CT THORAX DX C+: CPT | Mod: TC

## 2021-02-09 PROCEDURE — 78816 PET IMAGE W/CT FULL BODY: CPT | Mod: 26 | Performed by: RADIOLOGY

## 2021-02-09 PROCEDURE — 343N000001 HC RX 343: Performed by: INTERNAL MEDICINE

## 2021-02-09 PROCEDURE — A9552 F18 FDG: HCPCS | Performed by: INTERNAL MEDICINE

## 2021-02-09 RX ORDER — IOPAMIDOL 755 MG/ML
10-135 INJECTION, SOLUTION INTRAVASCULAR ONCE
Status: COMPLETED | OUTPATIENT
Start: 2021-02-09 | End: 2021-02-09

## 2021-02-09 RX ADMIN — IOPAMIDOL 109 ML: 755 INJECTION, SOLUTION INTRAVENOUS at 10:46

## 2021-02-09 RX ADMIN — FLUDEOXYGLUCOSE F-18 13.15 MCI.: 500 INJECTION, SOLUTION INTRAVENOUS at 09:48

## 2021-02-11 ENCOUNTER — DOCUMENTATION ONLY (OUTPATIENT)
Dept: PHARMACY | Facility: CLINIC | Age: 66
End: 2021-02-11

## 2021-02-11 ENCOUNTER — DOCUMENTATION ONLY (OUTPATIENT)
Dept: ONCOLOGY | Facility: CLINIC | Age: 66
End: 2021-02-11

## 2021-02-11 ENCOUNTER — VIRTUAL VISIT (OUTPATIENT)
Dept: ONCOLOGY | Facility: CLINIC | Age: 66
End: 2021-02-11
Attending: INTERNAL MEDICINE
Payer: COMMERCIAL

## 2021-02-11 ENCOUNTER — PATIENT OUTREACH (OUTPATIENT)
Dept: ONCOLOGY | Facility: CLINIC | Age: 66
End: 2021-02-11

## 2021-02-11 VITALS — BODY MASS INDEX: 28.73 KG/M2 | WEIGHT: 178 LBS

## 2021-02-11 DIAGNOSIS — Z11.59 ENCOUNTER FOR SCREENING FOR OTHER VIRAL DISEASES: ICD-10-CM

## 2021-02-11 DIAGNOSIS — Z17.0 MALIGNANT NEOPLASM OF UPPER-OUTER QUADRANT OF RIGHT BREAST IN FEMALE, ESTROGEN RECEPTOR POSITIVE (H): Primary | ICD-10-CM

## 2021-02-11 DIAGNOSIS — R59.1 LYMPHADENOPATHY: ICD-10-CM

## 2021-02-11 DIAGNOSIS — C50.411 MALIGNANT NEOPLASM OF UPPER-OUTER QUADRANT OF RIGHT BREAST IN FEMALE, ESTROGEN RECEPTOR POSITIVE (H): Primary | ICD-10-CM

## 2021-02-11 DIAGNOSIS — T45.1X5A ANEMIA DUE TO CHEMOTHERAPY: ICD-10-CM

## 2021-02-11 DIAGNOSIS — R91.8 PULMONARY NODULES: ICD-10-CM

## 2021-02-11 DIAGNOSIS — D64.81 ANEMIA DUE TO CHEMOTHERAPY: ICD-10-CM

## 2021-02-11 DIAGNOSIS — R21 RASH: ICD-10-CM

## 2021-02-11 PROCEDURE — 999N001193 HC VIDEO/TELEPHONE VISIT; NO CHARGE

## 2021-02-11 PROCEDURE — 99214 OFFICE O/P EST MOD 30 MIN: CPT | Mod: 95 | Performed by: INTERNAL MEDICINE

## 2021-02-11 ASSESSMENT — PAIN SCALES - GENERAL: PAINLEVEL: NO PAIN (0)

## 2021-02-11 NOTE — PROGRESS NOTES
Oral Chemotherapy Monitoring Program  Discontinuation    Patient was on capecitabine therapy for MBC.    Oncologist: Ck    Treatment Discontinuation Date: 2/11/21    Treatment Discontinuation Reason: Progression    Next Treatment Plan: Eribulin    Signing off on following patient.     Jessie ReyezD,CHAS  Hematology/Oncology Pharmacist  [facility]  February 11, 2021

## 2021-02-11 NOTE — LETTER
"    2/11/2021         RE: Flor Griffin  [id number] [facility] [id number]        Dear Colleague,    Thank you for referring your patient, Flor Griffin, to the [facility]. Please see a copy of my visit note below.    [facility]    Hematology/Oncology Established Patient Follow-up Note      Today's Date: 2/11/21    Reason for Follow-up: Metastatic breast cancer.    Flor Griffin is a 65 year old female who is being evaluated via a billable video visit.      The patient has been notified of following:     \"This video visit will be conducted via a call between you and your physician/provider. We have found that certain health care needs can be provided without the need for an in-person physical exam.  This service lets us provide the care you need with a video conversation during the COVID-19 pandemic.  If a prescription is necessary we can send it directly to your pharmacy.  If lab work is needed we can place an order for that and you can then stop by our lab to have the test done at a later time.    Video visits are billed at different rates depending on your insurance coverage.  Please reach out to your insurance provider with any questions.    If during the course of the call the physician/provider feels a video visit is not appropriate, you will not be charged for this service.\"    Patient has given verbal consent for Video visit? Yes    How would you like to obtain your AVS? Mareharrajinder    Patient would like the video invitation sent by: Text to cell phone: [phone]         Video-Visit Details    Type of service:  Video Visit      Originating Location (pt. Location): Home    Distant Location (provider location):  [facility]     Mode of Communication:  Video Conference via Doximity    Video visit duration: 10 minutes      HISTORY OF PRESENT ILLNESS: Flor Griffin is a 65 year old female who presents with the " following oncologic history:     --Clinical TX N3c M0 adenocarcinoma which is poorly differentiated, likely of breast origin, ER low-positive at 1-5%, NV negative, HER-2/mitzi FISH negative, androgen stain weak-intermediate 10-20%.  Initially she was seen 11/22/2017 after she underwent right supraclavicular lymph node biopsy which was positive for poorly differentiated adenocarcinoma.  She had this lymph node almost a month and had an excisional biopsy performed which was consistent with the diagnosis of cancer.   --A PET/CT scan 11/27/2017 showed hypermetabolic right supraclavicular, right axillary and subpectoral adenopathy.  Otherwise, no distant metastatic disease.   --3/7/2018: Completed 4 cycles of carboplatin and paclitaxel followed by dose dense AC x 4 cycles.  No surgery was done.  --5/29/2018-7/31/2018: Completed adjuvant radiation to right breast, right axilla, right supraclavicular region.  --9/2018: Started adjuvant letrozole.  --10/15/2020: Screening mammogram showed focal asymmetry in upper outer right breast; no suspicious findings in left breast.  --10/21/2020: U/S of right breast showed irregularly-shaped hypoechoic mass at 10:00, 7 cm from nipple, measuring 3.2 x 2.7 x 3.6 cm. Right axillary ultrasound shows multiple normal-appearing lymph nodes. Biopsy of right breast mass at 10:00 showed poorly differentiated carcinoma, no lymphovascular invasion, morphology consistent with breast cancer and similar to prior axillary node tumor, ER weakly positive at 1% and NV negative (0%), HER-2/mitzi FISH negative.  --10/28/2020: PET/CT scan showed high metabolic activity in 3 cm area of right upper outer breast, several small hypermetabolic lymph nodes in high right axilla and right subclavian region; mild hypermetabolic lymph nodes in bilateral hilar regions, favor metastatic disease; several tiny nodules in right upper lung, favor benign etiology; small hypermetabolic focus in pelvis in either sigmoid colon or  adjacent loop of small bowel.  --11/02/2020: Started 1st line metastatic therapy with capecitabine.  --2/9/2021: PET/CT scan showed hypermetabolic right breast mass not significantly changed since 12/7/2020, persistent hypermetabolism; hypermetabolic right axillary lymph node increased in size; no distant metastasis; focal hypermetabolism in pelvis associated with sigmoid colon; stable 6-mm lung nodules.    INTERIM HISTORY:  Michelle reports feeling well with no specific complaints today.      REVIEW OF SYSTEMS:   14 point ROS was reviewed and is negative other than as noted above in HPI.       HOME MEDICATIONS:  Current Outpatient Medications   Medication Sig Dispense Refill     aspirin 81 MG tablet Take 81 mg by mouth daily       Ibuprofen (ADVIL PO) Take 400 mg by mouth every 6 hours as needed for moderate pain       multivitamin w/minerals (THERA-VIT-M) tablet Take 1 tablet by mouth daily       prochlorperazine (COMPAZINE) 10 MG tablet Take 0.5-1 tablets (5-10 mg) by mouth every 6 hours as needed for nausea or vomiting 30 tablet 1     capecitabine (XELODA) 500 MG tablet Take 4 tablets (2,000 mg) by mouth 2 times daily for 14 days Days 1 through 14, then off for 7 days. 112 tablet 0     Omega-3 Fatty Acids (OMEGA-3 FISH OIL PO) Take 1 g by mouth daily           ALLERGIES:  Allergies   Allergen Reactions     Penicillins Hives         PAST MEDICAL HISTORY:  Past Medical History:   Diagnosis Date     Basal cell cancer     right cheek     Gastroesophageal reflux disease      History of blood transfusion     blue baby     Hyperlipidaemia          PAST SURGICAL HISTORY:  Past Surgical History:   Procedure Laterality Date     BIOPSY MASS NECK Right 11/16/2017    Procedure: BIOPSY MASS NECK;  Excisional Biopsy Right Neck Lymph node;  Surgeon: Waylon Corral MD;  Location: [facility] OR     COLONOSCOPY       IR PORT REMOVAL LEFT  5/13/2019     MOHS MICROGRAPHIC PROCEDURE  ~2010    right cheek; [facility]     MOHS MICROGRAPHIC PROCEDURE   10/31/2016    Dr. Nicholas [facility]         SOCIAL HISTORY:  Social History     Socioeconomic History     Marital status:      Spouse name: Not on file     Number of children: Not on file     Years of education: Not on file     Highest education level: Not on file   Occupational History     Not on file   Social Needs     Financial resource strain: Not on file     Food insecurity     Worry: Not on file     Inability: Not on file     Transportation needs     Medical: Not on file     Non-medical: Not on file   Tobacco Use     Smoking status: Never Smoker     Smokeless tobacco: Never Used   Substance and Sexual Activity     Alcohol use: Yes     Alcohol/week: 0.0 standard drinks     Comment: 4 times a week, 2 drinks     Drug use: No     Sexual activity: Yes     Partners: Male     Birth control/protection: Post-menopausal   Lifestyle     Physical activity     Days per week: Not on file     Minutes per session: Not on file     Stress: Not on file   Relationships     Social connections     Talks on phone: Not on file     Gets together: Not on file     Attends Yazidi service: Not on file     Active member of club or organization: Not on file     Attends meetings of clubs or organizations: Not on file     Relationship status: Not on file     Intimate partner violence     Fear of current or ex partner: Not on file     Emotionally abused: Not on file     Physically abused: Not on file     Forced sexual activity: Not on file   Other Topics Concern     Parent/sibling w/ CABG, MI or angioplasty before 65F 55M? No   Social History Narrative     Not on file         FAMILY HISTORY:  Family History   Problem Relation Age of Onset     Lupus Mother      Heart Disease Mother         CHF     Coronary Artery Disease Mother      Hyperlipidemia Mother      Diabetes Father      Breast Cancer Other          PHYSICAL EXAM:  Vital signs:  Not taken.  ECO  GENERAL: No acute distress.  EYES: No scleral icterus. No overt  erythema.  RESPIRATORY: No cough.  No labored breathing.  MUSCULOSKELETAL: Range of motion in the neck, shoulders, and arms appear normal.  SKIN: No overt rashes, discolorations, or lesions over the face, neck or arms.  NEUROLOGIC: Alert.  No overt tremors.  PSYCHIATRIC: Normal affect and mood.  Does not appear anxious.    The rest of a comprehensive physical examination is deferred due to PHE (public health emergency) video visit restrictions.      LABS:  CBC RESULTS:   Recent Labs   Lab Test 01/19/21  0940   WBC 4.2   RBC 2.99*   HGB 11.3*   HCT 32.8*   *   MCH 37.8*   MCHC 34.5   RDW 18.5*        Last Comprehensive Metabolic Panel:  Sodium   Date Value Ref Range Status   01/19/2021 138 133 - 144 mmol/L Final     Potassium   Date Value Ref Range Status   01/19/2021 3.8 3.4 - 5.3 mmol/L Final     Chloride   Date Value Ref Range Status   01/19/2021 106 94 - 109 mmol/L Final     Carbon Dioxide   Date Value Ref Range Status   01/19/2021 28 20 - 32 mmol/L Final     Anion Gap   Date Value Ref Range Status   01/19/2021 4 3 - 14 mmol/L Final     Glucose   Date Value Ref Range Status   01/19/2021 108 (H) 70 - 99 mg/dL Final     Urea Nitrogen   Date Value Ref Range Status   01/19/2021 14 7 - 30 mg/dL Final     Creatinine   Date Value Ref Range Status   01/19/2021 0.72 0.52 - 1.04 mg/dL Final     GFR Estimate   Date Value Ref Range Status   01/19/2021 88 >60 mL/min/[1.73_m2] Final     Comment:     Non  GFR Calc  Starting 12/18/2018, serum creatinine based estimated GFR (eGFR) will be   calculated using the Chronic Kidney Disease Epidemiology Collaboration   (CKD-EPI) equation.       Calcium   Date Value Ref Range Status   01/19/2021 8.7 8.5 - 10.1 mg/dL Final     Bilirubin Total   Date Value Ref Range Status   01/19/2021 0.9 0.2 - 1.3 mg/dL Final     Alkaline Phosphatase   Date Value Ref Range Status   01/19/2021 128 40 - 150 U/L Final     ALT   Date Value Ref Range Status   01/19/2021 69 (H) 0  - 50 U/L Final     AST   Date Value Ref Range Status   01/19/2021 41 0 - 45 U/L Final       PATHOLOGY:  Reviewed as per HPI.    IMAGING:   10/28/2020: PET scan at [facility]:  1. High metabolic activity in 3 cm mass in right upper outer breast.  2. Several small hypermetabolic lymph nodes in high right axillary and right subclavian suspicious for metastatic lymphadenopathy  3. Small mildly hypermetabolic lymph nodes in both hilar regions.  4. Several very tiny nodules in right upper lobe unchanged and with low metabolic activity.  5. Small hypermetabolic focus in pelvis difficult to localize to either sigmoid colon or loop of small bowel.    12/07/2020: Chest CT scan showed few tiny pleural nodules on right unchanged; 2.9 cm breast mass, diffuse fatty infiltration of liver.    2/9/2021: PET scan showed stable hypermetabolic right breast mass but increase in size of hypermetabolic right axillary lymph node; no evidence of distant metastasis; focal hypermetabolism in pelvis associated with sigmoid colon; stable lung nodules.    ASSESSMENT/PLAN:  Flor Griffin is a 65 year old female with the following issues:  1.  Right breast poorly differentiated adenocarcinoma, ER weakly positive (1%), ME negative, HER-2/mitzi negative with metastatic recurrence  2. Bilateral hilar lymphadenopathy, now resolved  3. Indeterminate pulmonary nodules, likely benign and stable  -I personally reviewed 2/9/2021 PET scan with Michelle -- this showed that the right breast mass has not changed much in size and that her right axillary lymph node has increased in size, consistent with disease progression.  -Therefore, I recommended she switch from capecitabine to eribulin.  I discussed the potential side effects of eribulin, including but not limited to anemia,  neutropenia, peripheral neuropathy, QT prolongation/cardiac arrhythmias/congestive heart failure, asthenia/fatigue, alopecia, nausea, constipation.  -Reiterated that we will consider surgical and  possibly radiation oncology consults if she achieves a good response to eribulin and if no distant metastatic disease on future scans.  -Plan for repeat PET scan in 5/2021.  -Discussed that the focal hypermetabolism in pelvis is likely insignificant as Michelle is not having signs/symptoms of diverticulitis -- this area will be reevaluated on future scans.  -Will arrange for port placement.    4. Anemia of chemotherapy  -Mild, no indication for transfusion.  -Continue to monitor CBC prior to each treatment.    Sally Stover MD  Hematology/Oncology  [facility]    Total time spent: 38 minutes in patient evaluation, independent review of PET scan, review of labs, discussion of plan of care, change in chemo orders, documentation.    Michelle is a 65 year old who is being evaluated via a billable video visit.      How would you like to obtain your AVS? MyChart  If the video visit is dropped, the invitation should be resent by: Text to cell phone: [phone]  Will anyone else be joining your video visit? Yes:  . How would they like to receive their invitation? Send to e-mail at: [email or web address]      Video Start Time:    Video-Visit Details    Type of service:  Video Visit    Video End Time:   Originating Location (pt. Location): Home    Distant Location (provider location):  [facility] [place]     Platform used for Video Visit: Tristan Torres CMA          Again, thank you for allowing me to participate in the care of your patient.        Sincerely,        Sally Stover MD

## 2021-02-11 NOTE — LETTER
"    2/11/2021         RE: Flor Griffin  [id number] [facility] [id number]        Dear Colleague,    Thank you for referring your patient, Flor Griffin, to the [facility]. Please see a copy of my visit note below.    [facility]    Hematology/Oncology Established Patient Follow-up Note      Today's Date: 2/11/21    Reason for Follow-up: Metastatic breast cancer.    Flor Griffin is a 65 year old female who is being evaluated via a billable video visit.      The patient has been notified of following:     \"This video visit will be conducted via a call between you and your physician/provider. We have found that certain health care needs can be provided without the need for an in-person physical exam.  This service lets us provide the care you need with a video conversation during the COVID-19 pandemic.  If a prescription is necessary we can send it directly to your pharmacy.  If lab work is needed we can place an order for that and you can then stop by our lab to have the test done at a later time.    Video visits are billed at different rates depending on your insurance coverage.  Please reach out to your insurance provider with any questions.    If during the course of the call the physician/provider feels a video visit is not appropriate, you will not be charged for this service.\"    Patient has given verbal consent for Video visit? Yes    How would you like to obtain your AVS? Mareharrajinder    Patient would like the video invitation sent by: Text to cell phone: [phone]         Video-Visit Details    Type of service:  Video Visit      Originating Location (pt. Location): Home    Distant Location (provider location):  [facility]     Mode of Communication:  Video Conference via Doximity    Video visit duration: 10 minutes      HISTORY OF PRESENT ILLNESS: Flro Griffin is a 65 year old female who presents with the " following oncologic history:     --Clinical TX N3c M0 adenocarcinoma which is poorly differentiated, likely of breast origin, ER low-positive at 1-5%, CO negative, HER-2/mitzi FISH negative, androgen stain weak-intermediate 10-20%.  Initially she was seen 11/22/2017 after she underwent right supraclavicular lymph node biopsy which was positive for poorly differentiated adenocarcinoma.  She had this lymph node almost a month and had an excisional biopsy performed which was consistent with the diagnosis of cancer.   --A PET/CT scan 11/27/2017 showed hypermetabolic right supraclavicular, right axillary and subpectoral adenopathy.  Otherwise, no distant metastatic disease.   --3/7/2018: Completed 4 cycles of carboplatin and paclitaxel followed by dose dense AC x 4 cycles.  No surgery was done.  --5/29/2018-7/31/2018: Completed adjuvant radiation to right breast, right axilla, right supraclavicular region.  --9/2018: Started adjuvant letrozole.  --10/15/2020: Screening mammogram showed focal asymmetry in upper outer right breast; no suspicious findings in left breast.  --10/21/2020: U/S of right breast showed irregularly-shaped hypoechoic mass at 10:00, 7 cm from nipple, measuring 3.2 x 2.7 x 3.6 cm. Right axillary ultrasound shows multiple normal-appearing lymph nodes. Biopsy of right breast mass at 10:00 showed poorly differentiated carcinoma, no lymphovascular invasion, morphology consistent with breast cancer and similar to prior axillary node tumor, ER weakly positive at 1% and CO negative (0%), HER-2/mitzi FISH negative.  --10/28/2020: PET/CT scan showed high metabolic activity in 3 cm area of right upper outer breast, several small hypermetabolic lymph nodes in high right axilla and right subclavian region; mild hypermetabolic lymph nodes in bilateral hilar regions, favor metastatic disease; several tiny nodules in right upper lung, favor benign etiology; small hypermetabolic focus in pelvis in either sigmoid colon or  adjacent loop of small bowel.  --11/02/2020: Started 1st line metastatic therapy with capecitabine.  --2/9/2021: PET/CT scan showed hypermetabolic right breast mass not significantly changed since 12/7/2020, persistent hypermetabolism; hypermetabolic right axillary lymph node increased in size; no distant metastasis; focal hypermetabolism in pelvis associated with sigmoid colon; stable 6-mm lung nodules.    INTERIM HISTORY:  Michelle reports feeling well with no specific complaints today.      REVIEW OF SYSTEMS:   14 point ROS was reviewed and is negative other than as noted above in HPI.       HOME MEDICATIONS:  Current Outpatient Medications   Medication Sig Dispense Refill     aspirin 81 MG tablet Take 81 mg by mouth daily       Ibuprofen (ADVIL PO) Take 400 mg by mouth every 6 hours as needed for moderate pain       multivitamin w/minerals (THERA-VIT-M) tablet Take 1 tablet by mouth daily       prochlorperazine (COMPAZINE) 10 MG tablet Take 0.5-1 tablets (5-10 mg) by mouth every 6 hours as needed for nausea or vomiting 30 tablet 1     capecitabine (XELODA) 500 MG tablet Take 4 tablets (2,000 mg) by mouth 2 times daily for 14 days Days 1 through 14, then off for 7 days. 112 tablet 0     Omega-3 Fatty Acids (OMEGA-3 FISH OIL PO) Take 1 g by mouth daily           ALLERGIES:  Allergies   Allergen Reactions     Penicillins Hives         PAST MEDICAL HISTORY:  Past Medical History:   Diagnosis Date     Basal cell cancer     right cheek     Gastroesophageal reflux disease      History of blood transfusion     blue baby     Hyperlipidaemia          PAST SURGICAL HISTORY:  Past Surgical History:   Procedure Laterality Date     BIOPSY MASS NECK Right 11/16/2017    Procedure: BIOPSY MASS NECK;  Excisional Biopsy Right Neck Lymph node;  Surgeon: Waylon Corral MD;  Location: [facility] OR     COLONOSCOPY       IR PORT REMOVAL LEFT  5/13/2019     MOHS MICROGRAPHIC PROCEDURE  ~2010    right cheek; BCC     MOHS MICROGRAPHIC PROCEDURE   10/31/2016    Dr. Nicholas [facility]         SOCIAL HISTORY:  Social History     Socioeconomic History     Marital status:      Spouse name: Not on file     Number of children: Not on file     Years of education: Not on file     Highest education level: Not on file   Occupational History     Not on file   Social Needs     Financial resource strain: Not on file     Food insecurity     Worry: Not on file     Inability: Not on file     Transportation needs     Medical: Not on file     Non-medical: Not on file   Tobacco Use     Smoking status: Never Smoker     Smokeless tobacco: Never Used   Substance and Sexual Activity     Alcohol use: Yes     Alcohol/week: 0.0 standard drinks     Comment: 4 times a week, 2 drinks     Drug use: No     Sexual activity: Yes     Partners: Male     Birth control/protection: Post-menopausal   Lifestyle     Physical activity     Days per week: Not on file     Minutes per session: Not on file     Stress: Not on file   Relationships     Social connections     Talks on phone: Not on file     Gets together: Not on file     Attends [employer] service: Not on file     Active member of club or organization: Not on file     Attends meetings of clubs or organizations: Not on file     Relationship status: Not on file     Intimate partner violence     Fear of current or ex partner: Not on file     Emotionally abused: Not on file     Physically abused: Not on file     Forced sexual activity: Not on file   Other Topics Concern     Parent/sibling w/ CABG, MI or angioplasty before 65F 55M? No   Social History Narrative     Not on file         FAMILY HISTORY:  Family History   Problem Relation Age of Onset     Lupus Mother      Heart Disease Mother         CHF     Coronary Artery Disease Mother      Hyperlipidemia Mother      Diabetes Father      Breast Cancer Other          PHYSICAL EXAM:  Vital signs:  Not taken.  ECO  GENERAL: No acute distress.  EYES: No scleral icterus. No overt  erythema.  RESPIRATORY: No cough.  No labored breathing.  MUSCULOSKELETAL: Range of motion in the neck, shoulders, and arms appear normal.  SKIN: No overt rashes, discolorations, or lesions over the face, neck or arms.  NEUROLOGIC: Alert.  No overt tremors.  PSYCHIATRIC: Normal affect and mood.  Does not appear anxious.    The rest of a comprehensive physical examination is deferred due to PHE (public health emergency) video visit restrictions.      LABS:  CBC RESULTS:   Recent Labs   Lab Test 01/19/21  0940   WBC 4.2   RBC 2.99*   HGB 11.3*   HCT 32.8*   *   MCH 37.8*   MCHC 34.5   RDW 18.5*        Last Comprehensive Metabolic Panel:  Sodium   Date Value Ref Range Status   01/19/2021 138 133 - 144 mmol/L Final     Potassium   Date Value Ref Range Status   01/19/2021 3.8 3.4 - 5.3 mmol/L Final     Chloride   Date Value Ref Range Status   01/19/2021 106 94 - 109 mmol/L Final     Carbon Dioxide   Date Value Ref Range Status   01/19/2021 28 20 - 32 mmol/L Final     Anion Gap   Date Value Ref Range Status   01/19/2021 4 3 - 14 mmol/L Final     Glucose   Date Value Ref Range Status   01/19/2021 108 (H) 70 - 99 mg/dL Final     Urea Nitrogen   Date Value Ref Range Status   01/19/2021 14 7 - 30 mg/dL Final     Creatinine   Date Value Ref Range Status   01/19/2021 0.72 0.52 - 1.04 mg/dL Final     GFR Estimate   Date Value Ref Range Status   01/19/2021 88 >60 mL/min/[1.73_m2] Final     Comment:     Non  GFR Calc  Starting 12/18/2018, serum creatinine based estimated GFR (eGFR) will be   calculated using the Chronic Kidney Disease Epidemiology Collaboration   (CKD-EPI) equation.       Calcium   Date Value Ref Range Status   01/19/2021 8.7 8.5 - 10.1 mg/dL Final     Bilirubin Total   Date Value Ref Range Status   01/19/2021 0.9 0.2 - 1.3 mg/dL Final     Alkaline Phosphatase   Date Value Ref Range Status   01/19/2021 128 40 - 150 U/L Final     ALT   Date Value Ref Range Status   01/19/2021 69 (H) 0  - 50 U/L Final     AST   Date Value Ref Range Status   01/19/2021 41 0 - 45 U/L Final       PATHOLOGY:  Reviewed as per HPI.    IMAGING:   10/28/2020: PET scan at [facility]:  1. High metabolic activity in 3 cm mass in right upper outer breast.  2. Several small hypermetabolic lymph nodes in high right axillary and right subclavian suspicious for metastatic lymphadenopathy  3. Small mildly hypermetabolic lymph nodes in both hilar regions.  4. Several very tiny nodules in right upper lobe unchanged and with low metabolic activity.  5. Small hypermetabolic focus in pelvis difficult to localize to either sigmoid colon or loop of small bowel.    12/07/2020: Chest CT scan showed few tiny pleural nodules on right unchanged; 2.9 cm breast mass, diffuse fatty infiltration of liver.    2/9/2021: PET scan showed stable hypermetabolic right breast mass but increase in size of hypermetabolic right axillary lymph node; no evidence of distant metastasis; focal hypermetabolism in pelvis associated with sigmoid colon; stable lung nodules.    ASSESSMENT/PLAN:  Flor Griffin is a 65 year old female with the following issues:  1.  Right breast poorly differentiated adenocarcinoma, ER weakly positive (1%), NH negative, HER-2/mitzi negative with metastatic recurrence  2. Bilateral hilar lymphadenopathy, now resolved  3. Indeterminate pulmonary nodules, likely benign and stable  -I personally reviewed 2/9/2021 PET scan with Michelle -- this showed that the right breast mass has not changed much in size and that her right axillary lymph node has increased in size, consistent with disease progression.  -Therefore, I recommended she switch from capecitabine to eribulin.  I discussed the potential side effects of eribulin, including but not limited to anemia,  neutropenia, peripheral neuropathy, QT prolongation/cardiac arrhythmias/congestive heart failure, asthenia/fatigue, alopecia, nausea, constipation.  -Reiterated that we will consider surgical and  possibly radiation oncology consults if she achieves a good response to eribulin and if no distant metastatic disease on future scans.  -Plan for repeat PET scan in 5/2021.  -Discussed that the focal hypermetabolism in pelvis is likely insignificant as Michelle is not having signs/symptoms of diverticulitis -- this area will be reevaluated on future scans.  -Will arrange for port placement.    4. Anemia of chemotherapy  -Mild, no indication for transfusion.  -Continue to monitor CBC prior to each treatment.    Sally Stover MD  Hematology/Oncology  [facility]    Total time spent: 38 minutes in patient evaluation, independent review of PET scan, review of labs, discussion of plan of care, change in chemo orders, documentation.    Michelle is a 65 year old who is being evaluated via a billable video visit.      How would you like to obtain your AVS? MyChart  If the video visit is dropped, the invitation should be resent by: Text to cell phone: [phone]  Will anyone else be joining your video visit? Yes:  . How would they like to receive their invitation? Send to e-mail at: [email or web address]      Video Start Time:    Video-Visit Details    Type of service:  Video Visit    Video End Time:   Originating Location (pt. Location): Home    Distant Location (provider location):  [facility] [place]     Platform used for Video Visit: Tristan Torres CMA          Again, thank you for allowing me to participate in the care of your patient.        Sincerely,        Sally Stover MD

## 2021-02-11 NOTE — PROGRESS NOTES
ORAL CHEMOTHERAPY DISCONTINUATION       Primary Oncologist:  Dr. Stover  Primary Oncology Clinic: [facility]  Cancer Diagnosis:  Breast cancer  Therapy History:  Xeloda started 11/2020. Stopped 2/11/2021  Reason For Discontinuation: disease progression    Additional Notes:  Changing to eribulin    Minoo Andre PharmD  February 11, 2021

## 2021-02-11 NOTE — PROGRESS NOTES
Chemo Teach  re: [place]  treatment plan for diagnosis: Breast Cancer   -See Pt Education documentation - Chemo Effects (Adult)  -Reviewed treatment schedule including cycle length, lab monitoring and take home medications.  -E mailed information to patient's daughter Emmie. Reviewed chemotherapy and side effects with Michelle over the telephone.   Verbal and written instruction provided using:     [employer] Qu[employer]  Drug Information   -Reviewed What Chemotherapy  Is Used For, How Chemotherapy  is Given, Side Effects, When to Contact Your Doctor of Health Care Provider and Self Care Tips sections.      [place] literature:   -Reviewed Getting Ready for Chemotherapy: What to Expect Before, During and After Your Treatment   -Reviewed Tips for Increasing Protein and Calories  -Reviewed Vascular Access Port Implantation with Power Port teaching education sheet   Danya Lara RN,BSN,OCN

## 2021-02-11 NOTE — PATIENT INSTRUCTIONS
1. Arrange for port placement.  2. Arrange for eribulin days 1 and 8 every 3 weeks for 2 cycles.  3. Lab draw days 1 and 8 every 3 weeks for 2 cycles.  4. RTC NP with cycle 1 day 8.  5. RTC MD with cycle 2 day 8.

## 2021-02-11 NOTE — PROGRESS NOTES
Michelle is a 65 year old who is being evaluated via a billable video visit.      How would you like to obtain your AVS? MyChart  If the video visit is dropped, the invitation should be resent by: Text to cell phone: [phone]  Will anyone else be joining your video visit? Yes:  . How would they like to receive their invitation? Send to e-mail at: [email or web address]      Video Start Time:    Video-Visit Details    Type of service:  Video Visit    Video End Time:   Originating Location (pt. Location): Home    Distant Location (provider location):  [facility] [place]     Platform used for Video Visit: Tristan Torres CMA

## 2021-02-12 ENCOUNTER — HOSPITAL ENCOUNTER (OUTPATIENT)
Dept: CARDIOLOGY | Facility: CLINIC | Age: 66
Discharge: HOME | End: 2021-02-12
Attending: INTERNAL MEDICINE | Admitting: INTERNAL MEDICINE
Payer: COMMERCIAL

## 2021-02-12 DIAGNOSIS — C50.411 MALIGNANT NEOPLASM OF UPPER-OUTER QUADRANT OF RIGHT BREAST IN FEMALE, ESTROGEN RECEPTOR POSITIVE (H): ICD-10-CM

## 2021-02-12 DIAGNOSIS — Z17.0 MALIGNANT NEOPLASM OF UPPER-OUTER QUADRANT OF RIGHT BREAST IN FEMALE, ESTROGEN RECEPTOR POSITIVE (H): ICD-10-CM

## 2021-02-12 DIAGNOSIS — Z11.59 ENCOUNTER FOR SCREENING FOR OTHER VIRAL DISEASES: ICD-10-CM

## 2021-02-12 LAB
SARS-COV-2 RNA RESP QL NAA+PROBE: NORMAL
SPECIMEN SOURCE: NORMAL

## 2021-02-12 PROCEDURE — U0003 INFECTIOUS AGENT DETECTION BY NUCLEIC ACID (DNA OR RNA); SEVERE ACUTE RESPIRATORY SYNDROME CORONAVIRUS 2 (SARS-COV-2) (CORONAVIRUS DISEASE [COVID-19]), AMPLIFIED PROBE TECHNIQUE, MAKING USE OF HIGH THROUGHPUT TECHNOLOGIES AS DESCRIBED BY CMS-2020-01-R: HCPCS | Performed by: INTERNAL MEDICINE

## 2021-02-12 PROCEDURE — 93005 ELECTROCARDIOGRAM TRACING: CPT

## 2021-02-12 PROCEDURE — U0005 INFEC AGEN DETEC AMPLI PROBE: HCPCS | Performed by: INTERNAL MEDICINE

## 2021-02-12 PROCEDURE — 93010 ELECTROCARDIOGRAM REPORT: CPT | Performed by: INTERNAL MEDICINE

## 2021-02-13 LAB
LABORATORY COMMENT REPORT: NORMAL
SARS-COV-2 RNA RESP QL NAA+PROBE: NEGATIVE
SPECIMEN SOURCE: NORMAL

## 2021-02-15 RX ORDER — ALBUTEROL SULFATE 0.83 MG/ML
2.5 SOLUTION RESPIRATORY (INHALATION)
Status: CANCELLED | OUTPATIENT
Start: 2021-02-25

## 2021-02-15 RX ORDER — EPINEPHRINE 1 MG/ML
0.3 INJECTION, SOLUTION, CONCENTRATE INTRAVENOUS EVERY 5 MIN PRN
Status: CANCELLED | OUTPATIENT
Start: 2021-02-18

## 2021-02-15 RX ORDER — ALBUTEROL SULFATE 90 UG/1
1-2 AEROSOL, METERED RESPIRATORY (INHALATION)
Status: CANCELLED
Start: 2021-02-25

## 2021-02-15 RX ORDER — EPINEPHRINE 1 MG/ML
0.3 INJECTION, SOLUTION, CONCENTRATE INTRAVENOUS EVERY 5 MIN PRN
Status: CANCELLED | OUTPATIENT
Start: 2021-02-25

## 2021-02-15 RX ORDER — DIPHENHYDRAMINE HYDROCHLORIDE 50 MG/ML
50 INJECTION INTRAMUSCULAR; INTRAVENOUS
Status: CANCELLED
Start: 2021-02-25

## 2021-02-15 RX ORDER — NALOXONE HYDROCHLORIDE 0.4 MG/ML
.1-.4 INJECTION, SOLUTION INTRAMUSCULAR; INTRAVENOUS; SUBCUTANEOUS
Status: CANCELLED | OUTPATIENT
Start: 2021-02-25

## 2021-02-15 RX ORDER — MEPERIDINE HYDROCHLORIDE 25 MG/ML
25 INJECTION INTRAMUSCULAR; INTRAVENOUS; SUBCUTANEOUS EVERY 30 MIN PRN
Status: CANCELLED | OUTPATIENT
Start: 2021-02-18

## 2021-02-15 RX ORDER — SODIUM CHLORIDE 9 MG/ML
1000 INJECTION, SOLUTION INTRAVENOUS CONTINUOUS PRN
Status: CANCELLED
Start: 2021-02-25

## 2021-02-15 RX ORDER — NALOXONE HYDROCHLORIDE 0.4 MG/ML
.1-.4 INJECTION, SOLUTION INTRAMUSCULAR; INTRAVENOUS; SUBCUTANEOUS
Status: CANCELLED | OUTPATIENT
Start: 2021-02-18

## 2021-02-15 RX ORDER — HEPARIN SODIUM,PORCINE 10 UNIT/ML
5 VIAL (ML) INTRAVENOUS
Status: CANCELLED | OUTPATIENT
Start: 2021-02-18

## 2021-02-15 RX ORDER — MEPERIDINE HYDROCHLORIDE 25 MG/ML
25 INJECTION INTRAMUSCULAR; INTRAVENOUS; SUBCUTANEOUS EVERY 30 MIN PRN
Status: CANCELLED | OUTPATIENT
Start: 2021-02-25

## 2021-02-15 RX ORDER — HEPARIN SODIUM (PORCINE) LOCK FLUSH IV SOLN 100 UNIT/ML 100 UNIT/ML
5 SOLUTION INTRAVENOUS EVERY 8 HOURS PRN
Status: CANCELLED | OUTPATIENT
Start: 2021-02-25

## 2021-02-15 RX ORDER — HEPARIN SODIUM (PORCINE) LOCK FLUSH IV SOLN 100 UNIT/ML 100 UNIT/ML
5 SOLUTION INTRAVENOUS EVERY 8 HOURS PRN
Status: CANCELLED | OUTPATIENT
Start: 2021-02-18

## 2021-02-15 RX ORDER — LORAZEPAM 2 MG/ML
0.5 INJECTION INTRAMUSCULAR EVERY 4 HOURS PRN
Status: CANCELLED
Start: 2021-02-25

## 2021-02-15 RX ORDER — ALBUTEROL SULFATE 0.83 MG/ML
2.5 SOLUTION RESPIRATORY (INHALATION)
Status: CANCELLED | OUTPATIENT
Start: 2021-02-18

## 2021-02-15 RX ORDER — HEPARIN SODIUM,PORCINE 10 UNIT/ML
5 VIAL (ML) INTRAVENOUS
Status: CANCELLED | OUTPATIENT
Start: 2021-02-25

## 2021-02-15 RX ORDER — ALBUTEROL SULFATE 90 UG/1
1-2 AEROSOL, METERED RESPIRATORY (INHALATION)
Status: CANCELLED
Start: 2021-02-18

## 2021-02-15 RX ORDER — LORAZEPAM 2 MG/ML
0.5 INJECTION INTRAMUSCULAR EVERY 4 HOURS PRN
Status: CANCELLED
Start: 2021-02-18

## 2021-02-15 RX ORDER — DIPHENHYDRAMINE HYDROCHLORIDE 50 MG/ML
50 INJECTION INTRAMUSCULAR; INTRAVENOUS
Status: CANCELLED
Start: 2021-02-18

## 2021-02-15 RX ORDER — SODIUM CHLORIDE 9 MG/ML
1000 INJECTION, SOLUTION INTRAVENOUS CONTINUOUS PRN
Status: CANCELLED
Start: 2021-02-18

## 2021-02-16 ENCOUNTER — HOSPITAL ENCOUNTER (OUTPATIENT)
Facility: CLINIC | Age: 66
Discharge: HOME | End: 2021-02-16
Attending: RADIOLOGY | Admitting: RADIOLOGY
Payer: COMMERCIAL

## 2021-02-16 ENCOUNTER — TELEPHONE (OUTPATIENT)
Dept: ONCOLOGY | Facility: CLINIC | Age: 66
End: 2021-02-16

## 2021-02-16 ENCOUNTER — APPOINTMENT (OUTPATIENT)
Dept: INTERVENTIONAL RADIOLOGY/VASCULAR | Facility: CLINIC | Age: 66
End: 2021-02-16
Attending: INTERNAL MEDICINE
Payer: COMMERCIAL

## 2021-02-16 VITALS
TEMPERATURE: 95.5 F | OXYGEN SATURATION: 100 % | DIASTOLIC BLOOD PRESSURE: 89 MMHG | SYSTOLIC BLOOD PRESSURE: 129 MMHG | RESPIRATION RATE: 20 BRPM | HEART RATE: 65 BPM

## 2021-02-16 DIAGNOSIS — C50.411 MALIGNANT NEOPLASM OF UPPER-OUTER QUADRANT OF RIGHT BREAST IN FEMALE, ESTROGEN RECEPTOR POSITIVE (H): ICD-10-CM

## 2021-02-16 DIAGNOSIS — Z17.0 MALIGNANT NEOPLASM OF UPPER-OUTER QUADRANT OF RIGHT BREAST IN FEMALE, ESTROGEN RECEPTOR POSITIVE (H): ICD-10-CM

## 2021-02-16 LAB — INTERPRETATION ECG - MUSE: NORMAL

## 2021-02-16 PROCEDURE — C1788 PORT, INDWELLING, IMP: HCPCS

## 2021-02-16 PROCEDURE — 250N000011 HC RX IP 250 OP 636: Performed by: RADIOLOGY

## 2021-02-16 PROCEDURE — 272N000504 HC NEEDLE CR4

## 2021-02-16 PROCEDURE — 77001 FLUOROGUIDE FOR VEIN DEVICE: CPT

## 2021-02-16 PROCEDURE — 250N000009 HC RX 250: Performed by: RADIOLOGY

## 2021-02-16 PROCEDURE — 250N000009 HC RX 250

## 2021-02-16 PROCEDURE — 272N000604 HC WOUND GLUE CR3

## 2021-02-16 PROCEDURE — 76937 US GUIDE VASCULAR ACCESS: CPT

## 2021-02-16 PROCEDURE — 36561 INSERT TUNNELED CV CATH: CPT

## 2021-02-16 PROCEDURE — 99152 MOD SED SAME PHYS/QHP 5/>YRS: CPT

## 2021-02-16 PROCEDURE — 250N000011 HC RX IP 250 OP 636

## 2021-02-16 RX ORDER — LORAZEPAM 0.5 MG/1
0.5 TABLET ORAL EVERY 4 HOURS PRN
Qty: 30 TABLET | Refills: 2 | Status: SHIPPED | OUTPATIENT
Start: 2021-02-17 | End: 2021-12-20

## 2021-02-16 RX ORDER — NALOXONE HYDROCHLORIDE 0.4 MG/ML
0.2 INJECTION, SOLUTION INTRAMUSCULAR; INTRAVENOUS; SUBCUTANEOUS
Status: DISCONTINUED | OUTPATIENT
Start: 2021-02-16 | End: 2021-02-16 | Stop reason: HOSPADM

## 2021-02-16 RX ORDER — DEXTROSE MONOHYDRATE 25 G/50ML
25-50 INJECTION, SOLUTION INTRAVENOUS
Status: CANCELLED | OUTPATIENT
Start: 2021-02-16

## 2021-02-16 RX ORDER — LORAZEPAM 1 MG/1
1 TABLET ORAL EVERY 6 HOURS PRN
COMMUNITY
End: 2021-02-25

## 2021-02-16 RX ORDER — HEPARIN SODIUM 1000 [USP'U]/ML
INJECTION, SOLUTION INTRAVENOUS; SUBCUTANEOUS
Status: COMPLETED
Start: 2021-02-16 | End: 2021-02-16

## 2021-02-16 RX ORDER — FLUMAZENIL 0.1 MG/ML
0.2 INJECTION, SOLUTION INTRAVENOUS
Status: DISCONTINUED | OUTPATIENT
Start: 2021-02-16 | End: 2021-02-16 | Stop reason: HOSPADM

## 2021-02-16 RX ORDER — NALOXONE HYDROCHLORIDE 0.4 MG/ML
0.4 INJECTION, SOLUTION INTRAMUSCULAR; INTRAVENOUS; SUBCUTANEOUS
Status: DISCONTINUED | OUTPATIENT
Start: 2021-02-16 | End: 2021-02-16 | Stop reason: HOSPADM

## 2021-02-16 RX ORDER — LIDOCAINE HYDROCHLORIDE 10 MG/ML
INJECTION, SOLUTION EPIDURAL; INFILTRATION; INTRACAUDAL; PERINEURAL
Status: DISCONTINUED
Start: 2021-02-16 | End: 2021-02-16 | Stop reason: HOSPADM

## 2021-02-16 RX ORDER — NICOTINE POLACRILEX 4 MG
15-30 LOZENGE BUCCAL
Status: CANCELLED | OUTPATIENT
Start: 2021-02-16

## 2021-02-16 RX ORDER — PROCHLORPERAZINE MALEATE 10 MG
10 TABLET ORAL EVERY 6 HOURS PRN
Qty: 30 TABLET | Refills: 2 | Status: SHIPPED | OUTPATIENT
Start: 2021-02-17 | End: 2021-02-18

## 2021-02-16 RX ORDER — CLINDAMYCIN PHOSPHATE 900 MG/50ML
INJECTION, SOLUTION INTRAVENOUS
Status: DISCONTINUED
Start: 2021-02-16 | End: 2021-02-16 | Stop reason: HOSPADM

## 2021-02-16 RX ORDER — LIDOCAINE 40 MG/G
CREAM TOPICAL
Status: CANCELLED | OUTPATIENT
Start: 2021-02-16

## 2021-02-16 RX ORDER — FENTANYL CITRATE 50 UG/ML
25-50 INJECTION, SOLUTION INTRAMUSCULAR; INTRAVENOUS EVERY 5 MIN PRN
Status: DISCONTINUED | OUTPATIENT
Start: 2021-02-16 | End: 2021-02-16 | Stop reason: HOSPADM

## 2021-02-16 RX ORDER — LIDOCAINE 40 MG/G
CREAM TOPICAL
Status: DISCONTINUED | OUTPATIENT
Start: 2021-02-16 | End: 2021-02-16 | Stop reason: HOSPADM

## 2021-02-16 RX ORDER — CLINDAMYCIN PHOSPHATE 900 MG/50ML
900 INJECTION, SOLUTION INTRAVENOUS
Status: COMPLETED | OUTPATIENT
Start: 2021-02-16 | End: 2021-02-16

## 2021-02-16 RX ORDER — NICOTINE POLACRILEX 4 MG
15-30 LOZENGE BUCCAL
Status: DISCONTINUED | OUTPATIENT
Start: 2021-02-16 | End: 2021-02-16 | Stop reason: HOSPADM

## 2021-02-16 RX ORDER — LIDOCAINE HYDROCHLORIDE AND EPINEPHRINE 10; 10 MG/ML; UG/ML
INJECTION, SOLUTION INFILTRATION; PERINEURAL
Status: COMPLETED
Start: 2021-02-16 | End: 2021-02-16

## 2021-02-16 RX ORDER — FENTANYL CITRATE 50 UG/ML
INJECTION, SOLUTION INTRAMUSCULAR; INTRAVENOUS
Status: DISCONTINUED
Start: 2021-02-16 | End: 2021-02-16 | Stop reason: HOSPADM

## 2021-02-16 RX ORDER — DEXTROSE MONOHYDRATE 25 G/50ML
25-50 INJECTION, SOLUTION INTRAVENOUS
Status: DISCONTINUED | OUTPATIENT
Start: 2021-02-16 | End: 2021-02-16 | Stop reason: HOSPADM

## 2021-02-16 RX ADMIN — MIDAZOLAM 0.5 MG: 1 INJECTION INTRAMUSCULAR; INTRAVENOUS at 08:44

## 2021-02-16 RX ADMIN — MIDAZOLAM 0.5 MG: 1 INJECTION INTRAMUSCULAR; INTRAVENOUS at 08:41

## 2021-02-16 RX ADMIN — LIDOCAINE HYDROCHLORIDE 6 ML: 10 INJECTION, SOLUTION EPIDURAL; INFILTRATION; INTRACAUDAL; PERINEURAL at 09:15

## 2021-02-16 RX ADMIN — LIDOCAINE HYDROCHLORIDE,EPINEPHRINE BITARTRATE 10 ML: 10; .01 INJECTION, SOLUTION INFILTRATION; PERINEURAL at 09:16

## 2021-02-16 RX ADMIN — HEPARIN SODIUM 500 UNITS: 1000 INJECTION INTRAVENOUS; SUBCUTANEOUS at 09:07

## 2021-02-16 RX ADMIN — MIDAZOLAM 0.5 MG: 1 INJECTION INTRAMUSCULAR; INTRAVENOUS at 09:02

## 2021-02-16 RX ADMIN — CLINDAMYCIN IN 5 PERCENT DEXTROSE 900 MG: 18 INJECTION, SOLUTION INTRAVENOUS at 08:40

## 2021-02-16 RX ADMIN — FENTANYL CITRATE 50 MCG: 50 INJECTION, SOLUTION INTRAMUSCULAR; INTRAVENOUS at 09:01

## 2021-02-16 NOTE — TELEPHONE ENCOUNTER
Script for Lorazepam and compazine sent to [facility] pharmacy in [place]. Michelle is aware and she will . Danya Lara RN,BSN,OCN

## 2021-02-16 NOTE — IP AVS SNAPSHOT
MRN:[id number]                      After Visit Summary   2/16/2021    Flor Griffin    MRN: [id number]           Visit Information        Department      2/16/2021  6:49 AM [facility] Cath Lab          Review of your medicines      UNREVIEWED medicines. Ask your doctor about these medicines       Dose / Directions   ADVIL PO      Dose: 400 mg  Take 400 mg by mouth every 6 hours as needed for moderate pain  Refills: 0     aspirin 81 MG tablet  Commonly known as: ASA      Dose: 81 mg  Take 81 mg by mouth daily  Refills: 0     capecitabine 500 MG tablet  Commonly known as: XELODA  Used for: Malignant neoplasm of upper-outer quadrant of right breast in female, estrogen receptor positive (H)      Dose: 1,000 mg/m2  Take 4 tablets (2,000 mg) by mouth 2 times daily for 14 days Days 1 through 14, then off for 7 days.  Quantity: 112 tablet  Refills: 0     LORazepam 1 MG tablet  Commonly known as: ATIVAN      Dose: 1 mg  Take 1 mg by mouth every 6 hours as needed for anxiety Dose is per patient  Refills: 0     multivitamin w/minerals tablet      Dose: 1 tablet  Take 1 tablet by mouth daily  Refills: 0     prochlorperazine 10 MG tablet  Commonly known as: COMPAZINE  Used for: Malignant neoplasm of upper-outer quadrant of right breast in female, estrogen receptor positive (H)      Dose: 5-10 mg  Take 0.5-1 tablets (5-10 mg) by mouth every 6 hours as needed for nausea or vomiting  Quantity: 30 tablet  Refills: 1        CONTINUE these medicines which have NOT CHANGED       Dose / Directions   OMEGA-3 FISH OIL PO      Dose: 1 g  Take 1 g by mouth daily  Refills: 0              Protect others around you: Learn how to safely use, store and throw away your medicines at www.disposemymeds.org.       Follow-ups after your visit       Your next 10 appointments already scheduled    Feb 18, 2021  8:15 AM  Lab Central with  LAB DRAW 1  [facility] ([facility]  [facility] ) [facility]  [address]  [facility] [address]  [phone]      Feb 18, 2021  9:00 AM  Level 2 with RH INFUSION CHAIR 1  [facility] ([facility] ) [facility]  [address]  [facility] [address]  [phone]      Feb 25, 2021  9:00 AM  Return Visit with Jennifer Garcia PA-C  [facility] ([facility] ) [address]  [facility] [address]  [phone]      Feb 25, 2021  9:30 AM  Level 1 with RH INFUSION CHAIR 1  [facility] ([facility] ) [facility]  [address]  [facility] [address]  [phone]      Mar 11, 2021  9:00 AM  Level 2 with RH INFUSION CHAIR 8  [facility] ([facility] ) [facility]  [address]  [facility] [address]  [phone]      Mar 18, 2021  8:20 AM  Video Visit with Sally Stover MD  [facility] ([facility] ) [address]  [facility] [id number] [phone]   Please Note: This is a virtual visit; there is no need to come to the facility.       Mar 18, 2021 11:00 AM  Level 2 with RH INFUSION CHAIR 9  [facility] ([facility] ) [facility]  [address]  [facility] [address] [phone]         Care Instructions       Further instructions from your care team         Going Home after Your Port Is Inserted  _______________________________________    Patient Name: Flor BARTHOLOMEW Griffin  Today's Date: February 16, 2021    The doctor who inserted your port was:  DR. Do at [facility]  [facility]    Have your port site and/or neck wound checked on:  2/18/2021     Go to:  Interventional Radiology    Your port may be accessed right away. A nurse needs to flush your port every 30 days or after each use.     When you get home:    You should have an adult with you for the first 6 hours.    No driving or drinking alcohol until tomorrow. You may still have side effects from the medicine you received today (you may feel drowsy, unsteady or forgetful).     You may go back to your regular diet today.     If you take aspirin or Plavix, you may begin taking it again tomorrow. You may restart all other medicines today. Use pain medicine as directed.    Avoid heavy lifting or the overuse of your shoulder for three days.    Caring for the port site and/or neck wound:    Keep the port site clean and dry. Cover the site with plastic before taking a shower. Do this until the site has healed.     Keep the bandage on your port site for three days. Change it if it gets wet or dirty. After three days, you may use Band-Aids until the wound has healed.    For a neck wound, leave the tape on for three days. You may cover it with a Band-Aid for comfort.     If you have oozing or bleeding from the port site or from the cut in your neck:     Put direct pressure on the wound for 5 to 10 minutes with a gauze pad.  If you still have bleeding after 10 minutes, call your doctor.    If you are bleeding a lot and can't control it with direct pressure, call 911.    Call your doctor if you have:    Bleeding from a wound after 10 minutes of direct pressure.    Swelling in your neck or over your port site.    Signs of infection: a fever over 100 F (37.8 C) under the tongue; the site is red, tender or draining.      Additional Information About Your Visit       [employer] Information    [email or web address] lets you send messages to your doctor, view your test results, renew your prescriptions, schedule appointments and more. To sign up, go to  [email or web address] . Click on \"Log in\" on the left side of the screen, which will take you to the Welcome page. Then click on \"Sign up Now\" on the right side of the page.     You will be asked to enter the access code listed below, as well as some personal information. Please follow the directions to create your username and password.     Your access code is: [email or web address]  Expires: 3/1/2021  5:19 AM     Your access code will  in 60 days. If you need help or a new code, please call your   [facility] clinic or [phone].       Care EveryWhere ID    This is your Care EveryWhere ID. This could be used by other organizations to access your [facility] medical records  [id number]       Your Vitals Were  Most recent update: 2021  9:13 AM    Blood Pressure   131/69 (BP Location: Left arm)          Pulse   78          Temperature   96.8  F (36  C) (Temporal)          Respirations   23          Last Period   2004 (Approximate)             Pulse Oximetry   98%          Primary Care Provider Office Phone # Fax #    [facility] [phone]      Equal Access to Services    MARYJANE JARA AH: Hadii nelida copeland hadasho Soomaali, waaxda luqadaha, qaybta kaalmada adeegyada, ketan connor. So [facility] [phone].    ATENCIÓN: Si habla español, tiene a thomas disposición servicios gratuitos de asistencia lingüística. Ngocame al [phone].    We comply with applicable federal and state civil rights laws, including the [place] Human Rights Act. We do not discriminate on the basis of race, color, creed, Voodoo, national origin, marital status, age, disability, sex, sexual orientation, or gender identity.    If you would like an itemization of your charges they will now be available in [facility] 30 days after discharge. To access the itemized statements in [facility] go to billing/billing summary. From there select view account. There will be multiple tabs " showing an overview of your account, detail, payments, and communications. From the communications tab you can see your monthly statements, your itemized statements, and any billing letters generated for your account. If you do not have a [employer] account and need help getting access please contact [employer] support at [phone].  If you would prefer to have your itemized statements mailed please contact our automated itemized bill request line at [phone] option  2.       Thank you!    Thank you for choosing [facility] for your care. Our goal is always to provide you with excellent care. Hearing back from our patients is one way we can continue to improve our services. Please take a few minutes to complete the written survey that you may receive in the mail after you visit. If you would like to speak to someone directly about your visit please contact Patient Relations at [phone]. Thank you!              Medication List      Medications          Morning Afternoon Evening Bedtime As Needed    OMEGA-3 FISH OIL PO  INSTRUCTIONS: Take 1 g by mouth daily                       ASK your doctor about these medications          Morning Afternoon Evening Bedtime As Needed    ADVIL PO  INSTRUCTIONS: Take 400 mg by mouth every 6 hours as needed for moderate pain                     aspirin 81 MG tablet  Also known as: ASA  INSTRUCTIONS: Take 81 mg by mouth daily                     capecitabine 500 MG tablet  Also known as: XELODA  INSTRUCTIONS: Take 4 tablets (2,000 mg) by mouth 2 times daily for 14 days Days 1 through 14, then off for 7 days.                     LORazepam 1 MG tablet  Also known as: ATIVAN  INSTRUCTIONS: Take 1 mg by mouth every 6 hours as needed for anxiety Dose is per patient                     multivitamin w/minerals tablet  INSTRUCTIONS: Take 1 tablet by mouth daily                     prochlorperazine 10 MG tablet  Also known as: COMPAZINE  INSTRUCTIONS: Take 0.5-1 tablets (5-10  mg) by mouth every 6 hours as needed for nausea or vomiting

## 2021-02-16 NOTE — DISCHARGE INSTRUCTIONS
Going Home after Your Port Is Inserted  _______________________________________    Patient Name: Flor Griffin  Today's Date: February 16, 2021    The doctor who inserted your port was:  DR. Do at [facility]    Have your port site and/or neck wound checked on:  2/18/2021     Go to:  Interventional Radiology    Your port may be accessed right away. A nurse needs to flush your port every 30 days or after each use.     When you get home:    You should have an adult with you for the first 6 hours.    No driving or drinking alcohol until tomorrow. You may still have side effects from the medicine you received today (you may feel drowsy, unsteady or forgetful).     You may go back to your regular diet today.     If you take aspirin or Plavix, you may begin taking it again tomorrow. You may restart all other medicines today. Use pain medicine as directed.    Avoid heavy lifting or the overuse of your shoulder for three days.    Caring for the port site and/or neck wound:    Keep the port site clean and dry. Cover the site with plastic before taking a shower. Do this until the site has healed.     Keep the bandage on your port site for three days. Change it if it gets wet or dirty. After three days, you may use Band-Aids until the wound has healed.    For a neck wound, leave the tape on for three days. You may cover it with a Band-Aid for comfort.     If you have oozing or bleeding from the port site or from the cut in your neck:     Put direct pressure on the wound for 5 to 10 minutes with a gauze pad.  If you still have bleeding after 10 minutes, call your doctor.    If you are bleeding a lot and can't control it with direct pressure, call 911.    Call your doctor if you have:    Bleeding from a wound after 10 minutes of direct pressure.    Swelling in your neck or over your port site.    Signs of infection: a fever over 100 F (37.8 C) under the tongue; the site is red, tender or draining.

## 2021-02-16 NOTE — IP AVS SNAPSHOT
[facility]  [address]  [facility] [address]  Phone: [phone]                                    After Visit Summary   2/16/2021    Flor Griffin    MRN: [id number]           After Visit Summary Signature Page    I have received my discharge instructions, and my questions have been answered. I have discussed any challenges I see with this plan with the nurse or doctor.    ..........................................................................................................................................  Patient/Patient Representative Signature      ..........................................................................................................................................  Patient Representative Print Name and Relationship to Patient    ..................................................               ................................................  Date                                   Time    ..........................................................................................................................................  Reviewed by Signature/Title    ...................................................              ..............................................  Date                                               Time          22EPIC Rev 08/18

## 2021-02-16 NOTE — PRE-PROCEDURE
GENERAL PRE-PROCEDURE:   Procedure:  Port placement.   Date/Time:  2/16/2021 8:30 AM    Verbal consent obtained?: Yes    Written consent obtained?: Yes    Risks and benefits: Risks, benefits and alternatives were discussed    Consent given by:  Patient  Patient states understanding of procedure being performed: Yes    Patient's understanding of procedure matches consent: Yes    Procedure consent matches procedure scheduled: Yes    Expected level of sedation:  Moderate  Appropriately NPO:  Yes  ASA Class:  Class 2- mild systemic disease, no acute problems, no functional limitations  Mallampati  :  Grade 2- soft palate, base of uvula, tonsillar pillars, and portion of posterior pharyngeal wall visible  Lungs:  Lungs clear with good breath sounds bilaterally  Heart:  Normal heart sounds and rate  History & Physical reviewed:  History and physical reviewed and no updates needed  Statement of review:  I have reviewed the lab findings, diagnostic data, medications, and the plan for sedation

## 2021-02-16 NOTE — TELEPHONE ENCOUNTER
Michelle called to request a refill of her lorazepam.  Last prescribed by Buster Menon on 1/5/2021.  She states she has 5 pills remaining and usually only takes one tablet daily at night.    She states she start chemo soon and she would like to get the refill before she starts chemo.    Lorazepam is included in her treatment plan take-home medications, but it appears to be local print not e-scribed.    Routing to CC Danya.    Woody Harding, VYN, RN, OCN  Oncology Care Coordinator  [facility]

## 2021-02-16 NOTE — TELEPHONE ENCOUNTER
Patient's daughter Emmie called clinic with questions regarding Michelle's Halaven  treatment. Explained treatment to Emmie and she will speak with her father regarding treatment. Michelle will start Halaven treatment on 2/18/21.  Emmie has questions about genetic counseling . Writer will follow up with Dr. Stover and get back to Emmie. Danya Lara RN,BSN,OCN

## 2021-02-16 NOTE — PROGRESS NOTES
Patient is alert/oriented x4 able to ambulate independently. Tolerated oral intake. Discharge instructions provided with questions answered.

## 2021-02-16 NOTE — PROCEDURES
[facility]    Procedure: Port placement    Date/Time: 2/16/2021 10:53 AM  Performed by: Domenica Do DO  Authorized by: Domencia Do DO     UNIVERSAL PROTOCOL   Site Marked: Yes  Prior Images Obtained and Reviewed:  Yes  Required items: Required blood products, implants, devices and special equipment available    Patient identity confirmed:  Verbally with patient, arm band, provided demographic data and hospital-assigned identification number  Patient was reevaluated immediately before administering moderate or deep sedation or anesthesia  Confirmation Checklist:  Patient's identity using two indicators, relevant allergies, procedure was appropriate and matched the consent or emergent situation and correct equipment/implants were available  Time out: Immediately prior to the procedure a time out was called    Universal Protocol: the Joint Commission Universal Protocol was followed    Preparation: Patient was prepped and draped in usual sterile fashion           ANESTHESIA    Anesthesia: Local infiltration  Local Anesthetic:  Lidocaine 1% without epinephrine      SEDATION    Patient Sedated: Yes    Sedation Type:  Moderate (conscious) sedation  Vital signs: Vital signs monitored during sedation    See dictated procedure note for full details.  Findings: Port placement    Specimens: none    Complications: None    Condition: Stable    Plan: Ok to use.     PROCEDURE   Patient Tolerance:  Patient tolerated the procedure well with no immediate complications    Length of time physician/provider present for 1:1 monitoring during sedation: 20

## 2021-02-17 ENCOUNTER — TELEPHONE (OUTPATIENT)
Dept: ONCOLOGY | Facility: CLINIC | Age: 66
End: 2021-02-17

## 2021-02-17 DIAGNOSIS — Z17.0 MALIGNANT NEOPLASM OF UPPER-OUTER QUADRANT OF RIGHT BREAST IN FEMALE, ESTROGEN RECEPTOR POSITIVE (H): Primary | ICD-10-CM

## 2021-02-17 DIAGNOSIS — C50.411 MALIGNANT NEOPLASM OF UPPER-OUTER QUADRANT OF RIGHT BREAST IN FEMALE, ESTROGEN RECEPTOR POSITIVE (H): Primary | ICD-10-CM

## 2021-02-17 NOTE — TELEPHONE ENCOUNTER
Michelle's  Daughter Emmie  called clinic inquiring about further genetic testing.  Michelle stated that she was negative for BRCA but stated she is open to meet with genetic counselor again to see if she may need further genetic testing. Genetic referral will be placed. Michelle will inform her daughter Emmie. Danya Lara RN,BSN,OCN

## 2021-02-18 ENCOUNTER — TELEPHONE (OUTPATIENT)
Dept: ONCOLOGY | Facility: CLINIC | Age: 66
End: 2021-02-18

## 2021-02-18 ENCOUNTER — OFFICE VISIT (OUTPATIENT)
Dept: INFUSION THERAPY | Facility: CLINIC | Age: 66
End: 2021-02-18
Attending: INTERNAL MEDICINE
Payer: COMMERCIAL

## 2021-02-18 ENCOUNTER — HOSPITAL ENCOUNTER (OUTPATIENT)
Facility: CLINIC | Age: 66
Setting detail: SPECIMEN
Discharge: HOME | End: 2021-02-18
Attending: INTERNAL MEDICINE | Admitting: INTERNAL MEDICINE
Payer: COMMERCIAL

## 2021-02-18 VITALS
HEART RATE: 74 BPM | HEIGHT: 66 IN | TEMPERATURE: 98.3 F | DIASTOLIC BLOOD PRESSURE: 81 MMHG | BODY MASS INDEX: 29.12 KG/M2 | SYSTOLIC BLOOD PRESSURE: 132 MMHG | OXYGEN SATURATION: 97 % | RESPIRATION RATE: 18 BRPM | WEIGHT: 181.2 LBS

## 2021-02-18 DIAGNOSIS — C50.411 MALIGNANT NEOPLASM OF UPPER-OUTER QUADRANT OF RIGHT BREAST IN FEMALE, ESTROGEN RECEPTOR POSITIVE (H): ICD-10-CM

## 2021-02-18 DIAGNOSIS — R59.1 LYMPHADENOPATHY: Primary | ICD-10-CM

## 2021-02-18 DIAGNOSIS — Z17.0 MALIGNANT NEOPLASM OF UPPER-OUTER QUADRANT OF RIGHT BREAST IN FEMALE, ESTROGEN RECEPTOR POSITIVE (H): ICD-10-CM

## 2021-02-18 LAB
ALBUMIN SERPL-MCNC: 3.4 G/DL (ref 3.4–5)
ALP SERPL-CCNC: 147 U/L (ref 40–150)
ALT SERPL W P-5'-P-CCNC: 116 U/L (ref 0–50)
ANION GAP SERPL CALCULATED.3IONS-SCNC: 4 MMOL/L (ref 3–14)
AST SERPL W P-5'-P-CCNC: 68 U/L (ref 0–45)
BASOPHILS # BLD AUTO: 0 10E9/L (ref 0–0.2)
BASOPHILS NFR BLD AUTO: 0.8 %
BILIRUB SERPL-MCNC: 1.2 MG/DL (ref 0.2–1.3)
BUN SERPL-MCNC: 14 MG/DL (ref 7–30)
CALCIUM SERPL-MCNC: 8.9 MG/DL (ref 8.5–10.1)
CANCER AG27-29 SERPL-ACNC: 15 U/ML (ref 0–39)
CEA SERPL-MCNC: 1.3 UG/L (ref 0–2.5)
CHLORIDE SERPL-SCNC: 107 MMOL/L (ref 94–109)
CO2 SERPL-SCNC: 28 MMOL/L (ref 20–32)
CREAT SERPL-MCNC: 0.66 MG/DL (ref 0.52–1.04)
DIFFERENTIAL METHOD BLD: ABNORMAL
EOSINOPHIL # BLD AUTO: 0.1 10E9/L (ref 0–0.7)
EOSINOPHIL NFR BLD AUTO: 2.7 %
ERYTHROCYTE [DISTWIDTH] IN BLOOD BY AUTOMATED COUNT: 16 % (ref 10–15)
GFR SERPL CREATININE-BSD FRML MDRD: >90 ML/MIN/{1.73_M2}
GLUCOSE SERPL-MCNC: 106 MG/DL (ref 70–99)
HCT VFR BLD AUTO: 34.6 % (ref 35–47)
HGB BLD-MCNC: 11.7 G/DL (ref 11.7–15.7)
IMM GRANULOCYTES # BLD: 0 10E9/L (ref 0–0.4)
IMM GRANULOCYTES NFR BLD: 0.3 %
LYMPHOCYTES # BLD AUTO: 0.8 10E9/L (ref 0.8–5.3)
LYMPHOCYTES NFR BLD AUTO: 21 %
MAGNESIUM SERPL-MCNC: 2 MG/DL (ref 1.6–2.3)
MCH RBC QN AUTO: 39.5 PG (ref 26.5–33)
MCHC RBC AUTO-ENTMCNC: 33.8 G/DL (ref 31.5–36.5)
MCV RBC AUTO: 117 FL (ref 78–100)
MONOCYTES # BLD AUTO: 0.4 10E9/L (ref 0–1.3)
MONOCYTES NFR BLD AUTO: 11 %
NEUTROPHILS # BLD AUTO: 2.4 10E9/L (ref 1.6–8.3)
NEUTROPHILS NFR BLD AUTO: 64.2 %
NRBC # BLD AUTO: 0 10*3/UL
NRBC BLD AUTO-RTO: 0 /100
PLATELET # BLD AUTO: 174 10E9/L (ref 150–450)
POTASSIUM SERPL-SCNC: 3.7 MMOL/L (ref 3.4–5.3)
PROT SERPL-MCNC: 6.2 G/DL (ref 6.8–8.8)
RBC # BLD AUTO: 2.96 10E12/L (ref 3.8–5.2)
SODIUM SERPL-SCNC: 139 MMOL/L (ref 133–144)
WBC # BLD AUTO: 3.7 10E9/L (ref 4–11)

## 2021-02-18 PROCEDURE — 96409 CHEMO IV PUSH SNGL DRUG: CPT

## 2021-02-18 PROCEDURE — 250N000011 HC RX IP 250 OP 636: Performed by: INTERNAL MEDICINE

## 2021-02-18 PROCEDURE — 80053 COMPREHEN METABOLIC PANEL: CPT | Performed by: INTERNAL MEDICINE

## 2021-02-18 PROCEDURE — 258N000003 HC RX IP 258 OP 636: Performed by: INTERNAL MEDICINE

## 2021-02-18 PROCEDURE — 82378 CARCINOEMBRYONIC ANTIGEN: CPT | Performed by: INTERNAL MEDICINE

## 2021-02-18 PROCEDURE — 85025 COMPLETE CBC W/AUTO DIFF WBC: CPT | Performed by: INTERNAL MEDICINE

## 2021-02-18 PROCEDURE — 96375 TX/PRO/DX INJ NEW DRUG ADDON: CPT

## 2021-02-18 PROCEDURE — 86300 IMMUNOASSAY TUMOR CA 15-3: CPT | Performed by: INTERNAL MEDICINE

## 2021-02-18 PROCEDURE — 83735 ASSAY OF MAGNESIUM: CPT | Performed by: INTERNAL MEDICINE

## 2021-02-18 RX ADMIN — SODIUM CHLORIDE 250 ML: 9 INJECTION, SOLUTION INTRAVENOUS at 09:10

## 2021-02-18 RX ADMIN — DEXAMETHASONE SODIUM PHOSPHATE 12 MG: 10 INJECTION, SOLUTION INTRAMUSCULAR; INTRAVENOUS at 09:10

## 2021-02-18 RX ADMIN — ERIBULIN MESYLATE 2.7 MG: 0.5 INJECTION INTRAVENOUS at 09:30

## 2021-02-18 ASSESSMENT — MIFFLIN-ST. JEOR: SCORE: 1383.67

## 2021-02-18 NOTE — TELEPHONE ENCOUNTER
VM mailbox is not set up, cannot leave a message about scheduling genetic testing per Danya's staff message.

## 2021-02-18 NOTE — LETTER
2/18/2021         RE: Flor Griffin  [address] [facility] [address]        Dear Colleague,    Thank you for referring your patient, Flor Griffin, to the [facility]. Please see a copy of my visit note below.    Nursing Note:  Flor Griffin presents today for port draw prior to infusion.    Patient seen by provider today: No   present during visit today: Not Applicable.    Note: Patient had port placed 2/16. Tegaderm dressing placed 2/16 still intact. Patient reports itching. Tegaderm removed for port access and red area on top left corner outlining where dressing was. Used IV 3000 dressing today and gauze. Steri strips intact. No bruising.    Intravenous Access:  Labs drawn without difficulty.  Implanted Port.    Discharge Plan:   [facility]    Ericka Ge RN                Again, thank you for allowing me to participate in the care of your patient.        Sincerely,         Lab Draw 1

## 2021-02-18 NOTE — PROGRESS NOTES
Nursing Note:  Flor BARTHOLOMEW Griffin presents today for port draw prior to infusion.    Patient seen by provider today: No   present during visit today: Not Applicable.    Note: Patient had port placed 2/16. Tegaderm dressing placed 2/16 still intact. Patient reports itching. Tegaderm removed for port access and red area on top left corner outlining where dressing was. Used IV 3000 dressing today and gauze. Steri strips intact. No bruising.    Intravenous Access:  Labs drawn without difficulty.  Implanted Port.    Discharge Plan:   Patient was sent to [facility] for infusion appointment.    Ericka Ge RN

## 2021-02-18 NOTE — PROGRESS NOTES
Infusion Nursing Note:  Flormarkos Griffin presents today for C1D1 Halaven.    Patient seen by provider today: No   present during visit today: Not Applicable.    Note: Halaven education completed with patient.  Questions answered.      Intravenous Access:  Implanted Port.    Treatment Conditions:  Lab Results   Component Value Date    HGB 11.7 02/18/2021     Lab Results   Component Value Date    WBC 3.7 02/18/2021      Lab Results   Component Value Date    ANEU 2.4 02/18/2021     Lab Results   Component Value Date     02/18/2021      Results reviewed, labs MET treatment parameters, ok to proceed with treatment.    EKG 2/12 NSR    Post Infusion Assessment:  Patient tolerated infusion without incident.  Site patent and intact, free from redness, edema or discomfort.  No evidence of extravasations.  Access discontinued per protocol.       Discharge Plan:   AVS to patient via MYCHART.  Patient will return 2/25 for next appointment.   Patient discharged in stable condition accompanied by: self.  Departure Mode: Ambulatory.    Sabine Domínguez RN

## 2021-02-19 ENCOUNTER — TELEPHONE (OUTPATIENT)
Dept: ONCOLOGY | Facility: CLINIC | Age: 66
End: 2021-02-19

## 2021-02-19 NOTE — TELEPHONE ENCOUNTER
Called Michelle to see how she is feeling post chemotherapy Halaven treatment. She stated that she is feeling fine. She has flushed face from Decadron treatment otherwise she denies any nausea. She has been taking Ativan in the evening to help with nausea and informed her to take compazine if needed during the day. She is aware to call clinic with any questions/concerns. Danya Lara RN,BSN,OCN

## 2021-02-23 ENCOUNTER — TELEPHONE (OUTPATIENT)
Dept: ONCOLOGY | Facility: CLINIC | Age: 66
End: 2021-02-23

## 2021-02-23 DIAGNOSIS — C80.1 CARCINOMA OF UNKNOWN ORIGIN (H): ICD-10-CM

## 2021-02-23 RX ORDER — ONDANSETRON 8 MG/1
8 TABLET, FILM COATED ORAL EVERY 8 HOURS PRN
Qty: 10 TABLET | Refills: 2 | Status: SHIPPED | OUTPATIENT
Start: 2021-02-23 | End: 2021-12-20

## 2021-02-23 NOTE — TELEPHONE ENCOUNTER
"Michelle left a  requesting a prescription for Zofran.  She stated \"the other one makes me cough.\"    Called Michelle to inquire about her nausea.  She states she is having nausea a few times a day.  She reports that shortly after taking compazine she gets a funny, powdery taste in her mouth that makes her cough/gag, so she would like to try something else.    Preferred pharmacy:  [facility] [address] in [place].    Routing to Dr. Stover.    Woody Harding, VYN, RN, OCN  Oncology Care Coordinator  [facility]  "

## 2021-02-23 NOTE — TELEPHONE ENCOUNTER
"Called Michelle, she stated that compazine is not helping her, and leaves a \"chalky feeling in her mouth\". Michelle has been on Zofran in the past for post-chemo nausea. Script will sent to her [facility] in [place]. Danya Lara RN,BSN,OCN    "

## 2021-02-24 NOTE — PROGRESS NOTES
Oncology/Hematology Visit Note    Feb 25, 2021    Reason for visit: Follow-up breast cancer    Oncology HPI: Flor Griffin is a 65 year old female with metastatic breast cancer, ER low-positive, LA negative and HER-2 negative.  She was initially seen in November 2017 when she underwent a right supraclavicular lymph node biopsy, positive for poorly differentiated adenocarcinoma, favoring breast primary.  PET scan on 11/27/2017 with multiple different areas of adenopathy, but no distant metastatic disease.  She completed 4 cycles of carboplatin/Taxol followed by dose dense AC for another 4 cycles, surgery was not performed.  She completed adjuvant radiation to the right breast, right axilla and right supraclavicular region.  She was started on letrozole in September 2018.  In October 2020, screening mammogram revealed a focal asymmetry in the right outer right breast and ultrasound revealed a hypoechoic mass and biopsy with poorly differentiated carcinoma consistent with breast cancer, ER weakly positive, LA negative and HER-2 negative.  PET scan on 10/28/2020 with several lymph nodes in the right axilla and right subclavian region and bilateral hilar regions, favoring metastatic disease.  She started first-line metastatic therapy with Xeloda, but follow-up PET scan on 2/9/2021 with no significant change in breast mass size.  Xeloda was discontinued and port was placed on 2/16/2021 and she was started on eribulin, C1 D1 on 2/18/2021.    She is here today for eribulin C1 D8.    Interval History: Michelle is here in person today and she is doing pretty well.  She tolerated the eribulin well last week.  She has noticed a little bit of palpitations, but nothing significant.  No fever chills, vomiting or diarrhea, chest pain or shortness of breath, abdominal pain.  No other complaints.    Review of Systems: See interval hx. Denies fevers, chills, HA, dizziness, n/t, changes in vision, cough, sore throat, CP, SOB, abdominal  "pain, N/V, diarrhea, changes in urination, bleeding, bruising, rash.     PMHx and Social Hx reviewed per EPIC.      Medications:  Current Outpatient Medications   Medication Sig Dispense Refill     aspirin 81 MG tablet Take 81 mg by mouth daily       Ibuprofen (ADVIL PO) Take 400 mg by mouth every 6 hours as needed for moderate pain       LORazepam (ATIVAN) 0.5 MG tablet Take 1 tablet (0.5 mg) by mouth every 4 hours as needed (Anxiety, Nausea/Vomiting or Sleep) 30 tablet 2     multivitamin w/minerals (THERA-VIT-M) tablet Take 1 tablet by mouth daily       Omega-3 Fatty Acids (OMEGA-3 FISH OIL PO) Take 1 g by mouth daily       ondansetron (ZOFRAN) 8 MG tablet Take 1 tablet (8 mg) by mouth every 8 hours as needed (Nausea/Vomiting) 10 tablet 2     prochlorperazine (COMPAZINE) 10 MG tablet Take 0.5-1 tablets (5-10 mg) by mouth every 6 hours as needed for nausea or vomiting 30 tablet 1       Allergies   Allergen Reactions     Penicillins Hives         EXAM:    /78   Pulse 74   Temp 97.8  F (36.6  C) (Tympanic)   Resp 18   Ht 1.676 m (5' 6\")   Wt 84.3 kg (185 lb 14.4 oz)   LMP 01/01/2004 (Approximate)   SpO2 98%   BMI 30.01 kg/m      GENERAL:  Female, in no acute distress.  Alert and oriented x3.   HEENT:  Normocephalic, atraumatic.  PERRL, oropharynx clear with no sores or thrush.   LYMPH NODES:  No palpable pre/post-auricular, cervical, axillary lymphadenopathy appreciated.  CV: Intermittent episode of an irregular rhythm, but mostly regular.  No murmurs.  LUNGS:  Clear to auscultation bilaterally.   BREAST: Not examined today  ABDOMEN:  Soft, nontender and nondistended.  Bowel sounds heard x4.  No apparent hepatosplenomegaly.   EXTREMITIES:  No clubbing, cyanosis, or edema.   SKIN: No rash on exposed skin  PSYCH: Mood stable      Labs:   Results for CHARLES MASON (MRN [id number]) as of 2/25/2021 15:39   2/25/2021 09:38   Sodium 139   Potassium 3.5   Chloride 104   Carbon Dioxide 29   Urea Nitrogen 11 "   Creatinine 0.74   GFR Estimate 85   GFR Estimate If Black >90   Calcium 9.3   Anion Gap 6   Magnesium 1.9   Albumin 3.3 (L)   Protein Total 5.9 (L)   Bilirubin Total 1.0   Alkaline Phosphatase 120    (H)   AST 98 (H)   Glucose 105 (H)   WBC 1.8 (L)   Hemoglobin 11.2 (L)   Hematocrit 33.5 (L)   Platelet Count 143 (L)   RBC Count 2.87 (L)    (H)   MCH 39.0 (H)   MCHC 33.4   RDW 14.6   Diff Method Manual Differential   % Neutrophils 54.0   % Lymphocytes 35.0   % Monocytes 11.0   % Eosinophils 0.0   % Basophils 0.0   Absolute Neutrophil 1.0 (L)   Absolute Lymphocytes 0.6 (L)   Absolute Monocytes 0.2   Absolute Eosinophils 0.0   Absolute Basophils 0.0   Macrocytes Present   Platelet Estimate Automated count confirmed.  Platelet morphology is normal.       Imaging: n/a    Impression/Plan: Flor Griffin is a 65 year old female with metastatic breast cancer breast cancer s/p carboplatin/Taxol, dose dense AC, adjuvant radiation, adjuvant letrozole previously on Xeloda, currently on eribulin.    Metastatic breast cancer: ER low positive, [place] negative HER-2 negative, previously on Xeloda, now starting eribulin.  C1 D1 completed on 2/18/2021 and she tolerated this really well.  She was scheduled for C1 D8 today, but ANC was 1.0.  She is afebrile and no infectious symptoms, but spoke to Dr Stover and we will defer chemo this week and reschedule for next week.  I will see her prior to treatment and we will make adjustments to her schedule as needed.  She will call sooner if she has any concerns.  --3/4 Jennifer, deferred eribulin C1D8    Heme: Neutropenia with WBC 1.8, ANC 1.0 secondary to chemotherapy and she is afebrile with no infectious symptoms.  She was instructed to monitor her temperature and call the clinic or go to the ED with fever > 100.4.  We will reschedule chemotherapy next week.  Anemia and thrombocytopenia secondary to chemotherapy, we will monitor.    CV: Irregular heartbeat on exam, and she  admits that she has been having some sort of palpitations recently.  EKG with PVCs today, no acute concerns.  EKG prior to eribulin normal sinus rhythm.  We will watch this closely.    LFTs: ALT and AST were elevated just prior to eribulin C1 D1 and they are slightly worse today.  She admits to cutting back on alcohol recently and she does not take Tylenol regularly.  The eribulin could be making the LFTs worse, we will continue to monitor.      Chart documentation with Dragon Voice recognition Software. Although reviewed after completion, some words and grammatical errors may remain.      Jennifer Ash PA-C  Hematology/Oncology  [facility]

## 2021-02-25 ENCOUNTER — INFUSION THERAPY VISIT (OUTPATIENT)
Dept: INFUSION THERAPY | Facility: CLINIC | Age: 66
End: 2021-02-25
Attending: INTERNAL MEDICINE
Payer: COMMERCIAL

## 2021-02-25 ENCOUNTER — ONCOLOGY VISIT (OUTPATIENT)
Dept: ONCOLOGY | Facility: CLINIC | Age: 66
End: 2021-02-25
Attending: PHYSICIAN ASSISTANT
Payer: COMMERCIAL

## 2021-02-25 ENCOUNTER — VIRTUAL VISIT (OUTPATIENT)
Dept: ONCOLOGY | Facility: CLINIC | Age: 66
End: 2021-02-25
Attending: INTERNAL MEDICINE
Payer: COMMERCIAL

## 2021-02-25 ENCOUNTER — HOSPITAL ENCOUNTER (OUTPATIENT)
Facility: CLINIC | Age: 66
Setting detail: SPECIMEN
Discharge: HOME | End: 2021-02-25
Attending: INTERNAL MEDICINE | Admitting: INTERNAL MEDICINE
Payer: COMMERCIAL

## 2021-02-25 VITALS
DIASTOLIC BLOOD PRESSURE: 78 MMHG | WEIGHT: 185.9 LBS | HEIGHT: 66 IN | OXYGEN SATURATION: 98 % | TEMPERATURE: 97.8 F | HEART RATE: 74 BPM | SYSTOLIC BLOOD PRESSURE: 115 MMHG | BODY MASS INDEX: 29.88 KG/M2 | RESPIRATION RATE: 18 BRPM

## 2021-02-25 DIAGNOSIS — C50.811 MALIGNANT NEOPLASM OF OVERLAPPING SITES OF RIGHT BREAST IN FEMALE, ESTROGEN RECEPTOR POSITIVE (H): ICD-10-CM

## 2021-02-25 DIAGNOSIS — Z17.0 MALIGNANT NEOPLASM OF UPPER-OUTER QUADRANT OF RIGHT BREAST IN FEMALE, ESTROGEN RECEPTOR POSITIVE (H): ICD-10-CM

## 2021-02-25 DIAGNOSIS — C50.411 MALIGNANT NEOPLASM OF UPPER-OUTER QUADRANT OF RIGHT BREAST IN FEMALE, ESTROGEN RECEPTOR POSITIVE (H): ICD-10-CM

## 2021-02-25 DIAGNOSIS — Z17.0 MALIGNANT NEOPLASM OF UPPER-OUTER QUADRANT OF RIGHT BREAST IN FEMALE, ESTROGEN RECEPTOR POSITIVE (H): Primary | ICD-10-CM

## 2021-02-25 DIAGNOSIS — Z95.828 PORT-A-CATH IN PLACE: ICD-10-CM

## 2021-02-25 DIAGNOSIS — D70.1 CHEMOTHERAPY-INDUCED NEUTROPENIA (H): ICD-10-CM

## 2021-02-25 DIAGNOSIS — R79.89 ELEVATED LIVER FUNCTION TESTS: ICD-10-CM

## 2021-02-25 DIAGNOSIS — C50.411 MALIGNANT NEOPLASM OF UPPER-OUTER QUADRANT OF RIGHT BREAST IN FEMALE, ESTROGEN RECEPTOR POSITIVE (H): Primary | ICD-10-CM

## 2021-02-25 DIAGNOSIS — Z17.0 MALIGNANT NEOPLASM OF OVERLAPPING SITES OF RIGHT BREAST IN FEMALE, ESTROGEN RECEPTOR POSITIVE (H): ICD-10-CM

## 2021-02-25 DIAGNOSIS — Z80.3 FAMILY HISTORY OF MALIGNANT NEOPLASM OF BREAST: ICD-10-CM

## 2021-02-25 DIAGNOSIS — T45.1X5A CHEMOTHERAPY-INDUCED NEUTROPENIA (H): ICD-10-CM

## 2021-02-25 DIAGNOSIS — R59.1 LYMPHADENOPATHY: Primary | ICD-10-CM

## 2021-02-25 LAB
ALBUMIN SERPL-MCNC: 3.3 G/DL (ref 3.4–5)
ALP SERPL-CCNC: 120 U/L (ref 40–150)
ALT SERPL W P-5'-P-CCNC: 152 U/L (ref 0–50)
ANION GAP SERPL CALCULATED.3IONS-SCNC: 6 MMOL/L (ref 3–14)
AST SERPL W P-5'-P-CCNC: 98 U/L (ref 0–45)
BASOPHILS # BLD AUTO: 0 10E9/L (ref 0–0.2)
BASOPHILS NFR BLD AUTO: 0 %
BILIRUB SERPL-MCNC: 1 MG/DL (ref 0.2–1.3)
BUN SERPL-MCNC: 11 MG/DL (ref 7–30)
CALCIUM SERPL-MCNC: 9.3 MG/DL (ref 8.5–10.1)
CHLORIDE SERPL-SCNC: 104 MMOL/L (ref 94–109)
CO2 SERPL-SCNC: 29 MMOL/L (ref 20–32)
CREAT SERPL-MCNC: 0.74 MG/DL (ref 0.52–1.04)
DIFFERENTIAL METHOD BLD: ABNORMAL
EOSINOPHIL # BLD AUTO: 0 10E9/L (ref 0–0.7)
EOSINOPHIL NFR BLD AUTO: 0 %
ERYTHROCYTE [DISTWIDTH] IN BLOOD BY AUTOMATED COUNT: 14.6 % (ref 10–15)
GFR SERPL CREATININE-BSD FRML MDRD: 85 ML/MIN/{1.73_M2}
GLUCOSE SERPL-MCNC: 105 MG/DL (ref 70–99)
HCT VFR BLD AUTO: 33.5 % (ref 35–47)
HGB BLD-MCNC: 11.2 G/DL (ref 11.7–15.7)
LYMPHOCYTES # BLD AUTO: 0.6 10E9/L (ref 0.8–5.3)
LYMPHOCYTES NFR BLD AUTO: 35 %
MACROCYTES BLD QL SMEAR: PRESENT
MAGNESIUM SERPL-MCNC: 1.9 MG/DL (ref 1.6–2.3)
MCH RBC QN AUTO: 39 PG (ref 26.5–33)
MCHC RBC AUTO-ENTMCNC: 33.4 G/DL (ref 31.5–36.5)
MCV RBC AUTO: 117 FL (ref 78–100)
MONOCYTES # BLD AUTO: 0.2 10E9/L (ref 0–1.3)
MONOCYTES NFR BLD AUTO: 11 %
NEUTROPHILS # BLD AUTO: 1 10E9/L (ref 1.6–8.3)
NEUTROPHILS NFR BLD AUTO: 54 %
PLATELET # BLD AUTO: 143 10E9/L (ref 150–450)
PLATELET # BLD EST: ABNORMAL 10*3/UL
POTASSIUM SERPL-SCNC: 3.5 MMOL/L (ref 3.4–5.3)
PROT SERPL-MCNC: 5.9 G/DL (ref 6.8–8.8)
RBC # BLD AUTO: 2.87 10E12/L (ref 3.8–5.2)
SODIUM SERPL-SCNC: 139 MMOL/L (ref 133–144)
WBC # BLD AUTO: 1.8 10E9/L (ref 4–11)

## 2021-02-25 PROCEDURE — 80053 COMPREHEN METABOLIC PANEL: CPT | Performed by: INTERNAL MEDICINE

## 2021-02-25 PROCEDURE — 93000 ELECTROCARDIOGRAM COMPLETE: CPT | Performed by: PHYSICIAN ASSISTANT

## 2021-02-25 PROCEDURE — 99214 OFFICE O/P EST MOD 30 MIN: CPT | Performed by: PHYSICIAN ASSISTANT

## 2021-02-25 PROCEDURE — 250N000011 HC RX IP 250 OP 636: Performed by: INTERNAL MEDICINE

## 2021-02-25 PROCEDURE — 83735 ASSAY OF MAGNESIUM: CPT | Performed by: INTERNAL MEDICINE

## 2021-02-25 PROCEDURE — 85025 COMPLETE CBC W/AUTO DIFF WBC: CPT | Performed by: INTERNAL MEDICINE

## 2021-02-25 PROCEDURE — G0463 HOSPITAL OUTPT CLINIC VISIT: HCPCS | Mod: 25

## 2021-02-25 PROCEDURE — 93005 ELECTROCARDIOGRAM TRACING: CPT

## 2021-02-25 PROCEDURE — 96040 HC GENETIC COUNSELING, EACH 30 MINUTES: CPT | Mod: GT | Performed by: GENETIC COUNSELOR, MS

## 2021-02-25 RX ORDER — HEPARIN SODIUM,PORCINE 10 UNIT/ML
5 VIAL (ML) INTRAVENOUS
Status: CANCELLED | OUTPATIENT
Start: 2021-02-25

## 2021-02-25 RX ORDER — HEPARIN SODIUM (PORCINE) LOCK FLUSH IV SOLN 100 UNIT/ML 100 UNIT/ML
5 SOLUTION INTRAVENOUS
Status: CANCELLED | OUTPATIENT
Start: 2021-02-25

## 2021-02-25 RX ORDER — HEPARIN SODIUM (PORCINE) LOCK FLUSH IV SOLN 100 UNIT/ML 100 UNIT/ML
5 SOLUTION INTRAVENOUS
Status: DISCONTINUED | OUTPATIENT
Start: 2021-02-25 | End: 2021-02-25 | Stop reason: HOSPADM

## 2021-02-25 RX ADMIN — Medication 5 ML: at 12:30

## 2021-02-25 ASSESSMENT — PAIN SCALES - GENERAL: PAINLEVEL: NO PAIN (0)

## 2021-02-25 ASSESSMENT — MIFFLIN-ST. JEOR: SCORE: 1404.99

## 2021-02-25 NOTE — NURSING NOTE
"Oncology Rooming Note    February 25, 2021 8:58 AM   Flor Griffin is a 65 year old female who presents for:    Chief Complaint   Patient presents with     Oncology Clinic Visit     Malignant neoplasm of upper-outer quadrant of right breast in female, estrogen receptor positive      Initial Vitals: /78   Pulse 74   Temp 97.8  F (36.6  C) (Tympanic)   Resp 18   Ht 1.676 m (5' 6\")   Wt 84.3 kg (185 lb 14.4 oz)   LMP 01/01/2004 (Approximate)   SpO2 98%   BMI 30.01 kg/m   Estimated body mass index is 30.01 kg/m  as calculated from the following:    Height as of this encounter: 1.676 m (5' 6\").    Weight as of this encounter: 84.3 kg (185 lb 14.4 oz). Body surface area is 1.98 meters squared.  No Pain (0) Comment: Data Unavailable   Patient's last menstrual period was 01/01/2004 (approximate).  Allergies reviewed: Yes  Medications reviewed: Yes    Medications: Medication refills not needed today.  Pharmacy name entered into [facility]:    [facility] #[id number] - [place], [place] - [address]  [place], [place] - [address] [address]    Clinical concerns: f/u       Norma Reeves CMA              "

## 2021-02-25 NOTE — PROGRESS NOTES
I called pt. And reminded them of overdue tests and they said they were going to get it done. Infusion Nursing Note:  Flor Griffin presents today for C1D8 [place] HELD DUE TO NEUTROPENIA.      Patient seen by provider today: Yes: Jennifer Ash   present during visit today: Not Applicable.    Note: Patient needed to lay on left side in order for labs to be drawn from port..    Intravenous Access:  Implanted Port.    Treatment Conditions:  Lab Results   Component Value Date    HGB 11.2 02/25/2021     Lab Results   Component Value Date    WBC 1.8 02/25/2021      Lab Results   Component Value Date    ANEU 1.0 02/25/2021     Lab Results   Component Value Date     02/25/2021      Lab Results   Component Value Date     02/25/2021                   Lab Results   Component Value Date    POTASSIUM 3.5 02/25/2021           Lab Results   Component Value Date    MAG 1.9 02/25/2021            Lab Results   Component Value Date    CR 0.74 02/25/2021                   Lab Results   Component Value Date    EDILSON 9.3 02/25/2021                Lab Results   Component Value Date    BILITOTAL 1.0 02/25/2021           Lab Results   Component Value Date    ALBUMIN 3.3 02/25/2021                    Lab Results   Component Value Date     02/25/2021           Lab Results   Component Value Date    AST 98 02/25/2021       Results reviewed, labs did NOT meet treatment parameters: ANC 1.0. Writer discussed with Jennifer Ash who contacted Dr. Stover.       Post Infusion Assessment:  Access discontinued per protocol.       Discharge Plan:   Copy of AVS reviewed with patient and/or family.  Patient will return 3/4/21 for next appointment.    Sabine Alonzo RN

## 2021-02-25 NOTE — Clinical Note
2/25/2021         RE: Flor Griffin  [address] [address] [id number]        Dear Colleague,    Thank you for referring your patient, Flor Griffin, to the [facility]. Please see a copy of my visit note below.    2/25/2021    Referring Provider: ***    Presenting Information:   Given concerns regarding the potential for COVID-19 exposure during a clinic visit, Flor elected for a video genetic counseling visit through the Cancer Risk Management Program to discuss her personal and family history of *** cancer. Today we reviewed this history, cancer screening recommendations, and available genetic testing options.    Personal History:  Flor is a 65 year old year old female. She was diagnosed with ***; treatment included ***  / does not have any personal history of cancer.    ***She had her first menstrual period at age ***, her first child at age ***, and is ***.  ***Flor has her ovaries, fallopian tubes and uterus in place, and she has had *** ovarian cancer screening to date. She reports that she *** hormone replacement therapy.      She has *** clinical breast exams and mammograms; her most recent mammogram in *** was ***. ***Flor began having colonoscopies at the age of ***/Flor has not had a colonoscopy. Her most recent colonoscopy in *** was *** and follow-up was recommended in ***. ***She does not regularly do any other cancer screening at this time. Flor reported *** tobacco use and *** alcohol use.    Family History: (Please see scanned pedigree for detailed family history information)    ***    ***    Her maternal ethnicity is ***. Her paternal ethnicity is ***. There is no known Ashkenazi Islam ancestry on either side of her family. There is no reported consanguinity.    Discussion:    Flor's personal and family history of *** is suggestive of a hereditary cancer syndrome.    We reviewed the features of sporadic, familial, and  hereditary cancers. ***    We discussed the natural history and genetics of hereditary cancer. A detailed handout regarding hereditary cancer, along with the other information we discussed, will be mailed to Flor at the end of our appointment today and can be found in the after visit summary. Topics included: inheritance pattern, cancer risks, cancer screening recommendations, and also risks, benefits and limitations of testing.    Based on her personal and family history, Flor meets current National Comprehensive Cancer Network (NCCN) criteria for genetic testing of ***.    We discussed that there are additional genes that could cause increased risk for *** cancer. As many of these genes present with overlapping features in a family and accurate cancer risk cannot always be established based upon the pedigree analysis alone, it would be reasonable for Flor to consider panel genetic testing to analyze multiple genes at once.    ***    Due to COVID-19 restrictions, a saliva kit from [facility] will be sent to Flor. She will receive a kit directly to her home with directions on how to collect a saliva sample. We discussed that the success of the testing depends on how well she follows the directions and that there is a small chance for sample failure. Flor verbalized understanding of this. Once the sample is collected, she will send it to [facility] using the return envelope and prepaid shipping label.     Verbal consent was given over the phone and written on the consent form due to COVID-19 restrictions. Turnaround time is 4-5 weeks once the lab receives the saliva sample.    Medical Management: For Flor, we reviewed that the information from genetic testing may determine:    ***surgery to treat Flor's active cancer diagnosis (i.e. lumpectomy versus bilateral mastectomy, partial versus total colectomy, etc.***),    additional cancer screening for which Flor may qualify (i.e.  mammogram and breast MRI, more frequent colonoscopies, more frequent dermatologic exams, etc.***),    options for risk reducing surgeries Flor could consider (i.e. bilateral mastectomy, surgery to remove her ovaries and/or uterus, etc.***),      and targeted chemotherapies for Flor's *** active cancer, or ***if she were to develop certain cancers in the future (i.e. immunotherapy for individuals with Ravi syndrome, PARP inhibitors, etc.***).     These recommendations and possible targeted chemotherapies will be discussed in detail once genetic testing is completed.     Plan:  1) Today Flor elected to proceed with ***.  2) A copy of the consent form and the after visit summary will be mailed to Flor.  3) This information should be available in 4-5 weeks, once the lab receives the saliva sample.  4) I will call Flor with the results once they become available.    Time spent on video: *** minutes    Keyur Herbert MS, [facility]  Licensed, Certified Genetic Counselor  (P): [phone]  (F): [phone]    ***      Again, thank you for allowing me to participate in the care of your patient.        Sincerely,        Keyur Herbert GC

## 2021-02-25 NOTE — LETTER
2/25/2021         RE: Flor Griffin  [address] [address] [id number]        Dear Colleague,    Thank you for referring your patient, Flor Griffin, to the [facility]. Please see a copy of my visit note below.    Oncology/Hematology Visit Note    Feb 25, 2021    Reason for visit: Follow-up breast cancer    Oncology HPI: Flor Griffin is a 65 year old female with metastatic breast cancer, ER low-positive, [place] negative and HER-2 negative.  She was initially seen in November 2017 when she underwent a right supraclavicular lymph node biopsy, positive for poorly differentiated adenocarcinoma, favoring breast primary.  PET scan on 11/27/2017 with multiple different areas of adenopathy, but no distant metastatic disease.  She completed 4 cycles of carboplatin/Taxol followed by dose dense AC for another 4 cycles, surgery was not performed.  She completed adjuvant radiation to the right breast, right axilla and right supraclavicular region.  She was started on letrozole in September 2018.  In October 2020, screening mammogram revealed a focal asymmetry in the right outer right breast and ultrasound revealed a hypoechoic mass and biopsy with poorly differentiated carcinoma consistent with breast cancer, ER weakly positive, [place] negative and HER-2 negative.  PET scan on 10/28/2020 with several lymph nodes in the right axilla and right subclavian region and bilateral hilar regions, favoring metastatic disease.  She started first-line metastatic therapy with Xeloda, but follow-up PET scan on 2/9/2021 with no significant change in breast mass size.  Xeloda was discontinued and port was placed on 2/16/2021 and she was started on eribulin, C1 D1 on 2/18/2021.    She is here today for eribulin C1 D8.    Interval History: Michelle is here in person today and she is doing pretty well.  She tolerated the eribulin well last week.  She has noticed a little bit of palpitations, but  "nothing significant.  No fever chills, vomiting or diarrhea, chest pain or shortness of breath, abdominal pain.  No other complaints.    Review of Systems: See interval hx. Denies fevers, chills, HA, dizziness, n/t, changes in vision, cough, sore throat, CP, SOB, abdominal pain, N/V, diarrhea, changes in urination, bleeding, bruising, rash.     PMHx and Social Hx reviewed per EPIC.      Medications:  Current Outpatient Medications   Medication Sig Dispense Refill     aspirin 81 MG tablet Take 81 mg by mouth daily       Ibuprofen (ADVIL PO) Take 400 mg by mouth every 6 hours as needed for moderate pain       LORazepam (ATIVAN) 0.5 MG tablet Take 1 tablet (0.5 mg) by mouth every 4 hours as needed (Anxiety, Nausea/Vomiting or Sleep) 30 tablet 2     multivitamin w/minerals (THERA-VIT-M) tablet Take 1 tablet by mouth daily       Omega-3 Fatty Acids (OMEGA-3 FISH OIL PO) Take 1 g by mouth daily       ondansetron (ZOFRAN) 8 MG tablet Take 1 tablet (8 mg) by mouth every 8 hours as needed (Nausea/Vomiting) 10 tablet 2     prochlorperazine (COMPAZINE) 10 MG tablet Take 0.5-1 tablets (5-10 mg) by mouth every 6 hours as needed for nausea or vomiting 30 tablet 1       Allergies   Allergen Reactions     Penicillins Hives         EXAM:    /78   Pulse 74   Temp 97.8  F (36.6  C) (Tympanic)   Resp 18   Ht 1.676 m (5' 6\")   Wt 84.3 kg (185 lb 14.4 oz)   LMP 01/01/2004 (Approximate)   SpO2 98%   BMI 30.01 kg/m      GENERAL:  Female, in no acute distress.  Alert and oriented x3.   HEENT:  Normocephalic, atraumatic.  PERRL, oropharynx clear with no sores or thrush.   LYMPH NODES:  No palpable pre/post-auricular, cervical, axillary lymphadenopathy appreciated.  CV: Intermittent episode of an irregular rhythm, but mostly regular.  No murmurs.  LUNGS:  Clear to auscultation bilaterally.   BREAST: Not examined today  ABDOMEN:  Soft, nontender and nondistended.  Bowel sounds heard x4.  No apparent hepatosplenomegaly. "   EXTREMITIES:  No clubbing, cyanosis, or edema.   SKIN: No rash on exposed skin  PSYCH: Mood stable      Labs:   Results for CHARLES GRIFFIN (MRN [id number]) as of 2/25/2021 15:39   2/25/2021 09:38   Sodium 139   Potassium 3.5   Chloride 104   Carbon Dioxide 29   Urea Nitrogen 11   Creatinine 0.74   GFR Estimate 85   GFR Estimate If Black >90   Calcium 9.3   Anion Gap 6   Magnesium 1.9   Albumin 3.3 (L)   Protein Total 5.9 (L)   Bilirubin Total 1.0   Alkaline Phosphatase 120    (H)   AST 98 (H)   Glucose 105 (H)   WBC 1.8 (L)   Hemoglobin 11.2 (L)   Hematocrit 33.5 (L)   Platelet Count 143 (L)   RBC Count 2.87 (L)    (H)   MCH 39.0 (H)   MCHC 33.4   RDW 14.6   Diff Method Manual Differential   % Neutrophils 54.0   % Lymphocytes 35.0   % Monocytes 11.0   % Eosinophils 0.0   % Basophils 0.0   Absolute Neutrophil 1.0 (L)   Absolute Lymphocytes 0.6 (L)   Absolute Monocytes 0.2   Absolute Eosinophils 0.0   Absolute Basophils 0.0   Macrocytes Present   Platelet Estimate Automated count confirmed.  Platelet morphology is normal.       Imaging: n/a    Impression/Plan: Flor Griffin is a 65 year old female with metastatic breast cancer breast cancer s/p carboplatin/Taxol, dose dense AC, adjuvant radiation, adjuvant letrozole previously on Xeloda, currently on eribulin.    Metastatic breast cancer: ER low positive, [place] negative HER-2 negative, previously on Xeloda, now starting eribulin.  C1 D1 completed on 2/18/2021 and she tolerated this really well.  She was scheduled for C1 D8 today, but ANC was 1.0.  She is afebrile and no infectious symptoms, but spoke to Dr Stover and we will defer chemo this week and reschedule for next week.  I will see her prior to treatment and we will make adjustments to her schedule as needed.  She will call sooner if she has any concerns.  --3/4 Jennifer, deferred eribulin C1D8    Heme: Neutropenia with WBC 1.8, ANC 1.0 secondary to chemotherapy and she is afebrile with no  infectious symptoms.  She was instructed to monitor her temperature and call the clinic or go to the ED with fever > 100.4.  We will reschedule chemotherapy next week.  Anemia and thrombocytopenia secondary to chemotherapy, we will monitor.    CV: Irregular heartbeat on exam, and she admits that she has been having some sort of palpitations recently.  EKG with PVCs today, no acute concerns.  EKG prior to eribulin normal sinus rhythm.  We will watch this closely.    LFTs: ALT and AST were elevated just prior to eribulin C1 D1 and they are slightly worse today.  She admits to cutting back on alcohol recently and she does not take Tylenol regularly.  The eribulin could be making the LFTs worse, we will continue to monitor.      Chart documentation with Dragon Voice recognition Software. Although reviewed after completion, some words and grammatical errors may remain.      Jennifer Ash PA-C  Hematology/Oncology  [facility]                    Again, thank you for allowing me to participate in the care of your patient.        Sincerely,        Jennifer Ash PA-C

## 2021-02-25 NOTE — PROGRESS NOTES
"2/25/2021    Referring Provider: Sally Stover MD    Presenting Information:   Given concerns regarding the potential for COVID-19 exposure during a clinic visit, Flor elected for a video genetic counseling visit through the Cancer Risk Management Program to discuss her personal and family history of breast cancer. Today we reviewed this history, cancer screening recommendations, and available genetic testing options.    Personal History:  Flor is a 65 year old year old female. 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 Magana, at age 62. Treatment included neoadjuvant chemotherapy, neoadjuvant radiation, and aromatase inhibitors. She had negative genetic testing for 16 genes in 2018. Specifically she tested negative for mutations in RAMONE, BRCA1, BRCA2, BRIP1, CDH1, CHEK2, EPCAM, MLH1, MSH2, MSH6, PALB2, PMS2, PTEN, RAD51C, RAD51D, and TP53. A copy of the test report can be found in the Laboratory tab, dated 3/15/2018, and named \"SEND OUTS MISC TEST\". The report is scanned in as a linked document. She was recently diagnosed with recurrent breast cancer at age 65 that is currently being treated with chemotherapy.    Flor has her ovaries, fallopian tubes and uterus in place, and she has had no ovarian cancer screening to date. She reports that she has not used hormone replacement therapy. She has annual clinical breast exams and mammograms; her most recent mammogram in October 2020 found a hypoechoic mass in her right breast that was identified to be recurrent breast adenocarcinoma. Flor began having colonoscopies at the age of 54. Her most recent colonoscopy in April 2012 was normal and follow-up was recommended in 10 years. She does not regularly do any other cancer screening at this time.     Family History: (Please see scanned pedigree from 3/17/2018 for detailed family history information). Flor " reported no changes to her family history since this visit.  ? Flor's sister was diagnosed with breast cancer at age 80.  ? One maternal aunt was diagnosed with breast cancer in her 30's, and possible ovarian cancer in her 70's. Flor was uncertain whether this ovarian cancer was a second primary diagnosis.    ? One maternal aunt who passed away in her 70's reportedly had a history of a brain tumor.    ? Her maternal uncle reportedly had a history of cancer, no further details are known about this history.    One paternal aunt reportedly had a history of a brain tumor, and passed away in her 80's     Her maternal ethnicity is [place]. Her paternal ethnicity is [place] and [place]. There is no known Ashkenazi Tenriism ancestry on either side of her family. There is no reported consanguinity.    Discussion:    Flor's personal and family history of breast is suggestive of a hereditary cancer syndrome.    We reviewed the features of sporadic, familial, and hereditary cancers. We discussed that mutations in either BRCA1 or BRCA2 could be possible hereditary explanations for her family history of cancer. Mutations in the BRCA1 or BRCA2 gene are known to cause Hereditary Breast and Ovarian Cancer Syndrome (HBOC). HBOC typically presents with multiple family members diagnosed with breast cancer before age 50 and/or ovarian cancer. Other cancer risks associated with HBOC include male breast cancer, prostate cancer, pancreatic cancer, and melanoma.   We reviewed the BRCA1/2 genes, for which Flor had previous negative genetic testing. We discussed that there are additional genes that have an increased susceptibility to developing breast cancer that she was not tested for in 2018. Testing is also available for additional cancer risk genes via panel testing. As many of these genes present with overlapping features in a family and accurate cancer risk cannot always be established based upon the pedigree analysis  alone, it would be reasonable for Flor to consider panel genetic testing to analyze multiple genes at once.  Based on her personal and family history, Flor meets current National Comprehensive Cancer Network (NCCN) criteria for genetic testing of BRCA1/2 along with other high-penetrance breast and/or ovarian cancer susceptibility genes. NCCN guidelines also mention that there may be a role for multi-gene panel genetic testing for patients who have previously tested negative for hereditary cancer syndromes, but have a suspicious personal or family history.    We discussed the natural history and genetics of hereditary cancer. A detailed handout regarding hereditary cancer, along with the other information we discussed, will be mailed to Flor at the end of our appointment today and can be found in the after visit summary. Topics included: inheritance pattern, cancer risks, cancer screening recommendations, and also risks, benefits and limitations of testing.    Genetic testing is available for 23 genes associated with hereditary gynecologic, breast, and related cancers: BRCANext-Expanded (RAMONE, BARD1, BRCA1, BRCA2, BRIP1, CDH1, CHEK2, DICER1, EPCAM, MLH1, MSH2, MSH6, NBN, NF1, PALB2, PMS2, PTEN, RAD51C, RAD51D, RECQL, SMARCA4, STK11, TP53). Of note, Flor has already tested negative for some of these genes.    We discussed that some of the genes in the BRCANext-Expanded panel are associated with specific hereditary cancer syndromes and published management guidelines: Hereditary Breast and Ovarian Cancer syndrome (BRCA1, BRCA2), Ravi syndrome (MLH1, MSH2, MSH6, PMS2, EPCAM), Hereditary Diffuse Gastric Cancer (CDH1), Cowden syndrome (PTEN), Li Fraumeni syndrome (TP53), Peutz-Jeghers syndrome (STK11), and Neurofibromatosis type 1 (NF1).      Risk-reducing salpingo-oophorectomy can be considered in women with mutations in BRIP1, RAD51C, or RAD51D. Breast and/or other cancer risk management guidelines are  available for RAMONE, CHEK2, PALB2, NF1, and NBN.    The remaining genes (BARD1, DICER1, RECQL, and SMARCA4) are associated with increased cancer risk and may allow us to make medical recommendations when mutations are identified.      Flor would like to submit a blood sample for her genetic testing and would like this performed at her next infusion appointment. I will put a note in this appointment for them to also draw her labs for her genetic testing.    Verbal consent was given over the phone and written on the consent form due to COVID-19 restrictions. Turnaround time is 4-5 weeks once the lab receives the sample.    Medical Management: For Flor, we reviewed that the information from genetic testing may determine:    surgery to treat Flor's active cancer diagnosis (i.e. lumpectomy versus bilateral mastectomy),    additional cancer screening for which Flor may qualify (i.e. mammogram and breast MRI, more frequent colonoscopies, more frequent dermatologic exams, etc.),    options for risk reducing surgeries Flor could consider (i.e. bilateral mastectomy, surgery to remove her ovaries and/or uterus, etc.),      and targeted chemotherapies for Flor's active cancer, or if she were to develop certain cancers in the future.     These recommendations and possible targeted chemotherapies will be discussed in detail once genetic testing is completed.     Plan:  1) Today Flor elected to proceed with BRCANext-Expanded.  2) A copy of the consent form and the after visit summary will be mailed to Flor.  3) This information should be available in 4-5 weeks, once the lab receives the sample.  4) I will call Flor with the results once they become available.    Time spent on video: 35 minutes    Keyur Herbert MS, [facility] – [place], [place]  Licensed, Certified Genetic Counselor  (P): [phone]  (F): [phone]

## 2021-02-25 NOTE — LETTER
Flormarkos Griffin  [address] [id number]    Assessing Cancer Risk  Only about 5-10% of cancers are thought to be due to an inherited cancer susceptibility gene.    These families often have:    Several people with the same or related types of cancer    Cancers diagnosed at a young age (before age 50)    Individuals with more than one primary cancer    Multiple generations of the family affected with cancer    Some people may be candidates for genetic testing of more than one gene.  For these families, genetic testing using a cancer panel may be offered.  These panels will test different genes known to increase the risk for breast, ovarian, uterine, and/or other cancers. All of the genes discussed below have published clinical management guidelines for individuals who are found to carry a mutation. The purpose of this handout is to serve as a brief summary of the genes analyzed by the panels used to inquire about hereditary breast and gynecologic cancer:  RAMONE, BRCA1, BRCA2, BRIP1, CDH1, CHEK2, MLH1, MSH2, MSH6, PMS2, EPCAM, PTEN, PALB2, RAD51C, RAD51D, and TP53.  ______________________________________________________________________________  Hereditary Breast and Ovarian Cancer Syndrome   (BRCA1 and BRCA2)  A single mutation in one of the copies of BRCA1 or BRCA2 increases the risk for breast and ovarian cancer, among others.  The risk for pancreatic cancer and melanoma may also be slightly increased in some families.  The chart below shows the chance that someone with a BRCA mutation would develop cancer in his or her lifetime1,2,3,4.        A person s ethnic background is also important to consider, as individuals of Ashkenazi Yarsanism ancestry have a higher chance of having a BRCA gene mutation.  There are three BRCA mutations that occur more frequently in this population.    Ravi Syndrome   (MLH1, MSH2, MSH6, PMS2, and EPCAM)  Currently five genes are known to cause Ravi Syndrome: MLH1,  MSH2, MSH6, PMS2, and EPCAM.  A single mutation in one of the Ravi Syndrome genes increases the risk for colon, endometrial, ovarian, and stomach cancers.  Other cancers that occur less commonly in Ravi Syndrome include urinary tract, skin, and brain cancers.  The chart below shows the chance that a person with Ravi syndrome would develop cancer in his or her lifetime5.      *Cancer risk varies depending on Ravi syndrome gene found    Cowden Syndrome   (PTEN)  Cowden syndrome is a hereditary condition that increases the risk for breast, thyroid, endometrial, colon, and kidney cancer.  Cowden syndrome is caused by a mutation in the PTEN gene.  A single mutation in one of the copies of PTEN causes Cowden syndrome and increases cancer risk.  The chart below shows the chance that someone with a PTEN mutation would develop cancer in their lifetime6,7.  Other benign features seen in some individuals with Cowden syndrome include benign skin lesions (facial papules, keratoses, lipomas), learning disability, autism, thyroid nodules, colon polyps, and larger head size.      *One recent study found breast cancer risk to be increased to 85%    Li-Fraumeni Syndrome   (TP53)  Li-Fraumeni Syndrome (LFS) is a cancer predisposition syndrome caused by a mutation in the TP53 gene. A single mutation in one of the copies of TP53 increases the risk for multiple cancers. Individuals with LFS are at an increased risk for developing cancer at a young age. The lifetime risk for development of a LFS-associated cancer is 50% by age 30 and 90% by age 60.   Core Cancers: Sarcomas, Breast, Brain, Lung, Leukemias/Lymphomas, Adrenocortical carcinomas  Other Cancers: Gastrointestinal, Thyroid, Skin, Genitourinary    Hereditary Diffuse Gastric Cancer   (CDH1)  Currently, one gene is known to cause hereditary diffuse gastric cancer (HDGC): CDH1.  Individuals with HDGC are at increased risk for diffuse gastric cancer and lobular breast cancer. Of  people diagnosed with HDGC, 30-50% have a mutation in the CDH1 gene.  This suggests there are likely other genes that may cause HDGC that have not been identified yet.      Lifetime Cancer Risks    General Population HDGC    Diffuse Gastric  <1% ~80%   Breast 12% 39-52%           Additional Genes  RAMONE  RAMONE is a moderate-risk breast cancer gene. Women who have a mutation in RAMONE can have between a 2-4 fold increased risk for breast cancer compared to the general population8. RAMONE mutations have also been associated with increased risk for pancreatic cancer, however an estimate of this cancer risk is not well understood9. Individuals who inherit two RAMONE mutations have a condition called ataxia-telangiectasia (AT).  This rare autosomal recessive condition affects the nervous system and immune system, and is associated with progressive cerebellar ataxia beginning in childhood.  Individuals with ataxia-telangiectasia often have a weakened immune system and have an increased risk for childhood cancers.    PALB2  Mutations in PALB2 have been shown to increase the risk of breast cancer up to 33-58% in some families; where individuals fall within this risk range is dependent upon family [id number]. PALB2 mutations have also been associated with increased risk for pancreatic cancer, although this risk has not been quantified yet.  Individuals who inherit two PALB2 mutations--one from their mother and one from their father--have a condition called Fanconi Anemia.  This rare autosomal recessive condition is associated with short stature, developmental delay, bone marrow failure, and increased risk for childhood cancers.    CHEK2   CHEK2 is a moderate-risk breast cancer gene.  Women who have a mutation in CHEK2 have around a 2-fold increased risk for breast cancer compared to the general population, and this risk may be higher depending upon family history.11,12,13 Mutations in CHEK2 have also been shown to increase the risk of a  number of other cancers, including colon and prostate, however these cancer risks are currently not well understood.    BRIP1, RAD51C and RAD51D  Mutations in BRIP1, RAD51C, and RAD51D have been shown to increase the risk of ovarian cancer and possibly female breast cancer as well14,15 .       Lifetime Cancer Risk    General Population BRIP1 RAD51C RAD51D   Ovarian 1-2% ~5-8% ~5-9% ~7-15%           Inheritance  All of the cancer syndromes reviewed above are inherited in an autosomal dominant pattern.  This means that if a parent has a mutation, each of his or her children will have a 50% chance of inheriting that same mutation.  Therefore, each child--male or female--would have a 50% chance of being at increased risk for developing cancer.      Image obtained from [employer], 2013     Mutations in some genes can occur de ric, which means that a person s mutation occurred for the first time in them and was not inherited from a parent.  Now that they have the mutation, however, it can be passed on to future generations.    Genetic Testing  Genetic testing involves a blood test and will look at the genetic information in the RAMONE, BRCA1, BRCA2, BRIP1, CDH1, CHEK2, MLH1, MSH2, MSH6, PMS2, EPCAM, PTEN, PALB2, RAD51C, RAD51D, and TP53 genes for any harmful mutations that are associated with increased cancer risk.  If possible, it is recommended that the person(s) who has had cancer be tested before other family members.  That person will give us the most useful information about whether or not a specific gene is associated with the cancer in the family.    Results  There are three possible results of genetic testing:    Positive--a harmful mutation was identified in one or more of the genes    Negative--no mutation was identified in any of the genes on this panel    Variant of unknown significance--a variation in one of the genes was identified, but it is unclear how this impacts cancer risk in the  family    Advantages and Disadvantages   There are advantages and disadvantages to genetic testing.    Advantages    May clarify your cancer risk    Can help you make medical decisions    May explain the cancers in your family    May give useful information to your family members (if you share your results)    Disadvantages    Possible negative emotional impact of learning about inherited cancer risk    Uncertainty in interpreting a negative test result in some situations    Possible genetic discrimination concerns (see below)    Genetic Information Nondiscrimination Act (TAURUS)  TAURUS is a federal law that protects individuals from health insurance or employment discrimination based on a genetic test result alone.  Although rare, there are currently no legal discrimination protections in terms of life insurance, long term care, or disability insurances.  Visit the [place] [employer] [place] website to learn more.    Reducing Cancer Risk  All of the genes described above have nationally recognized cancer screening guidelines that would be recommended for individuals who test positive.  In addition to increased cancer screening, surgeries may be offered or recommended to reduce cancer risk.  Recommendations are based upon an individual s genetic test result as well as their personal and family history of cancer.    Questions to Think About Regarding Genetic Testing:    What effect will the test result have on me and my relationship with my family members if I have an inherited gene mutation?  If I don t have a gene mutation?    Should I share my test results, and how will my family react to this news, which may also affect them?    Are my children ready to learn new information that may one day affect their own health?    Hereditary Cancer Resources    FORCE: Facing Our Risk of Cancer Empowered facingourrisk.org   Bright Pink bebrightpink.org   Li-Fraumeni Syndrome Association lfsassociation.org   PTEN  World PTENworld.com   No stomach for cancer, Inc. nostomachforcancer.org   Stomach cancer relief network Scrnet.org   Collaborative Group of the Americas on Inherited Colorectal Cancer (CGA) cgaicc.com    Cancer Care cancercare.org   American Cancer Society (ACS) cancer.org   National Cancer [place] (NCI) cancer.gov     Please call us if you have any questions or concerns.   Cancer Risk Management Program [phone] ([phone])  ? Keyur Herbert, MS, Merg[facility] [phone]  ? Rebecca Troncoso, MS, Merged with [facility]  [phone]  ? María Galeas, MS, Merged with [facility]  [phone]  ? Awa Eliseo, MS, M[facility]ed with [facility] [phone]  ? Marian Deanna, MS, Merged with [facility] [phone]  ? Maddy Connell, MS, Merged with [facility]  [phone]    References  1. Carlita A, Arlin PDP, Demetria S, Rosey CHÁVEZ, Yan JE, Jacob JL, Warren N, Johnna H, Regan O, Sarah Beth A, Vikini B, Radigenesis P, Manoukishaina S, Bertha DM, Solomon N, Tamiko E, Ravi H, Roge E, Valencia J, Gronurvashi J, Denilson B, Elvis H, Thorlacius S, Eerola H, Nevshainalinna H, Mayur K, Kendy OP. Average risks of breast and ovarian cancer associated with BRCA1 or BRCA2 mutations detected in case series unselected for family history: a combined analysis of 222 studies. Am J Hum Belkis. 2003;72:1117-30.  2. Malika N, Ruth M, Alrson G.  BRCA1 and BRCA2 Hereditary Breast and Ovarian Cancer. Gene Reviews online. 2013.  3. Sage YC, Yao S, Grover G, Ramsay S. Breast cancer risk among male BRCA1 and BRCA2 mutation carriers. J Natl Cancer Inst. 2007;99:1811-4.  4. Eduardo DG, Casandra I, Omar J, Samir E, Tirso ER, Bob F. Risk of breast cancer in male BRCA2 carriers. J Med Belkis. 2010;47:710-1.  5. National Comprehensive Cancer Network. Clinical practice guidelines in oncology, colorectal cancer screening. Available online (registration required). 2015.  6. Darren BRADEN, Dexter J, Vadim J, Medardo MYERS, Anshu TISNLEY, Chu C. Lifetime cancer risks in individuals with germline PTEN mutations. Clin Cancer Res.  2012;18:400-7.  7. Bryanna FORMAN. Cowden Syndrome: A Critical Review of the Clinical Literature. J Belkis . 2009:18:13-27.  8. Sravan SHORT, Zachary D, Elma S, Aida P, Netta T, Huang M, Robbin B, Sara H, Jazmin R, Justina K, Joshua L, Eduardo DG, Bertha D, Hector DF, Linda MR, The Breast Cancer Susceptibility Collaboration (UK) & John WARNER. RAMONE mutations that cause ataxia-telangiectasia are breast cancer susceptibility alleles. Nature Genetics. 2006;38:873-875  9. Олег N , Cassie Y, Radha J, Bella L, Erlin GM , Ilan ML, Gallinger S, Agee AG, Syngal S, Ozzy ML, Forrest J , Zuleima R, Becky SZ, Arnold JR, Arturo VE, Keily M, Vogelstein B, Charles N, Felix RH, Delvis KW, and Janine AP. RAMONE mutations in patients with hereditary pancreatic cancer. Cancer Discover. 2012;2:41-46  10. Carlita REED, et al. Breast-Cancer Risk in Families with Mutations in PALB2. NEJM. 2014; 371(6):497-506.  11. CHEK2 Breast Cancer Case-Control Consortium. CHEK2*1100delC and susceptibility to breast cancer: A collaborative analysis involving 10,860 breast cancer cases and 9,065 controls from 10 studies. Am J Hum Belkis, 74 (2004), pp. 0091-7435  12. Nahid T, Gallo S, Timothy K, et al. Spectrum of Mutations in BRCA1, BRCA2, CHEK2, and TP53 in Families at High Risk of Breast Cancer. FAIRDA. 2006;295(12):5710-7581.   13. Barbara BAUTISTA, Calvin HECTOR, Trish A, et al. Risk of breast cancer in women with a CHEK2 mutation with and without a family history of breast cancer. J Clin Oncol. 2011;29:9733-2957.  14. Selvin H, Nehal E, Ramona SJ, et al. Contribution of germline mutations in the RAD51B, RAD51C, and RAD51D genes to ovarian cancer in the population. J Clin Oncol. 2015;33(26):0709-3609. Doi:10.1200/JCO.2015.61.2408.  15. Enedina T, Saroj FULLER, Perry P, et al. Mutations in BRIP1 confer high risk of ovarian cancer. Chana Belkis. 2011;43(11):6803-6611. doi:10.1038/ng.955.

## 2021-02-25 NOTE — LETTER
"    Cancer Risk Management  Program Locations    [facility]  [facility]  [facility]  Mailing Address  Cancer Risk Management Program  [address]  [place] [address]    New patient appointments  [phone]  February 25, 2021    Flor Griffin  [address] [address] [id number]      Dear Flor,    It was a pleasure speaking with you over video on 2/25/2021. Here is a copy of the progress note from our discussion. If you have any additional questions, please feel free to call.    Referring Provider: Sally Stover MD    Presenting Information:   Given concerns regarding the potential for COVID-19 exposure during a clinic visit, Flor elected for a video genetic counseling visit through the Cancer Risk Management Program to discuss her personal and family history of breast cancer. Today we reviewed this history, cancer screening recommendations, and available genetic testing options.    Personal History:  Flor is a 65 year old year old female. 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 Magana, at age 62. Treatment included neoadjuvant chemotherapy, neoadjuvant radiation, and aromatase inhibitors. She had negative genetic testing for 16 genes in 2018. Specifically she tested negative for mutations in RAMONE, BRCA1, BRCA2, BRIP1, CDH1, CHEK2, EPCAM, MLH1, MSH2, MSH6, PALB2, PMS2, PTEN, RAD51C, RAD51D, and TP53. A copy of the test report can be found in the Laboratory tab, dated 3/15/2018, and named \"SEND OUTS MISC TEST\". The report is scanned in as a linked document. She was recently diagnosed with recurrent breast cancer at age 65 that is currently being treated with chemotherapy.    Flor has her ovaries, " fallopian tubes and uterus in place, and she has had no ovarian cancer screening to date. She reports that she has not used hormone replacement therapy. She has annual clinical breast exams and mammograms; her most recent mammogram in October 2020 found a hypoechoic mass in her right breast that was identified to be recurrent breast adenocarcinoma. Flor began having colonoscopies at the age of 54. Her most recent colonoscopy in April 2012 was normal and follow-up was recommended in 10 years. She does not regularly do any other cancer screening at this time.     Family History: (Please see scanned pedigree from 3/17/2018 for detailed family history information). Flor reported no changes to her family history since this visit.  ? Flor's sister was diagnosed with breast cancer at age 80.  ? One maternal aunt was diagnosed with breast cancer in her 30's, and possible ovarian cancer in her 70's. Flor was uncertain whether this ovarian cancer was a second primary diagnosis.    ? One maternal aunt who passed away in her 70's reportedly had a history of a brain tumor.    ? Her maternal uncle reportedly had a history of cancer, no further details are known about this history.    One paternal aunt reportedly had a history of a brain tumor, and passed away in her 80's     Her maternal ethnicity is [place]ese. Her paternal ethnicity is [place]ese and [place]. There is no known Ashkenazi Yazidism ancestry on either side of her family. There is no reported consanguinity.    Discussion:    Flor's personal and family history of breast is suggestive of a hereditary cancer syndrome.    We reviewed the features of sporadic, familial, and hereditary cancers. We discussed that mutations in either BRCA1 or BRCA2 could be possible hereditary explanations for her family history of cancer. Mutations in the BRCA1 or BRCA2 gene are known to cause Hereditary Breast and Ovarian Cancer Syndrome (HBOC). HBOC typically presents with  multiple family members diagnosed with breast cancer before age 50 and/or ovarian cancer. Other cancer risks associated with HBOC include male breast cancer, prostate cancer, pancreatic cancer, and melanoma.   We reviewed the BRCA1/2 genes, for which Flor had previous negative genetic testing. We discussed that there are additional genes that have an increased susceptibility to developing breast cancer that she was not tested for in 2018. Testing is also available for additional cancer risk genes via panel testing. As many of these genes present with overlapping features in a family and accurate cancer risk cannot always be established based upon the pedigree analysis alone, it would be reasonable for Flor to consider panel genetic testing to analyze multiple genes at once.  Based on her personal and family history, Flor meets current National Comprehensive Cancer Network (NCCN) criteria for genetic testing of BRCA1/2 along with other high-penetrance breast and/or ovarian cancer susceptibility genes. NCCN guidelines also mention that there may be a role for multi-gene panel genetic testing for patients who have previously tested negative for hereditary cancer syndromes, but have a suspicious personal or family history.    We discussed the natural history and genetics of hereditary cancer. A detailed handout regarding hereditary cancer, along with the other information we discussed, will be mailed to Flor at the end of our appointment today and can be found in the after visit summary. Topics included: inheritance pattern, cancer risks, cancer screening recommendations, and also risks, benefits and limitations of testing.    Genetic testing is available for 23 genes associated with hereditary gynecologic, breast, and related cancers: BRCANext-Expanded (RAMONE, BARD1, BRCA1, BRCA2, BRIP1, CDH1, CHEK2, DICER1, EPCAM, MLH1, MSH2, MSH6, NBN, NF1, PALB2, PMS2, PTEN, RAD51C, RAD51D, RECQL, SMARCA4, STK11, TP53). Of  note, Flor has already tested negative for some of these genes.    We discussed that some of the genes in the BRCANext-Expanded panel are associated with specific hereditary cancer syndromes and published management guidelines: Hereditary Breast and Ovarian Cancer syndrome (BRCA1, BRCA2), Ravi syndrome (MLH1, MSH2, MSH6, PMS2, EPCAM), Hereditary Diffuse Gastric Cancer (CDH1), Cowden syndrome (PTEN), Li Fraumeni syndrome (TP53), Peutz-Jeghers syndrome (STK11), and Neurofibromatosis type 1 (NF1).      Risk-reducing salpingo-oophorectomy can be considered in women with mutations in BRIP1, RAD51C, or RAD51D. Breast and/or other cancer risk management guidelines are available for RAMONE, CHEK2, PALB2, NF1, and NBN.    The remaining genes (BARD1, DICER1, RECQL, and SMARCA4) are associated with increased cancer risk and may allow us to make medical recommendations when mutations are identified.      Flor would like to submit a blood sample for her genetic testing and would like this performed at her next infusion appointment. I will put a note in this appointment for them to also draw her labs for her genetic testing.    Verbal consent was given over the phone and written on the consent form due to COVID-19 restrictions. Turnaround time is 4-5 weeks once the lab receives the sample.    Medical Management: For Flor, we reviewed that the information from genetic testing may determine:    surgery to treat Flor's active cancer diagnosis (i.e. lumpectomy versus bilateral mastectomy),    additional cancer screening for which Flor may qualify (i.e. mammogram and breast MRI, more frequent colonoscopies, more frequent dermatologic exams, etc.),    options for risk reducing surgeries Flor could consider (i.e. bilateral mastectomy, surgery to remove her ovaries and/or uterus, etc.),      and targeted chemotherapies for Flor's active cancer, or if she were to develop certain cancers in the future.     These  recommendations and possible targeted chemotherapies will be discussed in detail once genetic testing is completed.     Plan:  1) Today Flor elected to proceed with BRCANext-Expanded.  2) A copy of the consent form and the after visit summary will be mailed to Flor.  3) This information should be available in 4-5 weeks, once the lab receives the sample.  4) I will call Flor with the results once they become available.    Time spent on video: 35 minutes    Keyur Herbert MS, [facility]  Licensed, Certified Genetic Counselor  (P): [phone]  (F): [phone]

## 2021-03-01 ENCOUNTER — PATIENT OUTREACH (OUTPATIENT)
Dept: ONCOLOGY | Facility: CLINIC | Age: 66
End: 2021-03-01

## 2021-03-01 NOTE — PROGRESS NOTES
Called Michelle back, left message on Michelle's voice mail that writer is aware that her [place] was held last week secondary to her blood counts. Michelle is scheduled this week for Shante and follow up with ERIK Rodriguez at [facility]. Danya Lara RN,BSN,OCN

## 2021-03-01 NOTE — PROGRESS NOTES
Writer received a VM from patient requesting Danya DAVIDSON to return call.     No details provided.     Message routed.     Michela Parmar RN

## 2021-03-01 NOTE — PROGRESS NOTES
Michelle called clinic stating that she did not have a good experience with her port placement with surgery at [facility]. She would like to speak with a representative in patient relations at [facility]. Called Michelle and gave her phone number of [phone]. She will call and speak with representative. Danya Lara RN,BSN,OCN

## 2021-03-03 ENCOUNTER — OFFICE VISIT (OUTPATIENT)
Dept: INFUSION THERAPY | Facility: CLINIC | Age: 66
End: 2021-03-03
Attending: PHYSICIAN ASSISTANT
Payer: COMMERCIAL

## 2021-03-03 ENCOUNTER — HOSPITAL ENCOUNTER (OUTPATIENT)
Facility: CLINIC | Age: 66
Setting detail: SPECIMEN
Discharge: HOME | End: 2021-03-03
Attending: PHYSICIAN ASSISTANT | Admitting: PHYSICIAN ASSISTANT
Payer: COMMERCIAL

## 2021-03-03 DIAGNOSIS — C50.411 MALIGNANT NEOPLASM OF UPPER-OUTER QUADRANT OF RIGHT BREAST IN FEMALE, ESTROGEN RECEPTOR POSITIVE (H): ICD-10-CM

## 2021-03-03 DIAGNOSIS — R59.1 LYMPHADENOPATHY: ICD-10-CM

## 2021-03-03 DIAGNOSIS — Z17.0 MALIGNANT NEOPLASM OF OVERLAPPING SITES OF RIGHT BREAST IN FEMALE, ESTROGEN RECEPTOR POSITIVE (H): ICD-10-CM

## 2021-03-03 DIAGNOSIS — Z17.0 MALIGNANT NEOPLASM OF UPPER-OUTER QUADRANT OF RIGHT BREAST IN FEMALE, ESTROGEN RECEPTOR POSITIVE (H): ICD-10-CM

## 2021-03-03 DIAGNOSIS — C50.811 MALIGNANT NEOPLASM OF OVERLAPPING SITES OF RIGHT BREAST IN FEMALE, ESTROGEN RECEPTOR POSITIVE (H): ICD-10-CM

## 2021-03-03 DIAGNOSIS — Z80.3 FAMILY HISTORY OF MALIGNANT NEOPLASM OF BREAST: ICD-10-CM

## 2021-03-03 DIAGNOSIS — Z95.828 PORT-A-CATH IN PLACE: Primary | ICD-10-CM

## 2021-03-03 LAB
ALBUMIN SERPL-MCNC: 3.4 G/DL (ref 3.4–5)
ALP SERPL-CCNC: 126 U/L (ref 40–150)
ALT SERPL W P-5'-P-CCNC: 118 U/L (ref 0–50)
ANION GAP SERPL CALCULATED.3IONS-SCNC: 5 MMOL/L (ref 3–14)
AST SERPL W P-5'-P-CCNC: 67 U/L (ref 0–45)
BASOPHILS # BLD AUTO: 0 10E9/L (ref 0–0.2)
BASOPHILS NFR BLD AUTO: 1.3 %
BILIRUB SERPL-MCNC: 0.7 MG/DL (ref 0.2–1.3)
BUN SERPL-MCNC: 13 MG/DL (ref 7–30)
CALCIUM SERPL-MCNC: 8.9 MG/DL (ref 8.5–10.1)
CHLORIDE SERPL-SCNC: 106 MMOL/L (ref 94–109)
CO2 SERPL-SCNC: 28 MMOL/L (ref 20–32)
CREAT SERPL-MCNC: 0.95 MG/DL (ref 0.52–1.04)
DIFFERENTIAL METHOD BLD: ABNORMAL
EOSINOPHIL # BLD AUTO: 0 10E9/L (ref 0–0.7)
EOSINOPHIL NFR BLD AUTO: 0.4 %
ERYTHROCYTE [DISTWIDTH] IN BLOOD BY AUTOMATED COUNT: 14.5 % (ref 10–15)
GFR SERPL CREATININE-BSD FRML MDRD: 62 ML/MIN/{1.73_M2}
GLUCOSE SERPL-MCNC: 93 MG/DL (ref 70–99)
HCT VFR BLD AUTO: 34.9 % (ref 35–47)
HGB BLD-MCNC: 11.6 G/DL (ref 11.7–15.7)
IMM GRANULOCYTES # BLD: 0 10E9/L (ref 0–0.4)
IMM GRANULOCYTES NFR BLD: 0.4 %
LYMPHOCYTES # BLD AUTO: 1 10E9/L (ref 0.8–5.3)
LYMPHOCYTES NFR BLD AUTO: 43.5 %
MACROCYTES BLD QL SMEAR: PRESENT
MAGNESIUM SERPL-MCNC: 2.1 MG/DL (ref 1.6–2.3)
MCH RBC QN AUTO: 38.4 PG (ref 26.5–33)
MCHC RBC AUTO-ENTMCNC: 33.2 G/DL (ref 31.5–36.5)
MCV RBC AUTO: 116 FL (ref 78–100)
MONOCYTES # BLD AUTO: 0.6 10E9/L (ref 0–1.3)
MONOCYTES NFR BLD AUTO: 26.3 %
NEUTROPHILS # BLD AUTO: 0.7 10E9/L (ref 1.6–8.3)
NEUTROPHILS NFR BLD AUTO: 28.1 %
NRBC # BLD AUTO: 0 10*3/UL
NRBC BLD AUTO-RTO: 0 /100
PLATELET # BLD AUTO: 220 10E9/L (ref 150–450)
PLATELET # BLD EST: ABNORMAL 10*3/UL
POTASSIUM SERPL-SCNC: 3.9 MMOL/L (ref 3.4–5.3)
PROT SERPL-MCNC: 6.1 G/DL (ref 6.8–8.8)
RBC # BLD AUTO: 3.02 10E12/L (ref 3.8–5.2)
SODIUM SERPL-SCNC: 139 MMOL/L (ref 133–144)
WBC # BLD AUTO: 2.3 10E9/L (ref 4–11)

## 2021-03-03 PROCEDURE — 36591 DRAW BLOOD OFF VENOUS DEVICE: CPT

## 2021-03-03 PROCEDURE — 83735 ASSAY OF MAGNESIUM: CPT | Performed by: PHYSICIAN ASSISTANT

## 2021-03-03 PROCEDURE — 85025 COMPLETE CBC W/AUTO DIFF WBC: CPT | Performed by: PHYSICIAN ASSISTANT

## 2021-03-03 PROCEDURE — 80053 COMPREHEN METABOLIC PANEL: CPT | Performed by: PHYSICIAN ASSISTANT

## 2021-03-03 PROCEDURE — 250N000011 HC RX IP 250 OP 636: Performed by: INTERNAL MEDICINE

## 2021-03-03 RX ORDER — HEPARIN SODIUM,PORCINE 10 UNIT/ML
5 VIAL (ML) INTRAVENOUS
Status: CANCELLED | OUTPATIENT
Start: 2021-03-03

## 2021-03-03 RX ORDER — HEPARIN SODIUM (PORCINE) LOCK FLUSH IV SOLN 100 UNIT/ML 100 UNIT/ML
5 SOLUTION INTRAVENOUS
Status: DISCONTINUED | OUTPATIENT
Start: 2021-03-03 | End: 2021-03-03 | Stop reason: HOSPADM

## 2021-03-03 RX ORDER — HEPARIN SODIUM (PORCINE) LOCK FLUSH IV SOLN 100 UNIT/ML 100 UNIT/ML
5 SOLUTION INTRAVENOUS
Status: CANCELLED | OUTPATIENT
Start: 2021-03-03

## 2021-03-03 RX ADMIN — Medication 5 ML: at 13:40

## 2021-03-03 NOTE — LETTER
3/3/2021         RE: Flor Griffin  [address] [address] [id number]        Dear Colleague,    Thank you for referring your patient, Flor Griffin, to the [facility]. Please see a copy of my visit note below.    Nursing Note:  Flor Griffin presents today for labs.    Patient seen by provider today: No   present during visit today: Not Applicable.    Note: N/A.    Intravenous Access:  Labs drawn without difficulty.  Implanted Port.    Discharge Plan:   Patient was discharged to home     Ericka Sharma RN              Again, thank you for allowing me to participate in the care of your patient.        Sincerely,         Lab Draw 1

## 2021-03-03 NOTE — PROGRESS NOTES
Nursing Note:  Flor Griffin presents today for labs.    Patient seen by provider today: No   present during visit today: Not Applicable.    Note: N/A.    Intravenous Access:  Labs drawn without difficulty.  Implanted Port.    Discharge Plan:   Patient was discharged to home     Ericka Sharma RN

## 2021-03-04 ENCOUNTER — ONCOLOGY VISIT (OUTPATIENT)
Dept: ONCOLOGY | Facility: CLINIC | Age: 66
End: 2021-03-04
Attending: PHYSICIAN ASSISTANT
Payer: COMMERCIAL

## 2021-03-04 ENCOUNTER — TELEPHONE (OUTPATIENT)
Dept: ONCOLOGY | Facility: CLINIC | Age: 66
End: 2021-03-04

## 2021-03-04 VITALS
WEIGHT: 180 LBS | SYSTOLIC BLOOD PRESSURE: 109 MMHG | OXYGEN SATURATION: 97 % | DIASTOLIC BLOOD PRESSURE: 70 MMHG | HEIGHT: 66 IN | BODY MASS INDEX: 28.93 KG/M2 | TEMPERATURE: 98 F | RESPIRATION RATE: 16 BRPM | HEART RATE: 82 BPM

## 2021-03-04 DIAGNOSIS — C50.411 MALIGNANT NEOPLASM OF UPPER-OUTER QUADRANT OF RIGHT BREAST IN FEMALE, ESTROGEN RECEPTOR POSITIVE (H): Primary | ICD-10-CM

## 2021-03-04 DIAGNOSIS — T45.1X5A CHEMOTHERAPY-INDUCED NEUTROPENIA (H): ICD-10-CM

## 2021-03-04 DIAGNOSIS — D70.1 CHEMOTHERAPY-INDUCED NEUTROPENIA (H): ICD-10-CM

## 2021-03-04 DIAGNOSIS — Z17.0 MALIGNANT NEOPLASM OF UPPER-OUTER QUADRANT OF RIGHT BREAST IN FEMALE, ESTROGEN RECEPTOR POSITIVE (H): Primary | ICD-10-CM

## 2021-03-04 PROCEDURE — G0463 HOSPITAL OUTPT CLINIC VISIT: HCPCS

## 2021-03-04 PROCEDURE — 99214 OFFICE O/P EST MOD 30 MIN: CPT | Performed by: PHYSICIAN ASSISTANT

## 2021-03-04 ASSESSMENT — PAIN SCALES - GENERAL: PAINLEVEL: NO PAIN (0)

## 2021-03-04 ASSESSMENT — MIFFLIN-ST. JEOR: SCORE: 1378.22

## 2021-03-04 NOTE — TELEPHONE ENCOUNTER
Called Emmie stated to her that it is fine for her mother to receive the Covid vaccine whenever she can get it. Emmie was given information on how to sign up for [employer] for her mother or go to the [facility] website to get her mother signed up for the vaccine. Danya Lara RN,BSN,OCN

## 2021-03-04 NOTE — TELEPHONE ENCOUNTER
Emmie calling today requesting to speak with Danya. She states her moms counts were too low for treatment today.   Emmie was  Also asking if this would be a good time for her mom to receive the covid vaccine.  Would like a call back from Danya - [phone]

## 2021-03-04 NOTE — PROGRESS NOTES
Oncology/Hematology Visit Note    Mar 4, 2021    Reason for visit: Follow-up breast cancer    Oncology HPI: Flor Griffin is a 65 year old female with metastatic breast cancer, ER low-positive, FL negative and HER-2 negative.  She was initially seen in November 2017 when she underwent a right supraclavicular lymph node biopsy, positive for poorly differentiated adenocarcinoma, favoring breast primary.  PET scan on 11/27/2017 with multiple different areas of adenopathy, but no distant metastatic disease.  She completed 4 cycles of carboplatin/Taxol followed by dose dense AC for another 4 cycles, surgery was not performed.  She completed adjuvant radiation to the right breast, right axilla and right supraclavicular region.  She was started on letrozole in September 2018.  In October 2020, screening mammogram revealed a focal asymmetry in the right outer right breast and ultrasound revealed a hypoechoic mass and biopsy with poorly differentiated carcinoma consistent with breast cancer, ER weakly positive, FL negative and HER-2 negative.  PET scan on 10/28/2020 with several lymph nodes in the right axilla and right subclavian region and bilateral hilar regions, favoring metastatic disease.  She started first-line metastatic therapy with Xeloda, but follow-up PET scan on 2/9/2021 with no significant change in breast mass size.  Xeloda was discontinued and port was placed on 2/16/2021 and she was started on eribulin, C1 D1 on 2/18/2021.    She was in clinic on 2/25/2021 for C1 D8, but was noted to be neutropenic, therefore treatment was deferred.    She is here today for deferred eribulin C1 D8.    Interval History: Michelle is here in person today and she is doing well.  Unfortunately, her ANC is low again and she is disappointed she will not get chemo, which is reasonable.  She has had no fever, infectious symptoms no cough, no dysuria, hematuria.  She is eating well.  No other new complaints.    Review of Systems: See  "interval hx. Denies fevers, chills, HA, dizziness, n/t, changes in vision, cough, sore throat, CP, SOB, abdominal pain, N/V, diarrhea, changes in urination, bleeding, bruising, rash.     PMHx and Social Hx reviewed per EPIC.      Medications:  Current Outpatient Medications   Medication Sig Dispense Refill     aspirin 81 MG tablet Take 81 mg by mouth daily       Ibuprofen (ADVIL PO) Take 400 mg by mouth every 6 hours as needed for moderate pain       LORazepam (ATIVAN) 0.5 MG tablet Take 1 tablet (0.5 mg) by mouth every 4 hours as needed (Anxiety, Nausea/Vomiting or Sleep) 30 tablet 2     multivitamin w/minerals (THERA-VIT-M) tablet Take 1 tablet by mouth daily       Omega-3 Fatty Acids (OMEGA-3 FISH OIL PO) Take 1 g by mouth daily       ondansetron (ZOFRAN) 8 MG tablet Take 1 tablet (8 mg) by mouth every 8 hours as needed (Nausea/Vomiting) 10 tablet 2     prochlorperazine (COMPAZINE) 10 MG tablet Take 0.5-1 tablets (5-10 mg) by mouth every 6 hours as needed for nausea or vomiting 30 tablet 1       Allergies   Allergen Reactions     Penicillins Hives         EXAM:    /70   Pulse 82   Temp 98  F (36.7  C) (Tympanic)   Resp 16   Ht 1.676 m (5' 6\")   Wt 81.6 kg (180 lb)   LMP 01/01/2004 (Approximate)   SpO2 97%   BMI 29.05 kg/m      GENERAL:  Female, in no acute distress.  Alert and oriented x3.   HEENT:  Normocephalic, atraumatic.  PERRL, oropharynx clear with no sores or thrush.   LYMPH NODES:  No palpable pre/post-auricular, cervical, axillary lymphadenopathy appreciated.  CV: Intermittent episode of an irregular rhythm, but mostly regular.  No murmurs.  LUNGS:  Clear to auscultation bilaterally.   BREAST: Not examined today  ABDOMEN:  Soft, nontender and nondistended.  Bowel sounds heard x4.  No apparent hepatosplenomegaly.   EXTREMITIES:  No clubbing, cyanosis, or edema.   SKIN: No rash on exposed skin  PSYCH: Mood stable      Labs:   Results for CHARLES MASON (MRN [id number]) as of 3/4/2021 16:14   " 3/3/2021 13:40   Sodium 139   Potassium 3.9   Chloride 106   Carbon Dioxide 28   Urea Nitrogen 13   Creatinine 0.95   GFR Estimate 62   GFR Estimate If Black 72   Calcium 8.9   Anion Gap 5   Magnesium 2.1   Albumin 3.4   Protein Total 6.1 (L)   Bilirubin Total 0.7   Alkaline Phosphatase 126    (H)   AST 67 (H)   Glucose 93   WBC 2.3 (L)   Hemoglobin 11.6 (L)   Hematocrit 34.9 (L)   Platelet Count 220   RBC Count 3.02 (L)    (H)   MCH 38.4 (H)   MCHC 33.2   RDW 14.5   Diff Method Automated Method   % Neutrophils 28.1   % Lymphocytes 43.5   % Monocytes 26.3   % Eosinophils 0.4   % Basophils 1.3   % Immature Granulocytes 0.4   Nucleated RBCs 0   Absolute Neutrophil 0.7 (L)   Absolute Lymphocytes 1.0   Absolute Monocytes 0.6   Absolute Eosinophils 0.0   Absolute Basophils 0.0   Abs Immature Granulocytes 0.0   Absolute Nucleated RBC 0.0   Macrocytes Present   Platelet Estimate Automated count confirmed.  Platelet morphology is normal.   LABORATORY MISCELLANEOUS ORDER Rpt   SEND OUTS MISC TEST Rpt       Imaging: n/a    Impression/Plan: Flor Griffin is a 65 year old female with metastatic breast cancer breast cancer s/p carboplatin/Taxol, dose dense AC, adjuvant radiation, adjuvant letrozole previously on Xeloda, currently on eribulin.    Metastatic breast cancer: ER low positive, NC negative HER-2 negative, previously on Xeloda, now starting eribulin.  C1 D1 completed on 2/18/2021 and she tolerated this really well.  She is neutropenic again today with ANC 0.7, therefore we will continue to hold chemotherapy.  I spoke with Dr. Stover and we will defer to next week again.  I will see her and will repeat labs.  She is to monitor her temperature and we discussed this in detail.  She will call sooner if needed.  --[phone] Jennifer, deferred eribulin C1D8    Heme: Neutropenia with WBC 2.3, ANC 0.7secondary to chemotherapy and she is afebrile with no infectious symptoms.  She was instructed to monitor her  temperature and call the clinic or go to the ED with fever > 100.4.  We will reschedule chemotherapy next week.  Anemia and thrombocytopenia secondary to chemotherapy, we will monitor.    CV: Irregular heartbeat on exam, and she admits that she has been having some sort of palpitations recently.  EKG with PVCs on 2/25. EKG prior to eribulin normal sinus rhythm.  We will watch this closely.    LFTs: ALT and AST were elevated just prior to eribulin C1 D1 and they have been fluctuating.  Stable today.      Chart documentation with Dragon Voice recognition Software. Although reviewed after completion, some words and grammatical errors may remain.      Jennifer Ash PA-C  Hematology/Oncology  [facility]

## 2021-03-04 NOTE — LETTER
3/4/2021         RE: Flor Griffin  [address] [facility] [address]        Dear Colleague,    Thank you for referring your patient, Flor Griffin, to the [facility]. Please see a copy of my visit note below.    Oncology/Hematology Visit Note    Mar 4, 2021    Reason for visit: Follow-up breast cancer    Oncology HPI: Flor Griffin is a 65 year old female with metastatic breast cancer, ER low-positive, [place] negative and HER-2 negative.  She was initially seen in November 2017 when she underwent a right supraclavicular lymph node biopsy, positive for poorly differentiated adenocarcinoma, favoring breast primary.  PET scan on 11/27/2017 with multiple different areas of adenopathy, but no distant metastatic disease.  She completed 4 cycles of carboplatin/Taxol followed by dose dense AC for another 4 cycles, surgery was not performed.  She completed adjuvant radiation to the right breast, right axilla and right supraclavicular region.  She was started on letrozole in September 2018.  In October 2020, screening mammogram revealed a focal asymmetry in the right outer right breast and ultrasound revealed a hypoechoic mass and biopsy with poorly differentiated carcinoma consistent with breast cancer, ER weakly positive, [place] negative and HER-2 negative.  PET scan on 10/28/2020 with several lymph nodes in the right axilla and right subclavian region and bilateral hilar regions, favoring metastatic disease.  She started first-line metastatic therapy with Xeloda, but follow-up PET scan on 2/9/2021 with no significant change in breast mass size.  Xeloda was discontinued and port was placed on 2/16/2021 and she was started on eribulin, C1 D1 on 2/18/2021.    She was in clinic on 2/25/2021 for C1 D8, but was noted to be neutropenic, therefore treatment was deferred.    She is here today for deferred eribulin C1 D8.    Interval History: Michelle is here in person today and she is  "doing well.  Unfortunately, her ANC is low again and she is disappointed she will not get chemo, which is reasonable.  She has had no fever, infectious symptoms no cough, no dysuria, hematuria.  She is eating well.  No other new complaints.    Review of Systems: See interval hx. Denies fevers, chills, HA, dizziness, n/t, changes in vision, cough, sore throat, CP, SOB, abdominal pain, N/V, diarrhea, changes in urination, bleeding, bruising, rash.     PMHx and Social Hx reviewed per EPIC.      Medications:  Current Outpatient Medications   Medication Sig Dispense Refill     aspirin 81 MG tablet Take 81 mg by mouth daily       Ibuprofen (ADVIL PO) Take 400 mg by mouth every 6 hours as needed for moderate pain       LORazepam (ATIVAN) 0.5 MG tablet Take 1 tablet (0.5 mg) by mouth every 4 hours as needed (Anxiety, Nausea/Vomiting or Sleep) 30 tablet 2     multivitamin w/minerals (THERA-VIT-M) tablet Take 1 tablet by mouth daily       Omega-3 Fatty Acids (OMEGA-3 FISH OIL PO) Take 1 g by mouth daily       ondansetron (ZOFRAN) 8 MG tablet Take 1 tablet (8 mg) by mouth every 8 hours as needed (Nausea/Vomiting) 10 tablet 2     prochlorperazine (COMPAZINE) 10 MG tablet Take 0.5-1 tablets (5-10 mg) by mouth every 6 hours as needed for nausea or vomiting 30 tablet 1       Allergies   Allergen Reactions     Penicillins Hives         EXAM:    /70   Pulse 82   Temp 98  F (36.7  C) (Tympanic)   Resp 16   Ht 1.676 m (5' 6\")   Wt 81.6 kg (180 lb)   LMP 01/01/2004 (Approximate)   SpO2 97%   BMI 29.05 kg/m      GENERAL:  Female, in no acute distress.  Alert and oriented x3.   HEENT:  Normocephalic, atraumatic.  PERRL, oropharynx clear with no sores or thrush.   LYMPH NODES:  No palpable pre/post-auricular, cervical, axillary lymphadenopathy appreciated.  CV: Intermittent episode of an irregular rhythm, but mostly regular.  No murmurs.  LUNGS:  Clear to auscultation bilaterally.   BREAST: Not examined today  ABDOMEN:  " Soft, nontender and nondistended.  Bowel sounds heard x4.  No apparent hepatosplenomegaly.   EXTREMITIES:  No clubbing, cyanosis, or edema.   SKIN: No rash on exposed skin  PSYCH: Mood stable      Labs:   Results for CHARLES GRIFFIN (MRN [id number]) as of 3/4/2021 16:14   3/3/2021 13:40   Sodium 139   Potassium 3.9   Chloride 106   Carbon Dioxide 28   Urea Nitrogen 13   Creatinine 0.95   GFR Estimate 62   GFR Estimate If Black 72   Calcium 8.9   Anion Gap 5   Magnesium 2.1   Albumin 3.4   Protein Total 6.1 (L)   Bilirubin Total 0.7   Alkaline Phosphatase 126    (H)   AST 67 (H)   Glucose 93   WBC 2.3 (L)   Hemoglobin 11.6 (L)   Hematocrit 34.9 (L)   Platelet Count 220   RBC Count 3.02 (L)    (H)   MCH 38.4 (H)   MCHC 33.2   RDW 14.5   Diff Method Automated Method   % Neutrophils 28.1   % Lymphocytes 43.5   % Monocytes 26.3   % Eosinophils 0.4   % Basophils 1.3   % Immature Granulocytes 0.4   Nucleated RBCs 0   Absolute Neutrophil 0.7 (L)   Absolute Lymphocytes 1.0   Absolute Monocytes 0.6   Absolute Eosinophils 0.0   Absolute Basophils 0.0   Abs Immature Granulocytes 0.0   Absolute Nucleated RBC 0.0   Macrocytes Present   Platelet Estimate Automated count confirmed.  Platelet morphology is normal.   LABORATORY MISCELLANEOUS ORDER Rpt   SEND OUTS MISC TEST Rpt       Imaging: n/a    Impression/Plan: Flor Griffin is a 65 year old female with metastatic breast cancer breast cancer s/p carboplatin/Taxol, dose dense AC, adjuvant radiation, adjuvant letrozole previously on Xeloda, currently on eribulin.    Metastatic breast cancer: ER low positive, [place] negative HER-2 negative, previously on Xeloda, now starting eribulin.  C1 D1 completed on 2/18/2021 and she tolerated this really well.  She is neutropenic again today with ANC 0.7, therefore we will continue to hold chemotherapy.  I spoke with Dr. Stover and we will defer to next week again.  I will see her and will repeat labs.  She is to monitor her  temperature and we discussed this in detail.  She will call sooner if needed.  --[phone] Jennifer, deferred eribulin C1D8    Heme: Neutropenia with WBC 2.3, ANC 0.7secondary to chemotherapy and she is afebrile with no infectious symptoms.  She was instructed to monitor her temperature and call the clinic or go to the ED with fever > 100.4.  We will reschedule chemotherapy next week.  Anemia and thrombocytopenia secondary to chemotherapy, we will monitor.    CV: Irregular heartbeat on exam, and she admits that she has been having some sort of palpitations recently.  EKG with PVCs on 2/25. EKG prior to eribulin normal sinus rhythm.  We will watch this closely.    LFTs: ALT and AST were elevated just prior to eribulin C1 D1 and they have been fluctuating.  Stable today.      Chart documentation with Dragon Voice recognition Software. Although reviewed after completion, some words and grammatical errors may remain.      Jennifer Ash PA-C  Hematology/Oncology  [facility]                    Again, thank you for allowing me to participate in the care of your patient.        Sincerely,        Jennifer Ash PA-C

## 2021-03-04 NOTE — NURSING NOTE
"Oncology Rooming Note    March 4, 2021 8:31 AM   Flor Griffin is a 65 year old female who presents for:    Chief Complaint   Patient presents with     Oncology Clinic Visit     Malignant neoplasm of upper-outer quadrant of right breast in female, estrogen receptor positive      Initial Vitals: /70   Pulse 82   Temp 98  F (36.7  C) (Tympanic)   Resp 16   Ht 1.676 m (5' 6\")   Wt 81.6 kg (180 lb)   LMP 01/01/2004 (Approximate)   SpO2 97%   BMI 29.05 kg/m   Estimated body mass index is 29.05 kg/m  as calculated from the following:    Height as of this encounter: 1.676 m (5' 6\").    Weight as of this encounter: 81.6 kg (180 lb). Body surface area is 1.95 meters squared.  No Pain (0) Comment: Data Unavailable   Patient's last menstrual period was 01/01/2004 (approximate).  Allergies reviewed: Yes  Medications reviewed: Yes    Medications: Medication refills not needed today.  Pharmacy name entered into [employer]: [id number] - [facility] [id number]    Clinical concerns: f/u       Norma Reeves CMA              "

## 2021-03-05 ENCOUNTER — TELEPHONE (OUTPATIENT)
Dept: ONCOLOGY | Facility: CLINIC | Age: 66
End: 2021-03-05

## 2021-03-05 LAB — MISCELLANEOUS TEST: NORMAL

## 2021-03-05 NOTE — TELEPHONE ENCOUNTER
Emmie called clinic stating that she got her mother Michelle in for a Covid vaccine at [facility] today. She wanted to make sure it was ok with her counts being low. Stated that it is not a live Vaccine and that Dr. Stover wants patients to get it when available and when they can receive it. Danya Lara RN,BSN,OCN

## 2021-03-10 ENCOUNTER — ONCOLOGY VISIT (OUTPATIENT)
Dept: ONCOLOGY | Facility: CLINIC | Age: 66
End: 2021-03-10
Attending: PHYSICIAN ASSISTANT
Payer: COMMERCIAL

## 2021-03-10 ENCOUNTER — HOSPITAL ENCOUNTER (OUTPATIENT)
Facility: CLINIC | Age: 66
Setting detail: SPECIMEN
Discharge: HOME | End: 2021-03-10
Attending: PHYSICIAN ASSISTANT | Admitting: PHYSICIAN ASSISTANT
Payer: COMMERCIAL

## 2021-03-10 DIAGNOSIS — R59.1 LYMPHADENOPATHY: Primary | ICD-10-CM

## 2021-03-10 DIAGNOSIS — Z17.0 MALIGNANT NEOPLASM OF UPPER-OUTER QUADRANT OF RIGHT BREAST IN FEMALE, ESTROGEN RECEPTOR POSITIVE (H): ICD-10-CM

## 2021-03-10 DIAGNOSIS — Z17.0 MALIGNANT NEOPLASM OF UPPER-OUTER QUADRANT OF RIGHT BREAST IN FEMALE, ESTROGEN RECEPTOR POSITIVE (H): Primary | ICD-10-CM

## 2021-03-10 DIAGNOSIS — R59.1 LYMPHADENOPATHY: ICD-10-CM

## 2021-03-10 DIAGNOSIS — C50.411 MALIGNANT NEOPLASM OF UPPER-OUTER QUADRANT OF RIGHT BREAST IN FEMALE, ESTROGEN RECEPTOR POSITIVE (H): ICD-10-CM

## 2021-03-10 DIAGNOSIS — T45.1X5A CHEMOTHERAPY-INDUCED NEUTROPENIA (H): ICD-10-CM

## 2021-03-10 DIAGNOSIS — C50.411 MALIGNANT NEOPLASM OF UPPER-OUTER QUADRANT OF RIGHT BREAST IN FEMALE, ESTROGEN RECEPTOR POSITIVE (H): Primary | ICD-10-CM

## 2021-03-10 DIAGNOSIS — D70.1 CHEMOTHERAPY-INDUCED NEUTROPENIA (H): ICD-10-CM

## 2021-03-10 LAB
ALBUMIN SERPL-MCNC: 3.3 G/DL (ref 3.4–5)
ALP SERPL-CCNC: 123 U/L (ref 40–150)
ALT SERPL W P-5'-P-CCNC: 73 U/L (ref 0–50)
ANION GAP SERPL CALCULATED.3IONS-SCNC: 7 MMOL/L (ref 3–14)
AST SERPL W P-5'-P-CCNC: 48 U/L (ref 0–45)
BASOPHILS # BLD AUTO: 0 10E9/L (ref 0–0.2)
BASOPHILS NFR BLD AUTO: 1 %
BILIRUB SERPL-MCNC: 0.6 MG/DL (ref 0.2–1.3)
BUN SERPL-MCNC: 14 MG/DL (ref 7–30)
CALCIUM SERPL-MCNC: 8.8 MG/DL (ref 8.5–10.1)
CHLORIDE SERPL-SCNC: 109 MMOL/L (ref 94–109)
CO2 SERPL-SCNC: 25 MMOL/L (ref 20–32)
CREAT SERPL-MCNC: 0.71 MG/DL (ref 0.52–1.04)
DIFFERENTIAL METHOD BLD: ABNORMAL
EOSINOPHIL # BLD AUTO: 0 10E9/L (ref 0–0.7)
EOSINOPHIL NFR BLD AUTO: 0.5 %
ERYTHROCYTE [DISTWIDTH] IN BLOOD BY AUTOMATED COUNT: 13.9 % (ref 10–15)
GFR SERPL CREATININE-BSD FRML MDRD: 90 ML/MIN/{1.73_M2}
GLUCOSE SERPL-MCNC: 125 MG/DL (ref 70–99)
HCT VFR BLD AUTO: 39.1 % (ref 35–47)
HGB BLD-MCNC: 12.5 G/DL (ref 11.7–15.7)
IMM GRANULOCYTES # BLD: 0 10E9/L (ref 0–0.4)
IMM GRANULOCYTES NFR BLD: 0.5 %
LYMPHOCYTES # BLD AUTO: 1.1 10E9/L (ref 0.8–5.3)
LYMPHOCYTES NFR BLD AUTO: 27.5 %
MAGNESIUM SERPL-MCNC: 2 MG/DL (ref 1.6–2.3)
MCH RBC QN AUTO: 36.9 PG (ref 26.5–33)
MCHC RBC AUTO-ENTMCNC: 32 G/DL (ref 31.5–36.5)
MCV RBC AUTO: 115 FL (ref 78–100)
MONOCYTES # BLD AUTO: 0.4 10E9/L (ref 0–1.3)
MONOCYTES NFR BLD AUTO: 9.6 %
NEUTROPHILS # BLD AUTO: 2.5 10E9/L (ref 1.6–8.3)
NEUTROPHILS NFR BLD AUTO: 60.9 %
NRBC # BLD AUTO: 0 10*3/UL
NRBC BLD AUTO-RTO: 0 /100
PLATELET # BLD AUTO: 262 10E9/L (ref 150–450)
POTASSIUM SERPL-SCNC: 3.9 MMOL/L (ref 3.4–5.3)
PROT SERPL-MCNC: 6.2 G/DL (ref 6.8–8.8)
RBC # BLD AUTO: 3.39 10E12/L (ref 3.8–5.2)
SODIUM SERPL-SCNC: 141 MMOL/L (ref 133–144)
WBC # BLD AUTO: 4.1 10E9/L (ref 4–11)

## 2021-03-10 PROCEDURE — 80053 COMPREHEN METABOLIC PANEL: CPT | Performed by: PHYSICIAN ASSISTANT

## 2021-03-10 PROCEDURE — 83735 ASSAY OF MAGNESIUM: CPT | Performed by: PHYSICIAN ASSISTANT

## 2021-03-10 PROCEDURE — 85025 COMPLETE CBC W/AUTO DIFF WBC: CPT | Performed by: PHYSICIAN ASSISTANT

## 2021-03-10 PROCEDURE — 999N001193 HC VIDEO/TELEPHONE VISIT; NO CHARGE

## 2021-03-10 PROCEDURE — 99214 OFFICE O/P EST MOD 30 MIN: CPT | Mod: 95 | Performed by: PHYSICIAN ASSISTANT

## 2021-03-10 PROCEDURE — 36591 DRAW BLOOD OFF VENOUS DEVICE: CPT

## 2021-03-10 RX ORDER — NALOXONE HYDROCHLORIDE 0.4 MG/ML
.1-.4 INJECTION, SOLUTION INTRAMUSCULAR; INTRAVENOUS; SUBCUTANEOUS
Status: CANCELLED | OUTPATIENT
Start: 2021-03-11

## 2021-03-10 RX ORDER — HEPARIN SODIUM (PORCINE) LOCK FLUSH IV SOLN 100 UNIT/ML 100 UNIT/ML
5 SOLUTION INTRAVENOUS EVERY 8 HOURS PRN
Status: CANCELLED | OUTPATIENT
Start: 2021-03-11

## 2021-03-10 RX ORDER — DIPHENHYDRAMINE HYDROCHLORIDE 50 MG/ML
50 INJECTION INTRAMUSCULAR; INTRAVENOUS
Status: CANCELLED
Start: 2021-03-11

## 2021-03-10 RX ORDER — EPINEPHRINE 1 MG/ML
0.3 INJECTION, SOLUTION INTRAMUSCULAR; SUBCUTANEOUS EVERY 5 MIN PRN
Status: CANCELLED | OUTPATIENT
Start: 2021-03-11

## 2021-03-10 RX ORDER — METHYLPREDNISOLONE SODIUM SUCCINATE 125 MG/2ML
125 INJECTION, POWDER, LYOPHILIZED, FOR SOLUTION INTRAMUSCULAR; INTRAVENOUS
Status: CANCELLED
Start: 2021-03-11

## 2021-03-10 RX ORDER — LORAZEPAM 2 MG/ML
0.5 INJECTION INTRAMUSCULAR EVERY 4 HOURS PRN
Status: CANCELLED
Start: 2021-03-11

## 2021-03-10 RX ORDER — MEPERIDINE HYDROCHLORIDE 25 MG/ML
25 INJECTION INTRAMUSCULAR; INTRAVENOUS; SUBCUTANEOUS EVERY 30 MIN PRN
Status: CANCELLED | OUTPATIENT
Start: 2021-03-11

## 2021-03-10 RX ORDER — SODIUM CHLORIDE 9 MG/ML
1000 INJECTION, SOLUTION INTRAVENOUS CONTINUOUS PRN
Status: CANCELLED
Start: 2021-03-11

## 2021-03-10 RX ORDER — HEPARIN SODIUM,PORCINE 10 UNIT/ML
5 VIAL (ML) INTRAVENOUS
Status: CANCELLED | OUTPATIENT
Start: 2021-03-11

## 2021-03-10 RX ORDER — ALBUTEROL SULFATE 90 UG/1
1-2 AEROSOL, METERED RESPIRATORY (INHALATION)
Status: CANCELLED
Start: 2021-03-11

## 2021-03-10 RX ORDER — ALBUTEROL SULFATE 0.83 MG/ML
2.5 SOLUTION RESPIRATORY (INHALATION)
Status: CANCELLED | OUTPATIENT
Start: 2021-03-11

## 2021-03-10 NOTE — LETTER
3/10/2021         RE: Flor Griffin  [address]  [facility] [address]        Dear Colleague,    Thank you for referring your patient, Flor Griffin, to the [facility]. Please see a copy of my visit note below.    Michelle is a 65 year old who is being evaluated via a billable video visit.      How would you like to obtain your AVS? MyChart  If the video visit is dropped, the invitation should be resent by: Text to cell phone: [phone]  Will anyone else be joining your video visit? No      Video Start Time: 9:56 AM  Video-Visit Details    Type of service:  Video Visit    Video End Time: 10:08 AM    Originating Location (pt. Location): Home    Distant Location (provider location):  [facility]     Platform used for Video Visit: TwitJump    Oncology/Hematology Visit Note    Mar 10, 2021    Reason for visit: Follow-up breast cancer    Oncology HPI: Flor Griffin is a 65 year old female with metastatic breast cancer, ER low-positive, [place] negative and HER-2 negative.  She was initially seen in November 2017 when she underwent a right supraclavicular lymph node biopsy, positive for poorly differentiated adenocarcinoma, favoring breast primary.  PET scan on 11/27/2017 with multiple different areas of adenopathy, but no distant metastatic disease.  She completed 4 cycles of carboplatin/Taxol followed by dose dense AC for another 4 cycles, surgery was not performed.  She completed adjuvant radiation to the right breast, right axilla and right supraclavicular region.  She was started on letrozole in September 2018.  In October 2020, screening mammogram revealed a focal asymmetry in the right outer right breast and ultrasound revealed a hypoechoic mass and biopsy with poorly differentiated carcinoma consistent with breast cancer, ER weakly positive, NM negative and HER-2 negative.  PET scan on 10/28/2020 with several lymph nodes in the right  axilla and right subclavian region and bilateral hilar regions, favoring metastatic disease.  She started first-line metastatic therapy with Xeloda, but follow-up PET scan on 2/9/2021 with no significant change in breast mass size.  Xeloda was discontinued and port was placed on 2/16/2021 and she was started on eribulin, C1 D1 on 2/18/2021.    She was in clinic on 2/25/2021 for C1 D8, but was noted to be neutropenic, therefore treatment was deferred.    She is here today for deferred eribulin C1 D8.    Interval History: Michelle is doing well.  Her , Joe, is on video briefly as well.  She has not had chemo now for 2 weeks due to cytopenias and she is feeling okay.  Appetite is been great, no vomiting or diarrhea, bleeding, neuropathy.  She is looking forward to getting going with more chemotherapy.  No other complaints.    Review of Systems: See interval hx. Denies fevers, chills, HA, dizziness, n/t, changes in vision, cough, sore throat, CP, SOB, abdominal pain, N/V, diarrhea, changes in urination, bleeding, bruising, rash.     PMHx and Social Hx reviewed per EPIC.      Medications:  Current Outpatient Medications   Medication Sig Dispense Refill     aspirin 81 MG tablet Take 81 mg by mouth daily       Ibuprofen (ADVIL PO) Take 400 mg by mouth every 6 hours as needed for moderate pain       LORazepam (ATIVAN) 0.5 MG tablet Take 1 tablet (0.5 mg) by mouth every 4 hours as needed (Anxiety, Nausea/Vomiting or Sleep) 30 tablet 2     multivitamin w/minerals (THERA-VIT-M) tablet Take 1 tablet by mouth daily       Omega-3 Fatty Acids (OMEGA-3 FISH OIL PO) Take 1 g by mouth daily       ondansetron (ZOFRAN) 8 MG tablet Take 1 tablet (8 mg) by mouth every 8 hours as needed (Nausea/Vomiting) 10 tablet 2     prochlorperazine (COMPAZINE) 10 MG tablet Take 0.5-1 tablets (5-10 mg) by mouth every 6 hours as needed for nausea or vomiting 30 tablet 1       Allergies   Allergen Reactions     Penicillins Hives         EXAM:    LMP  01/01/2004 (Approximate)  Video visit, therefore vital signs were not obtained.    GENERAL:  Female, in no acute distress.  Alert and oriented x3. Well groomed.   HEENT:  Normocephalic, atraumatic. No conjunctival injection or eye swelling.   LUNGS:  Nonlabored breathing, no cough or audible wheezing, able to speak full sentences.  MSK: Full ROM UE.    SKIN: No rash on exposed skin.   NEURO: CN grossly intact, speech normal  PSYCH: Mentation appears normal, insight and judgement intact      Labs:   Results for CHARLES GRIFFIN (MRN [id number]) as of 3/10/2021 13:33   3/10/2021 08:30   Sodium 141   Potassium 3.9   Chloride 109   Carbon Dioxide 25   Urea Nitrogen 14   Creatinine 0.71   GFR Estimate 90   GFR Estimate If Black >90   Calcium 8.8   Anion Gap 7   Magnesium 2.0   Albumin 3.3 (L)   Protein Total 6.2 (L)   Bilirubin Total 0.6   Alkaline Phosphatase 123   ALT 73 (H)   AST 48 (H)   Glucose 125 (H)   WBC 4.1   Hemoglobin 12.5   Hematocrit 39.1   Platelet Count 262   RBC Count 3.39 (L)    (H)   MCH 36.9 (H)   MCHC 32.0   RDW 13.9   Diff Method Automated Method   % Neutrophils 60.9   % Lymphocytes 27.5   % Monocytes 9.6   % Eosinophils 0.5   % Basophils 1.0   % Immature Granulocytes 0.5   Nucleated RBCs 0   Absolute Neutrophil 2.5   Absolute Lymphocytes 1.1   Absolute Monocytes 0.4   Absolute Eosinophils 0.0   Absolute Basophils 0.0   Abs Immature Granulocytes 0.0   Absolute Nucleated RBC 0.0       Imaging: n/a    Impression/Plan: Flor Griffin is a 65 year old female with metastatic breast cancer breast cancer s/p carboplatin/Taxol, dose dense AC, adjuvant radiation, adjuvant letrozole previously on Xeloda, currently on eribulin.    Metastatic breast cancer: ER low positive, [place] negative HER-2 negative, previously on Xeloda, currently on eribulin, C1 D1 completed on 2/18/2021.  The plan is for 2 weeks on, 1 week off, however she was neutropenic and her chemo was deferred twice.  Dr. Stover recommended  natural recovery and her labs are finally recovered today.  Plan for eribulin C1 D8 today.  Next week will be her week off and then she will see Dr. Stover on 3/18 and then C2 D1 on 3/25.    Heme: Cytopenia secondary to chemotherapy and she has now recovered.  Plan for C1 D8 today we will monitor closely.    CV: Irregular heartbeat on exam, and she admits that she has been having some sort of palpitations recently.  EKG with PVCs on 2/25. EKG prior to eribulin normal sinus rhythm.  We will watch this closely.    LFTs: ALT and AST were elevated just prior to eribulin C1 D1 and they have been fluctuating.  Improved today.      Chart documentation with Dragon Voice recognition Software. Although reviewed after completion, some words and grammatical errors may remain.      Jennifer Ash PA-C  Hematology/Oncology  [facility]                    Again, thank you for allowing me to participate in the care of your patient.        Sincerely,        Jennifer Ash PA-C

## 2021-03-10 NOTE — PROGRESS NOTES
Michelle is a 65 year old who is being evaluated via a billable video visit.      How would you like to obtain your AVS? [email or web address]  If the video visit is dropped, the invitation should be resent by: Text to cell phone: [phone]  Will anyone else be joining your video visit? No      Video Start Time: 9:56 AM  Video-Visit Details    Type of service:  Video Visit    Video End Time: 10:08 AM    Originating Location (pt. Location): Home    Distant Location (provider location):  [facility]     Platform used for Video Visit: [facility]    Oncology/Hematology Visit Note    Mar 10, 2021    Reason for visit: Follow-up breast cancer    Oncology HPI: Flor Griffin is a 65 year old female with metastatic breast cancer, ER low-positive, [place] negative and HER-2 negative.  She was initially seen in November 2017 when she underwent a right supraclavicular lymph node biopsy, positive for poorly differentiated adenocarcinoma, favoring breast primary.  PET scan on 11/27/2017 with multiple different areas of adenopathy, but no distant metastatic disease.  She completed 4 cycles of carboplatin/Taxol followed by dose dense AC for another 4 cycles, surgery was not performed.  She completed adjuvant radiation to the right breast, right axilla and right supraclavicular region.  She was started on letrozole in September 2018.  In October 2020, screening mammogram revealed a focal asymmetry in the right outer right breast and ultrasound revealed a hypoechoic mass and biopsy with poorly differentiated carcinoma consistent with breast cancer, ER weakly positive, [place] negative and HER-2 negative.  PET scan on 10/28/2020 with several lymph nodes in the right axilla and right subclavian region and bilateral hilar regions, favoring metastatic disease.  She started first-line metastatic therapy with Xeloda, but follow-up PET scan on 2/9/2021 with no significant change in breast mass size.  Xeloda was discontinued and port was  placed on 2/16/2021 and she was started on eribulin, C1 D1 on 2/18/2021.    She was in clinic on 2/25/2021 for C1 D8, but was noted to be neutropenic, therefore treatment was deferred.    She is here today for deferred eribulin C1 D8.    Interval History: Michelle is doing well.  Her , Joe, is on video briefly as well.  She has not had chemo now for 2 weeks due to cytopenias and she is feeling okay.  Appetite is been great, no vomiting or diarrhea, bleeding, neuropathy.  She is looking forward to getting going with more chemotherapy.  No other complaints.    Review of Systems: See interval hx. Denies fevers, chills, HA, dizziness, n/t, changes in vision, cough, sore throat, CP, SOB, abdominal pain, N/V, diarrhea, changes in urination, bleeding, bruising, rash.     PMHx and Social Hx reviewed per EPIC.      Medications:  Current Outpatient Medications   Medication Sig Dispense Refill     aspirin 81 MG tablet Take 81 mg by mouth daily       Ibuprofen (ADVIL PO) Take 400 mg by mouth every 6 hours as needed for moderate pain       LORazepam (ATIVAN) 0.5 MG tablet Take 1 tablet (0.5 mg) by mouth every 4 hours as needed (Anxiety, Nausea/Vomiting or Sleep) 30 tablet 2     multivitamin w/minerals (THERA-VIT-M) tablet Take 1 tablet by mouth daily       Omega-3 Fatty Acids (OMEGA-3 FISH OIL PO) Take 1 g by mouth daily       ondansetron (ZOFRAN) 8 MG tablet Take 1 tablet (8 mg) by mouth every 8 hours as needed (Nausea/Vomiting) 10 tablet 2     prochlorperazine (COMPAZINE) 10 MG tablet Take 0.5-1 tablets (5-10 mg) by mouth every 6 hours as needed for nausea or vomiting 30 tablet 1       Allergies   Allergen Reactions     Penicillins Hives         EXAM:    LMP 01/01/2004 (Approximate)  Video visit, therefore vital signs were not obtained.    GENERAL:  Female, in no acute distress.  Alert and oriented x3. Well groomed.   HEENT:  Normocephalic, atraumatic. No conjunctival injection or eye swelling.   LUNGS:  Nonlabored  breathing, no cough or audible wheezing, able to speak full sentences.  MSK: Full ROM UE.    SKIN: No rash on exposed skin.   NEURO: CN grossly intact, speech normal  PSYCH: Mentation appears normal, insight and judgement intact      Labs:   Results for CHARLES GRIFFIN (MRN [id number]) as of 3/10/2021 13:33   3/10/2021 08:30   Sodium 141   Potassium 3.9   Chloride 109   Carbon Dioxide 25   Urea Nitrogen 14   Creatinine 0.71   GFR Estimate 90   GFR Estimate If Black >90   Calcium 8.8   Anion Gap 7   Magnesium 2.0   Albumin 3.3 (L)   Protein Total 6.2 (L)   Bilirubin Total 0.6   Alkaline Phosphatase 123   ALT 73 (H)   AST 48 (H)   Glucose 125 (H)   WBC 4.1   Hemoglobin 12.5   Hematocrit 39.1   Platelet Count 262   RBC Count 3.39 (L)    (H)   MCH 36.9 (H)   MCHC 32.0   RDW 13.9   Diff Method Automated Method   % Neutrophils 60.9   % Lymphocytes 27.5   % Monocytes 9.6   % Eosinophils 0.5   % Basophils 1.0   % Immature Granulocytes 0.5   Nucleated RBCs 0   Absolute Neutrophil 2.5   Absolute Lymphocytes 1.1   Absolute Monocytes 0.4   Absolute Eosinophils 0.0   Absolute Basophils 0.0   Abs Immature Granulocytes 0.0   Absolute Nucleated RBC 0.0       Imaging: n/a    Impression/Plan: Flor Griffin is a 65 year old female with metastatic breast cancer breast cancer s/p carboplatin/Taxol, dose dense AC, adjuvant radiation, adjuvant letrozole previously on Xeloda, currently on eribulin.    Metastatic breast cancer: ER low positive, [place] negative HER-2 negative, previously on Xeloda, currently on eribulin, C1 D1 completed on 2/18/2021.  The plan is for 2 weeks on, 1 week off, however she was neutropenic and her chemo was deferred twice.  Dr. Stover recommended natural recovery and her labs are finally recovered today.  Plan for eribulin C1 D8 today.  Next week will be her week off and then she will see Dr. Stover on 3/18 and then C2 D1 on 3/25.    Heme: Cytopenia secondary to chemotherapy and she has now recovered.  Plan for  C1 D8 today we will monitor closely.    CV: Irregular heartbeat on exam, and she admits that she has been having some sort of palpitations recently.  EKG with PVCs on 2/25. EKG prior to eribulin normal sinus rhythm.  We will watch this closely.    LFTs: ALT and AST were elevated just prior to eribulin C1 D1 and they have been fluctuating.  Improved today.      Chart documentation with Dragon Voice recognition Software. Although reviewed after completion, some words and grammatical errors may remain.      Jennifer Ash PA-C  Hematology/Oncology  [facility]

## 2021-03-11 ENCOUNTER — INFUSION THERAPY VISIT (OUTPATIENT)
Dept: INFUSION THERAPY | Facility: CLINIC | Age: 66
End: 2021-03-11
Attending: PHYSICIAN ASSISTANT
Payer: COMMERCIAL

## 2021-03-11 ENCOUNTER — TELEPHONE (OUTPATIENT)
Dept: ONCOLOGY | Facility: CLINIC | Age: 66
End: 2021-03-11

## 2021-03-11 VITALS
BODY MASS INDEX: 28.89 KG/M2 | SYSTOLIC BLOOD PRESSURE: 112 MMHG | WEIGHT: 179 LBS | HEART RATE: 76 BPM | TEMPERATURE: 97.7 F | OXYGEN SATURATION: 97 % | DIASTOLIC BLOOD PRESSURE: 76 MMHG

## 2021-03-11 DIAGNOSIS — Z17.0 MALIGNANT NEOPLASM OF UPPER-OUTER QUADRANT OF RIGHT BREAST IN FEMALE, ESTROGEN RECEPTOR POSITIVE (H): ICD-10-CM

## 2021-03-11 DIAGNOSIS — C50.411 MALIGNANT NEOPLASM OF UPPER-OUTER QUADRANT OF RIGHT BREAST IN FEMALE, ESTROGEN RECEPTOR POSITIVE (H): ICD-10-CM

## 2021-03-11 DIAGNOSIS — R59.1 LYMPHADENOPATHY: Primary | ICD-10-CM

## 2021-03-11 PROCEDURE — 96409 CHEMO IV PUSH SNGL DRUG: CPT

## 2021-03-11 PROCEDURE — 250N000011 HC RX IP 250 OP 636: Performed by: PHYSICIAN ASSISTANT

## 2021-03-11 PROCEDURE — 96375 TX/PRO/DX INJ NEW DRUG ADDON: CPT

## 2021-03-11 PROCEDURE — 258N000003 HC RX IP 258 OP 636: Performed by: PHYSICIAN ASSISTANT

## 2021-03-11 RX ORDER — HEPARIN SODIUM (PORCINE) LOCK FLUSH IV SOLN 100 UNIT/ML 100 UNIT/ML
5 SOLUTION INTRAVENOUS EVERY 8 HOURS PRN
Status: DISCONTINUED | OUTPATIENT
Start: 2021-03-11 | End: 2021-03-11 | Stop reason: HOSPADM

## 2021-03-11 RX ADMIN — DEXAMETHASONE SODIUM PHOSPHATE 12 MG: 10 INJECTION, SOLUTION INTRAMUSCULAR; INTRAVENOUS at 09:08

## 2021-03-11 RX ADMIN — Medication 5 ML: at 09:10

## 2021-03-11 RX ADMIN — SODIUM CHLORIDE 250 ML: 9 INJECTION, SOLUTION INTRAVENOUS at 08:58

## 2021-03-11 RX ADMIN — ERIBULIN MESYLATE 2.1 MG: 0.5 INJECTION INTRAVENOUS at 09:33

## 2021-03-11 NOTE — TELEPHONE ENCOUNTER
Called Michelle back stated that writer will get back to her with Dr. Stover's recommendation. Danya Lara RN,BSN,OCN

## 2021-03-11 NOTE — PROGRESS NOTES
"[facility]    Hematology/Oncology Established Patient Follow-up Note      Today's Date: 3/18/21    Reason for Follow-up: Metastatic breast cancer.    Flor Griffin is a 65 year old female who is being evaluated via a billable video visit.      The patient has been notified of following:     \"This video visit will be conducted via a call between you and your physician/provider. We have found that certain health care needs can be provided without the need for an in-person physical exam.  This service lets us provide the care you need with a video conversation during the COVID-19 pandemic.  If a prescription is necessary we can send it directly to your pharmacy.  If lab work is needed we can place an order for that and you can then stop by our lab to have the test done at a later time.    Video visits are billed at different rates depending on your insurance coverage.  Please reach out to your insurance provider with any questions.    If during the course of the call the physician/provider feels a video visit is not appropriate, you will not be charged for this service.\"    Patient has given verbal consent for Video visit? Yes    How would you like to obtain your AVS? [facility]    Patient would like the video invitation sent by: Text to cell phone: [phone]         Video-Visit Details    Type of service:  Video Visit      Originating Location (pt. Location): Home    Distant Location (provider location):  [facility]     Mode of Communication:  Video Conference via Doximity    Video visit duration: 10 minutes      HISTORY OF PRESENT ILLNESS: Flor Griffin is a 65 year old female who presents with the following oncologic history:     --Clinical TX N3c M0 adenocarcinoma which is poorly differentiated, likely of breast origin, ER low-positive at 1-5%, W[place] negative, HER-2/mitzi FISH negative, androgen stain weak-intermediate 10-20%.  Initially she was seen 11/22/2017 after " she underwent right supraclavicular lymph node biopsy which was positive for poorly differentiated adenocarcinoma.  She had this lymph node almost a month and had an excisional biopsy performed which was consistent with the diagnosis of cancer.   --A PET/CT scan 11/27/2017 showed hypermetabolic right supraclavicular, right axillary and subpectoral adenopathy.  Otherwise, no distant metastatic disease.   --3/7/2018: Completed 4 cycles of carboplatin and paclitaxel followed by dose dense AC x 4 cycles.  No surgery was done.  --5/29/2018-7/31/2018: Completed adjuvant radiation to right breast, right axilla, right supraclavicular region.  --9/2018: Started adjuvant letrozole.  --10/15/2020: Screening mammogram showed focal asymmetry in upper outer right breast; no suspicious findings in left breast.  --10/21/2020: U/S of right breast showed irregularly-shaped hypoechoic mass at 10:00, 7 cm from nipple, measuring 3.2 x 2.7 x 3.6 cm. Right axillary ultrasound shows multiple normal-appearing lymph nodes. Biopsy of right breast mass at 10:00 showed poorly differentiated carcinoma, no lymphovascular invasion, morphology consistent with breast cancer and similar to prior axillary node tumor, ER weakly positive at 1% and ND negative (0%), HER-2/mitzi FISH negative.  --10/28/2020: PET/CT scan showed high metabolic activity in 3 cm area of right upper outer breast, several small hypermetabolic lymph nodes in high right axilla and right subclavian region; mild hypermetabolic lymph nodes in bilateral hilar regions, favor metastatic disease; several tiny nodules in right upper lung, favor benign etiology; small hypermetabolic focus in pelvis in either sigmoid colon or adjacent loop of small bowel.  --11/02/2020: Started 1st line metastatic therapy with capecitabine.  --2/9/2021: PET/CT scan showed hypermetabolic right breast mass not significantly changed since 12/7/2020, persistent hypermetabolism; hypermetabolic right axillary  lymph node increased in size; no distant metastasis; focal hypermetabolism in pelvis associated with sigmoid colon; stable 6-mm lung nodules.  --2/18/2021: Due to disease progression, switched to 2nd line metastatic therapy with eribulin. Required dose reduction due to neutropenia.    INTERIM HISTORY:  Michelle denies any peripheral neuropathy, nausea, or diarrhea. She has fair energy levels.       REVIEW OF SYSTEMS:   14 point ROS was reviewed and is negative other than as noted above in HPI.       HOME MEDICATIONS:  Current Outpatient Medications   Medication Sig Dispense Refill     aspirin 81 MG tablet Take 81 mg by mouth daily       Ibuprofen (ADVIL PO) Take 400 mg by mouth every 6 hours as needed for moderate pain       LORazepam (ATIVAN) 0.5 MG tablet Take 1 tablet (0.5 mg) by mouth every 4 hours as needed (Anxiety, Nausea/Vomiting or Sleep) 30 tablet 2     multivitamin w/minerals (THERA-VIT-M) tablet Take 1 tablet by mouth daily       Omega-3 Fatty Acids (OMEGA-3 FISH OIL PO) Take 1 g by mouth daily       ondansetron (ZOFRAN) 8 MG tablet Take 1 tablet (8 mg) by mouth every 8 hours as needed (Nausea/Vomiting) 10 tablet 2     prochlorperazine (COMPAZINE) 10 MG tablet Take 0.5-1 tablets (5-10 mg) by mouth every 6 hours as needed for nausea or vomiting 30 tablet 1         ALLERGIES:  Allergies   Allergen Reactions     Penicillins Hives         PAST MEDICAL HISTORY:  Past Medical History:   Diagnosis Date     Basal cell cancer     right cheek     Gastroesophageal reflux disease      History of blood transfusion     blue baby     Hyperlipidaemia          PAST SURGICAL HISTORY:  Past Surgical History:   Procedure Laterality Date     BIOPSY MASS NECK Right 11/16/2017    Procedure: BIOPSY MASS NECK;  Excisional Biopsy Right Neck Lymph node;  Surgeon: Waylon Corral MD;  Location: [facility] OR     COLONOSCOPY       IR CHEST PORT PLACEMENT > 5 YRS OF AGE  2/16/2021     IR PORT REMOVAL LEFT  5/13/2019     MOHS MICROGRAPHIC  PROCEDURE  ~    right cheek; BCC     MOHS MICROGRAPHIC PROCEDURE  10/31/2016    Dr. Nicholas [facility]         SOCIAL HISTORY:  Social History     Socioeconomic History     Marital status:      Spouse name: Not on file     Number of children: Not on file     Years of education: Not on file     Highest education level: Not on file   Occupational History     Not on file   Social Needs     Financial resource strain: Not on file     Food insecurity     Worry: Not on file     Inability: Not on file     Transportation needs     Medical: Not on file     Non-medical: Not on file   Tobacco Use     Smoking status: Never Smoker     Smokeless tobacco: Never Used   Substance and Sexual Activity     Alcohol use: Yes     Alcohol/week: 0.0 standard drinks     Comment: 4 times a week, 2 drinks     Drug use: No     Sexual activity: Yes     Partners: Male     Birth control/protection: Post-menopausal   Lifestyle     Physical activity     Days per week: Not on file     Minutes per session: Not on file     Stress: Not on file   Relationships     Social connections     Talks on phone: Not on file     Gets together: Not on file     Attends Jew service: Not on file     Active member of club or organization: Not on file     Attends meetings of clubs or organizations: Not on file     Relationship status: Not on file     Intimate partner violence     Fear of current or ex partner: Not on file     Emotionally abused: Not on file     Physically abused: Not on file     Forced sexual activity: Not on file   Other Topics Concern     Parent/sibling w/ CABG, MI or angioplasty before 65F 55M? No   Social History Narrative     Not on file         FAMILY HISTORY:  Family History   Problem Relation Age of Onset     Lupus Mother      Heart Disease Mother         CHF     Coronary Artery Disease Mother      Hyperlipidemia Mother      Diabetes Father      Breast Cancer Other          PHYSICAL EXAM:  Vital signs:  Not taken.  ECO  GENERAL: No  acute distress.  EYES: No scleral icterus. No overt erythema.  RESPIRATORY: No cough.  No labored breathing.  MUSCULOSKELETAL: Range of motion in the neck, shoulders, and arms appear normal.  SKIN: No overt rashes, discolorations, or lesions over the face, neck or arms.  NEUROLOGIC: Alert.  No overt tremors.  PSYCHIATRIC: Normal affect and mood.  Does not appear anxious.    The rest of a comprehensive physical examination is deferred due to PHE (public health emergency) video visit restrictions.      LABS:  CBC RESULTS:   Recent Labs   Lab Test 03/10/21  0830   WBC 4.1   RBC 3.39*   HGB 12.5   HCT 39.1   *   MCH 36.9*   MCHC 32.0   RDW 13.9        Last Comprehensive Metabolic Panel:  Sodium   Date Value Ref Range Status   03/10/2021 141 133 - 144 mmol/L Final     Potassium   Date Value Ref Range Status   03/10/2021 3.9 3.4 - 5.3 mmol/L Final     Chloride   Date Value Ref Range Status   03/10/2021 109 94 - 109 mmol/L Final     Carbon Dioxide   Date Value Ref Range Status   03/10/2021 25 20 - 32 mmol/L Final     Anion Gap   Date Value Ref Range Status   03/10/2021 7 3 - 14 mmol/L Final     Glucose   Date Value Ref Range Status   03/10/2021 125 (H) 70 - 99 mg/dL Final     Urea Nitrogen   Date Value Ref Range Status   03/10/2021 14 7 - 30 mg/dL Final     Creatinine   Date Value Ref Range Status   03/10/2021 0.71 0.52 - 1.04 mg/dL Final     GFR Estimate   Date Value Ref Range Status   03/10/2021 90 >60 mL/min/[1.73_m2] Final     Comment:     Non  GFR Calc  Starting 12/18/2018, serum creatinine based estimated GFR (eGFR) will be   calculated using the Chronic Kidney Disease Epidemiology Collaboration   (CKD-EPI) equation.       Calcium   Date Value Ref Range Status   03/10/2021 8.8 8.5 - 10.1 mg/dL Final     Bilirubin Total   Date Value Ref Range Status   03/10/2021 0.6 0.2 - 1.3 mg/dL Final     Alkaline Phosphatase   Date Value Ref Range Status   03/10/2021 123 40 - 150 U/L Final     ALT    Date Value Ref Range Status   03/10/2021 73 (H) 0 - 50 U/L Final     AST   Date Value Ref Range Status   03/10/2021 48 (H) 0 - 45 U/L Final       PATHOLOGY:  Reviewed as per HPI.    IMAGING:   10/28/2020: PET scan at [facility]:  1. High metabolic activity in 3 cm mass in right upper outer breast.  2. Several small hypermetabolic lymph nodes in high right axillary and right subclavian suspicious for metastatic lymphadenopathy  3. Small mildly hypermetabolic lymph nodes in both hilar regions.  4. Several very tiny nodules in right upper lobe unchanged and with low metabolic activity.  5. Small hypermetabolic focus in pelvis difficult to localize to either sigmoid colon or loop of small bowel.    12/07/2020: Chest CT scan showed few tiny pleural nodules on right unchanged; 2.9 cm breast mass, diffuse fatty infiltration of liver.    2/9/2021: PET scan showed stable hypermetabolic right breast mass but increase in size of hypermetabolic right axillary lymph node; no evidence of distant metastasis; focal hypermetabolism in pelvis associated with sigmoid colon; stable lung nodules.    ASSESSMENT/PLAN:  Flor Griffin is a 65 year old female with the following issues:  1.  Right breast poorly differentiated adenocarcinoma, ER weakly positive (1%), CA negative, HER-2/mitzi negative with metastatic recurrence  2. Bilateral hilar lymphadenopathy, now resolved  3. Indeterminate pulmonary nodules, likely benign and stable  4. Chemotherapy-induced neutropenia, improved  -Michelle has required a dose reduction in eribulin due to prior neutropenia.  -She will have repeat CBC on 3/25 and if adequate recovery, will proceed with eribulin on 3/26.  -Will consider surgical and possibly radiation oncology consults if she achieves a good response to eribulin and if no distant metastatic disease on future scans.  -Plan for repeat PET scan in 5/2021. [facility] has a trip planned for [place] 5/1-5/8 so will arrange for PET scan after 5/8.    4. Anemia of  chemotherapy  -Improved, no indication for transfusion.  -Continue to monitor CBC prior to each treatment.    Sally Stover MD  Hematology/Oncology  [facility]    Total time spent: 25 minutes in review of patient's labs, patient evaluation, discussion of plan of care, chemo orders, and documentation.

## 2021-03-11 NOTE — TELEPHONE ENCOUNTER
Michelle calling into triage re: steroid injection. Wondering if Dr. Stover is comfortable with her receiving a steroid injection in her R big toe. Reports she has had this in the past for arthritis pain, but always checks with Dr. Stover first.     Will route to Dr. Mcgovern/RNCC for recommendations/follow-up     Rola Grover, VYN, RN, PHN, OCN  Oncology Care Coordinator  [facility]

## 2021-03-11 NOTE — PROGRESS NOTES
Infusion Nursing Note:  Flor Griffin presents today for C1D8 [place].    Patient seen by provider today: No   present during visit today: Not Applicable.    Note: N/A.      Intravenous Access:  Implanted Port.    Treatment Conditions: Labs noted from 3/10/21  Lab Results   Component Value Date    HGB 12.5 03/10/2021     Lab Results   Component Value Date    WBC 4.1 03/10/2021      Lab Results   Component Value Date    ANEU 2.5 03/10/2021     Lab Results   Component Value Date     03/10/2021      Lab Results   Component Value Date     03/10/2021                   Lab Results   Component Value Date    POTASSIUM 3.9 03/10/2021           Lab Results   Component Value Date    MAG 2.0 03/10/2021            Lab Results   Component Value Date    CR 0.71 03/10/2021                   Lab Results   Component Value Date    EDILSON 8.8 03/10/2021                Lab Results   Component Value Date    BILITOTAL 0.6 03/10/2021           Lab Results   Component Value Date    ALBUMIN 3.3 03/10/2021                    Lab Results   Component Value Date    ALT 73 03/10/2021           Lab Results   Component Value Date    AST 48 03/10/2021       Results reviewed, labs MET treatment parameters, ok to proceed with treatment.  Last EKG 2/25/21 with PVCs -- per PA notes from 3/10/21, will continue to monitor      Post Infusion Assessment:  Patient tolerated infusion without incident.  Blood return noted pre and post infusion.  Site patent and intact, free from redness, edema or discomfort.  No evidence of extravasations.  Access discontinued per protocol.       Discharge Plan:   Discharge instructions reviewed with: Patient.  Patient and/or family verbalized understanding of discharge instructions and all questions answered.  AVS to patient via WishT.  Patient will return 3/18/21 for labs for next appointment.   Patient discharged in stable condition accompanied by: self.  Departure Mode: Ambulatory.    Ericka AGUILERA  Zachary, RN

## 2021-03-12 LAB — LAB SCANNED RESULT: NORMAL

## 2021-03-12 NOTE — TELEPHONE ENCOUNTER
Called Michelle, she is aware of her future appointments including exam with Dr. Stover next Thursday 3/18/21 and [facility]. Danya Lara RN,BSN,OCN

## 2021-03-16 ENCOUNTER — TELEPHONE (OUTPATIENT)
Dept: ONCOLOGY | Facility: CLINIC | Age: 66
End: 2021-03-16

## 2021-03-16 NOTE — TELEPHONE ENCOUNTER
Michelle called clinic stating that her big toe is getting sore again and she needs another steroid injection to her big toe. She goes to [facility] for her injections.She stated that physician there needs Dr. Stover's approval since she is currently going through chemotherapy.    Message will be forwarded to  for her approval and then writer will relay message back to Michelle. Danya Lara RN,BSN,OCN

## 2021-03-17 NOTE — TELEPHONE ENCOUNTER
Yes, Michelle can go ahead with the procedure!    Message text      Called Michelle she is aware that it is ok for her to receive steroid injection. Danya Lara RN,BSN,OCN

## 2021-03-17 NOTE — PATIENT INSTRUCTIONS
Assessing Cancer Risk  Only about 5-10% of cancers are thought to be due to an inherited cancer susceptibility gene.    These families often have:    Several people with the same or related types of cancer    Cancers diagnosed at a young age (before age 50)    Individuals with more than one primary cancer    Multiple generations of the family affected with cancer    Some people may be candidates for genetic testing of more than one gene.  For these families, genetic testing using a cancer panel may be offered.  These panels will test different genes known to increase the risk for breast, ovarian, uterine, and/or other cancers. All of the genes discussed below have published clinical management guidelines for individuals who are found to carry a mutation. The purpose of this handout is to serve as a brief summary of the genes analyzed by the panels used to inquire about hereditary breast and gynecologic cancer:  RAMONE, BRCA1, BRCA2, BRIP1, CDH1, CHEK2, MLH1, MSH2, MSH6, PMS2, EPCAM, PTEN, PALB2, RAD51C, RAD51D, and TP53.  ______________________________________________________________________________  Hereditary Breast and Ovarian Cancer Syndrome   (BRCA1 and BRCA2)  A single mutation in one of the copies of BRCA1 or BRCA2 increases the risk for breast and ovarian cancer, among others.  The risk for pancreatic cancer and melanoma may also be slightly increased in some families.  The chart below shows the chance that someone with a BRCA mutation would develop cancer in his or her lifetime1,2,3,4.        A person s ethnic background is also important to consider, as individuals of Ashkenazi Faith ancestry have a higher chance of having a BRCA gene mutation.  There are three BRCA mutations that occur more frequently in this population.    Ravi Syndrome   (MLH1, MSH2, MSH6, PMS2, and EPCAM)  Currently five genes are known to cause Ravi Syndrome: MLH1, MSH2, MSH6, PMS2, and EPCAM.  A single mutation in one of the  Ravi Syndrome genes increases the risk for colon, endometrial, ovarian, and stomach cancers.  Other cancers that occur less commonly in Ravi Syndrome include urinary tract, skin, and brain cancers.  The chart below shows the chance that a person with Ravi syndrome would develop cancer in his or her lifetime5.      *Cancer risk varies depending on Ravi syndrome gene found    Cowden Syndrome   (PTEN)  Cowden syndrome is a hereditary condition that increases the risk for breast, thyroid, endometrial, colon, and kidney cancer.  Cowden syndrome is caused by a mutation in the PTEN gene.  A single mutation in one of the copies of PTEN causes Cowden syndrome and increases cancer risk.  The chart below shows the chance that someone with a PTEN mutation would develop cancer in their lifetime6,7.  Other benign features seen in some individuals with Cowden syndrome include benign skin lesions (facial papules, keratoses, lipomas), learning disability, autism, thyroid nodules, colon polyps, and larger head size.      *One recent study found breast cancer risk to be increased to 85%    Li-Fraumeni Syndrome   (TP53)  Li-Fraumeni Syndrome (LFS) is a cancer predisposition syndrome caused by a mutation in the TP53 gene. A single mutation in one of the copies of TP53 increases the risk for multiple cancers. Individuals with LFS are at an increased risk for developing cancer at a young age. The lifetime risk for development of a LFS-associated cancer is 50% by age 30 and 90% by age 60.   Core Cancers: Sarcomas, Breast, Brain, Lung, Leukemias/Lymphomas, Adrenocortical carcinomas  Other Cancers: Gastrointestinal, Thyroid, Skin, Genitourinary    Hereditary Diffuse Gastric Cancer   (CDH1)  Currently, one gene is known to cause hereditary diffuse gastric cancer (HDGC): CDH1.  Individuals with HDGC are at increased risk for diffuse gastric cancer and lobular breast cancer. Of people diagnosed with HDGC, 30-50% have a mutation in the CDH1  gene.  This suggests there are likely other genes that may cause HDGC that have not been identified yet.      Lifetime Cancer Risks    General Population HDGC    Diffuse Gastric  <1% ~80%   Breast 12% 39-52%         Additional Genes  RAMONE  RAMONE is a moderate-risk breast cancer gene. Women who have a mutation in RAMONE can have between a 2-4 fold increased risk for breast cancer compared to the general population8. RAMONE mutations have also been associated with increased risk for pancreatic cancer, however an estimate of this cancer risk is not well understood9. Individuals who inherit two RAMONE mutations have a condition called ataxia-telangiectasia (AT).  This rare autosomal recessive condition affects the nervous system and immune system, and is associated with progressive cerebellar ataxia beginning in childhood.  Individuals with ataxia-telangiectasia often have a weakened immune system and have an increased risk for childhood cancers.    PALB2  Mutations in PALB2 have been shown to increase the risk of breast cancer up to 33-58% in some families; where individuals fall within this risk range is dependent upon family [id number]. PALB2 mutations have also been associated with increased risk for pancreatic cancer, although this risk has not been quantified yet.  Individuals who inherit two PALB2 mutations--one from their mother and one from their father--have a condition called Fanconi Anemia.  This rare autosomal recessive condition is associated with short stature, developmental delay, bone marrow failure, and increased risk for childhood cancers.    CHEK2   CHEK2 is a moderate-risk breast cancer gene.  Women who have a mutation in CHEK2 have around a 2-fold increased risk for breast cancer compared to the general population, and this risk may be higher depending upon family history.11,12,13 Mutations in CHEK2 have also been shown to increase the risk of a number of other cancers, including colon and prostate, however  these cancer risks are currently not well understood.    BRIP1, RAD51C and RAD51D  Mutations in BRIP1, RAD51C, and RAD51D have been shown to increase the risk of ovarian cancer and possibly female breast cancer as well14,15 .       Lifetime Cancer Risk    General Population BRIP1 RAD51C RAD51D   Ovarian 1-2% ~5-8% ~5-9% ~7-15%           Inheritance  All of the cancer syndromes reviewed above are inherited in an autosomal dominant pattern.  This means that if a parent has a mutation, each of his or her children will have a 50% chance of inheriting that same mutation.  Therefore, each child--male or female--would have a 50% chance of being at increased risk for developing cancer.      Image obtained from Genetics Home Reference, 2013     Mutations in some genes can occur de ric, which means that a person s mutation occurred for the first time in them and was not inherited from a parent.  Now that they have the mutation, however, it can be passed on to future generations.    Genetic Testing  Genetic testing involves a blood test and will look at the genetic information in the RAMONE, BRCA1, BRCA2, BRIP1, CDH1, CHEK2, MLH1, MSH2, MSH6, PMS2, EPCAM, PTEN, PALB2, RAD51C, RAD51D, and TP53 genes for any harmful mutations that are associated with increased cancer risk.  If possible, it is recommended that the person(s) who has had cancer be tested before other family members.  That person will give us the most useful information about whether or not a specific gene is associated with the cancer in the family.    Results  There are three possible results of genetic testing:    Positive--a harmful mutation was identified in one or more of the genes    Negative--no mutation was identified in any of the genes on this panel    Variant of unknown significance--a variation in one of the genes was identified, but it is unclear how this impacts cancer risk in the family    Advantages and Disadvantages   There are advantages and  disadvantages to genetic testing.    Advantages    May clarify your cancer risk    Can help you make medical decisions    May explain the cancers in your family    May give useful information to your family members (if you share your results)    Disadvantages    Possible negative emotional impact of learning about inherited cancer risk    Uncertainty in interpreting a negative test result in some situations    Possible genetic discrimination concerns (see below)    Genetic Information Nondiscrimination Act (TAURUS)  TAURUS is a federal law that protects individuals from health insurance or employment discrimination based on a genetic test result alone.  Although rare, there are currently no legal discrimination protections in terms of life insurance, long term care, or disability insurances.  Visit the [employer] website to learn more.    Reducing Cancer Risk  All of the genes described above have nationally recognized cancer screening guidelines that would be recommended for individuals who test positive.  In addition to increased cancer screening, surgeries may be offered or recommended to reduce cancer risk.  Recommendations are based upon an individual s genetic test result as well as their personal and family history of cancer.    Questions to Think About Regarding Genetic Testing:    What effect will the test result have on me and my relationship with my family members if I have an inherited gene mutation?  If I don t have a gene mutation?    Should I share my test results, and how will my family react to this news, which may also affect them?    Are my children ready to learn new information that may one day affect their own health?    Hereditary Cancer Resources    FORCE: Facing Our Risk of Cancer Empowered facingourrisk.org   Bright Pink bebrightpink.org   Li-Fraumeni Syndrome Association lfsassociation.org   PTEN World PTENworld.com   No stomach for cancer, Inc.  nostomachforcancer.[email or web address]   Stomach cancer relief network Scrnet.org   Collaborative Group of the Americas on Inherited Colorectal Cancer (CGA) cgaicc.com    Cancer Care cancercare.org   American Cancer Society (ACS) cancer.org   National Cancer [place] (NCI) cancer.gov     Please call us if you have any questions or concerns.   Cancer Risk Management Program [phone] ([phone])  ? Keyur Herbert, MS, [facility] [phone]  ? Rebecca Troncoso, MS, [facility]  [phone]  ? María Galeas, MS, [facility]  [phone]  ? Awa Gomes, MS, [facility] [phone]  ? Marian Deanna, MS, [facility] [phone]  ? Maddy Connell, MS, [facility]  [phone]    References  1. Carlita A, Arlin PDP, Demetria S, Rosey CHÁVEZ, Yan JE, Jacob JL, Warren N, Johnna H, Regan O, Sarah Beth A, Reena B, Lindsay P, Mandain S, Bertha DM, Solomon N, Tamiko E, Ravi H, Roge E, Valencia J, Gronurvashi J, Denilson B, Elvis H, Thorlacius S, Eerola H, Juan Pablo H, Mayur K, Kendy OP. Average risks of breast and ovarian cancer associated with BRCA1 or BRCA2 mutations detected in case series unselected for family history: a combined analysis of 222 studies. Am J Hum Belkis. 2003;72:1117-30.  2. Malika WARNER, Ruth M, Neo G.  BRCA1 and BRCA2 Hereditary Breast and Ovarian Cancer. Gene Reviews online. 2013.  3. Sage YC, Yao S, Grover G, Ramsay S. Breast cancer risk among male BRCA1 and BRCA2 mutation carriers. J Natl Cancer Inst. 2007;99:1811-4.  4. Eduardo ADKINS, Casandra I, Omar J, Samir E, Tirso ER, Bob F. Risk of breast cancer in male BRCA2 carriers. J Med Belkis. 2010;47:710-1.  5. National Comprehensive Cancer Network. Clinical practice guidelines in oncology, colorectal cancer screening. Available online (registration required). 2015.  6. Darren BRADEN, Dexter J, Vadim J, Medardo LA, Anshu TINSLEY, Chu C. Lifetime cancer risks in individuals with germline PTEN mutations. Clin Cancer Res. 2012;18:400-7.  7. Bryanna FORMAN. Cowden Syndrome: A Critical Review of the  Clinical Literature. J Belkis . 2009:18:13-27.  8. Sravan A, Zachary D, Elma S, Aida P, Netta T, Huang M, Robbin B, Sara H, Jazmin R, Justina K, Joshua L, Eduardo DG, Bertha D, Hector DF, Linda MR, The Breast Cancer Susceptibility Collaboration () & John WARNER. RAMONE mutations that cause ataxia-telangiectasia are breast cancer susceptibility alleles. Nature Genetics. 2006;38:873-875  9. Олег N , Cassie Y, Radha J, Bella L, Erlin GM , Ilan ML, Gallinger S, Agee AG, Syngal S, Ozzy ML, Forrest J , Zuleima R, Becky SZ, Arnold JR, Arturo VE, Keily M, Vojosefina B, Charles N, Felix RH, Delvis KW, and Janine AP. RAMONE mutations in patients with hereditary pancreatic cancer. Cancer Discover. 2012;2:41-46  10. Carlita REED, et al. Breast-Cancer Risk in Families with Mutations in PALB2. NEJM. 2014; 371(6):497-506.  11. CHEK2 Breast Cancer Case-Control Consortium. CHEK2*1100delC and susceptibility to breast cancer: A collaborative analysis involving 10,860 breast cancer cases and 9,065 controls from 10 studies. Am J Hum Belkis, 74 (2004), pp. 0673-0477  12. Nahid T, Gallo S, Timothy K, et al. Spectrum of Mutations in BRCA1, BRCA2, CHEK2, and TP53 in Families at High Risk of Breast Cancer. FARIDA. 2006;295(12):1045-7221.   13. Barbara C, Calvin D, Trish A, et al. Risk of breast cancer in women with a CHEK2 mutation with and without a family history of breast cancer. J Clin Oncol. 2011;29:6994-9696.  14. Selvin H, Nehal E, Ramona SJ, et al. Contribution of germline mutations in the RAD51B, RAD51C, and RAD51D genes to ovarian cancer in the population. J Clin Oncol. 2015;33(26):4780-3815. Doi:10.1200/JCO.2015.61.2408.  15. Enedina T, Saroj FULLER, Perry P, et al. Mutations in BRIP1 confer high risk of ovarian cancer. Chana Belkis. 2011;43(11):7072-1716. doi:10.1038/ng.955.

## 2021-03-18 ENCOUNTER — VIRTUAL VISIT (OUTPATIENT)
Dept: ONCOLOGY | Facility: CLINIC | Age: 66
End: 2021-03-18
Attending: INTERNAL MEDICINE
Payer: COMMERCIAL

## 2021-03-18 DIAGNOSIS — T45.1X5A CHEMOTHERAPY-INDUCED NEUTROPENIA (H): ICD-10-CM

## 2021-03-18 DIAGNOSIS — C50.411 MALIGNANT NEOPLASM OF UPPER-OUTER QUADRANT OF RIGHT BREAST IN FEMALE, ESTROGEN RECEPTOR POSITIVE (H): Primary | ICD-10-CM

## 2021-03-18 DIAGNOSIS — T45.1X5A ANEMIA DUE TO CHEMOTHERAPY: ICD-10-CM

## 2021-03-18 DIAGNOSIS — D64.81 ANEMIA DUE TO CHEMOTHERAPY: ICD-10-CM

## 2021-03-18 DIAGNOSIS — Z17.0 MALIGNANT NEOPLASM OF UPPER-OUTER QUADRANT OF RIGHT BREAST IN FEMALE, ESTROGEN RECEPTOR POSITIVE (H): Primary | ICD-10-CM

## 2021-03-18 DIAGNOSIS — R59.1 LYMPHADENOPATHY: ICD-10-CM

## 2021-03-18 DIAGNOSIS — D70.1 CHEMOTHERAPY-INDUCED NEUTROPENIA (H): ICD-10-CM

## 2021-03-18 PROCEDURE — 99214 OFFICE O/P EST MOD 30 MIN: CPT | Mod: 95 | Performed by: INTERNAL MEDICINE

## 2021-03-18 PROCEDURE — 999N001193 HC VIDEO/TELEPHONE VISIT; NO CHARGE

## 2021-03-18 ASSESSMENT — PAIN SCALES - GENERAL: PAINLEVEL: NO PAIN (0)

## 2021-03-18 NOTE — LETTER
"    3/18/2021         RE: Flor Griffin  [address] [facility] [address] [facility] [id number]        Dear Colleague,    Thank you for referring your patient, Flor Grififn, to the [facility]. Please see a copy of my visit note below.    [facility]    Hematology/Oncology Established Patient Follow-up Note      Today's Date: 3/18/21    Reason for Follow-up: Metastatic breast cancer.    Flor Griffin is a 65 year old female who is being evaluated via a billable video visit.      The patient has been notified of following:     \"This video visit will be conducted via a call between you and your physician/provider. We have found that certain health care needs can be provided without the need for an in-person physical exam.  This service lets us provide the care you need with a video conversation during the COVID-19 pandemic.  If a prescription is necessary we can send it directly to your pharmacy.  If lab work is needed we can place an order for that and you can then stop by our lab to have the test done at a later time.    Video visits are billed at different rates depending on your insurance coverage.  Please reach out to your insurance provider with any questions.    If during the course of the call the physician/provider feels a video visit is not appropriate, you will not be charged for this service.\"    Patient has given verbal consent for Video visit? Yes    How would you like to obtain your AVS? Mareharrajinder    Patient would like the video invitation sent by: Text to cell phone: [phone]         Video-Visit Details    Type of service:  Video Visit      Originating Location (pt. Location): Home    Distant Location (provider location):  [facility]     Mode of Communication:  Video Conference via Doximity    Video visit duration: 10 minutes      HISTORY OF PRESENT ILLNESS: Flor Griffin is a 65 year old female who presents with the " following oncologic history:     --Clinical TX N3c M0 adenocarcinoma which is poorly differentiated, likely of breast origin, ER low-positive at 1-5%, MA negative, HER-2/mitzi FISH negative, androgen stain weak-intermediate 10-20%.  Initially she was seen 11/22/2017 after she underwent right supraclavicular lymph node biopsy which was positive for poorly differentiated adenocarcinoma.  She had this lymph node almost a month and had an excisional biopsy performed which was consistent with the diagnosis of cancer.   --A PET/CT scan 11/27/2017 showed hypermetabolic right supraclavicular, right axillary and subpectoral adenopathy.  Otherwise, no distant metastatic disease.   --3/7/2018: Completed 4 cycles of carboplatin and paclitaxel followed by dose dense AC x 4 cycles.  No surgery was done.  --5/29/2018-7/31/2018: Completed adjuvant radiation to right breast, right axilla, right supraclavicular region.  --9/2018: Started adjuvant letrozole.  --10/15/2020: Screening mammogram showed focal asymmetry in upper outer right breast; no suspicious findings in left breast.  --10/21/2020: U/S of right breast showed irregularly-shaped hypoechoic mass at 10:00, 7 cm from nipple, measuring 3.2 x 2.7 x 3.6 cm. Right axillary ultrasound shows multiple normal-appearing lymph nodes. Biopsy of right breast mass at 10:00 showed poorly differentiated carcinoma, no lymphovascular invasion, morphology consistent with breast cancer and similar to prior axillary node tumor, ER weakly positive at 1% and MA negative (0%), HER-2/mitzi FISH negative.  --10/28/2020: PET/CT scan showed high metabolic activity in 3 cm area of right upper outer breast, several small hypermetabolic lymph nodes in high right axilla and right subclavian region; mild hypermetabolic lymph nodes in bilateral hilar regions, favor metastatic disease; several tiny nodules in right upper lung, favor benign etiology; small hypermetabolic focus in pelvis in either sigmoid colon or  adjacent loop of small bowel.  --11/02/2020: Started 1st line metastatic therapy with capecitabine.  --2/9/2021: PET/CT scan showed hypermetabolic right breast mass not significantly changed since 12/7/2020, persistent hypermetabolism; hypermetabolic right axillary lymph node increased in size; no distant metastasis; focal hypermetabolism in pelvis associated with sigmoid colon; stable 6-mm lung nodules.  --2/18/2021: Due to disease progression, switched to 2nd line metastatic therapy with eribulin. Required dose reduction due to neutropenia.    INTERIM HISTORY:  Michelle denies any peripheral neuropathy, nausea, or diarrhea. She has fair energy levels.       REVIEW OF SYSTEMS:   14 point ROS was reviewed and is negative other than as noted above in HPI.       HOME MEDICATIONS:  Current Outpatient Medications   Medication Sig Dispense Refill     aspirin 81 MG tablet Take 81 mg by mouth daily       Ibuprofen (ADVIL PO) Take 400 mg by mouth every 6 hours as needed for moderate pain       LORazepam (ATIVAN) 0.5 MG tablet Take 1 tablet (0.5 mg) by mouth every 4 hours as needed (Anxiety, Nausea/Vomiting or Sleep) 30 tablet 2     multivitamin w/minerals (THERA-VIT-M) tablet Take 1 tablet by mouth daily       Omega-3 Fatty Acids (OMEGA-3 FISH OIL PO) Take 1 g by mouth daily       ondansetron (ZOFRAN) 8 MG tablet Take 1 tablet (8 mg) by mouth every 8 hours as needed (Nausea/Vomiting) 10 tablet 2     prochlorperazine (COMPAZINE) 10 MG tablet Take 0.5-1 tablets (5-10 mg) by mouth every 6 hours as needed for nausea or vomiting 30 tablet 1         ALLERGIES:  Allergies   Allergen Reactions     Penicillins Hives         PAST MEDICAL HISTORY:  Past Medical History:   Diagnosis Date     Basal cell cancer     right cheek     Gastroesophageal reflux disease      History of blood transfusion     blue baby     Hyperlipidaemia          PAST SURGICAL HISTORY:  Past Surgical History:   Procedure Laterality Date     BIOPSY MASS NECK Right  11/16/2017    Procedure: BIOPSY MASS NECK;  Excisional Biopsy Right Neck Lymph node;  Surgeon: Waylon Corral MD;  Location: [facility] OR     COLONOSCOPY       IR CHEST PORT PLACEMENT > 5 YRS OF AGE  2/16/2021     IR PORT REMOVAL LEFT  5/13/2019     MOHS MICROGRAPHIC PROCEDURE  ~2010    right cheek; [facility]     MOHS MICROGRAPHIC PROCEDURE  10/31/2016    Dr. [facility]         SOCIAL HISTORY:  Social History     Socioeconomic History     Marital status:      Spouse name: Not on file     Number of children: Not on file     Years of education: Not on file     Highest education level: Not on file   Occupational History     Not on file   Social Needs     Financial resource strain: Not on file     Food insecurity     Worry: Not on file     Inability: Not on file     Transportation needs     Medical: Not on file     Non-medical: Not on file   Tobacco Use     Smoking status: Never Smoker     Smokeless tobacco: Never Used   Substance and Sexual Activity     Alcohol use: Yes     Alcohol/week: 0.0 standard drinks     Comment: 4 times a week, 2 drinks     Drug use: No     Sexual activity: Yes     Partners: Male     Birth control/protection: Post-menopausal   Lifestyle     Physical activity     Days per week: Not on file     Minutes per session: Not on file     Stress: Not on file   Relationships     Social connections     Talks on phone: Not on file     Gets together: Not on file     Attends Religion service: Not on file     Active member of club or organization: Not on file     Attends meetings of clubs or organizations: Not on file     Relationship status: Not on file     Intimate partner violence     Fear of current or ex partner: Not on file     Emotionally abused: Not on file     Physically abused: Not on file     Forced sexual activity: Not on file   Other Topics Concern     Parent/sibling w/ CABG, MI or angioplasty before 65F 55M? No   Social History Narrative     Not on file         FAMILY HISTORY:  Family History    Problem Relation Age of Onset     Lupus Mother      Heart Disease Mother         CHF     Coronary Artery Disease Mother      Hyperlipidemia Mother      Diabetes Father      Breast Cancer Other          PHYSICAL EXAM:  Vital signs:  Not taken.  ECO  GENERAL: No acute distress.  EYES: No scleral icterus. No overt erythema.  RESPIRATORY: No cough.  No labored breathing.  MUSCULOSKELETAL: Range of motion in the neck, shoulders, and arms appear normal.  SKIN: No overt rashes, discolorations, or lesions over the face, neck or arms.  NEUROLOGIC: Alert.  No overt tremors.  PSYCHIATRIC: Normal affect and mood.  Does not appear anxious.    The rest of a comprehensive physical examination is deferred due to PHE (public health emergency) video visit restrictions.      LABS:  CBC RESULTS:   Recent Labs   Lab Test 03/10/21  0830   WBC 4.1   RBC 3.39*   HGB 12.5   HCT 39.1   *   MCH 36.9*   MCHC 32.0   RDW 13.9        Last Comprehensive Metabolic Panel:  Sodium   Date Value Ref Range Status   03/10/2021 141 133 - 144 mmol/L Final     Potassium   Date Value Ref Range Status   03/10/2021 3.9 3.4 - 5.3 mmol/L Final     Chloride   Date Value Ref Range Status   03/10/2021 109 94 - 109 mmol/L Final     Carbon Dioxide   Date Value Ref Range Status   03/10/2021 25 20 - 32 mmol/L Final     Anion Gap   Date Value Ref Range Status   03/10/2021 7 3 - 14 mmol/L Final     Glucose   Date Value Ref Range Status   03/10/2021 125 (H) 70 - 99 mg/dL Final     Urea Nitrogen   Date Value Ref Range Status   03/10/2021 14 7 - 30 mg/dL Final     Creatinine   Date Value Ref Range Status   03/10/2021 0.71 0.52 - 1.04 mg/dL Final     GFR Estimate   Date Value Ref Range Status   03/10/2021 90 >60 mL/min/[1.73_m2] Final     Comment:     Non  GFR Calc  Starting 2018, serum creatinine based estimated GFR (eGFR) will be   calculated using the Chronic Kidney Disease Epidemiology Collaboration   (CKD-EPI) equation.        Calcium   Date Value Ref Range Status   03/10/2021 8.8 8.5 - 10.1 mg/dL Final     Bilirubin Total   Date Value Ref Range Status   03/10/2021 0.6 0.2 - 1.3 mg/dL Final     Alkaline Phosphatase   Date Value Ref Range Status   03/10/2021 123 40 - 150 U/L Final     ALT   Date Value Ref Range Status   03/10/2021 73 (H) 0 - 50 U/L Final     AST   Date Value Ref Range Status   03/10/2021 48 (H) 0 - 45 U/L Final       PATHOLOGY:  Reviewed as per HPI.    IMAGING:   10/28/2020: PET scan at [facility]:  1. High metabolic activity in 3 cm mass in right upper outer breast.  2. Several small hypermetabolic lymph nodes in high right axillary and right subclavian suspicious for metastatic lymphadenopathy  3. Small mildly hypermetabolic lymph nodes in both hilar regions.  4. Several very tiny nodules in right upper lobe unchanged and with low metabolic activity.  5. Small hypermetabolic focus in pelvis difficult to localize to either sigmoid colon or loop of small bowel.    12/07/2020: Chest CT scan showed few tiny pleural nodules on right unchanged; 2.9 cm breast mass, diffuse fatty infiltration of liver.    2/9/2021: PET scan showed stable hypermetabolic right breast mass but increase in size of hypermetabolic right axillary lymph node; no evidence of distant metastasis; focal hypermetabolism in pelvis associated with sigmoid colon; stable lung nodules.    ASSESSMENT/PLAN:  Flor Griffin is a 65 year old female with the following issues:  1.  Right breast poorly differentiated adenocarcinoma, ER weakly positive (1%), SD negative, HER-2/mitzi negative with metastatic recurrence  2. Bilateral hilar lymphadenopathy, now resolved  3. Indeterminate pulmonary nodules, likely benign and stable  4. Chemotherapy-induced neutropenia, improved  -Michelle has required a dose reduction in eribulin due to prior neutropenia.  -She will have repeat CBC on 3/25 and if adequate recovery, will proceed with eribulin on 3/26.  -Will consider surgical and  possibly radiation oncology consults if she achieves a good response to eribulin and if no distant metastatic disease on future scans.  -Plan for repeat PET scan in 5/2021. Koko has a trip planned for [place] 5/1-5/8 so will arrange for PET scan after 5/8.    4. Anemia of chemotherapy  -Improved, no indication for transfusion.  -Continue to monitor CBC prior to each treatment.    Sally Stover MD  Hematology/Oncology  [facility]    Total time spent: 25 minutes in review of patient's labs, patient evaluation, discussion of plan of care, chemo orders, and documentation.    Michelle is a 65 year old who is being evaluated via a billable video visit.      How would you like to obtain your AVS? Mail a copy  If the video visit is dropped, the invitation should be resent by: Text to cell phone: - [phone]    Will anyone else be joining your video visit? No      Video-Visit Details    Type of service:  Video Visit    Originating Location (pt. Location): Home    Distant Location (provider location):  [facility]     Platform used for Video Visit: Tristan        Again, thank you for allowing me to participate in the care of your patient.        Sincerely,        Sally Stover MD

## 2021-03-18 NOTE — PATIENT INSTRUCTIONS
Please mail AVS. Shari Schoenberger, [place]    1. Arrange for additional eribulin 4/15, 4/22, 5/13 with lab draw prior to each chemo.  2. PET scan 5/10.  3. RTC NP on or prior to 4/15.  4. RTC MD 5/12.

## 2021-03-18 NOTE — PROGRESS NOTES
Michelle is a 65 year old who is being evaluated via a billable video visit.      How would you like to obtain your AVS? Mail a copy  If the video visit is dropped, the invitation should be resent by: Text to cell phone: - [phone]    Will anyone else be joining your video visit? No      Video-Visit Details    Type of service:  Video Visit    Originating Location (pt. Location): Home    Distant Location (provider location):  [facility] [place]     Platform used for Video Visit: Tristan

## 2021-03-18 NOTE — LETTER
"    3/18/2021         RE: Flor Griffin  [address] [facility] [address]        Dear Colleague,    Thank you for referring your patient, Flor Griffin, to the [facility]. Please see a copy of my visit note below.    [facility]    Hematology/Oncology Established Patient Follow-up Note      Today's Date: 3/18/21    Reason for Follow-up: Metastatic breast cancer.    Flor Griffin is a 65 year old female who is being evaluated via a billable video visit.      The patient has been notified of following:     \"This video visit will be conducted via a call between you and your physician/provider. We have found that certain health care needs can be provided without the need for an in-person physical exam.  This service lets us provide the care you need with a video conversation during the COVID-19 pandemic.  If a prescription is necessary we can send it directly to your pharmacy.  If lab work is needed we can place an order for that and you can then stop by our lab to have the test done at a later time.    Video visits are billed at different rates depending on your insurance coverage.  Please reach out to your insurance provider with any questions.    If during the course of the call the physician/provider feels a video visit is not appropriate, you will not be charged for this service.\"    Patient has given verbal consent for Video visit? Yes    How would you like to obtain your AVS? Mareharrajinder    Patient would like the video invitation sent by: Text to cell phone: [phone]         Video-Visit Details    Type of service:  Video Visit      Originating Location (pt. Location): Home    Distant Location (provider location):  [facility]     Mode of Communication:  Video Conference via Doximity    Video visit duration: 10 minutes      HISTORY OF PRESENT ILLNESS: Flor Griffin is a 65 year old female who presents with the " following oncologic history:     --Clinical TX N3c M0 adenocarcinoma which is poorly differentiated, likely of breast origin, ER low-positive at 1-5%, MI negative, HER-2/mitzi FISH negative, androgen stain weak-intermediate 10-20%.  Initially she was seen 11/22/2017 after she underwent right supraclavicular lymph node biopsy which was positive for poorly differentiated adenocarcinoma.  She had this lymph node almost a month and had an excisional biopsy performed which was consistent with the diagnosis of cancer.   --A PET/CT scan 11/27/2017 showed hypermetabolic right supraclavicular, right axillary and subpectoral adenopathy.  Otherwise, no distant metastatic disease.   --3/7/2018: Completed 4 cycles of carboplatin and paclitaxel followed by dose dense AC x 4 cycles.  No surgery was done.  --5/29/2018-7/31/2018: Completed adjuvant radiation to right breast, right axilla, right supraclavicular region.  --9/2018: Started adjuvant letrozole.  --10/15/2020: Screening mammogram showed focal asymmetry in upper outer right breast; no suspicious findings in left breast.  --10/21/2020: U/S of right breast showed irregularly-shaped hypoechoic mass at 10:00, 7 cm from nipple, measuring 3.2 x 2.7 x 3.6 cm. Right axillary ultrasound shows multiple normal-appearing lymph nodes. Biopsy of right breast mass at 10:00 showed poorly differentiated carcinoma, no lymphovascular invasion, morphology consistent with breast cancer and similar to prior axillary node tumor, ER weakly positive at 1% and MI negative (0%), HER-2/mitzi FISH negative.  --10/28/2020: PET/CT scan showed high metabolic activity in 3 cm area of right upper outer breast, several small hypermetabolic lymph nodes in high right axilla and right subclavian region; mild hypermetabolic lymph nodes in bilateral hilar regions, favor metastatic disease; several tiny nodules in right upper lung, favor benign etiology; small hypermetabolic focus in pelvis in either sigmoid colon or  adjacent loop of small bowel.  --11/02/2020: Started 1st line metastatic therapy with capecitabine.  --2/9/2021: PET/CT scan showed hypermetabolic right breast mass not significantly changed since 12/7/2020, persistent hypermetabolism; hypermetabolic right axillary lymph node increased in size; no distant metastasis; focal hypermetabolism in pelvis associated with sigmoid colon; stable 6-mm lung nodules.  --2/18/2021: Due to disease progression, switched to 2nd line metastatic therapy with eribulin. Required dose reduction due to neutropenia.    INTERIM HISTORY:  Michelle denies any peripheral neuropathy, nausea, or diarrhea. She has fair energy levels.       REVIEW OF SYSTEMS:   14 point ROS was reviewed and is negative other than as noted above in HPI.       HOME MEDICATIONS:  Current Outpatient Medications   Medication Sig Dispense Refill     aspirin 81 MG tablet Take 81 mg by mouth daily       Ibuprofen (ADVIL PO) Take 400 mg by mouth every 6 hours as needed for moderate pain       LORazepam (ATIVAN) 0.5 MG tablet Take 1 tablet (0.5 mg) by mouth every 4 hours as needed (Anxiety, Nausea/Vomiting or Sleep) 30 tablet 2     multivitamin w/minerals (THERA-VIT-M) tablet Take 1 tablet by mouth daily       Omega-3 Fatty Acids (OMEGA-3 FISH OIL PO) Take 1 g by mouth daily       ondansetron (ZOFRAN) 8 MG tablet Take 1 tablet (8 mg) by mouth every 8 hours as needed (Nausea/Vomiting) 10 tablet 2     prochlorperazine (COMPAZINE) 10 MG tablet Take 0.5-1 tablets (5-10 mg) by mouth every 6 hours as needed for nausea or vomiting 30 tablet 1         ALLERGIES:  Allergies   Allergen Reactions     Penicillins Hives         PAST MEDICAL HISTORY:  Past Medical History:   Diagnosis Date     Basal cell cancer     right cheek     Gastroesophageal reflux disease      History of blood transfusion     blue baby     Hyperlipidaemia          PAST SURGICAL HISTORY:  Past Surgical History:   Procedure Laterality Date     BIOPSY MASS NECK Right  11/16/2017    Procedure: BIOPSY MASS NECK;  Excisional Biopsy Right Neck Lymph node;  Surgeon: Waylon Corral MD;  Location: [facility] OR     COLONOSCOPY       IR CHEST PORT PLACEMENT > 5 YRS OF AGE  2/16/2021     IR PORT REMOVAL LEFT  5/13/2019     MOHS MICROGRAPHIC PROCEDURE  ~2010    right cheek; BCC     MOHS MICROGRAPHIC PROCEDURE  10/31/2016    Dr. Nicholas [facility]         SOCIAL HISTORY:  Social History     Socioeconomic History     Marital status:      Spouse name: Not on file     Number of children: Not on file     Years of education: Not on file     Highest education level: Not on file   Occupational History     Not on file   Social Needs     Financial resource strain: Not on file     Food insecurity     Worry: Not on file     Inability: Not on file     Transportation needs     Medical: Not on file     Non-medical: Not on file   Tobacco Use     Smoking status: Never Smoker     Smokeless tobacco: Never Used   Substance and Sexual Activity     Alcohol use: Yes     Alcohol/week: 0.0 standard drinks     Comment: 4 times a week, 2 drinks     Drug use: No     Sexual activity: Yes     Partners: Male     Birth control/protection: Post-menopausal   Lifestyle     Physical activity     Days per week: Not on file     Minutes per session: Not on file     Stress: Not on file   Relationships     Social connections     Talks on phone: Not on file     Gets together: Not on file     Attends [employer] service: Not on file     Active member of club or organization: Not on file     Attends meetings of clubs or organizations: Not on file     Relationship status: Not on file     Intimate partner violence     Fear of current or ex partner: Not on file     Emotionally abused: Not on file     Physically abused: Not on file     Forced sexual activity: Not on file   Other Topics Concern     Parent/sibling w/ CABG, MI or angioplasty before 65F 55M? No   Social History Narrative     Not on file         FAMILY HISTORY:  Family History    Problem Relation Age of Onset     Lupus Mother      Heart Disease Mother         CHF     Coronary Artery Disease Mother      Hyperlipidemia Mother      Diabetes Father      Breast Cancer Other          PHYSICAL EXAM:  Vital signs:  Not taken.  ECO  GENERAL: No acute distress.  EYES: No scleral icterus. No overt erythema.  RESPIRATORY: No cough.  No labored breathing.  MUSCULOSKELETAL: Range of motion in the neck, shoulders, and arms appear normal.  SKIN: No overt rashes, discolorations, or lesions over the face, neck or arms.  NEUROLOGIC: Alert.  No overt tremors.  PSYCHIATRIC: Normal affect and mood.  Does not appear anxious.    The rest of a comprehensive physical examination is deferred due to PHE (public health emergency) video visit restrictions.      LABS:  CBC RESULTS:   Recent Labs   Lab Test 03/10/21  0830   WBC 4.1   RBC 3.39*   HGB 12.5   HCT 39.1   *   MCH 36.9*   MCHC 32.0   RDW 13.9        Last Comprehensive Metabolic Panel:  Sodium   Date Value Ref Range Status   03/10/2021 141 133 - 144 mmol/L Final     Potassium   Date Value Ref Range Status   03/10/2021 3.9 3.4 - 5.3 mmol/L Final     Chloride   Date Value Ref Range Status   03/10/2021 109 94 - 109 mmol/L Final     Carbon Dioxide   Date Value Ref Range Status   03/10/2021 25 20 - 32 mmol/L Final     Anion Gap   Date Value Ref Range Status   03/10/2021 7 3 - 14 mmol/L Final     Glucose   Date Value Ref Range Status   03/10/2021 125 (H) 70 - 99 mg/dL Final     Urea Nitrogen   Date Value Ref Range Status   03/10/2021 14 7 - 30 mg/dL Final     Creatinine   Date Value Ref Range Status   03/10/2021 0.71 0.52 - 1.04 mg/dL Final     GFR Estimate   Date Value Ref Range Status   03/10/2021 90 >60 mL/min/[1.73_m2] Final     Comment:     Non  GFR Calc  Starting 2018, serum creatinine based estimated GFR (eGFR) will be   calculated using the Chronic Kidney Disease Epidemiology Collaboration   (CKD-EPI) equation.        Calcium   Date Value Ref Range Status   03/10/2021 8.8 8.5 - 10.1 mg/dL Final     Bilirubin Total   Date Value Ref Range Status   03/10/2021 0.6 0.2 - 1.3 mg/dL Final     Alkaline Phosphatase   Date Value Ref Range Status   03/10/2021 123 40 - 150 U/L Final     ALT   Date Value Ref Range Status   03/10/2021 73 (H) 0 - 50 U/L Final     AST   Date Value Ref Range Status   03/10/2021 48 (H) 0 - 45 U/L Final       PATHOLOGY:  Reviewed as per HPI.    IMAGING:   10/28/2020: PET scan at [facility]:  1. High metabolic activity in 3 cm mass in right upper outer breast.  2. Several small hypermetabolic lymph nodes in high right axillary and right subclavian suspicious for metastatic lymphadenopathy  3. Small mildly hypermetabolic lymph nodes in both hilar regions.  4. Several very tiny nodules in right upper lobe unchanged and with low metabolic activity.  5. Small hypermetabolic focus in pelvis difficult to localize to either sigmoid colon or loop of small bowel.    12/07/2020: Chest CT scan showed few tiny pleural nodules on right unchanged; 2.9 cm breast mass, diffuse fatty infiltration of liver.    2/9/2021: PET scan showed stable hypermetabolic right breast mass but increase in size of hypermetabolic right axillary lymph node; no evidence of distant metastasis; focal hypermetabolism in pelvis associated with sigmoid colon; stable lung nodules.    ASSESSMENT/PLAN:  Flor Griffin is a 65 year old female with the following issues:  1.  Right breast poorly differentiated adenocarcinoma, ER weakly positive (1%), FL negative, HER-2/mitzi negative with metastatic recurrence  2. Bilateral hilar lymphadenopathy, now resolved  3. Indeterminate pulmonary nodules, likely benign and stable  4. Chemotherapy-induced neutropenia, improved  -Michelle has required a dose reduction in eribulin due to prior neutropenia.  -She will have repeat CBC on 3/25 and if adequate recovery, will proceed with eribulin on 3/26.  -Will consider surgical and  possibly radiation oncology consults if she achieves a good response to eribulin and if no distant metastatic disease on future scans.  -Plan for repeat PET scan in 5/2021. Koko has a trip planned for [place] 5/1-5/8 so will arrange for PET scan after 5/8.    4. Anemia of chemotherapy  -Improved, no indication for transfusion.  -Continue to monitor CBC prior to each treatment.    Sally Stover MD  Hematology/Oncology  [facility]    Total time spent: 25 minutes in review of patient's labs, patient evaluation, discussion of plan of care, chemo orders, and documentation.    Michelle is a 65 year old who is being evaluated via a billable video visit.      How would you like to obtain your AVS? Mail a copy  If the video visit is dropped, the invitation should be resent by: Text to cell phone: - [phone]    Will anyone else be joining your video visit? No      Video-Visit Details    Type of service:  Video Visit    Originating Location (pt. Location): Home    Distant Location (provider location):  [facility]     Platform used for Video Visit: Tristan        Again, thank you for allowing me to participate in the care of your patient.        Sincerely,        Sally Stover MD

## 2021-03-24 ENCOUNTER — VIRTUAL VISIT (OUTPATIENT)
Dept: ONCOLOGY | Facility: CLINIC | Age: 66
End: 2021-03-24
Attending: GENETIC COUNSELOR, MS
Payer: COMMERCIAL

## 2021-03-24 DIAGNOSIS — Z17.0 MALIGNANT NEOPLASM OF UPPER-OUTER QUADRANT OF RIGHT BREAST IN FEMALE, ESTROGEN RECEPTOR POSITIVE (H): Primary | ICD-10-CM

## 2021-03-24 DIAGNOSIS — C50.411 MALIGNANT NEOPLASM OF UPPER-OUTER QUADRANT OF RIGHT BREAST IN FEMALE, ESTROGEN RECEPTOR POSITIVE (H): Primary | ICD-10-CM

## 2021-03-24 DIAGNOSIS — Z80.3 FAMILY HISTORY OF MALIGNANT NEOPLASM OF BREAST: ICD-10-CM

## 2021-03-24 PROCEDURE — 999N000069 HC STATISTIC GENETIC COUNSELING, < 16 MIN: Mod: GT | Performed by: GENETIC COUNSELOR, MS

## 2021-03-24 NOTE — Clinical Note
"Hello,    Please enclose a copy of the test report from the laboratory tab titled \"send out misc test\" dated 3/3/21 (Order [id number]) to send to the patient along with the letter.    Referring provider: please see summary of genetic test results below.    Thank you,  Keyur"

## 2021-03-24 NOTE — PROGRESS NOTES
"3/24/2021    Referring Provider: Sally Stover MD    Presenting Information:  I spoke to Flor by phone today to discuss her genetic testing results. Her blood was drawn on 3/3/21. The BRCANext-Expanded panel was ordered from [employer] [facility]. This testing was done because of Flor's personal and family history of breast cancer.    Genetic Testing Result: NEGATIVE  Flor is negative for mutations in the RAMONE, BARD1, BRCA1, BRCA2, BRIP1, CDH1, CHEK2, DICER1, EPCAM, MLH1, MSH2, MSH6, NBN, NF1, PALB2, PMS2, PTEN, RAD51C, RAD51D, RECQL, SMARCA4, STK11, and TP53 genes. No mutations were found in any of the 23 genes analyzed. This test involved sequencing and deletion/duplication analysis of all genes with the exceptions of EPCAM (deletions/duplications only).    Testing did not detect an identifiable mutation associated with Hereditary Breast and Ovarian Cancer syndrome (BRCA1, BRCA2), Ravi syndrome (MLH1, MSH2, MSH6, PMS2, EPCAM), Hereditary Diffuse Gastric Cancer (CDH1), Cowden syndrome (PTEN), Li Fraumeni syndrome (TP53), Peutz-Jeghers syndrome (STK11), or Neurofibromatosis type 1 (NF1).    A copy of the test report can be found in the Laboratory tab, dated 3/3/21, and named \"SEND OUTS MISC TEST\". The report is scanned in as a linked document.    Interpretation:  We discussed several different interpretations of this negative test result.    1. One explanation may be that there is a different gene or combination of genes and environment that are associated with the cancers in this family.  2. It is possible that her maternal aunt with breast cancer did have a mutation in one of these genes and she did not inherit it.  3. There is also a small possibility that there is a mutation in one of these genes, and the testing laboratory could not find it with their current testing methods.       Screening:  Based on this negative test result, it is important for Flor and her relatives to refer back to the family " history for appropriate cancer screening.      Flor s close female relatives remain at increased risk for breast cancer given their family history. Breast cancer screening is generally recommended to begin approximately 10 years younger than the earliest age of breast cancer diagnosis in the family, or at age 40, whichever comes first. Breast screening options should be discussed with an individual's primary care provider and a genetic counselor, to determine at what age to begin screening, what screening is appropriate, and if additional screening (such as breast MRI) is necessary based on personal/family history factors.    Other population cancer screening options, such as those recommended by the American Cancer Society and the National Comprehensive Cancer Network (NCCN), are also appropriate for Flor and her family. These screening recommendations may change if there are changes to Flor's personal and/or family history of cancer. Final screening recommendations should be made by each individual's managing physician.      Inheritance:  We reviewed autosomal dominant inheritance. We discussed that Flor did not pass on an identifiable mutation in these genes to her children based on this test result. Mutations in these genes do not skip generations.      Additional Testing Considerations:  Although Flor's genetic testing result was negative, other relatives may still carry a gene mutation associated with breast cancer. Genetic counseling is recommended for other close maternal relatives to discuss genetic testing options. If any of these relatives do pursue genetic testing, Flor is encouraged to contact me so that we may review the impact of their test results on her.    Summary:  We do not have an explanation for Flor's personal or family history of cancer. While no genetic changes were identified, Flor may still be at risk for certain cancers due to family history, environmental factors,  or other genetic causes not identified by this test.  Because of that, it is important that she continue with cancer screening based on her personal and family history as discussed above.    Genetic testing is rapidly advancing, and new cancer susceptibility genes will most likely be identified in the future.  Therefore, I encouraged Flor to contact me annually or if there are changes in her personal or family history.  This may change how we assess her cancer risk, screening, and the testing we would offer.    Plan:  1. A copy of the test results will be mailed to Flor.  2. She plans to follow-up with her other providers.  3. She should contact me annually, or sooner if her family history changes.    If Flor has any further questions, I encouraged her to contact me at [phone].    Time spent on video: 5 minutes.    Keyur Herbert MS, [facility]  Licensed, Certified Genetic Counselor  (P): [phone]  (F): [phone]

## 2021-03-24 NOTE — LETTER
"    Cancer Risk Management  Program Locations    [facility]  [facility]  Mailing Address  Cancer Risk Management Program  [address]  [place] [address]    New patient appointments  [phone]  March 24, 2021    Flor Griffin  [address] [facility] [address]      Dear Flor,    It was a pleasure speaking with you over video on 3/24/2021. Here is a copy of the progress note from our discussion. If you have any additional questions, please feel free to call.    Referring Provider: Sally Stover MD    Presenting Information:  I spoke to Flor by phone today to discuss her genetic testing results. Her blood was drawn on 3/3/21. The BRCANext-Expanded panel was ordered from [facility]. This testing was done because of Flor's personal and family history of breast cancer.    Genetic Testing Result: NEGATIVE  Flor is negative for mutations in the RAMONE, BARD1, BRCA1, BRCA2, BRIP1, CDH1, CHEK2, DICER1, EPCAM, MLH1, MSH2, MSH6, NBN, NF1, PALB2, PMS2, PTEN, RAD51C, RAD51D, RECQL, SMARCA4, STK11, and TP53 genes. No mutations were found in any of the 23 genes analyzed. This test involved sequencing and deletion/duplication analysis of all genes with the exceptions of EPCAM (deletions/duplications only).    Testing did not detect an identifiable mutation associated with Hereditary Breast and Ovarian Cancer syndrome (BRCA1, BRCA2), Ravi syndrome (MLH1, MSH2, MSH6, PMS2, EPCAM), Hereditary Diffuse Gastric Cancer (CDH1), Cowden syndrome (PTEN), Li Fraumeni syndrome (TP53), Peutz-Jeghers syndrome (STK11), or Neurofibromatosis type 1 (NF1).    A copy of the test report can be found in the Laboratory tab, dated 3/3/21, and named \"SEND OUTS MISC TEST\". The report is scanned in as a linked " document.    Interpretation:  We discussed several different interpretations of this negative test result.    1. One explanation may be that there is a different gene or combination of genes and environment that are associated with the cancers in this family.  2. It is possible that her maternal aunt with breast cancer did have a mutation in one of these genes and she did not inherit it.  3. There is also a small possibility that there is a mutation in one of these genes, and the testing laboratory could not find it with their current testing methods.       Screening:  Based on this negative test result, it is important for Flor and her relatives to refer back to the family history for appropriate cancer screening.      Flor s close female relatives remain at increased risk for breast cancer given their family history. Breast cancer screening is generally recommended to begin approximately 10 years younger than the earliest age of breast cancer diagnosis in the family, or at age 40, whichever comes first. Breast screening options should be discussed with an individual's primary care provider and a genetic counselor, to determine at what age to begin screening, what screening is appropriate, and if additional screening (such as breast MRI) is necessary based on personal/family history factors.    Other population cancer screening options, such as those recommended by the American Cancer Society and the National Comprehensive Cancer Network (NCCN), are also appropriate for Flor and her family. These screening recommendations may change if there are changes to Flor's personal and/or family history of cancer. Final screening recommendations should be made by each individual's managing physician.      Inheritance:  We reviewed autosomal dominant inheritance. We discussed that Flor did not pass on an identifiable mutation in these genes to her children based on this test result. Mutations in these genes do  not skip generations.      Additional Testing Considerations:  Although Flor's genetic testing result was negative, other relatives may still carry a gene mutation associated with breast cancer. Genetic counseling is recommended for other close maternal relatives to discuss genetic testing options. If any of these relatives do pursue genetic testing, Flor is encouraged to contact me so that we may review the impact of their test results on her.    Summary:  We do not have an explanation for Flor's personal or family history of cancer. While no genetic changes were identified, Flor may still be at risk for certain cancers due to family history, environmental factors, or other genetic causes not identified by this test.  Because of that, it is important that she continue with cancer screening based on her personal and family history as discussed above.    Genetic testing is rapidly advancing, and new cancer susceptibility genes will most likely be identified in the future.  Therefore, I encouraged Flor to contact me annually or if there are changes in her personal or family history.  This may change how we assess her cancer risk, screening, and the testing we would offer.    Plan:  1. A copy of the test results will be mailed to Flor.  2. She plans to follow-up with her other providers.  3. She should contact me annually, or sooner if her family history changes.    If Flor has any further questions, I encouraged her to contact me at [phone].    Time spent on video: 5 minutes.    Keyur Herbert MS, [facility]  Licensed, Certified Genetic Counselor  (P): [phone]  (F): [phone]

## 2021-03-25 ENCOUNTER — HOSPITAL ENCOUNTER (OUTPATIENT)
Facility: CLINIC | Age: 66
Setting detail: SPECIMEN
Discharge: HOME | End: 2021-03-25
Attending: INTERNAL MEDICINE | Admitting: INTERNAL MEDICINE
Payer: COMMERCIAL

## 2021-03-25 ENCOUNTER — INFUSION THERAPY VISIT (OUTPATIENT)
Dept: INFUSION THERAPY | Facility: CLINIC | Age: 66
End: 2021-03-25
Attending: INTERNAL MEDICINE
Payer: COMMERCIAL

## 2021-03-25 DIAGNOSIS — R59.1 LYMPHADENOPATHY: Primary | ICD-10-CM

## 2021-03-25 DIAGNOSIS — Z17.0 MALIGNANT NEOPLASM OF UPPER-OUTER QUADRANT OF RIGHT BREAST IN FEMALE, ESTROGEN RECEPTOR POSITIVE (H): ICD-10-CM

## 2021-03-25 DIAGNOSIS — C50.811 MALIGNANT NEOPLASM OF OVERLAPPING SITES OF RIGHT BREAST IN FEMALE, ESTROGEN RECEPTOR POSITIVE (H): ICD-10-CM

## 2021-03-25 DIAGNOSIS — C50.411 MALIGNANT NEOPLASM OF UPPER-OUTER QUADRANT OF RIGHT BREAST IN FEMALE, ESTROGEN RECEPTOR POSITIVE (H): ICD-10-CM

## 2021-03-25 DIAGNOSIS — Z95.828 PORT-A-CATH IN PLACE: ICD-10-CM

## 2021-03-25 DIAGNOSIS — Z17.0 MALIGNANT NEOPLASM OF OVERLAPPING SITES OF RIGHT BREAST IN FEMALE, ESTROGEN RECEPTOR POSITIVE (H): ICD-10-CM

## 2021-03-25 LAB
ALBUMIN SERPL-MCNC: 3.4 G/DL (ref 3.4–5)
ALP SERPL-CCNC: 96 U/L (ref 40–150)
ALT SERPL W P-5'-P-CCNC: 72 U/L (ref 0–50)
ANION GAP SERPL CALCULATED.3IONS-SCNC: 7 MMOL/L (ref 3–14)
AST SERPL W P-5'-P-CCNC: 41 U/L (ref 0–45)
BASOPHILS # BLD AUTO: 0.1 10E9/L (ref 0–0.2)
BASOPHILS NFR BLD AUTO: 1.3 %
BILIRUB SERPL-MCNC: 0.4 MG/DL (ref 0.2–1.3)
BUN SERPL-MCNC: 13 MG/DL (ref 7–30)
CALCIUM SERPL-MCNC: 8.7 MG/DL (ref 8.5–10.1)
CANCER AG27-29 SERPL-ACNC: 15 U/ML (ref 0–39)
CEA SERPL-MCNC: <0.5 UG/L (ref 0–2.5)
CHLORIDE SERPL-SCNC: 108 MMOL/L (ref 94–109)
CO2 SERPL-SCNC: 25 MMOL/L (ref 20–32)
CREAT SERPL-MCNC: 0.74 MG/DL (ref 0.52–1.04)
DIFFERENTIAL METHOD BLD: ABNORMAL
EOSINOPHIL # BLD AUTO: 0.1 10E9/L (ref 0–0.7)
EOSINOPHIL NFR BLD AUTO: 1.6 %
ERYTHROCYTE [DISTWIDTH] IN BLOOD BY AUTOMATED COUNT: 13.7 % (ref 10–15)
GFR SERPL CREATININE-BSD FRML MDRD: 84 ML/MIN/{1.73_M2}
GLUCOSE SERPL-MCNC: 128 MG/DL (ref 70–99)
HCT VFR BLD AUTO: 35.5 % (ref 35–47)
HGB BLD-MCNC: 12.1 G/DL (ref 11.7–15.7)
IMM GRANULOCYTES # BLD: 0 10E9/L (ref 0–0.4)
IMM GRANULOCYTES NFR BLD: 0.3 %
LYMPHOCYTES # BLD AUTO: 1 10E9/L (ref 0.8–5.3)
LYMPHOCYTES NFR BLD AUTO: 26.7 %
MAGNESIUM SERPL-MCNC: 2.1 MG/DL (ref 1.6–2.3)
MCH RBC QN AUTO: 36.1 PG (ref 26.5–33)
MCHC RBC AUTO-ENTMCNC: 34.1 G/DL (ref 31.5–36.5)
MCV RBC AUTO: 106 FL (ref 78–100)
MONOCYTES # BLD AUTO: 0.5 10E9/L (ref 0–1.3)
MONOCYTES NFR BLD AUTO: 12.4 %
NEUTROPHILS # BLD AUTO: 2.1 10E9/L (ref 1.6–8.3)
NEUTROPHILS NFR BLD AUTO: 57.7 %
NRBC # BLD AUTO: 0 10*3/UL
NRBC BLD AUTO-RTO: 0 /100
PLATELET # BLD AUTO: 261 10E9/L (ref 150–450)
POTASSIUM SERPL-SCNC: 3.8 MMOL/L (ref 3.4–5.3)
PROT SERPL-MCNC: 6.1 G/DL (ref 6.8–8.8)
RBC # BLD AUTO: 3.35 10E12/L (ref 3.8–5.2)
SODIUM SERPL-SCNC: 140 MMOL/L (ref 133–144)
WBC # BLD AUTO: 3.7 10E9/L (ref 4–11)

## 2021-03-25 PROCEDURE — 80053 COMPREHEN METABOLIC PANEL: CPT | Performed by: INTERNAL MEDICINE

## 2021-03-25 PROCEDURE — 82378 CARCINOEMBRYONIC ANTIGEN: CPT | Performed by: INTERNAL MEDICINE

## 2021-03-25 PROCEDURE — 83735 ASSAY OF MAGNESIUM: CPT | Performed by: INTERNAL MEDICINE

## 2021-03-25 PROCEDURE — 36591 DRAW BLOOD OFF VENOUS DEVICE: CPT

## 2021-03-25 PROCEDURE — 250N000011 HC RX IP 250 OP 636: Performed by: INTERNAL MEDICINE

## 2021-03-25 PROCEDURE — 85025 COMPLETE CBC W/AUTO DIFF WBC: CPT | Performed by: INTERNAL MEDICINE

## 2021-03-25 PROCEDURE — 86300 IMMUNOASSAY TUMOR CA 15-3: CPT | Performed by: INTERNAL MEDICINE

## 2021-03-25 RX ORDER — HEPARIN SODIUM,PORCINE 10 UNIT/ML
5 VIAL (ML) INTRAVENOUS
Status: CANCELLED | OUTPATIENT
Start: 2021-04-05

## 2021-03-25 RX ORDER — ALBUTEROL SULFATE 0.83 MG/ML
2.5 SOLUTION RESPIRATORY (INHALATION)
Status: CANCELLED | OUTPATIENT
Start: 2021-04-05

## 2021-03-25 RX ORDER — ALBUTEROL SULFATE 0.83 MG/ML
2.5 SOLUTION RESPIRATORY (INHALATION)
Status: CANCELLED | OUTPATIENT
Start: 2021-03-26

## 2021-03-25 RX ORDER — SODIUM CHLORIDE 9 MG/ML
1000 INJECTION, SOLUTION INTRAVENOUS CONTINUOUS PRN
Status: CANCELLED
Start: 2021-04-05

## 2021-03-25 RX ORDER — ALBUTEROL SULFATE 90 UG/1
1-2 AEROSOL, METERED RESPIRATORY (INHALATION)
Status: CANCELLED
Start: 2021-03-26

## 2021-03-25 RX ORDER — NALOXONE HYDROCHLORIDE 0.4 MG/ML
.1-.4 INJECTION, SOLUTION INTRAMUSCULAR; INTRAVENOUS; SUBCUTANEOUS
Status: CANCELLED | OUTPATIENT
Start: 2021-04-05

## 2021-03-25 RX ORDER — DIPHENHYDRAMINE HYDROCHLORIDE 50 MG/ML
50 INJECTION INTRAMUSCULAR; INTRAVENOUS
Status: CANCELLED
Start: 2021-03-26

## 2021-03-25 RX ORDER — HEPARIN SODIUM (PORCINE) LOCK FLUSH IV SOLN 100 UNIT/ML 100 UNIT/ML
5 SOLUTION INTRAVENOUS
Status: DISCONTINUED | OUTPATIENT
Start: 2021-03-25 | End: 2021-03-25 | Stop reason: HOSPADM

## 2021-03-25 RX ORDER — HEPARIN SODIUM (PORCINE) LOCK FLUSH IV SOLN 100 UNIT/ML 100 UNIT/ML
5 SOLUTION INTRAVENOUS EVERY 8 HOURS PRN
Status: CANCELLED | OUTPATIENT
Start: 2021-04-05

## 2021-03-25 RX ORDER — DIPHENHYDRAMINE HYDROCHLORIDE 50 MG/ML
50 INJECTION INTRAMUSCULAR; INTRAVENOUS
Status: CANCELLED
Start: 2021-04-05

## 2021-03-25 RX ORDER — LORAZEPAM 2 MG/ML
0.5 INJECTION INTRAMUSCULAR EVERY 4 HOURS PRN
Status: CANCELLED
Start: 2021-04-05

## 2021-03-25 RX ORDER — EPINEPHRINE 1 MG/ML
0.3 INJECTION, SOLUTION, CONCENTRATE INTRAVENOUS EVERY 5 MIN PRN
Status: CANCELLED | OUTPATIENT
Start: 2021-04-05

## 2021-03-25 RX ORDER — HEPARIN SODIUM (PORCINE) LOCK FLUSH IV SOLN 100 UNIT/ML 100 UNIT/ML
5 SOLUTION INTRAVENOUS
Status: CANCELLED | OUTPATIENT
Start: 2021-03-25

## 2021-03-25 RX ORDER — EPINEPHRINE 1 MG/ML
0.3 INJECTION, SOLUTION, CONCENTRATE INTRAVENOUS EVERY 5 MIN PRN
Status: CANCELLED | OUTPATIENT
Start: 2021-03-26

## 2021-03-25 RX ORDER — SODIUM CHLORIDE 9 MG/ML
1000 INJECTION, SOLUTION INTRAVENOUS CONTINUOUS PRN
Status: CANCELLED
Start: 2021-03-26

## 2021-03-25 RX ORDER — HEPARIN SODIUM,PORCINE 10 UNIT/ML
5 VIAL (ML) INTRAVENOUS
Status: CANCELLED | OUTPATIENT
Start: 2021-03-26

## 2021-03-25 RX ORDER — NALOXONE HYDROCHLORIDE 0.4 MG/ML
.1-.4 INJECTION, SOLUTION INTRAMUSCULAR; INTRAVENOUS; SUBCUTANEOUS
Status: CANCELLED | OUTPATIENT
Start: 2021-03-26

## 2021-03-25 RX ORDER — LORAZEPAM 2 MG/ML
0.5 INJECTION INTRAMUSCULAR EVERY 4 HOURS PRN
Status: CANCELLED
Start: 2021-03-26

## 2021-03-25 RX ORDER — MEPERIDINE HYDROCHLORIDE 25 MG/ML
25 INJECTION INTRAMUSCULAR; INTRAVENOUS; SUBCUTANEOUS EVERY 30 MIN PRN
Status: CANCELLED | OUTPATIENT
Start: 2021-04-05

## 2021-03-25 RX ORDER — ALBUTEROL SULFATE 90 UG/1
1-2 AEROSOL, METERED RESPIRATORY (INHALATION)
Status: CANCELLED
Start: 2021-04-05

## 2021-03-25 RX ORDER — HEPARIN SODIUM (PORCINE) LOCK FLUSH IV SOLN 100 UNIT/ML 100 UNIT/ML
5 SOLUTION INTRAVENOUS EVERY 8 HOURS PRN
Status: CANCELLED | OUTPATIENT
Start: 2021-03-26

## 2021-03-25 RX ORDER — MEPERIDINE HYDROCHLORIDE 25 MG/ML
25 INJECTION INTRAMUSCULAR; INTRAVENOUS; SUBCUTANEOUS EVERY 30 MIN PRN
Status: CANCELLED | OUTPATIENT
Start: 2021-03-26

## 2021-03-25 RX ORDER — HEPARIN SODIUM,PORCINE 10 UNIT/ML
5 VIAL (ML) INTRAVENOUS
Status: CANCELLED | OUTPATIENT
Start: 2021-03-25

## 2021-03-25 RX ADMIN — Medication 5 ML: at 12:42

## 2021-03-26 ENCOUNTER — INFUSION THERAPY VISIT (OUTPATIENT)
Dept: INFUSION THERAPY | Facility: CLINIC | Age: 66
End: 2021-03-26
Attending: INTERNAL MEDICINE
Payer: COMMERCIAL

## 2021-03-26 VITALS
DIASTOLIC BLOOD PRESSURE: 82 MMHG | RESPIRATION RATE: 16 BRPM | SYSTOLIC BLOOD PRESSURE: 134 MMHG | TEMPERATURE: 98 F | WEIGHT: 181.7 LBS | HEART RATE: 77 BPM | OXYGEN SATURATION: 99 % | BODY MASS INDEX: 29.33 KG/M2

## 2021-03-26 DIAGNOSIS — C50.411 MALIGNANT NEOPLASM OF UPPER-OUTER QUADRANT OF RIGHT BREAST IN FEMALE, ESTROGEN RECEPTOR POSITIVE (H): ICD-10-CM

## 2021-03-26 DIAGNOSIS — R59.1 LYMPHADENOPATHY: Primary | ICD-10-CM

## 2021-03-26 DIAGNOSIS — Z17.0 MALIGNANT NEOPLASM OF UPPER-OUTER QUADRANT OF RIGHT BREAST IN FEMALE, ESTROGEN RECEPTOR POSITIVE (H): ICD-10-CM

## 2021-03-26 PROCEDURE — 258N000003 HC RX IP 258 OP 636: Performed by: INTERNAL MEDICINE

## 2021-03-26 PROCEDURE — 96367 TX/PROPH/DG ADDL SEQ IV INF: CPT

## 2021-03-26 PROCEDURE — 250N000011 HC RX IP 250 OP 636: Performed by: INTERNAL MEDICINE

## 2021-03-26 PROCEDURE — 96409 CHEMO IV PUSH SNGL DRUG: CPT

## 2021-03-26 RX ORDER — HEPARIN SODIUM (PORCINE) LOCK FLUSH IV SOLN 100 UNIT/ML 100 UNIT/ML
5 SOLUTION INTRAVENOUS EVERY 8 HOURS PRN
Status: DISCONTINUED | OUTPATIENT
Start: 2021-03-26 | End: 2021-03-26 | Stop reason: HOSPADM

## 2021-03-26 RX ADMIN — Medication 5 ML: at 10:00

## 2021-03-26 RX ADMIN — SODIUM CHLORIDE 250 ML: 9 INJECTION, SOLUTION INTRAVENOUS at 09:24

## 2021-03-26 RX ADMIN — DEXAMETHASONE SODIUM PHOSPHATE 12 MG: 10 INJECTION, SOLUTION INTRAMUSCULAR; INTRAVENOUS at 09:24

## 2021-03-26 RX ADMIN — ERIBULIN MESYLATE 2.1 MG: 0.5 INJECTION INTRAVENOUS at 09:51

## 2021-03-26 NOTE — PROGRESS NOTES
Infusion Nursing Note:  Flor Griffin presents today for C2D1 Halaven.    Patient seen by provider today: No   present during visit today: Not Applicable.    Note: N/A.      Intravenous Access:  Implanted Port.    Treatment Conditions:  Lab Results   Component Value Date    HGB 12.1 03/25/2021     Lab Results   Component Value Date    WBC 3.7 03/25/2021      Lab Results   Component Value Date    ANEU 2.1 03/25/2021     Lab Results   Component Value Date     03/25/2021      Lab Results   Component Value Date     03/25/2021                   Lab Results   Component Value Date    POTASSIUM 3.8 03/25/2021           Lab Results   Component Value Date    MAG 2.1 03/25/2021            Lab Results   Component Value Date    CR 0.74 03/25/2021                   Lab Results   Component Value Date    EDILSON 8.7 03/25/2021                Lab Results   Component Value Date    BILITOTAL 0.4 03/25/2021           Lab Results   Component Value Date    ALBUMIN 3.4 03/25/2021                    Lab Results   Component Value Date    ALT 72 03/25/2021           Lab Results   Component Value Date    AST 41 03/25/2021       Results reviewed, labs MET treatment parameters, ok to proceed with treatment.      Post Infusion Assessment:  Patient tolerated infusion without incident.  Blood return noted pre and post infusion.  Site patent and intact, free from redness, edema or discomfort.  No evidence of extravasations.  Access discontinued per protocol.       Discharge Plan:   Discharge instructions reviewed with: Patient.  Patient and/or family verbalized understanding of discharge instructions and all questions answered.  AVS to patient via [employer].  Patient will return 4/1 for next appointment.   Patient discharged in stable condition accompanied by: self.  Departure Mode: Ambulatory.    Evelyn Clay RN

## 2021-04-05 ENCOUNTER — INFUSION THERAPY VISIT (OUTPATIENT)
Dept: INFUSION THERAPY | Facility: CLINIC | Age: 66
End: 2021-04-05
Attending: INTERNAL MEDICINE
Payer: COMMERCIAL

## 2021-04-05 ENCOUNTER — HOSPITAL ENCOUNTER (OUTPATIENT)
Facility: CLINIC | Age: 66
Setting detail: SPECIMEN
Discharge: HOME | End: 2021-04-05
Attending: INTERNAL MEDICINE | Admitting: INTERNAL MEDICINE
Payer: COMMERCIAL

## 2021-04-05 VITALS
WEIGHT: 180.8 LBS | TEMPERATURE: 98.1 F | RESPIRATION RATE: 16 BRPM | OXYGEN SATURATION: 97 % | BODY MASS INDEX: 29.06 KG/M2 | HEART RATE: 62 BPM | DIASTOLIC BLOOD PRESSURE: 65 MMHG | SYSTOLIC BLOOD PRESSURE: 99 MMHG | HEIGHT: 66 IN

## 2021-04-05 DIAGNOSIS — R59.1 LYMPHADENOPATHY: Primary | ICD-10-CM

## 2021-04-05 DIAGNOSIS — C50.411 MALIGNANT NEOPLASM OF UPPER-OUTER QUADRANT OF RIGHT BREAST IN FEMALE, ESTROGEN RECEPTOR POSITIVE (H): ICD-10-CM

## 2021-04-05 DIAGNOSIS — Z17.0 MALIGNANT NEOPLASM OF UPPER-OUTER QUADRANT OF RIGHT BREAST IN FEMALE, ESTROGEN RECEPTOR POSITIVE (H): ICD-10-CM

## 2021-04-05 LAB
ALBUMIN SERPL-MCNC: 3.2 G/DL (ref 3.4–5)
ALP SERPL-CCNC: 92 U/L (ref 40–150)
ALT SERPL W P-5'-P-CCNC: 94 U/L (ref 0–50)
ANION GAP SERPL CALCULATED.3IONS-SCNC: 6 MMOL/L (ref 3–14)
AST SERPL W P-5'-P-CCNC: 56 U/L (ref 0–45)
BASOPHILS # BLD AUTO: 0 10E9/L (ref 0–0.2)
BASOPHILS NFR BLD AUTO: 1 %
BILIRUB SERPL-MCNC: 0.8 MG/DL (ref 0.2–1.3)
BUN SERPL-MCNC: 12 MG/DL (ref 7–30)
CALCIUM SERPL-MCNC: 8.9 MG/DL (ref 8.5–10.1)
CHLORIDE SERPL-SCNC: 107 MMOL/L (ref 94–109)
CO2 SERPL-SCNC: 26 MMOL/L (ref 20–32)
CREAT SERPL-MCNC: 0.71 MG/DL (ref 0.52–1.04)
DIFFERENTIAL METHOD BLD: ABNORMAL
EOSINOPHIL # BLD AUTO: 0 10E9/L (ref 0–0.7)
EOSINOPHIL NFR BLD AUTO: 1 %
ERYTHROCYTE [DISTWIDTH] IN BLOOD BY AUTOMATED COUNT: 13.5 % (ref 10–15)
GFR SERPL CREATININE-BSD FRML MDRD: 88 ML/MIN/{1.73_M2}
GLUCOSE SERPL-MCNC: 136 MG/DL (ref 70–99)
HCT VFR BLD AUTO: 37.5 % (ref 35–47)
HGB BLD-MCNC: 12.6 G/DL (ref 11.7–15.7)
IMM GRANULOCYTES # BLD: 0 10E9/L (ref 0–0.4)
IMM GRANULOCYTES NFR BLD: 0.3 %
LYMPHOCYTES # BLD AUTO: 1.1 10E9/L (ref 0.8–5.3)
LYMPHOCYTES NFR BLD AUTO: 35.1 %
MAGNESIUM SERPL-MCNC: 1.9 MG/DL (ref 1.6–2.3)
MCH RBC QN AUTO: 35.4 PG (ref 26.5–33)
MCHC RBC AUTO-ENTMCNC: 33.6 G/DL (ref 31.5–36.5)
MCV RBC AUTO: 105 FL (ref 78–100)
MONOCYTES # BLD AUTO: 0.3 10E9/L (ref 0–1.3)
MONOCYTES NFR BLD AUTO: 9.9 %
NEUTROPHILS # BLD AUTO: 1.7 10E9/L (ref 1.6–8.3)
NEUTROPHILS NFR BLD AUTO: 52.7 %
NRBC # BLD AUTO: 0 10*3/UL
NRBC BLD AUTO-RTO: 0 /100
PLATELET # BLD AUTO: 254 10E9/L (ref 150–450)
POTASSIUM SERPL-SCNC: 3.8 MMOL/L (ref 3.4–5.3)
PROT SERPL-MCNC: 6 G/DL (ref 6.8–8.8)
RBC # BLD AUTO: 3.56 10E12/L (ref 3.8–5.2)
SODIUM SERPL-SCNC: 139 MMOL/L (ref 133–144)
WBC # BLD AUTO: 3.1 10E9/L (ref 4–11)

## 2021-04-05 PROCEDURE — 250N000011 HC RX IP 250 OP 636: Performed by: INTERNAL MEDICINE

## 2021-04-05 PROCEDURE — 83735 ASSAY OF MAGNESIUM: CPT | Performed by: INTERNAL MEDICINE

## 2021-04-05 PROCEDURE — 96367 TX/PROPH/DG ADDL SEQ IV INF: CPT

## 2021-04-05 PROCEDURE — 258N000003 HC RX IP 258 OP 636: Performed by: INTERNAL MEDICINE

## 2021-04-05 PROCEDURE — 85025 COMPLETE CBC W/AUTO DIFF WBC: CPT | Performed by: INTERNAL MEDICINE

## 2021-04-05 PROCEDURE — 80053 COMPREHEN METABOLIC PANEL: CPT | Performed by: INTERNAL MEDICINE

## 2021-04-05 PROCEDURE — 96409 CHEMO IV PUSH SNGL DRUG: CPT

## 2021-04-05 RX ORDER — HEPARIN SODIUM (PORCINE) LOCK FLUSH IV SOLN 100 UNIT/ML 100 UNIT/ML
5 SOLUTION INTRAVENOUS EVERY 8 HOURS PRN
Status: DISCONTINUED | OUTPATIENT
Start: 2021-04-05 | End: 2021-04-05 | Stop reason: HOSPADM

## 2021-04-05 RX ORDER — HEPARIN SODIUM (PORCINE) LOCK FLUSH IV SOLN 100 UNIT/ML 100 UNIT/ML
5 SOLUTION INTRAVENOUS EVERY 8 HOURS
Status: DISCONTINUED | OUTPATIENT
Start: 2021-04-05 | End: 2021-04-05 | Stop reason: HOSPADM

## 2021-04-05 RX ADMIN — SODIUM CHLORIDE 250 ML: 9 INJECTION, SOLUTION INTRAVENOUS at 14:38

## 2021-04-05 RX ADMIN — Medication 5 ML: at 15:08

## 2021-04-05 RX ADMIN — DEXAMETHASONE SODIUM PHOSPHATE 12 MG: 10 INJECTION, SOLUTION INTRAMUSCULAR; INTRAVENOUS at 14:42

## 2021-04-05 RX ADMIN — Medication 5 ML: at 09:27

## 2021-04-05 RX ADMIN — ERIBULIN MESYLATE 2.1 MG: 0.5 INJECTION INTRAVENOUS at 15:01

## 2021-04-05 ASSESSMENT — MIFFLIN-ST. JEOR: SCORE: 1381.6

## 2021-04-05 ASSESSMENT — PAIN SCALES - GENERAL: PAINLEVEL: NO PAIN (0)

## 2021-04-05 NOTE — PROGRESS NOTES
Infusion Nursing Note:  Flormarkos Griffin presents today for port labs.    Patient seen by provider today: No   present during visit today: Not Applicable.    Note: N/A.    Intravenous Access:  Lab draw site port, Needle type port, Gauge 20 3/4in.  Labs drawn without difficulty.  Implanted Port.    Treatment Conditions:  Not Applicable.      Post Infusion Assessment:  Patient tolerated procedure without incident.  Site patent and intact, free from redness, edema or discomfort.  No evidence of extravasations.  Access discontinued per protocol.  Per patient request.      Discharge Plan:   Discharge instructions reviewed with: Patient.  Patient discharged in stable condition accompanied by: self.  Departure Mode: Ambulatory.  Scheduled for treatment later today at [facility].    JOSE ROBERTO ARIZA RN

## 2021-04-05 NOTE — PROGRESS NOTES
Infusion Nursing Note:  Flor Griffin presents today for C2D8 Halaven.    Patient seen by provider today: No   present during visit today: Not Applicable.    Note: LFTs (ALT/AST) slightly elevated. Notified to Dr. Obregon to proceed with treatment today.  Patient did not meet criteria for an asymptomatic covid-19 PCR test in infusion today. Patient has received both vaccines for the covid-19.    Intravenous Access:  Implanted Port.    Treatment Conditions:  Lab Results   Component Value Date    HGB 12.6 04/05/2021     Lab Results   Component Value Date    WBC 3.1 04/05/2021      Lab Results   Component Value Date    ANEU 1.7 04/05/2021     Lab Results   Component Value Date     04/05/2021      Lab Results   Component Value Date     04/05/2021                   Lab Results   Component Value Date    POTASSIUM 3.8 04/05/2021           Lab Results   Component Value Date    MAG 1.9 04/05/2021            Lab Results   Component Value Date    CR 0.71 04/05/2021                   Lab Results   Component Value Date    EDILSON 8.9 04/05/2021                Lab Results   Component Value Date    BILITOTAL 0.8 04/05/2021           Lab Results   Component Value Date    ALBUMIN 3.2 04/05/2021                    Lab Results   Component Value Date    ALT 94 04/05/2021           Lab Results   Component Value Date    AST 56 04/05/2021       Results reviewed, labs MET treatment parameters, ok to proceed with treatment.      Post Infusion Assessment:  Patient tolerated infusion without incident.  Blood return noted pre and post infusion.  Site patent and intact, free from redness, edema or discomfort.  No evidence of extravasations.  Access discontinued per protocol.       Discharge Plan:   Patient declined prescription refills.  Discharge instructions reviewed with: Patient.  Patient and/or family verbalized understanding of discharge instructions and all questions answered.  Copy of AVS reviewed with patient  and/or family.  Patient will return to [facility] on 4/16/21 for next appointment.  Patient discharged in stable condition accompanied by: self.  Departure Mode: Ambulatory.    Violetta Patel RN

## 2021-04-14 NOTE — PROGRESS NOTES
"Oncology Rooming Note    April 15, 2021 7:53 AM   Flor Griffin is a 65 year old female who presents for:    Chief Complaint   Patient presents with     Oncology Clinic Visit     Malignant neoplasm of upper-outer quadrant of right breast     Initial Vitals: /68   Pulse 56   Temp 97.4  F (36.3  C) (Tympanic)   Resp 16   Ht 1.676 m (5' 5.98\")   Wt 78.9 kg (174 lb)   LMP 01/01/2004 (Approximate)   SpO2 98%   BMI 28.10 kg/m   Estimated body mass index is 28.1 kg/m  as calculated from the following:    Height as of this encounter: 1.676 m (5' 5.98\").    Weight as of this encounter: 78.9 kg (174 lb). Body surface area is 1.92 meters squared.  No Pain (0) Comment: Data Unavailable   Patient's last menstrual period was 01/01/2004 (approximate).  Allergies reviewed: Yes  Medications reviewed: Yes    Medications: Medication refills not needed today.  Pharmacy name entered into [employer]: [id number] - [place], [place] - [address]      Jennifer Ash PA-C          Oncology/Hematology Visit Note    Apr 15, 2021    Reason for visit: Follow-up breast cancer    Oncology HPI: Flor Griffin is a 65 year old female with metastatic breast cancer, ER low-positive, [place] negative and HER-2 negative.  She was initially seen in November 2017 when she underwent a right supraclavicular lymph node biopsy, positive for poorly differentiated adenocarcinoma, favoring breast primary.  PET scan on 11/27/2017 with multiple different areas of adenopathy, but no distant metastatic disease.  She completed 4 cycles of carboplatin/Taxol followed by dose dense AC for another 4 cycles, surgery was not performed.  She completed adjuvant radiation to the right breast, right axilla and right supraclavicular region.  She was started on letrozole in September 2018.  In October 2020, screening mammogram revealed a focal asymmetry in the right outer right breast and ultrasound revealed a " hypoechoic mass and biopsy with poorly differentiated carcinoma consistent with breast cancer, ER weakly positive, [place] negative and HER-2 negative.  PET scan on 10/28/2020 with several lymph nodes in the right axilla and right subclavian region and bilateral hilar regions, favoring metastatic disease.  She started first-line metastatic therapy with Xeloda, but follow-up PET scan on 2/9/2021 with no significant change in breast mass size.  Xeloda was discontinued and port was placed on 2/16/2021 and she was started on eribulin, C1 D1 on 2/18/2021.    She was in clinic on 2/25/2021 for C1 D8, but was noted to be neutropenic, therefore treatment was deferred.  Dose was eventually reduced and she has now completed 2 full cycles.    She is here today for deferred eribulin C3D1.    Interval History: Michelle is doing well. She has been tolerating treatment well with no significant toxicities. She has lost her hair, but she has been having fun with different scars. No fever or chills, vomiting or diarrhea, headache or vision changes, new lumps or bumps and appetite is good. No other new complaints.    Review of Systems: See interval hx. Denies fevers, chills, HA, dizziness, n/t, changes in vision, cough, sore throat, CP, SOB, abdominal pain, N/V, diarrhea, changes in urination, bleeding, bruising, rash.     PMHx and Social Hx reviewed per EPIC.      Medications:  Current Outpatient Medications   Medication Sig Dispense Refill     aspirin 81 MG tablet Take 81 mg by mouth daily       Ibuprofen (ADVIL PO) Take 400 mg by mouth every 6 hours as needed for moderate pain       LORazepam (ATIVAN) 0.5 MG tablet Take 1 tablet (0.5 mg) by mouth every 4 hours as needed (Anxiety, Nausea/Vomiting or Sleep) 30 tablet 2     multivitamin w/minerals (THERA-VIT-M) tablet Take 1 tablet by mouth daily       Omega-3 Fatty Acids (OMEGA-3 FISH OIL PO) Take 1 g by mouth daily       ondansetron (ZOFRAN) 8 MG tablet Take 1 tablet (8 mg) by mouth every 8  "hours as needed (Nausea/Vomiting) 10 tablet 2     prochlorperazine (COMPAZINE) 10 MG tablet Take 0.5-1 tablets (5-10 mg) by mouth every 6 hours as needed for nausea or vomiting 30 tablet 1       Allergies   Allergen Reactions     Penicillins Hives         EXAM:    /68   Pulse 56   Temp 97.4  F (36.3  C) (Tympanic)   Resp 16   Ht 1.676 m (5' 5.98\")   Wt 78.9 kg (174 lb)   LMP 01/01/2004 (Approximate)   SpO2 98%   BMI 28.10 kg/m       GENERAL:  Female, in no acute distress.  Alert and oriented x3.   HEENT:  Normocephalic, atraumatic. Alopecia.  PERRL, oropharynx clear with no sores or thrush.   LYMPH NODES:  No palpable pre/post-auricular, cervical, axillary lymphadenopathy appreciated.  CV:  RRR, No murmurs, gallops, or rubs.   LUNGS:  Clear to auscultation bilaterally.   BREAST: Not examined today  ABDOMEN:  Soft, nontender and nondistended.  Bowel sounds heard x4.  No apparent hepatosplenomegaly.   EXTREMITIES:  No clubbing, cyanosis, or edema.   SKIN: No rash  PSYCH: Mood stable    Labs:   Results for CHARLES MASON (MRN [id number]) as of 4/15/2021 08:31   4/15/2021 07:35   Sodium 138   Potassium 4.0   Chloride 106   Carbon Dioxide 28   Urea Nitrogen 14   Creatinine 0.82   GFR Estimate 74   GFR Estimate If Black 86   Calcium 8.7   Anion Gap 4   Magnesium 2.3   Albumin 3.4   Protein Total 6.1 (L)   Bilirubin Total 0.7   Alkaline Phosphatase 88   ALT 64 (H)   AST 33   Glucose 114 (H)   WBC 3.4 (L)   Hemoglobin 12.4   Hematocrit 36.8   Platelet Count 263   RBC Count 3.59 (L)    (H)   MCH 34.5 (H)   MCHC 33.7   RDW 14.1   Diff Method Automated Method   % Neutrophils 58.4   % Lymphocytes 26.8   % Monocytes 12.1   % Eosinophils 0.6   % Basophils 1.5   % Immature Granulocytes 0.6   Nucleated RBCs 0   Absolute Neutrophil 2.0   Absolute Lymphocytes 0.9   Absolute Monocytes 0.4   Absolute Eosinophils 0.0   Absolute Basophils 0.1   Abs Immature Granulocytes 0.0   Absolute Nucleated RBC 0.0       Imaging: " n/a    Impression/Plan: Flor Griffin is a 65 year old female with metastatic breast cancer breast cancer s/p carboplatin/Taxol, dose dense AC, adjuvant radiation, adjuvant letrozole previously on Xeloda, currently on eribulin.    Metastatic breast cancer: ER low positive, ID negative, HER-2 negative, previously on Xeloda, currently on eribulin, C1 D1 completed on 2/18/2021.  The plan is for 2 weeks on, 1 week off, however she was neutropenic and her chemo was deferred twice.  Eribulin dose was eventually reduced and she has been tolerating this well.  Labs look great today and plan for C3 D1 tomorrow.  She will contact the clinic sooner if needed.  --4/22 eribulin C3 D8  --5/11 PET  --5/12 Dr Stover    Heme: Mild leukopenia with WBC 3.4, but the rest of CBC looks great.    CV: Irregular heartbeat on initial exam. EKG with PVCs on 2/25. EKG prior to eribulin normal sinus rhythm.  NSR on exam today.     LFTs: ALT and AST were elevated just prior to eribulin C1 D1 and they have been fluctuating.  Improved today.      Chart documentation with Dragon Voice recognition Software. Although reviewed after completion, some words and grammatical errors may remain.      Jennifer Ash PA-C  Hematology/Oncology  [facility]

## 2021-04-15 ENCOUNTER — INFUSION THERAPY VISIT (OUTPATIENT)
Dept: INFUSION THERAPY | Facility: CLINIC | Age: 66
End: 2021-04-15
Attending: PHYSICIAN ASSISTANT
Payer: COMMERCIAL

## 2021-04-15 ENCOUNTER — ONCOLOGY VISIT (OUTPATIENT)
Dept: ONCOLOGY | Facility: CLINIC | Age: 66
End: 2021-04-15
Attending: PHYSICIAN ASSISTANT
Payer: COMMERCIAL

## 2021-04-15 ENCOUNTER — HOSPITAL ENCOUNTER (OUTPATIENT)
Facility: CLINIC | Age: 66
Setting detail: SPECIMEN
Discharge: HOME | End: 2021-04-15
Attending: PHYSICIAN ASSISTANT | Admitting: PHYSICIAN ASSISTANT
Payer: COMMERCIAL

## 2021-04-15 VITALS
HEIGHT: 66 IN | BODY MASS INDEX: 27.97 KG/M2 | TEMPERATURE: 97.4 F | RESPIRATION RATE: 16 BRPM | SYSTOLIC BLOOD PRESSURE: 108 MMHG | HEART RATE: 56 BPM | WEIGHT: 174 LBS | OXYGEN SATURATION: 98 % | DIASTOLIC BLOOD PRESSURE: 68 MMHG

## 2021-04-15 DIAGNOSIS — C50.411 MALIGNANT NEOPLASM OF UPPER-OUTER QUADRANT OF RIGHT BREAST IN FEMALE, ESTROGEN RECEPTOR POSITIVE (H): Primary | ICD-10-CM

## 2021-04-15 DIAGNOSIS — R59.1 LYMPHADENOPATHY: Primary | ICD-10-CM

## 2021-04-15 DIAGNOSIS — Z17.0 MALIGNANT NEOPLASM OF UPPER-OUTER QUADRANT OF RIGHT BREAST IN FEMALE, ESTROGEN RECEPTOR POSITIVE (H): ICD-10-CM

## 2021-04-15 DIAGNOSIS — R59.1 LYMPHADENOPATHY: ICD-10-CM

## 2021-04-15 DIAGNOSIS — Z17.0 MALIGNANT NEOPLASM OF OVERLAPPING SITES OF RIGHT BREAST IN FEMALE, ESTROGEN RECEPTOR POSITIVE (H): ICD-10-CM

## 2021-04-15 DIAGNOSIS — C50.811 MALIGNANT NEOPLASM OF OVERLAPPING SITES OF RIGHT BREAST IN FEMALE, ESTROGEN RECEPTOR POSITIVE (H): ICD-10-CM

## 2021-04-15 DIAGNOSIS — Z17.0 MALIGNANT NEOPLASM OF UPPER-OUTER QUADRANT OF RIGHT BREAST IN FEMALE, ESTROGEN RECEPTOR POSITIVE (H): Primary | ICD-10-CM

## 2021-04-15 DIAGNOSIS — R79.89 ELEVATED LIVER FUNCTION TESTS: ICD-10-CM

## 2021-04-15 DIAGNOSIS — C50.411 MALIGNANT NEOPLASM OF UPPER-OUTER QUADRANT OF RIGHT BREAST IN FEMALE, ESTROGEN RECEPTOR POSITIVE (H): ICD-10-CM

## 2021-04-15 DIAGNOSIS — Z95.828 PORT-A-CATH IN PLACE: ICD-10-CM

## 2021-04-15 LAB
ALBUMIN SERPL-MCNC: 3.4 G/DL (ref 3.4–5)
ALP SERPL-CCNC: 88 U/L (ref 40–150)
ALT SERPL W P-5'-P-CCNC: 64 U/L (ref 0–50)
ANION GAP SERPL CALCULATED.3IONS-SCNC: 4 MMOL/L (ref 3–14)
AST SERPL W P-5'-P-CCNC: 33 U/L (ref 0–45)
BASOPHILS # BLD AUTO: 0.1 10E9/L (ref 0–0.2)
BASOPHILS NFR BLD AUTO: 1.5 %
BILIRUB SERPL-MCNC: 0.7 MG/DL (ref 0.2–1.3)
BUN SERPL-MCNC: 14 MG/DL (ref 7–30)
CALCIUM SERPL-MCNC: 8.7 MG/DL (ref 8.5–10.1)
CANCER AG27-29 SERPL-ACNC: 13 U/ML (ref 0–39)
CEA SERPL-MCNC: 1.1 UG/L (ref 0–2.5)
CHLORIDE SERPL-SCNC: 106 MMOL/L (ref 94–109)
CO2 SERPL-SCNC: 28 MMOL/L (ref 20–32)
CREAT SERPL-MCNC: 0.82 MG/DL (ref 0.52–1.04)
DIFFERENTIAL METHOD BLD: ABNORMAL
EOSINOPHIL # BLD AUTO: 0 10E9/L (ref 0–0.7)
EOSINOPHIL NFR BLD AUTO: 0.6 %
ERYTHROCYTE [DISTWIDTH] IN BLOOD BY AUTOMATED COUNT: 14.1 % (ref 10–15)
GFR SERPL CREATININE-BSD FRML MDRD: 74 ML/MIN/{1.73_M2}
GLUCOSE SERPL-MCNC: 114 MG/DL (ref 70–99)
HCT VFR BLD AUTO: 36.8 % (ref 35–47)
HGB BLD-MCNC: 12.4 G/DL (ref 11.7–15.7)
IMM GRANULOCYTES # BLD: 0 10E9/L (ref 0–0.4)
IMM GRANULOCYTES NFR BLD: 0.6 %
LYMPHOCYTES # BLD AUTO: 0.9 10E9/L (ref 0.8–5.3)
LYMPHOCYTES NFR BLD AUTO: 26.8 %
MAGNESIUM SERPL-MCNC: 2.3 MG/DL (ref 1.6–2.3)
MCH RBC QN AUTO: 34.5 PG (ref 26.5–33)
MCHC RBC AUTO-ENTMCNC: 33.7 G/DL (ref 31.5–36.5)
MCV RBC AUTO: 103 FL (ref 78–100)
MONOCYTES # BLD AUTO: 0.4 10E9/L (ref 0–1.3)
MONOCYTES NFR BLD AUTO: 12.1 %
NEUTROPHILS # BLD AUTO: 2 10E9/L (ref 1.6–8.3)
NEUTROPHILS NFR BLD AUTO: 58.4 %
NRBC # BLD AUTO: 0 10*3/UL
NRBC BLD AUTO-RTO: 0 /100
PLATELET # BLD AUTO: 263 10E9/L (ref 150–450)
POTASSIUM SERPL-SCNC: 4 MMOL/L (ref 3.4–5.3)
PROT SERPL-MCNC: 6.1 G/DL (ref 6.8–8.8)
RBC # BLD AUTO: 3.59 10E12/L (ref 3.8–5.2)
SODIUM SERPL-SCNC: 138 MMOL/L (ref 133–144)
WBC # BLD AUTO: 3.4 10E9/L (ref 4–11)

## 2021-04-15 PROCEDURE — 82378 CARCINOEMBRYONIC ANTIGEN: CPT | Performed by: PHYSICIAN ASSISTANT

## 2021-04-15 PROCEDURE — 83735 ASSAY OF MAGNESIUM: CPT | Performed by: PHYSICIAN ASSISTANT

## 2021-04-15 PROCEDURE — G0463 HOSPITAL OUTPT CLINIC VISIT: HCPCS

## 2021-04-15 PROCEDURE — 250N000011 HC RX IP 250 OP 636: Performed by: INTERNAL MEDICINE

## 2021-04-15 PROCEDURE — 86300 IMMUNOASSAY TUMOR CA 15-3: CPT | Performed by: PHYSICIAN ASSISTANT

## 2021-04-15 PROCEDURE — 80053 COMPREHEN METABOLIC PANEL: CPT | Performed by: PHYSICIAN ASSISTANT

## 2021-04-15 PROCEDURE — 99214 OFFICE O/P EST MOD 30 MIN: CPT | Performed by: PHYSICIAN ASSISTANT

## 2021-04-15 PROCEDURE — 85025 COMPLETE CBC W/AUTO DIFF WBC: CPT | Performed by: PHYSICIAN ASSISTANT

## 2021-04-15 PROCEDURE — 36591 DRAW BLOOD OFF VENOUS DEVICE: CPT

## 2021-04-15 RX ORDER — NALOXONE HYDROCHLORIDE 0.4 MG/ML
.1-.4 INJECTION, SOLUTION INTRAMUSCULAR; INTRAVENOUS; SUBCUTANEOUS
Status: CANCELLED | OUTPATIENT
Start: 2021-04-16

## 2021-04-15 RX ORDER — NALOXONE HYDROCHLORIDE 0.4 MG/ML
.1-.4 INJECTION, SOLUTION INTRAMUSCULAR; INTRAVENOUS; SUBCUTANEOUS
Status: CANCELLED | OUTPATIENT
Start: 2021-04-23

## 2021-04-15 RX ORDER — HEPARIN SODIUM (PORCINE) LOCK FLUSH IV SOLN 100 UNIT/ML 100 UNIT/ML
5 SOLUTION INTRAVENOUS EVERY 8 HOURS PRN
Status: CANCELLED | OUTPATIENT
Start: 2021-04-16

## 2021-04-15 RX ORDER — METHYLPREDNISOLONE SODIUM SUCCINATE 125 MG/2ML
125 INJECTION, POWDER, LYOPHILIZED, FOR SOLUTION INTRAMUSCULAR; INTRAVENOUS
Status: CANCELLED
Start: 2021-04-23

## 2021-04-15 RX ORDER — HEPARIN SODIUM,PORCINE 10 UNIT/ML
5 VIAL (ML) INTRAVENOUS
Status: CANCELLED | OUTPATIENT
Start: 2021-04-23

## 2021-04-15 RX ORDER — ALBUTEROL SULFATE 0.83 MG/ML
2.5 SOLUTION RESPIRATORY (INHALATION)
Status: CANCELLED | OUTPATIENT
Start: 2021-04-23

## 2021-04-15 RX ORDER — LORAZEPAM 2 MG/ML
0.5 INJECTION INTRAMUSCULAR EVERY 4 HOURS PRN
Status: CANCELLED
Start: 2021-04-23

## 2021-04-15 RX ORDER — HEPARIN SODIUM,PORCINE 10 UNIT/ML
5 VIAL (ML) INTRAVENOUS
Status: CANCELLED | OUTPATIENT
Start: 2021-04-15

## 2021-04-15 RX ORDER — HEPARIN SODIUM,PORCINE 10 UNIT/ML
5 VIAL (ML) INTRAVENOUS
Status: CANCELLED | OUTPATIENT
Start: 2021-04-16

## 2021-04-15 RX ORDER — HEPARIN SODIUM (PORCINE) LOCK FLUSH IV SOLN 100 UNIT/ML 100 UNIT/ML
5 SOLUTION INTRAVENOUS
Status: CANCELLED | OUTPATIENT
Start: 2021-04-15

## 2021-04-15 RX ORDER — MEPERIDINE HYDROCHLORIDE 25 MG/ML
25 INJECTION INTRAMUSCULAR; INTRAVENOUS; SUBCUTANEOUS EVERY 30 MIN PRN
Status: CANCELLED | OUTPATIENT
Start: 2021-04-16

## 2021-04-15 RX ORDER — EPINEPHRINE 1 MG/ML
0.3 INJECTION, SOLUTION INTRAMUSCULAR; SUBCUTANEOUS EVERY 5 MIN PRN
Status: CANCELLED | OUTPATIENT
Start: 2021-04-23

## 2021-04-15 RX ORDER — ALBUTEROL SULFATE 90 UG/1
1-2 AEROSOL, METERED RESPIRATORY (INHALATION)
Status: CANCELLED
Start: 2021-04-23

## 2021-04-15 RX ORDER — SODIUM CHLORIDE 9 MG/ML
1000 INJECTION, SOLUTION INTRAVENOUS CONTINUOUS PRN
Status: CANCELLED
Start: 2021-04-16

## 2021-04-15 RX ORDER — METHYLPREDNISOLONE SODIUM SUCCINATE 125 MG/2ML
125 INJECTION, POWDER, LYOPHILIZED, FOR SOLUTION INTRAMUSCULAR; INTRAVENOUS
Status: CANCELLED
Start: 2021-04-16

## 2021-04-15 RX ORDER — EPINEPHRINE 1 MG/ML
0.3 INJECTION, SOLUTION INTRAMUSCULAR; SUBCUTANEOUS EVERY 5 MIN PRN
Status: CANCELLED | OUTPATIENT
Start: 2021-04-16

## 2021-04-15 RX ORDER — HEPARIN SODIUM (PORCINE) LOCK FLUSH IV SOLN 100 UNIT/ML 100 UNIT/ML
5 SOLUTION INTRAVENOUS
Status: DISCONTINUED | OUTPATIENT
Start: 2021-04-15 | End: 2021-04-15 | Stop reason: HOSPADM

## 2021-04-15 RX ORDER — ALBUTEROL SULFATE 90 UG/1
1-2 AEROSOL, METERED RESPIRATORY (INHALATION)
Status: CANCELLED
Start: 2021-04-16

## 2021-04-15 RX ORDER — LORAZEPAM 2 MG/ML
0.5 INJECTION INTRAMUSCULAR EVERY 4 HOURS PRN
Status: CANCELLED
Start: 2021-04-16

## 2021-04-15 RX ORDER — MEPERIDINE HYDROCHLORIDE 25 MG/ML
25 INJECTION INTRAMUSCULAR; INTRAVENOUS; SUBCUTANEOUS EVERY 30 MIN PRN
Status: CANCELLED | OUTPATIENT
Start: 2021-04-23

## 2021-04-15 RX ORDER — DIPHENHYDRAMINE HYDROCHLORIDE 50 MG/ML
50 INJECTION INTRAMUSCULAR; INTRAVENOUS
Status: CANCELLED
Start: 2021-04-16

## 2021-04-15 RX ORDER — HEPARIN SODIUM (PORCINE) LOCK FLUSH IV SOLN 100 UNIT/ML 100 UNIT/ML
5 SOLUTION INTRAVENOUS EVERY 8 HOURS PRN
Status: CANCELLED | OUTPATIENT
Start: 2021-04-23

## 2021-04-15 RX ORDER — SODIUM CHLORIDE 9 MG/ML
1000 INJECTION, SOLUTION INTRAVENOUS CONTINUOUS PRN
Status: CANCELLED
Start: 2021-04-23

## 2021-04-15 RX ORDER — ALBUTEROL SULFATE 0.83 MG/ML
2.5 SOLUTION RESPIRATORY (INHALATION)
Status: CANCELLED | OUTPATIENT
Start: 2021-04-16

## 2021-04-15 RX ORDER — DIPHENHYDRAMINE HYDROCHLORIDE 50 MG/ML
50 INJECTION INTRAMUSCULAR; INTRAVENOUS
Status: CANCELLED
Start: 2021-04-23

## 2021-04-15 RX ADMIN — Medication 5 ML: at 07:44

## 2021-04-15 ASSESSMENT — PAIN SCALES - GENERAL: PAINLEVEL: NO PAIN (0)

## 2021-04-15 ASSESSMENT — MIFFLIN-ST. JEOR: SCORE: 1350.69

## 2021-04-15 NOTE — LETTER
"    4/15/2021         RE: Flor Griffin  [address] [address]        Dear Colleague,    Thank you for referring your patient, Flor Griffin, to the [facility]. Please see a copy of my visit note below.    Oncology Rooming Note    April 15, 2021 7:53 AM   Flor Griffin is a 65 year old female who presents for:    Chief Complaint   Patient presents with     Oncology Clinic Visit     Malignant neoplasm of upper-outer quadrant of right breast     Initial Vitals: /68   Pulse 56   Temp 97.4  F (36.3  C) (Tympanic)   Resp 16   Ht 1.676 m (5' 5.98\")   Wt 78.9 kg (174 lb)   LMP 01/01/2004 (Approximate)   SpO2 98%   BMI 28.10 kg/m   Estimated body mass index is 28.1 kg/m  as calculated from the following:    Height as of this encounter: 1.676 m (5' 5.98\").    Weight as of this encounter: 78.9 kg (174 lb). Body surface area is 1.92 meters squared.  No Pain (0) Comment: Data Unavailable   Patient's last menstrual period was 01/01/2004 (approximate).  Allergies reviewed: Yes  Medications reviewed: Yes    Medications: Medication refills not needed today.  Pharmacy name entered into [facility]: [id number] - [place], [place] - [address]      Jennifer Ash PA-C          Oncology/Hematology Visit Note    Apr 15, 2021    Reason for visit: Follow-up breast cancer    Oncology HPI: Flor Griffin is a 65 year old female with metastatic breast cancer, ER low-positive, [place] negative and HER-2 negative.  She was initially seen in November 2017 when she underwent a right supraclavicular lymph node biopsy, positive for poorly differentiated adenocarcinoma, favoring breast primary.  PET scan on 11/27/2017 with multiple different areas of adenopathy, but no distant metastatic disease.  She completed 4 cycles of carboplatin/Taxol followed by dose dense AC for another 4 cycles, surgery was not performed.  She " completed adjuvant radiation to the right breast, right axilla and right supraclavicular region.  She was started on letrozole in September 2018.  In October 2020, screening mammogram revealed a focal asymmetry in the right outer right breast and ultrasound revealed a hypoechoic mass and biopsy with poorly differentiated carcinoma consistent with breast cancer, ER weakly positive, [place] negative and HER-2 negative.  PET scan on 10/28/2020 with several lymph nodes in the right axilla and right subclavian region and bilateral hilar regions, favoring metastatic disease.  She started first-line metastatic therapy with Xeloda, but follow-up PET scan on 2/9/2021 with no significant change in breast mass size.  Xeloda was discontinued and port was placed on 2/16/2021 and she was started on eribulin, C1 D1 on 2/18/2021.    She was in clinic on 2/25/2021 for C1 D8, but was noted to be neutropenic, therefore treatment was deferred.  Dose was eventually reduced and she has now completed 2 full cycles.    She is here today for deferred eribulin C3D1.    Interval History: Michelle is doing well. She has been tolerating treatment well with no significant toxicities. She has lost her hair, but she has been having fun with different scars. No fever or chills, vomiting or diarrhea, headache or vision changes, new lumps or bumps and appetite is good. No other new complaints.    Review of Systems: See interval hx. Denies fevers, chills, HA, dizziness, n/t, changes in vision, cough, sore throat, CP, SOB, abdominal pain, N/V, diarrhea, changes in urination, bleeding, bruising, rash.     PMHx and Social Hx reviewed per EPIC.      Medications:  Current Outpatient Medications   Medication Sig Dispense Refill     aspirin 81 MG tablet Take 81 mg by mouth daily       Ibuprofen (ADVIL PO) Take 400 mg by mouth every 6 hours as needed for moderate pain       LORazepam (ATIVAN) 0.5 MG tablet Take 1 tablet (0.5 mg) by mouth every 4 hours as needed  "(Anxiety, Nausea/Vomiting or Sleep) 30 tablet 2     multivitamin w/minerals (THERA-VIT-M) tablet Take 1 tablet by mouth daily       Omega-3 Fatty Acids (OMEGA-3 FISH OIL PO) Take 1 g by mouth daily       ondansetron (ZOFRAN) 8 MG tablet Take 1 tablet (8 mg) by mouth every 8 hours as needed (Nausea/Vomiting) 10 tablet 2     prochlorperazine (COMPAZINE) 10 MG tablet Take 0.5-1 tablets (5-10 mg) by mouth every 6 hours as needed for nausea or vomiting 30 tablet 1       Allergies   Allergen Reactions     Penicillins Hives         EXAM:    /68   Pulse 56   Temp 97.4  F (36.3  C) (Tympanic)   Resp 16   Ht 1.676 m (5' 5.98\")   Wt 78.9 kg (174 lb)   LMP 01/01/2004 (Approximate)   SpO2 98%   BMI 28.10 kg/m       GENERAL:  Female, in no acute distress.  Alert and oriented x3.   HEENT:  Normocephalic, atraumatic. Alopecia.  PERRL, oropharynx clear with no sores or thrush.   LYMPH NODES:  No palpable pre/post-auricular, cervical, axillary lymphadenopathy appreciated.  CV:  RRR, No murmurs, gallops, or rubs.   LUNGS:  Clear to auscultation bilaterally.   BREAST: Not examined today  ABDOMEN:  Soft, nontender and nondistended.  Bowel sounds heard x4.  No apparent hepatosplenomegaly.   EXTREMITIES:  No clubbing, cyanosis, or edema.   SKIN: No rash  PSYCH: Mood stable    Labs:   Results for CHARLES MASON (MRN [id number]) as of 4/15/2021 08:31   4/15/2021 07:35   Sodium 138   Potassium 4.0   Chloride 106   Carbon Dioxide 28   Urea Nitrogen 14   Creatinine 0.82   GFR Estimate 74   GFR Estimate If Black 86   Calcium 8.7   Anion Gap 4   Magnesium 2.3   Albumin 3.4   Protein Total 6.1 (L)   Bilirubin Total 0.7   Alkaline Phosphatase 88   ALT 64 (H)   AST 33   Glucose 114 (H)   WBC 3.4 (L)   Hemoglobin 12.4   Hematocrit 36.8   Platelet Count 263   RBC Count 3.59 (L)    (H)   MCH 34.5 (H)   MCHC 33.7   RDW 14.1   Diff Method Automated Method   % Neutrophils 58.4   % Lymphocytes 26.8   % Monocytes 12.1   % Eosinophils " 0.6   % Basophils 1.5   % Immature Granulocytes 0.6   Nucleated RBCs 0   Absolute Neutrophil 2.0   Absolute Lymphocytes 0.9   Absolute Monocytes 0.4   Absolute Eosinophils 0.0   Absolute Basophils 0.1   Abs Immature Granulocytes 0.0   Absolute Nucleated RBC 0.0       Imaging: n/a    Impression/Plan: Flor Griffin is a 65 year old female with metastatic breast cancer breast cancer s/p carboplatin/Taxol, dose dense AC, adjuvant radiation, adjuvant letrozole previously on Xeloda, currently on eribulin.    Metastatic breast cancer: ER low positive, ID negative, HER-2 negative, previously on Xeloda, currently on eribulin, C1 D1 completed on 2/18/2021.  The plan is for 2 weeks on, 1 week off, however she was neutropenic and her chemo was deferred twice.  Eribulin dose was eventually reduced and she has been tolerating this well.  Labs look great today and plan for C3 D1 tomorrow.  She will contact the clinic sooner if needed.  --4/22 eribulin C3 D8  --5/11 PET  --5/12 Dr Stover    Heme: Mild leukopenia with WBC 3.4, but the rest of CBC looks great.    CV: Irregular heartbeat on initial exam. EKG with PVCs on 2/25. EKG prior to eribulin normal sinus rhythm.  NSR on exam today.     LFTs: ALT and AST were elevated just prior to eribulin C1 D1 and they have been fluctuating.  Improved today.      Chart documentation with Dragon Voice recognition Software. Although reviewed after completion, some words and grammatical errors may remain.      Jennifer Ash PA-C  Hematology/Oncology  [facility]                  Again, thank you for allowing me to participate in the care of your patient.        Sincerely,        Jennifer Ash PA-C

## 2021-04-15 NOTE — PROGRESS NOTES
Nursing Note:  Flor Griffin presents today for port labs.    Patient seen by provider today: Yes: Jennifer VALENCIA   present during visit today: Not Applicable.    Note: N/A.    Intravenous Access:  Lab draw site port, Needle type Bridges, Gauge 20.  Labs drawn without difficulty.  Implanted Port.    Discharge Plan:   Patient was sent to [facility] for PA appointment.    Elisabeth North RN

## 2021-04-16 ENCOUNTER — INFUSION THERAPY VISIT (OUTPATIENT)
Dept: INFUSION THERAPY | Facility: CLINIC | Age: 66
End: 2021-04-16
Attending: INTERNAL MEDICINE
Payer: COMMERCIAL

## 2021-04-16 VITALS
OXYGEN SATURATION: 98 % | TEMPERATURE: 97.8 F | DIASTOLIC BLOOD PRESSURE: 77 MMHG | WEIGHT: 174 LBS | HEART RATE: 63 BPM | BODY MASS INDEX: 28.1 KG/M2 | RESPIRATION RATE: 16 BRPM | SYSTOLIC BLOOD PRESSURE: 120 MMHG

## 2021-04-16 DIAGNOSIS — R59.1 LYMPHADENOPATHY: Primary | ICD-10-CM

## 2021-04-16 DIAGNOSIS — Z17.0 MALIGNANT NEOPLASM OF UPPER-OUTER QUADRANT OF RIGHT BREAST IN FEMALE, ESTROGEN RECEPTOR POSITIVE (H): ICD-10-CM

## 2021-04-16 DIAGNOSIS — C50.411 MALIGNANT NEOPLASM OF UPPER-OUTER QUADRANT OF RIGHT BREAST IN FEMALE, ESTROGEN RECEPTOR POSITIVE (H): ICD-10-CM

## 2021-04-16 PROCEDURE — 96375 TX/PRO/DX INJ NEW DRUG ADDON: CPT

## 2021-04-16 PROCEDURE — 250N000011 HC RX IP 250 OP 636: Performed by: PHYSICIAN ASSISTANT

## 2021-04-16 PROCEDURE — 96409 CHEMO IV PUSH SNGL DRUG: CPT

## 2021-04-16 PROCEDURE — 258N000003 HC RX IP 258 OP 636: Performed by: PHYSICIAN ASSISTANT

## 2021-04-16 RX ORDER — HEPARIN SODIUM (PORCINE) LOCK FLUSH IV SOLN 100 UNIT/ML 100 UNIT/ML
5 SOLUTION INTRAVENOUS EVERY 8 HOURS PRN
Status: DISCONTINUED | OUTPATIENT
Start: 2021-04-16 | End: 2021-04-16 | Stop reason: HOSPADM

## 2021-04-16 RX ADMIN — SODIUM CHLORIDE 250 ML: 9 INJECTION, SOLUTION INTRAVENOUS at 12:23

## 2021-04-16 RX ADMIN — Medication 5 ML: at 13:09

## 2021-04-16 RX ADMIN — ERIBULIN MESYLATE 2.1 MG: 0.5 INJECTION INTRAVENOUS at 13:01

## 2021-04-16 RX ADMIN — DEXAMETHASONE SODIUM PHOSPHATE 12 MG: 10 INJECTION, SOLUTION INTRAMUSCULAR; INTRAVENOUS at 12:31

## 2021-04-16 NOTE — PROGRESS NOTES
Infusion Nursing Note:  Flor Griffin presents today for Cycle 3, Day 1 Halaven.    Patient seen by provider today: No   present during visit today: Not Applicable.    Note:  Patient reports is feeling well and offers no new issues or concerns today.  Patient denies fevers, chills or signs of infection.    Intravenous Access:  Implanted Port.  Port site C/D/I.  Port flushes well + excellent blood return.    Treatment Conditions:  Lab Results   Component Value Date    HGB 12.4 04/15/2021     Lab Results   Component Value Date    WBC 3.4 04/15/2021      Lab Results   Component Value Date    ANEU 2.0 04/15/2021     Lab Results   Component Value Date     04/15/2021      Lab Results   Component Value Date     04/15/2021                   Lab Results   Component Value Date    POTASSIUM 4.0 04/15/2021           Lab Results   Component Value Date    MAG 2.3 04/15/2021            Lab Results   Component Value Date    CR 0.82 04/15/2021                   Lab Results   Component Value Date    EDILSON 8.7 04/15/2021                Lab Results   Component Value Date    BILITOTAL 0.7 04/15/2021           Lab Results   Component Value Date    ALBUMIN 3.4 04/15/2021                    Lab Results   Component Value Date    ALT 64 04/15/2021           Lab Results   Component Value Date    AST 33 04/15/2021       Results reviewed, labs MET treatment parameters, ok to proceed with treatment.      Post Infusion Assessment:  Patient tolerated infusion without incident.  Blood return noted pre and post infusion.  Blood return noted during administration every 3-5 ml with Halaven.  Site patent and intact, free from redness, edema or discomfort.  No evidence of extravasations.       Discharge Plan:   Discharge instructions reviewed with: Patient.  Patient and/or family verbalized understanding of discharge instructions and all questions answered.  Patient discharged in stable condition accompanied by: self.  Departure  Mode: Ambulatory.  4/21/21: Labs.  4/22/21: Cycle 3, Day 8 Halaven.  5/11/21: PET scan.  5/12/21: Labs + Appointment with Dr. Stover.  5/13/21: Cycle 4, Day 1 Halaven.    Veronica Mckeon RN, BSN,OCN on 4/16/2021 at 3:38 PM

## 2021-04-21 ENCOUNTER — INFUSION THERAPY VISIT (OUTPATIENT)
Dept: INFUSION THERAPY | Facility: CLINIC | Age: 66
End: 2021-04-21
Attending: PHYSICIAN ASSISTANT
Payer: COMMERCIAL

## 2021-04-21 ENCOUNTER — HOSPITAL ENCOUNTER (OUTPATIENT)
Facility: CLINIC | Age: 66
Setting detail: SPECIMEN
Discharge: HOME | End: 2021-04-21
Attending: PHYSICIAN ASSISTANT | Admitting: PHYSICIAN ASSISTANT
Payer: COMMERCIAL

## 2021-04-21 DIAGNOSIS — R59.1 LYMPHADENOPATHY: Primary | ICD-10-CM

## 2021-04-21 DIAGNOSIS — Z17.0 MALIGNANT NEOPLASM OF UPPER-OUTER QUADRANT OF RIGHT BREAST IN FEMALE, ESTROGEN RECEPTOR POSITIVE (H): ICD-10-CM

## 2021-04-21 DIAGNOSIS — C50.411 MALIGNANT NEOPLASM OF UPPER-OUTER QUADRANT OF RIGHT BREAST IN FEMALE, ESTROGEN RECEPTOR POSITIVE (H): ICD-10-CM

## 2021-04-21 LAB
ALBUMIN SERPL-MCNC: 3.3 G/DL (ref 3.4–5)
ALP SERPL-CCNC: 78 U/L (ref 40–150)
ALT SERPL W P-5'-P-CCNC: 61 U/L (ref 0–50)
ANION GAP SERPL CALCULATED.3IONS-SCNC: 5 MMOL/L (ref 3–14)
AST SERPL W P-5'-P-CCNC: 31 U/L (ref 0–45)
BASOPHILS # BLD AUTO: 0 10E9/L (ref 0–0.2)
BASOPHILS NFR BLD AUTO: 0.9 %
BILIRUB SERPL-MCNC: 0.8 MG/DL (ref 0.2–1.3)
BUN SERPL-MCNC: 10 MG/DL (ref 7–30)
CALCIUM SERPL-MCNC: 9 MG/DL (ref 8.5–10.1)
CHLORIDE SERPL-SCNC: 104 MMOL/L (ref 94–109)
CO2 SERPL-SCNC: 29 MMOL/L (ref 20–32)
CREAT SERPL-MCNC: 0.72 MG/DL (ref 0.52–1.04)
DIFFERENTIAL METHOD BLD: ABNORMAL
EOSINOPHIL # BLD AUTO: 0 10E9/L (ref 0–0.7)
EOSINOPHIL NFR BLD AUTO: 0.3 %
ERYTHROCYTE [DISTWIDTH] IN BLOOD BY AUTOMATED COUNT: 13.6 % (ref 10–15)
GFR SERPL CREATININE-BSD FRML MDRD: 88 ML/MIN/{1.73_M2}
GLUCOSE SERPL-MCNC: 136 MG/DL (ref 70–99)
HCT VFR BLD AUTO: 36.4 % (ref 35–47)
HGB BLD-MCNC: 12.3 G/DL (ref 11.7–15.7)
IMM GRANULOCYTES # BLD: 0 10E9/L (ref 0–0.4)
IMM GRANULOCYTES NFR BLD: 1.2 %
LYMPHOCYTES # BLD AUTO: 0.9 10E9/L (ref 0.8–5.3)
LYMPHOCYTES NFR BLD AUTO: 24.6 %
MAGNESIUM SERPL-MCNC: 1.8 MG/DL (ref 1.6–2.3)
MCH RBC QN AUTO: 34.3 PG (ref 26.5–33)
MCHC RBC AUTO-ENTMCNC: 33.8 G/DL (ref 31.5–36.5)
MCV RBC AUTO: 101 FL (ref 78–100)
MONOCYTES # BLD AUTO: 0.2 10E9/L (ref 0–1.3)
MONOCYTES NFR BLD AUTO: 6.9 %
NEUTROPHILS # BLD AUTO: 2.3 10E9/L (ref 1.6–8.3)
NEUTROPHILS NFR BLD AUTO: 66.1 %
NRBC # BLD AUTO: 0 10*3/UL
NRBC BLD AUTO-RTO: 0 /100
PLATELET # BLD AUTO: 188 10E9/L (ref 150–450)
POTASSIUM SERPL-SCNC: 3.5 MMOL/L (ref 3.4–5.3)
PROT SERPL-MCNC: 6.1 G/DL (ref 6.8–8.8)
RBC # BLD AUTO: 3.59 10E12/L (ref 3.8–5.2)
SODIUM SERPL-SCNC: 138 MMOL/L (ref 133–144)
WBC # BLD AUTO: 3.5 10E9/L (ref 4–11)

## 2021-04-21 PROCEDURE — 83735 ASSAY OF MAGNESIUM: CPT | Performed by: PHYSICIAN ASSISTANT

## 2021-04-21 PROCEDURE — 36591 DRAW BLOOD OFF VENOUS DEVICE: CPT

## 2021-04-21 PROCEDURE — 80053 COMPREHEN METABOLIC PANEL: CPT | Performed by: PHYSICIAN ASSISTANT

## 2021-04-21 PROCEDURE — 85025 COMPLETE CBC W/AUTO DIFF WBC: CPT | Performed by: PHYSICIAN ASSISTANT

## 2021-04-21 NOTE — PROGRESS NOTES
Nursing Note:  Flor Griffin presents today for Port Draw.    Patient seen by provider today: No   present during visit today: Not Applicable.    Note: N/A.    Intravenous Access:  Labs drawn without difficulty.  Implanted Port.    Discharge Plan:   Patient was sent home.    Sabine Domínguez RN

## 2021-04-22 ENCOUNTER — INFUSION THERAPY VISIT (OUTPATIENT)
Dept: INFUSION THERAPY | Facility: CLINIC | Age: 66
End: 2021-04-22
Attending: INTERNAL MEDICINE
Payer: COMMERCIAL

## 2021-04-22 VITALS
WEIGHT: 179.9 LBS | HEART RATE: 65 BPM | DIASTOLIC BLOOD PRESSURE: 71 MMHG | OXYGEN SATURATION: 97 % | SYSTOLIC BLOOD PRESSURE: 110 MMHG | TEMPERATURE: 98 F | RESPIRATION RATE: 16 BRPM | BODY MASS INDEX: 29.05 KG/M2

## 2021-04-22 DIAGNOSIS — Z17.0 MALIGNANT NEOPLASM OF UPPER-OUTER QUADRANT OF RIGHT BREAST IN FEMALE, ESTROGEN RECEPTOR POSITIVE (H): ICD-10-CM

## 2021-04-22 DIAGNOSIS — C50.411 MALIGNANT NEOPLASM OF UPPER-OUTER QUADRANT OF RIGHT BREAST IN FEMALE, ESTROGEN RECEPTOR POSITIVE (H): ICD-10-CM

## 2021-04-22 DIAGNOSIS — R59.1 LYMPHADENOPATHY: Primary | ICD-10-CM

## 2021-04-22 PROCEDURE — 258N000003 HC RX IP 258 OP 636: Performed by: PHYSICIAN ASSISTANT

## 2021-04-22 PROCEDURE — 96409 CHEMO IV PUSH SNGL DRUG: CPT

## 2021-04-22 PROCEDURE — 96367 TX/PROPH/DG ADDL SEQ IV INF: CPT

## 2021-04-22 PROCEDURE — 250N000011 HC RX IP 250 OP 636: Performed by: PHYSICIAN ASSISTANT

## 2021-04-22 RX ORDER — HEPARIN SODIUM (PORCINE) LOCK FLUSH IV SOLN 100 UNIT/ML 100 UNIT/ML
5 SOLUTION INTRAVENOUS EVERY 8 HOURS PRN
Status: DISCONTINUED | OUTPATIENT
Start: 2021-04-22 | End: 2021-04-22 | Stop reason: HOSPADM

## 2021-04-22 RX ADMIN — SODIUM CHLORIDE 250 ML: 9 INJECTION, SOLUTION INTRAVENOUS at 08:23

## 2021-04-22 RX ADMIN — DEXAMETHASONE SODIUM PHOSPHATE 12 MG: 10 INJECTION, SOLUTION INTRAMUSCULAR; INTRAVENOUS at 08:23

## 2021-04-22 RX ADMIN — ERIBULIN MESYLATE 2.1 MG: 0.5 INJECTION INTRAVENOUS at 08:46

## 2021-04-22 RX ADMIN — Medication 5 ML: at 09:00

## 2021-04-22 NOTE — PROGRESS NOTES
Infusion Nursing Note:  Flor Griffin presents today for C3D8.    Patient seen by provider today: No   present during visit today: Not Applicable.    Note: N/A    Intravenous Access:  Implanted Port.    Treatment Conditions:  Lab Results   Component Value Date    HGB 12.3 04/21/2021     Lab Results   Component Value Date    WBC 3.5 04/21/2021      Lab Results   Component Value Date    ANEU 2.3 04/21/2021     Lab Results   Component Value Date     04/21/2021      Results reviewed, labs MET treatment parameters, ok to proceed with treatment.      Post Infusion Assessment:  Patient tolerated infusion without incident.  Site patent and intact, free from redness, edema or discomfort.  No evidence of extravasations.  Access discontinued per protocol.       Discharge Plan:   AVS to patient via [employer].  Patient will return 5/12 for next appointment.   Patient discharged in stable condition accompanied by: self.  Departure Mode: Ambulatory.    Sabine Domínguez RN

## 2021-04-24 ENCOUNTER — HOSPITAL ENCOUNTER (EMERGENCY)
Facility: CLINIC | Age: 66
Discharge: HOME | End: 2021-04-24
Attending: NURSE PRACTITIONER | Admitting: NURSE PRACTITIONER
Payer: COMMERCIAL

## 2021-04-24 ENCOUNTER — APPOINTMENT (OUTPATIENT)
Dept: ULTRASOUND IMAGING | Facility: CLINIC | Age: 66
End: 2021-04-24
Attending: NURSE PRACTITIONER
Payer: COMMERCIAL

## 2021-04-24 VITALS
BODY MASS INDEX: 28.12 KG/M2 | DIASTOLIC BLOOD PRESSURE: 75 MMHG | HEART RATE: 82 BPM | WEIGHT: 175 LBS | OXYGEN SATURATION: 98 % | RESPIRATION RATE: 16 BRPM | TEMPERATURE: 98.3 F | SYSTOLIC BLOOD PRESSURE: 111 MMHG | HEIGHT: 66 IN

## 2021-04-24 DIAGNOSIS — I82.409 DVT (DEEP VENOUS THROMBOSIS) (H): ICD-10-CM

## 2021-04-24 LAB
ALBUMIN SERPL-MCNC: 3.5 G/DL (ref 3.4–5)
ALP SERPL-CCNC: 81 U/L (ref 40–150)
ALT SERPL W P-5'-P-CCNC: 68 U/L (ref 0–50)
ANION GAP SERPL CALCULATED.3IONS-SCNC: 6 MMOL/L (ref 3–14)
APTT PPP: 21 SEC (ref 22–37)
AST SERPL W P-5'-P-CCNC: 31 U/L (ref 0–45)
BASOPHILS # BLD AUTO: 0 10E9/L (ref 0–0.2)
BASOPHILS NFR BLD AUTO: 0.3 %
BILIRUB DIRECT SERPL-MCNC: 0.1 MG/DL (ref 0–0.2)
BILIRUB SERPL-MCNC: 0.7 MG/DL (ref 0.2–1.3)
BUN SERPL-MCNC: 15 MG/DL (ref 7–30)
CALCIUM SERPL-MCNC: 8.5 MG/DL (ref 8.5–10.1)
CHLORIDE SERPL-SCNC: 104 MMOL/L (ref 94–109)
CO2 SERPL-SCNC: 28 MMOL/L (ref 20–32)
CREAT SERPL-MCNC: 0.77 MG/DL (ref 0.52–1.04)
DIFFERENTIAL METHOD BLD: ABNORMAL
EOSINOPHIL # BLD AUTO: 0 10E9/L (ref 0–0.7)
EOSINOPHIL NFR BLD AUTO: 0 %
ERYTHROCYTE [DISTWIDTH] IN BLOOD BY AUTOMATED COUNT: 14.3 % (ref 10–15)
GFR SERPL CREATININE-BSD FRML MDRD: 81 ML/MIN/{1.73_M2}
GLUCOSE SERPL-MCNC: 153 MG/DL (ref 70–99)
HCT VFR BLD AUTO: 35.1 % (ref 35–47)
HGB BLD-MCNC: 12.1 G/DL (ref 11.7–15.7)
IMM GRANULOCYTES # BLD: 0 10E9/L (ref 0–0.4)
IMM GRANULOCYTES NFR BLD: 0.5 %
INR PPP: 0.9 (ref 0.86–1.14)
LACTATE BLD-SCNC: 1.3 MMOL/L (ref 0.7–2)
LYMPHOCYTES # BLD AUTO: 0.9 10E9/L (ref 0.8–5.3)
LYMPHOCYTES NFR BLD AUTO: 23.7 %
MCH RBC QN AUTO: 34.3 PG (ref 26.5–33)
MCHC RBC AUTO-ENTMCNC: 34.5 G/DL (ref 31.5–36.5)
MCV RBC AUTO: 99 FL (ref 78–100)
MONOCYTES # BLD AUTO: 0.3 10E9/L (ref 0–1.3)
MONOCYTES NFR BLD AUTO: 8.3 %
NEUTROPHILS # BLD AUTO: 2.6 10E9/L (ref 1.6–8.3)
NEUTROPHILS NFR BLD AUTO: 67.2 %
NRBC # BLD AUTO: 0 10*3/UL
NRBC BLD AUTO-RTO: 0 /100
PLATELET # BLD AUTO: 177 10E9/L (ref 150–450)
POTASSIUM SERPL-SCNC: 3.2 MMOL/L (ref 3.4–5.3)
PROT SERPL-MCNC: 6.2 G/DL (ref 6.8–8.8)
RBC # BLD AUTO: 3.53 10E12/L (ref 3.8–5.2)
SODIUM SERPL-SCNC: 138 MMOL/L (ref 133–144)
WBC # BLD AUTO: 3.8 10E9/L (ref 4–11)

## 2021-04-24 PROCEDURE — 85610 PROTHROMBIN TIME: CPT | Performed by: NURSE PRACTITIONER

## 2021-04-24 PROCEDURE — 99284 EMERGENCY DEPT VISIT MOD MDM: CPT | Mod: 25

## 2021-04-24 PROCEDURE — 36415 COLL VENOUS BLD VENIPUNCTURE: CPT | Performed by: NURSE PRACTITIONER

## 2021-04-24 PROCEDURE — 250N000013 HC RX MED GY IP 250 OP 250 PS 637: Performed by: NURSE PRACTITIONER

## 2021-04-24 PROCEDURE — 80076 HEPATIC FUNCTION PANEL: CPT | Performed by: NURSE PRACTITIONER

## 2021-04-24 PROCEDURE — 85025 COMPLETE CBC W/AUTO DIFF WBC: CPT | Performed by: NURSE PRACTITIONER

## 2021-04-24 PROCEDURE — 83605 ASSAY OF LACTIC ACID: CPT | Performed by: NURSE PRACTITIONER

## 2021-04-24 PROCEDURE — 250N000011 HC RX IP 250 OP 636: Performed by: NURSE PRACTITIONER

## 2021-04-24 PROCEDURE — 80048 BASIC METABOLIC PNL TOTAL CA: CPT | Performed by: NURSE PRACTITIONER

## 2021-04-24 PROCEDURE — 93971 EXTREMITY STUDY: CPT | Mod: LT

## 2021-04-24 PROCEDURE — 87040 BLOOD CULTURE FOR BACTERIA: CPT | Performed by: NURSE PRACTITIONER

## 2021-04-24 PROCEDURE — 85730 THROMBOPLASTIN TIME PARTIAL: CPT | Performed by: NURSE PRACTITIONER

## 2021-04-24 RX ORDER — HEPARIN SODIUM (PORCINE) LOCK FLUSH IV SOLN 100 UNIT/ML 100 UNIT/ML
5 SOLUTION INTRAVENOUS EVERY 8 HOURS
Status: DISCONTINUED | OUTPATIENT
Start: 2021-04-24 | End: 2021-04-25 | Stop reason: HOSPADM

## 2021-04-24 RX ORDER — HEPARIN SODIUM,PORCINE 10 UNIT/ML
5 VIAL (ML) INTRAVENOUS ONCE
Status: DISCONTINUED | OUTPATIENT
Start: 2021-04-24 | End: 2021-04-24 | Stop reason: CLARIF

## 2021-04-24 RX ADMIN — RIVAROXABAN 15 MG: 15 TABLET, FILM COATED ORAL at 23:09

## 2021-04-24 RX ADMIN — HEPARIN 5 ML: 100 SYRINGE at 23:08

## 2021-04-24 ASSESSMENT — ENCOUNTER SYMPTOMS
COLOR CHANGE: 1
FEVER: 0
SHORTNESS OF BREATH: 0
CHILLS: 0
WOUND: 0

## 2021-04-24 ASSESSMENT — MIFFLIN-ST. JEOR: SCORE: 1355.54

## 2021-04-24 NOTE — ED TRIAGE NOTES
Pt here with c/o chest wall and L arm swelling since yesterday. Port last accessed Thurs. CMS intact. No SOB. Next chemo in two weeks. ABC intact.

## 2021-04-24 NOTE — ED PROVIDER NOTES
"  History   Chief Complaint:  Left chest shoulder and arm swelling    The history is provided by the patient.      Flor Griffin is a 65 year old female with history of breast cancer following with Dr. Stover, using Halaven as chemo treatment last access 2 days ago, who presents with swelling and discoloration or her left arm which is unusual. The patient adds that she feels as though there is more fluid within her left breast than her right.  She denies any pain while infusing but notes that they did a few more flushes than usual last treatment. The patient confirms that he port is a couple months old. Denies any fevers.     Review of Systems   Constitutional: Negative for chills and fever.   Respiratory: Negative for shortness of breath.    Musculoskeletal:        +left arm swelling   Skin: Positive for color change. Negative for wound.   All other systems reviewed and are negative.      Allergies:  Penicillins    Medications:  Ativan  Zofran  Compazine  Aspirin 81 mg  Halaven    Past Medical History:    Basal cell cancer  GERD  Blood transfusion  Hyperlipidemia  Lymphadenopathy  Breast cancer estrogen receptor positive  Anemia  Chemotherapy-induced neutropenia  Port catheter in place    Past Surgical History:    Biopsy mass neck  Colonoscopy  IR Chest Port Placement   IR port removal left  Mohs micrographic procedure x2    Family History:    Mother: Lupus, CHF, CAD, hyperlipidemia  Father: Diabetes    Social History:  Patient presents to the ED with .    Physical Exam     Patient Vitals for the past 24 hrs:   BP Temp Temp src Pulse Resp SpO2 Height Weight   04/24/21 2145 111/75 -- -- 82 -- 98 % -- --   04/24/21 1656 126/83 98.3  F (36.8  C) Temporal 78 16 99 % 1.676 m (5' 6\") 79.4 kg (175 lb)       Physical Exam  General: Alert, No obvious discomfort, well kept  Eyes: PERRL, conjunctivae pink no scleral icterus or conjunctival injection  ENT:   Moist mucus membranes, posterior oropharynx clear without " erythema or exudates, No lymphadenopathy, Normal voice  Resp:  Lungs clear to auscultation bilaterally, no crackles/rubs/wheezes. Good air movement  CV:  Normal rate and rhythm, no murmurs/rubs/gallops, left chest Port-A-Cath, mild swelling around left shoulder upper arm and dependent area alongside left breast.  Mild erythema upper arm/chest.  GI:  Abdomen soft and non-distended.  Normoactive BS.  No tenderness, guarding or rebound, No masses  Skin:  Warm, dry.  No rashes or petechiae  Musculoskeletal: No peripheral edema or calf tenderness, Normal gross ROM   Neuro: Alert and oriented to person/place/time, normal sensation  Psychiatric: Normal affect, cooperative, good eye contact    Emergency Department Course     Imaging:  US Upper Extremity Venous Duplex Left:  Occlusive DVT within the visualized left subclavian vein. The left axillary, brachial, radial and ulnar veins are patent. There is superficial thrombophlebitis within the proximal cephalic vein. As per radiology.     Laboratory:   CBC: WBC: 3.8 (L), HGB: 12.1, PLT: 177    BMP: Glucose 153 (H), Potassium: 3.2 (L), o/w WNL (Creatinine: 0.77)    Hepatic Panel: Bilirubin direct: 0.1, Bilirubin Total: 0.7, Albumin: 3.5, Protein Total: 6.2 (L), Alkaline Phosphatase: 81, ALT: 68 (H), AST: 31, o/w WNL    Lactic acid (Resulted 1932): 1.3    INR: 0.90    PTT: 21 (L)    Blood Cultures x2: Pending    Emergency Department Course:    Reviewed:  I reviewed nursing notes, vitals, past medical history and care everywhere    Assessments:  1827 I obtained history and examined the patient as noted above.     2132 Updated the patient.     2145 I rechecked the patient and explained findings.     Consults:   1907 Spoke with OVIDIO Schilling, regarding imaging options with chemo port in place.     2133 Spoke with OVIDIO Schilling, after ultrasound results.     2144 Spoke with Dr. [facility], regarding anticoagulation with patient's chemotherapy medication.      Interventions:  2230 Xarelto 15 mg PO    Disposition:  The patient was discharged to home.     Impression & Plan     Medical Decision Making:  Flor Griffin is a 65 year old female who presents today for evaluation of swelling of left shoulder and arm.  She is a cancer patient who has a port on the side of her body as well.  Her symptoms were concerning for possible infection versus DVT.  Laboratory studies were obtained which showed no sign of infection.  She had negative lactic acid and slightly low white blood cell count.  The remainder of her laboratory studies were noncontributory.  Ultrasound was obtained which does confirm a subclavian DVT.  I discussed this finding with both interventional radiology and patient's oncologist.  DOAC's were recommended.  I discussed the the option of DOAC's versus warfarin/enoxaparin with patient.  Together we decided on rivaroxaban.  Her first dose was given here.  She is given a month supply.  She will contact her oncologist on Monday to discuss the next steps.  There was no indication for admission or acute intervention at this time.  She appears to be safe and appropriate for outpatient management follow-up and is discharged home.      Diagnosis:    ICD-10-CM    1. DVT (deep venous thrombosis) (H)  I82.409     Subclavin       Discharge Medications:  New Prescriptions    RIVAROXABAN ANTICOAGULANT 15 & 20 MG TBPK    15 mg PO BID x 21 days, 20 MG Daily x 9 days       Scribe Disclosure:  Marin HERNANDEZ, am serving as a scribe at 6:27 PM on 4/24/2021 to document services personally performed by Mina Kitchen APRN based on my observations and the provider's statements to me.              Mina Kitchen APRN CNP  04/24/21 [id number]

## 2021-04-25 NOTE — DISCHARGE INSTRUCTIONS
Do not take NSAIDs such as ibuprofen or naproxen while you are taking anticoagulation medication.  Again call your doctor on Monday to discuss follow-up and continued cares.

## 2021-04-25 NOTE — ED NOTES
Pt's port-cath accessed, flushes well but does not draw back blood at all. Pt denies any pain around site. Pt states that they have been having difficulty lately with blood drawing back and last time she needed to lay flat and cough in attempts to get blood to draw back, this was also attempted with no results. Provider advised.

## 2021-04-26 ENCOUNTER — TELEPHONE (OUTPATIENT)
Dept: ONCOLOGY | Facility: CLINIC | Age: 66
End: 2021-04-26

## 2021-04-26 NOTE — TELEPHONE ENCOUNTER
Consulted with Dr. Stover regarding  Michelle's upcoming trip to [place] . She stated as long as Michelle is taking her anti-coagulation that it is ok for her to travel. Per Dr. Stover informed Michelle to go straight to the ED if she experiences any shortness of breath which could be a sign of a PE. Michelle stated that she is aware and will follow that advice. Danya Lara RN,BSN,OCN

## 2021-04-26 NOTE — TELEPHONE ENCOUNTER
Michelle calling to notify care team she was in the ED over the weekend and diagnosed with DVT. Wants to confirm Dr. Stover is comfortable with the ED recommendations/plan and know if she needs to come for any additional follow-up. Will route to [facility] to review chart and follow-up as needed.     Rola Grover, VYN, RN, PHN, OCN  Oncology Care Coordinator  [facility]

## 2021-04-26 NOTE — TELEPHONE ENCOUNTER
Michelle called clinic regarding her visit to the ED this past weekend secondary to swelling in her left arm and recent diagnosis of DVT in her left subclavian. She is currently on Xarelto 15 mg BID for 21 days then 20 mg daily. She is scheduled to leave for [place] this Saturday morning and wanted to make sure that Dr. Stover is ok with that. Danya Lara RN,BSN,OCN

## 2021-04-27 ENCOUNTER — TELEPHONE (OUTPATIENT)
Dept: ONCOLOGY | Facility: CLINIC | Age: 66
End: 2021-04-27

## 2021-04-27 NOTE — TELEPHONE ENCOUNTER
Pt calling to report continued tenderness at site of blood clot. Not worsening, no new sx. Pt will monitor and if persists, worsens or new sx develop she will be call to clinic or be seen locally while she is in [place].     Cathie Smith RN

## 2021-04-28 NOTE — TELEPHONE ENCOUNTER
Called Michelle she stated that the swelling has gone down from her axilla and she states that she is able to  her arm much better now. She is aware to go to ED in [place] if she experiences any shortness of breath. Danya Lara RN,BSN,OCN

## 2021-04-30 LAB
BACTERIA SPEC CULT: NO GROWTH
BACTERIA SPEC CULT: NO GROWTH
SPECIMEN SOURCE: NORMAL
SPECIMEN SOURCE: NORMAL

## 2021-05-07 NOTE — PROGRESS NOTES
"[facility]    Hematology/Oncology Established Patient Follow-up Note      Today's Date: 5/12/21    Reason for Follow-up: Metastatic breast cancer.    Flor Griffin is a 65 year old female who is being evaluated via a billable telephone visit.      The patient has been notified of following:     \"This telephone visit will be conducted via a call between you and your physician/provider. We have found that certain health care needs can be provided without the need for a physical exam.  This service lets us provide the care you need with a short phone conversation during the COVID-19 pandemic.  If a prescription is necessary we can send it directly to your pharmacy.  If lab work is needed we can place an order for that and you can then stop by our lab to have the test done at a later time.    Telephone visits are billed at different rates depending on your insurance coverage. During this emergency period, for some insurers they may be billed the same as an in-person visit.  Please reach out to your insurance provider with any questions.    If during the course of the call the physician/provider feels a telephone visit is not appropriate, you will not be charged for this service.\"    Patient has given verbal consent for Telephone visit?  Yes    How would you like to obtain your AVS? Mail a copy    Phone visit duration: 13 minutes    HISTORY OF PRESENT ILLNESS: Flor Griffin is a 65 year old female who presents with the following oncologic history:     --Clinical TX N3c M0 adenocarcinoma which is poorly differentiated, likely of breast origin, ER low-positive at 1-5%, AZ negative, HER-2/mitzi FISH negative, androgen stain weak-intermediate 10-20%.  Initially she was seen 11/22/2017 after she underwent right supraclavicular lymph node biopsy which was positive for poorly differentiated adenocarcinoma.  She had this lymph node almost a month and had an excisional biopsy performed which was consistent " with the diagnosis of cancer.   --A PET/CT scan 11/27/2017 showed hypermetabolic right supraclavicular, right axillary and subpectoral adenopathy.  Otherwise, no distant metastatic disease.   --3/7/2018: Completed 4 cycles of carboplatin and paclitaxel followed by dose dense AC x 4 cycles.  No surgery was done.  --5/29/2018-7/31/2018: Completed adjuvant radiation to right breast, right axilla, right supraclavicular region.  --9/2018: Started adjuvant letrozole.  --10/15/2020: Screening mammogram showed focal asymmetry in upper outer right breast; no suspicious findings in left breast.  --10/21/2020: U/S of right breast showed irregularly-shaped hypoechoic mass at 10:00, 7 cm from nipple, measuring 3.2 x 2.7 x 3.6 cm. Right axillary ultrasound shows multiple normal-appearing lymph nodes. Biopsy of right breast mass at 10:00 showed poorly differentiated carcinoma, no lymphovascular invasion, morphology consistent with breast cancer and similar to prior axillary node tumor, ER weakly positive at 1% and CA negative (0%), HER-2/mitzi FISH negative.  --10/28/2020: PET/CT scan showed high metabolic activity in 3 cm area of right upper outer breast, several small hypermetabolic lymph nodes in high right axilla and right subclavian region; mild hypermetabolic lymph nodes in bilateral hilar regions, favor metastatic disease; several tiny nodules in right upper lung, favor benign etiology; small hypermetabolic focus in pelvis in either sigmoid colon or adjacent loop of small bowel.  --11/02/2020: Started 1st line metastatic therapy with capecitabine.  --2/9/2021: PET/CT scan showed hypermetabolic right breast mass not significantly changed since 12/7/2020, persistent hypermetabolism; hypermetabolic right axillary lymph node increased in size; no distant metastasis; focal hypermetabolism in pelvis associated with sigmoid colon; stable 6-mm lung nodules.  --2/18/2021: Due to disease progression, switched to 2nd line metastatic  therapy with eribulin. Required dose reduction due to neutropenia.  --4/24/2021: Left upper extremity Doppler U/S showed occlusive DVT within left subclavian vein; superficial thrombophlebitis within proximal cephalic vein. Started rivaroxaban.  --5/11/2021: PET scan showed unchanged size and slightly increased uptake to right breast; resolved right axillary adenopathy; no new lesions; persistent hypermetabolism at proximal sigmoid colon.    INTERIM HISTORY:  Michelle reports no new complaints.       REVIEW OF SYSTEMS:   14 point ROS was reviewed and is negative other than as noted above in HPI.       HOME MEDICATIONS:  Current Outpatient Medications   Medication Sig Dispense Refill     aspirin 81 MG tablet Take 81 mg by mouth daily       Ibuprofen (ADVIL PO) Take 400 mg by mouth every 6 hours as needed for moderate pain       LORazepam (ATIVAN) 0.5 MG tablet Take 1 tablet (0.5 mg) by mouth every 4 hours as needed (Anxiety, Nausea/Vomiting or Sleep) 30 tablet 2     multivitamin w/minerals (THERA-VIT-M) tablet Take 1 tablet by mouth daily       Omega-3 Fatty Acids (OMEGA-3 FISH OIL PO) Take 1 g by mouth daily       ondansetron (ZOFRAN) 8 MG tablet Take 1 tablet (8 mg) by mouth every 8 hours as needed (Nausea/Vomiting) 10 tablet 2     prochlorperazine (COMPAZINE) 10 MG tablet Take 0.5-1 tablets (5-10 mg) by mouth every 6 hours as needed for nausea or vomiting 30 tablet 1     Rivaroxaban ANTICOAGULANT 15 & 20 MG TBPK 15 mg PO BID x 21 days, 20 MG Daily x 9 days 1 each 0         ALLERGIES:  Allergies   Allergen Reactions     Penicillins Hives         PAST MEDICAL HISTORY:  Past Medical History:   Diagnosis Date     Basal cell cancer     right cheek     Gastroesophageal reflux disease      History of blood transfusion     blue baby     Hyperlipidaemia          PAST SURGICAL HISTORY:  Past Surgical History:   Procedure Laterality Date     BIOPSY MASS NECK Right 11/16/2017    Procedure: BIOPSY MASS NECK;  Excisional Biopsy  Right Neck Lymph node;  Surgeon: Waylon Corral MD;  Location: [facility] OR     COLONOSCOPY       IR CHEST PORT PLACEMENT > 5 YRS OF AGE  2/16/2021     IR PORT REMOVAL LEFT  5/13/2019     MOHS MICROGRAPHIC PROCEDURE  ~2010    right cheek; [facility]     MOHS MICROGRAPHIC PROCEDURE  10/31/2016    Dr. [facility]         SOCIAL HISTORY:  Social History     Socioeconomic History     Marital status:      Spouse name: Not on file     Number of children: Not on file     Years of education: Not on file     Highest education level: Not on file   Occupational History     Not on file   Social Needs     Financial resource strain: Not on file     Food insecurity     Worry: Not on file     Inability: Not on file     Transportation needs     Medical: Not on file     Non-medical: Not on file   Tobacco Use     Smoking status: Never Smoker     Smokeless tobacco: Never Used   Substance and Sexual Activity     Alcohol use: Yes     Alcohol/week: 0.0 standard drinks     Comment: 4 times a week, 2 drinks     Drug use: No     Sexual activity: Yes     Partners: Male     Birth control/protection: Post-menopausal   Lifestyle     Physical activity     Days per week: Not on file     Minutes per session: Not on file     Stress: Not on file   Relationships     Social connections     Talks on phone: Not on file     Gets together: Not on file     Attends Shinto service: Not on file     Active member of club or organization: Not on file     Attends meetings of clubs or organizations: Not on file     Relationship status: Not on file     Intimate partner violence     Fear of current or ex partner: Not on file     Emotionally abused: Not on file     Physically abused: Not on file     Forced sexual activity: Not on file   Other Topics Concern     Parent/sibling w/ CABG, MI or angioplasty before 65F 55M? No   Social History Narrative     Not on file         FAMILY HISTORY:  Family History   Problem Relation Age of Onset     Lupus Mother      Heart Disease  Mother         CHF     Coronary Artery Disease Mother      Hyperlipidemia Mother      Diabetes Father      Breast Cancer Other          PHYSICAL EXAM:  Vital signs:  Not taken.  Not performed as this was a telephone visit.    LABS:  CBC RESULTS:   Recent Labs   Lab Test 05/12/21  0806   WBC 4.6   RBC 3.58*   HGB 11.5*   HCT 35.7      MCH 32.1   MCHC 32.2   RDW 15.0        Last Comprehensive Metabolic Panel:  Sodium   Date Value Ref Range Status   05/12/2021 139 133 - 144 mmol/L Final     Potassium   Date Value Ref Range Status   05/12/2021 4.0 3.4 - 5.3 mmol/L Final     Chloride   Date Value Ref Range Status   05/12/2021 108 94 - 109 mmol/L Final     Carbon Dioxide   Date Value Ref Range Status   05/12/2021 28 20 - 32 mmol/L Final     Anion Gap   Date Value Ref Range Status   05/12/2021 3 3 - 14 mmol/L Final     Glucose   Date Value Ref Range Status   05/12/2021 99 70 - 99 mg/dL Final     Urea Nitrogen   Date Value Ref Range Status   05/12/2021 13 7 - 30 mg/dL Final     Creatinine   Date Value Ref Range Status   05/12/2021 0.72 0.52 - 1.04 mg/dL Final     GFR Estimate   Date Value Ref Range Status   05/12/2021 87 >60 mL/min/[1.73_m2] Final     Comment:     Non  GFR Calc  Starting 12/18/2018, serum creatinine based estimated GFR (eGFR) will be   calculated using the Chronic Kidney Disease Epidemiology Collaboration   (CKD-EPI) equation.       Calcium   Date Value Ref Range Status   05/12/2021 8.5 8.5 - 10.1 mg/dL Final     Bilirubin Total   Date Value Ref Range Status   05/12/2021 0.7 0.2 - 1.3 mg/dL Final     Alkaline Phosphatase   Date Value Ref Range Status   05/12/2021 94 40 - 150 U/L Final     ALT   Date Value Ref Range Status   05/12/2021 48 0 - 50 U/L Final     AST   Date Value Ref Range Status   05/12/2021 31 0 - 45 U/L Final       PATHOLOGY:  Reviewed as per HPI.    IMAGING:   Reviewed as per HPI.    ASSESSMENT/PLAN:  Flor Griffin is a 65 year old female with the following  issues:  1.  Right breast poorly differentiated adenocarcinoma, ER weakly positive (1%), [place] negative, HER-2/mitzi negative with metastatic recurrence  2. Bilateral hilar lymphadenopathy, now resolved  3. Indeterminate pulmonary nodules, likely benign and stable  4. Chemotherapy-induced neutropenia, improved  -I personally reviewed the PET scan from 5/11/2021 which shows no new disease but right breast tumor is stable in size and is slightly more hypermetabolic.  The lymph nodes have resolved.  -Will arrange for surgical consult given stable response to eribulin, resolution of lymphadenopathy and no obvious distant metastatic disease on scans.  -I recommended she continue on eribulin until we have a possible surgical plan.    4. Anemia of chemotherapy  -Stable, no indication for transfusion.  -Continue to monitor CBC prior to each treatment.    5. Left upper extremity deep vein thrombosis   --4/24/2021 Doppler U/S showed occlusive DVT in left subclavian vein.  --Continue rivaroxaban. Given her current active malignancy, I recommended long term anticoagulation.  --Will need to hold anticoagulation around procedures.    6. Possible proximal sigmoid colon lesion  --This has persisted on her PET imaging.  --I recommended further evaluation of this with colonoscopy. Will arrange.    Sally Stover MD  Hematology/Oncology  [facility]

## 2021-05-11 ENCOUNTER — HOSPITAL ENCOUNTER (OUTPATIENT)
Dept: PET IMAGING | Facility: CLINIC | Age: 66
Discharge: HOME | End: 2021-05-11
Attending: INTERNAL MEDICINE | Admitting: INTERNAL MEDICINE
Payer: COMMERCIAL

## 2021-05-11 DIAGNOSIS — Z17.0 MALIGNANT NEOPLASM OF UPPER-OUTER QUADRANT OF RIGHT BREAST IN FEMALE, ESTROGEN RECEPTOR POSITIVE (H): ICD-10-CM

## 2021-05-11 DIAGNOSIS — R59.1 LYMPHADENOPATHY: ICD-10-CM

## 2021-05-11 DIAGNOSIS — C50.411 MALIGNANT NEOPLASM OF UPPER-OUTER QUADRANT OF RIGHT BREAST IN FEMALE, ESTROGEN RECEPTOR POSITIVE (H): ICD-10-CM

## 2021-05-11 PROCEDURE — 78816 PET IMAGE W/CT FULL BODY: CPT | Mod: 26 | Performed by: RADIOLOGY

## 2021-05-11 PROCEDURE — 343N000001 HC RX 343: Performed by: INTERNAL MEDICINE

## 2021-05-11 PROCEDURE — A9552 F18 FDG: HCPCS | Performed by: INTERNAL MEDICINE

## 2021-05-11 PROCEDURE — 78816 PET IMAGE W/CT FULL BODY: CPT | Mod: PS

## 2021-05-11 RX ADMIN — FLUDEOXYGLUCOSE F-18 14.2 MCI.: 500 INJECTION, SOLUTION INTRAVENOUS at 08:17

## 2021-05-12 ENCOUNTER — HOSPITAL ENCOUNTER (OUTPATIENT)
Facility: CLINIC | Age: 66
Setting detail: SPECIMEN
Discharge: HOME | End: 2021-05-12
Attending: PHYSICIAN ASSISTANT | Admitting: INTERNAL MEDICINE
Payer: COMMERCIAL

## 2021-05-12 ENCOUNTER — VIRTUAL VISIT (OUTPATIENT)
Dept: ONCOLOGY | Facility: CLINIC | Age: 66
End: 2021-05-12
Attending: INTERNAL MEDICINE
Payer: COMMERCIAL

## 2021-05-12 ENCOUNTER — INFUSION THERAPY VISIT (OUTPATIENT)
Dept: INFUSION THERAPY | Facility: CLINIC | Age: 66
End: 2021-05-12
Attending: PHYSICIAN ASSISTANT
Payer: COMMERCIAL

## 2021-05-12 DIAGNOSIS — C50.411 MALIGNANT NEOPLASM OF UPPER-OUTER QUADRANT OF RIGHT BREAST IN FEMALE, ESTROGEN RECEPTOR POSITIVE (H): ICD-10-CM

## 2021-05-12 DIAGNOSIS — R59.1 LYMPHADENOPATHY: ICD-10-CM

## 2021-05-12 DIAGNOSIS — Z17.0 MALIGNANT NEOPLASM OF OVERLAPPING SITES OF RIGHT BREAST IN FEMALE, ESTROGEN RECEPTOR POSITIVE (H): ICD-10-CM

## 2021-05-12 DIAGNOSIS — C50.411 MALIGNANT NEOPLASM OF UPPER-OUTER QUADRANT OF RIGHT BREAST IN FEMALE, ESTROGEN RECEPTOR POSITIVE (H): Primary | ICD-10-CM

## 2021-05-12 DIAGNOSIS — R59.1 LYMPHADENOPATHY: Primary | ICD-10-CM

## 2021-05-12 DIAGNOSIS — C50.811 MALIGNANT NEOPLASM OF OVERLAPPING SITES OF RIGHT BREAST IN FEMALE, ESTROGEN RECEPTOR POSITIVE (H): ICD-10-CM

## 2021-05-12 DIAGNOSIS — K63.9 LESION OF COLON: ICD-10-CM

## 2021-05-12 DIAGNOSIS — I82.622 ACUTE DEEP VEIN THROMBOSIS (DVT) OF OTHER VEIN OF LEFT UPPER EXTREMITY (H): ICD-10-CM

## 2021-05-12 DIAGNOSIS — Z95.828 PORT-A-CATH IN PLACE: ICD-10-CM

## 2021-05-12 DIAGNOSIS — Z17.0 MALIGNANT NEOPLASM OF UPPER-OUTER QUADRANT OF RIGHT BREAST IN FEMALE, ESTROGEN RECEPTOR POSITIVE (H): Primary | ICD-10-CM

## 2021-05-12 DIAGNOSIS — Z17.0 MALIGNANT NEOPLASM OF UPPER-OUTER QUADRANT OF RIGHT BREAST IN FEMALE, ESTROGEN RECEPTOR POSITIVE (H): ICD-10-CM

## 2021-05-12 LAB
ALBUMIN SERPL-MCNC: 3.3 G/DL (ref 3.4–5)
ALP SERPL-CCNC: 94 U/L (ref 40–150)
ALT SERPL W P-5'-P-CCNC: 48 U/L (ref 0–50)
ANION GAP SERPL CALCULATED.3IONS-SCNC: 3 MMOL/L (ref 3–14)
AST SERPL W P-5'-P-CCNC: 31 U/L (ref 0–45)
BASOPHILS # BLD AUTO: 0.1 10E9/L (ref 0–0.2)
BASOPHILS NFR BLD AUTO: 1.1 %
BILIRUB SERPL-MCNC: 0.7 MG/DL (ref 0.2–1.3)
BUN SERPL-MCNC: 13 MG/DL (ref 7–30)
CALCIUM SERPL-MCNC: 8.5 MG/DL (ref 8.5–10.1)
CANCER AG27-29 SERPL-ACNC: 13 U/ML (ref 0–39)
CEA SERPL-MCNC: 0.8 UG/L (ref 0–2.5)
CHLORIDE SERPL-SCNC: 108 MMOL/L (ref 94–109)
CO2 SERPL-SCNC: 28 MMOL/L (ref 20–32)
CREAT SERPL-MCNC: 0.72 MG/DL (ref 0.52–1.04)
DIFFERENTIAL METHOD BLD: ABNORMAL
EOSINOPHIL # BLD AUTO: 0.2 10E9/L (ref 0–0.7)
EOSINOPHIL NFR BLD AUTO: 3.7 %
ERYTHROCYTE [DISTWIDTH] IN BLOOD BY AUTOMATED COUNT: 15 % (ref 10–15)
GFR SERPL CREATININE-BSD FRML MDRD: 87 ML/MIN/{1.73_M2}
GLUCOSE SERPL-MCNC: 99 MG/DL (ref 70–99)
HCT VFR BLD AUTO: 35.7 % (ref 35–47)
HGB BLD-MCNC: 11.5 G/DL (ref 11.7–15.7)
IMM GRANULOCYTES # BLD: 0 10E9/L (ref 0–0.4)
IMM GRANULOCYTES NFR BLD: 0.4 %
LYMPHOCYTES # BLD AUTO: 1 10E9/L (ref 0.8–5.3)
LYMPHOCYTES NFR BLD AUTO: 22.2 %
MAGNESIUM SERPL-MCNC: 2.3 MG/DL (ref 1.6–2.3)
MCH RBC QN AUTO: 32.1 PG (ref 26.5–33)
MCHC RBC AUTO-ENTMCNC: 32.2 G/DL (ref 31.5–36.5)
MCV RBC AUTO: 100 FL (ref 78–100)
MONOCYTES # BLD AUTO: 0.6 10E9/L (ref 0–1.3)
MONOCYTES NFR BLD AUTO: 12.9 %
NEUTROPHILS # BLD AUTO: 2.8 10E9/L (ref 1.6–8.3)
NEUTROPHILS NFR BLD AUTO: 59.7 %
NRBC # BLD AUTO: 0 10*3/UL
NRBC BLD AUTO-RTO: 0 /100
PLATELET # BLD AUTO: 267 10E9/L (ref 150–450)
POTASSIUM SERPL-SCNC: 4 MMOL/L (ref 3.4–5.3)
PROT SERPL-MCNC: 6.5 G/DL (ref 6.8–8.8)
RBC # BLD AUTO: 3.58 10E12/L (ref 3.8–5.2)
SODIUM SERPL-SCNC: 139 MMOL/L (ref 133–144)
WBC # BLD AUTO: 4.6 10E9/L (ref 4–11)

## 2021-05-12 PROCEDURE — 99215 OFFICE O/P EST HI 40 MIN: CPT | Mod: 95 | Performed by: INTERNAL MEDICINE

## 2021-05-12 PROCEDURE — 86300 IMMUNOASSAY TUMOR CA 15-3: CPT | Performed by: INTERNAL MEDICINE

## 2021-05-12 PROCEDURE — 83735 ASSAY OF MAGNESIUM: CPT | Performed by: INTERNAL MEDICINE

## 2021-05-12 PROCEDURE — 36591 DRAW BLOOD OFF VENOUS DEVICE: CPT

## 2021-05-12 PROCEDURE — 85025 COMPLETE CBC W/AUTO DIFF WBC: CPT | Performed by: INTERNAL MEDICINE

## 2021-05-12 PROCEDURE — 82378 CARCINOEMBRYONIC ANTIGEN: CPT | Performed by: INTERNAL MEDICINE

## 2021-05-12 PROCEDURE — 250N000011 HC RX IP 250 OP 636: Performed by: INTERNAL MEDICINE

## 2021-05-12 PROCEDURE — 80053 COMPREHEN METABOLIC PANEL: CPT | Performed by: INTERNAL MEDICINE

## 2021-05-12 RX ORDER — METHYLPREDNISOLONE SODIUM SUCCINATE 125 MG/2ML
125 INJECTION, POWDER, LYOPHILIZED, FOR SOLUTION INTRAMUSCULAR; INTRAVENOUS
Status: CANCELLED
Start: 2021-05-12

## 2021-05-12 RX ORDER — EPINEPHRINE 1 MG/ML
0.3 INJECTION, SOLUTION INTRAMUSCULAR; SUBCUTANEOUS EVERY 5 MIN PRN
Status: CANCELLED | OUTPATIENT
Start: 2021-05-19

## 2021-05-12 RX ORDER — MEPERIDINE HYDROCHLORIDE 25 MG/ML
25 INJECTION INTRAMUSCULAR; INTRAVENOUS; SUBCUTANEOUS EVERY 30 MIN PRN
Status: CANCELLED | OUTPATIENT
Start: 2021-05-19

## 2021-05-12 RX ORDER — NALOXONE HYDROCHLORIDE 0.4 MG/ML
.1-.4 INJECTION, SOLUTION INTRAMUSCULAR; INTRAVENOUS; SUBCUTANEOUS
Status: CANCELLED | OUTPATIENT
Start: 2021-05-19

## 2021-05-12 RX ORDER — HEPARIN SODIUM (PORCINE) LOCK FLUSH IV SOLN 100 UNIT/ML 100 UNIT/ML
5 SOLUTION INTRAVENOUS
Status: CANCELLED | OUTPATIENT
Start: 2021-05-12

## 2021-05-12 RX ORDER — EPINEPHRINE 1 MG/ML
0.3 INJECTION, SOLUTION INTRAMUSCULAR; SUBCUTANEOUS EVERY 5 MIN PRN
Status: CANCELLED | OUTPATIENT
Start: 2021-05-12

## 2021-05-12 RX ORDER — LORAZEPAM 2 MG/ML
0.5 INJECTION INTRAMUSCULAR EVERY 4 HOURS PRN
Status: CANCELLED
Start: 2021-05-19

## 2021-05-12 RX ORDER — HEPARIN SODIUM (PORCINE) LOCK FLUSH IV SOLN 100 UNIT/ML 100 UNIT/ML
5 SOLUTION INTRAVENOUS EVERY 8 HOURS PRN
Status: CANCELLED | OUTPATIENT
Start: 2021-05-19

## 2021-05-12 RX ORDER — DIPHENHYDRAMINE HYDROCHLORIDE 50 MG/ML
50 INJECTION INTRAMUSCULAR; INTRAVENOUS
Status: CANCELLED
Start: 2021-05-19

## 2021-05-12 RX ORDER — NALOXONE HYDROCHLORIDE 0.4 MG/ML
.1-.4 INJECTION, SOLUTION INTRAMUSCULAR; INTRAVENOUS; SUBCUTANEOUS
Status: CANCELLED | OUTPATIENT
Start: 2021-05-12

## 2021-05-12 RX ORDER — ALBUTEROL SULFATE 90 UG/1
1-2 AEROSOL, METERED RESPIRATORY (INHALATION)
Status: CANCELLED
Start: 2021-05-12

## 2021-05-12 RX ORDER — HEPARIN SODIUM (PORCINE) LOCK FLUSH IV SOLN 100 UNIT/ML 100 UNIT/ML
5 SOLUTION INTRAVENOUS EVERY 8 HOURS PRN
Status: CANCELLED | OUTPATIENT
Start: 2021-05-12

## 2021-05-12 RX ORDER — LORAZEPAM 2 MG/ML
0.5 INJECTION INTRAMUSCULAR EVERY 4 HOURS PRN
Status: CANCELLED
Start: 2021-05-12

## 2021-05-12 RX ORDER — METHYLPREDNISOLONE SODIUM SUCCINATE 125 MG/2ML
125 INJECTION, POWDER, LYOPHILIZED, FOR SOLUTION INTRAMUSCULAR; INTRAVENOUS
Status: CANCELLED
Start: 2021-05-19

## 2021-05-12 RX ORDER — DIPHENHYDRAMINE HYDROCHLORIDE 50 MG/ML
50 INJECTION INTRAMUSCULAR; INTRAVENOUS
Status: CANCELLED
Start: 2021-05-12

## 2021-05-12 RX ORDER — ALBUTEROL SULFATE 0.83 MG/ML
2.5 SOLUTION RESPIRATORY (INHALATION)
Status: CANCELLED | OUTPATIENT
Start: 2021-05-12

## 2021-05-12 RX ORDER — HEPARIN SODIUM (PORCINE) LOCK FLUSH IV SOLN 100 UNIT/ML 100 UNIT/ML
5 SOLUTION INTRAVENOUS
Status: DISCONTINUED | OUTPATIENT
Start: 2021-05-12 | End: 2021-05-12 | Stop reason: HOSPADM

## 2021-05-12 RX ORDER — SODIUM CHLORIDE 9 MG/ML
1000 INJECTION, SOLUTION INTRAVENOUS CONTINUOUS PRN
Status: CANCELLED
Start: 2021-05-12

## 2021-05-12 RX ORDER — SODIUM CHLORIDE 9 MG/ML
1000 INJECTION, SOLUTION INTRAVENOUS CONTINUOUS PRN
Status: CANCELLED
Start: 2021-05-19

## 2021-05-12 RX ORDER — ALBUTEROL SULFATE 90 UG/1
1-2 AEROSOL, METERED RESPIRATORY (INHALATION)
Status: CANCELLED
Start: 2021-05-19

## 2021-05-12 RX ORDER — MEPERIDINE HYDROCHLORIDE 25 MG/ML
25 INJECTION INTRAMUSCULAR; INTRAVENOUS; SUBCUTANEOUS EVERY 30 MIN PRN
Status: CANCELLED | OUTPATIENT
Start: 2021-05-12

## 2021-05-12 RX ORDER — HEPARIN SODIUM,PORCINE 10 UNIT/ML
5 VIAL (ML) INTRAVENOUS
Status: CANCELLED | OUTPATIENT
Start: 2021-05-19

## 2021-05-12 RX ORDER — HEPARIN SODIUM,PORCINE 10 UNIT/ML
5 VIAL (ML) INTRAVENOUS
Status: CANCELLED | OUTPATIENT
Start: 2021-05-12

## 2021-05-12 RX ORDER — ALBUTEROL SULFATE 0.83 MG/ML
2.5 SOLUTION RESPIRATORY (INHALATION)
Status: CANCELLED | OUTPATIENT
Start: 2021-05-19

## 2021-05-12 RX ADMIN — Medication 5 ML: at 08:12

## 2021-05-12 ASSESSMENT — PAIN SCALES - GENERAL: PAINLEVEL: NO PAIN (0)

## 2021-05-12 NOTE — LETTER
"    5/12/2021         RE: Flor Griffin  [address] [facility] [id number]        Dear Colleague,    Thank you for referring your patient, Flor Griffin, to the [facility]. Please see a copy of my visit note below.    [facility]    Hematology/Oncology Established Patient Follow-up Note      Today's Date: 5/12/21    Reason for Follow-up: Metastatic breast cancer.    Flor Griffin is a 65 year old female who is being evaluated via a billable telephone visit.      The patient has been notified of following:     \"This telephone visit will be conducted via a call between you and your physician/provider. We have found that certain health care needs can be provided without the need for a physical exam.  This service lets us provide the care you need with a short phone conversation during the COVID-19 pandemic.  If a prescription is necessary we can send it directly to your pharmacy.  If lab work is needed we can place an order for that and you can then stop by our lab to have the test done at a later time.    Telephone visits are billed at different rates depending on your insurance coverage. During this emergency period, for some insurers they may be billed the same as an in-person visit.  Please reach out to your insurance provider with any questions.    If during the course of the call the physician/provider feels a telephone visit is not appropriate, you will not be charged for this service.\"    Patient has given verbal consent for Telephone visit?  Yes    How would you like to obtain your AVS? Mail a copy    Phone visit duration: 13 minutes    HISTORY OF PRESENT ILLNESS: Flor Griffin is a 65 year old female who presents with the following oncologic history:     --Clinical TX N3c M0 adenocarcinoma which is poorly differentiated, likely of breast origin, ER low-positive at 1-5%, AL negative, HER-2/mitzi FISH negative, androgen stain weak-intermediate " 10-20%.  Initially she was seen 11/22/2017 after she underwent right supraclavicular lymph node biopsy which was positive for poorly differentiated adenocarcinoma.  She had this lymph node almost a month and had an excisional biopsy performed which was consistent with the diagnosis of cancer.   --A PET/CT scan 11/27/2017 showed hypermetabolic right supraclavicular, right axillary and subpectoral adenopathy.  Otherwise, no distant metastatic disease.   --3/7/2018: Completed 4 cycles of carboplatin and paclitaxel followed by dose dense AC x 4 cycles.  No surgery was done.  --5/29/2018-7/31/2018: Completed adjuvant radiation to right breast, right axilla, right supraclavicular region.  --9/2018: Started adjuvant letrozole.  --10/15/2020: Screening mammogram showed focal asymmetry in upper outer right breast; no suspicious findings in left breast.  --10/21/2020: U/S of right breast showed irregularly-shaped hypoechoic mass at 10:00, 7 cm from nipple, measuring 3.2 x 2.7 x 3.6 cm. Right axillary ultrasound shows multiple normal-appearing lymph nodes. Biopsy of right breast mass at 10:00 showed poorly differentiated carcinoma, no lymphovascular invasion, morphology consistent with breast cancer and similar to prior axillary node tumor, ER weakly positive at 1% and OR negative (0%), HER-2/mitzi FISH negative.  --10/28/2020: PET/CT scan showed high metabolic activity in 3 cm area of right upper outer breast, several small hypermetabolic lymph nodes in high right axilla and right subclavian region; mild hypermetabolic lymph nodes in bilateral hilar regions, favor metastatic disease; several tiny nodules in right upper lung, favor benign etiology; small hypermetabolic focus in pelvis in either sigmoid colon or adjacent loop of small bowel.  --11/02/2020: Started 1st line metastatic therapy with capecitabine.  --2/9/2021: PET/CT scan showed hypermetabolic right breast mass not significantly changed since 12/7/2020, persistent  hypermetabolism; hypermetabolic right axillary lymph node increased in size; no distant metastasis; focal hypermetabolism in pelvis associated with sigmoid colon; stable 6-mm lung nodules.  --2/18/2021: Due to disease progression, switched to 2nd line metastatic therapy with eribulin. Required dose reduction due to neutropenia.  --4/24/2021: Left upper extremity Doppler U/S showed occlusive DVT within left subclavian vein; superficial thrombophlebitis within proximal cephalic vein. Started rivaroxaban.  --5/11/2021: PET scan showed unchanged size and slightly increased uptake to right breast; resolved right axillary adenopathy; no new lesions; persistent hypermetabolism at proximal sigmoid colon.    INTERIM HISTORY:  Michelle reports no new complaints.       REVIEW OF SYSTEMS:   14 point ROS was reviewed and is negative other than as noted above in HPI.       HOME MEDICATIONS:  Current Outpatient Medications   Medication Sig Dispense Refill     aspirin 81 MG tablet Take 81 mg by mouth daily       Ibuprofen (ADVIL PO) Take 400 mg by mouth every 6 hours as needed for moderate pain       LORazepam (ATIVAN) 0.5 MG tablet Take 1 tablet (0.5 mg) by mouth every 4 hours as needed (Anxiety, Nausea/Vomiting or Sleep) 30 tablet 2     multivitamin w/minerals (THERA-VIT-M) tablet Take 1 tablet by mouth daily       Omega-3 Fatty Acids (OMEGA-3 FISH OIL PO) Take 1 g by mouth daily       ondansetron (ZOFRAN) 8 MG tablet Take 1 tablet (8 mg) by mouth every 8 hours as needed (Nausea/Vomiting) 10 tablet 2     prochlorperazine (COMPAZINE) 10 MG tablet Take 0.5-1 tablets (5-10 mg) by mouth every 6 hours as needed for nausea or vomiting 30 tablet 1     Rivaroxaban ANTICOAGULANT 15 & 20 MG TBPK 15 mg PO BID x 21 days, 20 MG Daily x 9 days 1 each 0         ALLERGIES:  Allergies   Allergen Reactions     Penicillins Hives         PAST MEDICAL HISTORY:  Past Medical History:   Diagnosis Date     Basal cell cancer     right cheek      Gastroesophageal reflux disease      History of blood transfusion     blue baby     Hyperlipidaemia          PAST SURGICAL HISTORY:  Past Surgical History:   Procedure Laterality Date     BIOPSY MASS NECK Right 11/16/2017    Procedure: BIOPSY MASS NECK;  Excisional Biopsy Right Neck Lymph node;  Surgeon: Waylon Corral MD;  Location: [facility] OR     COLONOSCOPY       IR CHEST PORT PLACEMENT > 5 YRS OF AGE  2/16/2021     IR PORT REMOVAL LEFT  5/13/2019     MOHS MICROGRAPHIC PROCEDURE  ~2010    right cheek; BCC     MOHS MICROGRAPHIC PROCEDURE  10/31/2016    Dr. Nicholas [facility]         SOCIAL HISTORY:  Social History     Socioeconomic History     Marital status:      Spouse name: Not on file     Number of children: Not on file     Years of education: Not on file     Highest education level: Not on file   Occupational History     Not on file   Social Needs     Financial resource strain: Not on file     Food insecurity     Worry: Not on file     Inability: Not on file     Transportation needs     Medical: Not on file     Non-medical: Not on file   Tobacco Use     Smoking status: Never Smoker     Smokeless tobacco: Never Used   Substance and Sexual Activity     Alcohol use: Yes     Alcohol/week: 0.0 standard drinks     Comment: 4 times a week, 2 drinks     Drug use: No     Sexual activity: Yes     Partners: Male     Birth control/protection: Post-menopausal   Lifestyle     Physical activity     Days per week: Not on file     Minutes per session: Not on file     Stress: Not on file   Relationships     Social connections     Talks on phone: Not on file     Gets together: Not on file     Attends Holiness service: Not on file     Active member of club or organization: Not on file     Attends meetings of clubs or organizations: Not on file     Relationship status: Not on file     Intimate partner violence     Fear of current or ex partner: Not on file     Emotionally abused: Not on file     Physically abused: Not on file      Forced sexual activity: Not on file   Other Topics Concern     Parent/sibling w/ CABG, MI or angioplasty before 65F 55M? No   Social History Narrative     Not on file         FAMILY HISTORY:  Family History   Problem Relation Age of Onset     Lupus Mother      Heart Disease Mother         CHF     Coronary Artery Disease Mother      Hyperlipidemia Mother      Diabetes Father      Breast Cancer Other          PHYSICAL EXAM:  Vital signs:  Not taken.  Not performed as this was a telephone visit.    LABS:  CBC RESULTS:   Recent Labs   Lab Test 05/12/21  0806   WBC 4.6   RBC 3.58*   HGB 11.5*   HCT 35.7      MCH 32.1   MCHC 32.2   RDW 15.0        Last Comprehensive Metabolic Panel:  Sodium   Date Value Ref Range Status   05/12/2021 139 133 - 144 mmol/L Final     Potassium   Date Value Ref Range Status   05/12/2021 4.0 3.4 - 5.3 mmol/L Final     Chloride   Date Value Ref Range Status   05/12/2021 108 94 - 109 mmol/L Final     Carbon Dioxide   Date Value Ref Range Status   05/12/2021 28 20 - 32 mmol/L Final     Anion Gap   Date Value Ref Range Status   05/12/2021 3 3 - 14 mmol/L Final     Glucose   Date Value Ref Range Status   05/12/2021 99 70 - 99 mg/dL Final     Urea Nitrogen   Date Value Ref Range Status   05/12/2021 13 7 - 30 mg/dL Final     Creatinine   Date Value Ref Range Status   05/12/2021 0.72 0.52 - 1.04 mg/dL Final     GFR Estimate   Date Value Ref Range Status   05/12/2021 87 >60 mL/min/[1.73_m2] Final     Comment:     Non  GFR Calc  Starting 12/18/2018, serum creatinine based estimated GFR (eGFR) will be   calculated using the Chronic Kidney Disease Epidemiology Collaboration   (CKD-EPI) equation.       Calcium   Date Value Ref Range Status   05/12/2021 8.5 8.5 - 10.1 mg/dL Final     Bilirubin Total   Date Value Ref Range Status   05/12/2021 0.7 0.2 - 1.3 mg/dL Final     Alkaline Phosphatase   Date Value Ref Range Status   05/12/2021 94 40 - 150 U/L Final     ALT   Date Value  Ref Range Status   05/12/2021 48 0 - 50 U/L Final     AST   Date Value Ref Range Status   05/12/2021 31 0 - 45 U/L Final       PATHOLOGY:  Reviewed as per HPI.    IMAGING:   Reviewed as per HPI.    ASSESSMENT/PLAN:  Flor Griffin is a 65 year old female with the following issues:  1.  Right breast poorly differentiated adenocarcinoma, ER weakly positive (1%), NH negative, HER-2/mitzi negative with metastatic recurrence  2. Bilateral hilar lymphadenopathy, now resolved  3. Indeterminate pulmonary nodules, likely benign and stable  4. Chemotherapy-induced neutropenia, improved  -I personally reviewed the PET scan from 5/11/2021 which shows no new disease but right breast tumor is stable in size and is slightly more hypermetabolic.  The lymph nodes have resolved.  -Will arrange for surgical consult given stable response to eribulin, resolution of lymphadenopathy and no obvious distant metastatic disease on scans.  -I recommended she continue on eribulin until we have a possible surgical plan.    4. Anemia of chemotherapy  -Stable, no indication for transfusion.  -Continue to monitor CBC prior to each treatment.    5. Left upper extremity deep vein thrombosis   --4/24/2021 Doppler U/S showed occlusive DVT in left subclavian vein.  --Continue rivaroxaban. Given her current active malignancy, I recommended long term anticoagulation.  --Will need to hold anticoagulation around procedures.    6. Possible proximal sigmoid colon lesion  --This has persisted on her PET imaging.  --I recommended further evaluation of this with colonoscopy. Will arrange.    Sally Stover MD  Hematology/Oncology  [facility]    Michelle is a 65 year old who is being evaluated via a billable telephone visit.      What phone number would you like to be contacted at? [phone]  How would you like to obtain your AVS? [employer]  Phone call duration: 5 minutes      Again, thank you for allowing me to participate in the care of your  patient.        Sincerely,        Sally Stover MD

## 2021-05-12 NOTE — PROGRESS NOTES
"Infusion Nursing Note:  Flor Griffin presents today for port labs prior to treatment tomorrow..    Patient seen by provider today: No   present during visit today: Not Applicable.    Note: Patient states she's been on Xarelto for 20 days for blood clot \"close to her port site. \"All swelling and discomfort has resolved.     Intravenous Access:  Labs drawn without difficulty.  Implanted Port.    Treatment Conditions:  Not Applicable.      Post Infusion Assessment:  Patient tolerated lab draw without incident.  Blood return noted pre and post infusion.  Site patent and intact, free from redness, edema or discomfort.  No evidence of extravasations.  Access discontinued per protocol.       Discharge Plan:    Patient will return 5/13 for next appointment.  Patient discharged in stable condition accompanied by: self.  Departure Mode: Ambulatory.    Ericka Ge RN                        "

## 2021-05-12 NOTE — PROGRESS NOTES
Michelle is a 65 year old who is being evaluated via a billable telephone visit.      What phone number would you like to be contacted at? [phone]  How would you like to obtain your AVS? Juan  Phone call duration: 5 minutes

## 2021-05-12 NOTE — PATIENT INSTRUCTIONS
1. Arrange for colonoscopy.-   will call to schedule pt/Regla  2. Arrange for breast surgery consult.  3. Proceed with eribulin on 5/13 and 5/19 with lab draw.  4. Schedule eribulin on 6/22 with lab draw.  5. RTC MD or NP on or prior to 6/22.

## 2021-05-12 NOTE — LETTER
"    5/12/2021         RE: Folr Griffin  [id number] [facility] [id number]        Dear Colleague,    Thank you for referring your patient, Flor Griffin, to the [facility]. Please see a copy of my visit note below.    [facility]    Hematology/Oncology Established Patient Follow-up Note      Today's Date: 5/12/21    Reason for Follow-up: Metastatic breast cancer.    Flor Griffin is a 65 year old female who is being evaluated via a billable telephone visit.      The patient has been notified of following:     \"This telephone visit will be conducted via a call between you and your physician/provider. We have found that certain health care needs can be provided without the need for a physical exam.  This service lets us provide the care you need with a short phone conversation during the COVID-19 pandemic.  If a prescription is necessary we can send it directly to your pharmacy.  If lab work is needed we can place an order for that and you can then stop by our lab to have the test done at a later time.    Telephone visits are billed at different rates depending on your insurance coverage. During this emergency period, for some insurers they may be billed the same as an in-person visit.  Please reach out to your insurance provider with any questions.    If during the course of the call the physician/provider feels a telephone visit is not appropriate, you will not be charged for this service.\"    Patient has given verbal consent for Telephone visit?  Yes    How would you like to obtain your AVS? Mail a copy    Phone visit duration: 13 minutes    HISTORY OF PRESENT ILLNESS: Flor Griffin is a 65 year old female who presents with the following oncologic history:     --Clinical TX N3c M0 adenocarcinoma which is poorly differentiated, likely of breast origin, ER low-positive at 1-5%, ND negative, HER-2/mitzi FISH negative, androgen stain weak-intermediate " 10-20%.  Initially she was seen 11/22/2017 after she underwent right supraclavicular lymph node biopsy which was positive for poorly differentiated adenocarcinoma.  She had this lymph node almost a month and had an excisional biopsy performed which was consistent with the diagnosis of cancer.   --A PET/CT scan 11/27/2017 showed hypermetabolic right supraclavicular, right axillary and subpectoral adenopathy.  Otherwise, no distant metastatic disease.   --3/7/2018: Completed 4 cycles of carboplatin and paclitaxel followed by dose dense AC x 4 cycles.  No surgery was done.  --5/29/2018-7/31/2018: Completed adjuvant radiation to right breast, right axilla, right supraclavicular region.  --9/2018: Started adjuvant letrozole.  --10/15/2020: Screening mammogram showed focal asymmetry in upper outer right breast; no suspicious findings in left breast.  --10/21/2020: U/S of right breast showed irregularly-shaped hypoechoic mass at 10:00, 7 cm from nipple, measuring 3.2 x 2.7 x 3.6 cm. Right axillary ultrasound shows multiple normal-appearing lymph nodes. Biopsy of right breast mass at 10:00 showed poorly differentiated carcinoma, no lymphovascular invasion, morphology consistent with breast cancer and similar to prior axillary node tumor, ER weakly positive at 1% and NM negative (0%), HER-2/mitzi FISH negative.  --10/28/2020: PET/CT scan showed high metabolic activity in 3 cm area of right upper outer breast, several small hypermetabolic lymph nodes in high right axilla and right subclavian region; mild hypermetabolic lymph nodes in bilateral hilar regions, favor metastatic disease; several tiny nodules in right upper lung, favor benign etiology; small hypermetabolic focus in pelvis in either sigmoid colon or adjacent loop of small bowel.  --11/02/2020: Started 1st line metastatic therapy with capecitabine.  --2/9/2021: PET/CT scan showed hypermetabolic right breast mass not significantly changed since 12/7/2020, persistent  hypermetabolism; hypermetabolic right axillary lymph node increased in size; no distant metastasis; focal hypermetabolism in pelvis associated with sigmoid colon; stable 6-mm lung nodules.  --2/18/2021: Due to disease progression, switched to 2nd line metastatic therapy with eribulin. Required dose reduction due to neutropenia.  --4/24/2021: Left upper extremity Doppler U/S showed occlusive DVT within left subclavian vein; superficial thrombophlebitis within proximal cephalic vein. Started rivaroxaban.  --5/11/2021: PET scan showed unchanged size and slightly increased uptake to right breast; resolved right axillary adenopathy; no new lesions; persistent hypermetabolism at proximal sigmoid colon.    INTERIM HISTORY:  Michelle reports no new complaints.       REVIEW OF SYSTEMS:   14 point ROS was reviewed and is negative other than as noted above in HPI.       HOME MEDICATIONS:  Current Outpatient Medications   Medication Sig Dispense Refill     aspirin 81 MG tablet Take 81 mg by mouth daily       Ibuprofen (ADVIL PO) Take 400 mg by mouth every 6 hours as needed for moderate pain       LORazepam (ATIVAN) 0.5 MG tablet Take 1 tablet (0.5 mg) by mouth every 4 hours as needed (Anxiety, Nausea/Vomiting or Sleep) 30 tablet 2     multivitamin w/minerals (THERA-VIT-M) tablet Take 1 tablet by mouth daily       Omega-3 Fatty Acids (OMEGA-3 FISH OIL PO) Take 1 g by mouth daily       ondansetron (ZOFRAN) 8 MG tablet Take 1 tablet (8 mg) by mouth every 8 hours as needed (Nausea/Vomiting) 10 tablet 2     prochlorperazine (COMPAZINE) 10 MG tablet Take 0.5-1 tablets (5-10 mg) by mouth every 6 hours as needed for nausea or vomiting 30 tablet 1     Rivaroxaban ANTICOAGULANT 15 & 20 MG TBPK 15 mg PO BID x 21 days, 20 MG Daily x 9 days 1 each 0         ALLERGIES:  Allergies   Allergen Reactions     Penicillins Hives         PAST MEDICAL HISTORY:  Past Medical History:   Diagnosis Date     Basal cell cancer     right cheek      Gastroesophageal reflux disease      History of blood transfusion     blue baby     Hyperlipidaemia          PAST SURGICAL HISTORY:  Past Surgical History:   Procedure Laterality Date     BIOPSY MASS NECK Right 11/16/2017    Procedure: BIOPSY MASS NECK;  Excisional Biopsy Right Neck Lymph node;  Surgeon: Waylon Corral MD;  Location: [facility] OR     COLONOSCOPY       IR CHEST PORT PLACEMENT > 5 YRS OF AGE  2/16/2021     IR PORT REMOVAL LEFT  5/13/2019     MOHS MICROGRAPHIC PROCEDURE  ~2010    right cheek; BCC     MOHS MICROGRAPHIC PROCEDURE  10/31/2016    Dr. [facility]         SOCIAL HISTORY:  Social History     Socioeconomic History     Marital status:      Spouse name: Not on file     Number of children: Not on file     Years of education: Not on file     Highest education level: Not on file   Occupational History     Not on file   Social Needs     Financial resource strain: Not on file     Food insecurity     Worry: Not on file     Inability: Not on file     Transportation needs     Medical: Not on file     Non-medical: Not on file   Tobacco Use     Smoking status: Never Smoker     Smokeless tobacco: Never Used   Substance and Sexual Activity     Alcohol use: Yes     Alcohol/week: 0.0 standard drinks     Comment: 4 times a week, 2 drinks     Drug use: No     Sexual activity: Yes     Partners: Male     Birth control/protection: Post-menopausal   Lifestyle     Physical activity     Days per week: Not on file     Minutes per session: Not on file     Stress: Not on file   Relationships     Social connections     Talks on phone: Not on file     Gets together: Not on file     Attends Cheondoism service: Not on file     Active member of club or organization: Not on file     Attends meetings of clubs or organizations: Not on file     Relationship status: Not on file     Intimate partner violence     Fear of current or ex partner: Not on file     Emotionally abused: Not on file     Physically abused: Not on file      Forced sexual activity: Not on file   Other Topics Concern     Parent/sibling w/ CABG, MI or angioplasty before 65F 55M? No   Social History Narrative     Not on file         FAMILY HISTORY:  Family History   Problem Relation Age of Onset     Lupus Mother      Heart Disease Mother         CHF     Coronary Artery Disease Mother      Hyperlipidemia Mother      Diabetes Father      Breast Cancer Other          PHYSICAL EXAM:  Vital signs:  Not taken.  Not performed as this was a telephone visit.    LABS:  CBC RESULTS:   Recent Labs   Lab Test 05/12/21  0806   WBC 4.6   RBC 3.58*   HGB 11.5*   HCT 35.7      MCH 32.1   MCHC 32.2   RDW 15.0        Last Comprehensive Metabolic Panel:  Sodium   Date Value Ref Range Status   05/12/2021 139 133 - 144 mmol/L Final     Potassium   Date Value Ref Range Status   05/12/2021 4.0 3.4 - 5.3 mmol/L Final     Chloride   Date Value Ref Range Status   05/12/2021 108 94 - 109 mmol/L Final     Carbon Dioxide   Date Value Ref Range Status   05/12/2021 28 20 - 32 mmol/L Final     Anion Gap   Date Value Ref Range Status   05/12/2021 3 3 - 14 mmol/L Final     Glucose   Date Value Ref Range Status   05/12/2021 99 70 - 99 mg/dL Final     Urea Nitrogen   Date Value Ref Range Status   05/12/2021 13 7 - 30 mg/dL Final     Creatinine   Date Value Ref Range Status   05/12/2021 0.72 0.52 - 1.04 mg/dL Final     GFR Estimate   Date Value Ref Range Status   05/12/2021 87 >60 mL/min/[1.73_m2] Final     Comment:     Non  GFR Calc  Starting 12/18/2018, serum creatinine based estimated GFR (eGFR) will be   calculated using the Chronic Kidney Disease Epidemiology Collaboration   (CKD-EPI) equation.       Calcium   Date Value Ref Range Status   05/12/2021 8.5 8.5 - 10.1 mg/dL Final     Bilirubin Total   Date Value Ref Range Status   05/12/2021 0.7 0.2 - 1.3 mg/dL Final     Alkaline Phosphatase   Date Value Ref Range Status   05/12/2021 94 40 - 150 U/L Final     ALT   Date Value  Ref Range Status   05/12/2021 48 0 - 50 U/L Final     AST   Date Value Ref Range Status   05/12/2021 31 0 - 45 U/L Final       PATHOLOGY:  Reviewed as per HPI.    IMAGING:   Reviewed as per HPI.    ASSESSMENT/PLAN:  Flor Griffin is a 65 year old female with the following issues:  1.  Right breast poorly differentiated adenocarcinoma, ER weakly positive (1%), FL negative, HER-2/mitzi negative with metastatic recurrence  2. Bilateral hilar lymphadenopathy, now resolved  3. Indeterminate pulmonary nodules, likely benign and stable  4. Chemotherapy-induced neutropenia, improved  -I personally reviewed the PET scan from 5/11/2021 which shows no new disease but right breast tumor is stable in size and is slightly more hypermetabolic.  The lymph nodes have resolved.  -Will arrange for surgical consult given stable response to eribulin, resolution of lymphadenopathy and no obvious distant metastatic disease on scans.  -I recommended she continue on eribulin until we have a possible surgical plan.    4. Anemia of chemotherapy  -Stable, no indication for transfusion.  -Continue to monitor CBC prior to each treatment.    5. Left upper extremity deep vein thrombosis   --4/24/2021 Doppler U/S showed occlusive DVT in left subclavian vein.  --Continue rivaroxaban. Given her current active malignancy, I recommended long term anticoagulation.  --Will need to hold anticoagulation around procedures.    6. Possible proximal sigmoid colon lesion  --This has persisted on her PET imaging.  --I recommended further evaluation of this with colonoscopy. Will arrange.    Sally Stover MD  Hematology/Oncology  [facility]    Michelle is a 65 year old who is being evaluated via a billable telephone visit.      What phone number would you like to be contacted at? [phone]  How would you like to obtain your AVS? Wee Webhart  Phone call duration: 5 minutes      Again, thank you for allowing me to participate in the care of your  patient.        Sincerely,        Sally Stover MD

## 2021-05-13 ENCOUNTER — OFFICE VISIT (OUTPATIENT)
Dept: SURGERY | Facility: CLINIC | Age: 66
End: 2021-05-13
Payer: COMMERCIAL

## 2021-05-13 ENCOUNTER — INFUSION THERAPY VISIT (OUTPATIENT)
Dept: INFUSION THERAPY | Facility: CLINIC | Age: 66
End: 2021-05-13
Attending: INTERNAL MEDICINE
Payer: COMMERCIAL

## 2021-05-13 VITALS
TEMPERATURE: 98.1 F | WEIGHT: 174.9 LBS | BODY MASS INDEX: 28.23 KG/M2 | OXYGEN SATURATION: 97 % | DIASTOLIC BLOOD PRESSURE: 70 MMHG | SYSTOLIC BLOOD PRESSURE: 106 MMHG | HEART RATE: 65 BPM

## 2021-05-13 VITALS
HEIGHT: 66 IN | HEART RATE: 89 BPM | BODY MASS INDEX: 27.97 KG/M2 | WEIGHT: 174 LBS | SYSTOLIC BLOOD PRESSURE: 108 MMHG | DIASTOLIC BLOOD PRESSURE: 70 MMHG

## 2021-05-13 DIAGNOSIS — C50.411 MALIGNANT NEOPLASM OF UPPER-OUTER QUADRANT OF RIGHT BREAST IN FEMALE, ESTROGEN RECEPTOR POSITIVE (H): ICD-10-CM

## 2021-05-13 DIAGNOSIS — Z17.0 MALIGNANT NEOPLASM OF UPPER-OUTER QUADRANT OF RIGHT BREAST IN FEMALE, ESTROGEN RECEPTOR POSITIVE (H): ICD-10-CM

## 2021-05-13 DIAGNOSIS — C50.811 MALIGNANT NEOPLASM OF OVERLAPPING SITES OF RIGHT BREAST IN FEMALE, ESTROGEN RECEPTOR POSITIVE (H): Primary | ICD-10-CM

## 2021-05-13 DIAGNOSIS — Z17.0 MALIGNANT NEOPLASM OF OVERLAPPING SITES OF RIGHT BREAST IN FEMALE, ESTROGEN RECEPTOR POSITIVE (H): Primary | ICD-10-CM

## 2021-05-13 DIAGNOSIS — R59.1 LYMPHADENOPATHY: Primary | ICD-10-CM

## 2021-05-13 PROCEDURE — 96367 TX/PROPH/DG ADDL SEQ IV INF: CPT

## 2021-05-13 PROCEDURE — 99215 OFFICE O/P EST HI 40 MIN: CPT | Performed by: SURGERY

## 2021-05-13 PROCEDURE — 96409 CHEMO IV PUSH SNGL DRUG: CPT

## 2021-05-13 PROCEDURE — 250N000011 HC RX IP 250 OP 636: Performed by: INTERNAL MEDICINE

## 2021-05-13 PROCEDURE — 258N000003 HC RX IP 258 OP 636: Performed by: INTERNAL MEDICINE

## 2021-05-13 RX ORDER — HEPARIN SODIUM (PORCINE) LOCK FLUSH IV SOLN 100 UNIT/ML 100 UNIT/ML
5 SOLUTION INTRAVENOUS EVERY 8 HOURS PRN
Status: DISCONTINUED | OUTPATIENT
Start: 2021-05-13 | End: 2021-05-13 | Stop reason: HOSPADM

## 2021-05-13 RX ADMIN — DEXAMETHASONE SODIUM PHOSPHATE 12 MG: 10 INJECTION, SOLUTION INTRAMUSCULAR; INTRAVENOUS at 08:15

## 2021-05-13 RX ADMIN — Medication 5 ML: at 08:45

## 2021-05-13 RX ADMIN — ERIBULIN MESYLATE 2.1 MG: 0.5 INJECTION INTRAVENOUS at 08:36

## 2021-05-13 RX ADMIN — SODIUM CHLORIDE 250 ML: 9 INJECTION, SOLUTION INTRAVENOUS at 08:10

## 2021-05-13 ASSESSMENT — MIFFLIN-ST. JEOR: SCORE: 1351.01

## 2021-05-13 NOTE — LETTER
May 17, 2021          Sally Stover MD  [address]  [place] [address]      RE:   Flor Griffin 1955      Dear Colleague,    Thank you for referring your patient, Flor Griffin, to [facility] at [place] location. Please see a copy of my visit note below.    Surgery Consultation, [facility], ERIK Castillo MD, MD     Flor Griffin MRN# [id number]   YOB: 1955 Age: 65 year old      PCP:  Kevin Quinonesmount [phone]     Chief Complaint: Right breast cancer     Pt was seen in consultation from Kevin Quinonesmount.     History of Present Illness:  Flor Griffin is a 65 year old female who presented with probable right breast cancer.  She was initially noted several years ago when she presented with a right-sided supraclavicular mass.  This was eventually biopsied and found to be a adenocarcinoma, likely breast in origin.  Further imaging suggested right axillary lymphadenopathy but no breast primary.  She was treated with chemotherapy and radiation.  PET imaging suggested a right breast lesion, subpectoral lymphadenopathy, and right axillary lymphadenopathy.  She was treated with multiple agents with varying results but is currently on a second line therapy with eribulin.  Most recent PET/CT shows a hypermetabolic lesion in the right breast which has been persistent and some uptake in the sigmoid colon.  No other hypermetabolic lymphadenopathy.  Was also recently started on rivaroxaban for occlusive DVT in the left subclavian vein.     PMH:  Flor Griffin  has a past medical history of Basal cell cancer, Gastroesophageal reflux disease, History of blood transfusion, and Hyperlipidaemia.  PSH:  Flor Griffin  has a past surgical history that includes Mohs micrographic procedure (~2010); colonoscopy; Mohs micrographic procedure (10/31/2016); Biopsy mass neck (Right, 11/16/2017); IR Port Removal Left (5/13/2019); and  "IR Chest Port Placement > 5 Yrs of Age (2/16/2021).     Home medications and allergies reviewed.     Social History:  Flor Griffin  reports that she has never smoked. She has never used smokeless tobacco. She reports current alcohol use. She reports that she does not use drugs.  Family History:  Flor Griffin family history includes Breast Cancer in an other family member; Coronary Artery Disease in her mother; Diabetes in her father; Heart Disease in her mother; Hyperlipidemia in her mother; Lupus in her mother.     ROS:  The 10 point Review of Systems is negative other than noted in the HPI.  Feels well.  No recent weight loss or weight gain.     Physical Exam:  Blood pressure 108/70, pulse 89, height 1.676 m (5' 6\"), weight 78.9 kg (174 lb), last menstrual period 01/01/2004, not currently breastfeeding.  174 lbs 0 oz  Pleasant female in no distress.  Patient has a pleasant affect and communicates well.   Pupils equal round and reactive to light.   No cervical lymphadenopathy or thyromegaly.   Lung fields clear, breathing comfortably.   Heart normal sinus rhythm.  No murmurs rubs or gallops.  Abdomen soft, nontender, nondistended.  Skin warm, dry.  No obvious rashes or lesions.  Bilateral breast exam performed.  Moderately sized breasts bilaterally.  Easily palpable 3 to 4 cm mass in the upper outer right breast, fairly close to the chest wall.  No skin involvement.  No palpable axillary or supraclavicular lymphadenopathy on either side.  No left breast lesions.     All new lab and imaging data was reviewed.  This includes personal review of PET/CT images and previous clinic notes.      Assessment/plan: Chavo 65-year-old female with a history of stage IV breast cancer now on second line therapy.  She has a persistent right breast lesion which has been unchanged throughout therapy.  Other lymphadenopathy has showed some signs of improvement.  I think would be reasonable to consider mastectomy on this " right side to remove her primary.  Given her use of anticoagulants, I have advised against prophylactic left mastectomy.  Right mastectomy would be performed without the intention of reconstruction.  Patient understands that this would not be a curative procedure but might prolong her disease-free survival.  Patient was also noted to have a sigmoid lesion and has not had a colonoscopy for some time.  We will attempt to get this taken care of but the breast surgery may not need to wait.  I will discuss this plan with the patient's primary oncologist.  Surgical co-morbities include basal cell carcinoma, hyperlipidemia, neutropenia, anemia.      Again, thank you for allowing me to participate in the care of your patient.      Sincerely,      Ulises Castillo MD

## 2021-05-13 NOTE — NURSING NOTE
Breast Patients    BREAST PATIENTS (ALL)    1-Do you have any of the following symptoms? Lump(s) or Mass(es)  2-In which breast are you having the symptoms? right  3-Have you had a Mammogram? Kevin Rivera - Date:  10/15/20  4-Have you ever had a breast cyst drained? No  5-Have you ever had a breast biopsy? Yes:  Right  -  Date:  10/21/20  6-Have you ever had a Breast Cancer? Yes:  Right  -  Date:  10/2020   7-Is there a history of Breast Cancer in your family? Yes   Relationship to you:      8-Have you ever had Ovarian Cancer? No  9-Is there a history of Ovarian Cancer in your family? No  10-Summarize your caffeine intake (i.e. coffee, tea, chocolate, soda etc.): ?    BREAST PATIENTS (FEMALE)    11-What age did your periods begin? 14  12-Date your last menstrual period began?   13-Number of full-term pregnancies: 4  14-Your age when your first child was born? 25  15-Did you nurse your children? Yes  16-Are you pregnant now? No  17-Have you begun menopause? Yes  Age Menopause began:  50  18-Have you had either ovary removed?No  19-Do you have breast implants? No   20-Do you use hormone replacement therapy?  No  21-Have you taken oral contraceptive pills?    22-Have you had an intrauterine device (IUD) placed?  No  23-What is your current bra size?  34C    Mabel Wilhelm MA

## 2021-05-13 NOTE — PROGRESS NOTES
Infusion Nursing Note:  Flor Griffin presents today for C4D1 [place].    Patient seen by provider today: No   present during visit today: Not Applicable.    Note: Pt states her latest scan showed resolved R axillary adenopathy but the R breast tumor remains.  Due to the resolution of the adenopathy, pt states she is now a candidate for mastectomy.  She will meet with surgeon today to discuss this      Intravenous Access:  Implanted Port.    Treatment Conditions: Labs noted from 5/12/21  Lab Results   Component Value Date    HGB 11.5 05/12/2021     Lab Results   Component Value Date    WBC 4.6 05/12/2021      Lab Results   Component Value Date    ANEU 2.8 05/12/2021     Lab Results   Component Value Date     05/12/2021      Lab Results   Component Value Date     05/12/2021                   Lab Results   Component Value Date    POTASSIUM 4.0 05/12/2021           Lab Results   Component Value Date    MAG 2.3 05/12/2021            Lab Results   Component Value Date    CR 0.72 05/12/2021                   Lab Results   Component Value Date    EDILSON 8.5 05/12/2021                Lab Results   Component Value Date    BILITOTAL 0.7 05/12/2021           Lab Results   Component Value Date    ALBUMIN 3.3 05/12/2021                    Lab Results   Component Value Date    ALT 48 05/12/2021           Lab Results   Component Value Date    AST 31 05/12/2021       Results reviewed, labs MET treatment parameters, ok to proceed with treatment.  Last EKG 2/25/21 with normal QTC      Post Infusion Assessment:  Patient tolerated infusion without incident.  Blood return noted pre and post infusion.  Site patent and intact, free from redness, edema or discomfort.  No evidence of extravasations.  Access discontinued per protocol.       Discharge Plan:   Discharge instructions reviewed with: Patient.  Patient and/or family verbalized understanding of discharge instructions and all questions answered.  AVS to  patient via JamglueT.  Patient will return 5/18/21 for labs for next appointment.   Patient discharged in stable condition accompanied by: self.  Departure Mode: Ambulatory.    Ericka Sharma RN

## 2021-05-17 NOTE — H&P (VIEW-ONLY)
Surgery Consultation, [facility], ERIK Castillo MD, MD    Flor Griffin MRN# [id number]   YOB: 1955 Age: 65 year old     PCP:  Kevin Quinones [phone]    Chief Complaint: Right breast cancer    Pt was seen in consultation from Kevin Quinones.    History of Present Illness:  Flor Griffin is a 65 year old female who presented with probable right breast cancer.  She was initially noted several years ago when she presented with a right-sided supraclavicular mass.  This was eventually biopsied and found to be a adenocarcinoma, likely breast in origin.  Further imaging suggested right axillary lymphadenopathy but no breast primary.  She was treated with chemotherapy and radiation.  PET imaging suggested a right breast lesion, subpectoral lymphadenopathy, and right axillary lymphadenopathy.  She was treated with multiple agents with varying results but is currently on a second line therapy with eribulin.  Most recent PET/CT shows a hypermetabolic lesion in the right breast which has been persistent and some uptake in the sigmoid colon.  No other hypermetabolic lymphadenopathy.  Was also recently started on rivaroxaban for occlusive DVT in the left subclavian vein.    PMH:  Flor Griffin  has a past medical history of Basal cell cancer, Gastroesophageal reflux disease, History of blood transfusion, and Hyperlipidaemia.  PSH:  Flor Griffin  has a past surgical history that includes Mohs micrographic procedure (~2010); colonoscopy; Mohs micrographic procedure (10/31/2016); Biopsy mass neck (Right, 11/16/2017); IR Port Removal Left (5/13/2019); and IR Chest Port Placement > 5 Yrs of Age (2/16/2021).    Home medications and allergies reviewed.    Social History:  Flor Griffin  reports that she has never smoked. She has never used smokeless tobacco. She reports current alcohol use. She reports that she does not use drugs.  Family History:   "Flor Griffin family history includes Breast Cancer in an other family member; Coronary Artery Disease in her mother; Diabetes in her father; Heart Disease in her mother; Hyperlipidemia in her mother; Lupus in her mother.    ROS:  The 10 point Review of Systems is negative other than noted in the HPI.  Feels well.  No recent weight loss or weight gain.    Physical Exam:  Blood pressure 108/70, pulse 89, height 1.676 m (5' 6\"), weight 78.9 kg (174 lb), last menstrual period 01/01/2004, not currently breastfeeding.  174 lbs 0 oz  Pleasant female in no distress.  Patient has a pleasant affect and communicates well.   Pupils equal round and reactive to light.   No cervical lymphadenopathy or thyromegaly.   Lung fields clear, breathing comfortably.   Heart normal sinus rhythm.  No murmurs rubs or gallops.  Abdomen soft, nontender, nondistended.  Skin warm, dry.  No obvious rashes or lesions.  Bilateral breast exam performed.  Moderately sized breasts bilaterally.  Easily palpable 3 to 4 cm mass in the upper outer right breast, fairly close to the chest wall.  No skin involvement.  No palpable axillary or supraclavicular lymphadenopathy on either side.  No left breast lesions.    All new lab and imaging data was reviewed.  This includes personal review of PET/CT images and previous clinic notes.     Assessment/plan: Chavo 65-year-old female with a history of stage IV breast cancer now on second line therapy.  She has a persistent right breast lesion which has been unchanged throughout therapy.  Other lymphadenopathy has showed some signs of improvement.  I think would be reasonable to consider mastectomy on this right side to remove her primary.  Given her use of anticoagulants, I have advised against prophylactic left mastectomy.  Right mastectomy would be performed without the intention of reconstruction.  Patient understands that this would not be a curative procedure but might prolong her disease-free survival.  " Patient was also noted to have a sigmoid lesion and has not had a colonoscopy for some time.  We will attempt to get this taken care of but the breast surgery may not need to wait.  I will discuss this plan with the patient's primary oncologist.  Surgical co-morbities include basal cell carcinoma, hyperlipidemia, neutropenia, anemia.    Wilfredo Castillo M.D.  [facility]  [phone]    Total time spent 60 minutes, 50 minutes of which was spent discussing treatment, prognosis, and diagnosis.    Please route or send letter to:  Primary Care Provider (PCP) and Referring Provider

## 2021-05-17 NOTE — PROGRESS NOTES
Surgery Consultation, [facility], ERIK Castillo MD, MD    Flor Griffin MRN# [id number]   YOB: 1955 Age: 65 year old     PCP:  Kevin Quinones [phone]    Chief Complaint: Right breast cancer    Pt was seen in consultation from Kevin Quinones.    History of Present Illness:  Flor Griffin is a 65 year old female who presented with probable right breast cancer.  She was initially noted several years ago when she presented with a right-sided supraclavicular mass.  This was eventually biopsied and found to be a adenocarcinoma, likely breast in origin.  Further imaging suggested right axillary lymphadenopathy but no breast primary.  She was treated with chemotherapy and radiation.  PET imaging suggested a right breast lesion, subpectoral lymphadenopathy, and right axillary lymphadenopathy.  She was treated with multiple agents with varying results but is currently on a second line therapy with eribulin.  Most recent PET/CT shows a hypermetabolic lesion in the right breast which has been persistent and some uptake in the sigmoid colon.  No other hypermetabolic lymphadenopathy.  Was also recently started on rivaroxaban for occlusive DVT in the left subclavian vein.    PMH:  Flor Griffin  has a past medical history of Basal cell cancer, Gastroesophageal reflux disease, History of blood transfusion, and Hyperlipidaemia.  PSH:  Flor Griffin  has a past surgical history that includes Mohs micrographic procedure (~2010); colonoscopy; Mohs micrographic procedure (10/31/2016); Biopsy mass neck (Right, 11/16/2017); IR Port Removal Left (5/13/2019); and IR Chest Port Placement > 5 Yrs of Age (2/16/2021).    Home medications and allergies reviewed.    Social History:  Flor Griffin  reports that she has never smoked. She has never used smokeless tobacco. She reports current alcohol use. She reports that she does not use drugs.  Family History:   "Flor Griffin family history includes Breast Cancer in an other family member; Coronary Artery Disease in her mother; Diabetes in her father; Heart Disease in her mother; Hyperlipidemia in her mother; Lupus in her mother.    ROS:  The 10 point Review of Systems is negative other than noted in the HPI.  Feels well.  No recent weight loss or weight gain.    Physical Exam:  Blood pressure 108/70, pulse 89, height 1.676 m (5' 6\"), weight 78.9 kg (174 lb), last menstrual period 01/01/2004, not currently breastfeeding.  174 lbs 0 oz  Pleasant female in no distress.  Patient has a pleasant affect and communicates well.   Pupils equal round and reactive to light.   No cervical lymphadenopathy or thyromegaly.   Lung fields clear, breathing comfortably.   Heart normal sinus rhythm.  No murmurs rubs or gallops.  Abdomen soft, nontender, nondistended.  Skin warm, dry.  No obvious rashes or lesions.  Bilateral breast exam performed.  Moderately sized breasts bilaterally.  Easily palpable 3 to 4 cm mass in the upper outer right breast, fairly close to the chest wall.  No skin involvement.  No palpable axillary or supraclavicular lymphadenopathy on either side.  No left breast lesions.    All new lab and imaging data was reviewed.  This includes personal review of PET/CT images and previous clinic notes.     Assessment/plan: Chavo 65-year-old female with a history of stage IV breast cancer now on second line therapy.  She has a persistent right breast lesion which has been unchanged throughout therapy.  Other lymphadenopathy has showed some signs of improvement.  I think would be reasonable to consider mastectomy on this right side to remove her primary.  Given her use of anticoagulants, I have advised against prophylactic left mastectomy.  Right mastectomy would be performed without the intention of reconstruction.  Patient understands that this would not be a curative procedure but might prolong her disease-free survival.  " Patient was also noted to have a sigmoid lesion and has not had a colonoscopy for some time.  We will attempt to get this taken care of but the breast surgery may not need to wait.  I will discuss this plan with the patient's primary oncologist.  Surgical co-morbities include basal cell carcinoma, hyperlipidemia, neutropenia, anemia.    Wilfredo Castillo M.D.  [facility]  [phone]    Total time spent 60 minutes, 50 minutes of which was spent discussing treatment, prognosis, and diagnosis.    Please route or send letter to:  Primary Care Provider (PCP) and Referring Provider

## 2021-05-18 ENCOUNTER — HOSPITAL ENCOUNTER (OUTPATIENT)
Facility: CLINIC | Age: 66
Setting detail: SPECIMEN
Discharge: HOME | End: 2021-05-18
Attending: INTERNAL MEDICINE | Admitting: INTERNAL MEDICINE
Payer: COMMERCIAL

## 2021-05-18 ENCOUNTER — INFUSION THERAPY VISIT (OUTPATIENT)
Dept: INFUSION THERAPY | Facility: CLINIC | Age: 66
End: 2021-05-18
Attending: INTERNAL MEDICINE
Payer: COMMERCIAL

## 2021-05-18 DIAGNOSIS — C50.411 MALIGNANT NEOPLASM OF UPPER-OUTER QUADRANT OF RIGHT BREAST IN FEMALE, ESTROGEN RECEPTOR POSITIVE (H): ICD-10-CM

## 2021-05-18 DIAGNOSIS — Z95.828 PORT-A-CATH IN PLACE: Primary | ICD-10-CM

## 2021-05-18 DIAGNOSIS — C50.811 MALIGNANT NEOPLASM OF OVERLAPPING SITES OF RIGHT BREAST IN FEMALE, ESTROGEN RECEPTOR POSITIVE (H): ICD-10-CM

## 2021-05-18 DIAGNOSIS — Z17.0 MALIGNANT NEOPLASM OF OVERLAPPING SITES OF RIGHT BREAST IN FEMALE, ESTROGEN RECEPTOR POSITIVE (H): ICD-10-CM

## 2021-05-18 DIAGNOSIS — Z17.0 MALIGNANT NEOPLASM OF UPPER-OUTER QUADRANT OF RIGHT BREAST IN FEMALE, ESTROGEN RECEPTOR POSITIVE (H): ICD-10-CM

## 2021-05-18 DIAGNOSIS — R59.1 LYMPHADENOPATHY: ICD-10-CM

## 2021-05-18 LAB
ALBUMIN SERPL-MCNC: 3.4 G/DL (ref 3.4–5)
ALP SERPL-CCNC: 81 U/L (ref 40–150)
ALT SERPL W P-5'-P-CCNC: 58 U/L (ref 0–50)
ANION GAP SERPL CALCULATED.3IONS-SCNC: 4 MMOL/L (ref 3–14)
AST SERPL W P-5'-P-CCNC: 38 U/L (ref 0–45)
BASOPHILS # BLD AUTO: 0.1 10E9/L (ref 0–0.2)
BASOPHILS NFR BLD AUTO: 1.2 %
BILIRUB SERPL-MCNC: 0.8 MG/DL (ref 0.2–1.3)
BUN SERPL-MCNC: 9 MG/DL (ref 7–30)
CALCIUM SERPL-MCNC: 9.8 MG/DL (ref 8.5–10.1)
CHLORIDE SERPL-SCNC: 104 MMOL/L (ref 94–109)
CO2 SERPL-SCNC: 30 MMOL/L (ref 20–32)
CREAT SERPL-MCNC: 0.81 MG/DL (ref 0.52–1.04)
DIFFERENTIAL METHOD BLD: ABNORMAL
EOSINOPHIL # BLD AUTO: 0.1 10E9/L (ref 0–0.7)
EOSINOPHIL NFR BLD AUTO: 2 %
ERYTHROCYTE [DISTWIDTH] IN BLOOD BY AUTOMATED COUNT: 14.6 % (ref 10–15)
GFR SERPL CREATININE-BSD FRML MDRD: 75 ML/MIN/{1.73_M2}
GLUCOSE SERPL-MCNC: 123 MG/DL (ref 70–99)
HCT VFR BLD AUTO: 33.1 % (ref 35–47)
HGB BLD-MCNC: 10.8 G/DL (ref 11.7–15.7)
IMM GRANULOCYTES # BLD: 0.1 10E9/L (ref 0–0.4)
IMM GRANULOCYTES NFR BLD: 1.2 %
LYMPHOCYTES # BLD AUTO: 0.9 10E9/L (ref 0.8–5.3)
LYMPHOCYTES NFR BLD AUTO: 22.2 %
MAGNESIUM SERPL-MCNC: 1.8 MG/DL (ref 1.6–2.3)
MCH RBC QN AUTO: 31.7 PG (ref 26.5–33)
MCHC RBC AUTO-ENTMCNC: 32.6 G/DL (ref 31.5–36.5)
MCV RBC AUTO: 97 FL (ref 78–100)
MONOCYTES # BLD AUTO: 0.2 10E9/L (ref 0–1.3)
MONOCYTES NFR BLD AUTO: 4.2 %
NEUTROPHILS # BLD AUTO: 2.8 10E9/L (ref 1.6–8.3)
NEUTROPHILS NFR BLD AUTO: 69.2 %
NRBC # BLD AUTO: 0 10*3/UL
NRBC BLD AUTO-RTO: 0 /100
PLATELET # BLD AUTO: 246 10E9/L (ref 150–450)
POTASSIUM SERPL-SCNC: 3.2 MMOL/L (ref 3.4–5.3)
PROT SERPL-MCNC: 6.1 G/DL (ref 6.8–8.8)
RBC # BLD AUTO: 3.41 10E12/L (ref 3.8–5.2)
SODIUM SERPL-SCNC: 138 MMOL/L (ref 133–144)
WBC # BLD AUTO: 4.1 10E9/L (ref 4–11)

## 2021-05-18 PROCEDURE — 250N000011 HC RX IP 250 OP 636: Performed by: INTERNAL MEDICINE

## 2021-05-18 PROCEDURE — 85025 COMPLETE CBC W/AUTO DIFF WBC: CPT | Performed by: INTERNAL MEDICINE

## 2021-05-18 PROCEDURE — 36591 DRAW BLOOD OFF VENOUS DEVICE: CPT

## 2021-05-18 PROCEDURE — 80053 COMPREHEN METABOLIC PANEL: CPT | Performed by: INTERNAL MEDICINE

## 2021-05-18 PROCEDURE — 83735 ASSAY OF MAGNESIUM: CPT | Performed by: INTERNAL MEDICINE

## 2021-05-18 RX ORDER — HEPARIN SODIUM,PORCINE 10 UNIT/ML
5 VIAL (ML) INTRAVENOUS
Status: CANCELLED | OUTPATIENT
Start: 2021-05-18

## 2021-05-18 RX ORDER — HEPARIN SODIUM (PORCINE) LOCK FLUSH IV SOLN 100 UNIT/ML 100 UNIT/ML
5 SOLUTION INTRAVENOUS
Status: CANCELLED | OUTPATIENT
Start: 2021-05-18

## 2021-05-18 RX ORDER — HEPARIN SODIUM (PORCINE) LOCK FLUSH IV SOLN 100 UNIT/ML 100 UNIT/ML
5 SOLUTION INTRAVENOUS
Status: DISCONTINUED | OUTPATIENT
Start: 2021-05-18 | End: 2021-05-18 | Stop reason: HOSPADM

## 2021-05-18 RX ADMIN — Medication 5 ML: at 14:05

## 2021-05-18 NOTE — PROGRESS NOTES
Spoke with Jennifer VALENCIA regarding pts K+ of 3.2 today. Jennifer wants pt to have K+ replaced orally tomorrow at her chemo infusion appointment.

## 2021-05-18 NOTE — PROGRESS NOTES
Nursing Note:  Flor Griffin presents today for port labs.    Patient seen by provider today: No   present during visit today: Not Applicable.    Note: N/A.    Intravenous Access:  Lab draw site right port, Needle type Bridges, Gauge 20.  Labs drawn without difficulty.  Implanted Port.    Discharge Plan:   Patient was sent to home for 5/19/2021 appointment.    Elisabeth North RN

## 2021-05-19 ENCOUNTER — TELEPHONE (OUTPATIENT)
Dept: SURGERY | Facility: CLINIC | Age: 66
End: 2021-05-19

## 2021-05-19 ENCOUNTER — INFUSION THERAPY VISIT (OUTPATIENT)
Dept: INFUSION THERAPY | Facility: CLINIC | Age: 66
End: 2021-05-19
Attending: INTERNAL MEDICINE
Payer: COMMERCIAL

## 2021-05-19 VITALS
TEMPERATURE: 98.9 F | WEIGHT: 178.2 LBS | HEART RATE: 92 BPM | OXYGEN SATURATION: 100 % | DIASTOLIC BLOOD PRESSURE: 70 MMHG | SYSTOLIC BLOOD PRESSURE: 100 MMHG | RESPIRATION RATE: 16 BRPM | BODY MASS INDEX: 28.76 KG/M2

## 2021-05-19 DIAGNOSIS — Z17.0 MALIGNANT NEOPLASM OF UPPER-OUTER QUADRANT OF RIGHT BREAST IN FEMALE, ESTROGEN RECEPTOR POSITIVE (H): ICD-10-CM

## 2021-05-19 DIAGNOSIS — Z11.59 ENCOUNTER FOR SCREENING FOR OTHER VIRAL DISEASES: ICD-10-CM

## 2021-05-19 DIAGNOSIS — C50.411 MALIGNANT NEOPLASM OF UPPER-OUTER QUADRANT OF RIGHT BREAST IN FEMALE, ESTROGEN RECEPTOR POSITIVE (H): ICD-10-CM

## 2021-05-19 DIAGNOSIS — R59.1 LYMPHADENOPATHY: Primary | ICD-10-CM

## 2021-05-19 PROCEDURE — 96409 CHEMO IV PUSH SNGL DRUG: CPT

## 2021-05-19 PROCEDURE — 96367 TX/PROPH/DG ADDL SEQ IV INF: CPT

## 2021-05-19 PROCEDURE — 258N000003 HC RX IP 258 OP 636: Performed by: INTERNAL MEDICINE

## 2021-05-19 PROCEDURE — 250N000011 HC RX IP 250 OP 636: Performed by: INTERNAL MEDICINE

## 2021-05-19 RX ORDER — HEPARIN SODIUM (PORCINE) LOCK FLUSH IV SOLN 100 UNIT/ML 100 UNIT/ML
5 SOLUTION INTRAVENOUS EVERY 8 HOURS PRN
Status: DISCONTINUED | OUTPATIENT
Start: 2021-05-19 | End: 2021-05-19 | Stop reason: HOSPADM

## 2021-05-19 RX ADMIN — DEXAMETHASONE SODIUM PHOSPHATE 12 MG: 10 INJECTION, SOLUTION INTRAMUSCULAR; INTRAVENOUS at 14:38

## 2021-05-19 RX ADMIN — Medication 5 ML: at 15:04

## 2021-05-19 RX ADMIN — ERIBULIN MESYLATE 2.1 MG: 0.5 INJECTION INTRAVENOUS at 14:56

## 2021-05-19 RX ADMIN — SODIUM CHLORIDE 250 ML: 9 INJECTION, SOLUTION INTRAVENOUS at 14:32

## 2021-05-19 NOTE — TELEPHONE ENCOUNTER
Type of surgery: Right simple mastectomy  Location of surgery: [facility]  Date and time of surgery: 6/2/21 at 7:30am  Surgeon: Dr. Ulises Castillo  Pre-Op Appt Date: Patient to schedule  Post-Op Appt Date: Patient to schedule   Packet sent out: Yes  Pre-cert/Authorization completed:  Not Applicable  Date: 5/19/21

## 2021-05-19 NOTE — PROGRESS NOTES
Infusion Nursing Note:  Flor Griffin presents today for C4D8 Halaven.    Patient seen by provider today: No   present during visit today: Not Applicable.    Note: Patient states she is tolerating the Halaven well.  Plan is to have a mastectomy.  She recently saw the surgeon for this.  She will reach out to Dr. Stover's RNCC as her next appointment is 6/21 and 6/22 and she doesn't usually go a whole month without chemo.    Per Terry in pharmacy patient does not need a repeat EKG.  She did have a baseline done when she started Halaven.      Intravenous Access:  Implanted Port.    Treatment Conditions:  Lab Results   Component Value Date    HGB 10.8 05/18/2021     Lab Results   Component Value Date    WBC 4.1 05/18/2021      Lab Results   Component Value Date    ANEU 2.8 05/18/2021     Lab Results   Component Value Date     05/18/2021      Results reviewed, labs MET treatment parameters, ok to proceed with treatment.      Post Infusion Assessment:  Patient tolerated infusion without incident.  Blood return noted pre and post infusion.  Blood return noted during administration every 5 ml during Halaven.  Site patent and intact, free from redness, edema or discomfort.  No evidence of extravasations.  Access discontinued per protocol.       Discharge Plan:   Copy of AVS reviewed with patient and/or family.  Patient will return 6/21 and 6/22 for next appointment.  Patient discharged in stable condition accompanied by: self.  Departure Mode: Ambulatory.

## 2021-05-19 NOTE — TELEPHONE ENCOUNTER
Orders received for mastectomy with Dr. Ulises Castillo.      Left message for patient to call me at her convenience to schedule surgery.     Anni FORMAN    Surgery Coordinator  [facility]  [facility]  [phone]

## 2021-05-24 DIAGNOSIS — I82.622 ACUTE DEEP VEIN THROMBOSIS (DVT) OF OTHER VEIN OF LEFT UPPER EXTREMITY (H): Primary | ICD-10-CM

## 2021-05-24 NOTE — TELEPHONE ENCOUNTER
Patient calling to request refill of Xarelto. She is finished with her starter pack and in need of a refill. Will route to Dr. Stover for signature if appropriate.     Rola Grover, VYN, RN, PHN, OCN  Oncology Care Coordinator  [facility]

## 2021-05-25 ENCOUNTER — TELEPHONE (OUTPATIENT)
Dept: ONCOLOGY | Facility: CLINIC | Age: 66
End: 2021-05-25

## 2021-05-27 ENCOUNTER — OFFICE VISIT (OUTPATIENT)
Dept: FAMILY MEDICINE | Facility: CLINIC | Age: 66
End: 2021-05-27
Payer: COMMERCIAL

## 2021-05-27 VITALS
TEMPERATURE: 98.3 F | HEIGHT: 66 IN | RESPIRATION RATE: 14 BRPM | SYSTOLIC BLOOD PRESSURE: 104 MMHG | HEART RATE: 85 BPM | BODY MASS INDEX: 27.8 KG/M2 | WEIGHT: 173 LBS | OXYGEN SATURATION: 99 % | DIASTOLIC BLOOD PRESSURE: 70 MMHG

## 2021-05-27 DIAGNOSIS — C50.811 MALIGNANT NEOPLASM OF OVERLAPPING SITES OF RIGHT BREAST IN FEMALE, ESTROGEN RECEPTOR POSITIVE (H): ICD-10-CM

## 2021-05-27 DIAGNOSIS — Z17.0 MALIGNANT NEOPLASM OF OVERLAPPING SITES OF RIGHT BREAST IN FEMALE, ESTROGEN RECEPTOR POSITIVE (H): ICD-10-CM

## 2021-05-27 DIAGNOSIS — I82.622 DEEP VEIN THROMBOSIS (DVT) OF LEFT UPPER EXTREMITY, UNSPECIFIED CHRONICITY, UNSPECIFIED VEIN (H): ICD-10-CM

## 2021-05-27 DIAGNOSIS — E87.6 HYPOKALEMIA: ICD-10-CM

## 2021-05-27 DIAGNOSIS — Z01.818 PREOP GENERAL PHYSICAL EXAM: Primary | ICD-10-CM

## 2021-05-27 LAB
ANION GAP SERPL CALCULATED.3IONS-SCNC: 4 MMOL/L (ref 3–14)
BUN SERPL-MCNC: 13 MG/DL (ref 7–30)
CALCIUM SERPL-MCNC: 9 MG/DL (ref 8.5–10.1)
CHLORIDE SERPL-SCNC: 107 MMOL/L (ref 94–109)
CO2 SERPL-SCNC: 28 MMOL/L (ref 20–32)
CREAT SERPL-MCNC: 0.76 MG/DL (ref 0.52–1.04)
GFR SERPL CREATININE-BSD FRML MDRD: 82 ML/MIN/{1.73_M2}
GLUCOSE SERPL-MCNC: 136 MG/DL (ref 70–99)
POTASSIUM SERPL-SCNC: 3.6 MMOL/L (ref 3.4–5.3)
SODIUM SERPL-SCNC: 139 MMOL/L (ref 133–144)

## 2021-05-27 PROCEDURE — 99214 OFFICE O/P EST MOD 30 MIN: CPT | Performed by: PHYSICIAN ASSISTANT

## 2021-05-27 PROCEDURE — 36415 COLL VENOUS BLD VENIPUNCTURE: CPT | Performed by: PHYSICIAN ASSISTANT

## 2021-05-27 PROCEDURE — 80048 BASIC METABOLIC PNL TOTAL CA: CPT | Performed by: PHYSICIAN ASSISTANT

## 2021-05-27 ASSESSMENT — MIFFLIN-ST. JEOR: SCORE: 1346.47

## 2021-05-27 ASSESSMENT — PAIN SCALES - GENERAL: PAINLEVEL: NO PAIN (0)

## 2021-05-27 NOTE — PATIENT INSTRUCTIONS
MEDICATIONS prior to surgery:  1. Stop the xarelto 2 days prior to surgery  2. Stop all herbs/vitamins/minerals/fish oil TODAY      Preparing for Your Surgery  Getting started  A nurse will call you to review your health history and instructions. They will give you an arrival time based on your scheduled surgery time.  Please be ready to share the following:    Your doctor's clinic name and phone number    Your medical, surgical and anesthesia history    A list of allergies and sensitivities    A list of medicines, including herbal treatments and over-the-counter drugs    Whether the patient has a legal guardian (ask how to send us the papers in advance)  If you have a child who's having surgery, please ask for a copy of Preparing for Your Child's Surgery.    Preparing for surgery    Within 30 days of surgery: Have a pre-op exam (sometimes called an H&P, or History and Physical). This can be done at a clinic or pre-operative center.  ? If you're having a , you may not need this exam. Talk to your care team    At your pre-op exam, talk to your care team about all medicines you take. If you need to stop any medicines before surgery, ask when to start taking them again.  ? We do this for your safety. Many medicines can make you bleed too much during surgery. Some change how well surgery (anesthesia) drugs work.    Call your insurance company to let them know you're having surgery. (If you don't have insurance, call [phone].)    Call your clinic if there's any change in your health. This includes signs of a cold or flu (sore throat, runny nose, cough, rash, fever). It also includes a scrape or scratch near the surgery site.    If you have questions on the day of surgery, call your hospital or surgery center.  Eating and drinking guidelines  For your safety: Unless your surgeon tells you otherwise, follow the guidelines below.    Eat and drink as usual until 8 hours before surgery. After that, no food or  milk.    Drink clear liquids until 2 hours before surgery. These are liquids you can see through, like water, Gatorade and Propel Water. You may also have black coffee and tea (no cream or milk).    Nothing by mouth within 2 hours of surgery. This includes gum, candy and breath mints.    If you drink, stop drinking alcohol the night before surgery.    If your care team tells you to take medicine on the morning of surgery, it's okay to take it with a sip of water.  Preventing infection    Shower or bathe the night before and morning of your surgery. Follow the instructions your clinic gave you. (If no instructions, use regular soap.)    Don't shave or clip hair near your surgery site. We'll remove the hair if needed.    Don't smoke or vape the morning of surgery. You may chew nicotine gum up to 2 hours before surgery. A nicotine patch is okay.  ? Note: Some surgeries require you to completely quit smoking and nicotine. Check with your surgeon.    Your care team will make every effort to keep you safe from infection. We will:  ? Clean our hands often with soap and water (or an alcohol-based hand rub).  ? Clean the skin at your surgery site with a special soap that kills germs.  ? Give you a special gown to keep you warm. (Cold raises the risk of infection.)  ? Wear special hair covers, masks, gowns and gloves during surgery.  ? Give antibiotic medicine, if prescribed. Not all surgeries need antibiotics.  What to bring on the day of surgery    Photo ID and insurance card    Copy of your health care directive, if you have one    Glasses and hearing aides (bring cases)  ? You can't wear contacts during surgery    Inhaler and eye drops, if you use them (tell us about these when you arrive)    CPAP machine or breathing device, if you use them    A few personal items, if spending the night    If you have . . .  ? A pacemaker or ICD (cardiac defibrillator): Bring the ID card.  ? An implanted stimulator: Bring the remote  control.  ? A legal guardian: Bring a copy of the certified (court-stamped) guardianship papers.  Please remove any jewelry, including body piercings. Leave jewelry and other valuables at home.  If you're going home the day of surgery  Important: If you don't follow the rules below, we must cancel your surgery.     Arrange for someone to drive you home after surgery. You may not drive, take a taxi or take public transportation by yourself (unless you'll have local anesthesia only).    Arrange for a responsible adult to stay with you overnight. If you don't, we may keep you in the hospital overnight, and you may need to pay the costs yourself.  Questions?   If you have any questions for your care team, list them here: _________________________________________________________________________________________________________________________________________________________________________________________________________________________________________________________________________________________________________________________  For informational purposes only. Not to replace the advice of your health care provider. Copyright   2003, 2019 [facility]. All rights reserved. Clinically reviewed by Jeannie Escobar MD. [email or web address] [id number] - REV 4/20.

## 2021-05-27 NOTE — PROGRESS NOTES
[facility]  [address] [address]  Phone: [phone]  Primary Provider: Kevin Quinonesmount  Pre-op Performing Provider: MAYKEL MONTERO      PREOPERATIVE EVALUATION:  Today's date: 5/27/2021    Flor Griffin is a 65 year old female who presents for a preoperative evaluation.    Surgical Information:  Surgery/Procedure: RIGHT SIMPLE MASTECTOMY  Surgery Location: [facility]  Surgeon: Jonathan  Surgery Date: 6/2  Time of Surgery: 7:30am  Where patient plans to recover: At home with family  Fax number for surgical facility: Note does not need to be faxed, will be available electronically in Epic.    Type of Anesthesia Anticipated: General    Assessment & Plan     The proposed surgical procedure is considered INTERMEDIATE risk.    Preop general physical exam  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 in female, estrogen receptor positive (H)  Planned unilateral mastectomy    Deep vein thrombosis (DVT) of left upper extremity, unspecified chronicity, unspecified vein (H)  Will hold xarelto 2 days prior. Restart per surgery team.    Hypokalemia  Updating today. Likely would not preclude surgery  - Basic metabolic panel        RECOMMENDATION:  APPROVAL GIVEN to proceed with proposed procedure, without further diagnostic evaluation.          Subjective     HPI related to upcoming procedure: Hx of right sided Breast CA; Preventive mastectomy    Preop Questions 5/27/2021   1. Have you ever had a heart attack or stroke? No   2. Have you ever had surgery on your heart or blood vessels, such as a stent placement, a coronary artery bypass, or surgery on an artery in your head, neck, heart, or legs? No   3. Do you have chest pain with activity? No   4. Do you have a history of  heart failure? No   5. Do you currently have a cold, bronchitis or symptoms of other infection? No   6. Do you have a cough, shortness of breath, or wheezing? No   7. Do you or  anyone in your family have previous history of blood clots? YES - current DVT in the left subclavian   8. Do you or does anyone in your family have a serious bleeding problem such as prolonged bleeding following surgeries or cuts? No   9. Have you ever had problems with anemia or been told to take iron pills? No   10. Have you had any abnormal blood loss such as black, tarry or bloody stools, or abnormal vaginal bleeding? No   11. Have you ever had a blood transfusion? YES - as an infant; nothing since then   11a. Have you ever had a transfusion reaction? No   12. Are you willing to have a blood transfusion if it is medically needed before, during, or after your surgery? Yes   13. Have you or any of your relatives ever had problems with anesthesia? No   14. Do you have sleep apnea, excessive snoring or daytime drowsiness? No   15. Do you have any artifical heart valves or other implanted medical devices like a pacemaker, defibrillator, or continuous glucose monitor? No   16. Do you have artificial joints? No   17. Are you allergic to latex? No   18. Is there any chance that you may be pregnant? -       Health Care Directive:  Patient does not have a Health Care Directive or Living Will: Discussed advance care planning with patient; information given to patient to review.    Preoperative Review of :   reviewed - controlled substances reflected in medication list.      Status of Chronic Conditions:  See problem list for active medical problems.  Problems all longstanding and stable, except as noted/documented.  See ROS for pertinent symptoms related to these conditions.      Review of Systems  Constitutional, neuro, ENT, endocrine, pulmonary, cardiac, gastrointestinal, genitourinary, musculoskeletal, integument and psychiatric systems are negative, except as otherwise noted.    Patient Active Problem List    Diagnosis Date Noted     Lymphadenopathy 02/11/2021     Priority: Medium     Malignant neoplasm of  "upper-outer quadrant of right breast in female, estrogen receptor positive (H) 10/29/2020     Priority: Medium     Screening for cervical cancer      Priority: Medium     3/30/15 NIL, Neg HPV  7/1/2019 NIL, Neg HPV. \"Pap updated today, should be last pap.\" per provider notes.       Anemia 04/12/2018     Priority: Medium     Chemotherapy-induced neutropenia (H) 04/11/2018     Priority: Medium     Port catheter in place 03/21/2018     Priority: Medium     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 in female, estrogen receptor positive (H) 02/21/2018     Priority: Medium     Finish SCP prior to 2/6/19 apt.       Carcinoma of unknown origin (H) 12/07/2017     Priority: Medium     Overweight 11/14/2017     Priority: Medium     Cold sore 10/23/2017     Priority: Medium     BCC (basal cell carcinoma), face 01/06/2017     Priority: Medium     Follows with [facility] q6-1yr check       Vitamin D deficiency 04/29/2015     Priority: Medium     vit D3 26; discussed use of Vit D supplement 2000 IU  Problem list name updated by automated process. Provider to review       Mixed hyperlipidemia 03/30/2015     Priority: Medium     past use of statins, not well tolerated; now on Cholest-off; due for labs 2015       Heartburn 03/30/2015     Priority: Medium     has been on PPI 3 years; reviewed triggers; no EGD; recommmend change to H2; risk for Vit D         Past Medical History:   Diagnosis Date     Basal cell cancer     right cheek     Gastroesophageal reflux disease      History of blood transfusion     blue baby     Hyperlipidaemia      Past Surgical History:   Procedure Laterality Date     BIOPSY MASS NECK Right 11/16/2017    Procedure: BIOPSY MASS NECK;  Excisional Biopsy Right Neck Lymph node;  Surgeon: Waylon Corral MD;  Location: [facility] OR     COLONOSCOPY       IR CHEST PORT PLACEMENT > 5 YRS OF AGE  2/16/2021     IR PORT REMOVAL LEFT  5/13/2019     MOHS MICROGRAPHIC PROCEDURE  ~2010    right cheek; BCC     " MOHS MICROGRAPHIC PROCEDURE  10/31/2016    Dr. [facility]     Current Outpatient Medications   Medication Sig Dispense Refill     aspirin 81 MG tablet Take 81 mg by mouth daily       Ibuprofen (ADVIL PO) Take 400 mg by mouth every 6 hours as needed for moderate pain       LORazepam (ATIVAN) 0.5 MG tablet Take 1 tablet (0.5 mg) by mouth every 4 hours as needed (Anxiety, Nausea/Vomiting or Sleep) 30 tablet 2     multivitamin w/minerals (THERA-VIT-M) tablet Take 1 tablet by mouth daily       Omega-3 Fatty Acids (OMEGA-3 FISH OIL PO) Take 1 g by mouth daily       ondansetron (ZOFRAN) 8 MG tablet Take 1 tablet (8 mg) by mouth every 8 hours as needed (Nausea/Vomiting) 10 tablet 2     prochlorperazine (COMPAZINE) 10 MG tablet Take 0.5-1 tablets (5-10 mg) by mouth every 6 hours as needed for nausea or vomiting 30 tablet 1     rivaroxaban ANTICOAGULANT (XARELTO ANTICOAGULANT) 20 MG TABS tablet Take 1 tablet (20 mg) by mouth daily (with dinner) 90 tablet 3     Rivaroxaban ANTICOAGULANT 15 & 20 MG TBPK 15 mg PO BID x 21 days, 20 MG Daily x 9 days 1 each 0       Allergies   Allergen Reactions     Pcn [Penicillins] Hives        Social History     Tobacco Use     Smoking status: Never Smoker     Smokeless tobacco: Never Used   Substance Use Topics     Alcohol use: Yes     Alcohol/week: 0.0 standard drinks     Comment: 4 times a week, 2 drinks       History   Drug Use No         Objective     LMP 01/01/2004 (Approximate)     Physical Exam    GENERAL APPEARANCE: healthy, alert and no distress     EYES: EOMI,  PERRL     HENT: ear canals and TM's normal and nose and mouth without ulcers or lesions     RESP: lungs clear to auscultation - no rales, rhonchi or wheezes     BREAST: not examined     CV: regular rates and rhythm     ABDOMEN:  soft, nontender, no HSM or masses and bowel sounds normal     MS: No peripheral edema      PSYCH: mentation appears normal. and affect normal/bright      Diagnostics:  Recent Results (from the past 24  hour(s))   Basic metabolic panel    Collection Time: 05/27/21  2:28 PM   Result Value Ref Range    Sodium 139 133 - 144 mmol/L    Potassium 3.6 3.4 - 5.3 mmol/L    Chloride 107 94 - 109 mmol/L    Carbon Dioxide 28 20 - 32 mmol/L    Anion Gap 4 3 - 14 mmol/L    Glucose 136 (H) 70 - 99 mg/dL    Urea Nitrogen 13 7 - 30 mg/dL    Creatinine 0.76 0.52 - 1.04 mg/dL    GFR Estimate 82 >60 mL/min/[1.73_m2]    GFR Estimate If Black >90 >60 mL/min/[1.73_m2]    Calcium 9.0 8.5 - 10.1 mg/dL      EKG: reviewed; no cardiac hx. Not needed    Revised Cardiac Risk Index (RCRI):  The patient has the following serious cardiovascular risks for perioperative complications:   - No serious cardiac risks = 0 points     RCRI Interpretation: 0 points: Class I (very low risk - 0.4% complication rate)           Signed Electronically by: Lalo Mendez PA-C  Copy of this evaluation report is provided to requesting physician.

## 2021-05-30 ENCOUNTER — HOSPITAL ENCOUNTER (OUTPATIENT)
Dept: LAB | Facility: CLINIC | Age: 66
Discharge: HOME | End: 2021-05-30
Attending: SURGERY | Admitting: SURGERY
Payer: COMMERCIAL

## 2021-05-30 DIAGNOSIS — Z11.59 ENCOUNTER FOR SCREENING FOR OTHER VIRAL DISEASES: ICD-10-CM

## 2021-05-30 LAB
LABORATORY COMMENT REPORT: NORMAL
SARS-COV-2 RNA RESP QL NAA+PROBE: NEGATIVE
SARS-COV-2 RNA RESP QL NAA+PROBE: NORMAL
SPECIMEN SOURCE: NORMAL
SPECIMEN SOURCE: NORMAL

## 2021-05-30 PROCEDURE — U0005 INFEC AGEN DETEC AMPLI PROBE: HCPCS | Performed by: SURGERY

## 2021-05-30 PROCEDURE — U0003 INFECTIOUS AGENT DETECTION BY NUCLEIC ACID (DNA OR RNA); SEVERE ACUTE RESPIRATORY SYNDROME CORONAVIRUS 2 (SARS-COV-2) (CORONAVIRUS DISEASE [COVID-19]), AMPLIFIED PROBE TECHNIQUE, MAKING USE OF HIGH THROUGHPUT TECHNOLOGIES AS DESCRIBED BY CMS-2020-01-R: HCPCS | Performed by: SURGERY

## 2021-06-01 ENCOUNTER — ANESTHESIA EVENT (OUTPATIENT)
Dept: SURGERY | Facility: CLINIC | Age: 66
End: 2021-06-01
Payer: COMMERCIAL

## 2021-06-01 RX ORDER — CALCIUM CARBONATE 500 MG/1
1 TABLET, CHEWABLE ORAL DAILY PRN
COMMUNITY

## 2021-06-01 NOTE — PROGRESS NOTES
PTA medications updated by Medication Scribe prior to surgery via phone call with patient (last doses completed by Nurse)     Medication history sources: Patient, Surescripts and H&P  In the past week, patient estimated taking medication this percent of the time: Greater than 90%  Adherence assessment: N/A Not Observed    Significant changes made to the medication list:  Patient reports no longer taking the following meds (med scribe removed from PTA med list): Aspirin, Compazine, Ibuprofen      Additional medication history information:   None    Medication reconciliation completed by provider prior to medication history? No    Time spent in this activity: 25 minutes    The information provided in this note is only as accurate as the sources available at the time of update(s)      Prior to Admission medications    Medication Sig Last Dose Taking? Auth Provider   calcium carbonate (TUMS) 500 MG chewable tablet Take 1 chew tab by mouth daily as needed for heartburn 5/31/2021 at PRN Yes Reported, Patient   LORazepam (ATIVAN) 0.5 MG tablet Take 1 tablet (0.5 mg) by mouth every 4 hours as needed (Anxiety, Nausea/Vomiting or Sleep)  at PRN Yes Sally Stover MD   multivitamin w/minerals (THERA-VIT-M) tablet Take 1 tablet by mouth every morning  MORE THAN A WEEK at AM Yes Reported, Patient   Omega-3 Fatty Acids (OMEGA-3 FISH OIL PO) Take 1 g by mouth every morning  MORE THAN A WEEK at AM Yes Reported, Patient   ondansetron (ZOFRAN) 8 MG tablet Take 1 tablet (8 mg) by mouth every 8 hours as needed (Nausea/Vomiting)  at PRN Yes Sally Stover MD   rivaroxaban ANTICOAGULANT (XARELTO ANTICOAGULANT) 20 MG TABS tablet Take 1 tablet (20 mg) by mouth daily (with dinner)  Patient taking differently: Take 20 mg by mouth every morning  5/29/2021 at AM Yes Sally Stover MD [facility]  Medication Scribe  [facility]

## 2021-06-02 ENCOUNTER — ANESTHESIA (OUTPATIENT)
Dept: SURGERY | Facility: CLINIC | Age: 66
End: 2021-06-02
Payer: COMMERCIAL

## 2021-06-02 ENCOUNTER — APPOINTMENT (OUTPATIENT)
Dept: SURGERY | Facility: PHYSICIAN GROUP | Age: 66
End: 2021-06-02
Payer: COMMERCIAL

## 2021-06-02 ENCOUNTER — SURGERY (OUTPATIENT)
Age: 66
End: 2021-06-02
Payer: COMMERCIAL

## 2021-06-02 ENCOUNTER — HOSPITAL ENCOUNTER (OUTPATIENT)
Facility: CLINIC | Age: 66
Discharge: HOME | End: 2021-06-03
Attending: SURGERY | Admitting: SURGERY
Payer: COMMERCIAL

## 2021-06-02 DIAGNOSIS — C50.811 MALIGNANT NEOPLASM OF OVERLAPPING SITES OF RIGHT BREAST IN FEMALE, ESTROGEN RECEPTOR POSITIVE (H): ICD-10-CM

## 2021-06-02 DIAGNOSIS — G89.18 POSTOPERATIVE PAIN: Primary | ICD-10-CM

## 2021-06-02 DIAGNOSIS — Z17.0 MALIGNANT NEOPLASM OF OVERLAPPING SITES OF RIGHT BREAST IN FEMALE, ESTROGEN RECEPTOR POSITIVE (H): ICD-10-CM

## 2021-06-02 LAB — HGB BLD-MCNC: 11.8 G/DL (ref 11.7–15.7)

## 2021-06-02 PROCEDURE — 370N000017 HC ANESTHESIA TECHNICAL FEE, PER MIN: Performed by: SURGERY

## 2021-06-02 PROCEDURE — 999N000141 HC STATISTIC PRE-PROCEDURE NURSING ASSESSMENT: Performed by: SURGERY

## 2021-06-02 PROCEDURE — 19303 MAST SIMPLE COMPLETE: CPT | Mod: RT | Performed by: SURGERY

## 2021-06-02 PROCEDURE — 85018 HEMOGLOBIN: CPT | Performed by: PHYSICIAN ASSISTANT

## 2021-06-02 PROCEDURE — 258N000003 HC RX IP 258 OP 636: Performed by: ANESTHESIOLOGY

## 2021-06-02 PROCEDURE — 258N000003 HC RX IP 258 OP 636: Performed by: NURSE ANESTHETIST, CERTIFIED REGISTERED

## 2021-06-02 PROCEDURE — 250N000013 HC RX MED GY IP 250 OP 250 PS 637: Performed by: PHYSICIAN ASSISTANT

## 2021-06-02 PROCEDURE — 250N000011 HC RX IP 250 OP 636: Performed by: NURSE ANESTHETIST, CERTIFIED REGISTERED

## 2021-06-02 PROCEDURE — 36415 COLL VENOUS BLD VENIPUNCTURE: CPT | Performed by: PHYSICIAN ASSISTANT

## 2021-06-02 PROCEDURE — 250N000009 HC RX 250: Performed by: NURSE ANESTHETIST, CERTIFIED REGISTERED

## 2021-06-02 PROCEDURE — 250N000011 HC RX IP 250 OP 636: Performed by: ANESTHESIOLOGY

## 2021-06-02 PROCEDURE — 88305 TISSUE EXAM BY PATHOLOGIST: CPT | Mod: 26 | Performed by: PATHOLOGY

## 2021-06-02 PROCEDURE — 88307 TISSUE EXAM BY PATHOLOGIST: CPT | Mod: TC | Performed by: SURGERY

## 2021-06-02 PROCEDURE — 250N000011 HC RX IP 250 OP 636: Performed by: SURGERY

## 2021-06-02 PROCEDURE — 19303 MAST SIMPLE COMPLETE: CPT | Mod: RT | Performed by: PHYSICIAN ASSISTANT

## 2021-06-02 PROCEDURE — 88305 TISSUE EXAM BY PATHOLOGIST: CPT | Mod: TC | Performed by: SURGERY

## 2021-06-02 PROCEDURE — 272N000001 HC OR GENERAL SUPPLY STERILE: Performed by: SURGERY

## 2021-06-02 PROCEDURE — 710N000010 HC RECOVERY PHASE 1, LEVEL 2, PER MIN: Performed by: SURGERY

## 2021-06-02 PROCEDURE — 360N000076 HC SURGERY LEVEL 3, PER MIN: Performed by: SURGERY

## 2021-06-02 PROCEDURE — 250N000009 HC RX 250: Performed by: SURGERY

## 2021-06-02 PROCEDURE — 88307 TISSUE EXAM BY PATHOLOGIST: CPT | Mod: 26 | Performed by: PATHOLOGY

## 2021-06-02 RX ORDER — ONDANSETRON 2 MG/ML
4 INJECTION INTRAMUSCULAR; INTRAVENOUS EVERY 30 MIN PRN
Status: DISCONTINUED | OUTPATIENT
Start: 2021-06-02 | End: 2021-06-02

## 2021-06-02 RX ORDER — ONDANSETRON 4 MG/1
4 TABLET, ORALLY DISINTEGRATING ORAL EVERY 30 MIN PRN
Status: DISCONTINUED | OUTPATIENT
Start: 2021-06-02 | End: 2021-06-02

## 2021-06-02 RX ORDER — ONDANSETRON 4 MG/1
4 TABLET, ORALLY DISINTEGRATING ORAL EVERY 6 HOURS PRN
Status: DISCONTINUED | OUTPATIENT
Start: 2021-06-02 | End: 2021-06-03 | Stop reason: HOSPADM

## 2021-06-02 RX ORDER — NALOXONE HYDROCHLORIDE 0.4 MG/ML
0.4 INJECTION, SOLUTION INTRAMUSCULAR; INTRAVENOUS; SUBCUTANEOUS
Status: DISCONTINUED | OUTPATIENT
Start: 2021-06-02 | End: 2021-06-03 | Stop reason: HOSPADM

## 2021-06-02 RX ORDER — MULTIPLE VITAMINS W/ MINERALS TAB 9MG-400MCG
1 TAB ORAL EVERY MORNING
Status: DISCONTINUED | OUTPATIENT
Start: 2021-06-02 | End: 2021-06-03 | Stop reason: HOSPADM

## 2021-06-02 RX ORDER — HYDROMORPHONE HYDROCHLORIDE 1 MG/ML
.3-.5 INJECTION, SOLUTION INTRAMUSCULAR; INTRAVENOUS; SUBCUTANEOUS EVERY 5 MIN PRN
Status: DISCONTINUED | OUTPATIENT
Start: 2021-06-02 | End: 2021-06-02

## 2021-06-02 RX ORDER — SENNOSIDES 8.6 MG
8.6 TABLET ORAL 2 TIMES DAILY
Status: DISCONTINUED | OUTPATIENT
Start: 2021-06-02 | End: 2021-06-03 | Stop reason: HOSPADM

## 2021-06-02 RX ORDER — LIDOCAINE HYDROCHLORIDE 20 MG/ML
INJECTION, SOLUTION INFILTRATION; PERINEURAL PRN
Status: DISCONTINUED | OUTPATIENT
Start: 2021-06-02 | End: 2021-06-02

## 2021-06-02 RX ORDER — FENTANYL CITRATE 0.05 MG/ML
25-50 INJECTION, SOLUTION INTRAMUSCULAR; INTRAVENOUS
Status: CANCELLED | OUTPATIENT
Start: 2021-06-02

## 2021-06-02 RX ORDER — FENTANYL CITRATE 0.05 MG/ML
25-50 INJECTION, SOLUTION INTRAMUSCULAR; INTRAVENOUS
Status: DISCONTINUED | OUTPATIENT
Start: 2021-06-02 | End: 2021-06-02

## 2021-06-02 RX ORDER — NALOXONE HYDROCHLORIDE 0.4 MG/ML
0.2 INJECTION, SOLUTION INTRAMUSCULAR; INTRAVENOUS; SUBCUTANEOUS
Status: DISCONTINUED | OUTPATIENT
Start: 2021-06-02 | End: 2021-06-02

## 2021-06-02 RX ORDER — DEXAMETHASONE SODIUM PHOSPHATE 4 MG/ML
INJECTION, SOLUTION INTRA-ARTICULAR; INTRALESIONAL; INTRAMUSCULAR; INTRAVENOUS; SOFT TISSUE PRN
Status: DISCONTINUED | OUTPATIENT
Start: 2021-06-02 | End: 2021-06-02

## 2021-06-02 RX ORDER — SODIUM CHLORIDE, SODIUM LACTATE, POTASSIUM CHLORIDE, CALCIUM CHLORIDE 600; 310; 30; 20 MG/100ML; MG/100ML; MG/100ML; MG/100ML
INJECTION, SOLUTION INTRAVENOUS CONTINUOUS
Status: DISCONTINUED | OUTPATIENT
Start: 2021-06-02 | End: 2021-06-03

## 2021-06-02 RX ORDER — FENTANYL CITRATE 50 UG/ML
25-50 INJECTION, SOLUTION INTRAMUSCULAR; INTRAVENOUS
Status: DISCONTINUED | OUTPATIENT
Start: 2021-06-02 | End: 2021-06-02

## 2021-06-02 RX ORDER — ONDANSETRON 2 MG/ML
4 INJECTION INTRAMUSCULAR; INTRAVENOUS EVERY 6 HOURS PRN
Status: DISCONTINUED | OUTPATIENT
Start: 2021-06-02 | End: 2021-06-03 | Stop reason: HOSPADM

## 2021-06-02 RX ORDER — SODIUM CHLORIDE, SODIUM LACTATE, POTASSIUM CHLORIDE, CALCIUM CHLORIDE 600; 310; 30; 20 MG/100ML; MG/100ML; MG/100ML; MG/100ML
INJECTION, SOLUTION INTRAVENOUS CONTINUOUS
Status: DISCONTINUED | OUTPATIENT
Start: 2021-06-02 | End: 2021-06-02 | Stop reason: HOSPADM

## 2021-06-02 RX ORDER — MEPERIDINE HYDROCHLORIDE 25 MG/ML
12.5 INJECTION INTRAMUSCULAR; INTRAVENOUS; SUBCUTANEOUS EVERY 5 MIN PRN
Status: DISCONTINUED | OUTPATIENT
Start: 2021-06-02 | End: 2021-06-02

## 2021-06-02 RX ORDER — MAGNESIUM HYDROXIDE 1200 MG/15ML
LIQUID ORAL PRN
Status: DISCONTINUED | OUTPATIENT
Start: 2021-06-02 | End: 2021-06-02 | Stop reason: HOSPADM

## 2021-06-02 RX ORDER — CEFAZOLIN SODIUM 2 G/100ML
2 INJECTION, SOLUTION INTRAVENOUS SEE ADMIN INSTRUCTIONS
Status: DISCONTINUED | OUTPATIENT
Start: 2021-06-02 | End: 2021-06-02 | Stop reason: HOSPADM

## 2021-06-02 RX ORDER — SODIUM CHLORIDE, SODIUM LACTATE, POTASSIUM CHLORIDE, CALCIUM CHLORIDE 600; 310; 30; 20 MG/100ML; MG/100ML; MG/100ML; MG/100ML
INJECTION, SOLUTION INTRAVENOUS CONTINUOUS PRN
Status: DISCONTINUED | OUTPATIENT
Start: 2021-06-02 | End: 2021-06-02

## 2021-06-02 RX ORDER — LORAZEPAM 0.5 MG/1
0.5 TABLET ORAL EVERY 4 HOURS PRN
Status: DISCONTINUED | OUTPATIENT
Start: 2021-06-02 | End: 2021-06-03 | Stop reason: HOSPADM

## 2021-06-02 RX ORDER — MEPERIDINE HYDROCHLORIDE 25 MG/ML
12.5 INJECTION INTRAMUSCULAR; INTRAVENOUS; SUBCUTANEOUS
Status: DISCONTINUED | OUTPATIENT
Start: 2021-06-02 | End: 2021-06-02 | Stop reason: HOSPADM

## 2021-06-02 RX ORDER — PROCHLORPERAZINE MALEATE 5 MG
5 TABLET ORAL EVERY 6 HOURS PRN
Status: DISCONTINUED | OUTPATIENT
Start: 2021-06-02 | End: 2021-06-03 | Stop reason: HOSPADM

## 2021-06-02 RX ORDER — NALOXONE HYDROCHLORIDE 0.4 MG/ML
0.2 INJECTION, SOLUTION INTRAMUSCULAR; INTRAVENOUS; SUBCUTANEOUS
Status: DISCONTINUED | OUTPATIENT
Start: 2021-06-02 | End: 2021-06-03 | Stop reason: HOSPADM

## 2021-06-02 RX ORDER — FENTANYL CITRATE 50 UG/ML
INJECTION, SOLUTION INTRAMUSCULAR; INTRAVENOUS PRN
Status: DISCONTINUED | OUTPATIENT
Start: 2021-06-02 | End: 2021-06-02

## 2021-06-02 RX ORDER — SODIUM CHLORIDE, SODIUM LACTATE, POTASSIUM CHLORIDE, CALCIUM CHLORIDE 600; 310; 30; 20 MG/100ML; MG/100ML; MG/100ML; MG/100ML
INJECTION, SOLUTION INTRAVENOUS CONTINUOUS
Status: DISCONTINUED | OUTPATIENT
Start: 2021-06-02 | End: 2021-06-03 | Stop reason: HOSPADM

## 2021-06-02 RX ORDER — OXYCODONE HYDROCHLORIDE 5 MG/1
5-10 TABLET ORAL EVERY 4 HOURS PRN
Status: DISCONTINUED | OUTPATIENT
Start: 2021-06-02 | End: 2021-06-03 | Stop reason: HOSPADM

## 2021-06-02 RX ORDER — PROPOFOL 10 MG/ML
INJECTION, EMULSION INTRAVENOUS CONTINUOUS PRN
Status: DISCONTINUED | OUTPATIENT
Start: 2021-06-02 | End: 2021-06-02

## 2021-06-02 RX ORDER — NALOXONE HYDROCHLORIDE 0.4 MG/ML
0.4 INJECTION, SOLUTION INTRAMUSCULAR; INTRAVENOUS; SUBCUTANEOUS
Status: DISCONTINUED | OUTPATIENT
Start: 2021-06-02 | End: 2021-06-02

## 2021-06-02 RX ORDER — PROPOFOL 10 MG/ML
INJECTION, EMULSION INTRAVENOUS PRN
Status: DISCONTINUED | OUTPATIENT
Start: 2021-06-02 | End: 2021-06-02

## 2021-06-02 RX ORDER — ONDANSETRON 2 MG/ML
INJECTION INTRAMUSCULAR; INTRAVENOUS PRN
Status: DISCONTINUED | OUTPATIENT
Start: 2021-06-02 | End: 2021-06-02

## 2021-06-02 RX ORDER — CEFAZOLIN SODIUM 2 G/100ML
2 INJECTION, SOLUTION INTRAVENOUS
Status: DISCONTINUED | OUTPATIENT
Start: 2021-06-02 | End: 2021-06-02 | Stop reason: HOSPADM

## 2021-06-02 RX ORDER — HYDROMORPHONE HCL IN WATER/PF 6 MG/30 ML
0.2 PATIENT CONTROLLED ANALGESIA SYRINGE INTRAVENOUS
Status: DISCONTINUED | OUTPATIENT
Start: 2021-06-02 | End: 2021-06-03 | Stop reason: HOSPADM

## 2021-06-02 RX ORDER — SODIUM CHLORIDE, SODIUM LACTATE, POTASSIUM CHLORIDE, CALCIUM CHLORIDE 600; 310; 30; 20 MG/100ML; MG/100ML; MG/100ML; MG/100ML
INJECTION, SOLUTION INTRAVENOUS CONTINUOUS
Status: DISCONTINUED | OUTPATIENT
Start: 2021-06-02 | End: 2021-06-02

## 2021-06-02 RX ORDER — LABETALOL HYDROCHLORIDE 5 MG/ML
10 INJECTION, SOLUTION INTRAVENOUS
Status: DISCONTINUED | OUTPATIENT
Start: 2021-06-02 | End: 2021-06-02

## 2021-06-02 RX ORDER — FENTANYL CITRATE 0.05 MG/ML
25-50 INJECTION, SOLUTION INTRAMUSCULAR; INTRAVENOUS
Status: DISCONTINUED | OUTPATIENT
Start: 2021-06-02 | End: 2021-06-02 | Stop reason: HOSPADM

## 2021-06-02 RX ORDER — METHOCARBAMOL 500 MG/1
500 TABLET, FILM COATED ORAL EVERY 6 HOURS PRN
Status: DISCONTINUED | OUTPATIENT
Start: 2021-06-02 | End: 2021-06-03 | Stop reason: HOSPADM

## 2021-06-02 RX ORDER — LIDOCAINE 40 MG/G
CREAM TOPICAL
Status: DISCONTINUED | OUTPATIENT
Start: 2021-06-02 | End: 2021-06-03 | Stop reason: HOSPADM

## 2021-06-02 RX ORDER — ACETAMINOPHEN 325 MG/10.15ML
650 LIQUID ORAL EVERY 4 HOURS PRN
Status: DISCONTINUED | OUTPATIENT
Start: 2021-06-02 | End: 2021-06-03 | Stop reason: HOSPADM

## 2021-06-02 RX ORDER — ONDANSETRON 8 MG/1
8 TABLET, FILM COATED ORAL EVERY 8 HOURS PRN
Status: CANCELLED | OUTPATIENT
Start: 2021-06-02

## 2021-06-02 RX ORDER — ONDANSETRON 2 MG/ML
4 INJECTION INTRAMUSCULAR; INTRAVENOUS EVERY 30 MIN PRN
Status: DISCONTINUED | OUTPATIENT
Start: 2021-06-02 | End: 2021-06-02 | Stop reason: HOSPADM

## 2021-06-02 RX ORDER — HYDROMORPHONE HYDROCHLORIDE 1 MG/ML
.3-.5 INJECTION, SOLUTION INTRAMUSCULAR; INTRAVENOUS; SUBCUTANEOUS EVERY 10 MIN PRN
Status: DISCONTINUED | OUTPATIENT
Start: 2021-06-02 | End: 2021-06-02 | Stop reason: HOSPADM

## 2021-06-02 RX ORDER — ONDANSETRON 4 MG/1
4 TABLET, ORALLY DISINTEGRATING ORAL EVERY 30 MIN PRN
Status: DISCONTINUED | OUTPATIENT
Start: 2021-06-02 | End: 2021-06-02 | Stop reason: HOSPADM

## 2021-06-02 RX ORDER — CALCIUM CARBONATE 500 MG/1
500 TABLET, CHEWABLE ORAL DAILY PRN
Status: DISCONTINUED | OUTPATIENT
Start: 2021-06-02 | End: 2021-06-03 | Stop reason: HOSPADM

## 2021-06-02 RX ADMIN — FENTANYL CITRATE 50 MCG: 50 INJECTION, SOLUTION INTRAMUSCULAR; INTRAVENOUS at 07:28

## 2021-06-02 RX ADMIN — DEXAMETHASONE SODIUM PHOSPHATE 4 MG: 4 INJECTION, SOLUTION INTRA-ARTICULAR; INTRALESIONAL; INTRAMUSCULAR; INTRAVENOUS; SOFT TISSUE at 07:34

## 2021-06-02 RX ADMIN — ONDANSETRON 4 MG: 2 INJECTION INTRAMUSCULAR; INTRAVENOUS at 07:35

## 2021-06-02 RX ADMIN — FENTANYL CITRATE 50 MCG: 50 INJECTION, SOLUTION INTRAMUSCULAR; INTRAVENOUS at 07:55

## 2021-06-02 RX ADMIN — SODIUM CHLORIDE 1000 ML: 900 IRRIGANT IRRIGATION at 07:44

## 2021-06-02 RX ADMIN — PROPOFOL 150 MG: 10 INJECTION, EMULSION INTRAVENOUS at 07:30

## 2021-06-02 RX ADMIN — SUGAMMADEX 200 MG: 100 INJECTION, SOLUTION INTRAVENOUS at 09:05

## 2021-06-02 RX ADMIN — HYDROMORPHONE HYDROCHLORIDE 0.3 MG: 1 INJECTION, SOLUTION INTRAMUSCULAR; INTRAVENOUS; SUBCUTANEOUS at 07:52

## 2021-06-02 RX ADMIN — CALCIUM CARBONATE (ANTACID) CHEW TAB 500 MG 500 MG: 500 CHEW TAB at 18:36

## 2021-06-02 RX ADMIN — METHOCARBAMOL 500 MG: 500 TABLET ORAL at 13:30

## 2021-06-02 RX ADMIN — OXYCODONE HYDROCHLORIDE 5 MG: 5 TABLET ORAL at 18:36

## 2021-06-02 RX ADMIN — PROPOFOL 200 MCG/KG/MIN: 10 INJECTION, EMULSION INTRAVENOUS at 07:30

## 2021-06-02 RX ADMIN — OXYCODONE HYDROCHLORIDE 5 MG: 5 TABLET ORAL at 11:32

## 2021-06-02 RX ADMIN — HYDROMORPHONE HYDROCHLORIDE 0.2 MG: 1 INJECTION, SOLUTION INTRAMUSCULAR; INTRAVENOUS; SUBCUTANEOUS at 07:42

## 2021-06-02 RX ADMIN — SODIUM CHLORIDE, POTASSIUM CHLORIDE, SODIUM LACTATE AND CALCIUM CHLORIDE: 600; 310; 30; 20 INJECTION, SOLUTION INTRAVENOUS at 07:22

## 2021-06-02 RX ADMIN — ROCURONIUM BROMIDE 50 MG: 10 INJECTION INTRAVENOUS at 07:30

## 2021-06-02 RX ADMIN — MIDAZOLAM 2 MG: 1 INJECTION INTRAMUSCULAR; INTRAVENOUS at 07:22

## 2021-06-02 RX ADMIN — HYDROMORPHONE HYDROCHLORIDE 0.5 MG: 1 INJECTION, SOLUTION INTRAMUSCULAR; INTRAVENOUS; SUBCUTANEOUS at 10:16

## 2021-06-02 RX ADMIN — FENTANYL CITRATE 50 MCG: 50 INJECTION, SOLUTION INTRAMUSCULAR; INTRAVENOUS at 08:20

## 2021-06-02 RX ADMIN — FENTANYL CITRATE 25 MCG: 0.05 INJECTION, SOLUTION INTRAMUSCULAR; INTRAVENOUS at 09:53

## 2021-06-02 RX ADMIN — CEFAZOLIN SODIUM 2 G: 2 INJECTION, SOLUTION INTRAVENOUS at 07:33

## 2021-06-02 RX ADMIN — SODIUM CHLORIDE, POTASSIUM CHLORIDE, SODIUM LACTATE AND CALCIUM CHLORIDE: 600; 310; 30; 20 INJECTION, SOLUTION INTRAVENOUS at 11:26

## 2021-06-02 RX ADMIN — SENNOSIDES 8.6 MG: 8.6 TABLET, FILM COATED ORAL at 21:52

## 2021-06-02 RX ADMIN — LIDOCAINE HYDROCHLORIDE 80 MG: 20 INJECTION, SOLUTION INFILTRATION; PERINEURAL at 07:30

## 2021-06-02 RX ADMIN — OXYCODONE HYDROCHLORIDE 5 MG: 5 TABLET ORAL at 22:29

## 2021-06-02 ASSESSMENT — MIFFLIN-ST. JEOR: SCORE: 1334.22

## 2021-06-02 NOTE — PLAN OF CARE
From PACU approx 1130. VSS. A/Ox 4. Incisions CDI, ROGERS drain stripped, minimal output. Ace wraps on.  Pain controlled with oxycodone/robaxin. Up with SBA, walked in halls x1.  Tolerated clears, advanced to regular diet. Flatus +. Void x1. LS clear. Plan to check hemoglobin in am and then discharge. CTM

## 2021-06-02 NOTE — BRIEF OP NOTE
[facility]    Brief Operative Note    Pre-operative diagnosis: 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 in female, estrogen receptor positive (H) [C50.811, Z17.0]  Post-operative diagnosis Same as pre-operative diagnosis    Procedure: Procedure(s):  RIGHT SIMPLE MASTECTOMY  Surgeon: Surgeon(s) and Role:     * Ulises Castillo MD - Primary     * Mile Vanegas PA-C - Assisting  Anesthesia: General   Estimated blood loss: 30 mL  Drains: Angel Luis-Flores  Specimens:   ID Type Source Tests Collected by Time Destination   A : RIGHT MASTECTOMY SUTURE MARKED ON SUPERIOR Tissue Breast, Right SURGICAL PATHOLOGY EXAM Ulises Castillo MD 6/2/2021  8:20 AM    B : INFERIOR RIGHT MASTECTOMY SKIN  Tissue Breast, Right SURGICAL PATHOLOGY EXAM Ulises Castillo MD 6/2/2021  8:43 AM      Findings:   Right breast mastectomy. Tumor adherant to underlying muscle and portion of this was taken as well as overlying skin at tumor site. Surgicel hemostatic powder used on muscle. Drain left in place.  Complications: None.  Implants: * No implants in log *

## 2021-06-02 NOTE — ANESTHESIA POSTPROCEDURE EVALUATION
Patient: Flor Griffin    Procedure(s):  RIGHT SIMPLE MASTECTOMY    Diagnosis: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 in female, estrogen receptor positive (H) [C50.811, Z17.0]  Diagnosis Additional Information: No value filed.    Anesthesia Type:  General    Note:  Disposition: Inpatient   Postop Pain Control: Uneventful            Sign Out: Well controlled pain   PONV: No   Neuro/Psych: Uneventful            Sign Out: Acceptable/Baseline neuro status   Airway/Respiratory: Uneventful            Sign Out: Acceptable/Baseline resp. status   CV/Hemodynamics: Uneventful            Sign Out: Acceptable CV status; No obvious hypovolemia; No obvious fluid overload   Other NRE: NONE   DID A NON-ROUTINE EVENT OCCUR? No           Last vitals:  Vitals:    06/02/21 1000 06/02/21 1015 06/02/21 1026   BP: 125/72 127/62    Pulse: 58 58    Resp: 17 12    Temp:      SpO2: 97% 99% 98%       Last vitals prior to Anesthesia Care Transfer:  CRNA VITALS  6/2/2021 0846 - 6/2/2021 0946      6/2/2021             Resp Rate (set):  10          Electronically Signed By: Richie Ruiz DO  June 2, 2021  11:03 AM

## 2021-06-02 NOTE — OR NURSING
Report given to Rola PARK. AVSS on 2L O2 while resting. Ace wrap CDI, ROGERS drain minimal output. Patient rating pain tolerable now. Adequate for transfer to floor. Updated spouse Joe via phone.

## 2021-06-02 NOTE — ANESTHESIA CARE TRANSFER NOTE
Patient: Flor Griffin    Procedure(s):  RIGHT SIMPLE MASTECTOMY    Diagnosis: 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 in female, estrogen receptor positive (H) [C50.811, Z17.0]  Diagnosis Additional Information: No value filed.    Anesthesia Type:   General     Note:    Oropharynx: oropharynx clear of all foreign objects and spontaneously breathing  Level of Consciousness: drowsy  Oxygen Supplementation: face mask  Level of Supplemental Oxygen (L/min / FiO2): 6  Independent Airway: airway patency satisfactory and stable  Dentition: dentition unchanged  Vital Signs Stable: post-procedure vital signs reviewed and stable  Report to RN Given: handoff report given  Patient transferred to: PACU    Handoff Report: Identifed the Patient, Identified the Reponsible Provider, Reviewed the pertinent medical history, Discussed the surgical course, Reviewed Intra-OP anesthesia mangement and issues during anesthesia, Set expectations for post-procedure period and Allowed opportunity for questions and acknowledgement of understanding      Vitals: (Last set prior to Anesthesia Care Transfer)  CRNA VITALS  6/2/2021 0846 - 6/2/2021 0921      6/2/2021             Resp Rate (set):  10        Electronically Signed By: HEDY Thayer CRNA  June 2, 2021  9:21 AM

## 2021-06-02 NOTE — PROGRESS NOTES
[facility]    General Surgery  Short Progress Note    Flor Griffin underwent right simple mastectomy with Dr. Castillo. Hemostatic agent used intraoperatively so stripping ROGERS drain every 4 hours is of utmost importance. Will get Hgb check tomorrow morning prior to resuming Xarelto. Clear liquid diet and advance as tolerated, may saline lock once adequate oral intake. Tylenol, oxycodone, robaxin, and dilaudid for analgesia.    Mile Vanegas PA-C

## 2021-06-02 NOTE — OP NOTE
General Surgery Operative Note    PREOPERATIVE DIAGNOSIS:  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 in female, estrogen receptor positive (H) [C50.811, Z17.0]    POSTOPERATIVE DIAGNOSIS:      PROCEDURE:    RIGHT SIMPLE MASTECTOMY      ANESTHESIA:  General.    PREOPERATIVE MEDICATIONS: Ancef    SURGEON:  Ulises Castillo MD    ASSISTANT:  Mile Vanegas PA-C.  First assistant was necessary due to challenging exposure and the need for improved visualization and help maintaining hemostasis.      INDICATIONS: Patient has been treated for metastatic breast cancer for many years.  Most recent imaging has shown an excellent response to systemic treatment with the only remaining focus of breast cancer in the right breast.  She now presents for simple mastectomy.    DESCRIPTION OF PROCEDURE: The patient was taken to the operating suite and LMA was placed. The right breast and axilla were prepped and draped in a sterile fashion.  I made an elliptical incision is the usual fashion, including the nipple areolar complex. This was taken down through skin and subcutaneous tissues. I began creating skin flaps and took these flaps down to the level of the pectoralis fascia. All bleeding along the flaps were controlled with electrocautery and suture ligature.  I took special care to create relatively thin skin flaps directly over the palpable mass in the upper outer quadrant.  No breast tissue was left behind in this area.  Once we had gotten down circumferentially, we took the breast off of the pectoralis muscle using electrocautery.  The area of the breast at the lateral edge of the pectoralis muscle was quite tethered to the muscle itself, indicating either previous malignant infiltration or posttreatment scarring.  I took a significant portion of the muscle as well as all of the fascia with the breast specimen.  All perforators were tied off. When we got to the level of the axilla and axillary tail, we removed  this to the level of the axillary fat.  The clavipectoral fascia was obscured by previous treatment and possibly malignancy, and there was significant intrusion of the palpable mass into the superficial axilla.  I removed a portion of the axillary contents but did not perform a formal axillary dissection.  The breast was then completely removed and a suture was placed at the twelve-o'clock position. One drain was placed above the pectoralis muscle.  Surgi-Charles powder was placed within the mastectomy site owing to the patient's need to resume anticoagulation.  The wound was closed using 3-0 and 4-0 Vicryl.  Steri-strips were applied, and the wound was dressed.  The patient was awakened and taken to the PACU in stable condition.        ESTIMATED BLOOD LOSS: 30 mL  INTRAOPERATIVE FINDINGS: Palpable right breast mass densely adherent to underlying pec muscle, removed with no residual gross disease.    Ulises Castillo MD, MD

## 2021-06-02 NOTE — ANESTHESIA PROCEDURE NOTES
Airway       Patient location during procedure: OR ([facility] - Operating Room or Procedural Area)       Procedure Start/Stop Times: 6/2/2021 7:32 AM  Staff -        Anesthesiologist:  Richie Ruiz DO       CRNA: Amberly Mcnally APRN CRNA       Performed By: CRNAIndications and Patient Condition       Indications for airway management: salvador-procedural       Induction type:intravenous       Mask difficulty assessment: 2 - vent by mask + OA or adjuvant +/- NMBA    Final Airway Details       Final airway type: endotracheal airway       Successful airway: ETT - single  Endotracheal Airway Details        ETT size (mm): 7.0       Cuffed: yes       Successful intubation technique: video laryngoscopy       VL Blade Size: Bangura 3       Grade View of Cords: 1       Adjucts: stylet       Position: Right       Measured from: lips       Secured at (cm): 21       Bite block used: None    Post intubation assessment        Number of attempts at approach: 1       Number of other approaches attempted: 0       Secured with: pink tape       Ease of procedure: easy       Dentition: Intact and Unchanged    Medication(s) Administered   Medication Administration Time: 6/2/2021 7:32 AM

## 2021-06-03 ENCOUNTER — TELEPHONE (OUTPATIENT)
Dept: ONCOLOGY | Facility: CLINIC | Age: 66
End: 2021-06-03

## 2021-06-03 VITALS
RESPIRATION RATE: 16 BRPM | SYSTOLIC BLOOD PRESSURE: 94 MMHG | DIASTOLIC BLOOD PRESSURE: 56 MMHG | OXYGEN SATURATION: 98 % | HEART RATE: 68 BPM | HEIGHT: 66 IN | BODY MASS INDEX: 27.55 KG/M2 | WEIGHT: 171.4 LBS | TEMPERATURE: 97.6 F

## 2021-06-03 LAB
COPATH REPORT: NORMAL
HGB BLD-MCNC: 10.8 G/DL (ref 11.7–15.7)
HGB BLD-MCNC: 11 G/DL (ref 11.7–15.7)

## 2021-06-03 PROCEDURE — 36415 COLL VENOUS BLD VENIPUNCTURE: CPT | Performed by: PHYSICIAN ASSISTANT

## 2021-06-03 PROCEDURE — 250N000013 HC RX MED GY IP 250 OP 250 PS 637: Performed by: PHYSICIAN ASSISTANT

## 2021-06-03 PROCEDURE — 85018 HEMOGLOBIN: CPT | Performed by: PHYSICIAN ASSISTANT

## 2021-06-03 RX ORDER — OXYCODONE HYDROCHLORIDE 5 MG/1
5 TABLET ORAL EVERY 4 HOURS PRN
Qty: 15 TABLET | Refills: 0 | Status: SHIPPED | OUTPATIENT
Start: 2021-06-03 | End: 2021-11-11

## 2021-06-03 RX ORDER — SENNOSIDES 8.6 MG
1 TABLET ORAL 2 TIMES DAILY
Qty: 30 TABLET | Refills: 0 | Status: SHIPPED | OUTPATIENT
Start: 2021-06-03 | End: 2021-12-20

## 2021-06-03 RX ORDER — METHOCARBAMOL 500 MG/1
500 TABLET, FILM COATED ORAL EVERY 6 HOURS PRN
Qty: 20 TABLET | Refills: 0 | Status: SHIPPED | OUTPATIENT
Start: 2021-06-03 | End: 2021-11-11

## 2021-06-03 RX ADMIN — OXYCODONE HYDROCHLORIDE 5 MG: 5 TABLET ORAL at 08:10

## 2021-06-03 RX ADMIN — OXYCODONE HYDROCHLORIDE 5 MG: 5 TABLET ORAL at 03:37

## 2021-06-03 RX ADMIN — SENNOSIDES 8.6 MG: 8.6 TABLET, FILM COATED ORAL at 08:10

## 2021-06-03 RX ADMIN — MULTIPLE VITAMINS W/ MINERALS TAB 1 TABLET: TAB at 08:10

## 2021-06-03 NOTE — TELEPHONE ENCOUNTER
Patient's daughter Emmie called clinic inquiring about her mother's Post mastectomy pathology. She requested if Dr. Stover could contact her mother Michelle regarding her pathology results and her mother's plan of care after her breast surgery.

## 2021-06-03 NOTE — DISCHARGE INSTRUCTIONS
[facility]  Discharge Instructions: Post-Operative Mastectomy    ACTIVITY    Take frequent, short walks and increase your activity gradually.      Avoid strenuous physical activity or heavy lifting greater than 15-20 lbs. for 2 weeks on the side of surgery.  You may climb stairs.     Gentle rotation and stretching of your arms and shoulders will prevent joint stiffness.    You may drive without restrictions when you are not using any prescription pain medication and feel comfortable in a car.    You may return to work/school when you are comfortable without any prescription pain medication.    WOUND CARE    You may remove your ACE wrap/dressing and shower 48 hours after the surgery.  Pat your incisions dry and leave them open to air.  Re-apply dressing (Band-Aids or gauze/tape) as needed for drainage.    You may have steri-strips (looks like white tape) or skin glue on your incisions.  You may peel off the steri-strips 2 weeks after your surgery if they have not peeled off on their own.  If you have skin glue, it will peel up and fall off on its own.    Do not soak your incisions in a tub or pool for 2 weeks.     Do not apply any lotions, creams, or ointments to your incisions.    A ridge under your incisions is normal and will gradually resolve.    Wear the mastectomy camisole for comfort.    You may have 1 drain at your surgical site, refer to your drain care instructions.  Record output totals daily.  You will need to schedule an appointment for drain removal when your output is less than 30 mL per day for 1-2 days or 2 weeks, whichever comes first.    DRAIN CARE    You have a ROGERS drain on your right side. In order to empty this, open the tab/vent on the drain and record how much liquid you remove. To properly close the drain, squeeze the bulb (with tab/vent open) then close the tab while still squeezing the bulb. You should notice that liquid moves in the tubing toward the bulb if it  is properly closed.     You should also strip the drain tubing once daily to avoid any clogging. You do this by gently pinching the drain, starting near the skin, and stretching the tubing out this way until you reach the bulb. Do not pull hard on the drain tubing to the point of pulling the drain away from the skin.    DIET    Start with liquids, then gradually resume your regular diet as tolerated.     Drink plenty of fluids to stay hydrated.    PAIN    Expect some tenderness and discomfort at the incision site(s).  Use the prescribed pain medication at your discretion.  Expect gradual resolution of your pain over several days.    You may take ibuprofen with food (unless you have been told not to) or acetaminophen/Tylenol instead of or in addition to your prescribed pain medication.  If you are taking Norco or Percocet, do not take any additional acetaminophen/Tylenol.    Do not drink alcohol or drive while you are taking pain medications.    EXPECTATIONS    Pain medications can cause constipation.  Limit use when possible.  Take over the counter stool softener/stimulant, such as Colace or Senna, 1-2 times a day with plenty of water.  You may take a mild over the counter laxative, such as Miralax or a suppository, as needed.      You may discontinue these medications once you are having regular bowel movements and/or are no longer taking your narcotic pain medication.    RETURN APPOINTMENT    Follow up in our office when your drain is ready to be removed.  You will also follow up with your surgeon in 2 weeks.  Please call our office at [phone] to schedule your appointment.    CALL OUR OFFICE [phone] IF YOU HAVE:     Chills or fever above 101 F.    Increased redness, warmth, or drainage at your incisions.    Significant bleeding or swelling.    Pain not relieved by your pain medication or rest.    Increasing pain after the first 48 hours.    Any other concerns or questions.               **If you have  concerns or questions about your procedure,    please contact Dr. Castillo at  [phone]**          Same Day Surgery Discharge Instructions for  Sedation and General Anesthesia       It's not unusual to feel dizzy, light-headed or faint for up to 24 hours after surgery or while taking pain medication.  If you have these symptoms: sit for a few minutes before standing and have someone assist you when you get up to walk or use the bathroom.      You should rest and relax for the next 24 hours. We recommend you make arrangements to have an adult stay with you for at least 24 hours after your discharge.  Avoid hazardous and strenuous activity.      DO NOT DRIVE any vehicle or operate mechanical equipment for 24 hours following the end of your surgery.  Even though you may feel normal, your reactions may be affected by the medication you have received.      Do not drink alcoholic beverages for 24 hours following surgery.       Slowly progress to your regular diet as you feel able. It's not unusual to feel nauseated and/or vomit after receiving anesthesia.  If you develop these symptoms, drink clear liquids (apple juice, ginger ale, broth, 7-up, etc. ) until you feel better.  If your nausea and vomiting persists for 24 hours, please notify your surgeon.        All narcotic pain medications, along with inactivity and anesthesia, can cause constipation. Drinking plenty of liquids and increasing fiber intake will help.      For any questions of a medical nature, call your surgeon.      Do not make important decisions for 24 hours.      If you had general anesthesia, you may have a sore throat for a couple of days related to the breathing tube used during surgery.  You may use Cepacol lozenges to help with this discomfort.  If it worsens or if you develop a fever, contact your surgeon.       If you feel your pain is not well managed with the pain medications prescribed by your surgeon, please contact your surgeon's office to  let them know so they can address your concerns.       CoVid 19 Information    We want to give you information regarding Covid. Please consult your primary care provider with any questions you might have.     Patient who have symptoms (cough, fever, or shortness of breath), need to isolate for 7 days from when symptoms started OR 72 hours after fever resolves (without fever reducing medications) AND improvement of respiratory symptoms (whichever is longer).      Isolate yourself at home (in own room/own bathroom if possible)    Do Not allow any visitors    Do Not go to work or school    Do Not go to [employer],  centers, shopping, or other public places.    Do Not shake hands.    Avoid close and intimate contact with others (hugging, kissing).    Follow CDC recommendations for household cleaning of frequently touched services.     After the initial 7 days, continue to isolate yourself from household members as much as possible. To continue decrease the risk of community spread and exposure, you and any members of your household should limit activities in public for 14 days after starting home isolation.     You can reference the following CDC link for helpful home isolation/care tips:  https://www.cdc.gov/coronavirus/2019-ncov/downloads/10Things.pdf    Protect Others:    Cover Your Mouth and Nose with a mask, disposable tissue or wash cloth to avoid spreading germs to others.    Wash your hands and face frequently with soap and water    Call Your Primary Doctor If: Breathing difficulty develops or you become worse.    For more information about COVID19 and options for caring for yourself at home, please visit the CDC website at https://www.cdc.gov/coronavirus/2019-ncov/about/steps-when-sick.html  For more options for care at [facility], please visit our website at [email or web address]

## 2021-06-03 NOTE — PROGRESS NOTES
[facility]    General Surgery  Short Progress Note    Hgb check came back 11.0, improved from 10.8 this morning. Blood pressures improved, patient continues to ambulate without lightheadedness. Okay to resume Xarelto today and continue to take at discharge. Discharge orders in place, please provide mastectomy camisole for patient to put on tomorrow and drain education.    Discharge summary to follow.    Mile Vanegas PA-C

## 2021-06-03 NOTE — PROGRESS NOTES
"[facility]  General Surgery Progress Note    Admission Date: 2021  6/3/2021         Assessment and Plan:     Flor Griffin is a 66 year old female 1 day s/p right simple mastectomy    - Acute blood loss anemia. Likely dilutional component. Scant drain output and no evidence of hematoma. Drain was stripped more proximal this morning after removing dressing.   - Repeat Hgb ordered for 11am prior to resuming Xarelto  - Pain controlled  - Ambulate 4x day and encourage IS  - Mastectomy camisole ordered for patient to put on tomorrow at home  - ROGERS drain teaching    - Possible discharge home today if Hgb stable and will resume Xarelto             Interval History:     Pain controlled with oxy and robaxin. Tolerating diet, urinating without difficulty. Blood pressures have been on the soft side. No lightheadedness with ambulation. Meds reviewed.                    Physical Exam:   Blood pressure 103/64, pulse 61, temperature 97.9  F (36.6  C), temperature source Oral, resp. rate 16, height 1.676 m (5' 6\"), weight 77.7 kg (171 lb 6.4 oz), last menstrual period 2004, SpO2 95 %, not currently breastfeeding.  Temperature Temp  Av.7  F (36.5  C)  Min: 96.9  F (36.1  C)  Max: 98  F (36.7  C)   I/O last 3 completed shifts:  In: 1330 [P.O.:480; I.V.:850]  Out: 1590 [Urine:1550; Drains:10; Blood:30]    ROGERS: 10 ml since placement, dark serosanguinous, clots at proximal tubing were stripped     GENERAL: VS reviewed, alert, oriented, no acute distress  LUNGS: Normal respiratory effort, no wheezing  ABDOMEN:  Soft, non-distended  CHEST: No palpable seroma/hematoma. ROGERS drain intact without leakage, serosanguinous.    INCISION: Steris in place. Clean, dry, and intact. No surrounding erythema.  EXTREMITIES: Moving all extremities, no gross deformities, well perfused, no lower extremity edema or tenderness  NEUROLOGICAL: Grossly non-focal, mood & affect appropriate         Data:   No new labs/imaging. "   Recent Labs   Lab Test 06/03/21  0617 06/02/21  1454 05/18/21  1355 05/12/21  0806 04/24/21  1903   WBC  --   --  4.1 4.6 3.8*   HGB 10.8* 11.8 10.8* 11.5* 12.1   HCT  --   --  33.1* 35.7 35.1   PLT  --   --  246 267 177      Recent Labs   Lab Test 05/27/21  1428 05/18/21  1355 05/12/21  0806   POTASSIUM 3.6 3.2* 4.0   CHLORIDE 107 104 108   CO2 28 30 28   BUN 13 9 13   CR 0.76 0.81 0.72                      Recent Labs   Lab Test 04/24/21  2143 04/24/21  1903 05/13/19  0835 12/11/17  0700   INR  --  0.90 0.98 0.84*   PTT 21*  --  25 27        Mile Vanegas PA-C  [facility]  [phone]

## 2021-06-03 NOTE — PLAN OF CARE
A&OX4, VSS on RA. POD 0 of R simple mastectomy. Dressing c/d/I. ROGERS drain with minimal brownish output. Pain managed with Oxy and ice application. Up independently in the room. Tolerating regular diet. Voiding adequately. PIV Sled. Discharge expected tomorrow. Continue to monitor.

## 2021-06-03 NOTE — PROGRESS NOTES
Pt alert and oriented, up independently, tolerates diet, pain well controled, dressing clean dry and intact, ROGERS drain output charted, stripped, teaching done, camisole brought to pt, pt declines states she will buy her own, pt will discharge to home. Pt given written and verbal discharge instructions, pt knows that follow up care is needed and knows who to call should any questions or concerns arise. All medications were reviewed with pt. Pt's  in room and will transport pt home.

## 2021-06-03 NOTE — DISCHARGE SUMMARY
[facility] Discharge Summary    Flor Griffin MRN# [id number]   Age: 66 year old YOB: 1955     Date of Admission:  6/2/2021  Date of Discharge:  6/3/2021  Admitting Provider:  Ulises Castillo MD  Discharge Provider:  Mile Vanegas PA-C  Discharging Service: General Surgery     Primary Provider: Stacie [place]  Primary Care Physician Phone Number: [phone]          Admission Diagnoses:   Principle Diagnosis: 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 in female, estrogen receptor positive (H) [C50.811, Z17.0]  Secondary Diagnoses:  Acute blood loss anemia on chronic anemia  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       Discharge Diagnosis:     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 in female, estrogen receptor positive (H) [C50.811, Z17.0]          Procedures:     Procedure(s): Right simple mastectomy            Discharge Medications:     Current Discharge Medication List      START taking these medications    Details   methocarbamol (ROBAXIN) 500 MG tablet Take 1 tablet (500 mg) by mouth every 6 hours as needed for muscle spasms  Qty: 20 tablet, Refills: 0    Associated Diagnoses: Postoperative pain      oxyCODONE (ROXICODONE) 5 MG tablet Take 1 tablet (5 mg) by mouth every 4 hours as needed for moderate to severe pain  Qty: 15 tablet, Refills: 0    Associated Diagnoses: Postoperative pain      sennosides (SENOKOT) 8.6 MG tablet Take 1 tablet by mouth 2 times daily  Qty: 30 tablet, Refills: 0    Associated Diagnoses: Postoperative pain         CONTINUE these medications which have NOT CHANGED    Details   calcium carbonate (TUMS) 500 MG chewable tablet Take 1 chew tab by mouth daily as needed for heartburn      LORazepam (ATIVAN) 0.5 MG tablet Take 1 tablet (0.5 mg) by mouth every 4 hours as needed (Anxiety, Nausea/Vomiting or Sleep)  Qty: 30 tablet, Refills: 2    Associated Diagnoses: Lymphadenopathy;  Malignant neoplasm of upper-outer quadrant of right breast in female, estrogen receptor positive (H)      multivitamin w/minerals (THERA-VIT-M) tablet Take 1 tablet by mouth every morning       Omega-3 Fatty Acids (OMEGA-3 FISH OIL PO) Take 1 g by mouth every morning       ondansetron (ZOFRAN) 8 MG tablet Take 1 tablet (8 mg) by mouth every 8 hours as needed (Nausea/Vomiting)  Qty: 10 tablet, Refills: 2    Associated Diagnoses: Carcinoma of unknown origin (H)      rivaroxaban ANTICOAGULANT (XARELTO ANTICOAGULANT) 20 MG TABS tablet Take 1 tablet (20 mg) by mouth daily (with dinner)  Qty: 90 tablet, Refills: 3    Associated Diagnoses: Acute deep vein thrombosis (DVT) of other vein of left upper extremity (H)                 Allergies:         Allergies   Allergen Reactions     Pcn [Penicillins] Hives             Brief History of Illness:    Reason for your hospital stay      Right breast mastectomy             Flor Griffin is a 66-year-old female with stage IV right breast cancer on second line therapy. She was seen in consultation by Dr. Castillo on 5/13 for surgical options and it was decided that she would have right breast mastectomy.    After discussing the risks, benefits, and possible complications, informed consent was obtained and the patient underwent the above procedure.  There were no complications.  Please see the Operative Report for full details.           Hospital Course:   Flor Griffin's hospital course was unremarkable. Hgb following surgery dropped from 11.8 to 10.8, concerning for acute blood loss anemia on chronic anemia with dilutional component. For history of occlusive left subclavian DVT she is on chronic anticoagulation with Xarelto which was held following surgery until Hgb stabilized at 11.0. She recovered as anticipated and experienced no post-operative complications. Her incisions are healing well, there is no evidence of hematoma, her drain output is as expected, and her pain is  "controlled with oral analgesia. Patient has been up walking without lightheadedness. Nursing staff has discussed ROGERS drain care and recording outputs.     On the date of discharge, the patient was discharged to home in stable condition. She will be seen by Dr. Castillo in 1-2 weeks and when her drains are ready to be removed. She verbalized understanding of all discharge instructions and felt comfortable with the discharge plan.  She was asked to call with any further questions or concerns.         Condition on Discharge:        Discharge condition: Stable   Discharge vitals: Blood pressure 94/56, pulse 68, temperature 97.6  F (36.4  C), temperature source Oral, resp. rate 16, height 1.676 m (5' 6\"), weight 77.7 kg (171 lb 6.4 oz), last menstrual period 01/01/2004, SpO2 98 %, not currently breastfeeding.           Discharge Disposition:     Discharged to home          Discharge Instructions and Follow-Up:      Flor PUMA Griffin was asked to follow up with surgical team in 1-2 weeks.      Mile Vanegas PA-C  [facility]  [phone]       "

## 2021-06-03 NOTE — PLAN OF CARE
Alert and oriented x4. Vital signs stable on RA. PRN Oxycodone given for pain. Chest ACE wrapped clean dry and intact. ROGERS x1 with brown output, strip q4h. Lung sounds clear. Voiding. Bowel sounds active, no flatus, no BM. Denies N/V. Up independently. Regular diet. PIV saline locked. Plan for discharge home today.

## 2021-06-04 DIAGNOSIS — R59.1 LYMPHADENOPATHY: ICD-10-CM

## 2021-06-04 DIAGNOSIS — Z17.0 MALIGNANT NEOPLASM OF UPPER-OUTER QUADRANT OF RIGHT BREAST IN FEMALE, ESTROGEN RECEPTOR POSITIVE (H): Primary | ICD-10-CM

## 2021-06-04 DIAGNOSIS — C50.411 MALIGNANT NEOPLASM OF UPPER-OUTER QUADRANT OF RIGHT BREAST IN FEMALE, ESTROGEN RECEPTOR POSITIVE (H): Primary | ICD-10-CM

## 2021-06-04 NOTE — TELEPHONE ENCOUNTER
Jose Maria Hagan,     I called Michelle with her mastectomy pathology results.  I recommended she take a break from chemo and have a repeat PET scan and labs in August.  Could you please have scheduling remove her remaining June oncology appts (all labs and NP visit) and have return visit with me (can be in-person or virtual) for August?  I placed orders for labs and PET to be done in 2 months.     Thank you!   Sally    Message

## 2021-06-04 NOTE — PROGRESS NOTES
I called Michelle today and reviewed her pathology results after her palliative right mastectomy.  Pathology showed a 3 x 2.1 x 2 cm grade 3 invasive ductal carcinoma that had been resistant to past chemotherapy and more recent chemotherapy.  Tumor invasion was noted into underlying skeletal muscle.  No lymph nodes were taken in order to limit her risk of lymphedema and given resolution of he right axillary lymphadenopathy after treatment with eribulin.    I offered Michelle a break from chemotherapy now that she has undergone resection of her primary right breast tumor and repeat a PET scan in 8/2021 -- if there is recurrent metastatic disease, we can restart eribulin at that time.  She expresses understanding, agreement, and satisfaction with this plan of care.    She does have a colonoscopy coming up soon with Dr. Caro to evaluate the sigmoid colon abnormality on her May PET scan.

## 2021-06-10 ENCOUNTER — OFFICE VISIT (OUTPATIENT)
Dept: SURGERY | Facility: CLINIC | Age: 66
End: 2021-06-10
Payer: COMMERCIAL

## 2021-06-10 DIAGNOSIS — Z09 SURGERY FOLLOW-UP EXAMINATION: Primary | ICD-10-CM

## 2021-06-10 PROCEDURE — 99024 POSTOP FOLLOW-UP VISIT: CPT | Performed by: SURGERY

## 2021-06-10 NOTE — PROGRESS NOTES
Surgery Postop Note    Flor Griffin presents today for surgical followup.  she is doing well following right mastectomy.  Incisions look fine with no signs of wound infection.  Final path revealed a 3 x 2 cm area of IDC, triple negative.  This involved the pec muscle, some of which was taken with the specimen.  Final margins were negative.  I expect her to make a complete recovery.  Thank you for the opportunity to help in her care.    Wilfredo Castillo M.D.  [facility]  [phone]    Please route or send letter to:  Primary Care Provider (PCP) and Referring Provider

## 2021-06-16 ENCOUNTER — TELEPHONE (OUTPATIENT)
Dept: SURGERY | Facility: CLINIC | Age: 66
End: 2021-06-16

## 2021-06-16 NOTE — TELEPHONE ENCOUNTER
Right breast mastectomy 6/2/21 MR[facility], questions about how the breast looks and feels     Phone: [phone]    Message ok

## 2021-06-16 NOTE — TELEPHONE ENCOUNTER
"Michelle is s/p right simple mastectomy on 6/2/2021 with Dr. Castillo. She called today to report \"what feels like fluid\" near the incision.    Michelle noticed the fluid collection yesterday near the upper lateral quadrant of right chest wall. It is the size of a golf ball. She denies pain, redness, warmth or drainage associated with it.     Encouraged Michelle to take ibuprofen as needed, apply warm compress to site, and re-apply ace wrap for compression.     Will review above with Dr. Castillo to see if office visit is warranted or if he'd like her to continue to monitor. Michelle verbalized understanding.    Marli Cast RN, BSN, OCN, CBCN  Breast Nurse Navigator  [facility]  Phone: [phone]      "

## 2021-06-17 ENCOUNTER — TELEPHONE (OUTPATIENT)
Dept: ONCOLOGY | Facility: CLINIC | Age: 66
End: 2021-06-17

## 2021-06-17 ENCOUNTER — TELEPHONE (OUTPATIENT)
Dept: SURGERY | Facility: CLINIC | Age: 66
End: 2021-06-17

## 2021-06-17 NOTE — TELEPHONE ENCOUNTER
Consulted with Dr. Stover in clinic, Michelle is holding her Halaven treatment currently and plan is for her to have Pet scan in August with a follow up with Dr. Stover. Her labs, exam with Buster, and Halaven treatment will be canceled. Michelle is aware that she will need to do a port flush soon and she also stated that she is postponing her colonoscopy. Message will be forwarded to scheduling to schedule port flush at [facility] and Pet scan and F/U with Dr. Stover. Danya Lara RN,BSN,OCN

## 2021-06-17 NOTE — TELEPHONE ENCOUNTER
Left message for Michelle, per Dr. Castillo, continue to monitor post op seroma. Call our office back if seroma gets bigger. If seroma increases in size, Dr. Castillo is recommending to order an ultrasound. Encouraged Michelle to call our office back with questions or concerns.    Marli Cast RN, BSN, OCN, CBCN  Breast Nurse Navigator  [facility]  Phone: [phone]

## 2021-06-22 ENCOUNTER — INFUSION THERAPY VISIT (OUTPATIENT)
Dept: INFUSION THERAPY | Facility: CLINIC | Age: 66
End: 2021-06-22
Attending: SURGERY
Payer: COMMERCIAL

## 2021-06-22 DIAGNOSIS — C50.811 MALIGNANT NEOPLASM OF OVERLAPPING SITES OF RIGHT BREAST IN FEMALE, ESTROGEN RECEPTOR POSITIVE (H): ICD-10-CM

## 2021-06-22 DIAGNOSIS — Z95.828 PORT-A-CATH IN PLACE: Primary | ICD-10-CM

## 2021-06-22 DIAGNOSIS — Z17.0 MALIGNANT NEOPLASM OF OVERLAPPING SITES OF RIGHT BREAST IN FEMALE, ESTROGEN RECEPTOR POSITIVE (H): ICD-10-CM

## 2021-06-22 PROCEDURE — 250N000011 HC RX IP 250 OP 636: Performed by: INTERNAL MEDICINE

## 2021-06-22 PROCEDURE — 96523 IRRIG DRUG DELIVERY DEVICE: CPT

## 2021-06-22 RX ORDER — HEPARIN SODIUM (PORCINE) LOCK FLUSH IV SOLN 100 UNIT/ML 100 UNIT/ML
5 SOLUTION INTRAVENOUS
Status: CANCELLED | OUTPATIENT
Start: 2021-06-22

## 2021-06-22 RX ORDER — HEPARIN SODIUM,PORCINE 10 UNIT/ML
5 VIAL (ML) INTRAVENOUS
Status: CANCELLED | OUTPATIENT
Start: 2021-06-22

## 2021-06-22 RX ORDER — HEPARIN SODIUM (PORCINE) LOCK FLUSH IV SOLN 100 UNIT/ML 100 UNIT/ML
5 SOLUTION INTRAVENOUS
Status: DISCONTINUED | OUTPATIENT
Start: 2021-06-22 | End: 2021-06-22 | Stop reason: HOSPADM

## 2021-06-22 RX ADMIN — Medication 5 ML: at 13:08

## 2021-06-22 NOTE — PROGRESS NOTES
Nursing Note:  Flor Griffin presents today for port flush.    Patient seen by provider today: No   present during visit today: Not Applicable.    Note: N/A.    Intravenous Access:  Implanted Port.    Discharge Plan:   Patient was discharged to home     Ericka Sharma RN

## 2021-06-23 DIAGNOSIS — Z11.59 ENCOUNTER FOR SCREENING FOR OTHER VIRAL DISEASES: ICD-10-CM

## 2021-07-04 ENCOUNTER — HEALTH MAINTENANCE LETTER (OUTPATIENT)
Age: 66
End: 2021-07-04

## 2021-07-19 ENCOUNTER — LAB (OUTPATIENT)
Dept: LAB | Facility: CLINIC | Age: 66
End: 2021-07-19
Attending: INTERNAL MEDICINE
Payer: COMMERCIAL

## 2021-07-19 DIAGNOSIS — Z11.59 ENCOUNTER FOR SCREENING FOR OTHER VIRAL DISEASES: ICD-10-CM

## 2021-07-19 PROCEDURE — U0005 INFEC AGEN DETEC AMPLI PROBE: HCPCS

## 2021-07-19 PROCEDURE — U0003 INFECTIOUS AGENT DETECTION BY NUCLEIC ACID (DNA OR RNA); SEVERE ACUTE RESPIRATORY SYNDROME CORONAVIRUS 2 (SARS-COV-2) (CORONAVIRUS DISEASE [COVID-19]), AMPLIFIED PROBE TECHNIQUE, MAKING USE OF HIGH THROUGHPUT TECHNOLOGIES AS DESCRIBED BY CMS-2020-01-R: HCPCS

## 2021-07-20 LAB — SARS-COV-2 RNA RESP QL NAA+PROBE: NEGATIVE

## 2021-07-22 ENCOUNTER — HOSPITAL ENCOUNTER (OUTPATIENT)
Facility: CLINIC | Age: 66
Discharge: HOME | End: 2021-07-22
Attending: INTERNAL MEDICINE | Admitting: INTERNAL MEDICINE
Payer: COMMERCIAL

## 2021-07-22 VITALS
WEIGHT: 171 LBS | SYSTOLIC BLOOD PRESSURE: 97 MMHG | RESPIRATION RATE: 9 BRPM | HEIGHT: 66 IN | DIASTOLIC BLOOD PRESSURE: 59 MMHG | OXYGEN SATURATION: 99 % | HEART RATE: 53 BPM | BODY MASS INDEX: 27.48 KG/M2

## 2021-07-22 LAB — COLONOSCOPY: NORMAL

## 2021-07-22 PROCEDURE — 250N000011 HC RX IP 250 OP 636: Performed by: INTERNAL MEDICINE

## 2021-07-22 PROCEDURE — 45378 DIAGNOSTIC COLONOSCOPY: CPT | Performed by: INTERNAL MEDICINE

## 2021-07-22 PROCEDURE — G0500 MOD SEDAT ENDO SERVICE >5YRS: HCPCS | Performed by: INTERNAL MEDICINE

## 2021-07-22 RX ORDER — PROCHLORPERAZINE MALEATE 5 MG
5 TABLET ORAL EVERY 6 HOURS PRN
Status: CANCELLED | OUTPATIENT
Start: 2021-07-22

## 2021-07-22 RX ORDER — NALOXONE HYDROCHLORIDE 0.4 MG/ML
0.4 INJECTION, SOLUTION INTRAMUSCULAR; INTRAVENOUS; SUBCUTANEOUS
Status: CANCELLED | OUTPATIENT
Start: 2021-07-22

## 2021-07-22 RX ORDER — FENTANYL CITRATE 50 UG/ML
INJECTION, SOLUTION INTRAMUSCULAR; INTRAVENOUS PRN
Status: COMPLETED | OUTPATIENT
Start: 2021-07-22 | End: 2021-07-22

## 2021-07-22 RX ORDER — ONDANSETRON 4 MG/1
4 TABLET, ORALLY DISINTEGRATING ORAL EVERY 6 HOURS PRN
Status: CANCELLED | OUTPATIENT
Start: 2021-07-22

## 2021-07-22 RX ORDER — NALOXONE HYDROCHLORIDE 0.4 MG/ML
0.2 INJECTION, SOLUTION INTRAMUSCULAR; INTRAVENOUS; SUBCUTANEOUS
Status: CANCELLED | OUTPATIENT
Start: 2021-07-22

## 2021-07-22 RX ORDER — ONDANSETRON 2 MG/ML
4 INJECTION INTRAMUSCULAR; INTRAVENOUS EVERY 6 HOURS PRN
Status: CANCELLED | OUTPATIENT
Start: 2021-07-22

## 2021-07-22 RX ORDER — FLUMAZENIL 0.1 MG/ML
0.2 INJECTION, SOLUTION INTRAVENOUS
Status: CANCELLED | OUTPATIENT
Start: 2021-07-22 | End: 2021-07-22

## 2021-07-22 RX ORDER — LIDOCAINE 40 MG/G
CREAM TOPICAL
Status: CANCELLED | OUTPATIENT
Start: 2021-07-22

## 2021-07-22 RX ORDER — ONDANSETRON 2 MG/ML
4 INJECTION INTRAMUSCULAR; INTRAVENOUS
Status: CANCELLED | OUTPATIENT
Start: 2021-07-22

## 2021-07-22 RX ADMIN — MIDAZOLAM 2 MG: 1 INJECTION INTRAMUSCULAR; INTRAVENOUS at 09:19

## 2021-07-22 RX ADMIN — MIDAZOLAM 2 MG: 1 INJECTION INTRAMUSCULAR; INTRAVENOUS at 09:14

## 2021-07-22 RX ADMIN — FENTANYL CITRATE 100 MCG: 50 INJECTION, SOLUTION INTRAMUSCULAR; INTRAVENOUS at 09:14

## 2021-07-22 ASSESSMENT — MIFFLIN-ST. JEOR: SCORE: 1332.4

## 2021-07-24 NOTE — PROGRESS NOTES
[facility]    Hematology/Oncology Established Patient Follow-up Note      Today's Date: 8/5/21    Reason for Follow-up: Metastatic breast cancer.    Phone visit duration (she was unable to connect via video): 11 minutes    HISTORY OF PRESENT ILLNESS: Flor Griffin is a 66 year old female who presents with the following oncologic history:     --Clinical TX N3c M0 adenocarcinoma which is poorly differentiated, likely of breast origin, ER low-positive at 1-5%, CA negative, HER-2/mitzi FISH negative, androgen stain weak-intermediate 10-20%.  Initially she was seen 11/22/2017 after she underwent right supraclavicular lymph node biopsy which was positive for poorly differentiated adenocarcinoma.  She had this lymph node almost a month and had an excisional biopsy performed which was consistent with the diagnosis of cancer.   --A PET/CT scan 11/27/2017 showed hypermetabolic right supraclavicular, right axillary and subpectoral adenopathy.  Otherwise, no distant metastatic disease.   --3/7/2018: Completed 4 cycles of carboplatin and paclitaxel followed by dose dense AC x 4 cycles.  No surgery was done.  --5/29/2018-7/31/2018: Completed adjuvant radiation to right breast, right axilla, right supraclavicular region.  --9/2018: Started adjuvant letrozole.  --10/15/2020: Screening mammogram showed focal asymmetry in upper outer right breast; no suspicious findings in left breast.  --10/21/2020: U/S of right breast showed irregularly-shaped hypoechoic mass at 10:00, 7 cm from nipple, measuring 3.2 x 2.7 x 3.6 cm. Right axillary ultrasound shows multiple normal-appearing lymph nodes. Biopsy of right breast mass at 10:00 showed poorly differentiated carcinoma, no lymphovascular invasion, morphology consistent with breast cancer and similar to prior axillary node tumor, ER weakly positive at 1% and CA negative (0%), HER-2/mitzi FISH negative.  --10/28/2020: PET/CT scan showed high metabolic activity in 3 cm area  of right upper outer breast, several small hypermetabolic lymph nodes in high right axilla and right subclavian region; mild hypermetabolic lymph nodes in bilateral hilar regions, favor metastatic disease; several tiny nodules in right upper lung, favor benign etiology; small hypermetabolic focus in pelvis in either sigmoid colon or adjacent loop of small bowel.  --11/02/2020: Started 1st line metastatic therapy with capecitabine.  --2/9/2021: PET/CT scan showed hypermetabolic right breast mass not significantly changed since 12/7/2020, persistent hypermetabolism; hypermetabolic right axillary lymph node increased in size; no distant metastasis; focal hypermetabolism in pelvis associated with sigmoid colon; stable 6-mm lung nodules.  --2/18/2021: Due to disease progression, switched to 2nd line metastatic therapy with eribulin. Required dose reduction due to neutropenia.  --4/24/2021: Left upper extremity Doppler U/S showed occlusive DVT within left subclavian vein; superficial thrombophlebitis within proximal cephalic vein. Started rivaroxaban.  --5/11/2021: PET scan showed unchanged size and slightly increased uptake to right breast; resolved right axillary adenopathy; no new lesions; persistent hypermetabolism at proximal sigmoid colon.  --5/19/2021 On chemo break after cycle 4 of eribulin.  --6/2/2021: Underwent palliative right mastectomy under care of Dr. Wilfredo Castillo. Pathology showed grade 3 invasive ductal carcinoma with tumor invasion into underlying skeletal muscle. Margins negative.  --7/22/2021: Colonoscopy showed diverticula in sigmoid colon; normal mucosa throughout entire colon.  --8/3/2021: PET scan showed postsurgical seroma at right chest wall and axilla; small focus of residual mild FDG uptake at superolateral resection margin, likely posttreatment change or inflammation; no metastatic disease; persistent focus of uptake in sigmoid colon corresponding to diverticular disease.    INTERIM  HISTORY:  Michelle reports seroma at her right chest wall that is gradually improving. She has no new pain.      REVIEW OF SYSTEMS:   14 point ROS was reviewed and is negative other than as noted above in HPI.       HOME MEDICATIONS:  Current Outpatient Medications   Medication Sig Dispense Refill     calcium carbonate (TUMS) 500 MG chewable tablet Take 1 chew tab by mouth daily as needed for heartburn       LORazepam (ATIVAN) 0.5 MG tablet Take 1 tablet (0.5 mg) by mouth every 4 hours as needed (Anxiety, Nausea/Vomiting or Sleep) 30 tablet 2     methocarbamol (ROBAXIN) 500 MG tablet Take 1 tablet (500 mg) by mouth every 6 hours as needed for muscle spasms 20 tablet 0     multivitamin w/minerals (THERA-VIT-M) tablet Take 1 tablet by mouth every morning        Omega-3 Fatty Acids (OMEGA-3 FISH OIL PO) Take 1 g by mouth every morning        ondansetron (ZOFRAN) 8 MG tablet Take 1 tablet (8 mg) by mouth every 8 hours as needed (Nausea/Vomiting) 10 tablet 2     oxyCODONE (ROXICODONE) 5 MG tablet Take 1 tablet (5 mg) by mouth every 4 hours as needed for moderate to severe pain 15 tablet 0     rivaroxaban ANTICOAGULANT (XARELTO ANTICOAGULANT) 20 MG TABS tablet Take 1 tablet (20 mg) by mouth daily (with dinner) (Patient taking differently: Take 20 mg by mouth every morning ) 90 tablet 3     sennosides (SENOKOT) 8.6 MG tablet Take 1 tablet by mouth 2 times daily 30 tablet 0         ALLERGIES:  Allergies   Allergen Reactions     Pcn [Penicillins] Hives         PAST MEDICAL HISTORY:  Past Medical History:   Diagnosis Date     Basal cell cancer     right cheek     Breast cancer, right breast (H)      DVT (deep venous thrombosis) (H)      Gastroesophageal reflux disease      History of blood transfusion     blue baby     Hyperlipidaemia          PAST SURGICAL HISTORY:  Past Surgical History:   Procedure Laterality Date     BIOPSY MASS NECK Right 11/16/2017    Procedure: BIOPSY MASS NECK;  Excisional Biopsy Right Neck Lymph node;   Surgeon: Waylon Corral MD;  Location: [facility] OR     COLONOSCOPY       COLONOSCOPY N/A 7/22/2021    Procedure: COLONOSCOPY;  Surgeon: Gabriele Caro MD;  Location: [facility] GI     IR CHEST PORT PLACEMENT > 5 YRS OF AGE  2/16/2021     IR PORT REMOVAL LEFT  5/13/2019     MASTECTOMY SIMPLE Right 6/2/2021    Procedure: RIGHT SIMPLE MASTECTOMY;  Surgeon: Ulises Castillo MD;  Location:  OR     MOHS MICROGRAPHIC PROCEDURE  ~2010    right cheek; [facility]     MOHS MICROGRAPHIC PROCEDURE  10/31/2016    Dr. Nicholas [facility]         SOCIAL HISTORY:  Social History     Socioeconomic History     Marital status:      Spouse name: Not on file     Number of children: Not on file     Years of education: Not on file     Highest education level: Not on file   Occupational History     Not on file   Tobacco Use     Smoking status: Never Smoker     Smokeless tobacco: Never Used   Substance and Sexual Activity     Alcohol use: Yes     Alcohol/week: 0.0 standard drinks     Comment: 2 times a week, 2 drinks     Drug use: No     Sexual activity: Yes     Partners: Male     Birth control/protection: Post-menopausal   Other Topics Concern     Parent/sibling w/ CABG, MI or angioplasty before 65F 55M? No   Social History Narrative     Not on file     Social Determinants of Health     Financial Resource Strain:      Difficulty of Paying Living Expenses:    Food Insecurity:      Worried About Running Out of Food in the Last Year:      Ran Out of Food in the Last Year:    Transportation Needs:      Lack of Transportation (Medical):      Lack of Transportation (Non-Medical):    Physical Activity:      Days of Exercise per Week:      Minutes of Exercise per Session:    Stress:      Feeling of Stress :    Social Connections:      Frequency of Communication with Friends and Family:      Frequency of Social Gatherings with Friends and Family:      Attends Gnosticist Services:      Active Member of Clubs or Organizations:      Attends Club or  Organization Meetings:      Marital Status:    Intimate Partner Violence:      Fear of Current or Ex-Partner:      Emotionally Abused:      Physically Abused:      Sexually Abused:          FAMILY HISTORY:  Family History   Problem Relation Age of Onset     Lupus Mother      Heart Disease Mother         CHF     Coronary Artery Disease Mother      Hyperlipidemia Mother      Diabetes Father      Breast Cancer Other          PHYSICAL EXAM:  Vital signs:  Not taken.  Not performed as this was a telephone visit.    LABS:  CBC RESULTS:   Recent Labs   Lab Test 05/12/21  0806   WBC 4.6   RBC 3.58*   HGB 11.5*   HCT 35.7      MCH 32.1   MCHC 32.2   RDW 15.0        Last Comprehensive Metabolic Panel:  Sodium   Date Value Ref Range Status   05/27/2021 139 133 - 144 mmol/L Final     Potassium   Date Value Ref Range Status   05/27/2021 3.6 3.4 - 5.3 mmol/L Final     Chloride   Date Value Ref Range Status   05/27/2021 107 94 - 109 mmol/L Final     Carbon Dioxide   Date Value Ref Range Status   05/27/2021 28 20 - 32 mmol/L Final     Anion Gap   Date Value Ref Range Status   05/27/2021 4 3 - 14 mmol/L Final     Glucose   Date Value Ref Range Status   05/27/2021 136 (H) 70 - 99 mg/dL Final     Urea Nitrogen   Date Value Ref Range Status   05/27/2021 13 7 - 30 mg/dL Final     Creatinine   Date Value Ref Range Status   05/27/2021 0.76 0.52 - 1.04 mg/dL Final     GFR Estimate   Date Value Ref Range Status   05/27/2021 82 >60 mL/min/[1.73_m2] Final     Comment:     Non  GFR Calc  Starting 12/18/2018, serum creatinine based estimated GFR (eGFR) will be   calculated using the Chronic Kidney Disease Epidemiology Collaboration   (CKD-EPI) equation.       Calcium   Date Value Ref Range Status   05/27/2021 9.0 8.5 - 10.1 mg/dL Final     Bilirubin Total   Date Value Ref Range Status   05/18/2021 0.8 0.2 - 1.3 mg/dL Final     Alkaline Phosphatase   Date Value Ref Range Status   05/18/2021 81 40 - 150 U/L Final      ALT   Date Value Ref Range Status   05/18/2021 58 (H) 0 - 50 U/L Final     AST   Date Value Ref Range Status   05/18/2021 38 0 - 45 U/L Final       PATHOLOGY:  Reviewed as per HPI.    IMAGING:   Reviewed as per HPI.    ASSESSMENT/PLAN:  Flor Griffin is a 66 year old female with the following issues:  1.  Right breast poorly differentiated adenocarcinoma, ER weakly positive (1%), MA negative, HER-2/mitzi negative with metastatic recurrence  2. Bilateral hilar lymphadenopathy, now resolved  3. Indeterminate pulmonary nodules, likely benign and stable  --Michelle is s/p palliative right mastectomy and has been on chemo break from eribulin since 5/19/2021.  -I personally reviewed the PET scan from 8/3/2021 which shows postsurgical seroma and a focus of inflammation at the resection margin but otherwise no evidence of metastatic disease.  The focus in the sigmoid colon corresponds to her diverticular disease. Therefore, I recommended continuing on break from chemotherapy and repeating a PET scan in 3-4 months. She is thrilled with this plan of care.    4. Anemia of chemotherapy  -Stable, no indication for transfusion.    5. Left upper extremity deep vein thrombosis   --4/24/2021 Doppler U/S showed occlusive DVT in left subclavian vein.  --Continue rivaroxaban. Given her relatively recent malignancy recurrence, I recommended long term anticoagulation. I discussed we can revisit this if she had clear scans for a year.    Return in 3-4 months.    Sally Stover MD  Hematology/Oncology  [facility]

## 2021-08-03 ENCOUNTER — HOSPITAL ENCOUNTER (OUTPATIENT)
Dept: PET IMAGING | Facility: CLINIC | Age: 66
Discharge: HOME | End: 2021-08-03
Attending: INTERNAL MEDICINE | Admitting: INTERNAL MEDICINE
Payer: COMMERCIAL

## 2021-08-03 DIAGNOSIS — R59.1 LYMPHADENOPATHY: ICD-10-CM

## 2021-08-03 DIAGNOSIS — Z17.0 MALIGNANT NEOPLASM OF UPPER-OUTER QUADRANT OF RIGHT BREAST IN FEMALE, ESTROGEN RECEPTOR POSITIVE (H): ICD-10-CM

## 2021-08-03 DIAGNOSIS — C50.411 MALIGNANT NEOPLASM OF UPPER-OUTER QUADRANT OF RIGHT BREAST IN FEMALE, ESTROGEN RECEPTOR POSITIVE (H): ICD-10-CM

## 2021-08-03 PROCEDURE — 74177 CT ABD & PELVIS W/CONTRAST: CPT | Mod: 26 | Performed by: STUDENT IN AN ORGANIZED HEALTH CARE EDUCATION/TRAINING PROGRAM

## 2021-08-03 PROCEDURE — 71260 CT THORAX DX C+: CPT | Mod: 26 | Performed by: STUDENT IN AN ORGANIZED HEALTH CARE EDUCATION/TRAINING PROGRAM

## 2021-08-03 PROCEDURE — 250N000011 HC RX IP 250 OP 636: Performed by: INTERNAL MEDICINE

## 2021-08-03 PROCEDURE — A9552 F18 FDG: HCPCS | Performed by: INTERNAL MEDICINE

## 2021-08-03 PROCEDURE — 78816 PET IMAGE W/CT FULL BODY: CPT | Mod: PS

## 2021-08-03 PROCEDURE — 78816 PET IMAGE W/CT FULL BODY: CPT | Mod: 26 | Performed by: STUDENT IN AN ORGANIZED HEALTH CARE EDUCATION/TRAINING PROGRAM

## 2021-08-03 PROCEDURE — 343N000001 HC RX 343: Performed by: INTERNAL MEDICINE

## 2021-08-03 PROCEDURE — 74177 CT ABD & PELVIS W/CONTRAST: CPT

## 2021-08-03 RX ORDER — HEPARIN SODIUM (PORCINE) LOCK FLUSH IV SOLN 100 UNIT/ML 100 UNIT/ML
500 SOLUTION INTRAVENOUS EVERY 8 HOURS
Status: DISCONTINUED | OUTPATIENT
Start: 2021-08-03 | End: 2021-08-04 | Stop reason: HOSPADM

## 2021-08-03 RX ORDER — IOPAMIDOL 755 MG/ML
10-135 INJECTION, SOLUTION INTRAVASCULAR ONCE
Status: COMPLETED | OUTPATIENT
Start: 2021-08-03 | End: 2021-08-03

## 2021-08-03 RX ADMIN — IOPAMIDOL 105 ML: 755 INJECTION, SOLUTION INTRAVENOUS at 09:16

## 2021-08-03 RX ADMIN — Medication 500 UNITS: at 09:16

## 2021-08-03 RX ADMIN — FLUDEOXYGLUCOSE F-18 12.62 MCI.: 500 INJECTION, SOLUTION INTRAVENOUS at 09:15

## 2021-08-05 ENCOUNTER — VIRTUAL VISIT (OUTPATIENT)
Dept: ONCOLOGY | Facility: CLINIC | Age: 66
End: 2021-08-05
Attending: INTERNAL MEDICINE
Payer: COMMERCIAL

## 2021-08-05 DIAGNOSIS — C50.411 MALIGNANT NEOPLASM OF UPPER-OUTER QUADRANT OF RIGHT BREAST IN FEMALE, ESTROGEN RECEPTOR POSITIVE (H): Primary | ICD-10-CM

## 2021-08-05 DIAGNOSIS — Z86.718 PERSONAL HISTORY OF DVT (DEEP VEIN THROMBOSIS): ICD-10-CM

## 2021-08-05 DIAGNOSIS — Z17.0 MALIGNANT NEOPLASM OF UPPER-OUTER QUADRANT OF RIGHT BREAST IN FEMALE, ESTROGEN RECEPTOR POSITIVE (H): Primary | ICD-10-CM

## 2021-08-05 PROCEDURE — 99213 OFFICE O/P EST LOW 20 MIN: CPT | Mod: 95 | Performed by: INTERNAL MEDICINE

## 2021-08-05 NOTE — LETTER
8/5/2021         RE: Flor Griffin  [address]  [place] [facility] [place] [id number]        Dear Colleague,    Thank you for referring your patient, Flor Griffin, to the [facility]. Please see a copy of my visit note below.    [facility]    Hematology/Oncology Established Patient Follow-up Note      Today's Date: 8/5/21    Reason for Follow-up: Metastatic breast cancer.    Phone visit duration (she was unable to connect via video): 11 minutes    HISTORY OF PRESENT ILLNESS: Flor Griffin is a 66 year old female who presents with the following oncologic history:     --Clinical TX N3c M0 adenocarcinoma which is poorly differentiated, likely of breast origin, ER low-positive at 1-5%, MT negative, HER-2/mitzi FISH negative, androgen stain weak-intermediate 10-20%.  Initially she was seen 11/22/2017 after she underwent right supraclavicular lymph node biopsy which was positive for poorly differentiated adenocarcinoma.  She had this lymph node almost a month and had an excisional biopsy performed which was consistent with the diagnosis of cancer.   --A PET/CT scan 11/27/2017 showed hypermetabolic right supraclavicular, right axillary and subpectoral adenopathy.  Otherwise, no distant metastatic disease.   --3/7/2018: Completed 4 cycles of carboplatin and paclitaxel followed by dose dense AC x 4 cycles.  No surgery was done.  --5/29/2018-7/31/2018: Completed adjuvant radiation to right breast, right axilla, right supraclavicular region.  --9/2018: Started adjuvant letrozole.  --10/15/2020: Screening mammogram showed focal asymmetry in upper outer right breast; no suspicious findings in left breast.  --10/21/2020: U/S of right breast showed irregularly-shaped hypoechoic mass at 10:00, 7 cm from nipple, measuring 3.2 x 2.7 x 3.6 cm. Right axillary ultrasound shows multiple normal-appearing lymph nodes. Biopsy of right breast mass at 10:00 showed poorly differentiated  carcinoma, no lymphovascular invasion, morphology consistent with breast cancer and similar to prior axillary node tumor, ER weakly positive at 1% and [place] negative (0%), HER-2/mitzi FISH negative.  --10/28/2020: PET/CT scan showed high metabolic activity in 3 cm area of right upper outer breast, several small hypermetabolic lymph nodes in high right axilla and right subclavian region; mild hypermetabolic lymph nodes in bilateral hilar regions, favor metastatic disease; several tiny nodules in right upper lung, favor benign etiology; small hypermetabolic focus in pelvis in either sigmoid colon or adjacent loop of small bowel.  --11/02/2020: Started 1st line metastatic therapy with capecitabine.  --2/9/2021: PET/CT scan showed hypermetabolic right breast mass not significantly changed since 12/7/2020, persistent hypermetabolism; hypermetabolic right axillary lymph node increased in size; no distant metastasis; focal hypermetabolism in pelvis associated with sigmoid colon; stable 6-mm lung nodules.  --2/18/2021: Due to disease progression, switched to 2nd line metastatic therapy with eribulin. Required dose reduction due to neutropenia.  --4/24/2021: Left upper extremity Doppler U/S showed occlusive DVT within left subclavian vein; superficial thrombophlebitis within proximal cephalic vein. Started rivaroxaban.  --5/11/2021: PET scan showed unchanged size and slightly increased uptake to right breast; resolved right axillary adenopathy; no new lesions; persistent hypermetabolism at proximal sigmoid colon.  --5/19/2021 On chemo break after cycle 4 of eribulin.  --6/2/2021: Underwent palliative right mastectomy under care of Dr. Wilfredo Castillo. Pathology showed grade 3 invasive ductal carcinoma with tumor invasion into underlying skeletal muscle. Margins negative.  --7/22/2021: Colonoscopy showed diverticula in sigmoid colon; normal mucosa throughout entire colon.  --8/3/2021: PET scan showed postsurgical seroma at right chest  wall and axilla; small focus of residual mild FDG uptake at superolateral resection margin, likely posttreatment change or inflammation; no metastatic disease; persistent focus of uptake in sigmoid colon corresponding to diverticular disease.    INTERIM HISTORY:  Michelle reports seroma at her right chest wall that is gradually improving. She has no new pain.      REVIEW OF SYSTEMS:   14 point ROS was reviewed and is negative other than as noted above in HPI.       HOME MEDICATIONS:  Current Outpatient Medications   Medication Sig Dispense Refill     calcium carbonate (TUMS) 500 MG chewable tablet Take 1 chew tab by mouth daily as needed for heartburn       LORazepam (ATIVAN) 0.5 MG tablet Take 1 tablet (0.5 mg) by mouth every 4 hours as needed (Anxiety, Nausea/Vomiting or Sleep) 30 tablet 2     methocarbamol (ROBAXIN) 500 MG tablet Take 1 tablet (500 mg) by mouth every 6 hours as needed for muscle spasms 20 tablet 0     multivitamin w/minerals (THERA-VIT-M) tablet Take 1 tablet by mouth every morning        Omega-3 Fatty Acids (OMEGA-3 FISH OIL PO) Take 1 g by mouth every morning        ondansetron (ZOFRAN) 8 MG tablet Take 1 tablet (8 mg) by mouth every 8 hours as needed (Nausea/Vomiting) 10 tablet 2     oxyCODONE (ROXICODONE) 5 MG tablet Take 1 tablet (5 mg) by mouth every 4 hours as needed for moderate to severe pain 15 tablet 0     rivaroxaban ANTICOAGULANT (XARELTO ANTICOAGULANT) 20 MG TABS tablet Take 1 tablet (20 mg) by mouth daily (with dinner) (Patient taking differently: Take 20 mg by mouth every morning ) 90 tablet 3     sennosides (SENOKOT) 8.6 MG tablet Take 1 tablet by mouth 2 times daily 30 tablet 0         ALLERGIES:  Allergies   Allergen Reactions     Pcn [Penicillins] Hives         PAST MEDICAL HISTORY:  Past Medical History:   Diagnosis Date     Basal cell cancer     right cheek     Breast cancer, right breast (H)      DVT (deep venous thrombosis) (H)      Gastroesophageal reflux disease      History  of blood transfusion     blue baby     Hyperlipidaemia          PAST SURGICAL HISTORY:  Past Surgical History:   Procedure Laterality Date     BIOPSY MASS NECK Right 11/16/2017    Procedure: BIOPSY MASS NECK;  Excisional Biopsy Right Neck Lymph node;  Surgeon: Waylon Corral MD;  Location: [facility] OR     COLONOSCOPY       COLONOSCOPY N/A 7/22/2021    Procedure: COLONOSCOPY;  Surgeon: Gabriele Caro MD;  Location: [facility]     IR CHEST PORT PLACEMENT > 5 YRS OF AGE  2/16/2021     IR PORT REMOVAL LEFT  5/13/2019     MASTECTOMY SIMPLE Right 6/2/2021    Procedure: RIGHT SIMPLE MASTECTOMY;  Surgeon: Ulises Castillo MD;  Location: [facility] OR     MOHS MICROGRAPHIC PROCEDURE  ~2010    right cheek; [facility]     MOHS MICROGRAPHIC PROCEDURE  10/31/2016    Dr. Nicholas [facility]         SOCIAL HISTORY:  Social History     Socioeconomic History     Marital status:      Spouse name: Not on file     Number of children: Not on file     Years of education: Not on file     Highest education level: Not on file   Occupational History     Not on file   Tobacco Use     Smoking status: Never Smoker     Smokeless tobacco: Never Used   Substance and Sexual Activity     Alcohol use: Yes     Alcohol/week: 0.0 standard drinks     Comment: 2 times a week, 2 drinks     Drug use: No     Sexual activity: Yes     Partners: Male     Birth control/protection: Post-menopausal   Other Topics Concern     Parent/sibling w/ CABG, MI or angioplasty before 65F 55M? No   Social History Narrative     Not on file     Social Determinants of Health     Financial Resource Strain:      Difficulty of Paying Living Expenses:    Food Insecurity:      Worried About Running Out of Food in the Last Year:      Ran Out of Food in the Last Year:    Transportation Needs:      Lack of Transportation (Medical):      Lack of Transportation (Non-Medical):    Physical Activity:      Days of Exercise per Week:      Minutes of Exercise per Session:    Stress:      Feeling of Stress  :    Social Connections:      Frequency of Communication with Friends and Family:      Frequency of Social Gatherings with Friends and Family:      Attends Evangelical Services:      Active Member of Clubs or Organizations:      Attends Club or Organization Meetings:      Marital Status:    Intimate Partner Violence:      Fear of Current or Ex-Partner:      Emotionally Abused:      Physically Abused:      Sexually Abused:          FAMILY HISTORY:  Family History   Problem Relation Age of Onset     Lupus Mother      Heart Disease Mother         CHF     Coronary Artery Disease Mother      Hyperlipidemia Mother      Diabetes Father      Breast Cancer Other          PHYSICAL EXAM:  Vital signs:  Not taken.  Not performed as this was a telephone visit.    LABS:  CBC RESULTS:   Recent Labs   Lab Test 05/12/21  0806   WBC 4.6   RBC 3.58*   HGB 11.5*   HCT 35.7      MCH 32.1   MCHC 32.2   RDW 15.0        Last Comprehensive Metabolic Panel:  Sodium   Date Value Ref Range Status   05/27/2021 139 133 - 144 mmol/L Final     Potassium   Date Value Ref Range Status   05/27/2021 3.6 3.4 - 5.3 mmol/L Final     Chloride   Date Value Ref Range Status   05/27/2021 107 94 - 109 mmol/L Final     Carbon Dioxide   Date Value Ref Range Status   05/27/2021 28 20 - 32 mmol/L Final     Anion Gap   Date Value Ref Range Status   05/27/2021 4 3 - 14 mmol/L Final     Glucose   Date Value Ref Range Status   05/27/2021 136 (H) 70 - 99 mg/dL Final     Urea Nitrogen   Date Value Ref Range Status   05/27/2021 13 7 - 30 mg/dL Final     Creatinine   Date Value Ref Range Status   05/27/2021 0.76 0.52 - 1.04 mg/dL Final     GFR Estimate   Date Value Ref Range Status   05/27/2021 82 >60 mL/min/[1.73_m2] Final     Comment:     Non  GFR Calc  Starting 12/18/2018, serum creatinine based estimated GFR (eGFR) will be   calculated using the Chronic Kidney Disease Epidemiology Collaboration   (CKD-EPI) equation.       Calcium   Date  Value Ref Range Status   05/27/2021 9.0 8.5 - 10.1 mg/dL Final     Bilirubin Total   Date Value Ref Range Status   05/18/2021 0.8 0.2 - 1.3 mg/dL Final     Alkaline Phosphatase   Date Value Ref Range Status   05/18/2021 81 40 - 150 U/L Final     ALT   Date Value Ref Range Status   05/18/2021 58 (H) 0 - 50 U/L Final     AST   Date Value Ref Range Status   05/18/2021 38 0 - 45 U/L Final       PATHOLOGY:  Reviewed as per HPI.    IMAGING:   Reviewed as per HPI.    ASSESSMENT/PLAN:  Flor Griffin is a 66 year old female with the following issues:  1.  Right breast poorly differentiated adenocarcinoma, ER weakly positive (1%), UT negative, HER-2/mitzi negative with metastatic recurrence  2. Bilateral hilar lymphadenopathy, now resolved  3. Indeterminate pulmonary nodules, likely benign and stable  --Michelle is s/p palliative right mastectomy and has been on chemo break from eribulin since 5/19/2021.  -I personally reviewed the PET scan from 8/3/2021 which shows postsurgical seroma and a focus of inflammation at the resection margin but otherwise no evidence of metastatic disease.  The focus in the sigmoid colon corresponds to her diverticular disease. Therefore, I recommended continuing on break from chemotherapy and repeating a PET scan in 3-4 months. She is thrilled with this plan of care.    4. Anemia of chemotherapy  -Stable, no indication for transfusion.    5. Left upper extremity deep vein thrombosis   --4/24/2021 Doppler U/S showed occlusive DVT in left subclavian vein.  --Continue rivaroxaban. Given her relatively recent malignancy recurrence, I recommended long term anticoagulation. I discussed we can revisit this if she had clear scans for a year.    Return in 3-4 months.    Sally Stover MD  Hematology/Oncology  [facility]    Michelle is a 66 year old who is being evaluated via a billable video visit.      How would you like to obtain your AVS? MyChart    Please send text to:  [phone]      Will anyone else be joining your video visit? No      Video Start Time:   Video-Visit Details    Type of service:  Video Visit    Video End Time:    Originating Location (pt. Location): Home    Distant Location (provider location):  [employer]     Platform used for Video Visit: Eleuter[employer]      Again, thank you for allowing me to participate in the care of your patient.        Sincerely,        Sally Stover MD

## 2021-08-05 NOTE — PROGRESS NOTES
Michelle is a 66 year old who is being evaluated via a billable video visit.      How would you like to obtain your AVS? Bundle I[employer]ar[employer]er[employer]    Please send text to: [phone]      Will anyone else be joining your video visit? No      Video Start Time:   Video-Visit Details    Type of service:  Video Visit    Video End Time:    Originating Location (pt. Location): Home    Distant Location (provider location):  [facility]     Platform used for Video Visit: Eleuterio

## 2021-08-05 NOTE — LETTER
8/5/2021         RE: Flor Griffin  [address]  [facility] [address]        Dear Colleague,    Thank you for referring your patient, Flor Griffin, to the [facility]. Please see a copy of my visit note below.    [facility]    Hematology/Oncology Established Patient Follow-up Note      Today's Date: 8/5/21    Reason for Follow-up: Metastatic breast cancer.    Phone visit duration (she was unable to connect via video): 11 minutes    HISTORY OF PRESENT ILLNESS: Flor Griffin is a 66 year old female who presents with the following oncologic history:     --Clinical TX N3c M0 adenocarcinoma which is poorly differentiated, likely of breast origin, ER low-positive at 1-5%, ID negative, HER-2/mitzi FISH negative, androgen stain weak-intermediate 10-20%.  Initially she was seen 11/22/2017 after she underwent right supraclavicular lymph node biopsy which was positive for poorly differentiated adenocarcinoma.  She had this lymph node almost a month and had an excisional biopsy performed which was consistent with the diagnosis of cancer.   --A PET/CT scan 11/27/2017 showed hypermetabolic right supraclavicular, right axillary and subpectoral adenopathy.  Otherwise, no distant metastatic disease.   --3/7/2018: Completed 4 cycles of carboplatin and paclitaxel followed by dose dense AC x 4 cycles.  No surgery was done.  --5/29/2018-7/31/2018: Completed adjuvant radiation to right breast, right axilla, right supraclavicular region.  --9/2018: Started adjuvant letrozole.  --10/15/2020: Screening mammogram showed focal asymmetry in upper outer right breast; no suspicious findings in left breast.  --10/21/2020: U/S of right breast showed irregularly-shaped hypoechoic mass at 10:00, 7 cm from nipple, measuring 3.2 x 2.7 x 3.6 cm. Right axillary ultrasound shows multiple normal-appearing lymph nodes. Biopsy of right breast mass at 10:00 showed poorly differentiated  carcinoma, no lymphovascular invasion, morphology consistent with breast cancer and similar to prior axillary node tumor, ER weakly positive at 1% and WA negative (0%), HER-2/mitzi FISH negative.  --10/28/2020: PET/CT scan showed high metabolic activity in 3 cm area of right upper outer breast, several small hypermetabolic lymph nodes in high right axilla and right subclavian region; mild hypermetabolic lymph nodes in bilateral hilar regions, favor metastatic disease; several tiny nodules in right upper lung, favor benign etiology; small hypermetabolic focus in pelvis in either sigmoid colon or adjacent loop of small bowel.  --11/02/2020: Started 1st line metastatic therapy with capecitabine.  --2/9/2021: PET/CT scan showed hypermetabolic right breast mass not significantly changed since 12/7/2020, persistent hypermetabolism; hypermetabolic right axillary lymph node increased in size; no distant metastasis; focal hypermetabolism in pelvis associated with sigmoid colon; stable 6-mm lung nodules.  --2/18/2021: Due to disease progression, switched to 2nd line metastatic therapy with eribulin. Required dose reduction due to neutropenia.  --4/24/2021: Left upper extremity Doppler U/S showed occlusive DVT within left subclavian vein; superficial thrombophlebitis within proximal cephalic vein. Started rivaroxaban.  --5/11/2021: PET scan showed unchanged size and slightly increased uptake to right breast; resolved right axillary adenopathy; no new lesions; persistent hypermetabolism at proximal sigmoid colon.  --5/19/2021 On chemo break after cycle 4 of eribulin.  --6/2/2021: Underwent palliative right mastectomy under care of Dr. Wilfredo Castillo. Pathology showed grade 3 invasive ductal carcinoma with tumor invasion into underlying skeletal muscle. Margins negative.  --7/22/2021: Colonoscopy showed diverticula in sigmoid colon; normal mucosa throughout entire colon.  --8/3/2021: PET scan showed postsurgical seroma at right chest  wall and axilla; small focus of residual mild FDG uptake at superolateral resection margin, likely posttreatment change or inflammation; no metastatic disease; persistent focus of uptake in sigmoid colon corresponding to diverticular disease.    INTERIM HISTORY:  Michelle reports seroma at her right chest wall that is gradually improving. She has no new pain.      REVIEW OF SYSTEMS:   14 point ROS was reviewed and is negative other than as noted above in HPI.       HOME MEDICATIONS:  Current Outpatient Medications   Medication Sig Dispense Refill     calcium carbonate (TUMS) 500 MG chewable tablet Take 1 chew tab by mouth daily as needed for heartburn       LORazepam (ATIVAN) 0.5 MG tablet Take 1 tablet (0.5 mg) by mouth every 4 hours as needed (Anxiety, Nausea/Vomiting or Sleep) 30 tablet 2     methocarbamol (ROBAXIN) 500 MG tablet Take 1 tablet (500 mg) by mouth every 6 hours as needed for muscle spasms 20 tablet 0     multivitamin w/minerals (THERA-VIT-M) tablet Take 1 tablet by mouth every morning        Omega-3 Fatty Acids (OMEGA-3 FISH OIL PO) Take 1 g by mouth every morning        ondansetron (ZOFRAN) 8 MG tablet Take 1 tablet (8 mg) by mouth every 8 hours as needed (Nausea/Vomiting) 10 tablet 2     oxyCODONE (ROXICODONE) 5 MG tablet Take 1 tablet (5 mg) by mouth every 4 hours as needed for moderate to severe pain 15 tablet 0     rivaroxaban ANTICOAGULANT (XARELTO ANTICOAGULANT) 20 MG TABS tablet Take 1 tablet (20 mg) by mouth daily (with dinner) (Patient taking differently: Take 20 mg by mouth every morning ) 90 tablet 3     sennosides (SENOKOT) 8.6 MG tablet Take 1 tablet by mouth 2 times daily 30 tablet 0         ALLERGIES:  Allergies   Allergen Reactions     Pcn [Penicillins] Hives         PAST MEDICAL HISTORY:  Past Medical History:   Diagnosis Date     Basal cell cancer     right cheek     Breast cancer, right breast (H)      DVT (deep venous thrombosis) (H)      Gastroesophageal reflux disease      History  of blood transfusion     blue baby     Hyperlipidaemia          PAST SURGICAL HISTORY:  Past Surgical History:   Procedure Laterality Date     BIOPSY MASS NECK Right 11/16/2017    Procedure: BIOPSY MASS NECK;  Excisional Biopsy Right Neck Lymph node;  Surgeon: Waylon Corral MD;  Location: [facility] OR     COLONOSCOPY       COLONOSCOPY N/A 7/22/2021    Procedure: COLONOSCOPY;  Surgeon: Gabriele Caro MD;  Location: [facility] GI     IR CHEST PORT PLACEMENT > 5 YRS OF AGE  2/16/2021     IR PORT REMOVAL LEFT  5/13/2019     MASTECTOMY SIMPLE Right 6/2/2021    Procedure: RIGHT SIMPLE MASTECTOMY;  Surgeon: Ulises Castillo MD;  Location: [facility] OR     MOHS MICROGRAPHIC PROCEDURE  ~2010    right cheek; BCC     MOHS MICROGRAPHIC PROCEDURE  10/31/2016    Dr[facility]         SOCIAL HISTORY:  Social History     Socioeconomic History     Marital status:      Spouse name: Not on file     Number of children: Not on file     Years of education: Not on file     Highest education level: Not on file   Occupational History     Not on file   Tobacco Use     Smoking status: Never Smoker     Smokeless tobacco: Never Used   Substance and Sexual Activity     Alcohol use: Yes     Alcohol/week: 0.0 standard drinks     Comment: 2 times a week, 2 drinks     Drug use: No     Sexual activity: Yes     Partners: Male     Birth control/protection: Post-menopausal   Other Topics Concern     Parent/sibling w/ CABG, MI or angioplasty before 65F 55M? No   Social History Narrative     Not on file     Social Determinants of Health     Financial Resource Strain:      Difficulty of Paying Living Expenses:    Food Insecurity:      Worried About Running Out of Food in the Last Year:      Ran Out of Food in the Last Year:    Transportation Needs:      Lack of Transportation (Medical):      Lack of Transportation (Non-Medical):    Physical Activity:      Days of Exercise per Week:      Minutes of Exercise per Session:    Stress:      Feeling of Stress  :    Social Connections:      Frequency of Communication with Friends and Family:      Frequency of Social Gatherings with Friends and Family:      Attends Alevism Services:      Active Member of Clubs or Organizations:      Attends Club or Organization Meetings:      Marital Status:    Intimate Partner Violence:      Fear of Current or Ex-Partner:      Emotionally Abused:      Physically Abused:      Sexually Abused:          FAMILY HISTORY:  Family History   Problem Relation Age of Onset     Lupus Mother      Heart Disease Mother         CHF     Coronary Artery Disease Mother      Hyperlipidemia Mother      Diabetes Father      Breast Cancer Other          PHYSICAL EXAM:  Vital signs:  Not taken.  Not performed as this was a telephone visit.    LABS:  CBC RESULTS:   Recent Labs   Lab Test 05/12/21  0806   WBC 4.6   RBC 3.58*   HGB 11.5*   HCT 35.7      MCH 32.1   MCHC 32.2   RDW 15.0        Last Comprehensive Metabolic Panel:  Sodium   Date Value Ref Range Status   05/27/2021 139 133 - 144 mmol/L Final     Potassium   Date Value Ref Range Status   05/27/2021 3.6 3.4 - 5.3 mmol/L Final     Chloride   Date Value Ref Range Status   05/27/2021 107 94 - 109 mmol/L Final     Carbon Dioxide   Date Value Ref Range Status   05/27/2021 28 20 - 32 mmol/L Final     Anion Gap   Date Value Ref Range Status   05/27/2021 4 3 - 14 mmol/L Final     Glucose   Date Value Ref Range Status   05/27/2021 136 (H) 70 - 99 mg/dL Final     Urea Nitrogen   Date Value Ref Range Status   05/27/2021 13 7 - 30 mg/dL Final     Creatinine   Date Value Ref Range Status   05/27/2021 0.76 0.52 - 1.04 mg/dL Final     GFR Estimate   Date Value Ref Range Status   05/27/2021 82 >60 mL/min/[1.73_m2] Final     Comment:     Non  GFR Calc  Starting 12/18/2018, serum creatinine based estimated GFR (eGFR) will be   calculated using the Chronic Kidney Disease Epidemiology Collaboration   (CKD-EPI) equation.       Calcium   Date  Value Ref Range Status   05/27/2021 9.0 8.5 - 10.1 mg/dL Final     Bilirubin Total   Date Value Ref Range Status   05/18/2021 0.8 0.2 - 1.3 mg/dL Final     Alkaline Phosphatase   Date Value Ref Range Status   05/18/2021 81 40 - 150 U/L Final     ALT   Date Value Ref Range Status   05/18/2021 58 (H) 0 - 50 U/L Final     AST   Date Value Ref Range Status   05/18/2021 38 0 - 45 U/L Final       PATHOLOGY:  Reviewed as per HPI.    IMAGING:   Reviewed as per HPI.    ASSESSMENT/PLAN:  Flor Griffin is a 66 year old female with the following issues:  1.  Right breast poorly differentiated adenocarcinoma, ER weakly positive (1%), CA negative, HER-2/mitzi negative with metastatic recurrence  2. Bilateral hilar lymphadenopathy, now resolved  3. Indeterminate pulmonary nodules, likely benign and stable  --Michelle is s/p palliative right mastectomy and has been on chemo break from eribulin since 5/19/2021.  -I personally reviewed the PET scan from 8/3/2021 which shows postsurgical seroma and a focus of inflammation at the resection margin but otherwise no evidence of metastatic disease.  The focus in the sigmoid colon corresponds to her diverticular disease. Therefore, I recommended continuing on break from chemotherapy and repeating a PET scan in 3-4 months. She is thrilled with this plan of care.    4. Anemia of chemotherapy  -Stable, no indication for transfusion.    5. Left upper extremity deep vein thrombosis   --4/24/2021 Doppler U/S showed occlusive DVT in left subclavian vein.  --Continue rivaroxaban. Given her relatively recent malignancy recurrence, I recommended long term anticoagulation. I discussed we can revisit this if she had clear scans for a year.    Return in 3-4 months.    Sally Stover MD  Hematology/Oncology  [facility]    Michelle is a 66 year old who is being evaluated via a billable video visit.      How would you like to obtain your AVS? MyChart    Please send text to:  [phone]      Will anyone else be joining your video visit? No      Video Start Time:   Video-Visit Details    Type of service:  Video Visit    Video End Time:    Originating Location (pt. Location): Home    Distant Location (provider location):  [facility]     Platform used for Video Visit: Eleuterio      Again, thank you for allowing me to participate in the care of your patient.        Sincerely,        Sally Stover MD

## 2021-08-31 ENCOUNTER — LAB (OUTPATIENT)
Dept: INFUSION THERAPY | Facility: CLINIC | Age: 66
End: 2021-08-31
Attending: INTERNAL MEDICINE
Payer: COMMERCIAL

## 2021-08-31 DIAGNOSIS — C50.411 MALIGNANT NEOPLASM OF UPPER-OUTER QUADRANT OF RIGHT BREAST IN FEMALE, ESTROGEN RECEPTOR POSITIVE (H): Primary | ICD-10-CM

## 2021-08-31 DIAGNOSIS — C50.811 MALIGNANT NEOPLASM OF OVERLAPPING SITES OF RIGHT BREAST IN FEMALE, ESTROGEN RECEPTOR POSITIVE (H): ICD-10-CM

## 2021-08-31 DIAGNOSIS — Z17.0 MALIGNANT NEOPLASM OF OVERLAPPING SITES OF RIGHT BREAST IN FEMALE, ESTROGEN RECEPTOR POSITIVE (H): ICD-10-CM

## 2021-08-31 DIAGNOSIS — Z17.0 MALIGNANT NEOPLASM OF UPPER-OUTER QUADRANT OF RIGHT BREAST IN FEMALE, ESTROGEN RECEPTOR POSITIVE (H): Primary | ICD-10-CM

## 2021-08-31 DIAGNOSIS — R59.1 LYMPHADENOPATHY: ICD-10-CM

## 2021-08-31 DIAGNOSIS — Z95.828 PORT-A-CATH IN PLACE: ICD-10-CM

## 2021-08-31 LAB
ALBUMIN SERPL-MCNC: 3.5 G/DL (ref 3.4–5)
ALP SERPL-CCNC: 91 U/L (ref 40–150)
ALT SERPL W P-5'-P-CCNC: 43 U/L (ref 0–50)
ANION GAP SERPL CALCULATED.3IONS-SCNC: 4 MMOL/L (ref 3–14)
AST SERPL W P-5'-P-CCNC: 26 U/L (ref 0–45)
BASOPHILS # BLD AUTO: 0 10E3/UL (ref 0–0.2)
BASOPHILS NFR BLD AUTO: 1 %
BILIRUB SERPL-MCNC: 0.6 MG/DL (ref 0.2–1.3)
BUN SERPL-MCNC: 11 MG/DL (ref 7–30)
CALCIUM SERPL-MCNC: 9.1 MG/DL (ref 8.5–10.1)
CHLORIDE BLD-SCNC: 106 MMOL/L (ref 94–109)
CO2 SERPL-SCNC: 28 MMOL/L (ref 20–32)
CREAT SERPL-MCNC: 0.73 MG/DL (ref 0.52–1.04)
EOSINOPHIL # BLD AUTO: 0.1 10E3/UL (ref 0–0.7)
EOSINOPHIL NFR BLD AUTO: 3 %
ERYTHROCYTE [DISTWIDTH] IN BLOOD BY AUTOMATED COUNT: 13.7 % (ref 10–15)
GFR SERPL CREATININE-BSD FRML MDRD: 86 ML/MIN/1.73M2
GLUCOSE BLD-MCNC: 95 MG/DL (ref 70–99)
HCT VFR BLD AUTO: 38.5 % (ref 35–47)
HGB BLD-MCNC: 12.1 G/DL (ref 11.7–15.7)
IMM GRANULOCYTES # BLD: 0 10E3/UL
IMM GRANULOCYTES NFR BLD: 0 %
LYMPHOCYTES # BLD AUTO: 1.1 10E3/UL (ref 0.8–5.3)
LYMPHOCYTES NFR BLD AUTO: 30 %
MCH RBC QN AUTO: 31.1 PG (ref 26.5–33)
MCHC RBC AUTO-ENTMCNC: 31.4 G/DL (ref 31.5–36.5)
MCV RBC AUTO: 99 FL (ref 78–100)
MONOCYTES # BLD AUTO: 0.4 10E3/UL (ref 0–1.3)
MONOCYTES NFR BLD AUTO: 10 %
NEUTROPHILS # BLD AUTO: 2.2 10E3/UL (ref 1.6–8.3)
NEUTROPHILS NFR BLD AUTO: 56 %
NRBC # BLD AUTO: 0 10E3/UL
NRBC BLD AUTO-RTO: 0 /100
PLATELET # BLD AUTO: 241 10E3/UL (ref 150–450)
POTASSIUM BLD-SCNC: 4 MMOL/L (ref 3.4–5.3)
PROT SERPL-MCNC: 6.6 G/DL (ref 6.8–8.8)
RBC # BLD AUTO: 3.89 10E6/UL (ref 3.8–5.2)
SODIUM SERPL-SCNC: 138 MMOL/L (ref 133–144)
WBC # BLD AUTO: 3.9 10E3/UL (ref 4–11)

## 2021-08-31 PROCEDURE — 36591 DRAW BLOOD OFF VENOUS DEVICE: CPT

## 2021-08-31 PROCEDURE — 250N000011 HC RX IP 250 OP 636: Performed by: INTERNAL MEDICINE

## 2021-08-31 PROCEDURE — 85025 COMPLETE CBC W/AUTO DIFF WBC: CPT | Performed by: INTERNAL MEDICINE

## 2021-08-31 PROCEDURE — 82040 ASSAY OF SERUM ALBUMIN: CPT | Performed by: INTERNAL MEDICINE

## 2021-08-31 RX ORDER — HEPARIN SODIUM (PORCINE) LOCK FLUSH IV SOLN 100 UNIT/ML 100 UNIT/ML
5 SOLUTION INTRAVENOUS
Status: DISCONTINUED | OUTPATIENT
Start: 2021-08-31 | End: 2021-08-31 | Stop reason: HOSPADM

## 2021-08-31 RX ORDER — HEPARIN SODIUM,PORCINE 10 UNIT/ML
5 VIAL (ML) INTRAVENOUS
Status: CANCELLED | OUTPATIENT
Start: 2021-08-31

## 2021-08-31 RX ORDER — HEPARIN SODIUM (PORCINE) LOCK FLUSH IV SOLN 100 UNIT/ML 100 UNIT/ML
5 SOLUTION INTRAVENOUS
Status: CANCELLED | OUTPATIENT
Start: 2021-08-31

## 2021-08-31 RX ADMIN — Medication 5 ML: at 08:20

## 2021-08-31 NOTE — PROGRESS NOTES
Nursing Note:  Flor Griffin presents today for port labs.    Patient seen by provider today: No   present during visit today: Not Applicable.    Note: N/A.    Intravenous Access:  Lab draw site L chest, Needle type araujo, Gauge 20.  Labs drawn without difficulty.  Implanted Port.    Discharge Plan:   Patient was sent to [facility]  for discharge     Maria Luisa Landrum RN

## 2021-09-20 ENCOUNTER — TELEPHONE (OUTPATIENT)
Dept: ONCOLOGY | Facility: CLINIC | Age: 66
End: 2021-09-20

## 2021-09-20 ENCOUNTER — TELEPHONE (OUTPATIENT)
Dept: SURGERY | Facility: CLINIC | Age: 66
End: 2021-09-20

## 2021-09-20 DIAGNOSIS — Z17.0 MALIGNANT NEOPLASM OF OVERLAPPING SITES OF RIGHT BREAST IN FEMALE, ESTROGEN RECEPTOR POSITIVE (H): ICD-10-CM

## 2021-09-20 DIAGNOSIS — Z90.11 S/P MASTECTOMY, RIGHT: Primary | ICD-10-CM

## 2021-09-20 DIAGNOSIS — C50.811 MALIGNANT NEOPLASM OF OVERLAPPING SITES OF RIGHT BREAST IN FEMALE, ESTROGEN RECEPTOR POSITIVE (H): ICD-10-CM

## 2021-09-20 NOTE — TELEPHONE ENCOUNTER
Name of caller: Patient    Reason for Call:  Symptoms  Lump in middle of incision, can feel it especially when lying down.     Wondering if needs to be seen? Or is this normal?    Surgeon:  Dr. Castillo     Recent Surgery:  Yes.    If yes, when & what type:  6/2/2021    RIGHT SIMPLE MASTECTOMY      Best phone number to reach pt at is: [phone]  Ok to leave a message with medical info? Yes.

## 2021-09-20 NOTE — TELEPHONE ENCOUNTER
[facility] Telephone Triage Note    Assessment: Patient called in to triage reporting the following symptoms: Lump near incision on R breast.      Recommendations: Arranged visit with QUYEN Goins for assessment. .      Follow-Up: Instructed patient to seek care immediately for worsening symptoms, including: fever, chest pain, shortness of breath, dizziness. Patient voiced understanding of advice and/or instructions given.     VY DanielN, RN, PHN, OCN  Oncology Care Coordinator  [facility]

## 2021-09-21 ENCOUNTER — ONCOLOGY VISIT (OUTPATIENT)
Dept: ONCOLOGY | Facility: CLINIC | Age: 66
End: 2021-09-21
Attending: NURSE PRACTITIONER
Payer: COMMERCIAL

## 2021-09-21 VITALS
OXYGEN SATURATION: 98 % | HEART RATE: 65 BPM | WEIGHT: 170 LBS | DIASTOLIC BLOOD PRESSURE: 72 MMHG | BODY MASS INDEX: 27.44 KG/M2 | SYSTOLIC BLOOD PRESSURE: 125 MMHG | RESPIRATION RATE: 16 BRPM | TEMPERATURE: 98.4 F

## 2021-09-21 DIAGNOSIS — R22.9 LOCALIZED SUPERFICIAL SWELLING, MASS, OR LUMP: ICD-10-CM

## 2021-09-21 DIAGNOSIS — C50.411 MALIGNANT NEOPLASM OF UPPER-OUTER QUADRANT OF RIGHT BREAST IN FEMALE, ESTROGEN RECEPTOR POSITIVE (H): Primary | ICD-10-CM

## 2021-09-21 DIAGNOSIS — Z17.0 MALIGNANT NEOPLASM OF UPPER-OUTER QUADRANT OF RIGHT BREAST IN FEMALE, ESTROGEN RECEPTOR POSITIVE (H): Primary | ICD-10-CM

## 2021-09-21 DIAGNOSIS — R22.2 LOCALIZED SWELLING, MASS AND LUMP, TRUNK: ICD-10-CM

## 2021-09-21 PROCEDURE — 99214 OFFICE O/P EST MOD 30 MIN: CPT | Performed by: NURSE PRACTITIONER

## 2021-09-21 ASSESSMENT — PAIN SCALES - GENERAL: PAINLEVEL: NO PAIN (0)

## 2021-09-21 NOTE — PROGRESS NOTES
Oncology/Hematology Visit Note  Sep 21, 2021    Reason for Visit: follow up of left chest lump    Patient with history of right breast cancer status post right mastectomy. Currently on break from chemotherapy    Interval History:  Patient reports she noted a small lump in the right chest area around the mastectomy incision.  She reports she noted a lump about 3 weeks ago. Patient the lump is nontender, denies discharge, denies fever chills sweats      Review of Systems:  14 point ROS of systems including Constitutional, Eyes, Respiratory, Cardiovascular, Gastroenterology, Genitourinary, Integumentary, Muscularskeletal, Psychiatric were all negative except for pertinent positives noted in my HPI.      Physical Examination:  General: The patient is a pleasant female in no acute distress.  /72   Pulse 65   Temp 98.4  F (36.9  C) (Oral)   Resp 16   Wt 77.1 kg (170 lb)   LMP 01/01/2004 (Approximate)   SpO2 98%   BMI 27.44 kg/m    HEENT: EOMI, PERRL. Sclerae are anicteric. Oral mucosa is pink and moist with no lesions or thrush.   Lymph: Neck is supple with no lymphadenopathy in the cervical or supraclavicular areas.   Heart: Regular rate and rhythm.   Lungs: Clear to auscultation bilaterally.   GI: Bowel sounds present, soft, nontender with no palpable hepatosplenomegaly or masses.   Extremities: No lower extremity edema noted bilaterally.   Skin: No rashes, petechiae, or bruising noted on exposed skin.  Left chest-no abnormalities found on exam. On exam with the patient is pointing with the lump is looks like bony lump.  No lumps noted in axillary area . No skin rash nontender no redness      Assessment and Plan:    This is a 66-year-old female with    Right chest wall lump  Patient is status post right mastectomy in June 2021  On exam the lump is most likely bone surface  Order ultrasound of the area  10/26-patient is already scheduled for PET scan      Metastatic breast cancer  Currently on break from  chemotherapy  -Continue per Dr. Stover's recommendations  Patient has follow-up appointment with Dr. Stover next month after the PET scan        HEDY Beth [facility]     Chart documentation with Dragon Voice recognition Software. Although reviewed after completion, some words and grammatical errors may remain.

## 2021-09-21 NOTE — LETTER
9/21/2021         RE: Flor Griffin  [address]  [place] [address]        Dear Colleague,    Thank you for referring your patient, Flor Griffin, to the [facility]. Please see a copy of my visit note below.    Oncology/Hematology Visit Note  Sep 21, 2021    Reason for Visit: follow up of left chest lump    Patient with history of right breast cancer status post right mastectomy. Currently on break from chemotherapy    Interval History:  Patient reports she noted a small lump in the right chest area around the mastectomy incision.  She reports she noted a lump about 3 weeks ago. Patient the lump is nontender, denies discharge, denies fever chills sweats      Review of Systems:  14 point ROS of systems including Constitutional, Eyes, Respiratory, Cardiovascular, Gastroenterology, Genitourinary, Integumentary, Muscularskeletal, Psychiatric were all negative except for pertinent positives noted in my HPI.      Physical Examination:  General: The patient is a pleasant female in no acute distress.  /72   Pulse 65   Temp 98.4  F (36.9  C) (Oral)   Resp 16   Wt 77.1 kg (170 lb)   LMP 01/01/2004 (Approximate)   SpO2 98%   BMI 27.44 kg/m    HEENT: EOMI, PERRL. Sclerae are anicteric. Oral mucosa is pink and moist with no lesions or thrush.   Lymph: Neck is supple with no lymphadenopathy in the cervical or supraclavicular areas.   Heart: Regular rate and rhythm.   Lungs: Clear to auscultation bilaterally.   GI: Bowel sounds present, soft, nontender with no palpable hepatosplenomegaly or masses.   Extremities: No lower extremity edema noted bilaterally.   Skin: No rashes, petechiae, or bruising noted on exposed skin.  Left chest-no abnormalities found on exam. On exam with the patient is pointing with the lump is looks like bony lump.  No lumps noted in axillary area . No skin rash nontender no redness      Assessment and Plan:    This is a 66-year-old female  with    Right chest wall lump  Patient is status post right mastectomy in June 2021  On exam the lump is most likely bone surface  Order ultrasound of the area  10/26-patient is already scheduled for PET scan      Metastatic breast cancer  Currently on break from chemotherapy  -Continue per Dr. Stover's recommendations  Patient has follow-up appointment with Dr. Stover next month after the PET scan        HEDY Beth CNP  [facility]     Chart documentation with Dragon Voice recognition Software. Although reviewed after completion, some words and grammatical errors may remain.            Again, thank you for allowing me to participate in the care of your patient.        Sincerely,        HEDY Beth CNP

## 2021-09-21 NOTE — TELEPHONE ENCOUNTER
I called patient and left her a voicemail message asking if she could return my call when available.      Awaiting a return call from patient.      Radha Alarcon RN BSN  Breast Nurse Care Coordinator  [facility]  [facility] [place]  [phone]

## 2021-09-22 NOTE — TELEPHONE ENCOUNTER
Patient states she was concerned about a small hard pea-sized lump that developed in the middle of her breast incision line about 3 weeks ago.      Patient had a right breast simple mastectomy with Dr. Castillo on 6/2/2021.    Patient denies having any discomfort, fevers, or erythema and states she has otherwise done very well post operatively.      Patient states she saw Medical Oncology ZULY Menon yesterday in clinic and he placed order for an ultrasound of this incisional lump. Patient reports feeling very comfortable with this plan.    She also mentioned she has a PET Scan scheduled for 10/26/21.    Informed patient I will relay message to Dr. Castillo so he is aware of this plan.  Advised patient to call us back with any other concerns.  Patient verbalized understanding and agrees with the plan of care.  Radha Alarcon, RN, BSN

## 2021-09-23 NOTE — TELEPHONE ENCOUNTER
I discussed with Dr. Castillo who advises we get a breast ultrasound and right breast biopsy.    I placed orders for right breast ultrasound and right breast ultrasound biopsy for Dr. Castillo to sign off on and we will proceed with getting her scheduled.      I called patient to inform a  will be reaching out to her to assist in getting her scheduled for right breast US and right breast US Bx.  Patient verbalized understanding and agrees with the plan of care.  Radha Alarcon RN, BSN

## 2021-09-24 NOTE — TELEPHONE ENCOUNTER
Patient is now scheduled for Right Breast Ultrasound and Right Breast Ultrasound Core Needle Biopsy at the [facility] on 10/7/21 @ 8:30a.m.      Dr. Castillo and ERIK Beth  informed of appointment.  Radha Alarcon, RN, BSN

## 2021-09-28 ENCOUNTER — LAB (OUTPATIENT)
Dept: INFUSION THERAPY | Facility: CLINIC | Age: 66
End: 2021-09-28
Attending: INTERNAL MEDICINE
Payer: COMMERCIAL

## 2021-09-28 DIAGNOSIS — C50.811 MALIGNANT NEOPLASM OF OVERLAPPING SITES OF RIGHT BREAST IN FEMALE, ESTROGEN RECEPTOR POSITIVE (H): ICD-10-CM

## 2021-09-28 DIAGNOSIS — Z17.0 MALIGNANT NEOPLASM OF UPPER-OUTER QUADRANT OF RIGHT BREAST IN FEMALE, ESTROGEN RECEPTOR POSITIVE (H): Primary | ICD-10-CM

## 2021-09-28 DIAGNOSIS — C50.411 MALIGNANT NEOPLASM OF UPPER-OUTER QUADRANT OF RIGHT BREAST IN FEMALE, ESTROGEN RECEPTOR POSITIVE (H): Primary | ICD-10-CM

## 2021-09-28 DIAGNOSIS — Z95.828 PORT-A-CATH IN PLACE: ICD-10-CM

## 2021-09-28 DIAGNOSIS — Z17.0 MALIGNANT NEOPLASM OF OVERLAPPING SITES OF RIGHT BREAST IN FEMALE, ESTROGEN RECEPTOR POSITIVE (H): ICD-10-CM

## 2021-09-28 LAB
ALBUMIN SERPL-MCNC: 3.5 G/DL (ref 3.4–5)
ALP SERPL-CCNC: 89 U/L (ref 40–150)
ALT SERPL W P-5'-P-CCNC: 30 U/L (ref 0–50)
ANION GAP SERPL CALCULATED.3IONS-SCNC: 5 MMOL/L (ref 3–14)
AST SERPL W P-5'-P-CCNC: 18 U/L (ref 0–45)
BASOPHILS # BLD AUTO: 0 10E3/UL (ref 0–0.2)
BASOPHILS NFR BLD AUTO: 1 %
BILIRUB SERPL-MCNC: 0.5 MG/DL (ref 0.2–1.3)
BUN SERPL-MCNC: 12 MG/DL (ref 7–30)
CALCIUM SERPL-MCNC: 8.9 MG/DL (ref 8.5–10.1)
CHLORIDE BLD-SCNC: 106 MMOL/L (ref 94–109)
CO2 SERPL-SCNC: 29 MMOL/L (ref 20–32)
CREAT SERPL-MCNC: 0.76 MG/DL (ref 0.52–1.04)
EOSINOPHIL # BLD AUTO: 0.1 10E3/UL (ref 0–0.7)
EOSINOPHIL NFR BLD AUTO: 2 %
ERYTHROCYTE [DISTWIDTH] IN BLOOD BY AUTOMATED COUNT: 13.3 % (ref 10–15)
GFR SERPL CREATININE-BSD FRML MDRD: 82 ML/MIN/1.73M2
GLUCOSE BLD-MCNC: 113 MG/DL (ref 70–99)
HCT VFR BLD AUTO: 39.5 % (ref 35–47)
HGB BLD-MCNC: 12.4 G/DL (ref 11.7–15.7)
IMM GRANULOCYTES # BLD: 0 10E3/UL
IMM GRANULOCYTES NFR BLD: 0 %
LYMPHOCYTES # BLD AUTO: 1.1 10E3/UL (ref 0.8–5.3)
LYMPHOCYTES NFR BLD AUTO: 26 %
MCH RBC QN AUTO: 30.9 PG (ref 26.5–33)
MCHC RBC AUTO-ENTMCNC: 31.4 G/DL (ref 31.5–36.5)
MCV RBC AUTO: 99 FL (ref 78–100)
MONOCYTES # BLD AUTO: 0.3 10E3/UL (ref 0–1.3)
MONOCYTES NFR BLD AUTO: 8 %
NEUTROPHILS # BLD AUTO: 2.6 10E3/UL (ref 1.6–8.3)
NEUTROPHILS NFR BLD AUTO: 63 %
NRBC # BLD AUTO: 0 10E3/UL
NRBC BLD AUTO-RTO: 0 /100
PLATELET # BLD AUTO: 261 10E3/UL (ref 150–450)
POTASSIUM BLD-SCNC: 3.7 MMOL/L (ref 3.4–5.3)
PROT SERPL-MCNC: 6.8 G/DL (ref 6.8–8.8)
RBC # BLD AUTO: 4.01 10E6/UL (ref 3.8–5.2)
SODIUM SERPL-SCNC: 140 MMOL/L (ref 133–144)
WBC # BLD AUTO: 4.1 10E3/UL (ref 4–11)

## 2021-09-28 PROCEDURE — 80053 COMPREHEN METABOLIC PANEL: CPT | Performed by: INTERNAL MEDICINE

## 2021-09-28 PROCEDURE — 36591 DRAW BLOOD OFF VENOUS DEVICE: CPT

## 2021-09-28 PROCEDURE — 85004 AUTOMATED DIFF WBC COUNT: CPT | Performed by: INTERNAL MEDICINE

## 2021-09-28 PROCEDURE — 250N000011 HC RX IP 250 OP 636: Performed by: INTERNAL MEDICINE

## 2021-09-28 RX ORDER — HEPARIN SODIUM (PORCINE) LOCK FLUSH IV SOLN 100 UNIT/ML 100 UNIT/ML
5 SOLUTION INTRAVENOUS
Status: CANCELLED | OUTPATIENT
Start: 2021-09-28

## 2021-09-28 RX ORDER — HEPARIN SODIUM,PORCINE 10 UNIT/ML
5 VIAL (ML) INTRAVENOUS
Status: CANCELLED | OUTPATIENT
Start: 2021-09-28

## 2021-09-28 RX ORDER — HEPARIN SODIUM (PORCINE) LOCK FLUSH IV SOLN 100 UNIT/ML 100 UNIT/ML
5 SOLUTION INTRAVENOUS
Status: DISCONTINUED | OUTPATIENT
Start: 2021-09-28 | End: 2021-09-28 | Stop reason: HOSPADM

## 2021-09-28 RX ADMIN — Medication 5 ML: at 08:10

## 2021-09-28 NOTE — PROGRESS NOTES
Nursing Note:  Flormarkos Griffin presents today for Labs per Port.    Patient seen by provider today: No   present during visit today: Not Applicable.    Note: N/A.    Intravenous Access:  Labs drawn without difficulty.  Implanted Port.  Port site C/D/I.  Port flushes well + excellent blood return.    Discharge Plan:   Patient was sent home.    Veronica Mckeon RN, BSN, OCN on 9/28/2021 at 8:10 AM

## 2021-10-02 ENCOUNTER — OFFICE VISIT (OUTPATIENT)
Dept: URGENT CARE | Facility: URGENT CARE | Age: 66
End: 2021-10-02
Payer: COMMERCIAL

## 2021-10-02 VITALS
RESPIRATION RATE: 12 BRPM | SYSTOLIC BLOOD PRESSURE: 128 MMHG | TEMPERATURE: 97 F | OXYGEN SATURATION: 96 % | HEART RATE: 72 BPM | DIASTOLIC BLOOD PRESSURE: 83 MMHG

## 2021-10-02 DIAGNOSIS — R05.9 COUGH IN ADULT: ICD-10-CM

## 2021-10-02 DIAGNOSIS — H65.92 OME (OTITIS MEDIA WITH EFFUSION), LEFT: ICD-10-CM

## 2021-10-02 DIAGNOSIS — R09.81 CONGESTION OF PARANASAL SINUS: Primary | ICD-10-CM

## 2021-10-02 DIAGNOSIS — H93.8X2 CONGESTION OF LEFT EAR: ICD-10-CM

## 2021-10-02 PROCEDURE — 99214 OFFICE O/P EST MOD 30 MIN: CPT | Performed by: FAMILY MEDICINE

## 2021-10-02 RX ORDER — BENZONATATE 100 MG/1
100 CAPSULE ORAL 3 TIMES DAILY PRN
Qty: 30 CAPSULE | Refills: 0 | Status: SHIPPED | OUTPATIENT
Start: 2021-10-02 | End: 2021-11-11

## 2021-10-02 RX ORDER — AZITHROMYCIN 250 MG/1
TABLET, FILM COATED ORAL
Qty: 6 TABLET | Refills: 0 | Status: SHIPPED | OUTPATIENT
Start: 2021-10-02 | End: 2021-10-07

## 2021-10-02 NOTE — PROGRESS NOTES
Chief Complaint   Patient presents with     Urgent Care     Sinus Problem     Sinus pressure, Left ear pressure/pain-Sx started 1.5wks ago        Medical Decision Making:    ASSESMENT AND PLAN   Flor was seen today for urgent care and sinus problem.    Diagnoses and all orders for this visit:    Congestion of paranasal sinus    Congestion of left ear    OME (otitis media with effusion), left  -     azithromycin (ZITHROMAX) 250 MG tablet; Take 2 tablets (500 mg) by mouth daily for 1 day, THEN 1 tablet (250 mg) daily for 4 days.    Cough in adult  -     benzonatate (TESSALON) 100 MG capsule; Take 1 capsule (100 mg) by mouth 3 times daily as needed          Tylenol, Fluids, Rest, Saline gargles, Saline nasal spray and Vaporizer  Routine discharge counseling was given to the patient and the patient understands that worsening, changing or persistent symptoms should prompt an immediate call or follow up with their primary physician or the emergency department. The importance of appropriate follow up was also discussed with the patient.     I have reviewed the nursing notes.    Differential Diagnosis:  URI Adult/Peds:  Sinusitis, Tonsilitis, Viral pharyngitis, Viral syndrome and Viral upper respiratory illness      Time  spent on the date of the encounter doing chart review, interpretation of tests, patient visit and documentation     see orders in Epic  Pt verbalized and agreed with the plan and is aware of the worsening symptoms for which would need to follow up .  Pt was stable during time of discharge from the clinic     SUBJECTIVE     Flor Griffin is a 66 year old female presenting with a chief complaint of    Chief Complaint   Patient presents with     Urgent Care     Sinus Problem     Sinus pressure, Left ear pressure/pain-Sx started 1.5wks ago            Flor Griffin is a 66 year old female presenting with a chief complaint of stuffy nose, cough - productive, ear pain left and facial pain/pressure. She  is an established patient of [facility].  Onset of symptoms was 2 week(s) ago.  Course of illness is worsening.    Severity moderate  Current and Associated symptoms: ear pain left  Treatment measures tried include OTC Cough med.  Predisposing factors include None.    Past Medical History:   Diagnosis Date     Basal cell cancer     right cheek     Breast cancer, right breast (H)      DVT (deep venous thrombosis) (H)      Gastroesophageal reflux disease      History of blood transfusion     blue baby     Hyperlipidaemia      Current Outpatient Medications   Medication Sig Dispense Refill     azithromycin (ZITHROMAX) 250 MG tablet Take 2 tablets (500 mg) by mouth daily for 1 day, THEN 1 tablet (250 mg) daily for 4 days. 6 tablet 0     benzonatate (TESSALON) 100 MG capsule Take 1 capsule (100 mg) by mouth 3 times daily as needed 30 capsule 0     calcium carbonate (TUMS) 500 MG chewable tablet Take 1 chew tab by mouth daily as needed for heartburn       LORazepam (ATIVAN) 0.5 MG tablet Take 1 tablet (0.5 mg) by mouth every 4 hours as needed (Anxiety, Nausea/Vomiting or Sleep) 30 tablet 2     methocarbamol (ROBAXIN) 500 MG tablet Take 1 tablet (500 mg) by mouth every 6 hours as needed for muscle spasms 20 tablet 0     multivitamin w/minerals (THERA-VIT-M) tablet Take 1 tablet by mouth every morning        Omega-3 Fatty Acids (OMEGA-3 FISH OIL PO) Take 1 g by mouth every morning        ondansetron (ZOFRAN) 8 MG tablet Take 1 tablet (8 mg) by mouth every 8 hours as needed (Nausea/Vomiting) 10 tablet 2     oxyCODONE (ROXICODONE) 5 MG tablet Take 1 tablet (5 mg) by mouth every 4 hours as needed for moderate to severe pain 15 tablet 0     rivaroxaban ANTICOAGULANT (XARELTO ANTICOAGULANT) 20 MG TABS tablet Take 1 tablet (20 mg) by mouth daily (with dinner) (Patient taking differently: Take 20 mg by mouth every morning ) 90 tablet 3     sennosides (SENOKOT) 8.6 MG tablet Take 1 tablet by mouth 2 times daily (Patient not taking:  Reported on 10/2/2021) 30 tablet 0     Social History     Tobacco Use     Smoking status: Never Smoker     Smokeless tobacco: Never Used   Substance Use Topics     Alcohol use: Yes     Alcohol/week: 0.0 standard drinks     Comment: 2 times a week, 2 drinks     Family History   Problem Relation Age of Onset     Lupus Mother      Heart Disease Mother         CHF     Coronary Artery Disease Mother      Hyperlipidemia Mother      Diabetes Father      Breast Cancer Other          ROS:    10 point ROS of systems including Constitutional, Eyes, Cardiovascular, Gastroenterology, Genitourinary, Integumentary, Muscularskeletal, Psychiatric ,neurological were all negative except for pertinent positives noted in my HPI         OBJECTIVE:    /83   Pulse 72   Temp 97  F (36.1  C) (Tympanic)   Resp 12   LMP 01/01/2004 (Approximate)   SpO2 96%   Breastfeeding No   GENERAL APPEARANCE: healthy, alert and no distress  EYES: EOMI,  PERRL, conjunctiva clear  HENT:rt  ear canals and TM's normal., left TM congested and erythematous   Nose and mouth without ulcers, erythema or lesions  NECK: supple, nontender, no lymphadenopathy  RESP: lungs clear to auscultation - no rales, rhonchi or wheezes  CV: regular rates and rhythm, normal S1 S2, no murmur noted  ABDOMEN:  soft, nontender, no HSM or masses and bowel sounds normal  SKIN: no suspicious lesions or rashes  PSYCH: mentation appears normal  Physical Exam      (Note was completed, in part, with [employer] voice-recognition software. Documentation reviewed, but some grammatical, spelling, and word errors may remain.)  Angeles Dukes MD on 10/2/2021 at 9:27 AM

## 2021-10-07 ENCOUNTER — HOSPITAL ENCOUNTER (OUTPATIENT)
Dept: ULTRASOUND IMAGING | Facility: CLINIC | Age: 66
End: 2021-10-07
Attending: SURGERY
Payer: COMMERCIAL

## 2021-10-07 ENCOUNTER — TRANSFERRED RECORDS (OUTPATIENT)
Dept: HEALTH INFORMATION MANAGEMENT | Facility: CLINIC | Age: 66
End: 2021-10-07
Payer: COMMERCIAL

## 2021-10-07 DIAGNOSIS — C50.811 MALIGNANT NEOPLASM OF OVERLAPPING SITES OF RIGHT BREAST IN FEMALE, ESTROGEN RECEPTOR POSITIVE (H): ICD-10-CM

## 2021-10-07 DIAGNOSIS — Z17.0 MALIGNANT NEOPLASM OF OVERLAPPING SITES OF RIGHT BREAST IN FEMALE, ESTROGEN RECEPTOR POSITIVE (H): ICD-10-CM

## 2021-10-07 DIAGNOSIS — Z90.11 S/P MASTECTOMY, RIGHT: ICD-10-CM

## 2021-10-07 PROCEDURE — 76604 US EXAM CHEST: CPT

## 2021-10-07 PROCEDURE — 88342 IMHCHEM/IMCYTCHM 1ST ANTB: CPT | Mod: TC,XU | Performed by: SURGERY

## 2021-10-07 PROCEDURE — 272N000031 US BREAST BIOPSY CORE NEEDLE RIGHT

## 2021-10-07 PROCEDURE — 250N000009 HC RX 250: Performed by: RADIOLOGY

## 2021-10-07 RX ADMIN — LIDOCAINE HYDROCHLORIDE 5 ML: 10 INJECTION, SOLUTION EPIDURAL; INFILTRATION; INTRACAUDAL; PERINEURAL at 08:54

## 2021-10-07 NOTE — DISCHARGE INSTRUCTIONS

## 2021-10-08 ENCOUNTER — PATIENT OUTREACH (OUTPATIENT)
Dept: ONCOLOGY | Facility: CLINIC | Age: 66
End: 2021-10-08

## 2021-10-08 NOTE — PROGRESS NOTES
Writer received a call from patient's daughter, Emmie, requesting that her mom be called on Monday to be updated on how she will receive pathology results.    Message routed to Danya DAVIDSON to follow up on Monday, 10/11.    Michela Parmar RN

## 2021-10-11 ENCOUNTER — DOCUMENTATION ONLY (OUTPATIENT)
Dept: ONCOLOGY | Facility: CLINIC | Age: 66
End: 2021-10-11

## 2021-10-11 ENCOUNTER — TELEPHONE (OUTPATIENT)
Dept: ONCOLOGY | Facility: CLINIC | Age: 66
End: 2021-10-11

## 2021-10-11 PROCEDURE — 88342 IMHCHEM/IMCYTCHM 1ST ANTB: CPT | Mod: 26 | Performed by: PATHOLOGY

## 2021-10-11 PROCEDURE — 88360 TUMOR IMMUNOHISTOCHEM/MANUAL: CPT | Mod: 26 | Performed by: PATHOLOGY

## 2021-10-11 PROCEDURE — 88305 TISSUE EXAM BY PATHOLOGIST: CPT | Mod: 26 | Performed by: PATHOLOGY

## 2021-10-11 NOTE — TELEPHONE ENCOUNTER
Called Michelle regarding her right chest wall biopsy.    -Per Buster NAPIER   Please see the results of biopsy from 10/07     Right chest wall, mid scar/just superior to scar, biopsy-   -Positive for malignancy, poorly differentiated carcinoma, morphology and immunostains compatible with recurrent breast Carcinoma     Patient is scheduled for PET scan on 10/26 and follow-up with you on 11/10      Message sent to scheduling to have Pet scan moved sooner. Writer will follow up and speak with Dr. Stover regarding follow up after Pet scan. Danya Lara RN,BSN,OCN,CBCN

## 2021-10-11 NOTE — RESULT ENCOUNTER NOTE
I have already discussed the results with the patient.  She has a PET scan scheduled for 10/26.  Could you see if it could be done sooner?  And I would like to see her in the office after results are back.  Thanks.

## 2021-10-11 NOTE — PROGRESS NOTES
Right chest wall, mid scar/just superior to scar, biopsy-  -Positive for malignancy, poorly differentiated carcinoma, morphology and immunostains compatible with recurrent breast carcinoma    I will notify Dr. Ck Menon  [facility]   [facility]cer [facility]

## 2021-10-12 NOTE — TELEPHONE ENCOUNTER
Michelle's Pet scan  up to 10/19/21, writer will consult with Dr. Stover regarding moving exam date up. Danya Lara RN,BSN,OCN,CBCN

## 2021-10-13 ENCOUNTER — TELEPHONE (OUTPATIENT)
Dept: ONCOLOGY | Facility: CLINIC | Age: 66
End: 2021-10-13

## 2021-10-13 NOTE — TELEPHONE ENCOUNTER
Called Michelle left message on her voice mail that appointment with Dr. Stover will be moved up to 10/27/21 at 1200 to discuss Pet scan. Danya Lara RN,BSN,OCN,CBCN

## 2021-10-14 ENCOUNTER — OFFICE VISIT (OUTPATIENT)
Dept: URGENT CARE | Facility: URGENT CARE | Age: 66
End: 2021-10-14
Payer: COMMERCIAL

## 2021-10-14 VITALS
HEART RATE: 78 BPM | OXYGEN SATURATION: 98 % | DIASTOLIC BLOOD PRESSURE: 73 MMHG | TEMPERATURE: 98 F | SYSTOLIC BLOOD PRESSURE: 113 MMHG | RESPIRATION RATE: 20 BRPM

## 2021-10-14 DIAGNOSIS — H92.02 OTALGIA, LEFT: Primary | ICD-10-CM

## 2021-10-14 PROCEDURE — 99213 OFFICE O/P EST LOW 20 MIN: CPT | Performed by: FAMILY MEDICINE

## 2021-10-14 NOTE — PROGRESS NOTES
SUBJECTIVE:   Flor Griffin is a 66 year old female presenting with a chief complaint of ear pain.  No fever.  Has cough initially but improving.  Onset of symptoms was 2 week(s) ago.  Course of illness is waxing and waning.    Severity moderate  Current and Associated symptoms: left ear pain and pressure  Treatment measures tried include Fluids, Rest and sudafed and nose sray.  Predisposing factors include recent illness and breast cancer.    Was on Zpak for sinus infection  Ear discomfort persisting and worried that is infected    Past Medical History:   Diagnosis Date     Basal cell cancer     right cheek     Breast cancer, right breast (H)      DVT (deep venous thrombosis) (H)      Gastroesophageal reflux disease      History of blood transfusion     blue baby     Hyperlipidaemia      Current Outpatient Medications   Medication Sig Dispense Refill     benzonatate (TESSALON) 100 MG capsule Take 1 capsule (100 mg) by mouth 3 times daily as needed 30 capsule 0     calcium carbonate (TUMS) 500 MG chewable tablet Take 1 chew tab by mouth daily as needed for heartburn       LORazepam (ATIVAN) 0.5 MG tablet Take 1 tablet (0.5 mg) by mouth every 4 hours as needed (Anxiety, Nausea/Vomiting or Sleep) 30 tablet 2     methocarbamol (ROBAXIN) 500 MG tablet Take 1 tablet (500 mg) by mouth every 6 hours as needed for muscle spasms 20 tablet 0     multivitamin w/minerals (THERA-VIT-M) tablet Take 1 tablet by mouth every morning        Omega-3 Fatty Acids (OMEGA-3 FISH OIL PO) Take 1 g by mouth every morning        ondansetron (ZOFRAN) 8 MG tablet Take 1 tablet (8 mg) by mouth every 8 hours as needed (Nausea/Vomiting) 10 tablet 2     oxyCODONE (ROXICODONE) 5 MG tablet Take 1 tablet (5 mg) by mouth every 4 hours as needed for moderate to severe pain 15 tablet 0     sennosides (SENOKOT) 8.6 MG tablet Take 1 tablet by mouth 2 times daily 30 tablet 0     rivaroxaban ANTICOAGULANT (XARELTO ANTICOAGULANT) 20 MG TABS tablet Take 1  tablet (20 mg) by mouth daily (with dinner) (Patient not taking: Reported on 10/14/2021) 90 tablet 3     Social History     Tobacco Use     Smoking status: Never Smoker     Smokeless tobacco: Never Used   Substance Use Topics     Alcohol use: Yes     Alcohol/week: 0.0 standard drinks     Comment: 2 times a week, 2 drinks       ROS:  Review of systems negative except as stated above.    OBJECTIVE:  /73   Pulse 78   Temp 98  F (36.7  C) (Tympanic)   Resp 20   LMP 01/01/2004 (Approximate)   SpO2 98%   GENERAL APPEARANCE: healthy, alert and no distress  EYES: EOMI,  PERRL, conjunctiva clear  HENT: ear canals and TM's normal.   RESP: lungs with no audible wheezes or increase work of breathing  PSYCH: mentation appears normal and affect normal/bright    ASSESSMENT/PLAN:  (H92.02) Otalgia, left  (primary encounter diagnosis)  Plan: continue sudafed and steroid nasal spray      Reassurance given that does not have acute otitis media, that ear pain is most likely eustachian tube dysfunction.  Encourage to continue with sudafed and steroid nasal spray, tylenol for discomfort    Follow up with primary provider if no improvement of symptoms in 1-2 weeks    Monty Leonardo MD,  October 14, 2021 11:26 AM

## 2021-10-19 ENCOUNTER — HOSPITAL ENCOUNTER (OUTPATIENT)
Dept: PET IMAGING | Facility: CLINIC | Age: 66
Discharge: HOME | End: 2021-10-19
Attending: INTERNAL MEDICINE | Admitting: INTERNAL MEDICINE
Payer: COMMERCIAL

## 2021-10-19 DIAGNOSIS — C50.411 MALIGNANT NEOPLASM OF UPPER-OUTER QUADRANT OF RIGHT BREAST IN FEMALE, ESTROGEN RECEPTOR POSITIVE (H): ICD-10-CM

## 2021-10-19 DIAGNOSIS — Z17.0 MALIGNANT NEOPLASM OF UPPER-OUTER QUADRANT OF RIGHT BREAST IN FEMALE, ESTROGEN RECEPTOR POSITIVE (H): ICD-10-CM

## 2021-10-19 PROCEDURE — 250N000011 HC RX IP 250 OP 636: Performed by: INTERNAL MEDICINE

## 2021-10-19 PROCEDURE — 78816 PET IMAGE W/CT FULL BODY: CPT | Mod: PS

## 2021-10-19 PROCEDURE — 71260 CT THORAX DX C+: CPT

## 2021-10-19 PROCEDURE — 343N000001 HC RX 343: Performed by: INTERNAL MEDICINE

## 2021-10-19 PROCEDURE — A9552 F18 FDG: HCPCS | Performed by: INTERNAL MEDICINE

## 2021-10-19 PROCEDURE — 71260 CT THORAX DX C+: CPT | Mod: 26 | Performed by: RADIOLOGY

## 2021-10-19 PROCEDURE — 78816 PET IMAGE W/CT FULL BODY: CPT | Mod: 26 | Performed by: RADIOLOGY

## 2021-10-19 PROCEDURE — 74177 CT ABD & PELVIS W/CONTRAST: CPT | Mod: 26 | Performed by: RADIOLOGY

## 2021-10-19 RX ORDER — HEPARIN SODIUM (PORCINE) LOCK FLUSH IV SOLN 100 UNIT/ML 100 UNIT/ML
500 SOLUTION INTRAVENOUS ONCE
Status: COMPLETED | OUTPATIENT
Start: 2021-10-19 | End: 2021-10-19

## 2021-10-19 RX ORDER — IOPAMIDOL 755 MG/ML
10-135 INJECTION, SOLUTION INTRAVASCULAR ONCE
Status: COMPLETED | OUTPATIENT
Start: 2021-10-19 | End: 2021-10-19

## 2021-10-19 RX ADMIN — Medication 500 UNITS: at 13:34

## 2021-10-19 RX ADMIN — IOPAMIDOL 104 ML: 755 INJECTION, SOLUTION INTRAVENOUS at 13:34

## 2021-10-19 RX ADMIN — FLUDEOXYGLUCOSE F-18 13.07 MCI.: 500 INJECTION, SOLUTION INTRAVENOUS at 13:28

## 2021-10-20 ENCOUNTER — TELEPHONE (OUTPATIENT)
Dept: ONCOLOGY | Facility: CLINIC | Age: 66
End: 2021-10-20

## 2021-10-20 NOTE — TELEPHONE ENCOUNTER
Called Michelle she is aware that Pet results were discussed with Dr. Stover in clinic. She will consult with Dr. Castillo and will further discuss with Michelle at her exam next Wednesday 10/27/21. Danya Lara RN,BSN,OCN,CBCN

## 2021-10-20 NOTE — TELEPHONE ENCOUNTER
Michelle called clinic requesting call back from ONEL Hagan.  She states she would like her PET scan results.    She has follow-up scheduled with Dr. Stover on 10/27 and says she can't wait that long to get the results.    Routing to RNSHAN Hagan to call patient back.    VY JassoN, RN, OCN  Oncology Care Coordinator  [facility]

## 2021-10-20 NOTE — TELEPHONE ENCOUNTER
Called Michelle back regarding Pet/Ct results. They will be reviewed with Dr. Stover and Michelle will be call ed back with plan of care.     Released Pet/CT results for Michelle to review. Danya Lara RN,BSN,OCN,CBCN

## 2021-10-24 ENCOUNTER — HEALTH MAINTENANCE LETTER (OUTPATIENT)
Age: 66
End: 2021-10-24

## 2021-10-25 ENCOUNTER — TELEPHONE (OUTPATIENT)
Dept: SURGERY | Facility: CLINIC | Age: 66
End: 2021-10-25

## 2021-10-25 ENCOUNTER — TELEPHONE (OUTPATIENT)
Dept: ONCOLOGY | Facility: CLINIC | Age: 66
End: 2021-10-25

## 2021-10-25 NOTE — TELEPHONE ENCOUNTER
Called patient to inform her that Dr. Castillo has been in contact with Dr Ck Castillo would like to see her in clinic when she returns from [place]    Appt made    Isabel Barrientos, RN-BSN

## 2021-10-25 NOTE — TELEPHONE ENCOUNTER
Michelle called clinic inquiring if Dr. Stover has had a chance to consult with Dr. Castillo. Michelle is leaving for [place] on Saturday and will gone for a week.

## 2021-10-25 NOTE — TELEPHONE ENCOUNTER
Wondering about next steps in general. Also  she is leaving on a trip this coming Saturday 10/31/21 and wondering if RAY has spoken with Dr Mann and if travel is ok    Phone: [phone]  Message ok

## 2021-10-26 NOTE — PROGRESS NOTES
[facility]    Hematology/Oncology Established Patient Follow-up Note      Today's Date: 10/27/21    Reason for Follow-up: Metastatic breast cancer.    HISTORY OF PRESENT ILLNESS: Flor Griffin is a 66 year old female who presents with the following oncologic history:     --Clinical TX N3c M0 adenocarcinoma which is poorly differentiated, likely of breast origin, ER low-positive at 1-5%, KS negative, HER-2/mitzi FISH negative, androgen stain weak-intermediate 10-20%.  Initially she was seen 11/22/2017 after she underwent right supraclavicular lymph node biopsy which was positive for poorly differentiated adenocarcinoma.  She had this lymph node almost a month and had an excisional biopsy performed which was consistent with the diagnosis of cancer.   --A PET/CT scan 11/27/2017 showed hypermetabolic right supraclavicular, right axillary and subpectoral adenopathy.  Otherwise, no distant metastatic disease.   --3/7/2018: Completed 4 cycles of carboplatin and paclitaxel followed by dose dense AC x 4 cycles.  No surgery was done.  --5/29/2018-7/31/2018: Completed adjuvant radiation to right breast, right axilla, right supraclavicular region.  --9/2018: Started adjuvant letrozole.  --10/15/2020: Screening mammogram showed focal asymmetry in upper outer right breast; no suspicious findings in left breast.  --10/21/2020: U/S of right breast showed irregularly-shaped hypoechoic mass at 10:00, 7 cm from nipple, measuring 3.2 x 2.7 x 3.6 cm. Right axillary ultrasound shows multiple normal-appearing lymph nodes. Biopsy of right breast mass at 10:00 showed poorly differentiated carcinoma, no lymphovascular invasion, morphology consistent with breast cancer and similar to prior axillary node tumor, ER weakly positive at 1% and KS negative (0%), HER-2/mitzi FISH negative.  --10/28/2020: PET/CT scan showed high metabolic activity in 3 cm area of right upper outer breast, several small hypermetabolic lymph nodes in  high right axilla and right subclavian region; mild hypermetabolic lymph nodes in bilateral hilar regions, favor metastatic disease; several tiny nodules in right upper lung, favor benign etiology; small hypermetabolic focus in pelvis in either sigmoid colon or adjacent loop of small bowel.  --11/02/2020: Started 1st line metastatic therapy with capecitabine.  --2/9/2021: PET/CT scan showed hypermetabolic right breast mass not significantly changed since 12/7/2020, persistent hypermetabolism; hypermetabolic right axillary lymph node increased in size; no distant metastasis; focal hypermetabolism in pelvis associated with sigmoid colon; stable 6-mm lung nodules.  --2/18/2021: Due to disease progression, switched to 2nd line metastatic therapy with eribulin. Required dose reduction due to neutropenia.  --4/24/2021: Left upper extremity Doppler U/S showed occlusive DVT within left subclavian vein; superficial thrombophlebitis within proximal cephalic vein. Started rivaroxaban.  --5/11/2021: PET scan showed unchanged size and slightly increased uptake to right breast; resolved right axillary adenopathy; no new lesions; persistent hypermetabolism at proximal sigmoid colon.  --5/19/2021 On chemo break after cycle 4 of eribulin.  --6/2/2021: Underwent palliative right mastectomy under care of Dr. Wilfredo Castillo. Pathology showed grade 3 invasive ductal carcinoma with tumor invasion into underlying skeletal muscle. Margins negative.  --7/22/2021: Colonoscopy showed diverticula in sigmoid colon; normal mucosa throughout entire colon.  --8/3/2021: PET scan showed postsurgical seroma at right chest wall and axilla; small focus of residual mild FDG uptake at superolateral resection margin, likely posttreatment change or inflammation; no metastatic disease; persistent focus of uptake in sigmoid colon corresponding to diverticular disease.  --10/07/2021: U/S of right chest wall performed due to palpable lump. This showed hypoechoic  nodule superior to level of scar measuring 0.7 x 0.6 x 1.5 cm. Biopsy of nodule showed poorly differentiated carcinoma consistent with recurrent breast carcinoma, grade 3, ER negative.  --10/19/2021: PET/CT showed right chest wall lesion with SUV 12.9; decreased right chest wall seroma; no FDG uptake in abdomen or pelvis.    INTERIM HISTORY:  Michelle reports no new pain.      REVIEW OF SYSTEMS:   14 point ROS was reviewed and is negative other than as noted above in HPI.       HOME MEDICATIONS:  Current Outpatient Medications   Medication Sig Dispense Refill     benzonatate (TESSALON) 100 MG capsule Take 1 capsule (100 mg) by mouth 3 times daily as needed 30 capsule 0     calcium carbonate (TUMS) 500 MG chewable tablet Take 1 chew tab by mouth daily as needed for heartburn       LORazepam (ATIVAN) 0.5 MG tablet Take 1 tablet (0.5 mg) by mouth every 4 hours as needed (Anxiety, Nausea/Vomiting or Sleep) 30 tablet 2     methocarbamol (ROBAXIN) 500 MG tablet Take 1 tablet (500 mg) by mouth every 6 hours as needed for muscle spasms 20 tablet 0     multivitamin w/minerals (THERA-VIT-M) tablet Take 1 tablet by mouth every morning        Omega-3 Fatty Acids (OMEGA-3 FISH OIL PO) Take 1 g by mouth every morning        ondansetron (ZOFRAN) 8 MG tablet Take 1 tablet (8 mg) by mouth every 8 hours as needed (Nausea/Vomiting) 10 tablet 2     oxyCODONE (ROXICODONE) 5 MG tablet Take 1 tablet (5 mg) by mouth every 4 hours as needed for moderate to severe pain 15 tablet 0     rivaroxaban ANTICOAGULANT (XARELTO ANTICOAGULANT) 20 MG TABS tablet Take 1 tablet (20 mg) by mouth daily (with dinner) (Patient not taking: Reported on 10/14/2021) 90 tablet 3     sennosides (SENOKOT) 8.6 MG tablet Take 1 tablet by mouth 2 times daily 30 tablet 0         ALLERGIES:  Allergies   Allergen Reactions     Pcn [Penicillins] Hives         PAST MEDICAL HISTORY:  Past Medical History:   Diagnosis Date     Basal cell cancer     right cheek     Breast cancer,  right breast (H)      DVT (deep venous thrombosis) (H)      Gastroesophageal reflux disease      History of blood transfusion     blue baby     Hyperlipidaemia          PAST SURGICAL HISTORY:  Past Surgical History:   Procedure Laterality Date     BIOPSY MASS NECK Right 11/16/2017    Procedure: BIOPSY MASS NECK;  Excisional Biopsy Right Neck Lymph node;  Surgeon: Waylon Corral MD;  Location: [facility]     COLONOSCOPY       COLONOSCOPY N/A 7/22/2021    Procedure: COLONOSCOPY;  Surgeon: Gabriele Caro MD;  Location: [facility]     IR CHEST PORT PLACEMENT > 5 YRS OF AGE  2/16/2021     IR PORT REMOVAL LEFT  5/13/2019     MASTECTOMY SIMPLE Right 6/2/2021    Procedure: RIGHT SIMPLE MASTECTOMY;  Surgeon: Ulises Castillo MD;  Location: [facility] OR     MOHS MICROGRAPHIC PROCEDURE  ~2010    right cheek; BCC     MOHS MICROGRAPHIC PROCEDURE  10/31/2016    Dr[facility]         SOCIAL HISTORY:  Social History     Socioeconomic History     Marital status:      Spouse name: Not on file     Number of children: Not on file     Years of education: Not on file     Highest education level: Not on file   Occupational History     Not on file   Tobacco Use     Smoking status: Never Smoker     Smokeless tobacco: Never Used   Substance and Sexual Activity     Alcohol use: Yes     Alcohol/week: 0.0 standard drinks     Comment: 2 times a week, 2 drinks     Drug use: No     Sexual activity: Yes     Partners: Male     Birth control/protection: Post-menopausal   Other Topics Concern     Parent/sibling w/ CABG, MI or angioplasty before 65F 55M? No   Social History Narrative     Not on file     Social Determinants of Health     Financial Resource Strain:      Difficulty of Paying Living Expenses:    Food Insecurity:      Worried About Running Out of Food in the Last Year:      Ran Out of Food in the Last Year:    Transportation Needs:      Lack of Transportation (Medical):      Lack of Transportation (Non-Medical):    Physical  Activity:      Days of Exercise per Week:      Minutes of Exercise per Session:    Stress:      Feeling of Stress :    Social Connections:      Frequency of Communication with Friends and Family:      Frequency of Social Gatherings with Friends and Family:      Attends [employer] Services:      Active Member of Clubs or Organizations:      Attends Club or Organization Meetings:      Marital Status:    Intimate Partner Violence:      Fear of Current or Ex-Partner:      Emotionally Abused:      Physically Abused:      Sexually Abused:          FAMILY HISTORY:  Family History   Problem Relation Age of Onset     Lupus Mother      Heart Disease Mother         CHF     Coronary Artery Disease Mother      Hyperlipidemia Mother      Diabetes Father      Breast Cancer Other          PHYSICAL EXAM:  Vital signs:  /82   Pulse 64   Resp 16   Wt 76.2 kg (168 lb)   LMP 2004 (Approximate)   SpO2 99%   BMI 27.12 kg/m    ECO  GENERAL/CONSTITUTIONAL: No acute distress.  EYES: No erythema or scleral icterus.  LYMPH: No cervical, supraclavicular, axillary adenopathy.   BREAST: No masses in left breast; right breast is surgically absent with palpable nodule at the upper chest, above mastectomy incision line. No associated erythema, ulceration.  Small amount of fluid at the right lateral chest wall consistent with seroma.  RESPIRATORY: No audible cough or wheezing.   GASTROINTESTINAL: No hepatosplenomegaly, masses, or tenderness. No guarding.  No distention.  MUSCULOSKELETAL: Warm and well-perfused, no cyanosis, clubbing, or edema.  NEUROLOGIC: No focal motor deficits. Alert, oriented, answers questions appropriately.  INTEGUMENTARY: No rashes or jaundice.  GAIT: Steady, does not use assistive device    LABS:  CBC RESULTS: Recent Labs   Lab Test 21  0759   WBC 4.1   RBC 4.01   HGB 12.4   HCT 39.5   MCV 99   MCH 30.9   MCHC 31.4*   RDW 13.3          Last Comprehensive Metabolic Panel:  Sodium   Date Value  Ref Range Status   09/28/2021 140 133 - 144 mmol/L Final   05/27/2021 139 133 - 144 mmol/L Final     Potassium   Date Value Ref Range Status   09/28/2021 3.7 3.4 - 5.3 mmol/L Final   05/27/2021 3.6 3.4 - 5.3 mmol/L Final     Chloride   Date Value Ref Range Status   09/28/2021 106 94 - 109 mmol/L Final   05/27/2021 107 94 - 109 mmol/L Final     Carbon Dioxide   Date Value Ref Range Status   05/27/2021 28 20 - 32 mmol/L Final     Carbon Dioxide (CO2)   Date Value Ref Range Status   09/28/2021 29 20 - 32 mmol/L Final     Anion Gap   Date Value Ref Range Status   09/28/2021 5 3 - 14 mmol/L Final   05/27/2021 4 3 - 14 mmol/L Final     Glucose   Date Value Ref Range Status   09/28/2021 113 (H) 70 - 99 mg/dL Final   05/27/2021 136 (H) 70 - 99 mg/dL Final     Urea Nitrogen   Date Value Ref Range Status   09/28/2021 12 7 - 30 mg/dL Final   05/27/2021 13 7 - 30 mg/dL Final     Creatinine   Date Value Ref Range Status   09/28/2021 0.76 0.52 - 1.04 mg/dL Final   05/27/2021 0.76 0.52 - 1.04 mg/dL Final     GFR Estimate   Date Value Ref Range Status   09/28/2021 82 >60 mL/min/1.73m2 Final     Comment:     As of July 11, 2021, eGFR is calculated by the CKD-EPI creatinine equation, without race adjustment. eGFR can be influenced by muscle mass, exercise, and diet. The reported eGFR is an estimation only and is only applicable if the renal function is stable.   05/27/2021 82 >60 mL/min/[1.73_m2] Final     Comment:     Non  GFR Calc  Starting 12/18/2018, serum creatinine based estimated GFR (eGFR) will be   calculated using the Chronic Kidney Disease Epidemiology Collaboration   (CKD-EPI) equation.       Calcium   Date Value Ref Range Status   09/28/2021 8.9 8.5 - 10.1 mg/dL Final   05/27/2021 9.0 8.5 - 10.1 mg/dL Final     Bilirubin Total   Date Value Ref Range Status   09/28/2021 0.5 0.2 - 1.3 mg/dL Final   05/18/2021 0.8 0.2 - 1.3 mg/dL Final     Alkaline Phosphatase   Date Value Ref Range Status   09/28/2021 89 40  - 150 U/L Final   05/18/2021 81 40 - 150 U/L Final     ALT   Date Value Ref Range Status   09/28/2021 30 0 - 50 U/L Final   05/18/2021 58 (H) 0 - 50 U/L Final     AST   Date Value Ref Range Status   09/28/2021 18 0 - 45 U/L Final   05/18/2021 38 0 - 45 U/L Final       PATHOLOGY:  Reviewed as per HPI.    IMAGING:   Reviewed as per HPI.    ASSESSMENT/PLAN:  Flor Griffin is a 66 year old female with the following issues:  1.  Right breast poorly differentiated adenocarcinoma, local recurrence ER negative, OK negative, HER-2/mitzi negative  2. Bilateral hilar lymphadenopathy, now resolved  3. Indeterminate pulmonary nodules, likely benign and stable  --Michelle is s/p palliative right mastectomy and has been on chemo break from eribulin since 5/19/2021.  --I personally reviewed the PET scan from 10/19/2021 which shows hypermetabolic local recurrence to the right chest wall.  Fortunately, there does not appear to be radiographic evidence of distant metastatic disease at the present time.  --Discussed that her 10/7/2021 biopsy of the right chest wall nodule was consistent with local recurrence of her breast cancer but this site is ER negative.  Discussed that this is not necessarily surprising since often times we see a heterogeneous population in breast cancer and that some tumor cells may weakly express ER and other tumor cells may not express ER at all.  --I had spoken with Dr. Castillo who will be able to graciously see Michelle again in early November to discuss excision of this local recurrence.  Discussed she has had prior radiation to this area and will not be a candidate for repeat radiation.  --Given high risk for metastatic recurrence and that the recurrent right chest wall nodule is ER negative (I.e., triple negative), then will evaluate PD-L1 status to determine if pembrolizumab would be an option for future therapy.  If there is insufficient tissue from her 10/7/21 biopsy, will obtain PD-L1 status from the surgical  excision.  --Otherwise, post-excision, I advised repeating a PET scan in 3 months.    4. Left upper extremity deep vein thrombosis   --4/24/2021 Doppler U/S showed occlusive DVT in left subclavian vein.  --Continue rivaroxaban. Given her recent malignancy recurrence, I recommended long term anticoagulation. I discussed we can revisit this if she has clear scans for a year post excision of her local recurrence.  --For anticipated upcoming surgery, I recommended she hold her rivaroxaban for 2 days prior to surgery and resume at Dr. Castillo's discretion (likely 24-48 hours post-surgery).    Return in 4-6 weeks.    Sally Stover MD  Hematology/Oncology  [facility]    Total time spent: 35 minutes in patient evaluation, counseling, documentation, and coordination of care.

## 2021-10-27 ENCOUNTER — TELEPHONE (OUTPATIENT)
Dept: ONCOLOGY | Facility: CLINIC | Age: 66
End: 2021-10-27

## 2021-10-27 ENCOUNTER — ONCOLOGY VISIT (OUTPATIENT)
Dept: ONCOLOGY | Facility: CLINIC | Age: 66
End: 2021-10-27
Attending: INTERNAL MEDICINE
Payer: COMMERCIAL

## 2021-10-27 VITALS
SYSTOLIC BLOOD PRESSURE: 129 MMHG | OXYGEN SATURATION: 99 % | DIASTOLIC BLOOD PRESSURE: 82 MMHG | RESPIRATION RATE: 16 BRPM | BODY MASS INDEX: 27.12 KG/M2 | WEIGHT: 168 LBS | HEART RATE: 64 BPM

## 2021-10-27 DIAGNOSIS — Z17.1 MALIGNANT NEOPLASM OF UPPER-OUTER QUADRANT OF RIGHT BREAST IN FEMALE, ESTROGEN RECEPTOR NEGATIVE (H): Primary | ICD-10-CM

## 2021-10-27 DIAGNOSIS — C50.411 MALIGNANT NEOPLASM OF UPPER-OUTER QUADRANT OF RIGHT BREAST IN FEMALE, ESTROGEN RECEPTOR NEGATIVE (H): Primary | ICD-10-CM

## 2021-10-27 DIAGNOSIS — Z86.718 PERSONAL HISTORY OF DVT (DEEP VEIN THROMBOSIS): ICD-10-CM

## 2021-10-27 PROCEDURE — 99214 OFFICE O/P EST MOD 30 MIN: CPT | Performed by: INTERNAL MEDICINE

## 2021-10-27 ASSESSMENT — PAIN SCALES - GENERAL: PAINLEVEL: NO PAIN (0)

## 2021-10-27 NOTE — PROGRESS NOTES
"Oncology Rooming Note    October 27, 2021 12:15 PM   Flor Griffin is a 66 year old female who presents for:    Chief Complaint   Patient presents with     Oncology Clinic Visit     Initial Vitals: /82   Pulse 64   Resp 16   Wt 76.2 kg (168 lb)   LMP 01/01/2004 (Approximate)   SpO2 99%   BMI 27.12 kg/m   Estimated body mass index is 27.12 kg/m  as calculated from the following:    Height as of 7/22/21: 1.676 m (5' 6\").    Weight as of this encounter: 76.2 kg (168 lb). Body surface area is 1.88 meters squared.  No Pain (0) Comment: Data Unavailable   Patient's last menstrual period was 01/01/2004 (approximate).  Allergies reviewed: Yes  Medications reviewed: Yes    Medications: MEDICATION REFILLS NEEDED TODAY. Provider was notified.  Pharmacy name entered into [employer]: [id number] - [facility] [id number]    Clinical concerns: refill of Shaggy Kovacs CMA            "

## 2021-10-27 NOTE — LETTER
10/27/2021         RE: Flor Griffin  [id number]  [facility] [id number]        Dear Colleague,    Thank you for referring your patient, Flor Griffin, to the [facility]. Please see a copy of my visit note below.    [facility]    Hematology/Oncology Established Patient Follow-up Note      Today's Date: 10/27/21    Reason for Follow-up: Metastatic breast cancer.    HISTORY OF PRESENT ILLNESS: Flor Griffin is a 66 year old female who presents with the following oncologic history:     --Clinical TX N3c M0 adenocarcinoma which is poorly differentiated, likely of breast origin, ER low-positive at 1-5%, W[place] negative, HER-2/mitzi FISH negative, androgen stain weak-intermediate 10-20%.  Initially she was seen 11/22/2017 after she underwent right supraclavicular lymph node biopsy which was positive for poorly differentiated adenocarcinoma.  She had this lymph node almost a month and had an excisional biopsy performed which was consistent with the diagnosis of cancer.   --A PET/CT scan 11/27/2017 showed hypermetabolic right supraclavicular, right axillary and subpectoral adenopathy.  Otherwise, no distant metastatic disease.   --3/7/2018: Completed 4 cycles of carboplatin and paclitaxel followed by dose dense AC x 4 cycles.  No surgery was done.  --5/29/2018-7/31/2018: Completed adjuvant radiation to right breast, right axilla, right supraclavicular region.  --9/2018: Started adjuvant letrozole.  --10/15/2020: Screening mammogram showed focal asymmetry in upper outer right breast; no suspicious findings in left breast.  --10/21/2020: U/S of right breast showed irregularly-shaped hypoechoic mass at 10:00, 7 cm from nipple, measuring 3.2 x 2.7 x 3.6 cm. Right axillary ultrasound shows multiple normal-appearing lymph nodes. Biopsy of right breast mass at 10:00 showed poorly differentiated carcinoma, no lymphovascular invasion, morphology consistent with breast  cancer and similar to prior axillary node tumor, ER weakly positive at 1% and ID negative (0%), HER-2/mitzi FISH negative.  --10/28/2020: PET/CT scan showed high metabolic activity in 3 cm area of right upper outer breast, several small hypermetabolic lymph nodes in high right axilla and right subclavian region; mild hypermetabolic lymph nodes in bilateral hilar regions, favor metastatic disease; several tiny nodules in right upper lung, favor benign etiology; small hypermetabolic focus in pelvis in either sigmoid colon or adjacent loop of small bowel.  --11/02/2020: Started 1st line metastatic therapy with capecitabine.  --2/9/2021: PET/CT scan showed hypermetabolic right breast mass not significantly changed since 12/7/2020, persistent hypermetabolism; hypermetabolic right axillary lymph node increased in size; no distant metastasis; focal hypermetabolism in pelvis associated with sigmoid colon; stable 6-mm lung nodules.  --2/18/2021: Due to disease progression, switched to 2nd line metastatic therapy with eribulin. Required dose reduction due to neutropenia.  --4/24/2021: Left upper extremity Doppler U/S showed occlusive DVT within left subclavian vein; superficial thrombophlebitis within proximal cephalic vein. Started rivaroxaban.  --5/11/2021: PET scan showed unchanged size and slightly increased uptake to right breast; resolved right axillary adenopathy; no new lesions; persistent hypermetabolism at proximal sigmoid colon.  --5/19/2021 On chemo break after cycle 4 of eribulin.  --6/2/2021: Underwent palliative right mastectomy under care of Dr. Wilfredo Castillo. Pathology showed grade 3 invasive ductal carcinoma with tumor invasion into underlying skeletal muscle. Margins negative.  --7/22/2021: Colonoscopy showed diverticula in sigmoid colon; normal mucosa throughout entire colon.  --8/3/2021: PET scan showed postsurgical seroma at right chest wall and axilla; small focus of residual mild FDG uptake at superolateral  resection margin, likely posttreatment change or inflammation; no metastatic disease; persistent focus of uptake in sigmoid colon corresponding to diverticular disease.  --10/07/2021: U/S of right chest wall performed due to palpable lump. This showed hypoechoic nodule superior to level of scar measuring 0.7 x 0.6 x 1.5 cm. Biopsy of nodule showed poorly differentiated carcinoma consistent with recurrent breast carcinoma, grade 3, ER negative.  --10/19/2021: PET/CT showed right chest wall lesion with SUV 12.9; decreased right chest wall seroma; no FDG uptake in abdomen or pelvis.    INTERIM HISTORY:  Michelle reports no new pain.      REVIEW OF SYSTEMS:   14 point ROS was reviewed and is negative other than as noted above in HPI.       HOME MEDICATIONS:  Current Outpatient Medications   Medication Sig Dispense Refill     benzonatate (TESSALON) 100 MG capsule Take 1 capsule (100 mg) by mouth 3 times daily as needed 30 capsule 0     calcium carbonate (TUMS) 500 MG chewable tablet Take 1 chew tab by mouth daily as needed for heartburn       LORazepam (ATIVAN) 0.5 MG tablet Take 1 tablet (0.5 mg) by mouth every 4 hours as needed (Anxiety, Nausea/Vomiting or Sleep) 30 tablet 2     methocarbamol (ROBAXIN) 500 MG tablet Take 1 tablet (500 mg) by mouth every 6 hours as needed for muscle spasms 20 tablet 0     multivitamin w/minerals (THERA-VIT-M) tablet Take 1 tablet by mouth every morning        Omega-3 Fatty Acids (OMEGA-3 FISH OIL PO) Take 1 g by mouth every morning        ondansetron (ZOFRAN) 8 MG tablet Take 1 tablet (8 mg) by mouth every 8 hours as needed (Nausea/Vomiting) 10 tablet 2     oxyCODONE (ROXICODONE) 5 MG tablet Take 1 tablet (5 mg) by mouth every 4 hours as needed for moderate to severe pain 15 tablet 0     rivaroxaban ANTICOAGULANT (XARELTO ANTICOAGULANT) 20 MG TABS tablet Take 1 tablet (20 mg) by mouth daily (with dinner) (Patient not taking: Reported on 10/14/2021) 90 tablet 3     sennosides (SENOKOT) 8.6 MG  tablet Take 1 tablet by mouth 2 times daily 30 tablet 0         ALLERGIES:  Allergies   Allergen Reactions     Pcn [Penicillins] Hives         PAST MEDICAL HISTORY:  Past Medical History:   Diagnosis Date     Basal cell cancer     right cheek     Breast cancer, right breast (H)      DVT (deep venous thrombosis) (H)      Gastroesophageal reflux disease      History of blood transfusion     blue baby     Hyperlipidaemia          PAST SURGICAL HISTORY:  Past Surgical History:   Procedure Laterality Date     BIOPSY MASS NECK Right 11/16/2017    Procedure: BIOPSY MASS NECK;  Excisional Biopsy Right Neck Lymph node;  Surgeon: Waylon Corral MD;  Location: [facility] OR     COLONOSCOPY       COLONOSCOPY N/A 7/22/2021    Procedure: COLONOSCOPY;  Surgeon: Gabriele Caro MD;  Location: [facility] GI     IR CHEST PORT PLACEMENT > 5 YRS OF AGE  2/16/2021     IR PORT REMOVAL LEFT  5/13/2019     MASTECTOMY SIMPLE Right 6/2/2021    Procedure: RIGHT SIMPLE MASTECTOMY;  Surgeon: Ulises Castillo MD;  Location: [facility] OR     MOHS MICROGRAPHIC PROCEDURE  ~2010    right cheek; BCC     MOHS MICROGRAPHIC PROCEDURE  10/31/2016    Dr. Nicholas [facility]         SOCIAL HISTORY:  Social History     Socioeconomic History     Marital status:      Spouse name: Not on file     Number of children: Not on file     Years of education: Not on file     Highest education level: Not on file   Occupational History     Not on file   Tobacco Use     Smoking status: Never Smoker     Smokeless tobacco: Never Used   Substance and Sexual Activity     Alcohol use: Yes     Alcohol/week: 0.0 standard drinks     Comment: 2 times a week, 2 drinks     Drug use: No     Sexual activity: Yes     Partners: Male     Birth control/protection: Post-menopausal   Other Topics Concern     Parent/sibling w/ CABG, MI or angioplasty before 65F 55M? No   Social History Narrative     Not on file     Social Determinants of Health     Financial Resource Strain:      Difficulty of  Paying Living Expenses:    Food Insecurity:      Worried About Running Out of Food in the Last Year:      Ran Out of Food in the Last Year:    Transportation Needs:      Lack of Transportation (Medical):      Lack of Transportation (Non-Medical):    Physical Activity:      Days of Exercise per Week:      Minutes of Exercise per Session:    Stress:      Feeling of Stress :    Social Connections:      Frequency of Communication with Friends and Family:      Frequency of Social Gatherings with Friends and Family:      Attends [employer] Services:      Active Member of Clubs or Organizations:      Attends Club or Organization Meetings:      Marital Status:    Intimate Partner Violence:      Fear of Current or Ex-Partner:      Emotionally Abused:      Physically Abused:      Sexually Abused:          FAMILY HISTORY:  Family History   Problem Relation Age of Onset     Lupus Mother      Heart Disease Mother         CHF     Coronary Artery Disease Mother      Hyperlipidemia Mother      Diabetes Father      Breast Cancer Other          PHYSICAL EXAM:  Vital signs:  /82   Pulse 64   Resp 16   Wt 76.2 kg (168 lb)   LMP 2004 (Approximate)   SpO2 99%   BMI 27.12 kg/m    ECO  GENERAL/CONSTITUTIONAL: No acute distress.  EYES: No erythema or scleral icterus.  LYMPH: No cervical, supraclavicular, axillary adenopathy.   BREAST: No masses in left breast; right breast is surgically absent with palpable nodule at the upper chest, above mastectomy incision line. No associated erythema, ulceration.  Small amount of fluid at the right lateral chest wall consistent with seroma.  RESPIRATORY: No audible cough or wheezing.   GASTROINTESTINAL: No hepatosplenomegaly, masses, or tenderness. No guarding.  No distention.  MUSCULOSKELETAL: Warm and well-perfused, no cyanosis, clubbing, or edema.  NEUROLOGIC: No focal motor deficits. Alert, oriented, answers questions appropriately.  INTEGUMENTARY: No rashes or jaundice.  GAIT:  Steady, does not use assistive device    LABS:  CBC RESULTS: Recent Labs   Lab Test 09/28/21  0759   WBC 4.1   RBC 4.01   HGB 12.4   HCT 39.5   MCV 99   MCH 30.9   MCHC 31.4*   RDW 13.3          Last Comprehensive Metabolic Panel:  Sodium   Date Value Ref Range Status   09/28/2021 140 133 - 144 mmol/L Final   05/27/2021 139 133 - 144 mmol/L Final     Potassium   Date Value Ref Range Status   09/28/2021 3.7 3.4 - 5.3 mmol/L Final   05/27/2021 3.6 3.4 - 5.3 mmol/L Final     Chloride   Date Value Ref Range Status   09/28/2021 106 94 - 109 mmol/L Final   05/27/2021 107 94 - 109 mmol/L Final     Carbon Dioxide   Date Value Ref Range Status   05/27/2021 28 20 - 32 mmol/L Final     Carbon Dioxide (CO2)   Date Value Ref Range Status   09/28/2021 29 20 - 32 mmol/L Final     Anion Gap   Date Value Ref Range Status   09/28/2021 5 3 - 14 mmol/L Final   05/27/2021 4 3 - 14 mmol/L Final     Glucose   Date Value Ref Range Status   09/28/2021 113 (H) 70 - 99 mg/dL Final   05/27/2021 136 (H) 70 - 99 mg/dL Final     Urea Nitrogen   Date Value Ref Range Status   09/28/2021 12 7 - 30 mg/dL Final   05/27/2021 13 7 - 30 mg/dL Final     Creatinine   Date Value Ref Range Status   09/28/2021 0.76 0.52 - 1.04 mg/dL Final   05/27/2021 0.76 0.52 - 1.04 mg/dL Final     GFR Estimate   Date Value Ref Range Status   09/28/2021 82 >60 mL/min/1.73m2 Final     Comment:     As of July 11, 2021, eGFR is calculated by the CKD-EPI creatinine equation, without race adjustment. eGFR can be influenced by muscle mass, exercise, and diet. The reported eGFR is an estimation only and is only applicable if the renal function is stable.   05/27/2021 82 >60 mL/min/[1.73_m2] Final     Comment:     Non  GFR Calc  Starting 12/18/2018, serum creatinine based estimated GFR (eGFR) will be   calculated using the Chronic Kidney Disease Epidemiology Collaboration   (CKD-EPI) equation.       Calcium   Date Value Ref Range Status   09/28/2021 8.9 8.5 -  10.1 mg/dL Final   05/27/2021 9.0 8.5 - 10.1 mg/dL Final     Bilirubin Total   Date Value Ref Range Status   09/28/2021 0.5 0.2 - 1.3 mg/dL Final   05/18/2021 0.8 0.2 - 1.3 mg/dL Final     Alkaline Phosphatase   Date Value Ref Range Status   09/28/2021 89 40 - 150 U/L Final   05/18/2021 81 40 - 150 U/L Final     ALT   Date Value Ref Range Status   09/28/2021 30 0 - 50 U/L Final   05/18/2021 58 (H) 0 - 50 U/L Final     AST   Date Value Ref Range Status   09/28/2021 18 0 - 45 U/L Final   05/18/2021 38 0 - 45 U/L Final       PATHOLOGY:  Reviewed as per HPI.    IMAGING:   Reviewed as per HPI.    ASSESSMENT/PLAN:  Flor Griffin is a 66 year old female with the following issues:  1.  Right breast poorly differentiated adenocarcinoma, local recurrence ER negative, [place] negative, HER-2/mitzi negative  2. Bilateral hilar lymphadenopathy, now resolved  3. Indeterminate pulmonary nodules, likely benign and stable  --Michelle is s/p palliative right mastectomy and has been on chemo break from eribulin since 5/19/2021.  --I personally reviewed the PET scan from 10/19/2021 which shows hypermetabolic local recurrence to the right chest wall.  Fortunately, there does not appear to be radiographic evidence of distant metastatic disease at the present time.  --Discussed that her 10/7/2021 biopsy of the right chest wall nodule was consistent with local recurrence of her breast cancer but this site is ER negative.  Discussed that this is not necessarily surprising since often times we see a heterogeneous population in breast cancer and that some tumor cells may weakly express ER and other tumor cells may not express ER at all.  --I had spoken with Dr. Castillo who will be able to graciously see Michelle again in early November to discuss excision of this local recurrence.  Discussed she has had prior radiation to this area and will not be a candidate for repeat radiation.  --Given high risk for metastatic recurrence and that the recurrent right  "chest wall nodule is ER negative (I.e., triple negative), then will evaluate PD-L1 status to determine if pembrolizumab would be an option for future therapy.  If there is insufficient tissue from her 10/7/21 biopsy, will obtain PD-L1 status from the surgical excision.  --Otherwise, post-excision, I advised repeating a PET scan in 3 months.    4. Left upper extremity deep vein thrombosis   --4/24/2021 Doppler U/S showed occlusive DVT in left subclavian vein.  --Continue rivaroxaban. Given her recent malignancy recurrence, I recommended long term anticoagulation. I discussed we can revisit this if she has clear scans for a year post excision of her local recurrence.  --For anticipated upcoming surgery, I recommended she hold her rivaroxaban for 2 days prior to surgery and resume at Dr. Castillo's discretion (likely 24-48 hours post-surgery).    Return in 4-6 weeks.    Sally Stover MD  Hematology/Oncology  [facility]    Total time spent: 35 minutes in patient evaluation, counseling, documentation, and coordination of care.    Oncology Rooming Note    October 27, 2021 12:15 PM   Flor Griffin is a 66 year old female who presents for:    Chief Complaint   Patient presents with     Oncology Clinic Visit     Initial Vitals: /82   Pulse 64   Resp 16   Wt 76.2 kg (168 lb)   LMP 01/01/2004 (Approximate)   SpO2 99%   BMI 27.12 kg/m   Estimated body mass index is 27.12 kg/m  as calculated from the following:    Height as of 7/22/21: 1.676 m (5' 6\").    Weight as of this encounter: 76.2 kg (168 lb). Body surface area is 1.88 meters squared.  No Pain (0) Comment: Data Unavailable   Patient's last menstrual period was 01/01/2004 (approximate).  Allergies reviewed: Yes  Medications reviewed: Yes    Medications: MEDICATION REFILLS NEEDED TODAY. Provider was notified.  Pharmacy name entered into [employer]: [id number] - [place], [place] - [address]    Clinical concerns: refill of Lorazapam     Miriam Kovacs, [facility]                Again, thank you for allowing me to participate in the care of your patient.        Sincerely,        Sally Stover MD

## 2021-10-27 NOTE — TELEPHONE ENCOUNTER
Called pathology left message that Dr. Stover added PD-L1 pathology to her 10/7/21 right chest wall biopsy. Danya Lara RN,BSN,OCN,CBCN

## 2021-10-27 NOTE — TELEPHONE ENCOUNTER
Left message for the [facility] Pathology department to add PDL1 to her labs, requested call back at [phone] with any questions.

## 2021-10-28 NOTE — TELEPHONE ENCOUNTER
Received a call back from [facility] pathology stating that they cannot run PDL-1 testing and that it needs to be sent to [facility].     Message sent to Dr. Stover inquieing if PDL-1 can be sent on previous surgery biopsy from 6/21 or if it can be sent on her upcoming surgery with Dr. Castillo.   Danya Lara RN,BSN,OCN,CBCN

## 2021-10-28 NOTE — TELEPHONE ENCOUNTER
Called [facility] pathology department, PDL-1 testing will be done on her 10/7/21 pathology and sent to [facility] for PDL-1 22C3 testing for [facility] Breast. Danya Lara RN,BSN,OCN,CBCN

## 2021-11-09 ENCOUNTER — OFFICE VISIT (OUTPATIENT)
Dept: SURGERY | Facility: CLINIC | Age: 66
End: 2021-11-09
Payer: COMMERCIAL

## 2021-11-09 DIAGNOSIS — C50.411 MALIGNANT NEOPLASM OF UPPER-OUTER QUADRANT OF RIGHT BREAST IN FEMALE, ESTROGEN RECEPTOR POSITIVE (H): Primary | ICD-10-CM

## 2021-11-09 DIAGNOSIS — Z17.0 MALIGNANT NEOPLASM OF UPPER-OUTER QUADRANT OF RIGHT BREAST IN FEMALE, ESTROGEN RECEPTOR POSITIVE (H): Primary | ICD-10-CM

## 2021-11-09 PROCEDURE — 99214 OFFICE O/P EST MOD 30 MIN: CPT | Performed by: SURGERY

## 2021-11-09 NOTE — PROGRESS NOTES
Breast cancer follow-up    Flor Griffin presents today for a diagnosis of local breast cancer recurrence.  Patient is known to me from a meeting earlier this year where we elected to perform a simple toilet mastectomy for right-sided breast cancer.  Surgery went well and she healed uneventfully.  Most recently she palpated a small subcutaneous nodule on the right chest wall and biopsy confirmed triple negative invasive carcinoma.  She is here to discuss treatment options.    Physical Exam:  LMP 01/01/2004 (Approximate)   Patient appears healthy and alert.  Pleasant affect  No cervical lymphadenopathy or thyroid fullness.  Breathing comfortably, lung fields clear  Right-sided chest wall exam performed.  Well-healed surgical scar.  No skin thickening or edema.  Palpable 2 x 2 cm nodule overlying the chest wall just below the right pec muscle.  Easily mobile.    Recent imaging studies were personally reviewed by me and pertinent positives are noted.    Assessment and plan: Flor Griffin returns for evaluation of her locally advanced recurrent breast cancer.  Patient understands that any attempts to remove this lesion are an effort to prolong disease-free survival but are not curative.  I think we can perform a surgical excision under IV sedation with minimal morbidity.  The tissue would obviously be sent for pathology.  Given her history of radiation, repeat radiation would carry unacceptable morbidity.  We will get the procedure scheduled at the patient's convenience.  Thank you for the opportunity to help in her care.    Wilfredo Castillo M.D.  [facility]  [phone]    Please route or send letter to:  Primary Care Provider (PCP) and Referring Provider

## 2021-11-10 ENCOUNTER — TELEPHONE (OUTPATIENT)
Dept: SURGERY | Facility: CLINIC | Age: 66
End: 2021-11-10
Payer: COMMERCIAL

## 2021-11-10 DIAGNOSIS — Z11.59 ENCOUNTER FOR SCREENING FOR OTHER VIRAL DISEASES: ICD-10-CM

## 2021-11-10 NOTE — TELEPHONE ENCOUNTER
Type of surgery: Excision right chest wall mass  Location of surgery: [facility]  Date and time of surgery: 11/17/21 at 1:40pm  Surgeon: Dr. Ulises Castillo  Pre-Op Appt Date: Patient to schedule  Post-Op Appt Date: Patient to schedule   Packet sent out: Yes  Pre-cert/Authorization completed:  Not Applicable  Date: 11/10/21

## 2021-11-11 ENCOUNTER — OFFICE VISIT (OUTPATIENT)
Dept: FAMILY MEDICINE | Facility: CLINIC | Age: 66
End: 2021-11-11
Payer: COMMERCIAL

## 2021-11-11 VITALS
HEART RATE: 64 BPM | SYSTOLIC BLOOD PRESSURE: 130 MMHG | BODY MASS INDEX: 27.16 KG/M2 | RESPIRATION RATE: 16 BRPM | HEIGHT: 66 IN | DIASTOLIC BLOOD PRESSURE: 71 MMHG | WEIGHT: 169 LBS

## 2021-11-11 DIAGNOSIS — Z01.818 PREOP GENERAL PHYSICAL EXAM: Primary | ICD-10-CM

## 2021-11-11 DIAGNOSIS — Z23 NEED FOR PROPHYLACTIC VACCINATION AND INOCULATION AGAINST INFLUENZA: ICD-10-CM

## 2021-11-11 DIAGNOSIS — Z17.0 MALIGNANT NEOPLASM OF UPPER-OUTER QUADRANT OF RIGHT BREAST IN FEMALE, ESTROGEN RECEPTOR POSITIVE (H): ICD-10-CM

## 2021-11-11 DIAGNOSIS — Z23 HIGH PRIORITY FOR 2019-NCOV VACCINE: ICD-10-CM

## 2021-11-11 DIAGNOSIS — C50.411 MALIGNANT NEOPLASM OF UPPER-OUTER QUADRANT OF RIGHT BREAST IN FEMALE, ESTROGEN RECEPTOR POSITIVE (H): ICD-10-CM

## 2021-11-11 LAB — HGB BLD-MCNC: 14.1 G/DL (ref 11.7–15.7)

## 2021-11-11 PROCEDURE — G0008 ADMIN INFLUENZA VIRUS VAC: HCPCS | Performed by: FAMILY MEDICINE

## 2021-11-11 PROCEDURE — 36415 COLL VENOUS BLD VENIPUNCTURE: CPT | Performed by: FAMILY MEDICINE

## 2021-11-11 PROCEDURE — 93005 ELECTROCARDIOGRAM TRACING: CPT | Performed by: FAMILY MEDICINE

## 2021-11-11 PROCEDURE — 91300 COVID-19,PF,PFIZER (12+ YRS): CPT | Performed by: FAMILY MEDICINE

## 2021-11-11 PROCEDURE — 0004A COVID-19,PF,PFIZER (12+ YRS): CPT | Performed by: FAMILY MEDICINE

## 2021-11-11 PROCEDURE — 85018 HEMOGLOBIN: CPT | Performed by: FAMILY MEDICINE

## 2021-11-11 PROCEDURE — 93010 ELECTROCARDIOGRAM REPORT: CPT | Performed by: INTERNAL MEDICINE

## 2021-11-11 PROCEDURE — 99214 OFFICE O/P EST MOD 30 MIN: CPT | Mod: 25 | Performed by: FAMILY MEDICINE

## 2021-11-11 PROCEDURE — 90662 IIV NO PRSV INCREASED AG IM: CPT | Performed by: FAMILY MEDICINE

## 2021-11-11 ASSESSMENT — MIFFLIN-ST. JEOR: SCORE: 1323.33

## 2021-11-11 NOTE — PROGRESS NOTES
[facility]  [address] [facility] [address]  Phone: [phone]  Fax: [phone]  Primary Provider: Kevin Quinones  Pre-op Performing Provider: MAGDIEL GAVIRIA      PREOPERATIVE EVALUATION:  Today's date: 11/11/2021    Flor Griffin is a 66 year old female who presents for a preoperative evaluation.    Surgical Information:  Surgery/Procedure: Remove mass from chest  Surgery Location: [facility]   Surgeon: Dr. SANTOS Castillo  Surgery Date: 11/17/21  Time of Surgery: 1:40pm  Where patient plans to recover: At home with family  Fax number for surgical facility: Note does not need to be faxed, will be available electronically in Epic.    Type of Anesthesia Anticipated: General    Assessment & Plan     The proposed surgical procedure is considered LOW risk.      ICD-10-CM    1. Preop general physical exam  Z01.818 EKG 12-lead, tracing only     Hemoglobin   2. Malignant neoplasm of upper-outer quadrant of right breast in female, estrogen receptor positive (H)  C50.411     Z17.0    3. Need for prophylactic vaccination and inoculation against influenza  Z23 INFLUENZA, QUAD, HIGH DOSE, PF, 65YR + (FLUZONE HD)     COVID-19,PF,PFIZER (12+ Yrs PURPLE LABEL)   4. High priority for 2019-nCoV vaccine  Z23      Since you are low risk for complications being off your Xarelto you do want to stop it 2 days before your surgery so do not take it Monday, November 15 or Tuesday, November 16 or the day of surgery November 17.  You can restart it the evening of Thursday, November 18.    No aspirin ibuprofen or vitamins between now till after your surgery as we do like that to be off for 1 week.  Tylenol is safe.    EKG and hemoglobin today.    Covid booster and flu shot today.    We will print your preop and your EKG for you to take with you to surgery.         Risks and Recommendations:  The patient has the following additional risks and recommendations for perioperative  complications:   - No identified additional risk factors other than previously addressed    Medication Instructions:  see above    RECOMMENDATION:  APPROVAL GIVEN to proceed with proposed procedure, without further diagnostic evaluation.            30 minutes spent on the date of the encounter doing chart review, history and exam, documentation and further activities per the note        Subjective     HPI related to upcoming procedure: 66-year-old female without history of heart or lung disease.  She does have a history of left arm DVT diagnosed in April and treated with Xarelto.  Patient has had about right mastectomy for breast cancer and has a new mass in the right upper outer quadrant of the right breast.  This is to be surgically removed.      Preop Questions 11/11/2021   1. Have you ever had a heart attack or stroke? No   2. Have you ever had surgery on your heart or blood vessels, such as a stent placement, a coronary artery bypass, or surgery on an artery in your head, neck, heart, or legs? No   3. Do you have chest pain with activity? No   4. Do you have a history of  heart failure? No   5. Do you currently have a cold, bronchitis or symptoms of other infection? No   6. Do you have a cough, shortness of breath, or wheezing? No   7. Do you or anyone in your family have previous history of blood clots? YES - patient had DVT in pot and left arm in April   8. Do you or does anyone in your family have a serious bleeding problem such as prolonged bleeding following surgeries or cuts? No   9. Have you ever had problems with anemia or been told to take iron pills? No   10. Have you had any abnormal blood loss such as black, tarry or bloody stools, or abnormal vaginal bleeding? No   11. Have you ever had a blood transfusion? YES - As a baby   11a. Have you ever had a transfusion reaction? No   12. Are you willing to have a blood transfusion if it is medically needed before, during, or after your surgery? Yes   13.  "Have you or any of your relatives ever had problems with anesthesia? No   14. Do you have sleep apnea, excessive snoring or daytime drowsiness? No   15. Do you have any artifical heart valves or other implanted medical devices like a pacemaker, defibrillator, or continuous glucose monitor? No   16. Do you have artificial joints? No   17. Are you allergic to latex? No   18. Is there any chance that you may be pregnant? No       Health Care Directive:  Patient does not have a Health Care Directive or Living Will: Discussed advance care planning with patient; information given to patient to review.  56}    Status of Chronic Conditions:  See problem list for active medical problems.  Problems all longstanding and stable, except as noted/documented.  See ROS for pertinent symptoms related to these conditions.      Review of Systems  CONSTITUTIONAL: NEGATIVE for fever, chills, change in weight  INTEGUMENTARY/SKIN: NEGATIVE for worrisome rashes, moles or lesions  EYES: NEGATIVE for vision changes or irritation  ENT/MOUTH: NEGATIVE for ear, mouth and throat problems  RESP: NEGATIVE for significant cough or SOB  CV: NEGATIVE for chest pain, palpitations or peripheral edema  GI: NEGATIVE for nausea, abdominal pain, heartburn, or change in bowel habits  : NEGATIVE for frequency, dysuria, or hematuria  MUSCULOSKELETAL: NEGATIVE for significant arthralgias or myalgia  NEURO: NEGATIVE for weakness, dizziness or paresthesias  ENDOCRINE: NEGATIVE for temperature intolerance, skin/hair changes  HEME: NEGATIVE for bleeding problems  PSYCHIATRIC: NEGATIVE for changes in mood or affect    Patient Active Problem List    Diagnosis Date Noted     Lymphadenopathy 02/11/2021     Priority: Medium     Malignant neoplasm of upper-outer quadrant of right breast in female, estrogen receptor positive (H) 10/29/2020     Priority: Medium     Screening for cervical cancer      Priority: Medium     3/30/15 NIL, Neg HPV  7/1/2019 NIL, Neg HPV. \"Pap " "updated today, should be last pap.\" per provider notes.       Anemia 04/12/2018     Priority: Medium     Chemotherapy-induced neutropenia (H) 04/11/2018     Priority: Medium     Port catheter in place 03/21/2018     Priority: Medium     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 in female, estrogen receptor positive (H) 02/21/2018     Priority: Medium     Finish SCP prior to 2/6/19 apt.       Carcinoma of unknown origin (H) 12/07/2017     Priority: Medium     Overweight 11/14/2017     Priority: Medium     Cold sore 10/23/2017     Priority: Medium     BCC (basal cell carcinoma), face 01/06/2017     Priority: Medium     Follows with MyDermatology q6-1yr check       Vitamin D deficiency 04/29/2015     Priority: Medium     vit D3 26; discussed use of Vit D supplement 2000 IU  Problem list name updated by automated process. Provider to review       Mixed hyperlipidemia 03/30/2015     Priority: Medium     past use of statins, not well tolerated; now on Cholest-off; due for labs 2015       Heartburn 03/30/2015     Priority: Medium     has been on PPI 3 years; reviewed triggers; no EGD; recommmend change to H2; risk for Vit D         Past Medical History:   Diagnosis Date     Basal cell cancer     right cheek     Breast cancer, right breast (H)      DVT (deep venous thrombosis) (H)      Gastroesophageal reflux disease      History of blood transfusion     blue baby     Hyperlipidaemia      Past Surgical History:   Procedure Laterality Date     BIOPSY MASS NECK Right 11/16/2017    Procedure: BIOPSY MASS NECK;  Excisional Biopsy Right Neck Lymph node;  Surgeon: Waylon Corral MD;  Location: [facility] OR     COLONOSCOPY       COLONOSCOPY N/A 7/22/2021    Procedure: COLONOSCOPY;  Surgeon: Gabriele Caro MD;  Location:  GI     IR CHEST PORT PLACEMENT > 5 YRS OF AGE  2/16/2021     IR PORT REMOVAL LEFT  5/13/2019     MASTECTOMY SIMPLE Right 6/2/2021    Procedure: RIGHT SIMPLE MASTECTOMY;  Surgeon: Jonathan, " "Ulises Pedroza MD;  Location:  OR     MOHS MICROGRAPHIC PROCEDURE  ~2010    right cheek; [facility]     MOHS MICROGRAPHIC PROCEDURE  10/31/2016    Dr. Nicholas [facility]     Current Outpatient Medications   Medication Sig Dispense Refill     calcium carbonate (TUMS) 500 MG chewable tablet Take 1 chew tab by mouth daily as needed for heartburn       LORazepam (ATIVAN) 0.5 MG tablet Take 1 tablet (0.5 mg) by mouth every 4 hours as needed (Anxiety, Nausea/Vomiting or Sleep) 30 tablet 2     multivitamin w/minerals (THERA-VIT-M) tablet Take 1 tablet by mouth every morning        Omega-3 Fatty Acids (OMEGA-3 FISH OIL PO) Take 1 g by mouth every morning        ondansetron (ZOFRAN) 8 MG tablet Take 1 tablet (8 mg) by mouth every 8 hours as needed (Nausea/Vomiting) 10 tablet 2     rivaroxaban ANTICOAGULANT (XARELTO ANTICOAGULANT) 20 MG TABS tablet Take 1 tablet (20 mg) by mouth daily (with dinner) 90 tablet 3     sennosides (SENOKOT) 8.6 MG tablet Take 1 tablet by mouth 2 times daily 30 tablet 0       Allergies   Allergen Reactions     Pcn [Penicillins] Hives        Social History     Tobacco Use     Smoking status: Never Smoker     Smokeless tobacco: Never Used   Substance Use Topics     Alcohol use: Yes     Alcohol/week: 0.0 standard drinks     Comment: 2 times a week, 2 drinks     Family History   Problem Relation Age of Onset     Lupus Mother      Heart Disease Mother         CHF     Coronary Artery Disease Mother      Hyperlipidemia Mother      Diabetes Father      Breast Cancer Sister 83     No Known Problems Sister      Diabetes Brother      Suicide Brother      No Known Problems Brother      No Known Problems Brother      Suicide Brother      No Known Problems Brother      No Known Problems Brother      Breast Cancer Maternal Aunt 80     History   Drug Use No         Objective     /71   Pulse 64   Resp 16   Ht 1.676 m (5' 6\")   Wt 76.7 kg (169 lb)   LMP 01/01/2004 (Approximate)   BMI 27.28 kg/m      Physical Exam    " GENERAL APPEARANCE: healthy, alert and no distress     EYES: EOMI, PERRL     HENT: ear canals and TM's normal and nose and mouth without ulcers or lesions     NECK: no adenopathy, no asymmetry, masses, or scars and thyroid normal to palpation     RESP: lungs clear to auscultation - no rales, rhonchi or wheezes     CV: regular rates and rhythm, normal S1 S2, no S3 or S4 and no murmur, click or rub     ABDOMEN:  soft, nontender, no HSM or masses and bowel sounds normal     MS: extremities normal- no gross deformities noted, no evidence of inflammation in joints, FROM in all extremities.     SKIN: no suspicious lesions or rashes     NEURO: Normal strength and tone, sensory exam grossly normal, mentation intact and speech normal     PSYCH: mentation appears normal. and affect normal/bright     LYMPHATICS: No cervical adenopathy    Recent Labs   Lab Test 09/28/21  0759 08/31/21  0816 05/12/21  0806 04/24/21  1903   HGB 12.4 12.1   < > 12.1    241   < > 177   INR  --   --   --  0.90    138   < > 138   POTASSIUM 3.7 4.0   < > 3.2*   CR 0.76 0.73   < > 0.77    < > = values in this interval not displayed.        Diagnostics:  Labs pending at this time.  Results will be reviewed when available.   EKG:  Sinus rhythm   Normal ECG   When compared with ECG of 12-FEB-2021 14:14,   No significant change was found     Revised Cardiac Risk Index (RCRI):  The patient has the following serious cardiovascular risks for perioperative complications:   - No serious cardiac risks = 0 points     RCRI Interpretation: 0 points: Class I (very low risk - 0.4% complication rate)           Signed Electronically by: Justine Palacios MD  Copy of this evaluation report is provided to requesting physician.

## 2021-11-11 NOTE — H&P (VIEW-ONLY)
[facility]  [address] [facility] [address]  Phone: [phone]  Fax: [phone]  Primary Provider: Kevin Quinones  Pre-op Performing Provider: MAGDIEL GAVIRIA      PREOPERATIVE EVALUATION:  Today's date: 11/11/2021    Flor Griffin is a 66 year old female who presents for a preoperative evaluation.    Surgical Information:  Surgery/Procedure: Remove mass from chest  Surgery Location: [facility]   Surgeon: Dr. SANTOS Castillo  Surgery Date: 11/17/21  Time of Surgery: 1:40pm  Where patient plans to recover: At home with family  Fax number for surgical facility: Note does not need to be faxed, will be available electronically in Epic.    Type of Anesthesia Anticipated: General    Assessment & Plan     The proposed surgical procedure is considered LOW risk.      ICD-10-CM    1. Preop general physical exam  Z01.818 EKG 12-lead, tracing only     Hemoglobin   2. Malignant neoplasm of upper-outer quadrant of right breast in female, estrogen receptor positive (H)  C50.411     Z17.0    3. Need for prophylactic vaccination and inoculation against influenza  Z23 INFLUENZA, QUAD, HIGH DOSE, PF, 65YR + (FLUZONE HD)     COVID-19,PF,PFIZER (12+ Yrs PURPLE LABEL)   4. High priority for 2019-nCoV vaccine  Z23      Since you are low risk for complications being off your Xarelto you do want to stop it 2 days before your surgery so do not take it Monday, November 15 or Tuesday, November 16 or the day of surgery November 17.  You can restart it the evening of Thursday, November 18.    No aspirin ibuprofen or vitamins between now till after your surgery as we do like that to be off for 1 week.  Tylenol is safe.    EKG and hemoglobin today.    Covid booster and flu shot today.    We will print your preop and your EKG for you to take with you to surgery.         Risks and Recommendations:  The patient has the following additional risks and recommendations for perioperative  complications:   - No identified additional risk factors other than previously addressed    Medication Instructions:  see above    RECOMMENDATION:  APPROVAL GIVEN to proceed with proposed procedure, without further diagnostic evaluation.            30 minutes spent on the date of the encounter doing chart review, history and exam, documentation and further activities per the note        Subjective     HPI related to upcoming procedure: 66-year-old female without history of heart or lung disease.  She does have a history of left arm DVT diagnosed in April and treated with Xarelto.  Patient has had about right mastectomy for breast cancer and has a new mass in the right upper outer quadrant of the right breast.  This is to be surgically removed.      Preop Questions 11/11/2021   1. Have you ever had a heart attack or stroke? No   2. Have you ever had surgery on your heart or blood vessels, such as a stent placement, a coronary artery bypass, or surgery on an artery in your head, neck, heart, or legs? No   3. Do you have chest pain with activity? No   4. Do you have a history of  heart failure? No   5. Do you currently have a cold, bronchitis or symptoms of other infection? No   6. Do you have a cough, shortness of breath, or wheezing? No   7. Do you or anyone in your family have previous history of blood clots? YES - patient had DVT in pot and left arm in April   8. Do you or does anyone in your family have a serious bleeding problem such as prolonged bleeding following surgeries or cuts? No   9. Have you ever had problems with anemia or been told to take iron pills? No   10. Have you had any abnormal blood loss such as black, tarry or bloody stools, or abnormal vaginal bleeding? No   11. Have you ever had a blood transfusion? YES - As a baby   11a. Have you ever had a transfusion reaction? No   12. Are you willing to have a blood transfusion if it is medically needed before, during, or after your surgery? Yes   13.  "Have you or any of your relatives ever had problems with anesthesia? No   14. Do you have sleep apnea, excessive snoring or daytime drowsiness? No   15. Do you have any artifical heart valves or other implanted medical devices like a pacemaker, defibrillator, or continuous glucose monitor? No   16. Do you have artificial joints? No   17. Are you allergic to latex? No   18. Is there any chance that you may be pregnant? No       Health Care Directive:  Patient does not have a Health Care Directive or Living Will: Discussed advance care planning with patient; information given to patient to review.  56}    Status of Chronic Conditions:  See problem list for active medical problems.  Problems all longstanding and stable, except as noted/documented.  See ROS for pertinent symptoms related to these conditions.      Review of Systems  CONSTITUTIONAL: NEGATIVE for fever, chills, change in weight  INTEGUMENTARY/SKIN: NEGATIVE for worrisome rashes, moles or lesions  EYES: NEGATIVE for vision changes or irritation  ENT/MOUTH: NEGATIVE for ear, mouth and throat problems  RESP: NEGATIVE for significant cough or SOB  CV: NEGATIVE for chest pain, palpitations or peripheral edema  GI: NEGATIVE for nausea, abdominal pain, heartburn, or change in bowel habits  : NEGATIVE for frequency, dysuria, or hematuria  MUSCULOSKELETAL: NEGATIVE for significant arthralgias or myalgia  NEURO: NEGATIVE for weakness, dizziness or paresthesias  ENDOCRINE: NEGATIVE for temperature intolerance, skin/hair changes  HEME: NEGATIVE for bleeding problems  PSYCHIATRIC: NEGATIVE for changes in mood or affect    Patient Active Problem List    Diagnosis Date Noted     Lymphadenopathy 02/11/2021     Priority: Medium     Malignant neoplasm of upper-outer quadrant of right breast in female, estrogen receptor positive (H) 10/29/2020     Priority: Medium     Screening for cervical cancer      Priority: Medium     3/30/15 NIL, Neg HPV  7/1/2019 NIL, Neg HPV. \"Pap " "updated today, should be last pap.\" per provider notes.       Anemia 04/12/2018     Priority: Medium     Chemotherapy-induced neutropenia (H) 04/11/2018     Priority: Medium     Port catheter in place 03/21/2018     Priority: Medium     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 in female, estrogen receptor positive (H) 02/21/2018     Priority: Medium     Finish SCP prior to 2/6/19 apt.       Carcinoma of unknown origin (H) 12/07/2017     Priority: Medium     Overweight 11/14/2017     Priority: Medium     Cold sore 10/23/2017     Priority: Medium     BCC (basal cell carcinoma), face 01/06/2017     Priority: Medium     Follows with MyDermatology q6-1yr check       Vitamin D deficiency 04/29/2015     Priority: Medium     vit D3 26; discussed use of Vit D supplement 2000 IU  Problem list name updated by automated process. Provider to review       Mixed hyperlipidemia 03/30/2015     Priority: Medium     past use of statins, not well tolerated; now on Cholest-off; due for labs 2015       Heartburn 03/30/2015     Priority: Medium     has been on PPI 3 years; reviewed triggers; no EGD; recommmend change to H2; risk for Vit D         Past Medical History:   Diagnosis Date     Basal cell cancer     right cheek     Breast cancer, right breast (H)      DVT (deep venous thrombosis) (H)      Gastroesophageal reflux disease      History of blood transfusion     blue baby     Hyperlipidaemia      Past Surgical History:   Procedure Laterality Date     BIOPSY MASS NECK Right 11/16/2017    Procedure: BIOPSY MASS NECK;  Excisional Biopsy Right Neck Lymph node;  Surgeon: Waylon Corral MD;  Location: [facility] OR     COLONOSCOPY       COLONOSCOPY N/A 7/22/2021    Procedure: COLONOSCOPY;  Surgeon: Gabriele Caro MD;  Location:  GI     IR CHEST PORT PLACEMENT > 5 YRS OF AGE  2/16/2021     IR PORT REMOVAL LEFT  5/13/2019     MASTECTOMY SIMPLE Right 6/2/2021    Procedure: RIGHT SIMPLE MASTECTOMY;  Surgeon: Jonathan, " "Ulises Pedroza MD;  Location:  OR     MOHS MICROGRAPHIC PROCEDURE  ~2010    right cheek; [facility]     MOHS MICROGRAPHIC PROCEDURE  10/31/2016    Dr. Nicholas [facility]     Current Outpatient Medications   Medication Sig Dispense Refill     calcium carbonate (TUMS) 500 MG chewable tablet Take 1 chew tab by mouth daily as needed for heartburn       LORazepam (ATIVAN) 0.5 MG tablet Take 1 tablet (0.5 mg) by mouth every 4 hours as needed (Anxiety, Nausea/Vomiting or Sleep) 30 tablet 2     multivitamin w/minerals (THERA-VIT-M) tablet Take 1 tablet by mouth every morning        Omega-3 Fatty Acids (OMEGA-3 FISH OIL PO) Take 1 g by mouth every morning        ondansetron (ZOFRAN) 8 MG tablet Take 1 tablet (8 mg) by mouth every 8 hours as needed (Nausea/Vomiting) 10 tablet 2     rivaroxaban ANTICOAGULANT (XARELTO ANTICOAGULANT) 20 MG TABS tablet Take 1 tablet (20 mg) by mouth daily (with dinner) 90 tablet 3     sennosides (SENOKOT) 8.6 MG tablet Take 1 tablet by mouth 2 times daily 30 tablet 0       Allergies   Allergen Reactions     Pcn [Penicillins] Hives        Social History     Tobacco Use     Smoking status: Never Smoker     Smokeless tobacco: Never Used   Substance Use Topics     Alcohol use: Yes     Alcohol/week: 0.0 standard drinks     Comment: 2 times a week, 2 drinks     Family History   Problem Relation Age of Onset     Lupus Mother      Heart Disease Mother         CHF     Coronary Artery Disease Mother      Hyperlipidemia Mother      Diabetes Father      Breast Cancer Sister 83     No Known Problems Sister      Diabetes Brother      Suicide Brother      No Known Problems Brother      No Known Problems Brother      Suicide Brother      No Known Problems Brother      No Known Problems Brother      Breast Cancer Maternal Aunt 80     History   Drug Use No         Objective     /71   Pulse 64   Resp 16   Ht 1.676 m (5' 6\")   Wt 76.7 kg (169 lb)   LMP 01/01/2004 (Approximate)   BMI 27.28 kg/m      Physical Exam    " GENERAL APPEARANCE: healthy, alert and no distress     EYES: EOMI, PERRL     HENT: ear canals and TM's normal and nose and mouth without ulcers or lesions     NECK: no adenopathy, no asymmetry, masses, or scars and thyroid normal to palpation     RESP: lungs clear to auscultation - no rales, rhonchi or wheezes     CV: regular rates and rhythm, normal S1 S2, no S3 or S4 and no murmur, click or rub     ABDOMEN:  soft, nontender, no HSM or masses and bowel sounds normal     MS: extremities normal- no gross deformities noted, no evidence of inflammation in joints, FROM in all extremities.     SKIN: no suspicious lesions or rashes     NEURO: Normal strength and tone, sensory exam grossly normal, mentation intact and speech normal     PSYCH: mentation appears normal. and affect normal/bright     LYMPHATICS: No cervical adenopathy    Recent Labs   Lab Test 09/28/21  0759 08/31/21  0816 05/12/21  0806 04/24/21  1903   HGB 12.4 12.1   < > 12.1    241   < > 177   INR  --   --   --  0.90    138   < > 138   POTASSIUM 3.7 4.0   < > 3.2*   CR 0.76 0.73   < > 0.77    < > = values in this interval not displayed.        Diagnostics:  Labs pending at this time.  Results will be reviewed when available.   EKG:  Sinus rhythm   Normal ECG   When compared with ECG of 12-FEB-2021 14:14,   No significant change was found     Revised Cardiac Risk Index (RCRI):  The patient has the following serious cardiovascular risks for perioperative complications:   - No serious cardiac risks = 0 points     RCRI Interpretation: 0 points: Class I (very low risk - 0.4% complication rate)           Signed Electronically by: Justine Palacios MD  Copy of this evaluation report is provided to requesting physician.

## 2021-11-11 NOTE — PATIENT INSTRUCTIONS
Since you are low risk for complications being off your Xarelto you do want to stop it 2 days before your surgery so do not take it Monday, November 15 or Tuesday, November 16 or the day of surgery November 17.  You can restart it the evening of Thursday, November 18.    No aspirin ibuprofen or vitamins between now till after your surgery as we do like that to be off for 1 week.  Tylenol is safe.    EKG and hemoglobin today.    Covid booster and flu shot today.    We will print your preop and your EKG for you to take with you to surgery.

## 2021-11-14 ENCOUNTER — LAB (OUTPATIENT)
Dept: URGENT CARE | Facility: URGENT CARE | Age: 66
End: 2021-11-14
Payer: COMMERCIAL

## 2021-11-14 DIAGNOSIS — Z11.59 ENCOUNTER FOR SCREENING FOR OTHER VIRAL DISEASES: ICD-10-CM

## 2021-11-14 PROCEDURE — U0003 INFECTIOUS AGENT DETECTION BY NUCLEIC ACID (DNA OR RNA); SEVERE ACUTE RESPIRATORY SYNDROME CORONAVIRUS 2 (SARS-COV-2) (CORONAVIRUS DISEASE [COVID-19]), AMPLIFIED PROBE TECHNIQUE, MAKING USE OF HIGH THROUGHPUT TECHNOLOGIES AS DESCRIBED BY CMS-2020-01-R: HCPCS

## 2021-11-14 PROCEDURE — U0005 INFEC AGEN DETEC AMPLI PROBE: HCPCS

## 2021-11-15 LAB
ATRIAL RATE - MUSE: 63 BPM
DIASTOLIC BLOOD PRESSURE - MUSE: NORMAL MMHG
INTERPRETATION ECG - MUSE: NORMAL
P AXIS - MUSE: 52 DEGREES
PR INTERVAL - MUSE: 124 MS
QRS DURATION - MUSE: 88 MS
QT - MUSE: 428 MS
QTC - MUSE: 437 MS
R AXIS - MUSE: 3 DEGREES
SARS-COV-2 RNA RESP QL NAA+PROBE: NEGATIVE
SYSTOLIC BLOOD PRESSURE - MUSE: NORMAL MMHG
T AXIS - MUSE: 27 DEGREES
VENTRICULAR RATE- MUSE: 63 BPM

## 2021-11-17 ENCOUNTER — ANESTHESIA (OUTPATIENT)
Dept: SURGERY | Facility: CLINIC | Age: 66
End: 2021-11-17
Payer: COMMERCIAL

## 2021-11-17 ENCOUNTER — APPOINTMENT (OUTPATIENT)
Dept: SURGERY | Facility: PHYSICIAN GROUP | Age: 66
End: 2021-11-17
Payer: COMMERCIAL

## 2021-11-17 ENCOUNTER — ANESTHESIA EVENT (OUTPATIENT)
Dept: SURGERY | Facility: CLINIC | Age: 66
End: 2021-11-17
Payer: COMMERCIAL

## 2021-11-17 ENCOUNTER — HOSPITAL ENCOUNTER (OUTPATIENT)
Facility: CLINIC | Age: 66
Discharge: HOME | End: 2021-11-17
Attending: SURGERY | Admitting: SURGERY
Payer: COMMERCIAL

## 2021-11-17 VITALS
WEIGHT: 174 LBS | HEIGHT: 66 IN | BODY MASS INDEX: 27.97 KG/M2 | SYSTOLIC BLOOD PRESSURE: 109 MMHG | TEMPERATURE: 97.3 F | DIASTOLIC BLOOD PRESSURE: 64 MMHG | RESPIRATION RATE: 16 BRPM | HEART RATE: 71 BPM | OXYGEN SATURATION: 95 %

## 2021-11-17 DIAGNOSIS — G89.18 ACUTE POST-OPERATIVE PAIN: Primary | ICD-10-CM

## 2021-11-17 DIAGNOSIS — C50.411 MALIGNANT NEOPLASM OF UPPER-OUTER QUADRANT OF RIGHT BREAST IN FEMALE, ESTROGEN RECEPTOR POSITIVE (H): ICD-10-CM

## 2021-11-17 DIAGNOSIS — Z17.0 MALIGNANT NEOPLASM OF UPPER-OUTER QUADRANT OF RIGHT BREAST IN FEMALE, ESTROGEN RECEPTOR POSITIVE (H): ICD-10-CM

## 2021-11-17 PROCEDURE — 250N000011 HC RX IP 250 OP 636: Performed by: SURGERY

## 2021-11-17 PROCEDURE — 360N000075 HC SURGERY LEVEL 2, PER MIN: Performed by: SURGERY

## 2021-11-17 PROCEDURE — 250N000011 HC RX IP 250 OP 636: Performed by: INTERNAL MEDICINE

## 2021-11-17 PROCEDURE — 88305 TISSUE EXAM BY PATHOLOGIST: CPT | Mod: TC | Performed by: SURGERY

## 2021-11-17 PROCEDURE — 250N000011 HC RX IP 250 OP 636: Performed by: NURSE ANESTHETIST, CERTIFIED REGISTERED

## 2021-11-17 PROCEDURE — 272N000001 HC OR GENERAL SUPPLY STERILE: Performed by: SURGERY

## 2021-11-17 PROCEDURE — 258N000003 HC RX IP 258 OP 636: Performed by: ANESTHESIOLOGY

## 2021-11-17 PROCEDURE — 250N000013 HC RX MED GY IP 250 OP 250 PS 637: Performed by: PHYSICIAN ASSISTANT

## 2021-11-17 PROCEDURE — 710N000012 HC RECOVERY PHASE 2, PER MINUTE: Performed by: SURGERY

## 2021-11-17 PROCEDURE — 250N000009 HC RX 250: Performed by: SURGERY

## 2021-11-17 PROCEDURE — 370N000017 HC ANESTHESIA TECHNICAL FEE, PER MIN: Performed by: SURGERY

## 2021-11-17 PROCEDURE — 250N000009 HC RX 250: Performed by: ANESTHESIOLOGY

## 2021-11-17 PROCEDURE — 999N000141 HC STATISTIC PRE-PROCEDURE NURSING ASSESSMENT: Performed by: SURGERY

## 2021-11-17 PROCEDURE — 710N000009 HC RECOVERY PHASE 1, LEVEL 1, PER MIN: Performed by: SURGERY

## 2021-11-17 PROCEDURE — 250N000009 HC RX 250: Performed by: NURSE ANESTHETIST, CERTIFIED REGISTERED

## 2021-11-17 RX ORDER — FENTANYL CITRATE 0.05 MG/ML
25 INJECTION, SOLUTION INTRAMUSCULAR; INTRAVENOUS
Status: DISCONTINUED | OUTPATIENT
Start: 2021-11-17 | End: 2021-11-17 | Stop reason: HOSPADM

## 2021-11-17 RX ORDER — MEPERIDINE HYDROCHLORIDE 25 MG/ML
12.5 INJECTION INTRAMUSCULAR; INTRAVENOUS; SUBCUTANEOUS
Status: DISCONTINUED | OUTPATIENT
Start: 2021-11-17 | End: 2021-11-17 | Stop reason: HOSPADM

## 2021-11-17 RX ORDER — SODIUM CHLORIDE, SODIUM LACTATE, POTASSIUM CHLORIDE, CALCIUM CHLORIDE 600; 310; 30; 20 MG/100ML; MG/100ML; MG/100ML; MG/100ML
INJECTION, SOLUTION INTRAVENOUS CONTINUOUS
Status: DISCONTINUED | OUTPATIENT
Start: 2021-11-17 | End: 2021-11-17 | Stop reason: HOSPADM

## 2021-11-17 RX ORDER — CEFAZOLIN SODIUM 2 G/100ML
2 INJECTION, SOLUTION INTRAVENOUS SEE ADMIN INSTRUCTIONS
Status: DISCONTINUED | OUTPATIENT
Start: 2021-11-17 | End: 2021-11-17 | Stop reason: HOSPADM

## 2021-11-17 RX ORDER — LIDOCAINE 40 MG/G
CREAM TOPICAL
Status: DISCONTINUED | OUTPATIENT
Start: 2021-11-17 | End: 2021-11-17 | Stop reason: HOSPADM

## 2021-11-17 RX ORDER — MAGNESIUM HYDROXIDE 1200 MG/15ML
LIQUID ORAL PRN
Status: DISCONTINUED | OUTPATIENT
Start: 2021-11-17 | End: 2021-11-17 | Stop reason: HOSPADM

## 2021-11-17 RX ORDER — HYDROCODONE BITARTRATE AND ACETAMINOPHEN 5; 325 MG/1; MG/1
1 TABLET ORAL EVERY 4 HOURS PRN
Qty: 8 TABLET | Refills: 0 | Status: SHIPPED | OUTPATIENT
Start: 2021-11-17 | End: 2021-12-20

## 2021-11-17 RX ORDER — ONDANSETRON 2 MG/ML
4 INJECTION INTRAMUSCULAR; INTRAVENOUS
Status: COMPLETED | OUTPATIENT
Start: 2021-11-17 | End: 2021-11-17

## 2021-11-17 RX ORDER — ONDANSETRON 4 MG/1
4 TABLET, ORALLY DISINTEGRATING ORAL EVERY 30 MIN PRN
Status: DISCONTINUED | OUTPATIENT
Start: 2021-11-17 | End: 2021-11-17 | Stop reason: HOSPADM

## 2021-11-17 RX ORDER — CEFAZOLIN SODIUM 2 G/100ML
2 INJECTION, SOLUTION INTRAVENOUS
Status: DISCONTINUED | OUTPATIENT
Start: 2021-11-17 | End: 2021-11-17 | Stop reason: HOSPADM

## 2021-11-17 RX ORDER — LIDOCAINE HYDROCHLORIDE 20 MG/ML
INJECTION, SOLUTION INFILTRATION; PERINEURAL PRN
Status: DISCONTINUED | OUTPATIENT
Start: 2021-11-17 | End: 2021-11-17

## 2021-11-17 RX ORDER — DEXAMETHASONE SODIUM PHOSPHATE 4 MG/ML
INJECTION, SOLUTION INTRA-ARTICULAR; INTRALESIONAL; INTRAMUSCULAR; INTRAVENOUS; SOFT TISSUE PRN
Status: DISCONTINUED | OUTPATIENT
Start: 2021-11-17 | End: 2021-11-17

## 2021-11-17 RX ORDER — FENTANYL CITRATE 50 UG/ML
INJECTION, SOLUTION INTRAMUSCULAR; INTRAVENOUS PRN
Status: DISCONTINUED | OUTPATIENT
Start: 2021-11-17 | End: 2021-11-17

## 2021-11-17 RX ORDER — OXYCODONE HYDROCHLORIDE 5 MG/1
5 TABLET ORAL EVERY 4 HOURS PRN
Status: DISCONTINUED | OUTPATIENT
Start: 2021-11-17 | End: 2021-11-17 | Stop reason: HOSPADM

## 2021-11-17 RX ORDER — FENTANYL CITRATE 50 UG/ML
25 INJECTION, SOLUTION INTRAMUSCULAR; INTRAVENOUS EVERY 5 MIN PRN
Status: DISCONTINUED | OUTPATIENT
Start: 2021-11-17 | End: 2021-11-17 | Stop reason: HOSPADM

## 2021-11-17 RX ORDER — PROPOFOL 10 MG/ML
INJECTION, EMULSION INTRAVENOUS CONTINUOUS PRN
Status: DISCONTINUED | OUTPATIENT
Start: 2021-11-17 | End: 2021-11-17

## 2021-11-17 RX ORDER — ONDANSETRON 2 MG/ML
4 INJECTION INTRAMUSCULAR; INTRAVENOUS EVERY 30 MIN PRN
Status: DISCONTINUED | OUTPATIENT
Start: 2021-11-17 | End: 2021-11-17 | Stop reason: HOSPADM

## 2021-11-17 RX ORDER — HYDROMORPHONE HCL IN WATER/PF 6 MG/30 ML
0.2 PATIENT CONTROLLED ANALGESIA SYRINGE INTRAVENOUS EVERY 5 MIN PRN
Status: DISCONTINUED | OUTPATIENT
Start: 2021-11-17 | End: 2021-11-17 | Stop reason: HOSPADM

## 2021-11-17 RX ORDER — HYDROCODONE BITARTRATE AND ACETAMINOPHEN 5; 325 MG/1; MG/1
1 TABLET ORAL
Status: COMPLETED | OUTPATIENT
Start: 2021-11-17 | End: 2021-11-17

## 2021-11-17 RX ORDER — FENTANYL CITRATE 0.05 MG/ML
25-50 INJECTION, SOLUTION INTRAMUSCULAR; INTRAVENOUS
Status: DISCONTINUED | OUTPATIENT
Start: 2021-11-17 | End: 2021-11-17 | Stop reason: HOSPADM

## 2021-11-17 RX ADMIN — SODIUM CHLORIDE, POTASSIUM CHLORIDE, SODIUM LACTATE AND CALCIUM CHLORIDE: 600; 310; 30; 20 INJECTION, SOLUTION INTRAVENOUS at 12:16

## 2021-11-17 RX ADMIN — PROPOFOL 100 MCG/KG/MIN: 10 INJECTION, EMULSION INTRAVENOUS at 13:40

## 2021-11-17 RX ADMIN — Medication 8 MCG: at 13:46

## 2021-11-17 RX ADMIN — CEFAZOLIN SODIUM 2 G: 2 INJECTION, SOLUTION INTRAVENOUS at 13:41

## 2021-11-17 RX ADMIN — LIDOCAINE HYDROCHLORIDE 40 MG: 20 INJECTION, SOLUTION INFILTRATION; PERINEURAL at 13:40

## 2021-11-17 RX ADMIN — FENTANYL CITRATE 25 MCG: 50 INJECTION, SOLUTION INTRAMUSCULAR; INTRAVENOUS at 13:45

## 2021-11-17 RX ADMIN — ONDANSETRON 4 MG: 2 INJECTION INTRAMUSCULAR; INTRAVENOUS at 14:14

## 2021-11-17 RX ADMIN — DEXAMETHASONE SODIUM PHOSPHATE 4 MG: 4 INJECTION, SOLUTION INTRA-ARTICULAR; INTRALESIONAL; INTRAMUSCULAR; INTRAVENOUS; SOFT TISSUE at 13:43

## 2021-11-17 RX ADMIN — HYDROCODONE BITARTRATE AND ACETAMINOPHEN 1 TABLET: 5; 325 TABLET ORAL at 15:00

## 2021-11-17 RX ADMIN — FENTANYL CITRATE 25 MCG: 50 INJECTION, SOLUTION INTRAMUSCULAR; INTRAVENOUS at 13:42

## 2021-11-17 RX ADMIN — MIDAZOLAM 2 MG: 1 INJECTION INTRAMUSCULAR; INTRAVENOUS at 13:36

## 2021-11-17 RX ADMIN — LIDOCAINE HYDROCHLORIDE 1 ML: 10 INJECTION, SOLUTION EPIDURAL; INFILTRATION; INTRACAUDAL; PERINEURAL at 12:16

## 2021-11-17 ASSESSMENT — MIFFLIN-ST. JEOR: SCORE: 1346.01

## 2021-11-17 ASSESSMENT — LIFESTYLE VARIABLES: TOBACCO_USE: 0

## 2021-11-17 ASSESSMENT — ENCOUNTER SYMPTOMS: SEIZURES: 0

## 2021-11-17 NOTE — DISCHARGE INSTRUCTIONS
[facility] - [facility]  Discharge Instructions: Post-Operative Breast Surgery    ACTIVITY    Take frequent short walks and increase your activity gradually.      Avoid strenuous physical activity or heavy lifting greater than 15-20 lbs. for 1-2 weeks with arm on the surgery side.  You may climb stairs.    Gentle rotation and stretching of your arms and shoulders will prevent joint stiffness.    You may drive without restrictions when you are not using any prescription pain medication and feel comfortable in a car.    You may return to work/school when you are comfortable without any prescription pain medication.    WOUND CARE    You may remove your ACE wrap/dressing and shower 48 hours after the surgery.  Pat your incisions dry and leave them open to air.  Re-apply dressing (Band-Aids or gauze/tape) as needed for drainage.    You may have steri-strips (looks like white tape) or skin glue on your incisions.  You may peel off the steri-strips 2 weeks after your surgery if they have not peeled off on their own.  If you have skin glue, it will peel up and fall off on its own.    Do not soak your incisions in a tub or pool for 2 weeks.     Do not apply any lotions, creams, or ointments to your incisions.    A ridge under your incisions is normal and will gradually resolve.    Wear a supportive bra for 1-2 weeks, day and night.    DIET    Start with liquids, then gradually resume your regular diet as tolerated.     Drink plenty of liquids to stay hydrated.    PAIN    Expect some tenderness and discomfort at the incision site(s).  Use the prescribed pain medication at your discretion.  Expect gradual resolution of your pain over several days.    You may take ibuprofen with food (unless you have been told not to) or acetaminophen/Tylenol instead of or in addition to your prescribed pain medication.  If you are taking Norco or Percocet, do not take any additional acetaminophen/Tylenol.    Do not drink  alcohol or drive while you are taking pain medications.    EXPECTATIONS    Pain medications can cause constipation.  Limit use when possible.  Take over the counter stool softener/stimulant, such as Colace or Senna, 1-2 times a day with plenty of water.  You may take a mild over the counter laxative, such as Miralax or a suppository, as needed.      You may discontinue these medications once you are having regular bowel movements and/or are no longer taking your narcotic pain medication.    RETURN APPOINTMENT    Follow up with your surgeon in 2 weeks.  Please call the office at [phone] to schedule your appointment.      CALL OUR OFFICE -[phone] IF YOU HAVE:     Chills or fever above 101 F.    Increased redness, warmth, or drainage at your incisions.    Significant bleeding.    Pain not relieved by your pain medication or rest.    Increasing pain after the first 48 hours.  Any other concerns or questions.           Same Day Surgery Discharge Instructions for  Sedation and General Anesthesia       It's not unusual to feel dizzy, light-headed or faint for up to 24 hours after surgery or while taking pain medication.  If you have these symptoms: sit for a few minutes before standing and have someone assist you when you get up to walk or use the bathroom.      You should rest and relax for the next 24 hours. We recommend you make arrangements to have an adult stay with you for at least 24 hours after your discharge.  Avoid hazardous and strenuous activity.      DO NOT DRIVE any vehicle or operate mechanical equipment for 24 hours following the end of your surgery.  Even though you may feel normal, your reactions may be affected by the medication you have received.      Do not drink alcoholic beverages for 24 hours following surgery.       Slowly progress to your regular diet as you feel able. It's not unusual to feel nauseated and/or vomit after receiving anesthesia.  If you develop these symptoms, drink  clear liquids (apple juice, ginger ale, broth, 7-up, etc. ) until you feel better.  If your nausea and vomiting persists for 24 hours, please notify your surgeon.        All narcotic pain medications, along with inactivity and anesthesia, can cause constipation. Drinking plenty of liquids and increasing fiber intake will help.      For any questions of a medical nature, call your surgeon.      Do not make important decisions for 24 hours.      If you had general anesthesia, you may have a sore throat for a couple of days related to the breathing tube used during surgery.  You may use Cepacol lozenges to help with this discomfort.  If it worsens or if you develop a fever, contact your surgeon.       If you feel your pain is not well managed with the pain medications prescribed by your surgeon, please contact your surgeon's office to let them know so they can address your concerns.       CoVid 19 Information    We want to give you information regarding Covid. Please consult your primary care provider with any questions you might have.     Patient who have symptoms (cough, fever, or shortness of breath), need to isolate for 7 days from when symptoms started OR 72 hours after fever resolves (without fever reducing medications) AND improvement of respiratory symptoms (whichever is longer).      Isolate yourself at home (in own room/own bathroom if possible)    Do Not allow any visitors    Do Not go to work or school    Do Not go to Faith,  centers, shopping, or other public places.    Do Not shake hands.    Avoid close and intimate contact with others (hugging, kissing).    Follow CDC recommendations for household cleaning of frequently touched services.     After the initial 7 days, continue to isolate yourself from household members as much as possible. To continue decrease the risk of community spread and exposure, you and any members of your household should limit activities in public for 14 days after  starting home isolation.     You can reference the following CDC link for helpful home isolation/care tips:  https://www.cdc.gov/coronavirus/2019-ncov/downloads/10Things.pdf    Protect Others:    Cover Your Mouth and Nose with a mask, disposable tissue or wash cloth to avoid spreading germs to others.    Wash your hands and face frequently with soap and water    Call Your Primary Doctor If: Breathing difficulty develops or you become worse.    For more information about COVID19 and options for caring for yourself at home, please visit the CDC website at https://www.cdc.gov/coronavirus/2019-ncov/about/steps-when-sick.html  For more options for care at [facility], please visit our website at [email or web address]    **If you have concerns or questions about your procedure,    please contact Dr Castillo at  [phone]**

## 2021-11-17 NOTE — ANESTHESIA CARE TRANSFER NOTE
Patient: Flor Griffin    Procedure: Procedure(s):  Excise right chest wall mass       Diagnosis: Malignant neoplasm of upper-outer quadrant of right breast in female, estrogen receptor positive (H) [C50.411, Z17.0]  Diagnosis Additional Information: No value filed.    Anesthesia Type:   MAC     Note:    Oropharynx: oropharynx clear of all foreign objects  Level of Consciousness: awake  Oxygen Supplementation: face mask  Level of Supplemental Oxygen (L/min / FiO2): 6  Independent Airway: airway patency satisfactory and stable  Dentition: dentition unchanged  Vital Signs Stable: post-procedure vital signs reviewed and stable  Report to RN Given: handoff report given  Patient transferred to: PACU    Handoff Report: Identifed the Patient, Identified the Reponsible Provider, Reviewed the pertinent medical history, Discussed the surgical course, Reviewed Intra-OP anesthesia mangement and issues during anesthesia, Set expectations for post-procedure period and Allowed opportunity for questions and acknowledgement of understanding      Vitals:  Vitals Value Taken Time   BP     Temp     Pulse     Resp     SpO2         Electronically Signed By: HEDY Jordan CRNA  November 17, 2021  2:26 PM

## 2021-11-17 NOTE — ANESTHESIA PREPROCEDURE EVALUATION
Anesthesia Pre-Procedure Evaluation    Patient: Flor Griffin   MRN: [id number] : 1955        Preoperative Diagnosis: Malignant neoplasm of upper-outer quadrant of right breast in female, estrogen receptor positive (H) [C50.411, Z17.0]    Procedure : Procedure(s):  Excise right chest wall mass          Past Medical History:   Diagnosis Date     Basal cell cancer     right cheek     Breast cancer, right breast (H)      DVT (deep venous thrombosis) (H)      Gastroesophageal reflux disease      History of blood transfusion     blue baby     Hyperlipidaemia       Past Surgical History:   Procedure Laterality Date     BIOPSY MASS NECK Right 2017    Procedure: BIOPSY MASS NECK;  Excisional Biopsy Right Neck Lymph node;  Surgeon: Waylon Corral MD;  Location: [facility] OR     COLONOSCOPY       COLONOSCOPY N/A 2021    Procedure: COLONOSCOPY;  Surgeon: Gabriele Caro MD;  Location:  GI     IR CHEST PORT PLACEMENT > 5 YRS OF AGE  2021     IR PORT REMOVAL LEFT  2019     MASTECTOMY SIMPLE Right 2021    Procedure: RIGHT SIMPLE MASTECTOMY;  Surgeon: Ulises Castillo MD;  Location: [facility] OR     MOHS MICROGRAPHIC PROCEDURE  ~    right cheek; BCC     MOHS MICROGRAPHIC PROCEDURE  10/31/2016    Dr. Nicholsa BCC      Allergies   Allergen Reactions     Pcn [Penicillins] Hives      Social History     Tobacco Use     Smoking status: Never Smoker     Smokeless tobacco: Never Used   Substance Use Topics     Alcohol use: Yes     Alcohol/week: 0.0 standard drinks     Comment: 2 times a week, 2 drinks, wine or whiskey      Wt Readings from Last 1 Encounters:   21 78.9 kg (174 lb)        Anesthesia Evaluation   Pt has had prior anesthetic.     No history of anesthetic complications       ROS/MED HX  ENT/Pulmonary:    (-) tobacco use and sleep apnea   Neurologic:    (-) no seizures and no CVA   Cardiovascular:     (+) Dyslipidemia -----Taking blood thinners Pt has received instructions:  Instructions Given to patient: hold xarelto two days prior to surgery.     METS/Exercise Tolerance:     Hematologic: Comments: neutropenia, anemia    (+) History of blood clots, pt is anticoagulated, anemia,     Musculoskeletal:       GI/Hepatic:     (+) GERD,  (-) liver disease   Renal/Genitourinary:    (-) renal disease   Endo:    (-) Type II DM and thyroid disease   Psychiatric/Substance Use:       Infectious Disease:       Malignancy: Comment: Stage IV breast cancer  (+) Malignancy, History of Breast and Skin.Breast CA Active status post Chemo and Radiation.  Skin CA Remission status post Surgery.        Other:            Physical Exam    Airway        Mallampati: II   TM distance: > 3 FB   Neck ROM: full   Mouth opening: > 3 cm    Respiratory Devices and Support         Dental  no notable dental history         Cardiovascular   cardiovascular exam normal          Pulmonary   pulmonary exam normal                OUTSIDE LABS:  CBC:   Lab Results   Component Value Date    WBC 4.1 09/28/2021    WBC 3.9 (L) 08/31/2021    HGB 14.1 11/11/2021    HGB 12.4 09/28/2021    HCT 39.5 09/28/2021    HCT 38.5 08/31/2021     09/28/2021     08/31/2021     BMP:   Lab Results   Component Value Date     09/28/2021     08/31/2021    POTASSIUM 3.7 09/28/2021    POTASSIUM 4.0 08/31/2021    CHLORIDE 106 09/28/2021    CHLORIDE 106 08/31/2021    CO2 29 09/28/2021    CO2 28 08/31/2021    BUN 12 09/28/2021    BUN 11 08/31/2021    CR 0.76 09/28/2021    CR 0.73 08/31/2021     (H) 09/28/2021    GLC 95 08/31/2021     COAGS:   Lab Results   Component Value Date    PTT 21 (L) 04/24/2021    INR 0.90 04/24/2021     POC:   Lab Results   Component Value Date     (H) 05/02/2018     HEPATIC:   Lab Results   Component Value Date    ALBUMIN 3.5 09/28/2021    PROTTOTAL 6.8 09/28/2021    ALT 30 09/28/2021    AST 18 09/28/2021    ALKPHOS 89 09/28/2021    BILITOTAL 0.5 09/28/2021     OTHER:   Lab Results   Component  Value Date    LACT 1.3 04/24/2021    EDILSON 8.9 09/28/2021    MAG 1.8 05/18/2021    TSH 4.57 (H) 07/01/2019    T4 0.81 07/01/2019       Anesthesia Plan    ASA Status:  3   NPO Status:  NPO Appropriate    Anesthesia Type: MAC.     - Reason for MAC: straight local not clinically adequate              Consents    Anesthesia Plan(s) and associated risks, benefits, and realistic alternatives discussed. Questions answered and patient/representative(s) expressed understanding.    - Discussed:     - Discussed with:  Patient         Postoperative Care    Pain management: Multi-modal analgesia.   PONV prophylaxis: Ondansetron (or other 5HT-3)     Comments:                Jose Mukherjee MD

## 2021-11-18 NOTE — ANESTHESIA POSTPROCEDURE EVALUATION
Patient: Flor Griffin    Procedure: Procedure(s):  Excise right chest wall mass       Diagnosis:Malignant neoplasm of upper-outer quadrant of right breast in female, estrogen receptor positive (H) [C50.411, Z17.0]  Diagnosis Additional Information: No value filed.    Anesthesia Type:  MAC    Note:     Postop Pain Control: Uneventful            Sign Out: Well controlled pain   PONV: No   Neuro/Psych: Uneventful            Sign Out: Acceptable/Baseline neuro status   Airway/Respiratory: Uneventful            Sign Out: Acceptable/Baseline resp. status   CV/Hemodynamics: Uneventful            Sign Out: Acceptable CV status; No obvious hypovolemia; No obvious fluid overload   Other NRE: NONE   DID A NON-ROUTINE EVENT OCCUR? No           Last vitals:  Vitals Value Taken Time   /61 11/17/21 1515   Temp     Pulse 68 11/17/21 1515   Resp 31 11/17/21 1515   SpO2 97 % 11/17/21 1515   Vitals shown include unvalidated device data.    Electronically Signed By: Jurgen Oneal MD  November 17, 2021  7:36 PM

## 2021-11-18 NOTE — OP NOTE
General Surgery Operative Note    PREOPERATIVE DIAGNOSIS: Recurrent right breast cancer    POSTOPERATIVE DIAGNOSIS:  Same    PROCEDURE:    Excise right chest wall mass    ANESTHESIA: IV sedation and local    PREOPERATIVE MEDICATIONS:  Ancef IV    SURGEON:  Ulises Castillo MD    ASSISTANT:  Cruzito Martins PA-C  First assistant was necessary due to challenging exposure and the need for improved visualization and help maintaining hemostasis.    INDICATIONS: Patient has a history of right mastectomy performed earlier this year.  She palpated a small nodule along her chest wall which was biopsy confirmed recurrent breast cancer.  She now presents for excision.    DESCRIPTION OF PROCEDURE: The patient was taken to the operating suite and given IV sedation.  The right chest wall was prepped and draped in a sterile fashion. We made an elliptical incision over the palpable mass which was approximately 3 to 4 cm above the previous mastectomy scar.  I took this down through skin and subcutaneous tissue and entered the previous mastectomy seroma cavity.  We encountered the palpable nodule on the edge of the pectoralis major muscle.  I excised this out with clamps ties and cautery.  I took a generous amount of muscle circumferentially in an attempt to have negative margins.  This was taken all the way down to the level of the rib on that side.  Bleeding was controlled with ties.  The tissue was removed and sent to pathology.  Muscle was reapproximated with 3-0 Vicryl.  We then irrigated out the cavity and closed the skin in layers using 3-0 and 4-0 Vicryl. At the conclusion of the case, all lap and needle counts were correct. Estimated blood loss was 5 cc.      ESTIMATED BLOOD LOSS:  5 mL    INTRAOPERATIVE FINDINGS: Palpable nodule within the pectoralis muscle widely excised.    Ulises Castillo MD

## 2021-11-19 LAB
PATH REPORT.COMMENTS IMP SPEC: NORMAL
PATH REPORT.FINAL DX SPEC: NORMAL
PATH REPORT.GROSS SPEC: NORMAL
PATH REPORT.MICROSCOPIC SPEC OTHER STN: NORMAL
PATH REPORT.RELEVANT HX SPEC: NORMAL
PHOTO IMAGE: NORMAL

## 2021-11-19 PROCEDURE — 88307 TISSUE EXAM BY PATHOLOGIST: CPT | Mod: 26 | Performed by: PATHOLOGY

## 2021-12-03 ENCOUNTER — TELEPHONE (OUTPATIENT)
Dept: ONCOLOGY | Facility: CLINIC | Age: 66
End: 2021-12-03
Payer: COMMERCIAL

## 2021-12-03 NOTE — TELEPHONE ENCOUNTER
Left message for Michelle - returning her call on questions. Requested she call me back directly [phone].

## 2021-12-03 NOTE — TELEPHONE ENCOUNTER
Incoming triage calls negative for symptoms.  Patient requesting RNCC follow up regarding:    Port appointment coordination

## 2021-12-06 LAB
PATH REPORT.ADDENDUM SPEC: NORMAL
PATH REPORT.COMMENTS IMP SPEC: NORMAL
PATH REPORT.COMMENTS IMP SPEC: NORMAL
PATH REPORT.FINAL DX SPEC: NORMAL
PATH REPORT.GROSS SPEC: NORMAL
PATH REPORT.MICROSCOPIC SPEC OTHER STN: NORMAL
PHOTO IMAGE: NORMAL

## 2021-12-08 ENCOUNTER — LAB (OUTPATIENT)
Dept: INFUSION THERAPY | Facility: CLINIC | Age: 66
End: 2021-12-08
Attending: INTERNAL MEDICINE
Payer: COMMERCIAL

## 2021-12-08 DIAGNOSIS — Z17.0 MALIGNANT NEOPLASM OF OVERLAPPING SITES OF RIGHT BREAST IN FEMALE, ESTROGEN RECEPTOR POSITIVE (H): ICD-10-CM

## 2021-12-08 DIAGNOSIS — Z95.828 PORT-A-CATH IN PLACE: Primary | ICD-10-CM

## 2021-12-08 DIAGNOSIS — C50.811 MALIGNANT NEOPLASM OF OVERLAPPING SITES OF RIGHT BREAST IN FEMALE, ESTROGEN RECEPTOR POSITIVE (H): ICD-10-CM

## 2021-12-08 PROCEDURE — 250N000011 HC RX IP 250 OP 636: Performed by: INTERNAL MEDICINE

## 2021-12-08 RX ORDER — HEPARIN SODIUM (PORCINE) LOCK FLUSH IV SOLN 100 UNIT/ML 100 UNIT/ML
5 SOLUTION INTRAVENOUS
Status: DISCONTINUED | OUTPATIENT
Start: 2021-12-08 | End: 2021-12-08 | Stop reason: HOSPADM

## 2021-12-08 RX ORDER — HEPARIN SODIUM,PORCINE 10 UNIT/ML
5 VIAL (ML) INTRAVENOUS
Status: CANCELLED | OUTPATIENT
Start: 2021-12-08

## 2021-12-08 RX ORDER — HEPARIN SODIUM (PORCINE) LOCK FLUSH IV SOLN 100 UNIT/ML 100 UNIT/ML
5 SOLUTION INTRAVENOUS
Status: CANCELLED | OUTPATIENT
Start: 2021-12-08

## 2021-12-08 RX ADMIN — Medication 5 ML: at 11:18

## 2021-12-08 NOTE — PROGRESS NOTES
Nursing Note:  Flor Griffin presents today for Port Flush.    Patient seen by provider today: No   present during visit today: Not Applicable.    Note: N/A.    Intravenous Access:  Implanted Port.    Discharge Plan:   Patient was discharged home    Annalisa Sherman RN

## 2021-12-09 ENCOUNTER — TELEPHONE (OUTPATIENT)
Dept: MAMMOGRAPHY | Facility: CLINIC | Age: 66
End: 2021-12-09
Payer: COMMERCIAL

## 2021-12-09 NOTE — TELEPHONE ENCOUNTER
Message received from XAVIER Kimble with [facility] stating patient's daughter- Emmie is calling to ask if Dr. Castillo could call her to discuss the pathology results from excision of right chest wall done 11/17/21.    Informed Shyanne I will relay message to Dr. Castillo and ask if he could call her.    Radha Alarcon, RN BSN  Breast Nurse Care Coordinator  [facility] [place]  [phone]          Flor Griffin [id number]  F, 1955  [facility]  Surgical Pathology Report (Final result) [id number]  Authorizing Provider: Ulises Castillo MD Ordering Provider: Ulises Castillo MD  Ordering Location: [facility]  OR  Collected: 11/17/2021 02:05 PM  Pathologist: Yovany Livingston MD Received: 11/17/2021 02:34 PM  .  Specimens  A Chest, right chest wall mass - single superior loop lateral double deep  .  .  Final Diagnosis  A. Right chest mass, prior history of invasive high-grade ductal carcinoma:  - Intramuscular invasive high-grade ductal carcinoma, Jan grade 3 (see comment)  - Positive anterior and inferior margins of resection.  - Reactive degenerated muscle and stromal fibrosis.  Electronically signed by Yovany Livingston MD on 11/19/2021 at 10:12 PM

## 2021-12-14 NOTE — PROGRESS NOTES
Michelle is a 66 year old who is being evaluated via a billable video visit.      How would you like to obtain your AVS? [email or web address]  If the video visit is dropped, the invitation should be resent by: Text to cell phone: [phone]  Will anyone else be joining your video visit? Yes:  Joe. How would they like to receive their invitation? Text to cell phone: n/a     [facility] between [facility]    Hematology/Oncology Established Patient Follow-up Note      Today's Date: 2/20/21    Reason for Follow-up: Metastatic breast cancer.    Video visit duration: 10 minutes    HISTORY OF PRESENT ILLNESS: Flor Griffin is a 66 year old female who presents with the following oncologic history:     --Clinical TX N3c M0 adenocarcinoma which is poorly differentiated, likely of breast origin, ER low-positive at 1-5%, AL negative, HER-2/mitzi FISH negative, androgen stain weak-intermediate 10-20%.  Initially she was seen 11/22/2017 after she underwent right supraclavicular lymph node biopsy which was positive for poorly differentiated adenocarcinoma.  She had this lymph node almost a month and had an excisional biopsy performed which was consistent with the diagnosis of cancer.   --A PET/CT scan 11/27/2017 showed hypermetabolic right supraclavicular, right axillary and subpectoral adenopathy.  Otherwise, no distant metastatic disease.   --3/7/2018: Completed 4 cycles of carboplatin and paclitaxel followed by dose dense AC x 4 cycles.  No surgery was done.  --5/29/2018-7/31/2018: Completed adjuvant radiation to right breast, right axilla, right supraclavicular region.  --9/2018: Started adjuvant letrozole.  --10/15/2020: Screening mammogram showed focal asymmetry in upper outer right breast; no suspicious findings in left breast.  --10/21/2020: U/S of right breast showed irregularly-shaped hypoechoic mass at 10:00, 7 cm from nipple, measuring 3.2 x 2.7 x 3.6 cm. Right axillary ultrasound shows multiple  normal-appearing lymph nodes. Biopsy of right breast mass at 10:00 showed poorly differentiated carcinoma, no lymphovascular invasion, morphology consistent with breast cancer and similar to prior axillary node tumor, ER weakly positive at 1% and NV negative (0%), HER-2/mitzi FISH negative.  --10/28/2020: PET/CT scan showed high metabolic activity in 3 cm area of right upper outer breast, several small hypermetabolic lymph nodes in high right axilla and right subclavian region; mild hypermetabolic lymph nodes in bilateral hilar regions, favor metastatic disease; several tiny nodules in right upper lung, favor benign etiology; small hypermetabolic focus in pelvis in either sigmoid colon or adjacent loop of small bowel.  --11/02/2020: Started 1st line metastatic therapy with capecitabine.  --2/9/2021: PET/CT scan showed hypermetabolic right breast mass not significantly changed since 12/7/2020, persistent hypermetabolism; hypermetabolic right axillary lymph node increased in size; no distant metastasis; focal hypermetabolism in pelvis associated with sigmoid colon; stable 6-mm lung nodules.  --2/18/2021: Due to disease progression, switched to 2nd line metastatic therapy with eribulin. Required dose reduction due to neutropenia.  --4/24/2021: Left upper extremity Doppler U/S showed occlusive DVT within left subclavian vein; superficial thrombophlebitis within proximal cephalic vein. Started rivaroxaban.  --5/11/2021: PET scan showed unchanged size and slightly increased uptake to right breast; resolved right axillary adenopathy; no new lesions; persistent hypermetabolism at proximal sigmoid colon.  --5/19/2021 On chemo break after cycle 4 of eribulin.  --6/2/2021: Underwent palliative right mastectomy under care of Dr. Wilfredo Castillo. Pathology showed grade 3 invasive ductal carcinoma with tumor invasion into underlying skeletal muscle. Margins negative.  --7/22/2021: Colonoscopy showed diverticula in sigmoid colon; normal  mucosa throughout entire colon.  --8/3/2021: PET scan showed postsurgical seroma at right chest wall and axilla; small focus of residual mild FDG uptake at superolateral resection margin, likely posttreatment change or inflammation; no metastatic disease; persistent focus of uptake in sigmoid colon corresponding to diverticular disease.  --10/07/2021: U/S of right chest wall performed due to palpable lump. This showed hypoechoic nodule superior to level of scar measuring 0.7 x 0.6 x 1.5 cm. Biopsy of nodule showed poorly differentiated carcinoma consistent with recurrent breast carcinoma, grade 3, ER negative.  --10/19/2021: PET/CT showed right chest wall lesion with SUV 12.9; decreased right chest wall seroma; no FDG uptake in abdomen or pelvis.  --11/17/2021: Underwent excision of right chest wall nodule recurrence. Pathology showed grade 3 invasive ductal carcinoma, intramuscular; positive anterior and inferior margins of resection.    INTERIM HISTORY:  Michelle reports feeling well.        REVIEW OF SYSTEMS:   14 point ROS was reviewed and is negative other than as noted above in HPI.       HOME MEDICATIONS:  Current Outpatient Medications   Medication Sig Dispense Refill     calcium carbonate (TUMS) 500 MG chewable tablet Take 1 chew tab by mouth daily as needed for heartburn       multivitamin w/minerals (THERA-VIT-M) tablet Take 1 tablet by mouth every morning        Omega-3 Fatty Acids (OMEGA-3 FISH OIL PO) Take 1 g by mouth every morning        rivaroxaban ANTICOAGULANT (XARELTO ANTICOAGULANT) 20 MG TABS tablet Take 1 tablet (20 mg) by mouth daily (with dinner) 90 tablet 3         ALLERGIES:  Allergies   Allergen Reactions     Pcn [Penicillins] Hives         PAST MEDICAL HISTORY:  Past Medical History:   Diagnosis Date     Basal cell cancer     right cheek     Breast cancer, right breast (H)      DVT (deep venous thrombosis) (H)      Gastroesophageal reflux disease      History of blood transfusion     blue baby      Hyperlipidaemia          PAST SURGICAL HISTORY:  Past Surgical History:   Procedure Laterality Date     BIOPSY BREAST Right 11/17/2021    Procedure: Excise right chest wall mass;  Surgeon: Ulises Castillo MD;  Location: [facility] OR     BIOPSY MASS NECK Right 11/16/2017    Procedure: BIOPSY MASS NECK;  Excisional Biopsy Right Neck Lymph node;  Surgeon: Waylon Corral MD;  Location: [facility] OR     COLONOSCOPY       COLONOSCOPY N/A 7/22/2021    Procedure: COLONOSCOPY;  Surgeon: Gabriele Caro MD;  Location: [facility]     IR CHEST PORT PLACEMENT > 5 YRS OF AGE  2/16/2021     IR PORT REMOVAL LEFT  5/13/2019     MASTECTOMY SIMPLE Right 6/2/2021    Procedure: RIGHT SIMPLE MASTECTOMY;  Surgeon: Ulises Castillo MD;  Location: [facility] OR     MOHS MICROGRAPHIC PROCEDURE  ~2010    right cheek; BCC     MOHS MICROGRAPHIC PROCEDURE  10/31/2016    Dr. [facility]         SOCIAL HISTORY:  Social History     Socioeconomic History     Marital status:      Spouse name: Not on file     Number of children: Not on file     Years of education: Not on file     Highest education level: Not on file   Occupational History     Not on file   Tobacco Use     Smoking status: Never Smoker     Smokeless tobacco: Never Used   Substance and Sexual Activity     Alcohol use: Yes     Alcohol/week: 0.0 standard drinks     Comment: 2 times a week, 2 drinks, wine or whiskey     Drug use: No     Sexual activity: Yes     Partners: Male     Birth control/protection: Post-menopausal   Other Topics Concern     Parent/sibling w/ CABG, MI or angioplasty before 65F 55M? No   Social History Narrative     Not on file     Social Determinants of Health     Financial Resource Strain: Not on file   Food Insecurity: Not on file   Transportation Needs: Not on file   Physical Activity: Not on file   Stress: Not on file   Social Connections: Not on file   Intimate Partner Violence: Not on file   Housing Stability: Not on file         FAMILY HISTORY:  Family  History   Problem Relation Age of Onset     Lupus Mother      Heart Disease Mother         CHF     Coronary Artery Disease Mother      Hyperlipidemia Mother      Diabetes Father      Breast Cancer Sister 83     No Known Problems Sister      Diabetes Brother      Suicide Brother      No Known Problems Brother      No Known Problems Brother      Suicide Brother      No Known Problems Brother      No Known Problems Brother      Breast Cancer Maternal Aunt 80         PHYSICAL EXAM:  Vital signs:  Not taken.  GENERAL: No acute distress.  EYES: No scleral icterus. No overt erythema.  RESPIRATORY: No audible cough, wheezing, or labored breathing.  MUSCULOSKELETAL: Range of motion in the neck, shoulders, and arms appear normal.  SKIN: No overt rashes, discolorations, or lesions over the face and neck.  NEUROLOGIC: Alert.  No overt tremors.  PSYCHIATRIC: Normal affect and mood.  Does not appear anxious.    LABS:  CBC RESULTS: Recent Labs   Lab Test 11/11/21  1456 09/28/21  0759   WBC  --  4.1   RBC  --  4.01   HGB 14.1 12.4   HCT  --  39.5   MCV  --  99   MCH  --  30.9   MCHC  --  31.4*   RDW  --  13.3   PLT  --  261       Last Comprehensive Metabolic Panel:  Sodium   Date Value Ref Range Status   09/28/2021 140 133 - 144 mmol/L Final   05/27/2021 139 133 - 144 mmol/L Final     Potassium   Date Value Ref Range Status   09/28/2021 3.7 3.4 - 5.3 mmol/L Final   05/27/2021 3.6 3.4 - 5.3 mmol/L Final     Chloride   Date Value Ref Range Status   09/28/2021 106 94 - 109 mmol/L Final   05/27/2021 107 94 - 109 mmol/L Final     Carbon Dioxide   Date Value Ref Range Status   05/27/2021 28 20 - 32 mmol/L Final     Carbon Dioxide (CO2)   Date Value Ref Range Status   09/28/2021 29 20 - 32 mmol/L Final     Anion Gap   Date Value Ref Range Status   09/28/2021 5 3 - 14 mmol/L Final   05/27/2021 4 3 - 14 mmol/L Final     Glucose   Date Value Ref Range Status   09/28/2021 113 (H) 70 - 99 mg/dL Final   05/27/2021 136 (H) 70 - 99 mg/dL Final      Urea Nitrogen   Date Value Ref Range Status   09/28/2021 12 7 - 30 mg/dL Final   05/27/2021 13 7 - 30 mg/dL Final     Creatinine   Date Value Ref Range Status   09/28/2021 0.76 0.52 - 1.04 mg/dL Final   05/27/2021 0.76 0.52 - 1.04 mg/dL Final     GFR Estimate   Date Value Ref Range Status   09/28/2021 82 >60 mL/min/1.73m2 Final     Comment:     As of July 11, 2021, eGFR is calculated by the CKD-EPI creatinine equation, without race adjustment. eGFR can be influenced by muscle mass, exercise, and diet. The reported eGFR is an estimation only and is only applicable if the renal function is stable.   05/27/2021 82 >60 mL/min/[1.73_m2] Final     Comment:     Non  GFR Calc  Starting 12/18/2018, serum creatinine based estimated GFR (eGFR) will be   calculated using the Chronic Kidney Disease Epidemiology Collaboration   (CKD-EPI) equation.       Calcium   Date Value Ref Range Status   09/28/2021 8.9 8.5 - 10.1 mg/dL Final   05/27/2021 9.0 8.5 - 10.1 mg/dL Final     Bilirubin Total   Date Value Ref Range Status   09/28/2021 0.5 0.2 - 1.3 mg/dL Final   05/18/2021 0.8 0.2 - 1.3 mg/dL Final     Alkaline Phosphatase   Date Value Ref Range Status   09/28/2021 89 40 - 150 U/L Final   05/18/2021 81 40 - 150 U/L Final     ALT   Date Value Ref Range Status   09/28/2021 30 0 - 50 U/L Final   05/18/2021 58 (H) 0 - 50 U/L Final     AST   Date Value Ref Range Status   09/28/2021 18 0 - 45 U/L Final   05/18/2021 38 0 - 45 U/L Final       PATHOLOGY:  Reviewed as per HPI.    IMAGING:   Reviewed as per HPI.    ASSESSMENT/PLAN:  Flor Griffin is a 66 year old female with the following issues:  1.  Right breast poorly differentiated adenocarcinoma, local recurrence ER negative, IL negative, HER-2/mitzi negative  2. Bilateral hilar lymphadenopathy, now resolved  3. Indeterminate pulmonary nodules, likely benign and stable  --Michelle is s/p palliative right mastectomy and has been on chemo break from eribulin since  "5/19/2021.  --PET scan from 10/19/2021 showed hypermetabolic local recurrence to the right chest wall (triple negative) but no distant metastatic disease at the present time. Biopsy showed this was triple negative and PD-L1 positive.  --She underwent excision of this local recurrence on 11/17/2021.  I had discussed with Dr. Jonathan chanel on the margins of resection and that the \"positive\" margins were considered as such as he he had removed generous margins into the muscle.  She is probably not a candidate for repeat radiation but will consult with radiation oncology.  --Given that the triple negative right chest wall nodule is PD-L1 positive, pembrolizumab + chemotherapy would be an option for future therapy. She would like to be on a continued break from chemo.  --Otherwise, I advised repeating a PET scan in 3 months.    4. Left upper extremity deep vein thrombosis   --4/24/2021 Doppler U/S showed occlusive DVT in left subclavian vein.  --Continue rivaroxaban. Given her recent malignancy recurrence, I recommended long term anticoagulation. I discussed we can revisit this if she has clear scans for a year post excision of her local recurrence.    Return in 3 months.    Sally Stover MD  Hematology/Oncology  [facility]    Total time spent: 30 minutes in patient evaluation, counseling, documentation, and coordination of care.  "

## 2021-12-19 ENCOUNTER — HEALTH MAINTENANCE LETTER (OUTPATIENT)
Age: 66
End: 2021-12-19

## 2021-12-20 ENCOUNTER — VIRTUAL VISIT (OUTPATIENT)
Dept: ONCOLOGY | Facility: CLINIC | Age: 66
End: 2021-12-20
Attending: INTERNAL MEDICINE
Payer: COMMERCIAL

## 2021-12-20 DIAGNOSIS — Z86.718 PERSONAL HISTORY OF DVT (DEEP VEIN THROMBOSIS): ICD-10-CM

## 2021-12-20 DIAGNOSIS — C50.411 MALIGNANT NEOPLASM OF UPPER-OUTER QUADRANT OF RIGHT BREAST IN FEMALE, ESTROGEN RECEPTOR NEGATIVE (H): Primary | ICD-10-CM

## 2021-12-20 DIAGNOSIS — Z17.1 MALIGNANT NEOPLASM OF UPPER-OUTER QUADRANT OF RIGHT BREAST IN FEMALE, ESTROGEN RECEPTOR NEGATIVE (H): Primary | ICD-10-CM

## 2021-12-20 PROCEDURE — 99214 OFFICE O/P EST MOD 30 MIN: CPT | Mod: 95 | Performed by: INTERNAL MEDICINE

## 2021-12-20 NOTE — LETTER
12/20/2021         RE: Flor Griffin  [id number] [facility] [id number]        Dear Colleague,    Thank you for referring your patient, Flor Griffin, to the [facility] [place]. Please see a copy of my visit note below.    Michelle is a 66 year old who is being evaluated via a billable video visit.      How would you like to obtain your AVS? MyChart  If the video visit is dropped, the invitation should be resent by: Text to cell phone: [phone]  Will anyone else be joining your video visit? Yes:  Joe. How would they like to receive their invitation? Text to cell phone: n/a     Kena Garcia        [facility]    Hematology/Oncology Established Patient Follow-up Note      Today's Date: 2/20/21    Reason for Follow-up: Metastatic breast cancer.    Video visit duration: 10 minutes    HISTORY OF PRESENT ILLNESS: Flor Griffin is a 66 year old female who presents with the following oncologic history:     --Clinical TX N3c M0 adenocarcinoma which is poorly differentiated, likely of breast origin, ER low-positive at 1-5%, LA negative, HER-2/mitzi FISH negative, androgen stain weak-intermediate 10-20%.  Initially she was seen 11/22/2017 after she underwent right supraclavicular lymph node biopsy which was positive for poorly differentiated adenocarcinoma.  She had this lymph node almost a month and had an excisional biopsy performed which was consistent with the diagnosis of cancer.   --A PET/CT scan 11/27/2017 showed hypermetabolic right supraclavicular, right axillary and subpectoral adenopathy.  Otherwise, no distant metastatic disease.   --3/7/2018: Completed 4 cycles of carboplatin and paclitaxel followed by dose dense AC x 4 cycles.  No surgery was done.  --5/29/2018-7/31/2018: Completed adjuvant radiation to right breast, right axilla, right supraclavicular region.  --9/2018: Started adjuvant letrozole.  --10/15/2020: Screening mammogram showed  focal asymmetry in upper outer right breast; no suspicious findings in left breast.  --10/21/2020: U/S of right breast showed irregularly-shaped hypoechoic mass at 10:00, 7 cm from nipple, measuring 3.2 x 2.7 x 3.6 cm. Right axillary ultrasound shows multiple normal-appearing lymph nodes. Biopsy of right breast mass at 10:00 showed poorly differentiated carcinoma, no lymphovascular invasion, morphology consistent with breast cancer and similar to prior axillary node tumor, ER weakly positive at 1% and CT negative (0%), HER-2/mitzi FISH negative.  --10/28/2020: PET/CT scan showed high metabolic activity in 3 cm area of right upper outer breast, several small hypermetabolic lymph nodes in high right axilla and right subclavian region; mild hypermetabolic lymph nodes in bilateral hilar regions, favor metastatic disease; several tiny nodules in right upper lung, favor benign etiology; small hypermetabolic focus in pelvis in either sigmoid colon or adjacent loop of small bowel.  --11/02/2020: Started 1st line metastatic therapy with capecitabine.  --2/9/2021: PET/CT scan showed hypermetabolic right breast mass not significantly changed since 12/7/2020, persistent hypermetabolism; hypermetabolic right axillary lymph node increased in size; no distant metastasis; focal hypermetabolism in pelvis associated with sigmoid colon; stable 6-mm lung nodules.  --2/18/2021: Due to disease progression, switched to 2nd line metastatic therapy with eribulin. Required dose reduction due to neutropenia.  --4/24/2021: Left upper extremity Doppler U/S showed occlusive DVT within left subclavian vein; superficial thrombophlebitis within proximal cephalic vein. Started rivaroxaban.  --5/11/2021: PET scan showed unchanged size and slightly increased uptake to right breast; resolved right axillary adenopathy; no new lesions; persistent hypermetabolism at proximal sigmoid colon.  --5/19/2021 On chemo break after cycle 4 of eribulin.  --6/2/2021:  Underwent palliative right mastectomy under care of Dr. Wilfredo Castillo. Pathology showed grade 3 invasive ductal carcinoma with tumor invasion into underlying skeletal muscle. Margins negative.  --7/22/2021: Colonoscopy showed diverticula in sigmoid colon; normal mucosa throughout entire colon.  --8/3/2021: PET scan showed postsurgical seroma at right chest wall and axilla; small focus of residual mild FDG uptake at superolateral resection margin, likely posttreatment change or inflammation; no metastatic disease; persistent focus of uptake in sigmoid colon corresponding to diverticular disease.  --10/07/2021: U/S of right chest wall performed due to palpable lump. This showed hypoechoic nodule superior to level of scar measuring 0.7 x 0.6 x 1.5 cm. Biopsy of nodule showed poorly differentiated carcinoma consistent with recurrent breast carcinoma, grade 3, ER negative.  --10/19/2021: PET/CT showed right chest wall lesion with SUV 12.9; decreased right chest wall seroma; no FDG uptake in abdomen or pelvis.  --11/17/2021: Underwent excision of right chest wall nodule recurrence. Pathology showed grade 3 invasive ductal carcinoma, intramuscular; positive anterior and inferior margins of resection.    INTERIM HISTORY:  Michelle reports feeling well.        REVIEW OF SYSTEMS:   14 point ROS was reviewed and is negative other than as noted above in HPI.       HOME MEDICATIONS:  Current Outpatient Medications   Medication Sig Dispense Refill     calcium carbonate (TUMS) 500 MG chewable tablet Take 1 chew tab by mouth daily as needed for heartburn       multivitamin w/minerals (THERA-VIT-M) tablet Take 1 tablet by mouth every morning        Omega-3 Fatty Acids (OMEGA-3 FISH OIL PO) Take 1 g by mouth every morning        rivaroxaban ANTICOAGULANT (XARELTO ANTICOAGULANT) 20 MG TABS tablet Take 1 tablet (20 mg) by mouth daily (with dinner) 90 tablet 3         ALLERGIES:  Allergies   Allergen Reactions     Pcn [Penicillins] Hives          PAST MEDICAL HISTORY:  Past Medical History:   Diagnosis Date     Basal cell cancer     right cheek     Breast cancer, right breast (H)      DVT (deep venous thrombosis) (H)      Gastroesophageal reflux disease      History of blood transfusion     blue baby     Hyperlipidaemia          PAST SURGICAL HISTORY:  Past Surgical History:   Procedure Laterality Date     BIOPSY BREAST Right 11/17/2021    Procedure: Excise right chest wall mass;  Surgeon: Ulises Castillo MD;  Location: [facility] OR     BIOPSY MASS NECK Right 11/16/2017    Procedure: BIOPSY MASS NECK;  Excisional Biopsy Right Neck Lymph node;  Surgeon: Waylon Corral MD;  Location: [facility] OR     COLONOSCOPY       COLONOSCOPY N/A 7/22/2021    Procedure: COLONOSCOPY;  Surgeon: Gabriele Caro MD;  Location: [facility] GI     IR CHEST PORT PLACEMENT > 5 YRS OF AGE  2/16/2021     IR PORT REMOVAL LEFT  5/13/2019     MASTECTOMY SIMPLE Right 6/2/2021    Procedure: RIGHT SIMPLE MASTECTOMY;  Surgeon: Ulises Castillo MD;  Location: [facility] OR     MOHS MICROGRAPHIC PROCEDURE  ~2010    right cheek; BCC     MOHS MICROGRAPHIC PROCEDURE  10/31/2016    Dr. Nicholas [facility]         SOCIAL HISTORY:  Social History     Socioeconomic History     Marital status:      Spouse name: Not on file     Number of children: Not on file     Years of education: Not on file     Highest education level: Not on file   Occupational History     Not on file   Tobacco Use     Smoking status: Never Smoker     Smokeless tobacco: Never Used   Substance and Sexual Activity     Alcohol use: Yes     Alcohol/week: 0.0 standard drinks     Comment: 2 times a week, 2 drinks, wine or whiskey     Drug use: No     Sexual activity: Yes     Partners: Male     Birth control/protection: Post-menopausal   Other Topics Concern     Parent/sibling w/ CABG, MI or angioplasty before 65F 55M? No   Social History Narrative     Not on file     Social Determinants of Health     Financial Resource Strain: Not on  file   Food Insecurity: Not on file   Transportation Needs: Not on file   Physical Activity: Not on file   Stress: Not on file   Social Connections: Not on file   Intimate Partner Violence: Not on file   Housing Stability: Not on file         FAMILY HISTORY:  Family History   Problem Relation Age of Onset     Lupus Mother      Heart Disease Mother         CHF     Coronary Artery Disease Mother      Hyperlipidemia Mother      Diabetes Father      Breast Cancer Sister 83     No Known Problems Sister      Diabetes Brother      Suicide Brother      No Known Problems Brother      No Known Problems Brother      Suicide Brother      No Known Problems Brother      No Known Problems Brother      Breast Cancer Maternal Aunt 80         PHYSICAL EXAM:  Vital signs:  Not taken.  GENERAL: No acute distress.  EYES: No scleral icterus. No overt erythema.  RESPIRATORY: No audible cough, wheezing, or labored breathing.  MUSCULOSKELETAL: Range of motion in the neck, shoulders, and arms appear normal.  SKIN: No overt rashes, discolorations, or lesions over the face and neck.  NEUROLOGIC: Alert.  No overt tremors.  PSYCHIATRIC: Normal affect and mood.  Does not appear anxious.    LABS:  CBC RESULTS: Recent Labs   Lab Test 11/11/21  1456 09/28/21  0759   WBC  --  4.1   RBC  --  4.01   HGB 14.1 12.4   HCT  --  39.5   MCV  --  99   MCH  --  30.9   MCHC  --  31.4*   RDW  --  13.3   PLT  --  261       Last Comprehensive Metabolic Panel:  Sodium   Date Value Ref Range Status   09/28/2021 140 133 - 144 mmol/L Final   05/27/2021 139 133 - 144 mmol/L Final     Potassium   Date Value Ref Range Status   09/28/2021 3.7 3.4 - 5.3 mmol/L Final   05/27/2021 3.6 3.4 - 5.3 mmol/L Final     Chloride   Date Value Ref Range Status   09/28/2021 106 94 - 109 mmol/L Final   05/27/2021 107 94 - 109 mmol/L Final     Carbon Dioxide   Date Value Ref Range Status   05/27/2021 28 20 - 32 mmol/L Final     Carbon Dioxide (CO2)   Date Value Ref Range Status    09/28/2021 29 20 - 32 mmol/L Final     Anion Gap   Date Value Ref Range Status   09/28/2021 5 3 - 14 mmol/L Final   05/27/2021 4 3 - 14 mmol/L Final     Glucose   Date Value Ref Range Status   09/28/2021 113 (H) 70 - 99 mg/dL Final   05/27/2021 136 (H) 70 - 99 mg/dL Final     Urea Nitrogen   Date Value Ref Range Status   09/28/2021 12 7 - 30 mg/dL Final   05/27/2021 13 7 - 30 mg/dL Final     Creatinine   Date Value Ref Range Status   09/28/2021 0.76 0.52 - 1.04 mg/dL Final   05/27/2021 0.76 0.52 - 1.04 mg/dL Final     GFR Estimate   Date Value Ref Range Status   09/28/2021 82 >60 mL/min/1.73m2 Final     Comment:     As of July 11, 2021, eGFR is calculated by the CKD-EPI creatinine equation, without race adjustment. eGFR can be influenced by muscle mass, exercise, and diet. The reported eGFR is an estimation only and is only applicable if the renal function is stable.   05/27/2021 82 >60 mL/min/[1.73_m2] Final     Comment:     Non  GFR Calc  Starting 12/18/2018, serum creatinine based estimated GFR (eGFR) will be   calculated using the Chronic Kidney Disease Epidemiology Collaboration   (CKD-EPI) equation.       Calcium   Date Value Ref Range Status   09/28/2021 8.9 8.5 - 10.1 mg/dL Final   05/27/2021 9.0 8.5 - 10.1 mg/dL Final     Bilirubin Total   Date Value Ref Range Status   09/28/2021 0.5 0.2 - 1.3 mg/dL Final   05/18/2021 0.8 0.2 - 1.3 mg/dL Final     Alkaline Phosphatase   Date Value Ref Range Status   09/28/2021 89 40 - 150 U/L Final   05/18/2021 81 40 - 150 U/L Final     ALT   Date Value Ref Range Status   09/28/2021 30 0 - 50 U/L Final   05/18/2021 58 (H) 0 - 50 U/L Final     AST   Date Value Ref Range Status   09/28/2021 18 0 - 45 U/L Final   05/18/2021 38 0 - 45 U/L Final       PATHOLOGY:  Reviewed as per HPI.    IMAGING:   Reviewed as per HPI.    ASSESSMENT/PLAN:  Flor Griffin is a 66 year old female with the following issues:  1.  Right breast poorly differentiated adenocarcinoma,  "local recurrence ER negative, CA negative, HER-2/mitzi negative  2. Bilateral hilar lymphadenopathy, now resolved  3. Indeterminate pulmonary nodules, likely benign and stable  --Michelle is s/p palliative right mastectomy and has been on chemo break from eribulin since 5/19/2021.  --PET scan from 10/19/2021 showed hypermetabolic local recurrence to the right chest wall (triple negative) but no distant metastatic disease at the present time. Biopsy showed this was triple negative and PD-L1 positive.  --She underwent excision of this local recurrence on 11/17/2021.  I had discussed with Dr. Jonathan chanel on the margins of resection and that the \"positive\" margins were considered as such as he he had removed generous margins into the muscle.  She is probably not a candidate for repeat radiation but will consult with radiation oncology.  --Given that the triple negative right chest wall nodule is PD-L1 positive, pembrolizumab + chemotherapy would be an option for future therapy. She would like to be on a continued break from chemo.  --Otherwise, I advised repeating a PET scan in 3 months.    4. Left upper extremity deep vein thrombosis   --4/24/2021 Doppler U/S showed occlusive DVT in left subclavian vein.  --Continue rivaroxaban. Given her recent malignancy recurrence, I recommended long term anticoagulation. I discussed we can revisit this if she has clear scans for a year post excision of her local recurrence.    Return in 3 months.    Sally Stover MD  Hematology/Oncology  [facility]    Total time spent: 30 minutes in patient evaluation, counseling, documentation, and coordination of care.      Again, thank you for allowing me to participate in the care of your patient.        Sincerely,        Sally Stover MD    "

## 2021-12-20 NOTE — LETTER
12/20/2021         RE: Flor Griffin  [id number] [address] [facility] [id number]        Dear Colleague,    Thank you for referring your patient, Flor Griffin, to the [facility] [place]. Please see a copy of my visit note below.    Michelle is a 66 year old who is being evaluated via a billable video visit.      How would you like to obtain your AVS? MyChart  If the video visit is dropped, the invitation should be resent by: Text to cell phone: [phone]  Will anyone else be joining your video visit? Yes:  Joe. How would they like to receive their invitation? Text to cell phone: n/a     Kena Garcia        [facility]    Hematology/Oncology Established Patient Follow-up Note      Today's Date: 2/20/21    Reason for Follow-up: Metastatic breast cancer.    Video visit duration: 10 minutes    HISTORY OF PRESENT ILLNESS: Flor Griffin is a 66 year old female who presents with the following oncologic history:     --Clinical TX N3c M0 adenocarcinoma which is poorly differentiated, likely of breast origin, ER low-positive at 1-5%, AL negative, HER-2/mitzi FISH negative, androgen stain weak-intermediate 10-20%.  Initially she was seen 11/22/2017 after she underwent right supraclavicular lymph node biopsy which was positive for poorly differentiated adenocarcinoma.  She had this lymph node almost a month and had an excisional biopsy performed which was consistent with the diagnosis of cancer.   --A PET/CT scan 11/27/2017 showed hypermetabolic right supraclavicular, right axillary and subpectoral adenopathy.  Otherwise, no distant metastatic disease.   --3/7/2018: Completed 4 cycles of carboplatin and paclitaxel followed by dose dense AC x 4 cycles.  No surgery was done.  --5/29/2018-7/31/2018: Completed adjuvant radiation to right breast, right axilla, right supraclavicular region.  --9/2018: Started adjuvant letrozole.  --10/15/2020: Screening mammogram showed  focal asymmetry in upper outer right breast; no suspicious findings in left breast.  --10/21/2020: U/S of right breast showed irregularly-shaped hypoechoic mass at 10:00, 7 cm from nipple, measuring 3.2 x 2.7 x 3.6 cm. Right axillary ultrasound shows multiple normal-appearing lymph nodes. Biopsy of right breast mass at 10:00 showed poorly differentiated carcinoma, no lymphovascular invasion, morphology consistent with breast cancer and similar to prior axillary node tumor, ER weakly positive at 1% and TN negative (0%), HER-2/mitzi FISH negative.  --10/28/2020: PET/CT scan showed high metabolic activity in 3 cm area of right upper outer breast, several small hypermetabolic lymph nodes in high right axilla and right subclavian region; mild hypermetabolic lymph nodes in bilateral hilar regions, favor metastatic disease; several tiny nodules in right upper lung, favor benign etiology; small hypermetabolic focus in pelvis in either sigmoid colon or adjacent loop of small bowel.  --11/02/2020: Started 1st line metastatic therapy with capecitabine.  --2/9/2021: PET/CT scan showed hypermetabolic right breast mass not significantly changed since 12/7/2020, persistent hypermetabolism; hypermetabolic right axillary lymph node increased in size; no distant metastasis; focal hypermetabolism in pelvis associated with sigmoid colon; stable 6-mm lung nodules.  --2/18/2021: Due to disease progression, switched to 2nd line metastatic therapy with eribulin. Required dose reduction due to neutropenia.  --4/24/2021: Left upper extremity Doppler U/S showed occlusive DVT within left subclavian vein; superficial thrombophlebitis within proximal cephalic vein. Started rivaroxaban.  --5/11/2021: PET scan showed unchanged size and slightly increased uptake to right breast; resolved right axillary adenopathy; no new lesions; persistent hypermetabolism at proximal sigmoid colon.  --5/19/2021 On chemo break after cycle 4 of eribulin.  --6/2/2021:  Underwent palliative right mastectomy under care of Dr. Wilfredo Castillo. Pathology showed grade 3 invasive ductal carcinoma with tumor invasion into underlying skeletal muscle. Margins negative.  --7/22/2021: Colonoscopy showed diverticula in sigmoid colon; normal mucosa throughout entire colon.  --8/3/2021: PET scan showed postsurgical seroma at right chest wall and axilla; small focus of residual mild FDG uptake at superolateral resection margin, likely posttreatment change or inflammation; no metastatic disease; persistent focus of uptake in sigmoid colon corresponding to diverticular disease.  --10/07/2021: U/S of right chest wall performed due to palpable lump. This showed hypoechoic nodule superior to level of scar measuring 0.7 x 0.6 x 1.5 cm. Biopsy of nodule showed poorly differentiated carcinoma consistent with recurrent breast carcinoma, grade 3, ER negative.  --10/19/2021: PET/CT showed right chest wall lesion with SUV 12.9; decreased right chest wall seroma; no FDG uptake in abdomen or pelvis.  --11/17/2021: Underwent excision of right chest wall nodule recurrence. Pathology showed grade 3 invasive ductal carcinoma, intramuscular; positive anterior and inferior margins of resection.    INTERIM HISTORY:  Michelle reports feeling well.        REVIEW OF SYSTEMS:   14 point ROS was reviewed and is negative other than as noted above in HPI.       HOME MEDICATIONS:  Current Outpatient Medications   Medication Sig Dispense Refill     calcium carbonate (TUMS) 500 MG chewable tablet Take 1 chew tab by mouth daily as needed for heartburn       multivitamin w/minerals (THERA-VIT-M) tablet Take 1 tablet by mouth every morning        Omega-3 Fatty Acids (OMEGA-3 FISH OIL PO) Take 1 g by mouth every morning        rivaroxaban ANTICOAGULANT (XARELTO ANTICOAGULANT) 20 MG TABS tablet Take 1 tablet (20 mg) by mouth daily (with dinner) 90 tablet 3         ALLERGIES:  Allergies   Allergen Reactions     Pcn [Penicillins] Hives          PAST MEDICAL HISTORY:  Past Medical History:   Diagnosis Date     Basal cell cancer     right cheek     Breast cancer, right breast (H)      DVT (deep venous thrombosis) (H)      Gastroesophageal reflux disease      History of blood transfusion     blue baby     Hyperlipidaemia          PAST SURGICAL HISTORY:  Past Surgical History:   Procedure Laterality Date     BIOPSY BREAST Right 11/17/2021    Procedure: Excise right chest wall mass;  Surgeon: Ulises Castillo MD;  Location: [facility] OR     BIOPSY MASS NECK Right 11/16/2017    Procedure: BIOPSY MASS NECK;  Excisional Biopsy Right Neck Lymph node;  Surgeon: Waylon Corral MD;  Location: [facility] OR     COLONOSCOPY       COLONOSCOPY N/A 7/22/2021    Procedure: COLONOSCOPY;  Surgeon: Gabriele Caro MD;  Location: [facility] GI     IR CHEST PORT PLACEMENT > 5 YRS OF AGE  2/16/2021     IR PORT REMOVAL LEFT  5/13/2019     MASTECTOMY SIMPLE Right 6/2/2021    Procedure: RIGHT SIMPLE MASTECTOMY;  Surgeon: Ulises Castillo MD;  Location: [facility] OR     MOHS MICROGRAPHIC PROCEDURE  ~2010    right cheek; BCC     MOHS MICROGRAPHIC PROCEDURE  10/31/2016    Dr. Nicholas [facility]         SOCIAL HISTORY:  Social History     Socioeconomic History     Marital status:      Spouse name: Not on file     Number of children: Not on file     Years of education: Not on file     Highest education level: Not on file   Occupational History     Not on file   Tobacco Use     Smoking status: Never Smoker     Smokeless tobacco: Never Used   Substance and Sexual Activity     Alcohol use: Yes     Alcohol/week: 0.0 standard drinks     Comment: 2 times a week, 2 drinks, wine or whiskey     Drug use: No     Sexual activity: Yes     Partners: Male     Birth control/protection: Post-menopausal   Other Topics Concern     Parent/sibling w/ CABG, MI or angioplasty before 65F 55M? No   Social History Narrative     Not on file     Social Determinants of Health     Financial Resource Strain: Not on  file   Food Insecurity: Not on file   Transportation Needs: Not on file   Physical Activity: Not on file   Stress: Not on file   Social Connections: Not on file   Intimate Partner Violence: Not on file   Housing Stability: Not on file         FAMILY HISTORY:  Family History   Problem Relation Age of Onset     Lupus Mother      Heart Disease Mother         CHF     Coronary Artery Disease Mother      Hyperlipidemia Mother      Diabetes Father      Breast Cancer Sister 83     No Known Problems Sister      Diabetes Brother      Suicide Brother      No Known Problems Brother      No Known Problems Brother      Suicide Brother      No Known Problems Brother      No Known Problems Brother      Breast Cancer Maternal Aunt 80         PHYSICAL EXAM:  Vital signs:  Not taken.  GENERAL: No acute distress.  EYES: No scleral icterus. No overt erythema.  RESPIRATORY: No audible cough, wheezing, or labored breathing.  MUSCULOSKELETAL: Range of motion in the neck, shoulders, and arms appear normal.  SKIN: No overt rashes, discolorations, or lesions over the face and neck.  NEUROLOGIC: Alert.  No overt tremors.  PSYCHIATRIC: Normal affect and mood.  Does not appear anxious.    LABS:  CBC RESULTS: Recent Labs   Lab Test 11/11/21  1456 09/28/21  0759   WBC  --  4.1   RBC  --  4.01   HGB 14.1 12.4   HCT  --  39.5   MCV  --  99   MCH  --  30.9   MCHC  --  31.4*   RDW  --  13.3   PLT  --  261       Last Comprehensive Metabolic Panel:  Sodium   Date Value Ref Range Status   09/28/2021 140 133 - 144 mmol/L Final   05/27/2021 139 133 - 144 mmol/L Final     Potassium   Date Value Ref Range Status   09/28/2021 3.7 3.4 - 5.3 mmol/L Final   05/27/2021 3.6 3.4 - 5.3 mmol/L Final     Chloride   Date Value Ref Range Status   09/28/2021 106 94 - 109 mmol/L Final   05/27/2021 107 94 - 109 mmol/L Final     Carbon Dioxide   Date Value Ref Range Status   05/27/2021 28 20 - 32 mmol/L Final     Carbon Dioxide (CO2)   Date Value Ref Range Status    09/28/2021 29 20 - 32 mmol/L Final     Anion Gap   Date Value Ref Range Status   09/28/2021 5 3 - 14 mmol/L Final   05/27/2021 4 3 - 14 mmol/L Final     Glucose   Date Value Ref Range Status   09/28/2021 113 (H) 70 - 99 mg/dL Final   05/27/2021 136 (H) 70 - 99 mg/dL Final     Urea Nitrogen   Date Value Ref Range Status   09/28/2021 12 7 - 30 mg/dL Final   05/27/2021 13 7 - 30 mg/dL Final     Creatinine   Date Value Ref Range Status   09/28/2021 0.76 0.52 - 1.04 mg/dL Final   05/27/2021 0.76 0.52 - 1.04 mg/dL Final     GFR Estimate   Date Value Ref Range Status   09/28/2021 82 >60 mL/min/1.73m2 Final     Comment:     As of July 11, 2021, eGFR is calculated by the CKD-EPI creatinine equation, without race adjustment. eGFR can be influenced by muscle mass, exercise, and diet. The reported eGFR is an estimation only and is only applicable if the renal function is stable.   05/27/2021 82 >60 mL/min/[1.73_m2] Final     Comment:     Non  GFR Calc  Starting 12/18/2018, serum creatinine based estimated GFR (eGFR) will be   calculated using the Chronic Kidney Disease Epidemiology Collaboration   (CKD-EPI) equation.       Calcium   Date Value Ref Range Status   09/28/2021 8.9 8.5 - 10.1 mg/dL Final   05/27/2021 9.0 8.5 - 10.1 mg/dL Final     Bilirubin Total   Date Value Ref Range Status   09/28/2021 0.5 0.2 - 1.3 mg/dL Final   05/18/2021 0.8 0.2 - 1.3 mg/dL Final     Alkaline Phosphatase   Date Value Ref Range Status   09/28/2021 89 40 - 150 U/L Final   05/18/2021 81 40 - 150 U/L Final     ALT   Date Value Ref Range Status   09/28/2021 30 0 - 50 U/L Final   05/18/2021 58 (H) 0 - 50 U/L Final     AST   Date Value Ref Range Status   09/28/2021 18 0 - 45 U/L Final   05/18/2021 38 0 - 45 U/L Final       PATHOLOGY:  Reviewed as per HPI.    IMAGING:   Reviewed as per HPI.    ASSESSMENT/PLAN:  Flor Griffin is a 66 year old female with the following issues:  1.  Right breast poorly differentiated adenocarcinoma,  "local recurrence ER negative, NM negative, HER-2/mitzi negative  2. Bilateral hilar lymphadenopathy, now resolved  3. Indeterminate pulmonary nodules, likely benign and stable  --Michelle is s/p palliative right mastectomy and has been on chemo break from eribulin since 5/19/2021.  --PET scan from 10/19/2021 showed hypermetabolic local recurrence to the right chest wall (triple negative) but no distant metastatic disease at the present time. Biopsy showed this was triple negative and PD-L1 positive.  --She underwent excision of this local recurrence on 11/17/2021.  I had discussed with Dr. Jonathan chanel on the margins of resection and that the \"positive\" margins were considered as such as he he had removed generous margins into the muscle.  She is probably not a candidate for repeat radiation but will consult with radiation oncology.  --Given that the triple negative right chest wall nodule is PD-L1 positive, pembrolizumab + chemotherapy would be an option for future therapy. She would like to be on a continued break from chemo.  --Otherwise, I advised repeating a PET scan in 3 months.    4. Left upper extremity deep vein thrombosis   --4/24/2021 Doppler U/S showed occlusive DVT in left subclavian vein.  --Continue rivaroxaban. Given her recent malignancy recurrence, I recommended long term anticoagulation. I discussed we can revisit this if she has clear scans for a year post excision of her local recurrence.    Return in 3 months.    Sally Stover MD  Hematology/Oncology  [facility]    Total time spent: 30 minutes in patient evaluation, counseling, documentation, and coordination of care.      Again, thank you for allowing me to participate in the care of your patient.        Sincerely,        Sally Stover MD    "

## 2021-12-29 ENCOUNTER — DOCUMENTATION ONLY (OUTPATIENT)
Dept: ONCOLOGY | Facility: CLINIC | Age: 66
End: 2021-12-29
Payer: COMMERCIAL

## 2021-12-29 NOTE — PROGRESS NOTES
I discussed Michelle's case with Dr. Ramsey Adames (radiation oncology).  He favored repeating a PET scan at end of January and if there is evidence of repeat local recurrence (without evidence of recurrent distant metastatic disease), then there may be a role for radiation.  I communicated this to Michelle via phone/voicemail message. Will plan to move up her PET scan, labs, and return visit with me.    Sally Stover MD  Hematology/Oncology  [facility]

## 2022-01-06 ENCOUNTER — LAB (OUTPATIENT)
Dept: INFUSION THERAPY | Facility: CLINIC | Age: 67
End: 2022-01-06
Payer: COMMERCIAL

## 2022-01-06 DIAGNOSIS — Z17.0 MALIGNANT NEOPLASM OF OVERLAPPING SITES OF RIGHT BREAST IN FEMALE, ESTROGEN RECEPTOR POSITIVE (H): ICD-10-CM

## 2022-01-06 DIAGNOSIS — Z95.828 PORT-A-CATH IN PLACE: Primary | ICD-10-CM

## 2022-01-06 DIAGNOSIS — C50.811 MALIGNANT NEOPLASM OF OVERLAPPING SITES OF RIGHT BREAST IN FEMALE, ESTROGEN RECEPTOR POSITIVE (H): ICD-10-CM

## 2022-01-06 PROCEDURE — 250N000011 HC RX IP 250 OP 636: Performed by: INTERNAL MEDICINE

## 2022-01-06 PROCEDURE — 96523 IRRIG DRUG DELIVERY DEVICE: CPT

## 2022-01-06 RX ORDER — HEPARIN SODIUM (PORCINE) LOCK FLUSH IV SOLN 100 UNIT/ML 100 UNIT/ML
5 SOLUTION INTRAVENOUS
Status: CANCELLED | OUTPATIENT
Start: 2022-01-06

## 2022-01-06 RX ORDER — HEPARIN SODIUM,PORCINE 10 UNIT/ML
5 VIAL (ML) INTRAVENOUS
Status: CANCELLED | OUTPATIENT
Start: 2022-01-06

## 2022-01-06 RX ORDER — HEPARIN SODIUM (PORCINE) LOCK FLUSH IV SOLN 100 UNIT/ML 100 UNIT/ML
5 SOLUTION INTRAVENOUS
Status: DISCONTINUED | OUTPATIENT
Start: 2022-01-06 | End: 2022-01-06 | Stop reason: HOSPADM

## 2022-01-06 RX ADMIN — Medication 5 ML: at 11:02

## 2022-01-06 NOTE — PROGRESS NOTES
Nursing Note:  Flor Griffin presents today for Port flush.    Patient seen by provider today: No   present during visit today: Not Applicable.    Note: N/A.    Intravenous Access:  Implanted Port.    Discharge Plan:   Patient was sent to home. Will return 1/25/22 for Port labs and Imaging.     Lori Rodriguez RN          
No

## 2022-01-25 ENCOUNTER — HOSPITAL ENCOUNTER (OUTPATIENT)
Dept: PET IMAGING | Facility: CLINIC | Age: 67
Discharge: HOME | End: 2022-01-25
Attending: INTERNAL MEDICINE | Admitting: INTERNAL MEDICINE
Payer: COMMERCIAL

## 2022-01-25 ENCOUNTER — LAB (OUTPATIENT)
Dept: INFUSION THERAPY | Facility: CLINIC | Age: 67
End: 2022-01-25
Attending: INTERNAL MEDICINE
Payer: COMMERCIAL

## 2022-01-25 DIAGNOSIS — Z17.0 MALIGNANT NEOPLASM OF OVERLAPPING SITES OF RIGHT BREAST IN FEMALE, ESTROGEN RECEPTOR POSITIVE (H): ICD-10-CM

## 2022-01-25 DIAGNOSIS — Z17.1 MALIGNANT NEOPLASM OF UPPER-OUTER QUADRANT OF RIGHT BREAST IN FEMALE, ESTROGEN RECEPTOR NEGATIVE (H): ICD-10-CM

## 2022-01-25 DIAGNOSIS — C50.811 MALIGNANT NEOPLASM OF OVERLAPPING SITES OF RIGHT BREAST IN FEMALE, ESTROGEN RECEPTOR POSITIVE (H): ICD-10-CM

## 2022-01-25 DIAGNOSIS — Z17.0 MALIGNANT NEOPLASM OF UPPER-OUTER QUADRANT OF RIGHT BREAST IN FEMALE, ESTROGEN RECEPTOR POSITIVE (H): ICD-10-CM

## 2022-01-25 DIAGNOSIS — Z95.828 PORT-A-CATH IN PLACE: Primary | ICD-10-CM

## 2022-01-25 DIAGNOSIS — C50.411 MALIGNANT NEOPLASM OF UPPER-OUTER QUADRANT OF RIGHT BREAST IN FEMALE, ESTROGEN RECEPTOR POSITIVE (H): ICD-10-CM

## 2022-01-25 DIAGNOSIS — C50.411 MALIGNANT NEOPLASM OF UPPER-OUTER QUADRANT OF RIGHT BREAST IN FEMALE, ESTROGEN RECEPTOR NEGATIVE (H): ICD-10-CM

## 2022-01-25 LAB
ALBUMIN SERPL-MCNC: 3.6 G/DL (ref 3.4–5)
ALP SERPL-CCNC: 83 U/L (ref 40–150)
ALT SERPL W P-5'-P-CCNC: 37 U/L (ref 0–50)
ANION GAP SERPL CALCULATED.3IONS-SCNC: 6 MMOL/L (ref 3–14)
AST SERPL W P-5'-P-CCNC: 22 U/L (ref 0–45)
BASOPHILS # BLD AUTO: 0 10E3/UL (ref 0–0.2)
BASOPHILS NFR BLD AUTO: 1 %
BILIRUB SERPL-MCNC: 0.6 MG/DL (ref 0.2–1.3)
BUN SERPL-MCNC: 15 MG/DL (ref 7–30)
CALCIUM SERPL-MCNC: 9.3 MG/DL (ref 8.5–10.1)
CHLORIDE BLD-SCNC: 106 MMOL/L (ref 94–109)
CO2 SERPL-SCNC: 27 MMOL/L (ref 20–32)
CREAT SERPL-MCNC: 0.73 MG/DL (ref 0.52–1.04)
EOSINOPHIL # BLD AUTO: 0.1 10E3/UL (ref 0–0.7)
EOSINOPHIL NFR BLD AUTO: 2 %
ERYTHROCYTE [DISTWIDTH] IN BLOOD BY AUTOMATED COUNT: 13 % (ref 10–15)
GFR SERPL CREATININE-BSD FRML MDRD: 90 ML/MIN/1.73M2
GLUCOSE BLD-MCNC: 104 MG/DL (ref 70–99)
HCT VFR BLD AUTO: 40.7 % (ref 35–47)
HGB BLD-MCNC: 13.2 G/DL (ref 11.7–15.7)
IMM GRANULOCYTES # BLD: 0 10E3/UL
IMM GRANULOCYTES NFR BLD: 0 %
LYMPHOCYTES # BLD AUTO: 1 10E3/UL (ref 0.8–5.3)
LYMPHOCYTES NFR BLD AUTO: 22 %
MCH RBC QN AUTO: 32 PG (ref 26.5–33)
MCHC RBC AUTO-ENTMCNC: 32.4 G/DL (ref 31.5–36.5)
MCV RBC AUTO: 99 FL (ref 78–100)
MONOCYTES # BLD AUTO: 0.4 10E3/UL (ref 0–1.3)
MONOCYTES NFR BLD AUTO: 9 %
NEUTROPHILS # BLD AUTO: 3 10E3/UL (ref 1.6–8.3)
NEUTROPHILS NFR BLD AUTO: 66 %
NRBC # BLD AUTO: 0 10E3/UL
NRBC BLD AUTO-RTO: 0 /100
PLATELET # BLD AUTO: 245 10E3/UL (ref 150–450)
POTASSIUM BLD-SCNC: 4.1 MMOL/L (ref 3.4–5.3)
PROT SERPL-MCNC: 7 G/DL (ref 6.8–8.8)
RBC # BLD AUTO: 4.13 10E6/UL (ref 3.8–5.2)
SODIUM SERPL-SCNC: 139 MMOL/L (ref 133–144)
WBC # BLD AUTO: 4.5 10E3/UL (ref 4–11)

## 2022-01-25 PROCEDURE — 82040 ASSAY OF SERUM ALBUMIN: CPT | Performed by: INTERNAL MEDICINE

## 2022-01-25 PROCEDURE — 36591 DRAW BLOOD OFF VENOUS DEVICE: CPT

## 2022-01-25 PROCEDURE — A9552 F18 FDG: HCPCS | Performed by: INTERNAL MEDICINE

## 2022-01-25 PROCEDURE — 74177 CT ABD & PELVIS W/CONTRAST: CPT | Mod: 26

## 2022-01-25 PROCEDURE — 74177 CT ABD & PELVIS W/CONTRAST: CPT | Mod: 59,PS

## 2022-01-25 PROCEDURE — 80053 COMPREHEN METABOLIC PANEL: CPT | Performed by: INTERNAL MEDICINE

## 2022-01-25 PROCEDURE — 343N000001 HC RX 343: Performed by: INTERNAL MEDICINE

## 2022-01-25 PROCEDURE — 71260 CT THORAX DX C+: CPT | Mod: 26

## 2022-01-25 PROCEDURE — 78816 PET IMAGE W/CT FULL BODY: CPT | Mod: 26

## 2022-01-25 PROCEDURE — 85025 COMPLETE CBC W/AUTO DIFF WBC: CPT | Performed by: INTERNAL MEDICINE

## 2022-01-25 PROCEDURE — 250N000011 HC RX IP 250 OP 636: Performed by: INTERNAL MEDICINE

## 2022-01-25 RX ORDER — HEPARIN SODIUM (PORCINE) LOCK FLUSH IV SOLN 100 UNIT/ML 100 UNIT/ML
5 SOLUTION INTRAVENOUS
Status: CANCELLED | OUTPATIENT
Start: 2022-01-25

## 2022-01-25 RX ORDER — HEPARIN SODIUM,PORCINE 10 UNIT/ML
5 VIAL (ML) INTRAVENOUS
Status: CANCELLED | OUTPATIENT
Start: 2022-01-25

## 2022-01-25 RX ORDER — IOPAMIDOL 755 MG/ML
10-135 INJECTION, SOLUTION INTRAVASCULAR ONCE
Status: COMPLETED | OUTPATIENT
Start: 2022-01-25 | End: 2022-01-25

## 2022-01-25 RX ORDER — HEPARIN SODIUM (PORCINE) LOCK FLUSH IV SOLN 100 UNIT/ML 100 UNIT/ML
500 SOLUTION INTRAVENOUS EVERY 8 HOURS
Status: DISCONTINUED | OUTPATIENT
Start: 2022-01-25 | End: 2022-01-26 | Stop reason: HOSPADM

## 2022-01-25 RX ORDER — HEPARIN SODIUM (PORCINE) LOCK FLUSH IV SOLN 100 UNIT/ML 100 UNIT/ML
5 SOLUTION INTRAVENOUS
Status: DISCONTINUED | OUTPATIENT
Start: 2022-01-25 | End: 2022-01-25 | Stop reason: HOSPADM

## 2022-01-25 RX ADMIN — FLUDEOXYGLUCOSE F-18 13.8 MCI.: 500 INJECTION, SOLUTION INTRAVENOUS at 09:54

## 2022-01-25 RX ADMIN — Medication 500 UNITS: at 11:20

## 2022-01-25 RX ADMIN — IOPAMIDOL 107 ML: 755 INJECTION, SOLUTION INTRAVENOUS at 09:57

## 2022-01-25 NOTE — PROGRESS NOTES
Nursing Note:  Flormarkos Griffin presents today for port labs and access for scans.    Patient seen by provider today: No   present during visit today: Not Applicable.    Note: N/A.    Intravenous Access:  Lab draw site port, Needle type power loc, Gauge 20 3/4in.  Labs drawn without difficulty.  Implanted Port.    Discharge Plan:   Patient was sent to radiology for imaging appointment.    JOSE ROBERTO ARIZA RN

## 2022-01-25 NOTE — PROGRESS NOTES
Michelle is a 66 year old who is being evaluated via a billable video visit.      How would you like to obtain your AVS? [employer]  If the video visit is dropped, the invitation should be resent by: Text to cell phone: [phone]  Will anyone else be joining your video visit? No  is there with her.    [facility]    Hematology/Oncology Established Patient Follow-up Note      Today's Date: 1/26/2022    Reason for Follow-up: Metastatic breast cancer.    Video visit duration (Doximity): 20 minutes    HISTORY OF PRESENT ILLNESS: Flor Griffin is a 66 year old female who presents with the following oncologic history:     --Clinical TX N3c M0 adenocarcinoma which is poorly differentiated, likely of breast origin, ER low-positive at 1-5%, MT negative, HER-2/mitzi FISH negative, androgen stain weak-intermediate 10-20%.  Initially she was seen 11/22/2017 after she underwent right supraclavicular lymph node biopsy which was positive for poorly differentiated adenocarcinoma.  She had this lymph node almost a month and had an excisional biopsy performed which was consistent with the diagnosis of cancer.   --A PET/CT scan 11/27/2017 showed hypermetabolic right supraclavicular, right axillary and subpectoral adenopathy.  Otherwise, no distant metastatic disease.   --3/7/2018: Completed 4 cycles of carboplatin and paclitaxel followed by dose dense AC x 4 cycles.  No surgery was done.  --5/29/2018-7/31/2018: Completed adjuvant radiation to right breast, right axilla, right supraclavicular region.  --9/2018: Started adjuvant letrozole.  --10/15/2020: Screening mammogram showed focal asymmetry in upper outer right breast; no suspicious findings in left breast.  --10/21/2020: U/S of right breast showed irregularly-shaped hypoechoic mass at 10:00, 7 cm from nipple, measuring 3.2 x 2.7 x 3.6 cm. Right axillary ultrasound shows multiple normal-appearing lymph nodes. Biopsy of right breast mass at  10:00 showed poorly differentiated carcinoma, no lymphovascular invasion, morphology consistent with breast cancer and similar to prior axillary node tumor, ER weakly positive at 1% and VA negative (0%), HER-2/mitzi FISH negative.  --10/28/2020: PET/CT scan showed high metabolic activity in 3 cm area of right upper outer breast, several small hypermetabolic lymph nodes in high right axilla and right subclavian region; mild hypermetabolic lymph nodes in bilateral hilar regions, favor metastatic disease; several tiny nodules in right upper lung, favor benign etiology; small hypermetabolic focus in pelvis in either sigmoid colon or adjacent loop of small bowel.  --11/02/2020: Started 1st line metastatic therapy with capecitabine.  --2/9/2021: PET/CT scan showed hypermetabolic right breast mass not significantly changed since 12/7/2020, persistent hypermetabolism; hypermetabolic right axillary lymph node increased in size; no distant metastasis; focal hypermetabolism in pelvis associated with sigmoid colon; stable 6-mm lung nodules.  --2/18/2021: Due to disease progression, switched to 2nd line metastatic therapy with eribulin. Required dose reduction due to neutropenia.  --4/24/2021: Left upper extremity Doppler U/S showed occlusive DVT within left subclavian vein; superficial thrombophlebitis within proximal cephalic vein. Started rivaroxaban.  --5/11/2021: PET scan showed unchanged size and slightly increased uptake to right breast; resolved right axillary adenopathy; no new lesions; persistent hypermetabolism at proximal sigmoid colon.  --5/19/2021 On chemo break after cycle 4 of eribulin.  --6/2/2021: Underwent palliative right mastectomy under care of Dr. Wilfredo Castillo. Pathology showed grade 3 invasive ductal carcinoma with tumor invasion into underlying skeletal muscle. Margins negative.  --7/22/2021: Colonoscopy showed diverticula in sigmoid colon; normal mucosa throughout entire colon.  --8/3/2021: PET scan showed  postsurgical seroma at right chest wall and axilla; small focus of residual mild FDG uptake at superolateral resection margin, likely posttreatment change or inflammation; no metastatic disease; persistent focus of uptake in sigmoid colon corresponding to diverticular disease.  --10/07/2021: U/S of right chest wall performed due to palpable lump. This showed hypoechoic nodule superior to level of scar measuring 0.7 x 0.6 x 1.5 cm. Biopsy of nodule showed poorly differentiated carcinoma consistent with recurrent breast carcinoma, grade 3, ER negative.  --10/19/2021: PET/CT showed right chest wall lesion with SUV 12.9; decreased right chest wall seroma; no FDG uptake in abdomen or pelvis.  --11/17/2021: Underwent excision of right chest wall nodule recurrence. Pathology showed grade 3 invasive ductal carcinoma, intramuscular; positive anterior and inferior margins of resection.  --1/25/2022 PET scan showed persistent hypermetabolic nodularity along anterior right chest wall suspicious for residual/recurrent tumor; persistent FDG uptake adjacent to short segment of sigmoid diverticulosis with minimal pericolonic fat stranding, unchanged, may represent chronic diverticulitis.    INTERIM HISTORY:  Michelle reports feeling well with no new complaints.        REVIEW OF SYSTEMS:   14 point ROS was reviewed and is negative other than as noted above in HPI.       HOME MEDICATIONS:  Current Outpatient Medications   Medication Sig Dispense Refill     calcium carbonate (TUMS) 500 MG chewable tablet Take 1 chew tab by mouth daily as needed for heartburn       multivitamin w/minerals (THERA-VIT-M) tablet Take 1 tablet by mouth every morning        Omega-3 Fatty Acids (OMEGA-3 FISH OIL PO) Take 1 g by mouth every morning        rivaroxaban ANTICOAGULANT (XARELTO ANTICOAGULANT) 20 MG TABS tablet Take 1 tablet (20 mg) by mouth daily (with dinner) 90 tablet 3         ALLERGIES:  Allergies   Allergen Reactions     Pcn [Penicillins] Hives          PAST MEDICAL HISTORY:  Past Medical History:   Diagnosis Date     Basal cell cancer     right cheek     Breast cancer, right breast (H)      DVT (deep venous thrombosis) (H)      Gastroesophageal reflux disease      History of blood transfusion     blue baby     Hyperlipidaemia          PAST SURGICAL HISTORY:  Past Surgical History:   Procedure Laterality Date     BIOPSY BREAST Right 11/17/2021    Procedure: Excise right chest wall mass;  Surgeon: Ulises Castillo MD;  Location: [facility] OR     BIOPSY MASS NECK Right 11/16/2017    Procedure: BIOPSY MASS NECK;  Excisional Biopsy Right Neck Lymph node;  Surgeon: Waylon Corral MD;  Location: [facility] OR     COLONOSCOPY       COLONOSCOPY N/A 7/22/2021    Procedure: COLONOSCOPY;  Surgeon: Gabriele Caro MD;  Location: [facility] GI     IR CHEST PORT PLACEMENT > 5 YRS OF AGE  2/16/2021     IR PORT REMOVAL LEFT  5/13/2019     MASTECTOMY SIMPLE Right 6/2/2021    Procedure: RIGHT SIMPLE MASTECTOMY;  Surgeon: Ulises Castillo MD;  Location: [facility] OR     MOHS MICROGRAPHIC PROCEDURE  ~2010    right cheek; BCC     MOHS MICROGRAPHIC PROCEDURE  10/31/2016    Dr. Nicholas [facility]         SOCIAL HISTORY:  Social History     Socioeconomic History     Marital status:      Spouse name: Not on file     Number of children: Not on file     Years of education: Not on file     Highest education level: Not on file   Occupational History     Not on file   Tobacco Use     Smoking status: Never Smoker     Smokeless tobacco: Never Used   Substance and Sexual Activity     Alcohol use: Yes     Alcohol/week: 0.0 standard drinks     Comment: 2 times a week, 2 drinks, wine or whiskey     Drug use: No     Sexual activity: Yes     Partners: Male     Birth control/protection: Post-menopausal   Other Topics Concern     Parent/sibling w/ CABG, MI or angioplasty before 65F 55M? No   Social History Narrative     Not on file     Social Determinants of Health     Financial Resource Strain: Not on  file   Food Insecurity: Not on file   Transportation Needs: Not on file   Physical Activity: Not on file   Stress: Not on file   Social Connections: Not on file   Intimate Partner Violence: Not on file   Housing Stability: Not on file         FAMILY HISTORY:  Family History   Problem Relation Age of Onset     Lupus Mother      Heart Disease Mother         CHF     Coronary Artery Disease Mother      Hyperlipidemia Mother      Diabetes Father      Breast Cancer Sister 83     No Known Problems Sister      Diabetes Brother      Suicide Brother      No Known Problems Brother      No Known Problems Brother      Suicide Brother      No Known Problems Brother      No Known Problems Brother      Breast Cancer Maternal Aunt 80         PHYSICAL EXAM:  Vital signs:  Not taken.  GENERAL: No acute distress.  EYES: No scleral icterus. No overt erythema.  RESPIRATORY: No audible cough, wheezing, or labored breathing.  MUSCULOSKELETAL: Range of motion in the neck, shoulders, and arms appear normal.  SKIN: No overt rashes, discolorations, or lesions over the face and neck.  NEUROLOGIC: Alert.  No overt tremors.  PSYCHIATRIC: Normal affect and mood.  Does not appear anxious.    LABS:  CBC RESULTS: Recent Labs   Lab Test 01/25/22  0915   WBC 4.5   RBC 4.13   HGB 13.2   HCT 40.7   MCV 99   MCH 32.0   MCHC 32.4   RDW 13.0        Last Comprehensive Metabolic Panel:  Sodium   Date Value Ref Range Status   01/25/2022 139 133 - 144 mmol/L Final   05/27/2021 139 133 - 144 mmol/L Final     Potassium   Date Value Ref Range Status   01/25/2022 4.1 3.4 - 5.3 mmol/L Final   05/27/2021 3.6 3.4 - 5.3 mmol/L Final     Chloride   Date Value Ref Range Status   01/25/2022 106 94 - 109 mmol/L Final   05/27/2021 107 94 - 109 mmol/L Final     Carbon Dioxide   Date Value Ref Range Status   05/27/2021 28 20 - 32 mmol/L Final     Carbon Dioxide (CO2)   Date Value Ref Range Status   01/25/2022 27 20 - 32 mmol/L Final     Anion Gap   Date Value Ref Range  Status   01/25/2022 6 3 - 14 mmol/L Final   05/27/2021 4 3 - 14 mmol/L Final     Glucose   Date Value Ref Range Status   01/25/2022 104 (H) 70 - 99 mg/dL Final   05/27/2021 136 (H) 70 - 99 mg/dL Final     Urea Nitrogen   Date Value Ref Range Status   01/25/2022 15 7 - 30 mg/dL Final   05/27/2021 13 7 - 30 mg/dL Final     Creatinine   Date Value Ref Range Status   01/25/2022 0.73 0.52 - 1.04 mg/dL Final   05/27/2021 0.76 0.52 - 1.04 mg/dL Final     GFR Estimate   Date Value Ref Range Status   01/25/2022 90 >60 mL/min/1.73m2 Final     Comment:     Effective December 21, 2021 eGFRcr in adults is calculated using the 2021 CKD-EPI creatinine equation which includes age and gender (John et al., [place], DOI: 10.1056/GFEMsf9747918)   05/27/2021 82 >60 mL/min/[1.73_m2] Final     Comment:     Non  GFR Calc  Starting 12/18/2018, serum creatinine based estimated GFR (eGFR) will be   calculated using the Chronic Kidney Disease Epidemiology Collaboration   (CKD-EPI) equation.       Calcium   Date Value Ref Range Status   01/25/2022 9.3 8.5 - 10.1 mg/dL Final   05/27/2021 9.0 8.5 - 10.1 mg/dL Final     Bilirubin Total   Date Value Ref Range Status   01/25/2022 0.6 0.2 - 1.3 mg/dL Final   05/18/2021 0.8 0.2 - 1.3 mg/dL Final     Alkaline Phosphatase   Date Value Ref Range Status   01/25/2022 83 40 - 150 U/L Final   05/18/2021 81 40 - 150 U/L Final     ALT   Date Value Ref Range Status   01/25/2022 37 0 - 50 U/L Final   05/18/2021 58 (H) 0 - 50 U/L Final     AST   Date Value Ref Range Status   01/25/2022 22 0 - 45 U/L Final   05/18/2021 38 0 - 45 U/L Final       PATHOLOGY:  Reviewed as per HPI.    IMAGING:   Reviewed as per HPI.    ASSESSMENT/PLAN:  Flor Griffin is a 66 year old female with the following issues:  1.  Right breast poorly differentiated adenocarcinoma, local recurrence ER negative, AR negative, HER-2/mitzi negative  2. Bilateral hilar lymphadenopathy, now resolved  3. Indeterminate pulmonary nodules,  "likely benign and stable  --Michelle is s/p palliative right mastectomy and has been on chemo break from eribulin since 5/19/2021.  --PET scan from 10/19/2021 showed hypermetabolic local recurrence to the right chest wall (triple negative) but no distant metastatic disease at the present time. Biopsy showed this was triple negative and PD-L1 positive.  --She underwent excision of this local recurrence on 11/17/2021.  I had discussed with Dr. Jonathan chanel on the margins of resection and that the \"positive\" margins were considered as such as he he had removed generous margins into the muscle.   --I reviewed with Michelle today that her 1/25/2022 PET scan showed persistent hypermetabolic nodularity along anterior right chest wall that is quite suggestive of residual tumor after the last resection but fortunately no clinical evidence of distant metastatic disease. Therefore, I recommended she revisit with radiation oncology for consideration of chest wall radiation.  --Given that the triple negative right chest wall nodule is PD-L1 positive, pembrolizumab + chemotherapy would be an option for future therapy. She can remain on a continued break from chemo given the absence of distant metastatic disease on her most current PET scan.  --Otherwise, I advised repeating a PET scan in about 3 months.    4. Left upper extremity deep vein thrombosis   --4/24/2021 Doppler U/S showed occlusive DVT in left subclavian vein.  --Continue rivaroxaban. Given her recent malignancy recurrence, I recommended long term anticoagulation. I discussed we can revisit this if she has clear scans for a year post excision of her local recurrence.    Return in 3 months.    Sally Stover MD  Hematology/Oncology  [facility]    Total time spent: 30 minutes in patient evaluation, counseling, documentation, and coordination of care.  "

## 2022-01-26 ENCOUNTER — VIRTUAL VISIT (OUTPATIENT)
Dept: ONCOLOGY | Facility: CLINIC | Age: 67
End: 2022-01-26
Attending: INTERNAL MEDICINE
Payer: COMMERCIAL

## 2022-01-26 DIAGNOSIS — Z17.1 MALIGNANT NEOPLASM OF UPPER-OUTER QUADRANT OF RIGHT BREAST IN FEMALE, ESTROGEN RECEPTOR NEGATIVE (H): Primary | ICD-10-CM

## 2022-01-26 DIAGNOSIS — C50.411 MALIGNANT NEOPLASM OF UPPER-OUTER QUADRANT OF RIGHT BREAST IN FEMALE, ESTROGEN RECEPTOR NEGATIVE (H): Primary | ICD-10-CM

## 2022-01-26 DIAGNOSIS — Z86.718 PERSONAL HISTORY OF DVT (DEEP VEIN THROMBOSIS): ICD-10-CM

## 2022-01-26 PROCEDURE — 99214 OFFICE O/P EST MOD 30 MIN: CPT | Mod: 95 | Performed by: INTERNAL MEDICINE

## 2022-01-26 NOTE — LETTER
1/26/2022         RE: Flor Griffin  [id number] [facility] [id number]        Dear Colleague,    Thank you for referring your patient, Flor Griffin, to the [facility] [place]. Please see a copy of my visit note below.    Michelle is a 66 year old who is being evaluated via a billable video visit.      How would you like to obtain your AVS? MyChart  If the video visit is dropped, the invitation should be resent by: Text to cell phone: [phone]  Will anyone else be joining your video visit? No  is there with her.    Yola SINGER    [facility]    Hematology/Oncology Established Patient Follow-up Note      Today's Date: 1/26/2022    Reason for Follow-up: Metastatic breast cancer.    Video visit duration (Doximity): 20 minutes    HISTORY OF PRESENT ILLNESS: Flor Griffin is a 66 year old female who presents with the following oncologic history:     --Clinical TX N3c M0 adenocarcinoma which is poorly differentiated, likely of breast origin, ER low-positive at 1-5%, NH negative, HER-2/mitzi FISH negative, androgen stain weak-intermediate 10-20%.  Initially she was seen 11/22/2017 after she underwent right supraclavicular lymph node biopsy which was positive for poorly differentiated adenocarcinoma.  She had this lymph node almost a month and had an excisional biopsy performed which was consistent with the diagnosis of cancer.   --A PET/CT scan 11/27/2017 showed hypermetabolic right supraclavicular, right axillary and subpectoral adenopathy.  Otherwise, no distant metastatic disease.   --3/7/2018: Completed 4 cycles of carboplatin and paclitaxel followed by dose dense AC x 4 cycles.  No surgery was done.  --5/29/2018-7/31/2018: Completed adjuvant radiation to right breast, right axilla, right supraclavicular region.  --9/2018: Started adjuvant letrozole.  --10/15/2020: Screening mammogram showed focal asymmetry in upper outer right breast; no  suspicious findings in left breast.  --10/21/2020: U/S of right breast showed irregularly-shaped hypoechoic mass at 10:00, 7 cm from nipple, measuring 3.2 x 2.7 x 3.6 cm. Right axillary ultrasound shows multiple normal-appearing lymph nodes. Biopsy of right breast mass at 10:00 showed poorly differentiated carcinoma, no lymphovascular invasion, morphology consistent with breast cancer and similar to prior axillary node tumor, ER weakly positive at 1% and IN negative (0%), HER-2/mitzi FISH negative.  --10/28/2020: PET/CT scan showed high metabolic activity in 3 cm area of right upper outer breast, several small hypermetabolic lymph nodes in high right axilla and right subclavian region; mild hypermetabolic lymph nodes in bilateral hilar regions, favor metastatic disease; several tiny nodules in right upper lung, favor benign etiology; small hypermetabolic focus in pelvis in either sigmoid colon or adjacent loop of small bowel.  --11/02/2020: Started 1st line metastatic therapy with capecitabine.  --2/9/2021: PET/CT scan showed hypermetabolic right breast mass not significantly changed since 12/7/2020, persistent hypermetabolism; hypermetabolic right axillary lymph node increased in size; no distant metastasis; focal hypermetabolism in pelvis associated with sigmoid colon; stable 6-mm lung nodules.  --2/18/2021: Due to disease progression, switched to 2nd line metastatic therapy with eribulin. Required dose reduction due to neutropenia.  --4/24/2021: Left upper extremity Doppler U/S showed occlusive DVT within left subclavian vein; superficial thrombophlebitis within proximal cephalic vein. Started rivaroxaban.  --5/11/2021: PET scan showed unchanged size and slightly increased uptake to right breast; resolved right axillary adenopathy; no new lesions; persistent hypermetabolism at proximal sigmoid colon.  --5/19/2021 On chemo break after cycle 4 of eribulin.  --6/2/2021: Underwent palliative right mastectomy under care  of Dr. Wilfredo Castillo. Pathology showed grade 3 invasive ductal carcinoma with tumor invasion into underlying skeletal muscle. Margins negative.  --7/22/2021: Colonoscopy showed diverticula in sigmoid colon; normal mucosa throughout entire colon.  --8/3/2021: PET scan showed postsurgical seroma at right chest wall and axilla; small focus of residual mild FDG uptake at superolateral resection margin, likely posttreatment change or inflammation; no metastatic disease; persistent focus of uptake in sigmoid colon corresponding to diverticular disease.  --10/07/2021: U/S of right chest wall performed due to palpable lump. This showed hypoechoic nodule superior to level of scar measuring 0.7 x 0.6 x 1.5 cm. Biopsy of nodule showed poorly differentiated carcinoma consistent with recurrent breast carcinoma, grade 3, ER negative.  --10/19/2021: PET/CT showed right chest wall lesion with SUV 12.9; decreased right chest wall seroma; no FDG uptake in abdomen or pelvis.  --11/17/2021: Underwent excision of right chest wall nodule recurrence. Pathology showed grade 3 invasive ductal carcinoma, intramuscular; positive anterior and inferior margins of resection.  --1/25/2022 PET scan showed persistent hypermetabolic nodularity along anterior right chest wall suspicious for residual/recurrent tumor; persistent FDG uptake adjacent to short segment of sigmoid diverticulosis with minimal pericolonic fat stranding, unchanged, may represent chronic diverticulitis.    INTERIM HISTORY:  Michelle reports feeling well with no new complaints.        REVIEW OF SYSTEMS:   14 point ROS was reviewed and is negative other than as noted above in HPI.       HOME MEDICATIONS:  Current Outpatient Medications   Medication Sig Dispense Refill     calcium carbonate (TUMS) 500 MG chewable tablet Take 1 chew tab by mouth daily as needed for heartburn       multivitamin w/minerals (THERA-VIT-M) tablet Take 1 tablet by mouth every morning        Omega-3 Fatty Acids  (OMEGA-3 FISH OIL PO) Take 1 g by mouth every morning        rivaroxaban ANTICOAGULANT (XARELTO ANTICOAGULANT) 20 MG TABS tablet Take 1 tablet (20 mg) by mouth daily (with dinner) 90 tablet 3         ALLERGIES:  Allergies   Allergen Reactions     Pcn [Penicillins] Hives         PAST MEDICAL HISTORY:  Past Medical History:   Diagnosis Date     Basal cell cancer     right cheek     Breast cancer, right breast (H)      DVT (deep venous thrombosis) (H)      Gastroesophageal reflux disease      History of blood transfusion     blue baby     Hyperlipidaemia          PAST SURGICAL HISTORY:  Past Surgical History:   Procedure Laterality Date     BIOPSY BREAST Right 11/17/2021    Procedure: Excise right chest wall mass;  Surgeon: Ulises Castillo MD;  Location: [facility] OR     BIOPSY MASS NECK Right 11/16/2017    Procedure: BIOPSY MASS NECK;  Excisional Biopsy Right Neck Lymph node;  Surgeon: Waylon Corral MD;  Location: [facility] OR     COLONOSCOPY       COLONOSCOPY N/A 7/22/2021    Procedure: COLONOSCOPY;  Surgeon: Gabriele Caro MD;  Location: [facility] GI     IR CHEST PORT PLACEMENT > 5 YRS OF AGE  2/16/2021     IR PORT REMOVAL LEFT  5/13/2019     MASTECTOMY SIMPLE Right 6/2/2021    Procedure: RIGHT SIMPLE MASTECTOMY;  Surgeon: Ulises Castillo MD;  Location: [facility] OR     MOHS MICROGRAPHIC PROCEDURE  ~2010    right cheek; BCC     MOHS MICROGRAPHIC PROCEDURE  10/31/2016    Dr. Nicholas [facility]         SOCIAL HISTORY:  Social History     Socioeconomic History     Marital status:      Spouse name: Not on file     Number of children: Not on file     Years of education: Not on file     Highest education level: Not on file   Occupational History     Not on file   Tobacco Use     Smoking status: Never Smoker     Smokeless tobacco: Never Used   Substance and Sexual Activity     Alcohol use: Yes     Alcohol/week: 0.0 standard drinks     Comment: 2 times a week, 2 drinks, wine or whiskey     Drug use: No     Sexual activity: Yes      Partners: Male     Birth control/protection: Post-menopausal   Other Topics Concern     Parent/sibling w/ CABG, MI or angioplasty before 65F 55M? No   Social History Narrative     Not on file     Social Determinants of Health     Financial Resource Strain: Not on file   Food Insecurity: Not on file   Transportation Needs: Not on file   Physical Activity: Not on file   Stress: Not on file   Social Connections: Not on file   Intimate Partner Violence: Not on file   Housing Stability: Not on file         FAMILY HISTORY:  Family History   Problem Relation Age of Onset     Lupus Mother      Heart Disease Mother         CHF     Coronary Artery Disease Mother      Hyperlipidemia Mother      Diabetes Father      Breast Cancer Sister 83     No Known Problems Sister      Diabetes Brother      Suicide Brother      No Known Problems Brother      No Known Problems Brother      Suicide Brother      No Known Problems Brother      No Known Problems Brother      Breast Cancer Maternal Aunt 80         PHYSICAL EXAM:  Vital signs:  Not taken.  GENERAL: No acute distress.  EYES: No scleral icterus. No overt erythema.  RESPIRATORY: No audible cough, wheezing, or labored breathing.  MUSCULOSKELETAL: Range of motion in the neck, shoulders, and arms appear normal.  SKIN: No overt rashes, discolorations, or lesions over the face and neck.  NEUROLOGIC: Alert.  No overt tremors.  PSYCHIATRIC: Normal affect and mood.  Does not appear anxious.    LABS:  CBC RESULTS: Recent Labs   Lab Test 01/25/22  0915   WBC 4.5   RBC 4.13   HGB 13.2   HCT 40.7   MCV 99   MCH 32.0   MCHC 32.4   RDW 13.0        Last Comprehensive Metabolic Panel:  Sodium   Date Value Ref Range Status   01/25/2022 139 133 - 144 mmol/L Final   05/27/2021 139 133 - 144 mmol/L Final     Potassium   Date Value Ref Range Status   01/25/2022 4.1 3.4 - 5.3 mmol/L Final   05/27/2021 3.6 3.4 - 5.3 mmol/L Final     Chloride   Date Value Ref Range Status   01/25/2022 106 94 -  109 mmol/L Final   05/27/2021 107 94 - 109 mmol/L Final     Carbon Dioxide   Date Value Ref Range Status   05/27/2021 28 20 - 32 mmol/L Final     Carbon Dioxide (CO2)   Date Value Ref Range Status   01/25/2022 27 20 - 32 mmol/L Final     Anion Gap   Date Value Ref Range Status   01/25/2022 6 3 - 14 mmol/L Final   05/27/2021 4 3 - 14 mmol/L Final     Glucose   Date Value Ref Range Status   01/25/2022 104 (H) 70 - 99 mg/dL Final   05/27/2021 136 (H) 70 - 99 mg/dL Final     Urea Nitrogen   Date Value Ref Range Status   01/25/2022 15 7 - 30 mg/dL Final   05/27/2021 13 7 - 30 mg/dL Final     Creatinine   Date Value Ref Range Status   01/25/2022 0.73 0.52 - 1.04 mg/dL Final   05/27/2021 0.76 0.52 - 1.04 mg/dL Final     GFR Estimate   Date Value Ref Range Status   01/25/2022 90 >60 mL/min/1.73m2 Final     Comment:     Effective December 21, 2021 eGFRcr in adults is calculated using the 2021 CKD-EPI creatinine equation which includes age and gender (John et al., NEJM, DOI: 10.1056/KVODxs3834465)   05/27/2021 82 >60 mL/min/[1.73_m2] Final     Comment:     Non  GFR Calc  Starting 12/18/2018, serum creatinine based estimated GFR (eGFR) will be   calculated using the Chronic Kidney Disease Epidemiology Collaboration   (CKD-EPI) equation.       Calcium   Date Value Ref Range Status   01/25/2022 9.3 8.5 - 10.1 mg/dL Final   05/27/2021 9.0 8.5 - 10.1 mg/dL Final     Bilirubin Total   Date Value Ref Range Status   01/25/2022 0.6 0.2 - 1.3 mg/dL Final   05/18/2021 0.8 0.2 - 1.3 mg/dL Final     Alkaline Phosphatase   Date Value Ref Range Status   01/25/2022 83 40 - 150 U/L Final   05/18/2021 81 40 - 150 U/L Final     ALT   Date Value Ref Range Status   01/25/2022 37 0 - 50 U/L Final   05/18/2021 58 (H) 0 - 50 U/L Final     AST   Date Value Ref Range Status   01/25/2022 22 0 - 45 U/L Final   05/18/2021 38 0 - 45 U/L Final       PATHOLOGY:  Reviewed as per HPI.    IMAGING:   Reviewed as per HPI.    ASSESSMENT/PLAN:   "Flor Griffin is a 66 year old female with the following issues:  1.  Right breast poorly differentiated adenocarcinoma, local recurrence ER negative, UT negative, HER-2/mitzi negative  2. Bilateral hilar lymphadenopathy, now resolved  3. Indeterminate pulmonary nodules, likely benign and stable  --Michelle is s/p palliative right mastectomy and has been on chemo break from eribulin since 5/19/2021.  --PET scan from 10/19/2021 showed hypermetabolic local recurrence to the right chest wall (triple negative) but no distant metastatic disease at the present time. Biopsy showed this was triple negative and PD-L1 positive.  --She underwent excision of this local recurrence on 11/17/2021.  I had discussed with Dr. Jonathan chanel on the margins of resection and that the \"positive\" margins were considered as such as he he had removed generous margins into the muscle.   --I reviewed with Michelle today that her 1/25/2022 PET scan showed persistent hypermetabolic nodularity along anterior right chest wall that is quite suggestive of residual tumor after the last resection but fortunately no clinical evidence of distant metastatic disease. Therefore, I recommended she revisit with radiation oncology for consideration of chest wall radiation.  --Given that the triple negative right chest wall nodule is PD-L1 positive, pembrolizumab + chemotherapy would be an option for future therapy. She can remain on a continued break from chemo given the absence of distant metastatic disease on her most current PET scan.  --Otherwise, I advised repeating a PET scan in about 3 months.    4. Left upper extremity deep vein thrombosis   --4/24/2021 Doppler U/S showed occlusive DVT in left subclavian vein.  --Continue rivaroxaban. Given her recent malignancy recurrence, I recommended long term anticoagulation. I discussed we can revisit this if she has clear scans for a year post excision of her local recurrence.    Return in 3 months.    Sally " MD Ck  Hematology/Oncology  [facility]    Total time spent: 30 minutes in patient evaluation, counseling, documentation, and coordination of care.      Again, thank you for allowing me to participate in the care of your patient.        Sincerely,        Sally Stover MD

## 2022-01-26 NOTE — LETTER
1/26/2022         RE: Flor Griffin  [address] [facility] [address]        Dear Colleague,    Thank you for referring your patient, Flor Griffin, to the [facility] [place]. Please see a copy of my visit note below.    Michelle is a 66 year old who is being evaluated via a billable video visit.      How would you like to obtain your AVS? MyChart  If the video visit is dropped, the invitation should be resent by: Text to cell phone: [phone]  Will anyone else be joining your video visit? No  is there with her.    Yola SINGER    [facility]    Hematology/Oncology Established Patient Follow-up Note      Today's Date: 1/26/2022    Reason for Follow-up: Metastatic breast cancer.    Video visit duration (Doximity): 20 minutes    HISTORY OF PRESENT ILLNESS: Flor Griffin is a 66 year old female who presents with the following oncologic history:     --Clinical TX N3c M0 adenocarcinoma which is poorly differentiated, likely of breast origin, ER low-positive at 1-5%, KS negative, HER-2/mitzi FISH negative, androgen stain weak-intermediate 10-20%.  Initially she was seen 11/22/2017 after she underwent right supraclavicular lymph node biopsy which was positive for poorly differentiated adenocarcinoma.  She had this lymph node almost a month and had an excisional biopsy performed which was consistent with the diagnosis of cancer.   --A PET/CT scan 11/27/2017 showed hypermetabolic right supraclavicular, right axillary and subpectoral adenopathy.  Otherwise, no distant metastatic disease.   --3/7/2018: Completed 4 cycles of carboplatin and paclitaxel followed by dose dense AC x 4 cycles.  No surgery was done.  --5/29/2018-7/31/2018: Completed adjuvant radiation to right breast, right axilla, right supraclavicular region.  --9/2018: Started adjuvant letrozole.  --10/15/2020: Screening mammogram showed focal asymmetry in upper outer right breast; no  suspicious findings in left breast.  --10/21/2020: U/S of right breast showed irregularly-shaped hypoechoic mass at 10:00, 7 cm from nipple, measuring 3.2 x 2.7 x 3.6 cm. Right axillary ultrasound shows multiple normal-appearing lymph nodes. Biopsy of right breast mass at 10:00 showed poorly differentiated carcinoma, no lymphovascular invasion, morphology consistent with breast cancer and similar to prior axillary node tumor, ER weakly positive at 1% and IA negative (0%), HER-2/mitzi FISH negative.  --10/28/2020: PET/CT scan showed high metabolic activity in 3 cm area of right upper outer breast, several small hypermetabolic lymph nodes in high right axilla and right subclavian region; mild hypermetabolic lymph nodes in bilateral hilar regions, favor metastatic disease; several tiny nodules in right upper lung, favor benign etiology; small hypermetabolic focus in pelvis in either sigmoid colon or adjacent loop of small bowel.  --11/02/2020: Started 1st line metastatic therapy with capecitabine.  --2/9/2021: PET/CT scan showed hypermetabolic right breast mass not significantly changed since 12/7/2020, persistent hypermetabolism; hypermetabolic right axillary lymph node increased in size; no distant metastasis; focal hypermetabolism in pelvis associated with sigmoid colon; stable 6-mm lung nodules.  --2/18/2021: Due to disease progression, switched to 2nd line metastatic therapy with eribulin. Required dose reduction due to neutropenia.  --4/24/2021: Left upper extremity Doppler U/S showed occlusive DVT within left subclavian vein; superficial thrombophlebitis within proximal cephalic vein. Started rivaroxaban.  --5/11/2021: PET scan showed unchanged size and slightly increased uptake to right breast; resolved right axillary adenopathy; no new lesions; persistent hypermetabolism at proximal sigmoid colon.  --5/19/2021 On chemo break after cycle 4 of eribulin.  --6/2/2021: Underwent palliative right mastectomy under care  of Dr. Wilfredo Castillo. Pathology showed grade 3 invasive ductal carcinoma with tumor invasion into underlying skeletal muscle. Margins negative.  --7/22/2021: Colonoscopy showed diverticula in sigmoid colon; normal mucosa throughout entire colon.  --8/3/2021: PET scan showed postsurgical seroma at right chest wall and axilla; small focus of residual mild FDG uptake at superolateral resection margin, likely posttreatment change or inflammation; no metastatic disease; persistent focus of uptake in sigmoid colon corresponding to diverticular disease.  --10/07/2021: U/S of right chest wall performed due to palpable lump. This showed hypoechoic nodule superior to level of scar measuring 0.7 x 0.6 x 1.5 cm. Biopsy of nodule showed poorly differentiated carcinoma consistent with recurrent breast carcinoma, grade 3, ER negative.  --10/19/2021: PET/CT showed right chest wall lesion with SUV 12.9; decreased right chest wall seroma; no FDG uptake in abdomen or pelvis.  --11/17/2021: Underwent excision of right chest wall nodule recurrence. Pathology showed grade 3 invasive ductal carcinoma, intramuscular; positive anterior and inferior margins of resection.  --1/25/2022 PET scan showed persistent hypermetabolic nodularity along anterior right chest wall suspicious for residual/recurrent tumor; persistent FDG uptake adjacent to short segment of sigmoid diverticulosis with minimal pericolonic fat stranding, unchanged, may represent chronic diverticulitis.    INTERIM HISTORY:  Michelle reports feeling well with no new complaints.        REVIEW OF SYSTEMS:   14 point ROS was reviewed and is negative other than as noted above in HPI.       HOME MEDICATIONS:  Current Outpatient Medications   Medication Sig Dispense Refill     calcium carbonate (TUMS) 500 MG chewable tablet Take 1 chew tab by mouth daily as needed for heartburn       multivitamin w/minerals (THERA-VIT-M) tablet Take 1 tablet by mouth every morning        Omega-3 Fatty Acids  (OMEGA-3 FISH OIL PO) Take 1 g by mouth every morning        rivaroxaban ANTICOAGULANT (XARELTO ANTICOAGULANT) 20 MG TABS tablet Take 1 tablet (20 mg) by mouth daily (with dinner) 90 tablet 3         ALLERGIES:  Allergies   Allergen Reactions     Pcn [Penicillins] Hives         PAST MEDICAL HISTORY:  Past Medical History:   Diagnosis Date     Basal cell cancer     right cheek     Breast cancer, right breast (H)      DVT (deep venous thrombosis) (H)      Gastroesophageal reflux disease      History of blood transfusion     blue baby     Hyperlipidaemia          PAST SURGICAL HISTORY:  Past Surgical History:   Procedure Laterality Date     BIOPSY BREAST Right 11/17/2021    Procedure: Excise right chest wall mass;  Surgeon: Ulises Castillo MD;  Location: [facility] OR     BIOPSY MASS NECK Right 11/16/2017    Procedure: BIOPSY MASS NECK;  Excisional Biopsy Right Neck Lymph node;  Surgeon: Waylon Corral MD;  Location: [facility] OR     COLONOSCOPY       COLONOSCOPY N/A 7/22/2021    Procedure: COLONOSCOPY;  Surgeon: Gabriele Caro MD;  Location: [facility] GI     IR CHEST PORT PLACEMENT > 5 YRS OF AGE  2/16/2021     IR PORT REMOVAL LEFT  5/13/2019     MASTECTOMY SIMPLE Right 6/2/2021    Procedure: RIGHT SIMPLE MASTECTOMY;  Surgeon: Ulises Castillo MD;  Location: [facility] OR     MOHS MICROGRAPHIC PROCEDURE  ~2010    right cheek; BCC     MOHS MICROGRAPHIC PROCEDURE  10/31/2016    Dr. Nicholas [facility]         SOCIAL HISTORY:  Social History     Socioeconomic History     Marital status:      Spouse name: Not on file     Number of children: Not on file     Years of education: Not on file     Highest education level: Not on file   Occupational History     Not on file   Tobacco Use     Smoking status: Never Smoker     Smokeless tobacco: Never Used   Substance and Sexual Activity     Alcohol use: Yes     Alcohol/week: 0.0 standard drinks     Comment: 2 times a week, 2 drinks, wine or whiskey     Drug use: No     Sexual activity: Yes      Partners: Male     Birth control/protection: Post-menopausal   Other Topics Concern     Parent/sibling w/ CABG, MI or angioplasty before 65F 55M? No   Social History Narrative     Not on file     Social Determinants of Health     Financial Resource Strain: Not on file   Food Insecurity: Not on file   Transportation Needs: Not on file   Physical Activity: Not on file   Stress: Not on file   Social Connections: Not on file   Intimate Partner Violence: Not on file   Housing Stability: Not on file         FAMILY HISTORY:  Family History   Problem Relation Age of Onset     Lupus Mother      Heart Disease Mother         CHF     Coronary Artery Disease Mother      Hyperlipidemia Mother      Diabetes Father      Breast Cancer Sister 83     No Known Problems Sister      Diabetes Brother      Suicide Brother      No Known Problems Brother      No Known Problems Brother      Suicide Brother      No Known Problems Brother      No Known Problems Brother      Breast Cancer Maternal Aunt 80         PHYSICAL EXAM:  Vital signs:  Not taken.  GENERAL: No acute distress.  EYES: No scleral icterus. No overt erythema.  RESPIRATORY: No audible cough, wheezing, or labored breathing.  MUSCULOSKELETAL: Range of motion in the neck, shoulders, and arms appear normal.  SKIN: No overt rashes, discolorations, or lesions over the face and neck.  NEUROLOGIC: Alert.  No overt tremors.  PSYCHIATRIC: Normal affect and mood.  Does not appear anxious.    LABS:  CBC RESULTS: Recent Labs   Lab Test 01/25/22  0915   WBC 4.5   RBC 4.13   HGB 13.2   HCT 40.7   MCV 99   MCH 32.0   MCHC 32.4   RDW 13.0        Last Comprehensive Metabolic Panel:  Sodium   Date Value Ref Range Status   01/25/2022 139 133 - 144 mmol/L Final   05/27/2021 139 133 - 144 mmol/L Final     Potassium   Date Value Ref Range Status   01/25/2022 4.1 3.4 - 5.3 mmol/L Final   05/27/2021 3.6 3.4 - 5.3 mmol/L Final     Chloride   Date Value Ref Range Status   01/25/2022 106 94 -  109 mmol/L Final   05/27/2021 107 94 - 109 mmol/L Final     Carbon Dioxide   Date Value Ref Range Status   05/27/2021 28 20 - 32 mmol/L Final     Carbon Dioxide (CO2)   Date Value Ref Range Status   01/25/2022 27 20 - 32 mmol/L Final     Anion Gap   Date Value Ref Range Status   01/25/2022 6 3 - 14 mmol/L Final   05/27/2021 4 3 - 14 mmol/L Final     Glucose   Date Value Ref Range Status   01/25/2022 104 (H) 70 - 99 mg/dL Final   05/27/2021 136 (H) 70 - 99 mg/dL Final     Urea Nitrogen   Date Value Ref Range Status   01/25/2022 15 7 - 30 mg/dL Final   05/27/2021 13 7 - 30 mg/dL Final     Creatinine   Date Value Ref Range Status   01/25/2022 0.73 0.52 - 1.04 mg/dL Final   05/27/2021 0.76 0.52 - 1.04 mg/dL Final     GFR Estimate   Date Value Ref Range Status   01/25/2022 90 >60 mL/min/1.73m2 Final     Comment:     Effective December 21, 2021 eGFRcr in adults is calculated using the 2021 CKD-EPI creatinine equation which includes age and gender (John et al., NEJM, DOI: 10.1056/EWVWpx7070943)   05/27/2021 82 >60 mL/min/[1.73_m2] Final     Comment:     Non  GFR Calc  Starting 12/18/2018, serum creatinine based estimated GFR (eGFR) will be   calculated using the Chronic Kidney Disease Epidemiology Collaboration   (CKD-EPI) equation.       Calcium   Date Value Ref Range Status   01/25/2022 9.3 8.5 - 10.1 mg/dL Final   05/27/2021 9.0 8.5 - 10.1 mg/dL Final     Bilirubin Total   Date Value Ref Range Status   01/25/2022 0.6 0.2 - 1.3 mg/dL Final   05/18/2021 0.8 0.2 - 1.3 mg/dL Final     Alkaline Phosphatase   Date Value Ref Range Status   01/25/2022 83 40 - 150 U/L Final   05/18/2021 81 40 - 150 U/L Final     ALT   Date Value Ref Range Status   01/25/2022 37 0 - 50 U/L Final   05/18/2021 58 (H) 0 - 50 U/L Final     AST   Date Value Ref Range Status   01/25/2022 22 0 - 45 U/L Final   05/18/2021 38 0 - 45 U/L Final       PATHOLOGY:  Reviewed as per HPI.    IMAGING:   Reviewed as per HPI.    ASSESSMENT/PLAN:   "Flor Griffin is a 66 year old female with the following issues:  1.  Right breast poorly differentiated adenocarcinoma, local recurrence ER negative, TX negative, HER-2/mitzi negative  2. Bilateral hilar lymphadenopathy, now resolved  3. Indeterminate pulmonary nodules, likely benign and stable  --Michelle is s/p palliative right mastectomy and has been on chemo break from eribulin since 5/19/2021.  --PET scan from 10/19/2021 showed hypermetabolic local recurrence to the right chest wall (triple negative) but no distant metastatic disease at the present time. Biopsy showed this was triple negative and PD-L1 positive.  --She underwent excision of this local recurrence on 11/17/2021.  I had discussed with Dr. Jonathan chanel on the margins of resection and that the \"positive\" margins were considered as such as he he had removed generous margins into the muscle.   --I reviewed with Michelle today that her 1/25/2022 PET scan showed persistent hypermetabolic nodularity along anterior right chest wall that is quite suggestive of residual tumor after the last resection but fortunately no clinical evidence of distant metastatic disease. Therefore, I recommended she revisit with radiation oncology for consideration of chest wall radiation.  --Given that the triple negative right chest wall nodule is PD-L1 positive, pembrolizumab + chemotherapy would be an option for future therapy. She can remain on a continued break from chemo given the absence of distant metastatic disease on her most current PET scan.  --Otherwise, I advised repeating a PET scan in about 3 months.    4. Left upper extremity deep vein thrombosis   --4/24/2021 Doppler U/S showed occlusive DVT in left subclavian vein.  --Continue rivaroxaban. Given her recent malignancy recurrence, I recommended long term anticoagulation. I discussed we can revisit this if she has clear scans for a year post excision of her local recurrence.    Return in 3 months.    Sally " MD Ck  Hematology/Oncology  [facility]    Total time spent: 30 minutes in patient evaluation, counseling, documentation, and coordination of care.      Again, thank you for allowing me to participate in the care of your patient.        Sincerely,        Sally Stover MD

## 2022-02-03 ENCOUNTER — TRANSFERRED RECORDS (OUTPATIENT)
Dept: HEALTH INFORMATION MANAGEMENT | Facility: CLINIC | Age: 67
End: 2022-02-03
Payer: COMMERCIAL

## 2022-02-16 DIAGNOSIS — I82.622 ACUTE DEEP VEIN THROMBOSIS (DVT) OF OTHER VEIN OF LEFT UPPER EXTREMITY (H): ICD-10-CM

## 2022-02-25 ENCOUNTER — TELEPHONE (OUTPATIENT)
Dept: ONCOLOGY | Facility: CLINIC | Age: 67
End: 2022-02-25
Payer: COMMERCIAL

## 2022-02-25 NOTE — TELEPHONE ENCOUNTER
Pt called me back, would like to pursue free drug program from the . Income is $22,800 for household size of #2. Gave consent to fill out forms and sign on her behalf, and to have [facility] do a credit check (aware that sometimes they will still require proof of income if reported income is coming back higher).     Free Drug Application Initiated  Medication: Xarelto  Sponsor: [employer]  Phone #: [phone]  Fax #: [phone]  Additional Information: Application filled out, pt portion completed and signed and HCP portion waiting on Dr. Earl signature- sent to Yayo Morales to assist with obtaining Rajat signature.

## 2022-02-25 NOTE — TELEPHONE ENCOUNTER
Received an inbasket message from [facility] Yayo Andrew stating this patient is unable to afford her Xarelto. Asked us to look into other options (such as Eliquis)-- this has the same copay amount as Xarelto.    Called pt and LM on 2/23 as well as sent myC message to inform pt of free drug program through [employer].     Pt read myC on 2/23 but hasn't called back, I just tried her again and had to LM. Provided her with my call back phone # for today, but let her know next week Ashley will be back and to reach out to her (provided Ashley's phone #).

## 2022-03-02 NOTE — TELEPHONE ENCOUNTER
Called [facility] to check [facility] status, patient application is processing and can take 3-5 business days to process. I updated Michelle and told her I would call her on Monday with any updates.

## 2022-03-08 ENCOUNTER — TRANSFERRED RECORDS (OUTPATIENT)
Dept: HEALTH INFORMATION MANAGEMENT | Facility: CLINIC | Age: 67
End: 2022-03-08
Payer: COMMERCIAL

## 2022-03-12 ENCOUNTER — OFFICE VISIT (OUTPATIENT)
Dept: URGENT CARE | Facility: URGENT CARE | Age: 67
End: 2022-03-12
Payer: COMMERCIAL

## 2022-03-12 VITALS
TEMPERATURE: 98.2 F | WEIGHT: 169 LBS | RESPIRATION RATE: 16 BRPM | HEART RATE: 68 BPM | SYSTOLIC BLOOD PRESSURE: 116 MMHG | DIASTOLIC BLOOD PRESSURE: 70 MMHG | OXYGEN SATURATION: 100 % | BODY MASS INDEX: 27.28 KG/M2

## 2022-03-12 DIAGNOSIS — J01.90 ACUTE SINUSITIS WITH SYMPTOMS > 10 DAYS: Primary | ICD-10-CM

## 2022-03-12 PROCEDURE — 99213 OFFICE O/P EST LOW 20 MIN: CPT | Performed by: PHYSICIAN ASSISTANT

## 2022-03-12 RX ORDER — AZITHROMYCIN 250 MG/1
TABLET, FILM COATED ORAL
Qty: 6 TABLET | Refills: 0 | Status: SHIPPED | OUTPATIENT
Start: 2022-03-12 | End: 2022-03-17

## 2022-03-12 NOTE — PATIENT INSTRUCTIONS
Patient Education     Sinusitis (Antibiotic Treatment)    The sinuses are air-filled spaces within the bones of the face. They connect to the inside of the nose. Sinusitis is an inflammation of the tissue that lines the sinuses. Sinusitis can occur during a cold. It can also happen due to allergies to pollens and other particles in the air. Sinusitis can cause symptoms of sinus congestion and a feeling of fullness. A sinus infection causes fever, headache, and facial pain. There is often green or yellow fluid draining from the nose or into the back of the throat (post-nasal drip). You have been given antibiotics to treat this condition.   Home care    Take the full course of antibiotics as instructed. Don't stop taking them, even when you feel better.    Drink plenty of water, hot tea, and other liquids as directed by the healthcare provider. This may help thin nasal mucus. It also may help your sinuses drain fluids.    Heat may help soothe painful areas of your face. Use a towel soaked in hot water. Or,  the shower and direct the warm spray onto your face. Using a vaporizer along with a menthol rub at night may also help soothe symptoms.     An expectorant with guaifenesin may help thin nasal mucus and help your sinuses drain fluids. Talk with your provider or pharmacists before taking an over-the-counter (OTC) medicine if you have any questions about it or its side effects..    You can use an OTC decongestant, unless a similar medicine was prescribed to you. Nasal sprays work the fastest. Use one that contains phenylephrine or oxymetazoline. First blow your nose gently. Then use the spray. Don't use these medicines more often than directed on the label. If you do, your symptoms may get worse. You may also take pills that contain pseudoephedrine. Don t use products that combine multiple medicines. This is because side effects may be increased. Read labels. You can also ask the pharmacist for help. (People  with high blood pressure should not use decongestants. They can raise blood pressure.) Talk with your provider or pharmacist if you have any questions about the medicine..    OTC antihistamines may help if allergies contributed to your sinusitis. Talk with your provider or pharmacist if you have any questions about the medicine..    Don't use nasal rinses or irrigation during an acute sinus infection, unless your healthcare provider tells you to. Rinsing may spread the infection to other areas in your sinuses.    Use acetaminophen or ibuprofen to control pain, unless another pain medicine was prescribed to you. If you have chronic liver or kidney disease or ever had a stomach ulcer, talk with your healthcare provider before using these medicines. Never give aspirin to anyone under age 18 who is ill with a fever. It may cause severe liver damage.    Don't smoke. This can make symptoms worse.    Follow-up care  Follow up with your healthcare provider, or as advised.   When to seek medical advice  Call your healthcare provider if any of these occur:     Facial pain or headache that gets worse    Stiff neck    Unusual drowsiness or confusion    Swelling of your forehead or eyelids    Symptoms don't go away in 10 days    Vision problems, such as blurred or double vision    Fever of 100.4 F (38 C) or higher, or as directed by your healthcare provider  Call 911  Call 911 if any of these occur:     Seizure    Trouble breathing    Feeling dizzy or faint    Fingernails, skin or lips look blue, purple , or gray  Prevention  Here are steps you can take to help prevent an infection:     Keep good hand washing habits.    Don t have close contact with people who have sore throats, colds, or other upper respiratory infections.    Don t smoke, and stay away from secondhand smoke.    Stay up to date with of your vaccines.  "[employer] last reviewed this educational content on 12/1/2019 2000-2021 [employer]. All rights  reserved. This information is not intended as a substitute for professional medical care. Always follow your healthcare professional's instructions.

## 2022-03-12 NOTE — PROGRESS NOTES
Assessment & Plan     Acute sinusitis with symptoms > 10 days  Patient is allergic to penicillin.  Zithromax is prescribed today.  Continue supportive cares.  Keep monitoring symptoms.  Follow-up if any worsening symptoms.  Patient agrees with the plan.  - azithromycin (ZITHROMAX) 250 MG tablet  Dispense: 6 tablet; Refill: 0         Return in about 10 days (around 3/22/2022) for Symptoms failing to improve.    OSCAR Coreas [facility] JAIME Martinez is a 66 year old female who presents to clinic today for the following health issues:  Chief Complaint   Patient presents with     Sinus Problem     x 2 weeks     HPI      URI Adult    Onset of symptoms was 2 week(s) ago.  Course of illness is worsening.    Severity moderate  Current and Associated symptoms: facial pain/pressure, headache, post nasal drip, nasal congestion, chest congestion  Denies fever/chills/sob  Treatment measures tried include nasal saline rinses, nasal spray, mucinex  Predisposing factors include None.        Review of Systems  Constitutional, HEENT, cardiovascular, pulmonary, GI, , musculoskeletal, neuro, skin, endocrine and psych systems are negative, except as otherwise noted.      Objective    /70 (BP Location: Right arm, Patient Position: Chair, Cuff Size: Adult Large)   Pulse 68   Temp 98.2  F (36.8  C) (Oral)   Resp 16   Wt 76.7 kg (169 lb)   LMP 01/01/2004 (Approximate)   SpO2 100%   Breastfeeding No   BMI 27.28 kg/m    Physical Exam   GENERAL: healthy, alert and no distress  HENT: ear canals and TM's normal, nasal passages with boggy turbinates and mouth without ulcers or lesions  RESP: lungs clear to auscultation - no rales, rhonchi or wheezes  CV: regular rate and rhythm, normal S1 S2  MS: no gross musculoskeletal defects noted, no edema  SKIN: no suspicious lesions or rashes

## 2022-03-29 ENCOUNTER — LAB (OUTPATIENT)
Dept: INFUSION THERAPY | Facility: CLINIC | Age: 67
End: 2022-03-29
Attending: INTERNAL MEDICINE
Payer: COMMERCIAL

## 2022-03-29 DIAGNOSIS — C50.811 MALIGNANT NEOPLASM OF OVERLAPPING SITES OF RIGHT BREAST IN FEMALE, ESTROGEN RECEPTOR POSITIVE (H): ICD-10-CM

## 2022-03-29 DIAGNOSIS — Z95.828 PORT-A-CATH IN PLACE: Primary | ICD-10-CM

## 2022-03-29 DIAGNOSIS — Z17.0 MALIGNANT NEOPLASM OF OVERLAPPING SITES OF RIGHT BREAST IN FEMALE, ESTROGEN RECEPTOR POSITIVE (H): ICD-10-CM

## 2022-03-29 PROCEDURE — 96523 IRRIG DRUG DELIVERY DEVICE: CPT

## 2022-03-29 PROCEDURE — 250N000011 HC RX IP 250 OP 636: Performed by: INTERNAL MEDICINE

## 2022-03-29 RX ORDER — HEPARIN SODIUM (PORCINE) LOCK FLUSH IV SOLN 100 UNIT/ML 100 UNIT/ML
5 SOLUTION INTRAVENOUS
Status: CANCELLED | OUTPATIENT
Start: 2022-03-29

## 2022-03-29 RX ORDER — HEPARIN SODIUM (PORCINE) LOCK FLUSH IV SOLN 100 UNIT/ML 100 UNIT/ML
5 SOLUTION INTRAVENOUS
Status: DISCONTINUED | OUTPATIENT
Start: 2022-03-29 | End: 2022-03-29 | Stop reason: HOSPADM

## 2022-03-29 RX ORDER — HEPARIN SODIUM,PORCINE 10 UNIT/ML
5 VIAL (ML) INTRAVENOUS
Status: CANCELLED | OUTPATIENT
Start: 2022-03-29

## 2022-03-29 RX ADMIN — HEPARIN SODIUM (PORCINE) LOCK FLUSH IV SOLN 100 UNIT/ML 5 ML: 100 SOLUTION at 10:00

## 2022-03-29 NOTE — PROGRESS NOTES
Nursing Note:  Flor Griffin presents today for port flush.    Patient seen by provider today: No   present during visit today: Not Applicable.    Note: N/A.    Intravenous Access:  Implanted Port.    Discharge Plan:   Patient was sent home.     Sabine Fernandes RN

## 2022-03-31 ENCOUNTER — OFFICE VISIT (OUTPATIENT)
Dept: FAMILY MEDICINE | Facility: CLINIC | Age: 67
End: 2022-03-31
Payer: COMMERCIAL

## 2022-03-31 VITALS
OXYGEN SATURATION: 99 % | SYSTOLIC BLOOD PRESSURE: 122 MMHG | BODY MASS INDEX: 27.44 KG/M2 | WEIGHT: 170 LBS | TEMPERATURE: 97.9 F | HEART RATE: 60 BPM | RESPIRATION RATE: 14 BRPM | DIASTOLIC BLOOD PRESSURE: 78 MMHG

## 2022-03-31 DIAGNOSIS — M19.071 DEGENERATIVE ARTHRITIS OF TOE JOINT, RIGHT: Primary | ICD-10-CM

## 2022-03-31 PROCEDURE — 99213 OFFICE O/P EST LOW 20 MIN: CPT | Performed by: PHYSICIAN ASSISTANT

## 2022-03-31 NOTE — PROGRESS NOTES
"  Assessment & Plan       Degenerative arthritis of toe joint, right  Requesting referral to podiatry for injection in toe.  Has had this done before and works well.   - Orthopedic  Referral; Future             BMI:   Estimated body mass index is 27.44 kg/m  as calculated from the following:    Height as of 11/17/21: 1.676 m (5' 6\").    Weight as of this encounter: 77.1 kg (170 lb).           No follow-ups on file.    Ramona Ann Aaseby-Aguilera, PA-C  [facility] is a 66 year old who presents for the following health issues     History of Present Illness       Reason for visit:  Right big toe pain  Symptom onset:  More than a month  Symptom intensity:  Moderate  Symptom progression:  Staying the same  Had these symptoms before:  Yes  Has tried/received treatment for these symptoms:  Yes  Previous treatment was successful:  Yes  Prior treatment description:  Cortisone shot  What makes it worse:  No  What makes it better:  The shot            Review of Systems   Constitutional, HEENT, cardiovascular, pulmonary, gi and gu systems are negative, except as otherwise noted.      Objective    /78   Pulse 60   Temp 97.9  F (36.6  C) (Oral)   Resp 14   Wt 77.1 kg (170 lb)   LMP 01/01/2004 (Approximate)   SpO2 99%   BMI 27.44 kg/m    Body mass index is 27.44 kg/m .  Physical Exam   GENERAL: healthy, alert and no distress  RESP: lungs clear to auscultation - no rales, rhonchi or wheezes  CV: regular rate and rhythm, normal S1 S2, no S3 or S4, no murmur, click or rub, no peripheral edema and peripheral pulses strong                "

## 2022-04-06 ENCOUNTER — TELEPHONE (OUTPATIENT)
Dept: ONCOLOGY | Facility: CLINIC | Age: 67
End: 2022-04-06
Payer: COMMERCIAL

## 2022-04-06 NOTE — PROGRESS NOTES
ASSESSMENT & PLAN  Patient Instructions     1. Degenerative arthritis of toe joint, right      -Patient has chronic right great toe pain due to arthritis  -Patient requested and tolerated repeat first MTP joint cortisone injection today without complications.  Patient was given postprocedure instructions  -Patient will start formal physical therapy and home exercise program  -An order was placed for custom orthotics to be fitted and worn while she is walking and exercising  -Patient may continue Tylenol as needed for pain  -Patient will follow up when pain returns  -Call direct clinic number [[phone]] at any time with questions or concerns.    Albert Yeo MD [facility] and [facility] Medicine  [facility] [place]          -----    SUBJECTIVE  Flor Griffin is a/an 66 year old female who is seen in consultation at the request of  Ramona Ann Aaseby-Aguil* PA-C for evaluation of right foot pain. The patient is seen by themselves.    Onset: 3 years(s) ago. Reports insidious onset without acute precipitating event.  Location of Pain: right foot, 1st MTP joint, plantar aspect, radiating distally   Rating of Pain at worst: 10/10  Rating of Pain Currently: 8/10 when walking, 0/10 at rest  Worsened by: walking  Better with: corticosteriod injections, activity avoidance, rest   Treatments tried: previous 1st MTP joint corticosteriod injections (most recent 04/07/2021 at [facility]) which provided 5 months of relief, Tylenol as needed.   Associated symptoms: stiffness   Orthopedic history: NO  Relevant surgical history: NO  Social history: social history: retired    Past Medical History:   Diagnosis Date     Basal cell cancer     right cheek     Breast cancer, right breast (H)      DVT (deep venous thrombosis) (H)      Gastroesophageal reflux disease      History of blood transfusion     blue baby     Hyperlipidaemia      Social History     Socioeconomic History     Marital status:       "Spouse name: Not on file     Number of children: Not on file     Years of education: Not on file     Highest education level: Not on file   Occupational History     Not on file   Tobacco Use     Smoking status: Never Smoker     Smokeless tobacco: Never Used   Vaping Use     Vaping Use: Never used   Substance and Sexual Activity     Alcohol use: Yes     Alcohol/week: 0.0 standard drinks     Comment: 2 times a week, 2 drinks, wine or whiskey     Drug use: No     Sexual activity: Yes     Partners: Male     Birth control/protection: Post-menopausal   Other Topics Concern     Parent/sibling w/ CABG, MI or angioplasty before 65F 55M? No   Social History Narrative     Not on file     Social Determinants of Health     Financial Resource Strain: Not on file   Food Insecurity: Not on file   Transportation Needs: Not on file   Physical Activity: Not on file   Stress: Not on file   Social Connections: Not on file   Intimate Partner Violence: Not on file   Housing Stability: Not on file         Patient's past medical, surgical, social, and family histories were reviewed today and no changes are noted.    REVIEW OF SYSTEMS:  10 point ROS is negative other than symptoms noted above in HPI, Past Medical History or as stated below  Constitutional: NEGATIVE for fever, chills, change in weight  Skin: NEGATIVE for worrisome rashes, moles or lesions  GI/: NEGATIVE for bowel or bladder changes  Neuro: NEGATIVE for weakness, dizziness or paresthesias    OBJECTIVE:  /66   Ht 1.676 m (5' 6\")   Wt 78 kg (172 lb)   LMP 01/01/2004 (Approximate)   BMI 27.76 kg/m     General: healthy, alert and in no distress  HEENT: no scleral icterus or conjunctival erythema  Skin: no suspicious lesions or rash. No jaundice.  CV:  no pedal edema  Resp: normal respiratory effort without conversational dyspnea   Psych: normal mood and affect  Gait: normal steady gait with appropriate coordination and balance  Neuro: Normal light sensory exam of lower " extremity  MSK:  RIGHT FOOT  Inspection:    no swelling or ecchymosis  Palpation:    Tender about the 1st MTP joints. Otherwise remainder of bony/ligamentous landmarks are non-tender.  Range of Motion:   Limited by stiffness and pain  Strength:    Grossly intact in all planes  Special Tests:    Positive: none        Independent visualization of the below image:  No results found for this or any previous visit (from the past 24 hour(s)).    FOOT RIGHT THREE OR MORE VIEWS    6/26/2019 4:18 PM      HISTORY: Right foot pain     COMPARISON: None.     FINDINGS: Mild degenerative change is present at the first  metatarsophalangeal joint. No evidence of fracture or dislocation.  Soft tissues are unremarkable.                                                                      IMPRESSION: Mild degenerative change.     Small Joint Injection/Arthrocentesis: R great MTP    Date/Time: 4/12/2022 9:25 AM  Performed by: Yeo, Albert, MD  Authorized by: Yeo, Albert, MD   Indications:  Pain  Needle Size:  25 G  Guidance: ultrasound     Approach:  Dorsal  Location:  Great toe    Site:  R great MTP                    Medications:  40 mg methylPREDNISolone 40 MG/ML        Outcome:  Tolerated well, no immediate complications  Procedure discussed: discussed risks, benefits, and alternatives    Consent Given by:  Patient    Prep: patient was prepped and draped in usual sterile fashion              Albert Yeo MD [facility]

## 2022-04-12 ENCOUNTER — OFFICE VISIT (OUTPATIENT)
Dept: ORTHOPEDICS | Facility: CLINIC | Age: 67
End: 2022-04-12
Attending: PHYSICIAN ASSISTANT
Payer: COMMERCIAL

## 2022-04-12 VITALS
HEIGHT: 66 IN | BODY MASS INDEX: 27.64 KG/M2 | WEIGHT: 172 LBS | DIASTOLIC BLOOD PRESSURE: 66 MMHG | SYSTOLIC BLOOD PRESSURE: 110 MMHG

## 2022-04-12 DIAGNOSIS — M19.071 DEGENERATIVE ARTHRITIS OF TOE JOINT, RIGHT: ICD-10-CM

## 2022-04-12 PROCEDURE — 99203 OFFICE O/P NEW LOW 30 MIN: CPT | Mod: 25 | Performed by: FAMILY MEDICINE

## 2022-04-12 PROCEDURE — 20604 DRAIN/INJ JOINT/BURSA W/US: CPT | Mod: RT | Performed by: FAMILY MEDICINE

## 2022-04-12 RX ORDER — METHYLPREDNISOLONE ACETATE 40 MG/ML
40 INJECTION, SUSPENSION INTRA-ARTICULAR; INTRALESIONAL; INTRAMUSCULAR; SOFT TISSUE
Status: SHIPPED | OUTPATIENT
Start: 2022-04-12

## 2022-04-12 RX ADMIN — METHYLPREDNISOLONE ACETATE 40 MG: 40 INJECTION, SUSPENSION INTRA-ARTICULAR; INTRALESIONAL; INTRAMUSCULAR; SOFT TISSUE at 09:25

## 2022-04-12 NOTE — PATIENT INSTRUCTIONS
1. Degenerative arthritis of toe joint, right      -Patient has chronic right great toe pain due to arthritis  -Patient requested and tolerated repeat first MTP joint cortisone injection today without complications.  Patient was given postprocedure instructions  -Patient will start formal physical therapy and home exercise program  -An order was placed for custom orthotics to be fitted and worn while she is walking and exercising  -Patient may continue Tylenol as needed for pain  -Patient will follow up when pain returns  -Call direct clinic number [[phone]] at any time with questions or concerns.    Albert Yeo MD [facility]  [facility] [place]

## 2022-04-12 NOTE — LETTER
4/12/2022         RE: Flor Griffin  [id number] [facility] [facility] [id number]        Dear Colleague,    Thank you for referring your patient, Flor Griffin, to the [facility] [place]. Please see a copy of my visit note below.    ASSESSMENT & PLAN  Patient Instructions     1. Degenerative arthritis of toe joint, right      -Patient has chronic right great toe pain due to arthritis  -Patient requested and tolerated repeat first MTP joint cortisone injection today without complications.  Patient was given postprocedure instructions  -Patient will start formal physical therapy and home exercise program  -An order was placed for custom orthotics to be fitted and worn while she is walking and exercising  -Patient may continue Tylenol as needed for pain  -Patient will follow up when pain returns  -Call direct clinic number [[phone]] at any time with questions or concerns.    Albert Yeo MD [facility] and [facility]  [facility]          -----    SUBJECTIVE  Flor Griffin is a/an 66 year old female who is seen in consultation at the request of  Ramona Ann Aaseby-Aguil* PA-C for evaluation of right foot pain. The patient is seen by themselves.    Onset: 3 years(s) ago. Reports insidious onset without acute precipitating event.  Location of Pain: right foot, 1st MTP joint, plantar aspect, radiating distally   Rating of Pain at worst: 10/10  Rating of Pain Currently: 8/10 when walking, 0/10 at rest  Worsened by: walking  Better with: corticosteriod injections, activity avoidance, rest   Treatments tried: previous 1st MTP joint corticosteriod injections (most recent 04/07/2021 at [facility]) which provided 5 months of relief, Tylenol as needed.   Associated symptoms: stiffness   Orthopedic history: NO  Relevant surgical history: NO  Social history: social history: retired    Past Medical History:   Diagnosis Date     Basal cell  "cancer     right cheek     Breast cancer, right breast (H)      DVT (deep venous thrombosis) (H)      Gastroesophageal reflux disease      History of blood transfusion     blue baby     Hyperlipidaemia      Social History     Socioeconomic History     Marital status:      Spouse name: Not on file     Number of children: Not on file     Years of education: Not on file     Highest education level: Not on file   Occupational History     Not on file   Tobacco Use     Smoking status: Never Smoker     Smokeless tobacco: Never Used   Vaping Use     Vaping Use: Never used   Substance and Sexual Activity     Alcohol use: Yes     Alcohol/week: 0.0 standard drinks     Comment: 2 times a week, 2 drinks, wine or whiskey     Drug use: No     Sexual activity: Yes     Partners: Male     Birth control/protection: Post-menopausal   Other Topics Concern     Parent/sibling w/ CABG, MI or angioplasty before 65F 55M? No   Social History Narrative     Not on file     Social Determinants of Health     Financial Resource Strain: Not on file   Food Insecurity: Not on file   Transportation Needs: Not on file   Physical Activity: Not on file   Stress: Not on file   Social Connections: Not on file   Intimate Partner Violence: Not on file   Housing Stability: Not on file         Patient's past medical, surgical, social, and family histories were reviewed today and no changes are noted.    REVIEW OF SYSTEMS:  10 point ROS is negative other than symptoms noted above in HPI, Past Medical History or as stated below  Constitutional: NEGATIVE for fever, chills, change in weight  Skin: NEGATIVE for worrisome rashes, moles or lesions  GI/: NEGATIVE for bowel or bladder changes  Neuro: NEGATIVE for weakness, dizziness or paresthesias    OBJECTIVE:  /66   Ht 1.676 m (5' 6\")   Wt 78 kg (172 lb)   LMP 01/01/2004 (Approximate)   BMI 27.76 kg/m     General: healthy, alert and in no distress  HEENT: no scleral icterus or conjunctival " erythema  Skin: no suspicious lesions or rash. No jaundice.  CV:  no pedal edema  Resp: normal respiratory effort without conversational dyspnea   Psych: normal mood and affect  Gait: normal steady gait with appropriate coordination and balance  Neuro: Normal light sensory exam of lower extremity  MSK:  RIGHT FOOT  Inspection:    no swelling or ecchymosis  Palpation:    Tender about the 1st MTP joints. Otherwise remainder of bony/ligamentous landmarks are non-tender.  Range of Motion:   Limited by stiffness and pain  Strength:    Grossly intact in all planes  Special Tests:    Positive: none        Independent visualization of the below image:  No results found for this or any previous visit (from the past 24 hour(s)).    FOOT RIGHT THREE OR MORE VIEWS    6/26/2019 4:18 PM      HISTORY: Right foot pain     COMPARISON: None.     FINDINGS: Mild degenerative change is present at the first  metatarsophalangeal joint. No evidence of fracture or dislocation.  Soft tissues are unremarkable.                                                                      IMPRESSION: Mild degenerative change.     Small Joint Injection/Arthrocentesis: R great MTP    Date/Time: 4/12/2022 9:25 AM  Performed by: Yeo, Albert, MD  Authorized by: Yeo, Albert, MD   Indications:  Pain  Needle Size:  25 G  Guidance: ultrasound     Approach:  Dorsal  Location:  Great toe    Site:  R great MTP                    Medications:  40 mg methylPREDNISolone 40 MG/ML        Outcome:  Tolerated well, no immediate complications  Procedure discussed: discussed risks, benefits, and alternatives    Consent Given by:  Patient    Prep: patient was prepped and draped in usual sterile fashion              Albert Yeo MD [facility]        Again, thank you for allowing me to participate in the care of your patient.        Sincerely,        Albert Yeo, MD

## 2022-04-24 NOTE — PROGRESS NOTES
[facility]    Hematology/Oncology Established Patient Follow-up Note      Today's Date: 5/4/2022    Reason for Follow-up: Metastatic breast cancer.    HISTORY OF PRESENT ILLNESS: Flor Griffin is a 66 year old female who presents with the following oncologic history:     --Clinical TX N3c M0 adenocarcinoma which is poorly differentiated, likely of breast origin, ER low-positive at 1-5%, CO negative, HER-2/mitzi FISH negative, androgen stain weak-intermediate 10-20%.  Initially she was seen 11/22/2017 after she underwent right supraclavicular lymph node biopsy which was positive for poorly differentiated adenocarcinoma.  She had this lymph node almost a month and had an excisional biopsy performed which was consistent with the diagnosis of cancer.   --A PET/CT scan 11/27/2017 showed hypermetabolic right supraclavicular, right axillary and subpectoral adenopathy.  Otherwise, no distant metastatic disease.   --3/7/2018: Completed 4 cycles of carboplatin and paclitaxel followed by dose dense AC x 4 cycles.  No surgery was done.  --5/29/2018-7/31/2018: Completed adjuvant radiation to right breast, right axilla, right supraclavicular region.  --9/2018: Started adjuvant letrozole.  --10/15/2020: Screening mammogram showed focal asymmetry in upper outer right breast; no suspicious findings in left breast.  --10/21/2020: U/S of right breast showed irregularly-shaped hypoechoic mass at 10:00, 7 cm from nipple, measuring 3.2 x 2.7 x 3.6 cm. Right axillary ultrasound shows multiple normal-appearing lymph nodes. Biopsy of right breast mass at 10:00 showed poorly differentiated carcinoma, no lymphovascular invasion, morphology consistent with breast cancer and similar to prior axillary node tumor, ER weakly positive at 1% and CO negative (0%), HER-2/mitzi FISH negative.  --10/28/2020: PET/CT scan showed high metabolic activity in 3 cm area of right upper outer breast, several small hypermetabolic lymph nodes in  high right axilla and right subclavian region; mild hypermetabolic lymph nodes in bilateral hilar regions, favor metastatic disease; several tiny nodules in right upper lung, favor benign etiology; small hypermetabolic focus in pelvis in either sigmoid colon or adjacent loop of small bowel.  --11/02/2020: Started 1st line metastatic therapy with capecitabine.  --2/9/2021: PET/CT scan showed hypermetabolic right breast mass not significantly changed since 12/7/2020, persistent hypermetabolism; hypermetabolic right axillary lymph node increased in size; no distant metastasis; focal hypermetabolism in pelvis associated with sigmoid colon; stable 6-mm lung nodules.  --2/18/2021: Due to disease progression, switched to 2nd line metastatic therapy with eribulin. Required dose reduction due to neutropenia.  --4/24/2021: Left upper extremity Doppler U/S showed occlusive DVT within left subclavian vein; superficial thrombophlebitis within proximal cephalic vein. Started rivaroxaban.  --5/11/2021: PET scan showed unchanged size and slightly increased uptake to right breast; resolved right axillary adenopathy; no new lesions; persistent hypermetabolism at proximal sigmoid colon.  --5/19/2021 On chemo break after cycle 4 of eribulin.  --6/2/2021: Underwent palliative right mastectomy under care of Dr. Wilfredo Castillo. Pathology showed grade 3 invasive ductal carcinoma with tumor invasion into underlying skeletal muscle. Margins negative.  --7/22/2021: Colonoscopy showed diverticula in sigmoid colon; normal mucosa throughout entire colon.  --8/3/2021: PET scan showed postsurgical seroma at right chest wall and axilla; small focus of residual mild FDG uptake at superolateral resection margin, likely posttreatment change or inflammation; no metastatic disease; persistent focus of uptake in sigmoid colon corresponding to diverticular disease.  --10/07/2021: U/S of right chest wall performed due to palpable lump. This showed hypoechoic  nodule superior to level of scar measuring 0.7 x 0.6 x 1.5 cm. Biopsy of nodule showed poorly differentiated carcinoma consistent with recurrent breast carcinoma, grade 3, ER negative.  --10/19/2021: PET/CT showed right chest wall lesion with SUV 12.9; decreased right chest wall seroma; no FDG uptake in abdomen or pelvis.  --11/17/2021: Underwent excision of right chest wall nodule recurrence. Pathology showed grade 3 invasive ductal carcinoma, intramuscular; positive anterior and inferior margins of resection.  --1/25/2022 PET scan showed persistent hypermetabolic nodularity along anterior right chest wall suspicious for residual/recurrent tumor; persistent FDG uptake adjacent to short segment of sigmoid diverticulosis with minimal pericolonic fat stranding, unchanged, may represent chronic diverticulitis.  --2/14/2022-3/8/2022: Re-treatment with radiation to right chest wall.  --5/2/2022: PET scan showed increasing metabolic activity subtle pre-existing soft tissue nodules in the right breast/chest wall soft tissues suspicious for progression of disease.    INTERIM HISTORY:  Michelle reports feeling well with no new complaints.        REVIEW OF SYSTEMS:   14 point ROS was reviewed and is negative other than as noted above in HPI.       HOME MEDICATIONS:  Current Outpatient Medications   Medication Sig Dispense Refill     calcium carbonate (TUMS) 500 MG chewable tablet Take 1 chew tab by mouth daily as needed for heartburn       multivitamin w/minerals (THERA-VIT-M) tablet Take 1 tablet by mouth every morning        Omega-3 Fatty Acids (OMEGA-3 FISH OIL PO) Take 1 g by mouth every morning        rivaroxaban ANTICOAGULANT (XARELTO ANTICOAGULANT) 20 MG TABS tablet Take 1 tablet (20 mg) by mouth daily (with dinner) 90 tablet 3         ALLERGIES:  Allergies   Allergen Reactions     Pcn [Penicillins] Hives         PAST MEDICAL HISTORY:  Past Medical History:   Diagnosis Date     Basal cell cancer     right cheek     Breast  cancer, right breast (H)      DVT (deep venous thrombosis) (H)      Gastroesophageal reflux disease      History of blood transfusion     blue baby     Hyperlipidaemia          PAST SURGICAL HISTORY:  Past Surgical History:   Procedure Laterality Date     BIOPSY BREAST Right 11/17/2021    Procedure: Excise right chest wall mass;  Surgeon: Ulises Castillo MD;  Location: [facility] OR     BIOPSY MASS NECK Right 11/16/2017    Procedure: BIOPSY MASS NECK;  Excisional Biopsy Right Neck Lymph node;  Surgeon: Waylon Corral MD;  Location: [facility] OR     COLONOSCOPY       COLONOSCOPY N/A 7/22/2021    Procedure: COLONOSCOPY;  Surgeon: Gabriele Caro MD;  Location:  GI     IR CHEST PORT PLACEMENT > 5 YRS OF AGE  2/16/2021     IR PORT REMOVAL LEFT  5/13/2019     MASTECTOMY SIMPLE Right 6/2/2021    Procedure: RIGHT SIMPLE MASTECTOMY;  Surgeon: Ulises Castillo MD;  Location: [facility] OR     MOHS MICROGRAPHIC PROCEDURE  ~2010    right cheek; BCC     MOHS MICROGRAPHIC PROCEDURE  10/31/2016    Dr. Nicholas [facility]         SOCIAL HISTORY:  Social History     Socioeconomic History     Marital status:      Spouse name: Not on file     Number of children: Not on file     Years of education: Not on file     Highest education level: Not on file   Occupational History     Not on file   Tobacco Use     Smoking status: Never Smoker     Smokeless tobacco: Never Used   Vaping Use     Vaping Use: Never used   Substance and Sexual Activity     Alcohol use: Yes     Alcohol/week: 0.0 standard drinks     Comment: 2 times a week, 2 drinks, wine or whiskey     Drug use: No     Sexual activity: Yes     Partners: Male     Birth control/protection: Post-menopausal   Other Topics Concern     Parent/sibling w/ CABG, MI or angioplasty before 65F 55M? No   Social History Narrative     Not on file     Social Determinants of Health     Financial Resource Strain: Not on file   Food Insecurity: Not on file   Transportation Needs: Not on file    Physical Activity: Not on file   Stress: Not on file   Social Connections: Not on file   Intimate Partner Violence: Not on file   Housing Stability: Not on file         FAMILY HISTORY:  Family History   Problem Relation Age of Onset     Lupus Mother      Heart Disease Mother         CHF     Coronary Artery Disease Mother      Hyperlipidemia Mother      Diabetes Father      Breast Cancer Sister 83     No Known Problems Sister      Diabetes Brother      Suicide Brother      No Known Problems Brother      No Known Problems Brother      Suicide Brother      No Known Problems Brother      No Known Problems Brother      Breast Cancer Maternal Aunt 80         PHYSICAL EXAM:  Vital signs:  /67   Pulse 65   Temp 98.3  F (36.8  C) (Oral)   Resp 16   Wt 76.7 kg (169 lb)   LMP 2004 (Approximate)   SpO2 100%   BMI 27.28 kg/m    ECO  GENERAL/CONSTITUTIONAL: No acute distress.  EYES: No erythema or scleral icterus.  ENT/MOUTH: Neck supple.  LYMPH: No cervical, supraclavicular, axillary adenopathy.   BREAST: Right breast surgically absent with fluid in the right chest wall and palpable pea-size nodules x 2. RESPIRATORY: No audible couggh or wheezing.   GASTROINTESTINAL: No hepatosplenomegaly, masses, or tenderness. No guarding.  No distention.  MUSCULOSKELETAL: Warm and well-perfused, no cyanosis, clubbing, or edema.  NEUROLOGIC: No focal motor deficits. Alert, oriented, answers questions appropriately.  INTEGUMENTARY: No rashes or jaundice.  GAIT: Steady, does not use assistive device    LABS:  CBC RESULTS: Recent Labs   Lab Test 22  0915   WBC 4.5   RBC 4.13   HGB 13.2   HCT 40.7   MCV 99   MCH 32.0   MCHC 32.4   RDW 13.0        Last Comprehensive Metabolic Panel:  Sodium   Date Value Ref Range Status   2022 139 133 - 144 mmol/L Final   2021 139 133 - 144 mmol/L Final     Potassium   Date Value Ref Range Status   2022 4.1 3.4 - 5.3 mmol/L Final   2021 3.6 3.4 - 5.3  mmol/L Final     Chloride   Date Value Ref Range Status   01/25/2022 106 94 - 109 mmol/L Final   05/27/2021 107 94 - 109 mmol/L Final     Carbon Dioxide   Date Value Ref Range Status   05/27/2021 28 20 - 32 mmol/L Final     Carbon Dioxide (CO2)   Date Value Ref Range Status   01/25/2022 27 20 - 32 mmol/L Final     Anion Gap   Date Value Ref Range Status   01/25/2022 6 3 - 14 mmol/L Final   05/27/2021 4 3 - 14 mmol/L Final     Glucose   Date Value Ref Range Status   01/25/2022 104 (H) 70 - 99 mg/dL Final   05/27/2021 136 (H) 70 - 99 mg/dL Final     Urea Nitrogen   Date Value Ref Range Status   01/25/2022 15 7 - 30 mg/dL Final   05/27/2021 13 7 - 30 mg/dL Final     Creatinine   Date Value Ref Range Status   01/25/2022 0.73 0.52 - 1.04 mg/dL Final   05/27/2021 0.76 0.52 - 1.04 mg/dL Final     GFR Estimate   Date Value Ref Range Status   01/25/2022 90 >60 mL/min/1.73m2 Final     Comment:     Effective December 21, 2021 eGFRcr in adults is calculated using the 2021 CKD-EPI creatinine equation which includes age and gender (John et al., NEJ, DOI: 10.1056/LSYGse8947183)   05/27/2021 82 >60 mL/min/[1.73_m2] Final     Comment:     Non  GFR Calc  Starting 12/18/2018, serum creatinine based estimated GFR (eGFR) will be   calculated using the Chronic Kidney Disease Epidemiology Collaboration   (CKD-EPI) equation.       Calcium   Date Value Ref Range Status   01/25/2022 9.3 8.5 - 10.1 mg/dL Final   05/27/2021 9.0 8.5 - 10.1 mg/dL Final     Bilirubin Total   Date Value Ref Range Status   01/25/2022 0.6 0.2 - 1.3 mg/dL Final   05/18/2021 0.8 0.2 - 1.3 mg/dL Final     Alkaline Phosphatase   Date Value Ref Range Status   01/25/2022 83 40 - 150 U/L Final   05/18/2021 81 40 - 150 U/L Final     ALT   Date Value Ref Range Status   01/25/2022 37 0 - 50 U/L Final   05/18/2021 58 (H) 0 - 50 U/L Final     AST   Date Value Ref Range Status   01/25/2022 22 0 - 45 U/L Final   05/18/2021 38 0 - 45 U/L Final  "      PATHOLOGY:  Reviewed as per HPI.    IMAGING:   Reviewed as per HPI.    ASSESSMENT/PLAN:  Flor Griffin is a 66 year old female with the following issues:  1.  Right breast poorly differentiated adenocarcinoma, local recurrence ER negative, IA negative, HER-2/mitzi negative, PD-L1 positive  2. Bilateral hilar lymphadenopathy, now resolved  3. Indeterminate pulmonary nodules, likely benign and stable  --Michelle is s/p palliative right mastectomy and has been on chemo break from eribulin since 5/19/2021.  --PET scan from 10/19/2021 showed hypermetabolic local recurrence to the right chest wall (triple negative) but initially no distant metastatic disease at the present time. Biopsy showed this was triple negative and PD-L1 positive.  --She underwent excision of this local recurrence on 11/17/2021.  I had discussed with Dr. Jonathan chanel on the margins of resection and that the \"positive\" margins were considered as such as he had removed generous margins into the muscle.   --1/25/2022 PET scan showed persistent hypermetabolic nodularity along anterior right chest wall that is quite suggestive of residual tumor after the last resection but no clinical evidence of distant metastatic disease. Therefore, she proceeded with repeat right chest wall radiation.  --I reviewed the 5/2/2022 PET scan with Michelle.  The scan unfortunately shows increasing metabolic activity subtle pre-existing soft tissue nodules in the right breast/chest wall soft tissues suspicious for progression of disease.  --Given that the triple negative right chest wall nodule is PD-L1 positive, I recommended proceeding with pembrolizumab every 3 weeks + weekly paclitaxel days 1, 8, 15 every 4 weeks. I discussed the potential adverse effects of this regimen, including but not limited to cytopenias, alopecia, peripheral neuropathy, fatigue, nausea, emesis, bowel or bladder dysfunction, inflammation of any organ system, infusion reaction, increased risk " of infection and hospitalization.  She agrees to proceed with above regimen.  --Tentative plan to repeat PET scan in 3 months.  --However, she is planning to move to [place], [place] miles from [place] border and may potentially need to transfer her care to [facility] or [place], [place] in early July.    4. Left upper extremity deep vein thrombosis   --4/24/2021 Doppler U/S showed occlusive DVT in left subclavian vein.  --Continue rivaroxaban. Given her recent malignancy recurrence, I recommended long term anticoagulation.    Sally Stover MD  Hematology/Oncology  [facility]    Total time spent: 40 minutes in patient evaluation, counseling, documentation, and coordination of care.

## 2022-05-01 NOTE — PROGRESS NOTES
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  Initially she was seen 11/22/2017 after she had the right supraclavicular lymph node biopsy which was positive for poorly differentiated adenocarcinoma.  She had this lymph node almost a month and had an excisional biopsy performed which was consistent with the diagnosis of cancer.      A pet CT scan 11/27/2017 showed hypermetabolic right supraclavicular, right axillary and subpectoral adenopathy.  Otherwise, no distant metastatic disease.     Mammogram 11/30/17- negative    TREATMENT: Carboplatin and Taxol completed 4 cycles. 3/7/2018 started dose dense AC. Completed 4 cycles.      SUBJECTIVE:  The patient was seen as a followup today. She is recovering from a respiratory infection. She was not feeling well overall and had to miss radiation appointment.     REVIEW OF SYSTEMS:  A complete review of systems was performed and found to be negative other than pertinent positives mentioned in history of present illness.      Past medical, social histories reviewed.     Meds- Reviewed.     PHYSICAL EXAMINATION:   VITAL SIGNS: /57 (BP Location: Right arm, Patient Position: Chair, Cuff Size: Adult Regular)  Pulse 87  Temp 97.8  F (36.6  C)  Resp 16  Ht 1.676 m (5' 6\")  Wt 74.4 kg (164 lb)  LMP 01/01/2004 (Approximate)  SpO2 100%  BMI 26.47 kg/m2  CONSTITUTIONAL: Appears tired.   HEENT: Pupils are equal. Normal mucosa.    NECK: No cervical or supraclavicular lymphadenopathy.   RESPIRATORY: Clear bilaterally.   CARD/VASC: S1, S2, regular.   GI: Soft, nontender, nondistended, no hepatosplenomegaly.   MUSKULOSKELETAL: Warm, well perfused.   NEUROLOGIC: Alert, awake.   INTEGUMENT: No rash.   LYMPHATICS: No edema.   PSYCH: Anxious.      LABORATORY DATA AND IMAGING REVIEWED DURING THIS VISIT:  Recent Labs   Lab Test  " 04/30/18   1150  04/23/18   0951   NA  135  141   POTASSIUM  4.0  4.0   CHLORIDE  101  108   CO2  26  26   ANIONGAP  8  7   BUN  20  14   CR  0.62  0.61   GLC  106*  96   EDILSON  8.8  8.7     Recent Labs   Lab Test  05/03/18   1005  04/30/18   1150  04/23/18   0921  04/13/18   1000   WBC  3.0*  0.3*  3.2*  2.1*   HGB  7.3*  7.8*  8.0*  7.4*   PLT  61*  79*  308  80*   MCV  92  93  95  97   NEUTROPHIL   --   3.0  62.7  33.0     Recent Labs   Lab Test  04/30/18   1150  04/23/18   0951  04/09/18   0955   BILITOTAL  0.7  0.2  0.6   ALKPHOS  106  101  134   ALT  30  27  30   AST  13  20  21   ALBUMIN  3.4  2.9*  3.6         Results for orders placed or performed during the hospital encounter of 05/02/18   PET Oncology Whole Body    Narrative    Combined Report of:    PET and CT on  5/2/2018 1:57 PM :    1. PET of the neck, chest, abdomen, and pelvis.  2. PET CT Fusion for Attenuation Correction and Anatomical  Localization:    3. Diagnostic CT scan of the chest, abdomen, and pelvis with  intravenous contrast for interpretation.  3. CT of the chest, abdomen and pelvis obtained for diagnostic  interpretation.  4. 3D MIP and PET-CT fused images were processed on an independent  workstation and archived to PACS and reviewed by a radiologist.    Technique:    1. PET: The patient received 10.4 mCi of F-18-FDG; the serum glucose  was 111 prior to administration, body weight was 77.1 kg. Images were  evaluated in the axial, sagittal, and coronal planes as well as the  rotational whole body MIP. Images were acquired from the Vertex to the  Feet.    UPTAKE WAS MEASURED AT 65 MINUTES.     BACKGROUND:  Liver SUV max= 3.92,   Aorta Blood SUV Max: 2.62.     2. CT: Volumetric acquisition for clinical interpretation of the  chest, abdomen, and pelvis acquired at 3 mm sections . The chest,  abdomen, and pelvis were evaluated at 5 mm sections in bone, soft  tissue, and lung windows.      The patient received 103 cc. Of Isovue 370  intravenously for the  examination.      3. 3D MIP and PET-CT fused images were processed on an independent  workstation and archived to PACS and reviewed by a radiologist.    INDICATION: follow up breast cancer; Malignant neoplasm of overlapping  sites of right breast in female, estrogen receptor positive (H);  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 in female,  estrogen receptor positive (H)    ADDITIONAL INFORMATION OBTAINED FROM EMR: Patient with history of  right supraclavicular lymph node adenocarcinoma of unknown origin.  Patient is undergone chemotherapy with carboplatin Taxol     COMPARISON: None.    FINDINGS:     HEAD/NECK:  There is a new, FDG avid, 8 mm peripherally enhancing lesion in the  left parapharyngeal region with Max SUV of 9.2, likely representing an  necrotic lymphadenopathy.    Interval decrease in the size and FDG uptake of previously seen right  supraclavicular lymph nodes. For example, 6 mm nodule with max SUV of  8.1, previously was measuring 10 mm with max SUV of 14 and 7 mm node  with max SUV of 3.3 was measuring 14 mm with max SUV of 6.    The paranasal sinuses are clear. The mastoid air cells are clear.     The mucosal pharyngeal space, the , prevertebral and carotid  spaces are within normal limits.     No masses, mass effect or pathologically enlarged lymph nodes are  evident. The thyroid gland is normal.    CHEST:  Interval decrease in the size and FDG uptake of right axillary and  right pectoral lymph nodes. For example, right axillary lymph node  measures 6 mm with max SUV of 1.4, previously 11 mm with max SUV of  6.6 and pectoral node measures 9 mm with max SUV of 1.3, previously  measuring 1.7 cm with max SUV of 12.    There are no pathologically enlarged mediastinal, hilar or axillary  lymph nodes.    There are no suspicious lung nodules or evidence for infection.     There is no significant pericardial or plural effusions.    Left IJV port a catheter  tip is in the right atrium.    ABDOMEN AND PELVIS:  There is no suspicious FDG uptake in the abdomen or pelvis.    There are no suspicious hepatic lesions. There is no splenomegaly or  evidence for splenic or pancreatic mass lesion.  There are no suspicious adrenal mass lesions or opaque gallbladder  calculi. There is symmetric nephrographic renal phase without  hydronephrosis.    There is no evidence for diverticulitis, bowel obstruction or free  fluid.    LOWER EXTREMITIES:   No abnormal masses or hypermetabolic lesions.    BONES:   There are no suspicious lytic or blastic osseous lesions.  There is no  abnormal FDG uptake in the skeleton. Diffuse FDG uptake in the axial  and proximal appendicular skeleton, likely secondary to bone marrow  activation.      Impression    IMPRESSION: In this patient with history of poorly differentiated  adenocarcinoma with unknown origin status post chemotherapy;  1. Interval decrease in the size and FDG uptake of previously seen  right supraclavicular, axillary and pectoral lymph nodes. There are  still several residual hypermetabolic lymph nodes. Findings are  compatible with partial treatment response.  2. New hypermetabolic peripherally enhancing nodular lesion in the  left parapharyngeal space, likely representing a necrotic lymph node  reactive vs metastatic. Recommend direct visualization and biopsy.     I have personally reviewed the examination and initial interpretation  and I agree with the findings.    DESEAN VILLA MD        ECOG performance status 1     ASSESSMENT AND PLAN:  This is a 62-year-old lady who has diagnosis of 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  Tumor cells were positive for CK7, CK5/6 and GATA3.  We did tumor marker testing and she had CA 27-29 at the level of 12,  of 16, CA 19-9 at 19.  Her CEA was less than 0.5.      PET CT scan results was done 11/27/2017.  showed hypermetabolic right  supraclavicular, right axillary and subpectoral adenopathy.  There was no evidence of distant metastases.      The distribution of adenopathy indicates possibility breast cancer which has metastasized to the regional lymph nodes and we are not able to find a primary on physical exam and a PET scan.     Mammogram 11/30/17 no evidence of primary. Her-2 non amplified. Androgen receptor 10-20%. ER 1-5%. IA < 1%.   ENT examination by Dr. Miller was negative. EGD was negative as well. MRI breast 12/20/17 no evidence of malignancy in the breasts.    She started chemotherapy carboplatin/Taxol. Completed 4 cycles. PET Scan 2/20/18 showed good response to treatment.   She completed dose dense AC for 4 cycles.     - Labs were reviewed with the patient, normocytic anemia related to chemotherapy.   - PET Scan 5/2/18 results were reviewed with the patient. I reviewed the images myself. She has overall response to previous areas of disease. There is a new area in left parapharyngeal wall which appears to be a necrotic or reactive lymph node. I am concerned about a new metastatic site.  I will have her see one of my ENT colleagues for assessment of this area.     I also have discussed with Dr. Miles (radiation oncology). We will delay starting radiation until we have ENT area assessment done.      PLAN:   1- Schedule an appointment with ENT at  of  for left parapharyngeal hypermetabolic area  2- RTC MD after ENT appointment    ERON BARNES MD    5/3/2018       cc: Waylon Liu MD      Gen: AAOx3, non-toxic  Head: NCAT  HEENT: EOMI, oral mucosa moist, normal conjunctiva  Lung: CTAB, no respiratory distress, no wheezes/rhonchi/rales B/L, speaking in full sentences  CV: RRR, no murmurs, rubs or gallops  Abd: soft, NTND, no guarding, no CVA tenderness  MSK: no bony tenderness over fingers or hands, full strength in flexion/extension of PIP/DIP/MCP of all fingers, no joint redness/swelling, no bruising appreciated, radial pulses intact and equal b/l, good cap refill, no visible deformities  Neuro: No focal sensory or motor deficits, normal CN exam   Skin: Warm, well perfused, no rash  Psych: normal affect.     Clinton Melvin, DO

## 2022-05-02 ENCOUNTER — HOSPITAL ENCOUNTER (OUTPATIENT)
Dept: PET IMAGING | Facility: CLINIC | Age: 67
Discharge: HOME | End: 2022-05-02
Attending: INTERNAL MEDICINE | Admitting: INTERNAL MEDICINE
Payer: COMMERCIAL

## 2022-05-02 DIAGNOSIS — C50.411 MALIGNANT NEOPLASM OF UPPER-OUTER QUADRANT OF RIGHT BREAST IN FEMALE, ESTROGEN RECEPTOR NEGATIVE (H): ICD-10-CM

## 2022-05-02 DIAGNOSIS — Z17.1 MALIGNANT NEOPLASM OF UPPER-OUTER QUADRANT OF RIGHT BREAST IN FEMALE, ESTROGEN RECEPTOR NEGATIVE (H): ICD-10-CM

## 2022-05-02 PROCEDURE — A9552 F18 FDG: HCPCS | Performed by: INTERNAL MEDICINE

## 2022-05-02 PROCEDURE — 999N000130 PET ONCOLOGY WHOLE BODY: Mod: PS

## 2022-05-02 PROCEDURE — 343N000001 HC RX 343: Performed by: INTERNAL MEDICINE

## 2022-05-02 RX ORDER — HEPARIN SODIUM (PORCINE) LOCK FLUSH IV SOLN 100 UNIT/ML 100 UNIT/ML
500 SOLUTION INTRAVENOUS EVERY 8 HOURS
Status: DISCONTINUED | OUTPATIENT
Start: 2022-05-02 | End: 2022-05-03 | Stop reason: HOSPADM

## 2022-05-02 RX ADMIN — FLUDEOXYGLUCOSE F-18 9.71 MCI.: 500 INJECTION, SOLUTION INTRAVENOUS at 10:38

## 2022-05-04 ENCOUNTER — ONCOLOGY VISIT (OUTPATIENT)
Dept: ONCOLOGY | Facility: CLINIC | Age: 67
End: 2022-05-04
Attending: INTERNAL MEDICINE
Payer: COMMERCIAL

## 2022-05-04 VITALS
TEMPERATURE: 98.3 F | DIASTOLIC BLOOD PRESSURE: 67 MMHG | RESPIRATION RATE: 16 BRPM | OXYGEN SATURATION: 100 % | WEIGHT: 169 LBS | SYSTOLIC BLOOD PRESSURE: 137 MMHG | BODY MASS INDEX: 27.28 KG/M2 | HEART RATE: 65 BPM

## 2022-05-04 DIAGNOSIS — Z86.718 PERSONAL HISTORY OF DVT (DEEP VEIN THROMBOSIS): ICD-10-CM

## 2022-05-04 DIAGNOSIS — C50.411 MALIGNANT NEOPLASM OF UPPER-OUTER QUADRANT OF RIGHT BREAST IN FEMALE, ESTROGEN RECEPTOR NEGATIVE (H): Primary | ICD-10-CM

## 2022-05-04 DIAGNOSIS — Z17.1 MALIGNANT NEOPLASM OF UPPER-OUTER QUADRANT OF RIGHT BREAST IN FEMALE, ESTROGEN RECEPTOR NEGATIVE (H): Primary | ICD-10-CM

## 2022-05-04 DIAGNOSIS — R59.1 LYMPHADENOPATHY: ICD-10-CM

## 2022-05-04 PROCEDURE — G0463 HOSPITAL OUTPT CLINIC VISIT: HCPCS

## 2022-05-04 PROCEDURE — 99215 OFFICE O/P EST HI 40 MIN: CPT | Performed by: INTERNAL MEDICINE

## 2022-05-04 RX ORDER — HEPARIN SODIUM,PORCINE 10 UNIT/ML
5 VIAL (ML) INTRAVENOUS
Status: CANCELLED | OUTPATIENT
Start: 2022-07-13

## 2022-05-04 RX ORDER — HEPARIN SODIUM (PORCINE) LOCK FLUSH IV SOLN 100 UNIT/ML 100 UNIT/ML
5 SOLUTION INTRAVENOUS
Status: CANCELLED | OUTPATIENT
Start: 2022-05-11

## 2022-05-04 RX ORDER — DIPHENHYDRAMINE HCL 25 MG
50 CAPSULE ORAL ONCE
Status: CANCELLED
Start: 2022-05-11

## 2022-05-04 RX ORDER — ALBUTEROL SULFATE 90 UG/1
1-2 AEROSOL, METERED RESPIRATORY (INHALATION)
Status: CANCELLED
Start: 2022-07-06

## 2022-05-04 RX ORDER — METHYLPREDNISOLONE SODIUM SUCCINATE 125 MG/2ML
125 INJECTION, POWDER, LYOPHILIZED, FOR SOLUTION INTRAMUSCULAR; INTRAVENOUS
Status: CANCELLED
Start: 2022-06-08

## 2022-05-04 RX ORDER — DIPHENHYDRAMINE HCL 25 MG
50 CAPSULE ORAL
Status: CANCELLED
Start: 2022-07-06

## 2022-05-04 RX ORDER — ALBUTEROL SULFATE 0.83 MG/ML
2.5 SOLUTION RESPIRATORY (INHALATION)
Status: CANCELLED | OUTPATIENT
Start: 2022-07-13

## 2022-05-04 RX ORDER — NALOXONE HYDROCHLORIDE 0.4 MG/ML
0.2 INJECTION, SOLUTION INTRAMUSCULAR; INTRAVENOUS; SUBCUTANEOUS
Status: CANCELLED | OUTPATIENT
Start: 2022-06-01

## 2022-05-04 RX ORDER — ALBUTEROL SULFATE 0.83 MG/ML
2.5 SOLUTION RESPIRATORY (INHALATION)
Status: CANCELLED | OUTPATIENT
Start: 2022-05-11

## 2022-05-04 RX ORDER — DIPHENHYDRAMINE HCL 25 MG
50 CAPSULE ORAL
Status: CANCELLED
Start: 2022-07-20

## 2022-05-04 RX ORDER — DIPHENHYDRAMINE HYDROCHLORIDE 50 MG/ML
50 INJECTION INTRAMUSCULAR; INTRAVENOUS
Status: CANCELLED
Start: 2022-06-15

## 2022-05-04 RX ORDER — DIPHENHYDRAMINE HYDROCHLORIDE 50 MG/ML
50 INJECTION INTRAMUSCULAR; INTRAVENOUS
Status: CANCELLED
Start: 2022-07-13

## 2022-05-04 RX ORDER — ALBUTEROL SULFATE 90 UG/1
1-2 AEROSOL, METERED RESPIRATORY (INHALATION)
Status: CANCELLED
Start: 2022-06-22

## 2022-05-04 RX ORDER — MEPERIDINE HYDROCHLORIDE 25 MG/ML
25 INJECTION INTRAMUSCULAR; INTRAVENOUS; SUBCUTANEOUS EVERY 30 MIN PRN
Status: CANCELLED | OUTPATIENT
Start: 2022-05-25

## 2022-05-04 RX ORDER — ALBUTEROL SULFATE 90 UG/1
1-2 AEROSOL, METERED RESPIRATORY (INHALATION)
Status: CANCELLED
Start: 2022-07-13

## 2022-05-04 RX ORDER — METHYLPREDNISOLONE SODIUM SUCCINATE 125 MG/2ML
125 INJECTION, POWDER, LYOPHILIZED, FOR SOLUTION INTRAMUSCULAR; INTRAVENOUS
Status: CANCELLED
Start: 2022-05-25

## 2022-05-04 RX ORDER — DIPHENHYDRAMINE HYDROCHLORIDE 50 MG/ML
50 INJECTION INTRAMUSCULAR; INTRAVENOUS
Status: CANCELLED
Start: 2022-06-22

## 2022-05-04 RX ORDER — METHYLPREDNISOLONE SODIUM SUCCINATE 125 MG/2ML
125 INJECTION, POWDER, LYOPHILIZED, FOR SOLUTION INTRAMUSCULAR; INTRAVENOUS
Status: CANCELLED
Start: 2022-06-15

## 2022-05-04 RX ORDER — ALBUTEROL SULFATE 0.83 MG/ML
2.5 SOLUTION RESPIRATORY (INHALATION)
Status: CANCELLED | OUTPATIENT
Start: 2022-07-20

## 2022-05-04 RX ORDER — NALOXONE HYDROCHLORIDE 0.4 MG/ML
0.2 INJECTION, SOLUTION INTRAMUSCULAR; INTRAVENOUS; SUBCUTANEOUS
Status: CANCELLED | OUTPATIENT
Start: 2022-06-15

## 2022-05-04 RX ORDER — ALBUTEROL SULFATE 0.83 MG/ML
2.5 SOLUTION RESPIRATORY (INHALATION)
Status: CANCELLED | OUTPATIENT
Start: 2022-06-01

## 2022-05-04 RX ORDER — LORAZEPAM 2 MG/ML
0.5 INJECTION INTRAMUSCULAR EVERY 4 HOURS PRN
Status: CANCELLED | OUTPATIENT
Start: 2022-05-11

## 2022-05-04 RX ORDER — HEPARIN SODIUM,PORCINE 10 UNIT/ML
5 VIAL (ML) INTRAVENOUS
Status: CANCELLED | OUTPATIENT
Start: 2022-06-08

## 2022-05-04 RX ORDER — NALOXONE HYDROCHLORIDE 0.4 MG/ML
0.2 INJECTION, SOLUTION INTRAMUSCULAR; INTRAVENOUS; SUBCUTANEOUS
Status: CANCELLED | OUTPATIENT
Start: 2022-07-13

## 2022-05-04 RX ORDER — ALBUTEROL SULFATE 90 UG/1
1-2 AEROSOL, METERED RESPIRATORY (INHALATION)
Status: CANCELLED
Start: 2022-05-11

## 2022-05-04 RX ORDER — DIPHENHYDRAMINE HYDROCHLORIDE 50 MG/ML
50 INJECTION INTRAMUSCULAR; INTRAVENOUS
Status: CANCELLED
Start: 2022-05-25

## 2022-05-04 RX ORDER — EPINEPHRINE 1 MG/ML
0.3 INJECTION, SOLUTION INTRAMUSCULAR; SUBCUTANEOUS EVERY 5 MIN PRN
Status: CANCELLED | OUTPATIENT
Start: 2022-06-15

## 2022-05-04 RX ORDER — ALBUTEROL SULFATE 0.83 MG/ML
2.5 SOLUTION RESPIRATORY (INHALATION)
Status: CANCELLED | OUTPATIENT
Start: 2022-06-08

## 2022-05-04 RX ORDER — LORAZEPAM 2 MG/ML
0.5 INJECTION INTRAMUSCULAR EVERY 4 HOURS PRN
Status: CANCELLED | OUTPATIENT
Start: 2022-05-25

## 2022-05-04 RX ORDER — EPINEPHRINE 1 MG/ML
0.3 INJECTION, SOLUTION INTRAMUSCULAR; SUBCUTANEOUS EVERY 5 MIN PRN
Status: CANCELLED | OUTPATIENT
Start: 2022-05-11

## 2022-05-04 RX ORDER — METHYLPREDNISOLONE SODIUM SUCCINATE 125 MG/2ML
125 INJECTION, POWDER, LYOPHILIZED, FOR SOLUTION INTRAMUSCULAR; INTRAVENOUS
Status: CANCELLED
Start: 2022-05-11

## 2022-05-04 RX ORDER — HEPARIN SODIUM,PORCINE 10 UNIT/ML
5 VIAL (ML) INTRAVENOUS
Status: CANCELLED | OUTPATIENT
Start: 2022-07-06

## 2022-05-04 RX ORDER — HEPARIN SODIUM (PORCINE) LOCK FLUSH IV SOLN 100 UNIT/ML 100 UNIT/ML
5 SOLUTION INTRAVENOUS
Status: CANCELLED | OUTPATIENT
Start: 2022-06-08

## 2022-05-04 RX ORDER — LORAZEPAM 2 MG/ML
0.5 INJECTION INTRAMUSCULAR EVERY 4 HOURS PRN
Status: CANCELLED | OUTPATIENT
Start: 2022-06-22

## 2022-05-04 RX ORDER — ALBUTEROL SULFATE 90 UG/1
1-2 AEROSOL, METERED RESPIRATORY (INHALATION)
Status: CANCELLED
Start: 2022-05-18

## 2022-05-04 RX ORDER — LORAZEPAM 2 MG/ML
0.5 INJECTION INTRAMUSCULAR EVERY 4 HOURS PRN
Status: CANCELLED | OUTPATIENT
Start: 2022-06-08

## 2022-05-04 RX ORDER — EPINEPHRINE 1 MG/ML
0.3 INJECTION, SOLUTION INTRAMUSCULAR; SUBCUTANEOUS EVERY 5 MIN PRN
Status: CANCELLED | OUTPATIENT
Start: 2022-06-08

## 2022-05-04 RX ORDER — METHYLPREDNISOLONE SODIUM SUCCINATE 125 MG/2ML
125 INJECTION, POWDER, LYOPHILIZED, FOR SOLUTION INTRAMUSCULAR; INTRAVENOUS
Status: CANCELLED
Start: 2022-05-18

## 2022-05-04 RX ORDER — MEPERIDINE HYDROCHLORIDE 25 MG/ML
25 INJECTION INTRAMUSCULAR; INTRAVENOUS; SUBCUTANEOUS EVERY 30 MIN PRN
Status: CANCELLED | OUTPATIENT
Start: 2022-05-11

## 2022-05-04 RX ORDER — EPINEPHRINE 1 MG/ML
0.3 INJECTION, SOLUTION INTRAMUSCULAR; SUBCUTANEOUS EVERY 5 MIN PRN
Status: CANCELLED | OUTPATIENT
Start: 2022-06-22

## 2022-05-04 RX ORDER — MEPERIDINE HYDROCHLORIDE 25 MG/ML
25 INJECTION INTRAMUSCULAR; INTRAVENOUS; SUBCUTANEOUS EVERY 30 MIN PRN
Status: CANCELLED | OUTPATIENT
Start: 2022-05-18

## 2022-05-04 RX ORDER — DIPHENHYDRAMINE HCL 25 MG
50 CAPSULE ORAL
Status: CANCELLED
Start: 2022-05-25

## 2022-05-04 RX ORDER — NALOXONE HYDROCHLORIDE 0.4 MG/ML
0.2 INJECTION, SOLUTION INTRAMUSCULAR; INTRAVENOUS; SUBCUTANEOUS
Status: CANCELLED | OUTPATIENT
Start: 2022-05-25

## 2022-05-04 RX ORDER — HEPARIN SODIUM (PORCINE) LOCK FLUSH IV SOLN 100 UNIT/ML 100 UNIT/ML
5 SOLUTION INTRAVENOUS
Status: CANCELLED | OUTPATIENT
Start: 2022-07-13

## 2022-05-04 RX ORDER — METHYLPREDNISOLONE SODIUM SUCCINATE 125 MG/2ML
125 INJECTION, POWDER, LYOPHILIZED, FOR SOLUTION INTRAMUSCULAR; INTRAVENOUS
Status: CANCELLED
Start: 2022-07-20

## 2022-05-04 RX ORDER — HEPARIN SODIUM (PORCINE) LOCK FLUSH IV SOLN 100 UNIT/ML 100 UNIT/ML
5 SOLUTION INTRAVENOUS
Status: CANCELLED | OUTPATIENT
Start: 2022-05-18

## 2022-05-04 RX ORDER — DIPHENHYDRAMINE HCL 25 MG
50 CAPSULE ORAL
Status: CANCELLED
Start: 2022-07-13

## 2022-05-04 RX ORDER — HEPARIN SODIUM,PORCINE 10 UNIT/ML
5 VIAL (ML) INTRAVENOUS
Status: CANCELLED | OUTPATIENT
Start: 2022-06-15

## 2022-05-04 RX ORDER — HEPARIN SODIUM,PORCINE 10 UNIT/ML
5 VIAL (ML) INTRAVENOUS
Status: CANCELLED | OUTPATIENT
Start: 2022-06-22

## 2022-05-04 RX ORDER — HEPARIN SODIUM,PORCINE 10 UNIT/ML
5 VIAL (ML) INTRAVENOUS
Status: CANCELLED | OUTPATIENT
Start: 2022-05-25

## 2022-05-04 RX ORDER — MEPERIDINE HYDROCHLORIDE 25 MG/ML
25 INJECTION INTRAMUSCULAR; INTRAVENOUS; SUBCUTANEOUS EVERY 30 MIN PRN
Status: CANCELLED | OUTPATIENT
Start: 2022-06-01

## 2022-05-04 RX ORDER — DIPHENHYDRAMINE HYDROCHLORIDE 50 MG/ML
50 INJECTION INTRAMUSCULAR; INTRAVENOUS
Status: CANCELLED
Start: 2022-05-18

## 2022-05-04 RX ORDER — EPINEPHRINE 1 MG/ML
0.3 INJECTION, SOLUTION INTRAMUSCULAR; SUBCUTANEOUS EVERY 5 MIN PRN
Status: CANCELLED | OUTPATIENT
Start: 2022-05-25

## 2022-05-04 RX ORDER — DIPHENHYDRAMINE HCL 25 MG
50 CAPSULE ORAL
Status: CANCELLED
Start: 2022-06-08

## 2022-05-04 RX ORDER — ALBUTEROL SULFATE 0.83 MG/ML
2.5 SOLUTION RESPIRATORY (INHALATION)
Status: CANCELLED | OUTPATIENT
Start: 2022-05-25

## 2022-05-04 RX ORDER — ALBUTEROL SULFATE 0.83 MG/ML
2.5 SOLUTION RESPIRATORY (INHALATION)
Status: CANCELLED | OUTPATIENT
Start: 2022-05-18

## 2022-05-04 RX ORDER — LORAZEPAM 2 MG/ML
0.5 INJECTION INTRAMUSCULAR EVERY 4 HOURS PRN
Status: CANCELLED | OUTPATIENT
Start: 2022-06-15

## 2022-05-04 RX ORDER — MEPERIDINE HYDROCHLORIDE 25 MG/ML
25 INJECTION INTRAMUSCULAR; INTRAVENOUS; SUBCUTANEOUS EVERY 30 MIN PRN
Status: CANCELLED | OUTPATIENT
Start: 2022-07-20

## 2022-05-04 RX ORDER — EPINEPHRINE 1 MG/ML
0.3 INJECTION, SOLUTION INTRAMUSCULAR; SUBCUTANEOUS EVERY 5 MIN PRN
Status: CANCELLED | OUTPATIENT
Start: 2022-07-06

## 2022-05-04 RX ORDER — ALBUTEROL SULFATE 0.83 MG/ML
2.5 SOLUTION RESPIRATORY (INHALATION)
Status: CANCELLED | OUTPATIENT
Start: 2022-06-22

## 2022-05-04 RX ORDER — EPINEPHRINE 1 MG/ML
0.3 INJECTION, SOLUTION INTRAMUSCULAR; SUBCUTANEOUS EVERY 5 MIN PRN
Status: CANCELLED | OUTPATIENT
Start: 2022-06-01

## 2022-05-04 RX ORDER — LORAZEPAM 2 MG/ML
0.5 INJECTION INTRAMUSCULAR EVERY 4 HOURS PRN
Status: CANCELLED | OUTPATIENT
Start: 2022-07-06

## 2022-05-04 RX ORDER — ALBUTEROL SULFATE 0.83 MG/ML
2.5 SOLUTION RESPIRATORY (INHALATION)
Status: CANCELLED | OUTPATIENT
Start: 2022-07-06

## 2022-05-04 RX ORDER — DIPHENHYDRAMINE HCL 25 MG
50 CAPSULE ORAL
Status: CANCELLED
Start: 2022-06-22

## 2022-05-04 RX ORDER — HEPARIN SODIUM (PORCINE) LOCK FLUSH IV SOLN 100 UNIT/ML 100 UNIT/ML
5 SOLUTION INTRAVENOUS
Status: CANCELLED | OUTPATIENT
Start: 2022-06-15

## 2022-05-04 RX ORDER — HEPARIN SODIUM (PORCINE) LOCK FLUSH IV SOLN 100 UNIT/ML 100 UNIT/ML
5 SOLUTION INTRAVENOUS
Status: CANCELLED | OUTPATIENT
Start: 2022-06-22

## 2022-05-04 RX ORDER — NALOXONE HYDROCHLORIDE 0.4 MG/ML
0.2 INJECTION, SOLUTION INTRAMUSCULAR; INTRAVENOUS; SUBCUTANEOUS
Status: CANCELLED | OUTPATIENT
Start: 2022-05-11

## 2022-05-04 RX ORDER — DIPHENHYDRAMINE HYDROCHLORIDE 50 MG/ML
50 INJECTION INTRAMUSCULAR; INTRAVENOUS
Status: CANCELLED
Start: 2022-06-08

## 2022-05-04 RX ORDER — METHYLPREDNISOLONE SODIUM SUCCINATE 125 MG/2ML
125 INJECTION, POWDER, LYOPHILIZED, FOR SOLUTION INTRAMUSCULAR; INTRAVENOUS
Status: CANCELLED
Start: 2022-06-22

## 2022-05-04 RX ORDER — DIPHENHYDRAMINE HCL 25 MG
50 CAPSULE ORAL
Status: CANCELLED
Start: 2022-06-15

## 2022-05-04 RX ORDER — METHYLPREDNISOLONE SODIUM SUCCINATE 125 MG/2ML
125 INJECTION, POWDER, LYOPHILIZED, FOR SOLUTION INTRAMUSCULAR; INTRAVENOUS
Status: CANCELLED
Start: 2022-07-06

## 2022-05-04 RX ORDER — HEPARIN SODIUM,PORCINE 10 UNIT/ML
5 VIAL (ML) INTRAVENOUS
Status: CANCELLED | OUTPATIENT
Start: 2022-05-11

## 2022-05-04 RX ORDER — ALBUTEROL SULFATE 90 UG/1
1-2 AEROSOL, METERED RESPIRATORY (INHALATION)
Status: CANCELLED
Start: 2022-06-01

## 2022-05-04 RX ORDER — HEPARIN SODIUM (PORCINE) LOCK FLUSH IV SOLN 100 UNIT/ML 100 UNIT/ML
5 SOLUTION INTRAVENOUS
Status: CANCELLED | OUTPATIENT
Start: 2022-06-01

## 2022-05-04 RX ORDER — LORAZEPAM 2 MG/ML
0.5 INJECTION INTRAMUSCULAR EVERY 4 HOURS PRN
Status: CANCELLED | OUTPATIENT
Start: 2022-06-01

## 2022-05-04 RX ORDER — HEPARIN SODIUM,PORCINE 10 UNIT/ML
5 VIAL (ML) INTRAVENOUS
Status: CANCELLED | OUTPATIENT
Start: 2022-06-01

## 2022-05-04 RX ORDER — EPINEPHRINE 1 MG/ML
0.3 INJECTION, SOLUTION INTRAMUSCULAR; SUBCUTANEOUS EVERY 5 MIN PRN
Status: CANCELLED | OUTPATIENT
Start: 2022-05-18

## 2022-05-04 RX ORDER — ALBUTEROL SULFATE 90 UG/1
1-2 AEROSOL, METERED RESPIRATORY (INHALATION)
Status: CANCELLED
Start: 2022-06-15

## 2022-05-04 RX ORDER — ALBUTEROL SULFATE 90 UG/1
1-2 AEROSOL, METERED RESPIRATORY (INHALATION)
Status: CANCELLED
Start: 2022-07-20

## 2022-05-04 RX ORDER — HEPARIN SODIUM (PORCINE) LOCK FLUSH IV SOLN 100 UNIT/ML 100 UNIT/ML
5 SOLUTION INTRAVENOUS
Status: CANCELLED | OUTPATIENT
Start: 2022-07-20

## 2022-05-04 RX ORDER — LORAZEPAM 2 MG/ML
0.5 INJECTION INTRAMUSCULAR EVERY 4 HOURS PRN
Status: CANCELLED | OUTPATIENT
Start: 2022-07-13

## 2022-05-04 RX ORDER — EPINEPHRINE 1 MG/ML
0.3 INJECTION, SOLUTION INTRAMUSCULAR; SUBCUTANEOUS EVERY 5 MIN PRN
Status: CANCELLED | OUTPATIENT
Start: 2022-07-20

## 2022-05-04 RX ORDER — DIPHENHYDRAMINE HYDROCHLORIDE 50 MG/ML
50 INJECTION INTRAMUSCULAR; INTRAVENOUS
Status: CANCELLED
Start: 2022-06-01

## 2022-05-04 RX ORDER — HEPARIN SODIUM (PORCINE) LOCK FLUSH IV SOLN 100 UNIT/ML 100 UNIT/ML
5 SOLUTION INTRAVENOUS
Status: CANCELLED | OUTPATIENT
Start: 2022-05-25

## 2022-05-04 RX ORDER — ALBUTEROL SULFATE 90 UG/1
1-2 AEROSOL, METERED RESPIRATORY (INHALATION)
Status: CANCELLED
Start: 2022-05-25

## 2022-05-04 RX ORDER — MEPERIDINE HYDROCHLORIDE 25 MG/ML
25 INJECTION INTRAMUSCULAR; INTRAVENOUS; SUBCUTANEOUS EVERY 30 MIN PRN
Status: CANCELLED | OUTPATIENT
Start: 2022-06-08

## 2022-05-04 RX ORDER — ALBUTEROL SULFATE 0.83 MG/ML
2.5 SOLUTION RESPIRATORY (INHALATION)
Status: CANCELLED | OUTPATIENT
Start: 2022-06-15

## 2022-05-04 RX ORDER — MEPERIDINE HYDROCHLORIDE 25 MG/ML
25 INJECTION INTRAMUSCULAR; INTRAVENOUS; SUBCUTANEOUS EVERY 30 MIN PRN
Status: CANCELLED | OUTPATIENT
Start: 2022-07-13

## 2022-05-04 RX ORDER — MEPERIDINE HYDROCHLORIDE 25 MG/ML
25 INJECTION INTRAMUSCULAR; INTRAVENOUS; SUBCUTANEOUS EVERY 30 MIN PRN
Status: CANCELLED | OUTPATIENT
Start: 2022-06-22

## 2022-05-04 RX ORDER — NALOXONE HYDROCHLORIDE 0.4 MG/ML
0.2 INJECTION, SOLUTION INTRAMUSCULAR; INTRAVENOUS; SUBCUTANEOUS
Status: CANCELLED | OUTPATIENT
Start: 2022-05-18

## 2022-05-04 RX ORDER — NALOXONE HYDROCHLORIDE 0.4 MG/ML
0.2 INJECTION, SOLUTION INTRAMUSCULAR; INTRAVENOUS; SUBCUTANEOUS
Status: CANCELLED | OUTPATIENT
Start: 2022-06-08

## 2022-05-04 RX ORDER — NALOXONE HYDROCHLORIDE 0.4 MG/ML
0.2 INJECTION, SOLUTION INTRAMUSCULAR; INTRAVENOUS; SUBCUTANEOUS
Status: CANCELLED | OUTPATIENT
Start: 2022-06-22

## 2022-05-04 RX ORDER — DIPHENHYDRAMINE HYDROCHLORIDE 50 MG/ML
50 INJECTION INTRAMUSCULAR; INTRAVENOUS
Status: CANCELLED
Start: 2022-07-06

## 2022-05-04 RX ORDER — MEPERIDINE HYDROCHLORIDE 25 MG/ML
25 INJECTION INTRAMUSCULAR; INTRAVENOUS; SUBCUTANEOUS EVERY 30 MIN PRN
Status: CANCELLED | OUTPATIENT
Start: 2022-07-06

## 2022-05-04 RX ORDER — METHYLPREDNISOLONE SODIUM SUCCINATE 125 MG/2ML
125 INJECTION, POWDER, LYOPHILIZED, FOR SOLUTION INTRAMUSCULAR; INTRAVENOUS
Status: CANCELLED
Start: 2022-06-01

## 2022-05-04 RX ORDER — NALOXONE HYDROCHLORIDE 0.4 MG/ML
0.2 INJECTION, SOLUTION INTRAMUSCULAR; INTRAVENOUS; SUBCUTANEOUS
Status: CANCELLED | OUTPATIENT
Start: 2022-07-20

## 2022-05-04 RX ORDER — HEPARIN SODIUM,PORCINE 10 UNIT/ML
5 VIAL (ML) INTRAVENOUS
Status: CANCELLED | OUTPATIENT
Start: 2022-07-20

## 2022-05-04 RX ORDER — DIPHENHYDRAMINE HCL 25 MG
50 CAPSULE ORAL ONCE
Status: CANCELLED
Start: 2022-05-18

## 2022-05-04 RX ORDER — LORAZEPAM 2 MG/ML
0.5 INJECTION INTRAMUSCULAR EVERY 4 HOURS PRN
Status: CANCELLED | OUTPATIENT
Start: 2022-07-20

## 2022-05-04 RX ORDER — METHYLPREDNISOLONE SODIUM SUCCINATE 125 MG/2ML
125 INJECTION, POWDER, LYOPHILIZED, FOR SOLUTION INTRAMUSCULAR; INTRAVENOUS
Status: CANCELLED
Start: 2022-07-13

## 2022-05-04 RX ORDER — EPINEPHRINE 1 MG/ML
0.3 INJECTION, SOLUTION INTRAMUSCULAR; SUBCUTANEOUS EVERY 5 MIN PRN
Status: CANCELLED | OUTPATIENT
Start: 2022-07-13

## 2022-05-04 RX ORDER — DIPHENHYDRAMINE HYDROCHLORIDE 50 MG/ML
50 INJECTION INTRAMUSCULAR; INTRAVENOUS
Status: CANCELLED
Start: 2022-07-20

## 2022-05-04 RX ORDER — ALBUTEROL SULFATE 90 UG/1
1-2 AEROSOL, METERED RESPIRATORY (INHALATION)
Status: CANCELLED
Start: 2022-06-08

## 2022-05-04 RX ORDER — MEPERIDINE HYDROCHLORIDE 25 MG/ML
25 INJECTION INTRAMUSCULAR; INTRAVENOUS; SUBCUTANEOUS EVERY 30 MIN PRN
Status: CANCELLED | OUTPATIENT
Start: 2022-06-15

## 2022-05-04 RX ORDER — HEPARIN SODIUM (PORCINE) LOCK FLUSH IV SOLN 100 UNIT/ML 100 UNIT/ML
5 SOLUTION INTRAVENOUS
Status: CANCELLED | OUTPATIENT
Start: 2022-07-06

## 2022-05-04 RX ORDER — DIPHENHYDRAMINE HYDROCHLORIDE 50 MG/ML
50 INJECTION INTRAMUSCULAR; INTRAVENOUS
Status: CANCELLED
Start: 2022-05-11

## 2022-05-04 RX ORDER — NALOXONE HYDROCHLORIDE 0.4 MG/ML
0.2 INJECTION, SOLUTION INTRAMUSCULAR; INTRAVENOUS; SUBCUTANEOUS
Status: CANCELLED | OUTPATIENT
Start: 2022-07-06

## 2022-05-04 RX ORDER — HEPARIN SODIUM,PORCINE 10 UNIT/ML
5 VIAL (ML) INTRAVENOUS
Status: CANCELLED | OUTPATIENT
Start: 2022-05-18

## 2022-05-04 ASSESSMENT — PAIN SCALES - GENERAL: PAINLEVEL: NO PAIN (0)

## 2022-05-04 NOTE — PROGRESS NOTES
"Oncology Rooming Note    May 4, 2022 9:47 AM   Flor Griffin is a 66 year old female who presents for:    Chief Complaint   Patient presents with     Oncology Clinic Visit     Initial Vitals: LMP 01/01/2004 (Approximate)  Estimated body mass index is 27.76 kg/m  as calculated from the following:    Height as of 4/12/22: 1.676 m (5' 6\").    Weight as of 4/12/22: 78 kg (172 lb). There is no height or weight on file to calculate BSA.  Data Unavailable Comment: Data Unavailable   Patient's last menstrual period was 01/01/2004 (approximate).  Allergies reviewed: Yes  Medications reviewed: Yes    Medications: MEDICATION REFILLS NEEDED TODAY. Provider was notified.   Lorazepam    Pharmacy name entered into [employer]: [id number] - [place], [place] - [address]    Clinical concerns: no       Shari J. Schoenberger, [place]            "

## 2022-05-04 NOTE — LETTER
5/4/2022         RE: Flor Griffin  [id number]  [facility] [id number]        Dear Colleague,    Thank you for referring your patient, Flor Griffin, to the [facility]. Please see a copy of my visit note below.    [facility]    Hematology/Oncology Established Patient Follow-up Note      Today's Date: 5/4/2022    Reason for Follow-up: Metastatic breast cancer.    HISTORY OF PRESENT ILLNESS: Flor Griffin is a 66 year old female who presents with the following oncologic history:     --Clinical TX N3c M0 adenocarcinoma which is poorly differentiated, likely of breast origin, ER low-positive at 1-5%, CT negative, HER-2/mitzi FISH negative, androgen stain weak-intermediate 10-20%.  Initially she was seen 11/22/2017 after she underwent right supraclavicular lymph node biopsy which was positive for poorly differentiated adenocarcinoma.  She had this lymph node almost a month and had an excisional biopsy performed which was consistent with the diagnosis of cancer.   --A PET/CT scan 11/27/2017 showed hypermetabolic right supraclavicular, right axillary and subpectoral adenopathy.  Otherwise, no distant metastatic disease.   --3/7/2018: Completed 4 cycles of carboplatin and paclitaxel followed by dose dense AC x 4 cycles.  No surgery was done.  --5/29/2018-7/31/2018: Completed adjuvant radiation to right breast, right axilla, right supraclavicular region.  --9/2018: Started adjuvant letrozole.  --10/15/2020: Screening mammogram showed focal asymmetry in upper outer right breast; no suspicious findings in left breast.  --10/21/2020: U/S of right breast showed irregularly-shaped hypoechoic mass at 10:00, 7 cm from nipple, measuring 3.2 x 2.7 x 3.6 cm. Right axillary ultrasound shows multiple normal-appearing lymph nodes. Biopsy of right breast mass at 10:00 showed poorly differentiated carcinoma, no lymphovascular invasion, morphology consistent with breast  cancer and similar to prior axillary node tumor, ER weakly positive at 1% and KY negative (0%), HER-2/mitzi FISH negative.  --10/28/2020: PET/CT scan showed high metabolic activity in 3 cm area of right upper outer breast, several small hypermetabolic lymph nodes in high right axilla and right subclavian region; mild hypermetabolic lymph nodes in bilateral hilar regions, favor metastatic disease; several tiny nodules in right upper lung, favor benign etiology; small hypermetabolic focus in pelvis in either sigmoid colon or adjacent loop of small bowel.  --11/02/2020: Started 1st line metastatic therapy with capecitabine.  --2/9/2021: PET/CT scan showed hypermetabolic right breast mass not significantly changed since 12/7/2020, persistent hypermetabolism; hypermetabolic right axillary lymph node increased in size; no distant metastasis; focal hypermetabolism in pelvis associated with sigmoid colon; stable 6-mm lung nodules.  --2/18/2021: Due to disease progression, switched to 2nd line metastatic therapy with eribulin. Required dose reduction due to neutropenia.  --4/24/2021: Left upper extremity Doppler U/S showed occlusive DVT within left subclavian vein; superficial thrombophlebitis within proximal cephalic vein. Started rivaroxaban.  --5/11/2021: PET scan showed unchanged size and slightly increased uptake to right breast; resolved right axillary adenopathy; no new lesions; persistent hypermetabolism at proximal sigmoid colon.  --5/19/2021 On chemo break after cycle 4 of eribulin.  --6/2/2021: Underwent palliative right mastectomy under care of Dr. Wilfredo Castillo. Pathology showed grade 3 invasive ductal carcinoma with tumor invasion into underlying skeletal muscle. Margins negative.  --7/22/2021: Colonoscopy showed diverticula in sigmoid colon; normal mucosa throughout entire colon.  --8/3/2021: PET scan showed postsurgical seroma at right chest wall and axilla; small focus of residual mild FDG uptake at superolateral  resection margin, likely posttreatment change or inflammation; no metastatic disease; persistent focus of uptake in sigmoid colon corresponding to diverticular disease.  --10/07/2021: U/S of right chest wall performed due to palpable lump. This showed hypoechoic nodule superior to level of scar measuring 0.7 x 0.6 x 1.5 cm. Biopsy of nodule showed poorly differentiated carcinoma consistent with recurrent breast carcinoma, grade 3, ER negative.  --10/19/2021: PET/CT showed right chest wall lesion with SUV 12.9; decreased right chest wall seroma; no FDG uptake in abdomen or pelvis.  --11/17/2021: Underwent excision of right chest wall nodule recurrence. Pathology showed grade 3 invasive ductal carcinoma, intramuscular; positive anterior and inferior margins of resection.  --1/25/2022 PET scan showed persistent hypermetabolic nodularity along anterior right chest wall suspicious for residual/recurrent tumor; persistent FDG uptake adjacent to short segment of sigmoid diverticulosis with minimal pericolonic fat stranding, unchanged, may represent chronic diverticulitis.  --2/14/2022-3/8/2022: Re-treatment with radiation to right chest wall.  --5/2/2022: PET scan showed increasing metabolic activity subtle pre-existing soft tissue nodules in the right breast/chest wall soft tissues suspicious for progression of disease.    INTERIM HISTORY:  Michelle reports ***     feeling well with no new complaints.        REVIEW OF SYSTEMS:   14 point ROS was reviewed and is negative other than as noted above in HPI.       HOME MEDICATIONS:  Current Outpatient Medications   Medication Sig Dispense Refill     calcium carbonate (TUMS) 500 MG chewable tablet Take 1 chew tab by mouth daily as needed for heartburn       multivitamin w/minerals (THERA-VIT-M) tablet Take 1 tablet by mouth every morning        Omega-3 Fatty Acids (OMEGA-3 FISH OIL PO) Take 1 g by mouth every morning        rivaroxaban ANTICOAGULANT (XARELTO ANTICOAGULANT) 20 MG  TABS tablet Take 1 tablet (20 mg) by mouth daily (with dinner) 90 tablet 3         ALLERGIES:  Allergies   Allergen Reactions     Pcn [Penicillins] Hives         PAST MEDICAL HISTORY:  Past Medical History:   Diagnosis Date     Basal cell cancer     right cheek     Breast cancer, right breast (H)      DVT (deep venous thrombosis) (H)      Gastroesophageal reflux disease      History of blood transfusion     blue baby     Hyperlipidaemia          PAST SURGICAL HISTORY:  Past Surgical History:   Procedure Laterality Date     BIOPSY BREAST Right 11/17/2021    Procedure: Excise right chest wall mass;  Surgeon: Ulises Castillo MD;  Location: [facility] OR     BIOPSY MASS NECK Right 11/16/2017    Procedure: BIOPSY MASS NECK;  Excisional Biopsy Right Neck Lymph node;  Surgeon: Waylon Corral MD;  Location: [facility] OR     COLONOSCOPY       COLONOSCOPY N/A 7/22/2021    Procedure: COLONOSCOPY;  Surgeon: Gabriele Caro MD;  Location:  GI     IR CHEST PORT PLACEMENT > 5 YRS OF AGE  2/16/2021     IR PORT REMOVAL LEFT  5/13/2019     MASTECTOMY SIMPLE Right 6/2/2021    Procedure: RIGHT SIMPLE MASTECTOMY;  Surgeon: Ulises Castillo MD;  Location: [facility] OR     MOHS MICROGRAPHIC PROCEDURE  ~2010    right cheek; BCC     MOHS MICROGRAPHIC PROCEDURE  10/31/2016    Dr. Nicholas [facility]         SOCIAL HISTORY:  Social History     Socioeconomic History     Marital status:      Spouse name: Not on file     Number of children: Not on file     Years of education: Not on file     Highest education level: Not on file   Occupational History     Not on file   Tobacco Use     Smoking status: Never Smoker     Smokeless tobacco: Never Used   Vaping Use     Vaping Use: Never used   Substance and Sexual Activity     Alcohol use: Yes     Alcohol/week: 0.0 standard drinks     Comment: 2 times a week, 2 drinks, wine or whiskey     Drug use: No     Sexual activity: Yes     Partners: Male     Birth control/protection: Post-menopausal   Other  Topics Concern     Parent/sibling w/ CABG, MI or angioplasty before 65F 55M? No   Social History Narrative     Not on file     Social Determinants of Health     Financial Resource Strain: Not on file   Food Insecurity: Not on file   Transportation Needs: Not on file   Physical Activity: Not on file   Stress: Not on file   Social Connections: Not on file   Intimate Partner Violence: Not on file   Housing Stability: Not on file         FAMILY HISTORY:  Family History   Problem Relation Age of Onset     Lupus Mother      Heart Disease Mother         CHF     Coronary Artery Disease Mother      Hyperlipidemia Mother      Diabetes Father      Breast Cancer Sister 83     No Known Problems Sister      Diabetes Brother      Suicide Brother      No Known Problems Brother      No Known Problems Brother      Suicide Brother      No Known Problems Brother      No Known Problems Brother      Breast Cancer Maternal Aunt 80         PHYSICAL EXAM:  Vital signs:  /67   Pulse 65   Temp 98.3  F (36.8  C) (Oral)   Resp 16   Wt 76.7 kg (169 lb)   LMP 2004 (Approximate)   SpO2 100%   BMI 27.28 kg/m    ECO  GENERAL/CONSTITUTIONAL: No acute distress.  EYES: No erythema or scleral icterus.  ENT/MOUTH: Neck supple.  LYMPH: No cervical, supraclavicular, axillary adenopathy.   BREAST: Right breast surgically absent with fluid in the right chest wall and palpable pea-size nodules x 2. RESPIRATORY: No audible couggh or wheezing.   GASTROINTESTINAL: No hepatosplenomegaly, masses, or tenderness. No guarding.  No distention.  MUSCULOSKELETAL: Warm and well-perfused, no cyanosis, clubbing, or edema.  NEUROLOGIC: No focal motor deficits. Alert, oriented, answers questions appropriately.  INTEGUMENTARY: No rashes or jaundice.  GAIT: Steady, does not use assistive device    LABS:  CBC RESULTS: Recent Labs   Lab Test 22  0915   WBC 4.5   RBC 4.13   HGB 13.2   HCT 40.7   MCV 99   MCH 32.0   MCHC 32.4   RDW 13.0         Last Comprehensive Metabolic Panel:  Sodium   Date Value Ref Range Status   01/25/2022 139 133 - 144 mmol/L Final   05/27/2021 139 133 - 144 mmol/L Final     Potassium   Date Value Ref Range Status   01/25/2022 4.1 3.4 - 5.3 mmol/L Final   05/27/2021 3.6 3.4 - 5.3 mmol/L Final     Chloride   Date Value Ref Range Status   01/25/2022 106 94 - 109 mmol/L Final   05/27/2021 107 94 - 109 mmol/L Final     Carbon Dioxide   Date Value Ref Range Status   05/27/2021 28 20 - 32 mmol/L Final     Carbon Dioxide (CO2)   Date Value Ref Range Status   01/25/2022 27 20 - 32 mmol/L Final     Anion Gap   Date Value Ref Range Status   01/25/2022 6 3 - 14 mmol/L Final   05/27/2021 4 3 - 14 mmol/L Final     Glucose   Date Value Ref Range Status   01/25/2022 104 (H) 70 - 99 mg/dL Final   05/27/2021 136 (H) 70 - 99 mg/dL Final     Urea Nitrogen   Date Value Ref Range Status   01/25/2022 15 7 - 30 mg/dL Final   05/27/2021 13 7 - 30 mg/dL Final     Creatinine   Date Value Ref Range Status   01/25/2022 0.73 0.52 - 1.04 mg/dL Final   05/27/2021 0.76 0.52 - 1.04 mg/dL Final     GFR Estimate   Date Value Ref Range Status   01/25/2022 90 >60 mL/min/1.73m2 Final     Comment:     Effective December 21, 2021 eGFRcr in adults is calculated using the 2021 CKD-EPI creatinine equation which includes age and gender (John et al., NEJM, DOI: 10.1056/TQYQhe9642341)   05/27/2021 82 >60 mL/min/[1.73_m2] Final     Comment:     Non  GFR Calc  Starting 12/18/2018, serum creatinine based estimated GFR (eGFR) will be   calculated using the Chronic Kidney Disease Epidemiology Collaboration   (CKD-EPI) equation.       Calcium   Date Value Ref Range Status   01/25/2022 9.3 8.5 - 10.1 mg/dL Final   05/27/2021 9.0 8.5 - 10.1 mg/dL Final     Bilirubin Total   Date Value Ref Range Status   01/25/2022 0.6 0.2 - 1.3 mg/dL Final   05/18/2021 0.8 0.2 - 1.3 mg/dL Final     Alkaline Phosphatase   Date Value Ref Range Status   01/25/2022 83 40 - 150 U/L  "Final   05/18/2021 81 40 - 150 U/L Final     ALT   Date Value Ref Range Status   01/25/2022 37 0 - 50 U/L Final   05/18/2021 58 (H) 0 - 50 U/L Final     AST   Date Value Ref Range Status   01/25/2022 22 0 - 45 U/L Final   05/18/2021 38 0 - 45 U/L Final       PATHOLOGY:  Reviewed as per HPI.    IMAGING:   Reviewed as per HPI.    ASSESSMENT/PLAN:  Flor Griffin is a 66 year old female with the following issues:  1.  Right breast poorly differentiated adenocarcinoma, local recurrence ER negative, MT negative, HER-2/mitzi negative, PD-L1 positive  2. Bilateral hilar lymphadenopathy, now resolved  3. Indeterminate pulmonary nodules, likely benign and stable  --Michelle is s/p palliative right mastectomy and has been on chemo break from eribulin since 5/19/2021.  --PET scan from 10/19/2021 showed hypermetabolic local recurrence to the right chest wall (triple negative) but initially no distant metastatic disease at the present time. Biopsy showed this was triple negative and PD-L1 positive.  --She underwent excision of this local recurrence on 11/17/2021.  I had discussed with Dr. Jonathan chanel on the margins of resection and that the \"positive\" margins were considered as such as he had removed generous margins into the muscle.   --1/25/2022 PET scan showed persistent hypermetabolic nodularity along anterior right chest wall that is quite suggestive of residual tumor after the last resection but no clinical evidence of distant metastatic disease. Therefore, she proceeded with repeat right chest wall radiation.  --I reviewed the 5/2/2022 PET scan with Michelle.  The scan unfortunately shows increasing metabolic activity subtle pre-existing soft tissue nodules in the right breast/chest wall soft tissues suspicious for progression of disease.  --Given that the triple negative right chest wall nodule is PD-L1 positive, I recommended proceeding with pembrolizumab every 3 weeks + weekly paclitaxel days 1, 8, 15 every 4 weeks. I " "discussed the potential adverse effects of this regimen, including but not limited to cytopenias, alopecia, peripheral neuropathy, fatigue, nausea, emesis, bowel or bladder dysfunction, inflammation of any organ system, infusion reaction, increased risk of infection and hospitalization.  She agrees to proceed with above regimen.  --Tentative plan to repeat PET scan in 3 months.  --However, she is planning to move to [place], [place] miles from [place] border and may potentially need to transfer her care to [facility] or [place], [place] in early July.    4. Left upper extremity deep vein thrombosis   --4/24/2021 Doppler U/S showed occlusive DVT in left subclavian vein.  --Continue rivaroxaban. Given her recent malignancy recurrence, I recommended long term anticoagulation.    Sally Stover MD  Hematology/Oncology  [facility]    Total time spent: 40 minutes in patient evaluation, counseling, documentation, and coordination of care.    Oncology Rooming Note    May 4, 2022 9:47 AM   Flor Griffin is a 66 year old female who presents for:    Chief Complaint   Patient presents with     Oncology Clinic Visit     Initial Vitals: LMP 01/01/2004 (Approximate)  Estimated body mass index is 27.76 kg/m  as calculated from the following:    Height as of 4/12/22: 1.676 m (5' 6\").    Weight as of 4/12/22: 78 kg (172 lb). There is no height or weight on file to calculate BSA.  Data Unavailable Comment: Data Unavailable   Patient's last menstrual period was 01/01/2004 (approximate).  Allergies reviewed: Yes  Medications reviewed: Yes    Medications: MEDICATION REFILLS NEEDED TODAY. Provider was notified.   Lorazepam    Pharmacy name entered into [employer]: [id number] - [place], [place] - [address]    Clinical concerns: no       Shari J. Schoenberger, [facility]                Again, thank you for allowing me to participate in the care of your " patient.        Sincerely,        Sally Stover MD

## 2022-05-04 NOTE — PATIENT INSTRUCTIONS
Arrange for pembrolizumab every 3 weeks and paclitaxel weekly x 3 weeks, 1 week off, every 4 weeks.  Lab draw weekly x 3 weeks every 4 weeks.  RTC NP cycle 1 week 2.  RTC MD cycle 2 week 1.

## 2022-05-10 ENCOUNTER — ONCOLOGY VISIT (OUTPATIENT)
Dept: ONCOLOGY | Facility: CLINIC | Age: 67
End: 2022-05-10
Attending: PHYSICIAN ASSISTANT
Payer: COMMERCIAL

## 2022-05-10 ENCOUNTER — LAB (OUTPATIENT)
Dept: INFUSION THERAPY | Facility: CLINIC | Age: 67
End: 2022-05-10
Attending: PHYSICIAN ASSISTANT
Payer: COMMERCIAL

## 2022-05-10 VITALS
HEART RATE: 68 BPM | RESPIRATION RATE: 16 BRPM | HEIGHT: 66 IN | DIASTOLIC BLOOD PRESSURE: 68 MMHG | OXYGEN SATURATION: 99 % | WEIGHT: 181 LBS | BODY MASS INDEX: 29.09 KG/M2 | SYSTOLIC BLOOD PRESSURE: 112 MMHG | TEMPERATURE: 96.7 F

## 2022-05-10 DIAGNOSIS — C50.411 MALIGNANT NEOPLASM OF UPPER-OUTER QUADRANT OF RIGHT BREAST IN FEMALE, ESTROGEN RECEPTOR NEGATIVE (H): Primary | ICD-10-CM

## 2022-05-10 DIAGNOSIS — Z17.1 MALIGNANT NEOPLASM OF UPPER-OUTER QUADRANT OF RIGHT BREAST IN FEMALE, ESTROGEN RECEPTOR NEGATIVE (H): Primary | ICD-10-CM

## 2022-05-10 DIAGNOSIS — R59.1 LYMPHADENOPATHY: ICD-10-CM

## 2022-05-10 DIAGNOSIS — Z95.828 PORT-A-CATH IN PLACE: ICD-10-CM

## 2022-05-10 DIAGNOSIS — C50.811 MALIGNANT NEOPLASM OF OVERLAPPING SITES OF RIGHT BREAST IN FEMALE, ESTROGEN RECEPTOR POSITIVE (H): ICD-10-CM

## 2022-05-10 DIAGNOSIS — Z17.0 MALIGNANT NEOPLASM OF OVERLAPPING SITES OF RIGHT BREAST IN FEMALE, ESTROGEN RECEPTOR POSITIVE (H): ICD-10-CM

## 2022-05-10 LAB
ALBUMIN SERPL-MCNC: 3.4 G/DL (ref 3.4–5)
ALP SERPL-CCNC: 77 U/L (ref 40–150)
ALT SERPL W P-5'-P-CCNC: 37 U/L (ref 0–50)
ANION GAP SERPL CALCULATED.3IONS-SCNC: 4 MMOL/L (ref 3–14)
AST SERPL W P-5'-P-CCNC: 23 U/L (ref 0–45)
BASOPHILS # BLD AUTO: 0 10E3/UL (ref 0–0.2)
BASOPHILS NFR BLD AUTO: 1 %
BILIRUB SERPL-MCNC: 1.1 MG/DL (ref 0.2–1.3)
BUN SERPL-MCNC: 13 MG/DL (ref 7–30)
CALCIUM SERPL-MCNC: 9 MG/DL (ref 8.5–10.1)
CANCER AG27-29 SERPL-ACNC: 11 U/ML (ref 0–39)
CEA SERPL-MCNC: 0.7 UG/L (ref 0–2.5)
CHLORIDE BLD-SCNC: 108 MMOL/L (ref 94–109)
CO2 SERPL-SCNC: 26 MMOL/L (ref 20–32)
CREAT SERPL-MCNC: 0.68 MG/DL (ref 0.52–1.04)
EOSINOPHIL # BLD AUTO: 0.1 10E3/UL (ref 0–0.7)
EOSINOPHIL NFR BLD AUTO: 2 %
ERYTHROCYTE [DISTWIDTH] IN BLOOD BY AUTOMATED COUNT: 13.2 % (ref 10–15)
GFR SERPL CREATININE-BSD FRML MDRD: >90 ML/MIN/1.73M2
GLUCOSE BLD-MCNC: 136 MG/DL (ref 70–99)
HCT VFR BLD AUTO: 39.7 % (ref 35–47)
HGB BLD-MCNC: 12.6 G/DL (ref 11.7–15.7)
IMM GRANULOCYTES # BLD: 0 10E3/UL
IMM GRANULOCYTES NFR BLD: 1 %
LYMPHOCYTES # BLD AUTO: 1 10E3/UL (ref 0.8–5.3)
LYMPHOCYTES NFR BLD AUTO: 26 %
MCH RBC QN AUTO: 32.1 PG (ref 26.5–33)
MCHC RBC AUTO-ENTMCNC: 31.7 G/DL (ref 31.5–36.5)
MCV RBC AUTO: 101 FL (ref 78–100)
MONOCYTES # BLD AUTO: 0.3 10E3/UL (ref 0–1.3)
MONOCYTES NFR BLD AUTO: 7 %
NEUTROPHILS # BLD AUTO: 2.4 10E3/UL (ref 1.6–8.3)
NEUTROPHILS NFR BLD AUTO: 63 %
NRBC # BLD AUTO: 0 10E3/UL
NRBC BLD AUTO-RTO: 0 /100
PLATELET # BLD AUTO: 249 10E3/UL (ref 150–450)
POTASSIUM BLD-SCNC: 3.8 MMOL/L (ref 3.4–5.3)
PROT SERPL-MCNC: 6.4 G/DL (ref 6.8–8.8)
RBC # BLD AUTO: 3.93 10E6/UL (ref 3.8–5.2)
SODIUM SERPL-SCNC: 138 MMOL/L (ref 133–144)
TSH SERPL DL<=0.005 MIU/L-ACNC: 3.18 MU/L (ref 0.4–4)
WBC # BLD AUTO: 3.8 10E3/UL (ref 4–11)

## 2022-05-10 PROCEDURE — 99213 OFFICE O/P EST LOW 20 MIN: CPT | Performed by: PHYSICIAN ASSISTANT

## 2022-05-10 PROCEDURE — G0463 HOSPITAL OUTPT CLINIC VISIT: HCPCS

## 2022-05-10 PROCEDURE — 36591 DRAW BLOOD OFF VENOUS DEVICE: CPT

## 2022-05-10 PROCEDURE — 82378 CARCINOEMBRYONIC ANTIGEN: CPT | Performed by: INTERNAL MEDICINE

## 2022-05-10 PROCEDURE — 86300 IMMUNOASSAY TUMOR CA 15-3: CPT | Performed by: INTERNAL MEDICINE

## 2022-05-10 PROCEDURE — 250N000011 HC RX IP 250 OP 636: Performed by: INTERNAL MEDICINE

## 2022-05-10 PROCEDURE — 80053 COMPREHEN METABOLIC PANEL: CPT | Performed by: INTERNAL MEDICINE

## 2022-05-10 PROCEDURE — 85025 COMPLETE CBC W/AUTO DIFF WBC: CPT | Performed by: INTERNAL MEDICINE

## 2022-05-10 PROCEDURE — 84443 ASSAY THYROID STIM HORMONE: CPT | Performed by: INTERNAL MEDICINE

## 2022-05-10 RX ORDER — PROCHLORPERAZINE MALEATE 10 MG
10 TABLET ORAL EVERY 6 HOURS PRN
Qty: 30 TABLET | Refills: 2 | Status: SHIPPED | OUTPATIENT
Start: 2022-05-10 | End: 2022-08-29

## 2022-05-10 RX ORDER — HEPARIN SODIUM (PORCINE) LOCK FLUSH IV SOLN 100 UNIT/ML 100 UNIT/ML
5 SOLUTION INTRAVENOUS
Status: CANCELLED | OUTPATIENT
Start: 2022-05-10

## 2022-05-10 RX ORDER — HEPARIN SODIUM (PORCINE) LOCK FLUSH IV SOLN 100 UNIT/ML 100 UNIT/ML
5 SOLUTION INTRAVENOUS
Status: DISCONTINUED | OUTPATIENT
Start: 2022-05-10 | End: 2022-05-10 | Stop reason: HOSPADM

## 2022-05-10 RX ORDER — HEPARIN SODIUM,PORCINE 10 UNIT/ML
5 VIAL (ML) INTRAVENOUS
Status: CANCELLED | OUTPATIENT
Start: 2022-05-10

## 2022-05-10 RX ADMIN — Medication 5 ML: at 08:00

## 2022-05-10 ASSESSMENT — PAIN SCALES - GENERAL: PAINLEVEL: NO PAIN (0)

## 2022-05-10 NOTE — PROGRESS NOTES
"Oncology Rooming Note    May 10, 2022 8:11 AM   Flor Griffin is a 66 year old female who presents for:    Chief Complaint   Patient presents with     Oncology Clinic Visit     Malignant neoplasm of upper-outer quadrant of right breast in female, estrogen receptor negative      Initial Vitals: /68   Pulse 68   Temp (!) 96.7  F (35.9  C) (Tympanic)   Resp 16   Ht 1.676 m (5' 6\")   Wt 82.1 kg (181 lb)   LMP 01/01/2004 (Approximate)   SpO2 99%   BMI 29.21 kg/m   Estimated body mass index is 29.21 kg/m  as calculated from the following:    Height as of this encounter: 1.676 m (5' 6\").    Weight as of this encounter: 82.1 kg (181 lb). Body surface area is 1.96 meters squared.  No Pain (0) Comment: Data Unavailable   Patient's last menstrual period was 01/01/2004 (approximate).  Allergies reviewed: Yes  Medications reviewed: Yes    Medications: Medication refills not needed today.  Pharm[facility] into [employer]: [id number] - [place], [place] - [address]      Justine Rush CMA              "

## 2022-05-10 NOTE — LETTER
5/10/2022         RE: Flro Griffin  [address]  [facility] [address]        Dear Colleague,    Thank you for referring your patient, Flor Griffin, to the [facility]. Please see a copy of my visit note below.    Oncology/Hematology Visit Note  May 10, 2022    Reason for Visit: Follow up of metastatic breast cancer     History of Present Illness: Flor Griffin is a 66 year old female who presents with the following oncologic history:     --Clinical TX N3c M0 adenocarcinoma which is poorly differentiated, likely of breast origin, ER low-positive at 1-5%, OK negative, HER-2/mitzi FISH negative, androgen stain weak-intermediate 10-20%.  Initially she was seen 11/22/2017 after she underwent right supraclavicular lymph node biopsy which was positive for poorly differentiated adenocarcinoma.  She had this lymph node almost a month and had an excisional biopsy performed which was consistent with the diagnosis of cancer.   --A PET/CT scan 11/27/2017 showed hypermetabolic right supraclavicular, right axillary and subpectoral adenopathy.  Otherwise, no distant metastatic disease.   --3/7/2018: Completed 4 cycles of carboplatin and paclitaxel followed by dose dense AC x 4 cycles.  No surgery was done.  --5/29/2018-7/31/2018: Completed adjuvant radiation to right breast, right axilla, right supraclavicular region.  --9/2018: Started adjuvant letrozole.  --10/15/2020: Screening mammogram showed focal asymmetry in upper outer right breast; no suspicious findings in left breast.  --10/21/2020: U/S of right breast showed irregularly-shaped hypoechoic mass at 10:00, 7 cm from nipple, measuring 3.2 x 2.7 x 3.6 cm. Right axillary ultrasound shows multiple normal-appearing lymph nodes. Biopsy of right breast mass at 10:00 showed poorly differentiated carcinoma, no lymphovascular invasion, morphology consistent with breast cancer and similar to prior axillary node tumor, ER  weakly positive at 1% and ID negative (0%), HER-2/mitzi FISH negative.  --10/28/2020: PET/CT scan showed high metabolic activity in 3 cm area of right upper outer breast, several small hypermetabolic lymph nodes in high right axilla and right subclavian region; mild hypermetabolic lymph nodes in bilateral hilar regions, favor metastatic disease; several tiny nodules in right upper lung, favor benign etiology; small hypermetabolic focus in pelvis in either sigmoid colon or adjacent loop of small bowel.  --11/02/2020: Started 1st line metastatic therapy with capecitabine.  --2/9/2021: PET/CT scan showed hypermetabolic right breast mass not significantly changed since 12/7/2020, persistent hypermetabolism; hypermetabolic right axillary lymph node increased in size; no distant metastasis; focal hypermetabolism in pelvis associated with sigmoid colon; stable 6-mm lung nodules.  --2/18/2021: Due to disease progression, switched to 2nd line metastatic therapy with eribulin. Required dose reduction due to neutropenia.  --4/24/2021: Left upper extremity Doppler U/S showed occlusive DVT within left subclavian vein; superficial thrombophlebitis within proximal cephalic vein. Started rivaroxaban.  --5/11/2021: PET scan showed unchanged size and slightly increased uptake to right breast; resolved right axillary adenopathy; no new lesions; persistent hypermetabolism at proximal sigmoid colon.  --5/19/2021 On chemo break after cycle 4 of eribulin.  --6/2/2021: Underwent palliative right mastectomy under care of Dr. Wilfredo Castillo. Pathology showed grade 3 invasive ductal carcinoma with tumor invasion into underlying skeletal muscle. Margins negative.  --7/22/2021: Colonoscopy showed diverticula in sigmoid colon; normal mucosa throughout entire colon.  --8/3/2021: PET scan showed postsurgical seroma at right chest wall and axilla; small focus of residual mild FDG uptake at superolateral resection margin, likely posttreatment change or  inflammation; no metastatic disease; persistent focus of uptake in sigmoid colon corresponding to diverticular disease.  --10/07/2021: U/S of right chest wall performed due to palpable lump. This showed hypoechoic nodule superior to level of scar measuring 0.7 x 0.6 x 1.5 cm. Biopsy of nodule showed poorly differentiated carcinoma consistent with recurrent breast carcinoma, grade 3, ER negative.  --10/19/2021: PET/CT showed right chest wall lesion with SUV 12.9; decreased right chest wall seroma; no FDG uptake in abdomen or pelvis.  --11/17/2021: Underwent excision of right chest wall nodule recurrence. Pathology showed grade 3 invasive ductal carcinoma, intramuscular; positive anterior and inferior margins of resection.  --1/25/2022 PET scan showed persistent hypermetabolic nodularity along anterior right chest wall suspicious for residual/recurrent tumor; persistent FDG uptake adjacent to short segment of sigmoid diverticulosis with minimal pericolonic fat stranding, unchanged, may represent chronic diverticulitis.  --2/14/2022-3/8/2022: Re-treatment with radiation to right chest wall.  --5/2/2022: PET scan showed increasing metabolic activity subtle pre-existing soft tissue nodules in the right breast/chest wall soft tissues suspicious for progression of disease.    She saw Dr. Stover 5/4/22 who recommended pembrolizumab every 3 weeks + weekly paclitaxel days 1, 8,15 every 4 weeks.      Interval History:  Michelle is feeling well and has no complaints. We discussed treatment and reviewed her recent PET scan.     Review of Systems:  Patient denies fevers, chills, night sweats, unexplained weight changes, headaches, dizziness, vision or hearing changes, new lumps or bumps, chest pain, shortness of breath, cough, abdominal pain, nausea, vomiting, changes to bowel or bladder, swelling of extremities, bleeding issues, or rash.    Current Outpatient Medications   Medication Sig Dispense Refill     calcium carbonate (TUMS)  500 MG chewable tablet Take 1 chew tab by mouth daily as needed for heartburn       multivitamin w/minerals (THERA-VIT-M) tablet Take 1 tablet by mouth every morning        Omega-3 Fatty Acids (OMEGA-3 FISH OIL PO) Take 1 g by mouth every morning        rivaroxaban ANTICOAGULANT (XARELTO ANTICOAGULANT) 20 MG TABS tablet Take 1 tablet (20 mg) by mouth daily (with dinner) 90 tablet 3       Past Medical History  Past Medical History:   Diagnosis Date     Basal cell cancer     right cheek     Breast cancer, right breast (H)      DVT (deep venous thrombosis) (H)      Gastroesophageal reflux disease      History of blood transfusion     blue baby     Hyperlipidaemia      Past Surgical History:   Procedure Laterality Date     BIOPSY BREAST Right 11/17/2021    Procedure: Excise right chest wall mass;  Surgeon: Ulises Castillo MD;  Location: [facility] OR     BIOPSY MASS NECK Right 11/16/2017    Procedure: BIOPSY MASS NECK;  Excisional Biopsy Right Neck Lymph node;  Surgeon: Waylon Corral MD;  Location: [facility] OR     COLONOSCOPY       COLONOSCOPY N/A 7/22/2021    Procedure: COLONOSCOPY;  Surgeon: Gabriele Caro MD;  Location:  GI     IR CHEST PORT PLACEMENT > 5 YRS OF AGE  2/16/2021     IR PORT REMOVAL LEFT  5/13/2019     MASTECTOMY SIMPLE Right 6/2/2021    Procedure: RIGHT SIMPLE MASTECTOMY;  Surgeon: Ulises Castillo MD;  Location: [facility] OR     MOHS MICROGRAPHIC PROCEDURE  ~2010    right cheek; B[facility]     MOHS MICROGRAPHIC PROCEDURE  10/31/2016    Dr. Nicholas B[facility]     Allergies   Allergen Reactions     Pcn [Penicillins] Hives     Social History   Social History     Tobacco Use     Smoking status: Never Smoker     Smokeless tobacco: Never Used   Vaping Use     Vaping Use: Never used   Substance Use Topics     Alcohol use: Yes     Alcohol/week: 0.0 standard drinks     Comment: 2 times a week, 2 drinks, wine or whiskey     Drug use: No      Past medical history and social history were reviewed.    Physical  "Examination:  /68   Pulse 68   Temp (!) 96.7  F (35.9  C) (Tympanic)   Resp 16   Ht 1.676 m (5' 6\")   Wt 82.1 kg (181 lb)   LMP 01/01/2004 (Approximate)   SpO2 99%   BMI 29.21 kg/m    Wt Readings from Last 10 Encounters:   05/04/22 76.7 kg (169 lb)   04/12/22 78 kg (172 lb)   03/31/22 77.1 kg (170 lb)   03/12/22 76.7 kg (169 lb)   11/17/21 78.9 kg (174 lb)   11/11/21 76.7 kg (169 lb)   10/27/21 76.2 kg (168 lb)   09/21/21 77.1 kg (170 lb)   07/22/21 77.6 kg (171 lb)   06/02/21 77.7 kg (171 lb 6.4 oz)     Constitutional: Well-appearing female in no acute distress.  Eyes: EOMI, PERRL. No scleral icterus.  ENT: Oral mucosa is moist without lesions or thrush.   Lymphatic: Neck is supple without cervical or supraclavicular lymphadenopathy.   Breast: R chest wall s/p surgical resection, firm, 1cm firm nodule palpable.  Cardiovascular: Regular rate and rhythm. No murmurs, gallops, or rubs. No peripheral edema.  Respiratory: Clear to auscultation bilaterally. No wheezes or crackles.  Gastrointestinal: Bowel sounds present. Abdomen soft, non-tender.   Neurologic: Cranial nerves II through XII are grossly intact.  Skin: No rashes, petechiae, or bruising noted on exposed skin.    Laboratory Data:   Latest Reference Range & Units 05/10/22 07:49   Sodium 133 - 144 mmol/L 138   Potassium 3.4 - 5.3 mmol/L 3.8   Chloride 94 - 109 mmol/L 108   Carbon Dioxide 20 - 32 mmol/L 26   Urea Nitrogen 7 - 30 mg/dL 13   Creatinine 0.52 - 1.04 mg/dL 0.68   GFR Estimate >60 mL/min/1.73m2 >90 [1]   Calcium 8.5 - 10.1 mg/dL 9.0   Anion Gap 3 - 14 mmol/L 4   Albumin 3.4 - 5.0 g/dL 3.4   Protein Total 6.8 - 8.8 g/dL 6.4 (L)   Bilirubin Total 0.2 - 1.3 mg/dL 1.1   Alkaline Phosphatase 40 - 150 U/L 77   ALT 0 - 50 U/L 37   AST 0 - 45 U/L 23   TSH 0.40 - 4.00 mU/L 3.18   Glucose 70 - 99 mg/dL 136 (H)   WBC 4.0 - 11.0 10e3/uL 3.8 (L)   Hemoglobin 11.7 - 15.7 g/dL 12.6   Hematocrit 35.0 - 47.0 % 39.7   Platelet Count 150 - 450 10e3/uL 249 "   RBC Count 3.80 - 5.20 10e6/uL 3.93   MCV 78 - 100 fL 101 (H)   MCH 26.5 - 33.0 pg 32.1   MCHC 31.5 - 36.5 g/dL 31.7   RDW 10.0 - 15.0 % 13.2   % Neutrophils % 63   % Lymphocytes % 26   % Monocytes % 7   % Eosinophils % 2   % Basophils % 1   Absolute Basophils 0.0 - 0.2 10e3/uL 0.0   Absolute Eosinophils 0.0 - 0.7 10e3/uL 0.1   Absolute Immature Granulocytes <=0.4 10e3/uL 0.0   Absolute Lymphocytes 0.8 - 5.3 10e3/uL 1.0   Absolute Monocytes 0.0 - 1.3 10e3/uL 0.3   % Immature Granulocytes % 1   Absolute Neutrophils 1.6 - 8.3 10e3/uL 2.4   Absolute NRBCs 10e3/uL 0.0   NRBCs per 100 WBC <1 /100 0   (L): Data is abnormally low  (H): Data is abnormally high  [1] Effective December 21, 2021 eGFRcr in adults is calculated using the 2021 CKD-EPI creatinine equation which includes age and gender (John et al., NEJM, DOI: 10.1056/LKYToi5044172)      Assessment and Plan:  1. Metastatic Triple Negative Breast Cancer  See history above, s/p progression after prior chemotherapy, surgery, and radiation. Plan to start treatment with Pembrolizumab every 3 weeks and weekly paclitaxel day 1, 8, 15 every 4 weeks. Discussed toxicities of treatment including neuropathy, myelosuppression (mild), myalgias, nausea/vomiting, and immune mediated side effects.     Will plan to start treatment tomorrow-labs reviewed and no concerns. Tentative plan for 3 months of treatment and then repeat PET. Will monitor clinically as well.     She is moving to [place] in July and plans to transition care to [facility].    2. LUE DVT  Diagnosed April 2021. Continue Rivaroxaban indefinitely.     20 minutes spent on the date of the encounter doing chart review, review of test results, interpretation of tests, patient visit and documentation     Chucho Lorenzo PA-C  Department of Hematology and Oncology  [facility]       Oncology Rooming Note    May 10, 2022 8:11 AM   Flor Griffin is a 66 year old female who presents  "for:    Chief Complaint   Patient presents with     Oncology Clinic Visit     Malignant neoplasm of upper-outer quadrant of right breast in female, estrogen receptor negative      Initial Vitals: /68   Pulse 68   Temp (!) 96.7  F (35.9  C) (Tympanic)   Resp 16   Ht 1.676 m (5' 6\")   Wt 82.1 kg (181 lb)   LMP 01/01/2004 (Approximate)   SpO2 99%   BMI 29.21 kg/m   Estimated body mass index is 29.21 kg/m  as calculated from the following:    Height as of this encounter: 1.676 m (5' 6\").    Weight as of this encounter: 82.1 kg (181 lb). Body surface area is 1.96 meters squared.  No Pain (0) Comment: Data Unavailable   Patient's last menstrual period was 01/01/2004 (approximate).  Allergies reviewed: Yes  Medications reviewed: Yes    Medications: Medication refills not needed today.  Pharmacy name entered into [employer]: [id number] - [place], [place] - [address] [facility]                  Again, thank you for allowing me to participate in the care of your patient.        Sincerely,        ERIK Boogie    "

## 2022-05-10 NOTE — PROGRESS NOTES
Oncology/Hematology Visit Note  May 10, 2022    Reason for Visit: Follow up of metastatic breast cancer     History of Present Illness: Flor Griffin is a 66 year old female who presents with the following oncologic history:     --Clinical TX N3c M0 adenocarcinoma which is poorly differentiated, likely of breast origin, ER low-positive at 1-5%, CA negative, HER-2/mitzi FISH negative, androgen stain weak-intermediate 10-20%.  Initially she was seen 11/22/2017 after she underwent right supraclavicular lymph node biopsy which was positive for poorly differentiated adenocarcinoma.  She had this lymph node almost a month and had an excisional biopsy performed which was consistent with the diagnosis of cancer.   --A PET/CT scan 11/27/2017 showed hypermetabolic right supraclavicular, right axillary and subpectoral adenopathy.  Otherwise, no distant metastatic disease.   --3/7/2018: Completed 4 cycles of carboplatin and paclitaxel followed by dose dense AC x 4 cycles.  No surgery was done.  --5/29/2018-7/31/2018: Completed adjuvant radiation to right breast, right axilla, right supraclavicular region.  --9/2018: Started adjuvant letrozole.  --10/15/2020: Screening mammogram showed focal asymmetry in upper outer right breast; no suspicious findings in left breast.  --10/21/2020: U/S of right breast showed irregularly-shaped hypoechoic mass at 10:00, 7 cm from nipple, measuring 3.2 x 2.7 x 3.6 cm. Right axillary ultrasound shows multiple normal-appearing lymph nodes. Biopsy of right breast mass at 10:00 showed poorly differentiated carcinoma, no lymphovascular invasion, morphology consistent with breast cancer and similar to prior axillary node tumor, ER weakly positive at 1% and CA negative (0%), HER-2/mitzi FISH negative.  --10/28/2020: PET/CT scan showed high metabolic activity in 3 cm area of right upper outer breast, several small hypermetabolic lymph nodes in high right axilla and right subclavian region; mild  hypermetabolic lymph nodes in bilateral hilar regions, favor metastatic disease; several tiny nodules in right upper lung, favor benign etiology; small hypermetabolic focus in pelvis in either sigmoid colon or adjacent loop of small bowel.  --11/02/2020: Started 1st line metastatic therapy with capecitabine.  --2/9/2021: PET/CT scan showed hypermetabolic right breast mass not significantly changed since 12/7/2020, persistent hypermetabolism; hypermetabolic right axillary lymph node increased in size; no distant metastasis; focal hypermetabolism in pelvis associated with sigmoid colon; stable 6-mm lung nodules.  --2/18/2021: Due to disease progression, switched to 2nd line metastatic therapy with eribulin. Required dose reduction due to neutropenia.  --4/24/2021: Left upper extremity Doppler U/S showed occlusive DVT within left subclavian vein; superficial thrombophlebitis within proximal cephalic vein. Started rivaroxaban.  --5/11/2021: PET scan showed unchanged size and slightly increased uptake to right breast; resolved right axillary adenopathy; no new lesions; persistent hypermetabolism at proximal sigmoid colon.  --5/19/2021 On chemo break after cycle 4 of eribulin.  --6/2/2021: Underwent palliative right mastectomy under care of Dr. Wilfredo Castillo. Pathology showed grade 3 invasive ductal carcinoma with tumor invasion into underlying skeletal muscle. Margins negative.  --7/22/2021: Colonoscopy showed diverticula in sigmoid colon; normal mucosa throughout entire colon.  --8/3/2021: PET scan showed postsurgical seroma at right chest wall and axilla; small focus of residual mild FDG uptake at superolateral resection margin, likely posttreatment change or inflammation; no metastatic disease; persistent focus of uptake in sigmoid colon corresponding to diverticular disease.  --10/07/2021: U/S of right chest wall performed due to palpable lump. This showed hypoechoic nodule superior to level of scar measuring 0.7 x 0.6  x 1.5 cm. Biopsy of nodule showed poorly differentiated carcinoma consistent with recurrent breast carcinoma, grade 3, ER negative.  --10/19/2021: PET/CT showed right chest wall lesion with SUV 12.9; decreased right chest wall seroma; no FDG uptake in abdomen or pelvis.  --11/17/2021: Underwent excision of right chest wall nodule recurrence. Pathology showed grade 3 invasive ductal carcinoma, intramuscular; positive anterior and inferior margins of resection.  --1/25/2022 PET scan showed persistent hypermetabolic nodularity along anterior right chest wall suspicious for residual/recurrent tumor; persistent FDG uptake adjacent to short segment of sigmoid diverticulosis with minimal pericolonic fat stranding, unchanged, may represent chronic diverticulitis.  --2/14/2022-3/8/2022: Re-treatment with radiation to right chest wall.  --5/2/2022: PET scan showed increasing metabolic activity subtle pre-existing soft tissue nodules in the right breast/chest wall soft tissues suspicious for progression of disease.    She saw Dr. Stover 5/4/22 who recommended pembrolizumab every 3 weeks + weekly paclitaxel days 1, 8,15 every 4 weeks.      Interval History:  Michelle is feeling well and has no complaints. We discussed treatment and reviewed her recent PET scan.     Review of Systems:  Patient denies fevers, chills, night sweats, unexplained weight changes, headaches, dizziness, vision or hearing changes, new lumps or bumps, chest pain, shortness of breath, cough, abdominal pain, nausea, vomiting, changes to bowel or bladder, swelling of extremities, bleeding issues, or rash.    Current Outpatient Medications   Medication Sig Dispense Refill     calcium carbonate (TUMS) 500 MG chewable tablet Take 1 chew tab by mouth daily as needed for heartburn       multivitamin w/minerals (THERA-VIT-M) tablet Take 1 tablet by mouth every morning        Omega-3 Fatty Acids (OMEGA-3 FISH OIL PO) Take 1 g by mouth every morning        rivaroxaban  "ANTICOAGULANT (XARELTO ANTICOAGULANT) 20 MG TABS tablet Take 1 tablet (20 mg) by mouth daily (with dinner) 90 tablet 3       Past Medical History  Past Medical History:   Diagnosis Date     Basal cell cancer     right cheek     Breast cancer, right breast (H)      DVT (deep venous thrombosis) (H)      Gastroesophageal reflux disease      History of blood transfusion     blue baby     Hyperlipidaemia      Past Surgical History:   Procedure Laterality Date     BIOPSY BREAST Right 11/17/2021    Procedure: Excise right chest wall mass;  Surgeon: Ulises Castillo MD;  Location: [facility] OR     BIOPSY MASS NECK Right 11/16/2017    Procedure: BIOPSY MASS NECK;  Excisional Biopsy Right Neck Lymph node;  Surgeon: Waylon Corral MD;  Location: [facility] OR     COLONOSCOPY       COLONOSCOPY N/A 7/22/2021    Procedure: COLONOSCOPY;  Surgeon: Gabriele Caro MD;  Location:  GI     IR CHEST PORT PLACEMENT > 5 YRS OF AGE  2/16/2021     IR PORT REMOVAL LEFT  5/13/2019     MASTECTOMY SIMPLE Right 6/2/2021    Procedure: RIGHT SIMPLE MASTECTOMY;  Surgeon: Ulises Castillo MD;  Location: [facility] OR     MOHS MICROGRAPHIC PROCEDURE  ~2010    right cheek; [facility]     MOHS MICROGRAPHIC PROCEDURE  10/31/2016    Dr. Nicholas [facility]     Allergies   Allergen Reactions     Pcn [Penicillins] Hives     Social History   Social History     Tobacco Use     Smoking status: Never Smoker     Smokeless tobacco: Never Used   Vaping Use     Vaping Use: Never used   Substance Use Topics     Alcohol use: Yes     Alcohol/week: 0.0 standard drinks     Comment: 2 times a week, 2 drinks, wine or whiskey     Drug use: No      Past medical history and social history were reviewed.    Physical Examination:  /68   Pulse 68   Temp (!) 96.7  F (35.9  C) (Tympanic)   Resp 16   Ht 1.676 m (5' 6\")   Wt 82.1 kg (181 lb)   LMP 01/01/2004 (Approximate)   SpO2 99%   BMI 29.21 kg/m    Wt Readings from Last 10 Encounters:   05/04/22 76.7 kg (169 lb)   04/12/22 78 " kg (172 lb)   03/31/22 77.1 kg (170 lb)   03/12/22 76.7 kg (169 lb)   11/17/21 78.9 kg (174 lb)   11/11/21 76.7 kg (169 lb)   10/27/21 76.2 kg (168 lb)   09/21/21 77.1 kg (170 lb)   07/22/21 77.6 kg (171 lb)   06/02/21 77.7 kg (171 lb 6.4 oz)     Constitutional: Well-appearing female in no acute distress.  Eyes: EOMI, PERRL. No scleral icterus.  ENT: Oral mucosa is moist without lesions or thrush.   Lymphatic: Neck is supple without cervical or supraclavicular lymphadenopathy.   Breast: R chest wall s/p surgical resection, firm, 1cm firm nodule palpable.  Cardiovascular: Regular rate and rhythm. No murmurs, gallops, or rubs. No peripheral edema.  Respiratory: Clear to auscultation bilaterally. No wheezes or crackles.  Gastrointestinal: Bowel sounds present. Abdomen soft, non-tender.   Neurologic: Cranial nerves II through XII are grossly intact.  Skin: No rashes, petechiae, or bruising noted on exposed skin.    Laboratory Data:   Latest Reference Range & Units 05/10/22 07:49   Sodium 133 - 144 mmol/L 138   Potassium 3.4 - 5.3 mmol/L 3.8   Chloride 94 - 109 mmol/L 108   Carbon Dioxide 20 - 32 mmol/L 26   Urea Nitrogen 7 - 30 mg/dL 13   Creatinine 0.52 - 1.04 mg/dL 0.68   GFR Estimate >60 mL/min/1.73m2 >90 [1]   Calcium 8.5 - 10.1 mg/dL 9.0   Anion Gap 3 - 14 mmol/L 4   Albumin 3.4 - 5.0 g/dL 3.4   Protein Total 6.8 - 8.8 g/dL 6.4 (L)   Bilirubin Total 0.2 - 1.3 mg/dL 1.1   Alkaline Phosphatase 40 - 150 U/L 77   ALT 0 - 50 U/L 37   AST 0 - 45 U/L 23   TSH 0.40 - 4.00 mU/L 3.18   Glucose 70 - 99 mg/dL 136 (H)   WBC 4.0 - 11.0 10e3/uL 3.8 (L)   Hemoglobin 11.7 - 15.7 g/dL 12.6   Hematocrit 35.0 - 47.0 % 39.7   Platelet Count 150 - 450 10e3/uL 249   RBC Count 3.80 - 5.20 10e6/uL 3.93   MCV 78 - 100 fL 101 (H)   MCH 26.5 - 33.0 pg 32.1   MCHC 31.5 - 36.5 g/dL 31.7   RDW 10.0 - 15.0 % 13.2   % Neutrophils % 63   % Lymphocytes % 26   % Monocytes % 7   % Eosinophils % 2   % Basophils % 1   Absolute Basophils 0.0 - 0.2  10e3/uL 0.0   Absolute Eosinophils 0.0 - 0.7 10e3/uL 0.1   Absolute Immature Granulocytes <=0.4 10e3/uL 0.0   Absolute Lymphocytes 0.8 - 5.3 10e3/uL 1.0   Absolute Monocytes 0.0 - 1.3 10e3/uL 0.3   % Immature Granulocytes % 1   Absolute Neutrophils 1.6 - 8.3 10e3/uL 2.4   Absolute NRBCs 10e3/uL 0.0   NRBCs per 100 WBC <1 /100 0   (L): Data is abnormally low  (H): Data is abnormally high  [1] Effective December 21, 2021 eGFRcr in adults is calculated using the 2021 CKD-EPI creatinine equation which includes age and gender (John et al., NEJ, DOI: 10.1056/NEYLub6823441)      Assessment and Plan:  1. Metastatic Triple Negative Breast Cancer  See history above, s/p progression after prior chemotherapy, surgery, and radiation. Plan to start treatment with Pembrolizumab every 3 weeks and weekly paclitaxel day 1, 8, 15 every 4 weeks. Discussed toxicities of treatment including neuropathy, myelosuppression (mild), myalgias, nausea/vomiting, and immune mediated side effects.     Will plan to start treatment tomorrow-labs reviewed and no concerns. Tentative plan for 3 months of treatment and then repeat PET. Will monitor clinically as well.     She is moving to [place] in July and plans to transition care to [facility].    2. LUE DVT  Diagnosed April 2021. Continue Rivaroxaban indefinitely.     20 minutes spent on the date of the encounter doing chart review, review of test results, interpretation of tests, patient visit and documentation     Chucho Lorenzo PA-C  Department of Hematology and Oncology  [facility]

## 2022-05-10 NOTE — PROGRESS NOTES
Nursing Note:  Flor Griffin presents today for Labs per Port.    Patient seen by provider today: Yes: Chucho Stanley PA-C.   present during visit today: Not Applicable.    Note: N/A.    Intravenous Access:  Labs drawn without difficulty.  Implanted Port.  Port site C/D/I.  Port flushes well + excellent blood return.  Port needle left accessed for treatment on 5/11/22 per patient request.    Discharge Plan:   Patient was sent to [facility] for appointment with Chucho Stanley PA-C.   5/11/22: Cycle 1 Keytruda + Taxol.    Veronica Mckeon, RN, BSN, OCN on 5/10/2022 at 8:06 AM

## 2022-05-11 ENCOUNTER — TELEPHONE (OUTPATIENT)
Dept: ONCOLOGY | Facility: CLINIC | Age: 67
End: 2022-05-11

## 2022-05-11 ENCOUNTER — INFUSION THERAPY VISIT (OUTPATIENT)
Dept: INFUSION THERAPY | Facility: CLINIC | Age: 67
End: 2022-05-11
Attending: INTERNAL MEDICINE
Payer: COMMERCIAL

## 2022-05-11 VITALS
WEIGHT: 181.66 LBS | BODY MASS INDEX: 29.2 KG/M2 | TEMPERATURE: 97 F | HEIGHT: 66 IN | OXYGEN SATURATION: 99 % | RESPIRATION RATE: 16 BRPM | DIASTOLIC BLOOD PRESSURE: 70 MMHG | HEART RATE: 63 BPM | SYSTOLIC BLOOD PRESSURE: 107 MMHG

## 2022-05-11 DIAGNOSIS — C50.411 MALIGNANT NEOPLASM OF UPPER-OUTER QUADRANT OF RIGHT BREAST IN FEMALE, ESTROGEN RECEPTOR NEGATIVE (H): ICD-10-CM

## 2022-05-11 DIAGNOSIS — R59.1 LYMPHADENOPATHY: Primary | ICD-10-CM

## 2022-05-11 DIAGNOSIS — Z17.1 MALIGNANT NEOPLASM OF UPPER-OUTER QUADRANT OF RIGHT BREAST IN FEMALE, ESTROGEN RECEPTOR NEGATIVE (H): ICD-10-CM

## 2022-05-11 PROCEDURE — 250N000011 HC RX IP 250 OP 636: Performed by: INTERNAL MEDICINE

## 2022-05-11 PROCEDURE — 96375 TX/PRO/DX INJ NEW DRUG ADDON: CPT

## 2022-05-11 PROCEDURE — 96413 CHEMO IV INFUSION 1 HR: CPT

## 2022-05-11 PROCEDURE — 96367 TX/PROPH/DG ADDL SEQ IV INF: CPT

## 2022-05-11 PROCEDURE — 258N000003 HC RX IP 258 OP 636: Performed by: INTERNAL MEDICINE

## 2022-05-11 RX ORDER — HEPARIN SODIUM (PORCINE) LOCK FLUSH IV SOLN 100 UNIT/ML 100 UNIT/ML
5 SOLUTION INTRAVENOUS
Status: DISCONTINUED | OUTPATIENT
Start: 2022-05-11 | End: 2022-05-11 | Stop reason: HOSPADM

## 2022-05-11 RX ADMIN — DIPHENHYDRAMINE HYDROCHLORIDE 50 MG: 50 INJECTION INTRAMUSCULAR; INTRAVENOUS at 08:53

## 2022-05-11 RX ADMIN — FAMOTIDINE 20 MG: 10 INJECTION INTRAVENOUS at 08:50

## 2022-05-11 RX ADMIN — DEXAMETHASONE SODIUM PHOSPHATE 20 MG: 10 INJECTION, SOLUTION INTRAMUSCULAR; INTRAVENOUS at 09:08

## 2022-05-11 RX ADMIN — PACLITAXEL 170 MG: 6 INJECTION, SOLUTION INTRAVENOUS at 09:34

## 2022-05-11 RX ADMIN — Medication 5 ML: at 10:35

## 2022-05-11 RX ADMIN — SODIUM CHLORIDE 250 ML: 9 INJECTION, SOLUTION INTRAVENOUS at 08:49

## 2022-05-11 NOTE — TELEPHONE ENCOUNTER
Spoke with Cathie at [facility]. She will request the slides that previous PD-L1 testing was done on and add on testing for the CPS.    Will await addendum.    Cathie Smith RN

## 2022-05-11 NOTE — PROGRESS NOTES
Infusion Nursing Note:  Flor Griffin presents today for D1C1 Taxol, premeds.    Patient seen by provider today: No   present during visit today: Not Applicable.    Note: Patient was scheduled to receive Keytruda as well today, but due to insurance issues (see pharmacy note), patient did not receive this.      Intravenous Access:  Implanted Port. Port in place from appointment yesterday; good blood return noted and flushed easily.     Treatment Conditions:  Lab Results   Component Value Date    HGB 12.6 05/10/2022    WBC 3.8 (L) 05/10/2022    ANEU 2.8 05/18/2021    ANEUTAUTO 2.4 05/10/2022     05/10/2022      Lab Results   Component Value Date     05/10/2022    POTASSIUM 3.8 05/10/2022    MAG 1.8 05/18/2021    CR 0.68 05/10/2022    EDILSON 9.0 05/10/2022    BILITOTAL 1.1 05/10/2022    ALBUMIN 3.4 05/10/2022    ALT 37 05/10/2022    AST 23 05/10/2022     Results reviewed, labs MET treatment parameters, ok to proceed with treatment.      Post Infusion Assessment:  Patient tolerated infusion without incident.  Blood return noted pre and post infusion.  Site patent and intact, free from redness, edema or discomfort.  No evidence of extravasations.  Access discontinued per protocol.       Discharge Plan:   Patient declined prescription refills.  Discharge instructions reviewed with: Patient.  AVS to patient via [employer].  Patient will return 5/18/22 for next chemotherapy.  Patient discharged in stable condition accompanied by:  (picking up in entrance).  Departure Mode: Ambulatory.      Danica Lara RN

## 2022-05-17 ENCOUNTER — MYC MEDICAL ADVICE (OUTPATIENT)
Dept: ONCOLOGY | Facility: CLINIC | Age: 67
End: 2022-05-17
Payer: COMMERCIAL

## 2022-05-18 ENCOUNTER — INFUSION THERAPY VISIT (OUTPATIENT)
Dept: INFUSION THERAPY | Facility: CLINIC | Age: 67
End: 2022-05-18
Attending: INTERNAL MEDICINE
Payer: COMMERCIAL

## 2022-05-18 VITALS
RESPIRATION RATE: 16 BRPM | WEIGHT: 178.3 LBS | DIASTOLIC BLOOD PRESSURE: 81 MMHG | TEMPERATURE: 97.2 F | SYSTOLIC BLOOD PRESSURE: 120 MMHG | OXYGEN SATURATION: 99 % | HEART RATE: 75 BPM | BODY MASS INDEX: 29.21 KG/M2

## 2022-05-18 DIAGNOSIS — Z17.1 MALIGNANT NEOPLASM OF UPPER-OUTER QUADRANT OF RIGHT BREAST IN FEMALE, ESTROGEN RECEPTOR NEGATIVE (H): ICD-10-CM

## 2022-05-18 DIAGNOSIS — C50.411 MALIGNANT NEOPLASM OF UPPER-OUTER QUADRANT OF RIGHT BREAST IN FEMALE, ESTROGEN RECEPTOR NEGATIVE (H): ICD-10-CM

## 2022-05-18 DIAGNOSIS — R59.1 LYMPHADENOPATHY: Primary | ICD-10-CM

## 2022-05-18 LAB
BASOPHILS # BLD AUTO: 0 10E3/UL (ref 0–0.2)
BASOPHILS NFR BLD AUTO: 1 %
EOSINOPHIL # BLD AUTO: 0.1 10E3/UL (ref 0–0.7)
EOSINOPHIL NFR BLD AUTO: 2 %
ERYTHROCYTE [DISTWIDTH] IN BLOOD BY AUTOMATED COUNT: 13.3 % (ref 10–15)
HCT VFR BLD AUTO: 37.6 % (ref 35–47)
HGB BLD-MCNC: 12.1 G/DL (ref 11.7–15.7)
IMM GRANULOCYTES # BLD: 0 10E3/UL
IMM GRANULOCYTES NFR BLD: 0 %
LYMPHOCYTES # BLD AUTO: 1 10E3/UL (ref 0.8–5.3)
LYMPHOCYTES NFR BLD AUTO: 25 %
MCH RBC QN AUTO: 32 PG (ref 26.5–33)
MCHC RBC AUTO-ENTMCNC: 32.2 G/DL (ref 31.5–36.5)
MCV RBC AUTO: 100 FL (ref 78–100)
MONOCYTES # BLD AUTO: 0.3 10E3/UL (ref 0–1.3)
MONOCYTES NFR BLD AUTO: 8 %
NEUTROPHILS # BLD AUTO: 2.4 10E3/UL (ref 1.6–8.3)
NEUTROPHILS NFR BLD AUTO: 64 %
NRBC # BLD AUTO: 0 10E3/UL
NRBC BLD AUTO-RTO: 0 /100
PLATELET # BLD AUTO: 248 10E3/UL (ref 150–450)
RBC # BLD AUTO: 3.78 10E6/UL (ref 3.8–5.2)
WBC # BLD AUTO: 3.7 10E3/UL (ref 4–11)

## 2022-05-18 PROCEDURE — 250N000011 HC RX IP 250 OP 636: Performed by: INTERNAL MEDICINE

## 2022-05-18 PROCEDURE — 258N000003 HC RX IP 258 OP 636: Performed by: INTERNAL MEDICINE

## 2022-05-18 PROCEDURE — 85025 COMPLETE CBC W/AUTO DIFF WBC: CPT | Performed by: INTERNAL MEDICINE

## 2022-05-18 PROCEDURE — 96375 TX/PRO/DX INJ NEW DRUG ADDON: CPT

## 2022-05-18 PROCEDURE — 96413 CHEMO IV INFUSION 1 HR: CPT

## 2022-05-18 RX ORDER — HEPARIN SODIUM (PORCINE) LOCK FLUSH IV SOLN 100 UNIT/ML 100 UNIT/ML
5 SOLUTION INTRAVENOUS
Status: DISCONTINUED | OUTPATIENT
Start: 2022-05-18 | End: 2022-05-18 | Stop reason: HOSPADM

## 2022-05-18 RX ADMIN — PACLITAXEL 151 MG: 6 INJECTION, SOLUTION INTRAVENOUS at 11:59

## 2022-05-18 RX ADMIN — FAMOTIDINE 20 MG: 10 INJECTION INTRAVENOUS at 11:18

## 2022-05-18 RX ADMIN — DIPHENHYDRAMINE HYDROCHLORIDE 50 MG: 50 INJECTION INTRAMUSCULAR; INTRAVENOUS at 11:19

## 2022-05-18 RX ADMIN — SODIUM CHLORIDE 250 ML: 9 INJECTION, SOLUTION INTRAVENOUS at 11:15

## 2022-05-18 RX ADMIN — DEXAMETHASONE SODIUM PHOSPHATE 20 MG: 10 INJECTION, SOLUTION INTRAMUSCULAR; INTRAVENOUS at 11:38

## 2022-05-18 RX ADMIN — Medication 5 ML: at 13:04

## 2022-05-18 NOTE — TELEPHONE ENCOUNTER
Spoke at length with Mannie Olea daughter about the current treatment plan and why it was changed. She states understanding. Also, she had questions regarding breast care that she should be doing, ie. Genetic counseling, mammos, etc. Per the Genetic counseling note, it was not felt necessary at this time and she should proceed with yearly mammograms starting at 35. Pts daughter was thankful for time spent discussing. She will call or mychart with future concerns or questions.    Cathie Smith RN

## 2022-05-18 NOTE — TELEPHONE ENCOUNTER
Per Dr Stover, CPS score is 1 so not a candidate for Keytruda. Pt and infusion financing were notified.    Cathie Smith RN

## 2022-05-18 NOTE — PROGRESS NOTES
"Infusion Nursing Note:  Flor Griffin presents today for D8C1 taxol+labs.    Patient seen by provider today: No   present during visit today: Not Applicable.    Note: Denies painor s/s of being sick this admission.Pt stated that benadryl was given \"too fast\" in the last infusion.       Intravenous Access:  Labs drawn without difficulty.  Implanted Port.    Treatment Conditions:  Lab Results   Component Value Date    HGB 12.1 05/18/2022    WBC 3.7 (L) 05/18/2022    ANEU 2.8 05/18/2021    ANEUTAUTO 2.4 05/18/2022     05/18/2022      Results reviewed, labs MET treatment parameters, ok to proceed with treatment.      Post Infusion Assessment:  Patient tolerated infusion without incident.  Blood return noted pre and post infusion.  Site patent and intact, free from redness, edema or discomfort.  No evidence of extravasations.  Access discontinued per protocol.       Discharge Plan:   Discharge instructions reviewed with: Patient.  Patient and/or family verbalized understanding of discharge instructions and all questions answered.  AVS to patient via [employer].  Patient will return 5/24/22 for lab appointment and visit with ERIK Rankin.  Patient discharged in stable condition accompanied by: self.  Departure Mode: Ambulatory.      Dulce Atkinson RN                      "

## 2022-05-18 NOTE — TELEPHONE ENCOUNTER
Received a call from patient's daughter that Michelle is wanting some more information about why the Keytruda was stopped. Patient's daughter requests that someone reach out to Michelle to explain. She states that Michelle may have talked about it with someone at her infusion appointment today, but if not, she would like a call.    Will route to Dr. Stover and RNCC for follow up.    Charito Meyers, RN  Triage Nurse Advisor  [facility]

## 2022-05-24 ENCOUNTER — ONCOLOGY VISIT (OUTPATIENT)
Dept: ONCOLOGY | Facility: CLINIC | Age: 67
End: 2022-05-24
Attending: PHYSICIAN ASSISTANT
Payer: COMMERCIAL

## 2022-05-24 ENCOUNTER — LAB (OUTPATIENT)
Dept: INFUSION THERAPY | Facility: CLINIC | Age: 67
End: 2022-05-24
Attending: PHYSICIAN ASSISTANT
Payer: COMMERCIAL

## 2022-05-24 VITALS
SYSTOLIC BLOOD PRESSURE: 118 MMHG | TEMPERATURE: 97.5 F | RESPIRATION RATE: 16 BRPM | BODY MASS INDEX: 29.07 KG/M2 | DIASTOLIC BLOOD PRESSURE: 76 MMHG | HEART RATE: 74 BPM | OXYGEN SATURATION: 98 % | HEIGHT: 66 IN | WEIGHT: 180.9 LBS

## 2022-05-24 DIAGNOSIS — R59.1 LYMPHADENOPATHY: ICD-10-CM

## 2022-05-24 DIAGNOSIS — Z17.0 MALIGNANT NEOPLASM OF OVERLAPPING SITES OF RIGHT BREAST IN FEMALE, ESTROGEN RECEPTOR POSITIVE (H): Primary | ICD-10-CM

## 2022-05-24 DIAGNOSIS — C50.811 MALIGNANT NEOPLASM OF OVERLAPPING SITES OF RIGHT BREAST IN FEMALE, ESTROGEN RECEPTOR POSITIVE (H): Primary | ICD-10-CM

## 2022-05-24 DIAGNOSIS — Z17.1 MALIGNANT NEOPLASM OF UPPER-OUTER QUADRANT OF RIGHT BREAST IN FEMALE, ESTROGEN RECEPTOR NEGATIVE (H): Primary | ICD-10-CM

## 2022-05-24 DIAGNOSIS — C50.411 MALIGNANT NEOPLASM OF UPPER-OUTER QUADRANT OF RIGHT BREAST IN FEMALE, ESTROGEN RECEPTOR NEGATIVE (H): Primary | ICD-10-CM

## 2022-05-24 LAB
BASOPHILS # BLD AUTO: 0 10E3/UL (ref 0–0.2)
BASOPHILS NFR BLD AUTO: 1 %
EOSINOPHIL # BLD AUTO: 0.1 10E3/UL (ref 0–0.7)
EOSINOPHIL NFR BLD AUTO: 2 %
ERYTHROCYTE [DISTWIDTH] IN BLOOD BY AUTOMATED COUNT: 13.4 % (ref 10–15)
HCT VFR BLD AUTO: 35.9 % (ref 35–47)
HGB BLD-MCNC: 11.5 G/DL (ref 11.7–15.7)
IMM GRANULOCYTES # BLD: 0 10E3/UL
IMM GRANULOCYTES NFR BLD: 1 %
LYMPHOCYTES # BLD AUTO: 0.9 10E3/UL (ref 0.8–5.3)
LYMPHOCYTES NFR BLD AUTO: 30 %
MCH RBC QN AUTO: 32.2 PG (ref 26.5–33)
MCHC RBC AUTO-ENTMCNC: 32 G/DL (ref 31.5–36.5)
MCV RBC AUTO: 101 FL (ref 78–100)
MONOCYTES # BLD AUTO: 0.2 10E3/UL (ref 0–1.3)
MONOCYTES NFR BLD AUTO: 6 %
NEUTROPHILS # BLD AUTO: 1.9 10E3/UL (ref 1.6–8.3)
NEUTROPHILS NFR BLD AUTO: 60 %
NRBC # BLD AUTO: 0 10E3/UL
NRBC BLD AUTO-RTO: 0 /100
PLATELET # BLD AUTO: 265 10E3/UL (ref 150–450)
RBC # BLD AUTO: 3.57 10E6/UL (ref 3.8–5.2)
WBC # BLD AUTO: 3.1 10E3/UL (ref 4–11)

## 2022-05-24 PROCEDURE — 99213 OFFICE O/P EST LOW 20 MIN: CPT | Performed by: PHYSICIAN ASSISTANT

## 2022-05-24 PROCEDURE — 250N000011 HC RX IP 250 OP 636: Performed by: PHYSICIAN ASSISTANT

## 2022-05-24 PROCEDURE — G0463 HOSPITAL OUTPT CLINIC VISIT: HCPCS

## 2022-05-24 PROCEDURE — 85025 COMPLETE CBC W/AUTO DIFF WBC: CPT | Performed by: INTERNAL MEDICINE

## 2022-05-24 RX ORDER — HEPARIN SODIUM (PORCINE) LOCK FLUSH IV SOLN 100 UNIT/ML 100 UNIT/ML
5 SOLUTION INTRAVENOUS EVERY 8 HOURS
Status: DISCONTINUED | OUTPATIENT
Start: 2022-05-24 | End: 2022-05-24 | Stop reason: HOSPADM

## 2022-05-24 RX ORDER — HEPARIN SODIUM,PORCINE 10 UNIT/ML
5 VIAL (ML) INTRAVENOUS ONCE
Status: COMPLETED | OUTPATIENT
Start: 2022-05-24 | End: 2022-05-24

## 2022-05-24 RX ADMIN — Medication 5 ML: at 12:53

## 2022-05-24 ASSESSMENT — PAIN SCALES - GENERAL: PAINLEVEL: NO PAIN (0)

## 2022-05-24 NOTE — LETTER
5/24/2022         RE: Flor Griffin  [id number]  [facility] [address]        Dear Colleague,    Thank you for referring your patient, Flor Griffin, to the [facility]. Please see a copy of my visit note below.    Oncology/Hematology Visit Note  May 24, 2022    Reason for Visit: Follow up of metastatic breast cancer      History of Present Illness: Flor Griffin is a 66 year old female who presents with the following oncologic history:     --Clinical TX N3c M0 adenocarcinoma which is poorly differentiated, likely of breast origin, ER low-positive at 1-5%, GA negative, HER-2/mitzi FISH negative, androgen stain weak-intermediate 10-20%.  Initially she was seen 11/22/2017 after she underwent right supraclavicular lymph node biopsy which was positive for poorly differentiated adenocarcinoma.  She had this lymph node almost a month and had an excisional biopsy performed which was consistent with the diagnosis of cancer.   --A PET/CT scan 11/27/2017 showed hypermetabolic right supraclavicular, right axillary and subpectoral adenopathy.  Otherwise, no distant metastatic disease.   --3/7/2018: Completed 4 cycles of carboplatin and paclitaxel followed by dose dense AC x 4 cycles.  No surgery was done.  --5/29/2018-7/31/2018: Completed adjuvant radiation to right breast, right axilla, right supraclavicular region.  --9/2018: Started adjuvant letrozole.  --10/15/2020: Screening mammogram showed focal asymmetry in upper outer right breast; no suspicious findings in left breast.  --10/21/2020: U/S of right breast showed irregularly-shaped hypoechoic mass at 10:00, 7 cm from nipple, measuring 3.2 x 2.7 x 3.6 cm. Right axillary ultrasound shows multiple normal-appearing lymph nodes. Biopsy of right breast mass at 10:00 showed poorly differentiated carcinoma, no lymphovascular invasion, morphology consistent with breast cancer and similar to prior axillary node tumor, ER  weakly positive at 1% and CA negative (0%), HER-2/mitzi FISH negative.  --10/28/2020: PET/CT scan showed high metabolic activity in 3 cm area of right upper outer breast, several small hypermetabolic lymph nodes in high right axilla and right subclavian region; mild hypermetabolic lymph nodes in bilateral hilar regions, favor metastatic disease; several tiny nodules in right upper lung, favor benign etiology; small hypermetabolic focus in pelvis in either sigmoid colon or adjacent loop of small bowel.  --11/02/2020: Started 1st line metastatic therapy with capecitabine.  --2/9/2021: PET/CT scan showed hypermetabolic right breast mass not significantly changed since 12/7/2020, persistent hypermetabolism; hypermetabolic right axillary lymph node increased in size; no distant metastasis; focal hypermetabolism in pelvis associated with sigmoid colon; stable 6-mm lung nodules.  --2/18/2021: Due to disease progression, switched to 2nd line metastatic therapy with eribulin. Required dose reduction due to neutropenia.  --4/24/2021: Left upper extremity Doppler U/S showed occlusive DVT within left subclavian vein; superficial thrombophlebitis within proximal cephalic vein. Started rivaroxaban.  --5/11/2021: PET scan showed unchanged size and slightly increased uptake to right breast; resolved right axillary adenopathy; no new lesions; persistent hypermetabolism at proximal sigmoid colon.  --5/19/2021 On chemo break after cycle 4 of eribulin.  --6/2/2021: Underwent palliative right mastectomy under care of Dr. Wilfredo Castillo. Pathology showed grade 3 invasive ductal carcinoma with tumor invasion into underlying skeletal muscle. Margins negative.  --7/22/2021: Colonoscopy showed diverticula in sigmoid colon; normal mucosa throughout entire colon.  --8/3/2021: PET scan showed postsurgical seroma at right chest wall and axilla; small focus of residual mild FDG uptake at superolateral resection margin, likely posttreatment change or  inflammation; no metastatic disease; persistent focus of uptake in sigmoid colon corresponding to diverticular disease.  --10/07/2021: U/S of right chest wall performed due to palpable lump. This showed hypoechoic nodule superior to level of scar measuring 0.7 x 0.6 x 1.5 cm. Biopsy of nodule showed poorly differentiated carcinoma consistent with recurrent breast carcinoma, grade 3, ER negative.  --10/19/2021: PET/CT showed right chest wall lesion with SUV 12.9; decreased right chest wall seroma; no FDG uptake in abdomen or pelvis.  --11/17/2021: Underwent excision of right chest wall nodule recurrence. Pathology showed grade 3 invasive ductal carcinoma, intramuscular; positive anterior and inferior margins of resection.  --1/25/2022 PET scan showed persistent hypermetabolic nodularity along anterior right chest wall suspicious for residual/recurrent tumor; persistent FDG uptake adjacent to short segment of sigmoid diverticulosis with minimal pericolonic fat stranding, unchanged, may represent chronic diverticulitis.  --2/14/2022-3/8/2022: Re-treatment with radiation to right chest wall.  --5/2/2022: PET scan showed increasing metabolic activity subtle pre-existing soft tissue nodules in the right breast/chest wall soft tissues suspicious for progression of disease.     She saw Dr. Stover 5/4/22 who recommended pembrolizumab every 3 weeks + weekly paclitaxel days 1, 8,15 every 4 weeks, however her PD-L1 CPS score came back at 1, so now just doing paclitaxel and no keytruda.    She started Taxol 5/11/22.    Interval History:  Michelle is tolerating Taxol well. She has started to lose her hair and plans on shaving it. She gets occasional twinges of pain in her right chest. She otherwise has no complaints. Still planning on moving in July.     Review of Systems:  Patient denies fevers, chills, night sweats, unexplained weight changes, headaches, dizziness, vision or hearing changes, new lumps or bumps, chest pain, shortness  of breath, cough, abdominal pain, nausea, vomiting, changes to bowel or bladder, swelling of extremities, bleeding issues, or rash.    Current Outpatient Medications   Medication Sig Dispense Refill     calcium carbonate (TUMS) 500 MG chewable tablet Take 1 chew tab by mouth daily as needed for heartburn       multivitamin w/minerals (THERA-VIT-M) tablet Take 1 tablet by mouth every morning        Omega-3 Fatty Acids (OMEGA-3 FISH OIL PO) Take 1 g by mouth every morning        prochlorperazine (COMPAZINE) 10 MG tablet Take 1 tablet (10 mg) by mouth every 6 hours as needed (Nausea/Vomiting) 30 tablet 2     rivaroxaban ANTICOAGULANT (XARELTO ANTICOAGULANT) 20 MG TABS tablet Take 1 tablet (20 mg) by mouth daily (with dinner) 90 tablet 3       Past Medical History  Past Medical History:   Diagnosis Date     Basal cell cancer     right cheek     Breast cancer, right breast (H)      DVT (deep venous thrombosis) (H)      Gastroesophageal reflux disease      History of blood transfusion     blue baby     Hyperlipidaemia      Past Surgical History:   Procedure Laterality Date     BIOPSY BREAST Right 11/17/2021    Procedure: Excise right chest wall mass;  Surgeon: Ulises Castillo MD;  Location:  OR     BIOPSY MASS NECK Right 11/16/2017    Procedure: BIOPSY MASS NECK;  Excisional Biopsy Right Neck Lymph node;  Surgeon: Waylon Corral MD;  Location:  OR     COLONOSCOPY       COLONOSCOPY N/A 7/22/2021    Procedure: COLONOSCOPY;  Surgeon: Gabriele Caro MD;  Location:  GI     IR CHEST PORT PLACEMENT > 5 YRS OF AGE  2/16/2021     IR PORT REMOVAL LEFT  5/13/2019     MASTECTOMY SIMPLE Right 6/2/2021    Procedure: RIGHT SIMPLE MASTECTOMY;  Surgeon: Ulises Castillo MD;  Location:  OR     MOHS MICROGRAPHIC PROCEDURE  ~2010    right cheek; BCC     MOHS MICROGRAPHIC PROCEDURE  10/31/2016    Dr. Nicholas [facility]     Allergies   Allergen Reactions     Pcn [Penicillins] Hives     Social History   Social History  "    Tobacco Use     Smoking status: Never Smoker     Smokeless tobacco: Never Used   Vaping Use     Vaping Use: Never used   Substance Use Topics     Alcohol use: Yes     Alcohol/week: 0.0 standard drinks     Comment: 2 times a week, 2 drinks, wine or whiskey     Drug use: No      Past medical history and social history were reviewed.    Physical Examination:  /76   Pulse 74   Temp 97.5  F (36.4  C) (Tympanic)   Resp 16   Ht 1.664 m (5' 5.51\")   Wt 82.1 kg (180 lb 14.4 oz)   LMP 01/01/2004 (Approximate)   SpO2 98%   BMI 29.64 kg/m    Wt Readings from Last 10 Encounters:   05/18/22 80.9 kg (178 lb 4.8 oz)   05/11/22 82.4 kg (181 lb 10.5 oz)   05/10/22 82.1 kg (181 lb)   05/04/22 76.7 kg (169 lb)   04/12/22 78 kg (172 lb)   03/31/22 77.1 kg (170 lb)   03/12/22 76.7 kg (169 lb)   11/17/21 78.9 kg (174 lb)   11/11/21 76.7 kg (169 lb)   10/27/21 76.2 kg (168 lb)     Constitutional: Well-appearing female in no acute distress.  Eyes: EOMI, PERRL. No scleral icterus.  ENT: Oral mucosa is moist without lesions or thrush.   Lymphatic: Neck is supple without cervical or supraclavicular lymphadenopathy.   Breast:R chest wall exam with 1.5cm firm eeper nodule, mildly tender, along with more lateral 0.5cm superficial nodule.    Cardiovascular: Regular rate and rhythm. No murmurs, gallops, or rubs. No peripheral edema.  Respiratory: Clear to auscultation bilaterally. No wheezes or crackles.  Gastrointestinal: Bowel sounds present. Abdomen soft, non-tender. No palpable hepatosplenomegaly or masses.   Neurologic: Cranial nerves II through XII are grossly intact.  Skin: No rashes, petechiae, or bruising noted on exposed skin.    Laboratory Data:   Latest Reference Range & Units 05/24/22 12:52   WBC 4.0 - 11.0 10e3/uL 3.1 (L)   Hemoglobin 11.7 - 15.7 g/dL 11.5 (L)   Hematocrit 35.0 - 47.0 % 35.9   Platelet Count 150 - 450 10e3/uL 265   RBC Count 3.80 - 5.20 10e6/uL 3.57 (L)   MCV 78 - 100 fL 101 (H)   MCH 26.5 - 33.0 pg " 32.2   MCHC 31.5 - 36.5 g/dL 32.0   RDW 10.0 - 15.0 % 13.4   % Neutrophils % 60   % Lymphocytes % 30   % Monocytes % 6   % Eosinophils % 2   % Basophils % 1   Absolute Basophils 0.0 - 0.2 10e3/uL 0.0   Absolute Eosinophils 0.0 - 0.7 10e3/uL 0.1   Absolute Immature Granulocytes <=0.4 10e3/uL 0.0   Absolute Lymphocytes 0.8 - 5.3 10e3/uL 0.9   Absolute Monocytes 0.0 - 1.3 10e3/uL 0.2   % Immature Granulocytes % 1   Absolute Neutrophils 1.6 - 8.3 10e3/uL 1.9   Absolute NRBCs 10e3/uL 0.0   NRBCs per 100 WBC <1 /100 0   (L): Data is abnormally low  (H): Data is abnormally high    Assessment and Plan:  1. Metastatic Triple Negative Breast Cancer  See history above, s/p progression after prior chemotherapy, surgery, and radiation. Now on weekly paclitaxel day 1, 8, 15 every 4 weeks. No pembrolizumab due to CPS score only 1. Did confirm with Dr. Stover plan to continue paclitaxel alone and see if we get a response.      Will continue with treatment tomorrow. She is tolerating well and labs stable. Tentative plan for 3 months of treatment and then repeat PET. Will monitor clinically as well.      She is moving to [place] in July and plans to transition care to [facility].     2. LUE DVT  Diagnosed April 2021. Continue Rivaroxaban indefinitely.     25 minutes spent on the date of the encounter doing chart review, review of test results, interpretation of tests, patient visit and documentation     Chucho Lorenzo PA-C  Department of Hematology and Oncology  [facility]         Again, thank you for allowing me to participate in the care of your patient.        Sincerely,        ERIK Boogie

## 2022-05-24 NOTE — PROGRESS NOTES
Oncology/Hematology Visit Note  May 24, 2022    Reason for Visit: Follow up of metastatic breast cancer      History of Present Illness: Flor Griffin is a 66 year old female who presents with the following oncologic history:     --Clinical TX N3c M0 adenocarcinoma which is poorly differentiated, likely of breast origin, ER low-positive at 1-5%, IA negative, HER-2/mitzi FISH negative, androgen stain weak-intermediate 10-20%.  Initially she was seen 11/22/2017 after she underwent right supraclavicular lymph node biopsy which was positive for poorly differentiated adenocarcinoma.  She had this lymph node almost a month and had an excisional biopsy performed which was consistent with the diagnosis of cancer.   --A PET/CT scan 11/27/2017 showed hypermetabolic right supraclavicular, right axillary and subpectoral adenopathy.  Otherwise, no distant metastatic disease.   --3/7/2018: Completed 4 cycles of carboplatin and paclitaxel followed by dose dense AC x 4 cycles.  No surgery was done.  --5/29/2018-7/31/2018: Completed adjuvant radiation to right breast, right axilla, right supraclavicular region.  --9/2018: Started adjuvant letrozole.  --10/15/2020: Screening mammogram showed focal asymmetry in upper outer right breast; no suspicious findings in left breast.  --10/21/2020: U/S of right breast showed irregularly-shaped hypoechoic mass at 10:00, 7 cm from nipple, measuring 3.2 x 2.7 x 3.6 cm. Right axillary ultrasound shows multiple normal-appearing lymph nodes. Biopsy of right breast mass at 10:00 showed poorly differentiated carcinoma, no lymphovascular invasion, morphology consistent with breast cancer and similar to prior axillary node tumor, ER weakly positive at 1% and IA negative (0%), HER-2/mitzi FISH negative.  --10/28/2020: PET/CT scan showed high metabolic activity in 3 cm area of right upper outer breast, several small hypermetabolic lymph nodes in high right axilla and right subclavian region; mild  hypermetabolic lymph nodes in bilateral hilar regions, favor metastatic disease; several tiny nodules in right upper lung, favor benign etiology; small hypermetabolic focus in pelvis in either sigmoid colon or adjacent loop of small bowel.  --11/02/2020: Started 1st line metastatic therapy with capecitabine.  --2/9/2021: PET/CT scan showed hypermetabolic right breast mass not significantly changed since 12/7/2020, persistent hypermetabolism; hypermetabolic right axillary lymph node increased in size; no distant metastasis; focal hypermetabolism in pelvis associated with sigmoid colon; stable 6-mm lung nodules.  --2/18/2021: Due to disease progression, switched to 2nd line metastatic therapy with eribulin. Required dose reduction due to neutropenia.  --4/24/2021: Left upper extremity Doppler U/S showed occlusive DVT within left subclavian vein; superficial thrombophlebitis within proximal cephalic vein. Started rivaroxaban.  --5/11/2021: PET scan showed unchanged size and slightly increased uptake to right breast; resolved right axillary adenopathy; no new lesions; persistent hypermetabolism at proximal sigmoid colon.  --5/19/2021 On chemo break after cycle 4 of eribulin.  --6/2/2021: Underwent palliative right mastectomy under care of Dr. Wilfredo Castillo. Pathology showed grade 3 invasive ductal carcinoma with tumor invasion into underlying skeletal muscle. Margins negative.  --7/22/2021: Colonoscopy showed diverticula in sigmoid colon; normal mucosa throughout entire colon.  --8/3/2021: PET scan showed postsurgical seroma at right chest wall and axilla; small focus of residual mild FDG uptake at superolateral resection margin, likely posttreatment change or inflammation; no metastatic disease; persistent focus of uptake in sigmoid colon corresponding to diverticular disease.  --10/07/2021: U/S of right chest wall performed due to palpable lump. This showed hypoechoic nodule superior to level of scar measuring 0.7 x 0.6  x 1.5 cm. Biopsy of nodule showed poorly differentiated carcinoma consistent with recurrent breast carcinoma, grade 3, ER negative.  --10/19/2021: PET/CT showed right chest wall lesion with SUV 12.9; decreased right chest wall seroma; no FDG uptake in abdomen or pelvis.  --11/17/2021: Underwent excision of right chest wall nodule recurrence. Pathology showed grade 3 invasive ductal carcinoma, intramuscular; positive anterior and inferior margins of resection.  --1/25/2022 PET scan showed persistent hypermetabolic nodularity along anterior right chest wall suspicious for residual/recurrent tumor; persistent FDG uptake adjacent to short segment of sigmoid diverticulosis with minimal pericolonic fat stranding, unchanged, may represent chronic diverticulitis.  --2/14/2022-3/8/2022: Re-treatment with radiation to right chest wall.  --5/2/2022: PET scan showed increasing metabolic activity subtle pre-existing soft tissue nodules in the right breast/chest wall soft tissues suspicious for progression of disease.     She saw Dr. Stover 5/4/22 who recommended pembrolizumab every 3 weeks + weekly paclitaxel days 1, 8,15 every 4 weeks, however her PD-L1 CPS score came back at 1, so now just doing paclitaxel and no keytruda.    She started Taxol 5/11/22.    Interval History:  Michelle is tolerating Taxol well. She has started to lose her hair and plans on shaving it. She gets occasional twinges of pain in her right chest. She otherwise has no complaints. Still planning on moving in July.     Review of Systems:  Patient denies fevers, chills, night sweats, unexplained weight changes, headaches, dizziness, vision or hearing changes, new lumps or bumps, chest pain, shortness of breath, cough, abdominal pain, nausea, vomiting, changes to bowel or bladder, swelling of extremities, bleeding issues, or rash.    Current Outpatient Medications   Medication Sig Dispense Refill     calcium carbonate (TUMS) 500 MG chewable tablet Take 1 chew tab  by mouth daily as needed for heartburn       multivitamin w/minerals (THERA-VIT-M) tablet Take 1 tablet by mouth every morning        Omega-3 Fatty Acids (OMEGA-3 FISH OIL PO) Take 1 g by mouth every morning        prochlorperazine (COMPAZINE) 10 MG tablet Take 1 tablet (10 mg) by mouth every 6 hours as needed (Nausea/Vomiting) 30 tablet 2     rivaroxaban ANTICOAGULANT (XARELTO ANTICOAGULANT) 20 MG TABS tablet Take 1 tablet (20 mg) by mouth daily (with dinner) 90 tablet 3       Past Medical History  Past Medical History:   Diagnosis Date     Basal cell cancer     right cheek     Breast cancer, right breast (H)      DVT (deep venous thrombosis) (H)      Gastroesophageal reflux disease      History of blood transfusion     blue baby     Hyperlipidaemia      Past Surgical History:   Procedure Laterality Date     BIOPSY BREAST Right 11/17/2021    Procedure: Excise right chest wall mass;  Surgeon: Ulises Castillo MD;  Location: [facility] OR     BIOPSY MASS NECK Right 11/16/2017    Procedure: BIOPSY MASS NECK;  Excisional Biopsy Right Neck Lymph node;  Surgeon: Waylon Corral MD;  Location: [facility] OR     COLONOSCOPY       COLONOSCOPY N/A 7/22/2021    Procedure: COLONOSCOPY;  Surgeon: Gabriele Caro MD;  Location:  GI     IR CHEST PORT PLACEMENT > 5 YRS OF AGE  2/16/2021     IR PORT REMOVAL LEFT  5/13/2019     MASTECTOMY SIMPLE Right 6/2/2021    Procedure: RIGHT SIMPLE MASTECTOMY;  Surgeon: Ulises Castillo MD;  Location: [facility] OR     MOHS MICROGRAPHIC PROCEDURE  ~2010    right cheek; BCC     MOHS MICROGRAPHIC PROCEDURE  10/31/2016    Dr. Nicholas [facility]     Allergies   Allergen Reactions     Pcn [Penicillins] Hives     Social History   Social History     Tobacco Use     Smoking status: Never Smoker     Smokeless tobacco: Never Used   Vaping Use     Vaping Use: Never used   Substance Use Topics     Alcohol use: Yes     Alcohol/week: 0.0 standard drinks     Comment: 2 times a week, 2 drinks, wine or whiskey     Drug  "use: No      Past medical history and social history were reviewed.    Physical Examination:  /76   Pulse 74   Temp 97.5  F (36.4  C) (Tympanic)   Resp 16   Ht 1.664 m (5' 5.51\")   Wt 82.1 kg (180 lb 14.4 oz)   LMP 01/01/2004 (Approximate)   SpO2 98%   BMI 29.64 kg/m    Wt Readings from Last 10 Encounters:   05/18/22 80.9 kg (178 lb 4.8 oz)   05/11/22 82.4 kg (181 lb 10.5 oz)   05/10/22 82.1 kg (181 lb)   05/04/22 76.7 kg (169 lb)   04/12/22 78 kg (172 lb)   03/31/22 77.1 kg (170 lb)   03/12/22 76.7 kg (169 lb)   11/17/21 78.9 kg (174 lb)   11/11/21 76.7 kg (169 lb)   10/27/21 76.2 kg (168 lb)     Constitutional: Well-appearing female in no acute distress.  Eyes: EOMI, PERRL. No scleral icterus.  ENT: Oral mucosa is moist without lesions or thrush.   Lymphatic: Neck is supple without cervical or supraclavicular lymphadenopathy.   Breast:R chest wall exam with 1.5cm firm eeper nodule, mildly tender, along with more lateral 0.5cm superficial nodule.    Cardiovascular: Regular rate and rhythm. No murmurs, gallops, or rubs. No peripheral edema.  Respiratory: Clear to auscultation bilaterally. No wheezes or crackles.  Gastrointestinal: Bowel sounds present. Abdomen soft, non-tender. No palpable hepatosplenomegaly or masses.   Neurologic: Cranial nerves II through XII are grossly intact.  Skin: No rashes, petechiae, or bruising noted on exposed skin.    Laboratory Data:   Latest Reference Range & Units 05/24/22 12:52   WBC 4.0 - 11.0 10e3/uL 3.1 (L)   Hemoglobin 11.7 - 15.7 g/dL 11.5 (L)   Hematocrit 35.0 - 47.0 % 35.9   Platelet Count 150 - 450 10e3/uL 265   RBC Count 3.80 - 5.20 10e6/uL 3.57 (L)   MCV 78 - 100 fL 101 (H)   MCH 26.5 - 33.0 pg 32.2   MCHC 31.5 - 36.5 g/dL 32.0   RDW 10.0 - 15.0 % 13.4   % Neutrophils % 60   % Lymphocytes % 30   % Monocytes % 6   % Eosinophils % 2   % Basophils % 1   Absolute Basophils 0.0 - 0.2 10e3/uL 0.0   Absolute Eosinophils 0.0 - 0.7 10e3/uL 0.1   Absolute Immature " Granulocytes <=0.4 10e3/uL 0.0   Absolute Lymphocytes 0.8 - 5.3 10e3/uL 0.9   Absolute Monocytes 0.0 - 1.3 10e3/uL 0.2   % Immature Granulocytes % 1   Absolute Neutrophils 1.6 - 8.3 10e3/uL 1.9   Absolute NRBCs 10e3/uL 0.0   NRBCs per 100 WBC <1 /100 0   (L): Data is abnormally low  (H): Data is abnormally high    Assessment and Plan:  1. Metastatic Triple Negative Breast Cancer  See history above, s/p progression after prior chemotherapy, surgery, and radiation. Now on weekly paclitaxel day 1, 8, 15 every 4 weeks. No pembrolizumab due to CPS score only 1. Did confirm with Dr. Stover plan to continue paclitaxel alone and see if we get a response.      Will continue with treatment tomorrow. She is tolerating well and labs stable. Tentative plan for 3 months of treatment and then repeat PET. Will monitor clinically as well.      She is moving to [place] in July and plans to transition care to [place] [facility].     2. LUE DVT  Diagnosed April 2021. Continue Rivaroxaban indefinitely.     25 minutes spent on the date of the encounter doing chart review, review of test results, interpretation of tests, patient visit and documentation     Chucho Lorenzo PA-C  Department of Hematology and Oncology  [facility]

## 2022-05-24 NOTE — NURSING NOTE
"Oncology Rooming Note    May 24, 2022 1:03 PM   Flor Griffin is a 66 year old female who presents for:    Chief Complaint   Patient presents with     Oncology Clinic Visit     Malignant neoplasm of upper-outer quadrant of right breast in female, estrogen receptor negative      Initial Vitals: /76   Pulse 74   Temp 97.5  F (36.4  C) (Tympanic)   Resp 16   Ht 1.664 m (5' 5.51\")   Wt 82.1 kg (180 lb 14.4 oz)   LMP 01/01/2004 (Approximate)   SpO2 98%   BMI 29.64 kg/m   Estimated body mass index is 29.64 kg/m  as calculated from the following:    Height as of this encounter: 1.664 m (5' 5.51\").    Weight as of this encounter: 82.1 kg (180 lb 14.4 oz). Body surface area is 1.95 meters squared.  No Pain (0) Comment: Data Unavailable   Patient's last menstrual period was 01/01/2004 (approximate).  Allergies reviewed: Yes  Medications reviewed: Yes    Medications: Medication refills not needed today.  Pharmacy name entered into [employer]: [id number] - [place], [place] - [address]    Clinical concerns: follow up       Audrey Brown, PALMA              "

## 2022-05-24 NOTE — PROGRESS NOTES
Nursing Note:  Flor Griffin presents today for port labs.    Patient seen by provider today: Yes: Chucho   present during visit today: Not Applicable.    Note: Port needle remains in place for treatment tomorrow.    Intravenous Access:  Lab draw site port, Needle type port, Gauge 20 3/4in.  Labs drawn without difficulty.  Implanted Port.    Discharge Plan:   Patient was sent to clinic for PA appointment.    JOSE ROBERTO ARIZA RN

## 2022-05-25 ENCOUNTER — INFUSION THERAPY VISIT (OUTPATIENT)
Dept: INFUSION THERAPY | Facility: CLINIC | Age: 67
End: 2022-05-25
Attending: INTERNAL MEDICINE
Payer: COMMERCIAL

## 2022-05-25 VITALS
DIASTOLIC BLOOD PRESSURE: 74 MMHG | RESPIRATION RATE: 16 BRPM | SYSTOLIC BLOOD PRESSURE: 121 MMHG | OXYGEN SATURATION: 99 % | TEMPERATURE: 97.5 F | HEART RATE: 65 BPM

## 2022-05-25 DIAGNOSIS — C50.411 MALIGNANT NEOPLASM OF UPPER-OUTER QUADRANT OF RIGHT BREAST IN FEMALE, ESTROGEN RECEPTOR NEGATIVE (H): ICD-10-CM

## 2022-05-25 DIAGNOSIS — Z17.1 MALIGNANT NEOPLASM OF UPPER-OUTER QUADRANT OF RIGHT BREAST IN FEMALE, ESTROGEN RECEPTOR NEGATIVE (H): ICD-10-CM

## 2022-05-25 DIAGNOSIS — R59.1 LYMPHADENOPATHY: Primary | ICD-10-CM

## 2022-05-25 PROCEDURE — 258N000003 HC RX IP 258 OP 636: Performed by: INTERNAL MEDICINE

## 2022-05-25 PROCEDURE — 96413 CHEMO IV INFUSION 1 HR: CPT

## 2022-05-25 PROCEDURE — 250N000011 HC RX IP 250 OP 636: Performed by: INTERNAL MEDICINE

## 2022-05-25 RX ORDER — HEPARIN SODIUM (PORCINE) LOCK FLUSH IV SOLN 100 UNIT/ML 100 UNIT/ML
5 SOLUTION INTRAVENOUS
Status: DISCONTINUED | OUTPATIENT
Start: 2022-05-25 | End: 2022-05-25 | Stop reason: HOSPADM

## 2022-05-25 RX ADMIN — PACLITAXEL 151 MG: 6 INJECTION, SOLUTION INTRAVENOUS at 10:19

## 2022-05-25 RX ADMIN — Medication 5 ML: at 11:25

## 2022-05-25 RX ADMIN — SODIUM CHLORIDE 250 ML: 9 INJECTION, SOLUTION INTRAVENOUS at 09:55

## 2022-05-25 NOTE — PROGRESS NOTES
Infusion Nursing Note:  Flor Griffin presents today for Cycle 1, Day 15 Taxol.    Patient seen by provider today: No. Chucho Lorenzo PA-C on 5/245/22.   present during visit today: Not Applicable.    Note:  Patient reports is feeling well and offers no new issues since appointment with Chucho Lorenzo PA-C yesterday (5/24/22).  Patient denies fevers, chills or signs of infection.    Patient reports that she tolerated Taxol # 1 and Taxol # 2 without incident.  No Pre-Medications given today prior to Taxol # 3 per policy.  Patient understands to contact staff immediately if problems or concerns.  Patient understands to contact clinic if issues post Taxol.      Intravenous Access:  Port access + Labs on 5/24/22.  Port site C/D/I.   Port flushes well + excellent blood return.    Treatment Conditions:  Lab Results   Component Value Date    HGB 11.5 (L) 05/24/2022    WBC 3.1 (L) 05/24/2022    ANEU 2.8 05/18/2021    ANEUTAUTO 1.9 05/24/2022     05/24/2022      Results reviewed, labs MET treatment parameters, ok to proceed with treatment.      Post Infusion Assessment:  Patient tolerated infusion without incident.  Blood return noted pre and post infusion.  Site patent and intact, free from redness, edema or discomfort.  No evidence of extravasations.  Access discontinued per protocol.       Discharge Plan:   Discharge instructions reviewed with: Patient.  Patient and/or family verbalized understanding of discharge instructions and all questions answered.  Patient discharged in stable condition accompanied by: self.  Departure Mode: Ambulatory.  6/8/22: Labs + Cycle 2, Day 1 Taxol.    Veronica Mckeon, RN, BSN, OCN on 5/25/2022 at 10:55 AM

## 2022-06-03 NOTE — TELEPHONE ENCOUNTER
Free Drug Application Denied  Denial Reason(s):  Income above threshold  Patient notified? yes  Additional Information:

## 2022-06-08 ENCOUNTER — INFUSION THERAPY VISIT (OUTPATIENT)
Dept: INFUSION THERAPY | Facility: CLINIC | Age: 67
End: 2022-06-08
Attending: INTERNAL MEDICINE
Payer: COMMERCIAL

## 2022-06-08 VITALS
RESPIRATION RATE: 18 BRPM | WEIGHT: 179 LBS | DIASTOLIC BLOOD PRESSURE: 74 MMHG | HEART RATE: 65 BPM | OXYGEN SATURATION: 98 % | SYSTOLIC BLOOD PRESSURE: 110 MMHG | TEMPERATURE: 98.5 F | BODY MASS INDEX: 29.33 KG/M2

## 2022-06-08 DIAGNOSIS — R59.1 LYMPHADENOPATHY: Primary | ICD-10-CM

## 2022-06-08 DIAGNOSIS — C50.411 MALIGNANT NEOPLASM OF UPPER-OUTER QUADRANT OF RIGHT BREAST IN FEMALE, ESTROGEN RECEPTOR NEGATIVE (H): ICD-10-CM

## 2022-06-08 DIAGNOSIS — Z17.1 MALIGNANT NEOPLASM OF UPPER-OUTER QUADRANT OF RIGHT BREAST IN FEMALE, ESTROGEN RECEPTOR NEGATIVE (H): ICD-10-CM

## 2022-06-08 LAB
BASOPHILS # BLD AUTO: 0 10E3/UL (ref 0–0.2)
BASOPHILS NFR BLD AUTO: 1 %
CANCER AG27-29 SERPL-ACNC: 12 U/ML (ref 0–39)
CEA SERPL-MCNC: 1 UG/L (ref 0–2.5)
EOSINOPHIL # BLD AUTO: 0.1 10E3/UL (ref 0–0.7)
EOSINOPHIL NFR BLD AUTO: 3 %
ERYTHROCYTE [DISTWIDTH] IN BLOOD BY AUTOMATED COUNT: 14.4 % (ref 10–15)
HCT VFR BLD AUTO: 35.2 % (ref 35–47)
HGB BLD-MCNC: 11.2 G/DL (ref 11.7–15.7)
IMM GRANULOCYTES # BLD: 0 10E3/UL
IMM GRANULOCYTES NFR BLD: 1 %
LYMPHOCYTES # BLD AUTO: 0.9 10E3/UL (ref 0.8–5.3)
LYMPHOCYTES NFR BLD AUTO: 27 %
MCH RBC QN AUTO: 31.9 PG (ref 26.5–33)
MCHC RBC AUTO-ENTMCNC: 31.8 G/DL (ref 31.5–36.5)
MCV RBC AUTO: 100 FL (ref 78–100)
MONOCYTES # BLD AUTO: 0.5 10E3/UL (ref 0–1.3)
MONOCYTES NFR BLD AUTO: 14 %
NEUTROPHILS # BLD AUTO: 1.9 10E3/UL (ref 1.6–8.3)
NEUTROPHILS NFR BLD AUTO: 54 %
NRBC # BLD AUTO: 0 10E3/UL
NRBC BLD AUTO-RTO: 0 /100
PLATELET # BLD AUTO: 228 10E3/UL (ref 150–450)
RBC # BLD AUTO: 3.51 10E6/UL (ref 3.8–5.2)
WBC # BLD AUTO: 3.5 10E3/UL (ref 4–11)

## 2022-06-08 PROCEDURE — 85025 COMPLETE CBC W/AUTO DIFF WBC: CPT | Performed by: INTERNAL MEDICINE

## 2022-06-08 PROCEDURE — 86300 IMMUNOASSAY TUMOR CA 15-3: CPT | Performed by: INTERNAL MEDICINE

## 2022-06-08 PROCEDURE — 82378 CARCINOEMBRYONIC ANTIGEN: CPT | Performed by: INTERNAL MEDICINE

## 2022-06-08 PROCEDURE — 258N000003 HC RX IP 258 OP 636: Performed by: INTERNAL MEDICINE

## 2022-06-08 PROCEDURE — 250N000011 HC RX IP 250 OP 636: Performed by: INTERNAL MEDICINE

## 2022-06-08 PROCEDURE — 96413 CHEMO IV INFUSION 1 HR: CPT

## 2022-06-08 RX ORDER — HEPARIN SODIUM (PORCINE) LOCK FLUSH IV SOLN 100 UNIT/ML 100 UNIT/ML
5 SOLUTION INTRAVENOUS
Status: DISCONTINUED | OUTPATIENT
Start: 2022-06-08 | End: 2022-06-08 | Stop reason: HOSPADM

## 2022-06-08 RX ADMIN — PACLITAXEL 151 MG: 6 INJECTION, SOLUTION INTRAVENOUS at 11:20

## 2022-06-08 RX ADMIN — Medication 5 ML: at 12:32

## 2022-06-08 RX ADMIN — SODIUM CHLORIDE 250 ML: 9 INJECTION, SOLUTION INTRAVENOUS at 11:06

## 2022-06-08 ASSESSMENT — PAIN SCALES - GENERAL: PAINLEVEL: NO PAIN (0)

## 2022-06-08 NOTE — PROGRESS NOTES
"Infusion Nursing Note:  Flor Griffin presents today for Labs, C1D29 Taxol, Taxol #4.    Patient seen by provider today: No   present during visit today: Not Applicable.    Note: Patient reports observing \"small, raised bumps on my arms a day or two after last infusion\". Reports that \"they cleared up right away and were not bothersome at all\". Denies any rash or itching today. Writer advised patient to monitor for this symptom and to contact clinic should it return and/or not clear or become bothersome. Patient stated understanding, all questions answered.     Patient tolerated last Taxol infusion without pre-medications, as such none given today: Tolerated well.    Intravenous Access:  Labs drawn without difficulty.  Implanted Port.    Treatment Conditions:  Lab Results   Component Value Date    HGB 11.2 (L) 06/08/2022    WBC 3.5 (L) 06/08/2022    ANEU 2.8 05/18/2021    ANEUTAUTO 1.9 06/08/2022     06/08/2022      Results reviewed, labs MET treatment parameters, ok to proceed with treatment.    Post Infusion Assessment:  Patient tolerated infusion without incident.  Blood return noted pre and post infusion.  Site patent and intact, free from redness, edema or discomfort.  No evidence of extravasations.  Access discontinued per protocol.     Discharge Plan:   Discharge instructions reviewed with: Patient.  Patient and/or family verbalized understanding of discharge instructions and all questions answered.  AVS to patient via [employer].  Patient will return 6/15/22 for next appointment.   Patient discharged in stable condition accompanied by: self.  Departure Mode: Ambulatory.      Lori Rodriguez RN                      "

## 2022-06-15 ENCOUNTER — LAB (OUTPATIENT)
Dept: INFUSION THERAPY | Facility: CLINIC | Age: 67
End: 2022-06-15
Attending: PHYSICIAN ASSISTANT
Payer: COMMERCIAL

## 2022-06-15 ENCOUNTER — ONCOLOGY VISIT (OUTPATIENT)
Dept: ONCOLOGY | Facility: CLINIC | Age: 67
End: 2022-06-15
Attending: PHYSICIAN ASSISTANT
Payer: COMMERCIAL

## 2022-06-15 ENCOUNTER — INFUSION THERAPY VISIT (OUTPATIENT)
Dept: INFUSION THERAPY | Facility: CLINIC | Age: 67
End: 2022-06-15
Attending: INTERNAL MEDICINE
Payer: COMMERCIAL

## 2022-06-15 VITALS
WEIGHT: 180 LBS | BODY MASS INDEX: 28.93 KG/M2 | OXYGEN SATURATION: 98 % | DIASTOLIC BLOOD PRESSURE: 69 MMHG | SYSTOLIC BLOOD PRESSURE: 116 MMHG | HEIGHT: 66 IN | HEART RATE: 83 BPM | RESPIRATION RATE: 16 BRPM | TEMPERATURE: 97 F

## 2022-06-15 DIAGNOSIS — C50.411 MALIGNANT NEOPLASM OF UPPER-OUTER QUADRANT OF RIGHT BREAST IN FEMALE, ESTROGEN RECEPTOR NEGATIVE (H): ICD-10-CM

## 2022-06-15 DIAGNOSIS — Z17.1 MALIGNANT NEOPLASM OF UPPER-OUTER QUADRANT OF RIGHT BREAST IN FEMALE, ESTROGEN RECEPTOR NEGATIVE (H): Primary | ICD-10-CM

## 2022-06-15 DIAGNOSIS — C50.411 MALIGNANT NEOPLASM OF UPPER-OUTER QUADRANT OF RIGHT BREAST IN FEMALE, ESTROGEN RECEPTOR NEGATIVE (H): Primary | ICD-10-CM

## 2022-06-15 DIAGNOSIS — Z17.1 MALIGNANT NEOPLASM OF UPPER-OUTER QUADRANT OF RIGHT BREAST IN FEMALE, ESTROGEN RECEPTOR NEGATIVE (H): ICD-10-CM

## 2022-06-15 DIAGNOSIS — R59.1 LYMPHADENOPATHY: Primary | ICD-10-CM

## 2022-06-15 DIAGNOSIS — R59.1 LYMPHADENOPATHY: ICD-10-CM

## 2022-06-15 LAB
ALBUMIN SERPL-MCNC: 3.5 G/DL (ref 3.4–5)
ALP SERPL-CCNC: 101 U/L (ref 40–150)
ALT SERPL W P-5'-P-CCNC: 42 U/L (ref 0–50)
ANION GAP SERPL CALCULATED.3IONS-SCNC: 6 MMOL/L (ref 3–14)
AST SERPL W P-5'-P-CCNC: 26 U/L (ref 0–45)
BASOPHILS # BLD AUTO: 0 10E3/UL (ref 0–0.2)
BASOPHILS NFR BLD AUTO: 1 %
BILIRUB SERPL-MCNC: 0.5 MG/DL (ref 0.2–1.3)
BUN SERPL-MCNC: 10 MG/DL (ref 7–30)
CALCIUM SERPL-MCNC: 8.9 MG/DL (ref 8.5–10.1)
CHLORIDE BLD-SCNC: 106 MMOL/L (ref 94–109)
CO2 SERPL-SCNC: 27 MMOL/L (ref 20–32)
CREAT SERPL-MCNC: 0.62 MG/DL (ref 0.52–1.04)
EOSINOPHIL # BLD AUTO: 0.3 10E3/UL (ref 0–0.7)
EOSINOPHIL NFR BLD AUTO: 6 %
ERYTHROCYTE [DISTWIDTH] IN BLOOD BY AUTOMATED COUNT: 14.2 % (ref 10–15)
GFR SERPL CREATININE-BSD FRML MDRD: >90 ML/MIN/1.73M2
GLUCOSE BLD-MCNC: 144 MG/DL (ref 70–99)
HCT VFR BLD AUTO: 34.8 % (ref 35–47)
HGB BLD-MCNC: 11.1 G/DL (ref 11.7–15.7)
IMM GRANULOCYTES # BLD: 0 10E3/UL
IMM GRANULOCYTES NFR BLD: 1 %
LYMPHOCYTES # BLD AUTO: 1.2 10E3/UL (ref 0.8–5.3)
LYMPHOCYTES NFR BLD AUTO: 29 %
MCH RBC QN AUTO: 31.8 PG (ref 26.5–33)
MCHC RBC AUTO-ENTMCNC: 31.9 G/DL (ref 31.5–36.5)
MCV RBC AUTO: 100 FL (ref 78–100)
MONOCYTES # BLD AUTO: 0.3 10E3/UL (ref 0–1.3)
MONOCYTES NFR BLD AUTO: 7 %
NEUTROPHILS # BLD AUTO: 2.4 10E3/UL (ref 1.6–8.3)
NEUTROPHILS NFR BLD AUTO: 56 %
NRBC # BLD AUTO: 0 10E3/UL
NRBC BLD AUTO-RTO: 0 /100
PLATELET # BLD AUTO: 289 10E3/UL (ref 150–450)
POTASSIUM BLD-SCNC: 3.8 MMOL/L (ref 3.4–5.3)
PROT SERPL-MCNC: 6.5 G/DL (ref 6.8–8.8)
RBC # BLD AUTO: 3.49 10E6/UL (ref 3.8–5.2)
SODIUM SERPL-SCNC: 139 MMOL/L (ref 133–144)
WBC # BLD AUTO: 4.2 10E3/UL (ref 4–11)

## 2022-06-15 PROCEDURE — 36591 DRAW BLOOD OFF VENOUS DEVICE: CPT

## 2022-06-15 PROCEDURE — 96413 CHEMO IV INFUSION 1 HR: CPT

## 2022-06-15 PROCEDURE — 99213 OFFICE O/P EST LOW 20 MIN: CPT | Performed by: PHYSICIAN ASSISTANT

## 2022-06-15 PROCEDURE — 258N000003 HC RX IP 258 OP 636: Performed by: INTERNAL MEDICINE

## 2022-06-15 PROCEDURE — 80053 COMPREHEN METABOLIC PANEL: CPT | Performed by: INTERNAL MEDICINE

## 2022-06-15 PROCEDURE — 85025 COMPLETE CBC W/AUTO DIFF WBC: CPT | Performed by: INTERNAL MEDICINE

## 2022-06-15 PROCEDURE — 250N000011 HC RX IP 250 OP 636: Performed by: INTERNAL MEDICINE

## 2022-06-15 PROCEDURE — G0463 HOSPITAL OUTPT CLINIC VISIT: HCPCS | Mod: 25

## 2022-06-15 RX ORDER — HEPARIN SODIUM (PORCINE) LOCK FLUSH IV SOLN 100 UNIT/ML 100 UNIT/ML
5 SOLUTION INTRAVENOUS
Status: DISCONTINUED | OUTPATIENT
Start: 2022-06-15 | End: 2022-06-15 | Stop reason: HOSPADM

## 2022-06-15 RX ADMIN — PACLITAXEL 151 MG: 6 INJECTION, SOLUTION INTRAVENOUS at 10:13

## 2022-06-15 RX ADMIN — SODIUM CHLORIDE 250 ML: 9 INJECTION, SOLUTION INTRAVENOUS at 10:13

## 2022-06-15 ASSESSMENT — PAIN SCALES - GENERAL: PAINLEVEL: NO PAIN (0)

## 2022-06-15 NOTE — PROGRESS NOTES
Oncology/Hematology Visit Note  Howard 15, 2022    Reason for Visit: Follow up of metastatic breast cancer      History of Present Illness: Flor Griffin is a 66 year old female who presents with the following oncologic history:     --Clinical TX N3c M0 adenocarcinoma which is poorly differentiated, likely of breast origin, ER low-positive at 1-5%, SD negative, HER-2/mitzi FISH negative, androgen stain weak-intermediate 10-20%.  Initially she was seen 11/22/2017 after she underwent right supraclavicular lymph node biopsy which was positive for poorly differentiated adenocarcinoma.  She had this lymph node almost a month and had an excisional biopsy performed which was consistent with the diagnosis of cancer.   --A PET/CT scan 11/27/2017 showed hypermetabolic right supraclavicular, right axillary and subpectoral adenopathy.  Otherwise, no distant metastatic disease.   --3/7/2018: Completed 4 cycles of carboplatin and paclitaxel followed by dose dense AC x 4 cycles.  No surgery was done.  --5/29/2018-7/31/2018: Completed adjuvant radiation to right breast, right axilla, right supraclavicular region.  --9/2018: Started adjuvant letrozole.  --10/15/2020: Screening mammogram showed focal asymmetry in upper outer right breast; no suspicious findings in left breast.  --10/21/2020: U/S of right breast showed irregularly-shaped hypoechoic mass at 10:00, 7 cm from nipple, measuring 3.2 x 2.7 x 3.6 cm. Right axillary ultrasound shows multiple normal-appearing lymph nodes. Biopsy of right breast mass at 10:00 showed poorly differentiated carcinoma, no lymphovascular invasion, morphology consistent with breast cancer and similar to prior axillary node tumor, ER weakly positive at 1% and SD negative (0%), HER-2/mitzi FISH negative.  --10/28/2020: PET/CT scan showed high metabolic activity in 3 cm area of right upper outer breast, several small hypermetabolic lymph nodes in high right axilla and right subclavian region; mild  hypermetabolic lymph nodes in bilateral hilar regions, favor metastatic disease; several tiny nodules in right upper lung, favor benign etiology; small hypermetabolic focus in pelvis in either sigmoid colon or adjacent loop of small bowel.  --11/02/2020: Started 1st line metastatic therapy with capecitabine.  --2/9/2021: PET/CT scan showed hypermetabolic right breast mass not significantly changed since 12/7/2020, persistent hypermetabolism; hypermetabolic right axillary lymph node increased in size; no distant metastasis; focal hypermetabolism in pelvis associated with sigmoid colon; stable 6-mm lung nodules.  --2/18/2021: Due to disease progression, switched to 2nd line metastatic therapy with eribulin. Required dose reduction due to neutropenia.  --4/24/2021: Left upper extremity Doppler U/S showed occlusive DVT within left subclavian vein; superficial thrombophlebitis within proximal cephalic vein. Started rivaroxaban.  --5/11/2021: PET scan showed unchanged size and slightly increased uptake to right breast; resolved right axillary adenopathy; no new lesions; persistent hypermetabolism at proximal sigmoid colon.  --5/19/2021 On chemo break after cycle 4 of eribulin.  --6/2/2021: Underwent palliative right mastectomy under care of Dr. Wilfredo Castillo. Pathology showed grade 3 invasive ductal carcinoma with tumor invasion into underlying skeletal muscle. Margins negative.  --7/22/2021: Colonoscopy showed diverticula in sigmoid colon; normal mucosa throughout entire colon.  --8/3/2021: PET scan showed postsurgical seroma at right chest wall and axilla; small focus of residual mild FDG uptake at superolateral resection margin, likely posttreatment change or inflammation; no metastatic disease; persistent focus of uptake in sigmoid colon corresponding to diverticular disease.  --10/07/2021: U/S of right chest wall performed due to palpable lump. This showed hypoechoic nodule superior to level of scar measuring 0.7 x 0.6  x 1.5 cm. Biopsy of nodule showed poorly differentiated carcinoma consistent with recurrent breast carcinoma, grade 3, ER negative.  --10/19/2021: PET/CT showed right chest wall lesion with SUV 12.9; decreased right chest wall seroma; no FDG uptake in abdomen or pelvis.  --11/17/2021: Underwent excision of right chest wall nodule recurrence. Pathology showed grade 3 invasive ductal carcinoma, intramuscular; positive anterior and inferior margins of resection.  --1/25/2022 PET scan showed persistent hypermetabolic nodularity along anterior right chest wall suspicious for residual/recurrent tumor; persistent FDG uptake adjacent to short segment of sigmoid diverticulosis with minimal pericolonic fat stranding, unchanged, may represent chronic diverticulitis.  --2/14/2022-3/8/2022: Re-treatment with radiation to right chest wall.  --5/2/2022: PET scan showed increasing metabolic activity subtle pre-existing soft tissue nodules in the right breast/chest wall soft tissues suspicious for progression of disease.     She saw Dr. Stover 5/4/22 who recommended pembrolizumab every 3 weeks + weekly paclitaxel days 1, 8,15 every 4 weeks, however her PD-L1 CPS score came back at 1, so now just doing paclitaxel and no keytruda.     She started Taxol 5/11/22.     Interval History:  Michelle is overall doing well. She has had a few looser stools but then alternates with normal bowel movements. She notes slight neuropathy at times but not consistent. She feels the nodules in the right breast are smaller.    Review of Systems:  Patient denies fevers, chills, night sweats, unexplained weight changes, headaches, dizziness, vision or hearing changes, new lumps or bumps, chest pain, shortness of breath, cough, abdominal pain, nausea, vomiting, changes to bowel or bladder, swelling of extremities, bleeding issues, or rash.    Current Outpatient Medications   Medication Sig Dispense Refill     calcium carbonate (TUMS) 500 MG chewable tablet Take 1  chew tab by mouth daily as needed for heartburn       multivitamin w/minerals (THERA-VIT-M) tablet Take 1 tablet by mouth every morning        Omega-3 Fatty Acids (OMEGA-3 FISH OIL PO) Take 1 g by mouth every morning        prochlorperazine (COMPAZINE) 10 MG tablet Take 1 tablet (10 mg) by mouth every 6 hours as needed (Nausea/Vomiting) (Patient not taking: Reported on 6/8/2022) 30 tablet 2     rivaroxaban ANTICOAGULANT (XARELTO ANTICOAGULANT) 20 MG TABS tablet Take 1 tablet (20 mg) by mouth daily (with dinner) 90 tablet 3       Past Medical History  Past Medical History:   Diagnosis Date     Basal cell cancer     right cheek     Breast cancer, right breast (H)      DVT (deep venous thrombosis) (H)      Gastroesophageal reflux disease      History of blood transfusion     blue baby     Hyperlipidaemia      Past Surgical History:   Procedure Laterality Date     BIOPSY BREAST Right 11/17/2021    Procedure: Excise right chest wall mass;  Surgeon: Ulises Castillo MD;  Location:  OR     BIOPSY MASS NECK Right 11/16/2017    Procedure: BIOPSY MASS NECK;  Excisional Biopsy Right Neck Lymph node;  Surgeon: Waylon Corral MD;  Location: [facility] OR     COLONOSCOPY       COLONOSCOPY N/A 7/22/2021    Procedure: COLONOSCOPY;  Surgeon: Gabriele Caro MD;  Location:  GI     IR CHEST PORT PLACEMENT > 5 YRS OF AGE  2/16/2021     IR PORT REMOVAL LEFT  5/13/2019     MASTECTOMY SIMPLE Right 6/2/2021    Procedure: RIGHT SIMPLE MASTECTOMY;  Surgeon: Ulises Castillo MD;  Location: [facility] OR     MOHS MICROGRAPHIC PROCEDURE  ~2010    right cheek; [facility]     MOHS MICROGRAPHIC PROCEDURE  10/31/2016    Dr. Nicholas [facility]     Allergies   Allergen Reactions     Pcn [Penicillins] Hives     Social History   Social History     Tobacco Use     Smoking status: Never Smoker     Smokeless tobacco: Never Used   Vaping Use     Vaping Use: Never used   Substance Use Topics     Alcohol use: Yes     Alcohol/week: 0.0 standard drinks     Comment:  "2 times a week, 2 drinks, wine or whiskey     Drug use: No      Past medical history and social history were reviewed.    Physical Examination:  /69 (Cuff Size: Adult Large)   Pulse 83   Temp 97  F (36.1  C) (Tympanic)   Resp 16   Ht 1.664 m (5' 5.5\")   Wt 81.6 kg (180 lb)   LMP 01/01/2004   SpO2 98%   BMI 29.50 kg/m    Wt Readings from Last 10 Encounters:   06/08/22 81.2 kg (179 lb)   05/24/22 82.1 kg (180 lb 14.4 oz)   05/18/22 80.9 kg (178 lb 4.8 oz)   05/11/22 82.4 kg (181 lb 10.5 oz)   05/10/22 82.1 kg (181 lb)   05/04/22 76.7 kg (169 lb)   04/12/22 78 kg (172 lb)   03/31/22 77.1 kg (170 lb)   03/12/22 76.7 kg (169 lb)   11/17/21 78.9 kg (174 lb)     Constitutional: Well-appearing female in no acute distress.  Eyes: EOMI, PERRL. No scleral icterus.  ENT: Oral mucosa is moist without lesions or thrush.   Lymphatic: Neck is supple without cervical or supraclavicular lymphadenopathy.   Breast:R chest wall exam unable to feel deeper nodule. Lateral 0.5cm superficial nodule unchanged.    Cardiovascular: Regular rate and rhythm. No murmurs, gallops, or rubs. No peripheral edema.  Respiratory: Clear to auscultation bilaterally. No wheezes or crackles.  Gastrointestinal: Bowel sounds present. Abdomen soft, non-tender. No palpable hepatosplenomegaly or masses.   Neurologic: Cranial nerves II through XII are grossly intact.  Skin: No rashes, petechiae, or bruising noted on exposed skin.    Laboratory Data:   Latest Reference Range & Units 06/15/22 08:31   Sodium 133 - 144 mmol/L 139   Potassium 3.4 - 5.3 mmol/L 3.8   Chloride 94 - 109 mmol/L 106   Carbon Dioxide 20 - 32 mmol/L 27   Urea Nitrogen 7 - 30 mg/dL 10   Creatinine 0.52 - 1.04 mg/dL 0.62   GFR Estimate >60 mL/min/1.73m2 >90 [1]   Calcium 8.5 - 10.1 mg/dL 8.9   Anion Gap 3 - 14 mmol/L 6   Albumin 3.4 - 5.0 g/dL 3.5   Protein Total 6.8 - 8.8 g/dL 6.5 (L)   Bilirubin Total 0.2 - 1.3 mg/dL 0.5   Alkaline Phosphatase 40 - 150 U/L 101   ALT 0 - 50 U/L 42 "   AST 0 - 45 U/L 26   Glucose 70 - 99 mg/dL 144 (H)   WBC 4.0 - 11.0 10e3/uL 4.2   Hemoglobin 11.7 - 15.7 g/dL 11.1 (L)   Hematocrit 35.0 - 47.0 % 34.8 (L)   Platelet Count 150 - 450 10e3/uL 289   RBC Count 3.80 - 5.20 10e6/uL 3.49 (L)   MCV 78 - 100 fL 100   MCH 26.5 - 33.0 pg 31.8   MCHC 31.5 - 36.5 g/dL 31.9   RDW 10.0 - 15.0 % 14.2   % Neutrophils % 56   % Lymphocytes % 29   % Monocytes % 7   % Eosinophils % 6   % Basophils % 1   Absolute Basophils 0.0 - 0.2 10e3/uL 0.0   Absolute Eosinophils 0.0 - 0.7 10e3/uL 0.3   Absolute Immature Granulocytes <=0.4 10e3/uL 0.0   Absolute Lymphocytes 0.8 - 5.3 10e3/uL 1.2   Absolute Monocytes 0.0 - 1.3 10e3/uL 0.3   % Immature Granulocytes % 1   Absolute Neutrophils 1.6 - 8.3 10e3/uL 2.4   Absolute NRBCs 10e3/uL 0.0   NRBCs per 100 WBC <1 /100 0   (L): Data is abnormally low  (H): Data is abnormally high  [1] Effective December 21, 2021 eGFRcr in adults is calculated using the 2021 CKD-EPI creatinine equation which includes age and gender (John reynoso al., [facility], DOI: 10.1056/PTRXaf0275505)      Assessment and Plan:  1. Metastatic Triple Negative Breast Cancer  See history above, s/p progression after prior chemotherapy, surgery, and radiation. Now on weekly paclitaxel 80mg/m2 (slightly lower dose due to concerns for neutropenia per Dr. Stover) day 1, 8, 15 every 4 weeks. No pembrolizumab due to CPS score only 1. Started 5/11/22.     She is tolerating Taxol well. Labs stable. Will monitor intermittent diarrhea. Will continue with treatment today. She is tolerating well and labs stable. Tentative plan for 3 months of treatment (through August) and then repeat PET. Will monitor clinically as well which seems to show improvement.     She is moving to [place] in July and is thinking about transitioning care to [facility] but plans to stay with us for now.     2. LUE DVT  Diagnosed April 2021. Continue Rivaroxaban indefinitely.     16 minutes spent on the date of the encounter doing  chart review, review of test results, interpretation of tests, patient visit and documentation     Chucho Lorenzo PA-C  Department of Hematology and Oncology  [facility]

## 2022-06-15 NOTE — NURSING NOTE
"Oncology Rooming Note    Aaliyah 15, 2022 9:06 AM   Flor Griffin is a 67 year old female who presents for:    Chief Complaint   Patient presents with     Oncology Clinic Visit     Malignant neoplasm of upper-outer quadrant of right breast in female, estrogen receptor negative      Initial Vitals: Pulse 83   Temp 97  F (36.1  C) (Tympanic)   Resp 16   Ht 1.664 m (5' 5.5\")   Wt 81.6 kg (180 lb)   LMP 01/01/2004   SpO2 98%   BMI 29.50 kg/m   Estimated body mass index is 29.5 kg/m  as calculated from the following:    Height as of this encounter: 1.664 m (5' 5.5\").    Weight as of this encounter: 81.6 kg (180 lb). Body surface area is 1.94 meters squared.  No Pain (0) Comment: Data Unavailable   Patient's last menstrual period was 01/01/2004.  Allergies reviewed: Yes  Medications reviewed: Yes    Medications: Medication refills not needed today.  Pharmacy name entered into [facility]: [id number] - [place], [place] - [address]    Clinical concerns: f/u       Frida Weaver, PALMA              "

## 2022-06-15 NOTE — LETTER
6/15/2022         RE: Flor Griffin  [address]  [facility] [id number]        Dear Colleague,    Thank you for referring your patient, Flor Griffin, to the [facility]. Please see a copy of my visit note below.    Oncology/Hematology Visit Note  Howard 15, 2022    Reason for Visit: Follow up of metastatic breast cancer      History of Present Illness: Flor Griffin is a 66 year old female who presents with the following oncologic history:     --Clinical TX N3c M0 adenocarcinoma which is poorly differentiated, likely of breast origin, ER low-positive at 1-5%, AZ negative, HER-2/mitzi FISH negative, androgen stain weak-intermediate 10-20%.  Initially she was seen 11/22/2017 after she underwent right supraclavicular lymph node biopsy which was positive for poorly differentiated adenocarcinoma.  She had this lymph node almost a month and had an excisional biopsy performed which was consistent with the diagnosis of cancer.   --A PET/CT scan 11/27/2017 showed hypermetabolic right supraclavicular, right axillary and subpectoral adenopathy.  Otherwise, no distant metastatic disease.   --3/7/2018: Completed 4 cycles of carboplatin and paclitaxel followed by dose dense AC x 4 cycles.  No surgery was done.  --5/29/2018-7/31/2018: Completed adjuvant radiation to right breast, right axilla, right supraclavicular region.  --9/2018: Started adjuvant letrozole.  --10/15/2020: Screening mammogram showed focal asymmetry in upper outer right breast; no suspicious findings in left breast.  --10/21/2020: U/S of right breast showed irregularly-shaped hypoechoic mass at 10:00, 7 cm from nipple, measuring 3.2 x 2.7 x 3.6 cm. Right axillary ultrasound shows multiple normal-appearing lymph nodes. Biopsy of right breast mass at 10:00 showed poorly differentiated carcinoma, no lymphovascular invasion, morphology consistent with breast cancer and similar to prior axillary node tumor, ER  weakly positive at 1% and AZ negative (0%), HER-2/mitzi FISH negative.  --10/28/2020: PET/CT scan showed high metabolic activity in 3 cm area of right upper outer breast, several small hypermetabolic lymph nodes in high right axilla and right subclavian region; mild hypermetabolic lymph nodes in bilateral hilar regions, favor metastatic disease; several tiny nodules in right upper lung, favor benign etiology; small hypermetabolic focus in pelvis in either sigmoid colon or adjacent loop of small bowel.  --11/02/2020: Started 1st line metastatic therapy with capecitabine.  --2/9/2021: PET/CT scan showed hypermetabolic right breast mass not significantly changed since 12/7/2020, persistent hypermetabolism; hypermetabolic right axillary lymph node increased in size; no distant metastasis; focal hypermetabolism in pelvis associated with sigmoid colon; stable 6-mm lung nodules.  --2/18/2021: Due to disease progression, switched to 2nd line metastatic therapy with eribulin. Required dose reduction due to neutropenia.  --4/24/2021: Left upper extremity Doppler U/S showed occlusive DVT within left subclavian vein; superficial thrombophlebitis within proximal cephalic vein. Started rivaroxaban.  --5/11/2021: PET scan showed unchanged size and slightly increased uptake to right breast; resolved right axillary adenopathy; no new lesions; persistent hypermetabolism at proximal sigmoid colon.  --5/19/2021 On chemo break after cycle 4 of eribulin.  --6/2/2021: Underwent palliative right mastectomy under care of Dr. Wilfredo Castillo. Pathology showed grade 3 invasive ductal carcinoma with tumor invasion into underlying skeletal muscle. Margins negative.  --7/22/2021: Colonoscopy showed diverticula in sigmoid colon; normal mucosa throughout entire colon.  --8/3/2021: PET scan showed postsurgical seroma at right chest wall and axilla; small focus of residual mild FDG uptake at superolateral resection margin, likely posttreatment change or  inflammation; no metastatic disease; persistent focus of uptake in sigmoid colon corresponding to diverticular disease.  --10/07/2021: U/S of right chest wall performed due to palpable lump. This showed hypoechoic nodule superior to level of scar measuring 0.7 x 0.6 x 1.5 cm. Biopsy of nodule showed poorly differentiated carcinoma consistent with recurrent breast carcinoma, grade 3, ER negative.  --10/19/2021: PET/CT showed right chest wall lesion with SUV 12.9; decreased right chest wall seroma; no FDG uptake in abdomen or pelvis.  --11/17/2021: Underwent excision of right chest wall nodule recurrence. Pathology showed grade 3 invasive ductal carcinoma, intramuscular; positive anterior and inferior margins of resection.  --1/25/2022 PET scan showed persistent hypermetabolic nodularity along anterior right chest wall suspicious for residual/recurrent tumor; persistent FDG uptake adjacent to short segment of sigmoid diverticulosis with minimal pericolonic fat stranding, unchanged, may represent chronic diverticulitis.  --2/14/2022-3/8/2022: Re-treatment with radiation to right chest wall.  --5/2/2022: PET scan showed increasing metabolic activity subtle pre-existing soft tissue nodules in the right breast/chest wall soft tissues suspicious for progression of disease.     She saw Dr. Stover 5/4/22 who recommended pembrolizumab every 3 weeks + weekly paclitaxel days 1, 8,15 every 4 weeks, however her PD-L1 CPS score came back at 1, so now just doing paclitaxel and no keytruda.     She started Taxol 5/11/22.     Interval History:  Michelle is overall doing well. She has had a few looser stools but then alternates with normal bowel movements. She notes slight neuropathy at times but not consistent. She feels the nodules in the right breast are smaller.    Review of Systems:  Patient denies fevers, chills, night sweats, unexplained weight changes, headaches, dizziness, vision or hearing changes, new lumps or bumps, chest pain,  shortness of breath, cough, abdominal pain, nausea, vomiting, changes to bowel or bladder, swelling of extremities, bleeding issues, or rash.    Current Outpatient Medications   Medication Sig Dispense Refill     calcium carbonate (TUMS) 500 MG chewable tablet Take 1 chew tab by mouth daily as needed for heartburn       multivitamin w/minerals (THERA-VIT-M) tablet Take 1 tablet by mouth every morning        Omega-3 Fatty Acids (OMEGA-3 FISH OIL PO) Take 1 g by mouth every morning        prochlorperazine (COMPAZINE) 10 MG tablet Take 1 tablet (10 mg) by mouth every 6 hours as needed (Nausea/Vomiting) (Patient not taking: Reported on 6/8/2022) 30 tablet 2     rivaroxaban ANTICOAGULANT (XARELTO ANTICOAGULANT) 20 MG TABS tablet Take 1 tablet (20 mg) by mouth daily (with dinner) 90 tablet 3       Past Medical History  Past Medical History:   Diagnosis Date     Basal cell cancer     right cheek     Breast cancer, right breast (H)      DVT (deep venous thrombosis) (H)      Gastroesophageal reflux disease      History of blood transfusion     blue baby     Hyperlipidaemia      Past Surgical History:   Procedure Laterality Date     BIOPSY BREAST Right 11/17/2021    Procedure: Excise right chest wall mass;  Surgeon: Ulises Castillo MD;  Location:  OR     BIOPSY MASS NECK Right 11/16/2017    Procedure: BIOPSY MASS NECK;  Excisional Biopsy Right Neck Lymph node;  Surgeon: Waylon Corarl MD;  Location: [facility] OR     COLONOSCOPY       COLONOSCOPY N/A 7/22/2021    Procedure: COLONOSCOPY;  Surgeon: Gabriele Caro MD;  Location:  GI     IR CHEST PORT PLACEMENT > 5 YRS OF AGE  2/16/2021     IR PORT REMOVAL LEFT  5/13/2019     MASTECTOMY SIMPLE Right 6/2/2021    Procedure: RIGHT SIMPLE MASTECTOMY;  Surgeon: Ulises Castillo MD;  Location:  OR     MOHS MICROGRAPHIC PROCEDURE  ~2010    right cheek; BCC     MOHS MICROGRAPHIC PROCEDURE  10/31/2016    Dr. Nicholas BCC     Allergies   Allergen Reactions     Pcn  "[Penicillins] Hives     Social History   Social History     Tobacco Use     Smoking status: Never Smoker     Smokeless tobacco: Never Used   Vaping Use     Vaping Use: Never used   Substance Use Topics     Alcohol use: Yes     Alcohol/week: 0.0 standard drinks     Comment: 2 times a week, 2 drinks, wine or whiskey     Drug use: No      Past medical history and social history were reviewed.    Physical Examination:  /69 (Cuff Size: Adult Large)   Pulse 83   Temp 97  F (36.1  C) (Tympanic)   Resp 16   Ht 1.664 m (5' 5.5\")   Wt 81.6 kg (180 lb)   LMP 01/01/2004   SpO2 98%   BMI 29.50 kg/m    Wt Readings from Last 10 Encounters:   06/08/22 81.2 kg (179 lb)   05/24/22 82.1 kg (180 lb 14.4 oz)   05/18/22 80.9 kg (178 lb 4.8 oz)   05/11/22 82.4 kg (181 lb 10.5 oz)   05/10/22 82.1 kg (181 lb)   05/04/22 76.7 kg (169 lb)   04/12/22 78 kg (172 lb)   03/31/22 77.1 kg (170 lb)   03/12/22 76.7 kg (169 lb)   11/17/21 78.9 kg (174 lb)     Constitutional: Well-appearing female in no acute distress.  Eyes: EOMI, PERRL. No scleral icterus.  ENT: Oral mucosa is moist without lesions or thrush.   Lymphatic: Neck is supple without cervical or supraclavicular lymphadenopathy.   Breast:R chest wall exam unable to feel deeper nodule. Lateral 0.5cm superficial nodule unchanged.    Cardiovascular: Regular rate and rhythm. No murmurs, gallops, or rubs. No peripheral edema.  Respiratory: Clear to auscultation bilaterally. No wheezes or crackles.  Gastrointestinal: Bowel sounds present. Abdomen soft, non-tender. No palpable hepatosplenomegaly or masses.   Neurologic: Cranial nerves II through XII are grossly intact.  Skin: No rashes, petechiae, or bruising noted on exposed skin.    Laboratory Data:   Latest Reference Range & Units 06/15/22 08:31   Sodium 133 - 144 mmol/L 139   Potassium 3.4 - 5.3 mmol/L 3.8   Chloride 94 - 109 mmol/L 106   Carbon Dioxide 20 - 32 mmol/L 27   Urea Nitrogen 7 - 30 mg/dL 10   Creatinine 0.52 - 1.04 " mg/dL 0.62   GFR Estimate >60 mL/min/1.73m2 >90 [1]   Calcium 8.5 - 10.1 mg/dL 8.9   Anion Gap 3 - 14 mmol/L 6   Albumin 3.4 - 5.0 g/dL 3.5   Protein Total 6.8 - 8.8 g/dL 6.5 (L)   Bilirubin Total 0.2 - 1.3 mg/dL 0.5   Alkaline Phosphatase 40 - 150 U/L 101   ALT 0 - 50 U/L 42   AST 0 - 45 U/L 26   Glucose 70 - 99 mg/dL 144 (H)   WBC 4.0 - 11.0 10e3/uL 4.2   Hemoglobin 11.7 - 15.7 g/dL 11.1 (L)   Hematocrit 35.0 - 47.0 % 34.8 (L)   Platelet Count 150 - 450 10e3/uL 289   RBC Count 3.80 - 5.20 10e6/uL 3.49 (L)   MCV 78 - 100 fL 100   MCH 26.5 - 33.0 pg 31.8   MCHC 31.5 - 36.5 g/dL 31.9   RDW 10.0 - 15.0 % 14.2   % Neutrophils % 56   % Lymphocytes % 29   % Monocytes % 7   % Eosinophils % 6   % Basophils % 1   Absolute Basophils 0.0 - 0.2 10e3/uL 0.0   Absolute Eosinophils 0.0 - 0.7 10e3/uL 0.3   Absolute Immature Granulocytes <=0.4 10e3/uL 0.0   Absolute Lymphocytes 0.8 - 5.3 10e3/uL 1.2   Absolute Monocytes 0.0 - 1.3 10e3/uL 0.3   % Immature Granulocytes % 1   Absolute Neutrophils 1.6 - 8.3 10e3/uL 2.4   Absolute NRBCs 10e3/uL 0.0   NRBCs per 100 WBC <1 /100 0   (L): Data is abnormally low  (H): Data is abnormally high  [1] Effective December 21, 2021 eGFRcr in adults is calculated using the 2021 CKD-EPI creatinine equation which includes age and gender (John reynoso al., NEJ, DOI: 10.1056/XPJXca6442003)      Assessment and Plan:  1. Metastatic Triple Negative Breast Cancer  See history above, s/p progression after prior chemotherapy, surgery, and radiation. Now on weekly paclitaxel 80mg/m2 (slightly lower dose due to concerns for neutropenia per Dr. Stover) day 1, 8, 15 every 4 weeks. No pembrolizumab due to CPS score only 1. Started 5/11/22.     She is tolerating Taxol well. Labs stable. Will monitor intermittent diarrhea. Will continue with treatment today. She is tolerating well and labs stable. Tentative plan for 3 months of treatment (through August) and then repeat PET. Will monitor clinically as well which seems to  show improvement.     She is moving to [place] in July and is thinking about transitioning care to [facility] but plans to stay with us for now.     2. LUE DVT  Diagnosed April 2021. Continue Rivaroxaban indefinitely.     16 minutes spent on the date of the encounter doing chart review, review of test results, interpretation of tests, patient visit and documentation     Chucho Lorenzo PA-C  Department of Hematology and Oncology  [facility]         Again, thank you for allowing me to participate in the care of your patient.        Sincerely,        ERIK Boogie

## 2022-06-15 NOTE — PROGRESS NOTES
Infusion Nursing Note:  Flor Griffin presents today for C1D36 Taxol.    Patient seen by provider today: Yes: ERIK Mesa   present during visit today: Not Applicable.    Note: N/A.    Intravenous Access:  Implanted Port.    Treatment Conditions:  Lab Results   Component Value Date    HGB 11.1 (L) 06/15/2022    WBC 4.2 06/15/2022    ANEU 2.8 05/18/2021    ANEUTAUTO 2.4 06/15/2022     06/15/2022      Lab Results   Component Value Date     06/15/2022    POTASSIUM 3.8 06/15/2022    MAG 1.8 05/18/2021    CR 0.62 06/15/2022    EDILSON 8.9 06/15/2022    BILITOTAL 0.5 06/15/2022    ALBUMIN 3.5 06/15/2022    ALT 42 06/15/2022    AST 26 06/15/2022     Results reviewed, labs MET treatment parameters, ok to proceed with treatment.    Post Infusion Assessment:  Patient tolerated infusion without incident.  Blood return noted pre and post infusion.  Site patent and intact, free from redness, edema or discomfort.  No evidence of extravasations.  Access discontinued per protocol.     Discharge Plan:   Discharge instructions reviewed with: Patient.  Patient and/or family verbalized understanding of discharge instructions and all questions answered.  Copy of AVS reviewed with patient and/or family.  Patient will return 6/22/22 for next appointment.  Patient discharged in stable condition accompanied by: self.  Departure Mode: Ambulatory.      Justine Dominguez RN

## 2022-06-15 NOTE — PROGRESS NOTES
Nursing Note:  Flor Griffin presents today for Port labs.    Patient seen by provider today: Yes: Chucho Lorenzo PA-C   present during visit today: Not Applicable.    Note: N/A.    Intravenous Access:  Labs drawn without difficulty.  Implanted Port.    Discharge Plan:   Patient was sent to [facility] for clinic appointment.    Lori Rodriguez RN

## 2022-06-21 NOTE — PROGRESS NOTES
[facility]    Hematology/Oncology Established Patient Follow-up Note      Today's Date: 7/6/2022    Reason for Follow-up: Metastatic breast cancer.    HISTORY OF PRESENT ILLNESS: Flor Griffin is a 67 year old female who presents with the following oncologic history:     --Clinical TX N3c M0 adenocarcinoma which is poorly differentiated, likely of breast origin, ER low-positive at 1-5%, TX negative, HER-2/mitzi FISH negative, androgen stain weak-intermediate 10-20%.  Initially she was seen 11/22/2017 after she underwent right supraclavicular lymph node biopsy which was positive for poorly differentiated adenocarcinoma.  She had this lymph node almost a month and had an excisional biopsy performed which was consistent with the diagnosis of cancer.   --A PET/CT scan 11/27/2017 showed hypermetabolic right supraclavicular, right axillary and subpectoral adenopathy.  Otherwise, no distant metastatic disease.   --3/7/2018: Completed 4 cycles of carboplatin and paclitaxel followed by dose dense AC x 4 cycles.  No surgery was done.  --5/29/2018-7/31/2018: Completed adjuvant radiation to right breast, right axilla, right supraclavicular region.  --9/2018: Started adjuvant letrozole.  --10/15/2020: Screening mammogram showed focal asymmetry in upper outer right breast; no suspicious findings in left breast.  --10/21/2020: U/S of right breast showed irregularly-shaped hypoechoic mass at 10:00, 7 cm from nipple, measuring 3.2 x 2.7 x 3.6 cm. Right axillary ultrasound shows multiple normal-appearing lymph nodes. Biopsy of right breast mass at 10:00 showed poorly differentiated carcinoma, no lymphovascular invasion, morphology consistent with breast cancer and similar to prior axillary node tumor, ER weakly positive at 1% and TX negative (0%), HER-2/mitzi FISH negative.  --10/28/2020: PET/CT scan showed high metabolic activity in 3 cm area of right upper outer breast, several small hypermetabolic lymph nodes in  high right axilla and right subclavian region; mild hypermetabolic lymph nodes in bilateral hilar regions, favor metastatic disease; several tiny nodules in right upper lung, favor benign etiology; small hypermetabolic focus in pelvis in either sigmoid colon or adjacent loop of small bowel.  --11/02/2020: Started 1st line metastatic therapy with capecitabine.  --2/9/2021: PET/CT scan showed hypermetabolic right breast mass not significantly changed since 12/7/2020, persistent hypermetabolism; hypermetabolic right axillary lymph node increased in size; no distant metastasis; focal hypermetabolism in pelvis associated with sigmoid colon; stable 6-mm lung nodules.  --2/18/2021: Due to disease progression, switched to 2nd line metastatic therapy with eribulin. Required dose reduction due to neutropenia.  --4/24/2021: Left upper extremity Doppler U/S showed occlusive DVT within left subclavian vein; superficial thrombophlebitis within proximal cephalic vein. Started rivaroxaban.  --5/11/2021: PET scan showed unchanged size and slightly increased uptake to right breast; resolved right axillary adenopathy; no new lesions; persistent hypermetabolism at proximal sigmoid colon.  --5/19/2021 On chemo break after cycle 4 of eribulin.  --6/2/2021: Underwent palliative right mastectomy under care of Dr. Wilfredo Castillo. Pathology showed grade 3 invasive ductal carcinoma with tumor invasion into underlying skeletal muscle. Margins negative.  --7/22/2021: Colonoscopy showed diverticula in sigmoid colon; normal mucosa throughout entire colon.  --8/3/2021: PET scan showed postsurgical seroma at right chest wall and axilla; small focus of residual mild FDG uptake at superolateral resection margin, likely posttreatment change or inflammation; no metastatic disease; persistent focus of uptake in sigmoid colon corresponding to diverticular disease.  --10/07/2021: U/S of right chest wall performed due to palpable lump. This showed hypoechoic  nodule superior to level of scar measuring 0.7 x 0.6 x 1.5 cm. Biopsy of nodule showed poorly differentiated carcinoma consistent with recurrent breast carcinoma, grade 3, ER negative.  --10/19/2021: PET/CT showed right chest wall lesion with SUV 12.9; decreased right chest wall seroma; no FDG uptake in abdomen or pelvis.  --11/17/2021: Underwent excision of right chest wall nodule recurrence. Pathology showed grade 3 invasive ductal carcinoma, intramuscular; positive anterior and inferior margins of resection.  --1/25/2022 PET scan showed persistent hypermetabolic nodularity along anterior right chest wall suspicious for residual/recurrent tumor; persistent FDG uptake adjacent to short segment of sigmoid diverticulosis with minimal pericolonic fat stranding, unchanged, may represent chronic diverticulitis.  --2/14/2022-3/8/2022: Re-treatment with radiation to right chest wall.  --5/2/2022: PET scan showed increasing metabolic activity subtle pre-existing soft tissue nodules in the right breast/chest wall soft tissues suspicious for progression of disease.  -- 5/11/2022: Started weekly paclitaxel.  Not eligible for immunotherapy.    INTERIM HISTORY:  Michelle reports very mild paresthesias.  She has occasional loose stools; has intermittent solid stools in between. She denies nausea.    REVIEW OF SYSTEMS:   14 point ROS was reviewed and is negative other than as noted above in HPI.       HOME MEDICATIONS:  Current Outpatient Medications   Medication Sig Dispense Refill     calcium carbonate (TUMS) 500 MG chewable tablet Take 1 chew tab by mouth daily as needed for heartburn       multivitamin w/minerals (THERA-VIT-M) tablet Take 1 tablet by mouth every morning        Omega-3 Fatty Acids (OMEGA-3 FISH OIL PO) Take 1 g by mouth every morning        prochlorperazine (COMPAZINE) 10 MG tablet Take 1 tablet (10 mg) by mouth every 6 hours as needed (Nausea/Vomiting) (Patient not taking: No sig reported) 30 tablet 2      rivaroxaban ANTICOAGULANT (XARELTO ANTICOAGULANT) 20 MG TABS tablet Take 1 tablet (20 mg) by mouth daily (with dinner) 90 tablet 3         ALLERGIES:  Allergies   Allergen Reactions     Pcn [Penicillins] Hives         PAST MEDICAL HISTORY:  Past Medical History:   Diagnosis Date     Basal cell cancer     right cheek     Breast cancer, right breast (H)      DVT (deep venous thrombosis) (H)      Gastroesophageal reflux disease      History of blood transfusion     blue baby     Hyperlipidaemia          PAST SURGICAL HISTORY:  Past Surgical History:   Procedure Laterality Date     BIOPSY BREAST Right 11/17/2021    Procedure: Excise right chest wall mass;  Surgeon: Ulises Castillo MD;  Location: [facility] OR     BIOPSY MASS NECK Right 11/16/2017    Procedure: BIOPSY MASS NECK;  Excisional Biopsy Right Neck Lymph node;  Surgeon: Waylon Corral MD;  Location: [facility] OR     COLONOSCOPY       COLONOSCOPY N/A 7/22/2021    Procedure: COLONOSCOPY;  Surgeon: Gabriele Caro MD;  Location: [facility] GI     IR CHEST PORT PLACEMENT > 5 YRS OF AGE  2/16/2021     IR PORT REMOVAL LEFT  5/13/2019     MASTECTOMY SIMPLE Right 6/2/2021    Procedure: RIGHT SIMPLE MASTECTOMY;  Surgeon: Ulises Castillo MD;  Location: [facility] OR     MOHS MICROGRAPHIC PROCEDURE  ~2010    right cheek; BCC     MOHS MICROGRAPHIC PROCEDURE  10/31/2016    Dr. Nicholas [facility]         SOCIAL HISTORY:  Social History     Socioeconomic History     Marital status:      Spouse name: Not on file     Number of children: Not on file     Years of education: Not on file     Highest education level: Not on file   Occupational History     Not on file   Tobacco Use     Smoking status: Never Smoker     Smokeless tobacco: Never Used   Vaping Use     Vaping Use: Never used   Substance and Sexual Activity     Alcohol use: Yes     Alcohol/week: 0.0 standard drinks     Comment: 2 times a week, 2 drinks, wine or whiskey     Drug use: No     Sexual activity: Yes     Partners: Male      Birth control/protection: Post-menopausal   Other Topics Concern     Parent/sibling w/ CABG, MI or angioplasty before 65F 55M? No   Social History Narrative     Not on file     Social Determinants of Health     Financial Resource Strain: Not on file   Food Insecurity: Not on file   Transportation Needs: Not on file   Physical Activity: Not on file   Stress: Not on file   Social Connections: Not on file   Intimate Partner Violence: Not At Risk     Fear of Current or Ex-Partner: No     Emotionally Abused: No     Physically Abused: No     Sexually Abused: No   Housing Stability: Not on file         FAMILY HISTORY:  Family History   Problem Relation Age of Onset     Lupus Mother      Heart Disease Mother         CHF     Coronary Artery Disease Mother      Hyperlipidemia Mother      Diabetes Father      Breast Cancer Sister 83     No Known Problems Sister      Diabetes Brother      Suicide Brother      No Known Problems Brother      No Known Problems Brother      Suicide Brother      No Known Problems Brother      No Known Problems Brother      Breast Cancer Maternal Aunt 80         PHYSICAL EXAM:  Vital signs:  /67   Pulse 71   Temp 97.6  F (36.4  C) (Oral)   Resp 16   Wt 81.6 kg (179 lb 12.8 oz)   LMP 2004   SpO2 100%   BMI 29.47 kg/m    ECO  GENERAL/CONSTITUTIONAL: No acute distress.  EYES: No erythema or scleral icterus.  ENT/MOUTH: Neck supple. Mask in place.  LYMPH: No cervical, supraclavicular, axillary adenopathy.   BREAST: Right breast surgically absent with fluid in the right chest wall and palpable pea-size nodules x 2, unchanged. RESPIRATORY: Clear bilaterally.  CARDIAC: Regular rate and rhythm with no murmurs.  GASTROINTESTINAL: No hepatosplenomegaly, masses, or tenderness. No guarding.  No distention.  MUSCULOSKELETAL: Warm and well-perfused, no cyanosis, clubbing, or edema.  NEUROLOGIC: No focal motor deficits. Alert, oriented, answers questions appropriately.  INTEGUMENTARY: No  rashes or jaundice.  GAIT: Steady, does not use assistive device    LABS:  CBC RESULTS: Recent Labs   Lab Test 07/06/22  0723   WBC 2.8*   RBC 3.55*   HGB 11.5*   HCT 35.5      MCH 32.4   MCHC 32.4   RDW 15.2*        Last Comprehensive Metabolic Panel:  Sodium   Date Value Ref Range Status   06/22/2022 139 133 - 144 mmol/L Final   05/27/2021 139 133 - 144 mmol/L Final     Potassium   Date Value Ref Range Status   06/22/2022 3.9 3.4 - 5.3 mmol/L Final   05/27/2021 3.6 3.4 - 5.3 mmol/L Final     Chloride   Date Value Ref Range Status   06/22/2022 107 94 - 109 mmol/L Final   05/27/2021 107 94 - 109 mmol/L Final     Carbon Dioxide   Date Value Ref Range Status   05/27/2021 28 20 - 32 mmol/L Final     Carbon Dioxide (CO2)   Date Value Ref Range Status   06/22/2022 26 20 - 32 mmol/L Final     Anion Gap   Date Value Ref Range Status   06/22/2022 6 3 - 14 mmol/L Final   05/27/2021 4 3 - 14 mmol/L Final     Glucose   Date Value Ref Range Status   06/22/2022 97 70 - 99 mg/dL Final   05/27/2021 136 (H) 70 - 99 mg/dL Final     Urea Nitrogen   Date Value Ref Range Status   06/22/2022 10 7 - 30 mg/dL Final   05/27/2021 13 7 - 30 mg/dL Final     Creatinine   Date Value Ref Range Status   06/22/2022 0.64 0.52 - 1.04 mg/dL Final   05/27/2021 0.76 0.52 - 1.04 mg/dL Final     GFR Estimate   Date Value Ref Range Status   06/22/2022 >90 >60 mL/min/1.73m2 Final     Comment:     Effective December 21, 2021 eGFRcr in adults is calculated using the 2021 CKD-EPI creatinine equation which includes age and gender (oJhn reynoso al., NEJ, DOI: 10.1056/BYVQpt8356802)   05/27/2021 82 >60 mL/min/[1.73_m2] Final     Comment:     Non  GFR Calc  Starting 12/18/2018, serum creatinine based estimated GFR (eGFR) will be   calculated using the Chronic Kidney Disease Epidemiology Collaboration   (CKD-EPI) equation.       Calcium   Date Value Ref Range Status   06/22/2022 8.9 8.5 - 10.1 mg/dL Final   05/27/2021 9.0 8.5 - 10.1  "mg/dL Final     Bilirubin Total   Date Value Ref Range Status   06/22/2022 0.7 0.2 - 1.3 mg/dL Final   05/18/2021 0.8 0.2 - 1.3 mg/dL Final     Alkaline Phosphatase   Date Value Ref Range Status   06/22/2022 87 40 - 150 U/L Final   05/18/2021 81 40 - 150 U/L Final     ALT   Date Value Ref Range Status   06/22/2022 38 0 - 50 U/L Final   05/18/2021 58 (H) 0 - 50 U/L Final     AST   Date Value Ref Range Status   06/22/2022 21 0 - 45 U/L Final   05/18/2021 38 0 - 45 U/L Final       PATHOLOGY:  Reviewed as per HPI.    IMAGING:   Reviewed as per HPI.    ASSESSMENT/PLAN:  Flor Griffin is a 66 year old female with the following issues:  1.  Right breast poorly differentiated adenocarcinoma, local recurrence ER negative, TX negative, HER-2/mitzi negative, PD-L1 CPS=1  2. Bilateral hilar lymphadenopathy, now resolved  3. Indeterminate pulmonary nodules, likely benign and stable  --Michelle is s/p palliative right mastectomy palliative eribulin through 5/19/2021.  --PET scan from 10/19/2021 showed hypermetabolic local recurrence to the right chest wall (triple negative) but initially no distant metastatic disease at the present time. Biopsy showed this was triple negative and PD-L1 CPS=1. Therefore not a candidate for immunotherapy.  --She underwent excision of this local recurrence on 11/17/2021.  I had discussed with Dr. Castillo clarification on the margins of resection and that the \"positive\" margins were considered as such as he had removed generous margins into the muscle.   --1/25/2022 PET scan showed persistent hypermetabolic nodularity along anterior right chest wall that is quite suggestive of residual tumor after the last resection but no clinical evidence of distant metastatic disease. Therefore, she proceeded with repeat right chest wall radiation.  --5/2/2022 PET scan showed increasing metabolic activity subtle pre-existing soft tissue nodules in the right breast/chest wall soft tissues suspicious for progression of " disease.  --She then proceeded on 5/11/2022 with weekly paclitaxel days 1, 8, 15 every 4 weeks.  Ineligible for pembrolizumab due to PD-L1 CPS score less than 10 (result was obtained after starting paclitaxel).  --Plan to repeat 3-month interval PET scan in 8/2022.  If there is disease progression, I would recommend switching to Trodelvy (sacituzumab govitecan).  She is willing to continue on paclitaxel at the present time, as clinically she has stable disease and is tolerating the paclitaxel without significant side effects..  --She is eventually planning to move to [place], 10 miles from [facility] and may potentially need to transfer her care to [facility] or [place], [place].    4. Left upper extremity deep vein thrombosis   --4/24/2021 Doppler U/S showed occlusive DVT in left subclavian vein.  --Continue rivaroxaban. Given her recent malignancy recurrence, I recommended long term anticoagulation.    5.  Anemia and leukopenia related to chemotherapy  - Discussed that if ANC is less than or equal to 1.2, but I would recommend holding her paclitaxel and delaying by at least a week.  Discussed that dose reductions might be needed in the future.    Sally Stover MD  Hematology/Oncology  [facility]    Total time spent: 30 minutes in patient evaluation, counseling, documentation, and coordination of care.

## 2022-06-22 ENCOUNTER — INFUSION THERAPY VISIT (OUTPATIENT)
Dept: INFUSION THERAPY | Facility: CLINIC | Age: 67
End: 2022-06-22
Attending: PHYSICIAN ASSISTANT
Payer: COMMERCIAL

## 2022-06-22 VITALS
OXYGEN SATURATION: 99 % | HEART RATE: 63 BPM | RESPIRATION RATE: 16 BRPM | DIASTOLIC BLOOD PRESSURE: 74 MMHG | BODY MASS INDEX: 29.58 KG/M2 | SYSTOLIC BLOOD PRESSURE: 115 MMHG | WEIGHT: 180.5 LBS | TEMPERATURE: 97 F

## 2022-06-22 DIAGNOSIS — Z17.1 MALIGNANT NEOPLASM OF UPPER-OUTER QUADRANT OF RIGHT BREAST IN FEMALE, ESTROGEN RECEPTOR NEGATIVE (H): ICD-10-CM

## 2022-06-22 DIAGNOSIS — C50.411 MALIGNANT NEOPLASM OF UPPER-OUTER QUADRANT OF RIGHT BREAST IN FEMALE, ESTROGEN RECEPTOR NEGATIVE (H): ICD-10-CM

## 2022-06-22 DIAGNOSIS — R59.1 LYMPHADENOPATHY: Primary | ICD-10-CM

## 2022-06-22 LAB
ALBUMIN SERPL-MCNC: 3.5 G/DL (ref 3.4–5)
ALP SERPL-CCNC: 87 U/L (ref 40–150)
ALT SERPL W P-5'-P-CCNC: 38 U/L (ref 0–50)
ANION GAP SERPL CALCULATED.3IONS-SCNC: 6 MMOL/L (ref 3–14)
AST SERPL W P-5'-P-CCNC: 21 U/L (ref 0–45)
BASOPHILS # BLD AUTO: 0 10E3/UL (ref 0–0.2)
BASOPHILS NFR BLD AUTO: 1 %
BILIRUB SERPL-MCNC: 0.7 MG/DL (ref 0.2–1.3)
BUN SERPL-MCNC: 10 MG/DL (ref 7–30)
CALCIUM SERPL-MCNC: 8.9 MG/DL (ref 8.5–10.1)
CHLORIDE BLD-SCNC: 107 MMOL/L (ref 94–109)
CO2 SERPL-SCNC: 26 MMOL/L (ref 20–32)
CREAT SERPL-MCNC: 0.64 MG/DL (ref 0.52–1.04)
EOSINOPHIL # BLD AUTO: 0.2 10E3/UL (ref 0–0.7)
EOSINOPHIL NFR BLD AUTO: 6 %
ERYTHROCYTE [DISTWIDTH] IN BLOOD BY AUTOMATED COUNT: 14.6 % (ref 10–15)
GFR SERPL CREATININE-BSD FRML MDRD: >90 ML/MIN/1.73M2
GLUCOSE BLD-MCNC: 97 MG/DL (ref 70–99)
HCT VFR BLD AUTO: 33.5 % (ref 35–47)
HGB BLD-MCNC: 10.8 G/DL (ref 11.7–15.7)
IMM GRANULOCYTES # BLD: 0 10E3/UL
IMM GRANULOCYTES NFR BLD: 0 %
LYMPHOCYTES # BLD AUTO: 0.8 10E3/UL (ref 0.8–5.3)
LYMPHOCYTES NFR BLD AUTO: 26 %
MCH RBC QN AUTO: 32.5 PG (ref 26.5–33)
MCHC RBC AUTO-ENTMCNC: 32.2 G/DL (ref 31.5–36.5)
MCV RBC AUTO: 101 FL (ref 78–100)
MONOCYTES # BLD AUTO: 0.3 10E3/UL (ref 0–1.3)
MONOCYTES NFR BLD AUTO: 9 %
NEUTROPHILS # BLD AUTO: 1.8 10E3/UL (ref 1.6–8.3)
NEUTROPHILS NFR BLD AUTO: 58 %
NRBC # BLD AUTO: 0 10E3/UL
NRBC BLD AUTO-RTO: 0 /100
PLATELET # BLD AUTO: 278 10E3/UL (ref 150–450)
POTASSIUM BLD-SCNC: 3.9 MMOL/L (ref 3.4–5.3)
PROT SERPL-MCNC: 6.4 G/DL (ref 6.8–8.8)
RBC # BLD AUTO: 3.32 10E6/UL (ref 3.8–5.2)
SODIUM SERPL-SCNC: 139 MMOL/L (ref 133–144)
WBC # BLD AUTO: 3.1 10E3/UL (ref 4–11)

## 2022-06-22 PROCEDURE — 258N000003 HC RX IP 258 OP 636: Performed by: PHYSICIAN ASSISTANT

## 2022-06-22 PROCEDURE — 85014 HEMATOCRIT: CPT | Performed by: INTERNAL MEDICINE

## 2022-06-22 PROCEDURE — 250N000011 HC RX IP 250 OP 636: Performed by: PHYSICIAN ASSISTANT

## 2022-06-22 PROCEDURE — 250N000011 HC RX IP 250 OP 636: Performed by: INTERNAL MEDICINE

## 2022-06-22 PROCEDURE — 258N000003 HC RX IP 258 OP 636: Performed by: INTERNAL MEDICINE

## 2022-06-22 PROCEDURE — 96413 CHEMO IV INFUSION 1 HR: CPT

## 2022-06-22 PROCEDURE — 80053 COMPREHEN METABOLIC PANEL: CPT | Performed by: INTERNAL MEDICINE

## 2022-06-22 RX ORDER — HEPARIN SODIUM (PORCINE) LOCK FLUSH IV SOLN 100 UNIT/ML 100 UNIT/ML
5 SOLUTION INTRAVENOUS
Status: DISCONTINUED | OUTPATIENT
Start: 2022-06-22 | End: 2022-06-22 | Stop reason: HOSPADM

## 2022-06-22 RX ADMIN — Medication 5 ML: at 10:47

## 2022-06-22 RX ADMIN — PACLITAXEL 151 MG: 6 INJECTION, SOLUTION INTRAVENOUS at 09:44

## 2022-06-22 RX ADMIN — SODIUM CHLORIDE 250 ML: 9 INJECTION, SOLUTION INTRAVENOUS at 09:44

## 2022-06-22 ASSESSMENT — PAIN SCALES - GENERAL: PAINLEVEL: NO PAIN (0)

## 2022-06-22 NOTE — PROGRESS NOTES
Infusion Nursing Note:  Flor Griffin presents today for C1D43 Taxol.    Patient seen by provider today: No   present during visit today: Not Applicable.    Note: N/A.    Intravenous Access:  Labs drawn without difficulty.  Implanted Port.    Treatment Conditions:  Lab Results   Component Value Date    HGB 10.8 (L) 06/22/2022    WBC 3.1 (L) 06/22/2022    ANEU 2.8 05/18/2021    ANEUTAUTO 1.8 06/22/2022     06/22/2022      Lab Results   Component Value Date     06/22/2022    POTASSIUM 3.9 06/22/2022    MAG 1.8 05/18/2021    CR 0.64 06/22/2022    EDILSON 8.9 06/22/2022    BILITOTAL 0.7 06/22/2022    ALBUMIN 3.5 06/22/2022    ALT 38 06/22/2022    AST 21 06/22/2022     Results reviewed, labs MET treatment parameters, ok to proceed with treatment.    Post Infusion Assessment:  Patient tolerated infusion without incident.  Blood return noted pre and post infusion.  Site patent and intact, free from redness, edema or discomfort.  No evidence of extravasations.  Access discontinued per protocol.     Discharge Plan:   Discharge instructions reviewed with: Patient.  Patient and/or family verbalized understanding of discharge instructions and all questions answered.  Copy of AVS reviewed with patient and/or family.  Patient will return 7/6/22 for next appointment.  Patient discharged in stable condition accompanied by: self.  Departure Mode: Ambulatory.      Justine Dominguez RN

## 2022-07-06 ENCOUNTER — ONCOLOGY VISIT (OUTPATIENT)
Dept: ONCOLOGY | Facility: CLINIC | Age: 67
End: 2022-07-06
Attending: INTERNAL MEDICINE
Payer: COMMERCIAL

## 2022-07-06 ENCOUNTER — LAB (OUTPATIENT)
Dept: INFUSION THERAPY | Facility: CLINIC | Age: 67
End: 2022-07-06
Attending: INTERNAL MEDICINE
Payer: COMMERCIAL

## 2022-07-06 VITALS
SYSTOLIC BLOOD PRESSURE: 102 MMHG | BODY MASS INDEX: 29.47 KG/M2 | OXYGEN SATURATION: 100 % | RESPIRATION RATE: 16 BRPM | WEIGHT: 179.8 LBS | DIASTOLIC BLOOD PRESSURE: 67 MMHG | TEMPERATURE: 97.6 F | HEART RATE: 71 BPM

## 2022-07-06 VITALS — HEIGHT: 66 IN | WEIGHT: 179.8 LBS | BODY MASS INDEX: 28.9 KG/M2

## 2022-07-06 DIAGNOSIS — Z95.828 PORT-A-CATH IN PLACE: ICD-10-CM

## 2022-07-06 DIAGNOSIS — D64.81 ANEMIA DUE TO CHEMOTHERAPY: ICD-10-CM

## 2022-07-06 DIAGNOSIS — C50.411 MALIGNANT NEOPLASM OF UPPER-OUTER QUADRANT OF RIGHT BREAST IN FEMALE, ESTROGEN RECEPTOR NEGATIVE (H): Primary | ICD-10-CM

## 2022-07-06 DIAGNOSIS — Z17.1 MALIGNANT NEOPLASM OF UPPER-OUTER QUADRANT OF RIGHT BREAST IN FEMALE, ESTROGEN RECEPTOR NEGATIVE (H): Primary | ICD-10-CM

## 2022-07-06 DIAGNOSIS — Z17.0 MALIGNANT NEOPLASM OF OVERLAPPING SITES OF RIGHT BREAST IN FEMALE, ESTROGEN RECEPTOR POSITIVE (H): ICD-10-CM

## 2022-07-06 DIAGNOSIS — D70.1 LEUKOPENIA DUE TO ANTINEOPLASTIC CHEMOTHERAPY (H): ICD-10-CM

## 2022-07-06 DIAGNOSIS — T45.1X5A ANEMIA DUE TO CHEMOTHERAPY: ICD-10-CM

## 2022-07-06 DIAGNOSIS — R59.1 LYMPHADENOPATHY: Primary | ICD-10-CM

## 2022-07-06 DIAGNOSIS — T45.1X5A LEUKOPENIA DUE TO ANTINEOPLASTIC CHEMOTHERAPY (H): ICD-10-CM

## 2022-07-06 DIAGNOSIS — R59.1 LYMPHADENOPATHY: ICD-10-CM

## 2022-07-06 DIAGNOSIS — C50.811 MALIGNANT NEOPLASM OF OVERLAPPING SITES OF RIGHT BREAST IN FEMALE, ESTROGEN RECEPTOR POSITIVE (H): ICD-10-CM

## 2022-07-06 DIAGNOSIS — Z17.1 MALIGNANT NEOPLASM OF UPPER-OUTER QUADRANT OF RIGHT BREAST IN FEMALE, ESTROGEN RECEPTOR NEGATIVE (H): ICD-10-CM

## 2022-07-06 DIAGNOSIS — C50.411 MALIGNANT NEOPLASM OF UPPER-OUTER QUADRANT OF RIGHT BREAST IN FEMALE, ESTROGEN RECEPTOR NEGATIVE (H): ICD-10-CM

## 2022-07-06 DIAGNOSIS — Z86.718 PERSONAL HISTORY OF DVT (DEEP VEIN THROMBOSIS): ICD-10-CM

## 2022-07-06 LAB
BASOPHILS # BLD AUTO: 0 10E3/UL (ref 0–0.2)
BASOPHILS NFR BLD AUTO: 1 %
CANCER AG15-3 SERPL-ACNC: 11 U/ML
CEA SERPL-MCNC: 1.7 NG/ML
EOSINOPHIL # BLD AUTO: 0 10E3/UL (ref 0–0.7)
EOSINOPHIL NFR BLD AUTO: 0 %
ERYTHROCYTE [DISTWIDTH] IN BLOOD BY AUTOMATED COUNT: 15.2 % (ref 10–15)
HCT VFR BLD AUTO: 35.5 % (ref 35–47)
HGB BLD-MCNC: 11.5 G/DL (ref 11.7–15.7)
IMM GRANULOCYTES # BLD: 0 10E3/UL
IMM GRANULOCYTES NFR BLD: 0 %
LYMPHOCYTES # BLD AUTO: 0.8 10E3/UL (ref 0.8–5.3)
LYMPHOCYTES NFR BLD AUTO: 29 %
MCH RBC QN AUTO: 32.4 PG (ref 26.5–33)
MCHC RBC AUTO-ENTMCNC: 32.4 G/DL (ref 31.5–36.5)
MCV RBC AUTO: 100 FL (ref 78–100)
MONOCYTES # BLD AUTO: 0.4 10E3/UL (ref 0–1.3)
MONOCYTES NFR BLD AUTO: 14 %
NEUTROPHILS # BLD AUTO: 1.5 10E3/UL (ref 1.6–8.3)
NEUTROPHILS NFR BLD AUTO: 56 %
NRBC # BLD AUTO: 0 10E3/UL
NRBC BLD AUTO-RTO: 0 /100
PLATELET # BLD AUTO: 239 10E3/UL (ref 150–450)
RBC # BLD AUTO: 3.55 10E6/UL (ref 3.8–5.2)
WBC # BLD AUTO: 2.8 10E3/UL (ref 4–11)

## 2022-07-06 PROCEDURE — 99215 OFFICE O/P EST HI 40 MIN: CPT | Performed by: INTERNAL MEDICINE

## 2022-07-06 PROCEDURE — G0463 HOSPITAL OUTPT CLINIC VISIT: HCPCS | Mod: 25

## 2022-07-06 PROCEDURE — 82378 CARCINOEMBRYONIC ANTIGEN: CPT | Performed by: INTERNAL MEDICINE

## 2022-07-06 PROCEDURE — 85025 COMPLETE CBC W/AUTO DIFF WBC: CPT | Performed by: INTERNAL MEDICINE

## 2022-07-06 PROCEDURE — 258N000003 HC RX IP 258 OP 636: Performed by: INTERNAL MEDICINE

## 2022-07-06 PROCEDURE — 250N000011 HC RX IP 250 OP 636: Performed by: INTERNAL MEDICINE

## 2022-07-06 PROCEDURE — 36415 COLL VENOUS BLD VENIPUNCTURE: CPT

## 2022-07-06 PROCEDURE — 86300 IMMUNOASSAY TUMOR CA 15-3: CPT | Performed by: INTERNAL MEDICINE

## 2022-07-06 PROCEDURE — 96413 CHEMO IV INFUSION 1 HR: CPT

## 2022-07-06 RX ORDER — HEPARIN SODIUM (PORCINE) LOCK FLUSH IV SOLN 100 UNIT/ML 100 UNIT/ML
5 SOLUTION INTRAVENOUS
Status: DISCONTINUED | OUTPATIENT
Start: 2022-07-06 | End: 2022-07-06 | Stop reason: HOSPADM

## 2022-07-06 RX ORDER — HEPARIN SODIUM,PORCINE 10 UNIT/ML
5 VIAL (ML) INTRAVENOUS
Status: CANCELLED | OUTPATIENT
Start: 2022-07-06

## 2022-07-06 RX ORDER — HEPARIN SODIUM (PORCINE) LOCK FLUSH IV SOLN 100 UNIT/ML 100 UNIT/ML
5 SOLUTION INTRAVENOUS
Status: CANCELLED | OUTPATIENT
Start: 2022-07-06

## 2022-07-06 RX ADMIN — Medication 5 ML: at 10:41

## 2022-07-06 RX ADMIN — SODIUM CHLORIDE 250 ML: 9 INJECTION, SOLUTION INTRAVENOUS at 09:37

## 2022-07-06 RX ADMIN — SODIUM CHLORIDE 151 MG: 9 INJECTION, SOLUTION INTRAVENOUS at 09:37

## 2022-07-06 RX ADMIN — Medication 5 ML: at 07:25

## 2022-07-06 ASSESSMENT — PAIN SCALES - GENERAL: PAINLEVEL: NO PAIN (0)

## 2022-07-06 NOTE — PROGRESS NOTES
Nursing Note:  Flor Griffin presents today for Port labs.    Patient seen by provider today: Yes:    present during visit today: Not Applicable.    Note: Port needle remains accessed for infusion today.    Intravenous Access:  Implanted Port.    Discharge Plan:   Patient was sent to [facility] for provider appointment.    Aleena Sanford RN

## 2022-07-06 NOTE — PROGRESS NOTES
Infusion Nursing Note:  Flor Griffin presents today for C1D57 Taxol.    Patient seen by provider today: Yes: Ck   present during visit today: Not Applicable.    Note: N/A.    Intravenous Access:  Implanted Port.  Previously accessed in FT.    Treatment Conditions:  Lab Results   Component Value Date    HGB 11.5 (L) 07/06/2022    WBC 2.8 (L) 07/06/2022    ANEU 2.8 05/18/2021    ANEUTAUTO 1.5 (L) 07/06/2022     07/06/2022      Results reviewed, labs MET treatment parameters, ok to proceed with treatment.    Post Infusion Assessment:  Patient tolerated infusion without incident.  Blood return noted pre and post infusion.  Site patent and intact, free from redness, edema or discomfort.  No evidence of extravasations.  Access discontinued per protocol.     Discharge Plan:   Discharge instructions reviewed with: Patient.  Patient and/or family verbalized understanding of discharge instructions and all questions answered.  AVS to patient via [employer].  Patient will return 7/13/22 for next appointment.   Patient discharged in stable condition accompanied by: self.  Departure Mode: Ambulatory.      Herrera Hidalgo RN

## 2022-07-06 NOTE — PATIENT INSTRUCTIONS
Continue Taxol weekly x 3, 1 week off every 4 weeks through 8/31.  RTC MD with PET/CT scan prior to visit, prior to 8/31.  RTC NP on or prior to 8/3.  Lab draw weekly x 3 every 4 weeks (prior to each chemo).

## 2022-07-06 NOTE — PROGRESS NOTES
"Oncology Rooming Note    July 6, 2022 8:17 AM   Flor Griffin is a 67 year old female who presents for:    Chief Complaint   Patient presents with     Oncology Clinic Visit     Initial Vitals: Resp 16   Wt 81.6 kg (179 lb 12.8 oz)   LMP 01/01/2004   BMI 29.47 kg/m   Estimated body mass index is 29.47 kg/m  as calculated from the following:    Height as of 6/15/22: 1.664 m (5' 5.5\").    Weight as of this encounter: 81.6 kg (179 lb 12.8 oz). Body surface area is 1.94 meters squared.  No Pain (0) Comment: Data Unavailable   Patient's last menstrual period was 01/01/2004.  Allergies reviewed: Yes  Medications reviewed: Yes    Medications: Medication refills not needed today.  Pharmacy name entered into [employer] - Smartphone RFID: [id number] - [place], [place] - [address]    Clinical concerns: No       Mei Parker            "

## 2022-07-06 NOTE — LETTER
7/6/2022         RE: Flor Griffin  [id number]  [facility] [id number]        Dear Colleague,    Thank you for referring your patient, Flor Griffin, to the [facility]. Please see a copy of my visit note below.    [facility]    Hematology/Oncology Established Patient Follow-up Note      Today's Date: 7/6/2022    Reason for Follow-up: Metastatic breast cancer.    HISTORY OF PRESENT ILLNESS: Flor Griffin is a 67 year old female who presents with the following oncologic history:     --Clinical TX N3c M0 adenocarcinoma which is poorly differentiated, likely of breast origin, ER low-positive at 1-5%, VA negative, HER-2/mitzi FISH negative, androgen stain weak-intermediate 10-20%.  Initially she was seen 11/22/2017 after she underwent right supraclavicular lymph node biopsy which was positive for poorly differentiated adenocarcinoma.  She had this lymph node almost a month and had an excisional biopsy performed which was consistent with the diagnosis of cancer.   --A PET/CT scan 11/27/2017 showed hypermetabolic right supraclavicular, right axillary and subpectoral adenopathy.  Otherwise, no distant metastatic disease.   --3/7/2018: Completed 4 cycles of carboplatin and paclitaxel followed by dose dense AC x 4 cycles.  No surgery was done.  --5/29/2018-7/31/2018: Completed adjuvant radiation to right breast, right axilla, right supraclavicular region.  --9/2018: Started adjuvant letrozole.  --10/15/2020: Screening mammogram showed focal asymmetry in upper outer right breast; no suspicious findings in left breast.  --10/21/2020: U/S of right breast showed irregularly-shaped hypoechoic mass at 10:00, 7 cm from nipple, measuring 3.2 x 2.7 x 3.6 cm. Right axillary ultrasound shows multiple normal-appearing lymph nodes. Biopsy of right breast mass at 10:00 showed poorly differentiated carcinoma, no lymphovascular invasion, morphology consistent with breast  cancer and similar to prior axillary node tumor, ER weakly positive at 1% and TX negative (0%), HER-2/mitzi FISH negative.  --10/28/2020: PET/CT scan showed high metabolic activity in 3 cm area of right upper outer breast, several small hypermetabolic lymph nodes in high right axilla and right subclavian region; mild hypermetabolic lymph nodes in bilateral hilar regions, favor metastatic disease; several tiny nodules in right upper lung, favor benign etiology; small hypermetabolic focus in pelvis in either sigmoid colon or adjacent loop of small bowel.  --11/02/2020: Started 1st line metastatic therapy with capecitabine.  --2/9/2021: PET/CT scan showed hypermetabolic right breast mass not significantly changed since 12/7/2020, persistent hypermetabolism; hypermetabolic right axillary lymph node increased in size; no distant metastasis; focal hypermetabolism in pelvis associated with sigmoid colon; stable 6-mm lung nodules.  --2/18/2021: Due to disease progression, switched to 2nd line metastatic therapy with eribulin. Required dose reduction due to neutropenia.  --4/24/2021: Left upper extremity Doppler U/S showed occlusive DVT within left subclavian vein; superficial thrombophlebitis within proximal cephalic vein. Started rivaroxaban.  --5/11/2021: PET scan showed unchanged size and slightly increased uptake to right breast; resolved right axillary adenopathy; no new lesions; persistent hypermetabolism at proximal sigmoid colon.  --5/19/2021 On chemo break after cycle 4 of eribulin.  --6/2/2021: Underwent palliative right mastectomy under care of Dr. Wilfredo Castillo. Pathology showed grade 3 invasive ductal carcinoma with tumor invasion into underlying skeletal muscle. Margins negative.  --7/22/2021: Colonoscopy showed diverticula in sigmoid colon; normal mucosa throughout entire colon.  --8/3/2021: PET scan showed postsurgical seroma at right chest wall and axilla; small focus of residual mild FDG uptake at superolateral  resection margin, likely posttreatment change or inflammation; no metastatic disease; persistent focus of uptake in sigmoid colon corresponding to diverticular disease.  --10/07/2021: U/S of right chest wall performed due to palpable lump. This showed hypoechoic nodule superior to level of scar measuring 0.7 x 0.6 x 1.5 cm. Biopsy of nodule showed poorly differentiated carcinoma consistent with recurrent breast carcinoma, grade 3, ER negative.  --10/19/2021: PET/CT showed right chest wall lesion with SUV 12.9; decreased right chest wall seroma; no FDG uptake in abdomen or pelvis.  --11/17/2021: Underwent excision of right chest wall nodule recurrence. Pathology showed grade 3 invasive ductal carcinoma, intramuscular; positive anterior and inferior margins of resection.  --1/25/2022 PET scan showed persistent hypermetabolic nodularity along anterior right chest wall suspicious for residual/recurrent tumor; persistent FDG uptake adjacent to short segment of sigmoid diverticulosis with minimal pericolonic fat stranding, unchanged, may represent chronic diverticulitis.  --2/14/2022-3/8/2022: Re-treatment with radiation to right chest wall.  --5/2/2022: PET scan showed increasing metabolic activity subtle pre-existing soft tissue nodules in the right breast/chest wall soft tissues suspicious for progression of disease.  -- 5/11/2022: Started weekly paclitaxel.  Not eligible for immunotherapy.    INTERIM HISTORY:  Michelle reports very mild paresthesias.  She has occasional loose stools; has intermittent solid stools in between. She denies nausea.    REVIEW OF SYSTEMS:   14 point ROS was reviewed and is negative other than as noted above in HPI.       HOME MEDICATIONS:  Current Outpatient Medications   Medication Sig Dispense Refill     calcium carbonate (TUMS) 500 MG chewable tablet Take 1 chew tab by mouth daily as needed for heartburn       multivitamin w/minerals (THERA-VIT-M) tablet Take 1 tablet by mouth every morning         Omega-3 Fatty Acids (OMEGA-3 FISH OIL PO) Take 1 g by mouth every morning        prochlorperazine (COMPAZINE) 10 MG tablet Take 1 tablet (10 mg) by mouth every 6 hours as needed (Nausea/Vomiting) (Patient not taking: No sig reported) 30 tablet 2     rivaroxaban ANTICOAGULANT (XARELTO ANTICOAGULANT) 20 MG TABS tablet Take 1 tablet (20 mg) by mouth daily (with dinner) 90 tablet 3         ALLERGIES:  Allergies   Allergen Reactions     Pcn [Penicillins] Hives         PAST MEDICAL HISTORY:  Past Medical History:   Diagnosis Date     Basal cell cancer     right cheek     Breast cancer, right breast (H)      DVT (deep venous thrombosis) (H)      Gastroesophageal reflux disease      History of blood transfusion     blue baby     Hyperlipidaemia          PAST SURGICAL HISTORY:  Past Surgical History:   Procedure Laterality Date     BIOPSY BREAST Right 11/17/2021    Procedure: Excise right chest wall mass;  Surgeon: Ulises Castillo MD;  Location: [facility] OR     BIOPSY MASS NECK Right 11/16/2017    Procedure: BIOPSY MASS NECK;  Excisional Biopsy Right Neck Lymph node;  Surgeon: Waylon Corral MD;  Location: [facility] OR     COLONOSCOPY       COLONOSCOPY N/A 7/22/2021    Procedure: COLONOSCOPY;  Surgeon: Gabriele Caro MD;  Location:  GI     IR CHEST PORT PLACEMENT > 5 YRS OF AGE  2/16/2021     IR PORT REMOVAL LEFT  5/13/2019     MASTECTOMY SIMPLE Right 6/2/2021    Procedure: RIGHT SIMPLE MASTECTOMY;  Surgeon: Ulises Castillo MD;  Location: [facility] OR     MOHS MICROGRAPHIC PROCEDURE  ~2010    right cheek; BCC     MOHS MICROGRAPHIC PROCEDURE  10/31/2016    Dr. Nicholas [facility]         SOCIAL HISTORY:  Social History     Socioeconomic History     Marital status:      Spouse name: Not on file     Number of children: Not on file     Years of education: Not on file     Highest education level: Not on file   Occupational History     Not on file   Tobacco Use     Smoking status: Never Smoker     Smokeless tobacco: Never  Used   Vaping Use     Vaping Use: Never used   Substance and Sexual Activity     Alcohol use: Yes     Alcohol/week: 0.0 standard drinks     Comment: 2 times a week, 2 drinks, wine or whiskey     Drug use: No     Sexual activity: Yes     Partners: Male     Birth control/protection: Post-menopausal   Other Topics Concern     Parent/sibling w/ CABG, MI or angioplasty before 65F 55M? No   Social History Narrative     Not on file     Social Determinants of Health     Financial Resource Strain: Not on file   Food Insecurity: Not on file   Transportation Needs: Not on file   Physical Activity: Not on file   Stress: Not on file   Social Connections: Not on file   Intimate Partner Violence: Not At Risk     Fear of Current or Ex-Partner: No     Emotionally Abused: No     Physically Abused: No     Sexually Abused: No   Housing Stability: Not on file         FAMILY HISTORY:  Family History   Problem Relation Age of Onset     Lupus Mother      Heart Disease Mother         CHF     Coronary Artery Disease Mother      Hyperlipidemia Mother      Diabetes Father      Breast Cancer Sister 83     No Known Problems Sister      Diabetes Brother      Suicide Brother      No Known Problems Brother      No Known Problems Brother      Suicide Brother      No Known Problems Brother      No Known Problems Brother      Breast Cancer Maternal Aunt 80         PHYSICAL EXAM:  Vital signs:  /67   Pulse 71   Temp 97.6  F (36.4  C) (Oral)   Resp 16   Wt 81.6 kg (179 lb 12.8 oz)   LMP 2004   SpO2 100%   BMI 29.47 kg/m    ECO  GENERAL/CONSTITUTIONAL: No acute distress.  EYES: No erythema or scleral icterus.  ENT/MOUTH: Neck supple. Mask in place.  LYMPH: No cervical, supraclavicular, axillary adenopathy.   BREAST: Right breast surgically absent with fluid in the right chest wall and palpable pea-size nodules x 2, unchanged. RESPIRATORY: Clear bilaterally.  CARDIAC: Regular rate and rhythm with no murmurs.  GASTROINTESTINAL: No  hepatosplenomegaly, masses, or tenderness. No guarding.  No distention.  MUSCULOSKELETAL: Warm and well-perfused, no cyanosis, clubbing, or edema.  NEUROLOGIC: No focal motor deficits. Alert, oriented, answers questions appropriately.  INTEGUMENTARY: No rashes or jaundice.  GAIT: Steady, does not use assistive device    LABS:  CBC RESULTS: Recent Labs   Lab Test 07/06/22  0723   WBC 2.8*   RBC 3.55*   HGB 11.5*   HCT 35.5      MCH 32.4   MCHC 32.4   RDW 15.2*        Last Comprehensive Metabolic Panel:  Sodium   Date Value Ref Range Status   06/22/2022 139 133 - 144 mmol/L Final   05/27/2021 139 133 - 144 mmol/L Final     Potassium   Date Value Ref Range Status   06/22/2022 3.9 3.4 - 5.3 mmol/L Final   05/27/2021 3.6 3.4 - 5.3 mmol/L Final     Chloride   Date Value Ref Range Status   06/22/2022 107 94 - 109 mmol/L Final   05/27/2021 107 94 - 109 mmol/L Final     Carbon Dioxide   Date Value Ref Range Status   05/27/2021 28 20 - 32 mmol/L Final     Carbon Dioxide (CO2)   Date Value Ref Range Status   06/22/2022 26 20 - 32 mmol/L Final     Anion Gap   Date Value Ref Range Status   06/22/2022 6 3 - 14 mmol/L Final   05/27/2021 4 3 - 14 mmol/L Final     Glucose   Date Value Ref Range Status   06/22/2022 97 70 - 99 mg/dL Final   05/27/2021 136 (H) 70 - 99 mg/dL Final     Urea Nitrogen   Date Value Ref Range Status   06/22/2022 10 7 - 30 mg/dL Final   05/27/2021 13 7 - 30 mg/dL Final     Creatinine   Date Value Ref Range Status   06/22/2022 0.64 0.52 - 1.04 mg/dL Final   05/27/2021 0.76 0.52 - 1.04 mg/dL Final     GFR Estimate   Date Value Ref Range Status   06/22/2022 >90 >60 mL/min/1.73m2 Final     Comment:     Effective December 21, 2021 eGFRcr in adults is calculated using the 2021 CKD-EPI creatinine equation which includes age and gender (John et al., NEJM, DOI: 10.1056/DSMJhl5483788)   05/27/2021 82 >60 mL/min/[1.73_m2] Final     Comment:     Non  GFR Calc  Starting 12/18/2018, serum  "creatinine based estimated GFR (eGFR) will be   calculated using the Chronic Kidney Disease Epidemiology Collaboration   (CKD-EPI) equation.       Calcium   Date Value Ref Range Status   06/22/2022 8.9 8.5 - 10.1 mg/dL Final   05/27/2021 9.0 8.5 - 10.1 mg/dL Final     Bilirubin Total   Date Value Ref Range Status   06/22/2022 0.7 0.2 - 1.3 mg/dL Final   05/18/2021 0.8 0.2 - 1.3 mg/dL Final     Alkaline Phosphatase   Date Value Ref Range Status   06/22/2022 87 40 - 150 U/L Final   05/18/2021 81 40 - 150 U/L Final     ALT   Date Value Ref Range Status   06/22/2022 38 0 - 50 U/L Final   05/18/2021 58 (H) 0 - 50 U/L Final     AST   Date Value Ref Range Status   06/22/2022 21 0 - 45 U/L Final   05/18/2021 38 0 - 45 U/L Final       PATHOLOGY:  Reviewed as per HPI.    IMAGING:   Reviewed as per HPI.    ASSESSMENT/PLAN:  Flor Griffin is a 66 year old female with the following issues:  1.  Right breast poorly differentiated adenocarcinoma, local recurrence ER negative, DC negative, HER-2/mitzi negative, PD-L1 CPS=1  2. Bilateral hilar lymphadenopathy, now resolved  3. Indeterminate pulmonary nodules, likely benign and stable  --Michelle is s/p palliative right mastectomy palliative eribulin through 5/19/2021.  --PET scan from 10/19/2021 showed hypermetabolic local recurrence to the right chest wall (triple negative) but initially no distant metastatic disease at the present time. Biopsy showed this was triple negative and PD-L1 CPS=1. Therefore not a candidate for immunotherapy.  --She underwent excision of this local recurrence on 11/17/2021.  I had discussed with Dr. Castillo clarification on the margins of resection and that the \"positive\" margins were considered as such as he had removed generous margins into the muscle.   --1/25/2022 PET scan showed persistent hypermetabolic nodularity along anterior right chest wall that is quite suggestive of residual tumor after the last resection but no clinical evidence of distant " metastatic disease. Therefore, she proceeded with repeat right chest wall radiation.  --5/2/2022 PET scan showed increasing metabolic activity subtle pre-existing soft tissue nodules in the right breast/chest wall soft tissues suspicious for progression of disease.  --She then proceeded on 5/11/2022 with weekly paclitaxel days 1, 8, 15 every 4 weeks.  Ineligible for pembrolizumab due to PD-L1 CPS score less than 10 (result was obtained after starting paclitaxel).  --Plan to repeat 3-month interval PET scan in 8/2022.  If there is disease progression, I would recommend switching to Trodelvy (sacituzumab govitecan).  She is willing to continue on paclitaxel at the present time, as clinically she has stable disease and is tolerating the paclitaxel without significant side effects..  --She is eventually planning to move to [place], [place] from [facility] and may potentially need to transfer her care to [facility] or [place], [place].    4. Left upper extremity deep vein thrombosis   --4/24/2021 Doppler U/S showed occlusive DVT in left subclavian vein.  --Continue rivaroxaban. Given her recent malignancy recurrence, I recommended long term anticoagulation.    5.  Anemia and leukopenia related to chemotherapy  - Discussed that if ANC is less than or equal to 1.2, but I would recommend holding her paclitaxel and delaying by at least a week.  Discussed that dose reductions might be needed in the future.    Sally Stover MD  Hematology/Oncology  [facility]    Total time spent: 30 minutes in patient evaluation, counseling, documentation, and coordination of care.    Oncology Rooming Note    July 6, 2022 8:17 AM   Flor Griffin is a 67 year old female who presents for:    Chief Complaint   Patient presents with     Oncology Clinic Visit     Initial Vitals: Resp 16   Wt 81.6 kg (179 lb 12.8 oz)   LMP 01/01/2004   BMI 29.47 kg/m   Estimated body mass index is 29.47 kg/m  as  "calculated from the following:    Height as of 6/15/22: 1.664 m (5' 5.5\").    Weight as of this encounter: 81.6 kg (179 lb 12.8 oz). Body surface area is 1.94 meters squared.  No Pain (0) Comment: Data Unavailable   Patient's last menstrual period was 01/01/2004.  Allergies reviewed: Yes  Medications reviewed: Yes    Medications: Medication refills not needed today.  Pharmacy name entered into [facility]: [id number] - [facility] [id number]  [address]    Clinical concerns: No       Mei Parker                Again, thank you for allowing me to participate in the care of your patient.        Sincerely,        Sally Stover MD    "

## 2022-07-07 DIAGNOSIS — Z17.1 MALIGNANT NEOPLASM OF UPPER-OUTER QUADRANT OF RIGHT BREAST IN FEMALE, ESTROGEN RECEPTOR NEGATIVE (H): Primary | ICD-10-CM

## 2022-07-07 DIAGNOSIS — C50.411 MALIGNANT NEOPLASM OF UPPER-OUTER QUADRANT OF RIGHT BREAST IN FEMALE, ESTROGEN RECEPTOR NEGATIVE (H): Primary | ICD-10-CM

## 2022-07-13 ENCOUNTER — INFUSION THERAPY VISIT (OUTPATIENT)
Dept: INFUSION THERAPY | Facility: CLINIC | Age: 67
End: 2022-07-13
Attending: PHYSICIAN ASSISTANT
Payer: COMMERCIAL

## 2022-07-13 VITALS
SYSTOLIC BLOOD PRESSURE: 113 MMHG | WEIGHT: 179.9 LBS | HEART RATE: 68 BPM | BODY MASS INDEX: 29.47 KG/M2 | DIASTOLIC BLOOD PRESSURE: 64 MMHG | RESPIRATION RATE: 16 BRPM | TEMPERATURE: 97 F | OXYGEN SATURATION: 95 %

## 2022-07-13 DIAGNOSIS — Z17.1 MALIGNANT NEOPLASM OF UPPER-OUTER QUADRANT OF RIGHT BREAST IN FEMALE, ESTROGEN RECEPTOR NEGATIVE (H): ICD-10-CM

## 2022-07-13 DIAGNOSIS — R59.1 LYMPHADENOPATHY: Primary | ICD-10-CM

## 2022-07-13 DIAGNOSIS — C50.411 MALIGNANT NEOPLASM OF UPPER-OUTER QUADRANT OF RIGHT BREAST IN FEMALE, ESTROGEN RECEPTOR NEGATIVE (H): ICD-10-CM

## 2022-07-13 LAB
ALBUMIN SERPL-MCNC: 3.7 G/DL (ref 3.4–5)
ALP SERPL-CCNC: 77 U/L (ref 40–150)
ALT SERPL W P-5'-P-CCNC: 34 U/L (ref 0–50)
ANION GAP SERPL CALCULATED.3IONS-SCNC: 4 MMOL/L (ref 3–14)
AST SERPL W P-5'-P-CCNC: 19 U/L (ref 0–45)
BASOPHILS # BLD AUTO: 0 10E3/UL (ref 0–0.2)
BASOPHILS NFR BLD AUTO: 1 %
BILIRUB SERPL-MCNC: 0.7 MG/DL (ref 0.2–1.3)
BUN SERPL-MCNC: 14 MG/DL (ref 7–30)
CALCIUM SERPL-MCNC: 9.1 MG/DL (ref 8.5–10.1)
CHLORIDE BLD-SCNC: 106 MMOL/L (ref 94–109)
CO2 SERPL-SCNC: 26 MMOL/L (ref 20–32)
CREAT SERPL-MCNC: 0.65 MG/DL (ref 0.52–1.04)
EOSINOPHIL # BLD AUTO: 0.2 10E3/UL (ref 0–0.7)
EOSINOPHIL NFR BLD AUTO: 4 %
ERYTHROCYTE [DISTWIDTH] IN BLOOD BY AUTOMATED COUNT: 15 % (ref 10–15)
GFR SERPL CREATININE-BSD FRML MDRD: >90 ML/MIN/1.73M2
GLUCOSE BLD-MCNC: 91 MG/DL (ref 70–99)
HCT VFR BLD AUTO: 33.3 % (ref 35–47)
HGB BLD-MCNC: 10.7 G/DL (ref 11.7–15.7)
IMM GRANULOCYTES # BLD: 0 10E3/UL
IMM GRANULOCYTES NFR BLD: 1 %
LYMPHOCYTES # BLD AUTO: 1.3 10E3/UL (ref 0.8–5.3)
LYMPHOCYTES NFR BLD AUTO: 24 %
MCH RBC QN AUTO: 32.3 PG (ref 26.5–33)
MCHC RBC AUTO-ENTMCNC: 32.1 G/DL (ref 31.5–36.5)
MCV RBC AUTO: 101 FL (ref 78–100)
MONOCYTES # BLD AUTO: 0.4 10E3/UL (ref 0–1.3)
MONOCYTES NFR BLD AUTO: 7 %
NEUTROPHILS # BLD AUTO: 3.5 10E3/UL (ref 1.6–8.3)
NEUTROPHILS NFR BLD AUTO: 63 %
NRBC # BLD AUTO: 0 10E3/UL
NRBC BLD AUTO-RTO: 0 /100
PLATELET # BLD AUTO: 317 10E3/UL (ref 150–450)
POTASSIUM BLD-SCNC: 4.2 MMOL/L (ref 3.4–5.3)
PROT SERPL-MCNC: 6.6 G/DL (ref 6.8–8.8)
RBC # BLD AUTO: 3.31 10E6/UL (ref 3.8–5.2)
SODIUM SERPL-SCNC: 136 MMOL/L (ref 133–144)
WBC # BLD AUTO: 5.5 10E3/UL (ref 4–11)

## 2022-07-13 PROCEDURE — 250N000011 HC RX IP 250 OP 636: Performed by: INTERNAL MEDICINE

## 2022-07-13 PROCEDURE — 80053 COMPREHEN METABOLIC PANEL: CPT | Performed by: INTERNAL MEDICINE

## 2022-07-13 PROCEDURE — 85004 AUTOMATED DIFF WBC COUNT: CPT | Performed by: INTERNAL MEDICINE

## 2022-07-13 PROCEDURE — 258N000003 HC RX IP 258 OP 636: Performed by: INTERNAL MEDICINE

## 2022-07-13 PROCEDURE — 96413 CHEMO IV INFUSION 1 HR: CPT

## 2022-07-13 RX ORDER — HEPARIN SODIUM (PORCINE) LOCK FLUSH IV SOLN 100 UNIT/ML 100 UNIT/ML
5 SOLUTION INTRAVENOUS
Status: DISCONTINUED | OUTPATIENT
Start: 2022-07-13 | End: 2022-07-13 | Stop reason: HOSPADM

## 2022-07-13 RX ADMIN — Medication 5 ML: at 12:02

## 2022-07-13 RX ADMIN — SODIUM CHLORIDE 250 ML: 9 INJECTION, SOLUTION INTRAVENOUS at 10:58

## 2022-07-13 RX ADMIN — PACLITAXEL 151 MG: 6 INJECTION, SOLUTION INTRAVENOUS at 10:58

## 2022-07-13 NOTE — PROGRESS NOTES
Infusion Nursing Note:  Flor Griffin presents today for C1D64 Taxol.    Patient seen by provider today: No   present during visit today: Not Applicable.    Note: N/A.    Intravenous Access:  Labs drawn without difficulty.  Implanted Port.    Treatment Conditions:  Lab Results   Component Value Date    HGB 10.7 (L) 07/13/2022    WBC 5.5 07/13/2022    ANEU 2.8 05/18/2021    ANEUTAUTO 3.5 07/13/2022     07/13/2022      Lab Results   Component Value Date     07/13/2022    POTASSIUM 4.2 07/13/2022    MAG 1.8 05/18/2021    CR 0.65 07/13/2022    EDILSON 9.1 07/13/2022    BILITOTAL 0.7 07/13/2022    ALBUMIN 3.7 07/13/2022    ALT 34 07/13/2022    AST 19 07/13/2022     Results reviewed, labs MET treatment parameters, ok to proceed with treatment.    Post Infusion Assessment:  Patient tolerated infusion without incident.  Blood return noted pre and post infusion.  Site patent and intact, free from redness, edema or discomfort.  No evidence of extravasations.  Access discontinued per protocol.     Discharge Plan:   Discharge instructions reviewed with: Patient.  Patient and/or family verbalized understanding of discharge instructions and all questions answered.  Copy of AVS reviewed with patient and/or family.  Patient will return 7/20/22 for next appointment.  Patient discharged in stable condition accompanied by: self.  Departure Mode: Ambulatory.      Justine Dominguez RN

## 2022-07-20 ENCOUNTER — INFUSION THERAPY VISIT (OUTPATIENT)
Dept: INFUSION THERAPY | Facility: CLINIC | Age: 67
End: 2022-07-20
Attending: PHYSICIAN ASSISTANT
Payer: COMMERCIAL

## 2022-07-20 VITALS
OXYGEN SATURATION: 99 % | BODY MASS INDEX: 29.57 KG/M2 | DIASTOLIC BLOOD PRESSURE: 64 MMHG | HEART RATE: 70 BPM | RESPIRATION RATE: 16 BRPM | SYSTOLIC BLOOD PRESSURE: 100 MMHG | WEIGHT: 180.5 LBS | TEMPERATURE: 96.6 F

## 2022-07-20 DIAGNOSIS — Z17.1 MALIGNANT NEOPLASM OF UPPER-OUTER QUADRANT OF RIGHT BREAST IN FEMALE, ESTROGEN RECEPTOR NEGATIVE (H): ICD-10-CM

## 2022-07-20 DIAGNOSIS — C50.411 MALIGNANT NEOPLASM OF UPPER-OUTER QUADRANT OF RIGHT BREAST IN FEMALE, ESTROGEN RECEPTOR NEGATIVE (H): ICD-10-CM

## 2022-07-20 DIAGNOSIS — Z95.828 PORT-A-CATH IN PLACE: ICD-10-CM

## 2022-07-20 DIAGNOSIS — Z17.0 MALIGNANT NEOPLASM OF OVERLAPPING SITES OF RIGHT BREAST IN FEMALE, ESTROGEN RECEPTOR POSITIVE (H): ICD-10-CM

## 2022-07-20 DIAGNOSIS — C50.811 MALIGNANT NEOPLASM OF OVERLAPPING SITES OF RIGHT BREAST IN FEMALE, ESTROGEN RECEPTOR POSITIVE (H): ICD-10-CM

## 2022-07-20 DIAGNOSIS — R59.1 LYMPHADENOPATHY: Primary | ICD-10-CM

## 2022-07-20 LAB
BASOPHILS # BLD AUTO: 0 10E3/UL (ref 0–0.2)
BASOPHILS NFR BLD AUTO: 1 %
EOSINOPHIL # BLD AUTO: 0.2 10E3/UL (ref 0–0.7)
EOSINOPHIL NFR BLD AUTO: 10 %
ERYTHROCYTE [DISTWIDTH] IN BLOOD BY AUTOMATED COUNT: 15.1 % (ref 10–15)
HCT VFR BLD AUTO: 33.7 % (ref 35–47)
HGB BLD-MCNC: 10.6 G/DL (ref 11.7–15.7)
IMM GRANULOCYTES # BLD: 0 10E3/UL
IMM GRANULOCYTES NFR BLD: 0 %
LYMPHOCYTES # BLD AUTO: 0.8 10E3/UL (ref 0.8–5.3)
LYMPHOCYTES NFR BLD AUTO: 35 %
MCH RBC QN AUTO: 32.2 PG (ref 26.5–33)
MCHC RBC AUTO-ENTMCNC: 31.5 G/DL (ref 31.5–36.5)
MCV RBC AUTO: 102 FL (ref 78–100)
MONOCYTES # BLD AUTO: 0.2 10E3/UL (ref 0–1.3)
MONOCYTES NFR BLD AUTO: 7 %
NEUTROPHILS # BLD AUTO: 1.1 10E3/UL (ref 1.6–8.3)
NEUTROPHILS NFR BLD AUTO: 47 %
NRBC # BLD AUTO: 0 10E3/UL
NRBC BLD AUTO-RTO: 0 /100
PLATELET # BLD AUTO: 266 10E3/UL (ref 150–450)
RBC # BLD AUTO: 3.29 10E6/UL (ref 3.8–5.2)
WBC # BLD AUTO: 2.3 10E3/UL (ref 4–11)

## 2022-07-20 PROCEDURE — 250N000011 HC RX IP 250 OP 636: Performed by: INTERNAL MEDICINE

## 2022-07-20 PROCEDURE — 36591 DRAW BLOOD OFF VENOUS DEVICE: CPT

## 2022-07-20 PROCEDURE — 36593 DECLOT VASCULAR DEVICE: CPT

## 2022-07-20 PROCEDURE — 85025 COMPLETE CBC W/AUTO DIFF WBC: CPT | Performed by: INTERNAL MEDICINE

## 2022-07-20 RX ORDER — HEPARIN SODIUM (PORCINE) LOCK FLUSH IV SOLN 100 UNIT/ML 100 UNIT/ML
5 SOLUTION INTRAVENOUS
Status: DISCONTINUED | OUTPATIENT
Start: 2022-07-20 | End: 2022-07-20 | Stop reason: HOSPADM

## 2022-07-20 RX ORDER — HEPARIN SODIUM (PORCINE) LOCK FLUSH IV SOLN 100 UNIT/ML 100 UNIT/ML
5 SOLUTION INTRAVENOUS
Status: CANCELLED | OUTPATIENT
Start: 2022-07-20

## 2022-07-20 RX ORDER — HEPARIN SODIUM,PORCINE 10 UNIT/ML
5 VIAL (ML) INTRAVENOUS
Status: CANCELLED | OUTPATIENT
Start: 2022-07-20

## 2022-07-20 RX ADMIN — Medication 5 ML: at 08:45

## 2022-07-20 NOTE — PROGRESS NOTES
Infusion Nursing Note:  Flor Griffin presents today for C1D71 Taxol-DEFFERED.  Patient seen by provider today: No   present during visit today: Not Applicable.    Note: ANC 1.1, patient denies symptoms of infection and states she feels great.  Telephone order: Dr. Sotver: defer chemo for one week.  Patient aware to monitor for symptoms of infection and notify clinic if needed.    Intravenous Access:  Labs drawn without difficulty.  Implanted Port.    Treatment Conditions:  Lab Results   Component Value Date    HGB 10.6 (L) 07/20/2022    WBC 2.3 (L) 07/20/2022    ANEU 2.8 05/18/2021    ANEUTAUTO 1.1 (L) 07/20/2022     07/20/2022      Results reviewed, labs did NOT meet treatment parameters.    Post Infusion Assessment:  Site patent and intact, free from redness, edema or discomfort.  No evidence of extravasations.  Access discontinued per protocol.     Discharge Plan:   AVS to patient via MYCHART.  Patient will return 7/27/22 for next appointment.   Patient discharged in stable condition accompanied by: self.  Departure Mode: Ambulatory.      Sabine Fernandes RN

## 2022-07-25 DIAGNOSIS — Z17.1 MALIGNANT NEOPLASM OF UPPER-OUTER QUADRANT OF RIGHT BREAST IN FEMALE, ESTROGEN RECEPTOR NEGATIVE (H): Primary | ICD-10-CM

## 2022-07-25 DIAGNOSIS — C50.411 MALIGNANT NEOPLASM OF UPPER-OUTER QUADRANT OF RIGHT BREAST IN FEMALE, ESTROGEN RECEPTOR NEGATIVE (H): Primary | ICD-10-CM

## 2022-07-25 DIAGNOSIS — R59.1 LYMPHADENOPATHY: ICD-10-CM

## 2022-07-25 RX ORDER — NALOXONE HYDROCHLORIDE 0.4 MG/ML
0.2 INJECTION, SOLUTION INTRAMUSCULAR; INTRAVENOUS; SUBCUTANEOUS
Status: CANCELLED | OUTPATIENT
Start: 2022-07-27

## 2022-07-25 RX ORDER — MEPERIDINE HYDROCHLORIDE 25 MG/ML
25 INJECTION INTRAMUSCULAR; INTRAVENOUS; SUBCUTANEOUS EVERY 30 MIN PRN
Status: CANCELLED | OUTPATIENT
Start: 2022-07-27

## 2022-07-25 RX ORDER — HEPARIN SODIUM,PORCINE 10 UNIT/ML
5 VIAL (ML) INTRAVENOUS
Status: CANCELLED | OUTPATIENT
Start: 2022-07-27

## 2022-07-25 RX ORDER — DIPHENHYDRAMINE HCL 25 MG
50 CAPSULE ORAL
Status: CANCELLED
Start: 2022-07-27

## 2022-07-25 RX ORDER — DIPHENHYDRAMINE HYDROCHLORIDE 50 MG/ML
50 INJECTION INTRAMUSCULAR; INTRAVENOUS
Status: CANCELLED
Start: 2022-07-27

## 2022-07-25 RX ORDER — EPINEPHRINE 1 MG/ML
0.3 INJECTION, SOLUTION, CONCENTRATE INTRAVENOUS EVERY 5 MIN PRN
Status: CANCELLED | OUTPATIENT
Start: 2022-07-27

## 2022-07-25 RX ORDER — LORAZEPAM 2 MG/ML
0.5 INJECTION INTRAMUSCULAR EVERY 4 HOURS PRN
Status: CANCELLED | OUTPATIENT
Start: 2022-07-27

## 2022-07-25 RX ORDER — ALBUTEROL SULFATE 90 UG/1
1-2 AEROSOL, METERED RESPIRATORY (INHALATION)
Status: CANCELLED
Start: 2022-07-27

## 2022-07-25 RX ORDER — ALBUTEROL SULFATE 0.83 MG/ML
2.5 SOLUTION RESPIRATORY (INHALATION)
Status: CANCELLED | OUTPATIENT
Start: 2022-07-27

## 2022-07-25 RX ORDER — HEPARIN SODIUM (PORCINE) LOCK FLUSH IV SOLN 100 UNIT/ML 100 UNIT/ML
5 SOLUTION INTRAVENOUS
Status: CANCELLED | OUTPATIENT
Start: 2022-07-27

## 2022-07-27 ENCOUNTER — INFUSION THERAPY VISIT (OUTPATIENT)
Dept: INFUSION THERAPY | Facility: CLINIC | Age: 67
End: 2022-07-27
Attending: INTERNAL MEDICINE
Payer: COMMERCIAL

## 2022-07-27 VITALS
WEIGHT: 179.6 LBS | DIASTOLIC BLOOD PRESSURE: 63 MMHG | SYSTOLIC BLOOD PRESSURE: 106 MMHG | OXYGEN SATURATION: 98 % | HEART RATE: 66 BPM | TEMPERATURE: 97.3 F | BODY MASS INDEX: 29.42 KG/M2

## 2022-07-27 DIAGNOSIS — R59.1 LYMPHADENOPATHY: ICD-10-CM

## 2022-07-27 DIAGNOSIS — C50.411 MALIGNANT NEOPLASM OF UPPER-OUTER QUADRANT OF RIGHT BREAST IN FEMALE, ESTROGEN RECEPTOR NEGATIVE (H): Primary | ICD-10-CM

## 2022-07-27 DIAGNOSIS — Z17.1 MALIGNANT NEOPLASM OF UPPER-OUTER QUADRANT OF RIGHT BREAST IN FEMALE, ESTROGEN RECEPTOR NEGATIVE (H): Primary | ICD-10-CM

## 2022-07-27 LAB
BASOPHILS # BLD AUTO: 0 10E3/UL (ref 0–0.2)
BASOPHILS NFR BLD AUTO: 1 %
EOSINOPHIL # BLD AUTO: 0.1 10E3/UL (ref 0–0.7)
EOSINOPHIL NFR BLD AUTO: 4 %
ERYTHROCYTE [DISTWIDTH] IN BLOOD BY AUTOMATED COUNT: 15.6 % (ref 10–15)
HCT VFR BLD AUTO: 33.7 % (ref 35–47)
HGB BLD-MCNC: 10.6 G/DL (ref 11.7–15.7)
IMM GRANULOCYTES # BLD: 0 10E3/UL
IMM GRANULOCYTES NFR BLD: 0 %
LYMPHOCYTES # BLD AUTO: 1.1 10E3/UL (ref 0.8–5.3)
LYMPHOCYTES NFR BLD AUTO: 33 %
MCH RBC QN AUTO: 32.1 PG (ref 26.5–33)
MCHC RBC AUTO-ENTMCNC: 31.5 G/DL (ref 31.5–36.5)
MCV RBC AUTO: 102 FL (ref 78–100)
MONOCYTES # BLD AUTO: 0.4 10E3/UL (ref 0–1.3)
MONOCYTES NFR BLD AUTO: 13 %
NEUTROPHILS # BLD AUTO: 1.6 10E3/UL (ref 1.6–8.3)
NEUTROPHILS NFR BLD AUTO: 49 %
NRBC # BLD AUTO: 0 10E3/UL
NRBC BLD AUTO-RTO: 0 /100
PLATELET # BLD AUTO: 278 10E3/UL (ref 150–450)
RBC # BLD AUTO: 3.3 10E6/UL (ref 3.8–5.2)
WBC # BLD AUTO: 3.2 10E3/UL (ref 4–11)

## 2022-07-27 PROCEDURE — 258N000003 HC RX IP 258 OP 636: Performed by: INTERNAL MEDICINE

## 2022-07-27 PROCEDURE — 85025 COMPLETE CBC W/AUTO DIFF WBC: CPT | Performed by: INTERNAL MEDICINE

## 2022-07-27 PROCEDURE — 96413 CHEMO IV INFUSION 1 HR: CPT

## 2022-07-27 PROCEDURE — 250N000011 HC RX IP 250 OP 636: Performed by: INTERNAL MEDICINE

## 2022-07-27 RX ORDER — HEPARIN SODIUM (PORCINE) LOCK FLUSH IV SOLN 100 UNIT/ML 100 UNIT/ML
5 SOLUTION INTRAVENOUS
Status: DISCONTINUED | OUTPATIENT
Start: 2022-07-27 | End: 2022-07-27 | Stop reason: HOSPADM

## 2022-07-27 RX ADMIN — PACLITAXEL 151 MG: 6 INJECTION, SOLUTION INTRAVENOUS at 14:00

## 2022-07-27 RX ADMIN — SODIUM CHLORIDE 250 ML: 9 INJECTION, SOLUTION INTRAVENOUS at 14:00

## 2022-07-27 RX ADMIN — HEPARIN 5 ML: 100 SYRINGE at 15:05

## 2022-07-27 NOTE — PROGRESS NOTES
Infusion Nursing Note:  Flor Griffin presents today for C1D71 Taxol.    Patient seen by provider today: No   present during visit today: Not Applicable.    Note: N/A.    Intravenous Access:  Labs drawn without difficulty.  Implanted Port.    Treatment Conditions:  Lab Results   Component Value Date    HGB 10.6 (L) 07/27/2022    WBC 3.2 (L) 07/27/2022    ANEU 2.8 05/18/2021    ANEUTAUTO 1.6 07/27/2022     07/27/2022      Results reviewed, labs MET treatment parameters, ok to proceed with treatment.    Post Infusion Assessment:  Patient tolerated infusion without incident.  Blood return noted pre and post infusion.  Site patent and intact, free from redness, edema or discomfort.  No evidence of extravasations.  Access discontinued per protocol.     Discharge Plan:   Discharge instructions reviewed with: Patient.  Patient and/or family verbalized understanding of discharge instructions and all questions answered.  AVS to patient via [employer].  Patient will return 8/10 for next appointment.   Patient discharged in stable condition accompanied by: self.  Departure Mode: Ambulatory.      Annalisa Sherman RN

## 2022-07-31 ENCOUNTER — HEALTH MAINTENANCE LETTER (OUTPATIENT)
Age: 67
End: 2022-07-31

## 2022-08-07 DIAGNOSIS — C50.411 MALIGNANT NEOPLASM OF UPPER-OUTER QUADRANT OF RIGHT BREAST IN FEMALE, ESTROGEN RECEPTOR NEGATIVE (H): Primary | ICD-10-CM

## 2022-08-07 DIAGNOSIS — R59.1 LYMPHADENOPATHY: ICD-10-CM

## 2022-08-07 DIAGNOSIS — Z17.1 MALIGNANT NEOPLASM OF UPPER-OUTER QUADRANT OF RIGHT BREAST IN FEMALE, ESTROGEN RECEPTOR NEGATIVE (H): Primary | ICD-10-CM

## 2022-08-07 RX ORDER — MEPERIDINE HYDROCHLORIDE 25 MG/ML
25 INJECTION INTRAMUSCULAR; INTRAVENOUS; SUBCUTANEOUS EVERY 30 MIN PRN
Status: CANCELLED | OUTPATIENT
Start: 2022-10-26

## 2022-08-07 RX ORDER — ALBUTEROL SULFATE 90 UG/1
1-2 AEROSOL, METERED RESPIRATORY (INHALATION)
Status: CANCELLED
Start: 2022-08-10

## 2022-08-07 RX ORDER — LORAZEPAM 2 MG/ML
0.5 INJECTION INTRAMUSCULAR EVERY 4 HOURS PRN
Status: CANCELLED | OUTPATIENT
Start: 2022-08-24

## 2022-08-07 RX ORDER — ALBUTEROL SULFATE 90 UG/1
1-2 AEROSOL, METERED RESPIRATORY (INHALATION)
Status: CANCELLED
Start: 2022-10-19

## 2022-08-07 RX ORDER — EPINEPHRINE 1 MG/ML
0.3 INJECTION, SOLUTION, CONCENTRATE INTRAVENOUS EVERY 5 MIN PRN
Status: CANCELLED | OUTPATIENT
Start: 2022-09-21

## 2022-08-07 RX ORDER — HEPARIN SODIUM (PORCINE) LOCK FLUSH IV SOLN 100 UNIT/ML 100 UNIT/ML
5 SOLUTION INTRAVENOUS
Status: CANCELLED | OUTPATIENT
Start: 2022-09-28

## 2022-08-07 RX ORDER — NALOXONE HYDROCHLORIDE 0.4 MG/ML
0.2 INJECTION, SOLUTION INTRAMUSCULAR; INTRAVENOUS; SUBCUTANEOUS
Status: CANCELLED | OUTPATIENT
Start: 2022-08-10

## 2022-08-07 RX ORDER — HEPARIN SODIUM (PORCINE) LOCK FLUSH IV SOLN 100 UNIT/ML 100 UNIT/ML
5 SOLUTION INTRAVENOUS
Status: CANCELLED | OUTPATIENT
Start: 2022-09-14

## 2022-08-07 RX ORDER — DIPHENHYDRAMINE HCL 25 MG
50 CAPSULE ORAL
Status: CANCELLED
Start: 2022-10-26

## 2022-08-07 RX ORDER — ALBUTEROL SULFATE 90 UG/1
1-2 AEROSOL, METERED RESPIRATORY (INHALATION)
Status: CANCELLED
Start: 2022-09-14

## 2022-08-07 RX ORDER — DIPHENHYDRAMINE HYDROCHLORIDE 50 MG/ML
50 INJECTION INTRAMUSCULAR; INTRAVENOUS
Status: CANCELLED
Start: 2022-09-21

## 2022-08-07 RX ORDER — ALBUTEROL SULFATE 90 UG/1
1-2 AEROSOL, METERED RESPIRATORY (INHALATION)
Status: CANCELLED
Start: 2022-09-28

## 2022-08-07 RX ORDER — DIPHENHYDRAMINE HYDROCHLORIDE 50 MG/ML
50 INJECTION INTRAMUSCULAR; INTRAVENOUS
Status: CANCELLED
Start: 2022-10-19

## 2022-08-07 RX ORDER — MEPERIDINE HYDROCHLORIDE 25 MG/ML
25 INJECTION INTRAMUSCULAR; INTRAVENOUS; SUBCUTANEOUS EVERY 30 MIN PRN
Status: CANCELLED | OUTPATIENT
Start: 2022-10-19

## 2022-08-07 RX ORDER — ALBUTEROL SULFATE 90 UG/1
1-2 AEROSOL, METERED RESPIRATORY (INHALATION)
Status: CANCELLED
Start: 2022-08-24

## 2022-08-07 RX ORDER — DIPHENHYDRAMINE HCL 25 MG
50 CAPSULE ORAL
Status: CANCELLED
Start: 2022-08-24

## 2022-08-07 RX ORDER — ALBUTEROL SULFATE 0.83 MG/ML
2.5 SOLUTION RESPIRATORY (INHALATION)
Status: CANCELLED | OUTPATIENT
Start: 2022-09-28

## 2022-08-07 RX ORDER — LORAZEPAM 2 MG/ML
0.5 INJECTION INTRAMUSCULAR EVERY 4 HOURS PRN
Status: CANCELLED | OUTPATIENT
Start: 2022-10-26

## 2022-08-07 RX ORDER — HEPARIN SODIUM (PORCINE) LOCK FLUSH IV SOLN 100 UNIT/ML 100 UNIT/ML
5 SOLUTION INTRAVENOUS
Status: CANCELLED | OUTPATIENT
Start: 2022-10-12

## 2022-08-07 RX ORDER — NALOXONE HYDROCHLORIDE 0.4 MG/ML
0.2 INJECTION, SOLUTION INTRAMUSCULAR; INTRAVENOUS; SUBCUTANEOUS
Status: CANCELLED | OUTPATIENT
Start: 2022-10-12

## 2022-08-07 RX ORDER — LORAZEPAM 2 MG/ML
0.5 INJECTION INTRAMUSCULAR EVERY 4 HOURS PRN
Status: CANCELLED | OUTPATIENT
Start: 2022-09-28

## 2022-08-07 RX ORDER — HEPARIN SODIUM (PORCINE) LOCK FLUSH IV SOLN 100 UNIT/ML 100 UNIT/ML
5 SOLUTION INTRAVENOUS
Status: CANCELLED | OUTPATIENT
Start: 2022-08-17

## 2022-08-07 RX ORDER — NALOXONE HYDROCHLORIDE 0.4 MG/ML
0.2 INJECTION, SOLUTION INTRAMUSCULAR; INTRAVENOUS; SUBCUTANEOUS
Status: CANCELLED | OUTPATIENT
Start: 2022-10-19

## 2022-08-07 RX ORDER — NALOXONE HYDROCHLORIDE 0.4 MG/ML
0.2 INJECTION, SOLUTION INTRAMUSCULAR; INTRAVENOUS; SUBCUTANEOUS
Status: CANCELLED | OUTPATIENT
Start: 2022-10-26

## 2022-08-07 RX ORDER — DIPHENHYDRAMINE HYDROCHLORIDE 50 MG/ML
50 INJECTION INTRAMUSCULAR; INTRAVENOUS
Status: CANCELLED
Start: 2022-10-12

## 2022-08-07 RX ORDER — HEPARIN SODIUM (PORCINE) LOCK FLUSH IV SOLN 100 UNIT/ML 100 UNIT/ML
5 SOLUTION INTRAVENOUS
Status: CANCELLED | OUTPATIENT
Start: 2022-10-19

## 2022-08-07 RX ORDER — HEPARIN SODIUM,PORCINE 10 UNIT/ML
5 VIAL (ML) INTRAVENOUS
Status: CANCELLED | OUTPATIENT
Start: 2022-09-21

## 2022-08-07 RX ORDER — NALOXONE HYDROCHLORIDE 0.4 MG/ML
0.2 INJECTION, SOLUTION INTRAMUSCULAR; INTRAVENOUS; SUBCUTANEOUS
Status: CANCELLED | OUTPATIENT
Start: 2022-08-17

## 2022-08-07 RX ORDER — ALBUTEROL SULFATE 90 UG/1
1-2 AEROSOL, METERED RESPIRATORY (INHALATION)
Status: CANCELLED
Start: 2022-09-21

## 2022-08-07 RX ORDER — MEPERIDINE HYDROCHLORIDE 25 MG/ML
25 INJECTION INTRAMUSCULAR; INTRAVENOUS; SUBCUTANEOUS EVERY 30 MIN PRN
Status: CANCELLED | OUTPATIENT
Start: 2022-09-28

## 2022-08-07 RX ORDER — DIPHENHYDRAMINE HCL 25 MG
50 CAPSULE ORAL
Status: CANCELLED
Start: 2022-08-10

## 2022-08-07 RX ORDER — DIPHENHYDRAMINE HYDROCHLORIDE 50 MG/ML
50 INJECTION INTRAMUSCULAR; INTRAVENOUS
Status: CANCELLED
Start: 2022-08-24

## 2022-08-07 RX ORDER — EPINEPHRINE 1 MG/ML
0.3 INJECTION, SOLUTION, CONCENTRATE INTRAVENOUS EVERY 5 MIN PRN
Status: CANCELLED | OUTPATIENT
Start: 2022-09-28

## 2022-08-07 RX ORDER — HEPARIN SODIUM,PORCINE 10 UNIT/ML
5 VIAL (ML) INTRAVENOUS
Status: CANCELLED | OUTPATIENT
Start: 2022-08-17

## 2022-08-07 RX ORDER — ALBUTEROL SULFATE 90 UG/1
1-2 AEROSOL, METERED RESPIRATORY (INHALATION)
Status: CANCELLED
Start: 2022-10-26

## 2022-08-07 RX ORDER — MEPERIDINE HYDROCHLORIDE 25 MG/ML
25 INJECTION INTRAMUSCULAR; INTRAVENOUS; SUBCUTANEOUS EVERY 30 MIN PRN
Status: CANCELLED | OUTPATIENT
Start: 2022-08-24

## 2022-08-07 RX ORDER — LORAZEPAM 2 MG/ML
0.5 INJECTION INTRAMUSCULAR EVERY 4 HOURS PRN
Status: CANCELLED | OUTPATIENT
Start: 2022-10-12

## 2022-08-07 RX ORDER — DIPHENHYDRAMINE HYDROCHLORIDE 50 MG/ML
50 INJECTION INTRAMUSCULAR; INTRAVENOUS
Status: CANCELLED
Start: 2022-08-10

## 2022-08-07 RX ORDER — NALOXONE HYDROCHLORIDE 0.4 MG/ML
0.2 INJECTION, SOLUTION INTRAMUSCULAR; INTRAVENOUS; SUBCUTANEOUS
Status: CANCELLED | OUTPATIENT
Start: 2022-09-21

## 2022-08-07 RX ORDER — DIPHENHYDRAMINE HYDROCHLORIDE 50 MG/ML
50 INJECTION INTRAMUSCULAR; INTRAVENOUS
Status: CANCELLED
Start: 2022-08-17

## 2022-08-07 RX ORDER — HEPARIN SODIUM (PORCINE) LOCK FLUSH IV SOLN 100 UNIT/ML 100 UNIT/ML
5 SOLUTION INTRAVENOUS
Status: CANCELLED | OUTPATIENT
Start: 2022-08-24

## 2022-08-07 RX ORDER — DIPHENHYDRAMINE HYDROCHLORIDE 50 MG/ML
50 INJECTION INTRAMUSCULAR; INTRAVENOUS
Status: CANCELLED
Start: 2022-09-14

## 2022-08-07 RX ORDER — MEPERIDINE HYDROCHLORIDE 25 MG/ML
25 INJECTION INTRAMUSCULAR; INTRAVENOUS; SUBCUTANEOUS EVERY 30 MIN PRN
Status: CANCELLED | OUTPATIENT
Start: 2022-08-17

## 2022-08-07 RX ORDER — NALOXONE HYDROCHLORIDE 0.4 MG/ML
0.2 INJECTION, SOLUTION INTRAMUSCULAR; INTRAVENOUS; SUBCUTANEOUS
Status: CANCELLED | OUTPATIENT
Start: 2022-08-24

## 2022-08-07 RX ORDER — DIPHENHYDRAMINE HCL 25 MG
50 CAPSULE ORAL
Status: CANCELLED
Start: 2022-08-17

## 2022-08-07 RX ORDER — MEPERIDINE HYDROCHLORIDE 25 MG/ML
25 INJECTION INTRAMUSCULAR; INTRAVENOUS; SUBCUTANEOUS EVERY 30 MIN PRN
Status: CANCELLED | OUTPATIENT
Start: 2022-09-21

## 2022-08-07 RX ORDER — DIPHENHYDRAMINE HCL 25 MG
50 CAPSULE ORAL
Status: CANCELLED
Start: 2022-10-12

## 2022-08-07 RX ORDER — LORAZEPAM 2 MG/ML
0.5 INJECTION INTRAMUSCULAR EVERY 4 HOURS PRN
Status: CANCELLED | OUTPATIENT
Start: 2022-08-17

## 2022-08-07 RX ORDER — HEPARIN SODIUM,PORCINE 10 UNIT/ML
5 VIAL (ML) INTRAVENOUS
Status: CANCELLED | OUTPATIENT
Start: 2022-09-14

## 2022-08-07 RX ORDER — EPINEPHRINE 1 MG/ML
0.3 INJECTION, SOLUTION, CONCENTRATE INTRAVENOUS EVERY 5 MIN PRN
Status: CANCELLED | OUTPATIENT
Start: 2022-10-12

## 2022-08-07 RX ORDER — DIPHENHYDRAMINE HYDROCHLORIDE 50 MG/ML
50 INJECTION INTRAMUSCULAR; INTRAVENOUS
Status: CANCELLED
Start: 2022-10-26

## 2022-08-07 RX ORDER — LORAZEPAM 2 MG/ML
0.5 INJECTION INTRAMUSCULAR EVERY 4 HOURS PRN
Status: CANCELLED | OUTPATIENT
Start: 2022-10-19

## 2022-08-07 RX ORDER — ALBUTEROL SULFATE 0.83 MG/ML
2.5 SOLUTION RESPIRATORY (INHALATION)
Status: CANCELLED | OUTPATIENT
Start: 2022-10-12

## 2022-08-07 RX ORDER — ALBUTEROL SULFATE 0.83 MG/ML
2.5 SOLUTION RESPIRATORY (INHALATION)
Status: CANCELLED | OUTPATIENT
Start: 2022-08-24

## 2022-08-07 RX ORDER — HEPARIN SODIUM (PORCINE) LOCK FLUSH IV SOLN 100 UNIT/ML 100 UNIT/ML
5 SOLUTION INTRAVENOUS
Status: CANCELLED | OUTPATIENT
Start: 2022-09-21

## 2022-08-07 RX ORDER — ALBUTEROL SULFATE 0.83 MG/ML
2.5 SOLUTION RESPIRATORY (INHALATION)
Status: CANCELLED | OUTPATIENT
Start: 2022-09-21

## 2022-08-07 RX ORDER — DIPHENHYDRAMINE HCL 25 MG
50 CAPSULE ORAL
Status: CANCELLED
Start: 2022-09-28

## 2022-08-07 RX ORDER — LORAZEPAM 2 MG/ML
0.5 INJECTION INTRAMUSCULAR EVERY 4 HOURS PRN
Status: CANCELLED | OUTPATIENT
Start: 2022-09-14

## 2022-08-07 RX ORDER — HEPARIN SODIUM,PORCINE 10 UNIT/ML
5 VIAL (ML) INTRAVENOUS
Status: CANCELLED | OUTPATIENT
Start: 2022-10-26

## 2022-08-07 RX ORDER — LORAZEPAM 2 MG/ML
0.5 INJECTION INTRAMUSCULAR EVERY 4 HOURS PRN
Status: CANCELLED | OUTPATIENT
Start: 2022-09-21

## 2022-08-07 RX ORDER — ALBUTEROL SULFATE 90 UG/1
1-2 AEROSOL, METERED RESPIRATORY (INHALATION)
Status: CANCELLED
Start: 2022-08-17

## 2022-08-07 RX ORDER — HEPARIN SODIUM,PORCINE 10 UNIT/ML
5 VIAL (ML) INTRAVENOUS
Status: CANCELLED | OUTPATIENT
Start: 2022-08-24

## 2022-08-07 RX ORDER — DIPHENHYDRAMINE HCL 25 MG
50 CAPSULE ORAL
Status: CANCELLED
Start: 2022-10-19

## 2022-08-07 RX ORDER — HEPARIN SODIUM,PORCINE 10 UNIT/ML
5 VIAL (ML) INTRAVENOUS
Status: CANCELLED | OUTPATIENT
Start: 2022-09-28

## 2022-08-07 RX ORDER — DIPHENHYDRAMINE HCL 25 MG
50 CAPSULE ORAL
Status: CANCELLED
Start: 2022-09-21

## 2022-08-07 RX ORDER — NALOXONE HYDROCHLORIDE 0.4 MG/ML
0.2 INJECTION, SOLUTION INTRAMUSCULAR; INTRAVENOUS; SUBCUTANEOUS
Status: CANCELLED | OUTPATIENT
Start: 2022-09-14

## 2022-08-07 RX ORDER — MEPERIDINE HYDROCHLORIDE 25 MG/ML
25 INJECTION INTRAMUSCULAR; INTRAVENOUS; SUBCUTANEOUS EVERY 30 MIN PRN
Status: CANCELLED | OUTPATIENT
Start: 2022-09-14

## 2022-08-07 RX ORDER — EPINEPHRINE 1 MG/ML
0.3 INJECTION, SOLUTION, CONCENTRATE INTRAVENOUS EVERY 5 MIN PRN
Status: CANCELLED | OUTPATIENT
Start: 2022-08-17

## 2022-08-07 RX ORDER — HEPARIN SODIUM (PORCINE) LOCK FLUSH IV SOLN 100 UNIT/ML 100 UNIT/ML
5 SOLUTION INTRAVENOUS
Status: CANCELLED | OUTPATIENT
Start: 2022-10-26

## 2022-08-07 RX ORDER — DIPHENHYDRAMINE HYDROCHLORIDE 50 MG/ML
50 INJECTION INTRAMUSCULAR; INTRAVENOUS
Status: CANCELLED
Start: 2022-09-28

## 2022-08-07 RX ORDER — EPINEPHRINE 1 MG/ML
0.3 INJECTION, SOLUTION, CONCENTRATE INTRAVENOUS EVERY 5 MIN PRN
Status: CANCELLED | OUTPATIENT
Start: 2022-09-14

## 2022-08-07 RX ORDER — ALBUTEROL SULFATE 0.83 MG/ML
2.5 SOLUTION RESPIRATORY (INHALATION)
Status: CANCELLED | OUTPATIENT
Start: 2022-08-10

## 2022-08-07 RX ORDER — EPINEPHRINE 1 MG/ML
0.3 INJECTION, SOLUTION, CONCENTRATE INTRAVENOUS EVERY 5 MIN PRN
Status: CANCELLED | OUTPATIENT
Start: 2022-10-19

## 2022-08-07 RX ORDER — EPINEPHRINE 1 MG/ML
0.3 INJECTION, SOLUTION, CONCENTRATE INTRAVENOUS EVERY 5 MIN PRN
Status: CANCELLED | OUTPATIENT
Start: 2022-08-24

## 2022-08-07 RX ORDER — HEPARIN SODIUM,PORCINE 10 UNIT/ML
5 VIAL (ML) INTRAVENOUS
Status: CANCELLED | OUTPATIENT
Start: 2022-10-19

## 2022-08-07 RX ORDER — LORAZEPAM 2 MG/ML
0.5 INJECTION INTRAMUSCULAR EVERY 4 HOURS PRN
Status: CANCELLED | OUTPATIENT
Start: 2022-08-10

## 2022-08-07 RX ORDER — EPINEPHRINE 1 MG/ML
0.3 INJECTION, SOLUTION, CONCENTRATE INTRAVENOUS EVERY 5 MIN PRN
Status: CANCELLED | OUTPATIENT
Start: 2022-08-10

## 2022-08-07 RX ORDER — ALBUTEROL SULFATE 0.83 MG/ML
2.5 SOLUTION RESPIRATORY (INHALATION)
Status: CANCELLED | OUTPATIENT
Start: 2022-10-19

## 2022-08-07 RX ORDER — HEPARIN SODIUM (PORCINE) LOCK FLUSH IV SOLN 100 UNIT/ML 100 UNIT/ML
5 SOLUTION INTRAVENOUS
Status: CANCELLED | OUTPATIENT
Start: 2022-08-10

## 2022-08-07 RX ORDER — MEPERIDINE HYDROCHLORIDE 25 MG/ML
25 INJECTION INTRAMUSCULAR; INTRAVENOUS; SUBCUTANEOUS EVERY 30 MIN PRN
Status: CANCELLED | OUTPATIENT
Start: 2022-10-12

## 2022-08-07 RX ORDER — DIPHENHYDRAMINE HCL 25 MG
50 CAPSULE ORAL
Status: CANCELLED
Start: 2022-09-14

## 2022-08-07 RX ORDER — NALOXONE HYDROCHLORIDE 0.4 MG/ML
0.2 INJECTION, SOLUTION INTRAMUSCULAR; INTRAVENOUS; SUBCUTANEOUS
Status: CANCELLED | OUTPATIENT
Start: 2022-09-28

## 2022-08-07 RX ORDER — HEPARIN SODIUM,PORCINE 10 UNIT/ML
5 VIAL (ML) INTRAVENOUS
Status: CANCELLED | OUTPATIENT
Start: 2022-10-12

## 2022-08-07 RX ORDER — ALBUTEROL SULFATE 0.83 MG/ML
2.5 SOLUTION RESPIRATORY (INHALATION)
Status: CANCELLED | OUTPATIENT
Start: 2022-09-14

## 2022-08-07 RX ORDER — EPINEPHRINE 1 MG/ML
0.3 INJECTION, SOLUTION, CONCENTRATE INTRAVENOUS EVERY 5 MIN PRN
Status: CANCELLED | OUTPATIENT
Start: 2022-10-26

## 2022-08-07 RX ORDER — HEPARIN SODIUM,PORCINE 10 UNIT/ML
5 VIAL (ML) INTRAVENOUS
Status: CANCELLED | OUTPATIENT
Start: 2022-08-10

## 2022-08-07 RX ORDER — MEPERIDINE HYDROCHLORIDE 25 MG/ML
25 INJECTION INTRAMUSCULAR; INTRAVENOUS; SUBCUTANEOUS EVERY 30 MIN PRN
Status: CANCELLED | OUTPATIENT
Start: 2022-08-10

## 2022-08-07 RX ORDER — ALBUTEROL SULFATE 90 UG/1
1-2 AEROSOL, METERED RESPIRATORY (INHALATION)
Status: CANCELLED
Start: 2022-10-12

## 2022-08-07 RX ORDER — ALBUTEROL SULFATE 0.83 MG/ML
2.5 SOLUTION RESPIRATORY (INHALATION)
Status: CANCELLED | OUTPATIENT
Start: 2022-08-17

## 2022-08-07 RX ORDER — ALBUTEROL SULFATE 0.83 MG/ML
2.5 SOLUTION RESPIRATORY (INHALATION)
Status: CANCELLED | OUTPATIENT
Start: 2022-10-26

## 2022-08-10 ENCOUNTER — INFUSION THERAPY VISIT (OUTPATIENT)
Dept: INFUSION THERAPY | Facility: CLINIC | Age: 67
End: 2022-08-10
Attending: INTERNAL MEDICINE
Payer: COMMERCIAL

## 2022-08-10 VITALS
TEMPERATURE: 97.1 F | SYSTOLIC BLOOD PRESSURE: 98 MMHG | BODY MASS INDEX: 29.37 KG/M2 | WEIGHT: 179.3 LBS | RESPIRATION RATE: 16 BRPM | DIASTOLIC BLOOD PRESSURE: 65 MMHG | OXYGEN SATURATION: 98 % | HEART RATE: 66 BPM

## 2022-08-10 DIAGNOSIS — C50.411 MALIGNANT NEOPLASM OF UPPER-OUTER QUADRANT OF RIGHT BREAST IN FEMALE, ESTROGEN RECEPTOR NEGATIVE (H): ICD-10-CM

## 2022-08-10 DIAGNOSIS — Z17.1 MALIGNANT NEOPLASM OF UPPER-OUTER QUADRANT OF RIGHT BREAST IN FEMALE, ESTROGEN RECEPTOR NEGATIVE (H): ICD-10-CM

## 2022-08-10 DIAGNOSIS — R59.1 LYMPHADENOPATHY: Primary | ICD-10-CM

## 2022-08-10 LAB
ALBUMIN SERPL BCG-MCNC: 4 G/DL (ref 3.5–5.2)
ALP SERPL-CCNC: 84 U/L (ref 35–104)
ALT SERPL W P-5'-P-CCNC: 23 U/L (ref 10–35)
ANION GAP SERPL CALCULATED.3IONS-SCNC: 8 MMOL/L (ref 7–15)
AST SERPL W P-5'-P-CCNC: 21 U/L (ref 10–35)
BASOPHILS # BLD AUTO: 0 10E3/UL (ref 0–0.2)
BASOPHILS NFR BLD AUTO: 1 %
BILIRUB SERPL-MCNC: 0.5 MG/DL
BUN SERPL-MCNC: 10.5 MG/DL (ref 8–23)
CALCIUM SERPL-MCNC: 9.7 MG/DL (ref 8.8–10.2)
CANCER AG15-3 SERPL-ACNC: 10 U/ML
CEA SERPL-MCNC: 1.8 NG/ML
CHLORIDE SERPL-SCNC: 105 MMOL/L (ref 98–107)
CREAT SERPL-MCNC: 0.67 MG/DL (ref 0.51–0.95)
DEPRECATED HCO3 PLAS-SCNC: 27 MMOL/L (ref 22–29)
EOSINOPHIL # BLD AUTO: 0.2 10E3/UL (ref 0–0.7)
EOSINOPHIL NFR BLD AUTO: 7 %
ERYTHROCYTE [DISTWIDTH] IN BLOOD BY AUTOMATED COUNT: 15 % (ref 10–15)
GFR SERPL CREATININE-BSD FRML MDRD: >90 ML/MIN/1.73M2
GLUCOSE SERPL-MCNC: 111 MG/DL (ref 70–99)
HCT VFR BLD AUTO: 35.8 % (ref 35–47)
HGB BLD-MCNC: 11.3 G/DL (ref 11.7–15.7)
IMM GRANULOCYTES # BLD: 0 10E3/UL
IMM GRANULOCYTES NFR BLD: 0 %
LYMPHOCYTES # BLD AUTO: 1 10E3/UL (ref 0.8–5.3)
LYMPHOCYTES NFR BLD AUTO: 30 %
MCH RBC QN AUTO: 32.1 PG (ref 26.5–33)
MCHC RBC AUTO-ENTMCNC: 31.6 G/DL (ref 31.5–36.5)
MCV RBC AUTO: 102 FL (ref 78–100)
MONOCYTES # BLD AUTO: 0.3 10E3/UL (ref 0–1.3)
MONOCYTES NFR BLD AUTO: 10 %
NEUTROPHILS # BLD AUTO: 1.7 10E3/UL (ref 1.6–8.3)
NEUTROPHILS NFR BLD AUTO: 52 %
NRBC # BLD AUTO: 0 10E3/UL
NRBC BLD AUTO-RTO: 0 /100
PLATELET # BLD AUTO: 257 10E3/UL (ref 150–450)
POTASSIUM SERPL-SCNC: 3.9 MMOL/L (ref 3.4–5.3)
PROT SERPL-MCNC: 6.3 G/DL (ref 6.4–8.3)
RBC # BLD AUTO: 3.52 10E6/UL (ref 3.8–5.2)
SODIUM SERPL-SCNC: 140 MMOL/L (ref 136–145)
WBC # BLD AUTO: 3.3 10E3/UL (ref 4–11)

## 2022-08-10 PROCEDURE — 250N000011 HC RX IP 250 OP 636: Performed by: INTERNAL MEDICINE

## 2022-08-10 PROCEDURE — 85014 HEMATOCRIT: CPT | Performed by: INTERNAL MEDICINE

## 2022-08-10 PROCEDURE — 258N000003 HC RX IP 258 OP 636: Performed by: INTERNAL MEDICINE

## 2022-08-10 PROCEDURE — 96413 CHEMO IV INFUSION 1 HR: CPT

## 2022-08-10 PROCEDURE — 36591 DRAW BLOOD OFF VENOUS DEVICE: CPT

## 2022-08-10 PROCEDURE — 86300 IMMUNOASSAY TUMOR CA 15-3: CPT | Performed by: INTERNAL MEDICINE

## 2022-08-10 PROCEDURE — 82378 CARCINOEMBRYONIC ANTIGEN: CPT | Performed by: INTERNAL MEDICINE

## 2022-08-10 PROCEDURE — 80053 COMPREHEN METABOLIC PANEL: CPT | Performed by: INTERNAL MEDICINE

## 2022-08-10 RX ORDER — HEPARIN SODIUM (PORCINE) LOCK FLUSH IV SOLN 100 UNIT/ML 100 UNIT/ML
5 SOLUTION INTRAVENOUS
Status: DISCONTINUED | OUTPATIENT
Start: 2022-08-10 | End: 2022-08-10 | Stop reason: HOSPADM

## 2022-08-10 RX ADMIN — Medication 5 ML: at 09:58

## 2022-08-10 RX ADMIN — PACLITAXEL 151 MG: 6 INJECTION, SOLUTION INTRAVENOUS at 08:55

## 2022-08-10 RX ADMIN — SODIUM CHLORIDE 250 ML: 9 INJECTION, SOLUTION INTRAVENOUS at 08:53

## 2022-08-10 ASSESSMENT — PAIN SCALES - GENERAL: PAINLEVEL: NO PAIN (0)

## 2022-08-10 NOTE — PROGRESS NOTES
Infusion Nursing Note:  Flor Griffin presents today for C2D1 Taxol, labs.    Patient seen by provider today: No   present during visit today: Not Applicable.    Note: N/A.    Intravenous Access:  Labs drawn without difficulty.  Implanted Port.    Treatment Conditions:  Lab Results   Component Value Date    HGB 11.3 (L) 08/10/2022    WBC 3.3 (L) 08/10/2022    ANEU 2.8 05/18/2021    ANEUTAUTO 1.7 08/10/2022     08/10/2022      Lab Results   Component Value Date     08/10/2022    POTASSIUM 3.9 08/10/2022    MAG 1.8 05/18/2021    CR 0.67 08/10/2022    EDILSON 9.7 08/10/2022    BILITOTAL 0.5 08/10/2022    ALBUMIN 4.0 08/10/2022    ALT 23 08/10/2022    AST 21 08/10/2022     Results reviewed, labs MET treatment parameters, ok to proceed with treatment.    Post Infusion Assessment:  Patient tolerated infusion without incident.  Blood return noted pre and post infusion.  Site patent and intact, free from redness, edema or discomfort.  No evidence of extravasations.  Access discontinued per protocol.     Discharge Plan:   [facility]  Patient will return  for next appointment.   Patient discharged in stable condition accompanied by: self.  Departure Mode: Ambulatory.      Lori Rodriguez RN

## 2022-08-12 NOTE — PROGRESS NOTES
[facility]    Hematology/Oncology Established Patient Follow-up Note      Today's Date: 8/29/2022    Reason for Follow-up: Metastatic breast cancer.    HISTORY OF PRESENT ILLNESS: Flor Griffin is a 67 year old female who presents with the following oncologic history:     --Clinical TX N3c M0 adenocarcinoma which is poorly differentiated, likely of breast origin, ER low-positive at 1-5%, RI negative, HER-2/mitzi FISH negative, androgen stain weak-intermediate 10-20%.  Initially she was seen 11/22/2017 after she underwent right supraclavicular lymph node biopsy which was positive for poorly differentiated adenocarcinoma.  She had this lymph node almost a month and had an excisional biopsy performed which was consistent with the diagnosis of cancer.   --A PET/CT scan 11/27/2017 showed hypermetabolic right supraclavicular, right axillary and subpectoral adenopathy.  Otherwise, no distant metastatic disease.   --3/7/2018: Completed 4 cycles of carboplatin and paclitaxel followed by dose dense AC x 4 cycles.  No surgery was done.  --5/29/2018-7/31/2018: Completed adjuvant radiation to right breast, right axilla, right supraclavicular region.  --9/2018: Started adjuvant letrozole.  --10/15/2020: Screening mammogram showed focal asymmetry in upper outer right breast; no suspicious findings in left breast.  --10/21/2020: U/S of right breast showed irregularly-shaped hypoechoic mass at 10:00, 7 cm from nipple, measuring 3.2 x 2.7 x 3.6 cm. Right axillary ultrasound shows multiple normal-appearing lymph nodes. Biopsy of right breast mass at 10:00 showed poorly differentiated carcinoma, no lymphovascular invasion, morphology consistent with breast cancer and similar to prior axillary node tumor, ER weakly positive at 1% and RI negative (0%), HER-2/mitiz FISH negative.  --10/28/2020: PET/CT scan showed high metabolic activity in 3 cm area of right upper outer breast, several small hypermetabolic lymph nodes in  high right axilla and right subclavian region; mild hypermetabolic lymph nodes in bilateral hilar regions, favor metastatic disease; several tiny nodules in right upper lung, favor benign etiology; small hypermetabolic focus in pelvis in either sigmoid colon or adjacent loop of small bowel.  --11/02/2020: Started 1st line metastatic therapy with capecitabine.  --2/9/2021: PET/CT scan showed hypermetabolic right breast mass not significantly changed since 12/7/2020, persistent hypermetabolism; hypermetabolic right axillary lymph node increased in size; no distant metastasis; focal hypermetabolism in pelvis associated with sigmoid colon; stable 6-mm lung nodules.  --2/18/2021: Due to disease progression, switched to 2nd line metastatic therapy with eribulin. Required dose reduction due to neutropenia.  --4/24/2021: Left upper extremity Doppler U/S showed occlusive DVT within left subclavian vein; superficial thrombophlebitis within proximal cephalic vein. Started rivaroxaban.  --5/11/2021: PET scan showed unchanged size and slightly increased uptake to right breast; resolved right axillary adenopathy; no new lesions; persistent hypermetabolism at proximal sigmoid colon.  --5/19/2021 On chemo break after cycle 4 of eribulin.  --6/2/2021: Underwent palliative right mastectomy under care of Dr. Wilfredo Castillo. Pathology showed grade 3 invasive ductal carcinoma with tumor invasion into underlying skeletal muscle. Margins negative.  --7/22/2021: Colonoscopy showed diverticula in sigmoid colon; normal mucosa throughout entire colon.  --8/3/2021: PET scan showed postsurgical seroma at right chest wall and axilla; small focus of residual mild FDG uptake at superolateral resection margin, likely posttreatment change or inflammation; no metastatic disease; persistent focus of uptake in sigmoid colon corresponding to diverticular disease.  --10/07/2021: U/S of right chest wall performed due to palpable lump. This showed hypoechoic  nodule superior to level of scar measuring 0.7 x 0.6 x 1.5 cm. Biopsy of nodule showed poorly differentiated carcinoma consistent with recurrent breast carcinoma, grade 3, ER negative.  --10/19/2021: PET/CT showed right chest wall lesion with SUV 12.9; decreased right chest wall seroma; no FDG uptake in abdomen or pelvis.  --11/17/2021: Underwent excision of right chest wall nodule recurrence. Pathology showed grade 3 invasive ductal carcinoma, intramuscular; positive anterior and inferior margins of resection.  --1/25/2022 PET scan showed persistent hypermetabolic nodularity along anterior right chest wall suspicious for residual/recurrent tumor; persistent FDG uptake adjacent to short segment of sigmoid diverticulosis with minimal pericolonic fat stranding, unchanged, may represent chronic diverticulitis.  --2/14/2022-3/8/2022: Re-treatment with radiation to right chest wall.  --5/2/2022: PET scan showed increasing metabolic activity subtle pre-existing soft tissue nodules in the right breast/chest wall soft tissues suspicious for progression of disease.  --5/11/2022: Started weekly paclitaxel.  Not eligible for immunotherapy. Last dose of paclitaxel given 8/17/2022.  --8/23/2022: PET scan showed enlarged superficial soft tissue nodule in the skin/subcutaneous tissue of the right breast measuring 10 x 7 mm, previously 6 mm. New FDG-avid 5 mm right internal mammary lymph node. Enlarged right breast fluid collection measuring 10.6 cm maximally, suspected lymphocele. No other sites of disease.    INTERIM HISTORY:  Michelle reports she will be moving to [place] as of today. She denies paresthesias. No recent fevers or chills.     REVIEW OF SYSTEMS:   14 point ROS was reviewed and is negative other than as noted above in HPI.       HOME MEDICATIONS:  Current Outpatient Medications   Medication Sig Dispense Refill     calcium carbonate (TUMS) 500 MG chewable tablet Take 1 chew tab by mouth daily as needed for heartburn        multivitamin w/minerals (THERA-VIT-M) tablet Take 1 tablet by mouth every morning        Omega-3 Fatty Acids (OMEGA-3 FISH OIL PO) Take 1 g by mouth every morning        prochlorperazine (COMPAZINE) 10 MG tablet Take 1 tablet (10 mg) by mouth every 6 hours as needed (Nausea/Vomiting) 30 tablet 2     rivaroxaban ANTICOAGULANT (XARELTO ANTICOAGULANT) 20 MG TABS tablet Take 1 tablet (20 mg) by mouth daily (with dinner) 90 tablet 3         ALLERGIES:  Allergies   Allergen Reactions     Pcn [Penicillins] Hives         PAST MEDICAL HISTORY:  Past Medical History:   Diagnosis Date     Basal cell cancer     right cheek     Breast cancer, right breast (H)      DVT (deep venous thrombosis) (H)      Gastroesophageal reflux disease      History of blood transfusion     blue baby     Hyperlipidaemia          PAST SURGICAL HISTORY:  Past Surgical History:   Procedure Laterality Date     BIOPSY BREAST Right 11/17/2021    Procedure: Excise right chest wall mass;  Surgeon: Ulises Castillo MD;  Location:  OR     BIOPSY MASS NECK Right 11/16/2017    Procedure: BIOPSY MASS NECK;  Excisional Biopsy Right Neck Lymph node;  Surgeon: Waylon Corral MD;  Location: [facility] OR     COLONOSCOPY       COLONOSCOPY N/A 7/22/2021    Procedure: COLONOSCOPY;  Surgeon: Gabriele Caro MD;  Location:  GI     IR CHEST PORT PLACEMENT > 5 YRS OF AGE  2/16/2021     IR PORT REMOVAL LEFT  5/13/2019     MASTECTOMY SIMPLE Right 6/2/2021    Procedure: RIGHT SIMPLE MASTECTOMY;  Surgeon: Ulises Castillo MD;  Location:  OR     MOHS MICROGRAPHIC PROCEDURE  ~2010    right cheek; BCC     MOHS MICROGRAPHIC PROCEDURE  10/31/2016    Dr. Nicholas [facility]         SOCIAL HISTORY:  Social History     Socioeconomic History     Marital status:      Spouse name: Not on file     Number of children: Not on file     Years of education: Not on file     Highest education level: Not on file   Occupational History     Not on file   Tobacco Use     Smoking  "status: Never Smoker     Smokeless tobacco: Never Used   Vaping Use     Vaping Use: Never used   Substance and Sexual Activity     Alcohol use: Yes     Alcohol/week: 0.0 standard drinks     Comment: 2 times a week, 2 drinks, wine or whiskey     Drug use: No     Sexual activity: Yes     Partners: Male     Birth control/protection: Post-menopausal   Other Topics Concern     Parent/sibling w/ CABG, MI or angioplasty before 65F 55M? No   Social History Narrative     Not on file     Social Determinants of Health     Financial Resource Strain: Not on file   Food Insecurity: Not on file   Transportation Needs: Not on file   Physical Activity: Not on file   Stress: Not on file   Social Connections: Not on file   Intimate Partner Violence: Not At Risk     Fear of Current or Ex-Partner: No     Emotionally Abused: No     Physically Abused: No     Sexually Abused: No   Housing Stability: Not on file         FAMILY HISTORY:  Family History   Problem Relation Age of Onset     Lupus Mother      Heart Disease Mother         CHF     Coronary Artery Disease Mother      Hyperlipidemia Mother      Diabetes Father      Breast Cancer Sister 83     No Known Problems Sister      Diabetes Brother      Suicide Brother      No Known Problems Brother      No Known Problems Brother      Suicide Brother      No Known Problems Brother      No Known Problems Brother      Breast Cancer Maternal Aunt 80         PHYSICAL EXAM:  Vital signs:  /85   Pulse 79   Resp 16   Ht 1.676 m (5' 6\")   Wt 79.4 kg (175 lb)   LMP 2004   SpO2 100%   BMI 28.25 kg/m    ECO  GENERAL/CONSTITUTIONAL: No acute distress.  EYES: No erythema or scleral icterus.  ENT/MOUTH: Neck supple. Mask in place.  LYMPH: No cervical, supraclavicular, axillary adenopathy.   BREAST: Right breast surgically absent with fluid in the right chest wall and palpable slightly erythematous pea-size nodule.   RESPIRATORY: No audible cough or wheezing .  GASTROINTESTINAL: No " hepatosplenomegaly, masses, or tenderness. No guarding.  No distention.  MUSCULOSKELETAL: Warm and well-perfused, no cyanosis, clubbing, or edema.  NEUROLOGIC: No focal motor deficits. Alert, oriented, answers questions appropriately.  INTEGUMENTARY: No rashes or jaundice.  GAIT: Steady, does not use assistive device    LABS:  CBC RESULTS: Recent Labs   Lab Test 08/24/22  0806   WBC 2.3*   RBC 3.31*   HGB 10.5*   HCT 33.7*   *   MCH 31.7   MCHC 31.2*   RDW 15.5*        Last Comprehensive Metabolic Panel:  Sodium   Date Value Ref Range Status   08/10/2022 140 136 - 145 mmol/L Final   05/27/2021 139 133 - 144 mmol/L Final     Potassium   Date Value Ref Range Status   08/10/2022 3.9 3.4 - 5.3 mmol/L Final   07/13/2022 4.2 3.4 - 5.3 mmol/L Final   05/27/2021 3.6 3.4 - 5.3 mmol/L Final     Chloride   Date Value Ref Range Status   08/10/2022 105 98 - 107 mmol/L Final   07/13/2022 106 94 - 109 mmol/L Final   05/27/2021 107 94 - 109 mmol/L Final     Carbon Dioxide   Date Value Ref Range Status   05/27/2021 28 20 - 32 mmol/L Final     Carbon Dioxide (CO2)   Date Value Ref Range Status   08/10/2022 27 22 - 29 mmol/L Final   07/13/2022 26 20 - 32 mmol/L Final     Anion Gap   Date Value Ref Range Status   08/10/2022 8 7 - 15 mmol/L Final   07/13/2022 4 3 - 14 mmol/L Final   05/27/2021 4 3 - 14 mmol/L Final     Glucose   Date Value Ref Range Status   08/10/2022 111 (H) 70 - 99 mg/dL Final   07/13/2022 91 70 - 99 mg/dL Final   05/27/2021 136 (H) 70 - 99 mg/dL Final     Urea Nitrogen   Date Value Ref Range Status   08/10/2022 10.5 8.0 - 23.0 mg/dL Final   07/13/2022 14 7 - 30 mg/dL Final   05/27/2021 13 7 - 30 mg/dL Final     Creatinine   Date Value Ref Range Status   08/10/2022 0.67 0.51 - 0.95 mg/dL Final   05/27/2021 0.76 0.52 - 1.04 mg/dL Final     GFR Estimate   Date Value Ref Range Status   08/10/2022 >90 >60 mL/min/1.73m2 Final     Comment:     Effective December 21, 2021 eGFRcr in adults is calculated using  "the 2021 CKD-EPI creatinine equation which includes age and gender (John et al., NEJ, DOI: 10.1056/TRKMpq3477403)   05/27/2021 82 >60 mL/min/[1.73_m2] Final     Comment:     Non  GFR Calc  Starting 12/18/2018, serum creatinine based estimated GFR (eGFR) will be   calculated using the Chronic Kidney Disease Epidemiology Collaboration   (CKD-EPI) equation.       Calcium   Date Value Ref Range Status   08/10/2022 9.7 8.8 - 10.2 mg/dL Final   05/27/2021 9.0 8.5 - 10.1 mg/dL Final     Bilirubin Total   Date Value Ref Range Status   08/10/2022 0.5 <=1.2 mg/dL Final   05/18/2021 0.8 0.2 - 1.3 mg/dL Final     Alkaline Phosphatase   Date Value Ref Range Status   08/10/2022 84 35 - 104 U/L Final   05/18/2021 81 40 - 150 U/L Final     ALT   Date Value Ref Range Status   08/10/2022 23 10 - 35 U/L Final   05/18/2021 58 (H) 0 - 50 U/L Final     AST   Date Value Ref Range Status   08/10/2022 21 10 - 35 U/L Final   05/18/2021 38 0 - 45 U/L Final       PATHOLOGY:  Reviewed as per HPI.    IMAGING:   Reviewed as per HPI.    ASSESSMENT/PLAN:  Flor Griffin is a 66 year old female with the following issues:  1.  Right breast poorly differentiated adenocarcinoma, local recurrence, (triple negative) ER negative, IN negative, HER-2/mitzi negative, PD-L1 CPS=1  2. Bilateral hilar lymphadenopathy, now resolved  3. Indeterminate pulmonary nodules, likely benign and stable  --Michelle is s/p palliative right mastectomy palliative eribulin through 5/19/2021.  --PET scan from 10/19/2021 showed hypermetabolic local recurrence to the right chest wall (triple negative) but initially no distant metastatic disease at the present time. Biopsy showed this was triple negative and PD-L1 CPS=1. Therefore not a candidate for immunotherapy.  --She underwent excision of this local recurrence on 11/17/2021.  I had discussed with Dr. Castillo clarification on the margins of resection and that the \"positive\" margins were considered as such as he had " removed generous margins into the muscle.   --1/25/2022 PET scan showed persistent hypermetabolic nodularity along anterior right chest wall that is quite suggestive of residual tumor after the last resection but no clinical evidence of distant metastatic disease. Therefore, she proceeded with repeat right chest wall radiation.  --5/2/2022 PET scan showed increasing metabolic activity subtle pre-existing soft tissue nodules in the right breast/chest wall soft tissues suspicious for progression of disease.  --She then proceeded on 5/11/2022 with weekly paclitaxel days 1, 8, 15 every 4 weeks.  Ineligible for pembrolizumab due to PD-L1 CPS score less than 10 (this result was obtained after starting paclitaxel).  -- I personally reviewed her PET scan from 8/23/2022 which showed disease progression as noted above and includes new 5 mm right internal mammary lymph node. Therefore, I recommended switching to Trodelvy (sacituzumab govitecan).  Discussed potential adverse effects of this drug and that there is activity against both triple negative and ER positive breast cancer.  --She is moving to [place] as of today and will transfer her oncologic care to [facility] closer to her new residence. She asked if she could delay her new treatment until after an early October trip to [place] to attend her daughter's RN graduation.  I told her that I would recommend attempting to start treatment sooner if able to see a new oncologist and then she could work her treatment schedule around the trip.    4. Left upper extremity deep vein thrombosis   --4/24/2021 Doppler U/S showed occlusive DVT in left subclavian vein.  --Continue rivaroxaban. Given her recent malignancy recurrence, I recommended long term anticoagulation.    5.  Anemia and leukopenia related to chemotherapy  - Last labs from 8/24/2022 showed WBC 2.3, hemoglobin 10.5, platelets 293,000 with ANC 1.1.    Sally Stover MD  Hematology/Oncology  [facility]  Physicians    Total time spent: 30 minutes in patient evaluation, counseling, documentation, and coordination of care.

## 2022-08-17 ENCOUNTER — INFUSION THERAPY VISIT (OUTPATIENT)
Dept: INFUSION THERAPY | Facility: CLINIC | Age: 67
End: 2022-08-17
Attending: INTERNAL MEDICINE
Payer: COMMERCIAL

## 2022-08-17 VITALS
OXYGEN SATURATION: 99 % | TEMPERATURE: 97 F | DIASTOLIC BLOOD PRESSURE: 68 MMHG | SYSTOLIC BLOOD PRESSURE: 103 MMHG | RESPIRATION RATE: 16 BRPM | HEART RATE: 70 BPM

## 2022-08-17 DIAGNOSIS — R59.1 LYMPHADENOPATHY: ICD-10-CM

## 2022-08-17 DIAGNOSIS — C50.411 MALIGNANT NEOPLASM OF UPPER-OUTER QUADRANT OF RIGHT BREAST IN FEMALE, ESTROGEN RECEPTOR NEGATIVE (H): Primary | ICD-10-CM

## 2022-08-17 DIAGNOSIS — Z17.1 MALIGNANT NEOPLASM OF UPPER-OUTER QUADRANT OF RIGHT BREAST IN FEMALE, ESTROGEN RECEPTOR NEGATIVE (H): Primary | ICD-10-CM

## 2022-08-17 LAB
BASOPHILS # BLD AUTO: 0 10E3/UL (ref 0–0.2)
BASOPHILS NFR BLD AUTO: 1 %
EOSINOPHIL # BLD AUTO: 0.3 10E3/UL (ref 0–0.7)
EOSINOPHIL NFR BLD AUTO: 6 %
ERYTHROCYTE [DISTWIDTH] IN BLOOD BY AUTOMATED COUNT: 15.2 % (ref 10–15)
HCT VFR BLD AUTO: 34.2 % (ref 35–47)
HGB BLD-MCNC: 10.6 G/DL (ref 11.7–15.7)
IMM GRANULOCYTES # BLD: 0 10E3/UL
IMM GRANULOCYTES NFR BLD: 1 %
LYMPHOCYTES # BLD AUTO: 1 10E3/UL (ref 0.8–5.3)
LYMPHOCYTES NFR BLD AUTO: 25 %
MCH RBC QN AUTO: 31.6 PG (ref 26.5–33)
MCHC RBC AUTO-ENTMCNC: 31 G/DL (ref 31.5–36.5)
MCV RBC AUTO: 102 FL (ref 78–100)
MONOCYTES # BLD AUTO: 0.3 10E3/UL (ref 0–1.3)
MONOCYTES NFR BLD AUTO: 8 %
NEUTROPHILS # BLD AUTO: 2.5 10E3/UL (ref 1.6–8.3)
NEUTROPHILS NFR BLD AUTO: 59 %
NRBC # BLD AUTO: 0 10E3/UL
NRBC BLD AUTO-RTO: 0 /100
PLATELET # BLD AUTO: 273 10E3/UL (ref 150–450)
RBC # BLD AUTO: 3.35 10E6/UL (ref 3.8–5.2)
WBC # BLD AUTO: 4.1 10E3/UL (ref 4–11)

## 2022-08-17 PROCEDURE — 258N000003 HC RX IP 258 OP 636: Performed by: INTERNAL MEDICINE

## 2022-08-17 PROCEDURE — 250N000011 HC RX IP 250 OP 636: Performed by: INTERNAL MEDICINE

## 2022-08-17 PROCEDURE — 96365 THER/PROPH/DIAG IV INF INIT: CPT

## 2022-08-17 PROCEDURE — 96413 CHEMO IV INFUSION 1 HR: CPT

## 2022-08-17 PROCEDURE — 85025 COMPLETE CBC W/AUTO DIFF WBC: CPT | Performed by: INTERNAL MEDICINE

## 2022-08-17 RX ORDER — HEPARIN SODIUM (PORCINE) LOCK FLUSH IV SOLN 100 UNIT/ML 100 UNIT/ML
5 SOLUTION INTRAVENOUS
Status: DISCONTINUED | OUTPATIENT
Start: 2022-08-17 | End: 2022-08-17 | Stop reason: HOSPADM

## 2022-08-17 RX ADMIN — SODIUM CHLORIDE 250 ML: 9 INJECTION, SOLUTION INTRAVENOUS at 09:53

## 2022-08-17 RX ADMIN — Medication 5 ML: at 11:00

## 2022-08-17 RX ADMIN — PACLITAXEL 151 MG: 6 INJECTION, SOLUTION INTRAVENOUS at 09:53

## 2022-08-17 NOTE — PROGRESS NOTES
Infusion Nursing Note:  Flor Griffin presents today for C2D8 taxol.    Patient seen by provider today: No   present during visit today: Not Applicable.    Note: Michelle is feeling well. Some intermittent nausea after infusion last week, otherwise denies fever/cough/chill/SOB/chest pain.    Intravenous Access:  Labs drawn without difficulty.  Implanted Port.    Treatment Conditions:  Lab Results   Component Value Date    HGB 10.6 (L) 08/17/2022    WBC 4.1 08/17/2022    ANEU 2.8 05/18/2021    ANEUTAUTO 2.5 08/17/2022     08/17/2022      Results reviewed, labs MET treatment parameters, ok to proceed with treatment.    Post Infusion Assessment:  Patient tolerated infusion without incident.  Blood return noted pre and post infusion.  Site patent and intact, free from redness, edema or discomfort.  No evidence of extravasations.  Access discontinued per protocol.     Discharge Plan:   AVS to patient via MYCHART.  Patient will return 8/24 for next appointment.   Patient discharged in stable condition accompanied by: self.  Departure Mode: Ambulatory.      Elmer Yuan RN

## 2022-08-23 ENCOUNTER — HOSPITAL ENCOUNTER (OUTPATIENT)
Dept: PET IMAGING | Facility: CLINIC | Age: 67
Discharge: HOME | End: 2022-08-23
Attending: INTERNAL MEDICINE | Admitting: INTERNAL MEDICINE
Payer: COMMERCIAL

## 2022-08-23 DIAGNOSIS — Z17.1 MALIGNANT NEOPLASM OF UPPER-OUTER QUADRANT OF RIGHT BREAST IN FEMALE, ESTROGEN RECEPTOR NEGATIVE (H): ICD-10-CM

## 2022-08-23 DIAGNOSIS — C50.411 MALIGNANT NEOPLASM OF UPPER-OUTER QUADRANT OF RIGHT BREAST IN FEMALE, ESTROGEN RECEPTOR NEGATIVE (H): ICD-10-CM

## 2022-08-23 PROCEDURE — A9552 F18 FDG: HCPCS | Performed by: INTERNAL MEDICINE

## 2022-08-23 PROCEDURE — 250N000011 HC RX IP 250 OP 636: Performed by: INTERNAL MEDICINE

## 2022-08-23 PROCEDURE — 78816 PET IMAGE W/CT FULL BODY: CPT | Mod: 26 | Performed by: STUDENT IN AN ORGANIZED HEALTH CARE EDUCATION/TRAINING PROGRAM

## 2022-08-23 PROCEDURE — 343N000001 HC RX 343: Performed by: INTERNAL MEDICINE

## 2022-08-23 PROCEDURE — 999N000130 PET ONCOLOGY WHOLE BODY: Mod: PS

## 2022-08-23 RX ORDER — HEPARIN SODIUM (PORCINE) LOCK FLUSH IV SOLN 100 UNIT/ML 100 UNIT/ML
500 SOLUTION INTRAVENOUS EVERY 8 HOURS
Status: DISCONTINUED | OUTPATIENT
Start: 2022-08-23 | End: 2022-08-24 | Stop reason: HOSPADM

## 2022-08-23 RX ADMIN — Medication 500 UNITS: at 10:40

## 2022-08-23 RX ADMIN — FLUDEOXYGLUCOSE F-18 12.83 MCI.: 500 INJECTION, SOLUTION INTRAVENOUS at 10:35

## 2022-08-24 ENCOUNTER — INFUSION THERAPY VISIT (OUTPATIENT)
Dept: INFUSION THERAPY | Facility: CLINIC | Age: 67
End: 2022-08-24
Attending: INTERNAL MEDICINE
Payer: COMMERCIAL

## 2022-08-24 ENCOUNTER — PATIENT OUTREACH (OUTPATIENT)
Dept: ONCOLOGY | Facility: CLINIC | Age: 67
End: 2022-08-24

## 2022-08-24 VITALS
SYSTOLIC BLOOD PRESSURE: 115 MMHG | WEIGHT: 178 LBS | DIASTOLIC BLOOD PRESSURE: 73 MMHG | BODY MASS INDEX: 29.16 KG/M2 | TEMPERATURE: 98 F | OXYGEN SATURATION: 99 % | HEART RATE: 77 BPM

## 2022-08-24 DIAGNOSIS — C50.411 MALIGNANT NEOPLASM OF UPPER-OUTER QUADRANT OF RIGHT BREAST IN FEMALE, ESTROGEN RECEPTOR NEGATIVE (H): Primary | ICD-10-CM

## 2022-08-24 DIAGNOSIS — Z17.1 MALIGNANT NEOPLASM OF UPPER-OUTER QUADRANT OF RIGHT BREAST IN FEMALE, ESTROGEN RECEPTOR NEGATIVE (H): Primary | ICD-10-CM

## 2022-08-24 DIAGNOSIS — R59.1 LYMPHADENOPATHY: ICD-10-CM

## 2022-08-24 LAB
BASOPHILS # BLD AUTO: 0.1 10E3/UL (ref 0–0.2)
BASOPHILS NFR BLD AUTO: 2 %
EOSINOPHIL # BLD AUTO: 0.2 10E3/UL (ref 0–0.7)
EOSINOPHIL NFR BLD AUTO: 8 %
ERYTHROCYTE [DISTWIDTH] IN BLOOD BY AUTOMATED COUNT: 15.5 % (ref 10–15)
HCT VFR BLD AUTO: 33.7 % (ref 35–47)
HGB BLD-MCNC: 10.5 G/DL (ref 11.7–15.7)
IMM GRANULOCYTES # BLD: 0 10E3/UL
IMM GRANULOCYTES NFR BLD: 1 %
LYMPHOCYTES # BLD AUTO: 0.7 10E3/UL (ref 0.8–5.3)
LYMPHOCYTES NFR BLD AUTO: 33 %
MCH RBC QN AUTO: 31.7 PG (ref 26.5–33)
MCHC RBC AUTO-ENTMCNC: 31.2 G/DL (ref 31.5–36.5)
MCV RBC AUTO: 102 FL (ref 78–100)
MONOCYTES # BLD AUTO: 0.2 10E3/UL (ref 0–1.3)
MONOCYTES NFR BLD AUTO: 8 %
NEUTROPHILS # BLD AUTO: 1.1 10E3/UL (ref 1.6–8.3)
NEUTROPHILS NFR BLD AUTO: 48 %
NRBC # BLD AUTO: 0 10E3/UL
NRBC BLD AUTO-RTO: 0 /100
PLATELET # BLD AUTO: 293 10E3/UL (ref 150–450)
RBC # BLD AUTO: 3.31 10E6/UL (ref 3.8–5.2)
WBC # BLD AUTO: 2.3 10E3/UL (ref 4–11)

## 2022-08-24 PROCEDURE — 36591 DRAW BLOOD OFF VENOUS DEVICE: CPT

## 2022-08-24 PROCEDURE — 85014 HEMATOCRIT: CPT | Performed by: INTERNAL MEDICINE

## 2022-08-24 NOTE — PROGRESS NOTES
Patient's daughter, Emmie, called in to triage line today because she wanted to voice a couple of concerns for her mom.  She said that patient has an upcoming appointment with Dr. Stover on 22 to review scans and treatment plans moving forward. However, Emmie said that patient is refusing to come in for in person appointment as she is experiencing a lot of anxiety with her cancer.  She would prefer a phone call from Dr. Stover or a [employer] message as soon as possible with treatment plan/scan results.  Explained to Emmie that the appointment on  would be ideal to review scans and discuss all patient concerns, but Emmie said that patient just really doesn't want to come in for appointment.  Emmie also explained that patient has been experiencing some nausea after her treatments and that she is requesting an 8mg dissolvable tab Zofran.  She does not have an active prescription for that at this time.  Emmie also asked if patient could have Lorazepam for anxiety?  She said that patient used to have a prescription for Lorazepam, but it has since .  She said it really helped her moms anxiety previously.  If Dr. Stover approves prescriptions, she would like them sent to [facility] in [place] on [facility].    Writer explained to Emmie that she would check with Dr. Stover on everything discussed and get her advice and recommendations.  Writer will call Emmie back with plan.    Sravani France, RN, BSN    Triage Nurse Advisor  [facility]/[place]/[facility]  [phone]

## 2022-08-24 NOTE — PROGRESS NOTES
Returned call to Emmie, Vanesa's daughter regarding PET scan results and Taxol infusion today. ANC is 1.1. Infusion RN at [facility] is checking with Chucho, DEMETRICE re plan for today.    Message routed to Dr Stover about video visit instead of IP visit as scheduled on 8/29    Will reach out to Vanesa or Emmie when we know more.    Cathie Smith RN

## 2022-08-24 NOTE — PROGRESS NOTES
Infusion Nursing Note:  Flor Griffin presents today for C2D15 Taxol HELD  Patient seen by provider today: No   present during visit today: Not Applicable.    Note: Pt states today was supposed to be her last dose of Taxol.   Recent scan showed progression. She saw results last night on [facility].  Pt will be discussing next treatment options with Dr Stover on Monday.  Pt is moving to [place] on Sept 1st -- Is currently in process of finding an oncologist there.    ANC 1.1 -- Per Dr Stover's note on 7/6/22, she would recommend holding treatment for ANC less than 1.2  Discussed above with REIK Mesa, as well in the absence of Dr Stover who is currently on vacation.    TORB ERIK Mesa/Ericka Sharma RN: HOLD treatment today.  Follow up with Dr Stover on Monday to discuss scan results and further treatment options.    Intravenous Access:  Labs drawn without difficulty.  Implanted Port.    Treatment Conditions:  Lab Results   Component Value Date    HGB 10.5 (L) 08/24/2022    WBC 2.3 (L) 08/24/2022    ANEU 2.8 05/18/2021    ANEUTAUTO 1.1 (L) 08/24/2022     08/24/2022      Results reviewed, labs did NOT meet treatment parameters:      Discharge Plan:   AVS to patient via [employer].  Patient will follow up with Dr Stover on 8/29/22 for next appointment.   Patient discharged in stable condition accompanied by: self.  Departure Mode: Ambulatory.      Ericka Sharma, RN

## 2022-08-29 ENCOUNTER — ONCOLOGY VISIT (OUTPATIENT)
Dept: ONCOLOGY | Facility: CLINIC | Age: 67
End: 2022-08-29
Attending: INTERNAL MEDICINE
Payer: COMMERCIAL

## 2022-08-29 ENCOUNTER — DOCUMENTATION ONLY (OUTPATIENT)
Dept: ONCOLOGY | Facility: CLINIC | Age: 67
End: 2022-08-29

## 2022-08-29 VITALS
RESPIRATION RATE: 16 BRPM | WEIGHT: 175 LBS | HEIGHT: 66 IN | BODY MASS INDEX: 28.12 KG/M2 | SYSTOLIC BLOOD PRESSURE: 136 MMHG | DIASTOLIC BLOOD PRESSURE: 85 MMHG | OXYGEN SATURATION: 100 % | HEART RATE: 79 BPM

## 2022-08-29 DIAGNOSIS — T45.1X5A LEUKOPENIA DUE TO ANTINEOPLASTIC CHEMOTHERAPY (H): ICD-10-CM

## 2022-08-29 DIAGNOSIS — D70.1 LEUKOPENIA DUE TO ANTINEOPLASTIC CHEMOTHERAPY (H): ICD-10-CM

## 2022-08-29 DIAGNOSIS — T45.1X5A ANEMIA DUE TO CHEMOTHERAPY: ICD-10-CM

## 2022-08-29 DIAGNOSIS — Z17.1 MALIGNANT NEOPLASM OF UPPER-OUTER QUADRANT OF RIGHT BREAST IN FEMALE, ESTROGEN RECEPTOR NEGATIVE (H): Primary | ICD-10-CM

## 2022-08-29 DIAGNOSIS — D64.81 ANEMIA DUE TO CHEMOTHERAPY: ICD-10-CM

## 2022-08-29 DIAGNOSIS — C50.411 MALIGNANT NEOPLASM OF UPPER-OUTER QUADRANT OF RIGHT BREAST IN FEMALE, ESTROGEN RECEPTOR NEGATIVE (H): Primary | ICD-10-CM

## 2022-08-29 DIAGNOSIS — Z86.718 PERSONAL HISTORY OF DVT (DEEP VEIN THROMBOSIS): ICD-10-CM

## 2022-08-29 PROCEDURE — 99214 OFFICE O/P EST MOD 30 MIN: CPT | Performed by: INTERNAL MEDICINE

## 2022-08-29 PROCEDURE — G0463 HOSPITAL OUTPT CLINIC VISIT: HCPCS

## 2022-08-29 ASSESSMENT — PAIN SCALES - GENERAL: PAINLEVEL: NO PAIN (0)

## 2022-08-29 NOTE — LETTER
8/29/2022         RE: Flor Griffin  [address]  [place] [address]        Dear Colleague,    Thank you for referring your patient, Flor Griffin, to the [facility]. Please see a copy of my visit note below.    [facility]    Hematology/Oncology Established Patient Follow-up Note      Today's Date: 8/29/2022    Reason for Follow-up: Metastatic breast cancer.    HISTORY OF PRESENT ILLNESS: Flor Griffin is a 67 year old female who presents with the following oncologic history:     --Clinical TX N3c M0 adenocarcinoma which is poorly differentiated, likely of breast origin, ER low-positive at 1-5%, PA negative, HER-2/mitzi FISH negative, androgen stain weak-intermediate 10-20%.  Initially she was seen 11/22/2017 after she underwent right supraclavicular lymph node biopsy which was positive for poorly differentiated adenocarcinoma.  She had this lymph node almost a month and had an excisional biopsy performed which was consistent with the diagnosis of cancer.   --A PET/CT scan 11/27/2017 showed hypermetabolic right supraclavicular, right axillary and subpectoral adenopathy.  Otherwise, no distant metastatic disease.   --3/7/2018: Completed 4 cycles of carboplatin and paclitaxel followed by dose dense AC x 4 cycles.  No surgery was done.  --5/29/2018-7/31/2018: Completed adjuvant radiation to right breast, right axilla, right supraclavicular region.  --9/2018: Started adjuvant letrozole.  --10/15/2020: Screening mammogram showed focal asymmetry in upper outer right breast; no suspicious findings in left breast.  --10/21/2020: U/S of right breast showed irregularly-shaped hypoechoic mass at 10:00, 7 cm from nipple, measuring 3.2 x 2.7 x 3.6 cm. Right axillary ultrasound shows multiple normal-appearing lymph nodes. Biopsy of right breast mass at 10:00 showed poorly differentiated carcinoma, no lymphovascular invasion, morphology consistent with breast  cancer and similar to prior axillary node tumor, ER weakly positive at 1% and OR negative (0%), HER-2/mitzi FISH negative.  --10/28/2020: PET/CT scan showed high metabolic activity in 3 cm area of right upper outer breast, several small hypermetabolic lymph nodes in high right axilla and right subclavian region; mild hypermetabolic lymph nodes in bilateral hilar regions, favor metastatic disease; several tiny nodules in right upper lung, favor benign etiology; small hypermetabolic focus in pelvis in either sigmoid colon or adjacent loop of small bowel.  --11/02/2020: Started 1st line metastatic therapy with capecitabine.  --2/9/2021: PET/CT scan showed hypermetabolic right breast mass not significantly changed since 12/7/2020, persistent hypermetabolism; hypermetabolic right axillary lymph node increased in size; no distant metastasis; focal hypermetabolism in pelvis associated with sigmoid colon; stable 6-mm lung nodules.  --2/18/2021: Due to disease progression, switched to 2nd line metastatic therapy with eribulin. Required dose reduction due to neutropenia.  --4/24/2021: Left upper extremity Doppler U/S showed occlusive DVT within left subclavian vein; superficial thrombophlebitis within proximal cephalic vein. Started rivaroxaban.  --5/11/2021: PET scan showed unchanged size and slightly increased uptake to right breast; resolved right axillary adenopathy; no new lesions; persistent hypermetabolism at proximal sigmoid colon.  --5/19/2021 On chemo break after cycle 4 of eribulin.  --6/2/2021: Underwent palliative right mastectomy under care of Dr. Wilfredo Castillo. Pathology showed grade 3 invasive ductal carcinoma with tumor invasion into underlying skeletal muscle. Margins negative.  --7/22/2021: Colonoscopy showed diverticula in sigmoid colon; normal mucosa throughout entire colon.  --8/3/2021: PET scan showed postsurgical seroma at right chest wall and axilla; small focus of residual mild FDG uptake at superolateral  resection margin, likely posttreatment change or inflammation; no metastatic disease; persistent focus of uptake in sigmoid colon corresponding to diverticular disease.  --10/07/2021: U/S of right chest wall performed due to palpable lump. This showed hypoechoic nodule superior to level of scar measuring 0.7 x 0.6 x 1.5 cm. Biopsy of nodule showed poorly differentiated carcinoma consistent with recurrent breast carcinoma, grade 3, ER negative.  --10/19/2021: PET/CT showed right chest wall lesion with SUV 12.9; decreased right chest wall seroma; no FDG uptake in abdomen or pelvis.  --11/17/2021: Underwent excision of right chest wall nodule recurrence. Pathology showed grade 3 invasive ductal carcinoma, intramuscular; positive anterior and inferior margins of resection.  --1/25/2022 PET scan showed persistent hypermetabolic nodularity along anterior right chest wall suspicious for residual/recurrent tumor; persistent FDG uptake adjacent to short segment of sigmoid diverticulosis with minimal pericolonic fat stranding, unchanged, may represent chronic diverticulitis.  --2/14/2022-3/8/2022: Re-treatment with radiation to right chest wall.  --5/2/2022: PET scan showed increasing metabolic activity subtle pre-existing soft tissue nodules in the right breast/chest wall soft tissues suspicious for progression of disease.  --5/11/2022: Started weekly paclitaxel.  Not eligible for immunotherapy. Last dose of paclitaxel given 8/17/2022.  --8/23/2022: PET scan showed enlarged superficial soft tissue nodule in the skin/subcutaneous tissue of the right breast measuring 10 x 7 mm, previously 6 mm. New FDG-avid 5 mm right internal mammary lymph node. Enlarged right breast fluid collection measuring 10.6 cm maximally, suspected lymphocele. No other sites of disease.    INTERIM HISTORY:  Michelle reports she will be moving to [place] as of today. She denies paresthesias. No recent fevers or chills.     REVIEW OF SYSTEMS:   14 point ROS was  reviewed and is negative other than as noted above in HPI.       HOME MEDICATIONS:  Current Outpatient Medications   Medication Sig Dispense Refill     calcium carbonate (TUMS) 500 MG chewable tablet Take 1 chew tab by mouth daily as needed for heartburn       multivitamin w/minerals (THERA-VIT-M) tablet Take 1 tablet by mouth every morning        Omega-3 Fatty Acids (OMEGA-3 FISH OIL PO) Take 1 g by mouth every morning        prochlorperazine (COMPAZINE) 10 MG tablet Take 1 tablet (10 mg) by mouth every 6 hours as needed (Nausea/Vomiting) 30 tablet 2     rivaroxaban ANTICOAGULANT (XARELTO ANTICOAGULANT) 20 MG TABS tablet Take 1 tablet (20 mg) by mouth daily (with dinner) 90 tablet 3         ALLERGIES:  Allergies   Allergen Reactions     Pcn [Penicillins] Hives         PAST MEDICAL HISTORY:  Past Medical History:   Diagnosis Date     Basal cell cancer     right cheek     Breast cancer, right breast (H)      DVT (deep venous thrombosis) (H)      Gastroesophageal reflux disease      History of blood transfusion     blue baby     Hyperlipidaemia          PAST SURGICAL HISTORY:  Past Surgical History:   Procedure Laterality Date     BIOPSY BREAST Right 11/17/2021    Procedure: Excise right chest wall mass;  Surgeon: Ulises Castillo MD;  Location:  OR     BIOPSY MASS NECK Right 11/16/2017    Procedure: BIOPSY MASS NECK;  Excisional Biopsy Right Neck Lymph node;  Surgeon: Waylon Corral MD;  Location:  OR     COLONOSCOPY       COLONOSCOPY N/A 7/22/2021    Procedure: COLONOSCOPY;  Surgeon: Gabriele Caro MD;  Location:  GI     IR CHEST PORT PLACEMENT > 5 YRS OF AGE  2/16/2021     IR PORT REMOVAL LEFT  5/13/2019     MASTECTOMY SIMPLE Right 6/2/2021    Procedure: RIGHT SIMPLE MASTECTOMY;  Surgeon: Ulises Castillo MD;  Location:  OR     MOHS MICROGRAPHIC PROCEDURE  ~2010    right cheek; [facility]     MOHS MICROGRAPHIC PROCEDURE  10/31/2016    Dr. Nicholas [facility]         SOCIAL HISTORY:  Social History  "    Socioeconomic History     Marital status:      Spouse name: Not on file     Number of children: Not on file     Years of education: Not on file     Highest education level: Not on file   Occupational History     Not on file   Tobacco Use     Smoking status: Never Smoker     Smokeless tobacco: Never Used   Vaping Use     Vaping Use: Never used   Substance and Sexual Activity     Alcohol use: Yes     Alcohol/week: 0.0 standard drinks     Comment: 2 times a week, 2 drinks, wine or whiskey     Drug use: No     Sexual activity: Yes     Partners: Male     Birth control/protection: Post-menopausal   Other Topics Concern     Parent/sibling w/ CABG, MI or angioplasty before 65F 55M? No   Social History Narrative     Not on file     Social Determinants of Health     Financial Resource Strain: Not on file   Food Insecurity: Not on file   Transportation Needs: Not on file   Physical Activity: Not on file   Stress: Not on file   Social Connections: Not on file   Intimate Partner Violence: Not At Risk     Fear of Current or Ex-Partner: No     Emotionally Abused: No     Physically Abused: No     Sexually Abused: No   Housing Stability: Not on file         FAMILY HISTORY:  Family History   Problem Relation Age of Onset     Lupus Mother      Heart Disease Mother         CHF     Coronary Artery Disease Mother      Hyperlipidemia Mother      Diabetes Father      Breast Cancer Sister 83     No Known Problems Sister      Diabetes Brother      Suicide Brother      No Known Problems Brother      No Known Problems Brother      Suicide Brother      No Known Problems Brother      No Known Problems Brother      Breast Cancer Maternal Aunt 80         PHYSICAL EXAM:  Vital signs:  /85   Pulse 79   Resp 16   Ht 1.676 m (5' 6\")   Wt 79.4 kg (175 lb)   LMP 2004   SpO2 100%   BMI 28.25 kg/m    ECO  GENERAL/CONSTITUTIONAL: No acute distress.  EYES: No erythema or scleral icterus.  ENT/MOUTH: Neck supple. Mask in " place.  LYMPH: No cervical, supraclavicular, axillary adenopathy.   BREAST: Right breast surgically absent with fluid in the right chest wall and palpable slightly erythematous pea-size nodule.   RESPIRATORY: No audible cough or wheezing .  GASTROINTESTINAL: No hepatosplenomegaly, masses, or tenderness. No guarding.  No distention.  MUSCULOSKELETAL: Warm and well-perfused, no cyanosis, clubbing, or edema.  NEUROLOGIC: No focal motor deficits. Alert, oriented, answers questions appropriately.  INTEGUMENTARY: No rashes or jaundice.  GAIT: Steady, does not use assistive device    LABS:  CBC RESULTS: Recent Labs   Lab Test 08/24/22  0806   WBC 2.3*   RBC 3.31*   HGB 10.5*   HCT 33.7*   *   MCH 31.7   MCHC 31.2*   RDW 15.5*        Last Comprehensive Metabolic Panel:  Sodium   Date Value Ref Range Status   08/10/2022 140 136 - 145 mmol/L Final   05/27/2021 139 133 - 144 mmol/L Final     Potassium   Date Value Ref Range Status   08/10/2022 3.9 3.4 - 5.3 mmol/L Final   07/13/2022 4.2 3.4 - 5.3 mmol/L Final   05/27/2021 3.6 3.4 - 5.3 mmol/L Final     Chloride   Date Value Ref Range Status   08/10/2022 105 98 - 107 mmol/L Final   07/13/2022 106 94 - 109 mmol/L Final   05/27/2021 107 94 - 109 mmol/L Final     Carbon Dioxide   Date Value Ref Range Status   05/27/2021 28 20 - 32 mmol/L Final     Carbon Dioxide (CO2)   Date Value Ref Range Status   08/10/2022 27 22 - 29 mmol/L Final   07/13/2022 26 20 - 32 mmol/L Final     Anion Gap   Date Value Ref Range Status   08/10/2022 8 7 - 15 mmol/L Final   07/13/2022 4 3 - 14 mmol/L Final   05/27/2021 4 3 - 14 mmol/L Final     Glucose   Date Value Ref Range Status   08/10/2022 111 (H) 70 - 99 mg/dL Final   07/13/2022 91 70 - 99 mg/dL Final   05/27/2021 136 (H) 70 - 99 mg/dL Final     Urea Nitrogen   Date Value Ref Range Status   08/10/2022 10.5 8.0 - 23.0 mg/dL Final   07/13/2022 14 7 - 30 mg/dL Final   05/27/2021 13 7 - 30 mg/dL Final     Creatinine   Date Value Ref Range  Status   08/10/2022 0.67 0.51 - 0.95 mg/dL Final   05/27/2021 0.76 0.52 - 1.04 mg/dL Final     GFR Estimate   Date Value Ref Range Status   08/10/2022 >90 >60 mL/min/1.73m2 Final     Comment:     Effective December 21, 2021 eGFRcr in adults is calculated using the 2021 CKD-EPI creatinine equation which includes age and gender (John et al., NEJ, DOI: 10.[facility]6/UMVRyp0394981)   05/27/2021 82 >60 mL/min/[1.73_m2] Final     Comment:     Non  GFR Calc  Starting 12/18/2018, serum creatinine based estimated GFR (eGFR) will be   calculated using the Chronic Kidney Disease Epidemiology Collaboration   (CKD-EPI) equation.       Calcium   Date Value Ref Range Status   08/10/2022 9.7 8.8 - 10.2 mg/dL Final   05/27/2021 9.0 8.5 - 10.1 mg/dL Final     Bilirubin Total   Date Value Ref Range Status   08/10/2022 0.5 <=1.2 mg/dL Final   05/18/2021 0.8 0.2 - 1.3 mg/dL Final     Alkaline Phosphatase   Date Value Ref Range Status   08/10/2022 84 35 - 104 U/L Final   05/18/2021 81 40 - 150 U/L Final     ALT   Date Value Ref Range Status   08/10/2022 23 10 - 35 U/L Final   05/18/2021 58 (H) 0 - 50 U/L Final     AST   Date Value Ref Range Status   08/10/2022 21 10 - 35 U/L Final   05/18/2021 38 0 - 45 U/L Final       PATHOLOGY:  Reviewed as per HPI.    IMAGING:   Reviewed as per HPI.    ASSESSMENT/PLAN:  Flor Griffin is a 66 year old female with the following issues:  1.  Right breast poorly differentiated adenocarcinoma, local recurrence, (triple negative) ER negative, MS negative, HER-2/mitzi negative, PD-L1 CPS=1  2. Bilateral hilar lymphadenopathy, now resolved  3. Indeterminate pulmonary nodules, likely benign and stable  --Michelle is s/p palliative right mastectomy palliative eribulin through 5/19/2021.  --PET scan from 10/19/2021 showed hypermetabolic local recurrence to the right chest wall (triple negative) but initially no distant metastatic disease at the present time. Biopsy showed this was triple negative and  "PD-L1 CPS=1. Therefore not a candidate for immunotherapy.  --She underwent excision of this local recurrence on 11/17/2021.  I had discussed with Dr. Castillo clarification on the margins of resection and that the \"positive\" margins were considered as such as he had removed generous margins into the muscle.   --1/25/2022 PET scan showed persistent hypermetabolic nodularity along anterior right chest wall that is quite suggestive of residual tumor after the last resection but no clinical evidence of distant metastatic disease. Therefore, she proceeded with repeat right chest wall radiation.  --5/2/2022 PET scan showed increasing metabolic activity subtle pre-existing soft tissue nodules in the right breast/chest wall soft tissues suspicious for progression of disease.  --She then proceeded on 5/11/2022 with weekly paclitaxel days 1, 8, 15 every 4 weeks.  Ineligible for pembrolizumab due to PD-L1 CPS score less than 10 (this result was obtained after starting paclitaxel).  -- I personally reviewed her PET scan from 8/23/2022 which showed disease progression as noted above and includes new 5 mm right internal mammary lymph node. Therefore, I recommended switching to Trodelvy (sacituzumab govitecan).  Discussed potential adverse effects of this drug and that there is activity against both triple negative and ER positive breast cancer.  --She is moving to [place] as of today and will transfer her oncologic care to [facility] closer to her new residence. She asked if she could delay her new treatment until after an early October trip to [place] to attend her daughter's RN graduation.  I told her that I would recommend attempting to start treatment sooner if able to see a new oncologist and then she could work her treatment schedule around the trip.    4. Left upper extremity deep vein thrombosis   --4/24/2021 Doppler U/S showed occlusive DVT in left subclavian vein.  --Continue rivaroxaban. Given her recent malignancy " "recurrence, I recommended long term anticoagulation.    5.  Anemia and leukopenia related to chemotherapy  - Last labs from 8/24/2022 showed WBC 2.3, hemoglobin 10.5, platelets 293,000 with ANC 1.1.    Sally Stover MD  Hematology/Oncology  [facility]    Total time spent: 30 minutes in patient evaluation, counseling, documentation, and coordination of care.    Oncology Rooming Note    August 29, 2022 2:14 PM   Flor Griffin is a 67 year old female who presents for:    Chief Complaint   Patient presents with     Oncology Clinic Visit     Initial Vitals: LMP 01/01/2004  Estimated body mass index is 29.16 kg/m  as calculated from the following:    Height as of 7/6/22: 1.664 m (5' 5.51\").    Weight as of 8/24/22: 80.7 kg (178 lb). There is no height or weight on file to calculate BSA.  Data Unavailable Comment: Data Unavailable   Patient's last menstrual period was 01/01/2004.  Allergies reviewed: Yes  Medications reviewed: Yes    Medications: Medication refills not needed today.  Pharmacy name entered into [employer]: [id number] - [facility] [address]    Clinical concerns:  doctor was notified.      Anu Velasquez MA                Again, thank you for allowing me to participate in the care of your patient.        Sincerely,        Sally Stover MD    "

## 2022-08-29 NOTE — NURSING NOTE
Pt in clinic and signed [facility]t at  [facility] in [place].    Cathie Smith RN

## 2022-08-29 NOTE — PROGRESS NOTES
"Oncology Rooming Note    August 29, 2022 2:14 PM   Flor Griffin is a 67 year old female who presents for:    Chief Complaint   Patient presents with     Oncology Clinic Visit     Initial Vitals: LMP 01/01/2004  Estimated body mass index is 29.16 kg/m  as calculated from the following:    Height as of 7/6/22: 1.664 m (5' 5.51\").    Weight as of 8/24/22: 80.7 kg (178 lb). There is no height or weight on file to calculate BSA.  Data Unavailable Comment: Data Unavailable   Patient's last menstrual period was 01/01/2004.  Allergies reviewed: Yes  Medications reviewed: Yes    Medications: Medication refills not needed today.  Pharmacy name entered into [employer]: [facility] #[id number] - [place], [place] - [address]    Clinical concerns:  doctor was notified.      Anu Velasquez MA            "

## 2022-09-21 NOTE — TELEPHONE ENCOUNTER
Problem: Bariatric Environmental Safety  Goal: Safety Maintained with Care  Intervention: Promote Safety and Comfort  Flowsheets (Taken 9/21/2022 0403)  Bariatric Safety: specialty bed utilized     Problem: Skin Injury Risk Increased  Goal: Skin Health and Integrity  Intervention: Optimize Skin Protection  Flowsheets (Taken 9/21/2022 0403)  Pressure Reduction Techniques: frequent weight shift encouraged  Head of Bed (HOB) Positioning: HOB elevated      Talked with Dr. Magana. Will need PET scan. Will order at visit tomorrow. Will notify daughter.    Jaida Liu MD  Internal Medicine/Pediatrics

## 2022-10-15 ENCOUNTER — HEALTH MAINTENANCE LETTER (OUTPATIENT)
Age: 67
End: 2022-10-15

## 2023-01-26 NOTE — LETTER
[facility]  [address], [address]  [place] [address]                                                                                                              Flor Griffin  [address] [id number]    November 30, 2017  Date of Exam:     Dear Flor:    Thank you for your recent visit.  Breast Imaging Result: We are pleased to inform you that the results of your recent breast imaging show no evidence of malignancy (cancer).    Breast Density: Your mammogram shows that you have dense breast tissue. This means you have a slightly higher risk of getting breast cancer. It also means your mammograms will be harder to read but it doesn't mean that mammograms aren t useful. In fact, yearly mammograms are even more important for women at higher risk.    If you are experiencing any breast problems such as a lump or localized pain we request that you discuss this with your health care provider if you haven t already done so, as additional testing may be necessary.    As you know, early detection of cancer is very important. Although mammography is the most accurate method for early detection, not all cancers are found through mammography. A thorough examination includes a combination of mammography, physical examination and breast self-examination. Currently the American College of Radiology and the Society of Breast Imaging recommend an annual mammogram for all women beginning at the age of 40.    A report of your breast imaging results was sent to: Waylon Corral    Your breast imaging will become part of your medical file here at [place] [facility] for at least 10 years. You are responsible for informing any new health care provider or breast imaging facility of the date and location of this examination.    We appreciate the opportunity to participate in your health care.    Sincerely,    REJI LAKE MD  Interpreting Radiologist  [facility]      Detail Level: Detailed

## 2023-03-09 ENCOUNTER — OFFICE VISIT (OUTPATIENT)
Dept: URGENT CARE | Facility: URGENT CARE | Age: 68
End: 2023-03-09
Payer: MEDICARE

## 2023-03-09 VITALS
HEART RATE: 88 BPM | SYSTOLIC BLOOD PRESSURE: 120 MMHG | DIASTOLIC BLOOD PRESSURE: 66 MMHG | OXYGEN SATURATION: 98 % | WEIGHT: 175 LBS | BODY MASS INDEX: 28.25 KG/M2 | TEMPERATURE: 98.5 F | RESPIRATION RATE: 14 BRPM

## 2023-03-09 DIAGNOSIS — H61.21 IMPACTED CERUMEN OF RIGHT EAR: ICD-10-CM

## 2023-03-09 DIAGNOSIS — J01.40 ACUTE PANSINUSITIS, RECURRENCE NOT SPECIFIED: Primary | ICD-10-CM

## 2023-03-09 PROCEDURE — 99213 OFFICE O/P EST LOW 20 MIN: CPT | Performed by: PHYSICIAN ASSISTANT

## 2023-03-09 RX ORDER — FAMOTIDINE 40 MG/1
1 TABLET, FILM COATED ORAL
COMMUNITY
Start: 2023-01-10

## 2023-03-09 RX ORDER — DOXYCYCLINE 100 MG/1
100 CAPSULE ORAL 2 TIMES DAILY
Qty: 14 CAPSULE | Refills: 0 | Status: SHIPPED | OUTPATIENT
Start: 2023-03-09 | End: 2023-03-16

## 2023-03-09 ASSESSMENT — ENCOUNTER SYMPTOMS
COUGH: 1
SORE THROAT: 1
SINUS PRESSURE: 1
FEVER: 0
CHILLS: 1
HEADACHES: 1
SINUS PAIN: 1

## 2023-03-09 NOTE — PROGRESS NOTES
Assessment & Plan        1. Acute pansinusitis, recurrence not specified  -Clinical symptoms and manifestation of acute bacterial sinusitis.  Will treat with doxycycline.  - doxycycline hyclate (VIBRAMYCIN) 100 MG capsule; Take 1 capsule (100 mg) by mouth 2 times daily for 7 days  Dispense: 14 capsule; Refill: 0    2. Impacted cerumen of right ear    - carbamide peroxide (DEBROX) 6.5 % otic solution; Place 5 drops into the right ear 2 times daily for 5 days  Dispense: 2.5 mL; Refill: 0    Patient Instructions     Drink plenty of fluids, rest, warm compresses on face  Netti Pot 1x in the morning 1x at night  or saline rinses or saline nasal spray such as Oceans spray  Flonase (Fluticasone) 2x each nostril twice a day for two weeks, then once each nostril once a day until symptoms resolved                         Return for Follow up, with PCP.    At the end of the encounter, I discussed results, diagnosis, medications. Discussed red flags for immediate return to clinic/ER, as well as indications for follow up if no improvement. Patient understood and agreed to plan. Patient was stable for discharge.    Subjective     Michelle is a 67 year old female who presents to clinic today for the following health issues:  Chief Complaint   Patient presents with     Sick     Bilateral Ears, sinus issues/pressure , drainage, PHLEMY cough x 3 weeks  -- took nothing today  - ON a new chemo med but I could not find it     HPI  Patient reports sinus pain and pressure for 3 weeks. She has congestion, productive cough and post nasal drip. She is travelling from [place] on her way to [place] to dog sit for her son for a week. She stopped by the clinic to be seen. She is currently on chemotherapy for breast cancer. She has been taking acetaminophen  for pain which helps.  She denies fever and chills.        Review of Systems   Constitutional: Positive for chills. Negative for fever.   HENT: Positive for congestion, ear pain, sinus pressure, sinus  pain and sore throat.    Respiratory: Positive for cough.    Neurological: Positive for headaches.       Problem List:  2022-05: Malignant neoplasm of upper-outer quadrant of right breast   in female, estrogen receptor negative (H)  2021-02: Lymphadenopathy  2020-10: Malignant neoplasm of upper-outer quadrant of right breast   in female, estrogen receptor positive (H)  2019-11: Acute pain of right knee  2019-11: Tibial plateau fracture, right  2018-04: Anemia  2018-04: Chemotherapy-induced neutropenia (H)  2018-03: Port catheter in place  2018-02: 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 in   female, estrogen receptor positive (H)  2017-12: Carcinoma of unknown origin (H)  2017-11: Overweight  2017-10: Cold sore  2017-01: BCC (basal cell carcinoma), face  2015-04: Vitamin D deficiency  2015-03: Mixed hyperlipidemia  2015-03: Heartburn  Screening for cervical cancer      Past Medical History:   Diagnosis Date     Basal cell cancer     right cheek     Breast cancer, right breast (H)      DVT (deep venous thrombosis) (H)      Gastroesophageal reflux disease      History of blood transfusion     blue baby     Hyperlipidaemia        Social History     Tobacco Use     Smoking status: Never     Smokeless tobacco: Never   Substance Use Topics     Alcohol use: Yes     Alcohol/week: 0.0 standard drinks     Comment: 2 times a week, 2 drinks, wine or whiskey           Objective    /66 (BP Location: Left arm, Patient Position: Chair, Cuff Size: Adult Regular)   Pulse 88   Temp 98.5  F (36.9  C) (Oral)   Resp 14   Wt 79.4 kg (175 lb)   LMP 01/01/2004   SpO2 98%   BMI 28.25 kg/m    Physical Exam  Constitutional:       Appearance: Normal appearance.   HENT:      Head: Normocephalic.      Right Ear: There is impacted cerumen.      Left Ear: Tympanic membrane normal.      Nose: Congestion present.      Right Sinus: Maxillary sinus tenderness and frontal sinus tenderness present.      Left Sinus: Maxillary  sinus tenderness and frontal sinus tenderness present.      Mouth/Throat:      Mouth: Mucous membranes are moist.      Pharynx: Oropharynx is clear. Uvula midline. No posterior oropharyngeal erythema.   Eyes:      Conjunctiva/sclera: Conjunctivae normal.      Pupils: Pupils are equal, round, and reactive to light.   Cardiovascular:      Rate and Rhythm: Normal rate and regular rhythm.      Heart sounds: Normal heart sounds.   Pulmonary:      Effort: Pulmonary effort is normal.      Breath sounds: Normal breath sounds.   Musculoskeletal:      Cervical back: Normal range of motion and neck supple.   Lymphadenopathy:      Head:      Right side of head: No submental, submandibular, tonsillar, preauricular or posterior auricular adenopathy.      Left side of head: No submental, submandibular, tonsillar, preauricular or posterior auricular adenopathy.   Skin:     General: Skin is warm and dry.   Neurological:      Mental Status: She is alert and oriented to person, place, and time.   Psychiatric:         Mood and Affect: Mood normal.         Behavior: Behavior normal.              Mindi Foster PA-C

## 2023-03-09 NOTE — PATIENT INSTRUCTIONS
Drink plenty of fluids, rest, warm compresses on face  Netti Pot 1x in the morning 1x at night  or saline rinses or saline nasal spray such as Oceans spray  Flonase (Fluticasone) 2x each nostril twice a day for two weeks, then once each nostril once a day until symptoms resolved

## 2023-03-26 ENCOUNTER — HEALTH MAINTENANCE LETTER (OUTPATIENT)
Age: 68
End: 2023-03-26

## 2023-08-20 ENCOUNTER — HEALTH MAINTENANCE LETTER (OUTPATIENT)
Age: 68
End: 2023-08-20

## 2024-02-20 ENCOUNTER — MYC MEDICAL ADVICE (OUTPATIENT)
Dept: FAMILY MEDICINE | Facility: CLINIC | Age: 69
End: 2024-02-20
Payer: COMMERCIAL

## 2024-02-20 NOTE — TELEPHONE ENCOUNTER
Patient Quality Outreach    Patient is due for the following:   Physical Annual Wellness Visit    Next Steps:   Schedule a Annual Wellness Visit    Type of outreach:    Sent [employer] message.    Next Steps:  Reach out within 90 days via [employer].    Max number of attempts reached: No. Will try again in 90 days if patient still on fail list.    Questions for provider review:    None           Sabine Woods MA  Chart routed to Care Team.

## 2024-05-26 ENCOUNTER — HEALTH MAINTENANCE LETTER (OUTPATIENT)
Age: 69
End: 2024-05-26

## 2024-10-13 ENCOUNTER — HEALTH MAINTENANCE LETTER (OUTPATIENT)
Age: 69
End: 2024-10-13

## 2025-06-14 ENCOUNTER — HEALTH MAINTENANCE LETTER (OUTPATIENT)
Age: 70
End: 2025-06-14

## (undated) DEVICE — PREP SKIN SCRUB TRAY 4461A

## (undated) DEVICE — NDL 19GA 1.5"

## (undated) DEVICE — SU SILK 2-0 FSL 18" 677G

## (undated) DEVICE — SOL WATER IRRIG 1000ML BOTTLE 2F7114

## (undated) DEVICE — DRAIN JACKSON PRATT RESERVOIR 100ML SU130-1305

## (undated) DEVICE — SU VICRYL 4-0 PS-2 18" UND J496H

## (undated) DEVICE — PAD CHUX UNDERPAD 23X24" 7136

## (undated) DEVICE — SYR 10ML FINGER CONTROL W/O NDL 309695

## (undated) DEVICE — NDL 22GA 1.5"

## (undated) DEVICE — ESU ELEC BLADE 2.75" COATED/INSULATED E1455

## (undated) DEVICE — DECANTER VIAL 2006S

## (undated) DEVICE — SU VICRYL 3-0 SH 27" J316H

## (undated) DEVICE — ESU GROUND PAD UNIVERSAL W/O CORD

## (undated) DEVICE — DRSG STERI STRIP 1/2X4" R1547

## (undated) DEVICE — PREP CHLORAPREP 26ML TINTED ORANGE  260815

## (undated) DEVICE — SUCTION CANISTER MEDIVAC LINER 3000ML W/LID 65651-530

## (undated) DEVICE — SPONGE LAP 18X18" X8435

## (undated) DEVICE — SOL NACL 0.9% IRRIG 1000ML BOTTLE 2F7124

## (undated) DEVICE — BLADE KNIFE SURG 10 371110

## (undated) DEVICE — PACK MINOR CUSTOM RIDGES SBA32RMRMA

## (undated) DEVICE — GLOVE PROTEXIS POWDER FREE 8.0 ORTHOPEDIC 2D73ET80

## (undated) DEVICE — SU VICRYL 0 TIE 12X18" J906G

## (undated) DEVICE — GLOVE PROTEXIS W/NEU-THERA 7.5  2D73TE75

## (undated) DEVICE — LINEN FULL SHEET 5511

## (undated) DEVICE — NDL 25GA 1.5" 305127

## (undated) DEVICE — SU VICRYL 3-0 TIE 12X18" J904T

## (undated) DEVICE — PACK MINOR SBA15MIFSE

## (undated) DEVICE — SU VICRYL 4-0 P-3 18" UND J494G

## (undated) DEVICE — GLOVE PROTEXIS BLUE W/NEU-THERA 7.5  2D73EB75

## (undated) DEVICE — PREP POVIDONE IODINE SCRUB 7.5% 120ML

## (undated) DEVICE — LINEN TOWEL PACK X5 5464

## (undated) DEVICE — SYR 10ML LL W/O NDL 302995

## (undated) DEVICE — GOWN IMPERVIOUS ZONED XLG 9041

## (undated) DEVICE — SU VICRYL 3-0 SH 27" UND J416H

## (undated) DEVICE — GLOVE ESTEEM POWDER FREE SMT 6.5  2D72PT65

## (undated) DEVICE — ESU PENCIL W/SMOKE EVAC CVPLP2000

## (undated) DEVICE — SU DERMABOND MINI DHVM12

## (undated) DEVICE — DRAPE BREAST/CHEST 29420

## (undated) DEVICE — PREP POVIDONE IODINE SOLUTION 10% 120ML

## (undated) DEVICE — DRSG GAUZE 4X4" 3033

## (undated) DEVICE — SYR EAR BULB 3OZ 0035830

## (undated) DEVICE — DRAPE SHEET REV FOLD 3/4 9349

## (undated) DEVICE — BAG CLEAR TRASH 1.3M 39X33" P4040C

## (undated) DEVICE — SU VICRYL 2-0 TIE 12X18" J905T

## (undated) DEVICE — SURGICEL POWDER ABSORBABLE HEMOSTAT 3GM 3013SP

## (undated) DEVICE — LINEN HALF SHEET 5512

## (undated) DEVICE — ESU GROUND PAD ADULT W/CORD E7507

## (undated) DEVICE — GLOVE PROTEXIS POWDER FREE SMT 7.5  2D72PT75X

## (undated) DEVICE — DRSG KERLIX FLUFFS X5

## (undated) DEVICE — DRAIN JACKSON PRATT 15FR ROUND SU130-1323

## (undated) DEVICE — LINEN TOWEL PACK X10 5473

## (undated) DEVICE — DRAPE LAP PEDS DISP 29492

## (undated) RX ORDER — PROPOFOL 10 MG/ML
INJECTION, EMULSION INTRAVENOUS
Status: DISPENSED
Start: 2021-11-17

## (undated) RX ORDER — FENTANYL CITRATE 0.05 MG/ML
INJECTION, SOLUTION INTRAMUSCULAR; INTRAVENOUS
Status: DISPENSED
Start: 2021-06-02

## (undated) RX ORDER — CEFAZOLIN SODIUM 2 G/100ML
INJECTION, SOLUTION INTRAVENOUS
Status: DISPENSED
Start: 2021-06-02

## (undated) RX ORDER — FENTANYL CITRATE 50 UG/ML
INJECTION, SOLUTION INTRAMUSCULAR; INTRAVENOUS
Status: DISPENSED
Start: 2017-11-16

## (undated) RX ORDER — FENTANYL CITRATE 50 UG/ML
INJECTION, SOLUTION INTRAMUSCULAR; INTRAVENOUS
Status: DISPENSED
Start: 2021-06-02

## (undated) RX ORDER — CLINDAMYCIN PHOSPHATE 900 MG/50ML
INJECTION, SOLUTION INTRAVENOUS
Status: DISPENSED
Start: 2017-12-11

## (undated) RX ORDER — HEPARIN SODIUM (PORCINE) LOCK FLUSH IV SOLN 100 UNIT/ML 100 UNIT/ML
SOLUTION INTRAVENOUS
Status: DISPENSED
Start: 2019-01-31

## (undated) RX ORDER — FENTANYL CITRATE 50 UG/ML
INJECTION, SOLUTION INTRAMUSCULAR; INTRAVENOUS
Status: DISPENSED
Start: 2021-11-17

## (undated) RX ORDER — LIDOCAINE HYDROCHLORIDE 20 MG/ML
INJECTION, SOLUTION EPIDURAL; INFILTRATION; INTRACAUDAL; PERINEURAL
Status: DISPENSED
Start: 2021-06-02

## (undated) RX ORDER — LIDOCAINE HYDROCHLORIDE AND EPINEPHRINE 10; 10 MG/ML; UG/ML
INJECTION, SOLUTION INFILTRATION; PERINEURAL
Status: DISPENSED
Start: 2017-11-16

## (undated) RX ORDER — ONDANSETRON 2 MG/ML
INJECTION INTRAMUSCULAR; INTRAVENOUS
Status: DISPENSED
Start: 2017-11-16

## (undated) RX ORDER — PROPOFOL 10 MG/ML
INJECTION, EMULSION INTRAVENOUS
Status: DISPENSED
Start: 2021-06-02

## (undated) RX ORDER — BUPIVACAINE HYDROCHLORIDE 5 MG/ML
INJECTION, SOLUTION EPIDURAL; INTRACAUDAL
Status: DISPENSED
Start: 2021-06-02

## (undated) RX ORDER — KETOROLAC TROMETHAMINE 30 MG/ML
INJECTION, SOLUTION INTRAMUSCULAR; INTRAVENOUS
Status: DISPENSED
Start: 2021-11-17

## (undated) RX ORDER — HYDROMORPHONE HYDROCHLORIDE 1 MG/ML
INJECTION, SOLUTION INTRAMUSCULAR; INTRAVENOUS; SUBCUTANEOUS
Status: DISPENSED
Start: 2021-06-02

## (undated) RX ORDER — HYDROCODONE BITARTRATE AND ACETAMINOPHEN 5; 325 MG/1; MG/1
TABLET ORAL
Status: DISPENSED
Start: 2021-11-17

## (undated) RX ORDER — LIDOCAINE HYDROCHLORIDE 10 MG/ML
INJECTION, SOLUTION EPIDURAL; INFILTRATION; INTRACAUDAL; PERINEURAL
Status: DISPENSED
Start: 2021-11-17

## (undated) RX ORDER — DEXAMETHASONE SODIUM PHOSPHATE 4 MG/ML
INJECTION, SOLUTION INTRA-ARTICULAR; INTRALESIONAL; INTRAMUSCULAR; INTRAVENOUS; SOFT TISSUE
Status: DISPENSED
Start: 2021-06-02

## (undated) RX ORDER — HEPARIN SODIUM (PORCINE) LOCK FLUSH IV SOLN 100 UNIT/ML 100 UNIT/ML
SOLUTION INTRAVENOUS
Status: DISPENSED
Start: 2019-05-07

## (undated) RX ORDER — ONDANSETRON 2 MG/ML
INJECTION INTRAMUSCULAR; INTRAVENOUS
Status: DISPENSED
Start: 2021-06-02

## (undated) RX ORDER — HYDROCODONE BITARTRATE AND ACETAMINOPHEN 5; 325 MG/1; MG/1
TABLET ORAL
Status: DISPENSED
Start: 2017-11-16

## (undated) RX ORDER — BUPIVACAINE HYDROCHLORIDE 2.5 MG/ML
INJECTION, SOLUTION EPIDURAL; INFILTRATION; INTRACAUDAL
Status: DISPENSED
Start: 2017-11-16

## (undated) RX ORDER — FENTANYL CITRATE 50 UG/ML
INJECTION, SOLUTION INTRAMUSCULAR; INTRAVENOUS
Status: DISPENSED
Start: 2021-07-22

## (undated) RX ORDER — WATER 10 ML/10ML
INJECTION INTRAMUSCULAR; INTRAVENOUS; SUBCUTANEOUS
Status: DISPENSED
Start: 2021-06-02

## (undated) RX ORDER — CLINDAMYCIN PHOSPHATE 900 MG/50ML
INJECTION, SOLUTION INTRAVENOUS
Status: DISPENSED
Start: 2019-05-13

## (undated) RX ORDER — BUPIVACAINE HYDROCHLORIDE 5 MG/ML
INJECTION, SOLUTION EPIDURAL; INTRACAUDAL
Status: DISPENSED
Start: 2021-11-17

## (undated) RX ORDER — CLINDAMYCIN PHOSPHATE 600 MG/50ML
INJECTION, SOLUTION INTRAVENOUS
Status: DISPENSED
Start: 2017-11-16

## (undated) RX ORDER — CEFAZOLIN SODIUM 2 G/100ML
INJECTION, SOLUTION INTRAVENOUS
Status: DISPENSED
Start: 2021-11-17

## (undated) RX ORDER — PROPOFOL 10 MG/ML
INJECTION, EMULSION INTRAVENOUS
Status: DISPENSED
Start: 2017-11-16

## (undated) RX ORDER — DEXAMETHASONE SODIUM PHOSPHATE 4 MG/ML
INJECTION, SOLUTION INTRA-ARTICULAR; INTRALESIONAL; INTRAMUSCULAR; INTRAVENOUS; SOFT TISSUE
Status: DISPENSED
Start: 2017-11-16

## (undated) RX ORDER — LIDOCAINE HYDROCHLORIDE 10 MG/ML
INJECTION, SOLUTION EPIDURAL; INFILTRATION; INTRACAUDAL; PERINEURAL
Status: DISPENSED
Start: 2017-11-16